# Patient Record
Sex: MALE | Race: BLACK OR AFRICAN AMERICAN | NOT HISPANIC OR LATINO | Employment: UNEMPLOYED | URBAN - METROPOLITAN AREA
[De-identification: names, ages, dates, MRNs, and addresses within clinical notes are randomized per-mention and may not be internally consistent; named-entity substitution may affect disease eponyms.]

---

## 2021-09-19 ENCOUNTER — HOSPITAL ENCOUNTER (INPATIENT)
Facility: OTHER | Age: 56
LOS: 17 days | Discharge: NURSING, REHAB OR LONG-TERM CARE | DRG: 239 | End: 2021-10-06
Attending: EMERGENCY MEDICINE | Admitting: INTERNAL MEDICINE
Payer: MEDICAID

## 2021-09-19 DIAGNOSIS — R00.0 TACHYCARDIA: ICD-10-CM

## 2021-09-19 DIAGNOSIS — S91.301D OPEN WOUND OF FOOT, RIGHT, SUBSEQUENT ENCOUNTER: ICD-10-CM

## 2021-09-19 DIAGNOSIS — A41.9 SEPSIS, DUE TO UNSPECIFIED ORGANISM, UNSPECIFIED WHETHER ACUTE ORGAN DYSFUNCTION PRESENT: ICD-10-CM

## 2021-09-19 DIAGNOSIS — I96 GANGRENE OF TOE OF RIGHT FOOT: ICD-10-CM

## 2021-09-19 DIAGNOSIS — E11.52 DIABETIC WET GANGRENE OF THE FOOT: Primary | ICD-10-CM

## 2021-09-19 DIAGNOSIS — F32.A DEPRESSION, UNSPECIFIED DEPRESSION TYPE: ICD-10-CM

## 2021-09-19 DIAGNOSIS — E66.01 CLASS 2 SEVERE OBESITY DUE TO EXCESS CALORIES WITH SERIOUS COMORBIDITY AND BODY MASS INDEX (BMI) OF 38.0 TO 38.9 IN ADULT: ICD-10-CM

## 2021-09-19 DIAGNOSIS — Z79.4 TYPE 2 DIABETES MELLITUS WITH HYPERGLYCEMIA, WITH LONG-TERM CURRENT USE OF INSULIN: ICD-10-CM

## 2021-09-19 DIAGNOSIS — I10 BENIGN ESSENTIAL HTN: ICD-10-CM

## 2021-09-19 DIAGNOSIS — E78.5 HYPERLIPIDEMIA, UNSPECIFIED HYPERLIPIDEMIA TYPE: ICD-10-CM

## 2021-09-19 DIAGNOSIS — N17.9 AKI (ACUTE KIDNEY INJURY): ICD-10-CM

## 2021-09-19 DIAGNOSIS — E11.65 TYPE 2 DIABETES MELLITUS WITH HYPERGLYCEMIA, WITH LONG-TERM CURRENT USE OF INSULIN: ICD-10-CM

## 2021-09-19 LAB
ALLENS TEST: ABNORMAL
B-OH-BUTYR BLD STRIP-SCNC: 0.6 MMOL/L (ref 0–0.5)
HCO3 UR-SCNC: 31.8 MMOL/L (ref 24–28)
HCT VFR BLD CALC: 49 %PCV (ref 36–54)
HGB BLD-MCNC: 17 G/DL
PCO2 BLDA: 40.2 MMHG (ref 35–45)
PH SMN: 7.51 [PH] (ref 7.35–7.45)
PO2 BLDA: 39 MMHG (ref 40–60)
POC BE: 9 MMOL/L
POC IONIZED CALCIUM: 1.15 MMOL/L (ref 1.06–1.42)
POC SATURATED O2: 78 % (ref 95–100)
POC TCO2: 33 MMOL/L (ref 24–29)
POCT GLUCOSE: 437 MG/DL (ref 70–110)
POTASSIUM BLD-SCNC: 3.3 MMOL/L (ref 3.5–5.1)
SAMPLE: ABNORMAL
SITE: ABNORMAL
SODIUM BLD-SCNC: 129 MMOL/L (ref 136–145)

## 2021-09-19 PROCEDURE — U0002 COVID-19 LAB TEST NON-CDC: HCPCS | Performed by: EMERGENCY MEDICINE

## 2021-09-19 PROCEDURE — 99900035 HC TECH TIME PER 15 MIN (STAT)

## 2021-09-19 PROCEDURE — 80053 COMPREHEN METABOLIC PANEL: CPT | Performed by: EMERGENCY MEDICINE

## 2021-09-19 PROCEDURE — 96361 HYDRATE IV INFUSION ADD-ON: CPT

## 2021-09-19 PROCEDURE — 25000003 PHARM REV CODE 250: Performed by: EMERGENCY MEDICINE

## 2021-09-19 PROCEDURE — 93005 ELECTROCARDIOGRAM TRACING: CPT

## 2021-09-19 PROCEDURE — 82010 KETONE BODYS QUAN: CPT | Performed by: EMERGENCY MEDICINE

## 2021-09-19 PROCEDURE — 82962 GLUCOSE BLOOD TEST: CPT

## 2021-09-19 PROCEDURE — 87186 SC STD MICRODIL/AGAR DIL: CPT | Performed by: EMERGENCY MEDICINE

## 2021-09-19 PROCEDURE — 82803 BLOOD GASES ANY COMBINATION: CPT

## 2021-09-19 PROCEDURE — 87147 CULTURE TYPE IMMUNOLOGIC: CPT | Performed by: EMERGENCY MEDICINE

## 2021-09-19 PROCEDURE — 83605 ASSAY OF LACTIC ACID: CPT | Performed by: EMERGENCY MEDICINE

## 2021-09-19 PROCEDURE — 87040 BLOOD CULTURE FOR BACTERIA: CPT | Mod: 59 | Performed by: EMERGENCY MEDICINE

## 2021-09-19 PROCEDURE — 12000002 HC ACUTE/MED SURGE SEMI-PRIVATE ROOM

## 2021-09-19 PROCEDURE — 83735 ASSAY OF MAGNESIUM: CPT | Performed by: EMERGENCY MEDICINE

## 2021-09-19 PROCEDURE — 85025 COMPLETE CBC W/AUTO DIFF WBC: CPT | Performed by: EMERGENCY MEDICINE

## 2021-09-19 RX ORDER — HYDROCHLOROTHIAZIDE 25 MG/1
25 TABLET ORAL DAILY
Status: ON HOLD | COMMUNITY
Start: 2021-09-16 | End: 2021-10-04 | Stop reason: HOSPADM

## 2021-09-19 RX ORDER — INSULIN GLARGINE 100 [IU]/ML
100 INJECTION, SOLUTION SUBCUTANEOUS DAILY
Status: ON HOLD | COMMUNITY
Start: 2021-08-23 | End: 2021-10-04 | Stop reason: HOSPADM

## 2021-09-19 RX ORDER — SODIUM CHLORIDE 9 MG/ML
INJECTION, SOLUTION INTRAVENOUS
Status: COMPLETED | OUTPATIENT
Start: 2021-09-19 | End: 2021-09-20

## 2021-09-19 RX ORDER — METFORMIN HYDROCHLORIDE 1000 MG/1
1000 TABLET ORAL 2 TIMES DAILY
Status: ON HOLD | COMMUNITY
Start: 2021-08-23 | End: 2022-07-15 | Stop reason: SDUPTHER

## 2021-09-19 RX ORDER — ASPIRIN 81 MG/1
81 TABLET ORAL DAILY
Status: ON HOLD | COMMUNITY
Start: 2021-09-16 | End: 2022-07-15 | Stop reason: SDUPTHER

## 2021-09-19 RX ORDER — QUETIAPINE FUMARATE 100 MG/1
100 TABLET, FILM COATED ORAL NIGHTLY
COMMUNITY
Start: 2021-04-29 | End: 2021-12-07

## 2021-09-19 RX ORDER — LOSARTAN POTASSIUM 25 MG/1
25 TABLET ORAL 2 TIMES DAILY
COMMUNITY
Start: 2021-08-23 | End: 2022-07-15

## 2021-09-19 RX ORDER — LISINOPRIL 10 MG/1
10 TABLET ORAL DAILY
Status: ON HOLD | COMMUNITY
Start: 2021-07-30 | End: 2021-10-04 | Stop reason: HOSPADM

## 2021-09-19 RX ORDER — ATORVASTATIN CALCIUM 20 MG/1
40 TABLET, FILM COATED ORAL DAILY
Status: ON HOLD | COMMUNITY
Start: 2021-08-04 | End: 2022-07-15 | Stop reason: SDUPTHER

## 2021-09-19 RX ADMIN — SODIUM CHLORIDE: 0.9 INJECTION, SOLUTION INTRAVENOUS at 11:09

## 2021-09-20 PROBLEM — Z79.4 TYPE 2 DIABETES MELLITUS, WITH LONG-TERM CURRENT USE OF INSULIN: Status: ACTIVE | Noted: 2021-09-20

## 2021-09-20 PROBLEM — F32.A DEPRESSION: Status: ACTIVE | Noted: 2021-09-20

## 2021-09-20 PROBLEM — I10 BENIGN ESSENTIAL HTN: Status: ACTIVE | Noted: 2021-09-20

## 2021-09-20 PROBLEM — N17.9 AKI (ACUTE KIDNEY INJURY): Status: ACTIVE | Noted: 2021-09-20

## 2021-09-20 PROBLEM — E78.5 HLD (HYPERLIPIDEMIA): Status: ACTIVE | Noted: 2021-09-20

## 2021-09-20 PROBLEM — E66.9 CLASS 2 OBESITY IN ADULT: Status: ACTIVE | Noted: 2021-09-20

## 2021-09-20 PROBLEM — E11.9 TYPE 2 DIABETES MELLITUS, WITH LONG-TERM CURRENT USE OF INSULIN: Status: ACTIVE | Noted: 2021-09-20

## 2021-09-20 PROBLEM — E11.52 DIABETIC WET GANGRENE OF THE FOOT: Status: ACTIVE | Noted: 2021-09-20

## 2021-09-20 LAB
ALBUMIN SERPL BCP-MCNC: 2.5 G/DL (ref 3.5–5.2)
ALP SERPL-CCNC: 77 U/L (ref 55–135)
ALT SERPL W/O P-5'-P-CCNC: 34 U/L (ref 10–44)
AMORPH CRY URNS QL MICRO: NORMAL
ANION GAP SERPL CALC-SCNC: 12 MMOL/L (ref 8–16)
ANION GAP SERPL CALC-SCNC: 15 MMOL/L (ref 8–16)
AST SERPL-CCNC: 33 U/L (ref 10–40)
BACTERIA #/AREA URNS HPF: NORMAL /HPF
BASOPHILS # BLD AUTO: 0.05 K/UL (ref 0–0.2)
BASOPHILS # BLD AUTO: 0.06 K/UL (ref 0–0.2)
BASOPHILS NFR BLD: 0.2 % (ref 0–1.9)
BASOPHILS NFR BLD: 0.3 % (ref 0–1.9)
BILIRUB SERPL-MCNC: 1.2 MG/DL (ref 0.1–1)
BILIRUB UR QL STRIP: NEGATIVE
BNP SERPL-MCNC: 47 PG/ML (ref 0–99)
BUN SERPL-MCNC: 16 MG/DL (ref 6–20)
BUN SERPL-MCNC: 16 MG/DL (ref 6–20)
CALCIUM SERPL-MCNC: 8.5 MG/DL (ref 8.7–10.5)
CALCIUM SERPL-MCNC: 9.8 MG/DL (ref 8.7–10.5)
CHLORIDE SERPL-SCNC: 88 MMOL/L (ref 95–110)
CHLORIDE SERPL-SCNC: 94 MMOL/L (ref 95–110)
CLARITY UR: CLEAR
CO2 SERPL-SCNC: 24 MMOL/L (ref 23–29)
CO2 SERPL-SCNC: 25 MMOL/L (ref 23–29)
COLOR UR: YELLOW
CREAT SERPL-MCNC: 1.3 MG/DL (ref 0.5–1.4)
CREAT SERPL-MCNC: 1.5 MG/DL (ref 0.5–1.4)
CREAT UR-MCNC: 78.8 MG/DL (ref 23–375)
CRP SERPL-MCNC: 339.3 MG/L (ref 0–8.2)
CTP QC/QA: YES
DIFFERENTIAL METHOD: ABNORMAL
DIFFERENTIAL METHOD: ABNORMAL
EOSINOPHIL # BLD AUTO: 0 K/UL (ref 0–0.5)
EOSINOPHIL # BLD AUTO: 0.1 K/UL (ref 0–0.5)
EOSINOPHIL NFR BLD: 0 % (ref 0–8)
EOSINOPHIL NFR BLD: 0.3 % (ref 0–8)
ERYTHROCYTE [DISTWIDTH] IN BLOOD BY AUTOMATED COUNT: 12.6 % (ref 11.5–14.5)
ERYTHROCYTE [DISTWIDTH] IN BLOOD BY AUTOMATED COUNT: 12.6 % (ref 11.5–14.5)
ERYTHROCYTE [SEDIMENTATION RATE] IN BLOOD: 120 MM/HR (ref 0–10)
EST. GFR  (AFRICAN AMERICAN): 58 ML/MIN/1.73 M^2
EST. GFR  (AFRICAN AMERICAN): >60 ML/MIN/1.73 M^2
EST. GFR  (NON AFRICAN AMERICAN): 51 ML/MIN/1.73 M^2
EST. GFR  (NON AFRICAN AMERICAN): >60 ML/MIN/1.73 M^2
ESTIMATED AVG GLUCOSE: 315 MG/DL (ref 68–131)
GLUCOSE SERPL-MCNC: 375 MG/DL (ref 70–110)
GLUCOSE SERPL-MCNC: 497 MG/DL (ref 70–110)
GLUCOSE UR QL STRIP: ABNORMAL
HBA1C MFR BLD: 12.6 % (ref 4–5.6)
HCT VFR BLD AUTO: 32 % (ref 40–54)
HCT VFR BLD AUTO: 36 % (ref 40–54)
HGB BLD-MCNC: 10.3 G/DL (ref 14–18)
HGB BLD-MCNC: 11.9 G/DL (ref 14–18)
HGB UR QL STRIP: ABNORMAL
HYALINE CASTS #/AREA URNS LPF: 0 /LPF
IMM GRANULOCYTES # BLD AUTO: 0.32 K/UL (ref 0–0.04)
IMM GRANULOCYTES # BLD AUTO: 0.84 K/UL (ref 0–0.04)
IMM GRANULOCYTES NFR BLD AUTO: 1.3 % (ref 0–0.5)
IMM GRANULOCYTES NFR BLD AUTO: 3.6 % (ref 0–0.5)
KETONES UR QL STRIP: NEGATIVE
LACTATE SERPL-SCNC: 1.5 MMOL/L (ref 0.5–2.2)
LACTATE SERPL-SCNC: 2 MMOL/L (ref 0.5–2.2)
LEUKOCYTE ESTERASE UR QL STRIP: NEGATIVE
LYMPHOCYTES # BLD AUTO: 1.1 K/UL (ref 1–4.8)
LYMPHOCYTES # BLD AUTO: 1.6 K/UL (ref 1–4.8)
LYMPHOCYTES NFR BLD: 4.7 % (ref 18–48)
LYMPHOCYTES NFR BLD: 6.9 % (ref 18–48)
MAGNESIUM SERPL-MCNC: 1.6 MG/DL (ref 1.6–2.6)
MAGNESIUM SERPL-MCNC: 1.6 MG/DL (ref 1.6–2.6)
MCH RBC QN AUTO: 27.8 PG (ref 27–31)
MCH RBC QN AUTO: 28.3 PG (ref 27–31)
MCHC RBC AUTO-ENTMCNC: 32.2 G/DL (ref 32–36)
MCHC RBC AUTO-ENTMCNC: 33.1 G/DL (ref 32–36)
MCV RBC AUTO: 86 FL (ref 82–98)
MCV RBC AUTO: 87 FL (ref 82–98)
MICROSCOPIC COMMENT: NORMAL
MONOCYTES # BLD AUTO: 2.1 K/UL (ref 0.3–1)
MONOCYTES # BLD AUTO: 2.3 K/UL (ref 0.3–1)
MONOCYTES NFR BLD: 9.2 % (ref 4–15)
MONOCYTES NFR BLD: 9.6 % (ref 4–15)
NEUTROPHILS # BLD AUTO: 18.6 K/UL (ref 1.8–7.7)
NEUTROPHILS # BLD AUTO: 20 K/UL (ref 1.8–7.7)
NEUTROPHILS NFR BLD: 80 % (ref 38–73)
NEUTROPHILS NFR BLD: 83.9 % (ref 38–73)
NITRITE UR QL STRIP: NEGATIVE
NRBC BLD-RTO: 0 /100 WBC
NRBC BLD-RTO: 0 /100 WBC
OSMOLALITY UR: 493 MOSM/KG (ref 50–1200)
PH UR STRIP: 6 [PH] (ref 5–8)
PLATELET # BLD AUTO: 317 K/UL (ref 150–450)
PLATELET # BLD AUTO: 368 K/UL (ref 150–450)
PLATELET BLD QL SMEAR: ABNORMAL
PLATELET BLD QL SMEAR: ABNORMAL
PMV BLD AUTO: 10 FL (ref 9.2–12.9)
PMV BLD AUTO: 10 FL (ref 9.2–12.9)
POCT GLUCOSE: 261 MG/DL (ref 70–110)
POCT GLUCOSE: 268 MG/DL (ref 70–110)
POCT GLUCOSE: 357 MG/DL (ref 70–110)
POCT GLUCOSE: 367 MG/DL (ref 70–110)
POTASSIUM SERPL-SCNC: 3.3 MMOL/L (ref 3.5–5.1)
POTASSIUM SERPL-SCNC: 3.6 MMOL/L (ref 3.5–5.1)
PROT SERPL-MCNC: 9.1 G/DL (ref 6–8.4)
PROT UR QL STRIP: ABNORMAL
PROT UR-MCNC: 29 MG/DL (ref 0–15)
PROT/CREAT UR: 0.37 MG/G{CREAT} (ref 0–0.2)
RBC # BLD AUTO: 3.7 M/UL (ref 4.6–6.2)
RBC # BLD AUTO: 4.21 M/UL (ref 4.6–6.2)
RBC #/AREA URNS HPF: 2 /HPF (ref 0–4)
SARS-COV-2 RDRP RESP QL NAA+PROBE: NEGATIVE
SODIUM SERPL-SCNC: 127 MMOL/L (ref 136–145)
SODIUM SERPL-SCNC: 131 MMOL/L (ref 136–145)
SODIUM UR-SCNC: <20 MMOL/L (ref 20–250)
SP GR UR STRIP: 1.01 (ref 1–1.03)
SQUAMOUS #/AREA URNS HPF: 0 /HPF
URN SPEC COLLECT METH UR: ABNORMAL
UROBILINOGEN UR STRIP-ACNC: ABNORMAL EU/DL
WBC # BLD AUTO: 23.19 K/UL (ref 3.9–12.7)
WBC # BLD AUTO: 23.78 K/UL (ref 3.9–12.7)
WBC #/AREA URNS HPF: 0 /HPF (ref 0–5)
YEAST URNS QL MICRO: NORMAL

## 2021-09-20 PROCEDURE — 99223 PR INITIAL HOSPITAL CARE,LEVL III: ICD-10-PCS | Mod: ,,, | Performed by: INTERNAL MEDICINE

## 2021-09-20 PROCEDURE — 87076 CULTURE ANAEROBE IDENT EACH: CPT | Performed by: PODIATRIST

## 2021-09-20 PROCEDURE — 83735 ASSAY OF MAGNESIUM: CPT | Performed by: INTERNAL MEDICINE

## 2021-09-20 PROCEDURE — 83935 ASSAY OF URINE OSMOLALITY: CPT | Performed by: PHYSICIAN ASSISTANT

## 2021-09-20 PROCEDURE — 36415 COLL VENOUS BLD VENIPUNCTURE: CPT | Performed by: EMERGENCY MEDICINE

## 2021-09-20 PROCEDURE — 25000003 PHARM REV CODE 250: Performed by: EMERGENCY MEDICINE

## 2021-09-20 PROCEDURE — 84156 ASSAY OF PROTEIN URINE: CPT | Performed by: PHYSICIAN ASSISTANT

## 2021-09-20 PROCEDURE — 87070 CULTURE OTHR SPECIMN AEROBIC: CPT | Performed by: PODIATRIST

## 2021-09-20 PROCEDURE — 96365 THER/PROPH/DIAG IV INF INIT: CPT

## 2021-09-20 PROCEDURE — 87102 FUNGUS ISOLATION CULTURE: CPT | Performed by: PODIATRIST

## 2021-09-20 PROCEDURE — 63600175 PHARM REV CODE 636 W HCPCS: Performed by: PHYSICIAN ASSISTANT

## 2021-09-20 PROCEDURE — 87075 CULTR BACTERIA EXCEPT BLOOD: CPT | Performed by: PODIATRIST

## 2021-09-20 PROCEDURE — 80048 BASIC METABOLIC PNL TOTAL CA: CPT | Performed by: INTERNAL MEDICINE

## 2021-09-20 PROCEDURE — 87015 SPECIMEN INFECT AGNT CONCNTJ: CPT | Performed by: PODIATRIST

## 2021-09-20 PROCEDURE — 83036 HEMOGLOBIN GLYCOSYLATED A1C: CPT | Performed by: PHYSICIAN ASSISTANT

## 2021-09-20 PROCEDURE — 36415 COLL VENOUS BLD VENIPUNCTURE: CPT | Performed by: PHYSICIAN ASSISTANT

## 2021-09-20 PROCEDURE — 85025 COMPLETE CBC W/AUTO DIFF WBC: CPT | Performed by: INTERNAL MEDICINE

## 2021-09-20 PROCEDURE — 25000003 PHARM REV CODE 250: Performed by: INTERNAL MEDICINE

## 2021-09-20 PROCEDURE — 87206 SMEAR FLUORESCENT/ACID STAI: CPT | Performed by: PODIATRIST

## 2021-09-20 PROCEDURE — 85651 RBC SED RATE NONAUTOMATED: CPT | Performed by: INTERNAL MEDICINE

## 2021-09-20 PROCEDURE — 99223 1ST HOSP IP/OBS HIGH 75: CPT | Mod: ,,, | Performed by: PODIATRIST

## 2021-09-20 PROCEDURE — 83605 ASSAY OF LACTIC ACID: CPT | Performed by: PHYSICIAN ASSISTANT

## 2021-09-20 PROCEDURE — 86140 C-REACTIVE PROTEIN: CPT | Performed by: INTERNAL MEDICINE

## 2021-09-20 PROCEDURE — 99223 PR INITIAL HOSPITAL CARE,LEVL III: ICD-10-PCS | Mod: ,,, | Performed by: PODIATRIST

## 2021-09-20 PROCEDURE — 83880 ASSAY OF NATRIURETIC PEPTIDE: CPT | Performed by: EMERGENCY MEDICINE

## 2021-09-20 PROCEDURE — 84300 ASSAY OF URINE SODIUM: CPT | Performed by: PHYSICIAN ASSISTANT

## 2021-09-20 PROCEDURE — 63600175 PHARM REV CODE 636 W HCPCS: Performed by: EMERGENCY MEDICINE

## 2021-09-20 PROCEDURE — 99223 1ST HOSP IP/OBS HIGH 75: CPT | Mod: ,,, | Performed by: INTERNAL MEDICINE

## 2021-09-20 PROCEDURE — 99291 CRITICAL CARE FIRST HOUR: CPT | Mod: 25

## 2021-09-20 PROCEDURE — 81000 URINALYSIS NONAUTO W/SCOPE: CPT | Performed by: EMERGENCY MEDICINE

## 2021-09-20 PROCEDURE — 25000003 PHARM REV CODE 250: Performed by: PHYSICIAN ASSISTANT

## 2021-09-20 PROCEDURE — 36415 COLL VENOUS BLD VENIPUNCTURE: CPT | Performed by: INTERNAL MEDICINE

## 2021-09-20 PROCEDURE — 63600175 PHARM REV CODE 636 W HCPCS: Performed by: INTERNAL MEDICINE

## 2021-09-20 PROCEDURE — 87116 MYCOBACTERIA CULTURE: CPT | Performed by: PODIATRIST

## 2021-09-20 PROCEDURE — 12000002 HC ACUTE/MED SURGE SEMI-PRIVATE ROOM

## 2021-09-20 PROCEDURE — C9399 UNCLASSIFIED DRUGS OR BIOLOG: HCPCS | Performed by: PHYSICIAN ASSISTANT

## 2021-09-20 PROCEDURE — 96367 TX/PROPH/DG ADDL SEQ IV INF: CPT

## 2021-09-20 RX ORDER — ASPIRIN 81 MG/1
81 TABLET ORAL DAILY
Status: DISCONTINUED | OUTPATIENT
Start: 2021-09-20 | End: 2021-10-06 | Stop reason: HOSPADM

## 2021-09-20 RX ORDER — SODIUM CHLORIDE 9 MG/ML
INJECTION, SOLUTION INTRAVENOUS CONTINUOUS
Status: DISCONTINUED | OUTPATIENT
Start: 2021-09-20 | End: 2021-09-22

## 2021-09-20 RX ORDER — IBUPROFEN 200 MG
24 TABLET ORAL
Status: DISCONTINUED | OUTPATIENT
Start: 2021-09-20 | End: 2021-10-06 | Stop reason: HOSPADM

## 2021-09-20 RX ORDER — ACETAMINOPHEN 325 MG/1
650 TABLET ORAL EVERY 6 HOURS PRN
Status: DISCONTINUED | OUTPATIENT
Start: 2021-09-20 | End: 2021-09-21

## 2021-09-20 RX ORDER — HYDROCODONE BITARTRATE AND ACETAMINOPHEN 5; 325 MG/1; MG/1
1 TABLET ORAL EVERY 6 HOURS PRN
Status: DISCONTINUED | OUTPATIENT
Start: 2021-09-20 | End: 2021-09-21

## 2021-09-20 RX ORDER — IBUPROFEN 200 MG
16 TABLET ORAL
Status: DISCONTINUED | OUTPATIENT
Start: 2021-09-20 | End: 2021-10-06 | Stop reason: HOSPADM

## 2021-09-20 RX ORDER — MORPHINE SULFATE 2 MG/ML
2 INJECTION, SOLUTION INTRAMUSCULAR; INTRAVENOUS
Status: DISCONTINUED | OUTPATIENT
Start: 2021-09-20 | End: 2021-09-21

## 2021-09-20 RX ORDER — HYDRALAZINE HYDROCHLORIDE 25 MG/1
25 TABLET, FILM COATED ORAL EVERY 8 HOURS PRN
Status: DISCONTINUED | OUTPATIENT
Start: 2021-09-20 | End: 2021-09-26

## 2021-09-20 RX ORDER — SODIUM CHLORIDE 9 MG/ML
INJECTION, SOLUTION INTRAVENOUS
Status: COMPLETED | OUTPATIENT
Start: 2021-09-20 | End: 2021-09-20

## 2021-09-20 RX ORDER — ATORVASTATIN CALCIUM 20 MG/1
20 TABLET, FILM COATED ORAL DAILY
Status: DISCONTINUED | OUTPATIENT
Start: 2021-09-20 | End: 2021-10-06 | Stop reason: HOSPADM

## 2021-09-20 RX ORDER — MEROPENEM AND SODIUM CHLORIDE 1 G/50ML
1 INJECTION, SOLUTION INTRAVENOUS
Status: DISCONTINUED | OUTPATIENT
Start: 2021-09-20 | End: 2021-09-24

## 2021-09-20 RX ORDER — GLUCAGON 1 MG
1 KIT INJECTION
Status: DISCONTINUED | OUTPATIENT
Start: 2021-09-20 | End: 2021-10-06 | Stop reason: HOSPADM

## 2021-09-20 RX ORDER — INSULIN ASPART 100 [IU]/ML
0-5 INJECTION, SOLUTION INTRAVENOUS; SUBCUTANEOUS
Status: DISCONTINUED | OUTPATIENT
Start: 2021-09-20 | End: 2021-10-06 | Stop reason: HOSPADM

## 2021-09-20 RX ADMIN — PIPERACILLIN SODIUM AND TAZOBACTAM SODIUM 4.5 G: 4; .5 INJECTION, POWDER, FOR SOLUTION INTRAVENOUS at 12:09

## 2021-09-20 RX ADMIN — VANCOMYCIN HYDROCHLORIDE 1500 MG: 1.5 INJECTION, POWDER, LYOPHILIZED, FOR SOLUTION INTRAVENOUS at 02:09

## 2021-09-20 RX ADMIN — SODIUM CHLORIDE: 0.9 INJECTION, SOLUTION INTRAVENOUS at 11:09

## 2021-09-20 RX ADMIN — QUETIAPINE 125 MG: 100 TABLET ORAL at 02:09

## 2021-09-20 RX ADMIN — ATORVASTATIN CALCIUM 20 MG: 20 TABLET, FILM COATED ORAL at 09:09

## 2021-09-20 RX ADMIN — HYDROCODONE BITARTRATE AND ACETAMINOPHEN 1 TABLET: 5; 325 TABLET ORAL at 11:09

## 2021-09-20 RX ADMIN — HYDROCODONE BITARTRATE AND ACETAMINOPHEN 1 TABLET: 5; 325 TABLET ORAL at 06:09

## 2021-09-20 RX ADMIN — MEROPENEM AND SODIUM CHLORIDE 1 G: 1 INJECTION, SOLUTION INTRAVENOUS at 11:09

## 2021-09-20 RX ADMIN — SODIUM CHLORIDE: 0.9 INJECTION, SOLUTION INTRAVENOUS at 12:09

## 2021-09-20 RX ADMIN — INSULIN DETEMIR 40 UNITS: 100 INJECTION, SOLUTION SUBCUTANEOUS at 02:09

## 2021-09-20 RX ADMIN — POTASSIUM BICARBONATE 50 MEQ: 978 TABLET, EFFERVESCENT ORAL at 02:09

## 2021-09-20 RX ADMIN — INSULIN DETEMIR 40 UNITS: 100 INJECTION, SOLUTION SUBCUTANEOUS at 09:09

## 2021-09-20 RX ADMIN — MEROPENEM AND SODIUM CHLORIDE 1 G: 1 INJECTION, SOLUTION INTRAVENOUS at 02:09

## 2021-09-20 RX ADMIN — MORPHINE SULFATE 2 MG: 2 INJECTION, SOLUTION INTRAMUSCULAR; INTRAVENOUS at 01:09

## 2021-09-20 RX ADMIN — VANCOMYCIN HYDROCHLORIDE 2000 MG: 10 INJECTION, POWDER, LYOPHILIZED, FOR SOLUTION INTRAVENOUS at 12:09

## 2021-09-20 RX ADMIN — INSULIN ASPART 5 UNITS: 100 INJECTION, SOLUTION INTRAVENOUS; SUBCUTANEOUS at 07:09

## 2021-09-20 RX ADMIN — VANCOMYCIN HYDROCHLORIDE 500 MG: 500 INJECTION, POWDER, LYOPHILIZED, FOR SOLUTION INTRAVENOUS at 07:09

## 2021-09-20 RX ADMIN — INSULIN ASPART 3 UNITS: 100 INJECTION, SOLUTION INTRAVENOUS; SUBCUTANEOUS at 06:09

## 2021-09-20 RX ADMIN — ACETAMINOPHEN 650 MG: 325 TABLET ORAL at 02:09

## 2021-09-20 RX ADMIN — ASPIRIN 81 MG: 81 TABLET, COATED ORAL at 09:09

## 2021-09-20 RX ADMIN — QUETIAPINE 125 MG: 100 TABLET ORAL at 09:09

## 2021-09-20 RX ADMIN — INSULIN ASPART 5 UNITS: 100 INJECTION, SOLUTION INTRAVENOUS; SUBCUTANEOUS at 01:09

## 2021-09-20 RX ADMIN — MEROPENEM AND SODIUM CHLORIDE 1 G: 1 INJECTION, SOLUTION INTRAVENOUS at 09:09

## 2021-09-20 RX ADMIN — HYDROCODONE BITARTRATE AND ACETAMINOPHEN 1 TABLET: 5; 325 TABLET ORAL at 02:09

## 2021-09-20 RX ADMIN — SODIUM CHLORIDE: 0.9 INJECTION, SOLUTION INTRAVENOUS at 04:09

## 2021-09-21 ENCOUNTER — ANESTHESIA EVENT (OUTPATIENT)
Dept: SURGERY | Facility: OTHER | Age: 56
DRG: 239 | End: 2021-09-21
Payer: MEDICAID

## 2021-09-21 ENCOUNTER — ANESTHESIA (OUTPATIENT)
Dept: SURGERY | Facility: OTHER | Age: 56
DRG: 239 | End: 2021-09-21
Payer: MEDICAID

## 2021-09-21 LAB
POCT GLUCOSE: 194 MG/DL (ref 70–110)
POCT GLUCOSE: 213 MG/DL (ref 70–110)
POCT GLUCOSE: 235 MG/DL (ref 70–110)
VANCOMYCIN TROUGH SERPL-MCNC: 14.8 UG/ML (ref 10–22)

## 2021-09-21 PROCEDURE — 63600175 PHARM REV CODE 636 W HCPCS: Performed by: ANESTHESIOLOGY

## 2021-09-21 PROCEDURE — 63600175 PHARM REV CODE 636 W HCPCS: Performed by: PHYSICIAN ASSISTANT

## 2021-09-21 PROCEDURE — 87015 SPECIMEN INFECT AGNT CONCNTJ: CPT | Mod: 59 | Performed by: PODIATRIST

## 2021-09-21 PROCEDURE — 37000008 HC ANESTHESIA 1ST 15 MINUTES: Performed by: PODIATRIST

## 2021-09-21 PROCEDURE — 37000009 HC ANESTHESIA EA ADD 15 MINS: Performed by: PODIATRIST

## 2021-09-21 PROCEDURE — 28002 TREATMENT OF FOOT INFECTION: CPT | Mod: RT,,, | Performed by: PODIATRIST

## 2021-09-21 PROCEDURE — 87077 CULTURE AEROBIC IDENTIFY: CPT | Performed by: PODIATRIST

## 2021-09-21 PROCEDURE — 87116 MYCOBACTERIA CULTURE: CPT | Performed by: PODIATRIST

## 2021-09-21 PROCEDURE — 10061 I&D ABSCESS COMP/MULTIPLE: CPT | Mod: 59,LT,, | Performed by: PODIATRIST

## 2021-09-21 PROCEDURE — 87102 FUNGUS ISOLATION CULTURE: CPT | Performed by: PODIATRIST

## 2021-09-21 PROCEDURE — 25000003 PHARM REV CODE 250: Performed by: INTERNAL MEDICINE

## 2021-09-21 PROCEDURE — 25000003 PHARM REV CODE 250: Performed by: ANESTHESIOLOGY

## 2021-09-21 PROCEDURE — 99233 PR SUBSEQUENT HOSPITAL CARE,LEVL III: ICD-10-PCS | Mod: ,,, | Performed by: HOSPITALIST

## 2021-09-21 PROCEDURE — 10061 PR DRAIN SKIN ABSCESS COMPLIC: ICD-10-PCS | Mod: 59,LT,, | Performed by: PODIATRIST

## 2021-09-21 PROCEDURE — 63600175 PHARM REV CODE 636 W HCPCS: Performed by: INTERNAL MEDICINE

## 2021-09-21 PROCEDURE — 36415 COLL VENOUS BLD VENIPUNCTURE: CPT | Performed by: HOSPITALIST

## 2021-09-21 PROCEDURE — 25000003 PHARM REV CODE 250: Performed by: PHYSICIAN ASSISTANT

## 2021-09-21 PROCEDURE — 87206 SMEAR FLUORESCENT/ACID STAI: CPT | Mod: 91 | Performed by: PODIATRIST

## 2021-09-21 PROCEDURE — 99233 SBSQ HOSP IP/OBS HIGH 50: CPT | Mod: ,,, | Performed by: HOSPITALIST

## 2021-09-21 PROCEDURE — 87186 SC STD MICRODIL/AGAR DIL: CPT | Performed by: PODIATRIST

## 2021-09-21 PROCEDURE — 76942 ECHO GUIDE FOR BIOPSY: CPT | Performed by: ANESTHESIOLOGY

## 2021-09-21 PROCEDURE — 11000001 HC ACUTE MED/SURG PRIVATE ROOM

## 2021-09-21 PROCEDURE — 80202 ASSAY OF VANCOMYCIN: CPT | Performed by: HOSPITALIST

## 2021-09-21 PROCEDURE — 71000039 HC RECOVERY, EACH ADD'L HOUR: Performed by: PODIATRIST

## 2021-09-21 PROCEDURE — 36000704 HC OR TIME LEV I 1ST 15 MIN: Performed by: PODIATRIST

## 2021-09-21 PROCEDURE — 87205 SMEAR GRAM STAIN: CPT | Performed by: PODIATRIST

## 2021-09-21 PROCEDURE — 71000033 HC RECOVERY, INTIAL HOUR: Performed by: PODIATRIST

## 2021-09-21 PROCEDURE — 25000003 PHARM REV CODE 250: Performed by: PODIATRIST

## 2021-09-21 PROCEDURE — 36000705 HC OR TIME LEV I EA ADD 15 MIN: Performed by: PODIATRIST

## 2021-09-21 PROCEDURE — 94761 N-INVAS EAR/PLS OXIMETRY MLT: CPT

## 2021-09-21 PROCEDURE — 87076 CULTURE ANAEROBE IDENT EACH: CPT | Performed by: PODIATRIST

## 2021-09-21 PROCEDURE — 28002 PR DEEP DISSEC FOOT INFEC,1 BURSA: ICD-10-PCS | Mod: RT,,, | Performed by: PODIATRIST

## 2021-09-21 PROCEDURE — 87070 CULTURE OTHR SPECIMN AEROBIC: CPT | Mod: 59 | Performed by: PODIATRIST

## 2021-09-21 PROCEDURE — 63600175 PHARM REV CODE 636 W HCPCS: Performed by: EMERGENCY MEDICINE

## 2021-09-21 PROCEDURE — 63600175 PHARM REV CODE 636 W HCPCS

## 2021-09-21 PROCEDURE — 63600175 PHARM REV CODE 636 W HCPCS: Performed by: PODIATRIST

## 2021-09-21 PROCEDURE — 87075 CULTR BACTERIA EXCEPT BLOOD: CPT | Performed by: PODIATRIST

## 2021-09-21 RX ORDER — FENTANYL CITRATE 50 UG/ML
INJECTION, SOLUTION INTRAMUSCULAR; INTRAVENOUS
Status: DISCONTINUED | OUTPATIENT
Start: 2021-09-21 | End: 2021-09-21

## 2021-09-21 RX ORDER — OXYCODONE HYDROCHLORIDE 5 MG/1
5 TABLET ORAL
Status: DISCONTINUED | OUTPATIENT
Start: 2021-09-21 | End: 2021-09-21

## 2021-09-21 RX ORDER — ROPIVACAINE HYDROCHLORIDE 5 MG/ML
INJECTION, SOLUTION EPIDURAL; INFILTRATION; PERINEURAL
Status: COMPLETED | OUTPATIENT
Start: 2021-09-21 | End: 2021-09-21

## 2021-09-21 RX ORDER — SODIUM CHLORIDE 0.9 % (FLUSH) 0.9 %
3 SYRINGE (ML) INJECTION
Status: DISCONTINUED | OUTPATIENT
Start: 2021-09-21 | End: 2021-09-21

## 2021-09-21 RX ORDER — FENTANYL CITRATE 50 UG/ML
100 INJECTION, SOLUTION INTRAMUSCULAR; INTRAVENOUS EVERY 5 MIN PRN
Status: CANCELLED | OUTPATIENT
Start: 2021-09-21

## 2021-09-21 RX ORDER — VANCOMYCIN HYDROCHLORIDE 1 G/20ML
INJECTION, POWDER, LYOPHILIZED, FOR SOLUTION INTRAVENOUS
Status: DISCONTINUED | OUTPATIENT
Start: 2021-09-21 | End: 2021-09-21 | Stop reason: HOSPADM

## 2021-09-21 RX ORDER — BACITRACIN ZINC 500 UNIT/G
OINTMENT (GRAM) TOPICAL
Status: DISCONTINUED | OUTPATIENT
Start: 2021-09-21 | End: 2021-09-21 | Stop reason: HOSPADM

## 2021-09-21 RX ORDER — ACETAMINOPHEN 500 MG
1000 TABLET ORAL EVERY 8 HOURS PRN
Status: DISCONTINUED | OUTPATIENT
Start: 2021-09-22 | End: 2021-10-06 | Stop reason: HOSPADM

## 2021-09-21 RX ORDER — HYDROMORPHONE HYDROCHLORIDE 2 MG/ML
0.5 INJECTION, SOLUTION INTRAMUSCULAR; INTRAVENOUS; SUBCUTANEOUS
Status: DISCONTINUED | OUTPATIENT
Start: 2021-09-22 | End: 2021-09-23

## 2021-09-21 RX ORDER — PROCHLORPERAZINE EDISYLATE 5 MG/ML
5 INJECTION INTRAMUSCULAR; INTRAVENOUS EVERY 30 MIN PRN
Status: DISCONTINUED | OUTPATIENT
Start: 2021-09-21 | End: 2021-09-21

## 2021-09-21 RX ORDER — OXYCODONE HYDROCHLORIDE 5 MG/1
5 TABLET ORAL EVERY 4 HOURS PRN
Status: DISCONTINUED | OUTPATIENT
Start: 2021-09-21 | End: 2021-10-06 | Stop reason: HOSPADM

## 2021-09-21 RX ORDER — HYDROMORPHONE HYDROCHLORIDE 2 MG/ML
0.4 INJECTION, SOLUTION INTRAMUSCULAR; INTRAVENOUS; SUBCUTANEOUS EVERY 5 MIN PRN
Status: DISCONTINUED | OUTPATIENT
Start: 2021-09-21 | End: 2021-09-21

## 2021-09-21 RX ORDER — DIPHENHYDRAMINE HYDROCHLORIDE 50 MG/ML
12.5 INJECTION INTRAMUSCULAR; INTRAVENOUS EVERY 30 MIN PRN
Status: DISCONTINUED | OUTPATIENT
Start: 2021-09-21 | End: 2021-09-23

## 2021-09-21 RX ORDER — MIDAZOLAM HYDROCHLORIDE 1 MG/ML
INJECTION INTRAMUSCULAR; INTRAVENOUS
Status: DISCONTINUED | OUTPATIENT
Start: 2021-09-21 | End: 2021-09-21

## 2021-09-21 RX ORDER — LIDOCAINE HYDROCHLORIDE 20 MG/ML
INJECTION, SOLUTION EPIDURAL; INFILTRATION; INTRACAUDAL; PERINEURAL
Status: DISCONTINUED | OUTPATIENT
Start: 2021-09-21 | End: 2021-09-21 | Stop reason: HOSPADM

## 2021-09-21 RX ORDER — BUPIVACAINE HYDROCHLORIDE 5 MG/ML
INJECTION, SOLUTION EPIDURAL; INTRACAUDAL
Status: DISCONTINUED | OUTPATIENT
Start: 2021-09-21 | End: 2021-09-21 | Stop reason: HOSPADM

## 2021-09-21 RX ORDER — MIDAZOLAM HYDROCHLORIDE 1 MG/ML
2 INJECTION INTRAMUSCULAR; INTRAVENOUS
Status: CANCELLED | OUTPATIENT
Start: 2021-09-21 | End: 2021-09-21

## 2021-09-21 RX ADMIN — INSULIN DETEMIR 40 UNITS: 100 INJECTION, SOLUTION SUBCUTANEOUS at 09:09

## 2021-09-21 RX ADMIN — FENTANYL CITRATE 100 MCG: 50 INJECTION, SOLUTION INTRAMUSCULAR; INTRAVENOUS at 08:09

## 2021-09-21 RX ADMIN — VANCOMYCIN HYDROCHLORIDE 1500 MG: 1.5 INJECTION, POWDER, LYOPHILIZED, FOR SOLUTION INTRAVENOUS at 03:09

## 2021-09-21 RX ADMIN — HYDROCODONE BITARTRATE AND ACETAMINOPHEN 1 TABLET: 5; 325 TABLET ORAL at 09:09

## 2021-09-21 RX ADMIN — MORPHINE SULFATE 2 MG: 2 INJECTION, SOLUTION INTRAMUSCULAR; INTRAVENOUS at 06:09

## 2021-09-21 RX ADMIN — HYDROMORPHONE HYDROCHLORIDE 0.4 MG: 2 INJECTION INTRAMUSCULAR; INTRAVENOUS; SUBCUTANEOUS at 10:09

## 2021-09-21 RX ADMIN — OXYCODONE 5 MG: 5 TABLET ORAL at 10:09

## 2021-09-21 RX ADMIN — MEROPENEM AND SODIUM CHLORIDE 1 G: 1 INJECTION, SOLUTION INTRAVENOUS at 04:09

## 2021-09-21 RX ADMIN — VANCOMYCIN HYDROCHLORIDE 1500 MG: 1.5 INJECTION, POWDER, LYOPHILIZED, FOR SOLUTION INTRAVENOUS at 02:09

## 2021-09-21 RX ADMIN — MEROPENEM AND SODIUM CHLORIDE 1 G: 1 INJECTION, SOLUTION INTRAVENOUS at 07:09

## 2021-09-21 RX ADMIN — INSULIN ASPART 2 UNITS: 100 INJECTION, SOLUTION INTRAVENOUS; SUBCUTANEOUS at 04:09

## 2021-09-21 RX ADMIN — MEROPENEM AND SODIUM CHLORIDE 1 G: 1 INJECTION, SOLUTION INTRAVENOUS at 01:09

## 2021-09-21 RX ADMIN — MIDAZOLAM HYDROCHLORIDE 2 MG: 1 INJECTION, SOLUTION INTRAMUSCULAR; INTRAVENOUS at 08:09

## 2021-09-21 RX ADMIN — ROPIVACAINE HYDROCHLORIDE 30 ML: 5 INJECTION, SOLUTION EPIDURAL; INFILTRATION; PERINEURAL at 08:09

## 2021-09-21 RX ADMIN — SODIUM CHLORIDE: 0.9 INJECTION, SOLUTION INTRAVENOUS at 07:09

## 2021-09-21 RX ADMIN — QUETIAPINE 125 MG: 100 TABLET ORAL at 09:09

## 2021-09-21 RX ADMIN — INSULIN ASPART 1 UNITS: 100 INJECTION, SOLUTION INTRAVENOUS; SUBCUTANEOUS at 09:09

## 2021-09-22 LAB
ANION GAP SERPL CALC-SCNC: 12 MMOL/L (ref 8–16)
BASOPHILS # BLD AUTO: 0.06 K/UL (ref 0–0.2)
BASOPHILS NFR BLD: 0.3 % (ref 0–1.9)
BUN SERPL-MCNC: 12 MG/DL (ref 6–20)
CALCIUM SERPL-MCNC: 8.7 MG/DL (ref 8.7–10.5)
CHLORIDE SERPL-SCNC: 99 MMOL/L (ref 95–110)
CO2 SERPL-SCNC: 24 MMOL/L (ref 23–29)
CREAT SERPL-MCNC: 0.9 MG/DL (ref 0.5–1.4)
DIFFERENTIAL METHOD: ABNORMAL
EOSINOPHIL # BLD AUTO: 0.1 K/UL (ref 0–0.5)
EOSINOPHIL NFR BLD: 0.7 % (ref 0–8)
ERYTHROCYTE [DISTWIDTH] IN BLOOD BY AUTOMATED COUNT: 13.1 % (ref 11.5–14.5)
EST. GFR  (AFRICAN AMERICAN): >60 ML/MIN/1.73 M^2
EST. GFR  (NON AFRICAN AMERICAN): >60 ML/MIN/1.73 M^2
GLUCOSE SERPL-MCNC: 178 MG/DL (ref 70–110)
HCT VFR BLD AUTO: 34.3 % (ref 40–54)
HGB BLD-MCNC: 10.7 G/DL (ref 14–18)
IMM GRANULOCYTES # BLD AUTO: 0.15 K/UL (ref 0–0.04)
IMM GRANULOCYTES NFR BLD AUTO: 0.8 % (ref 0–0.5)
LYMPHOCYTES # BLD AUTO: 1.9 K/UL (ref 1–4.8)
LYMPHOCYTES NFR BLD: 10.9 % (ref 18–48)
MAGNESIUM SERPL-MCNC: 2 MG/DL (ref 1.6–2.6)
MCH RBC QN AUTO: 27.9 PG (ref 27–31)
MCHC RBC AUTO-ENTMCNC: 31.2 G/DL (ref 32–36)
MCV RBC AUTO: 89 FL (ref 82–98)
MONOCYTES # BLD AUTO: 1.9 K/UL (ref 0.3–1)
MONOCYTES NFR BLD: 10.6 % (ref 4–15)
NEUTROPHILS # BLD AUTO: 13.6 K/UL (ref 1.8–7.7)
NEUTROPHILS NFR BLD: 76.7 % (ref 38–73)
NRBC BLD-RTO: 0 /100 WBC
PHOSPHATE SERPL-MCNC: 1.1 MG/DL (ref 2.7–4.5)
PLATELET # BLD AUTO: 315 K/UL (ref 150–450)
PMV BLD AUTO: 10 FL (ref 9.2–12.9)
POCT GLUCOSE: 169 MG/DL (ref 70–110)
POCT GLUCOSE: 230 MG/DL (ref 70–110)
POCT GLUCOSE: 241 MG/DL (ref 70–110)
POCT GLUCOSE: 262 MG/DL (ref 70–110)
POTASSIUM SERPL-SCNC: 3.3 MMOL/L (ref 3.5–5.1)
RBC # BLD AUTO: 3.84 M/UL (ref 4.6–6.2)
SODIUM SERPL-SCNC: 135 MMOL/L (ref 136–145)
WBC # BLD AUTO: 17.68 K/UL (ref 3.9–12.7)

## 2021-09-22 PROCEDURE — 76937 US GUIDE VASCULAR ACCESS: CPT

## 2021-09-22 PROCEDURE — 63600175 PHARM REV CODE 636 W HCPCS: Performed by: INTERNAL MEDICINE

## 2021-09-22 PROCEDURE — 63600175 PHARM REV CODE 636 W HCPCS: Performed by: PHYSICIAN ASSISTANT

## 2021-09-22 PROCEDURE — 99233 PR SUBSEQUENT HOSPITAL CARE,LEVL III: ICD-10-PCS | Mod: ,,, | Performed by: HOSPITALIST

## 2021-09-22 PROCEDURE — 83735 ASSAY OF MAGNESIUM: CPT | Performed by: HOSPITALIST

## 2021-09-22 PROCEDURE — 84100 ASSAY OF PHOSPHORUS: CPT | Performed by: HOSPITALIST

## 2021-09-22 PROCEDURE — 25000003 PHARM REV CODE 250: Performed by: HOSPITALIST

## 2021-09-22 PROCEDURE — C9399 UNCLASSIFIED DRUGS OR BIOLOG: HCPCS | Performed by: PHYSICIAN ASSISTANT

## 2021-09-22 PROCEDURE — C1751 CATH, INF, PER/CENT/MIDLINE: HCPCS

## 2021-09-22 PROCEDURE — 85025 COMPLETE CBC W/AUTO DIFF WBC: CPT | Performed by: HOSPITALIST

## 2021-09-22 PROCEDURE — 11000001 HC ACUTE MED/SURG PRIVATE ROOM

## 2021-09-22 PROCEDURE — 25000003 PHARM REV CODE 250: Performed by: PHYSICIAN ASSISTANT

## 2021-09-22 PROCEDURE — 36410 VNPNXR 3YR/> PHY/QHP DX/THER: CPT

## 2021-09-22 PROCEDURE — 25000003 PHARM REV CODE 250: Performed by: INTERNAL MEDICINE

## 2021-09-22 PROCEDURE — 36415 COLL VENOUS BLD VENIPUNCTURE: CPT | Performed by: HOSPITALIST

## 2021-09-22 PROCEDURE — 80048 BASIC METABOLIC PNL TOTAL CA: CPT | Performed by: HOSPITALIST

## 2021-09-22 PROCEDURE — 63600175 PHARM REV CODE 636 W HCPCS: Performed by: HOSPITALIST

## 2021-09-22 PROCEDURE — 99233 SBSQ HOSP IP/OBS HIGH 50: CPT | Mod: ,,, | Performed by: HOSPITALIST

## 2021-09-22 RX ORDER — SODIUM CHLORIDE AND POTASSIUM CHLORIDE 300; 900 MG/100ML; MG/100ML
INJECTION, SOLUTION INTRAVENOUS CONTINUOUS
Status: DISPENSED | OUTPATIENT
Start: 2021-09-22 | End: 2021-09-22

## 2021-09-22 RX ORDER — ENOXAPARIN SODIUM 100 MG/ML
40 INJECTION SUBCUTANEOUS EVERY 24 HOURS
Status: DISCONTINUED | OUTPATIENT
Start: 2021-09-22 | End: 2021-10-06 | Stop reason: HOSPADM

## 2021-09-22 RX ADMIN — VANCOMYCIN HYDROCHLORIDE 1500 MG: 1.5 INJECTION, POWDER, LYOPHILIZED, FOR SOLUTION INTRAVENOUS at 07:09

## 2021-09-22 RX ADMIN — INSULIN ASPART 2 UNITS: 100 INJECTION, SOLUTION INTRAVENOUS; SUBCUTANEOUS at 12:09

## 2021-09-22 RX ADMIN — QUETIAPINE 125 MG: 100 TABLET ORAL at 08:09

## 2021-09-22 RX ADMIN — HYDRALAZINE HYDROCHLORIDE 25 MG: 25 TABLET ORAL at 09:09

## 2021-09-22 RX ADMIN — OXYCODONE HYDROCHLORIDE 5 MG: 5 TABLET ORAL at 03:09

## 2021-09-22 RX ADMIN — MEROPENEM AND SODIUM CHLORIDE 1 G: 1 INJECTION, SOLUTION INTRAVENOUS at 02:09

## 2021-09-22 RX ADMIN — ENOXAPARIN SODIUM 40 MG: 40 INJECTION SUBCUTANEOUS at 08:09

## 2021-09-22 RX ADMIN — POTASSIUM CHLORIDE AND SODIUM CHLORIDE: 900; 300 INJECTION, SOLUTION INTRAVENOUS at 12:09

## 2021-09-22 RX ADMIN — POTASSIUM PHOSPHATE, MONOBASIC AND POTASSIUM PHOSPHATE, DIBASIC 15 MMOL: 224; 236 INJECTION, SOLUTION, CONCENTRATE INTRAVENOUS at 12:09

## 2021-09-22 RX ADMIN — INSULIN ASPART 3 UNITS: 100 INJECTION, SOLUTION INTRAVENOUS; SUBCUTANEOUS at 06:09

## 2021-09-22 RX ADMIN — OXYCODONE HYDROCHLORIDE 5 MG: 5 TABLET ORAL at 07:09

## 2021-09-22 RX ADMIN — MEROPENEM AND SODIUM CHLORIDE 1 G: 1 INJECTION, SOLUTION INTRAVENOUS at 11:09

## 2021-09-22 RX ADMIN — INSULIN DETEMIR 40 UNITS: 100 INJECTION, SOLUTION SUBCUTANEOUS at 08:09

## 2021-09-22 RX ADMIN — ATORVASTATIN CALCIUM 20 MG: 20 TABLET, FILM COATED ORAL at 08:09

## 2021-09-22 RX ADMIN — VANCOMYCIN HYDROCHLORIDE 1500 MG: 1.5 INJECTION, POWDER, LYOPHILIZED, FOR SOLUTION INTRAVENOUS at 04:09

## 2021-09-22 RX ADMIN — MEROPENEM AND SODIUM CHLORIDE 1 G: 1 INJECTION, SOLUTION INTRAVENOUS at 08:09

## 2021-09-22 RX ADMIN — INSULIN ASPART 1 UNITS: 100 INJECTION, SOLUTION INTRAVENOUS; SUBCUTANEOUS at 08:09

## 2021-09-22 RX ADMIN — ASPIRIN 81 MG: 81 TABLET, COATED ORAL at 08:09

## 2021-09-22 RX ADMIN — OXYCODONE HYDROCHLORIDE 5 MG: 5 TABLET ORAL at 08:09

## 2021-09-23 ENCOUNTER — ANESTHESIA EVENT (OUTPATIENT)
Dept: SURGERY | Facility: OTHER | Age: 56
DRG: 239 | End: 2021-09-23
Payer: MEDICAID

## 2021-09-23 ENCOUNTER — ANESTHESIA (OUTPATIENT)
Dept: SURGERY | Facility: OTHER | Age: 56
DRG: 239 | End: 2021-09-23
Payer: MEDICAID

## 2021-09-23 PROBLEM — I96 GANGRENE OF TOE OF RIGHT FOOT: Status: ACTIVE | Noted: 2021-09-23

## 2021-09-23 LAB
ANION GAP SERPL CALC-SCNC: 9 MMOL/L (ref 8–16)
BACTERIA FLD AEROBE CULT: ABNORMAL
BACTERIA SPEC AEROBE CULT: ABNORMAL
BASOPHILS # BLD AUTO: 0.04 K/UL (ref 0–0.2)
BASOPHILS NFR BLD: 0.4 % (ref 0–1.9)
BUN SERPL-MCNC: 9 MG/DL (ref 6–20)
CALCIUM SERPL-MCNC: 8.4 MG/DL (ref 8.7–10.5)
CHLORIDE SERPL-SCNC: 100 MMOL/L (ref 95–110)
CO2 SERPL-SCNC: 26 MMOL/L (ref 23–29)
CREAT SERPL-MCNC: 0.8 MG/DL (ref 0.5–1.4)
DIFFERENTIAL METHOD: ABNORMAL
EOSINOPHIL # BLD AUTO: 0.2 K/UL (ref 0–0.5)
EOSINOPHIL NFR BLD: 1.9 % (ref 0–8)
ERYTHROCYTE [DISTWIDTH] IN BLOOD BY AUTOMATED COUNT: 13.2 % (ref 11.5–14.5)
EST. GFR  (AFRICAN AMERICAN): >60 ML/MIN/1.73 M^2
EST. GFR  (NON AFRICAN AMERICAN): >60 ML/MIN/1.73 M^2
GLUCOSE SERPL-MCNC: 189 MG/DL (ref 70–110)
GRAM STN SPEC: ABNORMAL
HCT VFR BLD AUTO: 34.9 % (ref 40–54)
HGB BLD-MCNC: 11 G/DL (ref 14–18)
IMM GRANULOCYTES # BLD AUTO: 0.12 K/UL (ref 0–0.04)
IMM GRANULOCYTES NFR BLD AUTO: 1.2 % (ref 0–0.5)
LYMPHOCYTES # BLD AUTO: 1.5 K/UL (ref 1–4.8)
LYMPHOCYTES NFR BLD: 14.5 % (ref 18–48)
MAGNESIUM SERPL-MCNC: 2.1 MG/DL (ref 1.6–2.6)
MCH RBC QN AUTO: 27.4 PG (ref 27–31)
MCHC RBC AUTO-ENTMCNC: 31.5 G/DL (ref 32–36)
MCV RBC AUTO: 87 FL (ref 82–98)
MONOCYTES # BLD AUTO: 1.3 K/UL (ref 0.3–1)
MONOCYTES NFR BLD: 12.9 % (ref 4–15)
NEUTROPHILS # BLD AUTO: 7 K/UL (ref 1.8–7.7)
NEUTROPHILS NFR BLD: 69.1 % (ref 38–73)
NRBC BLD-RTO: 0 /100 WBC
PHOSPHATE SERPL-MCNC: 1.3 MG/DL (ref 2.7–4.5)
PLATELET # BLD AUTO: 382 K/UL (ref 150–450)
PMV BLD AUTO: 10 FL (ref 9.2–12.9)
POCT GLUCOSE: 174 MG/DL (ref 70–110)
POCT GLUCOSE: 175 MG/DL (ref 70–110)
POCT GLUCOSE: 177 MG/DL (ref 70–110)
POCT GLUCOSE: 213 MG/DL (ref 70–110)
POCT GLUCOSE: 231 MG/DL (ref 70–110)
POTASSIUM SERPL-SCNC: 3.3 MMOL/L (ref 3.5–5.1)
RBC # BLD AUTO: 4.02 M/UL (ref 4.6–6.2)
SODIUM SERPL-SCNC: 135 MMOL/L (ref 136–145)
WBC # BLD AUTO: 10.18 K/UL (ref 3.9–12.7)

## 2021-09-23 PROCEDURE — 37000009 HC ANESTHESIA EA ADD 15 MINS: Performed by: PODIATRIST

## 2021-09-23 PROCEDURE — 27201423 OPTIME MED/SURG SUP & DEVICES STERILE SUPPLY: Performed by: PODIATRIST

## 2021-09-23 PROCEDURE — 25000003 PHARM REV CODE 250: Performed by: PHYSICIAN ASSISTANT

## 2021-09-23 PROCEDURE — 71000033 HC RECOVERY, INTIAL HOUR: Performed by: PODIATRIST

## 2021-09-23 PROCEDURE — 63600175 PHARM REV CODE 636 W HCPCS: Performed by: PODIATRIST

## 2021-09-23 PROCEDURE — 25000003 PHARM REV CODE 250: Performed by: INTERNAL MEDICINE

## 2021-09-23 PROCEDURE — 25000003 PHARM REV CODE 250: Performed by: HOSPITALIST

## 2021-09-23 PROCEDURE — 84100 ASSAY OF PHOSPHORUS: CPT | Performed by: HOSPITALIST

## 2021-09-23 PROCEDURE — 80048 BASIC METABOLIC PNL TOTAL CA: CPT | Performed by: HOSPITALIST

## 2021-09-23 PROCEDURE — 36000706: Performed by: PODIATRIST

## 2021-09-23 PROCEDURE — 37000008 HC ANESTHESIA 1ST 15 MINUTES: Performed by: PODIATRIST

## 2021-09-23 PROCEDURE — 28810 AMPUTATION TOE & METATARSAL: CPT | Mod: T5,,, | Performed by: PODIATRIST

## 2021-09-23 PROCEDURE — 99233 PR SUBSEQUENT HOSPITAL CARE,LEVL III: ICD-10-PCS | Mod: ,,, | Performed by: HOSPITALIST

## 2021-09-23 PROCEDURE — 25000003 PHARM REV CODE 250: Performed by: PODIATRIST

## 2021-09-23 PROCEDURE — 88305 TISSUE EXAM BY PATHOLOGIST: CPT | Performed by: PATHOLOGY

## 2021-09-23 PROCEDURE — 88305 TISSUE EXAM BY PATHOLOGIST: CPT | Mod: 26,,, | Performed by: PATHOLOGY

## 2021-09-23 PROCEDURE — 63600175 PHARM REV CODE 636 W HCPCS: Performed by: PHYSICIAN ASSISTANT

## 2021-09-23 PROCEDURE — 11000001 HC ACUTE MED/SURG PRIVATE ROOM

## 2021-09-23 PROCEDURE — 36415 COLL VENOUS BLD VENIPUNCTURE: CPT | Performed by: HOSPITALIST

## 2021-09-23 PROCEDURE — 36000707: Performed by: PODIATRIST

## 2021-09-23 PROCEDURE — 85025 COMPLETE CBC W/AUTO DIFF WBC: CPT | Performed by: HOSPITALIST

## 2021-09-23 PROCEDURE — 28810 PR AMPUTATION METATARSAL+TOE,SINGLE: ICD-10-PCS | Mod: T5,,, | Performed by: PODIATRIST

## 2021-09-23 PROCEDURE — 63600175 PHARM REV CODE 636 W HCPCS: Performed by: INTERNAL MEDICINE

## 2021-09-23 PROCEDURE — 88305 TISSUE EXAM BY PATHOLOGIST: ICD-10-PCS | Mod: 26,,, | Performed by: PATHOLOGY

## 2021-09-23 PROCEDURE — 71000039 HC RECOVERY, EACH ADD'L HOUR: Performed by: PODIATRIST

## 2021-09-23 PROCEDURE — 83735 ASSAY OF MAGNESIUM: CPT | Performed by: HOSPITALIST

## 2021-09-23 PROCEDURE — 25000003 PHARM REV CODE 250: Performed by: STUDENT IN AN ORGANIZED HEALTH CARE EDUCATION/TRAINING PROGRAM

## 2021-09-23 PROCEDURE — 99233 SBSQ HOSP IP/OBS HIGH 50: CPT | Mod: ,,, | Performed by: HOSPITALIST

## 2021-09-23 PROCEDURE — 63600175 PHARM REV CODE 636 W HCPCS: Performed by: STUDENT IN AN ORGANIZED HEALTH CARE EDUCATION/TRAINING PROGRAM

## 2021-09-23 RX ORDER — HYDROMORPHONE HYDROCHLORIDE 2 MG/ML
0.4 INJECTION, SOLUTION INTRAMUSCULAR; INTRAVENOUS; SUBCUTANEOUS EVERY 5 MIN PRN
Status: DISCONTINUED | OUTPATIENT
Start: 2021-09-23 | End: 2021-10-03

## 2021-09-23 RX ORDER — PROPOFOL 10 MG/ML
VIAL (ML) INTRAVENOUS
Status: DISCONTINUED | OUTPATIENT
Start: 2021-09-23 | End: 2021-09-23

## 2021-09-23 RX ORDER — LIDOCAINE HYDROCHLORIDE 10 MG/ML
INJECTION, SOLUTION EPIDURAL; INFILTRATION; INTRACAUDAL; PERINEURAL
Status: DISCONTINUED | OUTPATIENT
Start: 2021-09-23 | End: 2021-09-23 | Stop reason: HOSPADM

## 2021-09-23 RX ORDER — OXYCODONE HYDROCHLORIDE 5 MG/1
5 TABLET ORAL
Status: DISCONTINUED | OUTPATIENT
Start: 2021-09-23 | End: 2021-09-23

## 2021-09-23 RX ORDER — PROPOFOL 10 MG/ML
VIAL (ML) INTRAVENOUS CONTINUOUS PRN
Status: DISCONTINUED | OUTPATIENT
Start: 2021-09-23 | End: 2021-09-23

## 2021-09-23 RX ORDER — FENTANYL CITRATE 50 UG/ML
INJECTION, SOLUTION INTRAMUSCULAR; INTRAVENOUS
Status: DISCONTINUED | OUTPATIENT
Start: 2021-09-23 | End: 2021-09-23

## 2021-09-23 RX ORDER — PROCHLORPERAZINE EDISYLATE 5 MG/ML
5 INJECTION INTRAMUSCULAR; INTRAVENOUS EVERY 30 MIN PRN
Status: DISCONTINUED | OUTPATIENT
Start: 2021-09-23 | End: 2021-09-23

## 2021-09-23 RX ORDER — SODIUM CHLORIDE 0.9 % (FLUSH) 0.9 %
3 SYRINGE (ML) INJECTION
Status: DISCONTINUED | OUTPATIENT
Start: 2021-09-23 | End: 2021-10-06 | Stop reason: HOSPADM

## 2021-09-23 RX ORDER — LIDOCAINE HYDROCHLORIDE 20 MG/ML
INJECTION INTRAVENOUS
Status: DISCONTINUED | OUTPATIENT
Start: 2021-09-23 | End: 2021-09-23

## 2021-09-23 RX ORDER — MEPERIDINE HYDROCHLORIDE 25 MG/ML
12.5 INJECTION INTRAMUSCULAR; INTRAVENOUS; SUBCUTANEOUS ONCE AS NEEDED
Status: DISCONTINUED | OUTPATIENT
Start: 2021-09-23 | End: 2021-09-23

## 2021-09-23 RX ORDER — BUPIVACAINE HYDROCHLORIDE 5 MG/ML
INJECTION, SOLUTION EPIDURAL; INTRACAUDAL
Status: DISCONTINUED | OUTPATIENT
Start: 2021-09-23 | End: 2021-09-23 | Stop reason: HOSPADM

## 2021-09-23 RX ADMIN — MEROPENEM AND SODIUM CHLORIDE 1 G: 1 INJECTION, SOLUTION INTRAVENOUS at 12:09

## 2021-09-23 RX ADMIN — ASPIRIN 81 MG: 81 TABLET, COATED ORAL at 09:09

## 2021-09-23 RX ADMIN — QUETIAPINE 125 MG: 100 TABLET ORAL at 08:09

## 2021-09-23 RX ADMIN — OXYCODONE HYDROCHLORIDE 5 MG: 5 TABLET ORAL at 08:09

## 2021-09-23 RX ADMIN — PROPOFOL 100 MCG/KG/MIN: 10 INJECTION, EMULSION INTRAVENOUS at 01:09

## 2021-09-23 RX ADMIN — VANCOMYCIN HYDROCHLORIDE 1500 MG: 1.5 INJECTION, POWDER, LYOPHILIZED, FOR SOLUTION INTRAVENOUS at 05:09

## 2021-09-23 RX ADMIN — ATORVASTATIN CALCIUM 20 MG: 20 TABLET, FILM COATED ORAL at 09:09

## 2021-09-23 RX ADMIN — ENOXAPARIN SODIUM 40 MG: 40 INJECTION SUBCUTANEOUS at 04:09

## 2021-09-23 RX ADMIN — MEROPENEM AND SODIUM CHLORIDE 1 G: 1 INJECTION, SOLUTION INTRAVENOUS at 04:09

## 2021-09-23 RX ADMIN — INSULIN DETEMIR 40 UNITS: 100 INJECTION, SOLUTION SUBCUTANEOUS at 09:09

## 2021-09-23 RX ADMIN — PROPOFOL 50 MG: 10 INJECTION, EMULSION INTRAVENOUS at 01:09

## 2021-09-23 RX ADMIN — INSULIN ASPART 1 UNITS: 100 INJECTION, SOLUTION INTRAVENOUS; SUBCUTANEOUS at 09:09

## 2021-09-23 RX ADMIN — OXYCODONE HYDROCHLORIDE 5 MG: 5 TABLET ORAL at 09:09

## 2021-09-23 RX ADMIN — MEROPENEM AND SODIUM CHLORIDE 1 G: 1 INJECTION, SOLUTION INTRAVENOUS at 07:09

## 2021-09-23 RX ADMIN — ACETAMINOPHEN 1000 MG: 500 TABLET ORAL at 07:09

## 2021-09-23 RX ADMIN — LIDOCAINE HYDROCHLORIDE 80 MG: 20 INJECTION, SOLUTION INTRAVENOUS at 01:09

## 2021-09-23 RX ADMIN — FENTANYL CITRATE 100 MCG: 50 INJECTION, SOLUTION INTRAMUSCULAR; INTRAVENOUS at 01:09

## 2021-09-23 RX ADMIN — OXYCODONE HYDROCHLORIDE 5 MG: 5 TABLET ORAL at 04:09

## 2021-09-23 RX ADMIN — INSULIN ASPART 2 UNITS: 100 INJECTION, SOLUTION INTRAVENOUS; SUBCUTANEOUS at 09:09

## 2021-09-24 LAB
ANION GAP SERPL CALC-SCNC: 8 MMOL/L (ref 8–16)
BACTERIA FLD AEROBE CULT: ABNORMAL
BASOPHILS # BLD AUTO: 0.04 K/UL (ref 0–0.2)
BASOPHILS NFR BLD: 0.3 % (ref 0–1.9)
BUN SERPL-MCNC: 8 MG/DL (ref 6–20)
CALCIUM SERPL-MCNC: 8.3 MG/DL (ref 8.7–10.5)
CHLORIDE SERPL-SCNC: 101 MMOL/L (ref 95–110)
CO2 SERPL-SCNC: 26 MMOL/L (ref 23–29)
CREAT SERPL-MCNC: 0.7 MG/DL (ref 0.5–1.4)
DIFFERENTIAL METHOD: ABNORMAL
EOSINOPHIL # BLD AUTO: 0.3 K/UL (ref 0–0.5)
EOSINOPHIL NFR BLD: 2.2 % (ref 0–8)
ERYTHROCYTE [DISTWIDTH] IN BLOOD BY AUTOMATED COUNT: 13.2 % (ref 11.5–14.5)
EST. GFR  (AFRICAN AMERICAN): >60 ML/MIN/1.73 M^2
EST. GFR  (NON AFRICAN AMERICAN): >60 ML/MIN/1.73 M^2
GLUCOSE SERPL-MCNC: 171 MG/DL (ref 70–110)
GRAM STN SPEC: ABNORMAL
HCT VFR BLD AUTO: 34.9 % (ref 40–54)
HGB BLD-MCNC: 11 G/DL (ref 14–18)
IMM GRANULOCYTES # BLD AUTO: 0.2 K/UL (ref 0–0.04)
IMM GRANULOCYTES NFR BLD AUTO: 1.7 % (ref 0–0.5)
LYMPHOCYTES # BLD AUTO: 2 K/UL (ref 1–4.8)
LYMPHOCYTES NFR BLD: 16.6 % (ref 18–48)
MAGNESIUM SERPL-MCNC: 2.1 MG/DL (ref 1.6–2.6)
MCH RBC QN AUTO: 27.3 PG (ref 27–31)
MCHC RBC AUTO-ENTMCNC: 31.5 G/DL (ref 32–36)
MCV RBC AUTO: 87 FL (ref 82–98)
MONOCYTES # BLD AUTO: 1.8 K/UL (ref 0.3–1)
MONOCYTES NFR BLD: 15.2 % (ref 4–15)
NEUTROPHILS # BLD AUTO: 7.6 K/UL (ref 1.8–7.7)
NEUTROPHILS NFR BLD: 64 % (ref 38–73)
NRBC BLD-RTO: 0 /100 WBC
PHOSPHATE SERPL-MCNC: 1.5 MG/DL (ref 2.7–4.5)
PLATELET # BLD AUTO: 421 K/UL (ref 150–450)
PMV BLD AUTO: 9.6 FL (ref 9.2–12.9)
POCT GLUCOSE: 199 MG/DL (ref 70–110)
POCT GLUCOSE: 209 MG/DL (ref 70–110)
POCT GLUCOSE: 244 MG/DL (ref 70–110)
POCT GLUCOSE: 468 MG/DL (ref 70–110)
POTASSIUM SERPL-SCNC: 3.4 MMOL/L (ref 3.5–5.1)
RBC # BLD AUTO: 4.03 M/UL (ref 4.6–6.2)
SODIUM SERPL-SCNC: 135 MMOL/L (ref 136–145)
VANCOMYCIN TROUGH SERPL-MCNC: 12.8 UG/ML (ref 10–22)
WBC # BLD AUTO: 11.87 K/UL (ref 3.9–12.7)

## 2021-09-24 PROCEDURE — 63600175 PHARM REV CODE 636 W HCPCS: Performed by: PODIATRIST

## 2021-09-24 PROCEDURE — 80048 BASIC METABOLIC PNL TOTAL CA: CPT | Performed by: PODIATRIST

## 2021-09-24 PROCEDURE — 25000003 PHARM REV CODE 250: Performed by: INTERNAL MEDICINE

## 2021-09-24 PROCEDURE — 25000003 PHARM REV CODE 250: Performed by: PODIATRIST

## 2021-09-24 PROCEDURE — 80202 ASSAY OF VANCOMYCIN: CPT | Performed by: PODIATRIST

## 2021-09-24 PROCEDURE — 99223 PR INITIAL HOSPITAL CARE,LEVL III: ICD-10-PCS | Mod: ,,, | Performed by: INTERNAL MEDICINE

## 2021-09-24 PROCEDURE — 99223 1ST HOSP IP/OBS HIGH 75: CPT | Mod: ,,, | Performed by: INTERNAL MEDICINE

## 2021-09-24 PROCEDURE — 25000003 PHARM REV CODE 250: Performed by: HOSPITALIST

## 2021-09-24 PROCEDURE — 63600175 PHARM REV CODE 636 W HCPCS: Performed by: INTERNAL MEDICINE

## 2021-09-24 PROCEDURE — 11000001 HC ACUTE MED/SURG PRIVATE ROOM

## 2021-09-24 PROCEDURE — 25000003 PHARM REV CODE 250: Performed by: PHYSICIAN ASSISTANT

## 2021-09-24 PROCEDURE — 85025 COMPLETE CBC W/AUTO DIFF WBC: CPT | Performed by: PODIATRIST

## 2021-09-24 PROCEDURE — 84100 ASSAY OF PHOSPHORUS: CPT | Performed by: PODIATRIST

## 2021-09-24 PROCEDURE — 99233 SBSQ HOSP IP/OBS HIGH 50: CPT | Mod: ,,, | Performed by: HOSPITALIST

## 2021-09-24 PROCEDURE — 94761 N-INVAS EAR/PLS OXIMETRY MLT: CPT

## 2021-09-24 PROCEDURE — 36415 COLL VENOUS BLD VENIPUNCTURE: CPT | Performed by: PODIATRIST

## 2021-09-24 PROCEDURE — 99233 PR SUBSEQUENT HOSPITAL CARE,LEVL III: ICD-10-PCS | Mod: ,,, | Performed by: HOSPITALIST

## 2021-09-24 PROCEDURE — 83735 ASSAY OF MAGNESIUM: CPT | Performed by: PODIATRIST

## 2021-09-24 RX ORDER — SENNOSIDES 8.6 MG/1
8.6 TABLET ORAL DAILY
Status: DISCONTINUED | OUTPATIENT
Start: 2021-09-24 | End: 2021-10-03

## 2021-09-24 RX ORDER — METRONIDAZOLE 250 MG/1
500 TABLET ORAL EVERY 8 HOURS
Status: DISCONTINUED | OUTPATIENT
Start: 2021-09-24 | End: 2021-10-01

## 2021-09-24 RX ORDER — POLYETHYLENE GLYCOL 3350 17 G/17G
17 POWDER, FOR SOLUTION ORAL 2 TIMES DAILY
Status: DISCONTINUED | OUTPATIENT
Start: 2021-09-24 | End: 2021-10-03

## 2021-09-24 RX ADMIN — QUETIAPINE 125 MG: 100 TABLET ORAL at 08:09

## 2021-09-24 RX ADMIN — STANDARDIZED SENNA CONCENTRATE 8.6 MG: 8.6 TABLET ORAL at 02:09

## 2021-09-24 RX ADMIN — INSULIN ASPART 2 UNITS: 100 INJECTION, SOLUTION INTRAVENOUS; SUBCUTANEOUS at 05:09

## 2021-09-24 RX ADMIN — CEFTRIAXONE 2 G: 2 INJECTION, SOLUTION INTRAVENOUS at 02:09

## 2021-09-24 RX ADMIN — MEROPENEM AND SODIUM CHLORIDE 1 G: 1 INJECTION, SOLUTION INTRAVENOUS at 11:09

## 2021-09-24 RX ADMIN — ACETAMINOPHEN 1000 MG: 500 TABLET ORAL at 07:09

## 2021-09-24 RX ADMIN — INSULIN ASPART 2 UNITS: 100 INJECTION, SOLUTION INTRAVENOUS; SUBCUTANEOUS at 01:09

## 2021-09-24 RX ADMIN — POLYETHYLENE GLYCOL 3350 17 G: 17 POWDER, FOR SOLUTION ORAL at 02:09

## 2021-09-24 RX ADMIN — VANCOMYCIN HYDROCHLORIDE 1500 MG: 1.5 INJECTION, POWDER, LYOPHILIZED, FOR SOLUTION INTRAVENOUS at 05:09

## 2021-09-24 RX ADMIN — METRONIDAZOLE 500 MG: 250 TABLET ORAL at 02:09

## 2021-09-24 RX ADMIN — OXYCODONE HYDROCHLORIDE 5 MG: 5 TABLET ORAL at 04:09

## 2021-09-24 RX ADMIN — ACETAMINOPHEN 1000 MG: 500 TABLET ORAL at 08:09

## 2021-09-24 RX ADMIN — ASPIRIN 81 MG: 81 TABLET, COATED ORAL at 08:09

## 2021-09-24 RX ADMIN — MEROPENEM AND SODIUM CHLORIDE 1 G: 1 INJECTION, SOLUTION INTRAVENOUS at 03:09

## 2021-09-24 RX ADMIN — ATORVASTATIN CALCIUM 20 MG: 20 TABLET, FILM COATED ORAL at 08:09

## 2021-09-24 RX ADMIN — OXYCODONE HYDROCHLORIDE 5 MG: 5 TABLET ORAL at 02:09

## 2021-09-24 RX ADMIN — INSULIN DETEMIR 40 UNITS: 100 INJECTION, SOLUTION SUBCUTANEOUS at 10:09

## 2021-09-24 RX ADMIN — ENOXAPARIN SODIUM 40 MG: 40 INJECTION SUBCUTANEOUS at 05:09

## 2021-09-24 RX ADMIN — OXYCODONE HYDROCHLORIDE 5 MG: 5 TABLET ORAL at 08:09

## 2021-09-24 RX ADMIN — METRONIDAZOLE 500 MG: 250 TABLET ORAL at 09:09

## 2021-09-24 RX ADMIN — INSULIN ASPART 3 UNITS: 100 INJECTION, SOLUTION INTRAVENOUS; SUBCUTANEOUS at 10:09

## 2021-09-25 LAB
ANION GAP SERPL CALC-SCNC: 10 MMOL/L (ref 8–16)
BACTERIA BLD CULT: ABNORMAL
BASOPHILS # BLD AUTO: 0.06 K/UL (ref 0–0.2)
BASOPHILS NFR BLD: 0.6 % (ref 0–1.9)
BUN SERPL-MCNC: 9 MG/DL (ref 6–20)
CALCIUM SERPL-MCNC: 9 MG/DL (ref 8.7–10.5)
CHLORIDE SERPL-SCNC: 101 MMOL/L (ref 95–110)
CO2 SERPL-SCNC: 26 MMOL/L (ref 23–29)
CREAT SERPL-MCNC: 0.8 MG/DL (ref 0.5–1.4)
DIFFERENTIAL METHOD: ABNORMAL
EOSINOPHIL # BLD AUTO: 0.4 K/UL (ref 0–0.5)
EOSINOPHIL NFR BLD: 3.3 % (ref 0–8)
ERYTHROCYTE [DISTWIDTH] IN BLOOD BY AUTOMATED COUNT: 13.5 % (ref 11.5–14.5)
EST. GFR  (AFRICAN AMERICAN): >60 ML/MIN/1.73 M^2
EST. GFR  (NON AFRICAN AMERICAN): >60 ML/MIN/1.73 M^2
GLUCOSE SERPL-MCNC: 222 MG/DL (ref 70–110)
HCT VFR BLD AUTO: 37.3 % (ref 40–54)
HGB BLD-MCNC: 11.9 G/DL (ref 14–18)
IMM GRANULOCYTES # BLD AUTO: 0.39 K/UL (ref 0–0.04)
IMM GRANULOCYTES NFR BLD AUTO: 3.7 % (ref 0–0.5)
LYMPHOCYTES # BLD AUTO: 2.5 K/UL (ref 1–4.8)
LYMPHOCYTES NFR BLD: 24.2 % (ref 18–48)
MAGNESIUM SERPL-MCNC: 2.1 MG/DL (ref 1.6–2.6)
MCH RBC QN AUTO: 28.1 PG (ref 27–31)
MCHC RBC AUTO-ENTMCNC: 31.9 G/DL (ref 32–36)
MCV RBC AUTO: 88 FL (ref 82–98)
MONOCYTES # BLD AUTO: 1.6 K/UL (ref 0.3–1)
MONOCYTES NFR BLD: 15.7 % (ref 4–15)
NEUTROPHILS # BLD AUTO: 5.5 K/UL (ref 1.8–7.7)
NEUTROPHILS NFR BLD: 52.5 % (ref 38–73)
NRBC BLD-RTO: 0 /100 WBC
PHOSPHATE SERPL-MCNC: 1.7 MG/DL (ref 2.7–4.5)
PLATELET # BLD AUTO: 491 K/UL (ref 150–450)
PLATELET BLD QL SMEAR: ABNORMAL
PMV BLD AUTO: 10.2 FL (ref 9.2–12.9)
POCT GLUCOSE: 229 MG/DL (ref 70–110)
POCT GLUCOSE: 239 MG/DL (ref 70–110)
POCT GLUCOSE: 250 MG/DL (ref 70–110)
POCT GLUCOSE: 266 MG/DL (ref 70–110)
POCT GLUCOSE: 278 MG/DL (ref 70–110)
POTASSIUM SERPL-SCNC: 3.7 MMOL/L (ref 3.5–5.1)
RBC # BLD AUTO: 4.23 M/UL (ref 4.6–6.2)
SODIUM SERPL-SCNC: 137 MMOL/L (ref 136–145)
WBC # BLD AUTO: 10.47 K/UL (ref 3.9–12.7)

## 2021-09-25 PROCEDURE — 25000003 PHARM REV CODE 250: Performed by: PODIATRIST

## 2021-09-25 PROCEDURE — 11000001 HC ACUTE MED/SURG PRIVATE ROOM

## 2021-09-25 PROCEDURE — 25000003 PHARM REV CODE 250: Performed by: INTERNAL MEDICINE

## 2021-09-25 PROCEDURE — 36415 COLL VENOUS BLD VENIPUNCTURE: CPT | Performed by: PODIATRIST

## 2021-09-25 PROCEDURE — 84100 ASSAY OF PHOSPHORUS: CPT | Performed by: PODIATRIST

## 2021-09-25 PROCEDURE — 80048 BASIC METABOLIC PNL TOTAL CA: CPT | Performed by: PODIATRIST

## 2021-09-25 PROCEDURE — 99233 PR SUBSEQUENT HOSPITAL CARE,LEVL III: ICD-10-PCS | Mod: ,,, | Performed by: HOSPITALIST

## 2021-09-25 PROCEDURE — 63600175 PHARM REV CODE 636 W HCPCS: Performed by: INTERNAL MEDICINE

## 2021-09-25 PROCEDURE — 25000003 PHARM REV CODE 250: Performed by: PHYSICIAN ASSISTANT

## 2021-09-25 PROCEDURE — 94761 N-INVAS EAR/PLS OXIMETRY MLT: CPT

## 2021-09-25 PROCEDURE — 99900035 HC TECH TIME PER 15 MIN (STAT)

## 2021-09-25 PROCEDURE — 85025 COMPLETE CBC W/AUTO DIFF WBC: CPT | Performed by: PODIATRIST

## 2021-09-25 PROCEDURE — 63600175 PHARM REV CODE 636 W HCPCS: Performed by: PODIATRIST

## 2021-09-25 PROCEDURE — 99233 SBSQ HOSP IP/OBS HIGH 50: CPT | Mod: ,,, | Performed by: HOSPITALIST

## 2021-09-25 PROCEDURE — 83735 ASSAY OF MAGNESIUM: CPT | Performed by: PODIATRIST

## 2021-09-25 RX ADMIN — METRONIDAZOLE 500 MG: 250 TABLET ORAL at 10:09

## 2021-09-25 RX ADMIN — QUETIAPINE 125 MG: 100 TABLET ORAL at 08:09

## 2021-09-25 RX ADMIN — CEFTRIAXONE 2 G: 2 INJECTION, SOLUTION INTRAVENOUS at 02:09

## 2021-09-25 RX ADMIN — ATORVASTATIN CALCIUM 20 MG: 20 TABLET, FILM COATED ORAL at 08:09

## 2021-09-25 RX ADMIN — INSULIN ASPART 2 UNITS: 100 INJECTION, SOLUTION INTRAVENOUS; SUBCUTANEOUS at 08:09

## 2021-09-25 RX ADMIN — ENOXAPARIN SODIUM 40 MG: 40 INJECTION SUBCUTANEOUS at 05:09

## 2021-09-25 RX ADMIN — ASPIRIN 81 MG: 81 TABLET, COATED ORAL at 08:09

## 2021-09-25 RX ADMIN — INSULIN ASPART 1 UNITS: 100 INJECTION, SOLUTION INTRAVENOUS; SUBCUTANEOUS at 10:09

## 2021-09-25 RX ADMIN — OXYCODONE HYDROCHLORIDE 5 MG: 5 TABLET ORAL at 04:09

## 2021-09-25 RX ADMIN — METRONIDAZOLE 500 MG: 250 TABLET ORAL at 02:09

## 2021-09-25 RX ADMIN — OXYCODONE HYDROCHLORIDE 5 MG: 5 TABLET ORAL at 07:09

## 2021-09-25 RX ADMIN — OXYCODONE HYDROCHLORIDE 5 MG: 5 TABLET ORAL at 12:09

## 2021-09-25 RX ADMIN — INSULIN DETEMIR 40 UNITS: 100 INJECTION, SOLUTION SUBCUTANEOUS at 08:09

## 2021-09-26 LAB
ANION GAP SERPL CALC-SCNC: 10 MMOL/L (ref 8–16)
BASOPHILS # BLD AUTO: 0.05 K/UL (ref 0–0.2)
BASOPHILS NFR BLD: 0.5 % (ref 0–1.9)
BUN SERPL-MCNC: 9 MG/DL (ref 6–20)
CALCIUM SERPL-MCNC: 8.6 MG/DL (ref 8.7–10.5)
CHLORIDE SERPL-SCNC: 102 MMOL/L (ref 95–110)
CO2 SERPL-SCNC: 24 MMOL/L (ref 23–29)
CREAT SERPL-MCNC: 0.8 MG/DL (ref 0.5–1.4)
DIFFERENTIAL METHOD: ABNORMAL
EOSINOPHIL # BLD AUTO: 0.4 K/UL (ref 0–0.5)
EOSINOPHIL NFR BLD: 3.6 % (ref 0–8)
ERYTHROCYTE [DISTWIDTH] IN BLOOD BY AUTOMATED COUNT: 13.6 % (ref 11.5–14.5)
EST. GFR  (AFRICAN AMERICAN): >60 ML/MIN/1.73 M^2
EST. GFR  (NON AFRICAN AMERICAN): >60 ML/MIN/1.73 M^2
GLUCOSE SERPL-MCNC: 165 MG/DL (ref 70–110)
HCT VFR BLD AUTO: 36.2 % (ref 40–54)
HGB BLD-MCNC: 11.7 G/DL (ref 14–18)
IMM GRANULOCYTES # BLD AUTO: 0.41 K/UL (ref 0–0.04)
IMM GRANULOCYTES NFR BLD AUTO: 4.2 % (ref 0–0.5)
LYMPHOCYTES # BLD AUTO: 2.8 K/UL (ref 1–4.8)
LYMPHOCYTES NFR BLD: 28.3 % (ref 18–48)
MAGNESIUM SERPL-MCNC: 2.1 MG/DL (ref 1.6–2.6)
MCH RBC QN AUTO: 28.2 PG (ref 27–31)
MCHC RBC AUTO-ENTMCNC: 32.3 G/DL (ref 32–36)
MCV RBC AUTO: 87 FL (ref 82–98)
MONOCYTES # BLD AUTO: 1.4 K/UL (ref 0.3–1)
MONOCYTES NFR BLD: 13.8 % (ref 4–15)
NEUTROPHILS # BLD AUTO: 4.9 K/UL (ref 1.8–7.7)
NEUTROPHILS NFR BLD: 49.6 % (ref 38–73)
NRBC BLD-RTO: 0 /100 WBC
PHOSPHATE SERPL-MCNC: 2.1 MG/DL (ref 2.7–4.5)
PLATELET # BLD AUTO: 500 K/UL (ref 150–450)
PMV BLD AUTO: 10 FL (ref 9.2–12.9)
POCT GLUCOSE: 171 MG/DL (ref 70–110)
POCT GLUCOSE: 187 MG/DL (ref 70–110)
POCT GLUCOSE: 210 MG/DL (ref 70–110)
POCT GLUCOSE: 240 MG/DL (ref 70–110)
POCT GLUCOSE: 258 MG/DL (ref 70–110)
POTASSIUM SERPL-SCNC: 3.7 MMOL/L (ref 3.5–5.1)
RBC # BLD AUTO: 4.15 M/UL (ref 4.6–6.2)
SODIUM SERPL-SCNC: 136 MMOL/L (ref 136–145)
WBC # BLD AUTO: 9.87 K/UL (ref 3.9–12.7)

## 2021-09-26 PROCEDURE — 83735 ASSAY OF MAGNESIUM: CPT | Performed by: PODIATRIST

## 2021-09-26 PROCEDURE — 63600175 PHARM REV CODE 636 W HCPCS: Performed by: HOSPITALIST

## 2021-09-26 PROCEDURE — 25000003 PHARM REV CODE 250: Performed by: PODIATRIST

## 2021-09-26 PROCEDURE — 25000003 PHARM REV CODE 250: Performed by: PHYSICIAN ASSISTANT

## 2021-09-26 PROCEDURE — 36415 COLL VENOUS BLD VENIPUNCTURE: CPT | Performed by: PODIATRIST

## 2021-09-26 PROCEDURE — 99233 PR SUBSEQUENT HOSPITAL CARE,LEVL III: ICD-10-PCS | Mod: ,,, | Performed by: HOSPITALIST

## 2021-09-26 PROCEDURE — 80048 BASIC METABOLIC PNL TOTAL CA: CPT | Performed by: PODIATRIST

## 2021-09-26 PROCEDURE — 63600175 PHARM REV CODE 636 W HCPCS: Performed by: INTERNAL MEDICINE

## 2021-09-26 PROCEDURE — 25000003 PHARM REV CODE 250: Performed by: HOSPITALIST

## 2021-09-26 PROCEDURE — 99233 SBSQ HOSP IP/OBS HIGH 50: CPT | Mod: ,,, | Performed by: HOSPITALIST

## 2021-09-26 PROCEDURE — 63600175 PHARM REV CODE 636 W HCPCS: Performed by: PODIATRIST

## 2021-09-26 PROCEDURE — 85025 COMPLETE CBC W/AUTO DIFF WBC: CPT | Performed by: PODIATRIST

## 2021-09-26 PROCEDURE — 25000003 PHARM REV CODE 250: Performed by: INTERNAL MEDICINE

## 2021-09-26 PROCEDURE — 84100 ASSAY OF PHOSPHORUS: CPT | Performed by: PODIATRIST

## 2021-09-26 PROCEDURE — 11000001 HC ACUTE MED/SURG PRIVATE ROOM

## 2021-09-26 RX ORDER — INSULIN ASPART 100 [IU]/ML
5 INJECTION, SOLUTION INTRAVENOUS; SUBCUTANEOUS
Status: DISCONTINUED | OUTPATIENT
Start: 2021-09-26 | End: 2021-09-27

## 2021-09-26 RX ORDER — CHLORTHALIDONE 25 MG/1
25 TABLET ORAL DAILY
Status: DISCONTINUED | OUTPATIENT
Start: 2021-09-26 | End: 2021-10-06 | Stop reason: HOSPADM

## 2021-09-26 RX ORDER — LOSARTAN POTASSIUM 50 MG/1
50 TABLET ORAL DAILY
Status: DISCONTINUED | OUTPATIENT
Start: 2021-09-26 | End: 2021-10-06 | Stop reason: HOSPADM

## 2021-09-26 RX ADMIN — OXYCODONE HYDROCHLORIDE 5 MG: 5 TABLET ORAL at 11:09

## 2021-09-26 RX ADMIN — INSULIN ASPART 2 UNITS: 100 INJECTION, SOLUTION INTRAVENOUS; SUBCUTANEOUS at 04:09

## 2021-09-26 RX ADMIN — CEFTRIAXONE 2 G: 2 INJECTION, SOLUTION INTRAVENOUS at 02:09

## 2021-09-26 RX ADMIN — METRONIDAZOLE 500 MG: 250 TABLET ORAL at 02:09

## 2021-09-26 RX ADMIN — HYDRALAZINE HYDROCHLORIDE 25 MG: 25 TABLET ORAL at 04:09

## 2021-09-26 RX ADMIN — OXYCODONE HYDROCHLORIDE 5 MG: 5 TABLET ORAL at 03:09

## 2021-09-26 RX ADMIN — INSULIN ASPART 3 UNITS: 100 INJECTION, SOLUTION INTRAVENOUS; SUBCUTANEOUS at 12:09

## 2021-09-26 RX ADMIN — INSULIN ASPART 5 UNITS: 100 INJECTION, SOLUTION INTRAVENOUS; SUBCUTANEOUS at 05:09

## 2021-09-26 RX ADMIN — CHLORTHALIDONE 25 MG: 25 TABLET ORAL at 05:09

## 2021-09-26 RX ADMIN — INSULIN DETEMIR 40 UNITS: 100 INJECTION, SOLUTION SUBCUTANEOUS at 08:09

## 2021-09-26 RX ADMIN — QUETIAPINE 125 MG: 100 TABLET ORAL at 08:09

## 2021-09-26 RX ADMIN — METRONIDAZOLE 500 MG: 250 TABLET ORAL at 05:09

## 2021-09-26 RX ADMIN — ATORVASTATIN CALCIUM 20 MG: 20 TABLET, FILM COATED ORAL at 08:09

## 2021-09-26 RX ADMIN — OXYCODONE HYDROCHLORIDE 5 MG: 5 TABLET ORAL at 12:09

## 2021-09-26 RX ADMIN — ENOXAPARIN SODIUM 40 MG: 40 INJECTION SUBCUTANEOUS at 04:09

## 2021-09-26 RX ADMIN — LOSARTAN POTASSIUM 50 MG: 50 TABLET, FILM COATED ORAL at 05:09

## 2021-09-26 RX ADMIN — ASPIRIN 81 MG: 81 TABLET, COATED ORAL at 08:09

## 2021-09-26 RX ADMIN — OXYCODONE HYDROCHLORIDE 5 MG: 5 TABLET ORAL at 08:09

## 2021-09-26 RX ADMIN — METRONIDAZOLE 500 MG: 250 TABLET ORAL at 09:09

## 2021-09-26 RX ADMIN — OXYCODONE HYDROCHLORIDE 5 MG: 5 TABLET ORAL at 05:09

## 2021-09-27 LAB
ANION GAP SERPL CALC-SCNC: 9 MMOL/L (ref 8–16)
BACTERIA SPEC ANAEROBE CULT: ABNORMAL
BACTERIA SPEC ANAEROBE CULT: ABNORMAL
BACTERIA SPEC ANAEROBE CULT: NORMAL
BASOPHILS # BLD AUTO: 0.07 K/UL (ref 0–0.2)
BASOPHILS NFR BLD: 0.6 % (ref 0–1.9)
BUN SERPL-MCNC: 10 MG/DL (ref 6–20)
CALCIUM SERPL-MCNC: 9 MG/DL (ref 8.7–10.5)
CHLORIDE SERPL-SCNC: 101 MMOL/L (ref 95–110)
CO2 SERPL-SCNC: 25 MMOL/L (ref 23–29)
CREAT SERPL-MCNC: 0.8 MG/DL (ref 0.5–1.4)
DIFFERENTIAL METHOD: ABNORMAL
EOSINOPHIL # BLD AUTO: 0.3 K/UL (ref 0–0.5)
EOSINOPHIL NFR BLD: 3.2 % (ref 0–8)
ERYTHROCYTE [DISTWIDTH] IN BLOOD BY AUTOMATED COUNT: 13.8 % (ref 11.5–14.5)
EST. GFR  (AFRICAN AMERICAN): >60 ML/MIN/1.73 M^2
EST. GFR  (NON AFRICAN AMERICAN): >60 ML/MIN/1.73 M^2
GLUCOSE SERPL-MCNC: 186 MG/DL (ref 70–110)
HCT VFR BLD AUTO: 34.3 % (ref 40–54)
HGB BLD-MCNC: 10.8 G/DL (ref 14–18)
IMM GRANULOCYTES # BLD AUTO: 0.4 K/UL (ref 0–0.04)
IMM GRANULOCYTES NFR BLD AUTO: 3.7 % (ref 0–0.5)
LYMPHOCYTES # BLD AUTO: 3.1 K/UL (ref 1–4.8)
LYMPHOCYTES NFR BLD: 28.6 % (ref 18–48)
MAGNESIUM SERPL-MCNC: 1.9 MG/DL (ref 1.6–2.6)
MCH RBC QN AUTO: 27.3 PG (ref 27–31)
MCHC RBC AUTO-ENTMCNC: 31.5 G/DL (ref 32–36)
MCV RBC AUTO: 87 FL (ref 82–98)
MONOCYTES # BLD AUTO: 1.4 K/UL (ref 0.3–1)
MONOCYTES NFR BLD: 12.9 % (ref 4–15)
NEUTROPHILS # BLD AUTO: 5.5 K/UL (ref 1.8–7.7)
NEUTROPHILS NFR BLD: 51 % (ref 38–73)
NRBC BLD-RTO: 0 /100 WBC
PHOSPHATE SERPL-MCNC: 2.7 MG/DL (ref 2.7–4.5)
PLATELET # BLD AUTO: 535 K/UL (ref 150–450)
PLATELET BLD QL SMEAR: ABNORMAL
PMV BLD AUTO: 9.8 FL (ref 9.2–12.9)
POCT GLUCOSE: 162 MG/DL (ref 70–110)
POCT GLUCOSE: 202 MG/DL (ref 70–110)
POCT GLUCOSE: 249 MG/DL (ref 70–110)
POCT GLUCOSE: 265 MG/DL (ref 70–110)
POTASSIUM SERPL-SCNC: 3.8 MMOL/L (ref 3.5–5.1)
RBC # BLD AUTO: 3.96 M/UL (ref 4.6–6.2)
SODIUM SERPL-SCNC: 135 MMOL/L (ref 136–145)
WBC # BLD AUTO: 10.78 K/UL (ref 3.9–12.7)

## 2021-09-27 PROCEDURE — 25000003 PHARM REV CODE 250: Performed by: PHYSICIAN ASSISTANT

## 2021-09-27 PROCEDURE — 99233 PR SUBSEQUENT HOSPITAL CARE,LEVL III: ICD-10-PCS | Mod: ,,, | Performed by: INTERNAL MEDICINE

## 2021-09-27 PROCEDURE — 63600175 PHARM REV CODE 636 W HCPCS: Performed by: INTERNAL MEDICINE

## 2021-09-27 PROCEDURE — 85025 COMPLETE CBC W/AUTO DIFF WBC: CPT | Performed by: PODIATRIST

## 2021-09-27 PROCEDURE — 80048 BASIC METABOLIC PNL TOTAL CA: CPT | Performed by: PODIATRIST

## 2021-09-27 PROCEDURE — 63600175 PHARM REV CODE 636 W HCPCS: Performed by: HOSPITALIST

## 2021-09-27 PROCEDURE — 99233 SBSQ HOSP IP/OBS HIGH 50: CPT | Mod: ,,, | Performed by: HOSPITALIST

## 2021-09-27 PROCEDURE — 25000003 PHARM REV CODE 250: Performed by: INTERNAL MEDICINE

## 2021-09-27 PROCEDURE — 11000001 HC ACUTE MED/SURG PRIVATE ROOM

## 2021-09-27 PROCEDURE — 94761 N-INVAS EAR/PLS OXIMETRY MLT: CPT

## 2021-09-27 PROCEDURE — 36415 COLL VENOUS BLD VENIPUNCTURE: CPT | Performed by: PODIATRIST

## 2021-09-27 PROCEDURE — 83735 ASSAY OF MAGNESIUM: CPT | Performed by: PODIATRIST

## 2021-09-27 PROCEDURE — 63600175 PHARM REV CODE 636 W HCPCS: Performed by: PODIATRIST

## 2021-09-27 PROCEDURE — 84100 ASSAY OF PHOSPHORUS: CPT | Performed by: PODIATRIST

## 2021-09-27 PROCEDURE — 25000003 PHARM REV CODE 250: Performed by: PODIATRIST

## 2021-09-27 PROCEDURE — 99233 SBSQ HOSP IP/OBS HIGH 50: CPT | Mod: ,,, | Performed by: INTERNAL MEDICINE

## 2021-09-27 PROCEDURE — 99233 PR SUBSEQUENT HOSPITAL CARE,LEVL III: ICD-10-PCS | Mod: ,,, | Performed by: HOSPITALIST

## 2021-09-27 PROCEDURE — 25000003 PHARM REV CODE 250: Performed by: HOSPITALIST

## 2021-09-27 RX ORDER — INSULIN ASPART 100 [IU]/ML
8 INJECTION, SOLUTION INTRAVENOUS; SUBCUTANEOUS
Status: DISCONTINUED | OUTPATIENT
Start: 2021-09-28 | End: 2021-09-28

## 2021-09-27 RX ORDER — CEFEPIME HYDROCHLORIDE 1 G/50ML
2 INJECTION, SOLUTION INTRAVENOUS
Status: DISCONTINUED | OUTPATIENT
Start: 2021-09-27 | End: 2021-10-01

## 2021-09-27 RX ORDER — NIFEDIPINE 30 MG/1
30 TABLET, EXTENDED RELEASE ORAL DAILY
Status: DISCONTINUED | OUTPATIENT
Start: 2021-09-27 | End: 2021-10-06 | Stop reason: HOSPADM

## 2021-09-27 RX ADMIN — OXYCODONE HYDROCHLORIDE 5 MG: 5 TABLET ORAL at 08:09

## 2021-09-27 RX ADMIN — INSULIN DETEMIR 40 UNITS: 100 INJECTION, SOLUTION SUBCUTANEOUS at 09:09

## 2021-09-27 RX ADMIN — OXYCODONE HYDROCHLORIDE 5 MG: 5 TABLET ORAL at 09:09

## 2021-09-27 RX ADMIN — METRONIDAZOLE 500 MG: 250 TABLET ORAL at 01:09

## 2021-09-27 RX ADMIN — INSULIN ASPART 5 UNITS: 100 INJECTION, SOLUTION INTRAVENOUS; SUBCUTANEOUS at 08:09

## 2021-09-27 RX ADMIN — LOSARTAN POTASSIUM 50 MG: 50 TABLET, FILM COATED ORAL at 08:09

## 2021-09-27 RX ADMIN — ATORVASTATIN CALCIUM 20 MG: 20 TABLET, FILM COATED ORAL at 08:09

## 2021-09-27 RX ADMIN — INSULIN ASPART 2 UNITS: 100 INJECTION, SOLUTION INTRAVENOUS; SUBCUTANEOUS at 12:09

## 2021-09-27 RX ADMIN — QUETIAPINE 125 MG: 100 TABLET ORAL at 09:09

## 2021-09-27 RX ADMIN — METRONIDAZOLE 500 MG: 250 TABLET ORAL at 09:09

## 2021-09-27 RX ADMIN — INSULIN ASPART 1 UNITS: 100 INJECTION, SOLUTION INTRAVENOUS; SUBCUTANEOUS at 09:09

## 2021-09-27 RX ADMIN — INSULIN ASPART 3 UNITS: 100 INJECTION, SOLUTION INTRAVENOUS; SUBCUTANEOUS at 04:09

## 2021-09-27 RX ADMIN — CHLORTHALIDONE 25 MG: 25 TABLET ORAL at 08:09

## 2021-09-27 RX ADMIN — NIFEDIPINE 30 MG: 30 TABLET, FILM COATED, EXTENDED RELEASE ORAL at 09:09

## 2021-09-27 RX ADMIN — ENOXAPARIN SODIUM 40 MG: 40 INJECTION SUBCUTANEOUS at 04:09

## 2021-09-27 RX ADMIN — CEFEPIME HYDROCHLORIDE 2 G: 2 INJECTION, SOLUTION INTRAVENOUS at 12:09

## 2021-09-27 RX ADMIN — INSULIN ASPART 5 UNITS: 100 INJECTION, SOLUTION INTRAVENOUS; SUBCUTANEOUS at 04:09

## 2021-09-27 RX ADMIN — OXYCODONE HYDROCHLORIDE 5 MG: 5 TABLET ORAL at 04:09

## 2021-09-27 RX ADMIN — METRONIDAZOLE 500 MG: 250 TABLET ORAL at 05:09

## 2021-09-27 RX ADMIN — ASPIRIN 81 MG: 81 TABLET, COATED ORAL at 08:09

## 2021-09-27 RX ADMIN — INSULIN ASPART 5 UNITS: 100 INJECTION, SOLUTION INTRAVENOUS; SUBCUTANEOUS at 12:09

## 2021-09-28 PROBLEM — I96 GANGRENE OF TOE OF RIGHT FOOT: Status: RESOLVED | Noted: 2021-09-23 | Resolved: 2021-09-28

## 2021-09-28 LAB
ANION GAP SERPL CALC-SCNC: 8 MMOL/L (ref 8–16)
BASOPHILS # BLD AUTO: 0.05 K/UL (ref 0–0.2)
BASOPHILS NFR BLD: 0.4 % (ref 0–1.9)
BUN SERPL-MCNC: 11 MG/DL (ref 6–20)
CALCIUM SERPL-MCNC: 9.3 MG/DL (ref 8.7–10.5)
CHLORIDE SERPL-SCNC: 100 MMOL/L (ref 95–110)
CO2 SERPL-SCNC: 25 MMOL/L (ref 23–29)
CREAT SERPL-MCNC: 0.8 MG/DL (ref 0.5–1.4)
DIFFERENTIAL METHOD: ABNORMAL
EOSINOPHIL # BLD AUTO: 0.3 K/UL (ref 0–0.5)
EOSINOPHIL NFR BLD: 2.9 % (ref 0–8)
ERYTHROCYTE [DISTWIDTH] IN BLOOD BY AUTOMATED COUNT: 13.8 % (ref 11.5–14.5)
EST. GFR  (AFRICAN AMERICAN): >60 ML/MIN/1.73 M^2
EST. GFR  (NON AFRICAN AMERICAN): >60 ML/MIN/1.73 M^2
GLUCOSE SERPL-MCNC: 239 MG/DL (ref 70–110)
HCT VFR BLD AUTO: 35 % (ref 40–54)
HGB BLD-MCNC: 11 G/DL (ref 14–18)
IMM GRANULOCYTES # BLD AUTO: 0.38 K/UL (ref 0–0.04)
IMM GRANULOCYTES NFR BLD AUTO: 3.4 % (ref 0–0.5)
LYMPHOCYTES # BLD AUTO: 3.1 K/UL (ref 1–4.8)
LYMPHOCYTES NFR BLD: 27.5 % (ref 18–48)
MAGNESIUM SERPL-MCNC: 1.8 MG/DL (ref 1.6–2.6)
MCH RBC QN AUTO: 27.2 PG (ref 27–31)
MCHC RBC AUTO-ENTMCNC: 31.4 G/DL (ref 32–36)
MCV RBC AUTO: 86 FL (ref 82–98)
MONOCYTES # BLD AUTO: 1.2 K/UL (ref 0.3–1)
MONOCYTES NFR BLD: 10.5 % (ref 4–15)
NEUTROPHILS # BLD AUTO: 6.2 K/UL (ref 1.8–7.7)
NEUTROPHILS NFR BLD: 55.3 % (ref 38–73)
NRBC BLD-RTO: 0 /100 WBC
PHOSPHATE SERPL-MCNC: 3.1 MG/DL (ref 2.7–4.5)
PLATELET # BLD AUTO: 598 K/UL (ref 150–450)
PMV BLD AUTO: 9.7 FL (ref 9.2–12.9)
POCT GLUCOSE: 228 MG/DL (ref 70–110)
POCT GLUCOSE: 249 MG/DL (ref 70–110)
POCT GLUCOSE: 282 MG/DL (ref 70–110)
POCT GLUCOSE: 294 MG/DL (ref 70–110)
POTASSIUM SERPL-SCNC: 3.9 MMOL/L (ref 3.5–5.1)
RBC # BLD AUTO: 4.05 M/UL (ref 4.6–6.2)
SODIUM SERPL-SCNC: 133 MMOL/L (ref 136–145)
WBC # BLD AUTO: 11.21 K/UL (ref 3.9–12.7)

## 2021-09-28 PROCEDURE — 11000001 HC ACUTE MED/SURG PRIVATE ROOM

## 2021-09-28 PROCEDURE — 36415 COLL VENOUS BLD VENIPUNCTURE: CPT | Performed by: PODIATRIST

## 2021-09-28 PROCEDURE — 80048 BASIC METABOLIC PNL TOTAL CA: CPT | Performed by: PODIATRIST

## 2021-09-28 PROCEDURE — 85025 COMPLETE CBC W/AUTO DIFF WBC: CPT | Performed by: PODIATRIST

## 2021-09-28 PROCEDURE — 99233 PR SUBSEQUENT HOSPITAL CARE,LEVL III: ICD-10-PCS | Mod: ,,, | Performed by: INTERNAL MEDICINE

## 2021-09-28 PROCEDURE — 94761 N-INVAS EAR/PLS OXIMETRY MLT: CPT

## 2021-09-28 PROCEDURE — 25000003 PHARM REV CODE 250: Performed by: INTERNAL MEDICINE

## 2021-09-28 PROCEDURE — 25000003 PHARM REV CODE 250: Performed by: PODIATRIST

## 2021-09-28 PROCEDURE — 63600175 PHARM REV CODE 636 W HCPCS: Performed by: PODIATRIST

## 2021-09-28 PROCEDURE — 94799 UNLISTED PULMONARY SVC/PX: CPT

## 2021-09-28 PROCEDURE — 83735 ASSAY OF MAGNESIUM: CPT | Performed by: PODIATRIST

## 2021-09-28 PROCEDURE — 99233 SBSQ HOSP IP/OBS HIGH 50: CPT | Mod: ,,, | Performed by: INTERNAL MEDICINE

## 2021-09-28 PROCEDURE — 25000003 PHARM REV CODE 250: Performed by: PHYSICIAN ASSISTANT

## 2021-09-28 PROCEDURE — 63600175 PHARM REV CODE 636 W HCPCS: Performed by: INTERNAL MEDICINE

## 2021-09-28 PROCEDURE — 25000003 PHARM REV CODE 250: Performed by: HOSPITALIST

## 2021-09-28 PROCEDURE — 84100 ASSAY OF PHOSPHORUS: CPT | Performed by: PODIATRIST

## 2021-09-28 PROCEDURE — 99024 PR POST-OP FOLLOW-UP VISIT: ICD-10-PCS | Mod: ,,, | Performed by: PODIATRIST

## 2021-09-28 PROCEDURE — 99024 POSTOP FOLLOW-UP VISIT: CPT | Mod: ,,, | Performed by: PODIATRIST

## 2021-09-28 PROCEDURE — C9399 UNCLASSIFIED DRUGS OR BIOLOG: HCPCS | Performed by: INTERNAL MEDICINE

## 2021-09-28 RX ORDER — INSULIN ASPART 100 [IU]/ML
10 INJECTION, SOLUTION INTRAVENOUS; SUBCUTANEOUS
Status: DISCONTINUED | OUTPATIENT
Start: 2021-09-28 | End: 2021-10-06 | Stop reason: HOSPADM

## 2021-09-28 RX ADMIN — ATORVASTATIN CALCIUM 20 MG: 20 TABLET, FILM COATED ORAL at 09:09

## 2021-09-28 RX ADMIN — LOSARTAN POTASSIUM 50 MG: 50 TABLET, FILM COATED ORAL at 09:09

## 2021-09-28 RX ADMIN — OXYCODONE HYDROCHLORIDE 5 MG: 5 TABLET ORAL at 07:09

## 2021-09-28 RX ADMIN — QUETIAPINE 125 MG: 100 TABLET ORAL at 09:09

## 2021-09-28 RX ADMIN — OXYCODONE HYDROCHLORIDE 5 MG: 5 TABLET ORAL at 01:09

## 2021-09-28 RX ADMIN — CHLORTHALIDONE 25 MG: 25 TABLET ORAL at 09:09

## 2021-09-28 RX ADMIN — METRONIDAZOLE 500 MG: 250 TABLET ORAL at 01:09

## 2021-09-28 RX ADMIN — METRONIDAZOLE 500 MG: 250 TABLET ORAL at 09:09

## 2021-09-28 RX ADMIN — ASPIRIN 81 MG: 81 TABLET, COATED ORAL at 09:09

## 2021-09-28 RX ADMIN — OXYCODONE HYDROCHLORIDE 5 MG: 5 TABLET ORAL at 05:09

## 2021-09-28 RX ADMIN — METRONIDAZOLE 500 MG: 250 TABLET ORAL at 05:09

## 2021-09-28 RX ADMIN — OXYCODONE HYDROCHLORIDE 5 MG: 5 TABLET ORAL at 09:09

## 2021-09-28 RX ADMIN — ENOXAPARIN SODIUM 40 MG: 40 INJECTION SUBCUTANEOUS at 07:09

## 2021-09-28 RX ADMIN — INSULIN ASPART 2 UNITS: 100 INJECTION, SOLUTION INTRAVENOUS; SUBCUTANEOUS at 01:09

## 2021-09-28 RX ADMIN — INSULIN ASPART 3 UNITS: 100 INJECTION, SOLUTION INTRAVENOUS; SUBCUTANEOUS at 08:09

## 2021-09-28 RX ADMIN — INSULIN ASPART 8 UNITS: 100 INJECTION, SOLUTION INTRAVENOUS; SUBCUTANEOUS at 01:09

## 2021-09-28 RX ADMIN — INSULIN ASPART 10 UNITS: 100 INJECTION, SOLUTION INTRAVENOUS; SUBCUTANEOUS at 08:09

## 2021-09-28 RX ADMIN — NIFEDIPINE 30 MG: 30 TABLET, FILM COATED, EXTENDED RELEASE ORAL at 09:09

## 2021-09-28 RX ADMIN — INSULIN DETEMIR 45 UNITS: 100 INJECTION, SOLUTION SUBCUTANEOUS at 09:09

## 2021-09-28 RX ADMIN — OXYCODONE HYDROCHLORIDE 5 MG: 5 TABLET ORAL at 11:09

## 2021-09-28 RX ADMIN — CEFEPIME HYDROCHLORIDE 2 G: 2 INJECTION, SOLUTION INTRAVENOUS at 01:09

## 2021-09-28 RX ADMIN — INSULIN ASPART 1 UNITS: 100 INJECTION, SOLUTION INTRAVENOUS; SUBCUTANEOUS at 09:09

## 2021-09-29 LAB
ANION GAP SERPL CALC-SCNC: 9 MMOL/L (ref 8–16)
BASOPHILS # BLD AUTO: 0.05 K/UL (ref 0–0.2)
BASOPHILS NFR BLD: 0.4 % (ref 0–1.9)
BUN SERPL-MCNC: 12 MG/DL (ref 6–20)
CALCIUM SERPL-MCNC: 9.1 MG/DL (ref 8.7–10.5)
CHLORIDE SERPL-SCNC: 100 MMOL/L (ref 95–110)
CO2 SERPL-SCNC: 25 MMOL/L (ref 23–29)
CREAT SERPL-MCNC: 0.8 MG/DL (ref 0.5–1.4)
DIFFERENTIAL METHOD: ABNORMAL
EOSINOPHIL # BLD AUTO: 0.4 K/UL (ref 0–0.5)
EOSINOPHIL NFR BLD: 3.2 % (ref 0–8)
ERYTHROCYTE [DISTWIDTH] IN BLOOD BY AUTOMATED COUNT: 13.9 % (ref 11.5–14.5)
EST. GFR  (AFRICAN AMERICAN): >60 ML/MIN/1.73 M^2
EST. GFR  (NON AFRICAN AMERICAN): >60 ML/MIN/1.73 M^2
GLUCOSE SERPL-MCNC: 141 MG/DL (ref 70–110)
HCT VFR BLD AUTO: 35.7 % (ref 40–54)
HGB BLD-MCNC: 11.2 G/DL (ref 14–18)
IMM GRANULOCYTES # BLD AUTO: 0.36 K/UL (ref 0–0.04)
IMM GRANULOCYTES NFR BLD AUTO: 3.1 % (ref 0–0.5)
LYMPHOCYTES # BLD AUTO: 3.3 K/UL (ref 1–4.8)
LYMPHOCYTES NFR BLD: 28.9 % (ref 18–48)
MAGNESIUM SERPL-MCNC: 1.9 MG/DL (ref 1.6–2.6)
MCH RBC QN AUTO: 27.2 PG (ref 27–31)
MCHC RBC AUTO-ENTMCNC: 31.4 G/DL (ref 32–36)
MCV RBC AUTO: 87 FL (ref 82–98)
MONOCYTES # BLD AUTO: 1.1 K/UL (ref 0.3–1)
MONOCYTES NFR BLD: 9.1 % (ref 4–15)
NEUTROPHILS # BLD AUTO: 6.4 K/UL (ref 1.8–7.7)
NEUTROPHILS NFR BLD: 55.3 % (ref 38–73)
NRBC BLD-RTO: 0 /100 WBC
PHOSPHATE SERPL-MCNC: 3.1 MG/DL (ref 2.7–4.5)
PLATELET # BLD AUTO: 603 K/UL (ref 150–450)
PMV BLD AUTO: 9.4 FL (ref 9.2–12.9)
POCT GLUCOSE: 136 MG/DL (ref 70–110)
POCT GLUCOSE: 146 MG/DL (ref 70–110)
POCT GLUCOSE: 151 MG/DL (ref 70–110)
POCT GLUCOSE: 216 MG/DL (ref 70–110)
POTASSIUM SERPL-SCNC: 3.9 MMOL/L (ref 3.5–5.1)
RBC # BLD AUTO: 4.12 M/UL (ref 4.6–6.2)
SODIUM SERPL-SCNC: 134 MMOL/L (ref 136–145)
WBC # BLD AUTO: 11.57 K/UL (ref 3.9–12.7)

## 2021-09-29 PROCEDURE — 83735 ASSAY OF MAGNESIUM: CPT | Performed by: PODIATRIST

## 2021-09-29 PROCEDURE — 25000003 PHARM REV CODE 250: Performed by: PHYSICIAN ASSISTANT

## 2021-09-29 PROCEDURE — 85025 COMPLETE CBC W/AUTO DIFF WBC: CPT | Performed by: PODIATRIST

## 2021-09-29 PROCEDURE — 63600175 PHARM REV CODE 636 W HCPCS: Performed by: PODIATRIST

## 2021-09-29 PROCEDURE — 84100 ASSAY OF PHOSPHORUS: CPT | Performed by: PODIATRIST

## 2021-09-29 PROCEDURE — 11000001 HC ACUTE MED/SURG PRIVATE ROOM

## 2021-09-29 PROCEDURE — C9399 UNCLASSIFIED DRUGS OR BIOLOG: HCPCS | Performed by: INTERNAL MEDICINE

## 2021-09-29 PROCEDURE — 25000003 PHARM REV CODE 250: Performed by: PODIATRIST

## 2021-09-29 PROCEDURE — 97162 PT EVAL MOD COMPLEX 30 MIN: CPT

## 2021-09-29 PROCEDURE — 36415 COLL VENOUS BLD VENIPUNCTURE: CPT | Performed by: PODIATRIST

## 2021-09-29 PROCEDURE — 63600175 PHARM REV CODE 636 W HCPCS: Performed by: INTERNAL MEDICINE

## 2021-09-29 PROCEDURE — 99233 SBSQ HOSP IP/OBS HIGH 50: CPT | Mod: ,,, | Performed by: INTERNAL MEDICINE

## 2021-09-29 PROCEDURE — 97116 GAIT TRAINING THERAPY: CPT

## 2021-09-29 PROCEDURE — 25000003 PHARM REV CODE 250: Performed by: INTERNAL MEDICINE

## 2021-09-29 PROCEDURE — 99232 SBSQ HOSP IP/OBS MODERATE 35: CPT | Mod: 24,,, | Performed by: PODIATRIST

## 2021-09-29 PROCEDURE — 80048 BASIC METABOLIC PNL TOTAL CA: CPT | Performed by: PODIATRIST

## 2021-09-29 PROCEDURE — 25000003 PHARM REV CODE 250: Performed by: HOSPITALIST

## 2021-09-29 PROCEDURE — 99233 PR SUBSEQUENT HOSPITAL CARE,LEVL III: ICD-10-PCS | Mod: ,,, | Performed by: INTERNAL MEDICINE

## 2021-09-29 PROCEDURE — 94761 N-INVAS EAR/PLS OXIMETRY MLT: CPT

## 2021-09-29 PROCEDURE — 99232 PR SUBSEQUENT HOSPITAL CARE,LEVL II: ICD-10-PCS | Mod: 24,,, | Performed by: PODIATRIST

## 2021-09-29 RX ADMIN — QUETIAPINE 125 MG: 100 TABLET ORAL at 08:09

## 2021-09-29 RX ADMIN — INSULIN DETEMIR 45 UNITS: 100 INJECTION, SOLUTION SUBCUTANEOUS at 08:09

## 2021-09-29 RX ADMIN — METRONIDAZOLE 500 MG: 250 TABLET ORAL at 09:09

## 2021-09-29 RX ADMIN — CEFEPIME HYDROCHLORIDE 2 G: 2 INJECTION, SOLUTION INTRAVENOUS at 01:09

## 2021-09-29 RX ADMIN — INSULIN ASPART 10 UNITS: 100 INJECTION, SOLUTION INTRAVENOUS; SUBCUTANEOUS at 08:09

## 2021-09-29 RX ADMIN — NIFEDIPINE 30 MG: 30 TABLET, FILM COATED, EXTENDED RELEASE ORAL at 08:09

## 2021-09-29 RX ADMIN — INSULIN ASPART 10 UNITS: 100 INJECTION, SOLUTION INTRAVENOUS; SUBCUTANEOUS at 12:09

## 2021-09-29 RX ADMIN — LOSARTAN POTASSIUM 50 MG: 50 TABLET, FILM COATED ORAL at 08:09

## 2021-09-29 RX ADMIN — METRONIDAZOLE 500 MG: 250 TABLET ORAL at 05:09

## 2021-09-29 RX ADMIN — OXYCODONE HYDROCHLORIDE 5 MG: 5 TABLET ORAL at 08:09

## 2021-09-29 RX ADMIN — INSULIN ASPART 10 UNITS: 100 INJECTION, SOLUTION INTRAVENOUS; SUBCUTANEOUS at 04:09

## 2021-09-29 RX ADMIN — CEFEPIME HYDROCHLORIDE 2 G: 2 INJECTION, SOLUTION INTRAVENOUS at 12:09

## 2021-09-29 RX ADMIN — OXYCODONE HYDROCHLORIDE 5 MG: 5 TABLET ORAL at 07:09

## 2021-09-29 RX ADMIN — ENOXAPARIN SODIUM 40 MG: 40 INJECTION SUBCUTANEOUS at 04:09

## 2021-09-29 RX ADMIN — ATORVASTATIN CALCIUM 20 MG: 20 TABLET, FILM COATED ORAL at 08:09

## 2021-09-29 RX ADMIN — INSULIN ASPART 1 UNITS: 100 INJECTION, SOLUTION INTRAVENOUS; SUBCUTANEOUS at 08:09

## 2021-09-29 RX ADMIN — METRONIDAZOLE 500 MG: 250 TABLET ORAL at 01:09

## 2021-09-29 RX ADMIN — OXYCODONE HYDROCHLORIDE 5 MG: 5 TABLET ORAL at 02:09

## 2021-09-29 RX ADMIN — ASPIRIN 81 MG: 81 TABLET, COATED ORAL at 08:09

## 2021-09-29 RX ADMIN — CHLORTHALIDONE 25 MG: 25 TABLET ORAL at 08:09

## 2021-09-30 LAB
ANION GAP SERPL CALC-SCNC: 7 MMOL/L (ref 8–16)
BASOPHILS # BLD AUTO: 0.04 K/UL (ref 0–0.2)
BASOPHILS NFR BLD: 0.3 % (ref 0–1.9)
BUN SERPL-MCNC: 15 MG/DL (ref 6–20)
CALCIUM SERPL-MCNC: 9.2 MG/DL (ref 8.7–10.5)
CHLORIDE SERPL-SCNC: 100 MMOL/L (ref 95–110)
CO2 SERPL-SCNC: 26 MMOL/L (ref 23–29)
CREAT SERPL-MCNC: 1 MG/DL (ref 0.5–1.4)
DIFFERENTIAL METHOD: ABNORMAL
EOSINOPHIL # BLD AUTO: 0.3 K/UL (ref 0–0.5)
EOSINOPHIL NFR BLD: 2.8 % (ref 0–8)
ERYTHROCYTE [DISTWIDTH] IN BLOOD BY AUTOMATED COUNT: 14 % (ref 11.5–14.5)
EST. GFR  (AFRICAN AMERICAN): >60 ML/MIN/1.73 M^2
EST. GFR  (NON AFRICAN AMERICAN): >60 ML/MIN/1.73 M^2
GLUCOSE SERPL-MCNC: 230 MG/DL (ref 70–110)
HCT VFR BLD AUTO: 34.6 % (ref 40–54)
HGB BLD-MCNC: 11 G/DL (ref 14–18)
IMM GRANULOCYTES # BLD AUTO: 0.32 K/UL (ref 0–0.04)
IMM GRANULOCYTES NFR BLD AUTO: 2.7 % (ref 0–0.5)
LYMPHOCYTES # BLD AUTO: 3.3 K/UL (ref 1–4.8)
LYMPHOCYTES NFR BLD: 28 % (ref 18–48)
MAGNESIUM SERPL-MCNC: 1.9 MG/DL (ref 1.6–2.6)
MCH RBC QN AUTO: 27.6 PG (ref 27–31)
MCHC RBC AUTO-ENTMCNC: 31.8 G/DL (ref 32–36)
MCV RBC AUTO: 87 FL (ref 82–98)
MONOCYTES # BLD AUTO: 1 K/UL (ref 0.3–1)
MONOCYTES NFR BLD: 8.4 % (ref 4–15)
NEUTROPHILS # BLD AUTO: 6.8 K/UL (ref 1.8–7.7)
NEUTROPHILS NFR BLD: 57.8 % (ref 38–73)
NRBC BLD-RTO: 0 /100 WBC
PHOSPHATE SERPL-MCNC: 3.2 MG/DL (ref 2.7–4.5)
PLATELET # BLD AUTO: 585 K/UL (ref 150–450)
PMV BLD AUTO: 9.4 FL (ref 9.2–12.9)
POCT GLUCOSE: 181 MG/DL (ref 70–110)
POCT GLUCOSE: 192 MG/DL (ref 70–110)
POCT GLUCOSE: 289 MG/DL (ref 70–110)
POTASSIUM SERPL-SCNC: 4.2 MMOL/L (ref 3.5–5.1)
RBC # BLD AUTO: 3.99 M/UL (ref 4.6–6.2)
SODIUM SERPL-SCNC: 133 MMOL/L (ref 136–145)
WBC # BLD AUTO: 11.79 K/UL (ref 3.9–12.7)

## 2021-09-30 PROCEDURE — 85025 COMPLETE CBC W/AUTO DIFF WBC: CPT | Performed by: PODIATRIST

## 2021-09-30 PROCEDURE — 99233 SBSQ HOSP IP/OBS HIGH 50: CPT | Mod: ,,, | Performed by: INTERNAL MEDICINE

## 2021-09-30 PROCEDURE — 84100 ASSAY OF PHOSPHORUS: CPT | Performed by: PODIATRIST

## 2021-09-30 PROCEDURE — 63600175 PHARM REV CODE 636 W HCPCS: Performed by: INTERNAL MEDICINE

## 2021-09-30 PROCEDURE — 25000003 PHARM REV CODE 250: Performed by: INTERNAL MEDICINE

## 2021-09-30 PROCEDURE — 36415 COLL VENOUS BLD VENIPUNCTURE: CPT | Performed by: PODIATRIST

## 2021-09-30 PROCEDURE — 83735 ASSAY OF MAGNESIUM: CPT | Performed by: PODIATRIST

## 2021-09-30 PROCEDURE — 11000001 HC ACUTE MED/SURG PRIVATE ROOM

## 2021-09-30 PROCEDURE — 97116 GAIT TRAINING THERAPY: CPT | Mod: CQ

## 2021-09-30 PROCEDURE — 25000003 PHARM REV CODE 250: Performed by: HOSPITALIST

## 2021-09-30 PROCEDURE — 99233 PR SUBSEQUENT HOSPITAL CARE,LEVL III: ICD-10-PCS | Mod: ,,, | Performed by: INTERNAL MEDICINE

## 2021-09-30 PROCEDURE — 80048 BASIC METABOLIC PNL TOTAL CA: CPT | Performed by: PODIATRIST

## 2021-09-30 PROCEDURE — 63600175 PHARM REV CODE 636 W HCPCS: Performed by: PODIATRIST

## 2021-09-30 PROCEDURE — 25000003 PHARM REV CODE 250: Performed by: PODIATRIST

## 2021-09-30 PROCEDURE — 94761 N-INVAS EAR/PLS OXIMETRY MLT: CPT

## 2021-09-30 PROCEDURE — 25000003 PHARM REV CODE 250: Performed by: PHYSICIAN ASSISTANT

## 2021-09-30 PROCEDURE — 97165 OT EVAL LOW COMPLEX 30 MIN: CPT

## 2021-09-30 RX ADMIN — NIFEDIPINE 30 MG: 30 TABLET, FILM COATED, EXTENDED RELEASE ORAL at 10:09

## 2021-09-30 RX ADMIN — CEFEPIME HYDROCHLORIDE 2 G: 2 INJECTION, SOLUTION INTRAVENOUS at 12:09

## 2021-09-30 RX ADMIN — OXYCODONE HYDROCHLORIDE 5 MG: 5 TABLET ORAL at 11:09

## 2021-09-30 RX ADMIN — INSULIN ASPART 1 UNITS: 100 INJECTION, SOLUTION INTRAVENOUS; SUBCUTANEOUS at 09:09

## 2021-09-30 RX ADMIN — CHLORTHALIDONE 25 MG: 25 TABLET ORAL at 10:09

## 2021-09-30 RX ADMIN — CEFEPIME HYDROCHLORIDE 2 G: 2 INJECTION, SOLUTION INTRAVENOUS at 01:09

## 2021-09-30 RX ADMIN — METRONIDAZOLE 500 MG: 250 TABLET ORAL at 05:09

## 2021-09-30 RX ADMIN — ATORVASTATIN CALCIUM 20 MG: 20 TABLET, FILM COATED ORAL at 10:09

## 2021-09-30 RX ADMIN — METRONIDAZOLE 500 MG: 250 TABLET ORAL at 02:09

## 2021-09-30 RX ADMIN — OXYCODONE HYDROCHLORIDE 5 MG: 5 TABLET ORAL at 03:09

## 2021-09-30 RX ADMIN — INSULIN ASPART 10 UNITS: 100 INJECTION, SOLUTION INTRAVENOUS; SUBCUTANEOUS at 06:09

## 2021-09-30 RX ADMIN — METRONIDAZOLE 500 MG: 250 TABLET ORAL at 09:09

## 2021-09-30 RX ADMIN — QUETIAPINE 125 MG: 100 TABLET ORAL at 09:09

## 2021-09-30 RX ADMIN — ASPIRIN 81 MG: 81 TABLET, COATED ORAL at 10:09

## 2021-09-30 RX ADMIN — ENOXAPARIN SODIUM 40 MG: 40 INJECTION SUBCUTANEOUS at 06:09

## 2021-09-30 RX ADMIN — INSULIN ASPART 10 UNITS: 100 INJECTION, SOLUTION INTRAVENOUS; SUBCUTANEOUS at 12:09

## 2021-09-30 RX ADMIN — OXYCODONE HYDROCHLORIDE 5 MG: 5 TABLET ORAL at 07:09

## 2021-09-30 RX ADMIN — INSULIN ASPART 3 UNITS: 100 INJECTION, SOLUTION INTRAVENOUS; SUBCUTANEOUS at 12:09

## 2021-09-30 RX ADMIN — INSULIN DETEMIR 50 UNITS: 100 INJECTION, SOLUTION SUBCUTANEOUS at 09:09

## 2021-09-30 RX ADMIN — OXYCODONE HYDROCHLORIDE 5 MG: 5 TABLET ORAL at 02:09

## 2021-09-30 RX ADMIN — LOSARTAN POTASSIUM 50 MG: 50 TABLET, FILM COATED ORAL at 10:09

## 2021-09-30 RX ADMIN — INSULIN ASPART 10 UNITS: 100 INJECTION, SOLUTION INTRAVENOUS; SUBCUTANEOUS at 07:09

## 2021-09-30 RX ADMIN — OXYCODONE HYDROCHLORIDE 5 MG: 5 TABLET ORAL at 06:09

## 2021-09-30 RX ADMIN — ACETAMINOPHEN 1000 MG: 500 TABLET ORAL at 07:09

## 2021-09-30 RX ADMIN — POLYETHYLENE GLYCOL 3350 17 G: 17 POWDER, FOR SOLUTION ORAL at 09:09

## 2021-10-01 LAB
ANION GAP SERPL CALC-SCNC: 8 MMOL/L (ref 8–16)
ANISOCYTOSIS BLD QL SMEAR: SLIGHT
BASOPHILS # BLD AUTO: 0.06 K/UL (ref 0–0.2)
BASOPHILS NFR BLD: 0.5 % (ref 0–1.9)
BUN SERPL-MCNC: 15 MG/DL (ref 6–20)
CALCIUM SERPL-MCNC: 9.3 MG/DL (ref 8.7–10.5)
CHLORIDE SERPL-SCNC: 99 MMOL/L (ref 95–110)
CO2 SERPL-SCNC: 26 MMOL/L (ref 23–29)
CREAT SERPL-MCNC: 0.9 MG/DL (ref 0.5–1.4)
DIFFERENTIAL METHOD: ABNORMAL
EOSINOPHIL # BLD AUTO: 0.3 K/UL (ref 0–0.5)
EOSINOPHIL NFR BLD: 2.7 % (ref 0–8)
ERYTHROCYTE [DISTWIDTH] IN BLOOD BY AUTOMATED COUNT: 14 % (ref 11.5–14.5)
EST. GFR  (AFRICAN AMERICAN): >60 ML/MIN/1.73 M^2
EST. GFR  (NON AFRICAN AMERICAN): >60 ML/MIN/1.73 M^2
FINAL PATHOLOGIC DIAGNOSIS: NORMAL
GLUCOSE SERPL-MCNC: 261 MG/DL (ref 70–110)
HCT VFR BLD AUTO: 36.4 % (ref 40–54)
HGB BLD-MCNC: 11.7 G/DL (ref 14–18)
IMM GRANULOCYTES # BLD AUTO: 0.24 K/UL (ref 0–0.04)
IMM GRANULOCYTES NFR BLD AUTO: 2.2 % (ref 0–0.5)
LYMPHOCYTES # BLD AUTO: 3.8 K/UL (ref 1–4.8)
LYMPHOCYTES NFR BLD: 34.3 % (ref 18–48)
Lab: NORMAL
MAGNESIUM SERPL-MCNC: 1.9 MG/DL (ref 1.6–2.6)
MCH RBC QN AUTO: 27.9 PG (ref 27–31)
MCHC RBC AUTO-ENTMCNC: 32.1 G/DL (ref 32–36)
MCV RBC AUTO: 87 FL (ref 82–98)
MONOCYTES # BLD AUTO: 1 K/UL (ref 0.3–1)
MONOCYTES NFR BLD: 9.1 % (ref 4–15)
NEUTROPHILS # BLD AUTO: 5.6 K/UL (ref 1.8–7.7)
NEUTROPHILS NFR BLD: 51.2 % (ref 38–73)
NRBC BLD-RTO: 0 /100 WBC
PHOSPHATE SERPL-MCNC: 2.8 MG/DL (ref 2.7–4.5)
PLATELET # BLD AUTO: 675 K/UL (ref 150–450)
PLATELET BLD QL SMEAR: ABNORMAL
PMV BLD AUTO: 9.3 FL (ref 9.2–12.9)
POCT GLUCOSE: 120 MG/DL (ref 70–110)
POCT GLUCOSE: 155 MG/DL (ref 70–110)
POCT GLUCOSE: 209 MG/DL (ref 70–110)
POCT GLUCOSE: 230 MG/DL (ref 70–110)
POCT GLUCOSE: 243 MG/DL (ref 70–110)
POTASSIUM SERPL-SCNC: 4.1 MMOL/L (ref 3.5–5.1)
RBC # BLD AUTO: 4.19 M/UL (ref 4.6–6.2)
SODIUM SERPL-SCNC: 133 MMOL/L (ref 136–145)
WBC # BLD AUTO: 10.94 K/UL (ref 3.9–12.7)

## 2021-10-01 PROCEDURE — 97116 GAIT TRAINING THERAPY: CPT | Mod: CQ

## 2021-10-01 PROCEDURE — 25000003 PHARM REV CODE 250: Performed by: HOSPITALIST

## 2021-10-01 PROCEDURE — 97535 SELF CARE MNGMENT TRAINING: CPT

## 2021-10-01 PROCEDURE — 97530 THERAPEUTIC ACTIVITIES: CPT | Mod: CQ

## 2021-10-01 PROCEDURE — 99233 SBSQ HOSP IP/OBS HIGH 50: CPT | Mod: ,,, | Performed by: HOSPITALIST

## 2021-10-01 PROCEDURE — 99233 PR SUBSEQUENT HOSPITAL CARE,LEVL III: ICD-10-PCS | Mod: ,,, | Performed by: HOSPITALIST

## 2021-10-01 PROCEDURE — 80048 BASIC METABOLIC PNL TOTAL CA: CPT | Performed by: PODIATRIST

## 2021-10-01 PROCEDURE — 25000003 PHARM REV CODE 250: Performed by: PODIATRIST

## 2021-10-01 PROCEDURE — 85025 COMPLETE CBC W/AUTO DIFF WBC: CPT | Performed by: PODIATRIST

## 2021-10-01 PROCEDURE — 25000003 PHARM REV CODE 250: Performed by: INTERNAL MEDICINE

## 2021-10-01 PROCEDURE — 84100 ASSAY OF PHOSPHORUS: CPT | Performed by: PODIATRIST

## 2021-10-01 PROCEDURE — 11000001 HC ACUTE MED/SURG PRIVATE ROOM

## 2021-10-01 PROCEDURE — 63600175 PHARM REV CODE 636 W HCPCS: Performed by: INTERNAL MEDICINE

## 2021-10-01 PROCEDURE — 99233 SBSQ HOSP IP/OBS HIGH 50: CPT | Mod: ,,, | Performed by: INTERNAL MEDICINE

## 2021-10-01 PROCEDURE — 63600175 PHARM REV CODE 636 W HCPCS: Performed by: PODIATRIST

## 2021-10-01 PROCEDURE — 25000003 PHARM REV CODE 250: Performed by: PHYSICIAN ASSISTANT

## 2021-10-01 PROCEDURE — 99233 PR SUBSEQUENT HOSPITAL CARE,LEVL III: ICD-10-PCS | Mod: ,,, | Performed by: INTERNAL MEDICINE

## 2021-10-01 PROCEDURE — 83735 ASSAY OF MAGNESIUM: CPT | Performed by: PODIATRIST

## 2021-10-01 PROCEDURE — 36415 COLL VENOUS BLD VENIPUNCTURE: CPT | Performed by: PODIATRIST

## 2021-10-01 RX ORDER — AMOXICILLIN AND CLAVULANATE POTASSIUM 875; 125 MG/1; MG/1
1 TABLET, FILM COATED ORAL EVERY 12 HOURS
Status: DISCONTINUED | OUTPATIENT
Start: 2021-10-01 | End: 2021-10-06 | Stop reason: HOSPADM

## 2021-10-01 RX ADMIN — INSULIN ASPART 10 UNITS: 100 INJECTION, SOLUTION INTRAVENOUS; SUBCUTANEOUS at 11:10

## 2021-10-01 RX ADMIN — ASPIRIN 81 MG: 81 TABLET, COATED ORAL at 11:10

## 2021-10-01 RX ADMIN — OXYCODONE HYDROCHLORIDE 5 MG: 5 TABLET ORAL at 04:10

## 2021-10-01 RX ADMIN — INSULIN ASPART 10 UNITS: 100 INJECTION, SOLUTION INTRAVENOUS; SUBCUTANEOUS at 04:10

## 2021-10-01 RX ADMIN — NIFEDIPINE 30 MG: 30 TABLET, FILM COATED, EXTENDED RELEASE ORAL at 11:10

## 2021-10-01 RX ADMIN — QUETIAPINE 125 MG: 100 TABLET ORAL at 08:10

## 2021-10-01 RX ADMIN — INSULIN ASPART 10 UNITS: 100 INJECTION, SOLUTION INTRAVENOUS; SUBCUTANEOUS at 09:10

## 2021-10-01 RX ADMIN — ENOXAPARIN SODIUM 40 MG: 40 INJECTION SUBCUTANEOUS at 04:10

## 2021-10-01 RX ADMIN — AMOXICILLIN AND CLAVULANATE POTASSIUM 1 TABLET: 875; 125 TABLET, FILM COATED ORAL at 08:10

## 2021-10-01 RX ADMIN — OXYCODONE HYDROCHLORIDE 5 MG: 5 TABLET ORAL at 08:10

## 2021-10-01 RX ADMIN — METRONIDAZOLE 500 MG: 250 TABLET ORAL at 06:10

## 2021-10-01 RX ADMIN — CEFEPIME HYDROCHLORIDE 2 G: 2 INJECTION, SOLUTION INTRAVENOUS at 12:10

## 2021-10-01 RX ADMIN — ATORVASTATIN CALCIUM 20 MG: 20 TABLET, FILM COATED ORAL at 01:10

## 2021-10-01 RX ADMIN — INSULIN ASPART 10 UNITS: 100 INJECTION, SOLUTION INTRAVENOUS; SUBCUTANEOUS at 01:10

## 2021-10-01 RX ADMIN — CHLORTHALIDONE 25 MG: 25 TABLET ORAL at 11:10

## 2021-10-01 RX ADMIN — LOSARTAN POTASSIUM 50 MG: 50 TABLET, FILM COATED ORAL at 11:10

## 2021-10-01 RX ADMIN — INSULIN DETEMIR 50 UNITS: 100 INJECTION, SOLUTION SUBCUTANEOUS at 08:10

## 2021-10-02 LAB
ANION GAP SERPL CALC-SCNC: 8 MMOL/L (ref 8–16)
BASOPHILS # BLD AUTO: 0.07 K/UL (ref 0–0.2)
BASOPHILS NFR BLD: 0.7 % (ref 0–1.9)
BUN SERPL-MCNC: 17 MG/DL (ref 6–20)
CALCIUM SERPL-MCNC: 9.3 MG/DL (ref 8.7–10.5)
CHLORIDE SERPL-SCNC: 99 MMOL/L (ref 95–110)
CO2 SERPL-SCNC: 27 MMOL/L (ref 23–29)
CREAT SERPL-MCNC: 1 MG/DL (ref 0.5–1.4)
DIFFERENTIAL METHOD: ABNORMAL
EOSINOPHIL # BLD AUTO: 0.3 K/UL (ref 0–0.5)
EOSINOPHIL NFR BLD: 2.6 % (ref 0–8)
ERYTHROCYTE [DISTWIDTH] IN BLOOD BY AUTOMATED COUNT: 14.1 % (ref 11.5–14.5)
EST. GFR  (AFRICAN AMERICAN): >60 ML/MIN/1.73 M^2
EST. GFR  (NON AFRICAN AMERICAN): >60 ML/MIN/1.73 M^2
GLUCOSE SERPL-MCNC: 226 MG/DL (ref 70–110)
HCT VFR BLD AUTO: 36.7 % (ref 40–54)
HGB BLD-MCNC: 11.7 G/DL (ref 14–18)
IMM GRANULOCYTES # BLD AUTO: 0.18 K/UL (ref 0–0.04)
IMM GRANULOCYTES NFR BLD AUTO: 1.8 % (ref 0–0.5)
LYMPHOCYTES # BLD AUTO: 3.7 K/UL (ref 1–4.8)
LYMPHOCYTES NFR BLD: 37.4 % (ref 18–48)
MAGNESIUM SERPL-MCNC: 2 MG/DL (ref 1.6–2.6)
MCH RBC QN AUTO: 27.7 PG (ref 27–31)
MCHC RBC AUTO-ENTMCNC: 31.9 G/DL (ref 32–36)
MCV RBC AUTO: 87 FL (ref 82–98)
MONOCYTES # BLD AUTO: 0.9 K/UL (ref 0.3–1)
MONOCYTES NFR BLD: 9.6 % (ref 4–15)
NEUTROPHILS # BLD AUTO: 4.7 K/UL (ref 1.8–7.7)
NEUTROPHILS NFR BLD: 47.9 % (ref 38–73)
NRBC BLD-RTO: 0 /100 WBC
PHOSPHATE SERPL-MCNC: 3.1 MG/DL (ref 2.7–4.5)
PLATELET # BLD AUTO: 671 K/UL (ref 150–450)
PMV BLD AUTO: 9.2 FL (ref 9.2–12.9)
POCT GLUCOSE: 192 MG/DL (ref 70–110)
POCT GLUCOSE: 195 MG/DL (ref 70–110)
POCT GLUCOSE: 233 MG/DL (ref 70–110)
POCT GLUCOSE: 256 MG/DL (ref 70–110)
POTASSIUM SERPL-SCNC: 4 MMOL/L (ref 3.5–5.1)
RBC # BLD AUTO: 4.23 M/UL (ref 4.6–6.2)
SODIUM SERPL-SCNC: 134 MMOL/L (ref 136–145)
WBC # BLD AUTO: 9.79 K/UL (ref 3.9–12.7)

## 2021-10-02 PROCEDURE — 80048 BASIC METABOLIC PNL TOTAL CA: CPT | Performed by: PODIATRIST

## 2021-10-02 PROCEDURE — 94799 UNLISTED PULMONARY SVC/PX: CPT

## 2021-10-02 PROCEDURE — 97116 GAIT TRAINING THERAPY: CPT | Mod: CQ

## 2021-10-02 PROCEDURE — 25000003 PHARM REV CODE 250: Performed by: PODIATRIST

## 2021-10-02 PROCEDURE — 99232 SBSQ HOSP IP/OBS MODERATE 35: CPT | Mod: ,,, | Performed by: HOSPITALIST

## 2021-10-02 PROCEDURE — 11000001 HC ACUTE MED/SURG PRIVATE ROOM

## 2021-10-02 PROCEDURE — 25000003 PHARM REV CODE 250: Performed by: INTERNAL MEDICINE

## 2021-10-02 PROCEDURE — 25000003 PHARM REV CODE 250: Performed by: PHYSICIAN ASSISTANT

## 2021-10-02 PROCEDURE — 63600175 PHARM REV CODE 636 W HCPCS: Performed by: PODIATRIST

## 2021-10-02 PROCEDURE — 84100 ASSAY OF PHOSPHORUS: CPT | Performed by: PODIATRIST

## 2021-10-02 PROCEDURE — 97110 THERAPEUTIC EXERCISES: CPT | Mod: CQ

## 2021-10-02 PROCEDURE — 94761 N-INVAS EAR/PLS OXIMETRY MLT: CPT

## 2021-10-02 PROCEDURE — 83735 ASSAY OF MAGNESIUM: CPT | Performed by: PODIATRIST

## 2021-10-02 PROCEDURE — 36415 COLL VENOUS BLD VENIPUNCTURE: CPT | Performed by: PODIATRIST

## 2021-10-02 PROCEDURE — 99232 PR SUBSEQUENT HOSPITAL CARE,LEVL II: ICD-10-PCS | Mod: ,,, | Performed by: HOSPITALIST

## 2021-10-02 PROCEDURE — 85025 COMPLETE CBC W/AUTO DIFF WBC: CPT | Performed by: PODIATRIST

## 2021-10-02 PROCEDURE — 25000003 PHARM REV CODE 250: Performed by: HOSPITALIST

## 2021-10-02 RX ADMIN — INSULIN ASPART 1 UNITS: 100 INJECTION, SOLUTION INTRAVENOUS; SUBCUTANEOUS at 08:10

## 2021-10-02 RX ADMIN — AMOXICILLIN AND CLAVULANATE POTASSIUM 1 TABLET: 875; 125 TABLET, FILM COATED ORAL at 08:10

## 2021-10-02 RX ADMIN — INSULIN ASPART 1 UNITS: 100 INJECTION, SOLUTION INTRAVENOUS; SUBCUTANEOUS at 10:10

## 2021-10-02 RX ADMIN — QUETIAPINE 125 MG: 100 TABLET ORAL at 08:10

## 2021-10-02 RX ADMIN — OXYCODONE HYDROCHLORIDE 5 MG: 5 TABLET ORAL at 08:10

## 2021-10-02 RX ADMIN — INSULIN ASPART 10 UNITS: 100 INJECTION, SOLUTION INTRAVENOUS; SUBCUTANEOUS at 04:10

## 2021-10-02 RX ADMIN — INSULIN ASPART 10 UNITS: 100 INJECTION, SOLUTION INTRAVENOUS; SUBCUTANEOUS at 08:10

## 2021-10-02 RX ADMIN — ASPIRIN 81 MG: 81 TABLET, COATED ORAL at 08:10

## 2021-10-02 RX ADMIN — CHLORTHALIDONE 25 MG: 25 TABLET ORAL at 08:10

## 2021-10-02 RX ADMIN — INSULIN ASPART 10 UNITS: 100 INJECTION, SOLUTION INTRAVENOUS; SUBCUTANEOUS at 12:10

## 2021-10-02 RX ADMIN — ATORVASTATIN CALCIUM 20 MG: 20 TABLET, FILM COATED ORAL at 08:10

## 2021-10-02 RX ADMIN — OXYCODONE HYDROCHLORIDE 5 MG: 5 TABLET ORAL at 02:10

## 2021-10-02 RX ADMIN — NIFEDIPINE 30 MG: 30 TABLET, FILM COATED, EXTENDED RELEASE ORAL at 08:10

## 2021-10-02 RX ADMIN — INSULIN DETEMIR 50 UNITS: 100 INJECTION, SOLUTION SUBCUTANEOUS at 08:10

## 2021-10-02 RX ADMIN — ENOXAPARIN SODIUM 40 MG: 40 INJECTION SUBCUTANEOUS at 04:10

## 2021-10-03 LAB
ANION GAP SERPL CALC-SCNC: 9 MMOL/L (ref 8–16)
ANISOCYTOSIS BLD QL SMEAR: SLIGHT
BASOPHILS # BLD AUTO: 0.06 K/UL (ref 0–0.2)
BASOPHILS NFR BLD: 0.6 % (ref 0–1.9)
BUN SERPL-MCNC: 19 MG/DL (ref 6–20)
CALCIUM SERPL-MCNC: 9.9 MG/DL (ref 8.7–10.5)
CHLORIDE SERPL-SCNC: 97 MMOL/L (ref 95–110)
CO2 SERPL-SCNC: 28 MMOL/L (ref 23–29)
CREAT SERPL-MCNC: 1 MG/DL (ref 0.5–1.4)
DIFFERENTIAL METHOD: ABNORMAL
EOSINOPHIL # BLD AUTO: 0.3 K/UL (ref 0–0.5)
EOSINOPHIL NFR BLD: 2.8 % (ref 0–8)
ERYTHROCYTE [DISTWIDTH] IN BLOOD BY AUTOMATED COUNT: 14.2 % (ref 11.5–14.5)
EST. GFR  (AFRICAN AMERICAN): >60 ML/MIN/1.73 M^2
EST. GFR  (NON AFRICAN AMERICAN): >60 ML/MIN/1.73 M^2
GLUCOSE SERPL-MCNC: 195 MG/DL (ref 70–110)
HCT VFR BLD AUTO: 41.7 % (ref 40–54)
HGB BLD-MCNC: 12.9 G/DL (ref 14–18)
IMM GRANULOCYTES # BLD AUTO: 0.12 K/UL (ref 0–0.04)
IMM GRANULOCYTES NFR BLD AUTO: 1.3 % (ref 0–0.5)
LYMPHOCYTES # BLD AUTO: 3.9 K/UL (ref 1–4.8)
LYMPHOCYTES NFR BLD: 41.4 % (ref 18–48)
MAGNESIUM SERPL-MCNC: 2 MG/DL (ref 1.6–2.6)
MCH RBC QN AUTO: 27.1 PG (ref 27–31)
MCHC RBC AUTO-ENTMCNC: 30.9 G/DL (ref 32–36)
MCV RBC AUTO: 88 FL (ref 82–98)
MONOCYTES # BLD AUTO: 1 K/UL (ref 0.3–1)
MONOCYTES NFR BLD: 10.6 % (ref 4–15)
NEUTROPHILS # BLD AUTO: 4.1 K/UL (ref 1.8–7.7)
NEUTROPHILS NFR BLD: 43.3 % (ref 38–73)
NRBC BLD-RTO: 0 /100 WBC
PHOSPHATE SERPL-MCNC: 3.7 MG/DL (ref 2.7–4.5)
PLATELET # BLD AUTO: 677 K/UL (ref 150–450)
PLATELET BLD QL SMEAR: ABNORMAL
PMV BLD AUTO: 8.9 FL (ref 9.2–12.9)
POCT GLUCOSE: 168 MG/DL (ref 70–110)
POCT GLUCOSE: 188 MG/DL (ref 70–110)
POCT GLUCOSE: 203 MG/DL (ref 70–110)
POCT GLUCOSE: 232 MG/DL (ref 70–110)
POTASSIUM SERPL-SCNC: 4.2 MMOL/L (ref 3.5–5.1)
RBC # BLD AUTO: 4.76 M/UL (ref 4.6–6.2)
SODIUM SERPL-SCNC: 134 MMOL/L (ref 136–145)
WBC # BLD AUTO: 9.39 K/UL (ref 3.9–12.7)

## 2021-10-03 PROCEDURE — 36415 COLL VENOUS BLD VENIPUNCTURE: CPT | Performed by: PODIATRIST

## 2021-10-03 PROCEDURE — 25000003 PHARM REV CODE 250: Performed by: INTERNAL MEDICINE

## 2021-10-03 PROCEDURE — 83735 ASSAY OF MAGNESIUM: CPT | Performed by: PODIATRIST

## 2021-10-03 PROCEDURE — 94799 UNLISTED PULMONARY SVC/PX: CPT

## 2021-10-03 PROCEDURE — 99233 PR SUBSEQUENT HOSPITAL CARE,LEVL III: ICD-10-PCS | Mod: ,,, | Performed by: HOSPITALIST

## 2021-10-03 PROCEDURE — 84100 ASSAY OF PHOSPHORUS: CPT | Performed by: PODIATRIST

## 2021-10-03 PROCEDURE — 25000003 PHARM REV CODE 250: Performed by: PHYSICIAN ASSISTANT

## 2021-10-03 PROCEDURE — 63600175 PHARM REV CODE 636 W HCPCS: Performed by: ANESTHESIOLOGY

## 2021-10-03 PROCEDURE — 80048 BASIC METABOLIC PNL TOTAL CA: CPT | Performed by: PODIATRIST

## 2021-10-03 PROCEDURE — 11000001 HC ACUTE MED/SURG PRIVATE ROOM

## 2021-10-03 PROCEDURE — 25000003 PHARM REV CODE 250: Performed by: PODIATRIST

## 2021-10-03 PROCEDURE — 85025 COMPLETE CBC W/AUTO DIFF WBC: CPT | Performed by: PODIATRIST

## 2021-10-03 PROCEDURE — 63600175 PHARM REV CODE 636 W HCPCS: Performed by: PODIATRIST

## 2021-10-03 PROCEDURE — 97110 THERAPEUTIC EXERCISES: CPT

## 2021-10-03 PROCEDURE — 94761 N-INVAS EAR/PLS OXIMETRY MLT: CPT

## 2021-10-03 PROCEDURE — 99233 SBSQ HOSP IP/OBS HIGH 50: CPT | Mod: ,,, | Performed by: HOSPITALIST

## 2021-10-03 PROCEDURE — 25000003 PHARM REV CODE 250: Performed by: HOSPITALIST

## 2021-10-03 RX ORDER — POLYETHYLENE GLYCOL 3350 17 G/17G
17 POWDER, FOR SOLUTION ORAL 2 TIMES DAILY PRN
Status: DISCONTINUED | OUTPATIENT
Start: 2021-10-03 | End: 2021-10-06 | Stop reason: HOSPADM

## 2021-10-03 RX ORDER — SENNOSIDES 8.6 MG/1
8.6 TABLET ORAL DAILY PRN
Status: DISCONTINUED | OUTPATIENT
Start: 2021-10-03 | End: 2021-10-06 | Stop reason: HOSPADM

## 2021-10-03 RX ADMIN — INSULIN ASPART 1 UNITS: 100 INJECTION, SOLUTION INTRAVENOUS; SUBCUTANEOUS at 09:10

## 2021-10-03 RX ADMIN — OXYCODONE HYDROCHLORIDE 5 MG: 5 TABLET ORAL at 09:10

## 2021-10-03 RX ADMIN — ASPIRIN 81 MG: 81 TABLET, COATED ORAL at 08:10

## 2021-10-03 RX ADMIN — QUETIAPINE 125 MG: 100 TABLET ORAL at 08:10

## 2021-10-03 RX ADMIN — CHLORTHALIDONE 25 MG: 25 TABLET ORAL at 08:10

## 2021-10-03 RX ADMIN — OXYCODONE HYDROCHLORIDE 5 MG: 5 TABLET ORAL at 08:10

## 2021-10-03 RX ADMIN — INSULIN ASPART 10 UNITS: 100 INJECTION, SOLUTION INTRAVENOUS; SUBCUTANEOUS at 08:10

## 2021-10-03 RX ADMIN — OXYCODONE HYDROCHLORIDE 5 MG: 5 TABLET ORAL at 04:10

## 2021-10-03 RX ADMIN — NIFEDIPINE 30 MG: 30 TABLET, FILM COATED, EXTENDED RELEASE ORAL at 08:10

## 2021-10-03 RX ADMIN — INSULIN ASPART 10 UNITS: 100 INJECTION, SOLUTION INTRAVENOUS; SUBCUTANEOUS at 12:10

## 2021-10-03 RX ADMIN — ENOXAPARIN SODIUM 40 MG: 40 INJECTION SUBCUTANEOUS at 05:10

## 2021-10-03 RX ADMIN — OXYCODONE HYDROCHLORIDE 5 MG: 5 TABLET ORAL at 12:10

## 2021-10-03 RX ADMIN — OXYCODONE HYDROCHLORIDE 5 MG: 5 TABLET ORAL at 05:10

## 2021-10-03 RX ADMIN — ATORVASTATIN CALCIUM 20 MG: 20 TABLET, FILM COATED ORAL at 08:10

## 2021-10-03 RX ADMIN — AMOXICILLIN AND CLAVULANATE POTASSIUM 1 TABLET: 875; 125 TABLET, FILM COATED ORAL at 08:10

## 2021-10-03 RX ADMIN — HYDROMORPHONE HYDROCHLORIDE 0.4 MG: 2 INJECTION INTRAMUSCULAR; INTRAVENOUS; SUBCUTANEOUS at 11:10

## 2021-10-03 RX ADMIN — INSULIN ASPART 2 UNITS: 100 INJECTION, SOLUTION INTRAVENOUS; SUBCUTANEOUS at 05:10

## 2021-10-03 RX ADMIN — INSULIN DETEMIR 50 UNITS: 100 INJECTION, SOLUTION SUBCUTANEOUS at 08:10

## 2021-10-03 RX ADMIN — LOSARTAN POTASSIUM 50 MG: 50 TABLET, FILM COATED ORAL at 08:10

## 2021-10-03 RX ADMIN — AMOXICILLIN AND CLAVULANATE POTASSIUM 1 TABLET: 875; 125 TABLET, FILM COATED ORAL at 09:10

## 2021-10-03 RX ADMIN — INSULIN ASPART 10 UNITS: 100 INJECTION, SOLUTION INTRAVENOUS; SUBCUTANEOUS at 05:10

## 2021-10-04 LAB
ANION GAP SERPL CALC-SCNC: 9 MMOL/L (ref 8–16)
BASOPHILS # BLD AUTO: 0.06 K/UL (ref 0–0.2)
BASOPHILS NFR BLD: 0.7 % (ref 0–1.9)
BUN SERPL-MCNC: 20 MG/DL (ref 6–20)
CALCIUM SERPL-MCNC: 9.4 MG/DL (ref 8.7–10.5)
CHLORIDE SERPL-SCNC: 100 MMOL/L (ref 95–110)
CO2 SERPL-SCNC: 25 MMOL/L (ref 23–29)
CREAT SERPL-MCNC: 0.9 MG/DL (ref 0.5–1.4)
DIFFERENTIAL METHOD: ABNORMAL
EOSINOPHIL # BLD AUTO: 0.3 K/UL (ref 0–0.5)
EOSINOPHIL NFR BLD: 2.7 % (ref 0–8)
ERYTHROCYTE [DISTWIDTH] IN BLOOD BY AUTOMATED COUNT: 14.2 % (ref 11.5–14.5)
EST. GFR  (AFRICAN AMERICAN): >60 ML/MIN/1.73 M^2
EST. GFR  (NON AFRICAN AMERICAN): >60 ML/MIN/1.73 M^2
GLUCOSE SERPL-MCNC: 141 MG/DL (ref 70–110)
HCT VFR BLD AUTO: 38.1 % (ref 40–54)
HGB BLD-MCNC: 11.6 G/DL (ref 14–18)
IMM GRANULOCYTES # BLD AUTO: 0.07 K/UL (ref 0–0.04)
IMM GRANULOCYTES NFR BLD AUTO: 0.8 % (ref 0–0.5)
LYMPHOCYTES # BLD AUTO: 3.7 K/UL (ref 1–4.8)
LYMPHOCYTES NFR BLD: 40.2 % (ref 18–48)
MAGNESIUM SERPL-MCNC: 1.9 MG/DL (ref 1.6–2.6)
MCH RBC QN AUTO: 27 PG (ref 27–31)
MCHC RBC AUTO-ENTMCNC: 30.4 G/DL (ref 32–36)
MCV RBC AUTO: 89 FL (ref 82–98)
MONOCYTES # BLD AUTO: 1.1 K/UL (ref 0.3–1)
MONOCYTES NFR BLD: 11.7 % (ref 4–15)
NEUTROPHILS # BLD AUTO: 4 K/UL (ref 1.8–7.7)
NEUTROPHILS NFR BLD: 43.9 % (ref 38–73)
NRBC BLD-RTO: 0 /100 WBC
PHOSPHATE SERPL-MCNC: 3.7 MG/DL (ref 2.7–4.5)
PLATELET # BLD AUTO: 591 K/UL (ref 150–450)
PLATELET BLD QL SMEAR: ABNORMAL
PMV BLD AUTO: 8.8 FL (ref 9.2–12.9)
POCT GLUCOSE: 154 MG/DL (ref 70–110)
POCT GLUCOSE: 154 MG/DL (ref 70–110)
POCT GLUCOSE: 198 MG/DL (ref 70–110)
POTASSIUM SERPL-SCNC: 3.9 MMOL/L (ref 3.5–5.1)
RBC # BLD AUTO: 4.29 M/UL (ref 4.6–6.2)
SODIUM SERPL-SCNC: 134 MMOL/L (ref 136–145)
WBC # BLD AUTO: 9.16 K/UL (ref 3.9–12.7)

## 2021-10-04 PROCEDURE — 83735 ASSAY OF MAGNESIUM: CPT | Performed by: PODIATRIST

## 2021-10-04 PROCEDURE — 84100 ASSAY OF PHOSPHORUS: CPT | Performed by: PODIATRIST

## 2021-10-04 PROCEDURE — 80048 BASIC METABOLIC PNL TOTAL CA: CPT | Performed by: PODIATRIST

## 2021-10-04 PROCEDURE — 25000003 PHARM REV CODE 250: Performed by: HOSPITALIST

## 2021-10-04 PROCEDURE — 85025 COMPLETE CBC W/AUTO DIFF WBC: CPT | Performed by: PODIATRIST

## 2021-10-04 PROCEDURE — 25000003 PHARM REV CODE 250: Performed by: PODIATRIST

## 2021-10-04 PROCEDURE — 25000003 PHARM REV CODE 250: Performed by: INTERNAL MEDICINE

## 2021-10-04 PROCEDURE — 63600175 PHARM REV CODE 636 W HCPCS: Performed by: PODIATRIST

## 2021-10-04 PROCEDURE — C9399 UNCLASSIFIED DRUGS OR BIOLOG: HCPCS | Performed by: INTERNAL MEDICINE

## 2021-10-04 PROCEDURE — 94760 N-INVAS EAR/PLS OXIMETRY 1: CPT

## 2021-10-04 PROCEDURE — 99232 PR SUBSEQUENT HOSPITAL CARE,LEVL II: ICD-10-PCS | Mod: ,,, | Performed by: HOSPITALIST

## 2021-10-04 PROCEDURE — 11000001 HC ACUTE MED/SURG PRIVATE ROOM

## 2021-10-04 PROCEDURE — 97116 GAIT TRAINING THERAPY: CPT | Mod: CQ

## 2021-10-04 PROCEDURE — 97535 SELF CARE MNGMENT TRAINING: CPT

## 2021-10-04 PROCEDURE — 25000003 PHARM REV CODE 250: Performed by: PHYSICIAN ASSISTANT

## 2021-10-04 PROCEDURE — 36415 COLL VENOUS BLD VENIPUNCTURE: CPT | Performed by: PODIATRIST

## 2021-10-04 PROCEDURE — 99232 SBSQ HOSP IP/OBS MODERATE 35: CPT | Mod: ,,, | Performed by: HOSPITALIST

## 2021-10-04 RX ORDER — NIFEDIPINE 30 MG/1
30 TABLET, EXTENDED RELEASE ORAL DAILY
Qty: 30 TABLET | Refills: 11 | Status: SHIPPED | OUTPATIENT
Start: 2021-10-05 | End: 2021-12-07

## 2021-10-04 RX ORDER — AMOXICILLIN AND CLAVULANATE POTASSIUM 875; 125 MG/1; MG/1
1 TABLET, FILM COATED ORAL EVERY 12 HOURS
Qty: 22 TABLET | Refills: 0 | Status: SHIPPED | OUTPATIENT
Start: 2021-10-04 | End: 2021-10-15

## 2021-10-04 RX ORDER — INSULIN ASPART 100 [IU]/ML
10 INJECTION, SOLUTION INTRAVENOUS; SUBCUTANEOUS 3 TIMES DAILY
Refills: 0
Start: 2021-10-04 | End: 2022-05-28

## 2021-10-04 RX ORDER — CHLORTHALIDONE 25 MG/1
25 TABLET ORAL DAILY
Qty: 30 TABLET | Refills: 11 | Status: SHIPPED | OUTPATIENT
Start: 2021-10-05 | End: 2021-12-07

## 2021-10-04 RX ADMIN — QUETIAPINE 125 MG: 100 TABLET ORAL at 10:10

## 2021-10-04 RX ADMIN — INSULIN DETEMIR 50 UNITS: 100 INJECTION, SOLUTION SUBCUTANEOUS at 09:10

## 2021-10-04 RX ADMIN — ENOXAPARIN SODIUM 40 MG: 40 INJECTION SUBCUTANEOUS at 05:10

## 2021-10-04 RX ADMIN — NIFEDIPINE 30 MG: 30 TABLET, FILM COATED, EXTENDED RELEASE ORAL at 08:10

## 2021-10-04 RX ADMIN — INSULIN ASPART 10 UNITS: 100 INJECTION, SOLUTION INTRAVENOUS; SUBCUTANEOUS at 08:10

## 2021-10-04 RX ADMIN — AMOXICILLIN AND CLAVULANATE POTASSIUM 1 TABLET: 875; 125 TABLET, FILM COATED ORAL at 08:10

## 2021-10-04 RX ADMIN — INSULIN ASPART 10 UNITS: 100 INJECTION, SOLUTION INTRAVENOUS; SUBCUTANEOUS at 12:10

## 2021-10-04 RX ADMIN — ASPIRIN 81 MG: 81 TABLET, COATED ORAL at 08:10

## 2021-10-04 RX ADMIN — LOSARTAN POTASSIUM 50 MG: 50 TABLET, FILM COATED ORAL at 08:10

## 2021-10-04 RX ADMIN — INSULIN ASPART 1 UNITS: 100 INJECTION, SOLUTION INTRAVENOUS; SUBCUTANEOUS at 09:10

## 2021-10-04 RX ADMIN — AMOXICILLIN AND CLAVULANATE POTASSIUM 1 TABLET: 875; 125 TABLET, FILM COATED ORAL at 09:10

## 2021-10-04 RX ADMIN — OXYCODONE HYDROCHLORIDE 5 MG: 5 TABLET ORAL at 09:10

## 2021-10-04 RX ADMIN — ATORVASTATIN CALCIUM 20 MG: 20 TABLET, FILM COATED ORAL at 08:10

## 2021-10-04 RX ADMIN — INSULIN ASPART 10 UNITS: 100 INJECTION, SOLUTION INTRAVENOUS; SUBCUTANEOUS at 05:10

## 2021-10-04 RX ADMIN — OXYCODONE HYDROCHLORIDE 5 MG: 5 TABLET ORAL at 04:10

## 2021-10-04 RX ADMIN — CHLORTHALIDONE 25 MG: 25 TABLET ORAL at 08:10

## 2021-10-04 RX ADMIN — OXYCODONE HYDROCHLORIDE 5 MG: 5 TABLET ORAL at 11:10

## 2021-10-05 LAB
ANION GAP SERPL CALC-SCNC: 9 MMOL/L (ref 8–16)
BASOPHILS # BLD AUTO: 0.06 K/UL (ref 0–0.2)
BASOPHILS NFR BLD: 0.8 % (ref 0–1.9)
BUN SERPL-MCNC: 19 MG/DL (ref 6–20)
CALCIUM SERPL-MCNC: 9.5 MG/DL (ref 8.7–10.5)
CHLORIDE SERPL-SCNC: 102 MMOL/L (ref 95–110)
CO2 SERPL-SCNC: 25 MMOL/L (ref 23–29)
CREAT SERPL-MCNC: 0.9 MG/DL (ref 0.5–1.4)
DIFFERENTIAL METHOD: ABNORMAL
EOSINOPHIL # BLD AUTO: 0.2 K/UL (ref 0–0.5)
EOSINOPHIL NFR BLD: 3 % (ref 0–8)
ERYTHROCYTE [DISTWIDTH] IN BLOOD BY AUTOMATED COUNT: 14.2 % (ref 11.5–14.5)
EST. GFR  (AFRICAN AMERICAN): >60 ML/MIN/1.73 M^2
EST. GFR  (NON AFRICAN AMERICAN): >60 ML/MIN/1.73 M^2
GLUCOSE SERPL-MCNC: 171 MG/DL (ref 70–110)
HCT VFR BLD AUTO: 39.8 % (ref 40–54)
HGB BLD-MCNC: 12.4 G/DL (ref 14–18)
IMM GRANULOCYTES # BLD AUTO: 0.06 K/UL (ref 0–0.04)
IMM GRANULOCYTES NFR BLD AUTO: 0.8 % (ref 0–0.5)
LYMPHOCYTES # BLD AUTO: 3.5 K/UL (ref 1–4.8)
LYMPHOCYTES NFR BLD: 44.5 % (ref 18–48)
MAGNESIUM SERPL-MCNC: 2 MG/DL (ref 1.6–2.6)
MCH RBC QN AUTO: 27.6 PG (ref 27–31)
MCHC RBC AUTO-ENTMCNC: 31.2 G/DL (ref 32–36)
MCV RBC AUTO: 89 FL (ref 82–98)
MONOCYTES # BLD AUTO: 0.9 K/UL (ref 0.3–1)
MONOCYTES NFR BLD: 11 % (ref 4–15)
NEUTROPHILS # BLD AUTO: 3.2 K/UL (ref 1.8–7.7)
NEUTROPHILS NFR BLD: 39.9 % (ref 38–73)
NRBC BLD-RTO: 0 /100 WBC
PHOSPHATE SERPL-MCNC: 3.5 MG/DL (ref 2.7–4.5)
PLATELET # BLD AUTO: 643 K/UL (ref 150–450)
PMV BLD AUTO: 9.1 FL (ref 9.2–12.9)
POCT GLUCOSE: 187 MG/DL (ref 70–110)
POCT GLUCOSE: 219 MG/DL (ref 70–110)
POCT GLUCOSE: 245 MG/DL (ref 70–110)
POCT GLUCOSE: 245 MG/DL (ref 70–110)
POTASSIUM SERPL-SCNC: 3.9 MMOL/L (ref 3.5–5.1)
RBC # BLD AUTO: 4.49 M/UL (ref 4.6–6.2)
SODIUM SERPL-SCNC: 136 MMOL/L (ref 136–145)
WBC # BLD AUTO: 7.93 K/UL (ref 3.9–12.7)

## 2021-10-05 PROCEDURE — 25000003 PHARM REV CODE 250: Performed by: PHYSICIAN ASSISTANT

## 2021-10-05 PROCEDURE — 97530 THERAPEUTIC ACTIVITIES: CPT

## 2021-10-05 PROCEDURE — 99232 PR SUBSEQUENT HOSPITAL CARE,LEVL II: ICD-10-PCS | Mod: 24,,, | Performed by: PODIATRIST

## 2021-10-05 PROCEDURE — 97116 GAIT TRAINING THERAPY: CPT

## 2021-10-05 PROCEDURE — 99232 PR SUBSEQUENT HOSPITAL CARE,LEVL II: ICD-10-PCS | Mod: 95,,, | Performed by: HOSPITALIST

## 2021-10-05 PROCEDURE — 83735 ASSAY OF MAGNESIUM: CPT | Performed by: PODIATRIST

## 2021-10-05 PROCEDURE — 80048 BASIC METABOLIC PNL TOTAL CA: CPT | Performed by: PODIATRIST

## 2021-10-05 PROCEDURE — 85025 COMPLETE CBC W/AUTO DIFF WBC: CPT | Performed by: PODIATRIST

## 2021-10-05 PROCEDURE — 25000003 PHARM REV CODE 250: Performed by: INTERNAL MEDICINE

## 2021-10-05 PROCEDURE — 11000001 HC ACUTE MED/SURG PRIVATE ROOM

## 2021-10-05 PROCEDURE — 63600175 PHARM REV CODE 636 W HCPCS: Performed by: PODIATRIST

## 2021-10-05 PROCEDURE — 84100 ASSAY OF PHOSPHORUS: CPT | Performed by: PODIATRIST

## 2021-10-05 PROCEDURE — 99232 SBSQ HOSP IP/OBS MODERATE 35: CPT | Mod: 24,,, | Performed by: PODIATRIST

## 2021-10-05 PROCEDURE — 36415 COLL VENOUS BLD VENIPUNCTURE: CPT | Performed by: PODIATRIST

## 2021-10-05 PROCEDURE — 99232 SBSQ HOSP IP/OBS MODERATE 35: CPT | Mod: 95,,, | Performed by: HOSPITALIST

## 2021-10-05 PROCEDURE — 94761 N-INVAS EAR/PLS OXIMETRY MLT: CPT

## 2021-10-05 PROCEDURE — 25000003 PHARM REV CODE 250: Performed by: HOSPITALIST

## 2021-10-05 PROCEDURE — 25000003 PHARM REV CODE 250: Performed by: PODIATRIST

## 2021-10-05 RX ADMIN — AMOXICILLIN AND CLAVULANATE POTASSIUM 1 TABLET: 875; 125 TABLET, FILM COATED ORAL at 08:10

## 2021-10-05 RX ADMIN — QUETIAPINE 125 MG: 100 TABLET ORAL at 10:10

## 2021-10-05 RX ADMIN — OXYCODONE HYDROCHLORIDE 5 MG: 5 TABLET ORAL at 03:10

## 2021-10-05 RX ADMIN — INSULIN DETEMIR 50 UNITS: 100 INJECTION, SOLUTION SUBCUTANEOUS at 10:10

## 2021-10-05 RX ADMIN — INSULIN ASPART 2 UNITS: 100 INJECTION, SOLUTION INTRAVENOUS; SUBCUTANEOUS at 05:10

## 2021-10-05 RX ADMIN — LOSARTAN POTASSIUM 50 MG: 50 TABLET, FILM COATED ORAL at 08:10

## 2021-10-05 RX ADMIN — INSULIN ASPART 10 UNITS: 100 INJECTION, SOLUTION INTRAVENOUS; SUBCUTANEOUS at 12:10

## 2021-10-05 RX ADMIN — OXYCODONE HYDROCHLORIDE 5 MG: 5 TABLET ORAL at 10:10

## 2021-10-05 RX ADMIN — ASPIRIN 81 MG: 81 TABLET, COATED ORAL at 08:10

## 2021-10-05 RX ADMIN — ATORVASTATIN CALCIUM 20 MG: 20 TABLET, FILM COATED ORAL at 08:10

## 2021-10-05 RX ADMIN — NIFEDIPINE 30 MG: 30 TABLET, FILM COATED, EXTENDED RELEASE ORAL at 08:10

## 2021-10-05 RX ADMIN — OXYCODONE HYDROCHLORIDE 5 MG: 5 TABLET ORAL at 07:10

## 2021-10-05 RX ADMIN — CHLORTHALIDONE 25 MG: 25 TABLET ORAL at 08:10

## 2021-10-05 RX ADMIN — INSULIN ASPART 10 UNITS: 100 INJECTION, SOLUTION INTRAVENOUS; SUBCUTANEOUS at 05:10

## 2021-10-05 RX ADMIN — AMOXICILLIN AND CLAVULANATE POTASSIUM 1 TABLET: 875; 125 TABLET, FILM COATED ORAL at 10:10

## 2021-10-05 RX ADMIN — INSULIN ASPART 1 UNITS: 100 INJECTION, SOLUTION INTRAVENOUS; SUBCUTANEOUS at 10:10

## 2021-10-05 RX ADMIN — ENOXAPARIN SODIUM 40 MG: 40 INJECTION SUBCUTANEOUS at 05:10

## 2021-10-05 RX ADMIN — OXYCODONE HYDROCHLORIDE 5 MG: 5 TABLET ORAL at 02:10

## 2021-10-05 RX ADMIN — INSULIN ASPART 2 UNITS: 100 INJECTION, SOLUTION INTRAVENOUS; SUBCUTANEOUS at 12:10

## 2021-10-05 RX ADMIN — INSULIN ASPART 10 UNITS: 100 INJECTION, SOLUTION INTRAVENOUS; SUBCUTANEOUS at 08:10

## 2021-10-06 VITALS
RESPIRATION RATE: 18 BRPM | DIASTOLIC BLOOD PRESSURE: 79 MMHG | BODY MASS INDEX: 38.5 KG/M2 | WEIGHT: 300 LBS | HEART RATE: 82 BPM | HEIGHT: 74 IN | OXYGEN SATURATION: 95 % | SYSTOLIC BLOOD PRESSURE: 115 MMHG | TEMPERATURE: 98 F

## 2021-10-06 LAB
ANION GAP SERPL CALC-SCNC: 8 MMOL/L (ref 8–16)
ANISOCYTOSIS BLD QL SMEAR: SLIGHT
BASOPHILS # BLD AUTO: 0.09 K/UL (ref 0–0.2)
BASOPHILS NFR BLD: 1.1 % (ref 0–1.9)
BUN SERPL-MCNC: 17 MG/DL (ref 6–20)
CALCIUM SERPL-MCNC: 9.4 MG/DL (ref 8.7–10.5)
CHLORIDE SERPL-SCNC: 102 MMOL/L (ref 95–110)
CO2 SERPL-SCNC: 26 MMOL/L (ref 23–29)
CREAT SERPL-MCNC: 0.9 MG/DL (ref 0.5–1.4)
DIFFERENTIAL METHOD: ABNORMAL
EOSINOPHIL # BLD AUTO: 0.3 K/UL (ref 0–0.5)
EOSINOPHIL NFR BLD: 4 % (ref 0–8)
ERYTHROCYTE [DISTWIDTH] IN BLOOD BY AUTOMATED COUNT: 14.1 % (ref 11.5–14.5)
EST. GFR  (AFRICAN AMERICAN): >60 ML/MIN/1.73 M^2
EST. GFR  (NON AFRICAN AMERICAN): >60 ML/MIN/1.73 M^2
GLUCOSE SERPL-MCNC: 164 MG/DL (ref 70–110)
HCT VFR BLD AUTO: 37.5 % (ref 40–54)
HGB BLD-MCNC: 11.7 G/DL (ref 14–18)
IMM GRANULOCYTES # BLD AUTO: 0.04 K/UL (ref 0–0.04)
IMM GRANULOCYTES NFR BLD AUTO: 0.5 % (ref 0–0.5)
LYMPHOCYTES # BLD AUTO: 4 K/UL (ref 1–4.8)
LYMPHOCYTES NFR BLD: 46.5 % (ref 18–48)
MAGNESIUM SERPL-MCNC: 1.9 MG/DL (ref 1.6–2.6)
MCH RBC QN AUTO: 27.7 PG (ref 27–31)
MCHC RBC AUTO-ENTMCNC: 31.2 G/DL (ref 32–36)
MCV RBC AUTO: 89 FL (ref 82–98)
MONOCYTES # BLD AUTO: 1 K/UL (ref 0.3–1)
MONOCYTES NFR BLD: 11.7 % (ref 4–15)
NEUTROPHILS # BLD AUTO: 3.1 K/UL (ref 1.8–7.7)
NEUTROPHILS NFR BLD: 36.2 % (ref 38–73)
NRBC BLD-RTO: 0 /100 WBC
PHOSPHATE SERPL-MCNC: 3.1 MG/DL (ref 2.7–4.5)
PLATELET # BLD AUTO: 568 K/UL (ref 150–450)
PLATELET BLD QL SMEAR: ABNORMAL
PMV BLD AUTO: 10 FL (ref 9.2–12.9)
POCT GLUCOSE: 123 MG/DL (ref 70–110)
POCT GLUCOSE: 155 MG/DL (ref 70–110)
POCT GLUCOSE: 208 MG/DL (ref 70–110)
POCT GLUCOSE: 288 MG/DL (ref 70–110)
POTASSIUM SERPL-SCNC: 3.9 MMOL/L (ref 3.5–5.1)
RBC # BLD AUTO: 4.22 M/UL (ref 4.6–6.2)
SODIUM SERPL-SCNC: 136 MMOL/L (ref 136–145)
WBC # BLD AUTO: 8.55 K/UL (ref 3.9–12.7)

## 2021-10-06 PROCEDURE — 80048 BASIC METABOLIC PNL TOTAL CA: CPT | Performed by: PODIATRIST

## 2021-10-06 PROCEDURE — 83735 ASSAY OF MAGNESIUM: CPT | Performed by: PODIATRIST

## 2021-10-06 PROCEDURE — 85025 COMPLETE CBC W/AUTO DIFF WBC: CPT | Performed by: PODIATRIST

## 2021-10-06 PROCEDURE — 94761 N-INVAS EAR/PLS OXIMETRY MLT: CPT

## 2021-10-06 PROCEDURE — 25000003 PHARM REV CODE 250: Performed by: HOSPITALIST

## 2021-10-06 PROCEDURE — 63600175 PHARM REV CODE 636 W HCPCS: Performed by: PODIATRIST

## 2021-10-06 PROCEDURE — 97535 SELF CARE MNGMENT TRAINING: CPT

## 2021-10-06 PROCEDURE — 99239 HOSP IP/OBS DSCHRG MGMT >30: CPT | Mod: 95,,, | Performed by: HOSPITALIST

## 2021-10-06 PROCEDURE — 36415 COLL VENOUS BLD VENIPUNCTURE: CPT | Performed by: PODIATRIST

## 2021-10-06 PROCEDURE — 25000003 PHARM REV CODE 250: Performed by: PODIATRIST

## 2021-10-06 PROCEDURE — 25000003 PHARM REV CODE 250: Performed by: PHYSICIAN ASSISTANT

## 2021-10-06 PROCEDURE — 84100 ASSAY OF PHOSPHORUS: CPT | Performed by: PODIATRIST

## 2021-10-06 PROCEDURE — 97530 THERAPEUTIC ACTIVITIES: CPT | Mod: CQ

## 2021-10-06 PROCEDURE — 99239 PR HOSPITAL DISCHARGE DAY,>30 MIN: ICD-10-PCS | Mod: 95,,, | Performed by: HOSPITALIST

## 2021-10-06 PROCEDURE — 97116 GAIT TRAINING THERAPY: CPT | Mod: CQ

## 2021-10-06 PROCEDURE — 25000003 PHARM REV CODE 250: Performed by: INTERNAL MEDICINE

## 2021-10-06 RX ORDER — OXYCODONE HYDROCHLORIDE 5 MG/1
5 TABLET ORAL EVERY 6 HOURS PRN
Qty: 20 TABLET | Refills: 0
Start: 2021-10-06 | End: 2021-12-07

## 2021-10-06 RX ADMIN — ASPIRIN 81 MG: 81 TABLET, COATED ORAL at 08:10

## 2021-10-06 RX ADMIN — INSULIN ASPART 10 UNITS: 100 INJECTION, SOLUTION INTRAVENOUS; SUBCUTANEOUS at 08:10

## 2021-10-06 RX ADMIN — AMOXICILLIN AND CLAVULANATE POTASSIUM 1 TABLET: 875; 125 TABLET, FILM COATED ORAL at 08:10

## 2021-10-06 RX ADMIN — INSULIN ASPART 10 UNITS: 100 INJECTION, SOLUTION INTRAVENOUS; SUBCUTANEOUS at 04:10

## 2021-10-06 RX ADMIN — INSULIN ASPART 10 UNITS: 100 INJECTION, SOLUTION INTRAVENOUS; SUBCUTANEOUS at 12:10

## 2021-10-06 RX ADMIN — CHLORTHALIDONE 25 MG: 25 TABLET ORAL at 08:10

## 2021-10-06 RX ADMIN — INSULIN ASPART 2 UNITS: 100 INJECTION, SOLUTION INTRAVENOUS; SUBCUTANEOUS at 12:10

## 2021-10-06 RX ADMIN — OXYCODONE HYDROCHLORIDE 5 MG: 5 TABLET ORAL at 04:10

## 2021-10-06 RX ADMIN — OXYCODONE HYDROCHLORIDE 5 MG: 5 TABLET ORAL at 03:10

## 2021-10-06 RX ADMIN — ENOXAPARIN SODIUM 40 MG: 40 INJECTION SUBCUTANEOUS at 04:10

## 2021-10-06 RX ADMIN — OXYCODONE HYDROCHLORIDE 5 MG: 5 TABLET ORAL at 08:10

## 2021-10-06 RX ADMIN — ATORVASTATIN CALCIUM 20 MG: 20 TABLET, FILM COATED ORAL at 08:10

## 2021-10-15 ENCOUNTER — TELEPHONE (OUTPATIENT)
Dept: PODIATRY | Facility: CLINIC | Age: 56
End: 2021-10-15

## 2021-10-18 ENCOUNTER — TELEPHONE (OUTPATIENT)
Dept: PODIATRY | Facility: CLINIC | Age: 56
End: 2021-10-18

## 2021-10-19 ENCOUNTER — OFFICE VISIT (OUTPATIENT)
Dept: PODIATRY | Facility: CLINIC | Age: 56
End: 2021-10-19
Payer: MEDICAID

## 2021-10-19 VITALS
WEIGHT: 300 LBS | HEART RATE: 89 BPM | HEIGHT: 74 IN | DIASTOLIC BLOOD PRESSURE: 72 MMHG | BODY MASS INDEX: 38.5 KG/M2 | SYSTOLIC BLOOD PRESSURE: 113 MMHG

## 2021-10-19 DIAGNOSIS — Z98.890 POST-OPERATIVE STATE: Primary | ICD-10-CM

## 2021-10-19 DIAGNOSIS — E11.42 TYPE 2 DIABETES MELLITUS WITH DIABETIC POLYNEUROPATHY, UNSPECIFIED WHETHER LONG TERM INSULIN USE: ICD-10-CM

## 2021-10-19 DIAGNOSIS — L97.519 ULCER OF RIGHT FOOT, UNSPECIFIED ULCER STAGE: ICD-10-CM

## 2021-10-19 DIAGNOSIS — Z89.431 PARTIAL NONTRAUMATIC AMPUTATION OF FOOT, RIGHT: ICD-10-CM

## 2021-10-19 DIAGNOSIS — L57.0 KERATOSIS: ICD-10-CM

## 2021-10-19 PROCEDURE — 99214 PR OFFICE/OUTPT VISIT, EST, LEVL IV, 30-39 MIN: ICD-10-PCS | Mod: 24,S$PBB,, | Performed by: PODIATRIST

## 2021-10-19 PROCEDURE — 99213 OFFICE O/P EST LOW 20 MIN: CPT | Mod: PBBFAC,PN | Performed by: PODIATRIST

## 2021-10-19 PROCEDURE — 99999 PR PBB SHADOW E&M-EST. PATIENT-LVL III: CPT | Mod: PBBFAC,,, | Performed by: PODIATRIST

## 2021-10-19 PROCEDURE — 99214 OFFICE O/P EST MOD 30 MIN: CPT | Mod: 24,S$PBB,, | Performed by: PODIATRIST

## 2021-10-19 PROCEDURE — 99999 PR PBB SHADOW E&M-EST. PATIENT-LVL III: ICD-10-PCS | Mod: PBBFAC,,, | Performed by: PODIATRIST

## 2021-10-19 RX ORDER — ENOXAPARIN SODIUM 100 MG/ML
40 INJECTION SUBCUTANEOUS DAILY
Status: ON HOLD | COMMUNITY
End: 2022-01-17 | Stop reason: HOSPADM

## 2021-10-19 RX ORDER — AMMONIUM LACTATE 12 G/100G
CREAM TOPICAL
Qty: 140 G | Refills: 11 | Status: SHIPPED | OUTPATIENT
Start: 2021-10-19 | End: 2022-04-13

## 2021-10-19 RX ORDER — AMOXICILLIN AND CLAVULANATE POTASSIUM 875; 125 MG/1; MG/1
1 TABLET, FILM COATED ORAL 2 TIMES DAILY
Status: ON HOLD | COMMUNITY
End: 2022-01-17 | Stop reason: HOSPADM

## 2021-10-21 ENCOUNTER — HOSPITAL ENCOUNTER (OUTPATIENT)
Dept: WOUND CARE | Facility: HOSPITAL | Age: 56
Discharge: HOME | End: 2021-10-21
Attending: SURGERY
Payer: MEDICAID

## 2021-10-21 VITALS
WEIGHT: 300 LBS | BODY MASS INDEX: 38.5 KG/M2 | TEMPERATURE: 98 F | DIASTOLIC BLOOD PRESSURE: 78 MMHG | SYSTOLIC BLOOD PRESSURE: 120 MMHG | HEART RATE: 82 BPM | HEIGHT: 74 IN

## 2021-10-21 DIAGNOSIS — S91.301A OPEN WOUND OF RIGHT FOOT, INITIAL ENCOUNTER: Primary | ICD-10-CM

## 2021-10-21 DIAGNOSIS — T81.31XA SURGICAL WOUND DEHISCENCE, INITIAL ENCOUNTER: ICD-10-CM

## 2021-10-21 DIAGNOSIS — E66.01 CLASS 2 SEVERE OBESITY DUE TO EXCESS CALORIES WITH SERIOUS COMORBIDITY AND BODY MASS INDEX (BMI) OF 38.0 TO 38.9 IN ADULT: ICD-10-CM

## 2021-10-21 DIAGNOSIS — S98.111A AMPUTATION OF RIGHT GREAT TOE: ICD-10-CM

## 2021-10-21 DIAGNOSIS — I10 BENIGN ESSENTIAL HTN: ICD-10-CM

## 2021-10-21 PROCEDURE — 87186 SC STD MICRODIL/AGAR DIL: CPT | Performed by: SURGERY

## 2021-10-21 PROCEDURE — 87075 CULTR BACTERIA EXCEPT BLOOD: CPT | Performed by: SURGERY

## 2021-10-21 PROCEDURE — 11042 DBRDMT SUBQ TIS 1ST 20SQCM/<: CPT

## 2021-10-21 PROCEDURE — 87077 CULTURE AEROBIC IDENTIFY: CPT | Performed by: SURGERY

## 2021-10-21 PROCEDURE — 99204 OFFICE O/P NEW MOD 45 MIN: CPT

## 2021-10-21 PROCEDURE — 87070 CULTURE OTHR SPECIMN AEROBIC: CPT | Performed by: SURGERY

## 2021-10-21 PROCEDURE — 27201912 HC WOUND CARE DEBRIDEMENT SUPPLIES

## 2021-10-25 DIAGNOSIS — A49.02 MRSA (METHICILLIN RESISTANT STAPHYLOCOCCUS AUREUS) INFECTION: Primary | ICD-10-CM

## 2021-10-25 LAB
BACTERIA SPEC AEROBE CULT: ABNORMAL
FUNGUS SPEC CULT: NORMAL

## 2021-10-25 RX ORDER — SULFAMETHOXAZOLE AND TRIMETHOPRIM 800; 160 MG/1; MG/1
1 TABLET ORAL EVERY 12 HOURS
Qty: 14 TABLET | Refills: 0 | Status: SHIPPED | OUTPATIENT
Start: 2021-10-25 | End: 2021-11-01

## 2021-10-26 ENCOUNTER — TELEPHONE (OUTPATIENT)
Dept: PHARMACY | Facility: CLINIC | Age: 56
End: 2021-10-26
Payer: MEDICAID

## 2021-10-26 LAB — BACTERIA SPEC ANAEROBE CULT: NORMAL

## 2021-10-27 ENCOUNTER — TELEPHONE (OUTPATIENT)
Dept: WOUND CARE | Facility: HOSPITAL | Age: 56
End: 2021-10-27
Payer: MEDICAID

## 2021-10-28 ENCOUNTER — HOSPITAL ENCOUNTER (OUTPATIENT)
Dept: WOUND CARE | Facility: HOSPITAL | Age: 56
Discharge: HOME | End: 2021-10-28
Attending: SURGERY
Payer: MEDICAID

## 2021-10-28 VITALS
SYSTOLIC BLOOD PRESSURE: 131 MMHG | HEIGHT: 74 IN | DIASTOLIC BLOOD PRESSURE: 85 MMHG | TEMPERATURE: 98 F | WEIGHT: 300 LBS | HEART RATE: 93 BPM | BODY MASS INDEX: 38.5 KG/M2

## 2021-10-28 DIAGNOSIS — Z01.810 PRE-PROCEDURAL CARDIOVASCULAR EXAMINATION: ICD-10-CM

## 2021-10-28 DIAGNOSIS — T86.822 SKIN FLAP INFECTION: ICD-10-CM

## 2021-10-28 DIAGNOSIS — T81.31XA SURGICAL WOUND DEHISCENCE, INITIAL ENCOUNTER: ICD-10-CM

## 2021-10-28 DIAGNOSIS — S98.111A AMPUTATION OF RIGHT GREAT TOE: ICD-10-CM

## 2021-10-28 DIAGNOSIS — Z01.818 PRE-PROCEDURAL EXAMINATION: ICD-10-CM

## 2021-10-28 DIAGNOSIS — A49.02 MRSA (METHICILLIN RESISTANT STAPHYLOCOCCUS AUREUS) INFECTION: ICD-10-CM

## 2021-10-28 DIAGNOSIS — S91.301A OPEN WOUND OF RIGHT FOOT, INITIAL ENCOUNTER: Primary | ICD-10-CM

## 2021-10-28 PROCEDURE — 11042 DBRDMT SUBQ TIS 1ST 20SQCM/<: CPT

## 2021-10-28 PROCEDURE — 27201912 HC WOUND CARE DEBRIDEMENT SUPPLIES

## 2021-11-02 ENCOUNTER — OFFICE VISIT (OUTPATIENT)
Dept: PODIATRY | Facility: CLINIC | Age: 56
End: 2021-11-02
Payer: MEDICAID

## 2021-11-02 VITALS
SYSTOLIC BLOOD PRESSURE: 135 MMHG | DIASTOLIC BLOOD PRESSURE: 88 MMHG | HEART RATE: 84 BPM | BODY MASS INDEX: 38.5 KG/M2 | WEIGHT: 300 LBS | HEIGHT: 74 IN

## 2021-11-02 DIAGNOSIS — Z98.890 POST-OPERATIVE STATE: Primary | ICD-10-CM

## 2021-11-02 DIAGNOSIS — L97.519 ULCER OF RIGHT FOOT, UNSPECIFIED ULCER STAGE: ICD-10-CM

## 2021-11-02 DIAGNOSIS — E11.42 TYPE 2 DIABETES MELLITUS WITH DIABETIC POLYNEUROPATHY, UNSPECIFIED WHETHER LONG TERM INSULIN USE: ICD-10-CM

## 2021-11-02 PROCEDURE — 99213 OFFICE O/P EST LOW 20 MIN: CPT | Mod: PBBFAC,PN,25 | Performed by: PODIATRIST

## 2021-11-02 PROCEDURE — 99999 PR PBB SHADOW E&M-EST. PATIENT-LVL III: ICD-10-PCS | Mod: PBBFAC,,, | Performed by: PODIATRIST

## 2021-11-02 PROCEDURE — 99024 PR POST-OP FOLLOW-UP VISIT: ICD-10-PCS | Mod: ,,, | Performed by: PODIATRIST

## 2021-11-02 PROCEDURE — 99024 POSTOP FOLLOW-UP VISIT: CPT | Mod: ,,, | Performed by: PODIATRIST

## 2021-11-02 PROCEDURE — 99999 PR PBB SHADOW E&M-EST. PATIENT-LVL III: CPT | Mod: PBBFAC,,, | Performed by: PODIATRIST

## 2021-11-02 PROCEDURE — 11042 WOUND DEBRIDEMENT: ICD-10-PCS | Mod: 58,S$PBB,, | Performed by: PODIATRIST

## 2021-11-02 PROCEDURE — 11042 DBRDMT SUBQ TIS 1ST 20SQCM/<: CPT | Mod: 58,PBBFAC,PN | Performed by: PODIATRIST

## 2021-11-08 ENCOUNTER — HOSPITAL ENCOUNTER (OUTPATIENT)
Dept: WOUND CARE | Facility: HOSPITAL | Age: 56
Discharge: HOME | End: 2021-11-08
Attending: SURGERY
Payer: MEDICAID

## 2021-11-08 ENCOUNTER — CLINICAL SUPPORT (OUTPATIENT)
Dept: LAB | Facility: HOSPITAL | Age: 56
End: 2021-11-08
Attending: SURGERY
Payer: MEDICAID

## 2021-11-08 VITALS
WEIGHT: 300 LBS | DIASTOLIC BLOOD PRESSURE: 86 MMHG | SYSTOLIC BLOOD PRESSURE: 123 MMHG | HEART RATE: 79 BPM | HEIGHT: 74 IN | TEMPERATURE: 98 F | BODY MASS INDEX: 38.5 KG/M2

## 2021-11-08 DIAGNOSIS — S91.301A OPEN WOUND OF RIGHT FOOT, INITIAL ENCOUNTER: ICD-10-CM

## 2021-11-08 DIAGNOSIS — Z01.818 PRE-PROCEDURAL EXAMINATION: ICD-10-CM

## 2021-11-08 DIAGNOSIS — Z01.810 PRE-PROCEDURAL CARDIOVASCULAR EXAMINATION: ICD-10-CM

## 2021-11-08 DIAGNOSIS — A49.02 MRSA (METHICILLIN RESISTANT STAPHYLOCOCCUS AUREUS) INFECTION: ICD-10-CM

## 2021-11-08 DIAGNOSIS — S98.111A AMPUTATION OF RIGHT GREAT TOE: Primary | ICD-10-CM

## 2021-11-08 PROCEDURE — 93010 ELECTROCARDIOGRAM REPORT: CPT | Mod: ,,, | Performed by: INTERNAL MEDICINE

## 2021-11-08 PROCEDURE — 11042 DBRDMT SUBQ TIS 1ST 20SQCM/<: CPT

## 2021-11-08 PROCEDURE — 93005 ELECTROCARDIOGRAM TRACING: CPT | Mod: 59

## 2021-11-08 PROCEDURE — 93010 EKG 12-LEAD: ICD-10-PCS | Mod: ,,, | Performed by: INTERNAL MEDICINE

## 2021-11-08 PROCEDURE — 27201912 HC WOUND CARE DEBRIDEMENT SUPPLIES

## 2021-11-09 DIAGNOSIS — R94.31 ABNORMAL EKG: Primary | ICD-10-CM

## 2021-11-09 LAB
ACID FAST MOD KINY STN SPEC: NORMAL
MYCOBACTERIUM SPEC QL CULT: NORMAL

## 2021-11-11 ENCOUNTER — HOSPITAL ENCOUNTER (OUTPATIENT)
Dept: WOUND CARE | Facility: HOSPITAL | Age: 56
Discharge: HOME | End: 2021-11-11
Attending: SURGERY
Payer: MEDICAID

## 2021-11-11 DIAGNOSIS — E11.9 DIABETES MELLITUS WITHOUT COMPLICATION: ICD-10-CM

## 2021-11-11 PROCEDURE — 99213 OFFICE O/P EST LOW 20 MIN: CPT

## 2021-11-16 ENCOUNTER — TELEPHONE (OUTPATIENT)
Dept: CARDIOLOGY | Facility: CLINIC | Age: 56
End: 2021-11-16
Payer: MEDICAID

## 2021-11-17 ENCOUNTER — HOSPITAL ENCOUNTER (OUTPATIENT)
Dept: WOUND CARE | Facility: HOSPITAL | Age: 56
Discharge: HOME | End: 2021-11-17
Attending: SURGERY
Payer: MEDICAID

## 2021-11-17 DIAGNOSIS — E11.9 DIABETES MELLITUS WITHOUT COMPLICATION: ICD-10-CM

## 2021-11-17 PROCEDURE — 99212 OFFICE O/P EST SF 10 MIN: CPT

## 2021-11-23 ENCOUNTER — OFFICE VISIT (OUTPATIENT)
Dept: PODIATRY | Facility: CLINIC | Age: 56
End: 2021-11-23
Payer: MEDICAID

## 2021-11-23 VITALS
HEIGHT: 74 IN | WEIGHT: 300 LBS | HEART RATE: 86 BPM | BODY MASS INDEX: 38.5 KG/M2 | DIASTOLIC BLOOD PRESSURE: 89 MMHG | SYSTOLIC BLOOD PRESSURE: 141 MMHG

## 2021-11-23 DIAGNOSIS — Z89.431 PARTIAL NONTRAUMATIC AMPUTATION OF FOOT, RIGHT: ICD-10-CM

## 2021-11-23 DIAGNOSIS — L97.519 ULCER OF RIGHT FOOT, UNSPECIFIED ULCER STAGE: ICD-10-CM

## 2021-11-23 DIAGNOSIS — Z98.890 POST-OPERATIVE STATE: Primary | ICD-10-CM

## 2021-11-23 DIAGNOSIS — E11.42 TYPE 2 DIABETES MELLITUS WITH DIABETIC POLYNEUROPATHY, UNSPECIFIED WHETHER LONG TERM INSULIN USE: ICD-10-CM

## 2021-11-23 PROCEDURE — 99024 PR POST-OP FOLLOW-UP VISIT: ICD-10-PCS | Mod: ,,, | Performed by: PODIATRIST

## 2021-11-23 PROCEDURE — 99213 OFFICE O/P EST LOW 20 MIN: CPT | Mod: PBBFAC,PN | Performed by: PODIATRIST

## 2021-11-23 PROCEDURE — 99024 POSTOP FOLLOW-UP VISIT: CPT | Mod: ,,, | Performed by: PODIATRIST

## 2021-11-23 PROCEDURE — 99999 PR PBB SHADOW E&M-EST. PATIENT-LVL III: CPT | Mod: PBBFAC,,, | Performed by: PODIATRIST

## 2021-11-23 PROCEDURE — 99999 PR PBB SHADOW E&M-EST. PATIENT-LVL III: ICD-10-PCS | Mod: PBBFAC,,, | Performed by: PODIATRIST

## 2021-11-29 ENCOUNTER — HOSPITAL ENCOUNTER (OUTPATIENT)
Dept: WOUND CARE | Facility: HOSPITAL | Age: 56
Discharge: HOME | End: 2021-11-29
Attending: SURGERY
Payer: MEDICAID

## 2021-11-29 ENCOUNTER — TELEPHONE (OUTPATIENT)
Dept: CARDIOLOGY | Facility: CLINIC | Age: 56
End: 2021-11-29
Payer: MEDICAID

## 2021-11-29 VITALS
WEIGHT: 300 LBS | SYSTOLIC BLOOD PRESSURE: 108 MMHG | TEMPERATURE: 97 F | DIASTOLIC BLOOD PRESSURE: 74 MMHG | HEIGHT: 74 IN | BODY MASS INDEX: 38.5 KG/M2 | HEART RATE: 85 BPM

## 2021-11-29 DIAGNOSIS — T86.822 SKIN FLAP INFECTION: ICD-10-CM

## 2021-11-29 DIAGNOSIS — E11.52 DIABETIC WET GANGRENE OF THE FOOT: Primary | ICD-10-CM

## 2021-11-29 DIAGNOSIS — T81.31XA SURGICAL WOUND DEHISCENCE, INITIAL ENCOUNTER: ICD-10-CM

## 2021-11-29 PROCEDURE — 11042 DBRDMT SUBQ TIS 1ST 20SQCM/<: CPT

## 2021-11-29 PROCEDURE — 87077 CULTURE AEROBIC IDENTIFY: CPT | Mod: 59 | Performed by: SURGERY

## 2021-11-29 PROCEDURE — 27201912 HC WOUND CARE DEBRIDEMENT SUPPLIES

## 2021-11-29 PROCEDURE — 87075 CULTR BACTERIA EXCEPT BLOOD: CPT | Performed by: SURGERY

## 2021-11-29 PROCEDURE — 87186 SC STD MICRODIL/AGAR DIL: CPT | Performed by: SURGERY

## 2021-11-29 PROCEDURE — 87070 CULTURE OTHR SPECIMN AEROBIC: CPT | Performed by: SURGERY

## 2021-11-30 ENCOUNTER — TELEPHONE (OUTPATIENT)
Dept: CARDIOLOGY | Facility: CLINIC | Age: 56
End: 2021-11-30
Payer: MEDICAID

## 2021-12-01 ENCOUNTER — TELEPHONE (OUTPATIENT)
Dept: PODIATRY | Facility: CLINIC | Age: 56
End: 2021-12-01
Payer: MEDICAID

## 2021-12-02 DIAGNOSIS — A49.8 E COLI INFECTION: ICD-10-CM

## 2021-12-02 DIAGNOSIS — A49.02 MRSA (METHICILLIN RESISTANT STAPHYLOCOCCUS AUREUS) INFECTION: Primary | ICD-10-CM

## 2021-12-02 DIAGNOSIS — T86.822 SKIN FLAP INFECTION: ICD-10-CM

## 2021-12-02 LAB — BACTERIA SPEC AEROBE CULT: ABNORMAL

## 2021-12-02 RX ORDER — SULFAMETHOXAZOLE AND TRIMETHOPRIM 800; 160 MG/1; MG/1
1 TABLET ORAL EVERY 12 HOURS
Qty: 28 TABLET | Refills: 0 | Status: SHIPPED | OUTPATIENT
Start: 2021-12-02 | End: 2021-12-16

## 2021-12-02 RX ORDER — CIPROFLOXACIN 500 MG/1
500 TABLET ORAL EVERY 12 HOURS
Qty: 28 TABLET | Refills: 0 | Status: SHIPPED | OUTPATIENT
Start: 2021-12-02 | End: 2021-12-16

## 2021-12-03 ENCOUNTER — HOSPITAL ENCOUNTER (OUTPATIENT)
Dept: WOUND CARE | Facility: HOSPITAL | Age: 56
Discharge: HOME | End: 2021-12-03
Attending: PREVENTIVE MEDICINE
Payer: MEDICAID

## 2021-12-03 LAB
BACTERIA SPEC AEROBE CULT: ABNORMAL
BACTERIA SPEC ANAEROBE CULT: NORMAL
BACTERIA SPEC ANAEROBE CULT: NORMAL

## 2021-12-03 PROCEDURE — 99213 OFFICE O/P EST LOW 20 MIN: CPT

## 2021-12-06 ENCOUNTER — HOSPITAL ENCOUNTER (OUTPATIENT)
Dept: WOUND CARE | Facility: HOSPITAL | Age: 56
Discharge: HOME | End: 2021-12-06
Attending: SURGERY
Payer: MEDICAID

## 2021-12-06 VITALS
HEIGHT: 75 IN | BODY MASS INDEX: 37.3 KG/M2 | WEIGHT: 300 LBS | DIASTOLIC BLOOD PRESSURE: 75 MMHG | TEMPERATURE: 98 F | SYSTOLIC BLOOD PRESSURE: 113 MMHG | HEART RATE: 86 BPM

## 2021-12-06 DIAGNOSIS — T81.31XA SURGICAL WOUND DEHISCENCE, INITIAL ENCOUNTER: Primary | ICD-10-CM

## 2021-12-06 DIAGNOSIS — T86.822 SKIN FLAP INFECTION: ICD-10-CM

## 2021-12-06 DIAGNOSIS — A49.02 MRSA (METHICILLIN RESISTANT STAPHYLOCOCCUS AUREUS) INFECTION: ICD-10-CM

## 2021-12-06 DIAGNOSIS — S91.301A OPEN WOUND OF RIGHT FOOT, INITIAL ENCOUNTER: ICD-10-CM

## 2021-12-06 PROCEDURE — 11042 DBRDMT SUBQ TIS 1ST 20SQCM/<: CPT

## 2021-12-06 PROCEDURE — 27201912 HC WOUND CARE DEBRIDEMENT SUPPLIES

## 2021-12-07 ENCOUNTER — OFFICE VISIT (OUTPATIENT)
Dept: CARDIOLOGY | Facility: CLINIC | Age: 56
End: 2021-12-07
Payer: MEDICAID

## 2021-12-07 ENCOUNTER — TELEPHONE (OUTPATIENT)
Dept: CARDIOLOGY | Facility: CLINIC | Age: 56
End: 2021-12-07
Payer: MEDICAID

## 2021-12-07 VITALS
HEART RATE: 92 BPM | HEIGHT: 75 IN | DIASTOLIC BLOOD PRESSURE: 70 MMHG | WEIGHT: 278.19 LBS | SYSTOLIC BLOOD PRESSURE: 100 MMHG | OXYGEN SATURATION: 96 % | BODY MASS INDEX: 34.59 KG/M2

## 2021-12-07 DIAGNOSIS — E11.52 DIABETIC WET GANGRENE OF THE FOOT: ICD-10-CM

## 2021-12-07 DIAGNOSIS — R94.31 ABNORMAL EKG: ICD-10-CM

## 2021-12-07 DIAGNOSIS — E78.2 MIXED HYPERLIPIDEMIA: ICD-10-CM

## 2021-12-07 DIAGNOSIS — R94.31 NONSPECIFIC ABNORMAL ELECTROCARDIOGRAM (ECG) (EKG): ICD-10-CM

## 2021-12-07 DIAGNOSIS — E66.09 CLASS 1 OBESITY DUE TO EXCESS CALORIES WITH SERIOUS COMORBIDITY AND BODY MASS INDEX (BMI) OF 34.0 TO 34.9 IN ADULT: ICD-10-CM

## 2021-12-07 DIAGNOSIS — I10 BENIGN ESSENTIAL HTN: ICD-10-CM

## 2021-12-07 DIAGNOSIS — I10 PRIMARY HYPERTENSION: ICD-10-CM

## 2021-12-07 DIAGNOSIS — Z01.810 PRE-PROCEDURAL CARDIOVASCULAR EXAMINATION: ICD-10-CM

## 2021-12-07 PROCEDURE — 99999 PR PBB SHADOW E&M-EST. PATIENT-LVL IV: CPT | Mod: PBBFAC,,, | Performed by: INTERNAL MEDICINE

## 2021-12-07 PROCEDURE — 99999 PR PBB SHADOW E&M-EST. PATIENT-LVL IV: ICD-10-PCS | Mod: PBBFAC,,, | Performed by: INTERNAL MEDICINE

## 2021-12-07 PROCEDURE — 99214 OFFICE O/P EST MOD 30 MIN: CPT | Mod: PBBFAC,PN | Performed by: INTERNAL MEDICINE

## 2021-12-07 PROCEDURE — 99204 OFFICE O/P NEW MOD 45 MIN: CPT | Mod: S$PBB,,, | Performed by: INTERNAL MEDICINE

## 2021-12-07 PROCEDURE — 4010F PR ACE/ARB THEARPY RXD/TAKEN: ICD-10-PCS | Mod: CPTII,,, | Performed by: INTERNAL MEDICINE

## 2021-12-07 PROCEDURE — 4010F ACE/ARB THERAPY RXD/TAKEN: CPT | Mod: CPTII,,, | Performed by: INTERNAL MEDICINE

## 2021-12-07 PROCEDURE — 99204 PR OFFICE/OUTPT VISIT, NEW, LEVL IV, 45-59 MIN: ICD-10-PCS | Mod: S$PBB,,, | Performed by: INTERNAL MEDICINE

## 2021-12-07 RX ORDER — LISINOPRIL 10 MG/1
10 TABLET ORAL DAILY
Status: ON HOLD | COMMUNITY
Start: 2021-11-29 | End: 2022-01-07

## 2021-12-07 RX ORDER — QUETIAPINE FUMARATE 200 MG/1
200 TABLET, FILM COATED ORAL NIGHTLY
Status: ON HOLD | COMMUNITY
Start: 2021-11-29 | End: 2022-07-15 | Stop reason: SDUPTHER

## 2021-12-07 RX ORDER — HYDROCHLOROTHIAZIDE 25 MG/1
1 TABLET ORAL DAILY
Status: ON HOLD | COMMUNITY
Start: 2021-11-09 | End: 2022-03-07 | Stop reason: HOSPADM

## 2021-12-10 ENCOUNTER — LAB VISIT (OUTPATIENT)
Dept: LAB | Facility: HOSPITAL | Age: 56
End: 2021-12-10
Attending: INTERNAL MEDICINE
Payer: MEDICAID

## 2021-12-10 ENCOUNTER — TELEPHONE (OUTPATIENT)
Dept: CARDIOLOGY | Facility: CLINIC | Age: 56
End: 2021-12-10
Payer: MEDICAID

## 2021-12-10 DIAGNOSIS — E78.2 MIXED HYPERLIPIDEMIA: ICD-10-CM

## 2021-12-10 LAB
CHOLEST SERPL-MCNC: 123 MG/DL (ref 120–199)
CHOLEST/HDLC SERPL: 2.9 {RATIO} (ref 2–5)
HDLC SERPL-MCNC: 42 MG/DL (ref 40–75)
HDLC SERPL: 34.1 % (ref 20–50)
LDLC SERPL CALC-MCNC: 64.6 MG/DL (ref 63–159)
NONHDLC SERPL-MCNC: 81 MG/DL
TRIGL SERPL-MCNC: 82 MG/DL (ref 30–150)

## 2021-12-10 PROCEDURE — 36415 COLL VENOUS BLD VENIPUNCTURE: CPT | Performed by: INTERNAL MEDICINE

## 2021-12-10 PROCEDURE — 80061 LIPID PANEL: CPT | Performed by: INTERNAL MEDICINE

## 2021-12-13 ENCOUNTER — HOSPITAL ENCOUNTER (OUTPATIENT)
Dept: WOUND CARE | Facility: HOSPITAL | Age: 56
Discharge: HOME | End: 2021-12-13
Attending: SURGERY
Payer: MEDICAID

## 2021-12-13 VITALS
HEART RATE: 78 BPM | BODY MASS INDEX: 34.57 KG/M2 | HEIGHT: 75 IN | SYSTOLIC BLOOD PRESSURE: 136 MMHG | WEIGHT: 278 LBS | DIASTOLIC BLOOD PRESSURE: 82 MMHG | TEMPERATURE: 98 F

## 2021-12-13 DIAGNOSIS — T81.31XA SURGICAL WOUND DEHISCENCE, INITIAL ENCOUNTER: Primary | ICD-10-CM

## 2021-12-13 DIAGNOSIS — S91.301A OPEN WOUND OF RIGHT FOOT, INITIAL ENCOUNTER: ICD-10-CM

## 2021-12-13 DIAGNOSIS — E11.52 DIABETIC WET GANGRENE OF THE FOOT: ICD-10-CM

## 2021-12-13 PROCEDURE — 27201912 HC WOUND CARE DEBRIDEMENT SUPPLIES

## 2021-12-13 PROCEDURE — 11042 DBRDMT SUBQ TIS 1ST 20SQCM/<: CPT

## 2021-12-14 ENCOUNTER — HOSPITAL ENCOUNTER (OUTPATIENT)
Dept: WOUND CARE | Facility: HOSPITAL | Age: 56
Discharge: HOME | End: 2021-12-14
Attending: SURGERY
Payer: MEDICAID

## 2021-12-14 ENCOUNTER — OFFICE VISIT (OUTPATIENT)
Dept: PODIATRY | Facility: CLINIC | Age: 56
End: 2021-12-14
Payer: MEDICAID

## 2021-12-14 VITALS
DIASTOLIC BLOOD PRESSURE: 83 MMHG | HEART RATE: 72 BPM | BODY MASS INDEX: 34.57 KG/M2 | SYSTOLIC BLOOD PRESSURE: 137 MMHG | HEIGHT: 75 IN | WEIGHT: 278 LBS

## 2021-12-14 DIAGNOSIS — B35.1 ONYCHOMYCOSIS DUE TO DERMATOPHYTE: ICD-10-CM

## 2021-12-14 DIAGNOSIS — Z89.431 PARTIAL NONTRAUMATIC AMPUTATION OF FOOT, RIGHT: ICD-10-CM

## 2021-12-14 DIAGNOSIS — E11.42 TYPE 2 DIABETES MELLITUS WITH DIABETIC POLYNEUROPATHY, UNSPECIFIED WHETHER LONG TERM INSULIN USE: Primary | ICD-10-CM

## 2021-12-14 DIAGNOSIS — T86.828 HEMATOMA OF SKIN GRAFT: ICD-10-CM

## 2021-12-14 PROCEDURE — 11721 PR DEBRIDEMENT OF NAILS, 6 OR MORE: ICD-10-PCS | Mod: 58,S$PBB,, | Performed by: PODIATRIST

## 2021-12-14 PROCEDURE — 99214 PR OFFICE/OUTPT VISIT, EST, LEVL IV, 30-39 MIN: ICD-10-PCS | Mod: 25,S$PBB,, | Performed by: PODIATRIST

## 2021-12-14 PROCEDURE — 11721 DEBRIDE NAIL 6 OR MORE: CPT | Mod: Q7,PBBFAC,PN | Performed by: PODIATRIST

## 2021-12-14 PROCEDURE — 99214 OFFICE O/P EST MOD 30 MIN: CPT | Mod: PBBFAC,PN | Performed by: PODIATRIST

## 2021-12-14 PROCEDURE — 99214 OFFICE O/P EST MOD 30 MIN: CPT | Mod: 25,S$PBB,, | Performed by: PODIATRIST

## 2021-12-14 PROCEDURE — G0277 HBOT, FULL BODY CHAMBER, 30M: HCPCS | Mod: CCAT

## 2021-12-14 PROCEDURE — 99999 PR PBB SHADOW E&M-EST. PATIENT-LVL IV: CPT | Mod: PBBFAC,,, | Performed by: PODIATRIST

## 2021-12-14 PROCEDURE — 4010F ACE/ARB THERAPY RXD/TAKEN: CPT | Mod: CPTII,,, | Performed by: PODIATRIST

## 2021-12-14 PROCEDURE — 11721 DEBRIDE NAIL 6 OR MORE: CPT | Mod: 58,S$PBB,, | Performed by: PODIATRIST

## 2021-12-14 PROCEDURE — 4010F PR ACE/ARB THEARPY RXD/TAKEN: ICD-10-PCS | Mod: CPTII,,, | Performed by: PODIATRIST

## 2021-12-14 PROCEDURE — 99999 PR PBB SHADOW E&M-EST. PATIENT-LVL IV: ICD-10-PCS | Mod: PBBFAC,,, | Performed by: PODIATRIST

## 2021-12-14 RX ORDER — CICLOPIROX 80 MG/ML
SOLUTION TOPICAL NIGHTLY
Qty: 6.6 ML | Refills: 11 | Status: SHIPPED | OUTPATIENT
Start: 2021-12-14 | End: 2022-04-13

## 2021-12-15 ENCOUNTER — HOSPITAL ENCOUNTER (OUTPATIENT)
Dept: WOUND CARE | Facility: HOSPITAL | Age: 56
Discharge: HOME | End: 2021-12-15
Attending: SURGERY
Payer: MEDICAID

## 2021-12-15 DIAGNOSIS — L97.512 RIGHT FOOT ULCER, WITH FAT LAYER EXPOSED: ICD-10-CM

## 2021-12-15 DIAGNOSIS — T86.828 HEMATOMA OF SKIN GRAFT: ICD-10-CM

## 2021-12-15 PROCEDURE — G0277 HBOT, FULL BODY CHAMBER, 30M: HCPCS | Mod: CCAT

## 2021-12-16 ENCOUNTER — HOSPITAL ENCOUNTER (OUTPATIENT)
Dept: WOUND CARE | Facility: HOSPITAL | Age: 56
Discharge: HOME | End: 2021-12-16
Attending: SURGERY
Payer: MEDICAID

## 2021-12-16 DIAGNOSIS — T86.828 HEMATOMA OF SKIN GRAFT: ICD-10-CM

## 2021-12-16 DIAGNOSIS — L97.512 RIGHT FOOT ULCER, WITH FAT LAYER EXPOSED: ICD-10-CM

## 2021-12-16 LAB
POCT GLUCOSE: 172 MG/DL (ref 70–110)
POCT GLUCOSE: 211 MG/DL (ref 70–110)

## 2021-12-16 PROCEDURE — G0277 HBOT, FULL BODY CHAMBER, 30M: HCPCS | Mod: CCAT

## 2021-12-17 ENCOUNTER — HOSPITAL ENCOUNTER (OUTPATIENT)
Dept: WOUND CARE | Facility: HOSPITAL | Age: 56
Discharge: HOME | End: 2021-12-17
Attending: PREVENTIVE MEDICINE
Payer: MEDICAID

## 2021-12-17 DIAGNOSIS — L97.512 RIGHT FOOT ULCER, WITH FAT LAYER EXPOSED: ICD-10-CM

## 2021-12-17 DIAGNOSIS — T86.828 HEMATOMA OF SKIN GRAFT: ICD-10-CM

## 2021-12-17 DIAGNOSIS — E11.9 DIABETES MELLITUS WITHOUT COMPLICATION: ICD-10-CM

## 2021-12-17 LAB
POCT GLUCOSE: 213 MG/DL (ref 70–110)
POCT GLUCOSE: 240 MG/DL (ref 70–110)

## 2021-12-17 PROCEDURE — 99212 OFFICE O/P EST SF 10 MIN: CPT | Mod: 25

## 2021-12-17 PROCEDURE — G0277 HBOT, FULL BODY CHAMBER, 30M: HCPCS | Mod: CCAT

## 2021-12-20 ENCOUNTER — HOSPITAL ENCOUNTER (OUTPATIENT)
Dept: WOUND CARE | Facility: HOSPITAL | Age: 56
Discharge: HOME | End: 2021-12-20
Attending: SURGERY
Payer: MEDICAID

## 2021-12-20 ENCOUNTER — TELEPHONE (OUTPATIENT)
Dept: VASCULAR SURGERY | Facility: CLINIC | Age: 56
End: 2021-12-20
Payer: MEDICAID

## 2021-12-20 DIAGNOSIS — S91.101A OPEN WOUND OF RIGHT GREAT TOE, INITIAL ENCOUNTER: ICD-10-CM

## 2021-12-20 DIAGNOSIS — T81.31XA SURGICAL WOUND DEHISCENCE, INITIAL ENCOUNTER: ICD-10-CM

## 2021-12-20 DIAGNOSIS — A49.02 MRSA (METHICILLIN RESISTANT STAPHYLOCOCCUS AUREUS) INFECTION: ICD-10-CM

## 2021-12-20 DIAGNOSIS — T86.828 HEMATOMA OF SKIN GRAFT: ICD-10-CM

## 2021-12-20 DIAGNOSIS — L97.512 RIGHT FOOT ULCER, WITH FAT LAYER EXPOSED: ICD-10-CM

## 2021-12-20 DIAGNOSIS — S91.301A OPEN WOUND OF RIGHT FOOT, INITIAL ENCOUNTER: ICD-10-CM

## 2021-12-20 DIAGNOSIS — S91.301A OPEN WOUND OF RIGHT FOOT: ICD-10-CM

## 2021-12-20 DIAGNOSIS — Z79.4 TYPE 2 DIABETES MELLITUS WITHOUT COMPLICATION, WITH LONG-TERM CURRENT USE OF INSULIN: ICD-10-CM

## 2021-12-20 DIAGNOSIS — L97.512 RIGHT FOOT ULCER, WITH FAT LAYER EXPOSED: Primary | ICD-10-CM

## 2021-12-20 DIAGNOSIS — S91.109A OPEN WOUND OF TOE, INITIAL ENCOUNTER: ICD-10-CM

## 2021-12-20 DIAGNOSIS — E11.9 TYPE 2 DIABETES MELLITUS WITHOUT COMPLICATION, WITH LONG-TERM CURRENT USE OF INSULIN: ICD-10-CM

## 2021-12-20 DIAGNOSIS — T86.822 SKIN FLAP INFECTION: ICD-10-CM

## 2021-12-20 PROCEDURE — 27201912 HC WOUND CARE DEBRIDEMENT SUPPLIES

## 2021-12-20 PROCEDURE — G0277 HBOT, FULL BODY CHAMBER, 30M: HCPCS | Mod: CCAT

## 2021-12-20 PROCEDURE — 11042 DBRDMT SUBQ TIS 1ST 20SQCM/<: CPT

## 2021-12-21 ENCOUNTER — HOSPITAL ENCOUNTER (OUTPATIENT)
Dept: WOUND CARE | Facility: HOSPITAL | Age: 56
Discharge: HOME | End: 2021-12-21
Attending: PREVENTIVE MEDICINE
Payer: MEDICAID

## 2021-12-21 DIAGNOSIS — T86.828 HEMATOMA OF SKIN GRAFT: ICD-10-CM

## 2021-12-21 DIAGNOSIS — L97.512 RIGHT FOOT ULCER, WITH FAT LAYER EXPOSED: ICD-10-CM

## 2021-12-21 PROCEDURE — G0277 HBOT, FULL BODY CHAMBER, 30M: HCPCS | Mod: CCAT

## 2021-12-22 ENCOUNTER — HOSPITAL ENCOUNTER (OUTPATIENT)
Dept: WOUND CARE | Facility: HOSPITAL | Age: 56
Discharge: HOME | End: 2021-12-22
Attending: PREVENTIVE MEDICINE
Payer: MEDICAID

## 2021-12-22 DIAGNOSIS — T86.828 HEMATOMA OF SKIN GRAFT: ICD-10-CM

## 2021-12-22 DIAGNOSIS — L97.512 RIGHT FOOT ULCER, WITH FAT LAYER EXPOSED: ICD-10-CM

## 2021-12-22 PROCEDURE — G0277 HBOT, FULL BODY CHAMBER, 30M: HCPCS | Mod: CCAT

## 2021-12-23 ENCOUNTER — HOSPITAL ENCOUNTER (OUTPATIENT)
Dept: WOUND CARE | Facility: HOSPITAL | Age: 56
Discharge: HOME | End: 2021-12-23
Attending: PREVENTIVE MEDICINE
Payer: MEDICAID

## 2021-12-23 DIAGNOSIS — T86.828 HEMATOMA OF SKIN GRAFT: ICD-10-CM

## 2021-12-23 DIAGNOSIS — E11.9 TYPE 2 DIABETES MELLITUS WITHOUT COMPLICATION, WITH LONG-TERM CURRENT USE OF INSULIN: ICD-10-CM

## 2021-12-23 DIAGNOSIS — L97.512 RIGHT FOOT ULCER, WITH FAT LAYER EXPOSED: Primary | ICD-10-CM

## 2021-12-23 DIAGNOSIS — Z79.4 TYPE 2 DIABETES MELLITUS WITHOUT COMPLICATION, WITH LONG-TERM CURRENT USE OF INSULIN: ICD-10-CM

## 2021-12-23 DIAGNOSIS — T81.31XA SURGICAL WOUND DEHISCENCE, INITIAL ENCOUNTER: ICD-10-CM

## 2021-12-23 DIAGNOSIS — L97.512 RIGHT FOOT ULCER, WITH FAT LAYER EXPOSED: ICD-10-CM

## 2021-12-23 DIAGNOSIS — S91.301A OPEN WOUND OF RIGHT FOOT, INITIAL ENCOUNTER: ICD-10-CM

## 2021-12-23 PROCEDURE — G0277 HBOT, FULL BODY CHAMBER, 30M: HCPCS | Mod: CCAT

## 2021-12-23 PROCEDURE — 99213 OFFICE O/P EST LOW 20 MIN: CPT

## 2021-12-27 ENCOUNTER — HOSPITAL ENCOUNTER (OUTPATIENT)
Dept: WOUND CARE | Facility: HOSPITAL | Age: 56
Discharge: HOME | End: 2021-12-27
Attending: PREVENTIVE MEDICINE
Payer: MEDICAID

## 2021-12-27 ENCOUNTER — HOSPITAL ENCOUNTER (OUTPATIENT)
Dept: WOUND CARE | Facility: HOSPITAL | Age: 56
Discharge: HOME | End: 2021-12-27
Attending: SURGERY
Payer: MEDICAID

## 2021-12-27 VITALS
WEIGHT: 278 LBS | HEART RATE: 78 BPM | DIASTOLIC BLOOD PRESSURE: 98 MMHG | HEIGHT: 75 IN | SYSTOLIC BLOOD PRESSURE: 180 MMHG | BODY MASS INDEX: 34.57 KG/M2 | TEMPERATURE: 97 F

## 2021-12-27 DIAGNOSIS — S91.109D OPEN WOUND OF TOE, SUBSEQUENT ENCOUNTER: ICD-10-CM

## 2021-12-27 DIAGNOSIS — S91.301A OPEN WOUND OF RIGHT FOOT: ICD-10-CM

## 2021-12-27 DIAGNOSIS — T81.31XS SURGICAL WOUND DEHISCENCE, SEQUELA: ICD-10-CM

## 2021-12-27 DIAGNOSIS — S91.101A OPEN WOUND OF RIGHT GREAT TOE: ICD-10-CM

## 2021-12-27 DIAGNOSIS — Z79.4 TYPE 2 DIABETES MELLITUS WITHOUT COMPLICATION, WITH LONG-TERM CURRENT USE OF INSULIN: Primary | ICD-10-CM

## 2021-12-27 DIAGNOSIS — T86.828 HEMATOMA OF SKIN GRAFT: ICD-10-CM

## 2021-12-27 DIAGNOSIS — L97.512 RIGHT FOOT ULCER, WITH FAT LAYER EXPOSED: ICD-10-CM

## 2021-12-27 DIAGNOSIS — E11.9 TYPE 2 DIABETES MELLITUS WITHOUT COMPLICATION, WITH LONG-TERM CURRENT USE OF INSULIN: Primary | ICD-10-CM

## 2021-12-27 PROCEDURE — G0277 HBOT, FULL BODY CHAMBER, 30M: HCPCS | Mod: CCAT

## 2021-12-27 PROCEDURE — 27201912 HC WOUND CARE DEBRIDEMENT SUPPLIES

## 2021-12-27 PROCEDURE — 11042 DBRDMT SUBQ TIS 1ST 20SQCM/<: CPT

## 2022-01-07 ENCOUNTER — HOSPITAL ENCOUNTER (INPATIENT)
Facility: OTHER | Age: 57
LOS: 10 days | Discharge: HOME | DRG: 177 | End: 2022-01-17
Attending: EMERGENCY MEDICINE | Admitting: INTERNAL MEDICINE
Payer: MEDICAID

## 2022-01-07 DIAGNOSIS — J12.82 PNEUMONIA DUE TO COVID-19 VIRUS: Primary | ICD-10-CM

## 2022-01-07 DIAGNOSIS — R09.02 HYPOXIA: ICD-10-CM

## 2022-01-07 DIAGNOSIS — I47.10 SVT (SUPRAVENTRICULAR TACHYCARDIA): ICD-10-CM

## 2022-01-07 DIAGNOSIS — U07.1 COVID-19: ICD-10-CM

## 2022-01-07 DIAGNOSIS — R05.9 COUGH: ICD-10-CM

## 2022-01-07 DIAGNOSIS — U07.1 PNEUMONIA DUE TO COVID-19 VIRUS: Primary | ICD-10-CM

## 2022-01-07 DIAGNOSIS — J44.1 COPD EXACERBATION: ICD-10-CM

## 2022-01-07 DIAGNOSIS — R07.9 CHEST PAIN: ICD-10-CM

## 2022-01-07 PROBLEM — I50.20 SYSTOLIC CONGESTIVE HEART FAILURE: Status: ACTIVE | Noted: 2022-01-07

## 2022-01-07 PROBLEM — J96.00 ACUTE RESPIRATORY FAILURE DUE TO COVID-19: Status: ACTIVE | Noted: 2022-01-07

## 2022-01-07 PROBLEM — Z87.891 FORMER SMOKER: Status: ACTIVE | Noted: 2022-01-07

## 2022-01-07 PROBLEM — J44.89 COPD WITH ASTHMA: Status: ACTIVE | Noted: 2022-01-07

## 2022-01-07 LAB
ALBUMIN SERPL BCP-MCNC: 2.4 G/DL (ref 3.5–5.2)
ALP SERPL-CCNC: 92 U/L (ref 55–135)
ALT SERPL W/O P-5'-P-CCNC: 26 U/L (ref 10–44)
ANION GAP SERPL CALC-SCNC: 13 MMOL/L (ref 8–16)
AST SERPL-CCNC: 25 U/L (ref 10–40)
BASOPHILS # BLD AUTO: 0.02 K/UL (ref 0–0.2)
BASOPHILS NFR BLD: 0.2 % (ref 0–1.9)
BILIRUB SERPL-MCNC: 0.7 MG/DL (ref 0.1–1)
BNP SERPL-MCNC: 34 PG/ML (ref 0–99)
BUN SERPL-MCNC: 9 MG/DL (ref 6–20)
CALCIUM SERPL-MCNC: 8.6 MG/DL (ref 8.7–10.5)
CHLORIDE SERPL-SCNC: 98 MMOL/L (ref 95–110)
CK SERPL-CCNC: 131 U/L (ref 20–200)
CO2 SERPL-SCNC: 23 MMOL/L (ref 23–29)
CREAT SERPL-MCNC: 0.9 MG/DL (ref 0.5–1.4)
CRP SERPL-MCNC: 323 MG/L (ref 0–8.2)
DIFFERENTIAL METHOD: ABNORMAL
EOSINOPHIL # BLD AUTO: 0.3 K/UL (ref 0–0.5)
EOSINOPHIL NFR BLD: 2.1 % (ref 0–8)
ERYTHROCYTE [DISTWIDTH] IN BLOOD BY AUTOMATED COUNT: 16.7 % (ref 11.5–14.5)
EST. GFR  (AFRICAN AMERICAN): >60 ML/MIN/1.73 M^2
EST. GFR  (NON AFRICAN AMERICAN): >60 ML/MIN/1.73 M^2
GLUCOSE SERPL-MCNC: 196 MG/DL (ref 70–110)
HCT VFR BLD AUTO: 38.2 % (ref 40–54)
HGB BLD-MCNC: 12.2 G/DL (ref 14–18)
IMM GRANULOCYTES # BLD AUTO: 0.22 K/UL (ref 0–0.04)
IMM GRANULOCYTES NFR BLD AUTO: 1.8 % (ref 0–0.5)
LACTATE SERPL-SCNC: 2.2 MMOL/L (ref 0.5–2.2)
LDH SERPL L TO P-CCNC: 391 U/L (ref 110–260)
LYMPHOCYTES # BLD AUTO: 1.6 K/UL (ref 1–4.8)
LYMPHOCYTES NFR BLD: 12.7 % (ref 18–48)
MCH RBC QN AUTO: 27.1 PG (ref 27–31)
MCHC RBC AUTO-ENTMCNC: 31.9 G/DL (ref 32–36)
MCV RBC AUTO: 85 FL (ref 82–98)
MONOCYTES # BLD AUTO: 2 K/UL (ref 0.3–1)
MONOCYTES NFR BLD: 16.6 % (ref 4–15)
NEUTROPHILS # BLD AUTO: 8.1 K/UL (ref 1.8–7.7)
NEUTROPHILS NFR BLD: 66.6 % (ref 38–73)
NRBC BLD-RTO: 0 /100 WBC
PLATELET # BLD AUTO: 432 K/UL (ref 150–450)
PMV BLD AUTO: 9 FL (ref 9.2–12.9)
POTASSIUM SERPL-SCNC: 4.2 MMOL/L (ref 3.5–5.1)
PROCALCITONIN SERPL IA-MCNC: 0.21 NG/ML
PROT SERPL-MCNC: 7.8 G/DL (ref 6–8.4)
RBC # BLD AUTO: 4.5 M/UL (ref 4.6–6.2)
SODIUM SERPL-SCNC: 134 MMOL/L (ref 136–145)
TROPONIN I SERPL DL<=0.01 NG/ML-MCNC: 0.02 NG/ML (ref 0–0.03)
WBC # BLD AUTO: 12.2 K/UL (ref 3.9–12.7)

## 2022-01-07 PROCEDURE — 83605 ASSAY OF LACTIC ACID: CPT | Performed by: EMERGENCY MEDICINE

## 2022-01-07 PROCEDURE — 11000001 HC ACUTE MED/SURG PRIVATE ROOM

## 2022-01-07 PROCEDURE — 63600175 PHARM REV CODE 636 W HCPCS: Performed by: EMERGENCY MEDICINE

## 2022-01-07 PROCEDURE — 94761 N-INVAS EAR/PLS OXIMETRY MLT: CPT

## 2022-01-07 PROCEDURE — 82550 ASSAY OF CK (CPK): CPT | Performed by: EMERGENCY MEDICINE

## 2022-01-07 PROCEDURE — 99223 PR INITIAL HOSPITAL CARE,LEVL III: ICD-10-PCS | Mod: ,,, | Performed by: NURSE PRACTITIONER

## 2022-01-07 PROCEDURE — 27000221 HC OXYGEN, UP TO 24 HOURS

## 2022-01-07 PROCEDURE — 27000207 HC ISOLATION

## 2022-01-07 PROCEDURE — 96375 TX/PRO/DX INJ NEW DRUG ADDON: CPT

## 2022-01-07 PROCEDURE — 99223 1ST HOSP IP/OBS HIGH 75: CPT | Mod: ,,, | Performed by: NURSE PRACTITIONER

## 2022-01-07 PROCEDURE — 94640 AIRWAY INHALATION TREATMENT: CPT

## 2022-01-07 PROCEDURE — 25000003 PHARM REV CODE 250: Performed by: EMERGENCY MEDICINE

## 2022-01-07 PROCEDURE — 83615 LACTATE (LD) (LDH) ENZYME: CPT | Performed by: EMERGENCY MEDICINE

## 2022-01-07 PROCEDURE — 25000242 PHARM REV CODE 250 ALT 637 W/ HCPCS: Performed by: EMERGENCY MEDICINE

## 2022-01-07 PROCEDURE — 99291 CRITICAL CARE FIRST HOUR: CPT | Mod: 25

## 2022-01-07 PROCEDURE — 93010 EKG 12-LEAD: ICD-10-PCS | Mod: ,,, | Performed by: INTERNAL MEDICINE

## 2022-01-07 PROCEDURE — 96374 THER/PROPH/DIAG INJ IV PUSH: CPT

## 2022-01-07 PROCEDURE — 86140 C-REACTIVE PROTEIN: CPT | Performed by: EMERGENCY MEDICINE

## 2022-01-07 PROCEDURE — 87040 BLOOD CULTURE FOR BACTERIA: CPT | Performed by: EMERGENCY MEDICINE

## 2022-01-07 PROCEDURE — 84484 ASSAY OF TROPONIN QUANT: CPT | Performed by: EMERGENCY MEDICINE

## 2022-01-07 PROCEDURE — 93010 ELECTROCARDIOGRAM REPORT: CPT | Mod: ,,, | Performed by: INTERNAL MEDICINE

## 2022-01-07 PROCEDURE — 85025 COMPLETE CBC W/AUTO DIFF WBC: CPT | Performed by: EMERGENCY MEDICINE

## 2022-01-07 PROCEDURE — 83880 ASSAY OF NATRIURETIC PEPTIDE: CPT | Performed by: EMERGENCY MEDICINE

## 2022-01-07 PROCEDURE — 80053 COMPREHEN METABOLIC PANEL: CPT | Performed by: EMERGENCY MEDICINE

## 2022-01-07 PROCEDURE — 93005 ELECTROCARDIOGRAM TRACING: CPT

## 2022-01-07 PROCEDURE — 84145 PROCALCITONIN (PCT): CPT | Performed by: EMERGENCY MEDICINE

## 2022-01-07 RX ORDER — IPRATROPIUM BROMIDE AND ALBUTEROL SULFATE 2.5; .5 MG/3ML; MG/3ML
3 SOLUTION RESPIRATORY (INHALATION)
Status: DISCONTINUED | OUTPATIENT
Start: 2022-01-07 | End: 2022-01-07

## 2022-01-07 RX ORDER — GLUCAGON 1 MG
1 KIT INJECTION
Status: DISCONTINUED | OUTPATIENT
Start: 2022-01-07 | End: 2022-01-17 | Stop reason: HOSPADM

## 2022-01-07 RX ORDER — IPRATROPIUM BROMIDE AND ALBUTEROL SULFATE 2.5; .5 MG/3ML; MG/3ML
3 SOLUTION RESPIRATORY (INHALATION)
Status: COMPLETED | OUTPATIENT
Start: 2022-01-07 | End: 2022-01-07

## 2022-01-07 RX ORDER — BISACODYL 10 MG
10 SUPPOSITORY, RECTAL RECTAL DAILY PRN
Status: DISCONTINUED | OUTPATIENT
Start: 2022-01-07 | End: 2022-01-17 | Stop reason: HOSPADM

## 2022-01-07 RX ORDER — DEXAMETHASONE SODIUM PHOSPHATE 4 MG/ML
8 INJECTION, SOLUTION INTRA-ARTICULAR; INTRALESIONAL; INTRAMUSCULAR; INTRAVENOUS; SOFT TISSUE
Status: COMPLETED | OUTPATIENT
Start: 2022-01-07 | End: 2022-01-07

## 2022-01-07 RX ORDER — TALC
6 POWDER (GRAM) TOPICAL NIGHTLY PRN
Status: DISCONTINUED | OUTPATIENT
Start: 2022-01-07 | End: 2022-01-17 | Stop reason: HOSPADM

## 2022-01-07 RX ORDER — IBUPROFEN 200 MG
16 TABLET ORAL
Status: DISCONTINUED | OUTPATIENT
Start: 2022-01-07 | End: 2022-01-17 | Stop reason: HOSPADM

## 2022-01-07 RX ORDER — QUETIAPINE FUMARATE 200 MG/1
200 TABLET, FILM COATED ORAL NIGHTLY
Status: DISCONTINUED | OUTPATIENT
Start: 2022-01-07 | End: 2022-01-17 | Stop reason: HOSPADM

## 2022-01-07 RX ORDER — ASPIRIN 81 MG/1
81 TABLET ORAL DAILY
Status: DISCONTINUED | OUTPATIENT
Start: 2022-01-08 | End: 2022-01-17 | Stop reason: HOSPADM

## 2022-01-07 RX ORDER — GUAIFENESIN/DEXTROMETHORPHAN 100-10MG/5
10 SYRUP ORAL EVERY 4 HOURS PRN
Status: DISCONTINUED | OUTPATIENT
Start: 2022-01-07 | End: 2022-01-17 | Stop reason: HOSPADM

## 2022-01-07 RX ORDER — MUPIROCIN 20 MG/G
OINTMENT TOPICAL 2 TIMES DAILY
Status: DISPENSED | OUTPATIENT
Start: 2022-01-08 | End: 2022-01-13

## 2022-01-07 RX ORDER — ENOXAPARIN SODIUM 100 MG/ML
1 INJECTION SUBCUTANEOUS 2 TIMES DAILY
Status: DISCONTINUED | OUTPATIENT
Start: 2022-01-07 | End: 2022-01-17 | Stop reason: HOSPADM

## 2022-01-07 RX ORDER — POLYETHYLENE GLYCOL 3350 17 G/17G
17 POWDER, FOR SOLUTION ORAL 2 TIMES DAILY PRN
Status: DISCONTINUED | OUTPATIENT
Start: 2022-01-07 | End: 2022-01-17 | Stop reason: HOSPADM

## 2022-01-07 RX ORDER — ONDANSETRON 2 MG/ML
4 INJECTION INTRAMUSCULAR; INTRAVENOUS EVERY 8 HOURS PRN
Status: DISCONTINUED | OUTPATIENT
Start: 2022-01-07 | End: 2022-01-17 | Stop reason: HOSPADM

## 2022-01-07 RX ORDER — INSULIN ASPART 100 [IU]/ML
8 INJECTION, SOLUTION INTRAVENOUS; SUBCUTANEOUS
Status: DISCONTINUED | OUTPATIENT
Start: 2022-01-08 | End: 2022-01-08

## 2022-01-07 RX ORDER — ONDANSETRON 8 MG/1
8 TABLET, ORALLY DISINTEGRATING ORAL EVERY 8 HOURS PRN
Status: DISCONTINUED | OUTPATIENT
Start: 2022-01-07 | End: 2022-01-17 | Stop reason: HOSPADM

## 2022-01-07 RX ORDER — AMOXICILLIN 250 MG
1 CAPSULE ORAL 2 TIMES DAILY
Status: DISCONTINUED | OUTPATIENT
Start: 2022-01-08 | End: 2022-01-17 | Stop reason: HOSPADM

## 2022-01-07 RX ORDER — AZITHROMYCIN 250 MG/1
500 TABLET, FILM COATED ORAL
Status: COMPLETED | OUTPATIENT
Start: 2022-01-08 | End: 2022-01-10

## 2022-01-07 RX ORDER — ATORVASTATIN CALCIUM 20 MG/1
40 TABLET, FILM COATED ORAL DAILY
Status: DISCONTINUED | OUTPATIENT
Start: 2022-01-08 | End: 2022-01-17 | Stop reason: HOSPADM

## 2022-01-07 RX ORDER — SODIUM CHLORIDE 0.9 % (FLUSH) 0.9 %
10 SYRINGE (ML) INJECTION
Status: DISCONTINUED | OUTPATIENT
Start: 2022-01-07 | End: 2022-01-17 | Stop reason: HOSPADM

## 2022-01-07 RX ORDER — INSULIN ASPART 100 [IU]/ML
0-5 INJECTION, SOLUTION INTRAVENOUS; SUBCUTANEOUS
Status: DISCONTINUED | OUTPATIENT
Start: 2022-01-07 | End: 2022-01-08

## 2022-01-07 RX ORDER — HYDROCHLOROTHIAZIDE 25 MG/1
25 TABLET ORAL DAILY
Status: DISCONTINUED | OUTPATIENT
Start: 2022-01-08 | End: 2022-01-17 | Stop reason: HOSPADM

## 2022-01-07 RX ORDER — HYDROCODONE BITARTRATE AND ACETAMINOPHEN 5; 325 MG/1; MG/1
1 TABLET ORAL EVERY 6 HOURS PRN
Status: DISCONTINUED | OUTPATIENT
Start: 2022-01-07 | End: 2022-01-17 | Stop reason: HOSPADM

## 2022-01-07 RX ORDER — ALBUTEROL SULFATE 90 UG/1
2 AEROSOL, METERED RESPIRATORY (INHALATION) EVERY 6 HOURS
Status: DISCONTINUED | OUTPATIENT
Start: 2022-01-08 | End: 2022-01-07

## 2022-01-07 RX ORDER — ACETAMINOPHEN 325 MG/1
650 TABLET ORAL EVERY 4 HOURS PRN
Status: DISCONTINUED | OUTPATIENT
Start: 2022-01-07 | End: 2022-01-17 | Stop reason: HOSPADM

## 2022-01-07 RX ORDER — IPRATROPIUM BROMIDE AND ALBUTEROL SULFATE 2.5; .5 MG/3ML; MG/3ML
3 SOLUTION RESPIRATORY (INHALATION)
Status: ACTIVE | OUTPATIENT
Start: 2022-01-07 | End: 2022-01-07

## 2022-01-07 RX ORDER — IBUPROFEN 200 MG
24 TABLET ORAL
Status: DISCONTINUED | OUTPATIENT
Start: 2022-01-07 | End: 2022-01-17 | Stop reason: HOSPADM

## 2022-01-07 RX ORDER — ALBUTEROL SULFATE 90 UG/1
2 AEROSOL, METERED RESPIRATORY (INHALATION) EVERY 4 HOURS PRN
Status: DISCONTINUED | OUTPATIENT
Start: 2022-01-08 | End: 2022-01-17 | Stop reason: HOSPADM

## 2022-01-07 RX ORDER — LOSARTAN POTASSIUM 25 MG/1
25 TABLET ORAL 2 TIMES DAILY
Status: DISCONTINUED | OUTPATIENT
Start: 2022-01-08 | End: 2022-01-17 | Stop reason: HOSPADM

## 2022-01-07 RX ORDER — ASCORBIC ACID 500 MG
500 TABLET ORAL 2 TIMES DAILY
Status: DISCONTINUED | OUTPATIENT
Start: 2022-01-08 | End: 2022-01-17 | Stop reason: HOSPADM

## 2022-01-07 RX ORDER — IPRATROPIUM BROMIDE AND ALBUTEROL SULFATE 2.5; .5 MG/3ML; MG/3ML
3 SOLUTION RESPIRATORY (INHALATION) EVERY 4 HOURS
Status: DISCONTINUED | OUTPATIENT
Start: 2022-01-08 | End: 2022-01-08

## 2022-01-07 RX ADMIN — IPRATROPIUM BROMIDE AND ALBUTEROL SULFATE 3 ML: 2.5; .5 SOLUTION RESPIRATORY (INHALATION) at 09:01

## 2022-01-07 RX ADMIN — REMDESIVIR 200 MG: 100 INJECTION, POWDER, LYOPHILIZED, FOR SOLUTION INTRAVENOUS at 10:01

## 2022-01-07 RX ADMIN — SODIUM CHLORIDE 500 ML: 0.9 INJECTION, SOLUTION INTRAVENOUS at 08:01

## 2022-01-07 RX ADMIN — AZITHROMYCIN MONOHYDRATE 500 MG: 500 INJECTION, POWDER, LYOPHILIZED, FOR SOLUTION INTRAVENOUS at 09:01

## 2022-01-07 RX ADMIN — CEFTRIAXONE 2 G: 2 INJECTION, SOLUTION INTRAVENOUS at 09:01

## 2022-01-07 RX ADMIN — DEXAMETHASONE SODIUM PHOSPHATE 8 MG: 4 INJECTION INTRA-ARTICULAR; INTRALESIONAL; INTRAMUSCULAR; INTRAVENOUS; SOFT TISSUE at 09:01

## 2022-01-07 NOTE — Clinical Note
Diagnosis: Pneumonia due to COVID-19 virus [[id number]]   Future Attending Provider: GITA VARGAS [[id number]]   Admitting Provider:: GITA VARGAS [[id number]]

## 2022-01-08 LAB
ALBUMIN SERPL BCP-MCNC: 2.2 G/DL (ref 3.5–5.2)
ALP SERPL-CCNC: 90 U/L (ref 55–135)
ALT SERPL W/O P-5'-P-CCNC: 24 U/L (ref 10–44)
ANION GAP SERPL CALC-SCNC: 12 MMOL/L (ref 8–16)
APTT BLDCRRT: 30.1 SEC (ref 21–32)
AST SERPL-CCNC: 24 U/L (ref 10–40)
BACTERIA #/AREA URNS HPF: ABNORMAL /HPF
BASOPHILS # BLD AUTO: 0.01 K/UL (ref 0–0.2)
BASOPHILS NFR BLD: 0.1 % (ref 0–1.9)
BILIRUB SERPL-MCNC: 0.4 MG/DL (ref 0.1–1)
BILIRUB UR QL STRIP: NEGATIVE
BUN SERPL-MCNC: 13 MG/DL (ref 6–20)
CALCIUM SERPL-MCNC: 8.2 MG/DL (ref 8.7–10.5)
CHLORIDE SERPL-SCNC: 100 MMOL/L (ref 95–110)
CLARITY UR: CLEAR
CO2 SERPL-SCNC: 20 MMOL/L (ref 23–29)
COLOR UR: YELLOW
CREAT SERPL-MCNC: 1 MG/DL (ref 0.5–1.4)
D DIMER PPP IA.FEU-MCNC: >33 MG/L FEU
DIFFERENTIAL METHOD: ABNORMAL
EOSINOPHIL # BLD AUTO: 0 K/UL (ref 0–0.5)
EOSINOPHIL NFR BLD: 0.1 % (ref 0–8)
ERYTHROCYTE [DISTWIDTH] IN BLOOD BY AUTOMATED COUNT: 16.2 % (ref 11.5–14.5)
ERYTHROCYTE [SEDIMENTATION RATE] IN BLOOD: 110 MM/HR (ref 0–10)
EST. GFR  (AFRICAN AMERICAN): >60 ML/MIN/1.73 M^2
EST. GFR  (NON AFRICAN AMERICAN): >60 ML/MIN/1.73 M^2
FERRITIN SERPL-MCNC: 787 NG/ML (ref 20–300)
GLUCOSE SERPL-MCNC: 341 MG/DL (ref 70–110)
GLUCOSE UR QL STRIP: ABNORMAL
HCT VFR BLD AUTO: 36.8 % (ref 40–54)
HGB BLD-MCNC: 11.8 G/DL (ref 14–18)
HGB UR QL STRIP: NEGATIVE
HYALINE CASTS #/AREA URNS LPF: 3 /LPF
IMM GRANULOCYTES # BLD AUTO: 0.16 K/UL (ref 0–0.04)
IMM GRANULOCYTES NFR BLD AUTO: 1.7 % (ref 0–0.5)
INFLUENZA A, MOLECULAR: NEGATIVE
INFLUENZA B, MOLECULAR: NEGATIVE
INR PPP: 1.1 (ref 0.8–1.2)
KETONES UR QL STRIP: ABNORMAL
LEUKOCYTE ESTERASE UR QL STRIP: NEGATIVE
LYMPHOCYTES # BLD AUTO: 1.1 K/UL (ref 1–4.8)
LYMPHOCYTES NFR BLD: 12 % (ref 18–48)
MAGNESIUM SERPL-MCNC: 2 MG/DL (ref 1.6–2.6)
MCH RBC QN AUTO: 26.9 PG (ref 27–31)
MCHC RBC AUTO-ENTMCNC: 32.1 G/DL (ref 32–36)
MCV RBC AUTO: 84 FL (ref 82–98)
MICROSCOPIC COMMENT: ABNORMAL
MONOCYTES # BLD AUTO: 1.1 K/UL (ref 0.3–1)
MONOCYTES NFR BLD: 11.5 % (ref 4–15)
NEUTROPHILS # BLD AUTO: 6.9 K/UL (ref 1.8–7.7)
NEUTROPHILS NFR BLD: 74.6 % (ref 38–73)
NITRITE UR QL STRIP: NEGATIVE
NRBC BLD-RTO: 0 /100 WBC
PH UR STRIP: 6 [PH] (ref 5–8)
PLATELET # BLD AUTO: 380 K/UL (ref 150–450)
PMV BLD AUTO: 9.3 FL (ref 9.2–12.9)
POCT GLUCOSE: 245 MG/DL (ref 70–110)
POCT GLUCOSE: 281 MG/DL (ref 70–110)
POCT GLUCOSE: 321 MG/DL (ref 70–110)
POCT GLUCOSE: 403 MG/DL (ref 70–110)
POCT GLUCOSE: 414 MG/DL (ref 70–110)
POTASSIUM SERPL-SCNC: 4.7 MMOL/L (ref 3.5–5.1)
PROT SERPL-MCNC: 7.6 G/DL (ref 6–8.4)
PROT UR QL STRIP: ABNORMAL
PROTHROMBIN TIME: 12 SEC (ref 9–12.5)
RBC # BLD AUTO: 4.39 M/UL (ref 4.6–6.2)
RBC #/AREA URNS HPF: 2 /HPF (ref 0–4)
SODIUM SERPL-SCNC: 132 MMOL/L (ref 136–145)
SP GR UR STRIP: >=1.03 (ref 1–1.03)
SPECIMEN SOURCE: NORMAL
URN SPEC COLLECT METH UR: ABNORMAL
UROBILINOGEN UR STRIP-ACNC: NEGATIVE EU/DL
WBC # BLD AUTO: 9.24 K/UL (ref 3.9–12.7)
WBC #/AREA URNS HPF: 3 /HPF (ref 0–5)

## 2022-01-08 PROCEDURE — 27000249 HC VAPOTHERM CIRCUIT

## 2022-01-08 PROCEDURE — 80053 COMPREHEN METABOLIC PANEL: CPT | Performed by: NURSE PRACTITIONER

## 2022-01-08 PROCEDURE — 25000003 PHARM REV CODE 250: Performed by: NURSE PRACTITIONER

## 2022-01-08 PROCEDURE — 25000242 PHARM REV CODE 250 ALT 637 W/ HCPCS: Performed by: NURSE PRACTITIONER

## 2022-01-08 PROCEDURE — 94640 AIRWAY INHALATION TREATMENT: CPT

## 2022-01-08 PROCEDURE — 20000000 HC ICU ROOM

## 2022-01-08 PROCEDURE — 27100171 HC OXYGEN HIGH FLOW UP TO 24 HOURS

## 2022-01-08 PROCEDURE — 25000003 PHARM REV CODE 250: Performed by: EMERGENCY MEDICINE

## 2022-01-08 PROCEDURE — 63600175 PHARM REV CODE 636 W HCPCS: Performed by: NURSE PRACTITIONER

## 2022-01-08 PROCEDURE — 63600175 PHARM REV CODE 636 W HCPCS: Performed by: EMERGENCY MEDICINE

## 2022-01-08 PROCEDURE — 85610 PROTHROMBIN TIME: CPT | Performed by: NURSE PRACTITIONER

## 2022-01-08 PROCEDURE — 99900035 HC TECH TIME PER 15 MIN (STAT)

## 2022-01-08 PROCEDURE — 85651 RBC SED RATE NONAUTOMATED: CPT | Performed by: NURSE PRACTITIONER

## 2022-01-08 PROCEDURE — 27000207 HC ISOLATION

## 2022-01-08 PROCEDURE — 81000 URINALYSIS NONAUTO W/SCOPE: CPT | Performed by: NURSE PRACTITIONER

## 2022-01-08 PROCEDURE — C9399 UNCLASSIFIED DRUGS OR BIOLOG: HCPCS | Performed by: INTERNAL MEDICINE

## 2022-01-08 PROCEDURE — 93005 ELECTROCARDIOGRAM TRACING: CPT

## 2022-01-08 PROCEDURE — 99233 PR SUBSEQUENT HOSPITAL CARE,LEVL III: ICD-10-PCS | Mod: ,,, | Performed by: INTERNAL MEDICINE

## 2022-01-08 PROCEDURE — 63600175 PHARM REV CODE 636 W HCPCS: Performed by: INTERNAL MEDICINE

## 2022-01-08 PROCEDURE — 87502 INFLUENZA DNA AMP PROBE: CPT | Performed by: NURSE PRACTITIONER

## 2022-01-08 PROCEDURE — 25000003 PHARM REV CODE 250: Performed by: INTERNAL MEDICINE

## 2022-01-08 PROCEDURE — 83735 ASSAY OF MAGNESIUM: CPT | Performed by: NURSE PRACTITIONER

## 2022-01-08 PROCEDURE — 25000242 PHARM REV CODE 250 ALT 637 W/ HCPCS: Performed by: INTERNAL MEDICINE

## 2022-01-08 PROCEDURE — 36415 COLL VENOUS BLD VENIPUNCTURE: CPT | Performed by: NURSE PRACTITIONER

## 2022-01-08 PROCEDURE — 63700000 PHARM REV CODE 250 ALT 637 W/O HCPCS: Performed by: NURSE PRACTITIONER

## 2022-01-08 PROCEDURE — 94761 N-INVAS EAR/PLS OXIMETRY MLT: CPT

## 2022-01-08 PROCEDURE — 93010 EKG 12-LEAD: ICD-10-PCS | Mod: ,,, | Performed by: INTERNAL MEDICINE

## 2022-01-08 PROCEDURE — 94799 UNLISTED PULMONARY SVC/PX: CPT

## 2022-01-08 PROCEDURE — 93010 ELECTROCARDIOGRAM REPORT: CPT | Mod: ,,, | Performed by: INTERNAL MEDICINE

## 2022-01-08 PROCEDURE — 85730 THROMBOPLASTIN TIME PARTIAL: CPT | Performed by: NURSE PRACTITIONER

## 2022-01-08 PROCEDURE — 85025 COMPLETE CBC W/AUTO DIFF WBC: CPT | Performed by: NURSE PRACTITIONER

## 2022-01-08 PROCEDURE — 85379 FIBRIN DEGRADATION QUANT: CPT | Performed by: NURSE PRACTITIONER

## 2022-01-08 PROCEDURE — 82728 ASSAY OF FERRITIN: CPT | Performed by: NURSE PRACTITIONER

## 2022-01-08 PROCEDURE — 99233 SBSQ HOSP IP/OBS HIGH 50: CPT | Mod: ,,, | Performed by: INTERNAL MEDICINE

## 2022-01-08 RX ORDER — INSULIN ASPART 100 [IU]/ML
15 INJECTION, SOLUTION INTRAVENOUS; SUBCUTANEOUS
Status: DISCONTINUED | OUTPATIENT
Start: 2022-01-08 | End: 2022-01-09

## 2022-01-08 RX ORDER — INSULIN ASPART 100 [IU]/ML
1-10 INJECTION, SOLUTION INTRAVENOUS; SUBCUTANEOUS
Status: DISCONTINUED | OUTPATIENT
Start: 2022-01-08 | End: 2022-01-17 | Stop reason: HOSPADM

## 2022-01-08 RX ADMIN — OXYCODONE HYDROCHLORIDE AND ACETAMINOPHEN 500 MG: 500 TABLET ORAL at 09:01

## 2022-01-08 RX ADMIN — HYDROCHLOROTHIAZIDE 25 MG: 25 TABLET ORAL at 10:01

## 2022-01-08 RX ADMIN — IPRATROPIUM BROMIDE AND ALBUTEROL 2 PUFF: 20; 100 SPRAY, METERED RESPIRATORY (INHALATION) at 03:01

## 2022-01-08 RX ADMIN — DOCUSATE SODIUM - SENNOSIDES 1 TABLET: 50; 8.6 TABLET, FILM COATED ORAL at 09:01

## 2022-01-08 RX ADMIN — AZITHROMYCIN MONOHYDRATE 500 MG: 250 TABLET ORAL at 10:01

## 2022-01-08 RX ADMIN — MUPIROCIN: 20 OINTMENT TOPICAL at 09:01

## 2022-01-08 RX ADMIN — IPRATROPIUM BROMIDE AND ALBUTEROL SULFATE 3 ML: .5; 3 SOLUTION RESPIRATORY (INHALATION) at 12:01

## 2022-01-08 RX ADMIN — ASPIRIN 81 MG: 81 TABLET, DELAYED RELEASE ORAL at 10:01

## 2022-01-08 RX ADMIN — INSULIN ASPART 15 UNITS: 100 INJECTION, SOLUTION INTRAVENOUS; SUBCUTANEOUS at 05:01

## 2022-01-08 RX ADMIN — INSULIN DETEMIR 45 UNITS: 100 INJECTION, SOLUTION SUBCUTANEOUS at 09:01

## 2022-01-08 RX ADMIN — DOCUSATE SODIUM - SENNOSIDES 1 TABLET: 50; 8.6 TABLET, FILM COATED ORAL at 10:01

## 2022-01-08 RX ADMIN — INSULIN ASPART 1 UNITS: 100 INJECTION, SOLUTION INTRAVENOUS; SUBCUTANEOUS at 12:01

## 2022-01-08 RX ADMIN — ENOXAPARIN SODIUM 130 MG: 100 INJECTION SUBCUTANEOUS at 10:01

## 2022-01-08 RX ADMIN — ENOXAPARIN SODIUM 130 MG: 100 INJECTION SUBCUTANEOUS at 09:01

## 2022-01-08 RX ADMIN — LOSARTAN POTASSIUM 25 MG: 25 TABLET, FILM COATED ORAL at 10:01

## 2022-01-08 RX ADMIN — MUPIROCIN: 20 OINTMENT TOPICAL at 10:01

## 2022-01-08 RX ADMIN — IPRATROPIUM BROMIDE AND ALBUTEROL SULFATE 3 ML: .5; 3 SOLUTION RESPIRATORY (INHALATION) at 07:01

## 2022-01-08 RX ADMIN — IPRATROPIUM BROMIDE AND ALBUTEROL 2 PUFF: 20; 100 SPRAY, METERED RESPIRATORY (INHALATION) at 11:01

## 2022-01-08 RX ADMIN — ENOXAPARIN SODIUM 130 MG: 100 INJECTION SUBCUTANEOUS at 12:01

## 2022-01-08 RX ADMIN — CEFTRIAXONE 1 G: 1 INJECTION, SOLUTION INTRAVENOUS at 09:01

## 2022-01-08 RX ADMIN — REMDESIVIR 100 MG: 100 INJECTION, POWDER, LYOPHILIZED, FOR SOLUTION INTRAVENOUS at 10:01

## 2022-01-08 RX ADMIN — INSULIN ASPART 8 UNITS: 100 INJECTION, SOLUTION INTRAVENOUS; SUBCUTANEOUS at 10:01

## 2022-01-08 RX ADMIN — DEXAMETHASONE 6 MG: 4 TABLET ORAL at 10:01

## 2022-01-08 RX ADMIN — QUETIAPINE FUMARATE 200 MG: 200 TABLET ORAL at 09:01

## 2022-01-08 RX ADMIN — IPRATROPIUM BROMIDE AND ALBUTEROL 2 PUFF: 20; 100 SPRAY, METERED RESPIRATORY (INHALATION) at 12:01

## 2022-01-08 RX ADMIN — OXYCODONE HYDROCHLORIDE AND ACETAMINOPHEN 500 MG: 500 TABLET ORAL at 10:01

## 2022-01-08 RX ADMIN — IPRATROPIUM BROMIDE AND ALBUTEROL 2 PUFF: 20; 100 SPRAY, METERED RESPIRATORY (INHALATION) at 07:01

## 2022-01-08 RX ADMIN — INSULIN ASPART 3 UNITS: 100 INJECTION, SOLUTION INTRAVENOUS; SUBCUTANEOUS at 01:01

## 2022-01-08 RX ADMIN — INSULIN ASPART 8 UNITS: 100 INJECTION, SOLUTION INTRAVENOUS; SUBCUTANEOUS at 01:01

## 2022-01-08 RX ADMIN — IPRATROPIUM BROMIDE AND ALBUTEROL SULFATE 3 ML: .5; 3 SOLUTION RESPIRATORY (INHALATION) at 04:01

## 2022-01-08 RX ADMIN — QUETIAPINE FUMARATE 200 MG: 200 TABLET ORAL at 12:01

## 2022-01-08 RX ADMIN — INSULIN ASPART 10 UNITS: 100 INJECTION, SOLUTION INTRAVENOUS; SUBCUTANEOUS at 05:01

## 2022-01-08 RX ADMIN — THERA TABS 1 TABLET: TAB at 10:01

## 2022-01-08 RX ADMIN — INSULIN ASPART 4 UNITS: 100 INJECTION, SOLUTION INTRAVENOUS; SUBCUTANEOUS at 10:01

## 2022-01-08 RX ADMIN — LOSARTAN POTASSIUM 25 MG: 25 TABLET, FILM COATED ORAL at 09:01

## 2022-01-08 RX ADMIN — ATORVASTATIN CALCIUM 40 MG: 20 TABLET, FILM COATED ORAL at 10:01

## 2022-01-08 NOTE — ASSESSMENT & PLAN NOTE
-chronic  -BP slightly elevated at admission  -continue home HCTZ and losartan  -dose/medication adjustment as appropriate   -monitor

## 2022-01-08 NOTE — PROGRESS NOTES
[facility] Medicine  Progress Note    Patient Name: Dave Good  MRN: [id number]  Patient Class: IP- Inpatient   Admission Date: 1/7/2022  Length of Stay: 1 days  Attending Physician: GITA Correa MD  Primary Care Provider: Reyna Jorge NP        Subjective:     Principal Problem:Pneumonia due to COVID-19 virus        HPI:  Mr. 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 He presents to the ED with complaints of shortness of breath, chest pain, wheezing, and decreased appetite; he reports the symptoms with onset approximately 2 weeks ago and have progressively worsened.  He was diagnosed with COVID-19 12/29/2021.  He denies fever, chills, nausea, vomiting, abdominal pain, lightheadedness, dizziness, syncope.  He lives with [facility], is independent ADLs, uses no ambulatory aids.  He reports fiancee performed wound care and dressing changes yesterday.  Bilateral feet wrapped, C/D/I, and wearing bilateral surgical shoes. All past medical, social, and family history reviewed with patient; no pertinent family history identified at this time.    In the ED he is noted to be slightly hypertensive, heart rate ranging 100 to 120s, initially hypoxic on room air with saturations 77% to high 80s with improvement on nasal cannula to 92-96% however later transitioned to NRB before floor transfer, and afebrile.  CBC notes WBC 12, stable H&H, platelets 432.  Chemistry with normal renal function, sodium 134, potassium 4.2, albumin 2.4, glucose 196, and LFTs WNLs.  . BNP 34. .  . Troponin 0.016.  Lactic acid 2.2.  Procalcitonin 0.21. EKG with NSR and no acute dynamic changes.  Chest x-ray with bilateral airspace opacities concerning  for pneumonitis/multifocal pneumonia.  Blood cultures obtained.  He was given continuous nebulizers, azithromycin, ceftriaxone, dexamethasone, remdesivir and supplemental oxygen in the ED.  Ongoing COVID evaluation per protocol including ESR, ferritin, PT/INR, PTT, D-dimer, sputum culture, flu evaluation, and urinalysis pending. The patient was admitted to the Hospital Medicine Service for further evaluation and management.       Overview/Hospital Course:  No notes on file    Interval History: Remains on HFNC. Reports feeling symptomatically better.    Review of Systems   Constitutional: Negative for chills and fever.   Respiratory: Positive for shortness of breath. Negative for cough.    Cardiovascular: Negative for chest pain and palpitations.   Gastrointestinal: Negative for abdominal pain, nausea and vomiting.     Objective:     Vital Signs (Most Recent):  Temp: 97.8 °F (36.6 °C) (01/08/22 1623)  Pulse: 96 (01/08/22 1623)  Resp: 20 (01/08/22 1623)  BP: 132/83 (01/08/22 1623)  SpO2: 95 % (01/08/22 1623) Vital Signs (24h Range):  Temp:  [97.6 °F (36.4 °C)-99.6 °F (37.6 °C)] 97.8 °F (36.6 °C)  Pulse:  [] 96  Resp:  [14-42] 20  SpO2:  [77 %-97 %] 95 %  BP: (122-162)/() 132/83     Weight: 113.8 kg (250 lb 14.1 oz)  Body mass index is 32.21 kg/m².    Intake/Output Summary (Last 24 hours) at 1/8/2022 1624  Last data filed at 1/8/2022 0623  Gross per 24 hour   Intake 817.14 ml   Output 0 ml   Net 817.14 ml      Physical Exam  Vitals and nursing note reviewed.   Constitutional:       General: He is not in acute distress.     Appearance: He is well-developed.   HENT:      Head: Normocephalic and atraumatic.   Eyes:      General:         Right eye: No discharge.         Left eye: No discharge.      Conjunctiva/sclera: Conjunctivae normal.   Cardiovascular:      Rate and Rhythm: Normal rate.      Pulses: Normal pulses.   Pulmonary:      Effort: Pulmonary effort is normal. No respiratory distress.      Breath  sounds: Wheezing present.      Comments: On HFNC.  Abdominal:      Palpations: Abdomen is soft.      Tenderness: There is no abdominal tenderness.   Musculoskeletal:         General: Normal range of motion.      Right lower leg: No edema.      Left lower leg: No edema.   Skin:     General: Skin is warm and dry.   Neurological:      Mental Status: He is alert and oriented to person, place, and time.       Significant Labs:   CBC:  Recent Labs   Lab 01/07/22 2031 01/08/22  0513   WBC 12.20 9.24   HGB 12.2* 11.8*   HCT 38.2* 36.8*    380   GRAN 66.6  8.1* 74.6*  6.9   LYMPH 12.7*  1.6 12.0*  1.1   MONO 16.6*  2.0* 11.5  1.1*   EOS 0.3 0.0   BASO 0.02 0.01     CMP:  Recent Labs   Lab 01/07/22 2031 01/08/22  0513   * 132*   K 4.2 4.7   CL 98 100   CO2 23 20*   BUN 9 13   CREATININE 0.9 1.0   * 341*   CALCIUM 8.6* 8.2*   MG  --  2.0   ALKPHOS 92 90   AST 25 24   ALT 26 24   BILITOT 0.7 0.4   PROT 7.8 7.6   ALBUMIN 2.4* 2.2*   ANIONGAP 13 12      Significant Imaging:   No new imaging this morning.      Assessment/Plan:      * Pneumonia due to COVID-19 virus  Acute respiratory failure due to COVID-19 PNA, COPD with asthma, former smoker  - COVID PNA with acute hypoxemic respiratory failure.  - Inflammatory markers significantly elevated. Trend.  - Continue remdesivir 100mg IV daily for 5 day total course, dexamethasone 6mg PO daily for 10 day total course.  - Continue supplemental O2, wean as tolerated.  - Continue albuterol 2 puff inhaled q4hr PRN.  - Continue azithromycin 500mg PO daily, ceftriaxone 1g IV q24hr for 5 day total course.  - Continue enoxaparin 1mg/kg subQ BID.    Systolic congestive heart failure  - Chronic; appears euvolemic.  - Continue losartan, HCTZ as above.    Type II diabetes mellitus with peripheral circulatory disorder, uncontrolled  - Continue insulin detemir at 45 U subQ qHS, insulin aspart at 15 U TIDWM, convert to moderate-dose sliding scale insulin aspart 1-10U subQ  TIDWM PRN.  - Monitor and adjust further as needed.    Mixed hyperlipidemia  - Continue atorvastatin 40 mg PO daily.    Depression  - Continue quetiapine 200 mg PO qHS.    Primary hypertension  - Continue losartan 25mg PO BID, HCTZ 25mg PO daily.    VTE Risk Mitigation (From admission, onward)         Ordered     enoxaparin injection 130 mg  2 times daily         01/07/22 3732                Discharge Planning   LOIS:      Code Status: Full Code   Is the patient medically ready for discharge?:     Reason for patient still in hospital (select all that apply): Treatment  Discharge Plan A: Home        D Valentín Correa MD  Department of Hospital Medicine   [facility]

## 2022-01-08 NOTE — SUBJECTIVE & OBJECTIVE
Past Medical History:   Diagnosis Date    COPD (chronic obstructive pulmonary disease)     COVID-19     Depression     Diabetes mellitus     Diabetes mellitus, type 2     Hypertension        Past Surgical History:   Procedure Laterality Date    FOOT AMPUTATION THROUGH METATARSAL Right 9/23/2021    Procedure: AMPUTATION, FOOT, TRANSMETATARSAL right 1st ray resection;  Surgeon: Samuel Toussaint DPM;  Location: [facility];  Service: Podiatry;  Laterality: Right;    INCISION AND DRAINAGE FOOT Bilateral 9/21/2021    Procedure: INCISION AND DRAINAGE, FOOT - Bilateral;  Surgeon: Lavonne Vigil DPM;  Location: [facility] OR;  Service: Podiatry;  Laterality: Bilateral;       Review of patient's allergies indicates:   Allergen Reactions    Ibuprofen Swelling     Facial swelling       No current facility-administered medications on file prior to encounter.     Current Outpatient Medications on File Prior to Encounter   Medication Sig    ammonium lactate 12 % Crea Apply twice daily to affected parts both feet as needed.    amoxicillin-clavulanate 875-125mg (AUGMENTIN) 875-125 mg per tablet Take 1 tablet by mouth 2 (two) times a day.    aspirin (ECOTRIN) 81 MG EC tablet Take 81 mg by mouth once daily.    atorvastatin (LIPITOR) 20 MG tablet Take 40 mg by mouth once daily.     ciclopirox (PENLAC) 8 % Soln Apply topically nightly.    collagenase (SANTYL) ointment With each dressing change    enoxaparin (LOVENOX) 40 mg/0.4 mL Syrg Inject 40 mg into the skin once daily.    hydroCHLOROthiazide (HYDRODIURIL) 25 MG tablet Take 1 tablet by mouth once daily.    insulin aspart U-100 (NOVOLOG) 100 unit/mL (3 mL) InPn pen Inject 10 Units into the skin 3 (three) times daily.    insulin detemir U-100 (LEVEMIR FLEXTOUCH) 100 unit/mL (3 mL) SubQ InPn pen Inject 50 Units into the skin every evening.    losartan (COZAAR) 25 MG tablet Take 25 mg by mouth 2 (two) times daily.    metFORMIN (GLUCOPHAGE) 1000 MG tablet Take 1,000 mg by mouth  2 (two) times daily.    QUEtiapine (SEROQUEL) 200 MG Tab Take 200 mg by mouth nightly.    [DISCONTINUED] lisinopriL 10 MG tablet Take 10 mg by mouth once daily.     Family History    None       Tobacco Use    Smoking status: Former Smoker    Smokeless tobacco: Never Used   Substance and Sexual Activity    Alcohol use: Yes     Comment: whiskey and beer every other day    Drug use: Not Currently    Sexual activity: Yes     Partners: Female     Review of Systems   Constitutional: Positive for activity change, appetite change and fatigue. Negative for chills, diaphoresis, fever and unexpected weight change.   HENT: Negative for congestion, sinus pressure, sinus pain, sneezing and sore throat.    Respiratory: Positive for cough, shortness of breath and wheezing. Negative for chest tightness.    Cardiovascular: Positive for chest pain. Negative for palpitations and leg swelling.   Gastrointestinal: Negative for abdominal distention, abdominal pain, constipation, diarrhea, nausea and vomiting.   Endocrine: Negative.    Genitourinary: Negative for decreased urine volume, difficulty urinating and urgency.   Skin: Positive for wound.   Neurological: Negative for dizziness, syncope, weakness, light-headedness and headaches.     Objective:     Vital Signs (Most Recent):  Temp: 99.6 °F (37.6 °C) (01/07/22 2300)  Pulse: 97 (01/07/22 2300)  Resp: (!) 25 (01/07/22 2300)  BP: 135/88 (01/07/22 2300)  SpO2: (!) 94 % (01/07/22 2300) Vital Signs (24h Range):  Temp:  [97.8 °F (36.6 °C)-99.6 °F (37.6 °C)] 99.6 °F (37.6 °C)  Pulse:  [] 97  Resp:  [14-42] 25  SpO2:  [77 %-97 %] 94 %  BP: (134-162)/() 135/88     Weight: 129.3 kg (285 lb)  Body mass index is 36.59 kg/m².    Physical Exam  Vitals and nursing note reviewed.   Constitutional:       Appearance: Normal appearance. He is well-developed, normal weight and well-nourished.   HENT:      Head: Normocephalic and atraumatic.      Mouth/Throat:      Mouth: Mucous  membranes are normal.      Dentition: Normal dentition.   Eyes:      General: Lids are normal.      Extraocular Movements: Extraocular movements intact.      Conjunctiva/sclera: Conjunctivae normal.   Cardiovascular:      Rate and Rhythm: Normal rate and regular rhythm.      Pulses: Intact distal pulses.      Heart sounds: No murmur heard.      Pulmonary:      Effort: Pulmonary effort is normal.      Breath sounds: Wheezing and rhonchi present.      Comments: On 15 L non-rebreather, some conversational dyspnea, no significant increased work of breathing or accessory muscle use, coarse cough without sputum production noted during exam  Chest:      Chest wall: No tenderness.   Abdominal:      General: Bowel sounds are normal. There is no distension.      Palpations: Abdomen is soft.      Tenderness: There is no abdominal tenderness.   Musculoskeletal:         General: Deformity (R foot) present. No edema.      Cervical back: Normal range of motion.      Right lower leg: No edema.      Left lower leg: No edema.   Skin:     General: Skin is warm and dry.      Findings: No erythema or rash.      Comments: Bilateral foot wounds dropped with home dressing, C/D/I.    Neurological:      Mental Status: He is alert and oriented to person, place, and time.   Psychiatric:         Mood and Affect: Mood and affect normal.          Significant Labs:   All pertinent labs within the past 24 hours have been reviewed.  CBC:   Recent Labs   Lab 01/07/22 2031   WBC 12.20   HGB 12.2*   HCT 38.2*        CMP:   Recent Labs   Lab 01/07/22 2031   *   K 4.2   CL 98   CO2 23   *   BUN 9   CREATININE 0.9   CALCIUM 8.6*   PROT 7.8   ALBUMIN 2.4*   BILITOT 0.7   ALKPHOS 92   AST 25   ALT 26   ANIONGAP 13   EGFRNONAA >60     Cardiac Markers:   Recent Labs   Lab 01/07/22 2031   BNP 34     Lactic Acid:   Recent Labs   Lab 01/07/22 2031   LACTATE 2.2     Troponin:   Recent Labs   Lab 01/07/22 2031   TROPONINI 0.016        Significant Imaging: I have reviewed all pertinent imaging results/findings within the past 24 hours.

## 2022-01-08 NOTE — ASSESSMENT & PLAN NOTE
-chronic, poorly controlled  -hold home aspart 10 units t.i.d., detemir 50 units qHS., and PO metformin  -begin dose reduced regimen of detemir 38 units qHS, aspart 8 units TID with meals + low-dose sliding scale insulin  -dose/medication adjustment as appropriate   -monitor accuchecks AC/HS and PRN hypoglycemic protocol

## 2022-01-08 NOTE — ED TRIAGE NOTES
Pt to the ER with complaints of SOB and chest pain since for the past two weeks. Pt states he was diagnosed diagnosed with Covid last Wednesday; states his symptoms are getting worse.

## 2022-01-08 NOTE — ASSESSMENT & PLAN NOTE
-compensated  -BNP 34  -continue home HCTZ and losartan  -dose/medication adjustment as appropriate   -intake and output monitoring as well as daily weights  -monitor

## 2022-01-08 NOTE — ASSESSMENT & PLAN NOTE
Acute respiratory failure due to COVID-19  COVID-19  COPD with asthma  Former smoker  -covid risk score 6  -initially tachycardic and hypoxic; improved with supplementation, currently on 15 L non-rebreather  -at admission  WBC 12, stable H&H, platelets 432.  Chemistry with normal renal function, sodium 134, potassium 4.2, albumin 2.4, glucose 196, and LFTs WNLs.  . BNP 34. .  . Troponin 0.016.  Lactic acid 2.2.  Procalcitonin 0.21. EKG with NSR and no acute dynamic changes. Chest x-ray with bilateral airspace opacities concerning for pneumonitis/multifocal pneumonia.  Blood cultures obtained.   -ongoing COVID evaluation per protocol including ESR, ferritin, PT/INR, PTT, D-dimer, sputum culture, flu evaluation, and urinalysis pending.  -given continuous nebulizers, azithromycin, ceftriaxone, dexamethasone, remdesivir and supplemental oxygen in the ED  -continue azithromycin, ceftriaxone, dexamethasone, and remdesivir  -begin multivitamin and vitamin C  -begin albuterol and PRN supportive care  -continue supplemental oxygen titrate as appropriate and wean as tolerated  -further treatment as needed per COVID protocol  -COVID isolation  -monitor labs

## 2022-01-08 NOTE — ASSESSMENT & PLAN NOTE
Acute respiratory failure due to COVID-19 PNA, COPD with asthma, former smoker  - COVID PNA with acute hypoxemic respiratory failure.  - Inflammatory markers significantly elevated. Trend.  - Continue remdesivir 100mg IV daily for 5 day total course, dexamethasone 6mg PO daily for 10 day total course.  - Continue supplemental O2, wean as tolerated.  - Continue albuterol 2 puff inhaled q4hr PRN.  - Continue azithromycin 500mg PO daily, ceftriaxone 1g IV q24hr for 5 day total course.  - Continue enoxaparin 1mg/kg subQ BID.

## 2022-01-08 NOTE — ASSESSMENT & PLAN NOTE
- Continue insulin detemir at 45 U subQ qHS, insulin aspart at 15 U TIDWM, convert to moderate-dose sliding scale insulin aspart 1-10U subQ TIDWM PRN.  - Monitor and adjust further as needed.

## 2022-01-08 NOTE — ED PROVIDER NOTES
Encounter Date: 1/7/2022    SCRIBE #1 NOTE: I, Miracle Lucas, am scribing for, and in the presence of,  Jerry Ellsworth MD. I have scribed the following portions of the note - Other sections scribed: HPI, ROS, PE.       History     Chief Complaint   Patient presents with    Chest Pain    Cough    Diarrhea    Headache    Chills     X 2 days, dx'd w/ covid on 12.29 at [facility] Florentino is a 56 y.o. male, with a PMHx of COPD, HTN, CHF, Polysubstance abuse, Asthma, Bronchitis, who presents to the ED with shortness of breath and chest pain. Patient reports persistent chest pain and shortness of breath that has been progressively worsening over the last 2 weeks. Also reports wheezing and appetite loss. Patient tested positive for COVID-19 over 1 week ago on 12/29/21 at an outside facility. No other exacerbating or alleviating factors. Patient admits to second hand smoking and is a former smoker. No other associated symptoms.     The history is provided by the patient.     Review of patient's allergies indicates:   Allergen Reactions    Ibuprofen Swelling     Facial swelling     Past Medical History:   Diagnosis Date    COPD (chronic obstructive pulmonary disease)     COVID-19     Depression     Diabetes mellitus     Diabetes mellitus, type 2     Hypertension      Past Surgical History:   Procedure Laterality Date    FOOT AMPUTATION THROUGH METATARSAL Right 9/23/2021    Procedure: AMPUTATION, FOOT, TRANSMETATARSAL right 1st ray resection;  Surgeon: Samuel Toussaint DPM;  Location: [facility] OR;  Service: Podiatry;  Laterality: Right;    INCISION AND DRAINAGE FOOT Bilateral 9/21/2021    Procedure: INCISION AND DRAINAGE, FOOT - Bilateral;  Surgeon: Lavonne Vigil DPM;  Location: [facility] OR;  Service: Podiatry;  Laterality: Bilateral;     History reviewed. No pertinent family history.  Social History     Tobacco Use    Smoking status: Former Smoker    Smokeless tobacco: Never Used   Substance Use Topics    Alcohol  use: Yes     Comment: whiskey and beer every other day    Drug use: Not Currently     Review of Systems   Constitutional: Positive for appetite change. Negative for chills and fever.   HENT: Negative for congestion, rhinorrhea and sore throat.    Eyes: Negative for visual disturbance.   Respiratory: Positive for shortness of breath and wheezing. Negative for cough.    Cardiovascular: Positive for chest pain.   Gastrointestinal: Negative for abdominal pain, diarrhea, nausea and vomiting.   Genitourinary: Negative for dysuria, frequency and hematuria.   Musculoskeletal: Negative for back pain.   Skin: Negative for rash.   Neurological: Negative for dizziness, weakness and headaches.       Physical Exam     Initial Vitals [01/07/22 1926]   BP Pulse Resp Temp SpO2   (!) 143/106 (!) 119 (!) 24 97.8 °F (36.6 °C) 95 %      MAP       --         Physical Exam    Nursing note and vitals reviewed.  Constitutional: He is not diaphoretic.   Obese. Uncomfortable appearing.    HENT:   Head: Normocephalic and atraumatic.   Right Ear: External ear normal.   Left Ear: External ear normal.   Dry mucous membranes.    Eyes: EOM are normal. Pupils are equal, round, and reactive to light.   Neck: Neck supple.   Cardiovascular: Regular rhythm.   Tachycardic.    Pulmonary/Chest:   Diminished air exchange and expiratory wheezing throughout. Rhonchi throughout. Accessory muscle use at rest but speaks in full sentences.    Abdominal: Abdomen is soft. There is no abdominal tenderness.   Musculoskeletal:         General: No edema.      Cervical back: Neck supple.     Neurological: He is alert and oriented to person, place, and time.   Skin: Skin is warm and dry.   Psychiatric: He has a normal mood and affect.         ED Course   Critical Care    Date/Time: 1/7/2022 11:09 PM  Performed by: Jerry Ellsworth II, MD  Authorized by: GITA Correa MD   Direct patient critical care time: 20 minutes  Additional history critical care time: 5  minutes  Ordering / reviewing critical care time: 10 minutes  Documentation critical care time: 10 minutes  Consulting other physicians critical care time: 5 minutes  Total critical care time (exclusive of procedural time) : 50 minutes  Critical care was necessary to treat or prevent imminent or life-threatening deterioration of the following conditions: cardiac failure and respiratory failure.        Labs Reviewed   CBC W/ AUTO DIFFERENTIAL - Abnormal; Notable for the following components:       Result Value    RBC 4.50 (*)     Hemoglobin 12.2 (*)     Hematocrit 38.2 (*)     MCHC 31.9 (*)     RDW 16.7 (*)     MPV 9.0 (*)     Immature Granulocytes 1.8 (*)     Gran # (ANC) 8.1 (*)     Immature Grans (Abs) 0.22 (*)     Mono # 2.0 (*)     Lymph % 12.7 (*)     Mono % 16.6 (*)     All other components within normal limits   COMPREHENSIVE METABOLIC PANEL - Abnormal; Notable for the following components:    Sodium 134 (*)     Glucose 196 (*)     Calcium 8.6 (*)     Albumin 2.4 (*)     All other components within normal limits   C-REACTIVE PROTEIN - Abnormal; Notable for the following components:    .0 (*)     All other components within normal limits   LACTATE DEHYDROGENASE - Abnormal; Notable for the following components:     (*)     All other components within normal limits   CULTURE, BLOOD   CULTURE, BLOOD   CK   LACTIC ACID, PLASMA   TROPONIN I   PROCALCITONIN   B-TYPE NATRIURETIC PEPTIDE     EKG Readings: (Independently Interpreted)   Initial Reading: No STEMI.   Limited by motion artifact. Sinus tachycardia with a rate of 115 bpm. No obvious ischemia. Prolonged NC interval.        Imaging Results          X-Ray Chest AP Portable (Final result)  Result time 01/07/22 20:22:15    Final result by Patrice Alarcon MD (01/07/22 20:22:15)                 Impression:      Diffuse bilateral airspace opacities, suggestive of pneumonitis/multifocal pneumonia.      Electronically signed by: Patrice Alarcon  MD  Date:    01/07/2022  Time:    20:22             Narrative:    EXAMINATION:  XR CHEST AP PORTABLE    CLINICAL HISTORY:  Cough, unspecified    TECHNIQUE:  Single frontal view of the chest was performed.    COMPARISON:  09/19/2021.    FINDINGS:  Monitoring EKG leads are present.  The trachea is unremarkable.  The cardiomediastinal silhouette is within normal limits.  The hemidiaphragms are unremarkable.  There are no pleural effusions.  There is no evidence of a pneumothorax.  There is no evidence of pneumomediastinum.  There are diffuse bilateral airspace opacities.  There are degenerative changes in the osseous structures.                              X-Rays:   Independently Interpreted Readings:   Chest X-Ray: Patchy bibasilar infiltrates. No effusion or pneumothorax.      Medications   albuterol-ipratropium 2.5 mg-0.5 mg/3 mL nebulizer solution 3 mL (3 mLs Nebulization Not Given 1/7/22 2025)   remdesivir 200 mg in sodium chloride 0.9% 250 mL infusion (200 mg Intravenous New Bag 1/7/22 2256)     Followed by   remdesivir 100 mg in sodium chloride 0.9% 250 mL infusion (has no administration in time range)   aspirin EC tablet 81 mg (has no administration in time range)   atorvastatin tablet 40 mg (has no administration in time range)   hydroCHLOROthiazide tablet 25 mg (has no administration in time range)   losartan tablet 25 mg (has no administration in time range)   QUEtiapine tablet 200 mg (has no administration in time range)   sodium chloride 0.9% flush 10 mL (has no administration in time range)   dexAMETHasone tablet 6 mg (has no administration in time range)   glucose chewable tablet 16 g (has no administration in time range)   glucose chewable tablet 24 g (has no administration in time range)   dextrose 50% injection 12.5 g (has no administration in time range)   dextrose 50% injection 25 g (has no administration in time range)   glucagon (human recombinant) injection 1 mg (has no administration in time  range)   ascorbic acid (vitamin C) tablet 500 mg (has no administration in time range)   multivitamin tablet (has no administration in time range)   albuterol inhaler 2 puff (has no administration in time range)   enoxaparin injection 130 mg (has no administration in time range)   cefTRIAXone (ROCEPHIN) 1 g/50 mL D5W IVPB (has no administration in time range)   azithromycin tablet 500 mg (has no administration in time range)   acetaminophen tablet 650 mg (has no administration in time range)   HYDROcodone-acetaminophen 5-325 mg per tablet 1 tablet (has no administration in time range)   senna-docusate 8.6-50 mg per tablet 1 tablet (has no administration in time range)   polyethylene glycol packet 17 g (has no administration in time range)   bisacodyL suppository 10 mg (has no administration in time range)   ondansetron disintegrating tablet 8 mg (has no administration in time range)   ondansetron injection 4 mg (has no administration in time range)   insulin detemir U-100 pen 38 Units (has no administration in time range)   insulin aspart U-100 pen 8 Units (has no administration in time range)   insulin aspart U-100 pen 0-5 Units (has no administration in time range)   dextromethorphan-guaiFENesin  mg/5 ml liquid 10 mL (has no administration in time range)   melatonin tablet 6 mg (has no administration in time range)   mupirocin 2 % ointment (has no administration in time range)   sodium chloride 0.9% bolus 500 mL ( Intravenous Verify Only 1/7/22 2128)   albuterol-ipratropium 2.5 mg-0.5 mg/3 mL nebulizer solution 3 mL (3 mLs Nebulization Given 1/7/22 2135)   dexamethasone injection 8 mg (8 mg Intravenous Given 1/7/22 2113)   cefTRIAXone (ROCEPHIN) 2 g/50 mL D5W IVPB (2 g Intravenous New Bag 1/7/22 2117)   azithromycin 500 mg in dextrose 5 % 250 mL IVPB (ready to mix system) ( Intravenous Verify Only 1/7/22 2159)     Medical Decision Making:   History:   Old Medical Records: I decided to obtain old medical  records.  Independently Interpreted Test(s):   I have ordered and independently interpreted X-rays - see prior notes.  I have ordered and independently interpreted EKG Reading(s) - see prior notes  Clinical Tests:   Lab Tests: Ordered and Reviewed  Radiological Study: Ordered and Reviewed  Medical Tests: Ordered and Reviewed    Patient with positive COVID test 9 days ago, but not with worsening shortness of breath cough and general malaise.  Upon arrival is hypoxic, tachycardic.  Tachypneic with diffuse wheezing and diminished air exchange.  Started on nebulizer treatments due to respiratory difficulty.  X-ray shows diffuse patchy infiltrate consistent with COVID and therefore will be started on steroids, remdesivir.  Will also be started on antibiotics for community-acquired pneumonia given the x-ray findings.  Patient does feel better after the nebulizer treatments.  Maintaining oxygenation minimal supplemental O2.  Laboratory studies show normal CBC.  Mild hyponatremia.  Elevation of LDH and CRP, but normal BNP, lactic, procalcitonin.  Patient will be admitted for further oxygen, nebulizer treatments and observation.  Case discussed with the hospitalist service to whom he will be admitted.          Scribe Attestation:   Scribe #1: I performed the above scribed service and the documentation accurately describes the services I performed. I attest to the accuracy of the note.               I, [facility], personally performed the services described in this documentation. All medical record entries made by the scribe were at my direction and in my presence. I have reviewed the chart and agree that the record reflects my personal performance and is accurate and complete.    Clinical Impression:   Final diagnoses:  [R07.9] Chest pain  [R05.9] Cough  [U07.1] COVID-19  [U07.1, J12.82] Pneumonia due to COVID-19 virus (Primary)  [R09.02] Hypoxia  [J44.1] COPD exacerbation          ED Disposition Condition    Admit                Jerry Ellsworth II, MD  01/07/22 7469

## 2022-01-08 NOTE — ED NOTES
LOC: The patient is awake, alert, and oriented to self, place, time, and situation. Pt is calm and cooperative. Affect is appropriate.  Speech is appropriate and clear but pt is winded speaking in short, hurried phrases.     APPEARANCE: Patient resting on stretcher in respiratory distress.  Patient is clean and well groomed.    SKIN: The skin is warm and dry; color consistent with ethnicity.  Patient has normal skin turgor and moist mucus membranes.  Skin intact; no breakdown or bruising noted.     MUSCULOSKELETAL: Patient moving upper and lower extremities without difficulty; denies pain in the extremities or back.  Denies weakness.     RESPIRATORY: Airway is open and patent. Respirations spontaneous and even but moderately labored. Pt is tachypneic with a RR in the 40's.  Patient has an increased effort and rate. Wheezing and rales noted with ausculation. No accessory muscle use noted. Reports non-productive cough.     CARDIAC: Pt is tachycardic with a HR in the lower 100's.  Peripheral edema noted to the bilateral lower extremites. Pt complains of mid sternal chest pain.      ABDOMEN: Soft and non tender to palpation.  No distention noted; pt is obese. Pt denies abdominal pain; denies nausea, vomiting, diarrhea, or constipation.    NEUROLOGIC: Eyes open spontaneously.  Behavior appropriate to situation.  Follows commands; facial expression symmetrical.  Purposeful motor response noted; normal sensation in all extremities. Pt denies headache; denies lightheadedness or dizziness; denies visual disturbances; denies loss of balance; denies unilateral weakness.

## 2022-01-08 NOTE — PLAN OF CARE
[facility] - Med Surg ([place])  Initial Discharge Assessment       Primary Care Provider: Reyna Jorge NP    Admission Diagnosis: Cough [R05.9]  Hypoxia [R09.02]  COPD exacerbation [J44.1]  Chest pain [R07.9]  Pneumonia due to COVID-19 virus [U07.1, J12.82]  COVID-19 [U07.1]  Acute hypoxemic respiratory failure due to COVID-19 [U07.1, J96.01]    Admission Date: 1/7/2022  Expected Discharge Date:     Discharge Barriers Identified: None    Payor: MEDICAID / Plan: [facility] COMMUNITY PLAN [facility] ([place] MEDICAID) / Product Type: Managed Medicaid /     Extended Emergency Contact Information  Primary Emergency Contact: Taj Cole(Iris)  Mobile Phone: [phone]  Relation: Significant other  Preferred language: English   needed? No  Secondary Emergency Contact: nava good  Mobile Phone: [phone]  Relation: Relative   needed? No    Discharge Plan A: Home  Discharge Plan B: Home with family      [place], [place] - [address] [facility]  [address]  [facility] [address]  Phone: [phone] Fax: [phone]    SW spoke with patient's niece Nava Good via telephone to complete discharge planning assessment.  Naav verified all demographic information on facesheet is correct.  Nava verified PCP is KEVYN Jorge.  Nava verified primary health insurance is .  Patient with NO home health or DME.  Patient with NO POA or Living Will.  Patient not on dialysis or medication coumadin.  Patient with no 30 day admission.  Patient with no financial issues at this time.  Patient family will provide transportation upon discharge from facility.  Patient independent with ADLs, live with significant other, family drives OR patient uses Medicaid transportation.      Initial Assessment (most recent)     Adult Discharge Assessment - 01/08/22 1231        Discharge Assessment    Assessment Type Discharge Planning Assessment     Confirmed/corrected  address, phone number and insurance Yes     Confirmed Demographics Correct on Facesheet     Source of Information family     If unable to respond/provide information was family/caregiver contacted? Yes     Contact Name/Number Nava Good [phone]     Communicated LOIS with patient/caregiver Date not available/Unable to determine     Lives With significant other     Facility Arrived From: home     Do you expect to return to your current living situation? Yes     Do you have help at home or someone to help you manage your care at home? Yes     Who are your caregiver(s) and their phone number(s)? SO and family     Prior to hospitilization cognitive status: Unable to Assess     Current cognitive status: Unable to Assess     Walking or Climbing Stairs Difficulty none     Dressing/Bathing Difficulty none     Equipment Currently Used at Home none     Readmission within 30 days? No     Patient currently being followed by outpatient case management? No     Do you currently have service(s) that help you manage your care at home? No     Do you take prescription medications? Yes     Do you have prescription coverage? Yes     Do you have any problems affording any of your prescribed medications? No     Is the patient taking medications as prescribed? yes     Who is going to help you get home at discharge? SO and niece     How do you get to doctors appointments? family or friend will provide;health plan transportation     Are you on dialysis? No     Do you take coumadin? No     Discharge Plan A Home     Discharge Plan B Home with family     DME Needed Upon Discharge  oxygen     Discharge Plan discussed with: Patient     Discharge Barriers Identified None

## 2022-01-08 NOTE — SUBJECTIVE & OBJECTIVE
Interval History: Remains on HFNC. Reports feeling symptomatically better.    Review of Systems   Constitutional: Negative for chills and fever.   Respiratory: Positive for shortness of breath. Negative for cough.    Cardiovascular: Negative for chest pain and palpitations.   Gastrointestinal: Negative for abdominal pain, nausea and vomiting.     Objective:     Vital Signs (Most Recent):  Temp: 97.8 °F (36.6 °C) (01/08/22 1623)  Pulse: 96 (01/08/22 1623)  Resp: 20 (01/08/22 1623)  BP: 132/83 (01/08/22 1623)  SpO2: 95 % (01/08/22 1623) Vital Signs (24h Range):  Temp:  [97.6 °F (36.4 °C)-99.6 °F (37.6 °C)] 97.8 °F (36.6 °C)  Pulse:  [] 96  Resp:  [14-42] 20  SpO2:  [77 %-97 %] 95 %  BP: (122-162)/() 132/83     Weight: 113.8 kg (250 lb 14.1 oz)  Body mass index is 32.21 kg/m².    Intake/Output Summary (Last 24 hours) at 1/8/2022 1624  Last data filed at 1/8/2022 0623  Gross per 24 hour   Intake 817.14 ml   Output 0 ml   Net 817.14 ml      Physical Exam  Vitals and nursing note reviewed.   Constitutional:       General: He is not in acute distress.     Appearance: He is well-developed.   HENT:      Head: Normocephalic and atraumatic.   Eyes:      General:         Right eye: No discharge.         Left eye: No discharge.      Conjunctiva/sclera: Conjunctivae normal.   Cardiovascular:      Rate and Rhythm: Normal rate.      Pulses: Normal pulses.   Pulmonary:      Effort: Pulmonary effort is normal. No respiratory distress.      Breath sounds: Wheezing present.      Comments: On HFNC.  Abdominal:      Palpations: Abdomen is soft.      Tenderness: There is no abdominal tenderness.   Musculoskeletal:         General: Normal range of motion.      Right lower leg: No edema.      Left lower leg: No edema.   Skin:     General: Skin is warm and dry.   Neurological:      Mental Status: He is alert and oriented to person, place, and time.       Significant Labs:   CBC:  Recent Labs   Lab 01/07/22 2031 01/08/22  0513    WBC 12.20 9.24   HGB 12.2* 11.8*   HCT 38.2* 36.8*    380   GRAN 66.6  8.1* 74.6*  6.9   LYMPH 12.7*  1.6 12.0*  1.1   MONO 16.6*  2.0* 11.5  1.1*   EOS 0.3 0.0   BASO 0.02 0.01     CMP:  Recent Labs   Lab 01/07/22 2031 01/08/22  0513   * 132*   K 4.2 4.7   CL 98 100   CO2 23 20*   BUN 9 13   CREATININE 0.9 1.0   * 341*   CALCIUM 8.6* 8.2*   MG  --  2.0   ALKPHOS 92 90   AST 25 24   ALT 26 24   BILITOT 0.7 0.4   PROT 7.8 7.6   ALBUMIN 2.4* 2.2*   ANIONGAP 13 12      Significant Imaging:   No new imaging this morning.

## 2022-01-08 NOTE — H&P
[facility] Hospital Medicine  History & Physical    Patient Name: Dave Good  MRN: [id number]  Patient Class: IP- Inpatient  Admission Date: 1/7/2022  Attending Physician: GITA Correa MD   Primary Care Provider: Reyna Jorge NP    Patient information was obtained from patient, past medical records and ER records.     Subjective:     Principal Problem:Pneumonia due to COVID-19 virus    Chief Complaint:   Chief Complaint   Patient presents with    Chest Pain    Cough    Diarrhea    Headache    Chills     X 2 days, dx'd w/ covid on 12.29 at [facility]        HPI: Mr. 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 He presents to the ED with complaints of shortness of breath, chest pain, wheezing, and decreased appetite; he reports the symptoms with onset approximately 2 weeks ago and have progressively worsened.  He was diagnosed with COVID-19 12/29/2021.  He denies fever, chills, nausea, vomiting, abdominal pain, lightheadedness, dizziness, syncope.  He lives with [facility], is independent ADLs, uses no ambulatory aids.  He reports fiancee performed wound care and dressing changes yesterday.  Bilateral feet wrapped, C/D/I, and wearing bilateral surgical shoes. All past medical, social, and family history reviewed with patient; no pertinent family history identified at this time.    In the ED he is noted to be slightly hypertensive, heart rate ranging 100 to 120s, initially hypoxic on room air with saturations 77% to high 80s with improvement on nasal cannula to 92-96% however later transitioned to NRB before floor transfer, and afebrile.  CBC notes WBC 12, stable H&H, platelets 432.  Chemistry with normal renal function, sodium 134, potassium  4.2, albumin 2.4, glucose 196, and LFTs WNLs.  . BNP 34. .  . Troponin 0.016.  Lactic acid 2.2.  Procalcitonin 0.21. EKG with NSR and no acute dynamic changes.  Chest x-ray with bilateral airspace opacities concerning for pneumonitis/multifocal pneumonia.  Blood cultures obtained.  He was given continuous nebulizers, azithromycin, ceftriaxone, dexamethasone, remdesivir and supplemental oxygen in the ED.  Ongoing COVID evaluation per protocol including ESR, ferritin, PT/INR, PTT, D-dimer, sputum culture, flu evaluation, and urinalysis pending. The patient was admitted to the Hospital Medicine Service for further evaluation and management.     Past Medical History:   Diagnosis Date    COPD (chronic obstructive pulmonary disease)     COVID-19     Depression     Diabetes mellitus     Diabetes mellitus, type 2     Hypertension        Past Surgical History:   Procedure Laterality Date    FOOT AMPUTATION THROUGH METATARSAL Right 9/23/2021    Procedure: AMPUTATION, FOOT, TRANSMETATARSAL right 1st ray resection;  Surgeon: Samuel Toussaint DPM;  Location: [facility] OR;  Service: Podiatry;  Laterality: Right;    INCISION AND DRAINAGE FOOT Bilateral 9/21/2021    Procedure: INCISION AND DRAINAGE, FOOT - Bilateral;  Surgeon: Lavonne Vigil DPM;  Location: [facility] OR;  Service: Podiatry;  Laterality: Bilateral;       Review of patient's allergies indicates:   Allergen Reactions    Ibuprofen Swelling     Facial swelling       No current facility-administered medications on file prior to encounter.     Current Outpatient Medications on File Prior to Encounter   Medication Sig    ammonium lactate 12 % Crea Apply twice daily to affected parts both feet as needed.    amoxicillin-clavulanate 875-125mg (AUGMENTIN) 875-125 mg per tablet Take 1 tablet by mouth 2 (two) times a day.    aspirin (ECOTRIN) 81 MG EC tablet Take 81 mg by mouth once daily.    atorvastatin (LIPITOR) 20 MG tablet Take 40 mg by mouth once  daily.     ciclopirox (PENLAC) 8 % Soln Apply topically nightly.    collagenase (SANTYL) ointment With each dressing change    enoxaparin (LOVENOX) 40 mg/0.4 mL Syrg Inject 40 mg into the skin once daily.    hydroCHLOROthiazide (HYDRODIURIL) 25 MG tablet Take 1 tablet by mouth once daily.    insulin aspart U-100 (NOVOLOG) 100 unit/mL (3 mL) InPn pen Inject 10 Units into the skin 3 (three) times daily.    insulin detemir U-100 (LEVEMIR FLEXTOUCH) 100 unit/mL (3 mL) SubQ InPn pen Inject 50 Units into the skin every evening.    losartan (COZAAR) 25 MG tablet Take 25 mg by mouth 2 (two) times daily.    metFORMIN (GLUCOPHAGE) 1000 MG tablet Take 1,000 mg by mouth 2 (two) times daily.    QUEtiapine (SEROQUEL) 200 MG Tab Take 200 mg by mouth nightly.    [DISCONTINUED] lisinopriL 10 MG tablet Take 10 mg by mouth once daily.     Family History    None       Tobacco Use    Smoking status: Former Smoker    Smokeless tobacco: Never Used   Substance and Sexual Activity    Alcohol use: Yes     Comment: whiskey and beer every other day    Drug use: Not Currently    Sexual activity: Yes     Partners: Female     Review of Systems   Constitutional: Positive for activity change, appetite change and fatigue. Negative for chills, diaphoresis, fever and unexpected weight change.   HENT: Negative for congestion, sinus pressure, sinus pain, sneezing and sore throat.    Respiratory: Positive for cough, shortness of breath and wheezing. Negative for chest tightness.    Cardiovascular: Positive for chest pain. Negative for palpitations and leg swelling.   Gastrointestinal: Negative for abdominal distention, abdominal pain, constipation, diarrhea, nausea and vomiting.   Endocrine: Negative.    Genitourinary: Negative for decreased urine volume, difficulty urinating and urgency.   Skin: Positive for wound.   Neurological: Negative for dizziness, syncope, weakness, light-headedness and headaches.     Objective:     Vital Signs  (Most Recent):  Temp: 99.6 °F (37.6 °C) (01/07/22 2300)  Pulse: 97 (01/07/22 2300)  Resp: (!) 25 (01/07/22 2300)  BP: 135/88 (01/07/22 2300)  SpO2: (!) 94 % (01/07/22 2300) Vital Signs (24h Range):  Temp:  [97.8 °F (36.6 °C)-99.6 °F (37.6 °C)] 99.6 °F (37.6 °C)  Pulse:  [] 97  Resp:  [14-42] 25  SpO2:  [77 %-97 %] 94 %  BP: (134-162)/() 135/88     Weight: 129.3 kg (285 lb)  Body mass index is 36.59 kg/m².    Physical Exam  Vitals and nursing note reviewed.   Constitutional:       Appearance: Normal appearance. He is well-developed, normal weight and well-nourished.   HENT:      Head: Normocephalic and atraumatic.      Mouth/Throat:      Mouth: Mucous membranes are normal.      Dentition: Normal dentition.   Eyes:      General: Lids are normal.      Extraocular Movements: Extraocular movements intact.      Conjunctiva/sclera: Conjunctivae normal.   Cardiovascular:      Rate and Rhythm: Normal rate and regular rhythm.      Pulses: Intact distal pulses.      Heart sounds: No murmur heard.      Pulmonary:      Effort: Pulmonary effort is normal.      Breath sounds: Wheezing and rhonchi present.      Comments: On 15 L non-rebreather, some conversational dyspnea, no significant increased work of breathing or accessory muscle use, coarse cough without sputum production noted during exam  Chest:      Chest wall: No tenderness.   Abdominal:      General: Bowel sounds are normal. There is no distension.      Palpations: Abdomen is soft.      Tenderness: There is no abdominal tenderness.   Musculoskeletal:         General: Deformity (R foot) present. No edema.      Cervical back: Normal range of motion.      Right lower leg: No edema.      Left lower leg: No edema.   Skin:     General: Skin is warm and dry.      Findings: No erythema or rash.      Comments: Bilateral foot wounds dropped with home dressing, C/D/I.    Neurological:      Mental Status: He is alert and oriented to person, place, and time.    Psychiatric:         Mood and Affect: Mood and affect normal.          Significant Labs:   All pertinent labs within the past 24 hours have been reviewed.  CBC:   Recent Labs   Lab 01/07/22 2031   WBC 12.20   HGB 12.2*   HCT 38.2*        CMP:   Recent Labs   Lab 01/07/22 2031   *   K 4.2   CL 98   CO2 23   *   BUN 9   CREATININE 0.9   CALCIUM 8.6*   PROT 7.8   ALBUMIN 2.4*   BILITOT 0.7   ALKPHOS 92   AST 25   ALT 26   ANIONGAP 13   EGFRNONAA >60     Cardiac Markers:   Recent Labs   Lab 01/07/22 2031   BNP 34     Lactic Acid:   Recent Labs   Lab 01/07/22 2031   LACTATE 2.2     Troponin:   Recent Labs   Lab 01/07/22 2031   TROPONINI 0.016       Significant Imaging: I have reviewed all pertinent imaging results/findings within the past 24 hours.    Assessment/Plan:     * Pneumonia due to COVID-19 virus  Acute respiratory failure due to COVID-19  COVID-19  COPD with asthma  Former smoker  -covid risk score 6  -initially tachycardic and hypoxic; improved with supplementation, currently on 15 L non-rebreather  -at admission  WBC 12, stable H&H, platelets 432.  Chemistry with normal renal function, sodium 134, potassium 4.2, albumin 2.4, glucose 196, and LFTs WNLs.  . BNP 34. .  . Troponin 0.016.  Lactic acid 2.2.  Procalcitonin 0.21. EKG with NSR and no acute dynamic changes. Chest x-ray with bilateral airspace opacities concerning for pneumonitis/multifocal pneumonia.  Blood cultures obtained.   -ongoing COVID evaluation per protocol including ESR, ferritin, PT/INR, PTT, D-dimer, sputum culture, flu evaluation, and urinalysis pending.  -given continuous nebulizers, azithromycin, ceftriaxone, dexamethasone, remdesivir and supplemental oxygen in the ED  -continue azithromycin, ceftriaxone, dexamethasone, and remdesivir  -begin multivitamin and vitamin C  -begin albuterol and PRN supportive care  -continue supplemental oxygen titrate as appropriate and wean as  tolerated  -anticoagulation with full-dose Lovenox per COVID protocol  -further treatment as needed per COVID protocol  -COVID isolation  -monitor labs    Systolic congestive heart failure  -compensated  -BNP 34  -continue home HCTZ and losartan  -dose/medication adjustment as appropriate   -intake and output monitoring as well as daily weights  -monitor    Primary hypertension  -chronic  -BP slightly elevated at admission  -continue home HCTZ and losartan  -dose/medication adjustment as appropriate   -monitor     Type II diabetes mellitus with peripheral circulatory disorder, uncontrolled  -chronic, poorly controlled  -hold home aspart 10 units t.i.d., detemir 50 units qHS., and PO metformin  -begin dose reduced regimen of detemir 38 units qHS, aspart 8 units TID with meals + low-dose sliding scale insulin  -dose/medication adjustment as appropriate   -monitor accuchecks AC/HS and PRN hypoglycemic protocol     Mixed hyperlipidemia  -chronic  -continue home statin     Depression  -chronic  -continue home seroquel    VTE Risk Mitigation (From admission, onward)         Ordered     enoxaparin injection 130 mg  2 times daily         01/07/22 6538                Tiffanie Bush, IRAJ, AG-ACNP, BC  Department of Hospital Medicine  [facility]

## 2022-01-08 NOTE — HPI
Mr. 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 He presents to the ED with complaints of shortness of breath, chest pain, wheezing, and decreased appetite; he reports the symptoms with onset approximately 2 weeks ago and have progressively worsened.  He was diagnosed with COVID-19 12/29/2021.  He denies fever, chills, nausea, vomiting, abdominal pain, lightheadedness, dizziness, syncope.  He lives with [facility], is independent ADLs, uses no ambulatory aids.  He reports fiancee performed wound care and dressing changes yesterday.  Bilateral feet wrapped, C/D/I, and wearing bilateral surgical shoes. All past medical, social, and family history reviewed with patient; no pertinent family history identified at this time.    In the ED he is noted to be slightly hypertensive, heart rate ranging 100 to 120s, initially hypoxic on room air with saturations 77% to high 80s with improvement on nasal cannula to 92-96% however later transitioned to NRB before floor transfer, and afebrile.  CBC notes WBC 12, stable H&H, platelets 432.  Chemistry with normal renal function, sodium 134, potassium 4.2, albumin 2.4, glucose 196, and LFTs WNLs.  . BNP 34. .  . Troponin 0.016.  Lactic acid 2.2.  Procalcitonin 0.21. EKG with NSR and no acute dynamic changes.  Chest x-ray with bilateral airspace opacities concerning for pneumonitis/multifocal pneumonia.  Blood cultures obtained.  He was given continuous nebulizers, azithromycin, ceftriaxone, dexamethasone, remdesivir and supplemental oxygen in the ED.  Ongoing COVID evaluation per protocol including ESR, ferritin, PT/INR, PTT, D-dimer, sputum culture, flu evaluation, and urinalysis pending. The patient was admitted to  the Hospital Medicine Service for further evaluation and management.

## 2022-01-08 NOTE — ED NOTES
Pt placed on continuous cardiac and pulse ox monitoring with blood pressure to cycle every 30 minutes. Pt is tachypneic with a RR of 40 breaths/min and hypoxic with a oxygen saturation in the upper 70's. Pt placed on 2 liters of supplemental oxygen via nasal cannula. Bed locked in lowest position; side rails up and locked x 2; call light, bedside table, and personal belongings within reach. Family member remains at bedside  Dr Ellsworth made aware of pt status.

## 2022-01-09 LAB
ANION GAP SERPL CALC-SCNC: 10 MMOL/L (ref 8–16)
BASOPHILS # BLD AUTO: 0.02 K/UL (ref 0–0.2)
BASOPHILS NFR BLD: 0.1 % (ref 0–1.9)
BUN SERPL-MCNC: 25 MG/DL (ref 6–20)
CALCIUM SERPL-MCNC: 8.3 MG/DL (ref 8.7–10.5)
CHLORIDE SERPL-SCNC: 100 MMOL/L (ref 95–110)
CO2 SERPL-SCNC: 24 MMOL/L (ref 23–29)
CREAT SERPL-MCNC: 1 MG/DL (ref 0.5–1.4)
CRP SERPL-MCNC: 140.7 MG/L (ref 0–8.2)
DIFFERENTIAL METHOD: ABNORMAL
EOSINOPHIL # BLD AUTO: 0 K/UL (ref 0–0.5)
EOSINOPHIL NFR BLD: 0.1 % (ref 0–8)
ERYTHROCYTE [DISTWIDTH] IN BLOOD BY AUTOMATED COUNT: 16.6 % (ref 11.5–14.5)
EST. GFR  (AFRICAN AMERICAN): >60 ML/MIN/1.73 M^2
EST. GFR  (NON AFRICAN AMERICAN): >60 ML/MIN/1.73 M^2
GLUCOSE SERPL-MCNC: 411 MG/DL (ref 70–110)
HCT VFR BLD AUTO: 35.9 % (ref 40–54)
HGB BLD-MCNC: 11.4 G/DL (ref 14–18)
IMM GRANULOCYTES # BLD AUTO: 0.35 K/UL (ref 0–0.04)
IMM GRANULOCYTES NFR BLD AUTO: 2.3 % (ref 0–0.5)
LDH SERPL L TO P-CCNC: 397 U/L (ref 110–260)
LYMPHOCYTES # BLD AUTO: 1.9 K/UL (ref 1–4.8)
LYMPHOCYTES NFR BLD: 12.1 % (ref 18–48)
MAGNESIUM SERPL-MCNC: 2.4 MG/DL (ref 1.6–2.6)
MCH RBC QN AUTO: 26.6 PG (ref 27–31)
MCHC RBC AUTO-ENTMCNC: 31.8 G/DL (ref 32–36)
MCV RBC AUTO: 84 FL (ref 82–98)
MONOCYTES # BLD AUTO: 2 K/UL (ref 0.3–1)
MONOCYTES NFR BLD: 13 % (ref 4–15)
NEUTROPHILS # BLD AUTO: 11.1 K/UL (ref 1.8–7.7)
NEUTROPHILS NFR BLD: 72.4 % (ref 38–73)
NRBC BLD-RTO: 0 /100 WBC
PHOSPHATE SERPL-MCNC: 2.5 MG/DL (ref 2.7–4.5)
PLATELET # BLD AUTO: 474 K/UL (ref 150–450)
PMV BLD AUTO: 9.4 FL (ref 9.2–12.9)
POCT GLUCOSE: 261 MG/DL (ref 70–110)
POCT GLUCOSE: 317 MG/DL (ref 70–110)
POCT GLUCOSE: 349 MG/DL (ref 70–110)
POCT GLUCOSE: 357 MG/DL (ref 70–110)
POTASSIUM SERPL-SCNC: 4.8 MMOL/L (ref 3.5–5.1)
RBC # BLD AUTO: 4.28 M/UL (ref 4.6–6.2)
SODIUM SERPL-SCNC: 134 MMOL/L (ref 136–145)
WBC # BLD AUTO: 15.34 K/UL (ref 3.9–12.7)

## 2022-01-09 PROCEDURE — 36415 COLL VENOUS BLD VENIPUNCTURE: CPT | Performed by: INTERNAL MEDICINE

## 2022-01-09 PROCEDURE — 84100 ASSAY OF PHOSPHORUS: CPT | Performed by: INTERNAL MEDICINE

## 2022-01-09 PROCEDURE — 99291 CRITICAL CARE FIRST HOUR: CPT | Mod: ,,, | Performed by: INTERNAL MEDICINE

## 2022-01-09 PROCEDURE — 80048 BASIC METABOLIC PNL TOTAL CA: CPT | Performed by: INTERNAL MEDICINE

## 2022-01-09 PROCEDURE — 63700000 PHARM REV CODE 250 ALT 637 W/O HCPCS: Performed by: NURSE PRACTITIONER

## 2022-01-09 PROCEDURE — 82728 ASSAY OF FERRITIN: CPT | Performed by: NURSE PRACTITIONER

## 2022-01-09 PROCEDURE — 36415 COLL VENOUS BLD VENIPUNCTURE: CPT | Performed by: NURSE PRACTITIONER

## 2022-01-09 PROCEDURE — 86140 C-REACTIVE PROTEIN: CPT | Performed by: INTERNAL MEDICINE

## 2022-01-09 PROCEDURE — 94640 AIRWAY INHALATION TREATMENT: CPT

## 2022-01-09 PROCEDURE — 25000003 PHARM REV CODE 250: Performed by: NURSE PRACTITIONER

## 2022-01-09 PROCEDURE — 94799 UNLISTED PULMONARY SVC/PX: CPT

## 2022-01-09 PROCEDURE — 85025 COMPLETE CBC W/AUTO DIFF WBC: CPT | Performed by: INTERNAL MEDICINE

## 2022-01-09 PROCEDURE — 27000207 HC ISOLATION

## 2022-01-09 PROCEDURE — 27100171 HC OXYGEN HIGH FLOW UP TO 24 HOURS

## 2022-01-09 PROCEDURE — 94761 N-INVAS EAR/PLS OXIMETRY MLT: CPT

## 2022-01-09 PROCEDURE — 63600175 PHARM REV CODE 636 W HCPCS: Performed by: NURSE PRACTITIONER

## 2022-01-09 PROCEDURE — 25000003 PHARM REV CODE 250: Performed by: EMERGENCY MEDICINE

## 2022-01-09 PROCEDURE — 25000242 PHARM REV CODE 250 ALT 637 W/ HCPCS: Performed by: INTERNAL MEDICINE

## 2022-01-09 PROCEDURE — 83735 ASSAY OF MAGNESIUM: CPT | Performed by: INTERNAL MEDICINE

## 2022-01-09 PROCEDURE — 83615 LACTATE (LD) (LDH) ENZYME: CPT | Performed by: INTERNAL MEDICINE

## 2022-01-09 PROCEDURE — 20000000 HC ICU ROOM

## 2022-01-09 PROCEDURE — 99900035 HC TECH TIME PER 15 MIN (STAT)

## 2022-01-09 PROCEDURE — 99291 PR CRITICAL CARE, E/M 30-74 MINUTES: ICD-10-PCS | Mod: ,,, | Performed by: INTERNAL MEDICINE

## 2022-01-09 PROCEDURE — 63600175 PHARM REV CODE 636 W HCPCS: Performed by: EMERGENCY MEDICINE

## 2022-01-09 RX ORDER — INSULIN ASPART 100 [IU]/ML
18 INJECTION, SOLUTION INTRAVENOUS; SUBCUTANEOUS
Status: DISCONTINUED | OUTPATIENT
Start: 2022-01-09 | End: 2022-01-10

## 2022-01-09 RX ADMIN — LOSARTAN POTASSIUM 25 MG: 25 TABLET, FILM COATED ORAL at 08:01

## 2022-01-09 RX ADMIN — IPRATROPIUM BROMIDE AND ALBUTEROL 2 PUFF: 20; 100 SPRAY, METERED RESPIRATORY (INHALATION) at 08:01

## 2022-01-09 RX ADMIN — ATORVASTATIN CALCIUM 40 MG: 20 TABLET, FILM COATED ORAL at 08:01

## 2022-01-09 RX ADMIN — IPRATROPIUM BROMIDE AND ALBUTEROL 2 PUFF: 20; 100 SPRAY, METERED RESPIRATORY (INHALATION) at 12:01

## 2022-01-09 RX ADMIN — INSULIN ASPART 3 UNITS: 100 INJECTION, SOLUTION INTRAVENOUS; SUBCUTANEOUS at 09:01

## 2022-01-09 RX ADMIN — DEXAMETHASONE 6 MG: 4 TABLET ORAL at 08:01

## 2022-01-09 RX ADMIN — OXYCODONE HYDROCHLORIDE AND ACETAMINOPHEN 500 MG: 500 TABLET ORAL at 09:01

## 2022-01-09 RX ADMIN — MUPIROCIN: 20 OINTMENT TOPICAL at 08:01

## 2022-01-09 RX ADMIN — QUETIAPINE FUMARATE 200 MG: 200 TABLET ORAL at 09:01

## 2022-01-09 RX ADMIN — INSULIN ASPART 18 UNITS: 100 INJECTION, SOLUTION INTRAVENOUS; SUBCUTANEOUS at 04:01

## 2022-01-09 RX ADMIN — ASPIRIN 81 MG: 81 TABLET, DELAYED RELEASE ORAL at 08:01

## 2022-01-09 RX ADMIN — INSULIN ASPART 18 UNITS: 100 INJECTION, SOLUTION INTRAVENOUS; SUBCUTANEOUS at 11:01

## 2022-01-09 RX ADMIN — IPRATROPIUM BROMIDE AND ALBUTEROL 2 PUFF: 20; 100 SPRAY, METERED RESPIRATORY (INHALATION) at 07:01

## 2022-01-09 RX ADMIN — DOCUSATE SODIUM - SENNOSIDES 1 TABLET: 50; 8.6 TABLET, FILM COATED ORAL at 09:01

## 2022-01-09 RX ADMIN — ENOXAPARIN SODIUM 130 MG: 100 INJECTION SUBCUTANEOUS at 08:01

## 2022-01-09 RX ADMIN — INSULIN ASPART 15 UNITS: 100 INJECTION, SOLUTION INTRAVENOUS; SUBCUTANEOUS at 07:01

## 2022-01-09 RX ADMIN — OXYCODONE HYDROCHLORIDE AND ACETAMINOPHEN 500 MG: 500 TABLET ORAL at 08:01

## 2022-01-09 RX ADMIN — INSULIN ASPART 8 UNITS: 100 INJECTION, SOLUTION INTRAVENOUS; SUBCUTANEOUS at 08:01

## 2022-01-09 RX ADMIN — CEFTRIAXONE 1 G: 1 INJECTION, SOLUTION INTRAVENOUS at 09:01

## 2022-01-09 RX ADMIN — DOCUSATE SODIUM - SENNOSIDES 1 TABLET: 50; 8.6 TABLET, FILM COATED ORAL at 08:01

## 2022-01-09 RX ADMIN — IPRATROPIUM BROMIDE AND ALBUTEROL 2 PUFF: 20; 100 SPRAY, METERED RESPIRATORY (INHALATION) at 03:01

## 2022-01-09 RX ADMIN — ENOXAPARIN SODIUM 130 MG: 100 INJECTION SUBCUTANEOUS at 09:01

## 2022-01-09 RX ADMIN — REMDESIVIR 100 MG: 100 INJECTION, POWDER, LYOPHILIZED, FOR SOLUTION INTRAVENOUS at 09:01

## 2022-01-09 RX ADMIN — THERA TABS 1 TABLET: TAB at 08:01

## 2022-01-09 RX ADMIN — HYDROCHLOROTHIAZIDE 25 MG: 25 TABLET ORAL at 08:01

## 2022-01-09 RX ADMIN — INSULIN ASPART 8 UNITS: 100 INJECTION, SOLUTION INTRAVENOUS; SUBCUTANEOUS at 04:01

## 2022-01-09 RX ADMIN — LOSARTAN POTASSIUM 25 MG: 25 TABLET, FILM COATED ORAL at 09:01

## 2022-01-09 RX ADMIN — AZITHROMYCIN MONOHYDRATE 500 MG: 250 TABLET ORAL at 08:01

## 2022-01-09 RX ADMIN — INSULIN ASPART 10 UNITS: 100 INJECTION, SOLUTION INTRAVENOUS; SUBCUTANEOUS at 11:01

## 2022-01-09 NOTE — PLAN OF CARE
Pt was received at 1800 via stretcher on O2 on NRB mask. He is on vapotherm with the documented settings maintaining the SPO2 above 95%. He is AAOX4. VSS. He has diabetic foot bilaterally. Dressing is in the place, so unbale to see the wound. Urinal and call light within reach.

## 2022-01-09 NOTE — PLAN OF CARE
Patient in no apparent distress. MDI given Q 4. Patient received on vapotherm with settings as documented. FIO2 and flow weaned as tolerated . Will continue to monitor.

## 2022-01-09 NOTE — PROGRESS NOTES
[facility] Intensive Care [place] Hospital Medicine  Progress Note    Patient Name: Dave Good  MRN: [id number]  Patient Class: IP- Inpatient   Admission Date: 1/7/2022  Length of Stay: 2 days  Attending Physician: GITA Correa MD  Primary Care Provider: Reyna Jorge NP        Subjective:     Principal Problem:Pneumonia due to COVID-19 virus        HPI:  Mr. 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 He presents to the ED with complaints of shortness of breath, chest pain, wheezing, and decreased appetite; he reports the symptoms with onset approximately 2 weeks ago and have progressively worsened.  He was diagnosed with COVID-19 12/29/2021.  He denies fever, chills, nausea, vomiting, abdominal pain, lightheadedness, dizziness, syncope.  He lives with [facility] [place], is independent ADLs, uses no ambulatory aids.  He reports fiancee performed wound care and dressing changes yesterday.  Bilateral feet wrapped, C/D/I, and wearing bilateral surgical shoes. All past medical, social, and family history reviewed with patient; no pertinent family history identified at this time.    In the ED he is noted to be slightly hypertensive, heart rate ranging 100 to 120s, initially hypoxic on room air with saturations 77% to high 80s with improvement on nasal cannula to 92-96% however later transitioned to NRB before floor transfer, and afebrile.  CBC notes WBC 12, stable H&H, platelets 432.  Chemistry with normal renal function, sodium 134, potassium 4.2, albumin 2.4, glucose 196, and LFTs WNLs.  . BNP 34. .  . Troponin 0.016.  Lactic acid 2.2.  Procalcitonin 0.21. EKG with NSR and no acute dynamic changes.  Chest x-ray with bilateral airspace opacities  concerning for pneumonitis/multifocal pneumonia.  Blood cultures obtained.  He was given continuous nebulizers, azithromycin, ceftriaxone, dexamethasone, remdesivir and supplemental oxygen in the ED.  Ongoing COVID evaluation per protocol including ESR, ferritin, PT/INR, PTT, D-dimer, sputum culture, flu evaluation, and urinalysis pending. The patient was admitted to the Hospital Medicine Service for further evaluation and management.       Overview/Hospital Course:  No notes on file    Interval History: Decreasing HFNC requirements. Denies SOB at rest. No new concerns.    Review of Systems   Constitutional: Negative for chills and fever.   Respiratory: Negative for cough and shortness of breath.    Cardiovascular: Negative for chest pain and palpitations.   Gastrointestinal: Negative for abdominal pain, nausea and vomiting.     Objective:     Vital Signs (Most Recent):  Temp: 98.8 °F (37.1 °C) (01/09/22 1610)  Pulse: 81 (01/09/22 2015)  Resp: (!) 31 (01/09/22 2015)  BP: 139/73 (01/09/22 2001)  SpO2: (!) 92 % (01/09/22 2015) Vital Signs (24h Range):  Temp:  [97.9 °F (36.6 °C)-98.9 °F (37.2 °C)] 98.8 °F (37.1 °C)  Pulse:  [75-92] 81  Resp:  [16-39] 31  SpO2:  [87 %-98 %] 92 %  BP: (118-151)/(73-99) 139/73     Weight: 123 kg (271 lb 2.7 oz)  Body mass index is 34.82 kg/m².    Intake/Output Summary (Last 24 hours) at 1/9/2022 2101  Last data filed at 1/9/2022 1700  Gross per 24 hour   Intake 286.98 ml   Output 3300 ml   Net -3013.02 ml      Physical Exam  Vitals and nursing note reviewed.   Constitutional:       General: He is not in acute distress.     Appearance: He is well-developed.   HENT:      Head: Normocephalic and atraumatic.   Eyes:      General:         Right eye: No discharge.         Left eye: No discharge.      Conjunctiva/sclera: Conjunctivae normal.   Cardiovascular:      Rate and Rhythm: Normal rate.      Pulses: Normal pulses.   Pulmonary:      Effort: Pulmonary effort is normal. No respiratory  distress.      Breath sounds: Wheezing present.      Comments: On HFNC.  Abdominal:      Palpations: Abdomen is soft.      Tenderness: There is no abdominal tenderness.   Musculoskeletal:         General: Normal range of motion.      Right lower leg: No edema.      Left lower leg: No edema.   Skin:     General: Skin is warm and dry.   Neurological:      Mental Status: He is alert and oriented to person, place, and time.       Significant Labs:   CBC:  Recent Labs   Lab 01/07/22 2031 01/07/22 2031 01/08/22 0513 01/09/22 0449   WBC 12.20  --  9.24 15.34*   HGB 12.2*  --  11.8* 11.4*   HCT 38.2*  --  36.8* 35.9*     --  380 474*   GRAN 66.6  8.1*   < > 74.6*  6.9 72.4  11.1*   LYMPH 12.7*  1.6   < > 12.0*  1.1 12.1*  1.9   MONO 16.6*  2.0*   < > 11.5  1.1* 13.0  2.0*   EOS 0.3  --  0.0 0.0   BASO 0.02  --  0.01 0.02    < > = values in this interval not displayed.     CMP:  Recent Labs   Lab 01/07/22 2031 01/08/22 0513 01/09/22 0449   * 132* 134*   K 4.2 4.7 4.8   CL 98 100 100   CO2 23 20* 24   BUN 9 13 25*   CREATININE 0.9 1.0 1.0   * 341* 411*   CALCIUM 8.6* 8.2* 8.3*   MG  --  2.0 2.4   PHOS  --   --  2.5*   ALKPHOS 92 90  --    AST 25 24  --    ALT 26 24  --    BILITOT 0.7 0.4  --    PROT 7.8 7.6  --    ALBUMIN 2.4* 2.2*  --    ANIONGAP 13 12 10      Significant Imaging:   No new imaging this morning.      Assessment/Plan:      * Pneumonia due to COVID-19 virus  Acute respiratory failure due to COVID-19 PNA, COPD with asthma, former smoker  - COVID PNA with acute hypoxemic respiratory failure.  - Inflammatory markers significantly elevated. Trend.  - Continue remdesivir 100mg IV daily for 5 day total course, dexamethasone 6mg PO daily for 10 day total course.  - Continue supplemental O2, wean as tolerated.  - Continue albuterol 2 puff inhaled as q4hr PRN, ipratropium-albuterol 2 puff inhaled as q6hr wake.  - Continue azithromycin 500mg PO daily, ceftriaxone 1g IV q24hr for 5 day  total course.  - Continue enoxaparin 1mg/kg subQ BID.    Systolic congestive heart failure  - Chronic; appears euvolemic.  - Continue losartan, HCTZ as above.    Type II diabetes mellitus with peripheral circulatory disorder, uncontrolled  - Continue insulin detemir at 45 U subQ qHS, insulin aspart at 15 U TIDWM, convert to moderate-dose sliding scale insulin aspart 1-10U subQ TIDWM PRN.  - Monitor and adjust further as needed.    Mixed hyperlipidemia  - Continue atorvastatin 40 mg PO daily.    Depression  - Continue quetiapine 200 mg PO qHS.    Primary hypertension  - Continue losartan 25mg PO BID, HCTZ 25mg PO daily.    VTE Risk Mitigation (From admission, onward)         Ordered     enoxaparin injection 130 mg  2 times daily         01/07/22 0193                Discharge Planning   LOIS:      Code Status: Full Code   Is the patient medically ready for discharge?:     Reason for patient still in hospital (select all that apply): Treatment  Discharge Plan A: Home        Critical due to hypoxemic resp failure requiring HFNC.    Critical care time spent on the evaluation and treatment of severe organ dysfunction, review of pertinent labs and imaging studies, discussions with consulting providers and discussions with patient/family: 35 minutes.    VENANCIO Correa MD  Department of Hospital Medicine   [facility] Int[facility] ([place])

## 2022-01-10 LAB
FERRITIN SERPL-MCNC: 1033 NG/ML (ref 20–300)
POCT GLUCOSE: 281 MG/DL (ref 70–110)

## 2022-01-10 PROCEDURE — 25000003 PHARM REV CODE 250: Performed by: NURSE PRACTITIONER

## 2022-01-10 PROCEDURE — 94761 N-INVAS EAR/PLS OXIMETRY MLT: CPT

## 2022-01-10 PROCEDURE — 63600175 PHARM REV CODE 636 W HCPCS: Performed by: INTERNAL MEDICINE

## 2022-01-10 PROCEDURE — 86704 HEP B CORE ANTIBODY TOTAL: CPT | Performed by: INTERNAL MEDICINE

## 2022-01-10 PROCEDURE — 87340 HEPATITIS B SURFACE AG IA: CPT | Performed by: INTERNAL MEDICINE

## 2022-01-10 PROCEDURE — 63600175 PHARM REV CODE 636 W HCPCS: Performed by: NURSE PRACTITIONER

## 2022-01-10 PROCEDURE — 25000242 PHARM REV CODE 250 ALT 637 W/ HCPCS: Performed by: INTERNAL MEDICINE

## 2022-01-10 PROCEDURE — 99291 PR CRITICAL CARE, E/M 30-74 MINUTES: ICD-10-PCS | Mod: ,,, | Performed by: INTERNAL MEDICINE

## 2022-01-10 PROCEDURE — 86480 TB TEST CELL IMMUN MEASURE: CPT | Performed by: INTERNAL MEDICINE

## 2022-01-10 PROCEDURE — 25000242 PHARM REV CODE 250 ALT 637 W/ HCPCS: Performed by: NURSE PRACTITIONER

## 2022-01-10 PROCEDURE — 94640 AIRWAY INHALATION TREATMENT: CPT

## 2022-01-10 PROCEDURE — 63600175 PHARM REV CODE 636 W HCPCS: Performed by: EMERGENCY MEDICINE

## 2022-01-10 PROCEDURE — 36415 COLL VENOUS BLD VENIPUNCTURE: CPT | Performed by: INTERNAL MEDICINE

## 2022-01-10 PROCEDURE — 20000000 HC ICU ROOM

## 2022-01-10 PROCEDURE — 99900035 HC TECH TIME PER 15 MIN (STAT)

## 2022-01-10 PROCEDURE — 27000207 HC ISOLATION

## 2022-01-10 PROCEDURE — 63700000 PHARM REV CODE 250 ALT 637 W/O HCPCS: Performed by: NURSE PRACTITIONER

## 2022-01-10 PROCEDURE — C9399 UNCLASSIFIED DRUGS OR BIOLOG: HCPCS | Performed by: INTERNAL MEDICINE

## 2022-01-10 PROCEDURE — 25000003 PHARM REV CODE 250: Performed by: EMERGENCY MEDICINE

## 2022-01-10 PROCEDURE — 99291 CRITICAL CARE FIRST HOUR: CPT | Mod: ,,, | Performed by: INTERNAL MEDICINE

## 2022-01-10 PROCEDURE — 94799 UNLISTED PULMONARY SVC/PX: CPT

## 2022-01-10 RX ORDER — INSULIN ASPART 100 [IU]/ML
22 INJECTION, SOLUTION INTRAVENOUS; SUBCUTANEOUS
Status: DISCONTINUED | OUTPATIENT
Start: 2022-01-10 | End: 2022-01-10

## 2022-01-10 RX ORDER — INSULIN ASPART 100 [IU]/ML
22 INJECTION, SOLUTION INTRAVENOUS; SUBCUTANEOUS
Status: DISCONTINUED | OUTPATIENT
Start: 2022-01-10 | End: 2022-01-11

## 2022-01-10 RX ADMIN — REMDESIVIR 100 MG: 100 INJECTION, POWDER, LYOPHILIZED, FOR SOLUTION INTRAVENOUS at 08:01

## 2022-01-10 RX ADMIN — ENOXAPARIN SODIUM 130 MG: 100 INJECTION SUBCUTANEOUS at 08:01

## 2022-01-10 RX ADMIN — INSULIN ASPART 22 UNITS: 100 INJECTION, SOLUTION INTRAVENOUS; SUBCUTANEOUS at 11:01

## 2022-01-10 RX ADMIN — AZITHROMYCIN MONOHYDRATE 500 MG: 250 TABLET ORAL at 08:01

## 2022-01-10 RX ADMIN — IPRATROPIUM BROMIDE AND ALBUTEROL 2 PUFF: 20; 100 SPRAY, METERED RESPIRATORY (INHALATION) at 07:01

## 2022-01-10 RX ADMIN — CEFTRIAXONE 1 G: 1 INJECTION, SOLUTION INTRAVENOUS at 10:01

## 2022-01-10 RX ADMIN — ASPIRIN 81 MG: 81 TABLET, DELAYED RELEASE ORAL at 08:01

## 2022-01-10 RX ADMIN — OXYCODONE HYDROCHLORIDE AND ACETAMINOPHEN 500 MG: 500 TABLET ORAL at 08:01

## 2022-01-10 RX ADMIN — LOSARTAN POTASSIUM 25 MG: 25 TABLET, FILM COATED ORAL at 09:01

## 2022-01-10 RX ADMIN — INSULIN ASPART 6 UNITS: 100 INJECTION, SOLUTION INTRAVENOUS; SUBCUTANEOUS at 08:01

## 2022-01-10 RX ADMIN — MUPIROCIN: 20 OINTMENT TOPICAL at 08:01

## 2022-01-10 RX ADMIN — DEXAMETHASONE 6 MG: 4 TABLET ORAL at 08:01

## 2022-01-10 RX ADMIN — LOSARTAN POTASSIUM 25 MG: 25 TABLET, FILM COATED ORAL at 08:01

## 2022-01-10 RX ADMIN — QUETIAPINE FUMARATE 200 MG: 200 TABLET ORAL at 09:01

## 2022-01-10 RX ADMIN — ATORVASTATIN CALCIUM 40 MG: 20 TABLET, FILM COATED ORAL at 08:01

## 2022-01-10 RX ADMIN — THERA TABS 1 TABLET: TAB at 08:01

## 2022-01-10 RX ADMIN — IPRATROPIUM BROMIDE AND ALBUTEROL 2 PUFF: 20; 100 SPRAY, METERED RESPIRATORY (INHALATION) at 01:01

## 2022-01-10 RX ADMIN — HYDROCHLOROTHIAZIDE 25 MG: 25 TABLET ORAL at 08:01

## 2022-01-10 RX ADMIN — BARICITINIB 4 MG: 2 TABLET, FILM COATED ORAL at 08:01

## 2022-01-10 RX ADMIN — INSULIN ASPART 6 UNITS: 100 INJECTION, SOLUTION INTRAVENOUS; SUBCUTANEOUS at 04:01

## 2022-01-10 RX ADMIN — MUPIROCIN: 20 OINTMENT TOPICAL at 09:01

## 2022-01-10 RX ADMIN — DOCUSATE SODIUM - SENNOSIDES 1 TABLET: 50; 8.6 TABLET, FILM COATED ORAL at 08:01

## 2022-01-10 RX ADMIN — INSULIN ASPART 4 UNITS: 100 INJECTION, SOLUTION INTRAVENOUS; SUBCUTANEOUS at 09:01

## 2022-01-10 RX ADMIN — INSULIN ASPART 22 UNITS: 100 INJECTION, SOLUTION INTRAVENOUS; SUBCUTANEOUS at 08:01

## 2022-01-10 RX ADMIN — DOCUSATE SODIUM - SENNOSIDES 1 TABLET: 50; 8.6 TABLET, FILM COATED ORAL at 09:01

## 2022-01-10 RX ADMIN — INSULIN ASPART 22 UNITS: 100 INJECTION, SOLUTION INTRAVENOUS; SUBCUTANEOUS at 04:01

## 2022-01-10 RX ADMIN — INSULIN ASPART 4 UNITS: 100 INJECTION, SOLUTION INTRAVENOUS; SUBCUTANEOUS at 11:01

## 2022-01-10 RX ADMIN — OXYCODONE HYDROCHLORIDE AND ACETAMINOPHEN 500 MG: 500 TABLET ORAL at 09:01

## 2022-01-10 RX ADMIN — ENOXAPARIN SODIUM 130 MG: 100 INJECTION SUBCUTANEOUS at 09:01

## 2022-01-10 NOTE — PROGRESS NOTES
[facility] Intensive Care [facility])  Wound Care    Patient Name:  Dave Good   MRN:  [id number]  Date: 1/10/2022  Diagnosis: Pneumonia due to COVID-19 virus    History:     Past Medical History:   Diagnosis Date    COPD (chronic obstructive pulmonary disease)     COVID-19     Depression     Diabetes mellitus     Diabetes mellitus, type 2     Hypertension        Social History     Socioeconomic History    Marital status: Single   Tobacco Use    Smoking status: Former Smoker    Smokeless tobacco: Never Used   Substance and Sexual Activity    Alcohol use: Yes     Comment: whiskey and beer every other day    Drug use: Not Currently    Sexual activity: Yes     Partners: Female       Precautions:     Allergies as of 01/07/2022 - Reviewed 01/07/2022   Allergen Reaction Noted    Ibuprofen Swelling 07/29/2014   56 YOM with PMHx of COPD with asthma, former smoker (quit 16 years ago, however lives with a current smoker), occasional marijuana user, HFrEF (EF 45-50%), HTN, DM type II uncontrolled, history of diabetic foot 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 He presents to the ED with complaints of shortness of breath, chest pain, wheezing, and decreased appetite; he reports the symptoms with onset approximately 2 weeks ago and have progressively worsened.  He was diagnosed with COVID-19 12/29/2021    [facility] Assessment Details/Treatment     Wound care consulted to see for right foot incision , right 2nd plantar toe and left plantar foot ulcer.   Pt is under care of Dr Toussaint who performed the amputation and Dr Stover his wound care MD.  Wound care contacted [facility] wound care to follow current course of treatment.  Wounds cleaned with wound cleanser, Hydrafera blue antimicrobial foam applied and secured with rolled gauze and flexinet.  A small amount of tan purulence expressed from right 2nd toe under the surrounding callus.  Incision is well approximated. The sarah wound skin is  darker in pigmentation, no s/s of infection noted. Left plantar foot ulcer is black without fluctuance or exudate.      01/10/22 1600   Pain/Comfort/Sleep   Preferred Pain Scale number (Numeric Rating Pain Scale)   Comfort/Acceptable Pain Level 0   Pain Rating (0-10): Rest 0   Pain Rating (0-10): Activity 0   Coping/Psychosocial   Observed Emotional State accepting;calm;cooperative   Verbalized Emotional State acceptance   Involvement in Care   Family/Support Persons significant other   HEENT   HEENT WDL ex   Vision Aid glasses at bedside   Mouth/Teeth WDL   Mouth/Teeth WDL ex   Teeth Symptoms tooth/teeth missing   Cognitive   Cognitive/Neuro/Behavioral WDL WDL;arousability;mood/behavior;orientation   Level of Consciousness (AVPU) alert   Arousal Level opens eyes spontaneously   Orientation oriented x 4   Mood/Behavior calm;cooperative   Pupils   Pupil PERRLA yes   Addison Coma Scale   Best Eye Response 4-->(E4) spontaneous   Best Motor Response 6-->(M6) obeys commands   Best Verbal Response 5-->(V5) oriented   Addison Coma Scale Score 15   Motor Response   General Motor Response purposeful movement/localizing;purposeful motor response   Hand /Ankle Strength   Hand , Left strong   Hand , Right strong   Dorsiflexion, Left strong   Dorsiflexion, Right strong   Plantarflexion, Left strong   Plantarflexion, Right strong   Respiratory   Respiratory WDL ex   Rhythm/Pattern, Respiratory tachypneic;unlabored;shallow   Expansion/Accessory Muscles/Retractions expansion symmetric;no retractions;no use of accessory muscles   Cough Frequency infrequent   Cough Type productive   Breath Sounds   Breath Sounds All Fields;ZEB;LLL;RUL;RML;RLL   All Lung Fields Breath Sounds stridor, expiratory;crackles, coarse;diminished   Oxygen Therapy   Flow (L/min) 15   Oxygen Concentration (%) 40   Cardiac   Cardiac WDL ex;rhythm   Cardiac Rhythm tachycardic;apical pulse regular;radial pulse regular   ECG   Lead Monitored Lead II;V1    Rhythm normal sinus rhythm;sinus tachycardia;conduction defect   Conduction Defect first degree AV block   Peripheral Neurovascular   Peripheral Neurovascular WDL WDL   VTE Required Core Measure Pharmacological prophylaxis initiated/maintained   Edema dependent   Pulse Radial   Right Radial Pulse 2+ (normal)   Left Radial Pulse 2+ (normal)   Edema   Ankle, Left Edema 1+ (Trace)   Leg, Right Edema 1+ (Trace)   Foot, Left Edema 1+ (Trace)   Leg, Left Edema 1+ (Trace)   Ankle, Right Edema 1+ (Trace)   Foot, Right Edema 1+ (Trace)   Dependent Edema 1+ (Trace)        Peripheral IV - Single Lumen 01/07/22 2015 20 G Right Hand   Placement Date/Time: 01/07/22 2015   Present Prior to Hospital Arrival?: No  Size/Length: 20 G  Orientation: Right  Location: Hand  Site Prep: Chlorhexidine   Inserted by: RN  Insertion attempts (enter comment if more than 2 attempts): 1  Patient Tole...   Site Assessment Clean;Dry;Intact;No redness;No swelling   Extremity Assessment Distal to IV No abnormal discoloration;No redness;No swelling;No warmth   Line Status Saline locked   Dressing Status Clean;Dry;Intact   Dressing Intervention Integrity maintained   Dressing Change Due 01/11/22   Reason Not Rotated Not due        Peripheral IV - Single Lumen 01/07/22 2030 18 G Right Wrist   Placement Date/Time: 01/07/22 2030   Present Prior to Hospital Arrival?: No  Size/Length: 18 G  Orientation: Right  Location: Wrist  Site Prep: Chlorhexidine   Inserted by: RN  Insertion attempts (enter comment if more than 2 attempts): 1  Patient Herb...   Site Assessment Clean;Dry;Intact   Extremity Assessment Distal to IV No abnormal discoloration;No redness;No swelling;No warmth   Line Status Infusing   Dressing Status Clean;Intact   Dressing Intervention Integrity maintained   Dressing Change Due 01/11/22   Reason Not Rotated Not due   Gastrointestinal   GI WDL WDL;appearance/characteristics;bowel sounds   Abdominal Appearance rounded;obese;nondistended    Bowel Sounds All Quadrants   All Quadrants Bowel Sounds audible and normoactive;audible and active in all quadrants   GI Signs/Symptoms no gastrointestinal signs/symptoms;passing flatus   Last Bowel Movement 01/07/22   Genitourinary   Genitourinary WDL WDL   Voiding Characteristics voids spontaneously without difficulty   Urine Characteristics clear yellow   Skin   Skin WDL ex   Skin Color/Characteristics without discoloration   Skin Temperature warm   Skin Moisture dry   Skin Elasticity slow return to original state   Skin Integrity wound        Incision/Site 10/21/21 1300 Right Foot medial   Date First Assessed/Time First Assessed: 10/21/21 1300   Side: Right  Location: Foot  Orientation: medial   Wound Image    Incision WDL ex   Dressing Appearance Clean;Dry;Intact   Drainage Amount None   Drainage Characteristics/Odor No odor   Appearance Intact  (Incision approximated, with dark discoloration)   Periwound Area Intact   Care Cleansed with:;Wound cleanser   Dressing Applied;Foam;Gauze;Rolled gauze  (Hydrafera Blue foam and net wrap)        Wound 11/29/21 0240 Non pressure chronic ulcer Right Toe, second #3   Date First Assessed/Time First Assessed: 11/29/21 0240   Primary Wound Type: Non pressure chronic ulcer  Side: Right  Location: Toe, second  Wound Number: #3   Wound Image     Wound WDL ex   Dressing Appearance Clean;Dry;Intact   Drainage Amount Scant   Drainage Characteristics/Odor Purulent;Tan;No odor  (expressed purulence)   Appearance Pink;Moist  (sarah wound callus)   Tissue loss description Full thickness   Red (%), Wound Tissue Color 100 %  (pink)   Periwound Area Intact  (callused)   Wound Edges Callused;Open   Wound Length (cm) 0.3 cm   Wound Width (cm) 0.3 cm   Wound Depth (cm) 0.1 cm   Wound Volume (cm^3) 0.009 cm^3   Wound Surface Area (cm^2) 0.09 cm^2   Care Cleansed with:;Wound cleanser   Dressing Applied;Foam;Rolled gauze  (Hydrafera blue foam, flexinet)        Wound 10/21/21 1300 Other  (comment) Left plantar Foot   Date First Assessed/Time First Assessed: 10/21/21 1300   Primary Wound Type: Other (comment)  Side: Left  Orientation: plantar  Location: Foot   Wound Image    Wound WDL ex   Dressing Appearance Clean;Dry;Intact   Drainage Amount None   Drainage Characteristics/Odor No odor   Appearance Dry;Black;Eschar   Tissue loss description Full thickness   Black (%), Wound Tissue Color 100 %   Periwound Area Intact   Wound Edges Open   Wound Length (cm) 2 cm   Wound Width (cm) 2 cm   Wound Depth (cm) 0.1 cm   Wound Volume (cm^3) 0.4 cm^3   Wound Surface Area (cm^2) 4 cm^2   Care Cleansed with:;Wound cleanser   Dressing Gauze;Rolled gauze  (flexinet)   Musculoskeletal   Musculoskeletal WDL ex   General Mobility generalized weakness   Left Joint Tenderness foot;tenderness   Right Joint Tenderness foot;tenderness   Left Joint Swelling ankle;foot;swelling   Right Joint Swelling ankle;foot;swelling   Nutrition   Nutrition Risk Screen no indicators present   Safety   Safety WDL WDL   Safety Factors side rails raised x 3;bed in low position;wheels locked;call light in reach;ID band on   Safety Management   Safety Promotion/Fall Prevention assistive device/personal item within reach;Fall Risk reviewed with patient/family;Fall Risk signage in place;high risk medications identified;lighting adjusted;medications reviewed;pulse ox;instructed to call staff for mobility;bed alarm set   Medication Review/Management medications reviewed;high-risk medications identified   Patient Rounds bed in low position;bed wheels locked;call light in patient/parent reach;clutter free environment maintained;ID band on;placement of personal items at bedside;toileting offered;visualized patient   Safety Bands on Patient Fall Risk Band;Allergy Band   Daily Care   Activity Management Rolling - L1   Activity Assistance Provided independent   Symptoms Noted During/After Activity none     01/10/2022

## 2022-01-10 NOTE — PLAN OF CARE
Patient in no apparent distress. Patient received on vapotherm with settings as documented. Flow and Fio2 adjusted as needed. MDI given Q 6 wa . Will continue to monitor.

## 2022-01-10 NOTE — PROGRESS NOTES
[facility] Intensive Care [facility] Medicine  Progress Note    Patient Name: Dave Good  MRN: [id number]  Patient Class: IP- Inpatient   Admission Date: 1/7/2022  Length of Stay: 3 days  Attending Physician: GITA Correa MD  Primary Care Provider: Reyna Jorge NP        Subjective:     Principal Problem:Pneumonia due to COVID-19 virus        HPI:  Mr. 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 He presents to the ED with complaints of shortness of breath, chest pain, wheezing, and decreased appetite; he reports the symptoms with onset approximately 2 weeks ago and have progressively worsened.  He was diagnosed with COVID-19 12/29/2021.  He denies fever, chills, nausea, vomiting, abdominal pain, lightheadedness, dizziness, syncope.  He lives with [facility], is independent ADLs, uses no ambulatory aids.  He reports fiancee performed wound care and dressing changes yesterday.  Bilateral feet wrapped, C/D/I, and wearing bilateral surgical shoes. All past medical, social, and family history reviewed with patient; no pertinent family history identified at this time.    In the ED he is noted to be slightly hypertensive, heart rate ranging 100 to 120s, initially hypoxic on room air with saturations 77% to high 80s with improvement on nasal cannula to 92-96% however later transitioned to NRB before floor transfer, and afebrile.  CBC notes WBC 12, stable H&H, platelets 432.  Chemistry with normal renal function, sodium 134, potassium 4.2, albumin 2.4, glucose 196, and LFTs WNLs.  . BNP 34. .  . Troponin 0.016.  Lactic acid 2.2.  Procalcitonin 0.21. EKG with NSR and no acute dynamic changes.  Chest x-ray with bilateral airspace opacities  concerning for pneumonitis/multifocal pneumonia.  Blood cultures obtained.  He was given continuous nebulizers, azithromycin, ceftriaxone, dexamethasone, remdesivir and supplemental oxygen in the ED.  Ongoing COVID evaluation per protocol including ESR, ferritin, PT/INR, PTT, D-dimer, sputum culture, flu evaluation, and urinalysis pending. The patient was admitted to the Hospital Medicine Service for further evaluation and management.       Overview/Hospital Course:  Admitted with COVID PNA. Had increased O2 requirements and transferred to ICU. Had some improvement with remdesivir and dexamethasone but with persistent HFNC requirements started on baricitinib.      Interval History: Mildly improved HFNC requirements. Denies SOB at rest. No new concerns.    Review of Systems   Constitutional: Negative for chills and fever.   Respiratory: Negative for cough and shortness of breath.    Cardiovascular: Negative for chest pain and palpitations.   Gastrointestinal: Negative for abdominal pain, nausea and vomiting.     Objective:     Vital Signs (Most Recent):  Temp: 98.6 °F (37 °C) (01/10/22 1901)  Pulse: 93 (01/10/22 2201)  Resp: (!) 37 (01/10/22 2201)  BP: 136/87 (01/10/22 2201)  SpO2: (!) 89 % (01/10/22 2201) Vital Signs (24h Range):  Temp:  [98 °F (36.7 °C)-98.9 °F (37.2 °C)] 98.6 °F (37 °C)  Pulse:  [] 93  Resp:  [25-43] 37  SpO2:  [88 %-97 %] 89 %  BP: (117-164)/(78-98) 136/87     Weight: 123 kg (271 lb 2.7 oz)  Body mass index is 34.82 kg/m².    Intake/Output Summary (Last 24 hours) at 1/10/2022 2311  Last data filed at 1/10/2022 2200  Gross per 24 hour   Intake 1996.8 ml   Output 1850 ml   Net 146.8 ml      Physical Exam  Vitals and nursing note reviewed.   Constitutional:       General: He is not in acute distress.     Appearance: He is well-developed.   HENT:      Head: Normocephalic and atraumatic.   Eyes:      General:         Right eye: No discharge.         Left eye: No discharge.       Conjunctiva/sclera: Conjunctivae normal.   Cardiovascular:      Rate and Rhythm: Normal rate.      Pulses: Normal pulses.   Pulmonary:      Effort: Pulmonary effort is normal. No respiratory distress.      Breath sounds: Wheezing present.      Comments: On HFNC.  Abdominal:      Palpations: Abdomen is soft.      Tenderness: There is no abdominal tenderness.   Musculoskeletal:         General: Normal range of motion.      Right lower leg: No edema.      Left lower leg: No edema.   Skin:     General: Skin is warm and dry.   Neurological:      Mental Status: He is alert and oriented to person, place, and time.       Significant Labs:   CBC:  Recent Labs   Lab 01/08/22  0513 01/09/22  0449   WBC 9.24 15.34*   HGB 11.8* 11.4*   HCT 36.8* 35.9*    474*   GRAN 74.6*  6.9 72.4  11.1*   LYMPH 12.0*  1.1 12.1*  1.9   MONO 11.5  1.1* 13.0  2.0*   EOS 0.0 0.0   BASO 0.01 0.02     CMP:  Recent Labs   Lab 01/08/22  0513 01/09/22  0449   * 134*   K 4.7 4.8    100   CO2 20* 24   BUN 13 25*   CREATININE 1.0 1.0   * 411*   CALCIUM 8.2* 8.3*   MG 2.0 2.4   PHOS  --  2.5*   ALKPHOS 90  --    AST 24  --    ALT 24  --    BILITOT 0.4  --    PROT 7.6  --    ALBUMIN 2.2*  --    ANIONGAP 12 10      Significant Imaging:   No new imaging this morning.      Assessment/Plan:      * Pneumonia due to COVID-19 virus  Acute respiratory failure due to COVID-19 PNA, COPD with asthma, former smoker  - COVID PNA with acute hypoxemic respiratory failure.  - Inflammatory markers significantly elevated. Trend.  - Continue remdesivir 100mg IV daily for 5 day total course, dexamethasone 6mg PO daily for 10 day total course. Start baricitinib 4mg PO daily for 14 day total course.  - Continue supplemental O2, wean as tolerated.  - Continue albuterol 2 puff inhaled q4hr PRN, ipratropium-albuterol 2 puff inhaled q6hr wake.  - Continue azithromycin 500mg PO daily, ceftriaxone 1g IV q24hr for 5 day total course.  - Continue  enoxaparin 1mg/kg subQ BID.    Systolic congestive heart failure  - Chronic; appears euvolemic.  - Continue losartan, HCTZ as above.    Type II diabetes mellitus with peripheral circulatory disorder, uncontrolled  - Continue insulin detemir at 45 U subQ qHS, insulin aspart at 15 U TIDWM, convert to moderate-dose sliding scale insulin aspart 1-10U subQ TIDWM PRN.  - Monitor and adjust further as needed.    Mixed hyperlipidemia  - Continue atorvastatin 40 mg PO daily.    Depression  - Continue quetiapine 200 mg PO qHS.    Primary hypertension  - Continue losartan 25mg PO BID, HCTZ 25mg PO daily.    VTE Risk Mitigation (From admission, onward)         Ordered     enoxaparin injection 130 mg  2 times daily         01/07/22 1759                Discharge Planning   LOIS:      Code Status: Full Code   Is the patient medically ready for discharge?:     Reason for patient still in hospital (select all that apply): Treatment  Discharge Plan A: Home        Critical due to resp failure.    Critical care time spent on the evaluation and treatment of severe organ dysfunction, review of pertinent labs and imaging studies, discussions with consulting providers and discussions with patient/family: 35 minutes.     VENANCIO Correa MD  Department of Hospital Medicine   [facility] Intensive Care [facility])

## 2022-01-10 NOTE — PLAN OF CARE
Patient is AAO x4 . VSS. He is on vapotherm with documented settings. He did not have any specific complaint throughout the shift. His BG is above 300. Urinal and call light within reach. Will continue to monitor.

## 2022-01-10 NOTE — SUBJECTIVE & OBJECTIVE
Interval History: Decreasing HFNC requirements. Denies SOB at rest. No new concerns.    Review of Systems   Constitutional: Negative for chills and fever.   Respiratory: Negative for cough and shortness of breath.    Cardiovascular: Negative for chest pain and palpitations.   Gastrointestinal: Negative for abdominal pain, nausea and vomiting.     Objective:     Vital Signs (Most Recent):  Temp: 98.8 °F (37.1 °C) (01/09/22 1610)  Pulse: 81 (01/09/22 2015)  Resp: (!) 31 (01/09/22 2015)  BP: 139/73 (01/09/22 2001)  SpO2: (!) 92 % (01/09/22 2015) Vital Signs (24h Range):  Temp:  [97.9 °F (36.6 °C)-98.9 °F (37.2 °C)] 98.8 °F (37.1 °C)  Pulse:  [75-92] 81  Resp:  [16-39] 31  SpO2:  [87 %-98 %] 92 %  BP: (118-151)/(73-99) 139/73     Weight: 123 kg (271 lb 2.7 oz)  Body mass index is 34.82 kg/m².    Intake/Output Summary (Last 24 hours) at 1/9/2022 2101  Last data filed at 1/9/2022 1700  Gross per 24 hour   Intake 286.98 ml   Output 3300 ml   Net -3013.02 ml      Physical Exam  Vitals and nursing note reviewed.   Constitutional:       General: He is not in acute distress.     Appearance: He is well-developed.   HENT:      Head: Normocephalic and atraumatic.   Eyes:      General:         Right eye: No discharge.         Left eye: No discharge.      Conjunctiva/sclera: Conjunctivae normal.   Cardiovascular:      Rate and Rhythm: Normal rate.      Pulses: Normal pulses.   Pulmonary:      Effort: Pulmonary effort is normal. No respiratory distress.      Breath sounds: Wheezing present.      Comments: On HFNC.  Abdominal:      Palpations: Abdomen is soft.      Tenderness: There is no abdominal tenderness.   Musculoskeletal:         General: Normal range of motion.      Right lower leg: No edema.      Left lower leg: No edema.   Skin:     General: Skin is warm and dry.   Neurological:      Mental Status: He is alert and oriented to person, place, and time.       Significant Labs:   CBC:  Recent Labs   Lab 01/07/22 2031  01/07/22 2031 01/08/22 0513 01/09/22 0449   WBC 12.20  --  9.24 15.34*   HGB 12.2*  --  11.8* 11.4*   HCT 38.2*  --  36.8* 35.9*     --  380 474*   GRAN 66.6  8.1*   < > 74.6*  6.9 72.4  11.1*   LYMPH 12.7*  1.6   < > 12.0*  1.1 12.1*  1.9   MONO 16.6*  2.0*   < > 11.5  1.1* 13.0  2.0*   EOS 0.3  --  0.0 0.0   BASO 0.02  --  0.01 0.02    < > = values in this interval not displayed.     CMP:  Recent Labs   Lab 01/07/22 2031 01/08/22 0513 01/09/22 0449   * 132* 134*   K 4.2 4.7 4.8   CL 98 100 100   CO2 23 20* 24   BUN 9 13 25*   CREATININE 0.9 1.0 1.0   * 341* 411*   CALCIUM 8.6* 8.2* 8.3*   MG  --  2.0 2.4   PHOS  --   --  2.5*   ALKPHOS 92 90  --    AST 25 24  --    ALT 26 24  --    BILITOT 0.7 0.4  --    PROT 7.8 7.6  --    ALBUMIN 2.4* 2.2*  --    ANIONGAP 13 12 10      Significant Imaging:   No new imaging this morning.

## 2022-01-10 NOTE — NURSING
Assisted up to bedside chair. Deep breathing exercises completed. No SOB/GUNN noted with increased movement. Current spo2 is stable on 20L/60%. Safety maintained. Verbalized understanding to call when ready to get back in bed.

## 2022-01-11 PROBLEM — Z87.891 FORMER SMOKER: Status: RESOLVED | Noted: 2022-01-07 | Resolved: 2022-01-11

## 2022-01-11 LAB
ALBUMIN SERPL BCP-MCNC: 2.2 G/DL (ref 3.5–5.2)
ALP SERPL-CCNC: 91 U/L (ref 55–135)
ALT SERPL W/O P-5'-P-CCNC: 42 U/L (ref 10–44)
ANION GAP SERPL CALC-SCNC: 11 MMOL/L (ref 8–16)
AST SERPL-CCNC: 27 U/L (ref 10–40)
BASOPHILS # BLD AUTO: 0.04 K/UL (ref 0–0.2)
BASOPHILS NFR BLD: 0.3 % (ref 0–1.9)
BILIRUB SERPL-MCNC: 0.3 MG/DL (ref 0.1–1)
BUN SERPL-MCNC: 20 MG/DL (ref 6–20)
CALCIUM SERPL-MCNC: 8.6 MG/DL (ref 8.7–10.5)
CHLORIDE SERPL-SCNC: 99 MMOL/L (ref 95–110)
CO2 SERPL-SCNC: 23 MMOL/L (ref 23–29)
CREAT SERPL-MCNC: 0.9 MG/DL (ref 0.5–1.4)
CRP SERPL-MCNC: 142.1 MG/L (ref 0–8.2)
DIFFERENTIAL METHOD: ABNORMAL
EOSINOPHIL # BLD AUTO: 0.2 K/UL (ref 0–0.5)
EOSINOPHIL NFR BLD: 1.2 % (ref 0–8)
ERYTHROCYTE [DISTWIDTH] IN BLOOD BY AUTOMATED COUNT: 17.8 % (ref 11.5–14.5)
EST. GFR  (AFRICAN AMERICAN): >60 ML/MIN/1.73 M^2
EST. GFR  (NON AFRICAN AMERICAN): >60 ML/MIN/1.73 M^2
GAMMA INTERFERON BACKGROUND BLD IA-ACNC: 0.03 IU/ML
GLUCOSE SERPL-MCNC: 440 MG/DL (ref 70–110)
HBV CORE AB SERPL QL IA: NEGATIVE
HBV SURFACE AG SERPL QL IA: NEGATIVE
HCT VFR BLD AUTO: 36.4 % (ref 40–54)
HGB BLD-MCNC: 11.9 G/DL (ref 14–18)
IMM GRANULOCYTES # BLD AUTO: 0.52 K/UL (ref 0–0.04)
IMM GRANULOCYTES NFR BLD AUTO: 3.8 % (ref 0–0.5)
LDH SERPL L TO P-CCNC: 371 U/L (ref 110–260)
LYMPHOCYTES # BLD AUTO: 2.5 K/UL (ref 1–4.8)
LYMPHOCYTES NFR BLD: 18 % (ref 18–48)
M TB IFN-G CD4+ BCKGRND COR BLD-ACNC: -0.01 IU/ML
MCH RBC QN AUTO: 29 PG (ref 27–31)
MCHC RBC AUTO-ENTMCNC: 32.7 G/DL (ref 32–36)
MCV RBC AUTO: 89 FL (ref 82–98)
MITOGEN IGNF BCKGRD COR BLD-ACNC: -0.01 IU/ML
MONOCYTES # BLD AUTO: 1.6 K/UL (ref 0.3–1)
MONOCYTES NFR BLD: 11.4 % (ref 4–15)
NEUTROPHILS # BLD AUTO: 9 K/UL (ref 1.8–7.7)
NEUTROPHILS NFR BLD: 65.3 % (ref 38–73)
NRBC BLD-RTO: 0 /100 WBC
PLATELET # BLD AUTO: 624 K/UL (ref 150–450)
PMV BLD AUTO: 10 FL (ref 9.2–12.9)
POCT GLUCOSE: 207 MG/DL (ref 70–110)
POCT GLUCOSE: 260 MG/DL (ref 70–110)
POCT GLUCOSE: 267 MG/DL (ref 70–110)
POCT GLUCOSE: 309 MG/DL (ref 70–110)
POCT GLUCOSE: 334 MG/DL (ref 70–110)
POCT GLUCOSE: 352 MG/DL (ref 70–110)
POCT GLUCOSE: 495 MG/DL (ref 70–110)
POTASSIUM SERPL-SCNC: 4.9 MMOL/L (ref 3.5–5.1)
PROT SERPL-MCNC: 7.5 G/DL (ref 6–8.4)
RBC # BLD AUTO: 4.11 M/UL (ref 4.6–6.2)
SODIUM SERPL-SCNC: 133 MMOL/L (ref 136–145)
TB GOLD PLUS: ABNORMAL
TB2 - NIL: -0.01 IU/ML
WBC # BLD AUTO: 13.77 K/UL (ref 3.9–12.7)

## 2022-01-11 PROCEDURE — 27000207 HC ISOLATION

## 2022-01-11 PROCEDURE — 94761 N-INVAS EAR/PLS OXIMETRY MLT: CPT

## 2022-01-11 PROCEDURE — 63600175 PHARM REV CODE 636 W HCPCS: Performed by: INTERNAL MEDICINE

## 2022-01-11 PROCEDURE — 63600175 PHARM REV CODE 636 W HCPCS: Performed by: NURSE PRACTITIONER

## 2022-01-11 PROCEDURE — 99232 PR SUBSEQUENT HOSPITAL CARE,LEVL II: ICD-10-PCS | Mod: CR,,, | Performed by: INTERNAL MEDICINE

## 2022-01-11 PROCEDURE — 80053 COMPREHEN METABOLIC PANEL: CPT | Performed by: INTERNAL MEDICINE

## 2022-01-11 PROCEDURE — 27000249 HC VAPOTHERM CIRCUIT

## 2022-01-11 PROCEDURE — 94799 UNLISTED PULMONARY SVC/PX: CPT

## 2022-01-11 PROCEDURE — 27100171 HC OXYGEN HIGH FLOW UP TO 24 HOURS

## 2022-01-11 PROCEDURE — C9399 UNCLASSIFIED DRUGS OR BIOLOG: HCPCS | Performed by: INTERNAL MEDICINE

## 2022-01-11 PROCEDURE — 86140 C-REACTIVE PROTEIN: CPT | Performed by: INTERNAL MEDICINE

## 2022-01-11 PROCEDURE — 25000003 PHARM REV CODE 250: Performed by: NURSE PRACTITIONER

## 2022-01-11 PROCEDURE — 20000000 HC ICU ROOM

## 2022-01-11 PROCEDURE — 99232 SBSQ HOSP IP/OBS MODERATE 35: CPT | Mod: CR,,, | Performed by: INTERNAL MEDICINE

## 2022-01-11 PROCEDURE — 25000003 PHARM REV CODE 250: Performed by: EMERGENCY MEDICINE

## 2022-01-11 PROCEDURE — 63600175 PHARM REV CODE 636 W HCPCS: Performed by: EMERGENCY MEDICINE

## 2022-01-11 PROCEDURE — 25000242 PHARM REV CODE 250 ALT 637 W/ HCPCS: Performed by: INTERNAL MEDICINE

## 2022-01-11 PROCEDURE — 36415 COLL VENOUS BLD VENIPUNCTURE: CPT | Performed by: INTERNAL MEDICINE

## 2022-01-11 PROCEDURE — 94640 AIRWAY INHALATION TREATMENT: CPT

## 2022-01-11 PROCEDURE — 83615 LACTATE (LD) (LDH) ENZYME: CPT | Performed by: INTERNAL MEDICINE

## 2022-01-11 PROCEDURE — 99900035 HC TECH TIME PER 15 MIN (STAT)

## 2022-01-11 PROCEDURE — 85025 COMPLETE CBC W/AUTO DIFF WBC: CPT | Performed by: INTERNAL MEDICINE

## 2022-01-11 PROCEDURE — 25000003 PHARM REV CODE 250: Performed by: INTERNAL MEDICINE

## 2022-01-11 RX ORDER — INSULIN ASPART 100 [IU]/ML
26 INJECTION, SOLUTION INTRAVENOUS; SUBCUTANEOUS
Status: DISCONTINUED | OUTPATIENT
Start: 2022-01-11 | End: 2022-01-13

## 2022-01-11 RX ADMIN — ATORVASTATIN CALCIUM 40 MG: 20 TABLET, FILM COATED ORAL at 08:01

## 2022-01-11 RX ADMIN — IPRATROPIUM BROMIDE AND ALBUTEROL 2 PUFF: 20; 100 SPRAY, METERED RESPIRATORY (INHALATION) at 07:01

## 2022-01-11 RX ADMIN — DOCUSATE SODIUM - SENNOSIDES 1 TABLET: 50; 8.6 TABLET, FILM COATED ORAL at 08:01

## 2022-01-11 RX ADMIN — QUETIAPINE FUMARATE 200 MG: 200 TABLET ORAL at 08:01

## 2022-01-11 RX ADMIN — INSULIN ASPART 5 UNITS: 100 INJECTION, SOLUTION INTRAVENOUS; SUBCUTANEOUS at 08:01

## 2022-01-11 RX ADMIN — INSULIN ASPART 26 UNITS: 100 INJECTION, SOLUTION INTRAVENOUS; SUBCUTANEOUS at 11:01

## 2022-01-11 RX ADMIN — REMDESIVIR 100 MG: 100 INJECTION, POWDER, LYOPHILIZED, FOR SOLUTION INTRAVENOUS at 08:01

## 2022-01-11 RX ADMIN — INSULIN ASPART 26 UNITS: 100 INJECTION, SOLUTION INTRAVENOUS; SUBCUTANEOUS at 04:01

## 2022-01-11 RX ADMIN — INSULIN DETEMIR 70 UNITS: 100 INJECTION, SOLUTION SUBCUTANEOUS at 08:01

## 2022-01-11 RX ADMIN — THERA TABS 1 TABLET: TAB at 08:01

## 2022-01-11 RX ADMIN — BARICITINIB 4 MG: 2 TABLET, FILM COATED ORAL at 08:01

## 2022-01-11 RX ADMIN — INSULIN ASPART 8 UNITS: 100 INJECTION, SOLUTION INTRAVENOUS; SUBCUTANEOUS at 11:01

## 2022-01-11 RX ADMIN — INSULIN ASPART 6 UNITS: 100 INJECTION, SOLUTION INTRAVENOUS; SUBCUTANEOUS at 04:01

## 2022-01-11 RX ADMIN — INSULIN ASPART 26 UNITS: 100 INJECTION, SOLUTION INTRAVENOUS; SUBCUTANEOUS at 07:01

## 2022-01-11 RX ADMIN — DEXAMETHASONE 6 MG: 4 TABLET ORAL at 08:01

## 2022-01-11 RX ADMIN — ASPIRIN 81 MG: 81 TABLET, DELAYED RELEASE ORAL at 08:01

## 2022-01-11 RX ADMIN — HYDROCHLOROTHIAZIDE 25 MG: 25 TABLET ORAL at 08:01

## 2022-01-11 RX ADMIN — ENOXAPARIN SODIUM 130 MG: 100 INJECTION SUBCUTANEOUS at 08:01

## 2022-01-11 RX ADMIN — OXYCODONE HYDROCHLORIDE AND ACETAMINOPHEN 500 MG: 500 TABLET ORAL at 08:01

## 2022-01-11 RX ADMIN — LOSARTAN POTASSIUM 25 MG: 25 TABLET, FILM COATED ORAL at 08:01

## 2022-01-11 RX ADMIN — INSULIN ASPART 10 UNITS: 100 INJECTION, SOLUTION INTRAVENOUS; SUBCUTANEOUS at 07:01

## 2022-01-11 RX ADMIN — CEFTRIAXONE 1 G: 1 INJECTION, SOLUTION INTRAVENOUS at 08:01

## 2022-01-11 RX ADMIN — MUPIROCIN: 20 OINTMENT TOPICAL at 08:01

## 2022-01-11 NOTE — PLAN OF CARE
Plan of care is progressing. Weaned Vapotherm to 15L/30% - spo2 > 90 throughout shift. Up to chair x 3 hours. Mild SOB with increased activity. Tachypnea with HR 25-30. VSS. Afebrile. Good UO. Good intake. Elevated CBG treated with scheduled insulin and sliding scale. Safety maintained.

## 2022-01-11 NOTE — HOSPITAL COURSE
Admitted with COVID PNA. He was started on remdesivir and dexamethasone. He had increased O2 requirements and transferred to ICU. He had some gradual improvement with remdesivir and dexamethasone but with persistent HFNC requirements started on baricitinib. Developed hyperglycemia related to steroids so insulin was adjusted. He was transferred out of ICU with continued improvement. He was slowly weaned from high to low flow oxygen. He remained stable on 2L. He qualified for home O2 with 6MWT and was set up. He is now stable and ready for discharge home today with covid surveillance program.

## 2022-01-11 NOTE — PROGRESS NOTES
[facility] Intensive [facility] Medicine  Progress Note    Patient Name: Dave Good  MRN: [id number]  Patient Class: IP- Inpatient   Admission Date: 1/7/2022  Length of Stay: 4 days  Attending Physician: Marcy Patrick MD  Primary Care Provider: Reyna Jorge NP        Subjective:     Principal Problem:Pneumonia due to COVID-19 virus        HPI:  Mr. 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 He presents to the ED with complaints of shortness of breath, chest pain, wheezing, and decreased appetite; he reports the symptoms with onset approximately 2 weeks ago and have progressively worsened.  He was diagnosed with COVID-19 12/29/2021.  He denies fever, chills, nausea, vomiting, abdominal pain, lightheadedness, dizziness, syncope.  He lives with [facility], is independent ADLs, uses no ambulatory aids.  He reports ancee performed wound care and dressing changes yesterday.  Bilateral feet wrapped, C/D/I, and wearing bilateral surgical shoes. All past medical, social, and family history reviewed with patient; no pertinent family history identified at this time.    In the ED he is noted to be slightly hypertensive, heart rate ranging 100 to 120s, initially hypoxic on room air with saturations 77% to high 80s with improvement on nasal cannula to 92-96% however later transitioned to NRB before floor transfer, and afebrile.  CBC notes WBC 12, stable H&H, platelets 432.  Chemistry with normal renal function, sodium 134, potassium 4.2, albumin 2.4, glucose 196, and LFTs WNLs.  . BNP 34. .  . Troponin 0.016.  Lactic acid 2.2.  Procalcitonin 0.21. EKG with NSR and no acute dynamic changes.  Chest x-ray with bilateral airspace opacities  concerning for pneumonitis/multifocal pneumonia.  Blood cultures obtained.  He was given continuous nebulizers, azithromycin, ceftriaxone, dexamethasone, remdesivir and supplemental oxygen in the ED.  Ongoing COVID evaluation per protocol including ESR, ferritin, PT/INR, PTT, D-dimer, sputum culture, flu evaluation, and urinalysis pending. The patient was admitted to the Hospital Medicine Service for further evaluation and management.       Overview/Hospital Course:  Admitted with COVID PNA. Had increased O2 requirements and transferred to ICU. Had some improvement with remdesivir and dexamethasone but with persistent HFNC requirements started on baricitinib.      Interval History: Continued mild improvement in HFNC requirements. Denies SOB at rest. No new concerns.    Review of Systems   Constitutional: Negative for chills and fever.   Respiratory: Negative for cough and shortness of breath.    Cardiovascular: Negative for chest pain and palpitations.   Gastrointestinal: Negative for abdominal pain, nausea and vomiting.   Genitourinary: Positive for frequency.     Objective:     Vital Signs (Most Recent):  Temp: 98.7 °F (37.1 °C) (01/11/22 0730)  Pulse: 101 (01/11/22 1100)  Resp: (!) 29 (01/11/22 1100)  BP: 114/79 (01/11/22 1100)  SpO2: 97 % (01/11/22 1100) Vital Signs (24h Range):  Temp:  [98.5 °F (36.9 °C)-98.8 °F (37.1 °C)] 98.7 °F (37.1 °C)  Pulse:  [] 101  Resp:  [18-39] 29  SpO2:  [88 %-97 %] 97 %  BP: (114-162)/(77-96) 114/79     Weight: 123 kg (271 lb 2.7 oz)  Body mass index is 34.82 kg/m².    Intake/Output Summary (Last 24 hours) at 1/11/2022 1141  Last data filed at 1/11/2022 1025  Gross per 24 hour   Intake 1378.38 ml   Output 3100 ml   Net -1721.62 ml      Physical Exam  Vitals and nursing note reviewed.   Constitutional:       General: He is not in acute distress.     Appearance: Normal appearance. He is well-developed.   Cardiovascular:      Rate and Rhythm: Normal rate.      Pulses: Normal  pulses.   Pulmonary:      Effort: Pulmonary effort is normal. No respiratory distress.      Comments: Shallow BS. On HFNC.  Abdominal:      Palpations: Abdomen is soft.      Tenderness: There is no abdominal tenderness.   Musculoskeletal:      Right lower leg: No edema.      Left lower leg: No edema.   Skin:     General: Skin is warm and dry.      Comments: Wound dressing in place to b/l LE   Neurological:      Mental Status: He is alert and oriented to person, place, and time.   Psychiatric:         Mood and Affect: Mood normal.       Significant Labs:   CBC:  Recent Labs   Lab 01/09/22 0449 01/11/22 0518   WBC 15.34* 13.77*   HGB 11.4* 11.9*   HCT 35.9* 36.4*   * 624*   GRAN 72.4  11.1* 65.3  9.0*   LYMPH 12.1*  1.9 18.0  2.5   MONO 13.0  2.0* 11.4  1.6*   EOS 0.0 0.2   BASO 0.02 0.04     CMP:  Recent Labs   Lab 01/09/22 0449 01/11/22 0518   * 133*   K 4.8 4.9    99   CO2 24 23   BUN 25* 20   CREATININE 1.0 0.9   * 440*   CALCIUM 8.3* 8.6*   MG 2.4  --    PHOS 2.5*  --    ALKPHOS  --  91   AST  --  27   ALT  --  42   BILITOT  --  0.3   PROT  --  7.5   ALBUMIN  --  2.2*   ANIONGAP 10 11      Significant Imaging:   No new imaging this morning.      Assessment/Plan:      * Pneumonia due to COVID-19 virus  Acute respiratory failure due to COVID-19 PNA   COPD with asthma  Former smoker  - COVID PNA with acute hypoxemic respiratory failure.  - Inflammatory markers significantly elevated. Trend.  - Continue remdesivir 100mg IV daily x5 days, dexamethasone 6mg PO daily x10 days, baricitinib 4mg PO daily x14 days  - Continue supplemental O2, wean as tolerated.  - Continue albuterol 2 puff inhaled q4hr PRN, ipratropium-albuterol 2 puff inhaled q6hr wake.  - Continue azithromycin 500mg PO daily, ceftriaxone 1g IV q24hr for 5 day total course.  - Continue enoxaparin 1mg/kg subQ BID.    Systolic congestive heart failure  - Chronic; appears euvolemic.  - Continue losartan, HCTZ as  above.    Type II diabetes mellitus with peripheral circulatory disorder, uncontrolled  - Hyperglycemic due to steroids  - Increase insulin detemir to 70 U subQ qHS, insulin aspart at 26 U TIDWM  - Continue moderate-dose sliding scale insulin aspart 1-10U subQ TIDWM PRN.  - Discussed his diet and asked to avoid eating snacks his family brings  - Monitor and adjust further as needed.    Mixed hyperlipidemia  - Continue atorvastatin 40 mg PO daily.    Depression  - Continue quetiapine 200 mg PO qHS.    Primary hypertension  - Continue losartan 25mg PO BID, HCTZ 25mg PO daily.    VTE Risk Mitigation (From admission, onward)         Ordered     enoxaparin injection 130 mg  2 times daily         01/07/22 3586                        Marcy Patrick MD  Department of Hospital Medicine   [facility] - Intensive Care ([place])

## 2022-01-11 NOTE — ASSESSMENT & PLAN NOTE
Acute respiratory failure due to COVID-19 PNA   COPD with asthma  Former smoker  - COVID PNA with acute hypoxemic respiratory failure.  - Inflammatory markers significantly elevated. Trend.  - Continue remdesivir 100mg IV daily x5 days, dexamethasone 6mg PO daily x10 days, baricitinib 4mg PO daily x14 days  - Continue supplemental O2, wean as tolerated.  - Continue albuterol 2 puff inhaled q4hr PRN, ipratropium-albuterol 2 puff inhaled q6hr wake.  - Continue azithromycin 500mg PO daily, ceftriaxone 1g IV q24hr for 5 day total course.  - Continue enoxaparin 1mg/kg subQ BID.

## 2022-01-11 NOTE — ASSESSMENT & PLAN NOTE
Acute respiratory failure due to COVID-19 PNA, COPD with asthma, former smoker  - COVID PNA with acute hypoxemic respiratory failure.  - Inflammatory markers significantly elevated. Trend.  - Continue remdesivir 100mg IV daily for 5 day total course, dexamethasone 6mg PO daily for 10 day total course. Start baricitinib 4mg PO daily for 14 day total course.  - Continue supplemental O2, wean as tolerated.  - Continue albuterol 2 puff inhaled q4hr PRN, ipratropium-albuterol 2 puff inhaled q6hr wake.  - Continue azithromycin 500mg PO daily, ceftriaxone 1g IV q24hr for 5 day total course.  - Continue enoxaparin 1mg/kg subQ BID.

## 2022-01-11 NOTE — SUBJECTIVE & OBJECTIVE
Interval History: Continued mild improvement in HFNC requirements. Denies SOB at rest. No new concerns.    Review of Systems   Constitutional: Negative for chills and fever.   Respiratory: Negative for cough and shortness of breath.    Cardiovascular: Negative for chest pain and palpitations.   Gastrointestinal: Negative for abdominal pain, nausea and vomiting.   Genitourinary: Positive for frequency.     Objective:     Vital Signs (Most Recent):  Temp: 98.7 °F (37.1 °C) (01/11/22 0730)  Pulse: 101 (01/11/22 1100)  Resp: (!) 29 (01/11/22 1100)  BP: 114/79 (01/11/22 1100)  SpO2: 97 % (01/11/22 1100) Vital Signs (24h Range):  Temp:  [98.5 °F (36.9 °C)-98.8 °F (37.1 °C)] 98.7 °F (37.1 °C)  Pulse:  [] 101  Resp:  [18-39] 29  SpO2:  [88 %-97 %] 97 %  BP: (114-162)/(77-96) 114/79     Weight: 123 kg (271 lb 2.7 oz)  Body mass index is 34.82 kg/m².    Intake/Output Summary (Last 24 hours) at 1/11/2022 1141  Last data filed at 1/11/2022 1025  Gross per 24 hour   Intake 1378.38 ml   Output 3100 ml   Net -1721.62 ml      Physical Exam  Vitals and nursing note reviewed.   Constitutional:       General: He is not in acute distress.     Appearance: Normal appearance. He is well-developed.   Cardiovascular:      Rate and Rhythm: Normal rate.      Pulses: Normal pulses.   Pulmonary:      Effort: Pulmonary effort is normal. No respiratory distress.      Comments: Shallow BS. On HFNC.  Abdominal:      Palpations: Abdomen is soft.      Tenderness: There is no abdominal tenderness.   Musculoskeletal:      Right lower leg: No edema.      Left lower leg: No edema.   Skin:     General: Skin is warm and dry.      Comments: Wound dressing in place to b/l LE   Neurological:      Mental Status: He is alert and oriented to person, place, and time.   Psychiatric:         Mood and Affect: Mood normal.       Significant Labs:   CBC:  Recent Labs   Lab 01/09/22  0449 01/11/22  0518   WBC 15.34* 13.77*   HGB 11.4* 11.9*   HCT 35.9* 36.4*    * 624*   GRAN 72.4  11.1* 65.3  9.0*   LYMPH 12.1*  1.9 18.0  2.5   MONO 13.0  2.0* 11.4  1.6*   EOS 0.0 0.2   BASO 0.02 0.04     CMP:  Recent Labs   Lab 01/09/22  0449 01/11/22  0518   * 133*   K 4.8 4.9    99   CO2 24 23   BUN 25* 20   CREATININE 1.0 0.9   * 440*   CALCIUM 8.3* 8.6*   MG 2.4  --    PHOS 2.5*  --    ALKPHOS  --  91   AST  --  27   ALT  --  42   BILITOT  --  0.3   PROT  --  7.5   ALBUMIN  --  2.2*   ANIONGAP 10 11      Significant Imaging:   No new imaging this morning.

## 2022-01-11 NOTE — NURSING
Assisted back to bed. Tolerated transfer well with stand-by assistance. Spo2 95% on 15L/40%. Weaned to 15L/35%.

## 2022-01-11 NOTE — ASSESSMENT & PLAN NOTE
- Hyperglycemic due to steroids  - Increase insulin detemir to 70 U subQ qHS, insulin aspart at 26 U TIDWM  - Continue moderate-dose sliding scale insulin aspart 1-10U subQ TIDWM PRN.  - Discussed his diet and asked to avoid eating snacks his family brings  - Monitor and adjust further as needed.

## 2022-01-11 NOTE — SUBJECTIVE & OBJECTIVE
Interval History: Mildly improved HFNC requirements. Denies SOB at rest. No new concerns.    Review of Systems   Constitutional: Negative for chills and fever.   Respiratory: Negative for cough and shortness of breath.    Cardiovascular: Negative for chest pain and palpitations.   Gastrointestinal: Negative for abdominal pain, nausea and vomiting.     Objective:     Vital Signs (Most Recent):  Temp: 98.6 °F (37 °C) (01/10/22 1901)  Pulse: 93 (01/10/22 2201)  Resp: (!) 37 (01/10/22 2201)  BP: 136/87 (01/10/22 2201)  SpO2: (!) 89 % (01/10/22 2201) Vital Signs (24h Range):  Temp:  [98 °F (36.7 °C)-98.9 °F (37.2 °C)] 98.6 °F (37 °C)  Pulse:  [] 93  Resp:  [25-43] 37  SpO2:  [88 %-97 %] 89 %  BP: (117-164)/(78-98) 136/87     Weight: 123 kg (271 lb 2.7 oz)  Body mass index is 34.82 kg/m².    Intake/Output Summary (Last 24 hours) at 1/10/2022 2311  Last data filed at 1/10/2022 2200  Gross per 24 hour   Intake 1996.8 ml   Output 1850 ml   Net 146.8 ml      Physical Exam  Vitals and nursing note reviewed.   Constitutional:       General: He is not in acute distress.     Appearance: He is well-developed.   HENT:      Head: Normocephalic and atraumatic.   Eyes:      General:         Right eye: No discharge.         Left eye: No discharge.      Conjunctiva/sclera: Conjunctivae normal.   Cardiovascular:      Rate and Rhythm: Normal rate.      Pulses: Normal pulses.   Pulmonary:      Effort: Pulmonary effort is normal. No respiratory distress.      Breath sounds: Wheezing present.      Comments: On HFNC.  Abdominal:      Palpations: Abdomen is soft.      Tenderness: There is no abdominal tenderness.   Musculoskeletal:         General: Normal range of motion.      Right lower leg: No edema.      Left lower leg: No edema.   Skin:     General: Skin is warm and dry.   Neurological:      Mental Status: He is alert and oriented to person, place, and time.       Significant Labs:   CBC:  Recent Labs   Lab 01/08/22  0513  01/09/22 0449   WBC 9.24 15.34*   HGB 11.8* 11.4*   HCT 36.8* 35.9*    474*   GRAN 74.6*  6.9 72.4  11.1*   LYMPH 12.0*  1.1 12.1*  1.9   MONO 11.5  1.1* 13.0  2.0*   EOS 0.0 0.0   BASO 0.01 0.02     CMP:  Recent Labs   Lab 01/08/22  0513 01/09/22 0449   * 134*   K 4.7 4.8    100   CO2 20* 24   BUN 13 25*   CREATININE 1.0 1.0   * 411*   CALCIUM 8.2* 8.3*   MG 2.0 2.4   PHOS  --  2.5*   ALKPHOS 90  --    AST 24  --    ALT 24  --    BILITOT 0.4  --    PROT 7.6  --    ALBUMIN 2.2*  --    ANIONGAP 12 10      Significant Imaging:   No new imaging this morning.

## 2022-01-11 NOTE — PLAN OF CARE
Patient on 15L 40% Vapothern. Sats 93%. MDI tx given, pt tolerated well. Pt. in no distress, will continue to monitor.

## 2022-01-12 PROBLEM — I50.22 CHRONIC SYSTOLIC CONGESTIVE HEART FAILURE: Status: ACTIVE | Noted: 2022-01-07

## 2022-01-12 LAB
ALBUMIN SERPL BCP-MCNC: 2.5 G/DL (ref 3.5–5.2)
ALP SERPL-CCNC: 88 U/L (ref 55–135)
ALT SERPL W/O P-5'-P-CCNC: 50 U/L (ref 10–44)
ANION GAP SERPL CALC-SCNC: 9 MMOL/L (ref 8–16)
AST SERPL-CCNC: 27 U/L (ref 10–40)
BASOPHILS # BLD AUTO: 0.07 K/UL (ref 0–0.2)
BASOPHILS NFR BLD: 0.4 % (ref 0–1.9)
BILIRUB SERPL-MCNC: 0.3 MG/DL (ref 0.1–1)
BUN SERPL-MCNC: 21 MG/DL (ref 6–20)
CALCIUM SERPL-MCNC: 9.4 MG/DL (ref 8.7–10.5)
CHLORIDE SERPL-SCNC: 100 MMOL/L (ref 95–110)
CO2 SERPL-SCNC: 29 MMOL/L (ref 23–29)
CREAT SERPL-MCNC: 0.9 MG/DL (ref 0.5–1.4)
DIFFERENTIAL METHOD: ABNORMAL
EOSINOPHIL # BLD AUTO: 0.2 K/UL (ref 0–0.5)
EOSINOPHIL NFR BLD: 1.3 % (ref 0–8)
ERYTHROCYTE [DISTWIDTH] IN BLOOD BY AUTOMATED COUNT: 18.4 % (ref 11.5–14.5)
EST. GFR  (AFRICAN AMERICAN): >60 ML/MIN/1.73 M^2
EST. GFR  (NON AFRICAN AMERICAN): >60 ML/MIN/1.73 M^2
GLUCOSE SERPL-MCNC: 225 MG/DL (ref 70–110)
HCT VFR BLD AUTO: 39.6 % (ref 40–54)
HGB BLD-MCNC: 12.8 G/DL (ref 14–18)
IMM GRANULOCYTES # BLD AUTO: 0.78 K/UL (ref 0–0.04)
IMM GRANULOCYTES NFR BLD AUTO: 4.3 % (ref 0–0.5)
LYMPHOCYTES # BLD AUTO: 3.9 K/UL (ref 1–4.8)
LYMPHOCYTES NFR BLD: 21.4 % (ref 18–48)
MCH RBC QN AUTO: 29.1 PG (ref 27–31)
MCHC RBC AUTO-ENTMCNC: 32.3 G/DL (ref 32–36)
MCV RBC AUTO: 90 FL (ref 82–98)
MONOCYTES # BLD AUTO: 1.9 K/UL (ref 0.3–1)
MONOCYTES NFR BLD: 10.3 % (ref 4–15)
NEUTROPHILS # BLD AUTO: 11.4 K/UL (ref 1.8–7.7)
NEUTROPHILS NFR BLD: 62.3 % (ref 38–73)
NRBC BLD-RTO: 0 /100 WBC
PLATELET # BLD AUTO: 745 K/UL (ref 150–450)
PMV BLD AUTO: 9.8 FL (ref 9.2–12.9)
POCT GLUCOSE: 175 MG/DL (ref 70–110)
POCT GLUCOSE: 191 MG/DL (ref 70–110)
POCT GLUCOSE: 255 MG/DL (ref 70–110)
POCT GLUCOSE: 268 MG/DL (ref 70–110)
POTASSIUM SERPL-SCNC: 5.4 MMOL/L (ref 3.5–5.1)
PROT SERPL-MCNC: 8.2 G/DL (ref 6–8.4)
RBC # BLD AUTO: 4.4 M/UL (ref 4.6–6.2)
SODIUM SERPL-SCNC: 138 MMOL/L (ref 136–145)
WBC # BLD AUTO: 18.22 K/UL (ref 3.9–12.7)

## 2022-01-12 PROCEDURE — 94640 AIRWAY INHALATION TREATMENT: CPT

## 2022-01-12 PROCEDURE — 27100171 HC OXYGEN HIGH FLOW UP TO 24 HOURS

## 2022-01-12 PROCEDURE — C9399 UNCLASSIFIED DRUGS OR BIOLOG: HCPCS | Performed by: INTERNAL MEDICINE

## 2022-01-12 PROCEDURE — 25000003 PHARM REV CODE 250: Performed by: NURSE PRACTITIONER

## 2022-01-12 PROCEDURE — 99232 SBSQ HOSP IP/OBS MODERATE 35: CPT | Mod: CR,,, | Performed by: INTERNAL MEDICINE

## 2022-01-12 PROCEDURE — 63600175 PHARM REV CODE 636 W HCPCS: Performed by: INTERNAL MEDICINE

## 2022-01-12 PROCEDURE — 63600175 PHARM REV CODE 636 W HCPCS: Performed by: NURSE PRACTITIONER

## 2022-01-12 PROCEDURE — 94761 N-INVAS EAR/PLS OXIMETRY MLT: CPT

## 2022-01-12 PROCEDURE — 99900035 HC TECH TIME PER 15 MIN (STAT)

## 2022-01-12 PROCEDURE — 80053 COMPREHEN METABOLIC PANEL: CPT | Performed by: INTERNAL MEDICINE

## 2022-01-12 PROCEDURE — 11000001 HC ACUTE MED/SURG PRIVATE ROOM

## 2022-01-12 PROCEDURE — 36415 COLL VENOUS BLD VENIPUNCTURE: CPT | Performed by: INTERNAL MEDICINE

## 2022-01-12 PROCEDURE — 27000207 HC ISOLATION

## 2022-01-12 PROCEDURE — 25000242 PHARM REV CODE 250 ALT 637 W/ HCPCS: Performed by: INTERNAL MEDICINE

## 2022-01-12 PROCEDURE — 99232 PR SUBSEQUENT HOSPITAL CARE,LEVL II: ICD-10-PCS | Mod: CR,,, | Performed by: INTERNAL MEDICINE

## 2022-01-12 PROCEDURE — 94799 UNLISTED PULMONARY SVC/PX: CPT

## 2022-01-12 PROCEDURE — 85025 COMPLETE CBC W/AUTO DIFF WBC: CPT | Performed by: INTERNAL MEDICINE

## 2022-01-12 RX ADMIN — DOCUSATE SODIUM - SENNOSIDES 1 TABLET: 50; 8.6 TABLET, FILM COATED ORAL at 09:01

## 2022-01-12 RX ADMIN — HYDROCHLOROTHIAZIDE 25 MG: 25 TABLET ORAL at 09:01

## 2022-01-12 RX ADMIN — INSULIN ASPART 1 UNITS: 100 INJECTION, SOLUTION INTRAVENOUS; SUBCUTANEOUS at 09:01

## 2022-01-12 RX ADMIN — MUPIROCIN: 20 OINTMENT TOPICAL at 09:01

## 2022-01-12 RX ADMIN — LOSARTAN POTASSIUM 25 MG: 25 TABLET, FILM COATED ORAL at 09:01

## 2022-01-12 RX ADMIN — BARICITINIB 4 MG: 2 TABLET, FILM COATED ORAL at 09:01

## 2022-01-12 RX ADMIN — DEXAMETHASONE 6 MG: 4 TABLET ORAL at 09:01

## 2022-01-12 RX ADMIN — IPRATROPIUM BROMIDE AND ALBUTEROL 2 PUFF: 20; 100 SPRAY, METERED RESPIRATORY (INHALATION) at 08:01

## 2022-01-12 RX ADMIN — CEFTRIAXONE 1 G: 1 INJECTION, SOLUTION INTRAVENOUS at 09:01

## 2022-01-12 RX ADMIN — OXYCODONE HYDROCHLORIDE AND ACETAMINOPHEN 500 MG: 500 TABLET ORAL at 09:01

## 2022-01-12 RX ADMIN — INSULIN ASPART 26 UNITS: 100 INJECTION, SOLUTION INTRAVENOUS; SUBCUTANEOUS at 12:01

## 2022-01-12 RX ADMIN — THERA TABS 1 TABLET: TAB at 09:01

## 2022-01-12 RX ADMIN — ATORVASTATIN CALCIUM 40 MG: 20 TABLET, FILM COATED ORAL at 09:01

## 2022-01-12 RX ADMIN — INSULIN ASPART 6 UNITS: 100 INJECTION, SOLUTION INTRAVENOUS; SUBCUTANEOUS at 12:01

## 2022-01-12 RX ADMIN — INSULIN ASPART 26 UNITS: 100 INJECTION, SOLUTION INTRAVENOUS; SUBCUTANEOUS at 05:01

## 2022-01-12 RX ADMIN — INSULIN ASPART 6 UNITS: 100 INJECTION, SOLUTION INTRAVENOUS; SUBCUTANEOUS at 08:01

## 2022-01-12 RX ADMIN — ENOXAPARIN SODIUM 130 MG: 100 INJECTION SUBCUTANEOUS at 09:01

## 2022-01-12 RX ADMIN — INSULIN DETEMIR 70 UNITS: 100 INJECTION, SOLUTION SUBCUTANEOUS at 09:01

## 2022-01-12 RX ADMIN — INSULIN ASPART 2 UNITS: 100 INJECTION, SOLUTION INTRAVENOUS; SUBCUTANEOUS at 05:01

## 2022-01-12 RX ADMIN — ASPIRIN 81 MG: 81 TABLET, DELAYED RELEASE ORAL at 09:01

## 2022-01-12 RX ADMIN — QUETIAPINE FUMARATE 200 MG: 200 TABLET ORAL at 09:01

## 2022-01-12 RX ADMIN — INSULIN ASPART 26 UNITS: 100 INJECTION, SOLUTION INTRAVENOUS; SUBCUTANEOUS at 08:01

## 2022-01-12 NOTE — ASSESSMENT & PLAN NOTE
Acute respiratory failure due to COVID-19 PNA   COPD with asthma  Former smoker  - COVID PNA with acute hypoxemic respiratory failure.  - Inflammatory markers significantly elevated. Trend.  - Continue remdesivir 100mg IV daily x5 days, dexamethasone 6mg PO daily x10 days, baricitinib 4mg PO daily x14 days  - Continue supplemental O2, wean as tolerated.  - Continue albuterol 2 puff inhaled q4hr PRN, ipratropium-albuterol 2 puff inhaled q6hr wake.  - Continue azithromycin 500mg PO daily, ceftriaxone 1g IV q24hr for 5 day total course  - WBC trending up, suspect due to steroids. No other evidence of infection  - Continue enoxaparin 1mg/kg subQ BID.

## 2022-01-12 NOTE — SUBJECTIVE & OBJECTIVE
Interval History: Continued mild improvement in HFNC requirements. Denies SOB at rest but easily winded with activity. No new concerns.    Review of Systems   Constitutional: Negative for chills and fever.   Respiratory: Positive for shortness of breath. Negative for cough.    Cardiovascular: Negative for chest pain and palpitations.   Gastrointestinal: Negative for abdominal pain, nausea and vomiting.     Objective:     Vital Signs (Most Recent):  Temp: 97.7 °F (36.5 °C) (01/12/22 0400)  Pulse: 83 (01/12/22 0805)  Resp: 16 (01/12/22 0805)  BP: 120/76 (01/12/22 0909)  SpO2: (!) 92 % (01/12/22 0805) Vital Signs (24h Range):  Temp:  [97.7 °F (36.5 °C)-98.9 °F (37.2 °C)] 97.7 °F (36.5 °C)  Pulse:  [] 83  Resp:  [16-31] 16  SpO2:  [90 %-97 %] 92 %  BP: (101-148)/(71-99) 120/76     Weight: 123 kg (271 lb 2.7 oz)  Body mass index is 34.82 kg/m².    Intake/Output Summary (Last 24 hours) at 1/12/2022 0912  Last data filed at 1/12/2022 0630  Gross per 24 hour   Intake 778.15 ml   Output 2625 ml   Net -1846.85 ml      Physical Exam  Vitals and nursing note reviewed.   Constitutional:       General: He is not in acute distress.     Appearance: Normal appearance. He is well-developed.   Cardiovascular:      Rate and Rhythm: Normal rate.      Pulses: Normal pulses.   Pulmonary:      Effort: Pulmonary effort is normal. No respiratory distress.      Comments: Shallow BS. On HFNC.  Abdominal:      Palpations: Abdomen is soft.      Tenderness: There is no abdominal tenderness.   Musculoskeletal:      Right lower leg: No edema.      Left lower leg: No edema.   Skin:     General: Skin is warm and dry.      Comments: Wound dressing in place to b/l LE   Neurological:      Mental Status: He is alert and oriented to person, place, and time.   Psychiatric:         Mood and Affect: Mood normal.       Significant Labs:   CBC:  Recent Labs   Lab 01/11/22  0518 01/12/22  0354   WBC 13.77* 18.22*   HGB 11.9* 12.8*   HCT 36.4* 39.6*   PLT  624* 745*   GRAN 65.3  9.0* 62.3  11.4*   LYMPH 18.0  2.5 21.4  3.9   MONO 11.4  1.6* 10.3  1.9*   EOS 0.2 0.2   BASO 0.04 0.07     CMP:  Recent Labs   Lab 01/11/22  0518 01/12/22  0354   * 138   K 4.9 5.4*   CL 99 100   CO2 23 29   BUN 20 21*   CREATININE 0.9 0.9   * 225*   CALCIUM 8.6* 9.4   ALKPHOS 91 88   AST 27 27   ALT 42 50*   BILITOT 0.3 0.3   PROT 7.5 8.2   ALBUMIN 2.2* 2.5*   ANIONGAP 11 9      Significant Imaging:   No new imaging this morning.

## 2022-01-12 NOTE — ASSESSMENT & PLAN NOTE
Chronic wounds  - Hyperglycemic due to steroids  - Continue insulin detemir 70 U subQ qHS, insulin aspart 26 U TIDWM  - Continue moderate-dose sliding scale insulin aspart 1-10U subQ TIDWM PRN.  - Discussed his diet and asked to avoid eating snacks his family brings  - Monitor and adjust further as needed.  - Continue local wound care to b/l LE

## 2022-01-12 NOTE — PROGRESS NOTES
[facility], [place], operated by [facility] Medicine  Progress Note    Patient Name: Dave Good  MRN: [id number]  Patient Class: IP- Inpatient   Admission Date: 1/7/2022  Length of Stay: 5 days  Attending Physician: Marcy Patrick MD  Primary Care Provider: Reyna Jorge NP        Subjective:     Principal Problem:Pneumonia due to COVID-19 virus        HPI:  Mr. 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 He presents to the ED with complaints of shortness of breath, chest pain, wheezing, and decreased appetite; he reports the symptoms with onset approximately 2 weeks ago and have progressively worsened.  He was diagnosed with COVID-19 12/29/2021.  He denies fever, chills, nausea, vomiting, abdominal pain, lightheadedness, dizziness, syncope.  He lives with [facility], is independent ADLs, uses no ambulatory aids.  He reports ancee performed wound care and dressing changes yesterday.  Bilateral feet wrapped, C/D/I, and wearing bilateral surgical shoes. All past medical, social, and family history reviewed with patient; no pertinent family history identified at this time.    In the ED he is noted to be slightly hypertensive, heart rate ranging 100 to 120s, initially hypoxic on room air with saturations 77% to high 80s with improvement on nasal cannula to 92-96% however later transitioned to NRB before floor transfer, and afebrile.  CBC notes WBC 12, stable H&H, platelets 432.  Chemistry with normal renal function, sodium 134, potassium 4.2, albumin 2.4, glucose 196, and LFTs WNLs.  . BNP 34. .  . Troponin 0.016.  Lactic acid 2.2.  Procalcitonin 0.21. EKG with NSR and no acute dynamic changes.  Chest x-ray with bilateral airspace opacities  concerning for pneumonitis/multifocal pneumonia.  Blood cultures obtained.  He was given continuous nebulizers, azithromycin, ceftriaxone, dexamethasone, remdesivir and supplemental oxygen in the ED.  Ongoing COVID evaluation per protocol including ESR, ferritin, PT/INR, PTT, D-dimer, sputum culture, flu evaluation, and urinalysis pending. The patient was admitted to the Hospital Medicine Service for further evaluation and management.       Overview/Hospital Course:  Admitted with COVID PNA. He was started on remdesivir and dexamethasone. He had increased O2 requirements and transferred to ICU. He had some gradual improvement with remdesivir and dexamethasone but with persistent HFNC requirements started on baricitinib. Developed hyperglycemia related to steroids so insulin was adjusted.      Interval History: Continued mild improvement in HFNC requirements. Denies SOB at rest but easily winded with activity. No new concerns.    Review of Systems   Constitutional: Negative for chills and fever.   Respiratory: Positive for shortness of breath. Negative for cough.    Cardiovascular: Negative for chest pain and palpitations.   Gastrointestinal: Negative for abdominal pain, nausea and vomiting.     Objective:     Vital Signs (Most Recent):  Temp: 97.7 °F (36.5 °C) (01/12/22 0400)  Pulse: 83 (01/12/22 0805)  Resp: 16 (01/12/22 0805)  BP: 120/76 (01/12/22 0909)  SpO2: (!) 92 % (01/12/22 0805) Vital Signs (24h Range):  Temp:  [97.7 °F (36.5 °C)-98.9 °F (37.2 °C)] 97.7 °F (36.5 °C)  Pulse:  [] 83  Resp:  [16-31] 16  SpO2:  [90 %-97 %] 92 %  BP: (101-148)/(71-99) 120/76     Weight: 123 kg (271 lb 2.7 oz)  Body mass index is 34.82 kg/m².    Intake/Output Summary (Last 24 hours) at 1/12/2022 0912  Last data filed at 1/12/2022 0630  Gross per 24 hour   Intake 778.15 ml   Output 2625 ml   Net -1846.85 ml      Physical Exam  Vitals and nursing note reviewed.   Constitutional:       General: He is not in acute distress.      Appearance: Normal appearance. He is well-developed.   Cardiovascular:      Rate and Rhythm: Normal rate.      Pulses: Normal pulses.   Pulmonary:      Effort: Pulmonary effort is normal. No respiratory distress.      Comments: Shallow BS. On HFNC.  Abdominal:      Palpations: Abdomen is soft.      Tenderness: There is no abdominal tenderness.   Musculoskeletal:      Right lower leg: No edema.      Left lower leg: No edema.   Skin:     General: Skin is warm and dry.      Comments: Wound dressing in place to b/l LE   Neurological:      Mental Status: He is alert and oriented to person, place, and time.   Psychiatric:         Mood and Affect: Mood normal.       Significant Labs:   CBC:  Recent Labs   Lab 01/11/22  0518 01/12/22  0354   WBC 13.77* 18.22*   HGB 11.9* 12.8*   HCT 36.4* 39.6*   * 745*   GRAN 65.3  9.0* 62.3  11.4*   LYMPH 18.0  2.5 21.4  3.9   MONO 11.4  1.6* 10.3  1.9*   EOS 0.2 0.2   BASO 0.04 0.07     CMP:  Recent Labs   Lab 01/11/22  0518 01/12/22  0354   * 138   K 4.9 5.4*   CL 99 100   CO2 23 29   BUN 20 21*   CREATININE 0.9 0.9   * 225*   CALCIUM 8.6* 9.4   ALKPHOS 91 88   AST 27 27   ALT 42 50*   BILITOT 0.3 0.3   PROT 7.5 8.2   ALBUMIN 2.2* 2.5*   ANIONGAP 11 9      Significant Imaging:   No new imaging this morning.      Assessment/Plan:      * Pneumonia due to COVID-19 virus  Acute respiratory failure due to COVID-19 PNA   COPD with asthma  Former smoker  - COVID PNA with acute hypoxemic respiratory failure.  - Inflammatory markers significantly elevated. Trend.  - Continue remdesivir 100mg IV daily x5 days, dexamethasone 6mg PO daily x10 days, baricitinib 4mg PO daily x14 days  - Continue supplemental O2, wean as tolerated.  - Continue albuterol 2 puff inhaled q4hr PRN, ipratropium-albuterol 2 puff inhaled q6hr wake.  - Continue azithromycin 500mg PO daily, ceftriaxone 1g IV q24hr for 5 day total course  - WBC trending up, suspect due to steroids. No other evidence of  infection  - Continue enoxaparin 1mg/kg subQ BID.    Chronic systolic congestive heart failure  - Chronic; appears euvolemic.  - Continue losartan, HCTZ as above.    Type II diabetes mellitus with peripheral circulatory disorder, uncontrolled  Chronic wounds  - Hyperglycemic due to steroids  - Continue insulin detemir 70 U subQ qHS, insulin aspart 26 U TIDWM  - Continue moderate-dose sliding scale insulin aspart 1-10U subQ TIDWM PRN.  - Discussed his diet and asked to avoid eating snacks his family brings  - Monitor and adjust further as needed.  - Continue local wound care to b/l LE    Mixed hyperlipidemia  - Continue atorvastatin 40 mg PO daily.    Depression  - Continue quetiapine 200 mg PO qHS.    Primary hypertension  - Continue losartan 25mg PO BID, HCTZ 25mg PO daily.    VTE Risk Mitigation (From admission, onward)         Ordered     enoxaparin injection 130 mg  2 times daily         01/07/22 1157                      Marcy Patrick MD  Department of Hospital Medicine   [facility] ([place])

## 2022-01-12 NOTE — PLAN OF CARE
Patient in no apparent distress. Patient received on vapotherm with settings as documented. FIO2 and Flow adjusted as needed. Sat's 91-94  % on 10 lpm and 28%. MDI given Q 6 wa . Will continue to monitor.

## 2022-01-13 LAB
ALBUMIN SERPL BCP-MCNC: 2.5 G/DL (ref 3.5–5.2)
ALBUMIN SERPL BCP-MCNC: 2.5 G/DL (ref 3.5–5.2)
ALP SERPL-CCNC: 81 U/L (ref 55–135)
ALT SERPL W/O P-5'-P-CCNC: 46 U/L (ref 10–44)
ANION GAP SERPL CALC-SCNC: 9 MMOL/L (ref 8–16)
ANION GAP SERPL CALC-SCNC: 9 MMOL/L (ref 8–16)
AST SERPL-CCNC: 22 U/L (ref 10–40)
BACTERIA BLD CULT: NORMAL
BACTERIA BLD CULT: NORMAL
BASOPHILS # BLD AUTO: 0.06 K/UL (ref 0–0.2)
BASOPHILS NFR BLD: 0.3 % (ref 0–1.9)
BILIRUB SERPL-MCNC: 0.3 MG/DL (ref 0.1–1)
BUN SERPL-MCNC: 21 MG/DL (ref 6–20)
BUN SERPL-MCNC: 21 MG/DL (ref 6–20)
CALCIUM SERPL-MCNC: 9.4 MG/DL (ref 8.7–10.5)
CALCIUM SERPL-MCNC: 9.5 MG/DL (ref 8.7–10.5)
CHLORIDE SERPL-SCNC: 98 MMOL/L (ref 95–110)
CHLORIDE SERPL-SCNC: 99 MMOL/L (ref 95–110)
CO2 SERPL-SCNC: 28 MMOL/L (ref 23–29)
CO2 SERPL-SCNC: 29 MMOL/L (ref 23–29)
CREAT SERPL-MCNC: 0.9 MG/DL (ref 0.5–1.4)
CREAT SERPL-MCNC: 0.9 MG/DL (ref 0.5–1.4)
CRP SERPL-MCNC: 33.8 MG/L (ref 0–8.2)
DIFFERENTIAL METHOD: ABNORMAL
EOSINOPHIL # BLD AUTO: 0.2 K/UL (ref 0–0.5)
EOSINOPHIL NFR BLD: 1.2 % (ref 0–8)
ERYTHROCYTE [DISTWIDTH] IN BLOOD BY AUTOMATED COUNT: 16.9 % (ref 11.5–14.5)
EST. GFR  (AFRICAN AMERICAN): >60 ML/MIN/1.73 M^2
EST. GFR  (AFRICAN AMERICAN): >60 ML/MIN/1.73 M^2
EST. GFR  (NON AFRICAN AMERICAN): >60 ML/MIN/1.73 M^2
EST. GFR  (NON AFRICAN AMERICAN): >60 ML/MIN/1.73 M^2
GLUCOSE SERPL-MCNC: 213 MG/DL (ref 70–110)
GLUCOSE SERPL-MCNC: 215 MG/DL (ref 70–110)
HCT VFR BLD AUTO: 41.9 % (ref 40–54)
HGB BLD-MCNC: 12.9 G/DL (ref 14–18)
IMM GRANULOCYTES # BLD AUTO: 0.72 K/UL (ref 0–0.04)
IMM GRANULOCYTES NFR BLD AUTO: 4.2 % (ref 0–0.5)
LDH SERPL L TO P-CCNC: 278 U/L (ref 110–260)
LYMPHOCYTES # BLD AUTO: 3.9 K/UL (ref 1–4.8)
LYMPHOCYTES NFR BLD: 22.3 % (ref 18–48)
MCH RBC QN AUTO: 26.9 PG (ref 27–31)
MCHC RBC AUTO-ENTMCNC: 30.8 G/DL (ref 32–36)
MCV RBC AUTO: 88 FL (ref 82–98)
MONOCYTES # BLD AUTO: 1.8 K/UL (ref 0.3–1)
MONOCYTES NFR BLD: 10.2 % (ref 4–15)
NEUTROPHILS # BLD AUTO: 10.7 K/UL (ref 1.8–7.7)
NEUTROPHILS NFR BLD: 61.8 % (ref 38–73)
NRBC BLD-RTO: 0 /100 WBC
PHOSPHATE SERPL-MCNC: 4.3 MG/DL (ref 2.7–4.5)
PLATELET # BLD AUTO: 809 K/UL (ref 150–450)
PMV BLD AUTO: 9.6 FL (ref 9.2–12.9)
POCT GLUCOSE: 229 MG/DL (ref 70–110)
POCT GLUCOSE: 230 MG/DL (ref 70–110)
POCT GLUCOSE: 254 MG/DL (ref 70–110)
POCT GLUCOSE: 289 MG/DL (ref 70–110)
POCT GLUCOSE: 336 MG/DL (ref 70–110)
POCT GLUCOSE: 353 MG/DL (ref 70–110)
POCT GLUCOSE: 368 MG/DL (ref 70–110)
POTASSIUM SERPL-SCNC: 4.8 MMOL/L (ref 3.5–5.1)
POTASSIUM SERPL-SCNC: 4.9 MMOL/L (ref 3.5–5.1)
PROT SERPL-MCNC: 8.2 G/DL (ref 6–8.4)
RBC # BLD AUTO: 4.79 M/UL (ref 4.6–6.2)
SODIUM SERPL-SCNC: 136 MMOL/L (ref 136–145)
SODIUM SERPL-SCNC: 136 MMOL/L (ref 136–145)
WBC # BLD AUTO: 17.32 K/UL (ref 3.9–12.7)

## 2022-01-13 PROCEDURE — 63600175 PHARM REV CODE 636 W HCPCS: Performed by: INTERNAL MEDICINE

## 2022-01-13 PROCEDURE — 99232 SBSQ HOSP IP/OBS MODERATE 35: CPT | Mod: CR,,, | Performed by: INTERNAL MEDICINE

## 2022-01-13 PROCEDURE — 85025 COMPLETE CBC W/AUTO DIFF WBC: CPT | Performed by: INTERNAL MEDICINE

## 2022-01-13 PROCEDURE — 36415 COLL VENOUS BLD VENIPUNCTURE: CPT | Performed by: INTERNAL MEDICINE

## 2022-01-13 PROCEDURE — 80069 RENAL FUNCTION PANEL: CPT | Performed by: INTERNAL MEDICINE

## 2022-01-13 PROCEDURE — 86140 C-REACTIVE PROTEIN: CPT | Performed by: INTERNAL MEDICINE

## 2022-01-13 PROCEDURE — 99232 PR SUBSEQUENT HOSPITAL CARE,LEVL II: ICD-10-PCS | Mod: CR,,, | Performed by: INTERNAL MEDICINE

## 2022-01-13 PROCEDURE — 83615 LACTATE (LD) (LDH) ENZYME: CPT | Performed by: INTERNAL MEDICINE

## 2022-01-13 PROCEDURE — 27000221 HC OXYGEN, UP TO 24 HOURS

## 2022-01-13 PROCEDURE — 25000003 PHARM REV CODE 250: Performed by: INTERNAL MEDICINE

## 2022-01-13 PROCEDURE — 11000001 HC ACUTE MED/SURG PRIVATE ROOM

## 2022-01-13 PROCEDURE — 80053 COMPREHEN METABOLIC PANEL: CPT | Performed by: INTERNAL MEDICINE

## 2022-01-13 PROCEDURE — 27000207 HC ISOLATION

## 2022-01-13 PROCEDURE — 94761 N-INVAS EAR/PLS OXIMETRY MLT: CPT

## 2022-01-13 PROCEDURE — C9399 UNCLASSIFIED DRUGS OR BIOLOG: HCPCS | Performed by: INTERNAL MEDICINE

## 2022-01-13 RX ORDER — INSULIN ASPART 100 [IU]/ML
30 INJECTION, SOLUTION INTRAVENOUS; SUBCUTANEOUS
Status: DISCONTINUED | OUTPATIENT
Start: 2022-01-13 | End: 2022-01-16

## 2022-01-13 RX ORDER — METFORMIN HYDROCHLORIDE 500 MG/1
1000 TABLET ORAL 2 TIMES DAILY WITH MEALS
Status: DISCONTINUED | OUTPATIENT
Start: 2022-01-13 | End: 2022-01-17 | Stop reason: HOSPADM

## 2022-01-13 RX ADMIN — BARICITINIB 4 MG: 2 TABLET, FILM COATED ORAL at 09:01

## 2022-01-13 RX ADMIN — ENOXAPARIN SODIUM 130 MG: 100 INJECTION SUBCUTANEOUS at 08:01

## 2022-01-13 RX ADMIN — INSULIN DETEMIR 70 UNITS: 100 INJECTION, SOLUTION SUBCUTANEOUS at 09:01

## 2022-01-13 RX ADMIN — METFORMIN HYDROCHLORIDE 1000 MG: 500 TABLET ORAL at 05:01

## 2022-01-13 RX ADMIN — ATORVASTATIN CALCIUM 40 MG: 20 TABLET, FILM COATED ORAL at 09:01

## 2022-01-13 RX ADMIN — OXYCODONE HYDROCHLORIDE AND ACETAMINOPHEN 500 MG: 500 TABLET ORAL at 09:01

## 2022-01-13 RX ADMIN — DOCUSATE SODIUM - SENNOSIDES 1 TABLET: 50; 8.6 TABLET, FILM COATED ORAL at 09:01

## 2022-01-13 RX ADMIN — INSULIN ASPART 30 UNITS: 100 INJECTION, SOLUTION INTRAVENOUS; SUBCUTANEOUS at 05:01

## 2022-01-13 RX ADMIN — INSULIN ASPART 26 UNITS: 100 INJECTION, SOLUTION INTRAVENOUS; SUBCUTANEOUS at 12:01

## 2022-01-13 RX ADMIN — ASPIRIN 81 MG: 81 TABLET, DELAYED RELEASE ORAL at 09:01

## 2022-01-13 RX ADMIN — LOSARTAN POTASSIUM 25 MG: 25 TABLET, FILM COATED ORAL at 09:01

## 2022-01-13 RX ADMIN — THERA TABS 1 TABLET: TAB at 09:01

## 2022-01-13 RX ADMIN — INSULIN ASPART 4 UNITS: 100 INJECTION, SOLUTION INTRAVENOUS; SUBCUTANEOUS at 09:01

## 2022-01-13 RX ADMIN — INSULIN ASPART 26 UNITS: 100 INJECTION, SOLUTION INTRAVENOUS; SUBCUTANEOUS at 09:01

## 2022-01-13 RX ADMIN — DOCUSATE SODIUM - SENNOSIDES 1 TABLET: 50; 8.6 TABLET, FILM COATED ORAL at 08:01

## 2022-01-13 RX ADMIN — LOSARTAN POTASSIUM 25 MG: 25 TABLET, FILM COATED ORAL at 08:01

## 2022-01-13 RX ADMIN — ENOXAPARIN SODIUM 130 MG: 100 INJECTION SUBCUTANEOUS at 09:01

## 2022-01-13 RX ADMIN — INSULIN ASPART 10 UNITS: 100 INJECTION, SOLUTION INTRAVENOUS; SUBCUTANEOUS at 12:01

## 2022-01-13 RX ADMIN — HYDROCHLOROTHIAZIDE 25 MG: 25 TABLET ORAL at 09:01

## 2022-01-13 RX ADMIN — DEXAMETHASONE 6 MG: 4 TABLET ORAL at 09:01

## 2022-01-13 RX ADMIN — QUETIAPINE FUMARATE 200 MG: 200 TABLET ORAL at 08:01

## 2022-01-13 RX ADMIN — OXYCODONE HYDROCHLORIDE AND ACETAMINOPHEN 500 MG: 500 TABLET ORAL at 08:01

## 2022-01-13 NOTE — SUBJECTIVE & OBJECTIVE
Interval History: Pt continues to improve. He is comfortable on low flow oxygen today.    Review of Systems   Constitutional: Negative for chills and fever.   Respiratory: Positive for shortness of breath. Negative for cough.    Cardiovascular: Negative for chest pain and palpitations.   Gastrointestinal: Negative for abdominal pain, nausea and vomiting.     Objective:     Vital Signs (Most Recent):  Temp: 97.8 °F (36.6 °C) (01/13/22 0512)  Pulse: 83 (01/13/22 0749)  Resp: 20 (01/13/22 0749)  BP: 116/86 (01/13/22 0512)  SpO2: (!) 93 % (01/13/22 0749) Vital Signs (24h Range):  Temp:  [97.4 °F (36.3 °C)-98.7 °F (37.1 °C)] 97.8 °F (36.6 °C)  Pulse:  [75-95] 83  Resp:  [18-30] 20  SpO2:  [93 %-99 %] 93 %  BP: (111-141)/(75-93) 116/86     Weight: 123 kg (271 lb 2.7 oz)  Body mass index is 34.82 kg/m².    Intake/Output Summary (Last 24 hours) at 1/13/2022 1026  Last data filed at 1/12/2022 1200  Gross per 24 hour   Intake --   Output 900 ml   Net -900 ml      Physical Exam  Vitals and nursing note reviewed.   Constitutional:       General: He is not in acute distress.     Appearance: Normal appearance. He is well-developed.   Cardiovascular:      Rate and Rhythm: Normal rate.      Pulses: Normal pulses.   Pulmonary:      Effort: Pulmonary effort is normal. No respiratory distress.      Comments: Shallow BS  Abdominal:      Palpations: Abdomen is soft.      Tenderness: There is no abdominal tenderness.   Musculoskeletal:      Right lower leg: No edema.      Left lower leg: No edema.   Skin:     General: Skin is warm and dry.      Comments: Wound dressing in place to b/l LE   Neurological:      Mental Status: He is alert and oriented to person, place, and time.   Psychiatric:         Mood and Affect: Mood normal.       Significant Labs:   CBC:  Recent Labs   Lab 01/11/22  0518 01/11/22  0518 01/12/22  0354 01/13/22  0756   WBC 13.77*  --  18.22* 17.32*   HGB 11.9*  --  12.8* 12.9*   HCT 36.4*  --  39.6* 41.9   *  --   745* 809*   GRAN 65.3  9.0*   < > 62.3  11.4* 61.8  10.7*   LYMPH 18.0  2.5   < > 21.4  3.9 22.3  3.9   MONO 11.4  1.6*   < > 10.3  1.9* 10.2  1.8*   EOS 0.2  --  0.2 0.2   BASO 0.04  --  0.07 0.06    < > = values in this interval not displayed.     CMP:  Recent Labs   Lab 01/11/22  0518 01/11/22  0518 01/12/22  0354 01/13/22  0756 01/13/22  0846   *   < > 138 136 136   K 4.9   < > 5.4* 4.9 4.8   CL 99   < > 100 98 99   CO2 23   < > 29 29 28   BUN 20   < > 21* 21* 21*   CREATININE 0.9   < > 0.9 0.9 0.9   *   < > 225* 215* 213*   CALCIUM 8.6*   < > 9.4 9.5 9.4   PHOS  --   --   --   --  4.3   ALKPHOS 91  --  88 81  --    AST 27  --  27 22  --    ALT 42  --  50* 46*  --    BILITOT 0.3  --  0.3 0.3  --    PROT 7.5  --  8.2 8.2  --    ALBUMIN 2.2*   < > 2.5* 2.5* 2.5*   ANIONGAP 11   < > 9 9 9    < > = values in this interval not displayed.      Significant Imaging:   No new imaging this morning.

## 2022-01-13 NOTE — PLAN OF CARE
Pt transferred to unit via wheelchair w/ personal belongings. POC reviewed w/ pt and rounding complete. Meds administered per MAR. Blood glucose monitored and PRN insulin administered per order. VSS on 3L NC. Pt AAOx4 and ambulatory. Safety precautions intact; no injuries or falls this shift. Isolation precautions for COVID maintained. Pt denies needs at this time; will continue to monitor.

## 2022-01-13 NOTE — ASSESSMENT & PLAN NOTE
Acute respiratory failure due to COVID-19 PNA   COPD with asthma  Former smoker  - COVID PNA with acute hypoxemic respiratory failure.  - Inflammatory markers significantly elevated. Trend.  - Completed remdesivir x5 days  - Continue dexamethasone 6mg PO daily x10 days, baricitinib 4mg PO daily x14 days  - Continue supplemental O2, wean as tolerated. Now on low flow  - Continue albuterol 2 puff inhaled q4hr PRN, ipratropium-albuterol 2 puff inhaled q6hr wake.  - Completed azithromycin and ceftriaxone x 5 days  - WBC trending up, suspect due to steroids. No other evidence of infection  - Continue enoxaparin 1mg/kg subQ BID.

## 2022-01-13 NOTE — ASSESSMENT & PLAN NOTE
Chronic wounds  - Hyperglycemic due to steroids  - Continue insulin detemir 70 U subQ qHS, insulin aspart 26 U TIDWM  - Continue moderate-dose sliding scale insulin aspart 1-10U subQ TIDWM PRN.  - Monitor and adjust further as needed.  - Continue local wound care to b/l LE

## 2022-01-13 NOTE — NURSING
D: pt tx to room 321 per wheelchair. Pt liborio well. Pt notified fiance of room change. No acute signs of distress noted.

## 2022-01-13 NOTE — PLAN OF CARE
Pt is AAO x4. VSS. He is on 5 l O2 via NC. His blood glucose decreasing, last BG-191. No any complaints throughout the shift. He has a transfer order to Med-Surg unit. Bed-321 assigned. Awaiting for the nurse at med-surg to give the report.    1859 - Report given to the nurse for room 321. Awaiting transportation to transfer the patient.

## 2022-01-13 NOTE — PROGRESS NOTES
[facility] Medicine  Progress Note    Patient Name: Dave Good  MRN: [id number]  Patient Class: IP- Inpatient   Admission Date: 1/7/2022  Length of Stay: 6 days  Attending Physician: Marcy Patrick MD  Primary Care Provider: Reyna Jorge NP        Subjective:     Principal Problem:Pneumonia due to COVID-19 virus        HPI:  Mr. 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 He presents to the ED with complaints of shortness of breath, chest pain, wheezing, and decreased appetite; he reports the symptoms with onset approximately 2 weeks ago and have progressively worsened.  He was diagnosed with COVID-19 12/29/2021.  He denies fever, chills, nausea, vomiting, abdominal pain, lightheadedness, dizziness, syncope.  He lives with [facility], is independent ADLs, uses no ambulatory aids.  He reports ancee performed wound care and dressing changes yesterday.  Bilateral feet wrapped, C/D/I, and wearing bilateral surgical shoes. All past medical, social, and family history reviewed with patient; no pertinent family history identified at this time.    In the ED he is noted to be slightly hypertensive, heart rate ranging 100 to 120s, initially hypoxic on room air with saturations 77% to high 80s with improvement on nasal cannula to 92-96% however later transitioned to NRB before floor transfer, and afebrile.  CBC notes WBC 12, stable H&H, platelets 432.  Chemistry with normal renal function, sodium 134, potassium 4.2, albumin 2.4, glucose 196, and LFTs WNLs.  . BNP 34. .  . Troponin 0.016.  Lactic acid 2.2.  Procalcitonin 0.21. EKG with NSR and no acute dynamic changes.  Chest x-ray with bilateral airspace opacities concerning  for pneumonitis/multifocal pneumonia.  Blood cultures obtained.  He was given continuous nebulizers, azithromycin, ceftriaxone, dexamethasone, remdesivir and supplemental oxygen in the ED.  Ongoing COVID evaluation per protocol including ESR, ferritin, PT/INR, PTT, D-dimer, sputum culture, flu evaluation, and urinalysis pending. The patient was admitted to the Hospital Medicine Service for further evaluation and management.       Overview/Hospital Course:  Admitted with COVID PNA. He was started on remdesivir and dexamethasone. He had increased O2 requirements and transferred to ICU. He had some gradual improvement with remdesivir and dexamethasone but with persistent HFNC requirements started on baricitinib. Developed hyperglycemia related to steroids so insulin was adjusted. He was transferred out of ICU with continued improvement.      Interval History: Pt continues to improve. He is comfortable on low flow oxygen today.    Review of Systems   Constitutional: Negative for chills and fever.   Respiratory: Positive for shortness of breath. Negative for cough.    Cardiovascular: Negative for chest pain and palpitations.   Gastrointestinal: Negative for abdominal pain, nausea and vomiting.     Objective:     Vital Signs (Most Recent):  Temp: 97.8 °F (36.6 °C) (01/13/22 0512)  Pulse: 83 (01/13/22 0749)  Resp: 20 (01/13/22 0749)  BP: 116/86 (01/13/22 0512)  SpO2: (!) 93 % (01/13/22 0749) Vital Signs (24h Range):  Temp:  [97.4 °F (36.3 °C)-98.7 °F (37.1 °C)] 97.8 °F (36.6 °C)  Pulse:  [75-95] 83  Resp:  [18-30] 20  SpO2:  [93 %-99 %] 93 %  BP: (111-141)/(75-93) 116/86     Weight: 123 kg (271 lb 2.7 oz)  Body mass index is 34.82 kg/m².    Intake/Output Summary (Last 24 hours) at 1/13/2022 1026  Last data filed at 1/12/2022 1200  Gross per 24 hour   Intake --   Output 900 ml   Net -900 ml      Physical Exam  Vitals and nursing note reviewed.   Constitutional:       General: He is not in acute distress.     Appearance:  Normal appearance. He is well-developed.   Cardiovascular:      Rate and Rhythm: Normal rate.      Pulses: Normal pulses.   Pulmonary:      Effort: Pulmonary effort is normal. No respiratory distress.      Comments: Shallow BS  Abdominal:      Palpations: Abdomen is soft.      Tenderness: There is no abdominal tenderness.   Musculoskeletal:      Right lower leg: No edema.      Left lower leg: No edema.   Skin:     General: Skin is warm and dry.      Comments: Wound dressing in place to b/l LE   Neurological:      Mental Status: He is alert and oriented to person, place, and time.   Psychiatric:         Mood and Affect: Mood normal.       Significant Labs:   CBC:  Recent Labs   Lab 01/11/22 0518 01/11/22 0518 01/12/22  0354 01/13/22  0756   WBC 13.77*  --  18.22* 17.32*   HGB 11.9*  --  12.8* 12.9*   HCT 36.4*  --  39.6* 41.9   *  --  745* 809*   GRAN 65.3  9.0*   < > 62.3  11.4* 61.8  10.7*   LYMPH 18.0  2.5   < > 21.4  3.9 22.3  3.9   MONO 11.4  1.6*   < > 10.3  1.9* 10.2  1.8*   EOS 0.2  --  0.2 0.2   BASO 0.04  --  0.07 0.06    < > = values in this interval not displayed.     CMP:  Recent Labs   Lab 01/11/22 0518 01/11/22 0518 01/12/22  0354 01/13/22  0756 01/13/22  0846   *   < > 138 136 136   K 4.9   < > 5.4* 4.9 4.8   CL 99   < > 100 98 99   CO2 23   < > 29 29 28   BUN 20   < > 21* 21* 21*   CREATININE 0.9   < > 0.9 0.9 0.9   *   < > 225* 215* 213*   CALCIUM 8.6*   < > 9.4 9.5 9.4   PHOS  --   --   --   --  4.3   ALKPHOS 91  --  88 81  --    AST 27  --  27 22  --    ALT 42  --  50* 46*  --    BILITOT 0.3  --  0.3 0.3  --    PROT 7.5  --  8.2 8.2  --    ALBUMIN 2.2*   < > 2.5* 2.5* 2.5*   ANIONGAP 11   < > 9 9 9    < > = values in this interval not displayed.      Significant Imaging:   No new imaging this morning.      Assessment/Plan:      * Pneumonia due to COVID-19 virus  Acute respiratory failure due to COVID-19 PNA   COPD with asthma  Former smoker  - COVID PNA with acute  hypoxemic respiratory failure.  - Inflammatory markers significantly elevated. Trend.  - Completed remdesivir x5 days  - Continue dexamethasone 6mg PO daily x10 days, baricitinib 4mg PO daily x14 days  - Continue supplemental O2, wean as tolerated. Now on low flow  - Continue albuterol 2 puff inhaled q4hr PRN, ipratropium-albuterol 2 puff inhaled q6hr wake.  - Completed azithromycin and ceftriaxone x 5 days  - WBC trending up, suspect due to steroids. No other evidence of infection  - Continue enoxaparin 1mg/kg subQ BID.    Chronic systolic congestive heart failure  - Chronic; appears euvolemic.  - Continue losartan, HCTZ as above.    Type II diabetes mellitus with peripheral circulatory disorder, uncontrolled  Chronic wounds  - Hyperglycemic due to steroids  - Continue insulin detemir 70 U subQ qHS, insulin aspart 26 U TIDWM  - Continue moderate-dose sliding scale insulin aspart 1-10U subQ TIDWM PRN.  - Monitor and adjust further as needed.  - Continue local wound care to b/l LE    Mixed hyperlipidemia  - Continue atorvastatin 40 mg PO daily.    Depression  - Continue quetiapine 200 mg PO qHS.    Primary hypertension  - Continue losartan 25mg PO BID, HCTZ 25mg PO daily.    VTE Risk Mitigation (From admission, onward)         Ordered     enoxaparin injection 130 mg  2 times daily         01/07/22 9971                Marcy Patrick MD  Department of Hospital Medicine   [facility] ([place])

## 2022-01-14 LAB
POCT GLUCOSE: 136 MG/DL (ref 70–110)
POCT GLUCOSE: 167 MG/DL (ref 70–110)
POCT GLUCOSE: 186 MG/DL (ref 70–110)
POCT GLUCOSE: 206 MG/DL (ref 70–110)
POCT GLUCOSE: 207 MG/DL (ref 70–110)
POCT GLUCOSE: 279 MG/DL (ref 70–110)

## 2022-01-14 PROCEDURE — 99900035 HC TECH TIME PER 15 MIN (STAT)

## 2022-01-14 PROCEDURE — 11000001 HC ACUTE MED/SURG PRIVATE ROOM

## 2022-01-14 PROCEDURE — 99232 SBSQ HOSP IP/OBS MODERATE 35: CPT | Mod: ,,, | Performed by: INTERNAL MEDICINE

## 2022-01-14 PROCEDURE — C9399 UNCLASSIFIED DRUGS OR BIOLOG: HCPCS | Performed by: INTERNAL MEDICINE

## 2022-01-14 PROCEDURE — 63600175 PHARM REV CODE 636 W HCPCS: Performed by: INTERNAL MEDICINE

## 2022-01-14 PROCEDURE — 94761 N-INVAS EAR/PLS OXIMETRY MLT: CPT

## 2022-01-14 PROCEDURE — 25000003 PHARM REV CODE 250: Performed by: INTERNAL MEDICINE

## 2022-01-14 PROCEDURE — 99232 PR SUBSEQUENT HOSPITAL CARE,LEVL II: ICD-10-PCS | Mod: ,,, | Performed by: INTERNAL MEDICINE

## 2022-01-14 PROCEDURE — 27000221 HC OXYGEN, UP TO 24 HOURS

## 2022-01-14 PROCEDURE — 27000207 HC ISOLATION

## 2022-01-14 RX ADMIN — INSULIN ASPART 30 UNITS: 100 INJECTION, SOLUTION INTRAVENOUS; SUBCUTANEOUS at 05:01

## 2022-01-14 RX ADMIN — INSULIN ASPART 30 UNITS: 100 INJECTION, SOLUTION INTRAVENOUS; SUBCUTANEOUS at 12:01

## 2022-01-14 RX ADMIN — ATORVASTATIN CALCIUM 40 MG: 20 TABLET, FILM COATED ORAL at 09:01

## 2022-01-14 RX ADMIN — ENOXAPARIN SODIUM 130 MG: 100 INJECTION SUBCUTANEOUS at 09:01

## 2022-01-14 RX ADMIN — LOSARTAN POTASSIUM 25 MG: 25 TABLET, FILM COATED ORAL at 09:01

## 2022-01-14 RX ADMIN — ASPIRIN 81 MG: 81 TABLET, DELAYED RELEASE ORAL at 09:01

## 2022-01-14 RX ADMIN — DEXAMETHASONE 6 MG: 4 TABLET ORAL at 09:01

## 2022-01-14 RX ADMIN — DOCUSATE SODIUM - SENNOSIDES 1 TABLET: 50; 8.6 TABLET, FILM COATED ORAL at 09:01

## 2022-01-14 RX ADMIN — INSULIN ASPART 3 UNITS: 100 INJECTION, SOLUTION INTRAVENOUS; SUBCUTANEOUS at 09:01

## 2022-01-14 RX ADMIN — METFORMIN HYDROCHLORIDE 1000 MG: 500 TABLET ORAL at 09:01

## 2022-01-14 RX ADMIN — BARICITINIB 4 MG: 2 TABLET, FILM COATED ORAL at 09:01

## 2022-01-14 RX ADMIN — HYDROCHLOROTHIAZIDE 25 MG: 25 TABLET ORAL at 09:01

## 2022-01-14 RX ADMIN — INSULIN ASPART 30 UNITS: 100 INJECTION, SOLUTION INTRAVENOUS; SUBCUTANEOUS at 09:01

## 2022-01-14 RX ADMIN — QUETIAPINE FUMARATE 200 MG: 200 TABLET ORAL at 09:01

## 2022-01-14 RX ADMIN — METFORMIN HYDROCHLORIDE 1000 MG: 500 TABLET ORAL at 05:01

## 2022-01-14 RX ADMIN — THERA TABS 1 TABLET: TAB at 09:01

## 2022-01-14 RX ADMIN — INSULIN DETEMIR 70 UNITS: 100 INJECTION, SOLUTION SUBCUTANEOUS at 09:01

## 2022-01-14 RX ADMIN — OXYCODONE HYDROCHLORIDE AND ACETAMINOPHEN 500 MG: 500 TABLET ORAL at 09:01

## 2022-01-14 NOTE — ASSESSMENT & PLAN NOTE
Chronic wounds  - Hyperglycemic due to steroids  - Continue insulin detemir 70 U subQ qHS, insulin aspart 26 U TIDWM  - Continue moderate-dose sliding scale insulin aspart 1-10U subQ TIDWM PRN.  - Add home dose of metformin  - Monitor and adjust further as needed.  - Continue local wound care to b/l LE

## 2022-01-14 NOTE — SUBJECTIVE & OBJECTIVE
Interval History: Pt continues to improve. He is comfortable on low flow oxygen.    Review of Systems   Constitutional: Negative for chills and fever.   Respiratory: Positive for shortness of breath. Negative for cough.    Cardiovascular: Negative for chest pain and palpitations.   Gastrointestinal: Negative for abdominal pain, nausea and vomiting.     Objective:     Vital Signs (Most Recent):  Temp: 98.4 °F (36.9 °C) (01/14/22 1137)  Pulse: 87 (01/14/22 1137)  Resp: 16 (01/14/22 1137)  BP: 108/83 (01/14/22 1137)  SpO2: (!) 94 % (01/14/22 1137) Vital Signs (24h Range):  Temp:  [97.4 °F (36.3 °C)-98.4 °F (36.9 °C)] 98.4 °F (36.9 °C)  Pulse:  [83-95] 87  Resp:  [16-20] 16  SpO2:  [94 %-98 %] 94 %  BP: (108-121)/(74-84) 108/83     Weight: 123 kg (271 lb 2.7 oz)  Body mass index is 34.82 kg/m².    Intake/Output Summary (Last 24 hours) at 1/14/2022 1359  Last data filed at 1/14/2022 0800  Gross per 24 hour   Intake 900 ml   Output 800 ml   Net 100 ml      Physical Exam  Vitals and nursing note reviewed.   Constitutional:       General: He is not in acute distress.     Appearance: Normal appearance. He is well-developed.   Cardiovascular:      Rate and Rhythm: Normal rate.      Pulses: Normal pulses.   Pulmonary:      Effort: Pulmonary effort is normal. No respiratory distress.      Comments: Shallow BS  Abdominal:      Palpations: Abdomen is soft.      Tenderness: There is no abdominal tenderness.   Musculoskeletal:      Right lower leg: No edema.      Left lower leg: No edema.   Skin:     General: Skin is warm and dry.      Comments: Wound dressing in place to b/l LE   Neurological:      Mental Status: He is alert and oriented to person, place, and time.   Psychiatric:         Mood and Affect: Mood normal.       Significant Labs:   CBC:  Recent Labs   Lab 01/12/22  0354 01/13/22  0756   WBC 18.22* 17.32*   HGB 12.8* 12.9*   HCT 39.6* 41.9   * 809*   GRAN 62.3  11.4* 61.8  10.7*   LYMPH 21.4  3.9 22.3  3.9    MONO 10.3  1.9* 10.2  1.8*   EOS 0.2 0.2   BASO 0.07 0.06     CMP:  Recent Labs   Lab 01/12/22  0354 01/13/22  0756 01/13/22  0846    136 136   K 5.4* 4.9 4.8    98 99   CO2 29 29 28   BUN 21* 21* 21*   CREATININE 0.9 0.9 0.9   * 215* 213*   CALCIUM 9.4 9.5 9.4   PHOS  --   --  4.3   ALKPHOS 88 81  --    AST 27 22  --    ALT 50* 46*  --    BILITOT 0.3 0.3  --    PROT 8.2 8.2  --    ALBUMIN 2.5* 2.5* 2.5*   ANIONGAP 9 9 9      Significant Imaging:   No new imaging this morning.

## 2022-01-14 NOTE — PROGRESS NOTES
[facility] Medicine  Progress Note    Patient Name: Dave Good  MRN: [id number]  Patient Class: IP- Inpatient   Admission Date: 1/7/2022  Length of Stay: 7 days  Attending Physician: Marcy Patrick MD  Primary Care Provider: Reyna Jorge NP        Subjective:     Principal Problem:Pneumonia due to COVID-19 virus        HPI:  Mr. 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 He presents to the ED with complaints of shortness of breath, chest pain, wheezing, and decreased appetite; he reports the symptoms with onset approximately 2 weeks ago and have progressively worsened.  He was diagnosed with COVID-19 12/29/2021.  He denies fever, chills, nausea, vomiting, abdominal pain, lightheadedness, dizziness, syncope.  He lives with [facility], is independent ADLs, uses no ambulatory aids.  He reports ancee performed wound care and dressing changes yesterday.  Bilateral feet wrapped, C/D/I, and wearing bilateral surgical shoes. All past medical, social, and family history reviewed with patient; no pertinent family history identified at this time.    In the ED he is noted to be slightly hypertensive, heart rate ranging 100 to 120s, initially hypoxic on room air with saturations 77% to high 80s with improvement on nasal cannula to 92-96% however later transitioned to NRB before floor transfer, and afebrile.  CBC notes WBC 12, stable H&H, platelets 432.  Chemistry with normal renal function, sodium 134, potassium 4.2, albumin 2.4, glucose 196, and LFTs WNLs.  . BNP 34. .  . Troponin 0.016.  Lactic acid 2.2.  Procalcitonin 0.21. EKG with NSR and no acute dynamic changes.  Chest x-ray with bilateral airspace opacities concerning  for pneumonitis/multifocal pneumonia.  Blood cultures obtained.  He was given continuous nebulizers, azithromycin, ceftriaxone, dexamethasone, remdesivir and supplemental oxygen in the ED.  Ongoing COVID evaluation per protocol including ESR, ferritin, PT/INR, PTT, D-dimer, sputum culture, flu evaluation, and urinalysis pending. The patient was admitted to the Hospital Medicine Service for further evaluation and management.       Overview/Hospital Course:  Admitted with COVID PNA. He was started on remdesivir and dexamethasone. He had increased O2 requirements and transferred to ICU. He had some gradual improvement with remdesivir and dexamethasone but with persistent HFNC requirements started on baricitinib. Developed hyperglycemia related to steroids so insulin was adjusted. He was transferred out of ICU with continued improvement.      Interval History: Pt continues to improve. He is comfortable on low flow oxygen.    Review of Systems   Constitutional: Negative for chills and fever.   Respiratory: Positive for shortness of breath. Negative for cough.    Cardiovascular: Negative for chest pain and palpitations.   Gastrointestinal: Negative for abdominal pain, nausea and vomiting.     Objective:     Vital Signs (Most Recent):  Temp: 98.4 °F (36.9 °C) (01/14/22 1137)  Pulse: 87 (01/14/22 1137)  Resp: 16 (01/14/22 1137)  BP: 108/83 (01/14/22 1137)  SpO2: (!) 94 % (01/14/22 1137) Vital Signs (24h Range):  Temp:  [97.4 °F (36.3 °C)-98.4 °F (36.9 °C)] 98.4 °F (36.9 °C)  Pulse:  [83-95] 87  Resp:  [16-20] 16  SpO2:  [94 %-98 %] 94 %  BP: (108-121)/(74-84) 108/83     Weight: 123 kg (271 lb 2.7 oz)  Body mass index is 34.82 kg/m².    Intake/Output Summary (Last 24 hours) at 1/14/2022 1359  Last data filed at 1/14/2022 0800  Gross per 24 hour   Intake 900 ml   Output 800 ml   Net 100 ml      Physical Exam  Vitals and nursing note reviewed.   Constitutional:       General: He is not in acute distress.     Appearance: Normal  appearance. He is well-developed.   Cardiovascular:      Rate and Rhythm: Normal rate.      Pulses: Normal pulses.   Pulmonary:      Effort: Pulmonary effort is normal. No respiratory distress.      Comments: Shallow BS  Abdominal:      Palpations: Abdomen is soft.      Tenderness: There is no abdominal tenderness.   Musculoskeletal:      Right lower leg: No edema.      Left lower leg: No edema.   Skin:     General: Skin is warm and dry.      Comments: Wound dressing in place to b/l LE   Neurological:      Mental Status: He is alert and oriented to person, place, and time.   Psychiatric:         Mood and Affect: Mood normal.       Significant Labs:   CBC:  Recent Labs   Lab 01/12/22  0354 01/13/22  0756   WBC 18.22* 17.32*   HGB 12.8* 12.9*   HCT 39.6* 41.9   * 809*   GRAN 62.3  11.4* 61.8  10.7*   LYMPH 21.4  3.9 22.3  3.9   MONO 10.3  1.9* 10.2  1.8*   EOS 0.2 0.2   BASO 0.07 0.06     CMP:  Recent Labs   Lab 01/12/22  0354 01/13/22  0756 01/13/22  0846    136 136   K 5.4* 4.9 4.8    98 99   CO2 29 29 28   BUN 21* 21* 21*   CREATININE 0.9 0.9 0.9   * 215* 213*   CALCIUM 9.4 9.5 9.4   PHOS  --   --  4.3   ALKPHOS 88 81  --    AST 27 22  --    ALT 50* 46*  --    BILITOT 0.3 0.3  --    PROT 8.2 8.2  --    ALBUMIN 2.5* 2.5* 2.5*   ANIONGAP 9 9 9      Significant Imaging:   No new imaging this morning.      Assessment/Plan:      * Pneumonia due to COVID-19 virus  Acute respiratory failure due to COVID-19 PNA   COPD with asthma  Former smoker  - COVID PNA with acute hypoxemic respiratory failure.  - Inflammatory markers significantly elevated. Trend.  - Completed remdesivir x5 days  - Continue dexamethasone 6mg PO daily x10 days, baricitinib 4mg PO daily x14 days  - Continue supplemental O2, wean as tolerated. Now on low flow  - Continue albuterol 2 puff inhaled q4hr PRN, ipratropium-albuterol 2 puff inhaled q6hr wake.  - Completed azithromycin and ceftriaxone x 5 days  - WBC trending up,  suspect due to steroids. No other evidence of infection  - Continue enoxaparin 1mg/kg subQ BID.    Chronic systolic congestive heart failure  - Chronic; appears euvolemic.  - Continue losartan, HCTZ as above.    Type II diabetes mellitus with peripheral circulatory disorder, uncontrolled  Chronic wounds  - Hyperglycemic due to steroids  - Continue insulin detemir 70 U subQ qHS, insulin aspart 26 U TIDWM  - Continue moderate-dose sliding scale insulin aspart 1-10U subQ TIDWM PRN.  - Add home dose of metformin  - Monitor and adjust further as needed.  - Continue local wound care to b/l LE    Mixed hyperlipidemia  - Continue atorvastatin 40 mg PO daily.    Depression  - Continue quetiapine 200 mg PO qHS.    Primary hypertension  - Continue losartan 25mg PO BID, HCTZ 25mg PO daily.      VTE Risk Mitigation (From admission, onward)         Ordered     enoxaparin injection 130 mg  2 times daily         01/07/22 6846                        Marcy Patrick MD  Department of Hospital Medicine   [facility])

## 2022-01-14 NOTE — PLAN OF CARE
POC reviewed w/ pt and rounding complete. Meds administered per MAR. Blood glucose monitored and PRN  insulin administered per order. VSS on 5L NC. Pt AAOx4 and ambulatory. Safety precautions intact; no injuries or falls this shift. Pt denies needs at this time; will continue to monitor.

## 2022-01-15 LAB
CRP SERPL-MCNC: 10.3 MG/L (ref 0–8.2)
LDH SERPL L TO P-CCNC: 294 U/L (ref 110–260)
POCT GLUCOSE: 159 MG/DL (ref 70–110)
POCT GLUCOSE: 169 MG/DL (ref 70–110)
POCT GLUCOSE: 205 MG/DL (ref 70–110)
POCT GLUCOSE: 224 MG/DL (ref 70–110)
POCT GLUCOSE: 73 MG/DL (ref 70–110)
POCT GLUCOSE: 94 MG/DL (ref 70–110)

## 2022-01-15 PROCEDURE — 99232 PR SUBSEQUENT HOSPITAL CARE,LEVL II: ICD-10-PCS | Mod: ,,, | Performed by: INTERNAL MEDICINE

## 2022-01-15 PROCEDURE — 11000001 HC ACUTE MED/SURG PRIVATE ROOM

## 2022-01-15 PROCEDURE — 63600175 PHARM REV CODE 636 W HCPCS: Performed by: INTERNAL MEDICINE

## 2022-01-15 PROCEDURE — 27000207 HC ISOLATION

## 2022-01-15 PROCEDURE — 36415 COLL VENOUS BLD VENIPUNCTURE: CPT | Performed by: INTERNAL MEDICINE

## 2022-01-15 PROCEDURE — 99232 SBSQ HOSP IP/OBS MODERATE 35: CPT | Mod: ,,, | Performed by: INTERNAL MEDICINE

## 2022-01-15 PROCEDURE — 25000003 PHARM REV CODE 250: Performed by: INTERNAL MEDICINE

## 2022-01-15 PROCEDURE — C9399 UNCLASSIFIED DRUGS OR BIOLOG: HCPCS | Performed by: INTERNAL MEDICINE

## 2022-01-15 PROCEDURE — 83615 LACTATE (LD) (LDH) ENZYME: CPT | Performed by: INTERNAL MEDICINE

## 2022-01-15 PROCEDURE — 86140 C-REACTIVE PROTEIN: CPT | Performed by: INTERNAL MEDICINE

## 2022-01-15 RX ADMIN — HYDROCHLOROTHIAZIDE 25 MG: 25 TABLET ORAL at 09:01

## 2022-01-15 RX ADMIN — DOCUSATE SODIUM - SENNOSIDES 1 TABLET: 50; 8.6 TABLET, FILM COATED ORAL at 08:01

## 2022-01-15 RX ADMIN — INSULIN ASPART 1 UNITS: 100 INJECTION, SOLUTION INTRAVENOUS; SUBCUTANEOUS at 10:01

## 2022-01-15 RX ADMIN — QUETIAPINE FUMARATE 200 MG: 200 TABLET ORAL at 08:01

## 2022-01-15 RX ADMIN — LOSARTAN POTASSIUM 25 MG: 25 TABLET, FILM COATED ORAL at 09:01

## 2022-01-15 RX ADMIN — INSULIN ASPART 30 UNITS: 100 INJECTION, SOLUTION INTRAVENOUS; SUBCUTANEOUS at 09:01

## 2022-01-15 RX ADMIN — METFORMIN HYDROCHLORIDE 1000 MG: 500 TABLET ORAL at 06:01

## 2022-01-15 RX ADMIN — ASPIRIN 81 MG: 81 TABLET, DELAYED RELEASE ORAL at 09:01

## 2022-01-15 RX ADMIN — ENOXAPARIN SODIUM 130 MG: 100 INJECTION SUBCUTANEOUS at 09:01

## 2022-01-15 RX ADMIN — ATORVASTATIN CALCIUM 40 MG: 20 TABLET, FILM COATED ORAL at 09:01

## 2022-01-15 RX ADMIN — ENOXAPARIN SODIUM 130 MG: 100 INJECTION SUBCUTANEOUS at 08:01

## 2022-01-15 RX ADMIN — THERA TABS 1 TABLET: TAB at 09:01

## 2022-01-15 RX ADMIN — INSULIN DETEMIR 70 UNITS: 100 INJECTION, SOLUTION SUBCUTANEOUS at 09:01

## 2022-01-15 RX ADMIN — DEXAMETHASONE 6 MG: 4 TABLET ORAL at 09:01

## 2022-01-15 RX ADMIN — METFORMIN HYDROCHLORIDE 1000 MG: 500 TABLET ORAL at 09:01

## 2022-01-15 RX ADMIN — DOCUSATE SODIUM - SENNOSIDES 1 TABLET: 50; 8.6 TABLET, FILM COATED ORAL at 09:01

## 2022-01-15 RX ADMIN — OXYCODONE HYDROCHLORIDE AND ACETAMINOPHEN 500 MG: 500 TABLET ORAL at 09:01

## 2022-01-15 RX ADMIN — INSULIN ASPART 30 UNITS: 100 INJECTION, SOLUTION INTRAVENOUS; SUBCUTANEOUS at 01:01

## 2022-01-15 RX ADMIN — OXYCODONE HYDROCHLORIDE AND ACETAMINOPHEN 500 MG: 500 TABLET ORAL at 08:01

## 2022-01-15 RX ADMIN — LOSARTAN POTASSIUM 25 MG: 25 TABLET, FILM COATED ORAL at 08:01

## 2022-01-15 RX ADMIN — BARICITINIB 4 MG: 2 TABLET, FILM COATED ORAL at 09:01

## 2022-01-15 NOTE — NURSING
Oriented x 4. Plan of care reviewed. Pain denied at this time. Vitals stable on 5 liters via nasal cannula with humidification. Fall risk reviewed. Urinal provided. Glucose monitoring continued. Bed locked/lowest position, side rails up x 2, call light within reach. Patient encouraged to call for assistance when needed. Will continue to monitor.

## 2022-01-15 NOTE — PROGRESS NOTES
[facility] Medicine  Progress Note    Patient Name: Dave Good  MRN: [id number]  Patient Class: IP- Inpatient   Admission Date: 1/7/2022  Length of Stay: 8 days  Attending Physician: Marcy Patrick MD  Primary Care Provider: Reyna Jorge NP        Subjective:     Principal Problem:Pneumonia due to COVID-19 virus        HPI:  Mr. 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 He presents to the ED with complaints of shortness of breath, chest pain, wheezing, and decreased appetite; he reports the symptoms with onset approximately 2 weeks ago and have progressively worsened.  He was diagnosed with COVID-19 12/29/2021.  He denies fever, chills, nausea, vomiting, abdominal pain, lightheadedness, dizziness, syncope.  He lives with [facility], is independent ADLs, uses no ambulatory aids.  He reports ancee performed wound care and dressing changes yesterday.  Bilateral feet wrapped, C/D/I, and wearing bilateral surgical shoes. All past medical, social, and family history reviewed with patient; no pertinent family history identified at this time.    In the ED he is noted to be slightly hypertensive, heart rate ranging 100 to 120s, initially hypoxic on room air with saturations 77% to high 80s with improvement on nasal cannula to 92-96% however later transitioned to NRB before floor transfer, and afebrile.  CBC notes WBC 12, stable H&H, platelets 432.  Chemistry with normal renal function, sodium 134, potassium 4.2, albumin 2.4, glucose 196, and LFTs WNLs.  . BNP 34. .  . Troponin 0.016.  Lactic acid 2.2.  Procalcitonin 0.21. EKG with NSR and no acute dynamic changes.  Chest x-ray with bilateral airspace opacities concerning  for pneumonitis/multifocal pneumonia.  Blood cultures obtained.  He was given continuous nebulizers, azithromycin, ceftriaxone, dexamethasone, remdesivir and supplemental oxygen in the ED.  Ongoing COVID evaluation per protocol including ESR, ferritin, PT/INR, PTT, D-dimer, sputum culture, flu evaluation, and urinalysis pending. The patient was admitted to the Hospital Medicine Service for further evaluation and management.       Overview/Hospital Course:  Admitted with COVID PNA. He was started on remdesivir and dexamethasone. He had increased O2 requirements and transferred to ICU. He had some gradual improvement with remdesivir and dexamethasone but with persistent HFNC requirements started on baricitinib. Developed hyperglycemia related to steroids so insulin was adjusted. He was transferred out of ICU with continued improvement.      Interval History: Pt continues to improve. He remains on 5L    Review of Systems   Constitutional: Negative for chills and fever.   Respiratory: Negative for cough and shortness of breath.    Cardiovascular: Negative for chest pain and palpitations.   Gastrointestinal: Negative for abdominal pain, nausea and vomiting.     Objective:     Vital Signs (Most Recent):  Temp: 97.8 °F (36.6 °C) (01/15/22 1145)  Pulse: 85 (01/15/22 1145)  Resp: 16 (01/15/22 1145)  BP: 116/75 (01/15/22 1145)  SpO2: 95 % (01/15/22 1145) Vital Signs (24h Range):  Temp:  [97.8 °F (36.6 °C)-98.7 °F (37.1 °C)] 97.8 °F (36.6 °C)  Pulse:  [] 85  Resp:  [16-20] 16  SpO2:  [95 %-99 %] 95 %  BP: (102-123)/(72-82) 116/75     Weight: 123 kg (271 lb 2.7 oz)  Body mass index is 34.82 kg/m².    Intake/Output Summary (Last 24 hours) at 1/15/2022 1228  Last data filed at 1/15/2022 1200  Gross per 24 hour   Intake 720 ml   Output 1630 ml   Net -910 ml      Physical Exam  Vitals and nursing note reviewed.   Constitutional:       General: He is not in acute distress.     Appearance: Normal appearance. He is  well-developed.   Cardiovascular:      Rate and Rhythm: Normal rate.      Pulses: Normal pulses.   Pulmonary:      Effort: Pulmonary effort is normal. No respiratory distress.      Comments: Shallow BS  Abdominal:      Palpations: Abdomen is soft.      Tenderness: There is no abdominal tenderness.   Musculoskeletal:      Right lower leg: No edema.      Left lower leg: No edema.   Skin:     General: Skin is warm and dry.      Comments: Wound dressing in place to b/l LE   Neurological:      Mental Status: He is alert and oriented to person, place, and time.   Psychiatric:         Mood and Affect: Mood normal.       Significant Labs:   CBC:  Recent Labs   Lab 01/13/22  0756   WBC 17.32*   HGB 12.9*   HCT 41.9   *   GRAN 61.8  10.7*   LYMPH 22.3  3.9   MONO 10.2  1.8*   EOS 0.2   BASO 0.06     CMP:  Recent Labs   Lab 01/13/22  0756 01/13/22  0846    136   K 4.9 4.8   CL 98 99   CO2 29 28   BUN 21* 21*   CREATININE 0.9 0.9   * 213*   CALCIUM 9.5 9.4   PHOS  --  4.3   ALKPHOS 81  --    AST 22  --    ALT 46*  --    BILITOT 0.3  --    PROT 8.2  --    ALBUMIN 2.5* 2.5*   ANIONGAP 9 9      Significant Imaging:   No new imaging this morning.      Assessment/Plan:      * Pneumonia due to COVID-19 virus  Acute respiratory failure due to COVID-19 PNA   COPD with asthma  Former smoker  - COVID PNA with acute hypoxemic respiratory failure.  - Inflammatory markers significantly elevated. Trend.  - Completed remdesivir x5 days  - Continue dexamethasone 6mg PO daily x10 days, baricitinib 4mg PO daily x14 days  - Continue supplemental O2, wean as tolerated. Now on low flow  - Continue albuterol 2 puff inhaled q4hr PRN, ipratropium-albuterol 2 puff inhaled q6hr wake.  - Completed azithromycin and ceftriaxone x 5 days  - WBC trending up, suspect due to steroids. No other evidence of infection  - Continue enoxaparin 1mg/kg subQ BID.    Chronic systolic congestive heart failure  - Chronic; appears euvolemic.  -  Continue losartan, HCTZ as above.    Type II diabetes mellitus with peripheral circulatory disorder, uncontrolled  Chronic wounds  - Hyperglycemic due to steroids  - Continue insulin detemir 70 U subQ qHS, insulin aspart 26 U TIDWM  - Continue moderate-dose sliding scale insulin aspart 1-10U subQ TIDWM PRN.  - Add home dose of metformin  - Monitor and adjust further as needed.  - Continue local wound care to b/l LE    Mixed hyperlipidemia  - Continue atorvastatin 40 mg PO daily.    Depression  - Continue quetiapine 200 mg PO qHS.    Primary hypertension  - Continue losartan 25mg PO BID, HCTZ 25mg PO daily.    VTE Risk Mitigation (From admission, onward)         Ordered     enoxaparin injection 130 mg  2 times daily         01/07/22 6998                    Marcy Patrick MD  Department of Hospital Medicine   [facility] ([facility])

## 2022-01-15 NOTE — SUBJECTIVE & OBJECTIVE
Interval History: Pt continues to improve. He remains on 5L    Review of Systems   Constitutional: Negative for chills and fever.   Respiratory: Negative for cough and shortness of breath.    Cardiovascular: Negative for chest pain and palpitations.   Gastrointestinal: Negative for abdominal pain, nausea and vomiting.     Objective:     Vital Signs (Most Recent):  Temp: 97.8 °F (36.6 °C) (01/15/22 1145)  Pulse: 85 (01/15/22 1145)  Resp: 16 (01/15/22 1145)  BP: 116/75 (01/15/22 1145)  SpO2: 95 % (01/15/22 1145) Vital Signs (24h Range):  Temp:  [97.8 °F (36.6 °C)-98.7 °F (37.1 °C)] 97.8 °F (36.6 °C)  Pulse:  [] 85  Resp:  [16-20] 16  SpO2:  [95 %-99 %] 95 %  BP: (102-123)/(72-82) 116/75     Weight: 123 kg (271 lb 2.7 oz)  Body mass index is 34.82 kg/m².    Intake/Output Summary (Last 24 hours) at 1/15/2022 1228  Last data filed at 1/15/2022 1200  Gross per 24 hour   Intake 720 ml   Output 1630 ml   Net -910 ml      Physical Exam  Vitals and nursing note reviewed.   Constitutional:       General: He is not in acute distress.     Appearance: Normal appearance. He is well-developed.   Cardiovascular:      Rate and Rhythm: Normal rate.      Pulses: Normal pulses.   Pulmonary:      Effort: Pulmonary effort is normal. No respiratory distress.      Comments: Shallow BS  Abdominal:      Palpations: Abdomen is soft.      Tenderness: There is no abdominal tenderness.   Musculoskeletal:      Right lower leg: No edema.      Left lower leg: No edema.   Skin:     General: Skin is warm and dry.      Comments: Wound dressing in place to b/l LE   Neurological:      Mental Status: He is alert and oriented to person, place, and time.   Psychiatric:         Mood and Affect: Mood normal.       Significant Labs:   CBC:  Recent Labs   Lab 01/13/22  0756   WBC 17.32*   HGB 12.9*   HCT 41.9   *   GRAN 61.8  10.7*   LYMPH 22.3  3.9   MONO 10.2  1.8*   EOS 0.2   BASO 0.06     CMP:  Recent Labs   Lab 01/13/22  0756 01/13/22  0846     136   K 4.9 4.8   CL 98 99   CO2 29 28   BUN 21* 21*   CREATININE 0.9 0.9   * 213*   CALCIUM 9.5 9.4   PHOS  --  4.3   ALKPHOS 81  --    AST 22  --    ALT 46*  --    BILITOT 0.3  --    PROT 8.2  --    ALBUMIN 2.5* 2.5*   ANIONGAP 9 9      Significant Imaging:   No new imaging this morning.

## 2022-01-16 LAB
POCT GLUCOSE: 155 MG/DL (ref 70–110)
POCT GLUCOSE: 190 MG/DL (ref 70–110)
POCT GLUCOSE: 194 MG/DL (ref 70–110)
POCT GLUCOSE: 324 MG/DL (ref 70–110)

## 2022-01-16 PROCEDURE — 99232 PR SUBSEQUENT HOSPITAL CARE,LEVL II: ICD-10-PCS | Mod: ,,, | Performed by: INTERNAL MEDICINE

## 2022-01-16 PROCEDURE — 63600175 PHARM REV CODE 636 W HCPCS: Performed by: INTERNAL MEDICINE

## 2022-01-16 PROCEDURE — 27000221 HC OXYGEN, UP TO 24 HOURS

## 2022-01-16 PROCEDURE — 25000003 PHARM REV CODE 250: Performed by: INTERNAL MEDICINE

## 2022-01-16 PROCEDURE — 94761 N-INVAS EAR/PLS OXIMETRY MLT: CPT

## 2022-01-16 PROCEDURE — C9399 UNCLASSIFIED DRUGS OR BIOLOG: HCPCS | Performed by: INTERNAL MEDICINE

## 2022-01-16 PROCEDURE — 27000207 HC ISOLATION

## 2022-01-16 PROCEDURE — 11000001 HC ACUTE MED/SURG PRIVATE ROOM

## 2022-01-16 PROCEDURE — 99232 SBSQ HOSP IP/OBS MODERATE 35: CPT | Mod: ,,, | Performed by: INTERNAL MEDICINE

## 2022-01-16 RX ORDER — INSULIN ASPART 100 [IU]/ML
20 INJECTION, SOLUTION INTRAVENOUS; SUBCUTANEOUS
Status: DISCONTINUED | OUTPATIENT
Start: 2022-01-16 | End: 2022-01-17

## 2022-01-16 RX ADMIN — LOSARTAN POTASSIUM 25 MG: 25 TABLET, FILM COATED ORAL at 09:01

## 2022-01-16 RX ADMIN — ENOXAPARIN SODIUM 130 MG: 100 INJECTION SUBCUTANEOUS at 08:01

## 2022-01-16 RX ADMIN — QUETIAPINE FUMARATE 200 MG: 200 TABLET ORAL at 09:01

## 2022-01-16 RX ADMIN — DOCUSATE SODIUM - SENNOSIDES 1 TABLET: 50; 8.6 TABLET, FILM COATED ORAL at 08:01

## 2022-01-16 RX ADMIN — METFORMIN HYDROCHLORIDE 1000 MG: 500 TABLET ORAL at 05:01

## 2022-01-16 RX ADMIN — INSULIN ASPART 1 UNITS: 100 INJECTION, SOLUTION INTRAVENOUS; SUBCUTANEOUS at 09:01

## 2022-01-16 RX ADMIN — METFORMIN HYDROCHLORIDE 1000 MG: 500 TABLET ORAL at 08:01

## 2022-01-16 RX ADMIN — INSULIN ASPART 2 UNITS: 100 INJECTION, SOLUTION INTRAVENOUS; SUBCUTANEOUS at 09:01

## 2022-01-16 RX ADMIN — THERA TABS 1 TABLET: TAB at 08:01

## 2022-01-16 RX ADMIN — INSULIN DETEMIR 70 UNITS: 100 INJECTION, SOLUTION SUBCUTANEOUS at 09:01

## 2022-01-16 RX ADMIN — ASPIRIN 81 MG: 81 TABLET, DELAYED RELEASE ORAL at 08:01

## 2022-01-16 RX ADMIN — DEXAMETHASONE 6 MG: 4 TABLET ORAL at 08:01

## 2022-01-16 RX ADMIN — ENOXAPARIN SODIUM 130 MG: 100 INJECTION SUBCUTANEOUS at 09:01

## 2022-01-16 RX ADMIN — OXYCODONE HYDROCHLORIDE AND ACETAMINOPHEN 500 MG: 500 TABLET ORAL at 09:01

## 2022-01-16 RX ADMIN — ATORVASTATIN CALCIUM 40 MG: 20 TABLET, FILM COATED ORAL at 08:01

## 2022-01-16 RX ADMIN — DOCUSATE SODIUM - SENNOSIDES 1 TABLET: 50; 8.6 TABLET, FILM COATED ORAL at 09:01

## 2022-01-16 RX ADMIN — BARICITINIB 4 MG: 2 TABLET, FILM COATED ORAL at 08:01

## 2022-01-16 RX ADMIN — HYDROCHLOROTHIAZIDE 25 MG: 25 TABLET ORAL at 09:01

## 2022-01-16 RX ADMIN — INSULIN ASPART 20 UNITS: 100 INJECTION, SOLUTION INTRAVENOUS; SUBCUTANEOUS at 05:01

## 2022-01-16 RX ADMIN — OXYCODONE HYDROCHLORIDE AND ACETAMINOPHEN 500 MG: 500 TABLET ORAL at 08:01

## 2022-01-16 RX ADMIN — INSULIN ASPART 20 UNITS: 100 INJECTION, SOLUTION INTRAVENOUS; SUBCUTANEOUS at 01:01

## 2022-01-16 RX ADMIN — INSULIN ASPART 2 UNITS: 100 INJECTION, SOLUTION INTRAVENOUS; SUBCUTANEOUS at 05:01

## 2022-01-16 NOTE — PROGRESS NOTES
[facility] Medicine  Progress Note    Patient Name: Dave Good  MRN: [id number]  Patient Class: IP- Inpatient   Admission Date: 1/7/2022  Length of Stay: 9 days  Attending Physician: Marcy Patrick MD  Primary Care Provider: Reyna Jorge NP        Subjective:     Principal Problem:Pneumonia due to COVID-19 virus        HPI:  Mr. 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 He presents to the ED with complaints of shortness of breath, chest pain, wheezing, and decreased appetite; he reports the symptoms with onset approximately 2 weeks ago and have progressively worsened.  He was diagnosed with COVID-19 12/29/2021.  He denies fever, chills, nausea, vomiting, abdominal pain, lightheadedness, dizziness, syncope.  He lives with [facility], is independent ADLs, uses no ambulatory aids.  He reports ancee performed wound care and dressing changes yesterday.  Bilateral feet wrapped, C/D/I, and wearing bilateral surgical shoes. All past medical, social, and family history reviewed with patient; no pertinent family history identified at this time.    In the ED he is noted to be slightly hypertensive, heart rate ranging 100 to 120s, initially hypoxic on room air with saturations 77% to high 80s with improvement on nasal cannula to 92-96% however later transitioned to NRB before floor transfer, and afebrile.  CBC notes WBC 12, stable H&H, platelets 432.  Chemistry with normal renal function, sodium 134, potassium 4.2, albumin 2.4, glucose 196, and LFTs WNLs.  . BNP 34. .  . Troponin 0.016.  Lactic acid 2.2.  Procalcitonin 0.21. EKG with NSR and no acute dynamic changes.  Chest x-ray with bilateral airspace opacities concerning  for pneumonitis/multifocal pneumonia.  Blood cultures obtained.  He was given continuous nebulizers, azithromycin, ceftriaxone, dexamethasone, remdesivir and supplemental oxygen in the ED.  Ongoing COVID evaluation per protocol including ESR, ferritin, PT/INR, PTT, D-dimer, sputum culture, flu evaluation, and urinalysis pending. The patient was admitted to the Hospital Medicine Service for further evaluation and management.       Overview/Hospital Course:  Admitted with COVID PNA. He was started on remdesivir and dexamethasone. He had increased O2 requirements and transferred to ICU. He had some gradual improvement with remdesivir and dexamethasone but with persistent HFNC requirements started on baricitinib. Developed hyperglycemia related to steroids so insulin was adjusted. He was transferred out of ICU with continued improvement.      Interval History: Pt continues to improve. He is feeling well on 2L NC today.    Review of Systems   Constitutional: Negative for chills and fever.   Respiratory: Negative for cough and shortness of breath.    Cardiovascular: Negative for chest pain and palpitations.   Gastrointestinal: Negative for abdominal pain, nausea and vomiting.     Objective:     Vital Signs (Most Recent):  Temp: 98.4 °F (36.9 °C) (01/16/22 0904)  Pulse: 83 (01/16/22 0904)  Resp: 18 (01/16/22 0904)  BP: 116/80 (01/16/22 0910)  SpO2: 97 % (01/16/22 0904) Vital Signs (24h Range):  Temp:  [97.7 °F (36.5 °C)-98.7 °F (37.1 °C)] 98.4 °F (36.9 °C)  Pulse:  [] 83  Resp:  [16-20] 18  SpO2:  [95 %-100 %] 97 %  BP: (100-125)/(67-81) 116/80     Weight: 123 kg (271 lb 2.7 oz)  Body mass index is 34.82 kg/m².    Intake/Output Summary (Last 24 hours) at 1/16/2022 1045  Last data filed at 1/15/2022 1800  Gross per 24 hour   Intake 1000 ml   Output 1740 ml   Net -740 ml      Physical Exam  Vitals and nursing note reviewed.   Constitutional:       General: He is not in acute distress.     Appearance: Normal appearance.  He is well-developed.   Cardiovascular:      Rate and Rhythm: Normal rate.      Pulses: Normal pulses.   Pulmonary:      Effort: Pulmonary effort is normal. No respiratory distress.      Comments: Shallow BS  Abdominal:      Palpations: Abdomen is soft.      Tenderness: There is no abdominal tenderness.   Musculoskeletal:      Right lower leg: No edema.      Left lower leg: No edema.   Skin:     General: Skin is warm and dry.      Comments: Wound dressing in place to b/l LE   Neurological:      Mental Status: He is alert and oriented to person, place, and time.   Psychiatric:         Mood and Affect: Mood normal.       Significant Labs:   All labs reviewed    Significant Imaging:   No new imaging this morning.      Assessment/Plan:      * Pneumonia due to COVID-19 virus  Acute respiratory failure due to COVID-19 PNA   COPD with asthma  Former smoker  - COVID PNA with acute hypoxemic respiratory failure.  - Inflammatory markers significantly elevated. Trend.  - Completed remdesivir x5 days  - Continue dexamethasone 6mg PO daily x10 days, baricitinib 4mg PO daily x14 days  - Continue albuterol 2 puff inhaled q4hr PRN, ipratropium-albuterol 2 puff inhaled q6hr wake.  - Completed azithromycin and ceftriaxone x 5 days  - WBC trending up, suspect due to steroids. No other evidence of infection  - Continue enoxaparin 1mg/kg subQ BID.  - Continue supplemental O2, wean as tolerated. Turned down to 2L today. If remains stable plan for 6MWT tomorrow    Chronic systolic congestive heart failure  - Chronic; appears euvolemic.  - Continue losartan, HCTZ as above.    Type II diabetes mellitus with peripheral circulatory disorder, uncontrolled  Chronic wounds  - Hyperglycemic due to steroids  - Continue insulin detemir 70 U subQ qHS, insulin aspart 26 U TIDWM  - Continue moderate-dose sliding scale insulin aspart 1-10U subQ TIDWM PRN.  - Add home dose of metformin  - Monitor and adjust further as needed.  - Continue local wound  care to b/l LE    Mixed hyperlipidemia  - Continue atorvastatin 40 mg PO daily.    Depression  - Continue quetiapine 200 mg PO qHS.    Primary hypertension  - Continue losartan 25mg PO BID, HCTZ 25mg PO daily.      VTE Risk Mitigation (From admission, onward)         Ordered     enoxaparin injection 130 mg  2 times daily         01/07/22 5549                    Marcy Patrick MD  Department of Hospital Medicine   [facility] ([place])

## 2022-01-16 NOTE — ASSESSMENT & PLAN NOTE
Acute respiratory failure due to COVID-19 PNA   COPD with asthma  Former smoker  - COVID PNA with acute hypoxemic respiratory failure.  - Inflammatory markers significantly elevated. Trend.  - Completed remdesivir x5 days  - Continue dexamethasone 6mg PO daily x10 days, baricitinib 4mg PO daily x14 days  - Continue albuterol 2 puff inhaled q4hr PRN, ipratropium-albuterol 2 puff inhaled q6hr wake.  - Completed azithromycin and ceftriaxone x 5 days  - WBC trending up, suspect due to steroids. No other evidence of infection  - Continue enoxaparin 1mg/kg subQ BID.  - Continue supplemental O2, wean as tolerated. Turned down to 2L today. If remains stable plan for 6MWT tomorrow

## 2022-01-16 NOTE — RESPIRATORY THERAPY
Decreased oxygen to 2 LPM via NC as RN stated that was physician's requested     SpO2 remained at 96 % and patient does not complain of SOB at this time

## 2022-01-16 NOTE — SUBJECTIVE & OBJECTIVE
Interval History: Pt continues to improve. He is feeling well on 2L NC today.    Review of Systems   Constitutional: Negative for chills and fever.   Respiratory: Negative for cough and shortness of breath.    Cardiovascular: Negative for chest pain and palpitations.   Gastrointestinal: Negative for abdominal pain, nausea and vomiting.     Objective:     Vital Signs (Most Recent):  Temp: 98.4 °F (36.9 °C) (01/16/22 0904)  Pulse: 83 (01/16/22 0904)  Resp: 18 (01/16/22 0904)  BP: 116/80 (01/16/22 0910)  SpO2: 97 % (01/16/22 0904) Vital Signs (24h Range):  Temp:  [97.7 °F (36.5 °C)-98.7 °F (37.1 °C)] 98.4 °F (36.9 °C)  Pulse:  [] 83  Resp:  [16-20] 18  SpO2:  [95 %-100 %] 97 %  BP: (100-125)/(67-81) 116/80     Weight: 123 kg (271 lb 2.7 oz)  Body mass index is 34.82 kg/m².    Intake/Output Summary (Last 24 hours) at 1/16/2022 1045  Last data filed at 1/15/2022 1800  Gross per 24 hour   Intake 1000 ml   Output 1740 ml   Net -740 ml      Physical Exam  Vitals and nursing note reviewed.   Constitutional:       General: He is not in acute distress.     Appearance: Normal appearance. He is well-developed.   Cardiovascular:      Rate and Rhythm: Normal rate.      Pulses: Normal pulses.   Pulmonary:      Effort: Pulmonary effort is normal. No respiratory distress.      Comments: Shallow BS  Abdominal:      Palpations: Abdomen is soft.      Tenderness: There is no abdominal tenderness.   Musculoskeletal:      Right lower leg: No edema.      Left lower leg: No edema.   Skin:     General: Skin is warm and dry.      Comments: Wound dressing in place to b/l LE   Neurological:      Mental Status: He is alert and oriented to person, place, and time.   Psychiatric:         Mood and Affect: Mood normal.       Significant Labs:   All labs reviewed    Significant Imaging:   No new imaging this morning.

## 2022-01-16 NOTE — PLAN OF CARE
POC reviewed w/ pt and rounding complete. Meds administered per MAR. Blood glucose monitored and PRN insulin administered per order. VSS on 5L NC. Pt AAOx4 and turns independently. Isolations precautions for COVID intact. Pt denies needs at this time; will continue to monitor.

## 2022-01-17 ENCOUNTER — PATIENT MESSAGE (OUTPATIENT)
Dept: ADMINISTRATIVE | Facility: CLINIC | Age: 57
End: 2022-01-17
Payer: MEDICAID

## 2022-01-17 VITALS
WEIGHT: 271.19 LBS | BODY MASS INDEX: 34.8 KG/M2 | DIASTOLIC BLOOD PRESSURE: 80 MMHG | OXYGEN SATURATION: 96 % | TEMPERATURE: 99 F | SYSTOLIC BLOOD PRESSURE: 138 MMHG | RESPIRATION RATE: 16 BRPM | HEIGHT: 74 IN | HEART RATE: 96 BPM

## 2022-01-17 LAB
CRP SERPL-MCNC: 5.2 MG/L (ref 0–8.2)
LDH SERPL L TO P-CCNC: 242 U/L (ref 110–260)
POCT GLUCOSE: 135 MG/DL (ref 70–110)
POCT GLUCOSE: 136 MG/DL (ref 70–110)
POCT GLUCOSE: 166 MG/DL (ref 70–110)

## 2022-01-17 PROCEDURE — C9399 UNCLASSIFIED DRUGS OR BIOLOG: HCPCS | Performed by: INTERNAL MEDICINE

## 2022-01-17 PROCEDURE — 99239 PR HOSPITAL DISCHARGE DAY,>30 MIN: ICD-10-PCS | Mod: ,,, | Performed by: INTERNAL MEDICINE

## 2022-01-17 PROCEDURE — 63600175 PHARM REV CODE 636 W HCPCS: Performed by: INTERNAL MEDICINE

## 2022-01-17 PROCEDURE — 99239 HOSP IP/OBS DSCHRG MGMT >30: CPT | Mod: ,,, | Performed by: INTERNAL MEDICINE

## 2022-01-17 PROCEDURE — 83615 LACTATE (LD) (LDH) ENZYME: CPT | Performed by: INTERNAL MEDICINE

## 2022-01-17 PROCEDURE — 86140 C-REACTIVE PROTEIN: CPT | Performed by: INTERNAL MEDICINE

## 2022-01-17 PROCEDURE — 25000003 PHARM REV CODE 250: Performed by: INTERNAL MEDICINE

## 2022-01-17 PROCEDURE — 36415 COLL VENOUS BLD VENIPUNCTURE: CPT | Performed by: INTERNAL MEDICINE

## 2022-01-17 RX ORDER — INSULIN ASPART 100 [IU]/ML
10 INJECTION, SOLUTION INTRAVENOUS; SUBCUTANEOUS
Status: DISCONTINUED | OUTPATIENT
Start: 2022-01-17 | End: 2022-01-17 | Stop reason: HOSPADM

## 2022-01-17 RX ADMIN — BARICITINIB 4 MG: 2 TABLET, FILM COATED ORAL at 08:01

## 2022-01-17 RX ADMIN — LOSARTAN POTASSIUM 25 MG: 25 TABLET, FILM COATED ORAL at 08:01

## 2022-01-17 RX ADMIN — THERA TABS 1 TABLET: TAB at 08:01

## 2022-01-17 RX ADMIN — METFORMIN HYDROCHLORIDE 1000 MG: 500 TABLET ORAL at 08:01

## 2022-01-17 RX ADMIN — INSULIN ASPART 10 UNITS: 100 INJECTION, SOLUTION INTRAVENOUS; SUBCUTANEOUS at 01:01

## 2022-01-17 RX ADMIN — ASPIRIN 81 MG: 81 TABLET, DELAYED RELEASE ORAL at 08:01

## 2022-01-17 RX ADMIN — ENOXAPARIN SODIUM 130 MG: 100 INJECTION SUBCUTANEOUS at 08:01

## 2022-01-17 RX ADMIN — HYDROCHLOROTHIAZIDE 25 MG: 25 TABLET ORAL at 08:01

## 2022-01-17 RX ADMIN — DEXAMETHASONE 6 MG: 4 TABLET ORAL at 08:01

## 2022-01-17 RX ADMIN — ATORVASTATIN CALCIUM 40 MG: 20 TABLET, FILM COATED ORAL at 08:01

## 2022-01-17 RX ADMIN — OXYCODONE HYDROCHLORIDE AND ACETAMINOPHEN 500 MG: 500 TABLET ORAL at 08:01

## 2022-01-17 RX ADMIN — DOCUSATE SODIUM - SENNOSIDES 1 TABLET: 50; 8.6 TABLET, FILM COATED ORAL at 08:01

## 2022-01-17 NOTE — DISCHARGE SUMMARY
[facility] Medicine  Discharge Summary      Patient Name: Dave Good  MRN: [id number]  Patient Class: IP- Inpatient  Admission Date: 1/7/2022  Hospital Length of Stay: 10 days  Discharge Date and Time:  01/17/2022 5:01 PM  Attending Physician: No att. providers found   Discharging Provider: Marcy Patrick MD  Primary Care Provider: Reyna Jorge NP      HPI:   Mr. 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 He presents to the ED with complaints of shortness of breath, chest pain, wheezing, and decreased appetite; he reports the symptoms with onset approximately 2 weeks ago and have progressively worsened.  He was diagnosed with COVID-19 12/29/2021.  He denies fever, chills, nausea, vomiting, abdominal pain, lightheadedness, dizziness, syncope.  He lives with [facility], is independent ADLs, uses no ambulatory aids.  He reports fiancee performed wound care and dressing changes yesterday.  Bilateral feet wrapped, C/D/I, and wearing bilateral surgical shoes. All past medical, social, and family history reviewed with patient; no pertinent family history identified at this time.    In the ED he is noted to be slightly hypertensive, heart rate ranging 100 to 120s, initially hypoxic on room air with saturations 77% to high 80s with improvement on nasal cannula to 92-96% however later transitioned to NRB before floor transfer, and afebrile.  CBC notes WBC 12, stable H&H, platelets 432.  Chemistry with normal renal function, sodium 134, potassium 4.2, albumin 2.4, glucose 196, and LFTs WNLs.  . BNP 34. .  . Troponin 0.016.  Lactic acid 2.2.  Procalcitonin 0.21. EKG with NSR and no acute dynamic changes.  Chest x-ray with  bilateral airspace opacities concerning for pneumonitis/multifocal pneumonia.  Blood cultures obtained.  He was given continuous nebulizers, azithromycin, ceftriaxone, dexamethasone, remdesivir and supplemental oxygen in the ED.  Ongoing COVID evaluation per protocol including ESR, ferritin, PT/INR, PTT, D-dimer, sputum culture, flu evaluation, and urinalysis pending. The patient was admitted to the Hospital Medicine Service for further evaluation and management.       * No surgery found *      Hospital Course:   Admitted with COVID PNA. He was started on remdesivir and dexamethasone. He had increased O2 requirements and transferred to ICU. He had some gradual improvement with remdesivir and dexamethasone but with persistent HFNC requirements started on baricitinib. Developed hyperglycemia related to steroids so insulin was adjusted. He was transferred out of ICU with continued improvement. He was slowly weaned from high to low flow oxygen. He remained stable on 2L. He qualified for home O2 with 6MWT and was set up. He is now stable and ready for discharge home today with covid surveillance program.       Goals of Care Treatment Preferences:  Code Status: Full Code      Consults:     No new Assessment & Plan notes have been filed under this hospital service since the last note was generated.  Service: Hospital Medicine    Final Active Diagnoses:    Diagnosis Date Noted POA    PRINCIPAL PROBLEM:  Pneumonia due to COVID-19 virus [U07.1, J12.82] 01/07/2022 Yes    COVID-19 [U07.1] 01/07/2022 Yes    Acute respiratory failure due to COVID-19 [U07.1, J96.00] 01/07/2022 Yes    COPD with asthma [J44.9] 01/07/2022 Yes    Chronic systolic congestive heart failure [I50.22] 01/07/2022 Yes    Type II diabetes mellitus with peripheral circulatory disorder, uncontrolled [E11.51, E11.65]  Yes    Depression [F32.A] 09/20/2021 Yes    Mixed hyperlipidemia [E78.2] 09/20/2021 Yes    Primary hypertension [I10] 09/20/2021 Yes     "  Problems Resolved During this Admission:    Diagnosis Date Noted Date Resolved POA    Former smoker [Z87.891] 01/07/2022 01/11/2022 Not Applicable       Discharged Condition: good    Disposition: Home or Self Care    Follow Up:   Follow-up Information     Reyna Jorge NP In 2 weeks.    Specialty: Family Medicine  Why: hospital follow-up  Contact information:  [id number] [place] [id number]  [phone]                       Patient Instructions:      OXYGEN FOR HOME USE     Order Specific Question Answer Comments   Liter Flow 2    Duration Continuous    Qualifying Test Performed at: Rest    Oxygen saturation: 88    Portable mode: continuous    Route nasal cannula    Device: home concentrator with portable concentrator    Length of need (in months): 3 mos    Patient condition with qualifying saturation Other - List qualifying diagnosis and code    Select a diagnosis & list the code in the comments COVID-19 [[id number]]    Height: 6' 2" (1.88 m)    Weight: 123 kg (271 lb 2.7 oz)    Alternative treatment measures have been tried or considered and deemed clinically ineffective. Yes      Diet diabetic     COVID-19 Surveillance Program     Order Specific Question Answer Comments   Does patient have a smartphone? Yes    Does patient have the [facility] on their smartphone? No    While in surveillance program, will patient be using home oxygen? Yes      Activity as tolerated       Significant Diagnostic Studies: Labs: All labs within the past 24 hours have been reviewed    Pending Diagnostic Studies:     None         Medications:  Reconciled Home Medications:      Medication List      START taking these medications    pulse oximeter device  Commonly known as: pulse oximeter  by Apply Externally route 2 (two) times a day. Use twice daily at 8 AM and 3 PM and record the value in MyChart as directed.        CONTINUE taking these medications    ammonium lactate 12 % Crea  Apply twice daily to " affected parts both feet as needed.     aspirin 81 MG EC tablet  Commonly known as: ECOTRIN  Take 81 mg by mouth once daily.     atorvastatin 20 MG tablet  Commonly known as: LIPITOR  Take 40 mg by mouth once daily.     ciclopirox 8 % Soln  Commonly known as: PENLAC  Apply topically nightly.     collagenase ointment  Commonly known as: SANTYL  With each dressing change     hydroCHLOROthiazide 25 MG tablet  Commonly known as: HYDRODIURIL  Take 1 tablet by mouth once daily.     insulin aspart U-100 100 unit/mL (3 mL) Inpn pen  Commonly known as: NovoLOG  Inject 10 Units into the skin 3 (three) times daily.     insulin detemir U-100 100 unit/mL (3 mL) Inpn pen  Commonly known as: Levemir FLEXTOUCH  Inject 50 Units into the skin every evening.     losartan 25 MG tablet  Commonly known as: COZAAR  Take 25 mg by mouth 2 (two) times daily.     metFORMIN 1000 MG tablet  Commonly known as: GLUCOPHAGE  Take 1,000 mg by mouth 2 (two) times daily.     QUEtiapine 200 MG Tab  Commonly known as: SEROQUEL  Take 200 mg by mouth nightly.        STOP taking these medications    amoxicillin-clavulanate 875-125mg 875-125 mg per tablet  Commonly known as: AUGMENTIN     enoxaparin 40 mg/0.4 mL Syrg  Commonly known as: LOVENOX            Indwelling Lines/Drains at time of discharge:   Lines/Drains/Airways     None                 Time spent on the discharge of patient: 35 minutes         Marcy Patrick MD  Department of Hospital Medicine  [facility])

## 2022-01-17 NOTE — PLAN OF CARE
Dr. Patrick requested the O2 monitoring program with O2 oximeter being supplied. CM Sup contacted the [employer] O2 monitoring program set up at this time/[phone]; patient will receive a pulse Ox meter and be further monitored by this program. ARLENE Francis contacted the patient and left a VM advising of this service being arranged.     Patient has dc home with family and supplemental O2. No further needs at this time.

## 2022-01-17 NOTE — PLAN OF CARE
Pt discharged. Waiting for respiratory to come and do the teaching. IV removed and discharge papers given and reviewed. Bed low and locked in place. Call bell and personal items in reach.      Problem: Adult Inpatient Plan of Care  Goal: Plan of Care Review  Outcome: Met  Goal: Patient-Specific Goal (Individualized)  Outcome: Met  Goal: Absence of Hospital-Acquired Illness or Injury  Outcome: Met  Goal: Optimal Comfort and Wellbeing  Outcome: Met  Goal: Readiness for Transition of Care  Outcome: Met     Problem: Diabetes Comorbidity  Goal: Blood Glucose Level Within Targeted Range  Outcome: Met     Problem: Fluid and Electrolyte Imbalance (Acute Kidney Injury/Impairment)  Goal: Fluid and Electrolyte Balance  Outcome: Met     Problem: Oral Intake Inadequate (Acute Kidney Injury/Impairment)  Goal: Optimal Nutrition Intake  Outcome: Met     Problem: Renal Function Impairment (Acute Kidney Injury/Impairment)  Goal: Effective Renal Function  Outcome: Met     Problem: Infection  Goal: Absence of Infection Signs and Symptoms  Outcome: Met     Problem: Impaired Wound Healing  Goal: Optimal Wound Healing  Outcome: Met

## 2022-01-17 NOTE — PLAN OF CARE
POC reviewed w/ pt and purposeful rounding complete. Meds administered per order. Blood glucose ordered and PRN insulin administered per order. VSS on 2L NC. Pt AAOx4 and ambulatory. Isolation precautions for COVID intact. Safety precautions intact; no injuries or falls this shift. Pt denies needs at this time; will continue to monitor.

## 2022-01-17 NOTE — PLAN OF CARE
01/17/22 1511   Final Note   Assessment Type Final Discharge Note   Anticipated Discharge Disposition Home   Post-Acute Status   Post-Acute Authorization HME   HME Status Set-up Complete/Auth obtained   Discharge Delays (!) Home Medical Equipment (Insurance, Delivery)   [facility] - [facility] ([facility])  Discharge Final Note    Primary Care Provider: Reyna Jorge NP    Expected Discharge Date: 1/17/2022    Final Discharge Note (most recent)       Final Note - 01/17/22 1511          Final Note    Assessment Type Final Discharge Note (P)      Anticipated Discharge Disposition Home or Self Care (P)         Post-Acute Status    Post-Acute Authorization HME (P)      HME Status Set-up Complete/Auth obtained (P)      Discharge Delays Home Medical Equipment (Insurance, Delivery) (P)                      Important Message from Medicare             Contact Info       Reyna Jorge NP   Specialty: Family Medicine   Relationship: PCP - General    [address]  [address]  [place] [address]   Phone: [phone]       Next Steps: Follow up in 2 week(s)    Instructions: hospital follow-up

## 2022-01-17 NOTE — RESPIRATORY THERAPY
E-Cylinder and Oxygen  use education provided  Via verbal, instruction and demonstration.    Patient was able to verba brian understanding and demonstrate ability

## 2022-01-17 NOTE — PLAN OF CARE
01/17/22 1507   Post-Acute Status   Post-Acute Authorization [facility] Status Set-up Complete/Auth obtained   Discharge Delays (!) Home Medical Equipment (Insurance, Delivery)   Discharge Plan   Discharge Plan A Home with family     O2 approved by Gilbert with [facility]. ARLENE contacted the patient on his cell and verified the address for delivery; Patient explained that he and family will be called to deliver the concentrator; patient's niece (unknown name) was irate on the phone and stated that she will use her own concentrator for the patient if the concentrator is not delivered when they are home. ARLENE Sup attempted to explain that the patient would indeed have his own, but delivery is contingent to someone being available.     ARLENE Francis advised Rey barrios to print order and facesheet as well as the delivery ticket and rental sheet for the patient to sign. ARLENE CISNEROS and ARLENE Francis will follow up in the AM.

## 2022-01-17 NOTE — PROCEDURES
SpO2 at rest on room air drops to 88%    During the first minute of walking on room air patients SpO2 drops to between 77 and 80%      Placed patient back on 2lpm NC and SpO2 returns to 92%

## 2022-01-19 ENCOUNTER — PATIENT MESSAGE (OUTPATIENT)
Dept: ADMINISTRATIVE | Facility: CLINIC | Age: 57
End: 2022-01-19
Payer: MEDICAID

## 2022-01-19 ENCOUNTER — PATIENT MESSAGE (OUTPATIENT)
Dept: ADMINISTRATIVE | Facility: OTHER | Age: 57
End: 2022-01-19
Payer: MEDICAID

## 2022-01-19 ENCOUNTER — TELEPHONE (OUTPATIENT)
Dept: ADMINISTRATIVE | Facility: CLINIC | Age: 57
End: 2022-01-19
Payer: MEDICAID

## 2022-01-20 ENCOUNTER — PATIENT MESSAGE (OUTPATIENT)
Dept: ADMINISTRATIVE | Facility: CLINIC | Age: 57
End: 2022-01-20
Payer: MEDICAID

## 2022-01-21 ENCOUNTER — NURSE TRIAGE (OUTPATIENT)
Dept: ADMINISTRATIVE | Facility: CLINIC | Age: 57
End: 2022-01-21
Payer: MEDICAID

## 2022-01-21 ENCOUNTER — PATIENT MESSAGE (OUTPATIENT)
Dept: ADMINISTRATIVE | Facility: CLINIC | Age: 57
End: 2022-01-21
Payer: MEDICAID

## 2022-01-21 ENCOUNTER — PATIENT MESSAGE (OUTPATIENT)
Dept: ADMINISTRATIVE | Facility: OTHER | Age: 57
End: 2022-01-21
Payer: MEDICAID

## 2022-01-21 NOTE — TELEPHONE ENCOUNTER
Pt called and he said that he is doing well pt escalated for O2 sat 93 and he said that his wife left and he is home alone and he is feeling good and denies needing any help. Pt told to call back if any problems he voices no complaints and doesn't know where his o2 device is he will call back if any problems   Reason for Disposition   Information only question and nurse able to answer    Protocols used: INFORMATION ONLY CALL - NO TRIAGE-A-OH

## 2022-01-21 NOTE — TELEPHONE ENCOUNTER
"Surveillance pt escalated for SOB 3/5 with activity.    Pt was advised to see PCP within 2 weeks per triage protocol.  Pt verbalized understanding.    Reason for Disposition   [1] MILD longstanding difficulty breathing AND [2]  SAME as normal    Additional Information   Negative: SEVERE difficulty breathing (e.g., struggling for each breath, speaks in single words)   Negative: [1] Breathing stopped AND [2] hasn't returned   Negative: Choking on something   Negative: Bluish (or gray) lips or face now   Negative: Difficult to awaken or acting confused (e.g., disoriented, slurred speech)   Negative: Passed out (i.e., lost consciousness, collapsed and was not responding)   Negative: Wheezing started suddenly after medicine, an allergic food or bee sting   Negative: Stridor   Negative: Slow, shallow and weak breathing   Negative: Sounds like a life-threatening emergency to the triager   Negative: [1] MODERATE difficulty breathing (e.g., speaks in phrases, SOB even at rest, pulse 100-120) AND [2] NEW-onset or WORSE than normal   Negative: Wheezing can be heard across the room   Negative: Drooling or spitting out saliva (because can't swallow)   Negative: History of prior "blood clot" in leg or lungs (i.e., deep vein thrombosis, pulmonary embolism)   Negative: History of inherited increased risk of blood clots (e.g., Factor 5 Leiden, Anti-thrombin 3, Protein C or Protein S deficiency, Prothrombin mutation)   Negative: Major surgery in the past month   Negative: Hip or leg fracture (broken bone) in past month (or had cast on leg or ankle in past month)   Negative: Illness requiring prolonged bedrest in past month (e.g., immobilization, long hospital stay)   Negative: Long-distance travel in past month (e.g., car, bus, train, plane; with trip lasting 6 or more hours)   Negative: Cancer treatment in past six months (or has cancer now)   Negative: Extra heart beats OR irregular heart beating   (i.e., " ""palpitations")   Negative: Fever > 103 F (39.4 C)   Negative: [1] Fever > 101 F (38.3 C) AND [2] age > 60 years   Negative: [1] Fever > 100.0 F (37.8 C) AND [2] bedridden (e.g., nursing home patient, CVA, chronic illness, recovering from surgery)   Negative: [1] Fever > 100.0 F (37.8 C) AND [2] diabetes mellitus or weak immune system (e.g., HIV positive, cancer chemo, splenectomy, organ transplant, chronic steroids)   Negative: [1] Periods where breathing stops and then resumes normally AND [2] bedridden (e.g., nursing home patient, CVA)   Negative: Pregnant or postpartum (from 0 to 6 weeks after delivery)   Negative: Patient sounds very sick or weak to the triager   Negative: [1] MILD difficulty breathing (e.g., minimal/no SOB at rest, SOB with walking, pulse <100) AND [2] NEW-onset or WORSE than normal   Negative: [1] Longstanding difficulty breathing (e.g., CHF, COPD, emphysema) AND [2] WORSE than normal   Negative: [1] Longstanding difficulty breathing AND [2] not responding to usual therapy   Negative: [1] Continuous (nonstop) coughing AND [2] keeps from working or sleeping   Negative: [1] MODERATE longstanding difficulty breathing (e.g., speaks in phrases, SOB even at rest, pulse 100-120) AND [2] SAME as normal    Protocols used: BREATHING DIFFICULTY-A-AH      "

## 2022-01-22 ENCOUNTER — PATIENT MESSAGE (OUTPATIENT)
Dept: ADMINISTRATIVE | Facility: CLINIC | Age: 57
End: 2022-01-22
Payer: MEDICAID

## 2022-01-23 ENCOUNTER — PATIENT MESSAGE (OUTPATIENT)
Dept: ADMINISTRATIVE | Facility: CLINIC | Age: 57
End: 2022-01-23
Payer: MEDICAID

## 2022-01-27 ENCOUNTER — HOSPITAL ENCOUNTER (OUTPATIENT)
Dept: WOUND CARE | Facility: HOSPITAL | Age: 57
Discharge: HOME | End: 2022-01-27
Attending: SURGERY
Payer: MEDICAID

## 2022-01-27 VITALS
HEART RATE: 98 BPM | TEMPERATURE: 98 F | SYSTOLIC BLOOD PRESSURE: 108 MMHG | RESPIRATION RATE: 18 BRPM | DIASTOLIC BLOOD PRESSURE: 76 MMHG

## 2022-01-27 DIAGNOSIS — L97.512 RIGHT FOOT ULCER, WITH FAT LAYER EXPOSED: Primary | ICD-10-CM

## 2022-01-27 DIAGNOSIS — T81.31XS SURGICAL WOUND DEHISCENCE, SEQUELA: ICD-10-CM

## 2022-01-27 PROCEDURE — 11042 DBRDMT SUBQ TIS 1ST 20SQCM/<: CPT

## 2022-01-27 PROCEDURE — 27201912 HC WOUND CARE DEBRIDEMENT SUPPLIES

## 2022-01-27 NOTE — PROGRESS NOTES
Subjective:       Patient ID: Dave Good is a 56 y.o. male.    Chief Complaint: Wound Care    Patient to wound care for right foot surgical wound, progressing well. 2 small wounds remain, new orders noted.   Referral to Dr. Winkler in his clinic for foot  and callus care.     Review of Systems      Objective:      Temp:  [98.3 °F (36.8 °C)]   Pulse:  [98]   Resp:  [18]   BP: (108)/(76)   Physical Exam       Incision/Site 10/21/21 1300 Right Foot medial (Active)   10/21/21 1300   Present Prior to Hospital Arrival?:    Side: Right   Location: Foot   Orientation: medial   Incision Type:    Closure Method:    Additional Comments:    Removal Indication and Assessment:    Wound Outcome:    Removal Indications:    Wound Image   01/27/22 1500   Dressing Appearance Intact;Moist drainage 01/27/22 1500   Drainage Amount Small 01/27/22 1500   Drainage Characteristics/Odor Serosanguineous 01/27/22 1500   Appearance Intact;Pink;White;Moist 01/27/22 1500   Red (%), Wound Tissue Color 100 % 01/27/22 1500   Periwound Area Intact 01/27/22 1500   Wound Edges Irregular 01/27/22 1500   Wound Length (cm) 0.5 cm 01/27/22 1500   Wound Width (cm) 2 cm 01/27/22 1500   Wound Depth (cm) 0.2 cm 01/27/22 1500   Wound Volume (cm^3) 0.2 cm^3 01/27/22 1500   Wound Surface Area (cm^2) 1 cm^2 01/27/22 1500   Care Cleansed with:;Soap and water;Sterile normal saline 01/27/22 1500   Dressing Applied;Changed;Hydrofiber;Island/border 01/27/22 1500   Periwound Care Dry periwound area maintained 01/27/22 1500   Off Loading Off loading shoe 01/27/22 1500   Dressing Change Due 02/10/22 01/27/22 1500            Incision/Site 01/27/22 0200 Right Foot upper;medial (Active)   01/27/22 0200   Present Prior to Hospital Arrival?: Yes   Side: Right   Location: Foot   Orientation: upper;medial   Incision Type:    Closure Method:    Additional Comments:    Removal Indication and Assessment:    Wound Outcome:    Removal Indications:    Wound Image   01/27/22 1500    Dressing Appearance Intact;Moist drainage 01/27/22 1500   Drainage Amount Small 01/27/22 1500   Drainage Characteristics/Odor Serosanguineous 01/27/22 1500   Appearance Intact;Pink;Red 01/27/22 1500   Red (%), Wound Tissue Color 100 % 01/27/22 1500   Wound Edges Defined 01/27/22 1500   Wound Length (cm) 0.2 cm 01/27/22 1500   Wound Width (cm) 0.7 cm 01/27/22 1500   Wound Depth (cm) 0.2 cm 01/27/22 1500   Wound Volume (cm^3) 0.028 cm^3 01/27/22 1500   Wound Surface Area (cm^2) 0.14 cm^2 01/27/22 1500   Care Cleansed with:;Soap and water;Sterile normal saline 01/27/22 1500   Dressing Applied;Changed;Hydrofiber;Island/border 01/27/22 1500   Off Loading Off loading shoe 01/27/22 1500   Dressing Change Due 02/10/22 01/27/22 1500         Assessment:         ICD-10-CM ICD-9-CM   1. Right foot ulcer, with fat layer exposed  L97.512 707.15         Plan:   Tissue pathology and/or culture taken:  [] Yes [x] No   Sharp debridement performed:   [x] Yes [] No   Labs ordered this visit:   [] Yes [x] No   Imaging ordered this visit:   [] Yes [x] No           Orders Placed This Encounter   Procedures    Change dressing     Left foot, Right medial foot and R 2nd toe   Cleanse wound with: Normal Saline   Lidocaine: PRN   Silver nitrate: PRN   Periwound care: Moisturizer   Primary dressing: Hydrofera Ready, Border Dressing  Edema control: Elevate as much as possible avoid prolong standing.     Follow-up: 2 weeks with Dr. Walker 02/10/21    Referral to Dr. Bunn for foot care in his clinic        Follow up in about 2 weeks (around 2/10/2022).

## 2022-01-27 NOTE — PROGRESS NOTES
Subjective:       Patient ID: Dave Good is a 56 y.o. male.    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 Patient has a hx of DM2, COPD and HTN,  Patient verbalized a pain level of 7 and takes oxycodone prn. Spouse present, verbalized Dr. Toussaint did surgery at [facility], does not have home health, last dressing change was done in hospital (at in rehab) last week. Wound is opened at right top medial foot and is connected to skin flap with 3 intact sutures. Soft tissue flap is not adherent to deep tissue and medial proximal wounds communicate with distal open wounds. Patient verbalized wound does not drain much. Wound packed with santyl and Aquacel rope gently and lightly to avoid opening of surgical incision. Cx taken to right medial foot per Dr. Walker, patient tolerated well. Will fax paperwork to home health  10/28/2021:  Wound cx reviewed. Bactrim re-ordered after last visit. Discussed HBO with pt's surgeon who agrees with this. Patient to wound care for right DFU, new orders noted. Patient educated and evaluated for HBO, possible start on 11/01/21.  11/8/21: Follow up with  S/p right great toe amputation with partial closure 9/23/21 with . Reports transportation issues with insurance. Denies any issues related to wound. States he has appointment tomorrow with Dr. Toussaint Thursday in his office for dressing change.  Tolerated debridement today per Dr. Walker. New orders initiated in clinic. Approved for HBOT pending labs and EKG.  Patient educated to get labs/EKG today to complete HBOT workup. Discussed having visits with us and Dr. Toussaint on days that the dressing is due to be changed (as pt is unable to have HH per his insurance). Patient verbalized understanding and will arrange transportation through his insurance when approved for HBOT.  RTC for  11/15/21.   11/29/2021:  Patient to wound care center for BL foot wounds, new orders noted.  Patient to follow up with Cardiology to be cleared for HBO, and Dr. Toussaint for nail and callus care. Repeat wound tissue cx taken.  12/6/2021:  Patient to wound care center for bilateral foot wounds, progressing well. Continue with the same treatment plan.   Patient to see Cardiology on 12/07/21 and Dr. Toussaint on 12/14/21 12/13/2021:  Patient to wound care center for bilateral foot wounds, progressing well, but still present wet. Increase frequency of dressing changes and patient to start HBO. 12/14 12/20/2021:   Pt presents to the wound center for a scheduled visit. He is tolerating HBOT well. No new wound c/o. It appears to be healing well.    1/27/2021:  Pt hasn't been seen for >1mo due to his hospitalization. Patient to wound care for right foot surgical wound, progressing well. 2 small wounds remain, much improved, new orders noted. Pt requests referral to a different podiatrist for a second opinion regarding foot care. Referral placed to Dr. Winkler in his clinic for foot and callus care.     Review of Systems    All systems were reviewed and are negative, except that mentioned in the HPI.    Objective:         Physical Exam  Constitutional:       Appearance: He is well-developed and well-nourished.   HENT:      Head: Normocephalic and atraumatic.   Eyes:      Extraocular Movements: EOM normal.      Pupils: Pupils are equal, round, and reactive to light.   Cardiovascular:      Rate and Rhythm: Normal rate.   Pulmonary:      Effort: Pulmonary effort is normal. No respiratory distress.      Breath sounds: No stridor.   Abdominal:      General: There is no distension.   Musculoskeletal:      Cervical back: Normal range of motion.   Skin:     Comments: See Synopsis for wound details   Neurological:      Mental Status: He is alert and oriented to person, place, and time.   Psychiatric:         Mood and  Affect: Mood and affect normal.            Incision/Site 10/21/21 1300 Right Foot medial (Active)   10/21/21 1300   Present Prior to Hospital Arrival?:    Side: Right   Location: Foot   Orientation: medial   Incision Type:    Closure Method:    Additional Comments:    Removal Indication and Assessment:    Wound Outcome:    Removal Indications:    Wound Image   01/27/22 1500   Dressing Appearance Intact;Moist drainage 01/27/22 1500   Drainage Amount Moderate 01/27/22 1500   Drainage Characteristics/Odor Serosanguineous 01/27/22 1500   Appearance Intact;Pink;White;Moist 01/27/22 1500   Red (%), Wound Tissue Color 100 % 01/27/22 1500   Periwound Area Intact 01/27/22 1500   Wound Edges Irregular 01/27/22 1500   Wound Length (cm) 0.5 cm 01/27/22 1500   Wound Width (cm) 2 cm 01/27/22 1500   Wound Depth (cm) 0.2 cm 01/27/22 1500   Wound Volume (cm^3) 0.2 cm^3 01/27/22 1500   Wound Surface Area (cm^2) 1 cm^2 01/27/22 1500   Care Cleansed with:;Soap and water;Sterile normal saline 01/27/22 1500   Dressing Applied;Changed;Hydrofiber;Island/border 01/27/22 1500   Periwound Care Dry periwound area maintained 01/27/22 1500   Off Loading Off loading shoe 01/27/22 1500   Dressing Change Due 02/10/22 01/27/22 1500            Incision/Site 01/27/22 0200 Right Foot upper;medial (Active)   01/27/22 0200   Present Prior to Hospital Arrival?: Yes   Side: Right   Location: Foot   Orientation: upper;medial   Incision Type:    Closure Method:    Additional Comments:    Removal Indication and Assessment:    Wound Outcome:    Removal Indications:    Wound Image   01/27/22 1500   Dressing Appearance Intact;Moist drainage 01/27/22 1500   Drainage Amount Moderate 01/27/22 1500   Drainage Characteristics/Odor Serosanguineous 01/27/22 1500   Appearance Intact;Pink;Red 01/27/22 1500   Red (%), Wound Tissue Color 100 % 01/27/22 1500   Wound Edges Defined 01/27/22 1500   Wound Length (cm) 0.2 cm 01/27/22 1500   Wound Width (cm) 0.7 cm 01/27/22 1500    Wound Depth (cm) 0.2 cm 01/27/22 1500   Wound Volume (cm^3) 0.028 cm^3 01/27/22 1500   Wound Surface Area (cm^2) 0.14 cm^2 01/27/22 1500   Care Cleansed with:;Soap and water;Sterile normal saline 01/27/22 1500   Dressing Applied;Changed;Hydrofiber;Island/border 01/27/22 1500   Off Loading Off loading shoe 01/27/22 1500   Dressing Change Due 02/10/22 01/27/22 1500         Assessment:         ICD-10-CM ICD-9-CM   1. Right foot ulcer, with fat layer exposed  L97.512 707.15   2. Surgical wound dehiscence, sequela  T81.31XS 909.3         Plan:   Tissue pathology and/or culture taken:  [] Yes [x] No   Sharp debridement performed:   [x] Yes [] No   Labs ordered this visit:   [] Yes [x] No   Imaging ordered this visit:   [] Yes [x] No           Orders Placed This Encounter   Procedures    Change dressing     Left foot, Right medial foot and R 2nd toe   Cleanse wound with: Normal Saline   Lidocaine: PRN   Silver nitrate: PRN   Periwound care: Moisturizer   Primary dressing: Hydrofera Ready, Border Dressing  Edema control: Elevate as much as possible avoid prolong standing.     Follow-up: 2 weeks with Dr. Walker 02/10/21    Referral to Dr. Bunn for foot care in his clinic        Follow up in about 2 weeks (around 2/10/2022).

## 2022-01-31 NOTE — PROCEDURES
"Debridement    Date/Time: 1/27/2022 2:00 PM  Performed by: Miracle Walker DO  Authorized by: Miracle Walker DO     Time out: Immediately prior to procedure a "time out" was called to verify the correct patient, procedure, equipment, support staff and site/side marked as required.    Consent Done?:  Yes (Written)  Local anesthesia used?: Yes    Local anesthetic:  Topical anesthetic    Debridement - 1st Wound - General Location: right foot medial.    Type of Debridement:  Excisional       Length (cm):  0.5       Area (sq cm):  1       Width (cm):  2       Percent Debrided (%):  100       Depth (cm):  0.2       Total Area Debrided (sq cm):  1    Depth of debridement:  Subcutaneous tissue    Tissue debrided:  Subcutaneous    Devitalized tissue debrided:  Slough, Fibrin, Exudate and Biofilm    Instruments:  Curette    2nd Wound Details:     Debridement - 2nd Wound - General Location: right foot upper medial.    Type of Debridement:  Excisional       Length (cm):  0.2       Area (sq cm):  0.14       Width (cm):  0.7       Percent Debrided (%):  100       Depth (cm):  0.2       Total Area Debrided (sq cm):  0.14    Depth of debridement:  Subcutaneous tissue    Tissue debrided:  Subcutaneous    Devitalized tissue debrided:  Slough, Fibrin, Biofilm and Exudate    Instruments:  Curette    Bleeding:  Minimal  Hemostasis Achieved: Yes    Method Used:  Pressure  Patient tolerance:  Patient tolerated the procedure well with no immediate complications      "

## 2022-02-10 ENCOUNTER — HOSPITAL ENCOUNTER (OUTPATIENT)
Dept: WOUND CARE | Facility: HOSPITAL | Age: 57
Discharge: HOME | End: 2022-02-10
Attending: SURGERY
Payer: MEDICAID

## 2022-02-10 VITALS
TEMPERATURE: 98 F | WEIGHT: 285 LBS | HEART RATE: 95 BPM | SYSTOLIC BLOOD PRESSURE: 136 MMHG | BODY MASS INDEX: 36.57 KG/M2 | HEIGHT: 74 IN | DIASTOLIC BLOOD PRESSURE: 85 MMHG

## 2022-02-10 DIAGNOSIS — T81.31XS SURGICAL WOUND DEHISCENCE, SEQUELA: Primary | ICD-10-CM

## 2022-02-10 DIAGNOSIS — L97.512 RIGHT FOOT ULCER, WITH FAT LAYER EXPOSED: ICD-10-CM

## 2022-02-10 DIAGNOSIS — S91.101A OPEN WOUND OF RIGHT GREAT TOE: ICD-10-CM

## 2022-02-10 DIAGNOSIS — S91.302A OPEN WOUND OF LEFT FOOT, INITIAL ENCOUNTER: ICD-10-CM

## 2022-02-10 DIAGNOSIS — E11.9 TYPE 2 DIABETES MELLITUS WITHOUT COMPLICATION, WITH LONG-TERM CURRENT USE OF INSULIN: ICD-10-CM

## 2022-02-10 DIAGNOSIS — S91.109A OPEN WOUND OF TOE: ICD-10-CM

## 2022-02-10 DIAGNOSIS — S91.101A OPEN WOUND OF RIGHT GREAT TOE, INITIAL ENCOUNTER: ICD-10-CM

## 2022-02-10 DIAGNOSIS — S91.109A OPEN WOUND OF TOE, INITIAL ENCOUNTER: ICD-10-CM

## 2022-02-10 DIAGNOSIS — Z79.4 TYPE 2 DIABETES MELLITUS WITHOUT COMPLICATION, WITH LONG-TERM CURRENT USE OF INSULIN: ICD-10-CM

## 2022-02-10 PROCEDURE — 27201912 HC WOUND CARE DEBRIDEMENT SUPPLIES

## 2022-02-10 PROCEDURE — 11042 DBRDMT SUBQ TIS 1ST 20SQCM/<: CPT

## 2022-02-10 NOTE — PROGRESS NOTES
Subjective:       Patient ID: Dave Good is a 56 y.o. male.    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 Patient has a hx of DM2, COPD and HTN,  Patient verbalized a pain level of 7 and takes oxycodone prn. Spouse present, verbalized Dr. Toussaint did surgery at [facility], does not have home health, last dressing change was done in hospital (at in rehab) last week. Wound is opened at right top medial foot and is connected to skin flap with 3 intact sutures. Soft tissue flap is not adherent to deep tissue and medial proximal wounds communicate with distal open wounds. Patient verbalized wound does not drain much. Wound packed with santyl and Aquacel rope gently and lightly to avoid opening of surgical incision. Cx taken to right medial foot per Dr. Walker, patient tolerated well. Will fax paperwork to home health  10/28/2021:  Wound cx reviewed. Bactrim re-ordered after last visit. Discussed HBO with pt's surgeon who agrees with this. Patient to wound care for right DFU, new orders noted. Patient educated and evaluated for HBO, possible start on 11/01/21.  11/8/21: Follow up with  S/p right great toe amputation with partial closure 9/23/21 with . Reports transportation issues with insurance. Denies any issues related to wound. States he has appointment tomorrow with Dr. Toussaint Thursday in his office for dressing change.  Tolerated debridement today per Dr. Walker. New orders initiated in clinic. Approved for HBOT pending labs and EKG.  Patient educated to get labs/EKG today to complete HBOT workup. Discussed having visits with us and Dr. Toussaint on days that the dressing is due to be changed (as pt is unable to have HH per his insurance). Patient verbalized understanding and will arrange transportation through his insurance when approved for HBOT.  RTC for  11/15/21.   11/29/2021:  Patient to wound care center for BL foot wounds, new orders noted.  Patient to follow up with Cardiology to be cleared for HBO, and Dr. Toussaint for nail and callus care. Repeat wound tissue cx taken.  12/6/2021:  Patient to wound care center for bilateral foot wounds, progressing well. Continue with the same treatment plan.   Patient to see Cardiology on 12/07/21 and Dr. Toussaint on 12/14/21 12/13/2021:  Patient to wound care center for bilateral foot wounds, progressing well, but still present wet. Increase frequency of dressing changes and patient to start HBO. 12/14 12/20/2021:   Pt presents to the wound center for a scheduled visit. He is tolerating HBOT well. No new wound c/o. It appears to be healing well.     1/27/2021:  Pt hasn't been seen for >1mo due to his hospitalization. Patient to wound care for right foot surgical wound, progressing well. 2 small wounds remain, much improved, new orders noted. Pt requests referral to a different podiatrist for a second opinion regarding foot care. Referral placed to Dr. Winkler in his clinic for foot and callus care.   2/10/2022:  Patient to wound care center for BL foot wounds, new orders noted. New wound to the left foot. Right foot surgical wound not completely healed. Pt has been soaking his feet. No new c/o.  He would like to establish care with a new podiatrist. Patient to follow up with Dr. Bunn on 02/22    Review of Systems    All systems were reviewed and are negative, except that mentioned in the HPI.    Objective:      Temp:  [97.8 °F (36.6 °C)]   Pulse:  [95]   BP: (136)/(85)   Physical Exam  Constitutional:       Appearance: He is well-developed and well-nourished.   HENT:      Head: Normocephalic and atraumatic.   Eyes:      Extraocular Movements: EOM normal.      Pupils: Pupils are equal, round, and reactive to light.   Cardiovascular:      Rate and Rhythm: Normal rate.   Pulmonary:      Effort: Pulmonary  effort is normal. No respiratory distress.      Breath sounds: No stridor.   Abdominal:      General: There is no distension.   Musculoskeletal:      Cervical back: Normal range of motion.   Skin:     Comments: See Synopsis for wound details   Neurological:      Mental Status: He is alert and oriented to person, place, and time.   Psychiatric:         Mood and Affect: Mood and affect normal.            Wound 10/21/21 1300 Other (comment) Left plantar Foot (Active)   10/21/21 1300    Pre-existing:    Primary Wound Type: Other   Side: Left   Orientation: plantar   Location: Foot   Wound Number:    Ankle-Brachial Index:    Pulses:    Removal Indication and Assessment:    Wound Outcome:    (Retired) Wound Type:    (Retired) Wound Length (cm):    (Retired) Wound Width (cm):    (Retired) Depth (cm):    Wound Description (Comments):    Removal Indications:    Wound Image   02/10/22 1500   Dressing Appearance Intact;Moist drainage 02/10/22 1500   Drainage Amount Scant 02/10/22 1500   Drainage Characteristics/Odor Serosanguineous 02/10/22 1500   Appearance Pink;Intact;White;Wet 02/10/22 1500   Tissue loss description Full thickness 02/10/22 1500   Red (%), Wound Tissue Color 100 % 02/10/22 1500   Periwound Area Intact;Moist 02/10/22 1500   Wound Edges Irregular 02/10/22 1500   Wound Length (cm) 0.6 cm 02/10/22 1500   Wound Width (cm) 0.4 cm 02/10/22 1500   Wound Depth (cm) 0.2 cm 02/10/22 1500   Wound Volume (cm^3) 0.048 cm^3 02/10/22 1500   Wound Surface Area (cm^2) 0.24 cm^2 02/10/22 1500   Care Cleansed with:;Sterile normal saline;Soap and water 02/10/22 1500   Dressing Applied;Changed;Hydrofiber;Island/border 02/10/22 1500            Wound 11/29/21 0240 Non pressure chronic ulcer Right Toe, second #3 (Active)   11/29/21 0240    Pre-existing:    Primary Wound Type: Non pressure   Side: Right   Orientation:    Location: Toe, second   Wound Number: #3   Ankle-Brachial Index:    Pulses:    Removal Indication and Assessment:     Wound Outcome:    (Retired) Wound Type:    (Retired) Wound Length (cm):    (Retired) Wound Width (cm):    (Retired) Depth (cm):    Wound Description (Comments):    Removal Indications:    Wound Image   02/10/22 1500   Dressing Appearance Intact 02/10/22 1500   Drainage Amount Scant 02/10/22 1500   Appearance Intact;Pink;Red 02/10/22 1500   Tissue loss description Partial thickness 02/10/22 1500   Red (%), Wound Tissue Color 100 % 02/10/22 1500   Periwound Area Intact;Moist;Pale white 02/10/22 1500   Wound Edges Irregular 02/10/22 1500   Wound Length (cm) 0.4 cm 02/10/22 1500   Wound Width (cm) 0.04 cm 02/10/22 1500   Wound Depth (cm) 0.1 cm 02/10/22 1500   Wound Volume (cm^3) 0.0016 cm^3 02/10/22 1500   Wound Surface Area (cm^2) 0.016 cm^2 02/10/22 1500   Care Cleansed with:;Soap and water;Sterile normal saline 02/10/22 1500   Dressing Applied;Changed;Hydrofiber;Island/border 02/10/22 1500   Periwound Care Skin barrier film applied 02/10/22 1500   Dressing Change Due 02/22/22 02/10/22 1500            Incision/Site 01/27/22 0200 Right Foot upper;medial (Active)   01/27/22 0200   Present Prior to Hospital Arrival?: Yes   Side: Right   Location: Foot   Orientation: upper;medial   Incision Type:    Closure Method:    Additional Comments:    Removal Indication and Assessment:    Wound Outcome:    Removal Indications:    Dressing Appearance Intact;Moist drainage 02/10/22 1500   Drainage Amount Small 02/10/22 1500   Drainage Characteristics/Odor Serosanguineous 02/10/22 1500   Appearance Intact;Pink;White;Moist 02/10/22 1500   Red (%), Wound Tissue Color 100 % 02/10/22 1500   Wound Edges Irregular 02/10/22 1500   Wound Length (cm) 0.3 cm 02/10/22 1500   Wound Width (cm) 0.5 cm 02/10/22 1500   Wound Depth (cm) 0.2 cm 02/10/22 1500   Wound Volume (cm^3) 0.03 cm^3 02/10/22 1500   Wound Surface Area (cm^2) 0.15 cm^2 02/10/22 1500   Care Cleansed with:;Soap and water;Sterile normal saline 02/10/22 1500   Dressing  Applied;Changed;Hydrofiber;Island/border 02/10/22 1500         Assessment:         ICD-10-CM ICD-9-CM   1. Open wound of toe  S91.109A 893.0   2. Open wound of right great toe  S91.101A 893.0   3. Right foot ulcer, with fat layer exposed  L97.512 707.15         Plan:   Tissue pathology and/or culture taken:  [] Yes [x] No   Sharp debridement performed:   [x] Yes [] No   Labs ordered this visit:   [] Yes [x] No   Imaging ordered this visit:   [] Yes [x] No           Orders Placed This Encounter   Procedures    Change dressing     Left foot, Right medial foot and R 2nd toe   Cleanse wound with: Normal Saline   Lidocaine: PRN   Silver nitrate: PRN   Periwound care: Moisturizer   Primary dressing: Hydrofera Ready, Border Dressing   Edema control: Elevate as much as possible avoid prolong standing.     Follow-up: 2 weeks with Dr. Bunn 02/22/22        Follow up in about 12 days (around 2/22/2022).

## 2022-02-21 ENCOUNTER — HOSPITAL ENCOUNTER (INPATIENT)
Facility: OTHER | Age: 57
LOS: 14 days | Discharge: NURSING, REHAB OR LONG-TERM CARE | DRG: 255 | End: 2022-03-07
Attending: EMERGENCY MEDICINE | Admitting: INTERNAL MEDICINE
Payer: MEDICAID

## 2022-02-21 DIAGNOSIS — E87.6 HYPOKALEMIA: ICD-10-CM

## 2022-02-21 DIAGNOSIS — B95.8 BACTEREMIA DUE TO STAPHYLOCOCCUS: ICD-10-CM

## 2022-02-21 DIAGNOSIS — R63.8 INCREASED NUTRITIONAL NEEDS: ICD-10-CM

## 2022-02-21 DIAGNOSIS — S91.302A OPEN WOUND OF LEFT FOOT, INITIAL ENCOUNTER: Primary | ICD-10-CM

## 2022-02-21 DIAGNOSIS — E11.52 DIABETIC WET GANGRENE OF THE FOOT: ICD-10-CM

## 2022-02-21 DIAGNOSIS — I96 GANGRENE OF RIGHT FOOT: ICD-10-CM

## 2022-02-21 DIAGNOSIS — R07.9 CHEST PAIN: ICD-10-CM

## 2022-02-21 DIAGNOSIS — I76 SEPTIC EMBOLISM: ICD-10-CM

## 2022-02-21 DIAGNOSIS — R78.81 MRSA BACTEREMIA: ICD-10-CM

## 2022-02-21 DIAGNOSIS — E11.628 TYPE 2 DIABETES MELLITUS WITH RIGHT DIABETIC FOOT INFECTION: ICD-10-CM

## 2022-02-21 DIAGNOSIS — M79.673 FOOT PAIN: ICD-10-CM

## 2022-02-21 DIAGNOSIS — R78.81 BACTEREMIA DUE TO STAPHYLOCOCCUS: ICD-10-CM

## 2022-02-21 DIAGNOSIS — J98.4 CAVITARY LESION OF LUNG: ICD-10-CM

## 2022-02-21 DIAGNOSIS — L08.9 TYPE 2 DIABETES MELLITUS WITH RIGHT DIABETIC FOOT INFECTION: ICD-10-CM

## 2022-02-21 DIAGNOSIS — B95.62 MRSA BACTEREMIA: ICD-10-CM

## 2022-02-21 LAB
ALBUMIN SERPL BCP-MCNC: 1.9 G/DL (ref 3.5–5.2)
ALBUMIN SERPL BCP-MCNC: 2.1 G/DL (ref 3.5–5.2)
ALP SERPL-CCNC: 106 U/L (ref 55–135)
ALP SERPL-CCNC: 95 U/L (ref 55–135)
ALT SERPL W/O P-5'-P-CCNC: 31 U/L (ref 10–44)
ALT SERPL W/O P-5'-P-CCNC: 35 U/L (ref 10–44)
ANION GAP SERPL CALC-SCNC: 10 MMOL/L (ref 8–16)
ANION GAP SERPL CALC-SCNC: 13 MMOL/L (ref 8–16)
AST SERPL-CCNC: 30 U/L (ref 10–40)
AST SERPL-CCNC: 36 U/L (ref 10–40)
BASOPHILS # BLD AUTO: 0.06 K/UL (ref 0–0.2)
BASOPHILS # BLD AUTO: 0.06 K/UL (ref 0–0.2)
BASOPHILS NFR BLD: 0.3 % (ref 0–1.9)
BASOPHILS NFR BLD: 0.3 % (ref 0–1.9)
BILIRUB SERPL-MCNC: 0.6 MG/DL (ref 0.1–1)
BILIRUB SERPL-MCNC: 0.8 MG/DL (ref 0.1–1)
BUN SERPL-MCNC: 12 MG/DL (ref 6–20)
BUN SERPL-MCNC: 13 MG/DL (ref 6–20)
CALCIUM SERPL-MCNC: 9.4 MG/DL (ref 8.7–10.5)
CALCIUM SERPL-MCNC: 9.7 MG/DL (ref 8.7–10.5)
CHLORIDE SERPL-SCNC: 89 MMOL/L (ref 95–110)
CHLORIDE SERPL-SCNC: 91 MMOL/L (ref 95–110)
CO2 SERPL-SCNC: 28 MMOL/L (ref 23–29)
CO2 SERPL-SCNC: 31 MMOL/L (ref 23–29)
CREAT SERPL-MCNC: 1 MG/DL (ref 0.5–1.4)
CREAT SERPL-MCNC: 1.1 MG/DL (ref 0.5–1.4)
CRP SERPL-MCNC: 289 MG/L (ref 0–8.2)
DIFFERENTIAL METHOD: ABNORMAL
DIFFERENTIAL METHOD: ABNORMAL
EOSINOPHIL # BLD AUTO: 0.1 K/UL (ref 0–0.5)
EOSINOPHIL # BLD AUTO: 0.2 K/UL (ref 0–0.5)
EOSINOPHIL NFR BLD: 0.5 % (ref 0–8)
EOSINOPHIL NFR BLD: 1.2 % (ref 0–8)
ERYTHROCYTE [DISTWIDTH] IN BLOOD BY AUTOMATED COUNT: 15 % (ref 11.5–14.5)
ERYTHROCYTE [DISTWIDTH] IN BLOOD BY AUTOMATED COUNT: 15 % (ref 11.5–14.5)
ERYTHROCYTE [SEDIMENTATION RATE] IN BLOOD: 120 MM/HR (ref 0–10)
EST. GFR  (AFRICAN AMERICAN): >60 ML/MIN/1.73 M^2
EST. GFR  (AFRICAN AMERICAN): >60 ML/MIN/1.73 M^2
EST. GFR  (NON AFRICAN AMERICAN): >60 ML/MIN/1.73 M^2
EST. GFR  (NON AFRICAN AMERICAN): >60 ML/MIN/1.73 M^2
ESTIMATED AVG GLUCOSE: 235 MG/DL (ref 68–131)
GLUCOSE SERPL-MCNC: 234 MG/DL (ref 70–110)
GLUCOSE SERPL-MCNC: 239 MG/DL (ref 70–110)
HBA1C MFR BLD: 9.8 % (ref 4–5.6)
HCT VFR BLD AUTO: 31.2 % (ref 40–54)
HCT VFR BLD AUTO: 34 % (ref 40–54)
HCV AB SERPL QL IA: POSITIVE
HGB BLD-MCNC: 10.8 G/DL (ref 14–18)
HGB BLD-MCNC: 9.9 G/DL (ref 14–18)
HIV 1+2 AB+HIV1 P24 AG SERPL QL IA: NEGATIVE
IMM GRANULOCYTES # BLD AUTO: 0.48 K/UL (ref 0–0.04)
IMM GRANULOCYTES # BLD AUTO: 0.62 K/UL (ref 0–0.04)
IMM GRANULOCYTES NFR BLD AUTO: 2.5 % (ref 0–0.5)
IMM GRANULOCYTES NFR BLD AUTO: 2.9 % (ref 0–0.5)
LACTATE SERPL-SCNC: 1.1 MMOL/L (ref 0.5–2.2)
LACTATE SERPL-SCNC: 1.4 MMOL/L (ref 0.5–2.2)
LYMPHOCYTES # BLD AUTO: 2.9 K/UL (ref 1–4.8)
LYMPHOCYTES # BLD AUTO: 3 K/UL (ref 1–4.8)
LYMPHOCYTES NFR BLD: 13.9 % (ref 18–48)
LYMPHOCYTES NFR BLD: 15 % (ref 18–48)
MCH RBC QN AUTO: 27.3 PG (ref 27–31)
MCH RBC QN AUTO: 27.3 PG (ref 27–31)
MCHC RBC AUTO-ENTMCNC: 31.7 G/DL (ref 32–36)
MCHC RBC AUTO-ENTMCNC: 31.8 G/DL (ref 32–36)
MCV RBC AUTO: 86 FL (ref 82–98)
MCV RBC AUTO: 86 FL (ref 82–98)
MONOCYTES # BLD AUTO: 2.3 K/UL (ref 0.3–1)
MONOCYTES # BLD AUTO: 2.5 K/UL (ref 0.3–1)
MONOCYTES NFR BLD: 11.4 % (ref 4–15)
MONOCYTES NFR BLD: 12.1 % (ref 4–15)
NEUTROPHILS # BLD AUTO: 13.2 K/UL (ref 1.8–7.7)
NEUTROPHILS # BLD AUTO: 15.4 K/UL (ref 1.8–7.7)
NEUTROPHILS NFR BLD: 68.9 % (ref 38–73)
NEUTROPHILS NFR BLD: 71 % (ref 38–73)
NRBC BLD-RTO: 0 /100 WBC
NRBC BLD-RTO: 0 /100 WBC
PLATELET # BLD AUTO: 504 K/UL (ref 150–450)
PLATELET # BLD AUTO: 554 K/UL (ref 150–450)
PLATELET BLD QL SMEAR: ABNORMAL
PMV BLD AUTO: 9.5 FL (ref 9.2–12.9)
PMV BLD AUTO: 9.7 FL (ref 9.2–12.9)
POCT GLUCOSE: 224 MG/DL (ref 70–110)
POCT GLUCOSE: 240 MG/DL (ref 70–110)
POCT GLUCOSE: 243 MG/DL (ref 70–110)
POCT GLUCOSE: 280 MG/DL (ref 70–110)
POTASSIUM SERPL-SCNC: 2.9 MMOL/L (ref 3.5–5.1)
POTASSIUM SERPL-SCNC: 3 MMOL/L (ref 3.5–5.1)
PROT SERPL-MCNC: 8.3 G/DL (ref 6–8.4)
PROT SERPL-MCNC: 9.1 G/DL (ref 6–8.4)
RBC # BLD AUTO: 3.62 M/UL (ref 4.6–6.2)
RBC # BLD AUTO: 3.96 M/UL (ref 4.6–6.2)
SODIUM SERPL-SCNC: 130 MMOL/L (ref 136–145)
SODIUM SERPL-SCNC: 132 MMOL/L (ref 136–145)
TROPONIN I SERPL DL<=0.01 NG/ML-MCNC: 0.01 NG/ML (ref 0–0.03)
WBC # BLD AUTO: 19.18 K/UL (ref 3.9–12.7)
WBC # BLD AUTO: 21.67 K/UL (ref 3.9–12.7)

## 2022-02-21 PROCEDURE — 80053 COMPREHEN METABOLIC PANEL: CPT | Performed by: NURSE PRACTITIONER

## 2022-02-21 PROCEDURE — 99291 CRITICAL CARE FIRST HOUR: CPT | Mod: 25

## 2022-02-21 PROCEDURE — 96365 THER/PROPH/DIAG IV INF INIT: CPT

## 2022-02-21 PROCEDURE — 82962 GLUCOSE BLOOD TEST: CPT

## 2022-02-21 PROCEDURE — 63600175 PHARM REV CODE 636 W HCPCS: Performed by: EMERGENCY MEDICINE

## 2022-02-21 PROCEDURE — 36415 COLL VENOUS BLD VENIPUNCTURE: CPT | Performed by: EMERGENCY MEDICINE

## 2022-02-21 PROCEDURE — 99223 1ST HOSP IP/OBS HIGH 75: CPT | Mod: ,,, | Performed by: INTERNAL MEDICINE

## 2022-02-21 PROCEDURE — 85651 RBC SED RATE NONAUTOMATED: CPT | Performed by: NURSE PRACTITIONER

## 2022-02-21 PROCEDURE — 63600175 PHARM REV CODE 636 W HCPCS: Performed by: INTERNAL MEDICINE

## 2022-02-21 PROCEDURE — 83605 ASSAY OF LACTIC ACID: CPT | Performed by: EMERGENCY MEDICINE

## 2022-02-21 PROCEDURE — 36415 COLL VENOUS BLD VENIPUNCTURE: CPT | Performed by: NURSE PRACTITIONER

## 2022-02-21 PROCEDURE — 87186 SC STD MICRODIL/AGAR DIL: CPT | Performed by: EMERGENCY MEDICINE

## 2022-02-21 PROCEDURE — 87040 BLOOD CULTURE FOR BACTERIA: CPT | Mod: 59 | Performed by: EMERGENCY MEDICINE

## 2022-02-21 PROCEDURE — 93010 EKG 12-LEAD: ICD-10-PCS | Mod: ,,, | Performed by: INTERNAL MEDICINE

## 2022-02-21 PROCEDURE — 80053 COMPREHEN METABOLIC PANEL: CPT | Mod: 91 | Performed by: INTERNAL MEDICINE

## 2022-02-21 PROCEDURE — 83036 HEMOGLOBIN GLYCOSYLATED A1C: CPT | Performed by: INTERNAL MEDICINE

## 2022-02-21 PROCEDURE — 25500020 PHARM REV CODE 255: Performed by: EMERGENCY MEDICINE

## 2022-02-21 PROCEDURE — 85025 COMPLETE CBC W/AUTO DIFF WBC: CPT | Mod: 91 | Performed by: INTERNAL MEDICINE

## 2022-02-21 PROCEDURE — 12000002 HC ACUTE/MED SURGE SEMI-PRIVATE ROOM

## 2022-02-21 PROCEDURE — 96367 TX/PROPH/DG ADDL SEQ IV INF: CPT

## 2022-02-21 PROCEDURE — 86803 HEPATITIS C AB TEST: CPT | Performed by: EMERGENCY MEDICINE

## 2022-02-21 PROCEDURE — 87077 CULTURE AEROBIC IDENTIFY: CPT | Performed by: EMERGENCY MEDICINE

## 2022-02-21 PROCEDURE — C9399 UNCLASSIFIED DRUGS OR BIOLOG: HCPCS | Performed by: INTERNAL MEDICINE

## 2022-02-21 PROCEDURE — 86140 C-REACTIVE PROTEIN: CPT | Performed by: NURSE PRACTITIONER

## 2022-02-21 PROCEDURE — 93005 ELECTROCARDIOGRAM TRACING: CPT

## 2022-02-21 PROCEDURE — 25000003 PHARM REV CODE 250: Performed by: PHYSICIAN ASSISTANT

## 2022-02-21 PROCEDURE — 93010 ELECTROCARDIOGRAM REPORT: CPT | Mod: ,,, | Performed by: INTERNAL MEDICINE

## 2022-02-21 PROCEDURE — 99285 EMERGENCY DEPT VISIT HI MDM: CPT | Mod: 25

## 2022-02-21 PROCEDURE — 25000003 PHARM REV CODE 250: Performed by: EMERGENCY MEDICINE

## 2022-02-21 PROCEDURE — 25000003 PHARM REV CODE 250: Performed by: INTERNAL MEDICINE

## 2022-02-21 PROCEDURE — 87522 HEPATITIS C REVRS TRNSCRPJ: CPT | Performed by: EMERGENCY MEDICINE

## 2022-02-21 PROCEDURE — 84484 ASSAY OF TROPONIN QUANT: CPT | Performed by: NURSE PRACTITIONER

## 2022-02-21 PROCEDURE — 99223 PR INITIAL HOSPITAL CARE,LEVL III: ICD-10-PCS | Mod: ,,, | Performed by: INTERNAL MEDICINE

## 2022-02-21 PROCEDURE — 85025 COMPLETE CBC W/AUTO DIFF WBC: CPT | Performed by: NURSE PRACTITIONER

## 2022-02-21 PROCEDURE — 87389 HIV-1 AG W/HIV-1&-2 AB AG IA: CPT | Performed by: EMERGENCY MEDICINE

## 2022-02-21 PROCEDURE — 83605 ASSAY OF LACTIC ACID: CPT | Mod: 91 | Performed by: EMERGENCY MEDICINE

## 2022-02-21 RX ORDER — ACETAMINOPHEN 325 MG/1
650 TABLET ORAL EVERY 6 HOURS PRN
Status: DISCONTINUED | OUTPATIENT
Start: 2022-02-21 | End: 2022-03-07 | Stop reason: HOSPADM

## 2022-02-21 RX ORDER — HYDROCODONE BITARTRATE AND ACETAMINOPHEN 5; 325 MG/1; MG/1
1 TABLET ORAL EVERY 6 HOURS PRN
Status: DISCONTINUED | OUTPATIENT
Start: 2022-02-21 | End: 2022-02-25

## 2022-02-21 RX ORDER — INSULIN ASPART 100 [IU]/ML
10 INJECTION, SOLUTION INTRAVENOUS; SUBCUTANEOUS
Status: DISCONTINUED | OUTPATIENT
Start: 2022-02-21 | End: 2022-03-07 | Stop reason: HOSPADM

## 2022-02-21 RX ORDER — POTASSIUM CHLORIDE 20 MEQ/1
20 TABLET, EXTENDED RELEASE ORAL ONCE
Status: COMPLETED | OUTPATIENT
Start: 2022-02-21 | End: 2022-02-21

## 2022-02-21 RX ORDER — ASPIRIN 81 MG/1
81 TABLET ORAL DAILY
Status: DISCONTINUED | OUTPATIENT
Start: 2022-02-22 | End: 2022-03-07 | Stop reason: HOSPADM

## 2022-02-21 RX ORDER — ATORVASTATIN CALCIUM 20 MG/1
40 TABLET, FILM COATED ORAL DAILY
Status: DISCONTINUED | OUTPATIENT
Start: 2022-02-22 | End: 2022-03-07 | Stop reason: HOSPADM

## 2022-02-21 RX ORDER — MORPHINE SULFATE 2 MG/ML
2 INJECTION, SOLUTION INTRAMUSCULAR; INTRAVENOUS EVERY 4 HOURS PRN
Status: DISCONTINUED | OUTPATIENT
Start: 2022-02-21 | End: 2022-02-22 | Stop reason: RX

## 2022-02-21 RX ORDER — IBUPROFEN 200 MG
16 TABLET ORAL
Status: DISCONTINUED | OUTPATIENT
Start: 2022-02-21 | End: 2022-03-07 | Stop reason: HOSPADM

## 2022-02-21 RX ORDER — QUETIAPINE FUMARATE 200 MG/1
200 TABLET, FILM COATED ORAL NIGHTLY
Status: DISCONTINUED | OUTPATIENT
Start: 2022-02-21 | End: 2022-03-07 | Stop reason: HOSPADM

## 2022-02-21 RX ORDER — HYDROCODONE BITARTRATE AND ACETAMINOPHEN 10; 325 MG/1; MG/1
1 TABLET ORAL
Status: COMPLETED | OUTPATIENT
Start: 2022-02-21 | End: 2022-02-21

## 2022-02-21 RX ORDER — SODIUM CHLORIDE 0.9 % (FLUSH) 0.9 %
10 SYRINGE (ML) INJECTION
Status: DISCONTINUED | OUTPATIENT
Start: 2022-02-21 | End: 2022-03-07 | Stop reason: HOSPADM

## 2022-02-21 RX ORDER — IBUPROFEN 200 MG
24 TABLET ORAL
Status: DISCONTINUED | OUTPATIENT
Start: 2022-02-21 | End: 2022-03-07 | Stop reason: HOSPADM

## 2022-02-21 RX ORDER — LISINOPRIL 10 MG/1
10 TABLET ORAL DAILY
Status: ON HOLD | COMMUNITY
Start: 2022-02-21 | End: 2022-07-15 | Stop reason: SDUPTHER

## 2022-02-21 RX ORDER — GLUCAGON 1 MG
1 KIT INJECTION
Status: DISCONTINUED | OUTPATIENT
Start: 2022-02-21 | End: 2022-03-07 | Stop reason: HOSPADM

## 2022-02-21 RX ORDER — MEROPENEM AND SODIUM CHLORIDE 1 G/50ML
1 INJECTION, SOLUTION INTRAVENOUS
Status: COMPLETED | OUTPATIENT
Start: 2022-02-21 | End: 2022-02-21

## 2022-02-21 RX ORDER — INSULIN GLARGINE 100 [IU]/ML
INJECTION, SOLUTION SUBCUTANEOUS
Status: ON HOLD | COMMUNITY
Start: 2022-02-21 | End: 2022-03-07 | Stop reason: HOSPADM

## 2022-02-21 RX ORDER — HEPARIN SODIUM 5000 [USP'U]/ML
5000 INJECTION, SOLUTION INTRAVENOUS; SUBCUTANEOUS EVERY 8 HOURS
Status: DISCONTINUED | OUTPATIENT
Start: 2022-02-21 | End: 2022-03-07 | Stop reason: HOSPADM

## 2022-02-21 RX ORDER — LOSARTAN POTASSIUM 25 MG/1
25 TABLET ORAL 2 TIMES DAILY
Status: DISCONTINUED | OUTPATIENT
Start: 2022-02-21 | End: 2022-03-07 | Stop reason: HOSPADM

## 2022-02-21 RX ADMIN — QUETIAPINE FUMARATE 200 MG: 200 TABLET ORAL at 09:02

## 2022-02-21 RX ADMIN — IOHEXOL 100 ML: 350 INJECTION, SOLUTION INTRAVENOUS at 02:02

## 2022-02-21 RX ADMIN — POTASSIUM BICARBONATE 20 MEQ: 391 TABLET, EFFERVESCENT ORAL at 02:02

## 2022-02-21 RX ADMIN — LOSARTAN POTASSIUM 25 MG: 25 TABLET, FILM COATED ORAL at 11:02

## 2022-02-21 RX ADMIN — MEROPENEM AND SODIUM CHLORIDE 1 G: 1 INJECTION, SOLUTION INTRAVENOUS at 12:02

## 2022-02-21 RX ADMIN — POTASSIUM CHLORIDE 20 MEQ: 1500 TABLET, EXTENDED RELEASE ORAL at 05:02

## 2022-02-21 RX ADMIN — VANCOMYCIN HYDROCHLORIDE 2000 MG: 100 INJECTION, POWDER, LYOPHILIZED, FOR SOLUTION INTRAVENOUS at 01:02

## 2022-02-21 RX ADMIN — INSULIN ASPART 10 UNITS: 100 INJECTION, SOLUTION INTRAVENOUS; SUBCUTANEOUS at 05:02

## 2022-02-21 RX ADMIN — HYDROCODONE BITARTRATE AND ACETAMINOPHEN 1 TABLET: 5; 325 TABLET ORAL at 09:02

## 2022-02-21 RX ADMIN — HYDROCODONE BITARTRATE AND ACETAMINOPHEN 1 TABLET: 10; 325 TABLET ORAL at 01:02

## 2022-02-21 RX ADMIN — HEPARIN SODIUM 5000 UNITS: 5000 INJECTION INTRAVENOUS; SUBCUTANEOUS at 09:02

## 2022-02-21 RX ADMIN — INSULIN DETEMIR 50 UNITS: 100 INJECTION, SOLUTION SUBCUTANEOUS at 09:02

## 2022-02-21 RX ADMIN — PIPERACILLIN AND TAZOBACTAM 4.5 G: 4; .5 INJECTION, POWDER, LYOPHILIZED, FOR SOLUTION INTRAVENOUS; PARENTERAL at 04:02

## 2022-02-21 NOTE — ED PROVIDER NOTES
"SCRIBE #1 NOTE: I, Lenka Shravan, am scribing for, and in the presence of, Gricelda Jay MD.       Source of History:  The patient.    Chief complaint:  Wound Infection (Wound infection to the right second toe, recent amputation to the right big toe. Foul smelling odor coming from foot. )      HPI:  Dave Good is a 56 y.o. male with PMHx of HTN, DM, COPD and PE presenting with right second digit wound. The patient had his right great toe amputated by Dr. 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 He has had ESBL, E. Coli and Staph Aureus grow out of the wound cultures sensitive to vancomycin and zosyn. He is followed by wound care at [facility]. He was receiving wound care twice per week, but his last appointment was 1.5 weeks ago. He states that his right second digit began swelling 2 weeks ago. Last week, he began having pain and foul smelling drainage associated with the right 2nd digit. He states that the presentation is similar to his previous infection of the right great toe. He has been taking tylenol for pain; his last dose was yesterday. He denies any fever or chills. He reports worsening shortness of breath on exertion for the past week. He also reports intermittent left sided chest pain that began yesterday. He describes the pain as a "stabbing" sensation that is worse with movement and deep breaths.     This is the extent to the patients complaints today here in the emergency department.    ROS: As per HPI and below:  General: No fever.  No chills.  Eyes: No visual changes.  ENT: No sore throat. No ear pain  Head: No headache.    Respiratory: +Shortness of breath.  Cardiovascular: +Left sided chest pain.  Abdomen: No abdominal pain.  No nausea or vomiting.  Genito-Urinary: No abnormal urination.  Neurologic: No focal weakness.  No numbness.  MSK: no back pain.  Integument: No rashes or lesions. +Right 2nd toe wound.  Hematologic: No easy bruising.  Endocrine: No excessive thirst or " "urination.    Review of patient's allergies indicates:   Allergen Reactions    Ibuprofen Swelling     Facial swelling       PMH:  As per HPI and below:  Past Medical History:   Diagnosis Date    COPD (chronic obstructive pulmonary disease)     COVID-19     Depression     Diabetes mellitus     Diabetes mellitus, type 2     Hypertension      Past Surgical History:   Procedure Laterality Date    FOOT AMPUTATION THROUGH METATARSAL Right 9/23/2021    Procedure: AMPUTATION, FOOT, TRANSMETATARSAL right 1st ray resection;  Surgeon: Samuel Toussaint DPM;  Location: [facility] OR;  Service: Podiatry;  Laterality: Right;    INCISION AND DRAINAGE FOOT Bilateral 9/21/2021    Procedure: INCISION AND DRAINAGE, FOOT - Bilateral;  Surgeon: Lavonne Vigil DPM;  Location: [facility] OR;  Service: Podiatry;  Laterality: Bilateral;       Social History     Tobacco Use    Smoking status: Former Smoker    Smokeless tobacco: Never Used   Substance Use Topics    Alcohol use: Yes     Comment: whiskey and beer every other day    Drug use: Not Currently       Physical Exam:    /75   Pulse 80   Temp 98.3 °F (36.8 °C)   Resp (!) 23   Ht 6' 2" (1.88 m)   Wt 129.3 kg (285 lb)   SpO2 96%   BMI 36.59 kg/m²   Nursing note and vital signs reviewed.    Appearance: No acute distress.  Eyes: No conjunctival injection.  Neck: No deformity.   ENT: Oropharynx clear.  No stridor.   Chest/ Respiratory: Coarse breath sounds bilaterally. No wheezes. No rhonchi. No rales.  Cardiovascular: Tachycardic rate. Regular rhythm.  No murmurs. No gallops. No rubs.  Abdomen: Obese abdomen. Soft.  Not distended.  Nontender.  No guarding.  No rebound. Non-peritoneal.  Musculoskeletal: Good range of motion all joints.  No deformities.  Neck supple.  No meningismus.  RLE: Right foot amputation of the 1st digit with wound dehiscence and foul drainage. Swelling and discoloration of the 2nd digit. Dorsum of the foot with subcutaneous fluid collection visible. " Tenderness on exam. 1+ pulse. Intact sensation of the foot but decreased sensation of the toes. Right ankle is nontender with good range of motion.  Skin: No rashes seen.  Good turgor.  No abrasions.  No ecchymoses.  Neurologic: Motor intact.  Sensation intact.  Cerebellar intact.  Cranial nerves intact.  Mental Status:  Alert and oriented x 3.  Appropriate, conversant.      EKG:  I independently reviewed and interpreted the EKG and my findings are as follows:   Normal Sinus Rhythm. Rate of 94. Left axis deviation. QT prolonged at 505. No ST elevation. T waves are upright. Similar to previous from 01/08/2022.    Imaging:  I independently reviewed and interpreted the patient's X-Ray and my findings are as follows:    Right Foot X-Ray: Evidence of bony destruction of the stump of the 1st metatarsal. Subcutaneous air present. Partial loss of the distal portion of the 2nd distal phalanx with periosteal elevation on the lateral side with additional gas.    Initial Impression  56 y.o. male with diabetic foot infection consistent with wet gangrene. Hemodynamically stable, however blood pressure is lower than baseline. Patient also with shortness of breath and history of PE. Plan septic workup, inflammatory markers, foot X-ray, add on troponin and CTA chest if creatinine permits. Admit to hospital with surgical consultation.    Differential Dx:  Sepsis, bacteremia, UTI, pneumonia, cellulitis, abscess, indwelling line/catheter infection, cholecystitis, viral URI, gastroenteritis, viral syndrome, sinusitis, otitis media/externa, neoplasm, drug reaction, serotonin syndrome, intoxication/withdrawal syndrome, osteomyelitis, infection with gas forming organisms.     MDM:        ED Course as of 02/21/22 2327   Mon Feb 21, 2022   1208 CBC auto differential [LF]   1339 1:39 PM  Spoke with Dr. Flores. He will come down to see the patient. [MG]      ED Course User Index  [LF] Gricelda Jay MD  [MG] Lenka Cheney     Results for orders  placed or performed during the hospital encounter of 02/21/22   CBC auto differential   Result Value Ref Range    WBC 21.67 (H) 3.90 - 12.70 K/uL    RBC 3.96 (L) 4.60 - 6.20 M/uL    Hemoglobin 10.8 (L) 14.0 - 18.0 g/dL    Hematocrit 34.0 (L) 40.0 - 54.0 %    MCV 86 82 - 98 fL    MCH 27.3 27.0 - 31.0 pg    MCHC 31.8 (L) 32.0 - 36.0 g/dL    RDW 15.0 (H) 11.5 - 14.5 %    Platelets 554 (H) 150 - 450 K/uL    MPV 9.7 9.2 - 12.9 fL    Immature Granulocytes 2.9 (H) 0.0 - 0.5 %    Gran # (ANC) 15.4 (H) 1.8 - 7.7 K/uL    Immature Grans (Abs) 0.62 (H) 0.00 - 0.04 K/uL    Lymph # 3.0 1.0 - 4.8 K/uL    Mono # 2.5 (H) 0.3 - 1.0 K/uL    Eos # 0.1 0.0 - 0.5 K/uL    Baso # 0.06 0.00 - 0.20 K/uL    nRBC 0 0 /100 WBC    Gran % 71.0 38.0 - 73.0 %    Lymph % 13.9 (L) 18.0 - 48.0 %    Mono % 11.4 4.0 - 15.0 %    Eosinophil % 0.5 0.0 - 8.0 %    Basophil % 0.3 0.0 - 1.9 %    Differential Method Automated    Comprehensive metabolic panel   Result Value Ref Range    Sodium 132 (L) 136 - 145 mmol/L    Potassium 2.9 (L) 3.5 - 5.1 mmol/L    Chloride 91 (L) 95 - 110 mmol/L    CO2 28 23 - 29 mmol/L    Glucose 234 (H) 70 - 110 mg/dL    BUN 13 6 - 20 mg/dL    Creatinine 1.1 0.5 - 1.4 mg/dL    Calcium 9.7 8.7 - 10.5 mg/dL    Total Protein 9.1 (H) 6.0 - 8.4 g/dL    Albumin 2.1 (L) 3.5 - 5.2 g/dL    Total Bilirubin 0.8 0.1 - 1.0 mg/dL    Alkaline Phosphatase 106 55 - 135 U/L    AST 36 10 - 40 U/L    ALT 35 10 - 44 U/L    Anion Gap 13 8 - 16 mmol/L    eGFR if African American >60 >60 mL/min/1.73 m^2    eGFR if non African American >60 >60 mL/min/1.73 m^2   C-reactive protein   Result Value Ref Range    .0 (H) 0.0 - 8.2 mg/L   Sedimentation rate   Result Value Ref Range    Sed Rate 120 (H) 0 - 10 mm/Hr   HIV 1/2 Ag/Ab (4th Gen)   Result Value Ref Range    HIV 1/2 Ag/Ab Negative Negative   Hepatitis C Antibody   Result Value Ref Range    Hepatitis C Ab Positive (A) Negative   Lactic acid, plasma #1   Result Value Ref Range    Lactate (Lactic Acid)  1.4 0.5 - 2.2 mmol/L   Lactic acid, plasma #2   Result Value Ref Range    Lactate (Lactic Acid) 1.1 0.5 - 2.2 mmol/L   Troponin I   Result Value Ref Range    Troponin I 0.012 0.000 - 0.026 ng/mL   Comprehensive metabolic panel   Result Value Ref Range    Sodium 130 (L) 136 - 145 mmol/L    Potassium 3.0 (L) 3.5 - 5.1 mmol/L    Chloride 89 (L) 95 - 110 mmol/L    CO2 31 (H) 23 - 29 mmol/L    Glucose 239 (H) 70 - 110 mg/dL    BUN 12 6 - 20 mg/dL    Creatinine 1.0 0.5 - 1.4 mg/dL    Calcium 9.4 8.7 - 10.5 mg/dL    Total Protein 8.3 6.0 - 8.4 g/dL    Albumin 1.9 (L) 3.5 - 5.2 g/dL    Total Bilirubin 0.6 0.1 - 1.0 mg/dL    Alkaline Phosphatase 95 55 - 135 U/L    AST 30 10 - 40 U/L    ALT 31 10 - 44 U/L    Anion Gap 10 8 - 16 mmol/L    eGFR if African American >60 >60 mL/min/1.73 m^2    eGFR if non African American >60 >60 mL/min/1.73 m^2   CBC auto differential   Result Value Ref Range    WBC 19.18 (H) 3.90 - 12.70 K/uL    RBC 3.62 (L) 4.60 - 6.20 M/uL    Hemoglobin 9.9 (L) 14.0 - 18.0 g/dL    Hematocrit 31.2 (L) 40.0 - 54.0 %    MCV 86 82 - 98 fL    MCH 27.3 27.0 - 31.0 pg    MCHC 31.7 (L) 32.0 - 36.0 g/dL    RDW 15.0 (H) 11.5 - 14.5 %    Platelets 504 (H) 150 - 450 K/uL    MPV 9.5 9.2 - 12.9 fL    Immature Granulocytes 2.5 (H) 0.0 - 0.5 %    Gran # (ANC) 13.2 (H) 1.8 - 7.7 K/uL    Immature Grans (Abs) 0.48 (H) 0.00 - 0.04 K/uL    Lymph # 2.9 1.0 - 4.8 K/uL    Mono # 2.3 (H) 0.3 - 1.0 K/uL    Eos # 0.2 0.0 - 0.5 K/uL    Baso # 0.06 0.00 - 0.20 K/uL    nRBC 0 0 /100 WBC    Gran % 68.9 38.0 - 73.0 %    Lymph % 15.0 (L) 18.0 - 48.0 %    Mono % 12.1 4.0 - 15.0 %    Eosinophil % 1.2 0.0 - 8.0 %    Basophil % 0.3 0.0 - 1.9 %    Platelet Estimate Increased (A)     Differential Method Automated    Hemoglobin A1c if not done in past 3 months   Result Value Ref Range    Hemoglobin A1C 9.8 (H) 4.0 - 5.6 %    Estimated Avg Glucose 235 (H) 68 - 131 mg/dL   POCT glucose   Result Value Ref Range    POCT Glucose 280 (H) 70 - 110 mg/dL    POCT glucose   Result Value Ref Range    POCT Glucose 243 (H) 70 - 110 mg/dL   POCT glucose   Result Value Ref Range    POCT Glucose 224 (H) 70 - 110 mg/dL   POCT glucose   Result Value Ref Range    POCT Glucose 240 (H) 70 - 110 mg/dL     CTA Chest Non-Coronary (PE Study)   Final Result      1. No pulmonary embolism.   2. Numerous bilateral ill-defined cavitary lesions.  This is concerning for septic emboli or pneumonia (including bacterial, fungal and mycobacterial).  Metastatic disease is possible but felt to be less likely.  Imaging follow-up is recommended to ensure resolution of these findings.   3. Scattered peripheral opacities seen throughout both lungs, suggestive of an underlying component of organizing pneumonia.   4. Bilateral hilar and mediastinal lymphadenopathy.   These findings were discussed with Gricelda Jay MD, on 02/21/2022 at 15:11         Electronically signed by: Malorie Hill   Date:    02/21/2022   Time:    15:11      X-Ray Foot Complete Right   Final Result      Soft tissue edema and subcutaneous emphysema adjacent to the 2nd toe suggestive of infection in the appropriate clinical setting.  No obvious findings of acute osteomyelitis, though MRI could provide improved sensitivity if clinically warranted.      Additional findings discussed in the body of the report.         Electronically signed by: Declan Ma MD   Date:    02/21/2022   Time:    13:21        Critical Care    Date/Time: 2/21/2022 1:21 PM  Performed by: Gricelda Jay MD  Authorized by: Gricelda Jay MD   Direct patient critical care time: 10 minutes  Additional history critical care time: 10 minutes  Ordering / reviewing critical care time: 10 minutes  Documentation critical care time: 10 minutes  Consulting other physicians critical care time: 10 minutes  Consult with family critical care time: 10 minutes  Total critical care time (exclusive of procedural time) : 60 minutes  Critical care was necessary to treat or  prevent imminent or life-threatening deterioration of the following conditions: sepsis.  Critical care was time spent personally by me on the following activities: development of treatment plan with patient or surrogate, discussions with consultants, evaluation of patient's response to treatment, examination of patient, obtaining history from patient or surrogate, ordering and performing treatments and interventions, ordering and review of laboratory studies, ordering and review of radiographic studies and re-evaluation of patient's condition.                     Scribe Attestation:   Scribe #1: I performed the above scribed service and the documentation accurately describes the services I performed. I attest to the accuracy of the note.    Physician Attestation for Scribe: I, Gricelda Jay MD, reviewed documentation as scribed in my presence, which is both accurate and complete.      Diagnostic Impression:    1. Gangrene of right foot    2. Foot pain    3. Chest pain    4. Type 2 diabetes mellitus with right diabetic foot infection    5. Septic embolism    6. Cavitary lesion of lung    7. Type II diabetes mellitus with peripheral circulatory disorder, uncontrolled         ED Disposition Condition    Admit                   Gricelda Jay MD  02/21/22 5018

## 2022-02-21 NOTE — HPI
55 yo m with PMH of DM , HTN , PE and COPD presented to ED  for R 2nd digit wound. Pt had amputation by  back on 9/2021 for wet gangrene . Pt is following up with wound care at [facility] twice a week. But the last time was 1.5 weeks ago, he noticed swelling ,pain and foul smelling drainage from the wound. Pt also reports worsening SOB with exertion . With some chest tightness. In ED VS were stable. Started on iv abx . Labs showed leukocytosis and hypokalemia.CTA showed cavitary lesions with possible septic emboli.

## 2022-02-21 NOTE — ED NOTES
HPI:  Patient states his right great toe was amputated in November 2021 and patient was receiving wound care and hyperbarics treatments. Patient tested positive for COVID and was removed from hyperbarics treatments. Patient states that over the past month the right second toe began with drainage and an odor. These symptoms progressively worse which concerned the patient and he sought further follow-up with his doctor, who referred him to the emergency department for further evaluation.    -in aggreance with [facility]

## 2022-02-21 NOTE — SUBJECTIVE & OBJECTIVE
Past Medical History:   Diagnosis Date    COPD (chronic obstructive pulmonary disease)     COVID-19     Depression     Diabetes mellitus     Diabetes mellitus, type 2     Hypertension        Past Surgical History:   Procedure Laterality Date    FOOT AMPUTATION THROUGH METATARSAL Right 9/23/2021    Procedure: AMPUTATION, FOOT, TRANSMETATARSAL right 1st ray resection;  Surgeon: Samuel Toussaint DPM;  Location: [facility] OR;  Service: Podiatry;  Laterality: Right;    INCISION AND DRAINAGE FOOT Bilateral 9/21/2021    Procedure: INCISION AND DRAINAGE, FOOT - Bilateral;  Surgeon: Lavonne Vigil DPM;  Location: [facility] OR;  Service: Podiatry;  Laterality: Bilateral;       Review of patient's allergies indicates:   Allergen Reactions    Ibuprofen Swelling     Facial swelling       No current facility-administered medications on file prior to encounter.     Current Outpatient Medications on File Prior to Encounter   Medication Sig    aspirin (ECOTRIN) 81 MG EC tablet Take 81 mg by mouth once daily.    atorvastatin (LIPITOR) 20 MG tablet Take 40 mg by mouth once daily.     hydroCHLOROthiazide (HYDRODIURIL) 25 MG tablet Take 1 tablet by mouth once daily.    insulin detemir U-100 (LEVEMIR FLEXTOUCH) 100 unit/mL (3 mL) SubQ InPn pen Inject 50 Units into the skin every evening.    losartan (COZAAR) 25 MG tablet Take 25 mg by mouth 2 (two) times daily.    metFORMIN (GLUCOPHAGE) 1000 MG tablet Take 1,000 mg by mouth 2 (two) times daily.    QUEtiapine (SEROQUEL) 200 MG Tab Take 200 mg by mouth nightly.    ammonium lactate 12 % Crea Apply twice daily to affected parts both feet as needed.    ciclopirox (PENLAC) 8 % Soln Apply topically nightly.    collagenase (SANTYL) ointment With each dressing change    insulin aspart U-100 (NOVOLOG) 100 unit/mL (3 mL) InPn pen Inject 10 Units into the skin 3 (three) times daily.    LANTUS SOLOSTAR U-100 INSULIN glargine 100 units/mL (3mL) SubQ pen Inject into the skin.    lisinopriL 10 MG tablet Take 10 mg  by mouth once daily.    pulse oximeter (PULSE OXIMETER) device by Apply Externally route 2 (two) times a day. Use twice daily at 8 AM and 3 PM and record the value in MyChart as directed.     Family History    None       Tobacco Use    Smoking status: Former Smoker    Smokeless tobacco: Never Used   Substance and Sexual Activity    Alcohol use: Yes     Comment: whiskey and beer every other day    Drug use: Not Currently    Sexual activity: Yes     Partners: Female     Review of Systems   Constitutional:  Negative for appetite change, chills and fever.   HENT:  Negative for congestion and nosebleeds.    Eyes:  Negative for photophobia and pain.   Respiratory:  Positive for shortness of breath. Negative for cough.    Cardiovascular:  Positive for chest pain. Negative for palpitations and leg swelling.   Gastrointestinal:  Negative for abdominal pain, diarrhea, nausea and vomiting.   Endocrine: Negative for polydipsia, polyphagia and polyuria.   Genitourinary:  Negative for dysuria and frequency.   Musculoskeletal:  Negative for arthralgias and myalgias.        R 2nd digit wound   Neurological:  Negative for dizziness, weakness and light-headedness.   Psychiatric/Behavioral:  Negative for agitation and confusion.    Objective:     Vital Signs (Most Recent):  Temp: 98.3 °F (36.8 °C) (02/21/22 1530)  Pulse: 80 (02/21/22 1530)  Resp: 19 (02/21/22 1530)  BP: 135/82 (02/21/22 1530)  SpO2: 95 % (02/21/22 1530) Vital Signs (24h Range):  Temp:  [98.1 °F (36.7 °C)-98.5 °F (36.9 °C)] 98.3 °F (36.8 °C)  Pulse:  [] 80  Resp:  [14-19] 19  SpO2:  [91 %-97 %] 95 %  BP: (123-149)/(77-87) 135/82     Weight: 129.3 kg (285 lb)  Body mass index is 36.59 kg/m².    Physical Exam  Constitutional:       Appearance: Normal appearance. He is obese.   HENT:      Head: Normocephalic and atraumatic.      Mouth/Throat:      Mouth: Mucous membranes are moist.   Cardiovascular:      Rate and Rhythm: Regular rhythm. Tachycardia present.       Pulses: Normal pulses.      Heart sounds: Normal heart sounds. No murmur heard.    No gallop.   Pulmonary:      Effort: Pulmonary effort is normal.      Breath sounds: Rales present.   Abdominal:      General: Abdomen is flat. Bowel sounds are normal. There is no distension.      Palpations: Abdomen is soft.      Tenderness: There is no abdominal tenderness.   Musculoskeletal:         General: No swelling. Normal range of motion.      Cervical back: Normal range of motion.      Comments: R 2nd digit swelling with wound    Skin:     General: Skin is warm.   Neurological:      General: No focal deficit present.      Mental Status: He is alert and oriented to person, place, and time.           Significant Labs: All pertinent labs within the past 24 hours have been reviewed.    Significant Imaging: I have reviewed all pertinent imaging results/findings within the past 24 hours.

## 2022-02-21 NOTE — ASSESSMENT & PLAN NOTE
- CTA was done to r/o PE showed B/L cavitary lesion with possible septic emboli vs organizing pneumonia   - will get quantiferon gold test  - will get ECHO   - currently on broad spectrum abx  - will consult ID   - monitor pulse oxi  - blood cx

## 2022-02-21 NOTE — PHARMACY MED REC
".Admission Medication History     The home medication history was taken by Ramona Wilburn CPHT    Patient was able to verify medication at bedside with fiance.      You may go to "Admission" then "Reconcile Home Medications" tabs to review and/or act upon these items.      The home medication list has been updated by the Pharmacy department.    Please read ALL comments highlighted in yellow.    Please address this information as you see fit.     Feel free to contact us if you have any questions or require assistance.      Ramona Wilburn CPHT  EXT: [id number]                  .          "

## 2022-02-21 NOTE — H&P
[facility] Medicine  History & Physical    Patient Name: Dave Good  MRN: [id number]  Patient Class: IP- Inpatient  Admission Date: 2/21/2022  Attending Physician: Evi Tolentino MD  Primary Care Provider: Reyna Jorge NP         Patient information was obtained from ER records.     Subjective:     Principal Problem:<principal problem not specified>    Chief Complaint:   Chief Complaint   Patient presents with    Wound Infection     Wound infection to the right second toe, recent amputation to the right big toe. Foul smelling odor coming from foot.         HPI: 57 yo m with PMH of DM , HTN , PE and COPD presented to ED  for R 2nd digit wound. Pt had amputation by  back on 9/2021 for wet gangrene . Pt is following up with wound care at [facility] twice a week. But the last time was 1.5 weeks ago, he noticed swelling ,pain and foul smelling drainage from the wound. Pt also reports worsening SOB with exertion . With some chest tightness. In ED VS were stable. Started on iv abx . Labs showed leukocytosis and hypokalemia.CTA showed cavitary lesions with possible septic emboli.       Past Medical History:   Diagnosis Date    COPD (chronic obstructive pulmonary disease)     COVID-19     Depression     Diabetes mellitus     Diabetes mellitus, type 2     Hypertension        Past Surgical History:   Procedure Laterality Date    FOOT AMPUTATION THROUGH METATARSAL Right 9/23/2021    Procedure: AMPUTATION, FOOT, TRANSMETATARSAL right 1st ray resection;  Surgeon: Samuel Toussaint DPM;  Location: [facility] OR;  Service: Podiatry;  Laterality: Right;    INCISION AND DRAINAGE FOOT Bilateral 9/21/2021    Procedure: INCISION AND DRAINAGE, FOOT - Bilateral;  Surgeon: Lavonne Vigil DPM;  Location: [facility] OR;  Service: Podiatry;  Laterality: Bilateral;       Review of patient's allergies indicates:   Allergen Reactions    Ibuprofen Swelling     Facial swelling       No current  facility-administered medications on file prior to encounter.     Current Outpatient Medications on File Prior to Encounter   Medication Sig    aspirin (ECOTRIN) 81 MG EC tablet Take 81 mg by mouth once daily.    atorvastatin (LIPITOR) 20 MG tablet Take 40 mg by mouth once daily.     hydroCHLOROthiazide (HYDRODIURIL) 25 MG tablet Take 1 tablet by mouth once daily.    insulin detemir U-100 (LEVEMIR FLEXTOUCH) 100 unit/mL (3 mL) SubQ InPn pen Inject 50 Units into the skin every evening.    losartan (COZAAR) 25 MG tablet Take 25 mg by mouth 2 (two) times daily.    metFORMIN (GLUCOPHAGE) 1000 MG tablet Take 1,000 mg by mouth 2 (two) times daily.    QUEtiapine (SEROQUEL) 200 MG Tab Take 200 mg by mouth nightly.    ammonium lactate 12 % Crea Apply twice daily to affected parts both feet as needed.    ciclopirox (PENLAC) 8 % Soln Apply topically nightly.    collagenase (SANTYL) ointment With each dressing change    insulin aspart U-100 (NOVOLOG) 100 unit/mL (3 mL) InPn pen Inject 10 Units into the skin 3 (three) times daily.    LANTUS SOLOSTAR U-100 INSULIN glargine 100 units/mL (3mL) SubQ pen Inject into the skin.    lisinopriL 10 MG tablet Take 10 mg by mouth once daily.    pulse oximeter (PULSE OXIMETER) device by Apply Externally route 2 (two) times a day. Use twice daily at 8 AM and 3 PM and record the value in MyChart as directed.     Family History    None       Tobacco Use    Smoking status: Former Smoker    Smokeless tobacco: Never Used   Substance and Sexual Activity    Alcohol use: Yes     Comment: whiskey and beer every other day    Drug use: Not Currently    Sexual activity: Yes     Partners: Female     Review of Systems   Constitutional:  Negative for appetite change, chills and fever.   HENT:  Negative for congestion and nosebleeds.    Eyes:  Negative for photophobia and pain.   Respiratory:  Positive for shortness of breath. Negative for cough.    Cardiovascular:  Positive for chest pain.  Negative for palpitations and leg swelling.   Gastrointestinal:  Negative for abdominal pain, diarrhea, nausea and vomiting.   Endocrine: Negative for polydipsia, polyphagia and polyuria.   Genitourinary:  Negative for dysuria and frequency.   Musculoskeletal:  Negative for arthralgias and myalgias.        R 2nd digit wound   Neurological:  Negative for dizziness, weakness and light-headedness.   Psychiatric/Behavioral:  Negative for agitation and confusion.    Objective:     Vital Signs (Most Recent):  Temp: 98.3 °F (36.8 °C) (02/21/22 1530)  Pulse: 80 (02/21/22 1530)  Resp: 19 (02/21/22 1530)  BP: 135/82 (02/21/22 1530)  SpO2: 95 % (02/21/22 1530) Vital Signs (24h Range):  Temp:  [98.1 °F (36.7 °C)-98.5 °F (36.9 °C)] 98.3 °F (36.8 °C)  Pulse:  [] 80  Resp:  [14-19] 19  SpO2:  [91 %-97 %] 95 %  BP: (123-149)/(77-87) 135/82     Weight: 129.3 kg (285 lb)  Body mass index is 36.59 kg/m².    Physical Exam  Constitutional:       Appearance: Normal appearance. He is obese.   HENT:      Head: Normocephalic and atraumatic.      Mouth/Throat:      Mouth: Mucous membranes are moist.   Cardiovascular:      Rate and Rhythm: Regular rhythm. Tachycardia present.      Pulses: Normal pulses.      Heart sounds: Normal heart sounds. No murmur heard.    No gallop.   Pulmonary:      Effort: Pulmonary effort is normal.      Breath sounds: Rales present.   Abdominal:      General: Abdomen is flat. Bowel sounds are normal. There is no distension.      Palpations: Abdomen is soft.      Tenderness: There is no abdominal tenderness.   Musculoskeletal:         General: No swelling. Normal range of motion.      Cervical back: Normal range of motion.      Comments: R 2nd digit swelling with wound    Skin:     General: Skin is warm.   Neurological:      General: No focal deficit present.      Mental Status: He is alert and oriented to person, place, and time.           Significant Labs: All pertinent labs within the past 24 hours have  been reviewed.    Significant Imaging: I have reviewed all pertinent imaging results/findings within the past 24 hours.    Assessment/Plan:     Hypokalemia  Will replete and monitor       Cavitary lesion of lung  - CTA was done to r/o PE showed B/L cavitary lesion with possible septic emboli vs organizing pneumonia   - will get quantiferon gold test  - will get ECHO   - currently on broad spectrum abx  - will consult ID   - monitor pulse oxi  - blood cx      Depression  Resume home meds      HTN (hypertension)  Resume home meds      Type 2 diabetes mellitus without complication, with long-term current use of insulin  - resumed home insulin  - will hold meformin as the pt got meformin  - diabetic diet      Diabetic wet gangrene of the foot  - likely infected   - started on broad spectrum antibiotics  - surgery consulted   - wound care         VTE Risk Mitigation (From admission, onward)         Ordered     heparin (porcine) injection 5,000 Units  Every 8 hours         02/21/22 0438                   Evi Tolentino MD  Department of Hospital Medicine   [facility] - Emergency Dept

## 2022-02-21 NOTE — FIRST PROVIDER EVALUATION
Emergency Department TeleTriage Encounter Note      CHIEF COMPLAINT    Chief Complaint   Patient presents with    Wound Infection     Wound infection to the right second toe, recent amputation to the right big toe. Foul smelling odor coming from foot.        VITAL SIGNS   Initial Vitals [02/21/22 1125]   BP Pulse Resp Temp SpO2   123/77 (!) 115 18 98.5 °F (36.9 °C) (!) 91 %      MAP       --            ALLERGIES    Review of patient's allergies indicates:   Allergen Reactions    Ibuprofen Swelling     Facial swelling       PROVIDER TRIAGE NOTE  This is a teletriage evaluation of a 56 y.o. male presenting to the ED complaining of foul odor and drainage coming from right second toe for three weeks. Pt had right great toe amputation in September.     Initial orders will be placed and care will be transferred to an alternate provider when patient is roomed for a full evaluation. Any additional orders and the final disposition will be determined by that provider.           ORDERS  Labs Reviewed   CBC W/ AUTO DIFFERENTIAL   COMPREHENSIVE METABOLIC PANEL   C-REACTIVE PROTEIN   SEDIMENTATION RATE   POCT GLUCOSE MONITORING CONTINUOUS       ED Orders (720h ago, onward)    Start Ordered     Status Ordering Provider    02/21/22 1130 02/21/22 1130  CBC auto differential  STAT         Ordered JAXON AUSTIN    02/21/22 1130 02/21/22 1130  Comprehensive metabolic panel  STAT         Ordered JAXON AUSTIN    02/21/22 1130 02/21/22 1130  Insert Saline lock IV  Once         Ordered JAXON AUSTIN    02/21/22 1130 02/21/22 1130  POCT glucose  Once         Ordered JAXON AUSTIN    02/21/22 1130 02/21/22 1130  C-reactive protein  STAT         Ordered JAXON AUSTIN    02/21/22 1130 02/21/22 1130  Sedimentation rate  STAT         Ordered JAXON AUSTIN    02/21/22 1130 02/21/22 1130  X-Ray Foot Complete Right  1 time imaging         Ordered JAXON AUSTIN             Virtual Visit Note: The provider triage portion of this emergency department evaluation and documentation was performed via [employer], a HIPAA-compliant telemedicine application, in concert with a tele-presenter in the room. A face to face patient evaluation with one of my colleagues will occur once the patient is placed in an emergency department room.      DISCLAIMER: This note was prepared with [employer] voice recognition transcription software. Garbled syntax, mangled pronouns, and other bizarre constructions may be attributed to that software system.

## 2022-02-21 NOTE — ASSESSMENT & PLAN NOTE
- likely infected   - started on broad spectrum antibiotics  - surgery consulted   - wound care

## 2022-02-22 ENCOUNTER — ANESTHESIA (OUTPATIENT)
Dept: SURGERY | Facility: OTHER | Age: 57
DRG: 255 | End: 2022-02-22
Payer: MEDICAID

## 2022-02-22 ENCOUNTER — ANESTHESIA EVENT (OUTPATIENT)
Dept: SURGERY | Facility: OTHER | Age: 57
DRG: 255 | End: 2022-02-22
Payer: MEDICAID

## 2022-02-22 PROBLEM — R78.81 BACTEREMIA: Status: ACTIVE | Noted: 2022-02-22

## 2022-02-22 PROBLEM — J96.11 CHRONIC RESPIRATORY FAILURE WITH HYPOXIA: Status: ACTIVE | Noted: 2022-02-22

## 2022-02-22 LAB
ANION GAP SERPL CALC-SCNC: 8 MMOL/L (ref 8–16)
ASCENDING AORTA: 3.35 CM
AV INDEX (PROSTH): 0.83
AV MEAN GRADIENT: 7 MMHG
AV PEAK GRADIENT: 12 MMHG
AV VALVE AREA: 3.28 CM2
AV VELOCITY RATIO: 0.9
BASOPHILS # BLD AUTO: 0.03 K/UL (ref 0–0.2)
BASOPHILS NFR BLD: 0.2 % (ref 0–1.9)
BSA FOR ECHO PROCEDURE: 2.6 M2
BUN SERPL-MCNC: 12 MG/DL (ref 6–20)
CALCIUM SERPL-MCNC: 8.9 MG/DL (ref 8.7–10.5)
CHLORIDE SERPL-SCNC: 93 MMOL/L (ref 95–110)
CO2 SERPL-SCNC: 33 MMOL/L (ref 23–29)
CREAT SERPL-MCNC: 0.9 MG/DL (ref 0.5–1.4)
CTP QC/QA: YES
CV ECHO LV RWT: 0.51 CM
DIFFERENTIAL METHOD: ABNORMAL
DOP CALC AO PEAK VEL: 1.72 M/S
DOP CALC AO VTI: 30.99 CM
DOP CALC LVOT AREA: 3.9 CM2
DOP CALC LVOT DIAMETER: 2.24 CM
DOP CALC LVOT PEAK VEL: 1.55 M/S
DOP CALC LVOT STROKE VOLUME: 101.58 CM3
DOP CALCLVOT PEAK VEL VTI: 25.79 CM
ECHO LV POSTERIOR WALL: 1.26 CM (ref 0.6–1.1)
EOSINOPHIL # BLD AUTO: 0.3 K/UL (ref 0–0.5)
EOSINOPHIL NFR BLD: 1.9 % (ref 0–8)
ERYTHROCYTE [DISTWIDTH] IN BLOOD BY AUTOMATED COUNT: 15.1 % (ref 11.5–14.5)
EST. GFR  (AFRICAN AMERICAN): >60 ML/MIN/1.73 M^2
EST. GFR  (NON AFRICAN AMERICAN): >60 ML/MIN/1.73 M^2
FRACTIONAL SHORTENING: 28 % (ref 28–44)
GLUCOSE SERPL-MCNC: 162 MG/DL (ref 70–110)
GRAM STN SPEC: NORMAL
HCT VFR BLD AUTO: 32 % (ref 40–54)
HGB BLD-MCNC: 10.2 G/DL (ref 14–18)
IMM GRANULOCYTES # BLD AUTO: 0.39 K/UL (ref 0–0.04)
IMM GRANULOCYTES NFR BLD AUTO: 2.7 % (ref 0–0.5)
INTERVENTRICULAR SEPTUM: 0.99 CM (ref 0.6–1.1)
LA MAJOR: 5.06 CM
LA MINOR: 5.91 CM
LA WIDTH: 3.48 CM
LEFT ATRIUM SIZE: 3.37 CM
LEFT ATRIUM VOLUME INDEX MOD: 22.5 ML/M2
LEFT ATRIUM VOLUME INDEX: 21.5 ML/M2
LEFT ATRIUM VOLUME MOD: 57 CM3
LEFT ATRIUM VOLUME: 54.35 CM3
LEFT INTERNAL DIMENSION IN SYSTOLE: 3.57 CM (ref 2.1–4)
LEFT VENTRICLE DIASTOLIC VOLUME INDEX: 46.28 ML/M2
LEFT VENTRICLE DIASTOLIC VOLUME: 117.1 ML
LEFT VENTRICLE MASS INDEX: 84 G/M2
LEFT VENTRICLE SYSTOLIC VOLUME INDEX: 21 ML/M2
LEFT VENTRICLE SYSTOLIC VOLUME: 53.2 ML
LEFT VENTRICULAR INTERNAL DIMENSION IN DIASTOLE: 4.98 CM (ref 3.5–6)
LEFT VENTRICULAR MASS: 212.28 G
LYMPHOCYTES # BLD AUTO: 2.3 K/UL (ref 1–4.8)
LYMPHOCYTES NFR BLD: 15.6 % (ref 18–48)
MCH RBC QN AUTO: 28.1 PG (ref 27–31)
MCHC RBC AUTO-ENTMCNC: 31.9 G/DL (ref 32–36)
MCV RBC AUTO: 88 FL (ref 82–98)
MONOCYTES # BLD AUTO: 1.6 K/UL (ref 0.3–1)
MONOCYTES NFR BLD: 11 % (ref 4–15)
MV A" WAVE DURATION": 11.99 MSEC
NEUTROPHILS # BLD AUTO: 10 K/UL (ref 1.8–7.7)
NEUTROPHILS NFR BLD: 68.6 % (ref 38–73)
NRBC BLD-RTO: 0 /100 WBC
PLATELET # BLD AUTO: 503 K/UL (ref 150–450)
PMV BLD AUTO: 9.8 FL (ref 9.2–12.9)
POCT GLUCOSE: 163 MG/DL (ref 70–110)
POCT GLUCOSE: 167 MG/DL (ref 70–110)
POCT GLUCOSE: 194 MG/DL (ref 70–110)
POCT GLUCOSE: 276 MG/DL (ref 70–110)
POCT GLUCOSE: 316 MG/DL (ref 70–110)
POTASSIUM SERPL-SCNC: 3.9 MMOL/L (ref 3.5–5.1)
PULM VEIN S/D RATIO: 1.14
PV PEAK D VEL: 0.29 M/S
PV PEAK S VEL: 0.33 M/S
PV PEAK VELOCITY: 1.08 CM/S
RA MAJOR: 4.55 CM
RA PRESSURE: 3 MMHG
RA WIDTH: 3.15 CM
RBC # BLD AUTO: 3.63 M/UL (ref 4.6–6.2)
RV TISSUE DOPPLER FREE WALL SYSTOLIC VELOCITY 1 (APICAL 4 CHAMBER VIEW): 15.01 CM/S
SARS-COV-2 RDRP RESP QL NAA+PROBE: NEGATIVE
SINUS: 3.68 CM
SODIUM SERPL-SCNC: 134 MMOL/L (ref 136–145)
STJ: 2.95 CM
TDI LATERAL: 0.09 M/S
TDI SEPTAL: 0.08 M/S
TDI: 0.09 M/S
TRICUSPID ANNULAR PLANE SYSTOLIC EXCURSION: 2.67 CM
WBC # BLD AUTO: 14.57 K/UL (ref 3.9–12.7)

## 2022-02-22 PROCEDURE — 63600175 PHARM REV CODE 636 W HCPCS: Performed by: INTERNAL MEDICINE

## 2022-02-22 PROCEDURE — 87206 SMEAR FLUORESCENT/ACID STAI: CPT | Performed by: INTERNAL MEDICINE

## 2022-02-22 PROCEDURE — 25000003 PHARM REV CODE 250: Performed by: INTERNAL MEDICINE

## 2022-02-22 PROCEDURE — 63600175 PHARM REV CODE 636 W HCPCS

## 2022-02-22 PROCEDURE — 87076 CULTURE ANAEROBE IDENT EACH: CPT | Performed by: SPECIALIST

## 2022-02-22 PROCEDURE — 25000003 PHARM REV CODE 250: Performed by: NURSE ANESTHETIST, CERTIFIED REGISTERED

## 2022-02-22 PROCEDURE — 99223 1ST HOSP IP/OBS HIGH 75: CPT | Mod: ,,, | Performed by: INTERNAL MEDICINE

## 2022-02-22 PROCEDURE — U0002 COVID-19 LAB TEST NON-CDC: HCPCS | Performed by: INTERNAL MEDICINE

## 2022-02-22 PROCEDURE — 71000039 HC RECOVERY, EACH ADD'L HOUR: Performed by: SPECIALIST

## 2022-02-22 PROCEDURE — 87077 CULTURE AEROBIC IDENTIFY: CPT | Performed by: SPECIALIST

## 2022-02-22 PROCEDURE — 25000003 PHARM REV CODE 250: Performed by: ANESTHESIOLOGY

## 2022-02-22 PROCEDURE — 71000033 HC RECOVERY, INTIAL HOUR: Performed by: SPECIALIST

## 2022-02-22 PROCEDURE — 99233 PR SUBSEQUENT HOSPITAL CARE,LEVL III: ICD-10-PCS | Mod: ,,, | Performed by: INTERNAL MEDICINE

## 2022-02-22 PROCEDURE — 87186 SC STD MICRODIL/AGAR DIL: CPT | Performed by: SPECIALIST

## 2022-02-22 PROCEDURE — 87070 CULTURE OTHR SPECIMN AEROBIC: CPT | Performed by: SPECIALIST

## 2022-02-22 PROCEDURE — 63600175 PHARM REV CODE 636 W HCPCS: Performed by: EMERGENCY MEDICINE

## 2022-02-22 PROCEDURE — 28810 PR AMPUTATION METATARSAL+TOE,SINGLE: ICD-10-PCS | Mod: T6,,, | Performed by: SPECIALIST

## 2022-02-22 PROCEDURE — 37000008 HC ANESTHESIA 1ST 15 MINUTES: Performed by: SPECIALIST

## 2022-02-22 PROCEDURE — 76942 ECHO GUIDE FOR BIOPSY: CPT | Performed by: ANESTHESIOLOGY

## 2022-02-22 PROCEDURE — 25000003 PHARM REV CODE 250: Performed by: EMERGENCY MEDICINE

## 2022-02-22 PROCEDURE — 99223 PR INITIAL HOSPITAL CARE,LEVL III: ICD-10-PCS | Mod: ,,, | Performed by: INTERNAL MEDICINE

## 2022-02-22 PROCEDURE — 94640 AIRWAY INHALATION TREATMENT: CPT

## 2022-02-22 PROCEDURE — 87205 SMEAR GRAM STAIN: CPT | Performed by: SPECIALIST

## 2022-02-22 PROCEDURE — 87102 FUNGUS ISOLATION CULTURE: CPT | Performed by: SPECIALIST

## 2022-02-22 PROCEDURE — 11000001 HC ACUTE MED/SURG PRIVATE ROOM

## 2022-02-22 PROCEDURE — 63600175 PHARM REV CODE 636 W HCPCS: Performed by: PHYSICIAN ASSISTANT

## 2022-02-22 PROCEDURE — 87015 SPECIMEN INFECT AGNT CONCNTJ: CPT | Performed by: INTERNAL MEDICINE

## 2022-02-22 PROCEDURE — 37000009 HC ANESTHESIA EA ADD 15 MINS: Performed by: SPECIALIST

## 2022-02-22 PROCEDURE — 87206 SMEAR FLUORESCENT/ACID STAI: CPT | Mod: 91 | Performed by: SPECIALIST

## 2022-02-22 PROCEDURE — 87116 MYCOBACTERIA CULTURE: CPT | Mod: 59 | Performed by: SPECIALIST

## 2022-02-22 PROCEDURE — 27201423 OPTIME MED/SURG SUP & DEVICES STERILE SUPPLY: Performed by: SPECIALIST

## 2022-02-22 PROCEDURE — 94761 N-INVAS EAR/PLS OXIMETRY MLT: CPT

## 2022-02-22 PROCEDURE — 27000221 HC OXYGEN, UP TO 24 HOURS

## 2022-02-22 PROCEDURE — 85025 COMPLETE CBC W/AUTO DIFF WBC: CPT | Performed by: INTERNAL MEDICINE

## 2022-02-22 PROCEDURE — 25000003 PHARM REV CODE 250: Performed by: PHYSICIAN ASSISTANT

## 2022-02-22 PROCEDURE — 36415 COLL VENOUS BLD VENIPUNCTURE: CPT | Performed by: INTERNAL MEDICINE

## 2022-02-22 PROCEDURE — 63600175 PHARM REV CODE 636 W HCPCS: Performed by: NURSE ANESTHETIST, CERTIFIED REGISTERED

## 2022-02-22 PROCEDURE — 88305 TISSUE EXAM BY PATHOLOGIST: CPT | Mod: 26,,, | Performed by: PATHOLOGY

## 2022-02-22 PROCEDURE — 63600175 PHARM REV CODE 636 W HCPCS: Performed by: ANESTHESIOLOGY

## 2022-02-22 PROCEDURE — 99233 SBSQ HOSP IP/OBS HIGH 50: CPT | Mod: ,,, | Performed by: INTERNAL MEDICINE

## 2022-02-22 PROCEDURE — 87075 CULTR BACTERIA EXCEPT BLOOD: CPT | Performed by: SPECIALIST

## 2022-02-22 PROCEDURE — 36000707: Performed by: SPECIALIST

## 2022-02-22 PROCEDURE — 36000706: Performed by: SPECIALIST

## 2022-02-22 PROCEDURE — 88305 TISSUE EXAM BY PATHOLOGIST: CPT | Performed by: PATHOLOGY

## 2022-02-22 PROCEDURE — 88305 TISSUE EXAM BY PATHOLOGIST: ICD-10-PCS | Mod: 26,,, | Performed by: PATHOLOGY

## 2022-02-22 PROCEDURE — C9399 UNCLASSIFIED DRUGS OR BIOLOG: HCPCS | Performed by: INTERNAL MEDICINE

## 2022-02-22 PROCEDURE — 28810 AMPUTATION TOE & METATARSAL: CPT | Mod: T6,,, | Performed by: SPECIALIST

## 2022-02-22 PROCEDURE — 80048 BASIC METABOLIC PNL TOTAL CA: CPT | Performed by: INTERNAL MEDICINE

## 2022-02-22 PROCEDURE — 87116 MYCOBACTERIA CULTURE: CPT | Performed by: INTERNAL MEDICINE

## 2022-02-22 RX ORDER — MEPERIDINE HYDROCHLORIDE 25 MG/ML
12.5 INJECTION INTRAMUSCULAR; INTRAVENOUS; SUBCUTANEOUS ONCE AS NEEDED
Status: DISCONTINUED | OUTPATIENT
Start: 2022-02-22 | End: 2022-02-22 | Stop reason: HOSPADM

## 2022-02-22 RX ORDER — FENTANYL CITRATE 50 UG/ML
INJECTION, SOLUTION INTRAMUSCULAR; INTRAVENOUS
Status: DISCONTINUED | OUTPATIENT
Start: 2022-02-22 | End: 2022-02-22

## 2022-02-22 RX ORDER — MIDAZOLAM HYDROCHLORIDE 1 MG/ML
INJECTION INTRAMUSCULAR; INTRAVENOUS
Status: DISCONTINUED | OUTPATIENT
Start: 2022-02-22 | End: 2022-02-22

## 2022-02-22 RX ORDER — LIDOCAINE HCL/PF 100 MG/5ML
SYRINGE (ML) INTRAVENOUS
Status: DISCONTINUED | OUTPATIENT
Start: 2022-02-22 | End: 2022-02-22

## 2022-02-22 RX ORDER — DIPHENHYDRAMINE HYDROCHLORIDE 50 MG/ML
25 INJECTION INTRAMUSCULAR; INTRAVENOUS EVERY 6 HOURS PRN
Status: DISCONTINUED | OUTPATIENT
Start: 2022-02-22 | End: 2022-02-22 | Stop reason: HOSPADM

## 2022-02-22 RX ORDER — HYDROMORPHONE HYDROCHLORIDE 2 MG/ML
0.4 INJECTION, SOLUTION INTRAMUSCULAR; INTRAVENOUS; SUBCUTANEOUS EVERY 5 MIN PRN
Status: DISCONTINUED | OUTPATIENT
Start: 2022-02-22 | End: 2022-02-22 | Stop reason: HOSPADM

## 2022-02-22 RX ORDER — POTASSIUM CHLORIDE 750 MG/1
30 TABLET, EXTENDED RELEASE ORAL ONCE
Status: DISCONTINUED | OUTPATIENT
Start: 2022-02-22 | End: 2022-02-22

## 2022-02-22 RX ORDER — OXYCODONE HYDROCHLORIDE 5 MG/1
5 TABLET ORAL
Status: DISCONTINUED | OUTPATIENT
Start: 2022-02-22 | End: 2022-02-22 | Stop reason: HOSPADM

## 2022-02-22 RX ORDER — INSULIN ASPART 100 [IU]/ML
0-5 INJECTION, SOLUTION INTRAVENOUS; SUBCUTANEOUS
Status: DISCONTINUED | OUTPATIENT
Start: 2022-02-22 | End: 2022-03-07 | Stop reason: HOSPADM

## 2022-02-22 RX ORDER — LIDOCAINE HYDROCHLORIDE 20 MG/ML
INJECTION, SOLUTION EPIDURAL; INFILTRATION; INTRACAUDAL; PERINEURAL
Status: COMPLETED | OUTPATIENT
Start: 2022-02-22 | End: 2022-02-22

## 2022-02-22 RX ORDER — PROPOFOL 10 MG/ML
VIAL (ML) INTRAVENOUS CONTINUOUS PRN
Status: DISCONTINUED | OUTPATIENT
Start: 2022-02-22 | End: 2022-02-22

## 2022-02-22 RX ORDER — SODIUM CHLORIDE FOR INHALATION 7 %
7 VIAL, NEBULIZER (ML) INHALATION ONCE
Status: COMPLETED | OUTPATIENT
Start: 2022-02-22 | End: 2022-02-22

## 2022-02-22 RX ORDER — PROCHLORPERAZINE EDISYLATE 5 MG/ML
5 INJECTION INTRAMUSCULAR; INTRAVENOUS EVERY 30 MIN PRN
Status: DISCONTINUED | OUTPATIENT
Start: 2022-02-22 | End: 2022-02-22 | Stop reason: HOSPADM

## 2022-02-22 RX ORDER — SODIUM CHLORIDE 0.9 % (FLUSH) 0.9 %
3 SYRINGE (ML) INJECTION
Status: DISCONTINUED | OUTPATIENT
Start: 2022-02-22 | End: 2022-03-07 | Stop reason: HOSPADM

## 2022-02-22 RX ORDER — MORPHINE SULFATE 4 MG/ML
2 INJECTION, SOLUTION INTRAMUSCULAR; INTRAVENOUS EVERY 4 HOURS PRN
Status: DISCONTINUED | OUTPATIENT
Start: 2022-02-23 | End: 2022-03-07 | Stop reason: HOSPADM

## 2022-02-22 RX ORDER — IPRATROPIUM BROMIDE AND ALBUTEROL SULFATE 2.5; .5 MG/3ML; MG/3ML
3 SOLUTION RESPIRATORY (INHALATION) EVERY 6 HOURS PRN
Status: DISCONTINUED | OUTPATIENT
Start: 2022-02-22 | End: 2022-03-07 | Stop reason: HOSPADM

## 2022-02-22 RX ORDER — MUPIROCIN 20 MG/G
OINTMENT TOPICAL 2 TIMES DAILY
Status: COMPLETED | OUTPATIENT
Start: 2022-02-22 | End: 2022-02-27

## 2022-02-22 RX ORDER — ROPIVACAINE HYDROCHLORIDE 5 MG/ML
INJECTION, SOLUTION EPIDURAL; INFILTRATION; PERINEURAL
Status: COMPLETED | OUTPATIENT
Start: 2022-02-22 | End: 2022-02-22

## 2022-02-22 RX ORDER — PHENYLEPHRINE HYDROCHLORIDE 10 MG/ML
INJECTION INTRAVENOUS
Status: DISCONTINUED | OUTPATIENT
Start: 2022-02-22 | End: 2022-02-22

## 2022-02-22 RX ADMIN — PHENYLEPHRINE HYDROCHLORIDE 200 MCG: 10 INJECTION INTRAVENOUS at 12:02

## 2022-02-22 RX ADMIN — PIPERACILLIN AND TAZOBACTAM 4.5 G: 4; .5 INJECTION, POWDER, LYOPHILIZED, FOR SOLUTION INTRAVENOUS; PARENTERAL at 06:02

## 2022-02-22 RX ADMIN — PIPERACILLIN AND TAZOBACTAM 4.5 G: 4; .5 INJECTION, POWDER, LYOPHILIZED, FOR SOLUTION INTRAVENOUS; PARENTERAL at 10:02

## 2022-02-22 RX ADMIN — HYDROCODONE BITARTRATE AND ACETAMINOPHEN 1 TABLET: 5; 325 TABLET ORAL at 07:02

## 2022-02-22 RX ADMIN — MUPIROCIN: 20 OINTMENT TOPICAL at 08:02

## 2022-02-22 RX ADMIN — PHENYLEPHRINE HYDROCHLORIDE 100 MCG: 10 INJECTION INTRAVENOUS at 11:02

## 2022-02-22 RX ADMIN — INSULIN ASPART 3 UNITS: 100 INJECTION, SOLUTION INTRAVENOUS; SUBCUTANEOUS at 05:02

## 2022-02-22 RX ADMIN — PHENYLEPHRINE HYDROCHLORIDE 100 MCG: 10 INJECTION INTRAVENOUS at 12:02

## 2022-02-22 RX ADMIN — SODIUM CHLORIDE, SODIUM LACTATE, POTASSIUM CHLORIDE, AND CALCIUM CHLORIDE: .6; .31; .03; .02 INJECTION, SOLUTION INTRAVENOUS at 10:02

## 2022-02-22 RX ADMIN — HEPARIN SODIUM 5000 UNITS: 5000 INJECTION INTRAVENOUS; SUBCUTANEOUS at 06:02

## 2022-02-22 RX ADMIN — INSULIN ASPART 10 UNITS: 100 INJECTION, SOLUTION INTRAVENOUS; SUBCUTANEOUS at 05:02

## 2022-02-22 RX ADMIN — CEFTRIAXONE 2 G: 2 INJECTION, SOLUTION INTRAVENOUS at 06:02

## 2022-02-22 RX ADMIN — MORPHINE SULFATE 2 MG: 4 INJECTION, SOLUTION INTRAMUSCULAR; INTRAVENOUS at 11:02

## 2022-02-22 RX ADMIN — INSULIN DETEMIR 50 UNITS: 100 INJECTION, SOLUTION SUBCUTANEOUS at 10:02

## 2022-02-22 RX ADMIN — INSULIN ASPART 2 UNITS: 100 INJECTION, SOLUTION INTRAVENOUS; SUBCUTANEOUS at 08:02

## 2022-02-22 RX ADMIN — PROPOFOL 50 MCG/KG/MIN: 10 INJECTION, EMULSION INTRAVENOUS at 11:02

## 2022-02-22 RX ADMIN — VANCOMYCIN HYDROCHLORIDE 2000 MG: 10 INJECTION, POWDER, LYOPHILIZED, FOR SOLUTION INTRAVENOUS at 08:02

## 2022-02-22 RX ADMIN — FENTANYL CITRATE 50 MCG: 50 INJECTION, SOLUTION INTRAMUSCULAR; INTRAVENOUS at 10:02

## 2022-02-22 RX ADMIN — VANCOMYCIN HYDROCHLORIDE 2000 MG: 10 INJECTION, POWDER, LYOPHILIZED, FOR SOLUTION INTRAVENOUS at 07:02

## 2022-02-22 RX ADMIN — HEPARIN SODIUM 5000 UNITS: 5000 INJECTION INTRAVENOUS; SUBCUTANEOUS at 10:02

## 2022-02-22 RX ADMIN — QUETIAPINE FUMARATE 200 MG: 200 TABLET ORAL at 08:02

## 2022-02-22 RX ADMIN — LIDOCAINE HYDROCHLORIDE 50 MG: 20 INJECTION, SOLUTION INTRAVENOUS at 11:02

## 2022-02-22 RX ADMIN — LIDOCAINE HYDROCHLORIDE 10 ML: 20 INJECTION, SOLUTION EPIDURAL; INFILTRATION; INTRACAUDAL; PERINEURAL at 10:02

## 2022-02-22 RX ADMIN — LOSARTAN POTASSIUM 25 MG: 25 TABLET, FILM COATED ORAL at 08:02

## 2022-02-22 RX ADMIN — MORPHINE SULFATE 2 MG: 2 INJECTION, SOLUTION INTRAMUSCULAR; INTRAVENOUS at 05:02

## 2022-02-22 RX ADMIN — MIDAZOLAM HYDROCHLORIDE 1 MG: 1 INJECTION, SOLUTION INTRAMUSCULAR; INTRAVENOUS at 10:02

## 2022-02-22 RX ADMIN — PIPERACILLIN AND TAZOBACTAM 4.5 G: 4; .5 INJECTION, POWDER, LYOPHILIZED, FOR SOLUTION INTRAVENOUS; PARENTERAL at 04:02

## 2022-02-22 RX ADMIN — SODIUM CHLORIDE 7 ML: 7 NEBU SOLN,3 % NEBU at 07:02

## 2022-02-22 RX ADMIN — HYDROMORPHONE HYDROCHLORIDE 0.4 MG: 2 INJECTION INTRAMUSCULAR; INTRAVENOUS; SUBCUTANEOUS at 01:02

## 2022-02-22 RX ADMIN — ROPIVACAINE HYDROCHLORIDE 20 ML: 5 INJECTION, SOLUTION EPIDURAL; INFILTRATION; PERINEURAL at 10:02

## 2022-02-22 NOTE — ASSESSMENT & PLAN NOTE
Gram positive bacteremia.  Very concerning for endocarditis.  TTE negative.      Plan    1. Repeat blood cultures.    2. Vancomycin and ceftriaxone for now.    3. Follow up on ID and sensitivities of the GPCs.  He may need PAT.

## 2022-02-22 NOTE — TRANSFER OF CARE
"Anesthesia Transfer of Care Note    Patient: Dave Good    Procedure(s) Performed: Procedure(s) (LRB):  DEBRIDEMENT, WOUND - RIGHT FOOT (Right)    Patient location: PACU    Anesthesia Type: regional    Transport from OR: Transported from OR on 6-10 L/min O2 by face mask with adequate spontaneous ventilation    Post pain: adequate analgesia    Post assessment: no apparent anesthetic complications    Post vital signs: stable    Level of consciousness: awake and responds to stimulation    Nausea/Vomiting: no nausea/vomiting    Complications: none    Transfer of care protocol was followed      Last vitals:   Visit Vitals  /78 (BP Location: Left arm, Patient Position: Lying)   Pulse 82   Temp 36.9 °C (98.4 °F) (Oral)   Resp 16   Ht 6' 2" (1.88 m)   Wt 129.3 kg (285 lb)   SpO2 99%   BMI 36.59 kg/m²     "

## 2022-02-22 NOTE — INTERVAL H&P NOTE
The patient has been examined and the H&P has been reviewed:    I concur with the findings and no changes have occurred since H&P was written.    Active Hospital Problems    Diagnosis  POA    *Diabetic wet gangrene of the foot [E11.52]  Yes    Cavitary lesion of lung [J98.4]  Yes    Hypokalemia [E87.6]  Yes    Type 2 diabetes mellitus without complication, with long-term current use of insulin [E11.9, Z79.4]  Not Applicable    HTN (hypertension) [I10]  Yes    Depression [F32.A]  Yes      Resolved Hospital Problems   No resolved problems to display.

## 2022-02-22 NOTE — ASSESSMENT & PLAN NOTE
- CTA without PE but with bilateral cavitary lesion with possible septic emboli vs organizing pneumonia   - Check quantiferon gold and sputum AFB x 3  - Continue broad spectrum abx. Follow cultures  - Echo without vegetation. May require PAT pending clearance of bacteremia  - ID consulted

## 2022-02-22 NOTE — ED NOTES
420 am labs colleced to lab.  Called lab multiple times for draw and advised by Vandana it has not crossed over on board.  quantiferon gold tb lab test needs to be collected.   Vandana advised that is on board and tech will come draw.

## 2022-02-22 NOTE — ASSESSMENT & PLAN NOTE
In the setting of gram positive bacteremia, this is concerning for septic emboli.  Patient reports a history of homelessness for years and incarceration for years putting him at high risk for exposure to tuberculosis.  His underlying history of diabetes increases his risk of progression from latent tuberculosis to active tuberculosis.  He denies cough but admits to 15 lb weight loss over the past 5 months and night sweats.    Recommendations    1. Consult pulmonary for induction of sputum samples.  Send AFB smear and culture X3.  Send once sample for tuberculosis PCR as well.    2. Agree with checking quantiferon gold.  Follow up results.  Would still obtain respiratory samples.

## 2022-02-22 NOTE — CONSULTS
56-year-old male with longstanding diabetes mellitus and infection the right foot.  Patient is status post amputation of the 1st digit of the right foot.  The 2nd digit now appears to be gangrenous.  The foot is swollen and tender.  Patient will need surgical debridement or amputation.

## 2022-02-22 NOTE — SUBJECTIVE & OBJECTIVE
Interval History: No events overnight. Pt seen after OR with family at bedside. Denies pain. + dyspnea.    Review of Systems   Constitutional:  Negative for chills and fever.   Respiratory:  Positive for shortness of breath.    Cardiovascular:  Negative for chest pain.   Gastrointestinal:  Negative for nausea and vomiting.   Objective:     Vital Signs (Most Recent):  Temp: 98.4 °F (36.9 °C) (02/22/22 1451)  Pulse: 83 (02/22/22 1451)  Resp: 18 (02/22/22 1451)  BP: (!) 92/55 (02/22/22 1451)  SpO2: (!) 94 % (02/22/22 1451)   Vital Signs (24h Range):  Temp:  [97.6 °F (36.4 °C)-98.4 °F (36.9 °C)] 98.4 °F (36.9 °C)  Pulse:  [77-85] 83  Resp:  [11-30] 18  SpO2:  [94 %-100 %] 94 %  BP: ()/(55-82) 92/55     Weight: 129.3 kg (285 lb)  Body mass index is 36.59 kg/m².    Intake/Output Summary (Last 24 hours) at 2/22/2022 1644  Last data filed at 2/22/2022 1355  Gross per 24 hour   Intake 2250 ml   Output 0 ml   Net 2250 ml      Physical Exam  Vitals and nursing note reviewed.   Constitutional:       General: He is not in acute distress.     Appearance: Normal appearance. He is well-developed.   Cardiovascular:      Rate and Rhythm: Normal rate and regular rhythm.      Pulses: Normal pulses.   Pulmonary:      Effort: Pulmonary effort is normal.      Breath sounds: Rales present.   Abdominal:      General: Bowel sounds are normal.      Palpations: Abdomen is soft.      Tenderness: There is no abdominal tenderness.   Musculoskeletal:      Right lower leg: Edema present.      Left lower leg: Edema present.   Skin:     General: Skin is warm and dry.      Comments: R foot with post-op dressing in place   Neurological:      Mental Status: He is alert and oriented to person, place, and time. Mental status is at baseline.   Psychiatric:         Behavior: Behavior normal.       Significant Labs: All pertinent labs within the past 24 hours have been reviewed.  BMP:   Recent Labs   Lab 02/22/22  0810   *   *   K 3.9   CL  93*   CO2 33*   BUN 12   CREATININE 0.9   CALCIUM 8.9     CBC:   Recent Labs   Lab 02/21/22  1210 02/21/22  1544 02/22/22  0810   WBC 21.67* 19.18* 14.57*   HGB 10.8* 9.9* 10.2*   HCT 34.0* 31.2* 32.0*   * 504* 503*       Significant Imaging: I have reviewed all pertinent imaging results/findings within the past 24 hours.

## 2022-02-22 NOTE — OR NURSING
Patient had phone under sheet prior to surgery.  Removed phone and put in bag with patient label. Screen noted to be shattered. Patient states he is aware of that.

## 2022-02-22 NOTE — HPI
56 year old male with a history of DM and chronic wounds to both feet worse on the right foot.  He has a history of right foot transmetatarsal amputation of the 1st digit of the right foot in September 2021.  He had been following with wound care for his non-healing wound.  Cultures obtained from his right foot wound in November 2021 were positive for MSSA.  He was treated with oral bactrim for about 2 weeks.  A few days prior to admission, he developed fevers, increased pain in his foot and ultimately presented to the ER.  In the ER, he was noted to have a gangrenous 2nd digit of the right foot.  CTA of chest in the ER revealed multiple cavitary lung lesions.  Blood cultures obtained are positive in 3 out of 4 bottles with GPCs.  TTE is negative.  He has now underwent surgical debridement of the right foot with amputation of the 2nd digit on February 22, 2022.  ID is consulted to assist with management.  Of note, he states that he has lost around 15 lbs since September of 2021.  He has previously been homeless for many years but secured an apartment 1 year ago and hasn't been homeless since.

## 2022-02-22 NOTE — ASSESSMENT & PLAN NOTE
S/p surgical debridement.  Suspect there may be some residual bone infection.  Blood cultures positive for GPCs.      Plan    1. Continue IV vancomycin.    2. Discontinue zosyn and start IV ceftriaxone.

## 2022-02-22 NOTE — ANESTHESIA PREPROCEDURE EVALUATION
02/22/2022  Dave Good is a 56 y.o., male.    Pre-op Assessment    I have reviewed the Patient Summary Reports.    I have reviewed the Nursing Notes. I have reviewed the NPO Status.   I have reviewed the Medications.     Review of Systems  Anesthesia Hx:  Denies Family Hx of Anesthesia complications.   Denies Personal Hx of Anesthesia complications.   Social:  Non-Smoker    Hematology/Oncology:     Oncology Normal    -- Anemia:   EENT/Dental:EENT/Dental Normal   Cardiovascular:   Hypertension hyperlipidemia    Pulmonary:   COPD    Renal/:   Chronic Renal Disease (improved since admit), ARF    Hepatic/GI:  Hepatic/GI Normal    Musculoskeletal:  Musculoskeletal Normal    Neurological:  Neurology Normal    Endocrine:   Diabetes, poorly controlled, type 2, using insulin    Dermatological:  Skin Normal    Psych:   Psychiatric History          Physical Exam  General:  Morbid Obesity        Dental:  Dental Findings:Multiple missing, none loose          Mental Status:  Mental Status Findings:  Cooperative, Alert and Oriented         Anesthesia Plan  Type of Anesthesia, risks & benefits discussed:  Anesthesia Type:  MAC    Patient's Preference:   Plan Factors:          Intra-op Monitoring Plan: standard ASA monitors  Intra-op Monitoring Plan Comments:   Post Op Pain Control Plan: per primary service following discharge from PACU and multimodal analgesia  Post Op Pain Control Plan Comments:     Induction:   IV  Beta Blocker:         Informed Consent: Informed consent signed with the Patient and all parties understand the risks and agree with anesthesia plan.  All questions answered.  Anesthesia consent signed with patient.  ASA Score: 3     Day of Surgery Review of History & Physical:              Ready For Surgery From Anesthesia Perspective.           Physical Exam  General: Morbid Obesity          Anesthesia  Plan  Type of Anesthesia, risks & benefits discussed:    Anesthesia Type: MAC  Intra-op Monitoring Plan: standard ASA monitors  Post Op Pain Control Plan: per primary service following discharge from PACU and multimodal analgesia  Induction:  IV  Informed Consent: Informed consent signed with the Patient and all parties understand the risks and agree with anesthesia plan.  All questions answered.   ASA Score: 3    Ready For Surgery From Anesthesia Perspective.       .

## 2022-02-22 NOTE — SUBJECTIVE & OBJECTIVE
Past Medical History:   Diagnosis Date    COPD (chronic obstructive pulmonary disease)     COVID-19     Depression     Diabetes mellitus     Diabetes mellitus, type 2     Hypertension        Past Surgical History:   Procedure Laterality Date    FOOT AMPUTATION THROUGH METATARSAL Right 9/23/2021    Procedure: AMPUTATION, FOOT, TRANSMETATARSAL right 1st ray resection;  Surgeon: Samuel Toussaint DPM;  Location: [facility];  Service: Podiatry;  Laterality: Right;    INCISION AND DRAINAGE FOOT Bilateral 9/21/2021    Procedure: INCISION AND DRAINAGE, FOOT - Bilateral;  Surgeon: Lavonne Vigil DPM;  Location: [facility] OR;  Service: Podiatry;  Laterality: Bilateral;       Review of patient's allergies indicates:   Allergen Reactions    Ibuprofen Swelling     Facial swelling       Medications:  Medications Prior to Admission   Medication Sig    ammonium lactate 12 % Crea Apply twice daily to affected parts both feet as needed.    aspirin (ECOTRIN) 81 MG EC tablet Take 81 mg by mouth once daily.    atorvastatin (LIPITOR) 20 MG tablet Take 40 mg by mouth once daily.     ciclopirox (PENLAC) 8 % Soln Apply topically nightly.    collagenase (SANTYL) ointment With each dressing change    hydroCHLOROthiazide (HYDRODIURIL) 25 MG tablet Take 1 tablet by mouth once daily.    LANTUS SOLOSTAR U-100 INSULIN glargine 100 units/mL (3mL) SubQ pen Inject into the skin.    lisinopriL 10 MG tablet Take 10 mg by mouth once daily.    losartan (COZAAR) 25 MG tablet Take 25 mg by mouth 2 (two) times daily.    metFORMIN (GLUCOPHAGE) 1000 MG tablet Take 1,000 mg by mouth 2 (two) times daily.    QUEtiapine (SEROQUEL) 200 MG Tab Take 200 mg by mouth nightly.    insulin aspart U-100 (NOVOLOG) 100 unit/mL (3 mL) InPn pen Inject 10 Units into the skin 3 (three) times daily.    insulin detemir U-100 (LEVEMIR FLEXTOUCH) 100 unit/mL (3 mL) SubQ InPn pen Inject 50 Units into the skin every evening.    pulse oximeter (PULSE OXIMETER) device by Apply Externally route 2  "(two) times a day. Use twice daily at 8 AM and 3 PM and record the value in MyChart as directed.     Antibiotics (From admission, onward)                Start     Stop Route Frequency Ordered    02/22/22 2100  mupirocin 2 % ointment         02/27 2059 Nasl 2 times daily 02/22/22 1255    02/22/22 0330  vancomycin 2 g in dextrose 5 % 500 mL IVPB         -- IV Every 12 hours (non-standard times) 02/21/22 1553    02/21/22 1630  piperacillin-tazobactam 4.5 g in dextrose 5 % 100 mL IVPB (ready to mix system)  ( Pneumonia - Moderate-High MDR )         02/26 1629 IV Every 8 hours (non-standard times) 02/21/22 1530    02/21/22 1628  vancomycin - pharmacy to dose  ( Pneumonia - Moderate-High MDR )        "And" Linked Group Details    -- IV pharmacy to manage frequency 02/21/22 1530          Antifungals (From admission, onward)                None          Antivirals (From admission, onward)      None             There is no immunization history for the selected administration types on file for this patient.    Family History    None       Social History     Socioeconomic History    Marital status: Single   Tobacco Use    Smoking status: Former Smoker    Smokeless tobacco: Never Used   Substance and Sexual Activity    Alcohol use: Yes     Comment: whiskey and beer every other day    Drug use: Not Currently    Sexual activity: Yes     Partners: Female     Review of Systems   Constitutional:  Positive for fatigue, fever and unexpected weight change.   Skin:  Positive for wound.   All other systems reviewed and are negative.  Objective:     Vital Signs (Most Recent):  Temp: 98.4 °F (36.9 °C) (02/22/22 1451)  Pulse: 83 (02/22/22 1451)  Resp: 18 (02/22/22 1451)  BP: (!) 92/55 (02/22/22 1451)  SpO2: (!) 94 % (02/22/22 1451)   Vital Signs (24h Range):  Temp:  [97.6 °F (36.4 °C)-98.4 °F (36.9 °C)] 98.4 °F (36.9 °C)  Pulse:  [77-85] 83  Resp:  [11-30] 18  SpO2:  [94 %-100 %] 94 %  BP: ()/(55-82) 92/55     Weight: 129.3 kg (285 " lb)  Body mass index is 36.59 kg/m².    Estimated Creatinine Clearance: 127.8 mL/min (based on SCr of 0.9 mg/dL).    Physical Exam  Vitals and nursing note reviewed.   Constitutional:       General: He is not in acute distress.     Appearance: He is well-developed. He is not diaphoretic.   HENT:      Head: Normocephalic and atraumatic.      Right Ear: External ear normal.      Left Ear: External ear normal.      Nose: Nose normal.      Mouth/Throat:      Pharynx: No oropharyngeal exudate.   Eyes:      General: No scleral icterus.        Right eye: No discharge.         Left eye: No discharge.      Conjunctiva/sclera: Conjunctivae normal.      Pupils: Pupils are equal, round, and reactive to light.   Neck:      Thyroid: No thyromegaly.      Vascular: No JVD.      Trachea: No tracheal deviation.   Cardiovascular:      Rate and Rhythm: Normal rate and regular rhythm.      Heart sounds: No murmur heard.    No friction rub. No gallop.   Pulmonary:      Effort: Pulmonary effort is normal. No respiratory distress.      Breath sounds: Normal breath sounds. No stridor. No wheezing or rales.   Chest:      Chest wall: No tenderness.   Abdominal:      General: Bowel sounds are normal. There is no distension.      Palpations: Abdomen is soft. There is no mass.      Tenderness: There is no abdominal tenderness. There is no guarding or rebound.   Musculoskeletal:         General: No tenderness. Normal range of motion.      Cervical back: Normal range of motion and neck supple.      Comments: Dressings covering both feet.  Pictures of foot wounds reviewed.   Lymphadenopathy:      Cervical: No cervical adenopathy.   Skin:     General: Skin is warm.      Coloration: Skin is not pale.      Findings: No erythema or rash.   Neurological:      Mental Status: He is alert and oriented to person, place, and time.      Cranial Nerves: No cranial nerve deficit.      Motor: No abnormal muscle tone.      Coordination: Coordination normal.       Deep Tendon Reflexes: Reflexes are normal and symmetric. Reflexes normal.   Psychiatric:         Behavior: Behavior normal.         Thought Content: Thought content normal.         Judgment: Judgment normal.       Significant Labs:   Microbiology Results (last 7 days)       Procedure Component Value Units Date/Time    Aerobic culture [[id number]] Collected: 02/22/22 1153    Order Status: Sent Specimen: Wound from Foot, Right Updated: 02/22/22 1302    Gram stain [[id number]] Collected: 02/22/22 1153    Order Status: Sent Specimen: Wound from Foot, Right Updated: 02/22/22 1302    AFB Culture & Smear [[id number]] Collected: 02/22/22 1153    Order Status: Sent Specimen: Wound from Foot, Right Updated: 02/22/22 1301    Fungus culture [[id number]] Collected: 02/22/22 1153    Order Status: Sent Specimen: Wound from Foot, Right Updated: 02/22/22 1301    Culture, Anaerobic [[id number]] Collected: 02/22/22 1153    Order Status: Sent Specimen: Wound from Foot, Right Updated: 02/22/22 1301    Blood culture x two cultures. Draw prior to antibiotics. [[id number]] Collected: 02/21/22 1218    Order Status: Completed Specimen: Blood from Peripheral, Hand, Right Updated: 02/22/22 1213     Blood Culture, Routine Gram stain aer bottle: Gram positive cocci       Positive results previously called 02/22/2022  12:12    Narrative:      Aerobic and anaerobic    Blood culture x two cultures. Draw prior to antibiotics. [[id number]] Collected: 02/21/22 1231    Order Status: Completed Specimen: Blood from Peripheral, Hand, Left Updated: 02/22/22 1104     Blood Culture, Routine Gram stain aer bottle: Gram positive cocci      Gram stain jose bottle: Gram positive cocci      Results called to and read back by: KRISTINA ESCOTO RN  02/22/2022  11:03    Narrative:      Aerobic and anaerobic            Significant Imaging: I have reviewed all pertinent imaging results/findings within the past 24 hours.

## 2022-02-22 NOTE — ASSESSMENT & PLAN NOTE
Bacteremia  - R 2nd digit with gangrene  - Blood culture with GPC. Repeat blood culture pending  - ID consulted. Appreciate input  - Continue ceftriaxone and vancomycin with pharmacy dosing  - General surgery following. S/p 2nd digit transmetatarsal amputation and debridement   - OR cultures pending

## 2022-02-22 NOTE — HOSPITAL COURSE
Patient admitted with foul smelling drainage from wound of R foot and SOB. Found to have cavitary lung lesions concerning for septic emboli. General surgery and ID consulted. He went to OR on 2/22 debridement and transmetatarsal amputation of 2nd R digit. Wound vac placed. Blood culture with MRSA. PAT ordered but patient unable to tolerate procedure (unable to pass probe, wouldn't swallow). Given presence of septic emboli, planned for empiric treatment of endocarditis for 6 weeks. Planned for removal of wound vac and closure on 3/02 which went well. He was discharged to LTAC in stable condition to continue IV abx for endocarditis and wound care.

## 2022-02-22 NOTE — ED NOTES
Pt in bed with fiance at bedside, bed in the lowest position, call light within reach. Pt is AOx3, dressed in gown,rt foot exposed with gangrenous rt foot. Rt foot presents dark in color, great toe amputation with blackened necrotic second toe. Pt requests to eat. Pt expresses no further needs at this time.

## 2022-02-22 NOTE — ANESTHESIA PROCEDURE NOTES
Peripheral Block    Patient location during procedure: holding area    Reason for block: primary anesthetic    Diagnosis: right foot   Timeout: 2/22/2022 10:46 AM     Staffing  Authorizing Provider: Vitaliy Mcclellan MD  Performing Provider: Vitaliy Mcclellan MD    Preanesthetic Checklist  Completed: patient identified, IV checked, site marked, risks and benefits discussed, surgical consent, monitors and equipment checked, pre-op evaluation and timeout performed  Peripheral Block  Patient position: left lateral decubitus  Prep: ChloraPrep  Patient monitoring: heart rate, cardiac monitor, continuous pulse ox, continuous capnometry and frequent blood pressure checks  Block type: popliteal  Laterality: right  Injection technique: single shot  Needle  Needle type: Stimuplex   Needle gauge: 21 G  Needle length: 4 in  Needle localization: ultrasound guidance   -ultrasound image captured on disc.  Assessment  Injection assessment: negative aspiration, negative parasthesia and local visualized surrounding nerve  Paresthesia pain: none  Heart rate change: no  Slow fractionated injection: yes    Medications:  Medication Administration Time: 2/22/2022 10:49 AM  Medications: lidocaine (PF) 20 mg/mL (2%) injection - Other   10 mL - 2/22/2022 10:49:00 AM  ropivacaine (NAROPIN) injection 0.5% - Perineural   20 mL - 2/22/2022 10:49:00 AM    Additional Notes  VSS.  [facility] RN monitoring vitals throughout procedure.  Patient tolerated procedure well.

## 2022-02-22 NOTE — PROGRESS NOTES
[facility] Emergency Dept  [facility]  Progress Note    Patient Name: Dave Good  MRN: [id number]  Patient Class: IP- Inpatient   Admission Date: 2/21/2022  Length of Stay: 1 days  Attending Physician: Marcy Patrick MD  Primary Care Provider: Reyna Jorge NP        Subjective:     Principal Problem:Diabetic wet gangrene of the foot        HPI:  55 yo m with PMH of DM , HTN , PE and COPD presented to ED  for R 2nd digit wound. Pt had amputation by  back on 9/2021 for wet gangrene . Pt is following up with wound care at [facility] twice a week. But the last time was 1.5 weeks ago, he noticed swelling ,pain and foul smelling drainage from the wound. Pt also reports worsening SOB with exertion . With some chest tightness. In ED VS were stable. Started on iv abx . Labs showed leukocytosis and hypokalemia.CTA showed cavitary lesions with possible septic emboli.       Overview/Hospital Course:  Patient admitted with foul smelling drainage from wound of R foot and SOB. Found to have cavitary lung lesions concerning for septic emboli. General surgery and ID consulted. He went to OR on 2/22 debridement and transmetatarsal amputation of 2nd R digit. Blood culture with GPC.      Interval History: No events overnight. Pt seen after OR with family at bedside. Denies pain. + dyspnea.    Review of Systems   Constitutional:  Negative for chills and fever.   Respiratory:  Positive for shortness of breath.    Cardiovascular:  Negative for chest pain.   Gastrointestinal:  Negative for nausea and vomiting.   Objective:     Vital Signs (Most Recent):  Temp: 98.4 °F (36.9 °C) (02/22/22 1451)  Pulse: 83 (02/22/22 1451)  Resp: 18 (02/22/22 1451)  BP: (!) 92/55 (02/22/22 1451)  SpO2: (!) 94 % (02/22/22 1451)   Vital Signs (24h Range):  Temp:  [97.6 °F (36.4 °C)-98.4 °F (36.9 °C)] 98.4 °F (36.9 °C)  Pulse:  [77-85] 83  Resp:  [11-30] 18  SpO2:  [94 %-100 %] 94 %  BP: ()/(55-82) 92/55     Weight: 129.3 kg (285  lb)  Body mass index is 36.59 kg/m².    Intake/Output Summary (Last 24 hours) at 2/22/2022 1644  Last data filed at 2/22/2022 1355  Gross per 24 hour   Intake 2250 ml   Output 0 ml   Net 2250 ml      Physical Exam  Vitals and nursing note reviewed.   Constitutional:       General: He is not in acute distress.     Appearance: Normal appearance. He is well-developed.   Cardiovascular:      Rate and Rhythm: Normal rate and regular rhythm.      Pulses: Normal pulses.   Pulmonary:      Effort: Pulmonary effort is normal.      Breath sounds: Rales present.   Abdominal:      General: Bowel sounds are normal.      Palpations: Abdomen is soft.      Tenderness: There is no abdominal tenderness.   Musculoskeletal:      Right lower leg: Edema present.      Left lower leg: Edema present.   Skin:     General: Skin is warm and dry.      Comments: R foot with post-op dressing in place   Neurological:      Mental Status: He is alert and oriented to person, place, and time. Mental status is at baseline.   Psychiatric:         Behavior: Behavior normal.       Significant Labs: All pertinent labs within the past 24 hours have been reviewed.  BMP:   Recent Labs   Lab 02/22/22  0810   *   *   K 3.9   CL 93*   CO2 33*   BUN 12   CREATININE 0.9   CALCIUM 8.9     CBC:   Recent Labs   Lab 02/21/22  1210 02/21/22  1544 02/22/22  0810   WBC 21.67* 19.18* 14.57*   HGB 10.8* 9.9* 10.2*   HCT 34.0* 31.2* 32.0*   * 504* 503*       Significant Imaging: I have reviewed all pertinent imaging results/findings within the past 24 hours.      Assessment/Plan:      * Diabetic wet gangrene of the foot  Bacteremia  - R 2nd digit with gangrene  - Blood culture with GPC. Repeat blood culture pending  - ID consulted. Appreciate input  - Continue ceftriaxone and vancomycin with pharmacy dosing  - General surgery following. S/p 2nd digit transmetatarsal amputation and debridement   - OR cultures pending    Chronic respiratory failure with  hypoxia  - On 2L home O2 2/2 COVID-19  - Continue oxygen protocol    Hypokalemia  - Replace PRN    Cavitary lesion of lung  - CTA without PE but with bilateral cavitary lesion with possible septic emboli vs organizing pneumonia   - Check quantiferon gold and sputum AFB x 3  - Continue broad spectrum abx. Follow cultures  - Echo without vegetation. May require PAT pending clearance of bacteremia  - ID consulted    COPD with asthma  - Duoneb PRN    Depression  - Continue seroquel    HTN (hypertension)  - Continue losartan 25 mg qd    Type 2 diabetes mellitus without complication, with long-term current use of insulin  - A1c 9.8  - Continue levemir 50U qHS, aspart 10U TIDWM and low dose SSI AC/HS PRN    VTE Risk Mitigation (From admission, onward)         Ordered     heparin (porcine) injection 5,000 Units  Every 8 hours         02/21/22 4097                        Marcy Patrick MD  Department of Hospital Medicine   [facility] Emergency Dept

## 2022-02-22 NOTE — ED NOTES
Pt resting. Family at bedside. Monitors in place.  Pt has wound to right foot on great to that appears not to be healing. Pt 2nd digit right foot black and appears infected.  AAO x 3 nadn skin w.d  No needs expressed. Pt is NPO at this time

## 2022-02-22 NOTE — ANESTHESIA POSTPROCEDURE EVALUATION
Anesthesia Post Evaluation    Patient: Dave Good    Procedure(s) Performed: Procedure(s) (LRB):  DEBRIDEMENT, WOUND - RIGHT FOOT (Right)    Final Anesthesia Type: regional      Patient location during evaluation: PACU  Patient participation: Yes- Able to Participate  Level of consciousness: awake and alert  Post-procedure vital signs: reviewed and stable  Pain management: adequate  Airway patency: patent    PONV status at discharge: No PONV  Anesthetic complications: no      Cardiovascular status: blood pressure returned to baseline and stable  Respiratory status: room air  Hydration status: euvolemic  Follow-up not needed.          Vitals Value Taken Time   /76 02/22/22 1330   Temp 36.4 °C (97.6 °F) 02/22/22 1258   Pulse 78 02/22/22 1341   Resp 16 02/22/22 1332   SpO2 98 % 02/22/22 1341   Vitals shown include unvalidated device data.      No case tracking events are documented in the log.      Pain/Mignon Score: Pain Rating Prior to Med Admin: 8 (2/22/2022  1:32 PM)  Mignon Score: 8 (2/22/2022 12:58 PM)

## 2022-02-22 NOTE — CONSULTS
[facility] ([place]  Infectious Disease  Consult Note    Patient Name: Dave Good  MRN: [id number]  Admission Date: 2/21/2022  Hospital Length of Stay: 1 days  Attending Physician: Marcy Patrick MD  Primary Care Provider: Reyna Jorge NP     Isolation Status: No active isolations    Patient information was obtained from patient, past medical records and ER records.      Inpatient consult to Infectious Diseases  Consult performed by: Jean Munguia MD  Consult ordered by: Evi Tolentino MD        Assessment/Plan:     * Diabetic wet gangrene of the foot  S/p surgical debridement.  Suspect there may be some residual bone infection.  Blood cultures positive for GPCs.      Plan    1. Continue IV vancomycin.    2. Discontinue zosyn and start IV ceftriaxone.    Bacteremia  Gram positive bacteremia.  Very concerning for endocarditis.  TTE negative.      Plan    1. Repeat blood cultures.    2. Vancomycin and ceftriaxone for now.    3. Follow up on ID and sensitivities of the GPCs.  He may need PAT.        Cavitary lesion of lung  In the setting of gram positive bacteremia, this is concerning for septic emboli.  Patient reports a history of homelessness for years and incarceration for years putting him at high risk for exposure to tuberculosis.  His underlying history of diabetes increases his risk of progression from latent tuberculosis to active tuberculosis.  He denies cough but admits to 15 lb weight loss over the past 5 months and night sweats.    Recommendations    1. Consult pulmonary for induction of sputum samples.  Send AFB smear and culture X3.  Send once sample for tuberculosis PCR as well.    2. Agree with checking quantiferon gold.  Follow up results.  Would still obtain respiratory samples.        Thank you for your consult. I will follow-up with patient. Please contact us if you have any additional questions.    Jean Munguia MD  Infectious Disease  [facility]  ([place])    Subjective:     Principal Problem: Diabetic wet gangrene of the foot    HPI: 56 year old male with a history of DM and chronic wounds to both feet worse on the right foot.  He has a history of right foot transmetatarsal amputation of the 1st digit of the right foot in September 2021.  He had been following with wound care for his non-healing wound.  Cultures obtained from his right foot wound in November 2021 were positive for MSSA.  He was treated with oral bactrim for about 2 weeks.  A few days prior to admission, he developed fevers, increased pain in his foot and ultimately presented to the ER.  In the ER, he was noted to have a gangrenous 2nd digit of the right foot.  CTA of chest in the ER revealed multiple cavitary lung lesions.  Blood cultures obtained are positive in 3 out of 4 bottles with GPCs.  TTE is negative.  He has now underwent surgical debridement of the right foot with amputation of the 2nd digit on February 22, 2022.  ID is consulted to assist with management.  Of note, he states that he has lost around 15 lbs since September of 2021.  He has previously been homeless for many years but secured an apartment 1 year ago and hasn't been homeless since.      Past Medical History:   Diagnosis Date    COPD (chronic obstructive pulmonary disease)     COVID-19     Depression     Diabetes mellitus     Diabetes mellitus, type 2     Hypertension        Past Surgical History:   Procedure Laterality Date    FOOT AMPUTATION THROUGH METATARSAL Right 9/23/2021    Procedure: AMPUTATION, FOOT, TRANSMETATARSAL right 1st ray resection;  Surgeon: Samuel Toussaint DPM;  Location: [facility] OR;  Service: Podiatry;  Laterality: Right;    INCISION AND DRAINAGE FOOT Bilateral 9/21/2021    Procedure: INCISION AND DRAINAGE, FOOT - Bilateral;  Surgeon: Lavonne Vigil DPM;  Location: [facility] OR;  Service: Podiatry;  Laterality: Bilateral;       Review of patient's allergies indicates:   Allergen Reactions     "Ibuprofen Swelling     Facial swelling       Medications:  Medications Prior to Admission   Medication Sig    ammonium lactate 12 % Crea Apply twice daily to affected parts both feet as needed.    aspirin (ECOTRIN) 81 MG EC tablet Take 81 mg by mouth once daily.    atorvastatin (LIPITOR) 20 MG tablet Take 40 mg by mouth once daily.     ciclopirox (PENLAC) 8 % Soln Apply topically nightly.    collagenase (SANTYL) ointment With each dressing change    hydroCHLOROthiazide (HYDRODIURIL) 25 MG tablet Take 1 tablet by mouth once daily.    LANTUS SOLOSTAR U-100 INSULIN glargine 100 units/mL (3mL) SubQ pen Inject into the skin.    lisinopriL 10 MG tablet Take 10 mg by mouth once daily.    losartan (COZAAR) 25 MG tablet Take 25 mg by mouth 2 (two) times daily.    metFORMIN (GLUCOPHAGE) 1000 MG tablet Take 1,000 mg by mouth 2 (two) times daily.    QUEtiapine (SEROQUEL) 200 MG Tab Take 200 mg by mouth nightly.    insulin aspart U-100 (NOVOLOG) 100 unit/mL (3 mL) InPn pen Inject 10 Units into the skin 3 (three) times daily.    insulin detemir U-100 (LEVEMIR FLEXTOUCH) 100 unit/mL (3 mL) SubQ InPn pen Inject 50 Units into the skin every evening.    pulse oximeter (PULSE OXIMETER) device by Apply Externally route 2 (two) times a day. Use twice daily at 8 AM and 3 PM and [facility] the [facility] as directed.     Antibiotics (From admission, onward)                Start     Stop Route Frequency Ordered    02/22/22 2100  mupirocin 2 % ointment         02/27 2059 Nasl 2 times daily 02/22/22 1255    02/22/22 0330  vancomycin 2 g in dextrose 5 % 500 mL IVPB         -- IV Every 12 hours (non-standard times) 02/21/22 1553    02/21/22 1630  piperacillin-tazobactam 4.5 g in dextrose 5 % 100 mL IVPB (ready to mix system)  ( Pneumonia - Moderate-High MDR )         02/26 1629 IV Every 8 hours (non-standard times) 02/21/22 1530    02/21/22 1628  vancomycin - pharmacy to dose  ( Pneumonia - Moderate-High MDR )        "And" " Linked Group Details    -- IV pharmacy to manage frequency 02/21/22 1530          Antifungals (From admission, onward)                None          Antivirals (From admission, onward)      None             There is no immunization history for the selected administration types on file for this patient.    Family History    None       Social History     Socioeconomic History    Marital status: Single   Tobacco Use    Smoking status: Former Smoker    Smokeless tobacco: Never Used   Substance and Sexual Activity    Alcohol use: Yes     Comment: whiskey and beer every other day    Drug use: Not Currently    Sexual activity: Yes     Partners: Female     Review of Systems   Constitutional:  Positive for fatigue, fever and unexpected weight change.   Skin:  Positive for wound.   All other systems reviewed and are negative.  Objective:     Vital Signs (Most Recent):  Temp: 98.4 °F (36.9 °C) (02/22/22 1451)  Pulse: 83 (02/22/22 1451)  Resp: 18 (02/22/22 1451)  BP: (!) 92/55 (02/22/22 1451)  SpO2: (!) 94 % (02/22/22 1451)   Vital Signs (24h Range):  Temp:  [97.6 °F (36.4 °C)-98.4 °F (36.9 °C)] 98.4 °F (36.9 °C)  Pulse:  [77-85] 83  Resp:  [11-30] 18  SpO2:  [94 %-100 %] 94 %  BP: ()/(55-82) 92/55     Weight: 129.3 kg (285 lb)  Body mass index is 36.59 kg/m².    Estimated Creatinine Clearance: 127.8 mL/min (based on SCr of 0.9 mg/dL).    Physical Exam  Vitals and nursing note reviewed.   Constitutional:       General: He is not in acute distress.     Appearance: He is well-developed. He is not diaphoretic.   HENT:      Head: Normocephalic and atraumatic.      Right Ear: External ear normal.      Left Ear: External ear normal.      Nose: Nose normal.      Mouth/Throat:      Pharynx: No oropharyngeal exudate.   Eyes:      General: No scleral icterus.        Right eye: No discharge.         Left eye: No discharge.      Conjunctiva/sclera: Conjunctivae normal.      Pupils: Pupils are equal, round, and reactive to light.    Neck:      Thyroid: No thyromegaly.      Vascular: No JVD.      Trachea: No tracheal deviation.   Cardiovascular:      Rate and Rhythm: Normal rate and regular rhythm.      Heart sounds: No murmur heard.    No friction rub. No gallop.   Pulmonary:      Effort: Pulmonary effort is normal. No respiratory distress.      Breath sounds: Normal breath sounds. No stridor. No wheezing or rales.   Chest:      Chest wall: No tenderness.   Abdominal:      General: Bowel sounds are normal. There is no distension.      Palpations: Abdomen is soft. There is no mass.      Tenderness: There is no abdominal tenderness. There is no guarding or rebound.   Musculoskeletal:         General: No tenderness. Normal range of motion.      Cervical back: Normal range of motion and neck supple.      Comments: Dressings covering both feet.  Pictures of foot wounds reviewed.   Lymphadenopathy:      Cervical: No cervical adenopathy.   Skin:     General: Skin is warm.      Coloration: Skin is not pale.      Findings: No erythema or rash.   Neurological:      Mental Status: He is alert and oriented to person, place, and time.      Cranial Nerves: No cranial nerve deficit.      Motor: No abnormal muscle tone.      Coordination: Coordination normal.      Deep Tendon Reflexes: Reflexes are normal and symmetric. Reflexes normal.   Psychiatric:         Behavior: Behavior normal.         Thought Content: Thought content normal.         Judgment: Judgment normal.       Significant Labs:   Microbiology Results (last 7 days)       Procedure Component Value Units Date/Time    Aerobic culture [[id number]] Collected: 02/22/22 1153    Order Status: Sent Specimen: Wound from Foot, Right Updated: 02/22/22 1302    Gram stain [[id number]] Collected: 02/22/22 1153    Order Status: Sent Specimen: Wound from Foot, Right Updated: 02/22/22 1302    AFB Culture & Smear [[id number]] Collected: 02/22/22 1153    Order Status: Sent Specimen: Wound from Foot, Right Updated:  02/22/22 1301    Fungus culture [[id number]] Collected: 02/22/22 1153    Order Status: Sent Specimen: Wound from Foot, Right Updated: 02/22/22 1301    Culture, Anaerobic [[id number]] Collected: 02/22/22 1153    Order Status: Sent Specimen: Wound from Foot, Right Updated: 02/22/22 1301    Blood culture x two cultures. Draw prior to antibiotics. [[id number]] Collected: 02/21/22 1218    Order Status: Completed Specimen: Blood from Peripheral, Hand, Right Updated: 02/22/22 1213     Blood Culture, Routine Gram stain aer bottle: Gram positive cocci       Positive results previously called 02/22/2022  12:12    Narrative:      Aerobic and anaerobic    Blood culture x two cultures. Draw prior to antibiotics. [[id number]] Collected: 02/21/22 1231    Order Status: Completed Specimen: Blood from Peripheral, Hand, Left Updated: 02/22/22 1104     Blood Culture, Routine Gram stain aer bottle: Gram positive cocci      Gram stain jose bottle: Gram positive cocci      Results called to and read back by: KRISTINA ESCOTO RN  02/22/2022  11:03    Narrative:      Aerobic and anaerobic            Significant Imaging: I have reviewed all pertinent imaging results/findings within the past 24 hours.

## 2022-02-22 NOTE — OP NOTE
[facility] ([place])  Surgery Department  Operative Note    SUMMARY     Patient: Dave Good    Medical Record: [id number]    Date of Procedure: 2/22/2022     Surgeon: Surgeon(s) and Role:     * Jesus Flores Jr., MD - Primary    Assisting Surgeon: None    Pre-Operative Diagnosis: Diabetic infection of right foot [E11.628, L08.9]  Infected blister of right foot, initial encounter [S90.821A, L08.9]    Post-Operative Diagnosis: Post-Op Diagnosis Codes:     * Diabetic infection of right foot [E11.628, L08.9]     * Infected blister of right foot, initial encounter [S90.821A, L08.9]    Procedure: Procedure(s) (LRB):  DEBRIDEMENT, WOUND - RIGHT FOOT (Right) , skin, subcutaneous tissue, muscle, fascia transmetatarsal amputation 2nd digit, application of black VAC dressing system    Procedure in Detail:  The patient was brought to the operating room and placed in the supine position, the right lower extremity prepped and draped in a sterile fashion.  A longitudinal incision made along the medial aspect of the right foot along the 1st metatarsal.  The incision was then carried over across the 2nd digit which was gangrenous.  Soft tissues were freed from the distal metatarsal and the distal metatarsal transected.  Hemostasis obtained with 3-0 Vicryl ties and ligatures.  Patient had deep space infection which required excisional debridement of muscle, subcutaneous tissue, skin & tendons.  This was done using a Metzenbaum scissors in a 10. Blade.  The wounds were irrigated and after hemostasis had been obtained a black VAC dressing sponge was applied.  The patient tolerated procedure well left the operating room in good condition.  The end of procedure all sponge lap and instrument counts were correct.  Estimated blood loss 100 cc

## 2022-02-23 PROBLEM — B95.8 BACTEREMIA DUE TO STAPHYLOCOCCUS: Status: ACTIVE | Noted: 2022-02-22

## 2022-02-23 LAB
ALBUMIN SERPL BCP-MCNC: 1.7 G/DL (ref 3.5–5.2)
ANION GAP SERPL CALC-SCNC: 7 MMOL/L (ref 8–16)
BASOPHILS # BLD AUTO: 0.03 K/UL (ref 0–0.2)
BASOPHILS NFR BLD: 0.2 % (ref 0–1.9)
BUN SERPL-MCNC: 15 MG/DL (ref 6–20)
CALCIUM SERPL-MCNC: 8.9 MG/DL (ref 8.7–10.5)
CHLORIDE SERPL-SCNC: 95 MMOL/L (ref 95–110)
CO2 SERPL-SCNC: 30 MMOL/L (ref 23–29)
CREAT SERPL-MCNC: 1.2 MG/DL (ref 0.5–1.4)
DIFFERENTIAL METHOD: ABNORMAL
EOSINOPHIL # BLD AUTO: 0.2 K/UL (ref 0–0.5)
EOSINOPHIL NFR BLD: 1.5 % (ref 0–8)
ERYTHROCYTE [DISTWIDTH] IN BLOOD BY AUTOMATED COUNT: 15.3 % (ref 11.5–14.5)
EST. GFR  (AFRICAN AMERICAN): >60 ML/MIN/1.73 M^2
EST. GFR  (NON AFRICAN AMERICAN): >60 ML/MIN/1.73 M^2
GLUCOSE SERPL-MCNC: 224 MG/DL (ref 70–110)
HCT VFR BLD AUTO: 30.8 % (ref 40–54)
HEPATITIS C VIRUS (HCV) RNA DETECTION/QUANTIFICATION RT-PCR: NORMAL IU/ML
HGB BLD-MCNC: 9.7 G/DL (ref 14–18)
IMM GRANULOCYTES # BLD AUTO: 0.4 K/UL (ref 0–0.04)
IMM GRANULOCYTES NFR BLD AUTO: 2.8 % (ref 0–0.5)
LYMPHOCYTES # BLD AUTO: 2.4 K/UL (ref 1–4.8)
LYMPHOCYTES NFR BLD: 17.1 % (ref 18–48)
MCH RBC QN AUTO: 27.9 PG (ref 27–31)
MCHC RBC AUTO-ENTMCNC: 31.5 G/DL (ref 32–36)
MCV RBC AUTO: 89 FL (ref 82–98)
MONOCYTES # BLD AUTO: 1.5 K/UL (ref 0.3–1)
MONOCYTES NFR BLD: 10.7 % (ref 4–15)
NEUTROPHILS # BLD AUTO: 9.7 K/UL (ref 1.8–7.7)
NEUTROPHILS NFR BLD: 67.7 % (ref 38–73)
NRBC BLD-RTO: 0 /100 WBC
PHOSPHATE SERPL-MCNC: 2.7 MG/DL (ref 2.7–4.5)
PLATELET # BLD AUTO: 547 K/UL (ref 150–450)
PMV BLD AUTO: 9.7 FL (ref 9.2–12.9)
POCT GLUCOSE: 174 MG/DL (ref 70–110)
POCT GLUCOSE: 232 MG/DL (ref 70–110)
POCT GLUCOSE: 235 MG/DL (ref 70–110)
POTASSIUM SERPL-SCNC: 3.7 MMOL/L (ref 3.5–5.1)
RBC # BLD AUTO: 3.48 M/UL (ref 4.6–6.2)
SODIUM SERPL-SCNC: 132 MMOL/L (ref 136–145)
VANCOMYCIN TROUGH SERPL-MCNC: 21.9 UG/ML (ref 10–22)
WBC # BLD AUTO: 14.29 K/UL (ref 3.9–12.7)

## 2022-02-23 PROCEDURE — 63600175 PHARM REV CODE 636 W HCPCS: Performed by: INTERNAL MEDICINE

## 2022-02-23 PROCEDURE — 87040 BLOOD CULTURE FOR BACTERIA: CPT | Performed by: INTERNAL MEDICINE

## 2022-02-23 PROCEDURE — 36415 COLL VENOUS BLD VENIPUNCTURE: CPT | Performed by: INTERNAL MEDICINE

## 2022-02-23 PROCEDURE — 63600175 PHARM REV CODE 636 W HCPCS: Performed by: EMERGENCY MEDICINE

## 2022-02-23 PROCEDURE — 25000003 PHARM REV CODE 250: Performed by: INTERNAL MEDICINE

## 2022-02-23 PROCEDURE — 25000003 PHARM REV CODE 250: Performed by: PHYSICIAN ASSISTANT

## 2022-02-23 PROCEDURE — 94761 N-INVAS EAR/PLS OXIMETRY MLT: CPT

## 2022-02-23 PROCEDURE — 63600175 PHARM REV CODE 636 W HCPCS: Performed by: PHYSICIAN ASSISTANT

## 2022-02-23 PROCEDURE — 80069 RENAL FUNCTION PANEL: CPT | Performed by: INTERNAL MEDICINE

## 2022-02-23 PROCEDURE — 99900035 HC TECH TIME PER 15 MIN (STAT)

## 2022-02-23 PROCEDURE — 36415 COLL VENOUS BLD VENIPUNCTURE: CPT | Performed by: EMERGENCY MEDICINE

## 2022-02-23 PROCEDURE — 99233 PR SUBSEQUENT HOSPITAL CARE,LEVL III: ICD-10-PCS | Mod: ,,, | Performed by: INTERNAL MEDICINE

## 2022-02-23 PROCEDURE — 99233 SBSQ HOSP IP/OBS HIGH 50: CPT | Mod: ,,, | Performed by: INTERNAL MEDICINE

## 2022-02-23 PROCEDURE — 11000001 HC ACUTE MED/SURG PRIVATE ROOM

## 2022-02-23 PROCEDURE — 80202 ASSAY OF VANCOMYCIN: CPT | Performed by: EMERGENCY MEDICINE

## 2022-02-23 PROCEDURE — 86480 TB TEST CELL IMMUN MEASURE: CPT | Performed by: INTERNAL MEDICINE

## 2022-02-23 PROCEDURE — 27000221 HC OXYGEN, UP TO 24 HOURS

## 2022-02-23 PROCEDURE — 85025 COMPLETE CBC W/AUTO DIFF WBC: CPT | Performed by: INTERNAL MEDICINE

## 2022-02-23 PROCEDURE — 25000003 PHARM REV CODE 250: Performed by: EMERGENCY MEDICINE

## 2022-02-23 RX ADMIN — INSULIN ASPART 10 UNITS: 100 INJECTION, SOLUTION INTRAVENOUS; SUBCUTANEOUS at 04:02

## 2022-02-23 RX ADMIN — INSULIN DETEMIR 50 UNITS: 100 INJECTION, SOLUTION SUBCUTANEOUS at 08:02

## 2022-02-23 RX ADMIN — MORPHINE SULFATE 2 MG: 4 INJECTION, SOLUTION INTRAMUSCULAR; INTRAVENOUS at 03:02

## 2022-02-23 RX ADMIN — LOSARTAN POTASSIUM 25 MG: 25 TABLET, FILM COATED ORAL at 08:02

## 2022-02-23 RX ADMIN — INSULIN ASPART 2 UNITS: 100 INJECTION, SOLUTION INTRAVENOUS; SUBCUTANEOUS at 04:02

## 2022-02-23 RX ADMIN — HYDROCODONE BITARTRATE AND ACETAMINOPHEN 1 TABLET: 5; 325 TABLET ORAL at 08:02

## 2022-02-23 RX ADMIN — VANCOMYCIN HYDROCHLORIDE 2000 MG: 10 INJECTION, POWDER, LYOPHILIZED, FOR SOLUTION INTRAVENOUS at 05:02

## 2022-02-23 RX ADMIN — HEPARIN SODIUM 5000 UNITS: 5000 INJECTION INTRAVENOUS; SUBCUTANEOUS at 03:02

## 2022-02-23 RX ADMIN — ATORVASTATIN CALCIUM 40 MG: 20 TABLET, FILM COATED ORAL at 09:02

## 2022-02-23 RX ADMIN — MORPHINE SULFATE 2 MG: 4 INJECTION, SOLUTION INTRAMUSCULAR; INTRAVENOUS at 09:02

## 2022-02-23 RX ADMIN — MUPIROCIN: 20 OINTMENT TOPICAL at 08:02

## 2022-02-23 RX ADMIN — INSULIN ASPART 10 UNITS: 100 INJECTION, SOLUTION INTRAVENOUS; SUBCUTANEOUS at 09:02

## 2022-02-23 RX ADMIN — ASPIRIN 81 MG: 81 TABLET, COATED ORAL at 09:02

## 2022-02-23 RX ADMIN — MUPIROCIN: 20 OINTMENT TOPICAL at 09:02

## 2022-02-23 RX ADMIN — QUETIAPINE FUMARATE 200 MG: 200 TABLET ORAL at 08:02

## 2022-02-23 RX ADMIN — INSULIN ASPART 2 UNITS: 100 INJECTION, SOLUTION INTRAVENOUS; SUBCUTANEOUS at 09:02

## 2022-02-23 RX ADMIN — VANCOMYCIN HYDROCHLORIDE 2000 MG: 10 INJECTION, POWDER, LYOPHILIZED, FOR SOLUTION INTRAVENOUS at 04:02

## 2022-02-23 RX ADMIN — INSULIN ASPART 10 UNITS: 100 INJECTION, SOLUTION INTRAVENOUS; SUBCUTANEOUS at 12:02

## 2022-02-23 RX ADMIN — HEPARIN SODIUM 5000 UNITS: 5000 INJECTION INTRAVENOUS; SUBCUTANEOUS at 10:02

## 2022-02-23 RX ADMIN — MORPHINE SULFATE 2 MG: 4 INJECTION, SOLUTION INTRAMUSCULAR; INTRAVENOUS at 07:02

## 2022-02-23 RX ADMIN — HEPARIN SODIUM 5000 UNITS: 5000 INJECTION INTRAVENOUS; SUBCUTANEOUS at 05:02

## 2022-02-23 RX ADMIN — CEFTRIAXONE 2 G: 2 INJECTION, SOLUTION INTRAVENOUS at 06:02

## 2022-02-23 RX ADMIN — LOSARTAN POTASSIUM 25 MG: 25 TABLET, FILM COATED ORAL at 09:02

## 2022-02-23 NOTE — SUBJECTIVE & OBJECTIVE
Interval History: No events overnight. Pt with sweats overnight but no fever (the AC was broken). Family at bedside. Updated on POC.     Review of Systems   Constitutional:  Negative for chills and fever.   Respiratory:  Positive for shortness of breath.    Cardiovascular:  Negative for chest pain.   Gastrointestinal:  Negative for nausea and vomiting.   Objective:     Vital Signs (Most Recent):  Temp: 97.2 °F (36.2 °C) (02/23/22 0748)  Pulse: 90 (02/23/22 0748)  Resp: 20 (02/23/22 0919)  BP: 120/78 (02/23/22 0913)  SpO2: 95 % (02/23/22 0748)   Vital Signs (24h Range):  Temp:  [97.2 °F (36.2 °C)-99.7 °F (37.6 °C)] 97.2 °F (36.2 °C)  Pulse:  [77-96] 90  Resp:  [14-20] 20  SpO2:  [92 %-100 %] 95 %  BP: ()/(55-78) 120/78     Weight: 129.3 kg (285 lb)  Body mass index is 36.59 kg/m².    Intake/Output Summary (Last 24 hours) at 2/23/2022 1103  Last data filed at 2/22/2022 2005  Gross per 24 hour   Intake 2088.58 ml   Output 600 ml   Net 1488.58 ml        Physical Exam  Vitals and nursing note reviewed.   Constitutional:       General: He is not in acute distress.     Appearance: Normal appearance. He is well-developed.   Cardiovascular:      Rate and Rhythm: Normal rate and regular rhythm.      Pulses: Normal pulses.   Pulmonary:      Effort: Pulmonary effort is normal.      Breath sounds: Rales present.   Abdominal:      General: Bowel sounds are normal.      Palpations: Abdomen is soft.      Tenderness: There is no abdominal tenderness.   Musculoskeletal:      Right lower leg: Edema present.      Left lower leg: Edema present.   Skin:     General: Skin is warm and dry.      Comments: R foot with post-op dressing in place. L foot with wounds to plantar aspect with eschar present, malodorous, no drainage noted.   Neurological:      Mental Status: He is alert and oriented to person, place, and time. Mental status is at baseline.   Psychiatric:         Behavior: Behavior normal.       Significant Labs: All pertinent  labs within the past 24 hours have been reviewed.  BMP:   Recent Labs   Lab 02/22/22  0810   *   *   K 3.9   CL 93*   CO2 33*   BUN 12   CREATININE 0.9   CALCIUM 8.9       CBC:   Recent Labs   Lab 02/21/22  1210 02/21/22  1544 02/22/22  0810   WBC 21.67* 19.18* 14.57*   HGB 10.8* 9.9* 10.2*   HCT 34.0* 31.2* 32.0*   * 504* 503*         Significant Imaging: I have reviewed all pertinent imaging results/findings within the past 24 hours.

## 2022-02-23 NOTE — ASSESSMENT & PLAN NOTE
- CTA without PE but with bilateral cavitary lesion with possible septic emboli vs organizing pneumonia   - Check quantiferon gold and sputum AFB x 3  - Continue broad spectrum abx. Follow cultures  - Echo without vegetation. May require PAT pending clearance of bacteremia  - ID following

## 2022-02-23 NOTE — PROGRESS NOTES
Status post debridement right foot yesterday.  Patient also with diabetic ulcers and necrotic appearing skin left foot.  Discussed with patient.  For bilateral foot debridement in a.m.

## 2022-02-23 NOTE — ASSESSMENT & PLAN NOTE
Reason For Visit  KRANTHI VALVERDE is here today for a nurse visit for SPIROMETRY.      Current Meds   1. Atorvastatin Calcium 40 MG Oral Tablet; TAKE 1 TABLET AT BEDTIME, REPLACES THE   20 MG DOSE;   Therapy: 06Apr2016 to (Evaluate:22Mar2019)  Requested for: 25Jun2018; Last   Rx:[id number] Ordered   2. Azithromycin 250 MG Oral Tablet; TAKE 2 TABLETS ON DAY 1 THEN TAKE 1 TABLET A   DAY FOR 4 DAYS;   Therapy: 02Scs4902 to (Last Rx:[id number])  Requested for: 98Qvu6045 Ordered   3. Breo Ellipta 200-25 MCG/INH Inhalation Aerosol Powder Breath Activated; INHALE 1   PUFFS Daily to replace the Symbicort;   Therapy: 25Jun2018 to (Last Rx:25Jun2018) Ordered   4. Cialis 5 MG Oral Tablet; TAKE AS DIRECTED;   Therapy: 12Oct2016 to (Evaluate:08Xqn3822); Last Rx:[id number] Ordered   5. Cyclobenzaprine HCl - 10 MG Oral Tablet; TAKE 1 TABLET AT BEDTIME AS NEEDED FOR   MUSCLE CRAMPS;   Therapy: 21Roa2644 to (Evaluate:17Jun2018)  Requested for: 14Xmg1518; Last   Rx:[id number] Ordered   6. Fenofibrate Micronized 200 MG Oral Capsule; TAKE ONE CAPSULE BY MOUTH EVERY   DAY;   Therapy: 72Qtk8027 to (Evaluate:22Mar2019)  Requested for: 25Jun2018; Last   Rx:25Jun2018 Ordered   7. Fluticasone Propionate 50 MCG/ACT Nasal Suspension; USE 2 SPRAYS IN EACH   NOSTRIL ONCE DAILY;   Therapy: 98Unq2830 to (Evaluate:27Jyn0706)  Requested for: 25Jun2018; Last   Rx:25Jun2018 Ordered   8. Ketorolac Tromethamine 0.5 % Ophthalmic Solution;   Therapy: 42Caj8277 to Recorded   9. Lisinopril-Hydrochlorothiazide 20-12.5 MG Oral Tablet; TAKE 1 TABLET BY MOUTH   EVERY DAY;   Therapy: 14Uec8950 to (Evaluate:69Sly1484)  Requested for: 25Jun2018; Last   Rx:[id number] Ordered   10. Omeprazole 20 MG Oral Capsule Delayed Release; TAKE 1 CAPSULE DAILY EVERY    MORNING BEFORE BREAKFAST;    Therapy: 77Ado6763 to (Evaluate:18Oct2017)  Requested for: 20Jun2017; Last    Rx:20Jun2017; Status: ACTIVE - Transmit to Pharmacy - Awaiting Verification Ordered   11. PredniSONE 20  Staph aureus bacteremia  Diabetic foot infection of left foot  - R 2nd digit with gangrene  - Blood culture with Staph. Repeat blood culture pending  - ID consulted. Appreciate input  - Continue ceftriaxone and vancomycin with pharmacy dosing  - General surgery following. S/p 2nd digit transmetatarsal amputation and debridement   - R foot cultures pending   - L foot appears infected as well. Likely needs debridement. Wound care consulted   MG Oral Tablet; TAKE 1 TABLET BY MOUTH TWICE DAILY;    Therapy: 85Htv4302 to (Evaluate:09Jan2018)  Requested for: 41Viq0370; Last    Rx:[id number] Ordered   12. Spiriva HandiHaler 18 MCG Inhalation Capsule; INHALE CONTENTS OF ONE CAPSULE    ONCE DAILY USING HANDIHALER;    Therapy: 46Isw1911 to (Last Rx:[id number]) Ordered   13. Symbicort 160-4.5 MCG/ACT Inhalation Aerosol; INHALE TWO PUFFS BY MOUTH EVERY    TWELVE HOURS *rinse mouth after use*;    Therapy: 48Jtf4019 to (Evaluate:64Bue5406)  Requested for: 30Nov2017; Last    Rx:30Nov2017; Status: ACTIVE - Transmit to Pharmacy - Awaiting Verification Ordered   14. TraMADol HCl - 50 MG Oral Tablet; TAKE 1 TABLET 3 TIMES DAILY as needed for knee or    hip pain;    Therapy: 12Oct2016 to (Evaluate:11Nov2016); Last Rx:[id number] Ordered   15. Ventolin  (90 Base) MCG/ACT Inhalation Aerosol Solution; INHALE 1-2 PUFFS    EVERY 4-6 HOURS AS NEEDED AND AS DIRECTED;    Therapy: 95Icu6845 to (Last Rx:[id number])  Requested for: 20Fii9575 Ordered   16. Vigamox 0.5 % Ophthalmic Solution;    Therapy: 10Mar2012 to Recorded    Allergies  No Known Drug Allergies  Asprin    Procedure    PERFORMED SPIROMETRY. PT. TOLERATED WELL.      Nurse Documentation    PERFORMED SPIROMETRY. PT. TOLERATED WELL.           Plan    Patient Instructions MD NOTIFIED WITH NO NEW ORDERS. PT. D/C IN STABLE CONDITION. COPY OF RESULTS PLACED TO BE SCANNED INTO CHART.   Return to Clinic as needed.      Signatures   Electronically signed by : Christine Adams R.N.; Jun 27 2018 10:58AM CST

## 2022-02-23 NOTE — PLAN OF CARE
Received pt from PACU, oriented to room. AAOx4. POC reviewed w/ pt and spouse. No significant events this shift. VSS on 2.5 L NC. C/o pain treated w/ PRN pain medication per MAR. Wound vac therapy continued at 125 mmHg; no leak detected in seal, dressing remains intact. Pt voids and shifts in bed independently. No injuries, falls, or trauma occurred during shift. Purposeful rounding completed. Bed low and locked, side rails up x3, call light within reach.

## 2022-02-23 NOTE — PLAN OF CARE
Initial Discharge Planning Assessment:  Patient admitted on: 2/21/22     Chart reviewed, Care plan discussed with treatment team,  attending Dr. Patrick     PCP updated in Epic: Ania Masterson, updated in Epic: Danyelle     DME at home: None       Current dispo: Home with family       Transportation: Family will provide      Power of  or Living Will: None      Anticipated DC needs from CM perspective:       02/23/22 1604   Discharge Assessment   Assessment Type Discharge Planning Assessment   Confirmed/corrected address, phone number and insurance Yes   Confirmed Demographics Correct on Facesheet   Source of Information patient;family;health record   Lives With alone   Do you expect to return to your current living situation? Yes   Prior to hospitilization cognitive status: Alert/Oriented   Current cognitive status: Alert/Oriented   Walking or Climbing Stairs Difficulty none   Dressing/Bathing Difficulty none   Equipment Currently Used at Home none   Readmission within 30 days? No   Patient currently being followed by outpatient case management? Unable to determine (comments)   Do you currently have service(s) that help you manage your care at home? No   Do you take prescription medications? Yes   Do you have prescription coverage? Yes   Do you have any problems affording any of your prescribed medications? No   Is the patient taking medications as prescribed? yes   How do you get to doctors appointments? family or friend will provide;health plan transportation   Are you on dialysis? No   Do you take coumadin? No   Discharge Plan A Home with family   Discharge Plan B Home with family   DME Needed Upon Discharge  none   Discharge Plan discussed with: Patient   Discharge Barriers Identified None

## 2022-02-23 NOTE — CONSULTS
[facility] ([place])  Wound Care    Patient Name:  Dave Good   MRN:  [id number]  Date: 2/23/2022  Diagnosis: Diabetic wet gangrene of the foot    Area of concern has been removed by sharps debridement per Dr Flores, NPWT in place at this time.    02/23/2022

## 2022-02-23 NOTE — CONSULTS
"  [facility] ([place])  Adult Nutrition  Consult Note    SUMMARY     Recommendations    Recommendation:   1. Continue Diabetic diet as ordered   2. Please Order James TID to promote wound healing   3. If pt's intake is adequate, also consider adding double protein to diet order    Goals: pt to meet at least 85% EPN by f/u  Nutrition Goal Status: new  Communication of RD Recs:  (POC)    Assessment and Plan  Nutrition Problem:  Inadequate protein intake    Related to (etiology):   wound    Signs and Symptoms (as evidenced by):   S/p 2nd R toe amputation, foot gangrene    Interventions:  Carbohydrate modified diet  [employer] TID - James  Collaboration with other providers    Nutrition Diagnosis Status:   New    Malnutrition Assessment  EMI    Reason for Assessment    Reason For Assessment: consult  Diagnosis: diabetes diagnosis/complications  Relevant Medical History: COPD, COVID-19, depression, T2DM. HTN    General Information Comments: RD working remotely for facility coverage;NFPE unable to be performed. Consult recieved for wound. Pt admitted with diabetic gangrene of foot. On 2/22 had amputation of 2nd R digit. wound vac present. +edema.    Nutrition Discharge Planning: Diabetic/Low sodium with increased protein for wound healing    Nutrition Risk Screen    Nutrition Risk Screen: no indicators present    Nutrition/Diet History    Food Allergies: NKFA  Factors Affecting Nutritional Intake: None identified at this time    Anthropometrics    Temp: 97.2 °F (36.2 °C)  Height: 6' 2" (188 cm)  Height (inches): 74 in  Weight Method: Stated  Weight: 129.3 kg (285 lb)  Weight (lb): 285 lb  Ideal Body Weight (IBW), Male: 190 lb  % Ideal Body Weight, Male (lb): 150 %  BMI (Calculated): 36.6  BMI Grade: 35 - 39.9 - obesity - grade II  Weight Change Amount: 15 lb (Since September)       Lab/Procedures/Meds    Pertinent Labs Reviewed: reviewed  Pertinent Labs Comments: Na 134, glucose 162, A1c 9.8  Pertinent " Medications Reviewed: reviewed  Pertinent Medications Comments: statin, insulin, losartan, abx    Estimated/Assessed Needs    Weight Used For Calorie Calculations: 129.3 kg (285 lb 0.9 oz)  Energy Calorie Requirements (kcal): 2182  Energy Need Method: Sacul-St Jeor (no AF)  Protein Requirements: 155 g (1.2 g/kg)  Weight Used For Protein Calculations: 129.3 kg (285 lb 0.9 oz)     Estimated Fluid Requirement Method: RDA Method  RDA Method (mL): 2182  CHO Requirement: 272 g    Nutrition Prescription Ordered    Current Diet Order: Diabetic diet    Evaluation of Received Nutrient/Fluid Intake    Fluid Required: meeting needs  % Intake of Estimated Energy Needs:EMI  % Meal Intake:EMI    Nutrition Risk    Level of Risk/Frequency of Follow-up: moderate       Monitor and Evaluation    Food and Nutrient Intake: energy intake, food and beverage intake  Food and Nutrient Adminstration: diet order  Physical Activity and Function: nutrition-related ADLs and IADLs  Anthropometric Measurements: weight, weight change  Biochemical Data, Medical Tests and Procedures: electrolyte and renal panel, glucose/endocrine profile, gastrointestinal profile, inflammatory profile, lipid profile  Nutrition-Focused Physical Findings: overall appearance       Nutrition Follow-Up    RD Follow-up?: Yes   29-Aug-2017 10:06

## 2022-02-23 NOTE — PROGRESS NOTES
[facility] Medicine  Progress Note    Patient Name: Dave Good  MRN: [id number]  Patient Class: IP- Inpatient   Admission Date: 2/21/2022  Length of Stay: 2 days  Attending Physician: Marcy Patrick MD  Primary Care Provider: Reyna Jorge NP        Subjective:     Principal Problem:Diabetic wet gangrene of the foot        HPI:  55 yo m with PMH of DM , HTN , PE and COPD presented to ED  for R 2nd digit wound. Pt had amputation by  back on 9/2021 for wet gangrene . Pt is following up with wound care at [facility] twice a week. But the last time was 1.5 weeks ago, he noticed swelling ,pain and foul smelling drainage from the wound. Pt also reports worsening SOB with exertion . With some chest tightness. In ED VS were stable. Started on iv abx . Labs showed leukocytosis and hypokalemia.CTA showed cavitary lesions with possible septic emboli.       Overview/Hospital Course:  Patient admitted with foul smelling drainage from wound of R foot and SOB. Found to have cavitary lung lesions concerning for septic emboli. General surgery and ID consulted. He went to OR on 2/22 debridement and transmetatarsal amputation of 2nd R digit. Blood culture with Staph aureus.      Interval History: No events overnight. Pt with sweats overnight but no fever (the AC was broken). Family at bedside. Updated on POC.     Review of Systems   Constitutional:  Negative for chills and fever.   Respiratory:  Positive for shortness of breath.    Cardiovascular:  Negative for chest pain.   Gastrointestinal:  Negative for nausea and vomiting.   Objective:     Vital Signs (Most Recent):  Temp: 97.2 °F (36.2 °C) (02/23/22 0748)  Pulse: 90 (02/23/22 0748)  Resp: 20 (02/23/22 0919)  BP: 120/78 (02/23/22 0913)  SpO2: 95 % (02/23/22 0748)   Vital Signs (24h Range):  Temp:  [97.2 °F (36.2 °C)-99.7 °F (37.6 °C)] 97.2 °F (36.2 °C)  Pulse:  [77-96] 90  Resp:  [14-20] 20  SpO2:  [92 %-100 %] 95 %  BP: ()/(55-78)  120/78     Weight: 129.3 kg (285 lb)  Body mass index is 36.59 kg/m².    Intake/Output Summary (Last 24 hours) at 2/23/2022 1103  Last data filed at 2/22/2022 2005  Gross per 24 hour   Intake 2088.58 ml   Output 600 ml   Net 1488.58 ml        Physical Exam  Vitals and nursing note reviewed.   Constitutional:       General: He is not in acute distress.     Appearance: Normal appearance. He is well-developed.   Cardiovascular:      Rate and Rhythm: Normal rate and regular rhythm.      Pulses: Normal pulses.   Pulmonary:      Effort: Pulmonary effort is normal.      Breath sounds: Rales present.   Abdominal:      General: Bowel sounds are normal.      Palpations: Abdomen is soft.      Tenderness: There is no abdominal tenderness.   Musculoskeletal:      Right lower leg: Edema present.      Left lower leg: Edema present.   Skin:     General: Skin is warm and dry.      Comments: R foot with post-op dressing in place. L foot with wounds to plantar aspect with eschar present, malodorous, no drainage noted.   Neurological:      Mental Status: He is alert and oriented to person, place, and time. Mental status is at baseline.   Psychiatric:         Behavior: Behavior normal.       Significant Labs: All pertinent labs within the past 24 hours have been reviewed.  BMP:   Recent Labs   Lab 02/22/22  0810   *   *   K 3.9   CL 93*   CO2 33*   BUN 12   CREATININE 0.9   CALCIUM 8.9       CBC:   Recent Labs   Lab 02/21/22  1210 02/21/22  1544 02/22/22  0810   WBC 21.67* 19.18* 14.57*   HGB 10.8* 9.9* 10.2*   HCT 34.0* 31.2* 32.0*   * 504* 503*         Significant Imaging: I have reviewed all pertinent imaging results/findings within the past 24 hours.      Assessment/Plan:      * Diabetic wet gangrene of the foot  Staph aureus bacteremia  Diabetic foot infection of left foot  - R 2nd digit with gangrene  - Blood culture with Staph. Repeat blood culture pending  - ID consulted. Appreciate input  - Continue  ceftriaxone and vancomycin with pharmacy dosing  - General surgery following. S/p 2nd digit transmetatarsal amputation and debridement   - R foot cultures pending   - L foot appears infected as well. Likely needs debridement. Wound care consulted    Chronic respiratory failure with hypoxia  - On 2L home O2 2/2 COVID-19  - Continue oxygen protocol    Hypokalemia  - Replace PRN    Cavitary lesion of lung  - CTA without PE but with bilateral cavitary lesion with possible septic emboli vs organizing pneumonia   - Check quantiferon gold and sputum AFB x 3  - Continue broad spectrum abx. Follow cultures  - Echo without vegetation. May require PAT pending clearance of bacteremia  - ID following    COPD with asthma  - Duoneb PRN    Depression  - Continue seroquel    HTN (hypertension)  - Continue losartan 25 mg qd    Type 2 diabetes mellitus without complication, with long-term current use of insulin  - A1c 9.8  - Continue levemir 50U qHS, aspart 10U TIDWM and low dose SSI AC/HS PRN    VTE Risk Mitigation (From admission, onward)         Ordered     heparin (porcine) injection 5,000 Units  Every 8 hours         02/21/22 3803                        Marcy Patrick MD  Department of Hospital Medicine   [facility] ([place])

## 2022-02-23 NOTE — PLAN OF CARE
Recommendation:   1. Continue Diabetic diet as ordered   2. Please Order James TID to promote wound healing   3. If pt's intake is adequate, also consider adding double protein to diet order    Goals: pt to meet at least 85% EPN by f/u  Nutrition Goal Status: new

## 2022-02-23 NOTE — PROGRESS NOTES
Pharmacokinetic Assessment Follow Up: IV Vancomycin    Vancomycin serum concentration assessment(s):    The trough level was drawn correctly and can be used to guide therapy at this time. The measurement is above the desired definitive target range of 10 to 20 mcg/mL.    Vancomycin Regimen Plan:    Change regimen to Vancomycin 1750 mg IV every 12 hours with next serum trough concentration measured at 3:00 prior to 3rd dose on 2/25/22    Drug levels (last 3 results):  Recent Labs   Lab Result Units 02/23/22  1505   Vancomycin-Trough ug/mL 21.9       Pharmacy will continue to follow and monitor vancomycin.    Please contact pharmacy at extension [phone] for questions regarding this assessment.    Thank you for the consult,   Jhonathan Sharma       Patient brief summary:  Dave Good is a 56 y.o. male initiated on antimicrobial therapy with IV Vancomycin for treatment of sepsis    The patient's current regimen is 1750 mg q12h IV    Drug Allergies:   Review of patient's allergies indicates:   Allergen Reactions    Ibuprofen Swelling     Facial swelling       Actual Body Weight:   131.1 kg    Renal Function:   Estimated Creatinine Clearance: 96.6 mL/min (based on SCr of 1.2 mg/dL).,     Dialysis Method (if applicable):  N/A    CBC (last 72 hours):  Recent Labs   Lab Result Units 02/21/22  1210 02/21/22  1544 02/22/22  0810 02/23/22  1059   WBC K/uL 21.67* 19.18* 14.57* 14.29*   Hemoglobin g/dL 10.8* 9.9* 10.2* 9.7*   Hemoglobin A1C %  --  9.8*  --   --    Hematocrit % 34.0* 31.2* 32.0* 30.8*   Platelets K/uL 554* 504* 503* 547*   Gran % % 71.0 68.9 68.6 67.7   Lymph % % 13.9* 15.0* 15.6* 17.1*   Mono % % 11.4 12.1 11.0 10.7   Eosinophil % % 0.5 1.2 1.9 1.5   Basophil % % 0.3 0.3 0.2 0.2   Differential Method  Automated Automated Automated Automated       Metabolic Panel (last 72 hours):  Recent Labs   Lab Result Units 02/21/22  1210 02/21/22  1544 02/22/22  0810 02/23/22  1059   Sodium mmol/L 132* 130* 134* 132*    Potassium mmol/L 2.9* 3.0* 3.9 3.7   Chloride mmol/L 91* 89* 93* 95   CO2 mmol/L 28 31* 33* 30*   Glucose mg/dL 234* 239* 162* 224*   BUN mg/dL 13 12 12 15   Creatinine mg/dL 1.1 1.0 0.9 1.2   Albumin g/dL 2.1* 1.9*  --  1.7*   Total Bilirubin mg/dL 0.8 0.6  --   --    Alkaline Phosphatase U/L 106 95  --   --    AST U/L 36 30  --   --    ALT U/L 35 31  --   --    Phosphorus mg/dL  --   --   --  2.7       Vancomycin Administrations:  vancomycin given in the last 96 hours                     vancomycin 2 g in dextrose 5 % 500 mL IVPB (mg) 2,000 mg New Bag 02/23/22 1633     2,000 mg New Bag  0510     2,000 mg New Bag 02/22/22 2047     2,000 mg New Bag  0739    vancomycin 2 g in dextrose 5 % 500 mL IVPB (mg) 2,000 mg New Bag 02/21/22 1320                    Microbiologic Results:  Microbiology Results (last 7 days)       Procedure Component Value Units Date/Time    Blood culture [[id number]] Collected: 02/23/22 0550    Order Status: Completed Specimen: Blood Updated: 02/23/22 1545     Blood Culture, Routine No Growth to date    Narrative:      Collection has been rescheduled by WAD1 at 02/23/2022 05:53 Reason:   Patient unavailable labs collected  Collection has been rescheduled by WAD1 at 02/23/2022 05:53 Reason:   Patient unavailable labs collected    Blood culture x two cultures. Draw prior to antibiotics. [[id number]]  (Abnormal) Collected: 02/21/22 1231    Order Status: Completed Specimen: Blood from Peripheral, Hand, Left Updated: 02/23/22 0743     Blood Culture, Routine Gram stain aer bottle: Gram positive cocci      Gram stain jose bottle: Gram positive cocci      Results called to and read back by: KRISTINA ESCOTO RN  02/22/2022  11:03      STAPHYLOCOCCUS AUREUS  ID consult required at [facility],[place] [facility].  For susceptibility see order #[id number]      Narrative:      Aerobic and anaerobic    Blood culture x two cultures. Draw prior to antibiotics. [[id number]]  (Abnormal) Collected: 02/21/22  1218    Order Status: Completed Specimen: Blood from Peripheral, Hand, Right Updated: 02/23/22 0739     Blood Culture, Routine Gram stain aer bottle: Gram positive cocci       Positive results previously called 02/22/2022  12:12      Gram stain jose bottle: Gram positive cocci      STAPHYLOCOCCUS AUREUS  Susceptibility pending  ID consult required at [facility],[place] and [facility] locations.      Narrative:      Aerobic and anaerobic    Culture, Anaerobic [[id number]] Collected: 02/22/22 1153    Order Status: Completed Specimen: Wound from Foot, Right Updated: 02/23/22 0648     Anaerobic Culture Culture in progress    AFB Culture & Smear [[id number]]     Order Status: No result Specimen: Respiratory     AFB Culture & Smear [[id number]] Collected: 02/22/22 1955    Order Status: Sent Specimen: Respiratory from Mouth Updated: 02/23/22 0051    AFB Culture & Smear [[id number]]     Order Status: No result Specimen: Respiratory     Gram stain [[id number]] Collected: 02/22/22 1153    Order Status: Completed Specimen: Wound from Foot, Right Updated: 02/22/22 2156     Gram Stain Result Rare WBC's      Few Gram positive cocci      Rare Gram positive rods    AFB Culture & Smear [[id number]] Collected: 02/22/22 1153    Order Status: Sent Specimen: Wound from Foot, Right Updated: 02/22/22 1937    Aerobic culture [[id number]] Collected: 02/22/22 1153    Order Status: Sent Specimen: Wound from Foot, Right Updated: 02/22/22 1937    Fungus culture [[id number]] Collected: 02/22/22 1153    Order Status: Sent Specimen: Wound from Foot, Right Updated: 02/22/22 1937

## 2022-02-24 ENCOUNTER — ANESTHESIA (OUTPATIENT)
Dept: SURGERY | Facility: OTHER | Age: 57
DRG: 255 | End: 2022-02-24
Payer: MEDICAID

## 2022-02-24 ENCOUNTER — ANESTHESIA EVENT (OUTPATIENT)
Dept: SURGERY | Facility: OTHER | Age: 57
DRG: 255 | End: 2022-02-24
Payer: MEDICAID

## 2022-02-24 PROBLEM — E87.6 HYPOKALEMIA: Status: RESOLVED | Noted: 2022-02-21 | Resolved: 2022-02-24

## 2022-02-24 LAB
ALBUMIN SERPL BCP-MCNC: 1.7 G/DL (ref 3.5–5.2)
ANION GAP SERPL CALC-SCNC: 7 MMOL/L (ref 8–16)
BACTERIA BLD CULT: ABNORMAL
BASOPHILS # BLD AUTO: 0.04 K/UL (ref 0–0.2)
BASOPHILS NFR BLD: 0.3 % (ref 0–1.9)
BUN SERPL-MCNC: 13 MG/DL (ref 6–20)
CALCIUM SERPL-MCNC: 9.1 MG/DL (ref 8.7–10.5)
CHLORIDE SERPL-SCNC: 97 MMOL/L (ref 95–110)
CO2 SERPL-SCNC: 31 MMOL/L (ref 23–29)
CREAT SERPL-MCNC: 1.1 MG/DL (ref 0.5–1.4)
DIFFERENTIAL METHOD: ABNORMAL
EOSINOPHIL # BLD AUTO: 0.4 K/UL (ref 0–0.5)
EOSINOPHIL NFR BLD: 2.6 % (ref 0–8)
ERYTHROCYTE [DISTWIDTH] IN BLOOD BY AUTOMATED COUNT: 15.2 % (ref 11.5–14.5)
EST. GFR  (AFRICAN AMERICAN): >60 ML/MIN/1.73 M^2
EST. GFR  (NON AFRICAN AMERICAN): >60 ML/MIN/1.73 M^2
GLUCOSE SERPL-MCNC: 182 MG/DL (ref 70–110)
HCT VFR BLD AUTO: 31.3 % (ref 40–54)
HGB BLD-MCNC: 9.7 G/DL (ref 14–18)
IMM GRANULOCYTES # BLD AUTO: 0.34 K/UL (ref 0–0.04)
IMM GRANULOCYTES NFR BLD AUTO: 2.4 % (ref 0–0.5)
LYMPHOCYTES # BLD AUTO: 2.5 K/UL (ref 1–4.8)
LYMPHOCYTES NFR BLD: 17.7 % (ref 18–48)
MCH RBC QN AUTO: 27.5 PG (ref 27–31)
MCHC RBC AUTO-ENTMCNC: 31 G/DL (ref 32–36)
MCV RBC AUTO: 89 FL (ref 82–98)
MONOCYTES # BLD AUTO: 1.4 K/UL (ref 0.3–1)
MONOCYTES NFR BLD: 9.5 % (ref 4–15)
NEUTROPHILS # BLD AUTO: 9.6 K/UL (ref 1.8–7.7)
NEUTROPHILS NFR BLD: 67.5 % (ref 38–73)
NRBC BLD-RTO: 0 /100 WBC
PHOSPHATE SERPL-MCNC: 2.9 MG/DL (ref 2.7–4.5)
PLATELET # BLD AUTO: 563 K/UL (ref 150–450)
PMV BLD AUTO: 9.5 FL (ref 9.2–12.9)
POCT GLUCOSE: 184 MG/DL (ref 70–110)
POCT GLUCOSE: 186 MG/DL (ref 70–110)
POCT GLUCOSE: 192 MG/DL (ref 70–110)
POCT GLUCOSE: 203 MG/DL (ref 70–110)
POCT GLUCOSE: 241 MG/DL (ref 70–110)
POCT GLUCOSE: 299 MG/DL (ref 70–110)
POTASSIUM SERPL-SCNC: 3.9 MMOL/L (ref 3.5–5.1)
RBC # BLD AUTO: 3.53 M/UL (ref 4.6–6.2)
SODIUM SERPL-SCNC: 135 MMOL/L (ref 136–145)
WBC # BLD AUTO: 14.24 K/UL (ref 3.9–12.7)

## 2022-02-24 PROCEDURE — 97607 PR NEG PRESS WOUND THERAPY (NPWT) W/DISPOSABLE WOUND VAC DEVICE, <=50 CM: ICD-10-PCS | Mod: ,,, | Performed by: SPECIALIST

## 2022-02-24 PROCEDURE — 63600175 PHARM REV CODE 636 W HCPCS: Performed by: INTERNAL MEDICINE

## 2022-02-24 PROCEDURE — 80069 RENAL FUNCTION PANEL: CPT | Performed by: INTERNAL MEDICINE

## 2022-02-24 PROCEDURE — 11000001 HC ACUTE MED/SURG PRIVATE ROOM

## 2022-02-24 PROCEDURE — 99232 SBSQ HOSP IP/OBS MODERATE 35: CPT | Mod: ,,, | Performed by: INTERNAL MEDICINE

## 2022-02-24 PROCEDURE — 25000003 PHARM REV CODE 250: Performed by: ANESTHESIOLOGY

## 2022-02-24 PROCEDURE — 99233 SBSQ HOSP IP/OBS HIGH 50: CPT | Mod: ,,, | Performed by: INTERNAL MEDICINE

## 2022-02-24 PROCEDURE — 63600175 PHARM REV CODE 636 W HCPCS: Performed by: STUDENT IN AN ORGANIZED HEALTH CARE EDUCATION/TRAINING PROGRAM

## 2022-02-24 PROCEDURE — 99232 PR SUBSEQUENT HOSPITAL CARE,LEVL II: ICD-10-PCS | Mod: ,,, | Performed by: INTERNAL MEDICINE

## 2022-02-24 PROCEDURE — 86480 TB TEST CELL IMMUN MEASURE: CPT | Performed by: INTERNAL MEDICINE

## 2022-02-24 PROCEDURE — 11043 DBRDMT MUSC&/FSCA 1ST 20/<: CPT | Mod: 58,,, | Performed by: SPECIALIST

## 2022-02-24 PROCEDURE — 63600175 PHARM REV CODE 636 W HCPCS: Performed by: ANESTHESIOLOGY

## 2022-02-24 PROCEDURE — 63600175 PHARM REV CODE 636 W HCPCS: Performed by: PHYSICIAN ASSISTANT

## 2022-02-24 PROCEDURE — 71000033 HC RECOVERY, INTIAL HOUR: Performed by: SPECIALIST

## 2022-02-24 PROCEDURE — 37000008 HC ANESTHESIA 1ST 15 MINUTES: Performed by: SPECIALIST

## 2022-02-24 PROCEDURE — 25000003 PHARM REV CODE 250: Performed by: INTERNAL MEDICINE

## 2022-02-24 PROCEDURE — 99222 1ST HOSP IP/OBS MODERATE 55: CPT | Mod: ,,, | Performed by: INTERNAL MEDICINE

## 2022-02-24 PROCEDURE — 63600175 PHARM REV CODE 636 W HCPCS

## 2022-02-24 PROCEDURE — 11043 PR DEBRIDEMENT, SKIN, SUB-Q TISSUE,MUSCLE,=<20 SQ CM: ICD-10-PCS | Mod: 58,,, | Performed by: SPECIALIST

## 2022-02-24 PROCEDURE — 36415 COLL VENOUS BLD VENIPUNCTURE: CPT | Performed by: INTERNAL MEDICINE

## 2022-02-24 PROCEDURE — 99222 PR INITIAL HOSPITAL CARE,LEVL II: ICD-10-PCS | Mod: ,,, | Performed by: INTERNAL MEDICINE

## 2022-02-24 PROCEDURE — 36000706: Performed by: SPECIALIST

## 2022-02-24 PROCEDURE — 76942 ECHO GUIDE FOR BIOPSY: CPT | Performed by: ANESTHESIOLOGY

## 2022-02-24 PROCEDURE — 85025 COMPLETE CBC W/AUTO DIFF WBC: CPT | Performed by: INTERNAL MEDICINE

## 2022-02-24 PROCEDURE — 25000003 PHARM REV CODE 250: Performed by: STUDENT IN AN ORGANIZED HEALTH CARE EDUCATION/TRAINING PROGRAM

## 2022-02-24 PROCEDURE — 71000039 HC RECOVERY, EACH ADD'L HOUR: Performed by: SPECIALIST

## 2022-02-24 PROCEDURE — 99233 PR SUBSEQUENT HOSPITAL CARE,LEVL III: ICD-10-PCS | Mod: ,,, | Performed by: INTERNAL MEDICINE

## 2022-02-24 PROCEDURE — 97607 NEG PRS WND THR NDME<=50SQCM: CPT | Mod: ,,, | Performed by: SPECIALIST

## 2022-02-24 PROCEDURE — 36000707: Performed by: SPECIALIST

## 2022-02-24 PROCEDURE — 37000009 HC ANESTHESIA EA ADD 15 MINS: Performed by: SPECIALIST

## 2022-02-24 PROCEDURE — 25000003 PHARM REV CODE 250: Performed by: PHYSICIAN ASSISTANT

## 2022-02-24 RX ORDER — SODIUM CHLORIDE 0.9 % (FLUSH) 0.9 %
3 SYRINGE (ML) INJECTION
Status: DISCONTINUED | OUTPATIENT
Start: 2022-02-24 | End: 2022-03-07 | Stop reason: HOSPADM

## 2022-02-24 RX ORDER — FENTANYL CITRATE 50 UG/ML
INJECTION, SOLUTION INTRAMUSCULAR; INTRAVENOUS
Status: DISCONTINUED | OUTPATIENT
Start: 2022-02-24 | End: 2022-02-24

## 2022-02-24 RX ORDER — MIDAZOLAM HYDROCHLORIDE 1 MG/ML
INJECTION INTRAMUSCULAR; INTRAVENOUS
Status: DISCONTINUED | OUTPATIENT
Start: 2022-02-24 | End: 2022-02-24

## 2022-02-24 RX ORDER — ROPIVACAINE HYDROCHLORIDE 5 MG/ML
INJECTION, SOLUTION EPIDURAL; INFILTRATION; PERINEURAL
Status: COMPLETED | OUTPATIENT
Start: 2022-02-24 | End: 2022-02-24

## 2022-02-24 RX ORDER — HYDROMORPHONE HYDROCHLORIDE 2 MG/ML
0.4 INJECTION, SOLUTION INTRAMUSCULAR; INTRAVENOUS; SUBCUTANEOUS EVERY 5 MIN PRN
Status: DISCONTINUED | OUTPATIENT
Start: 2022-02-24 | End: 2022-02-25 | Stop reason: HOSPADM

## 2022-02-24 RX ORDER — OXYCODONE HYDROCHLORIDE 5 MG/1
5 TABLET ORAL
Status: DISCONTINUED | OUTPATIENT
Start: 2022-02-24 | End: 2022-02-25 | Stop reason: HOSPADM

## 2022-02-24 RX ORDER — MEPERIDINE HYDROCHLORIDE 25 MG/ML
12.5 INJECTION INTRAMUSCULAR; INTRAVENOUS; SUBCUTANEOUS ONCE AS NEEDED
Status: DISCONTINUED | OUTPATIENT
Start: 2022-02-24 | End: 2022-02-25 | Stop reason: HOSPADM

## 2022-02-24 RX ORDER — PROPOFOL 10 MG/ML
VIAL (ML) INTRAVENOUS CONTINUOUS PRN
Status: DISCONTINUED | OUTPATIENT
Start: 2022-02-24 | End: 2022-02-24

## 2022-02-24 RX ORDER — LIDOCAINE HYDROCHLORIDE 20 MG/ML
INJECTION INTRAVENOUS
Status: DISCONTINUED | OUTPATIENT
Start: 2022-02-24 | End: 2022-02-24

## 2022-02-24 RX ORDER — PROCHLORPERAZINE EDISYLATE 5 MG/ML
5 INJECTION INTRAMUSCULAR; INTRAVENOUS EVERY 30 MIN PRN
Status: DISCONTINUED | OUTPATIENT
Start: 2022-02-24 | End: 2022-02-25 | Stop reason: HOSPADM

## 2022-02-24 RX ADMIN — LIDOCAINE HYDROCHLORIDE 20 ML: 10; .005 INJECTION, SOLUTION EPIDURAL; INFILTRATION; INTRACAUDAL; PERINEURAL at 09:02

## 2022-02-24 RX ADMIN — MIDAZOLAM HYDROCHLORIDE 1 MG: 1 INJECTION, SOLUTION INTRAMUSCULAR; INTRAVENOUS at 08:02

## 2022-02-24 RX ADMIN — LIDOCAINE HYDROCHLORIDE 40 MG: 20 INJECTION, SOLUTION INTRAVENOUS at 09:02

## 2022-02-24 RX ADMIN — MORPHINE SULFATE 2 MG: 4 INJECTION, SOLUTION INTRAMUSCULAR; INTRAVENOUS at 09:02

## 2022-02-24 RX ADMIN — SODIUM CHLORIDE, SODIUM LACTATE, POTASSIUM CHLORIDE, AND CALCIUM CHLORIDE: .6; .31; .03; .02 INJECTION, SOLUTION INTRAVENOUS at 09:02

## 2022-02-24 RX ADMIN — INSULIN ASPART 10 UNITS: 100 INJECTION, SOLUTION INTRAVENOUS; SUBCUTANEOUS at 12:02

## 2022-02-24 RX ADMIN — INSULIN ASPART 10 UNITS: 100 INJECTION, SOLUTION INTRAVENOUS; SUBCUTANEOUS at 06:02

## 2022-02-24 RX ADMIN — OXYCODONE 5 MG: 5 TABLET ORAL at 10:02

## 2022-02-24 RX ADMIN — HYDROCODONE BITARTRATE AND ACETAMINOPHEN 1 TABLET: 5; 325 TABLET ORAL at 09:02

## 2022-02-24 RX ADMIN — VANCOMYCIN HYDROCHLORIDE 1750 MG: 500 INJECTION, POWDER, LYOPHILIZED, FOR SOLUTION INTRAVENOUS at 03:02

## 2022-02-24 RX ADMIN — CEFTRIAXONE 2 G: 2 INJECTION, SOLUTION INTRAVENOUS at 06:02

## 2022-02-24 RX ADMIN — MORPHINE SULFATE 2 MG: 4 INJECTION, SOLUTION INTRAMUSCULAR; INTRAVENOUS at 01:02

## 2022-02-24 RX ADMIN — INSULIN DETEMIR 50 UNITS: 100 INJECTION, SOLUTION SUBCUTANEOUS at 08:02

## 2022-02-24 RX ADMIN — MORPHINE SULFATE 2 MG: 4 INJECTION, SOLUTION INTRAMUSCULAR; INTRAVENOUS at 05:02

## 2022-02-24 RX ADMIN — MUPIROCIN: 20 OINTMENT TOPICAL at 11:02

## 2022-02-24 RX ADMIN — INSULIN ASPART 1 UNITS: 100 INJECTION, SOLUTION INTRAVENOUS; SUBCUTANEOUS at 09:02

## 2022-02-24 RX ADMIN — OXYCODONE 5 MG: 5 TABLET ORAL at 06:02

## 2022-02-24 RX ADMIN — QUETIAPINE FUMARATE 200 MG: 200 TABLET ORAL at 08:02

## 2022-02-24 RX ADMIN — LOSARTAN POTASSIUM 25 MG: 25 TABLET, FILM COATED ORAL at 11:02

## 2022-02-24 RX ADMIN — INSULIN ASPART 3 UNITS: 100 INJECTION, SOLUTION INTRAVENOUS; SUBCUTANEOUS at 06:02

## 2022-02-24 RX ADMIN — HEPARIN SODIUM 5000 UNITS: 5000 INJECTION INTRAVENOUS; SUBCUTANEOUS at 06:02

## 2022-02-24 RX ADMIN — GLYCOPYRROLATE 0.2 MG: 0.2 INJECTION, SOLUTION INTRAMUSCULAR; INTRAVITREAL at 09:02

## 2022-02-24 RX ADMIN — HEPARIN SODIUM 5000 UNITS: 5000 INJECTION INTRAVENOUS; SUBCUTANEOUS at 03:02

## 2022-02-24 RX ADMIN — LOSARTAN POTASSIUM 25 MG: 25 TABLET, FILM COATED ORAL at 08:02

## 2022-02-24 RX ADMIN — ASPIRIN 81 MG: 81 TABLET, COATED ORAL at 11:02

## 2022-02-24 RX ADMIN — HEPARIN SODIUM 5000 UNITS: 5000 INJECTION INTRAVENOUS; SUBCUTANEOUS at 09:02

## 2022-02-24 RX ADMIN — FENTANYL CITRATE 50 MCG: 50 INJECTION, SOLUTION INTRAMUSCULAR; INTRAVENOUS at 08:02

## 2022-02-24 RX ADMIN — ATORVASTATIN CALCIUM 40 MG: 20 TABLET, FILM COATED ORAL at 11:02

## 2022-02-24 RX ADMIN — PROPOFOL 100 MCG/KG/MIN: 10 INJECTION, EMULSION INTRAVENOUS at 09:02

## 2022-02-24 RX ADMIN — MUPIROCIN: 20 OINTMENT TOPICAL at 08:02

## 2022-02-24 RX ADMIN — ROPIVACAINE HYDROCHLORIDE 20 ML: 5 INJECTION, SOLUTION EPIDURAL; INFILTRATION; PERINEURAL at 09:02

## 2022-02-24 NOTE — ASSESSMENT & PLAN NOTE
S/p surgical debridement.  Suspect there may be some residual bone infection.  Blood cultures positive for staphylococus aureus. Surgical cultures are pending.    Plan    1. Continue IV vancomycin.    2. Continue IV ceftriaxone.    3. Follow up on culture results.

## 2022-02-24 NOTE — H&P (VIEW-ONLY)
Cardiology Consult  2/24/2022  1:17 PM    Attending Cardiologist: Chavo Vigil M.D.  Primary Care Provider: Reyna Jorge NP  Chief Complaint/Reason For Consultation:  PAT      Problem list  Patient Active Problem List   Diagnosis    Diabetic wet gangrene of the foot    Type 2 diabetes mellitus without complication, with long-term current use of insulin    HTN (hypertension)    SHIV (acute kidney injury)    Depression    Mixed hyperlipidemia    Class 1 obesity due to excess calories with serious comorbidity and body mass index (BMI) of 34.0 to 34.9 in adult    Open wound of right foot    Surgical wound dehiscence, sequela    Type II diabetes mellitus with peripheral circulatory disorder, uncontrolled    Amputation of right great toe    Skin flap infection    MRSA (methicillin resistant Staphylococcus aureus) infection    Pre-procedural cardiovascular examination    Pre-procedural examination    Nonspecific abnormal electrocardiogram (ECG) (EKG)    Hematoma of skin graft    Right foot ulcer, with fat layer exposed    Open wound of right great toe    Open wound of toe    Pneumonia due to COVID-19 virus    COVID-19    Acute respiratory failure due to COVID-19    COPD with asthma    Chronic systolic congestive heart failure    Open wound of left foot    Cavitary lesion of lung    Bacteremia due to Staphylococcus    Chronic respiratory failure with hypoxia       CC:  Infection in feet    HPI:  Dave Good is a 56 y.o.year-old male DM , HTN , PE and COPD presented to ED  for R 2nd digit wound.  Has cavitary lung lesions with GPC in blood cultures.  On antibiotics.  ID MD recommended PAT to evaluate for endocarditis.  Patient denies any prior cardiac problems.  He denies any dysphagia, odynophagia.      Medications  Current Facility-Administered Medications   Medication Dose Route Frequency Provider Last Rate Last Admin    acetaminophen tablet 650 mg  650 mg Oral Q6H PRN  Bonnie Terrazas, PA-C        albuterol-ipratropium 2.5 mg-0.5 mg/3 mL nebulizer solution 3 mL  3 mL Nebulization Q6H PRN Marcy Patrick MD        aspirin EC tablet 81 mg  81 mg Oral Daily Evi Tolentino MD   81 mg at 02/24/22 1120    atorvastatin tablet 40 mg  40 mg Oral Daily Evi Tolentino MD   40 mg at 02/24/22 1120    cefTRIAXone (ROCEPHIN) 2 g/50 mL D5W IVPB  2 g Intravenous Q24H Jean Munguia MD   Stopped at 02/23/22 1903    dextrose 10% bolus 125 mL  12.5 g Intravenous PRN Evi Tolentino MD        dextrose 10% bolus 250 mL  25 g Intravenous PRN Evi Tolentino MD        glucagon (human recombinant) injection 1 mg  1 mg Intramuscular PRN Evi Tolentino MD        glucose chewable tablet 16 g  16 g Oral PRN Evi Tolentino MD        glucose chewable tablet 24 g  24 g Oral PRN Evi Tolentino MD        heparin (porcine) injection 5,000 Units  5,000 Units Subcutaneous Q8H Evi Tolentino MD   5,000 Units at 02/24/22 0602    HYDROcodone-acetaminophen 5-325 mg per tablet 1 tablet  1 tablet Oral Q6H PRN Bonnie Terrazas, JERI   1 tablet at 02/23/22 2017    HYDROmorphone (PF) injection 0.4 mg  0.4 mg Intravenous Q5 Min PRN Anibal Brandon MD        influenza (QUADRIVALENT PF) vaccine 0.5 mL  0.5 mL Intramuscular vaccine x 1 dose Marcy Patrick MD        insulin aspart U-100 pen 0-5 Units  0-5 Units Subcutaneous QID (AC + HS) PRN Marcy Patrick MD   2 Units at 02/23/22 1636    insulin aspart U-100 pen 10 Units  10 Units Subcutaneous TIDWM Evi Tolentino MD   10 Units at 02/24/22 1239    insulin detemir U-100 pen 50 Units  50 Units Subcutaneous QHS Evi Tolentino MD   50 Units at 02/23/22 2018    losartan tablet 25 mg  25 mg Oral BID Evi Tolentino MD   25 mg at 02/24/22 1120    meperidine (PF) injection 12.5 mg  12.5 mg Intravenous Once PRN Anibal Brandon MD        morphine injection 2 mg  2 mg Intravenous Q4H PRN Bonnie Terrazas PA-C   2 mg at  02/23/22 1931    mupirocin 2 % ointment   Nasal BID Marcy Patrick MD   Given at 02/24/22 1120    oxyCODONE immediate release tablet 5 mg  5 mg Oral Q3H PRN Anibal Brandon MD   5 mg at 02/24/22 1047    prochlorperazine injection Soln 5 mg  5 mg Intravenous Q30 Min PRN Anibal Brandon MD        QUEtiapine tablet 200 mg  200 mg Oral Nightly Evi Tolentino MD   200 mg at 02/23/22 2018    sodium chloride 0.9% flush 10 mL  10 mL Intravenous PRN Evi Tolentino MD        sodium chloride 0.9% flush 3 mL  3 mL Intravenous PRN Vitaliy Mcclellan MD        sodium chloride 0.9% flush 3 mL  3 mL Intravenous PRN Anibal Brandon MD        vancomycin - pharmacy to dose   Intravenous pharmacy to manage frequency Evi Tolentino MD        vancomycin 1.75 g in 5 % dextrose 500 mL IVPB  1,750 mg Intravenous Q12H Marcy Patrick MD   Stopped at 02/24/22 0548      Prior to Admission medications    Medication Sig Start Date End Date Taking? Authorizing Provider   ammonium lactate 12 % Crea Apply twice daily to affected parts both feet as needed. 10/19/21  Yes Samuel Toussaint DPM   aspirin (ECOTRIN) 81 MG EC tablet Take 81 mg by mouth once daily. 9/16/21  Yes Historical Provider   atorvastatin (LIPITOR) 20 MG tablet Take 40 mg by mouth once daily.  8/4/21  Yes Historical Provider   ciclopirox (PENLAC) 8 % Soln Apply topically nightly. 12/14/21  Yes Samuel Toussaint DPM   collagenase (SANTYL) ointment With each dressing change 10/21/21  Yes Miracle Walker,    hydroCHLOROthiazide (HYDRODIURIL) 25 MG tablet Take 1 tablet by mouth once daily. 11/9/21  Yes Historical Provider   LANTUS SOLOSTAR U-100 INSULIN glargine 100 units/mL (3mL) SubQ pen Inject into the skin. 2/21/22  Yes Historical Provider   lisinopriL 10 MG tablet Take 10 mg by mouth once daily. 2/21/22  Yes Historical Provider   losartan (COZAAR) 25 MG tablet Take 25 mg by mouth 2 (two) times daily. 8/23/21  Yes Historical Provider   metFORMIN  (GLUCOPHAGE) 1000 MG tablet Take 1,000 mg by mouth 2 (two) times daily. 8/23/21  Yes Historical Provider   QUEtiapine (SEROQUEL) 200 MG Tab Take 200 mg by mouth nightly. 11/29/21  Yes Historical Provider   insulin aspart U-100 (NOVOLOG) 100 unit/mL (3 mL) InPn pen Inject 10 Units into the skin 3 (three) times daily. 10/4/21 10/4/22  Aminah Ricardo MD   insulin detemir U-100 (LEVEMIR FLEXTOUCH) 100 unit/mL (3 mL) SubQ InPn pen Inject 50 Units into the skin every evening. 10/4/21 10/4/22  Aminah Ricardo MD   pulse oximeter (PULSE OXIMETER) device by Apply Externally route 2 (two) times a day. Use twice daily at 8 AM and 3 PM and record the value in MyChart as directed. 1/17/22   Marcy Patrick MD         History  Past Medical History:   Diagnosis Date    COPD (chronic obstructive pulmonary disease)     COVID-19     Depression     Diabetes mellitus     Diabetes mellitus, type 2     Hypertension      Past Surgical History:   Procedure Laterality Date    FOOT AMPUTATION THROUGH METATARSAL Right 9/23/2021    Procedure: AMPUTATION, FOOT, TRANSMETATARSAL right 1st ray resection;  Surgeon: Samuel Toussaint DPM;  Location: [facility];  Service: Podiatry;  Laterality: Right;    INCISION AND DRAINAGE FOOT Bilateral 9/21/2021    Procedure: INCISION AND DRAINAGE, FOOT - Bilateral;  Surgeon: Lavonne Vigil DPM;  Location: [facility] OR;  Service: Podiatry;  Laterality: Bilateral;    WOUND DEBRIDEMENT Right 2/22/2022    Procedure: DEBRIDEMENT, WOUND - RIGHT FOOT;  Surgeon: Jesus Flores Jr., MD;  Location: [facility];  Service: Vascular;  Laterality: Right;     Social History     Socioeconomic History    Marital status: Single   Tobacco Use    Smoking status: Former Smoker    Smokeless tobacco: Never Used   Substance and Sexual Activity    Alcohol use: Yes     Comment: whiskey and beer every other day    Drug use: Not Currently    Sexual activity: Yes     Partners: Female         Allergies  Review of patient's allergies  indicates:   Allergen Reactions    Ibuprofen Swelling     Facial swelling         Review of Systems   Review of Systems   Cardiovascular: Negative.    Respiratory: Negative.          Physical Exam  Wt Readings from Last 1 Encounters:   02/23/22 131.1 kg (289 lb 0.4 oz)     BP Readings from Last 3 Encounters:   02/24/22 113/70   02/10/22 136/85   01/27/22 108/76     Pulse Readings from Last 1 Encounters:   02/24/22 75     Body mass index is 37.11 kg/m².    Physical Exam  Constitutional:       General: He is not in acute distress.  Cardiovascular:      Rate and Rhythm: Normal rate and regular rhythm.      Pulses:           Carotid pulses are 2+ on the right side and 2+ on the left side.       Radial pulses are 2+ on the right side and 2+ on the left side.      Heart sounds: S1 normal and S2 normal.   Pulmonary:      Breath sounds: Normal breath sounds and air entry.   Musculoskeletal:      Right lower leg: No edema.      Left lower leg: No edema.      Comments: Both feet are wrapped   Neurological:      Mental Status: He is alert.                   Assessment and Plan:    Diabetic wet gangrene of the foot    Bacteremia  -discussed in details risk/benefits/indications/alternatives of PAT.  Patient and his wife understood and agreed to proceed.  Consent signed.  Will proceed with PAT tomorrow.    Thank you for allowing me to participate in the care of Dave MARIPOSA Good.      Chavo Vigil MD, F.A.C.C, F.S.C.A.I.

## 2022-02-24 NOTE — ASSESSMENT & PLAN NOTE
Staphylococcus aureus bacteremia. Sensitivities pending.  Very concerning for endocarditis.  TTE negative.      Plan    1. Follow up on sensitivities.    2. Vancomycin and ceftriaxone for now.    3. Recommend PAT.

## 2022-02-24 NOTE — ANESTHESIA PROCEDURE NOTES
Peripheral Block    Patient location during procedure: holding area    Reason for block: primary anesthetic    Diagnosis: bilateral feet   Timeout: 2/24/2022 8:50 AM     Staffing  Authorizing Provider: Vitaliy Mcclellan MD  Performing Provider: Vitaliy Mcclellan MD    Preanesthetic Checklist  Completed: patient identified, IV checked, site marked, risks and benefits discussed, surgical consent, monitors and equipment checked, pre-op evaluation and timeout performed  Peripheral Block  Patient position: supine  Prep: ChloraPrep  Patient monitoring: heart rate, cardiac monitor, continuous pulse ox, continuous capnometry and frequent blood pressure checks  Block type: popliteal  Laterality: bilateral  Injection technique: single shot  Needle  Needle type: Stimuplex   Needle gauge: 21 G  Needle length: 4 in  Needle localization: anatomical landmarks and ultrasound guidance   -ultrasound image captured on disc.  Assessment  Injection assessment: negative aspiration, negative parasthesia and local visualized surrounding nerve  Paresthesia pain: none  Heart rate change: no  Slow fractionated injection: yes    Medications:    Medications: lidocaine (PF) 1%-EPINEPHrine 1:200,000 injection - Other   20 mL - 2/24/2022 9:00:00 AM  ropivacaine (NAROPIN) injection 0.5% - Perineural   20 mL - 2/24/2022 9:00:00 AM    Additional Notes  VSS.  [facility] RN monitoring vitals throughout procedure.  Patient tolerated procedure well.

## 2022-02-24 NOTE — ASSESSMENT & PLAN NOTE
In the setting of staphylococcus aureus bacteremia, this is concerning for septic emboli.  Patient reports a history of homelessness for years and incarceration for years putting him at high risk for exposure to tuberculosis.  His underlying history of diabetes increases his risk of progression from latent tuberculosis to active tuberculosis.  He denies cough but admits to 15 lb weight loss over the past 5 months and night sweats.    Recommendations    1. Consult respiratory therapy for induction of sputum samples.  Send AFB smear and culture X3.  Send once sample for tuberculosis PCR as well.    2. Will follow up quantiferon gold test.

## 2022-02-24 NOTE — OP NOTE
[facility])  Surgery Department  Operative Note    SUMMARY     Patient: Dave Good    Medical Record: [id number]    Date of Procedure: 2/24/2022     Surgeon: Surgeon(s) and Role:     * Jesus Flores Jr., MD - Primary    Assisting Surgeon: None    Pre-Operative Diagnosis: Diabetic wet gangrene of the foot [E11.52]    Post-Operative Diagnosis: Post-Op Diagnosis Codes:     * Diabetic wet gangrene of the foot [E11.52]    Procedure: Procedure(s) (LRB):  DEBRIDEMENT, WOUND / BILATERAL DEBRIDEMENT FEET (Bilateral)  REPLACEMENT, DRESSING, WOUND (Right)  Implication  Procedure in Detail:  The patient was brought to the operating room and placed in supine position.  Both lower extremities prepped draped in sterile fashion.  A black VAC dressing sponge was removed from the right foot in the wounds carefully inspected.  The wound showed improvement over previous dressing change however, there were areas of nonviable muscle, fascia, subcutaneous tissue and skin which required excision.  This was done with a 20. Blade.  The wounds thoroughly irrigated hemostasis obtained with electrocautery Bovie.  Black VAC dressing sponge then reapplied.  The left lower extremity was then addressed there were areas of nonviable skin subcutaneous tissue plantar surface which required excision.  This was also done with a 20. Blade.  The wounds irrigated after hemostasis had been obtained for sterilely dressed.  Patient tolerated procedure well left operating room in good condition.  The end the procedure all sponge lap instrument counts were correct.  Estimated blood loss-30 cc

## 2022-02-24 NOTE — PROGRESS NOTES
[facility] Medicine  Progress Note    Patient Name: Dave Good  MRN: [id number]  Patient Class: IP- Inpatient   Admission Date: 2/21/2022  Length of Stay: 3 days  Attending Physician: Marcy Patrick MD  Primary Care Provider: Reyna Jorge NP        Subjective:     Principal Problem:Diabetic wet gangrene of the foot        HPI:  55 yo m with PMH of DM , HTN , PE and COPD presented to ED  for R 2nd digit wound. Pt had amputation by  back on 9/2021 for wet gangrene . Pt is following up with wound care at [facility] twice a week. But the last time was 1.5 weeks ago, he noticed swelling ,pain and foul smelling drainage from the wound. Pt also reports worsening SOB with exertion . With some chest tightness. In ED VS were stable. Started on iv abx . Labs showed leukocytosis and hypokalemia.CTA showed cavitary lesions with possible septic emboli.       Overview/Hospital Course:  Patient admitted with foul smelling drainage from wound of R foot and SOB. Found to have cavitary lung lesions concerning for septic emboli. General surgery and ID consulted. He went to OR on 2/22 debridement and transmetatarsal amputation of 2nd R digit. Blood culture with Staph aureus.      Interval History: No events overnight. Pt seen after OR today. No complaints     Review of Systems   Constitutional:  Negative for chills and fever.   Respiratory:  Negative for shortness of breath.    Cardiovascular:  Negative for chest pain.   Gastrointestinal:  Negative for nausea and vomiting.   Objective:     Vital Signs (Most Recent):  Temp: 97.5 °F (36.4 °C) (02/24/22 1119)  Pulse: 75 (02/24/22 1119)  Resp: 18 (02/24/22 1333)  BP: 113/70 (02/24/22 1119)  SpO2: 95 % (02/24/22 1119)   Vital Signs (24h Range):  Temp:  [97.5 °F (36.4 °C)-99.1 °F (37.3 °C)] 97.5 °F (36.4 °C)  Pulse:  [65-85] 75  Resp:  [18-20] 18  SpO2:  [95 %-100 %] 95 %  BP: (108-145)/(67-88) 113/70     Weight: 131.1 kg (289 lb 0.4 oz)  Body mass  index is 37.11 kg/m².    Intake/Output Summary (Last 24 hours) at 2/24/2022 1501  Last data filed at 2/24/2022 0958  Gross per 24 hour   Intake 1635.36 ml   Output 1125 ml   Net 510.36 ml        Physical Exam  Vitals and nursing note reviewed.   Constitutional:       General: He is not in acute distress.     Appearance: Normal appearance. He is well-developed.   Cardiovascular:      Rate and Rhythm: Normal rate and regular rhythm.      Pulses: Normal pulses.   Pulmonary:      Effort: Pulmonary effort is normal.      Breath sounds: Rales present.   Abdominal:      General: Bowel sounds are normal.      Palpations: Abdomen is soft.      Tenderness: There is no abdominal tenderness.   Musculoskeletal:      Right lower leg: Edema present.      Left lower leg: Edema present.   Skin:     General: Skin is warm and dry.      Comments: Post-op dressing on bilateral feet   Neurological:      Mental Status: He is alert and oriented to person, place, and time. Mental status is at baseline.   Psychiatric:         Behavior: Behavior normal.       Significant Labs: All pertinent labs within the past 24 hours have been reviewed.  BMP:   Recent Labs   Lab 02/24/22  0821   *   *   K 3.9   CL 97   CO2 31*   BUN 13   CREATININE 1.1   CALCIUM 9.1       CBC:   Recent Labs   Lab 02/23/22  1059 02/24/22  0821   WBC 14.29* 14.24*   HGB 9.7* 9.7*   HCT 30.8* 31.3*   * 563*         Significant Imaging: I have reviewed all pertinent imaging results/findings within the past 24 hours.      Assessment/Plan:      * Diabetic wet gangrene of the foot  Staph aureus bacteremia  Septic emboli / cavitary lung lesion  Diabetic foot infection of left foot  - R 2nd digit with gangrene  - CTA without PE but with bilateral cavitary lesion with possible septic emboli vs organizing pneumonia   - Quantiferon gold and sputum AFB x 3 pending  - Echo without vegetation but given septic emboli, needs PAT to r/o endocarditis. Cardiology  consulted  - Blood culture with Staph. Repeat blood culture NGTD  - ID following. Appreciate input  - Continue ceftriaxone and vancomycin with pharmacy dosing  - General surgery and wound care following.   - S/p 2nd digit transmetatarsal amputation and debridement 2/22 and b/l debridement 2/24  - Cultures pending    Chronic respiratory failure with hypoxia  - On 2L home O2 2/2 COVID-19  - Continue oxygen protocol    Cavitary lesion of lung  - As above    COPD with asthma  - Duoneb PRN    Depression  - Continue seroquel    HTN (hypertension)  - Continue losartan 25 mg qd    Type 2 diabetes mellitus without complication, with long-term current use of insulin  - A1c 9.8  - Continue levemir 50U qHS, aspart 10U TIDWM and low dose SSI AC/HS PRN    VTE Risk Mitigation (From admission, onward)         Ordered     heparin (porcine) injection 5,000 Units  Every 8 hours         02/21/22 4752                      Marcy Patrick MD  Department of Hospital Medicine   [facility] ([place])

## 2022-02-24 NOTE — ASSESSMENT & PLAN NOTE
Staph aureus bacteremia  Septic emboli / cavitary lung lesion  Diabetic foot infection of left foot  - R 2nd digit with gangrene  - CTA without PE but with bilateral cavitary lesion with possible septic emboli vs organizing pneumonia   - Quantiferon gold and sputum AFB x 3 pending  - Echo without vegetation but given septic emboli, needs PAT to r/o endocarditis. Cardiology consulted  - Blood culture with Staph. Repeat blood culture NGTD  - ID following. Appreciate input  - Continue ceftriaxone and vancomycin with pharmacy dosing  - General surgery and wound care following.   - S/p 2nd digit transmetatarsal amputation and debridement 2/22 and b/l debridement 2/24  - Cultures pending

## 2022-02-24 NOTE — SUBJECTIVE & OBJECTIVE
Interval History:     Feels okay today.    Review of Systems   All other systems reviewed and are negative.  Objective:     Vital Signs (Most Recent):  Temp: 98 °F (36.7 °C) (02/23/22 2016)  Pulse: 81 (02/23/22 2024)  Resp: 20 (02/23/22 2024)  BP: 117/71 (02/23/22 2016)  SpO2: 97 % (02/23/22 2024) Vital Signs (24h Range):  Temp:  [97.2 °F (36.2 °C)-99.1 °F (37.3 °C)] 98 °F (36.7 °C)  Pulse:  [81-92] 81  Resp:  [14-20] 20  SpO2:  [92 %-97 %] 97 %  BP: (103-124)/(67-78) 117/71     Weight: 131.1 kg (289 lb 0.4 oz)  Body mass index is 37.11 kg/m².    Estimated Creatinine Clearance: 96.6 mL/min (based on SCr of 1.2 mg/dL).    Physical Exam  Vitals and nursing note reviewed.   Constitutional:       General: He is not in acute distress.     Appearance: He is well-developed. He is not diaphoretic.   HENT:      Head: Normocephalic and atraumatic.      Right Ear: External ear normal.      Left Ear: External ear normal.      Nose: Nose normal.      Mouth/Throat:      Pharynx: No oropharyngeal exudate.   Eyes:      General: No scleral icterus.        Right eye: No discharge.         Left eye: No discharge.      Conjunctiva/sclera: Conjunctivae normal.      Pupils: Pupils are equal, round, and reactive to light.   Neck:      Thyroid: No thyromegaly.      Vascular: No JVD.      Trachea: No tracheal deviation.   Cardiovascular:      Rate and Rhythm: Normal rate and regular rhythm.      Heart sounds: No murmur heard.    No friction rub. No gallop.   Pulmonary:      Effort: Pulmonary effort is normal. No respiratory distress.      Breath sounds: Normal breath sounds. No stridor. No wheezing or rales.   Chest:      Chest wall: No tenderness.   Abdominal:      General: Bowel sounds are normal. There is no distension.      Palpations: Abdomen is soft. There is no mass.      Tenderness: There is no abdominal tenderness. There is no guarding or rebound.   Musculoskeletal:      Cervical back: Normal range of motion and neck supple.       Comments: Feet wrapped bilaterally.   Lymphadenopathy:      Cervical: No cervical adenopathy.   Skin:     General: Skin is warm.      Coloration: Skin is not pale.      Findings: No erythema or rash.   Neurological:      Mental Status: He is alert and oriented to person, place, and time.      Cranial Nerves: No cranial nerve deficit.      Motor: No abnormal muscle tone.      Coordination: Coordination normal.      Deep Tendon Reflexes: Reflexes are normal and symmetric. Reflexes normal.   Psychiatric:         Behavior: Behavior normal.         Thought Content: Thought content normal.         Judgment: Judgment normal.       Significant Labs:   Microbiology Results (last 7 days)       Procedure Component Value Units Date/Time    Blood culture [[id number]] Collected: 02/23/22 0550    Order Status: Completed Specimen: Blood Updated: 02/23/22 1545     Blood Culture, Routine No Growth to date    Narrative:      Collection has been rescheduled by [email or web address] at 02/23/2022 05:53 Reason:   Patient unavailable labs collected  Collection has been rescheduled by [email or web address] at 02/23/2022 05:53 Reason:   Patient unavailable labs collected    Blood culture x two cultures. Draw prior to antibiotics. [[id number]]  (Abnormal) Collected: 02/21/22 1231    Order Status: Completed Specimen: Blood from Peripheral, Hand, Left Updated: 02/23/22 0743     Blood Culture, Routine Gram stain aer bottle: Gram positive cocci      Gram stain jose bottle: Gram positive cocci      Results called to and read back by: KRISTINA ESCOTO RN  02/22/2022  11:03      STAPHYLOCOCCUS AUREUS  ID consult required at [facility],[place] and [facility] locations.  For susceptibility see order #[id number]      Narrative:      Aerobic and anaerobic    Blood culture x two cultures. Draw prior to antibiotics. [[id number]]  (Abnormal) Collected: 02/21/22 1218    Order Status: Completed Specimen: Blood from Peripheral, Hand, Right Updated: 02/23/22 0739     Blood Culture, Routine Gram stain  aer bottle: Gram positive cocci       Positive results previously called 02/22/2022  12:12      Gram stain jose bottle: Gram positive cocci      STAPHYLOCOCCUS AUREUS  Susceptibility pending  ID consult required at [facility] [place] and [facility].      Narrative:      Aerobic and anaerobic    Culture, Anaerobic [[id number]] Collected: 02/22/22 1153    Order Status: Completed Specimen: Wound from Foot, Right Updated: 02/23/22 0648     Anaerobic Culture Culture in progress    AFB Culture & Smear [[id number]]     Order Status: No result Specimen: Respiratory     AFB Culture & Smear [[id number]] Collected: 02/22/22 1955    Order Status: Sent Specimen: Respiratory from Mouth Updated: 02/23/22 0051    AFB Culture & Smear [[id number]]     Order Status: No result Specimen: Respiratory     Gram stain [[id number]] Collected: 02/22/22 1153    Order Status: Completed Specimen: Wound from Foot, Right Updated: 02/22/22 2156     Gram Stain Result Rare WBC's      Few Gram positive cocci      Rare Gram positive rods    AFB Culture & Smear [[id number]] Collected: 02/22/22 1153    Order Status: Sent Specimen: Wound from Foot, Right Updated: 02/22/22 1937    Aerobic culture [[id number]] Collected: 02/22/22 1153    Order Status: Sent Specimen: Wound from Foot, Right Updated: 02/22/22 1937    Fungus culture [[id number]] Collected: 02/22/22 1153    Order Status: Sent Specimen: Wound from Foot, Right Updated: 02/22/22 1937            Significant Imaging: I have reviewed all pertinent imaging results/findings within the past 24 hours.

## 2022-02-24 NOTE — PLAN OF CARE
Patient in no apparent distress. Sat's  97 % on 2 lpm. Patient wears 2 lpm at home. PRN treatments not needed . Will continue to monitor.

## 2022-02-24 NOTE — CONSULTS
Cardiology Consult  2/24/2022  1:17 PM    Attending Cardiologist: Chavo Vigil M.D.  Primary Care Provider: Reyna Jorge NP  Chief Complaint/Reason For Consultation:  PAT      Problem list  Patient Active Problem List   Diagnosis    Diabetic wet gangrene of the foot    Type 2 diabetes mellitus without complication, with long-term current use of insulin    HTN (hypertension)    SHIV (acute kidney injury)    Depression    Mixed hyperlipidemia    Class 1 obesity due to excess calories with serious comorbidity and body mass index (BMI) of 34.0 to 34.9 in adult    Open wound of right foot    Surgical wound dehiscence, sequela    Type II diabetes mellitus with peripheral circulatory disorder, uncontrolled    Amputation of right great toe    Skin flap infection    MRSA (methicillin resistant Staphylococcus aureus) infection    Pre-procedural cardiovascular examination    Pre-procedural examination    Nonspecific abnormal electrocardiogram (ECG) (EKG)    Hematoma of skin graft    Right foot ulcer, with fat layer exposed    Open wound of right great toe    Open wound of toe    Pneumonia due to COVID-19 virus    COVID-19    Acute respiratory failure due to COVID-19    COPD with asthma    Chronic systolic congestive heart failure    Open wound of left foot    Cavitary lesion of lung    Bacteremia due to Staphylococcus    Chronic respiratory failure with hypoxia       CC:  Infection in feet    HPI:  Dave Good is a 56 y.o.year-old male DM , HTN , PE and COPD presented to ED  for R 2nd digit wound.  Has cavitary lung lesions with GPC in blood cultures.  On antibiotics.  ID MD recommended PAT to evaluate for endocarditis.  Patient denies any prior cardiac problems.  He denies any dysphagia, odynophagia.      Medications  Current Facility-Administered Medications   Medication Dose Route Frequency Provider Last Rate Last Admin    acetaminophen tablet 650 mg  650 mg Oral Q6H PRN  Bonnie Terrazas, PA-C        albuterol-ipratropium 2.5 mg-0.5 mg/3 mL nebulizer solution 3 mL  3 mL Nebulization Q6H PRN Marcy Ptarick MD        aspirin EC tablet 81 mg  81 mg Oral Daily Evi Tolentino MD   81 mg at 02/24/22 1120    atorvastatin tablet 40 mg  40 mg Oral Daily Evi Tolentino MD   40 mg at 02/24/22 1120    cefTRIAXone (ROCEPHIN) 2 g/50 mL D5W IVPB  2 g Intravenous Q24H Jean Munguia MD   Stopped at 02/23/22 1903    dextrose 10% bolus 125 mL  12.5 g Intravenous PRN Evi Tolentino MD        dextrose 10% bolus 250 mL  25 g Intravenous PRN Evi Tolentino MD        glucagon (human recombinant) injection 1 mg  1 mg Intramuscular PRN Evi Tolentino MD        glucose chewable tablet 16 g  16 g Oral PRN Evi Tolentino MD        glucose chewable tablet 24 g  24 g Oral PRN Evi Tolentino MD        heparin (porcine) injection 5,000 Units  5,000 Units Subcutaneous Q8H Evi Tolentino MD   5,000 Units at 02/24/22 0602    HYDROcodone-acetaminophen 5-325 mg per tablet 1 tablet  1 tablet Oral Q6H PRN Bonnie Terrazas, JERI   1 tablet at 02/23/22 2017    HYDROmorphone (PF) injection 0.4 mg  0.4 mg Intravenous Q5 Min PRN Anibal Brandon MD        influenza (QUADRIVALENT PF) vaccine 0.5 mL  0.5 mL Intramuscular vaccine x 1 dose Marcy Patrick MD        insulin aspart U-100 pen 0-5 Units  0-5 Units Subcutaneous QID (AC + HS) PRN Marcy Patrick MD   2 Units at 02/23/22 1636    insulin aspart U-100 pen 10 Units  10 Units Subcutaneous TIDWM Evi Tolentino MD   10 Units at 02/24/22 1239    insulin detemir U-100 pen 50 Units  50 Units Subcutaneous QHS Evi Tolentino MD   50 Units at 02/23/22 2018    losartan tablet 25 mg  25 mg Oral BID Evi Tolentino MD   25 mg at 02/24/22 1120    meperidine (PF) injection 12.5 mg  12.5 mg Intravenous Once PRN Anibal Brandon MD        morphine injection 2 mg  2 mg Intravenous Q4H PRN Bonnie Terrazas PA-C   2 mg at  02/23/22 1931    mupirocin 2 % ointment   Nasal BID Marcy Patrick MD   Given at 02/24/22 1120    oxyCODONE immediate release tablet 5 mg  5 mg Oral Q3H PRN Anibal Brandon MD   5 mg at 02/24/22 1047    prochlorperazine injection Soln 5 mg  5 mg Intravenous Q30 Min PRN Anibal Brandon MD        QUEtiapine tablet 200 mg  200 mg Oral Nightly Evi Tolentino MD   200 mg at 02/23/22 2018    sodium chloride 0.9% flush 10 mL  10 mL Intravenous PRN Evi Tolentino MD        sodium chloride 0.9% flush 3 mL  3 mL Intravenous PRN Vitaliy Mcclellan MD        sodium chloride 0.9% flush 3 mL  3 mL Intravenous PRN Anibal Brandon MD        vancomycin - pharmacy to dose   Intravenous pharmacy to manage frequency Evi Tolentino MD        vancomycin 1.75 g in 5 % dextrose 500 mL IVPB  1,750 mg Intravenous Q12H Marcy Patrick MD   Stopped at 02/24/22 0548      Prior to Admission medications    Medication Sig Start Date End Date Taking? Authorizing Provider   ammonium lactate 12 % Crea Apply twice daily to affected parts both feet as needed. 10/19/21  Yes Samuel Toussaint DPM   aspirin (ECOTRIN) 81 MG EC tablet Take 81 mg by mouth once daily. 9/16/21  Yes Historical Provider   atorvastatin (LIPITOR) 20 MG tablet Take 40 mg by mouth once daily.  8/4/21  Yes Historical Provider   ciclopirox (PENLAC) 8 % Soln Apply topically nightly. 12/14/21  Yes Samuel Toussaint DPM   collagenase (SANTYL) ointment With each dressing change 10/21/21  Yes Miracle Walker,    hydroCHLOROthiazide (HYDRODIURIL) 25 MG tablet Take 1 tablet by mouth once daily. 11/9/21  Yes Historical Provider   LANTUS SOLOSTAR U-100 INSULIN glargine 100 units/mL (3mL) SubQ pen Inject into the skin. 2/21/22  Yes Historical Provider   lisinopriL 10 MG tablet Take 10 mg by mouth once daily. 2/21/22  Yes Historical Provider   losartan (COZAAR) 25 MG tablet Take 25 mg by mouth 2 (two) times daily. 8/23/21  Yes Historical Provider   metFORMIN  (GLUCOPHAGE) 1000 MG tablet Take 1,000 mg by mouth 2 (two) times daily. 8/23/21  Yes Historical Provider   QUEtiapine (SEROQUEL) 200 MG Tab Take 200 mg by mouth nightly. 11/29/21  Yes Historical Provider   insulin aspart U-100 (NOVOLOG) 100 unit/mL (3 mL) InPn pen Inject 10 Units into the skin 3 (three) times daily. 10/4/21 10/4/22  Aminah Ricardo MD   insulin detemir U-100 (LEVEMIR FLEXTOUCH) 100 unit/mL (3 mL) SubQ InPn pen Inject 50 Units into the skin every evening. 10/4/21 10/4/22  Aminah Ricardo MD   pulse oximeter (PULSE OXIMETER) device by Apply Externally route 2 (two) times a day. Use twice daily at 8 AM and 3 PM and record the value in MyChart as directed. 1/17/22   Marcy Patrick MD         History  Past Medical History:   Diagnosis Date    COPD (chronic obstructive pulmonary disease)     COVID-19     Depression     Diabetes mellitus     Diabetes mellitus, type 2     Hypertension      Past Surgical History:   Procedure Laterality Date    FOOT AMPUTATION THROUGH METATARSAL Right 9/23/2021    Procedure: AMPUTATION, FOOT, TRANSMETATARSAL right 1st ray resection;  Surgeon: Samuel Toussaint DPM;  Location: [facility];  Service: Podiatry;  Laterality: Right;    INCISION AND DRAINAGE FOOT Bilateral 9/21/2021    Procedure: INCISION AND DRAINAGE, FOOT - Bilateral;  Surgeon: Lavonne Vigil DPM;  Location: [facility] OR;  Service: Podiatry;  Laterality: Bilateral;    WOUND DEBRIDEMENT Right 2/22/2022    Procedure: DEBRIDEMENT, WOUND - RIGHT FOOT;  Surgeon: Jesus Flores Jr., MD;  Location: [facility];  Service: Vascular;  Laterality: Right;     Social History     Socioeconomic History    Marital status: Single   Tobacco Use    Smoking status: Former Smoker    Smokeless tobacco: Never Used   Substance and Sexual Activity    Alcohol use: Yes     Comment: whiskey and beer every other day    Drug use: Not Currently    Sexual activity: Yes     Partners: Female         Allergies  Review of patient's allergies  indicates:   Allergen Reactions    Ibuprofen Swelling     Facial swelling         Review of Systems   Review of Systems   Cardiovascular: Negative.    Respiratory: Negative.          Physical Exam  Wt Readings from Last 1 Encounters:   02/23/22 131.1 kg (289 lb 0.4 oz)     BP Readings from Last 3 Encounters:   02/24/22 113/70   02/10/22 136/85   01/27/22 108/76     Pulse Readings from Last 1 Encounters:   02/24/22 75     Body mass index is 37.11 kg/m².    Physical Exam  Constitutional:       General: He is not in acute distress.  Cardiovascular:      Rate and Rhythm: Normal rate and regular rhythm.      Pulses:           Carotid pulses are 2+ on the right side and 2+ on the left side.       Radial pulses are 2+ on the right side and 2+ on the left side.      Heart sounds: S1 normal and S2 normal.   Pulmonary:      Breath sounds: Normal breath sounds and air entry.   Musculoskeletal:      Right lower leg: No edema.      Left lower leg: No edema.      Comments: Both feet are wrapped   Neurological:      Mental Status: He is alert.                   Assessment and Plan:    Diabetic wet gangrene of the foot    Bacteremia  -discussed in details risk/benefits/indications/alternatives of PAT.  Patient and his wife understood and agreed to proceed.  Consent signed.  Will proceed with PAT tomorrow.    Thank you for allowing me to participate in the care of Dave MARIPOSA Good.      Chavo Vigil MD, F.A.C.C, F.S.C.A.I.

## 2022-02-24 NOTE — PROGRESS NOTES
Patient reviewed, renal function stable, cultures reviewed, no new levels, continue current therapy; Next levels due: 2/25 @ 4707

## 2022-02-24 NOTE — ANESTHESIA POSTPROCEDURE EVALUATION
Anesthesia Post Evaluation    Patient: Dave Good    Procedure(s) Performed: Procedure(s) (LRB):  DEBRIDEMENT, WOUND / BILATERAL DEBRIDEMENT FEET (Bilateral)  REPLACEMENT, DRESSING, WOUND (Right)    Final Anesthesia Type: regional      Patient location during evaluation: PACU  Patient participation: Yes- Able to Participate  Level of consciousness: awake and alert  Post-procedure vital signs: reviewed and stable  Pain management: adequate  Airway patency: patent    PONV status at discharge: No PONV  Anesthetic complications: no      Cardiovascular status: blood pressure returned to baseline and stable  Respiratory status: room air  Hydration status: euvolemic  Follow-up not needed.          Vitals Value Taken Time   /70 02/24/22 1119   Temp 36.4 °C (97.5 °F) 02/24/22 1119   Pulse 75 02/24/22 1119   Resp 18 02/24/22 1119   SpO2 95 % 02/24/22 1119         Event Time   Out of Recovery 02/24/2022 11:03:00         Pain/Mignon Score: Pain Rating Prior to Med Admin: 5 (2/24/2022 10:47 AM)  Pain Rating Post Med Admin: 4 (2/24/2022 10:51 AM)  Mignon Score: 9 (2/24/2022 10:51 AM)

## 2022-02-24 NOTE — PROGRESS NOTES
[facility] ([place])  Infectious Disease  Progress Note    Patient Name: Dave Good  MRN: [id number]  Admission Date: 2/21/2022  Length of Stay: 2 days  Attending Physician: Marcy Patrick MD  Primary Care Provider: Reyna Jorge NP    Isolation Status: No active isolations  Assessment/Plan:      * Diabetic wet gangrene of the foot  S/p surgical debridement.  Suspect there may be some residual bone infection.  Blood cultures positive for staphylococus aureus. Surgical cultures are pending.    Plan    1. Continue IV vancomycin.    2. Continue IV ceftriaxone.    3. Follow up on culture results.    Bacteremia due to Staphylococcus  Staphylococcus aureus bacteremia. Sensitivities pending.  Very concerning for endocarditis.  TTE negative.      Plan    1. Follow up on sensitivities.    2. Vancomycin and ceftriaxone for now.    3. Recommend PAT.        Cavitary lesion of lung  In the setting of staphylococcus aureus bacteremia, this is concerning for septic emboli.  Patient reports a history of homelessness for years and incarceration for years putting him at high risk for exposure to tuberculosis.  His underlying history of diabetes increases his risk of progression from latent tuberculosis to active tuberculosis.  He denies cough but admits to 15 lb weight loss over the past 5 months and night sweats.    Recommendations    1. Consult respiratory therapy for induction of sputum samples.  Send AFB smear and culture X3.  Send once sample for tuberculosis PCR as well.    2. Will follow up quantiferon gold test.        Anticipated Disposition: TBD    Thank you for your consult. I will follow-up with patient. Please contact us if you have any additional questions.    Jean Munguia MD  Infectious Disease  [facility] ([place])    Subjective:     Principal Problem:Diabetic wet gangrene of the foot    HPI: 56 year old male with a history of DM and chronic wounds to both feet worse on the right foot.  He has a  history of right foot transmetatarsal amputation of the 1st digit of the right foot in September 2021.  He had been following with wound care for his non-healing wound.  Cultures obtained from his right foot wound in November 2021 were positive for MSSA.  He was treated with oral bactrim for about 2 weeks.  A few days prior to admission, he developed fevers, increased pain in his foot and ultimately presented to the ER.  In the ER, he was noted to have a gangrenous 2nd digit of the right foot.  CTA of chest in the ER revealed multiple cavitary lung lesions.  Blood cultures obtained are positive in 3 out of 4 bottles with GPCs.  TTE is negative.  He has now underwent surgical debridement of the right foot with amputation of the 2nd digit on February 22, 2022.  ID is consulted to assist with management.  Of note, he states that he has lost around 15 lbs since September of 2021.  He has previously been homeless for many years but secured an apartment 1 year ago and hasn't been homeless since.    Interval History:     Feels okay today.    Review of Systems   All other systems reviewed and are negative.  Objective:     Vital Signs (Most Recent):  Temp: 98 °F (36.7 °C) (02/23/22 2016)  Pulse: 81 (02/23/22 2024)  Resp: 20 (02/23/22 2024)  BP: 117/71 (02/23/22 2016)  SpO2: 97 % (02/23/22 2024) Vital Signs (24h Range):  Temp:  [97.2 °F (36.2 °C)-99.1 °F (37.3 °C)] 98 °F (36.7 °C)  Pulse:  [81-92] 81  Resp:  [14-20] 20  SpO2:  [92 %-97 %] 97 %  BP: (103-124)/(67-78) 117/71     Weight: 131.1 kg (289 lb 0.4 oz)  Body mass index is 37.11 kg/m².    Estimated Creatinine Clearance: 96.6 mL/min (based on SCr of 1.2 mg/dL).    Physical Exam  Vitals and nursing note reviewed.   Constitutional:       General: He is not in acute distress.     Appearance: He is well-developed. He is not diaphoretic.   HENT:      Head: Normocephalic and atraumatic.      Right Ear: External ear normal.      Left Ear: External ear normal.      Nose: Nose  normal.      Mouth/Throat:      Pharynx: No oropharyngeal exudate.   Eyes:      General: No scleral icterus.        Right eye: No discharge.         Left eye: No discharge.      Conjunctiva/sclera: Conjunctivae normal.      Pupils: Pupils are equal, round, and reactive to light.   Neck:      Thyroid: No thyromegaly.      Vascular: No JVD.      Trachea: No tracheal deviation.   Cardiovascular:      Rate and Rhythm: Normal rate and regular rhythm.      Heart sounds: No murmur heard.    No friction rub. No gallop.   Pulmonary:      Effort: Pulmonary effort is normal. No respiratory distress.      Breath sounds: Normal breath sounds. No stridor. No wheezing or rales.   Chest:      Chest wall: No tenderness.   Abdominal:      General: Bowel sounds are normal. There is no distension.      Palpations: Abdomen is soft. There is no mass.      Tenderness: There is no abdominal tenderness. There is no guarding or rebound.   Musculoskeletal:      Cervical back: Normal range of motion and neck supple.      Comments: Feet wrapped bilaterally.   Lymphadenopathy:      Cervical: No cervical adenopathy.   Skin:     General: Skin is warm.      Coloration: Skin is not pale.      Findings: No erythema or rash.   Neurological:      Mental Status: He is alert and oriented to person, place, and time.      Cranial Nerves: No cranial nerve deficit.      Motor: No abnormal muscle tone.      Coordination: Coordination normal.      Deep Tendon Reflexes: Reflexes are normal and symmetric. Reflexes normal.   Psychiatric:         Behavior: Behavior normal.         Thought Content: Thought content normal.         Judgment: Judgment normal.       Significant Labs:   Microbiology Results (last 7 days)       Procedure Component Value Units Date/Time    Blood culture [[id number]] Collected: 02/23/22 0566    Order Status: Completed Specimen: Blood Updated: 02/23/22 7427     Blood Culture, Routine No Growth to date    Narrative:      Collection has been  rescheduled by [email or web address] at 02/23/2022 05:53 Reason:   Patient unavailable labs collected  Collection has been rescheduled by WAD1 at 02/23/2022 05:53 Reason:   Patient unavailable labs collected    Blood culture x two cultures. Draw prior to antibiotics. [[id number]]  (Abnormal) Collected: 02/21/22 1231    Order Status: Completed Specimen: Blood from Peripheral, Hand, Left Updated: 02/23/22 0743     Blood Culture, Routine Gram stain aer bottle: Gram positive cocci      Gram stain jose bottle: Gram positive cocci      Results called to and read back by: KRISTINA ESCOTO RN  02/22/2022  11:03      STAPHYLOCOCCUS AUREUS  ID consult required at [facility] and [facility] locations.  For susceptibility see order #[id number]      Narrative:      Aerobic and anaerobic    Blood culture x two cultures. Draw prior to antibiotics. [[id number]]  (Abnormal) Collected: 02/21/22 1218    Order Status: Completed Specimen: Blood from Peripheral, Hand, Right Updated: 02/23/22 0739     Blood Culture, Routine Gram stain aer bottle: Gram positive cocci       Positive results previously called 02/22/2022  12:12      Gram stain jose bottle: Gram positive cocci      STAPHYLOCOCCUS AUREUS  Susceptibility pending  ID consult required at [facility] and [facility] locations.      Narrative:      Aerobic and anaerobic    Culture, Anaerobic [[id number]] Collected: 02/22/22 1153    Order Status: Completed Specimen: Wound from Foot, Right Updated: 02/23/22 0648     Anaerobic Culture Culture in progress    AFB Culture & Smear [[id number]]     Order Status: No result Specimen: Respiratory     AFB Culture & Smear [[id number]] Collected: 02/22/22 1955    Order Status: Sent Specimen: Respiratory from Mouth Updated: 02/23/22 0051    AFB Culture & Smear [[id number]]     Order Status: No result Specimen: Respiratory     Gram stain [[id number]] Collected: 02/22/22 1153    Order Status: Completed Specimen: Wound from Foot, Right Updated: 02/22/22 2156     Gram  Stain Result Rare WBC's      Few Gram positive cocci      Rare Gram positive rods    AFB Culture & Smear [[id number]] Collected: 02/22/22 1153    Order Status: Sent Specimen: Wound from Foot, Right Updated: 02/22/22 1937    Aerobic culture [[id number]] Collected: 02/22/22 1153    Order Status: Sent Specimen: Wound from Foot, Right Updated: 02/22/22 1937    Fungus culture [[id number]] Collected: 02/22/22 1153    Order Status: Sent Specimen: Wound from Foot, Right Updated: 02/22/22 1937            Significant Imaging: I have reviewed all pertinent imaging results/findings within the past 24 hours.

## 2022-02-24 NOTE — TRANSFER OF CARE
"Anesthesia Transfer of Care Note    Patient: Dave Good    Procedure(s) Performed: Procedure(s) (LRB):  DEBRIDEMENT, WOUND / BILATERAL DEBRIDEMENT FEET (Bilateral)  REPLACEMENT, DRESSING, WOUND (Right)    Patient location: PACU    Anesthesia Type: general    Transport from OR: Transported from OR on 6-10 L/min O2 by face mask with adequate spontaneous ventilation    Post pain: adequate analgesia    Post assessment: no apparent anesthetic complications and tolerated procedure well    Post vital signs: stable    Level of consciousness: sedated    Nausea/Vomiting: no nausea/vomiting    Complications: none    Transfer of care protocol was followed      Last vitals:   Visit Vitals  /73 (BP Location: Left arm, Patient Position: Lying)   Pulse 72   Temp 36.6 °C (97.8 °F) (Oral)   Resp 18   Ht 6' 2" (1.88 m)   Wt 131.1 kg (289 lb 0.4 oz)   SpO2 96%   BMI 37.11 kg/m²     "

## 2022-02-24 NOTE — SUBJECTIVE & OBJECTIVE
Interval History: No events overnight. Pt seen after OR today. No complaints     Review of Systems   Constitutional:  Negative for chills and fever.   Respiratory:  Negative for shortness of breath.    Cardiovascular:  Negative for chest pain.   Gastrointestinal:  Negative for nausea and vomiting.   Objective:     Vital Signs (Most Recent):  Temp: 97.5 °F (36.4 °C) (02/24/22 1119)  Pulse: 75 (02/24/22 1119)  Resp: 18 (02/24/22 1333)  BP: 113/70 (02/24/22 1119)  SpO2: 95 % (02/24/22 1119)   Vital Signs (24h Range):  Temp:  [97.5 °F (36.4 °C)-99.1 °F (37.3 °C)] 97.5 °F (36.4 °C)  Pulse:  [65-85] 75  Resp:  [18-20] 18  SpO2:  [95 %-100 %] 95 %  BP: (108-145)/(67-88) 113/70     Weight: 131.1 kg (289 lb 0.4 oz)  Body mass index is 37.11 kg/m².    Intake/Output Summary (Last 24 hours) at 2/24/2022 1501  Last data filed at 2/24/2022 0958  Gross per 24 hour   Intake 1635.36 ml   Output 1125 ml   Net 510.36 ml        Physical Exam  Vitals and nursing note reviewed.   Constitutional:       General: He is not in acute distress.     Appearance: Normal appearance. He is well-developed.   Cardiovascular:      Rate and Rhythm: Normal rate and regular rhythm.      Pulses: Normal pulses.   Pulmonary:      Effort: Pulmonary effort is normal.      Breath sounds: Rales present.   Abdominal:      General: Bowel sounds are normal.      Palpations: Abdomen is soft.      Tenderness: There is no abdominal tenderness.   Musculoskeletal:      Right lower leg: Edema present.      Left lower leg: Edema present.   Skin:     General: Skin is warm and dry.      Comments: Post-op dressing on bilateral feet   Neurological:      Mental Status: He is alert and oriented to person, place, and time. Mental status is at baseline.   Psychiatric:         Behavior: Behavior normal.       Significant Labs: All pertinent labs within the past 24 hours have been reviewed.  BMP:   Recent Labs   Lab 02/24/22  0821   *   *   K 3.9   CL 97   CO2 31*    BUN 13   CREATININE 1.1   CALCIUM 9.1       CBC:   Recent Labs   Lab 02/23/22  1059 02/24/22  0821   WBC 14.29* 14.24*   HGB 9.7* 9.7*   HCT 30.8* 31.3*   * 563*         Significant Imaging: I have reviewed all pertinent imaging results/findings within the past 24 hours.

## 2022-02-24 NOTE — PLAN OF CARE
POC reviewed w/ pt and niece. AAOx4. No significant events this shift. VSS on 2L NC. C/o pain treated w/ PRN pain medication per MAR. Wound vac therapy continued at 125 mmHg; no leak detected in seal, dressing remains intact. Turn Q2/frequent weight shift assistance provided. Instructed to call for help ambulating. No injuries, falls, or trauma occurred during shift. Purposeful rounding completed. Bed alarm set, bed low and locked, side rails up x3, call light within reach.

## 2022-02-24 NOTE — ANESTHESIA PREPROCEDURE EVALUATION
02/24/2022  Dave Good is a 56 y.o., male.    Pre-op Assessment    I have reviewed the Patient Summary Reports.    I have reviewed the Nursing Notes. I have reviewed the NPO Status.   I have reviewed the Medications.     Review of Systems  Anesthesia Hx:  Denies Family Hx of Anesthesia complications.   Denies Personal Hx of Anesthesia complications.   Social:  Non-Smoker    Hematology/Oncology:     Oncology Normal    -- Anemia:   EENT/Dental:EENT/Dental Normal   Cardiovascular:   Hypertension hyperlipidemia    Pulmonary:   COPD    Renal/:   Chronic Renal Disease (improved since admit), ARF    Hepatic/GI:  Hepatic/GI Normal    Musculoskeletal:  Musculoskeletal Normal    Neurological:  Neurology Normal    Endocrine:   Diabetes, poorly controlled, type 2, using insulin    Dermatological:  Skin Normal    Psych:   Psychiatric History          Physical Exam  General:  Morbid Obesity        Dental:  Dental Findings:Multiple missing, none loose          Mental Status:  Mental Status Findings:  Cooperative, Alert and Oriented         Anesthesia Plan  Type of Anesthesia, risks & benefits discussed:  Anesthesia Type:  MAC    Patient's Preference:   Plan Factors:          Intra-op Monitoring Plan: standard ASA monitors  Intra-op Monitoring Plan Comments:   Post Op Pain Control Plan: per primary service following discharge from PACU, multimodal analgesia and peripheral nerve block  Post Op Pain Control Plan Comments:     Induction:   IV  Beta Blocker:         Informed Consent: Informed consent signed with the Patient and all parties understand the risks and agree with anesthesia plan.  All questions answered.  Anesthesia consent signed with patient.  ASA Score: 3     Day of Surgery Review of History & Physical:              Ready For Surgery From Anesthesia Perspective.           Physical Exam  General: Morbid  Obesity          Anesthesia Plan  Type of Anesthesia, risks & benefits discussed:    Anesthesia Type: MAC  Intra-op Monitoring Plan: standard ASA monitors  Post Op Pain Control Plan: per primary service following discharge from PACU, multimodal analgesia and peripheral nerve block  Induction:  IV  Informed Consent: Informed consent signed with the Patient and all parties understand the risks and agree with anesthesia plan.  All questions answered.   ASA Score: 3    Ready For Surgery From Anesthesia Perspective.       .

## 2022-02-24 NOTE — PLAN OF CARE
POC reviewed w/ pt and hourly rounding complete. Meds administered per MAR. PRN pain meds administered for foot pain w/ relief obtained. VSS on RA. Pt  AAOx4 and shifts weight independently. Wound vac intact. Blood glucose monitored. Safety precautions intact; no injuries or falls this shift. Pt denies needs at this time; will continue to monitor.

## 2022-02-25 PROBLEM — B95.62 MRSA BACTEREMIA: Status: ACTIVE | Noted: 2022-02-22

## 2022-02-25 LAB
BACTERIA SPEC AEROBE CULT: ABNORMAL
GAMMA INTERFERON BACKGROUND BLD IA-ACNC: 0.02 IU/ML
M TB IFN-G CD4+ BCKGRND COR BLD-ACNC: 0.01 IU/ML
M TB IFN-G CD4+CD8+ BCKGRND COR BLD-ACNC: 0 IU/ML
MITOGEN IGNF BCKGRD COR BLD-ACNC: 2.32 IU/ML
POCT GLUCOSE: 157 MG/DL (ref 70–110)
POCT GLUCOSE: 161 MG/DL (ref 70–110)
POCT GLUCOSE: 167 MG/DL (ref 70–110)
TB GOLD PLUS: NEGATIVE
VANCOMYCIN SERPL-MCNC: 21.2 UG/ML
VANCOMYCIN TROUGH SERPL-MCNC: 26.9 UG/ML (ref 10–22)

## 2022-02-25 PROCEDURE — 27000207 HC ISOLATION

## 2022-02-25 PROCEDURE — 36415 COLL VENOUS BLD VENIPUNCTURE: CPT | Performed by: INTERNAL MEDICINE

## 2022-02-25 PROCEDURE — 99499 NO LOS: ICD-10-PCS | Mod: ,,, | Performed by: INTERNAL MEDICINE

## 2022-02-25 PROCEDURE — C9399 UNCLASSIFIED DRUGS OR BIOLOG: HCPCS | Performed by: INTERNAL MEDICINE

## 2022-02-25 PROCEDURE — 27000221 HC OXYGEN, UP TO 24 HOURS

## 2022-02-25 PROCEDURE — 87040 BLOOD CULTURE FOR BACTERIA: CPT | Performed by: INTERNAL MEDICINE

## 2022-02-25 PROCEDURE — 11000001 HC ACUTE MED/SURG PRIVATE ROOM

## 2022-02-25 PROCEDURE — 99233 PR SUBSEQUENT HOSPITAL CARE,LEVL III: ICD-10-PCS | Mod: ,,, | Performed by: INTERNAL MEDICINE

## 2022-02-25 PROCEDURE — 97162 PT EVAL MOD COMPLEX 30 MIN: CPT

## 2022-02-25 PROCEDURE — 80202 ASSAY OF VANCOMYCIN: CPT | Performed by: INTERNAL MEDICINE

## 2022-02-25 PROCEDURE — 99233 SBSQ HOSP IP/OBS HIGH 50: CPT | Mod: ,,, | Performed by: INTERNAL MEDICINE

## 2022-02-25 PROCEDURE — 99499 UNLISTED E&M SERVICE: CPT | Mod: ,,, | Performed by: INTERNAL MEDICINE

## 2022-02-25 PROCEDURE — 94761 N-INVAS EAR/PLS OXIMETRY MLT: CPT

## 2022-02-25 PROCEDURE — 80202 ASSAY OF VANCOMYCIN: CPT | Mod: 91 | Performed by: INTERNAL MEDICINE

## 2022-02-25 PROCEDURE — 25000003 PHARM REV CODE 250: Performed by: INTERNAL MEDICINE

## 2022-02-25 PROCEDURE — 25000003 PHARM REV CODE 250: Performed by: PHYSICIAN ASSISTANT

## 2022-02-25 PROCEDURE — 63600175 PHARM REV CODE 636 W HCPCS: Performed by: INTERNAL MEDICINE

## 2022-02-25 PROCEDURE — 63600175 PHARM REV CODE 636 W HCPCS: Performed by: PHYSICIAN ASSISTANT

## 2022-02-25 RX ORDER — FENTANYL CITRATE 50 UG/ML
INJECTION, SOLUTION INTRAMUSCULAR; INTRAVENOUS
Status: DISCONTINUED | OUTPATIENT
Start: 2022-02-25 | End: 2022-02-25 | Stop reason: HOSPADM

## 2022-02-25 RX ORDER — MIDAZOLAM HYDROCHLORIDE 1 MG/ML
INJECTION, SOLUTION INTRAMUSCULAR; INTRAVENOUS
Status: DISCONTINUED | OUTPATIENT
Start: 2022-02-25 | End: 2022-02-25 | Stop reason: HOSPADM

## 2022-02-25 RX ORDER — OXYCODONE AND ACETAMINOPHEN 7.5; 325 MG/1; MG/1
1 TABLET ORAL EVERY 6 HOURS PRN
Status: DISCONTINUED | OUTPATIENT
Start: 2022-02-25 | End: 2022-03-07 | Stop reason: HOSPADM

## 2022-02-25 RX ADMIN — MORPHINE SULFATE 2 MG: 4 INJECTION, SOLUTION INTRAMUSCULAR; INTRAVENOUS at 03:02

## 2022-02-25 RX ADMIN — MUPIROCIN: 20 OINTMENT TOPICAL at 09:02

## 2022-02-25 RX ADMIN — HEPARIN SODIUM 5000 UNITS: 5000 INJECTION INTRAVENOUS; SUBCUTANEOUS at 10:02

## 2022-02-25 RX ADMIN — INSULIN ASPART 10 UNITS: 100 INJECTION, SOLUTION INTRAVENOUS; SUBCUTANEOUS at 09:02

## 2022-02-25 RX ADMIN — HYDROCODONE BITARTRATE AND ACETAMINOPHEN 1 TABLET: 5; 325 TABLET ORAL at 01:02

## 2022-02-25 RX ADMIN — VANCOMYCIN HYDROCHLORIDE 1750 MG: 500 INJECTION, POWDER, LYOPHILIZED, FOR SOLUTION INTRAVENOUS at 03:02

## 2022-02-25 RX ADMIN — MORPHINE SULFATE 2 MG: 4 INJECTION, SOLUTION INTRAMUSCULAR; INTRAVENOUS at 04:02

## 2022-02-25 RX ADMIN — HEPARIN SODIUM 5000 UNITS: 5000 INJECTION INTRAVENOUS; SUBCUTANEOUS at 05:02

## 2022-02-25 RX ADMIN — MUPIROCIN: 20 OINTMENT TOPICAL at 10:02

## 2022-02-25 RX ADMIN — LOSARTAN POTASSIUM 25 MG: 25 TABLET, FILM COATED ORAL at 09:02

## 2022-02-25 RX ADMIN — INSULIN ASPART 10 UNITS: 100 INJECTION, SOLUTION INTRAVENOUS; SUBCUTANEOUS at 05:02

## 2022-02-25 RX ADMIN — INSULIN ASPART 10 UNITS: 100 INJECTION, SOLUTION INTRAVENOUS; SUBCUTANEOUS at 01:02

## 2022-02-25 RX ADMIN — CEFTRIAXONE 2 G: 2 INJECTION, SOLUTION INTRAVENOUS at 05:02

## 2022-02-25 RX ADMIN — ASPIRIN 81 MG: 81 TABLET, COATED ORAL at 09:02

## 2022-02-25 RX ADMIN — QUETIAPINE FUMARATE 200 MG: 200 TABLET ORAL at 10:02

## 2022-02-25 RX ADMIN — LOSARTAN POTASSIUM 25 MG: 25 TABLET, FILM COATED ORAL at 10:02

## 2022-02-25 RX ADMIN — OXYCODONE AND ACETAMINOPHEN 1 TABLET: 7.5; 325 TABLET ORAL at 05:02

## 2022-02-25 RX ADMIN — MORPHINE SULFATE 2 MG: 4 INJECTION, SOLUTION INTRAMUSCULAR; INTRAVENOUS at 10:02

## 2022-02-25 RX ADMIN — INSULIN DETEMIR 55 UNITS: 100 INJECTION, SOLUTION SUBCUTANEOUS at 10:02

## 2022-02-25 RX ADMIN — HEPARIN SODIUM 5000 UNITS: 5000 INJECTION INTRAVENOUS; SUBCUTANEOUS at 01:02

## 2022-02-25 RX ADMIN — ATORVASTATIN CALCIUM 40 MG: 20 TABLET, FILM COATED ORAL at 09:02

## 2022-02-25 RX ADMIN — OXYCODONE AND ACETAMINOPHEN 1 TABLET: 7.5; 325 TABLET ORAL at 10:02

## 2022-02-25 NOTE — PROGRESS NOTES
Postop day 1.  Status post debridement right/ left lower extremities  VAC dressing in place right lower extremity  VAC dressing change next week

## 2022-02-25 NOTE — PROGRESS NOTES
Pharmacokinetic Assessment Follow Up: IV Vancomycin    Vancomycin serum concentration assessment(s):    The trough level was drawn correctly and can be used to guide therapy at this time. The measurement is above the desired definitive target range of 10 to 20 mcg/mL.    Vancomycin Regimen Plan:    Discontinue the scheduled vancomycin regimen and re-dose when the random level is less than 20 mcg/mL, next level to be drawn at 2100 on 2/25/22.    Drug levels (last 3 results):  Recent Labs   Lab Result Units 02/23/22  1505 02/25/22  0243   Vancomycin-Trough ug/mL 21.9 26.9*       Pharmacy will continue to follow and monitor vancomycin.    Please contact pharmacy at extension [phone] for questions regarding this assessment.    Thank you for the consult,   Tanya Boggs       Patient brief summary:  Dave Good is a 56 y.o. male initiated on antimicrobial therapy with IV Vancomycin for treatment of sepsis    The patient's current regimen is pulse dosing    Drug Allergies:   Review of patient's allergies indicates:   Allergen Reactions    Ibuprofen Swelling     Facial swelling       Actual Body Weight:   131.1 kg    Renal Function:   Estimated Creatinine Clearance: 105.4 mL/min (based on SCr of 1.1 mg/dL).,     Dialysis Method (if applicable):  N/A    CBC (last 72 hours):  Recent Labs   Lab Result Units 02/22/22  0810 02/23/22  1059 02/24/22  0821   WBC K/uL 14.57* 14.29* 14.24*   Hemoglobin g/dL 10.2* 9.7* 9.7*   Hematocrit % 32.0* 30.8* 31.3*   Platelets K/uL 503* 547* 563*   Gran % % 68.6 67.7 67.5   Lymph % % 15.6* 17.1* 17.7*   Mono % % 11.0 10.7 9.5   Eosinophil % % 1.9 1.5 2.6   Basophil % % 0.2 0.2 0.3   Differential Method  Automated Automated Automated       Metabolic Panel (last 72 hours):  Recent Labs   Lab Result Units 02/22/22  0810 02/23/22  1059 02/24/22  0821   Sodium mmol/L 134* 132* 135*   Potassium mmol/L 3.9 3.7 3.9   Chloride mmol/L 93* 95 97   CO2 mmol/L 33* 30* 31*   Glucose mg/dL 162* 224* 182*    BUN mg/dL 12 15 13   Creatinine mg/dL 0.9 1.2 1.1   Albumin g/dL  --  1.7* 1.7*   Phosphorus mg/dL  --  2.7 2.9       Vancomycin Administrations:  vancomycin given in the last 96 hours                   vancomycin 1.75 g in 5 % dextrose 500 mL IVPB (mg) 1,750 mg New Bag 02/25/22 0308      Restarted 02/24/22 1703     1,750 mg New Bag  1505     1,750 mg New Bag  0348    vancomycin 2 g in dextrose 5 % 500 mL IVPB (mg) 2,000 mg New Bag 02/23/22 1633     2,000 mg New Bag  0510     2,000 mg New Bag 02/22/22 2047     2,000 mg New Bag  0739    vancomycin 2 g in dextrose 5 % 500 mL IVPB (mg) 2,000 mg New Bag 02/21/22 1320                Microbiologic Results:  Microbiology Results (last 7 days)     Procedure Component Value Units Date/Time    Blood culture [[id number]]     Order Status: Sent Specimen: Blood     Aerobic culture [[id number]]  (Abnormal) Collected: 02/22/22 1153    Order Status: Completed Specimen: Wound from Foot, Right Updated: 02/24/22 1021     Aerobic Bacterial Culture STAPHYLOCOCCUS AUREUS  Many  Susceptibility pending      Blood culture [[id number]] Collected: 02/23/22 0550    Order Status: Completed Specimen: Blood Updated: 02/24/22 1012     Blood Culture, Routine No Growth to date      No Growth to date    Narrative:      Collection has been rescheduled by [email or web address] at 02/23/2022 05:53 Reason:   Patient unavailable labs collected  Collection has been rescheduled by [email or web address] at 02/23/2022 05:53 Reason:   Patient unavailable labs collected    Blood culture x two cultures. Draw prior to antibiotics. [[id number]]  (Abnormal) Collected: 02/21/22 1231    Order Status: Completed Specimen: Blood from Peripheral, Hand, Left Updated: 02/24/22 0948     Blood Culture, Routine Gram stain aer bottle: Gram positive cocci      Gram stain jose bottle: Gram positive cocci      Results called to and read back by: KRISTINA ESCOTO RN  02/22/2022  11:03      METHICILLIN RESISTANT STAPHYLOCOCCUS AUREUS  [place] [facility] [place]  [facility].  For susceptibility see order #[id number]  ID consult required at [facility].      Narrative:      Aerobic and anaerobic    Blood culture x two cultures. Draw prior to antibiotics. [[id number]]  (Abnormal)  (Susceptibility) Collected: 02/21/22 1218    Order Status: Completed Specimen: Blood from Peripheral, Hand, Right Updated: 02/24/22 0945     Blood Culture, Routine Gram stain aer bottle: Gram positive cocci       Positive results previously called 02/22/2022  12:12      Gram stain jose bottle: Gram positive cocci      METHICILLIN RESISTANT STAPHYLOCOCCUS AUREUS  ID consult required at [facility].      Narrative:      Aerobic and anaerobic    AFB Culture & Smear [[id number]] Collected: 02/22/22 1955    Order Status: Completed Specimen: Respiratory from Mouth Updated: 02/24/22 0927     AFB Culture & Smear Culture in progress     AFB CULTURE STAIN No acid fast bacilli seen.    AFB Culture & Smear [[id number]] Collected: 02/22/22 1153    Order Status: Completed Specimen: Wound from Foot, Right Updated: 02/24/22 0927     AFB Culture & Smear Culture in progress     AFB CULTURE STAIN No acid fast bacilli seen.    Culture, Anaerobic [[id number]] Collected: 02/22/22 1153    Order Status: Completed Specimen: Wound from Foot, Right Updated: 02/24/22 0925     Anaerobic Culture Culture in progress    AFB Culture & Smear [[id number]]     Order Status: No result Specimen: Respiratory     AFB Culture & Smear [[id number]]     Order Status: No result Specimen: Respiratory     Gram stain [[id number]] Collected: 02/22/22 1153    Order Status: Completed Specimen: Wound from Foot, Right Updated: 02/22/22 2156     Gram Stain Result Rare WBC's      Few Gram positive cocci      Rare Gram positive rods    Fungus culture [[id number]] Collected: 02/22/22 1153    Order Status: Sent Specimen: Wound from Foot, Right Updated: 02/22/22 1937

## 2022-02-25 NOTE — PROCEDURES
PAT    Sedation:  Hurricane oropharynx spray, 3mg Versed, 50 mcg Fentanyl  Indication:  Bacteremia    Consent on chart.  Unable to pass PAT probe (lack of effort on patient to try and swallow) after 3 attempts and patient refused procedure.

## 2022-02-25 NOTE — ASSESSMENT & PLAN NOTE
MRSA bacteremia.  Likely source would be his right foot.  TTE negative.     Plan    1. Continue vancomycin IV    2. Discontinue ceftriaxone.    3. Place patient in contact precautions for MRSA.    4. Repeat blood cultures.

## 2022-02-25 NOTE — PLAN OF CARE
POC reviewed w/ pt and purposeful rounding complete. Meds administered per MAR. PRN pain meds administered for c/o bilateral foot pain w/ moderate relief obtained. Blood glucose monitored and PRN insulin administered per order. VSS on 2L NC. Pt AAOx4 and turns independently. Significant other at bedside. Safety precautions intact; no injuries or falls this shift. Pt denies needs at this time; will continue to monitor.

## 2022-02-25 NOTE — PROGRESS NOTES
[facility]  Progress Note    Patient Name: Dave Good  MRN: [id number]  Patient Class: IP- Inpatient   Admission Date: 2/21/2022  Length of Stay: 4 days  Attending Physician: Marcy Patrick MD  Primary Care Provider: Reyna Jorge NP        Subjective:     Principal Problem:Diabetic wet gangrene of the foot        HPI:  55 yo m with PMH of DM , HTN , PE and COPD presented to ED  for R 2nd digit wound. Pt had amputation by  back on 9/2021 for wet gangrene . Pt is following up with wound care at [facility] twice a week. But the last time was 1.5 weeks ago, he noticed swelling ,pain and foul smelling drainage from the wound. Pt also reports worsening SOB with exertion . With some chest tightness. In ED VS were stable. Started on iv abx . Labs showed leukocytosis and hypokalemia.CTA showed cavitary lesions with possible septic emboli.       Overview/Hospital Course:  Patient admitted with foul smelling drainage from wound of R foot and SOB. Found to have cavitary lung lesions concerning for septic emboli. General surgery and ID consulted. He went to OR on 2/22 debridement and transmetatarsal amputation of 2nd R digit. Blood culture with MRSA. PAT ordered but patient unable to tolerate procedure (unable to pass probe, wouldn't swallow).      Interval History: No events overnight. Went for PAT this morning but unable to swallow probe and eventually refused.    Review of Systems   Constitutional:  Negative for chills and fever.   Respiratory:  Negative for shortness of breath.    Cardiovascular:  Negative for chest pain.   Gastrointestinal:  Negative for nausea and vomiting.   Objective:     Vital Signs (Most Recent):  Temp: 98 °F (36.7 °C) (02/25/22 1222)  Pulse: 80 (02/25/22 1222)  Resp: 18 (02/25/22 1311)  BP: 111/67 (02/25/22 1222)  SpO2: 97 % (02/25/22 1302)   Vital Signs (24h Range):  Temp:  [97.8 °F (36.6 °C)-98.8 °F (37.1 °C)] 98 °F (36.7 °C)  Pulse:  [69-85]  80  Resp:  [16-20] 18  SpO2:  [92 %-100 %] 97 %  BP: ()/(53-76) 111/67     Weight: 131.1 kg (289 lb 0.4 oz)  Body mass index is 37.11 kg/m².    Intake/Output Summary (Last 24 hours) at 2/25/2022 1334  Last data filed at 2/25/2022 0400  Gross per 24 hour   Intake 2720.36 ml   Output 2100 ml   Net 620.36 ml        Physical Exam  Vitals and nursing note reviewed.   Constitutional:       General: He is not in acute distress.     Appearance: Normal appearance. He is well-developed.   Cardiovascular:      Rate and Rhythm: Normal rate and regular rhythm.      Pulses: Normal pulses.   Pulmonary:      Effort: Pulmonary effort is normal.      Breath sounds: No rales.      Comments: shallow  Abdominal:      General: Bowel sounds are normal.      Palpations: Abdomen is soft.      Tenderness: There is no abdominal tenderness.   Musculoskeletal:      Right lower leg: Edema present.      Left lower leg: Edema present.   Skin:     General: Skin is warm and dry.      Comments: Post-op dressing on bilateral feet   Neurological:      Mental Status: He is alert and oriented to person, place, and time. Mental status is at baseline.   Psychiatric:         Behavior: Behavior normal.       Significant Labs: All pertinent labs within the past 24 hours have been reviewed.  BMP:   Recent Labs   Lab 02/24/22  0821   *   *   K 3.9   CL 97   CO2 31*   BUN 13   CREATININE 1.1   CALCIUM 9.1       CBC:   Recent Labs   Lab 02/24/22  0821   WBC 14.24*   HGB 9.7*   HCT 31.3*   *         Significant Imaging: I have reviewed all pertinent imaging results/findings within the past 24 hours.      Assessment/Plan:      * Diabetic wet gangrene of the foot  MRSA bacteremia  Septic emboli / cavitary lung lesion  Diabetic foot infection of left foot  - R 2nd digit with wet gangrene and necrotic ulcer on L foot  - CTA without PE but with bilateral cavitary lesion with possible septic emboli vs organizing pneumonia   - Quantiferon gold  pending. Sputum AFB smear negative x 3. AFB culture pending   - Echo without vegetation but given septic emboli  - PAT canceled, pt unable to swallow probe on 3 attempts then refused  - Blood culture with MRSA. Repeat blood culture NGTD  - ID, general surgery and wound care following. Appreciate input  - Continue vancomycin with pharmacy dosing  - S/p 2nd digit transmetatarsal amputation and debridement 2/22 and b/l debridement 2/24  - PT/OT eval once weight bearing status established  - Add percocet 7.5mg due to uncontrolled pain. Continue morphine 2 mg IV prn severe pain    Chronic respiratory failure with hypoxia  - On 2L home O2 2/2 COVID-19  - Continue oxygen protocol    Cavitary lesion of lung  - As above    COPD with asthma  - Duoneb PRN    Depression  - Continue seroquel    HTN (hypertension)  - Continue losartan 25 mg qd    Type 2 diabetes mellitus without complication, with long-term current use of insulin  - A1c 9.8  - Increase levemir to 55U qHS, continue aspart 10U TIDWM and low dose SSI AC/HS PRN    VTE Risk Mitigation (From admission, onward)         Ordered     heparin (porcine) injection 5,000 Units  Every 8 hours         02/21/22 9889                    Marcy Patrick MD  Department of Hospital Medicine   [facility] Med S[facility] ([place])

## 2022-02-25 NOTE — INTERVAL H&P NOTE
The patient has been examined and the H&P has been reviewed:    I concur with the findings and no changes have occurred since H&P was written.    Anesthesia/Surgery risks, benefits and alternative options discussed and understood by patient/family.          Active Hospital Problems    Diagnosis  POA    *Diabetic wet gangrene of the foot [E11.52]  Yes    MRSA bacteremia [R78.81, B95.62]  Yes    Chronic respiratory failure with hypoxia [J96.11]  Yes    Cavitary lesion of lung [J98.4]  Yes    COPD with asthma [J44.9]  Yes    Type 2 diabetes mellitus without complication, with long-term current use of insulin [E11.9, Z79.4]  Not Applicable    HTN (hypertension) [I10]  Yes    Depression [F32.A]  Yes      Resolved Hospital Problems    Diagnosis Date Resolved POA    Hypokalemia [E87.6] 02/24/2022 Yes

## 2022-02-25 NOTE — PROGRESS NOTES
Patient PAT cancelled due to patient inability to tolerate procedure with conscious sedation per MD Vigil. Patient VSS. Patient report called to . Patient transported with transport staff via stretcher with O2 2liters via NC.

## 2022-02-25 NOTE — PROGRESS NOTES
[facility] ([place])  Infectious Disease  Progress Note    Patient Name: Dave Good  MRN: [id number]  Admission Date: 2/21/2022  Length of Stay: 3 days  Attending Physician: Marcy Patrick MD  Primary Care Provider: Reyna Jorge NP    Isolation Status: No active isolations  Assessment/Plan:      * Diabetic wet gangrene of the foot  S/p surgical debridement right foot debridement on February 22 and bilateral foot debridements on February 24.  Suspect there may be some residual bone infection.  Blood cultures positive for MRSA. Surgical cultures positive for staph aureus.    Plan    1. Continue IV vancomycin.    2. Can discontinue ceftriaxone.    Bacteremia due to Staphylococcus  MRSA bacteremia.  Likely source would be his right foot.  TTE negative.     Plan    1. Continue vancomycin IV    2. Discontinue ceftriaxone.    3. Place patient in contact precautions for MRSA.    4. Repeat blood cultures.        Cavitary lesion of lung  In the setting of MRSA bacteremia, this is concerning for septic emboli.  Patient has some risk factors for TB exposure (homelessness, incarceration).  AFB smear negative X3.  AFB cultures pending.    Recommendations    1. Continue IV vancomycin.    2. Follow up quantiferon gold test.    3. Follow up AFB cultures.        Anticipated Disposition: TBD    Thank you for your consult. I will follow-up with patient. Please contact us if you have any additional questions.    Jean Munguia MD  Infectious Disease  [facility] ([place])    Subjective:     Principal Problem:Diabetic wet gangrene of the foot    HPI: 56 year old male with a history of DM and chronic wounds to both feet worse on the right foot.  He has a history of right foot transmetatarsal amputation of the 1st digit of the right foot in September 2021.  He had been following with wound care for his non-healing wound.  Cultures obtained from his right foot wound in November 2021 were positive for MSSA.  He was treated  with oral bactrim for about 2 weeks.  A few days prior to admission, he developed fevers, increased pain in his foot and ultimately presented to the ER.  In the ER, he was noted to have a gangrenous 2nd digit of the right foot.  CTA of chest in the ER revealed multiple cavitary lung lesions.  Blood cultures obtained are positive in 3 out of 4 bottles with GPCs.  TTE is negative.  He has now underwent surgical debridement of the right foot with amputation of the 2nd digit on February 22, 2022.  ID is consulted to assist with management.  Of note, he states that he has lost around 15 lbs since September of 2021.  He has previously been homeless for many years but secured an apartment 1 year ago and hasn't been homeless since.    Interval History:     No adverse events.    Review of Systems   All other systems reviewed and are negative.  Objective:     Vital Signs (Most Recent):  Temp: 98.8 °F (37.1 °C) (02/24/22 2004)  Pulse: 79 (02/24/22 2004)  Resp: 20 (02/24/22 2158)  BP: 108/69 (02/24/22 2004)  SpO2: 97 % (02/24/22 2004) Vital Signs (24h Range):  Temp:  [97.5 °F (36.4 °C)-98.8 °F (37.1 °C)] 98.8 °F (37.1 °C)  Pulse:  [65-79] 79  Resp:  [18-20] 20  SpO2:  [92 %-100 %] 97 %  BP: (108-146)/(59-88) 108/69     Weight: 131.1 kg (289 lb 0.4 oz)  Body mass index is 37.11 kg/m².    Estimated Creatinine Clearance: 105.4 mL/min (based on SCr of 1.1 mg/dL).    Physical Exam  Vitals and nursing note reviewed.   Constitutional:       General: He is not in acute distress.     Appearance: He is well-developed. He is not diaphoretic.   HENT:      Head: Normocephalic and atraumatic.      Right Ear: External ear normal.      Left Ear: External ear normal.      Nose: Nose normal.      Mouth/Throat:      Pharynx: No oropharyngeal exudate.   Eyes:      General: No scleral icterus.        Right eye: No discharge.         Left eye: No discharge.      Conjunctiva/sclera: Conjunctivae normal.      Pupils: Pupils are equal, round, and  reactive to light.   Neck:      Thyroid: No thyromegaly.      Vascular: No JVD.      Trachea: No tracheal deviation.   Cardiovascular:      Rate and Rhythm: Normal rate and regular rhythm.      Heart sounds: No murmur heard.    No friction rub. No gallop.   Pulmonary:      Effort: Pulmonary effort is normal. No respiratory distress.      Breath sounds: Normal breath sounds. No stridor. No wheezing or rales.   Chest:      Chest wall: No tenderness.   Abdominal:      General: Bowel sounds are normal. There is no distension.      Palpations: Abdomen is soft. There is no mass.      Tenderness: There is no abdominal tenderness. There is no guarding or rebound.   Musculoskeletal:      Cervical back: Normal range of motion and neck supple.      Comments: Feet wrapped bilaterally.   Lymphadenopathy:      Cervical: No cervical adenopathy.   Skin:     General: Skin is warm.      Coloration: Skin is not pale.      Findings: No erythema or rash.   Neurological:      Mental Status: He is alert and oriented to person, place, and time.      Cranial Nerves: No cranial nerve deficit.      Motor: No abnormal muscle tone.      Coordination: Coordination normal.      Deep Tendon Reflexes: Reflexes are normal and symmetric. Reflexes normal.   Psychiatric:         Behavior: Behavior normal.         Thought Content: Thought content normal.         Judgment: Judgment normal.       Significant Labs:   Microbiology Results (last 7 days)       Procedure Component Value Units Date/Time    Aerobic culture [[id number]]  (Abnormal) Collected: 02/22/22 1153    Order Status: Completed Specimen: Wound from Foot, Right Updated: 02/24/22 1021     Aerobic Bacterial Culture STAPHYLOCOCCUS AUREUS  Many  Susceptibility pending      Blood culture [[id number]] Collected: 02/23/22 0550    Order Status: Completed Specimen: Blood Updated: 02/24/22 1012     Blood Culture, Routine No Growth to date      No Growth to date    Narrative:      Collection has been  rescheduled by WAD1 at 02/23/2022 05:53 Reason:   Patient unavailable labs collected  Collection has been rescheduled by WAD1 at 02/23/2022 05:53 Reason:   Patient unavailable labs collected    Blood culture x two cultures. Draw prior to antibiotics. [[id number]]  (Abnormal) Collected: 02/21/22 1231    Order Status: Completed Specimen: Blood from Peripheral, Hand, Left Updated: 02/24/22 0948     Blood Culture, Routine Gram stain aer bottle: Gram positive cocci      Gram stain jose bottle: Gram positive cocci      Results called to and read back by: KRISTINA ESCOTO RN  02/22/2022  11:03      METHICILLIN RESISTANT STAPHYLOCOCCUS AUREUS  ID consult required at [facility] and [facility] locations.  For susceptibility see order #[id number]  ID consult required at [facility] and [facility] locations.      Narrative:      Aerobic and anaerobic    Blood culture x two cultures. Draw prior to antibiotics. [[id number]]  (Abnormal)  (Susceptibility) Collected: 02/21/22 1218    Order Status: Completed Specimen: Blood from Peripheral, Hand, Right Updated: 02/24/22 0945     Blood Culture, Routine Gram stain aer bottle: Gram positive cocci       Positive results previously called 02/22/2022  12:12      Gram stain jose bottle: Gram positive cocci      METHICILLIN RESISTANT STAPHYLOCOCCUS AUREUS  ID consult required at [facility] and [facility] locations.      Narrative:      Aerobic and anaerobic    AFB Culture & Smear [[id number]] Collected: 02/22/22 1955    Order Status: Completed Specimen: Respiratory from Mouth Updated: 02/24/22 0927     AFB Culture & Smear Culture in progress     AFB CULTURE STAIN No acid fast bacilli seen.    AFB Culture & Smear [[id number]] Collected: 02/22/22 1153    Order Status: Completed Specimen: Wound from Foot, Right Updated: 02/24/22 0927     AFB Culture & Smear Culture in progress     AFB CULTURE STAIN No acid fast bacilli seen.    Culture, Anaerobic [[id number]] Collected: 02/22/22 1153     Order Status: Completed Specimen: Wound from Foot, Right Updated: 02/24/22 0925     Anaerobic Culture Culture in progress    AFB Culture & Smear [[id number]]     Order Status: No result Specimen: Respiratory     AFB Culture & Smear [[id number]]     Order Status: No result Specimen: Respiratory     Gram stain [[id number]] Collected: 02/22/22 1153    Order Status: Completed Specimen: Wound from Foot, Right Updated: 02/22/22 2156     Gram Stain Result Rare WBC's      Few Gram positive cocci      Rare Gram positive rods    Fungus culture [[id number]] Collected: 02/22/22 1153    Order Status: Sent Specimen: Wound from Foot, Right Updated: 02/22/22 1937            Significant Imaging: I have reviewed all pertinent imaging results/findings within the past 24 hours.

## 2022-02-25 NOTE — PT/OT/SLP EVAL
Physical Therapy Evaluation    Patient Name:  Dave Good   MRN:  [id number]    Recommendations:     Discharge Recommendations:  home health PT, home health OT (if HH not covered by insurance, OP PT with insurance transport)   Discharge Equipment Recommendations: cane, straight (other DME per OT)   Barriers to discharge: Inaccessible home    Assessment:     Dave Good is a 56 y.o. male admitted with a medical diagnosis of Diabetic wet gangrene of the foot s/p debridement RLE 2/22 and debridement BLE 2/24 with wound vac placed to RLE; hospital course notable for finding of cavitary lung lesions concerning for septic emboli. Pmhx significant for  He presents with the following impairments/functional limitations:  impaired sensation, impaired self care skills, impaired functional mobilty, gait instability, impaired balance, decreased lower extremity function, decreased safety awareness, decreased ROM, impaired skin, orthopedic precautions.    Patient evaluated by PT and goals established. Patient defers far ambulation bout this date d/t frequent blood draws but agreeable to brief assessment of standing and step transfer. Performed with SBA without AD with wide stance and high guard - would benefit from DME with standing activity (cane anticipated most appropriate device). Will plan to see patient tomorrow, 2/26 to best determine DME needs. PT will continue to follow and progress as tolerated. Rec for dc to home with HH (if HH covered by insurance; otherwise OP PT to address gait stability).     Rehab Prognosis: Good; patient would benefit from acute skilled PT services to address these deficits and reach maximum level of function.    Recent Surgery: Procedure(s) (LRB):  Transesophageal echo (PAT) intra-procedure log documentation (N/A) Day of Surgery    Plan:     During this hospitalization, patient to be seen 3 x/week to address the identified rehab impairments via gait training, therapeutic activities, therapeutic  "exercises, neuromuscular re-education and progress toward the following goals:    · Plan of Care Expires:  03/11/22    Subjective     Chief Complaint: "They were sticking me all day for blood, I'm not up for all this"  Patient/Family Comments/goals: Goal to be able to go home; Patient agreeable to evaluation.  Pain/Comfort:  · Pain Rating 1:  (unrated)  · Location - Side 1: Bilateral  · Location 1: foot  · Pain Addressed 1: Reposition, Distraction, Cessation of Activity  · Pain Rating Post-Intervention 1:  (unrated, appears comfortable at rest)    Patients cultural, spiritual, Bahai conflicts given the current situation: no    Living Environment:  · Pt lives with his fiance in a 2nd floor apartment with flight of steps to enter (no elevator access).   · Upon discharge, patient will have assistance from his fiance PRN.  Prior level of function:  · Ambulation: Indep  · ADL's: Indep  · IADLs: Indep  ·  Equipment used at home: none.  DME owned (not currently used): none.      Objective:     Communicated with [place] William prior to session.  Patient found HOB elevated with wound vac  upon PT entry to room.    General Precautions: Standard, diabetic, fall   Orthopedic Precautions:RLE weight bearing as tolerated   Braces:  (surgical shoes/darco shoes in closet)  Respiratory Status: Room air    Exams:  · Cognition:   · Patient is oriented x4.  · Pt follows approximately 100% of one step commands.    · Mood: Pleasant and cooperative.   · Safety Awareness: Impaired  · Musculoskeletal:  · BMI: 37.11  · Posture:  Forward head, rounded shoulders, wide stance width  · LE ROM/Strength:   · R ROM: WFL, limited DF  · L ROM: WFL, limited DF  · R Strength: WFL  · L Strength: WFL  · Neuromuscular:  · Coordination/Tone/Reflexes: No impairments identified with functional mobility. No formal testing performed.   · Balance:   · Static sitting: Good  · Static standing: Good  · Dynamic standing: Fair  · Visual-vestibular: No impairments " identified with functional mobility. No formal testing performed.  · Integument: Dry and B foot dressing c/d/i BLE  · Cardiopulmonary:  · O2 Requirements: 2L NC  · Edema: No pitting edema noted     Functional Mobility:  · Bed Mobility:     · Supine to Sit: modified independence  · Sit to Supine: modified independence  · Transfers:     · Sit to Stand:  stand by assistance with no AD  · Gait: x2 ft forwards and backwards with no AD and SBA.   · High guard with wide stance and (I) B stance time.   · No overt LOB noted.     Therapeutic Activities and Exercises:   PT educated patient re:   PT plan of care/role of PT  Safety with OOB mobility  Use of cane   Discharge disposition    Pt verbalized understanding       AM-PAC 6 CLICK MOBILITY  Total Score:20     Patient left HOB elevated with all lines intact, call button in reach and white board updated.    GOALS:   Multidisciplinary Problems     Physical Therapy Goals        Problem: Physical Therapy Goal    Goal Priority Disciplines Outcome Goal Variances Interventions   Physical Therapy Goal     PT, PT/OT Ongoing, Progressing     Description: Goals to be met by: 3/11/22    Patient will increase functional independence with mobility by performin. Gait x 100 feet with supervision with LRAD.  2. Ascend/descend 10 step(s) with least restrictive assistive device and uni HR with supervision.  3. Standing activity x5 min without significant increased in foot pain with surgical shoes donned.                    History:     Past Medical History:   Diagnosis Date    COPD (chronic obstructive pulmonary disease)     COVID-19     Depression     Diabetes mellitus     Diabetes mellitus, type 2     Hypertension        Past Surgical History:   Procedure Laterality Date    FOOT AMPUTATION THROUGH METATARSAL Right 2021    Procedure: AMPUTATION, FOOT, TRANSMETATARSAL right 1st ray resection;  Surgeon: Samuel Toussaint DPM;  Location: [facility];  Service: Podiatry;   Laterality: Right;    INCISION AND DRAINAGE FOOT Bilateral 9/21/2021    Procedure: INCISION AND DRAINAGE, FOOT - Bilateral;  Surgeon: Lavonne Vigil DPM;  Location: [facility];  Service: Podiatry;  Laterality: Bilateral;    REPLACEMENT OF WOUND DRESSING Right 2/24/2022    Procedure: REPLACEMENT, DRESSING, WOUND;  Surgeon: Jesus Flores Jr., MD;  Location: [facility];  Service: Vascular;  Laterality: Right;  wound vac dressing changed    WOUND DEBRIDEMENT Right 2/22/2022    Procedure: DEBRIDEMENT, WOUND - RIGHT FOOT;  Surgeon: Jesus Flores Jr., MD;  Location: [facility];  Service: Vascular;  Laterality: Right;    WOUND DEBRIDEMENT Bilateral 2/24/2022    Procedure: DEBRIDEMENT, WOUND / BILATERAL DEBRIDEMENT FEET;  Surgeon: Jesus Flores Jr., MD;  Location: [facility];  Service: Vascular;  Laterality: Bilateral;       Time Tracking:     PT Received On: 02/25/22  PT Start Time: 1438     PT Stop Time: 1448  PT Total Time (min): 10 min     Billable Minutes: Evaluation 10      02/25/2022

## 2022-02-25 NOTE — ASSESSMENT & PLAN NOTE
S/p surgical debridement right foot debridement on February 22 and bilateral foot debridements on February 24.  Suspect there may be some residual bone infection.  Blood cultures positive for MRSA. Surgical cultures positive for staph aureus.    Plan    1. Continue IV vancomycin.    2. Can discontinue ceftriaxone.

## 2022-02-25 NOTE — ASSESSMENT & PLAN NOTE
In the setting of MRSA bacteremia, this is concerning for septic emboli.  Patient has some risk factors for TB exposure (homelessness, incarceration).  AFB smear negative X3.  AFB cultures pending.    Recommendations    1. Continue IV vancomycin.    2. Follow up quantiferon gold test.    3. Follow up AFB cultures.

## 2022-02-25 NOTE — SUBJECTIVE & OBJECTIVE
Interval History:     No adverse events.    Review of Systems   All other systems reviewed and are negative.  Objective:     Vital Signs (Most Recent):  Temp: 98.8 °F (37.1 °C) (02/24/22 2004)  Pulse: 79 (02/24/22 2004)  Resp: 20 (02/24/22 2158)  BP: 108/69 (02/24/22 2004)  SpO2: 97 % (02/24/22 2004) Vital Signs (24h Range):  Temp:  [97.5 °F (36.4 °C)-98.8 °F (37.1 °C)] 98.8 °F (37.1 °C)  Pulse:  [65-79] 79  Resp:  [18-20] 20  SpO2:  [92 %-100 %] 97 %  BP: (108-146)/(59-88) 108/69     Weight: 131.1 kg (289 lb 0.4 oz)  Body mass index is 37.11 kg/m².    Estimated Creatinine Clearance: 105.4 mL/min (based on SCr of 1.1 mg/dL).    Physical Exam  Vitals and nursing note reviewed.   Constitutional:       General: He is not in acute distress.     Appearance: He is well-developed. He is not diaphoretic.   HENT:      Head: Normocephalic and atraumatic.      Right Ear: External ear normal.      Left Ear: External ear normal.      Nose: Nose normal.      Mouth/Throat:      Pharynx: No oropharyngeal exudate.   Eyes:      General: No scleral icterus.        Right eye: No discharge.         Left eye: No discharge.      Conjunctiva/sclera: Conjunctivae normal.      Pupils: Pupils are equal, round, and reactive to light.   Neck:      Thyroid: No thyromegaly.      Vascular: No JVD.      Trachea: No tracheal deviation.   Cardiovascular:      Rate and Rhythm: Normal rate and regular rhythm.      Heart sounds: No murmur heard.    No friction rub. No gallop.   Pulmonary:      Effort: Pulmonary effort is normal. No respiratory distress.      Breath sounds: Normal breath sounds. No stridor. No wheezing or rales.   Chest:      Chest wall: No tenderness.   Abdominal:      General: Bowel sounds are normal. There is no distension.      Palpations: Abdomen is soft. There is no mass.      Tenderness: There is no abdominal tenderness. There is no guarding or rebound.   Musculoskeletal:      Cervical back: Normal range of motion and neck supple.       Comments: Feet wrapped bilaterally.   Lymphadenopathy:      Cervical: No cervical adenopathy.   Skin:     General: Skin is warm.      Coloration: Skin is not pale.      Findings: No erythema or rash.   Neurological:      Mental Status: He is alert and oriented to person, place, and time.      Cranial Nerves: No cranial nerve deficit.      Motor: No abnormal muscle tone.      Coordination: Coordination normal.      Deep Tendon Reflexes: Reflexes are normal and symmetric. Reflexes normal.   Psychiatric:         Behavior: Behavior normal.         Thought Content: Thought content normal.         Judgment: Judgment normal.       Significant Labs:   Microbiology Results (last 7 days)       Procedure Component Value Units Date/Time    Aerobic culture [[id number]]  (Abnormal) Collected: 02/22/22 1153    Order Status: Completed Specimen: Wound from Foot, Right Updated: 02/24/22 1021     Aerobic Bacterial Culture STAPHYLOCOCCUS AUREUS  Many  Susceptibility pending      Blood culture [[id number]] Collected: 02/23/22 0550    Order Status: Completed Specimen: Blood Updated: 02/24/22 1012     Blood Culture, Routine No Growth to date      No Growth to date    Narrative:      Collection has been rescheduled by WAD1 at 02/23/2022 05:53 Reason:   Patient unavailable labs collected  Collection has been rescheduled by WAD1 at 02/23/2022 05:53 Reason:   Patient unavailable labs collected    Blood culture x two cultures. Draw prior to antibiotics. [[id number]]  (Abnormal) Collected: 02/21/22 1231    Order Status: Completed Specimen: Blood from Peripheral, Hand, Left Updated: 02/24/22 0948     Blood Culture, Routine Gram stain aer bottle: Gram positive cocci      Gram stain jose bottle: Gram positive cocci      Results called to and read back by: KRISTINA ESCOTO RN  02/22/2022  11:03      METHICILLIN RESISTANT STAPHYLOCOCCUS AUREUS  ID consult required at [facility].[place] and [place] locations.  For susceptibility see order  #[id number]  ID consult required at [facility] beds at the [facility] and [facility].      Narrative:      Aerobic and anaerobic    Blood culture x two cultures. Draw prior to antibiotics. [[id number]]  (Abnormal)  (Susceptibility) Collected: 02/21/22 1218    Order Status: Completed Specimen: Blood from Peripheral, Hand, Right Updated: 02/24/22 0945     Blood Culture, Routine Gram stain aer bottle: Gram positive cocci       Positive results previously called 02/22/2022  12:12      Gram stain jose bottle: Gram positive cocci      METHICILLIN RESISTANT STAPHYLOCOCCUS AUREUS  ID consult required at [facility] and [facility].      Narrative:      Aerobic and anaerobic    AFB Culture & Smear [[id number]] Collected: 02/22/22 1955    Order Status: Completed Specimen: Respiratory from Mouth Updated: 02/24/22 0927     AFB Culture & Smear Culture in progress     AFB CULTURE STAIN No acid fast bacilli seen.    AFB Culture & Smear [[id number]] Collected: 02/22/22 1153    Order Status: Completed Specimen: Wound from Foot, Right Updated: 02/24/22 0927     AFB Culture & Smear Culture in progress     AFB CULTURE STAIN No acid fast bacilli seen.    Culture, Anaerobic [[id number]] Collected: 02/22/22 1153    Order Status: Completed Specimen: Wound from Foot, Right Updated: 02/24/22 0925     Anaerobic Culture Culture in progress    AFB Culture & Smear [[id number]]     Order Status: No result Specimen: Respiratory     AFB Culture & Smear [[id number]]     Order Status: No result Specimen: Respiratory     Gram stain [[id number]] Collected: 02/22/22 1153    Order Status: Completed Specimen: Wound from Foot, Right Updated: 02/22/22 2156     Gram Stain Result Rare WBC's      Few Gram positive cocci      Rare Gram positive rods    Fungus culture [[id number]] Collected: 02/22/22 1153    Order Status: Sent Specimen: Wound from Foot, Right Updated: 02/22/22 1937            Significant Imaging: I have reviewed all pertinent imaging  results/findings within the past 24 hours.

## 2022-02-25 NOTE — SUBJECTIVE & OBJECTIVE
Interval History: No events overnight. Went for PAT this morning but unable to swallow probe and eventually refused.    Review of Systems   Constitutional:  Negative for chills and fever.   Respiratory:  Negative for shortness of breath.    Cardiovascular:  Negative for chest pain.   Gastrointestinal:  Negative for nausea and vomiting.   Objective:     Vital Signs (Most Recent):  Temp: 98 °F (36.7 °C) (02/25/22 1222)  Pulse: 80 (02/25/22 1222)  Resp: 18 (02/25/22 1311)  BP: 111/67 (02/25/22 1222)  SpO2: 97 % (02/25/22 1302)   Vital Signs (24h Range):  Temp:  [97.8 °F (36.6 °C)-98.8 °F (37.1 °C)] 98 °F (36.7 °C)  Pulse:  [69-85] 80  Resp:  [16-20] 18  SpO2:  [92 %-100 %] 97 %  BP: ()/(53-76) 111/67     Weight: 131.1 kg (289 lb 0.4 oz)  Body mass index is 37.11 kg/m².    Intake/Output Summary (Last 24 hours) at 2/25/2022 1334  Last data filed at 2/25/2022 0400  Gross per 24 hour   Intake 2720.36 ml   Output 2100 ml   Net 620.36 ml        Physical Exam  Vitals and nursing note reviewed.   Constitutional:       General: He is not in acute distress.     Appearance: Normal appearance. He is well-developed.   Cardiovascular:      Rate and Rhythm: Normal rate and regular rhythm.      Pulses: Normal pulses.   Pulmonary:      Effort: Pulmonary effort is normal.      Breath sounds: No rales.      Comments: shallow  Abdominal:      General: Bowel sounds are normal.      Palpations: Abdomen is soft.      Tenderness: There is no abdominal tenderness.   Musculoskeletal:      Right lower leg: Edema present.      Left lower leg: Edema present.   Skin:     General: Skin is warm and dry.      Comments: Post-op dressing on bilateral feet   Neurological:      Mental Status: He is alert and oriented to person, place, and time. Mental status is at baseline.   Psychiatric:         Behavior: Behavior normal.       Significant Labs: All pertinent labs within the past 24 hours have been reviewed.  BMP:   Recent Labs   Lab 02/24/22  0849    *   *   K 3.9   CL 97   CO2 31*   BUN 13   CREATININE 1.1   CALCIUM 9.1       CBC:   Recent Labs   Lab 02/24/22  0821   WBC 14.24*   HGB 9.7*   HCT 31.3*   *         Significant Imaging: I have reviewed all pertinent imaging results/findings within the past 24 hours.

## 2022-02-25 NOTE — ASSESSMENT & PLAN NOTE
MRSA bacteremia  Septic emboli / cavitary lung lesion  Diabetic foot infection of left foot  - R 2nd digit with wet gangrene and necrotic ulcer on L foot  - CTA without PE but with bilateral cavitary lesion with possible septic emboli vs organizing pneumonia   - Quantiferon gold pending. Sputum AFB smear negative x 3. AFB culture pending   - Echo without vegetation but given septic emboli  - PAT canceled, pt unable to swallow probe on 3 attempts then refused  - Blood culture with MRSA. Repeat blood culture NGTD  - ID, general surgery and wound care following. Appreciate input  - Continue vancomycin with pharmacy dosing  - S/p 2nd digit transmetatarsal amputation and debridement 2/22 and b/l debridement 2/24  - PT/OT eval once weight bearing status established  - Add percocet 7.5mg due to uncontrolled pain. Continue morphine 2 mg IV prn severe pain

## 2022-02-25 NOTE — PLAN OF CARE
Problem: Physical Therapy Goal  Goal: Physical Therapy Goal  Description: Goals to be met by: 3/11/22    Patient will increase functional independence with mobility by performin. Gait x 100 feet with supervision with LRAD.  2. Ascend/descend 10 step(s) with least restrictive assistive device and uni HR with supervision.  3. Standing activity x5 min without significant increased in foot pain with surgical shoes donned.   Outcome: Ongoing, Progressing     Patient evaluated by PT and goals established. Patient defers far ambulation bout this date d/t frequent blood draws but agreeable to brief assessment of standing and step transfer. Performed with SBA without AD with wide stance and high guard - would benefit from DME with standing activity (cane anticipated most appropriate device). Will plan to see patient tomorrow,  to best determine DME needs. PT will continue to follow and progress as tolerated. Rec for dc to home with HH (if HH covered by insurance; otherwise OP PT to address gait stability). Please see progress note for detailed plan of care and recommendations.

## 2022-02-26 LAB
ALBUMIN SERPL BCP-MCNC: 1.8 G/DL (ref 3.5–5.2)
ANION GAP SERPL CALC-SCNC: 8 MMOL/L (ref 8–16)
BUN SERPL-MCNC: 14 MG/DL (ref 6–20)
CALCIUM SERPL-MCNC: 9.6 MG/DL (ref 8.7–10.5)
CHLORIDE SERPL-SCNC: 100 MMOL/L (ref 95–110)
CO2 SERPL-SCNC: 29 MMOL/L (ref 23–29)
CREAT SERPL-MCNC: 1.2 MG/DL (ref 0.5–1.4)
ERYTHROCYTE [DISTWIDTH] IN BLOOD BY AUTOMATED COUNT: 15.1 % (ref 11.5–14.5)
EST. GFR  (AFRICAN AMERICAN): >60 ML/MIN/1.73 M^2
EST. GFR  (NON AFRICAN AMERICAN): >60 ML/MIN/1.73 M^2
GLUCOSE SERPL-MCNC: 184 MG/DL (ref 70–110)
HCT VFR BLD AUTO: 34.8 % (ref 40–54)
HGB BLD-MCNC: 10.4 G/DL (ref 14–18)
MCH RBC QN AUTO: 27.1 PG (ref 27–31)
MCHC RBC AUTO-ENTMCNC: 29.9 G/DL (ref 32–36)
MCV RBC AUTO: 91 FL (ref 82–98)
PHOSPHATE SERPL-MCNC: 2.8 MG/DL (ref 2.7–4.5)
PLATELET # BLD AUTO: 653 K/UL (ref 150–450)
PMV BLD AUTO: 9.4 FL (ref 9.2–12.9)
POCT GLUCOSE: 128 MG/DL (ref 70–110)
POCT GLUCOSE: 179 MG/DL (ref 70–110)
POCT GLUCOSE: 181 MG/DL (ref 70–110)
POCT GLUCOSE: 188 MG/DL (ref 70–110)
POCT GLUCOSE: 266 MG/DL (ref 70–110)
POTASSIUM SERPL-SCNC: 4.4 MMOL/L (ref 3.5–5.1)
RBC # BLD AUTO: 3.84 M/UL (ref 4.6–6.2)
SODIUM SERPL-SCNC: 137 MMOL/L (ref 136–145)
VANCOMYCIN SERPL-MCNC: 16 UG/ML
VANCOMYCIN SERPL-MCNC: 17.4 UG/ML
WBC # BLD AUTO: 13.62 K/UL (ref 3.9–12.7)

## 2022-02-26 PROCEDURE — 97166 OT EVAL MOD COMPLEX 45 MIN: CPT

## 2022-02-26 PROCEDURE — 80202 ASSAY OF VANCOMYCIN: CPT | Performed by: INTERNAL MEDICINE

## 2022-02-26 PROCEDURE — 36415 COLL VENOUS BLD VENIPUNCTURE: CPT | Performed by: INTERNAL MEDICINE

## 2022-02-26 PROCEDURE — 85027 COMPLETE CBC AUTOMATED: CPT | Performed by: INTERNAL MEDICINE

## 2022-02-26 PROCEDURE — 97530 THERAPEUTIC ACTIVITIES: CPT

## 2022-02-26 PROCEDURE — 97110 THERAPEUTIC EXERCISES: CPT | Mod: CQ

## 2022-02-26 PROCEDURE — 25000003 PHARM REV CODE 250: Performed by: INTERNAL MEDICINE

## 2022-02-26 PROCEDURE — 63600175 PHARM REV CODE 636 W HCPCS: Performed by: PHYSICIAN ASSISTANT

## 2022-02-26 PROCEDURE — 11000001 HC ACUTE MED/SURG PRIVATE ROOM

## 2022-02-26 PROCEDURE — 27000207 HC ISOLATION

## 2022-02-26 PROCEDURE — 63600175 PHARM REV CODE 636 W HCPCS: Performed by: INTERNAL MEDICINE

## 2022-02-26 PROCEDURE — 99232 SBSQ HOSP IP/OBS MODERATE 35: CPT | Mod: ,,, | Performed by: INTERNAL MEDICINE

## 2022-02-26 PROCEDURE — 80069 RENAL FUNCTION PANEL: CPT | Performed by: INTERNAL MEDICINE

## 2022-02-26 PROCEDURE — 99232 PR SUBSEQUENT HOSPITAL CARE,LEVL II: ICD-10-PCS | Mod: ,,, | Performed by: INTERNAL MEDICINE

## 2022-02-26 PROCEDURE — 97116 GAIT TRAINING THERAPY: CPT | Mod: CQ

## 2022-02-26 RX ADMIN — OXYCODONE AND ACETAMINOPHEN 1 TABLET: 7.5; 325 TABLET ORAL at 05:02

## 2022-02-26 RX ADMIN — ATORVASTATIN CALCIUM 40 MG: 20 TABLET, FILM COATED ORAL at 08:02

## 2022-02-26 RX ADMIN — QUETIAPINE FUMARATE 200 MG: 200 TABLET ORAL at 10:02

## 2022-02-26 RX ADMIN — INSULIN ASPART 10 UNITS: 100 INJECTION, SOLUTION INTRAVENOUS; SUBCUTANEOUS at 08:02

## 2022-02-26 RX ADMIN — OXYCODONE AND ACETAMINOPHEN 1 TABLET: 7.5; 325 TABLET ORAL at 06:02

## 2022-02-26 RX ADMIN — MUPIROCIN: 20 OINTMENT TOPICAL at 08:02

## 2022-02-26 RX ADMIN — INSULIN ASPART 10 UNITS: 100 INJECTION, SOLUTION INTRAVENOUS; SUBCUTANEOUS at 03:02

## 2022-02-26 RX ADMIN — HEPARIN SODIUM 5000 UNITS: 5000 INJECTION INTRAVENOUS; SUBCUTANEOUS at 10:02

## 2022-02-26 RX ADMIN — LOSARTAN POTASSIUM 25 MG: 25 TABLET, FILM COATED ORAL at 10:02

## 2022-02-26 RX ADMIN — OXYCODONE AND ACETAMINOPHEN 1 TABLET: 7.5; 325 TABLET ORAL at 11:02

## 2022-02-26 RX ADMIN — ASPIRIN 81 MG: 81 TABLET, COATED ORAL at 08:02

## 2022-02-26 RX ADMIN — CEFTRIAXONE 2 G: 2 INJECTION, SOLUTION INTRAVENOUS at 05:02

## 2022-02-26 RX ADMIN — HEPARIN SODIUM 5000 UNITS: 5000 INJECTION INTRAVENOUS; SUBCUTANEOUS at 05:02

## 2022-02-26 RX ADMIN — LOSARTAN POTASSIUM 25 MG: 25 TABLET, FILM COATED ORAL at 08:02

## 2022-02-26 RX ADMIN — HEPARIN SODIUM 5000 UNITS: 5000 INJECTION INTRAVENOUS; SUBCUTANEOUS at 03:02

## 2022-02-26 RX ADMIN — MUPIROCIN: 20 OINTMENT TOPICAL at 10:02

## 2022-02-26 RX ADMIN — MORPHINE SULFATE 2 MG: 4 INJECTION, SOLUTION INTRAMUSCULAR; INTRAVENOUS at 10:02

## 2022-02-26 RX ADMIN — MORPHINE SULFATE 2 MG: 4 INJECTION, SOLUTION INTRAMUSCULAR; INTRAVENOUS at 07:02

## 2022-02-26 RX ADMIN — INSULIN ASPART 3 UNITS: 100 INJECTION, SOLUTION INTRAVENOUS; SUBCUTANEOUS at 10:02

## 2022-02-26 RX ADMIN — VANCOMYCIN HYDROCHLORIDE 1500 MG: 1.5 INJECTION, POWDER, LYOPHILIZED, FOR SOLUTION INTRAVENOUS at 08:02

## 2022-02-26 RX ADMIN — INSULIN DETEMIR 55 UNITS: 100 INJECTION, SOLUTION SUBCUTANEOUS at 10:02

## 2022-02-26 RX ADMIN — INSULIN ASPART 10 UNITS: 100 INJECTION, SOLUTION INTRAVENOUS; SUBCUTANEOUS at 12:02

## 2022-02-26 NOTE — ASSESSMENT & PLAN NOTE
Likely septic emboli.  Mycobacterial infection is less likely.    Recommendations    1. Continue IV vancomycin.    2. Quantiferon gold pending.

## 2022-02-26 NOTE — PROGRESS NOTES
[facility]  Progress Note    Patient Name: Dave Good  MRN: [id number]  Patient Class: IP- Inpatient   Admission Date: 2/21/2022  Length of Stay: 5 days  Attending Physician: Marcy Patrick MD  Primary Care Provider: Reyna Jorge NP        Subjective:     Principal Problem:Diabetic wet gangrene of the foot        HPI:  57 yo m with PMH of DM , HTN , PE and COPD presented to ED  for R 2nd digit wound. Pt had amputation by  back on 9/2021 for wet gangrene . Pt is following up with wound care at [facility] twice a week. But the last time was 1.5 weeks ago, he noticed swelling ,pain and foul smelling drainage from the wound. Pt also reports worsening SOB with exertion . With some chest tightness. In ED VS were stable. Started on iv abx . Labs showed leukocytosis and hypokalemia.CTA showed cavitary lesions with possible septic emboli.       Overview/Hospital Course:  Patient admitted with foul smelling drainage from wound of R foot and SOB. Found to have cavitary lung lesions concerning for septic emboli. General surgery and ID consulted. He went to OR on 2/22 debridement and transmetatarsal amputation of 2nd R digit. Blood culture with MRSA. PAT ordered but patient unable to tolerate procedure (unable to pass probe, wouldn't swallow).      Interval History: No events overnight. No new concerns.    Review of Systems   Constitutional:  Negative for chills and fever.   Respiratory:  Negative for shortness of breath.    Cardiovascular:  Negative for chest pain.   Gastrointestinal:  Negative for nausea and vomiting.   Objective:     Vital Signs (Most Recent):  Temp: 97.4 °F (36.3 °C) (02/26/22 0736)  Pulse: 76 (02/26/22 0736)  Resp: 18 (02/26/22 0736)  BP: 122/78 (02/26/22 0736)  SpO2: 97 % (02/26/22 0736)   Vital Signs (24h Range):  Temp:  [97 °F (36.1 °C)-98.6 °F (37 °C)] 97.4 °F (36.3 °C)  Pulse:  [75-80] 76  Resp:  [16-18] 18  SpO2:  [96 %-97 %] 97 %  BP:  (100-130)/(67-80) 122/78     Weight: 131.1 kg (289 lb 0.4 oz)  Body mass index is 37.11 kg/m².    Intake/Output Summary (Last 24 hours) at 2/26/2022 1101  Last data filed at 2/26/2022 0600  Gross per 24 hour   Intake 1920 ml   Output 2300 ml   Net -380 ml        Physical Exam  Vitals and nursing note reviewed.   Constitutional:       General: He is not in acute distress.     Appearance: Normal appearance. He is well-developed.   Cardiovascular:      Rate and Rhythm: Normal rate and regular rhythm.      Pulses: Normal pulses.   Pulmonary:      Effort: Pulmonary effort is normal.      Breath sounds: No rales.      Comments: shallow  Abdominal:      General: Bowel sounds are normal.      Palpations: Abdomen is soft.      Tenderness: There is no abdominal tenderness.   Musculoskeletal:      Right lower leg: Edema present.      Left lower leg: Edema present.   Skin:     General: Skin is warm and dry.      Comments: Post-op dressing on bilateral feet   Neurological:      Mental Status: He is alert and oriented to person, place, and time. Mental status is at baseline.   Psychiatric:         Behavior: Behavior normal.       Significant Labs: All pertinent labs within the past 24 hours have been reviewed.  BMP:   Recent Labs   Lab 02/26/22  0513   *      K 4.4      CO2 29   BUN 14   CREATININE 1.2   CALCIUM 9.6       CBC:   Recent Labs   Lab 02/26/22  0513   WBC 13.62*   HGB 10.4*   HCT 34.8*   *         Significant Imaging: I have reviewed all pertinent imaging results/findings within the past 24 hours.      Assessment/Plan:      * Diabetic wet gangrene of the foot  MRSA bacteremia  Septic emboli / cavitary lung lesion  Diabetic foot infection of left foot  - R 2nd digit with wet gangrene and necrotic ulcer on L foot  - CTA without PE but with bilateral cavitary lesion with possible septic emboli vs organizing pneumonia   - Quantiferon gold negative. Sputum AFB smear negative x 3. AFB culture  pending   - Echo without vegetation but given septic emboli, PAT attempted  - PAT canceled, pt unable to swallow probe on 3 attempts then refused  - Blood culture with MRSA. Repeat blood culture NGTD  - ID, general surgery and wound care following. Appreciate input  - Continue vancomycin with pharmacy dosing  - S/p 2nd digit transmetatarsal amputation and debridement 2/22 and b/l debridement 2/24  - Continue PT/OT   - Continue percocet and morphine for pain  - Plan to treat empirically for endocarditis given septic emboli    Chronic respiratory failure with hypoxia  - On 2L home O2 2/2 COVID-19  - Continue oxygen protocol    Cavitary lesion of lung  - As above    COPD with asthma  - Duoneb PRN    Depression  - Continue seroquel    HTN (hypertension)  - Continue losartan 25 mg qd    Type 2 diabetes mellitus without complication, with long-term current use of insulin  - A1c 9.8  - Increase levemir to 55U qHS, continue aspart 10U TIDWM and low dose SSI AC/HS PRN    VTE Risk Mitigation (From admission, onward)         Ordered     heparin (porcine) injection 5,000 Units  Every 8 hours         02/21/22 3108                    Marcy Patrick MD  Department of Hospital Medicine   [facility] ([place])

## 2022-02-26 NOTE — PROGRESS NOTES
Pharmacokinetic Assessment Follow Up: IV Vancomycin    Vancomycin serum concentration assessment(s):    The random level was drawn correctly and can be used to guide therapy at this time. The measurement is within the desired definitive target range of 10 to 20 mcg/mL.    Vancomycin Regimen Plan:    Administer vancomycin 1500 mg IV once now. Re-dose when the random level is less than 20 mcg/mL, next level to be drawn at 2000 on 2/26/22    Drug levels (last 3 results):  Recent Labs   Lab Result Units 02/23/22  1505 02/25/22  0243 02/25/22  2138 02/26/22  0513   Vancomycin, Random ug/mL  --   --  21.2 17.4   Vancomycin-Trough ug/mL 21.9 26.9*  --   --        Pharmacy will continue to follow and monitor vancomycin.    Please contact pharmacy at extension [phone] for questions regarding this assessment.    Thank you for the consult,   Tanya Boggs       Patient brief summary:  Dave Good is a 56 y.o. male initiated on antimicrobial therapy with IV Vancomycin for treatment of pneumonia      Drug Allergies:   Review of patient's allergies indicates:   Allergen Reactions    Ibuprofen Swelling     Facial swelling       Actual Body Weight:   131.1 kg    Renal Function:   Estimated Creatinine Clearance: 96.6 mL/min (based on SCr of 1.2 mg/dL).,     Dialysis Method (if applicable):  N/A    CBC (last 72 hours):  Recent Labs   Lab Result Units 02/23/22  1059 02/24/22  0821 02/26/22  0513   WBC K/uL 14.29* 14.24* 13.62*   Hemoglobin g/dL 9.7* 9.7* 10.4*   Hematocrit % 30.8* 31.3* 34.8*   Platelets K/uL 547* 563* 653*   Gran % % 67.7 67.5  --    Lymph % % 17.1* 17.7*  --    Mono % % 10.7 9.5  --    Eosinophil % % 1.5 2.6  --    Basophil % % 0.2 0.3  --    Differential Method  Automated Automated  --        Metabolic Panel (last 72 hours):  Recent Labs   Lab Result Units 02/23/22  1059 02/24/22  0821 02/26/22  0513   Sodium mmol/L 132* 135* 137   Potassium mmol/L 3.7 3.9 4.4   Chloride mmol/L 95 97 100   CO2 mmol/L 30* 31* 29    Glucose mg/dL 224* 182* 184*   BUN mg/dL 15 13 14   Creatinine mg/dL 1.2 1.1 1.2   Albumin g/dL 1.7* 1.7* 1.8*   Phosphorus mg/dL 2.7 2.9 2.8       Vancomycin Administrations:  vancomycin given in the last 96 hours                   vancomycin 1.75 g in 5 % dextrose 500 mL IVPB (mg) 1,750 mg New Bag 02/25/22 0308      Restarted 02/24/22 1703     1,750 mg New Bag  1505     1,750 mg New Bag  0348    vancomycin 2 g in dextrose 5 % 500 mL IVPB (mg) 2,000 mg New Bag 02/23/22 1633     2,000 mg New Bag  0510     2,000 mg New Bag 02/22/22 2047     2,000 mg New Bag  0739                Microbiologic Results:  Microbiology Results (last 7 days)     Procedure Component Value Units Date/Time    Blood culture [[id number]] Collected: 02/25/22 0548    Order Status: Completed Specimen: Blood Updated: 02/25/22 1745     Blood Culture, Routine No Growth to date    Narrative:      Collection has been rescheduled by [email or web address] at 02/25/2022 05:55 Reason:   Patient unavailable labs collected  Collection has been rescheduled by [email or web address] at 02/25/2022 05:55 Reason:   Patient unavailable labs collected    Aerobic culture [[id number]]  (Abnormal)  (Susceptibility) Collected: 02/22/22 1153    Order Status: Completed Specimen: Wound from Foot, Right Updated: 02/25/22 1055     Aerobic Bacterial Culture METHICILLIN RESISTANT STAPHYLOCOCCUS AUREUS  Many  No other significant isolate      Blood culture [[id number]] Collected: 02/23/22 0550    Order Status: Completed Specimen: Blood Updated: 02/25/22 1012     Blood Culture, Routine No Growth to date      No Growth to date      No Growth to date    Narrative:      Collection has been rescheduled by [email or web address] at 02/23/2022 05:53 Reason:   Patient unavailable labs collected  Collection has been rescheduled by [email or web address] at 02/23/2022 05:53 Reason:   Patient unavailable labs collected    Blood culture x two cultures. Draw prior to antibiotics. [[id number]]  (Abnormal) Collected: 02/21/22 1231    Order Status: Completed  Specimen: Blood from Peripheral, Hand, Left Updated: 02/24/22 0948     Blood Culture, Routine Gram stain aer bottle: Gram positive cocci      Gram stain jose bottle: Gram positive cocci      Results called to and read back by: KRISTINA ESCOTO RN  02/22/2022  11:03      METHICILLIN RESISTANT STAPHYLOCOCCUS AUREUS  ID consult required at [facility] and [facility].  For susceptibility see order #[id number]  ID consult required at [facility] and [facility].      Narrative:      Aerobic and anaerobic    Blood culture x two cultures. Draw prior to antibiotics. [[id number]]  (Abnormal)  (Susceptibility) Collected: 02/21/22 1218    Order Status: Completed Specimen: Blood from Peripheral, Hand, Right Updated: 02/24/22 0945     Blood Culture, Routine Gram stain aer bottle: Gram positive cocci       Positive results previously called 02/22/2022  12:12      Gram stain jose bottle: Gram positive cocci      METHICILLIN RESISTANT STAPHYLOCOCCUS AUREUS  ID consult required at [facility] and [facility] locations.      Narrative:      Aerobic and anaerobic    AFB Culture & Smear [[id number]] Collected: 02/22/22 1955    Order Status: Completed Specimen: Respiratory from Mouth Updated: 02/24/22 0927     AFB Culture & Smear Culture in progress     AFB CULTURE STAIN No acid fast bacilli seen.    AFB Culture & Smear [[id number]] Collected: 02/22/22 1153    Order Status: Completed Specimen: Wound from Foot, Right Updated: 02/24/22 0927     AFB Culture & Smear Culture in progress     AFB CULTURE STAIN No acid fast bacilli seen.    Culture, Anaerobic [[id number]] Collected: 02/22/22 1153    Order Status: Completed Specimen: Wound from Foot, Right Updated: 02/24/22 0925     Anaerobic Culture Culture in progress    AFB Culture & Smear [[id number]]     Order Status: No result Specimen: Respiratory     AFB Culture & Smear [[id number]]     Order Status: No result Specimen: Respiratory     Gram stain [[id number]]  Collected: 02/22/22 1153    Order Status: Completed Specimen: Wound from Foot, Right Updated: 02/22/22 2156     Gram Stain Result Rare WBC's      Few Gram positive cocci      Rare Gram positive rods    Fungus culture [[id number]] Collected: 02/22/22 1153    Order Status: Sent Specimen: Wound from Foot, Right Updated: 02/22/22 1937

## 2022-02-26 NOTE — ASSESSMENT & PLAN NOTE
MRSA bacteremia  Septic emboli / cavitary lung lesion  Diabetic foot infection of left foot  - R 2nd digit with wet gangrene and necrotic ulcer on L foot  - CTA without PE but with bilateral cavitary lesion with possible septic emboli vs organizing pneumonia   - Quantiferon gold negative. Sputum AFB smear negative x 3. AFB culture pending   - Echo without vegetation but given septic emboli, PAT attempted  - PAT canceled, pt unable to swallow probe on 3 attempts then refused  - Blood culture with MRSA. Repeat blood culture NGTD  - ID, general surgery and wound care following. Appreciate input  - Continue vancomycin with pharmacy dosing  - S/p 2nd digit transmetatarsal amputation and debridement 2/22 and b/l debridement 2/24  - Continue PT/OT   - Continue percocet and morphine for pain  - Plan to treat empirically for endocarditis given septic emboli

## 2022-02-26 NOTE — PLAN OF CARE
Problem: Occupational Therapy Goal  Goal: Occupational Therapy Goal  Description: Goals to be met by: 3/12/2022     Patient will increase functional independence with ADLs by performing:    LE Dressing with Supervision to don pants.  Grooming while standing at sink with Supervision.  Toileting from bedside commode with Supervision for hygiene and clothing management.   Toilet transfer to bedside commode with Supervision.    Outcome: Ongoing, Progressing     Initial OT eval/treat complete.  Ambulating short household distance bedside chair<>bathroom doorway with CGA/SBA and RW with O2 NC in place.  Step transfer to toilet also requiring CGA/SBA for steadying/safety while use RW with increased verbal cuing for safe RW usage while navigating turn as Pt. Lifting RW; better follow through with safe RW use once returning from BSC>bedside chair.  Reports increased pain to LLE foot this day.  Needs BSC and T TB; defer AD needs to PT.  Recommend post acute HH OT/PT.  Fiance cooks, cleans, and provides intermittent assistance while niece drives to important appts.  To benefit from continued acute care OT services to increase independence in self-care/functional transfers.  OT to follow.

## 2022-02-26 NOTE — ASSESSMENT & PLAN NOTE
MRSA bacteremia.  Likely source would be his right foot.  TTE negative. PAT aborted due to patient becoming patient having substantial anxiety.  Given the presence of possible septic emboli in his lungs.  Will plan to treat for presumptive endocarditis.      Plan    1. Continue vancomycin IV    2. Follow up repeat blood cultures.

## 2022-02-26 NOTE — SUBJECTIVE & OBJECTIVE
Interval History: No events overnight. No new concerns.    Review of Systems   Constitutional:  Negative for chills and fever.   Respiratory:  Negative for shortness of breath.    Cardiovascular:  Negative for chest pain.   Gastrointestinal:  Negative for nausea and vomiting.   Objective:     Vital Signs (Most Recent):  Temp: 97.4 °F (36.3 °C) (02/26/22 0736)  Pulse: 76 (02/26/22 0736)  Resp: 18 (02/26/22 0736)  BP: 122/78 (02/26/22 0736)  SpO2: 97 % (02/26/22 0736)   Vital Signs (24h Range):  Temp:  [97 °F (36.1 °C)-98.6 °F (37 °C)] 97.4 °F (36.3 °C)  Pulse:  [75-80] 76  Resp:  [16-18] 18  SpO2:  [96 %-97 %] 97 %  BP: (100-130)/(67-80) 122/78     Weight: 131.1 kg (289 lb 0.4 oz)  Body mass index is 37.11 kg/m².    Intake/Output Summary (Last 24 hours) at 2/26/2022 1101  Last data filed at 2/26/2022 0600  Gross per 24 hour   Intake 1920 ml   Output 2300 ml   Net -380 ml        Physical Exam  Vitals and nursing note reviewed.   Constitutional:       General: He is not in acute distress.     Appearance: Normal appearance. He is well-developed.   Cardiovascular:      Rate and Rhythm: Normal rate and regular rhythm.      Pulses: Normal pulses.   Pulmonary:      Effort: Pulmonary effort is normal.      Breath sounds: No rales.      Comments: shallow  Abdominal:      General: Bowel sounds are normal.      Palpations: Abdomen is soft.      Tenderness: There is no abdominal tenderness.   Musculoskeletal:      Right lower leg: Edema present.      Left lower leg: Edema present.   Skin:     General: Skin is warm and dry.      Comments: Post-op dressing on bilateral feet   Neurological:      Mental Status: He is alert and oriented to person, place, and time. Mental status is at baseline.   Psychiatric:         Behavior: Behavior normal.       Significant Labs: All pertinent labs within the past 24 hours have been reviewed.  BMP:   Recent Labs   Lab 02/26/22  0513   *      K 4.4      CO2 29   BUN 14    CREATININE 1.2   CALCIUM 9.6       CBC:   Recent Labs   Lab 02/26/22  0513   WBC 13.62*   HGB 10.4*   HCT 34.8*   *         Significant Imaging: I have reviewed all pertinent imaging results/findings within the past 24 hours.

## 2022-02-26 NOTE — PROGRESS NOTES
Pharmacokinetic Assessment Follow Up: IV Vancomycin    Vancomycin serum concentration assessment(s):    The random level was drawn correctly and can be used to guide therapy at this time. The measurement is above the desired definitive target range of 10 to 20 mcg/mL.    Vancomycin Regimen Plan:    Re-dose when the random level is less than 20 mcg/mL, next level to be drawn at 0430 on 2/26/22    Drug levels (last 3 results):  Recent Labs   Lab Result Units 02/23/22  1505 02/25/22  0243 02/25/22  2138   Vancomycin, Random ug/mL  --   --  21.2   Vancomycin-Trough ug/mL 21.9 26.9*  --        Pharmacy will continue to follow and monitor vancomycin.    Please contact pharmacy at extension [phone] for questions regarding this assessment.    Thank you for the consult,   Tanya Boggs       Patient brief summary:  Dave Good is a 56 y.o. male initiated on antimicrobial therapy with IV Vancomycin for treatment of sepsis      Drug Allergies:   Review of patient's allergies indicates:   Allergen Reactions    Ibuprofen Swelling     Facial swelling       Actual Body Weight:   131.1 kg    Renal Function:   Estimated Creatinine Clearance: 105.4 mL/min (based on SCr of 1.1 mg/dL).,     Dialysis Method (if applicable):  N/A    CBC (last 72 hours):  Recent Labs   Lab Result Units 02/23/22  1059 02/24/22  0821   WBC K/uL 14.29* 14.24*   Hemoglobin g/dL 9.7* 9.7*   Hematocrit % 30.8* 31.3*   Platelets K/uL 547* 563*   Gran % % 67.7 67.5   Lymph % % 17.1* 17.7*   Mono % % 10.7 9.5   Eosinophil % % 1.5 2.6   Basophil % % 0.2 0.3   Differential Method  Automated Automated       Metabolic Panel (last 72 hours):  Recent Labs   Lab Result Units 02/23/22  1059 02/24/22  0821   Sodium mmol/L 132* 135*   Potassium mmol/L 3.7 3.9   Chloride mmol/L 95 97   CO2 mmol/L 30* 31*   Glucose mg/dL 224* 182*   BUN mg/dL 15 13   Creatinine mg/dL 1.2 1.1   Albumin g/dL 1.7* 1.7*   Phosphorus mg/dL 2.7 2.9       Vancomycin Administrations:  vancomycin  given in the last 96 hours                   vancomycin 1.75 g in 5 % dextrose 500 mL IVPB (mg) 1,750 mg New Bag 02/25/22 0308      Restarted 02/24/22 1703     1,750 mg New Bag  1505     1,750 mg New Bag  0348    vancomycin 2 g in dextrose 5 % 500 mL IVPB (mg) 2,000 mg New Bag 02/23/22 1633     2,000 mg New Bag  0510     2,000 mg New Bag 02/22/22 2047     2,000 mg New Bag  0739                Microbiologic Results:  Microbiology Results (last 7 days)     Procedure Component Value Units Date/Time    Blood culture [[id number]] Collected: 02/25/22 0548    Order Status: Completed Specimen: Blood Updated: 02/25/22 1745     Blood Culture, Routine No Growth to date    Narrative:      Collection has been rescheduled by WAD1 at 02/25/2022 05:55 Reason:   Patient unavailable labs collected  Collection has been rescheduled by WAD1 at 02/25/2022 05:55 Reason:   Patient unavailable labs collected    Aerobic culture [[id number]]  (Abnormal)  (Susceptibility) Collected: 02/22/22 1153    Order Status: Completed Specimen: Wound from Foot, Right Updated: 02/25/22 1055     Aerobic Bacterial Culture METHICILLIN RESISTANT STAPHYLOCOCCUS AUREUS  Many  No other significant isolate      Blood culture [[id number]] Collected: 02/23/22 0550    Order Status: Completed Specimen: Blood Updated: 02/25/22 1012     Blood Culture, Routine No Growth to date      No Growth to date      No Growth to date    Narrative:      Collection has been rescheduled by WAD1 at 02/23/2022 05:53 Reason:   Patient unavailable labs collected  Collection has been rescheduled by WAD1 at 02/23/2022 05:53 Reason:   Patient unavailable labs collected    Blood culture x two cultures. Draw prior to antibiotics. [[id number]]  (Abnormal) Collected: 02/21/22 1231    Order Status: Completed Specimen: Blood from Peripheral, Hand, Left Updated: 02/24/22 0948     Blood Culture, Routine Gram stain aer bottle: Gram positive cocci      Gram stain jose bottle: Gram positive cocci       Results called to and read back by: KRISTINA ESCOTO RN  02/22/2022  11:03      METHICILLIN RESISTANT STAPHYLOCOCCUS AUREUS  ID consult required at [facility] and [facility].  For susceptibility see order #[id number]  ID consult required at [facility] and [facility] locations.      Narrative:      Aerobic and anaerobic    Blood culture x two cultures. Draw prior to antibiotics. [[id number]]  (Abnormal)  (Susceptibility) Collected: 02/21/22 1218    Order Status: Completed Specimen: Blood from Peripheral, Hand, Right Updated: 02/24/22 0945     Blood Culture, Routine Gram stain aer bottle: Gram positive cocci       Positive results previously called 02/22/2022  12:12      Gram stain jose bottle: Gram positive cocci      METHICILLIN RESISTANT STAPHYLOCOCCUS AUREUS  ID consult required at [facility] and [facility] locations.      Narrative:      Aerobic and anaerobic    AFB Culture & Smear [[id number]] Collected: 02/22/22 1955    Order Status: Completed Specimen: Respiratory from Mouth Updated: 02/24/22 0927     AFB Culture & Smear Culture in progress     AFB CULTURE STAIN No acid fast bacilli seen.    AFB Culture & Smear [[id number]] Collected: 02/22/22 1153    Order Status: Completed Specimen: Wound from Foot, Right Updated: 02/24/22 0927     AFB Culture & Smear Culture in progress     AFB CULTURE STAIN No acid fast bacilli seen.    Culture, Anaerobic [[id number]] Collected: 02/22/22 1153    Order Status: Completed Specimen: Wound from Foot, Right Updated: 02/24/22 0925     Anaerobic Culture Culture in progress    AFB Culture & Smear [[id number]]     Order Status: No result Specimen: Respiratory     AFB Culture & Smear [[id number]]     Order Status: No result Specimen: Respiratory     Gram stain [[id number]] Collected: 02/22/22 1153    Order Status: Completed Specimen: Wound from Foot, Right Updated: 02/22/22 2156     Gram Stain Result Rare WBC's      Few Gram positive cocci      Rare Gram positive rods     Fungus culture [[id number]] Collected: 02/22/22 1153    Order Status: Sent Specimen: Wound from Foot, Right Updated: 02/22/22 1937

## 2022-02-26 NOTE — SUBJECTIVE & OBJECTIVE
Interval History:     PAT attempted but aborted.    Review of Systems   All other systems reviewed and are negative.  Objective:     Vital Signs (Most Recent):  Temp: 97 °F (36.1 °C) (02/25/22 1936)  Pulse: 75 (02/25/22 2253)  Resp: 16 (02/25/22 2251)  BP: 120/76 (02/25/22 2253)  SpO2: 97 % (02/25/22 1936) Vital Signs (24h Range):  Temp:  [97 °F (36.1 °C)-98.6 °F (37 °C)] 97 °F (36.1 °C)  Pulse:  [69-85] 75  Resp:  [16-20] 16  SpO2:  [93 %-100 %] 97 %  BP: ()/(53-80) 120/76     Weight: 131.1 kg (289 lb 0.4 oz)  Body mass index is 37.11 kg/m².    Estimated Creatinine Clearance: 105.4 mL/min (based on SCr of 1.1 mg/dL).    Physical Exam  Vitals and nursing note reviewed.   Constitutional:       General: He is not in acute distress.     Appearance: He is well-developed. He is not diaphoretic.   HENT:      Head: Normocephalic and atraumatic.      Right Ear: External ear normal.      Left Ear: External ear normal.      Nose: Nose normal.      Mouth/Throat:      Pharynx: No oropharyngeal exudate.   Eyes:      General: No scleral icterus.        Right eye: No discharge.         Left eye: No discharge.      Conjunctiva/sclera: Conjunctivae normal.      Pupils: Pupils are equal, round, and reactive to light.   Neck:      Thyroid: No thyromegaly.      Vascular: No JVD.      Trachea: No tracheal deviation.   Cardiovascular:      Rate and Rhythm: Normal rate and regular rhythm.      Heart sounds: No murmur heard.    No friction rub. No gallop.   Pulmonary:      Effort: Pulmonary effort is normal. No respiratory distress.      Breath sounds: Normal breath sounds. No stridor. No wheezing or rales.   Chest:      Chest wall: No tenderness.   Abdominal:      General: Bowel sounds are normal. There is no distension.      Palpations: Abdomen is soft. There is no mass.      Tenderness: There is no abdominal tenderness. There is no guarding or rebound.   Musculoskeletal:      Cervical back: Normal range of motion and neck supple.       Comments: Feet wrapped bilaterally.   Lymphadenopathy:      Cervical: No cervical adenopathy.   Skin:     General: Skin is warm.      Coloration: Skin is not pale.      Findings: No erythema or rash.   Neurological:      Mental Status: He is alert and oriented to person, place, and time.      Cranial Nerves: No cranial nerve deficit.      Motor: No abnormal muscle tone.      Coordination: Coordination normal.      Deep Tendon Reflexes: Reflexes are normal and symmetric. Reflexes normal.   Psychiatric:         Behavior: Behavior normal.         Thought Content: Thought content normal.         Judgment: Judgment normal.       Significant Labs:   Microbiology Results (last 7 days)       Procedure Component Value Units Date/Time    Blood culture [[id number]] Collected: 02/25/22 0548    Order Status: Completed Specimen: Blood Updated: 02/25/22 1745     Blood Culture, Routine No Growth to date    Narrative:      Collection has been rescheduled by WAD1 at 02/25/2022 05:55 Reason:   Patient unavailable labs collected  Collection has been rescheduled by WAD1 at 02/25/2022 05:55 Reason:   Patient unavailable labs collected    Aerobic culture [[id number]]  (Abnormal)  (Susceptibility) Collected: 02/22/22 1153    Order Status: Completed Specimen: Wound from Foot, Right Updated: 02/25/22 1055     Aerobic Bacterial Culture METHICILLIN RESISTANT STAPHYLOCOCCUS AUREUS  Many  No other significant isolate      Blood culture [[id number]] Collected: 02/23/22 0550    Order Status: Completed Specimen: Blood Updated: 02/25/22 1012     Blood Culture, Routine No Growth to date      No Growth to date      No Growth to date    Narrative:      Collection has been rescheduled by WAD1 at 02/23/2022 05:53 Reason:   Patient unavailable labs collected  Collection has been rescheduled by WAD1 at 02/23/2022 05:53 Reason:   Patient unavailable labs collected    Blood culture x two cultures. Draw prior to antibiotics. [[id number]]  (Abnormal)  Collected: 02/21/22 1231    Order Status: Completed Specimen: Blood from Peripheral, Hand, Left Updated: 02/24/22 0948     Blood Culture, Routine Gram stain aer bottle: Gram positive cocci      Gram stain jose bottle: Gram positive cocci      Results called to and read back by: KRISTINA ESCOTO RN  02/22/2022  11:03      METHICILLIN RESISTANT STAPHYLOCOCCUS AUREUS  ID consult required at [facility].  For susceptibility see order #[id number]  ID consult required at [facility] and [facility].      Narrative:      Aerobic and anaerobic    Blood culture x two cultures. Draw prior to antibiotics. [[id number]]  (Abnormal)  (Susceptibility) Collected: 02/21/22 1218    Order Status: Completed Specimen: Blood from Peripheral, Hand, Right Updated: 02/24/22 0945     Blood Culture, Routine Gram stain aer bottle: Gram positive cocci       Positive results previously called 02/22/2022  12:12      Gram stain jose bottle: Gram positive cocci      METHICILLIN RESISTANT STAPHYLOCOCCUS AUREUS  ID consult required at [facility] and [facility] locations.      Narrative:      Aerobic and anaerobic    AFB Culture & Smear [[id number]] Collected: 02/22/22 1955    Order Status: Completed Specimen: Respiratory from Mouth Updated: 02/24/22 0927     AFB Culture & Smear Culture in progress     AFB CULTURE STAIN No acid fast bacilli seen.    AFB Culture & Smear [[id number]] Collected: 02/22/22 1153    Order Status: Completed Specimen: Wound from Foot, Right Updated: 02/24/22 0927     AFB Culture & Smear Culture in progress     AFB CULTURE STAIN No acid fast bacilli seen.    Culture, Anaerobic [[id number]] Collected: 02/22/22 1153    Order Status: Completed Specimen: Wound from Foot, Right Updated: 02/24/22 0925     Anaerobic Culture Culture in progress    AFB Culture & Smear [[id number]]     Order Status: No result Specimen: Respiratory     AFB Culture & Smear [[id number]]     Order Status: No result  Specimen: Respiratory     Gram stain [[id number]] Collected: 02/22/22 1153    Order Status: Completed Specimen: Wound from Foot, Right Updated: 02/22/22 2156     Gram Stain Result Rare WBC's      Few Gram positive cocci      Rare Gram positive rods    Fungus culture [[id number]] Collected: 02/22/22 1153    Order Status: Sent Specimen: Wound from Foot, Right Updated: 02/22/22 1937            Significant Imaging: I have reviewed all pertinent imaging results/findings within the past 24 hours.

## 2022-02-26 NOTE — ASSESSMENT & PLAN NOTE
S/p surgical debridement right foot debridement on February 22 and bilateral foot debridements on February 24.  Suspect there may be some residual bone infection.  Blood cultures positive for MRSA. Surgical cultures positive for MRSA    Plan    1. Continue IV vancomycin.

## 2022-02-26 NOTE — PROGRESS NOTES
[facility] ([place])  Infectious Disease  Progress Note    Patient Name: Dave Good  MRN: [id number]  Admission Date: 2/21/2022  Length of Stay: 4 days  Attending Physician: Marcy Patrick MD  Primary Care Provider: Reyna Jorge NP    Isolation Status: Contact  Assessment/Plan:      * Diabetic wet gangrene of the foot  S/p surgical debridement right foot debridement on February 22 and bilateral foot debridements on February 24.  Suspect there may be some residual bone infection.  Blood cultures positive for MRSA. Surgical cultures positive for MRSA    Plan    1. Continue IV vancomycin.    MRSA bacteremia  MRSA bacteremia.  Likely source would be his right foot.  TTE negative. PAT aborted due to patient becoming patient having substantial anxiety.  Given the presence of possible septic emboli in his lungs.  Will plan to treat for presumptive endocarditis.      Plan    1. Continue vancomycin IV    2. Follow up repeat blood cultures.    Cavitary lesion of lung  Likely septic emboli.  Mycobacterial infection is less likely.    Recommendations    1. Continue IV vancomycin.    2. Quantiferon gold pending.        Anticipated Disposition: TBD    Thank you for your consult. I will follow-up with patient. Please contact us if you have any additional questions.    Jean Munguia MD  Infectious Disease  [facility] ([place])    Subjective:     Principal Problem:Diabetic wet gangrene of the foot    HPI: 56 year old male with a history of DM and chronic wounds to both feet worse on the right foot.  He has a history of right foot transmetatarsal amputation of the 1st digit of the right foot in September 2021.  He had been following with wound care for his non-healing wound.  Cultures obtained from his right foot wound in November 2021 were positive for MSSA.  He was treated with oral bactrim for about 2 weeks.  A few days prior to admission, he developed fevers, increased pain in his foot and ultimately presented  to the ER.  In the ER, he was noted to have a gangrenous 2nd digit of the right foot.  CTA of chest in the ER revealed multiple cavitary lung lesions.  Blood cultures obtained are positive in 3 out of 4 bottles with GPCs.  TTE is negative.  He has now underwent surgical debridement of the right foot with amputation of the 2nd digit on February 22, 2022.  ID is consulted to assist with management.  Of note, he states that he has lost around 15 lbs since September of 2021.  He has previously been homeless for many years but secured an apartment 1 year ago and hasn't been homeless since.    Interval History:     PAT attempted but aborted.    Review of Systems   All other systems reviewed and are negative.  Objective:     Vital Signs (Most Recent):  Temp: 97 °F (36.1 °C) (02/25/22 1936)  Pulse: 75 (02/25/22 2253)  Resp: 16 (02/25/22 2251)  BP: 120/76 (02/25/22 2253)  SpO2: 97 % (02/25/22 1936) Vital Signs (24h Range):  Temp:  [97 °F (36.1 °C)-98.6 °F (37 °C)] 97 °F (36.1 °C)  Pulse:  [69-85] 75  Resp:  [16-20] 16  SpO2:  [93 %-100 %] 97 %  BP: ()/(53-80) 120/76     Weight: 131.1 kg (289 lb 0.4 oz)  Body mass index is 37.11 kg/m².    Estimated Creatinine Clearance: 105.4 mL/min (based on SCr of 1.1 mg/dL).    Physical Exam  Vitals and nursing note reviewed.   Constitutional:       General: He is not in acute distress.     Appearance: He is well-developed. He is not diaphoretic.   HENT:      Head: Normocephalic and atraumatic.      Right Ear: External ear normal.      Left Ear: External ear normal.      Nose: Nose normal.      Mouth/Throat:      Pharynx: No oropharyngeal exudate.   Eyes:      General: No scleral icterus.        Right eye: No discharge.         Left eye: No discharge.      Conjunctiva/sclera: Conjunctivae normal.      Pupils: Pupils are equal, round, and reactive to light.   Neck:      Thyroid: No thyromegaly.      Vascular: No JVD.      Trachea: No tracheal deviation.   Cardiovascular:      Rate and  Rhythm: Normal rate and regular rhythm.      Heart sounds: No murmur heard.    No friction rub. No gallop.   Pulmonary:      Effort: Pulmonary effort is normal. No respiratory distress.      Breath sounds: Normal breath sounds. No stridor. No wheezing or rales.   Chest:      Chest wall: No tenderness.   Abdominal:      General: Bowel sounds are normal. There is no distension.      Palpations: Abdomen is soft. There is no mass.      Tenderness: There is no abdominal tenderness. There is no guarding or rebound.   Musculoskeletal:      Cervical back: Normal range of motion and neck supple.      Comments: Feet wrapped bilaterally.   Lymphadenopathy:      Cervical: No cervical adenopathy.   Skin:     General: Skin is warm.      Coloration: Skin is not pale.      Findings: No erythema or rash.   Neurological:      Mental Status: He is alert and oriented to person, place, and time.      Cranial Nerves: No cranial nerve deficit.      Motor: No abnormal muscle tone.      Coordination: Coordination normal.      Deep Tendon Reflexes: Reflexes are normal and symmetric. Reflexes normal.   Psychiatric:         Behavior: Behavior normal.         Thought Content: Thought content normal.         Judgment: Judgment normal.       Significant Labs:   Microbiology Results (last 7 days)       Procedure Component Value Units Date/Time    Blood culture [[id number]] Collected: 02/25/22 0548    Order Status: Completed Specimen: Blood Updated: 02/25/22 1745     Blood Culture, Routine No Growth to date    Narrative:      Collection has been rescheduled by WAD1 at 02/25/2022 05:55 Reason:   Patient unavailable labs collected  Collection has been rescheduled by WAD1 at 02/25/2022 05:55 Reason:   Patient unavailable labs collected    Aerobic culture [[id number]]  (Abnormal)  (Susceptibility) Collected: 02/22/22 1153    Order Status: Completed Specimen: Wound from Foot, Right Updated: 02/25/22 1055     Aerobic Bacterial Culture METHICILLIN RESISTANT  STAPHYLOCOCCUS AUREUS  Many  No other significant isolate      Blood culture [[id number]] Collected: 02/23/22 0550    Order Status: Completed Specimen: Blood Updated: 02/25/22 1012     Blood Culture, Routine No Growth to date      No Growth to date      No Growth to date    Narrative:      Collection has been rescheduled by WAD1 at 02/23/2022 05:53 Reason:   Patient unavailable labs collected  Collection has been rescheduled by WAD1 at 02/23/2022 05:53 Reason:   Patient unavailable labs collected    Blood culture x two cultures. Draw prior to antibiotics. [[id number]]  (Abnormal) Collected: 02/21/22 1231    Order Status: Completed Specimen: Blood from Peripheral, Hand, Left Updated: 02/24/22 0948     Blood Culture, Routine Gram stain aer bottle: Gram positive cocci      Gram stain jose bottle: Gram positive cocci      Results called to and read back by: KRISTINA ESCOTO RN  02/22/2022  11:03      METHICILLIN RESISTANT STAPHYLOCOCCUS AUREUS  ID consult required at [facility].  For susceptibility see order #[id number]  ID consult required at [facility] and [facility].      Narrative:      Aerobic and anaerobic    Blood culture x two cultures. Draw prior to antibiotics. [[id number]]  (Abnormal)  (Susceptibility) Collected: 02/21/22 1218    Order Status: Completed Specimen: Blood from Peripheral, Hand, Right Updated: 02/24/22 0945     Blood Culture, Routine Gram stain aer bottle: Gram positive cocci       Positive results previously called 02/22/2022  12:12      Gram stain jose bottle: Gram positive cocci      METHICILLIN RESISTANT STAPHYLOCOCCUS AUREUS  ID consult required at [facility].      Narrative:      Aerobic and anaerobic    AFB Culture & Smear [[id number]] Collected: 02/22/22 1955    Order Status: Completed Specimen: Respiratory from Mouth Updated: 02/24/22 0927     AFB Culture & Smear Culture in progress     AFB CULTURE STAIN No acid fast  bacilli seen.    AFB Culture & Smear [[id number]] Collected: 02/22/22 1153    Order Status: Completed Specimen: Wound from Foot, Right Updated: 02/24/22 0927     AFB Culture & Smear Culture in progress     AFB CULTURE STAIN No acid fast bacilli seen.    Culture, Anaerobic [[id number]] Collected: 02/22/22 1153    Order Status: Completed Specimen: Wound from Foot, Right Updated: 02/24/22 0925     Anaerobic Culture Culture in progress    AFB Culture & Smear [[id number]]     Order Status: No result Specimen: Respiratory     AFB Culture & Smear [[id number]]     Order Status: No result Specimen: Respiratory     Gram stain [[id number]] Collected: 02/22/22 1153    Order Status: Completed Specimen: Wound from Foot, Right Updated: 02/22/22 2156     Gram Stain Result Rare WBC's      Few Gram positive cocci      Rare Gram positive rods    Fungus culture [[id number]] Collected: 02/22/22 1153    Order Status: Sent Specimen: Wound from Foot, Right Updated: 02/22/22 1937            Significant Imaging: I have reviewed all pertinent imaging results/findings within the past 24 hours.

## 2022-02-26 NOTE — PT/OT/SLP PROGRESS
"Physical Therapy Treatment    Patient Name:  Dave Good   MRN:  [id number]    Recommendations:     Discharge Recommendations:  home health PT, home health OT (if HHPT not covered by ins., then OPPT w/ insurance transportation)   Discharge Equipment Recommendations: cane, straight (other DME per OT)   Barriers to discharge: None    Assessment:     Dave Good is a 56 y.o. male admitted with a medical diagnosis of Diabetic wet gangrene of the foot.  He presents with the following impairments/functional limitations:  weakness, impaired endurance, impaired self care skills, impaired functional mobilty, gait instability, impaired balance, decreased lower extremity function, pain, impaired skin, edema, decreased safety awareness ;pt with improved mobility today, inc amb distance in room, using a RW today, will try a cane next session.    Rehab Prognosis: Good; patient would benefit from acute skilled PT services to address these deficits and reach maximum level of function.    Recent Surgery: Procedure(s) (LRB):  Transesophageal echo (PAT) intra-procedure log documentation (N/A) 1 Day Post-Op    Plan:     During this hospitalization, patient to be seen 3 x/week to address the identified rehab impairments via gait training, therapeutic activities, therapeutic exercises, neuromuscular re-education and progress toward the following goals:    · Plan of Care Expires:  03/11/22    Subjective     Chief Complaint: pt reporting "throbbing" in R foot  Patient/Family Comments/goals: pt agreeable to session  Pain/Comfort:  · Pain Rating 1: 0/10 (at rest)  · Location - Side 1: Right  · Location 1: foot  · Pain Addressed 1: Reposition, Distraction  · Pain Rating Post-Intervention 1: 9/10 (after amb)      Objective:     Communicated with nurse prior to session.  Patient found HOB elevated with peripheral IV, oxygen, wound vac upon PT entry to room.     General Precautions: Standard, diabetic, fall (home O2)   Orthopedic " Precautions:RLE weight bearing as tolerated   Braces:  (B [employer]co shoes)  Respiratory Status: Nasal cannula, flow 2 L/min     Functional Mobility:  · Bed Mobility:     · Supine to Sit: stand by assistance  · Transfers:     · Sit to Stand:  stand by assistance with rolling walker  · Gait: amb'd 30' w/ RW and CGA/SBA, woundvac in tow, O2@2L      AM-PAC 6 CLICK MOBILITY  Turning over in bed (including adjusting bedclothes, sheets and blankets)?: 4  Sitting down on and standing up from a chair with arms (e.g., wheelchair, bedside commode, etc.): 3  Moving from lying on back to sitting on the side of the bed?: 4  Moving to and from a bed to a chair (including a wheelchair)?: 3  Need to walk in hospital room?: 3  Climbing 3-5 steps with a railing?: 3  Basic Mobility Total Score: 20       Therapeutic Activities and Exercises:   perf'd  Sup to sit from partially elevated HOB w/ SBA, perf'd seated hip flex and LAQ's x 10 ea.     Patient left up in chair with all lines intact, call button in reach, nurse notified and LE's elevated..    GOALS:   Multidisciplinary Problems     Physical Therapy Goals        Problem: Physical Therapy Goal    Goal Priority Disciplines Outcome Goal Variances Interventions   Physical Therapy Goal     PT, PT/OT Ongoing, Progressing     Description: Goals to be met by: 3/11/22    Patient will increase functional independence with mobility by performin. Gait x 100 feet with supervision with LRAD.  2. Ascend/descend 10 step(s) with least restrictive assistive device and uni HR with supervision.  3. Standing activity x5 min without significant increased in foot pain with surgical shoes donned.                    Time Tracking:     PT Received On: 22  PT Start Time: 1130     PT Stop Time: 1153  PT Total Time (min): 23 min     Billable Minutes: Gait Training 13 and Therapeutic Exercise 10    Treatment Type: Treatment  PT/PTA: PTA     PTA Visit Number: 2022

## 2022-02-26 NOTE — PT/OT/SLP EVAL
Occupational Therapy   Evaluation and Treatment    Name: Dave Good  MRN: [id number]  Admitting Diagnosis:  Diabetic wet gangrene of the foot  Recent Surgery: Procedure(s) (LRB):  Transesophageal echo (PAT) intra-procedure log documentation (N/A) 1 Day Post-Op    Recommendations:     Discharge Recommendations: home health PT, home health OT  Discharge Equipment Recommendations:  bath bench, bedside commode (AD needs per PT)  Barriers to discharge:  Inaccessible home environment, Decreased caregiver support (current functional level)    Assessment:   Initial OT eval/treat complete.  Ambulating short household distance bedside chair<>bathroom doorway with CGA/SBA and RW with O2 NC in place.  Step transfer to toilet also requiring CGA/SBA for steadying/safety while use RW with increased verbal cuing for safe RW usage while navigating turn as Pt. Lifting RW; better follow through with safe RW use once returning from BSC>bedside chair.  Reports increased pain to LLE foot this day.  Needs BSC and T TB; defer AD needs to PT.  Recommend post acute HH OT/PT.  Bobo cooks, cleans, and provides intermittent assistance while niece drives to important appts.  To benefit from continued acute care OT services to increase independence in self-care/functional transfers.  OT to follow.     Dave Good is a 56 y.o. male with a medical diagnosis of Diabetic wet gangrene of the foot.  He presents with below deficits decreasing independence in self-care/functional transfers. Performance deficits affecting function: weakness, impaired endurance, impaired self care skills, impaired functional mobilty, gait instability, impaired balance, pain, decreased safety awareness, decreased lower extremity function, impaired skin, impaired cardiopulmonary response to activity.      Rehab Prognosis: Good; patient would benefit from acute skilled OT services to address these deficits and reach maximum level of function.       Plan:     Patient to be  seen 3 x/week to address the above listed problems via self-care/home management, therapeutic activities, therapeutic exercises  · Plan of Care Expires: 03/12/22  · Plan of Care Reviewed with: patient    Subjective     Chief Complaint: With c/o RLE-foot pain.   Patient/Family Comments/goals: No goals stated at this time.     Occupational Profile:  Lives alone in [address] [place] [address] with flight of stairs to with B-handrails; bathroom setup as tub/shower combo with standard toilet.  Previously MOD I with ambulation and ADL.  Fiance comes by daily for cooking/cleaning needs while niece assists with driving to important appts and MD appts.   Equipment Used at Home:  none  Assistance upon Discharge: Fiance able to provide intermittent assist.     Pain/Comfort:  · Pain Rating 1: 9/10 (at rest; no facial grimace, furrowed brows, moaning, guarding, or rigidity noted)  · Location - Side 1: Right  · Location 1: foot  · Pain Rating Post-Intervention 1: 9/10 (at rest though after activity; no facial grimace, furrowed brows, moaning, guarding, or rigidity noted)    Patients cultural, spiritual, [employer]ology conflicts given the current situation:  (None stated.)    Objective:     Communicated with: Kristen MANUEL LPN prior to session.  Patient found up in chair with peripheral IV, wound vac, oxygen upon OT entry to room.    General Precautions: Standard, fall, diabetic, contact   Orthopedic Precautions:RLE weight bearing as tolerated   Braces:  (sx shoe to BLE)  Respiratory Status: Nasal cannula, flow 2 L/min    Occupational Performance:    Functional Mobility/Transfers:  Sit>stand from bedside chair and ambulating short household distance bedside chair<>bathroom doorway with RW and CGA/SBA for steadying/safety due to impaired balance/stability. Step transfer bedside chair<>BSC with CGA/SBA and verbal cuing for safe RW management during task as Pt. With tendency to lift RW during step/turn though with better follow through of cues for proper RW  management upon return to bedside chair.      Activities of Daily Living:  Donned hospital gown as would robe.  Pt. Denied need for toileting at this time with BSC placed inside room in the even of need to toilet with nursing staff.  Offered assist with grooming/hygiene though Pt. Also refused.      Cognitive/Visual Perceptual:  Cognitive/Psychosocial Skills:  -       Oriented to: Person, Place, Time and Situation   -       Follows Commands/attention:Follows one-step commands  -       Communication: able to make basic needs known and answers questions appropriately  -       Memory: No Deficits noted  -       Safety awareness/insight to disability: impaired   -       Mood/Affect/Coping skills/emotional control: Appropriate to situation and Cooperative  Visual/Perceptual:  -grossly intact     Physical Exam:  Postural examination/scapula alignment: -       Rounded shoulders  -       Forward head  -       forward flexed trunk in stance   Skin integrity: Visible skin intact  Edema:  None noted  Sensation: -       Intact  light/touch though reports numbness/tinling to BUE-hands  Dominant hand: -       R  Upper Extremity Range of Motion:  -       Right Upper Extremity: WFL  -       Left Upper Extremity: WFL  Upper Extremity Strength: -       Right Upper Extremity: 4+/5 GROSS   -       Left Upper Extremity: 4+/5 GROSS   Strength: -       Right Upper Extremity: WFL  -       Left Upper Extremity: WFL  Fine Motor Coordination: -       Intact  Left hand thumb/finger opposition skills and Right hand thumb/finger opposition skills    AMPAC 6 Click ADL:  AMPAC Total Score: 19    Treatment & Education:  Educated on role of OT and POC.  Functional transfers/ADL tasks as follows:  Sit>stand from bedside chair and ambulating short household distance bedside chair<>bathroom doorway with RW and CGA/SBA for steadying/safety due to impaired balance/stability. Step transfer bedside chair<>BSC with CGA/SBA and verbal cuing for safe RW  management during task as Pt. With tendency to lift RW during step/turn though with better follow through of cues for proper RW management upon return to bedside chair.  Donned hospital gown as would jonel.  Pt. Denied need for toileting at this time with BSC placed inside room in the even of need to toilet with nursing staff.  Offered assist with grooming/hygiene though Pt. Also refused.  Received review of call light and importance of calling for assist for OOB activity and as needed.     Education:    Patient left up in chair with all lines intact, call button in reach and nursing notified    GOALS:   Multidisciplinary Problems     Occupational Therapy Goals        Problem: Occupational Therapy Goal    Goal Priority Disciplines Outcome Interventions   Occupational Therapy Goal     OT, PT/OT Ongoing, Progressing    Description: Goals to be met by: 3/12/2022     Patient will increase functional independence with ADLs by performing:    LE Dressing with Supervision to don pants.  Grooming while standing at sink with Supervision.  Toileting from bedside commode with Supervision for hygiene and clothing management.   Toilet transfer to bedside commode with Supervision.                     History:     Past Medical History:   Diagnosis Date    COPD (chronic obstructive pulmonary disease)     COVID-19     Depression     Diabetes mellitus     Diabetes mellitus, type 2     Hypertension        Past Surgical History:   Procedure Laterality Date    FOOT AMPUTATION THROUGH METATARSAL Right 9/23/2021    Procedure: AMPUTATION, FOOT, TRANSMETATARSAL right 1st ray resection;  Surgeon: Samuel Toussaint DPM;  Location: [facility] OR;  Service: Podiatry;  Laterality: Right;    INCISION AND DRAINAGE FOOT Bilateral 9/21/2021    Procedure: INCISION AND DRAINAGE, FOOT - Bilateral;  Surgeon: Lavonne Vigil DPM;  Location: [facility] OR;  Service: Podiatry;  Laterality: Bilateral;    REPLACEMENT OF WOUND DRESSING Right 2/24/2022    Procedure:  REPLACEMENT, DRESSING, WOUND;  Surgeon: Jesus Flores Jr., MD;  Location: [facility];  Service: Vascular;  Laterality: Right;  wound vac dressing changed    WOUND DEBRIDEMENT Right 2/22/2022    Procedure: DEBRIDEMENT, WOUND - RIGHT FOOT;  Surgeon: Jesus Flores Jr., MD;  Location: [facility];  Service: Vascular;  Laterality: Right;    WOUND DEBRIDEMENT Bilateral 2/24/2022    Procedure: DEBRIDEMENT, WOUND / BILATERAL DEBRIDEMENT FEET;  Surgeon: Jesus Flores Jr., MD;  Location: [facility];  Service: Vascular;  Laterality: Bilateral;       Time Tracking:     OT Date of Treatment: 02/26/22  OT Start Time: 1307  OT Stop Time: 1339  OT Total Time (min): 32 min    Billable Minutes:Evaluation 22  Therapeutic Activity 10    2/26/2022

## 2022-02-27 PROBLEM — R78.81 MRSA BACTEREMIA: Status: RESOLVED | Noted: 2022-02-22 | Resolved: 2022-02-27

## 2022-02-27 PROBLEM — B95.62 MRSA BACTEREMIA: Status: RESOLVED | Noted: 2022-02-22 | Resolved: 2022-02-27

## 2022-02-27 LAB
POCT GLUCOSE: 113 MG/DL (ref 70–110)
POCT GLUCOSE: 166 MG/DL (ref 70–110)
POCT GLUCOSE: 181 MG/DL (ref 70–110)
POCT GLUCOSE: 182 MG/DL (ref 70–110)

## 2022-02-27 PROCEDURE — 25000003 PHARM REV CODE 250: Performed by: INTERNAL MEDICINE

## 2022-02-27 PROCEDURE — A4216 STERILE WATER/SALINE, 10 ML: HCPCS | Performed by: INTERNAL MEDICINE

## 2022-02-27 PROCEDURE — 11000001 HC ACUTE MED/SURG PRIVATE ROOM

## 2022-02-27 PROCEDURE — 63600175 PHARM REV CODE 636 W HCPCS: Performed by: INTERNAL MEDICINE

## 2022-02-27 PROCEDURE — C1751 CATH, INF, PER/CENT/MIDLINE: HCPCS

## 2022-02-27 PROCEDURE — 63600175 PHARM REV CODE 636 W HCPCS: Performed by: PHYSICIAN ASSISTANT

## 2022-02-27 PROCEDURE — 99232 SBSQ HOSP IP/OBS MODERATE 35: CPT | Mod: ,,, | Performed by: INTERNAL MEDICINE

## 2022-02-27 PROCEDURE — 36569 INSJ PICC 5 YR+ W/O IMAGING: CPT

## 2022-02-27 PROCEDURE — 76937 US GUIDE VASCULAR ACCESS: CPT

## 2022-02-27 PROCEDURE — 27000207 HC ISOLATION

## 2022-02-27 PROCEDURE — 99232 PR SUBSEQUENT HOSPITAL CARE,LEVL II: ICD-10-PCS | Mod: ,,, | Performed by: INTERNAL MEDICINE

## 2022-02-27 RX ORDER — SODIUM CHLORIDE 0.9 % (FLUSH) 0.9 %
10 SYRINGE (ML) INJECTION
Status: DISCONTINUED | OUTPATIENT
Start: 2022-02-27 | End: 2022-03-07 | Stop reason: HOSPADM

## 2022-02-27 RX ORDER — SODIUM CHLORIDE 0.9 % (FLUSH) 0.9 %
10 SYRINGE (ML) INJECTION EVERY 6 HOURS
Status: DISCONTINUED | OUTPATIENT
Start: 2022-02-27 | End: 2022-03-07 | Stop reason: HOSPADM

## 2022-02-27 RX ADMIN — OXYCODONE AND ACETAMINOPHEN 1 TABLET: 7.5; 325 TABLET ORAL at 09:02

## 2022-02-27 RX ADMIN — INSULIN ASPART 10 UNITS: 100 INJECTION, SOLUTION INTRAVENOUS; SUBCUTANEOUS at 12:02

## 2022-02-27 RX ADMIN — HEPARIN SODIUM 5000 UNITS: 5000 INJECTION INTRAVENOUS; SUBCUTANEOUS at 01:02

## 2022-02-27 RX ADMIN — SODIUM CHLORIDE, PRESERVATIVE FREE 10 ML: 5 INJECTION INTRAVENOUS at 06:02

## 2022-02-27 RX ADMIN — MORPHINE SULFATE 2 MG: 4 INJECTION, SOLUTION INTRAMUSCULAR; INTRAVENOUS at 08:02

## 2022-02-27 RX ADMIN — ATORVASTATIN CALCIUM 40 MG: 20 TABLET, FILM COATED ORAL at 09:02

## 2022-02-27 RX ADMIN — HEPARIN SODIUM 5000 UNITS: 5000 INJECTION INTRAVENOUS; SUBCUTANEOUS at 09:02

## 2022-02-27 RX ADMIN — SODIUM CHLORIDE, PRESERVATIVE FREE 10 ML: 5 INJECTION INTRAVENOUS at 01:02

## 2022-02-27 RX ADMIN — QUETIAPINE FUMARATE 200 MG: 200 TABLET ORAL at 09:02

## 2022-02-27 RX ADMIN — VANCOMYCIN HYDROCHLORIDE 1500 MG: 1.5 INJECTION, POWDER, LYOPHILIZED, FOR SOLUTION INTRAVENOUS at 03:02

## 2022-02-27 RX ADMIN — INSULIN ASPART 10 UNITS: 100 INJECTION, SOLUTION INTRAVENOUS; SUBCUTANEOUS at 09:02

## 2022-02-27 RX ADMIN — OXYCODONE AND ACETAMINOPHEN 1 TABLET: 7.5; 325 TABLET ORAL at 12:02

## 2022-02-27 RX ADMIN — INSULIN DETEMIR 55 UNITS: 100 INJECTION, SOLUTION SUBCUTANEOUS at 09:02

## 2022-02-27 RX ADMIN — OXYCODONE AND ACETAMINOPHEN 1 TABLET: 7.5; 325 TABLET ORAL at 06:02

## 2022-02-27 RX ADMIN — LOSARTAN POTASSIUM 25 MG: 25 TABLET, FILM COATED ORAL at 09:02

## 2022-02-27 RX ADMIN — SODIUM CHLORIDE, PRESERVATIVE FREE 10 ML: 5 INJECTION INTRAVENOUS at 05:02

## 2022-02-27 RX ADMIN — HEPARIN SODIUM 5000 UNITS: 5000 INJECTION INTRAVENOUS; SUBCUTANEOUS at 06:02

## 2022-02-27 RX ADMIN — VANCOMYCIN HYDROCHLORIDE 1500 MG: 1.5 INJECTION, POWDER, LYOPHILIZED, FOR SOLUTION INTRAVENOUS at 01:02

## 2022-02-27 RX ADMIN — ASPIRIN 81 MG: 81 TABLET, COATED ORAL at 09:02

## 2022-02-27 RX ADMIN — MUPIROCIN: 20 OINTMENT TOPICAL at 09:02

## 2022-02-27 RX ADMIN — INSULIN ASPART 10 UNITS: 100 INJECTION, SOLUTION INTRAVENOUS; SUBCUTANEOUS at 05:02

## 2022-02-27 NOTE — PROGRESS NOTES
[facility] Medicine  Progress Note    Patient Name: Dave Good  MRN: [id number]  Patient Class: IP- Inpatient   Admission Date: 2/21/2022  Length of Stay: 6 days  Attending Physician: Marcy Patrick MD  Primary Care Provider: Reyna Jorge NP        Subjective:     Principal Problem:Diabetic wet gangrene of the foot        HPI:  55 yo m with PMH of DM , HTN , PE and COPD presented to ED  for R 2nd digit wound. Pt had amputation by  back on 9/2021 for wet gangrene . Pt is following up with wound care at [facility] twice a week. But the last time was 1.5 weeks ago, he noticed swelling ,pain and foul smelling drainage from the wound. Pt also reports worsening SOB with exertion . With some chest tightness. In ED VS were stable. Started on iv abx . Labs showed leukocytosis and hypokalemia.CTA showed cavitary lesions with possible septic emboli.       Overview/Hospital Course:  Patient admitted with foul smelling drainage from wound of R foot and SOB. Found to have cavitary lung lesions concerning for septic emboli. General surgery and ID consulted. He went to OR on 2/22 debridement and transmetatarsal amputation of 2nd R digit. Wound vac placed. Blood culture with MRSA. PAT ordered but patient unable to tolerate procedure (unable to pass probe, wouldn't swallow). Given presence of septic emboli, planned for empiric treatment of endocarditis for 6 weeks.      Interval History: No events overnight. No new concerns.    Review of Systems   Constitutional:  Negative for chills and fever.   Respiratory:  Negative for shortness of breath.    Cardiovascular:  Negative for chest pain.   Gastrointestinal:  Negative for nausea and vomiting.   Objective:     Vital Signs (Most Recent):  Temp: 98 °F (36.7 °C) (02/27/22 1131)  Pulse: 76 (02/27/22 1131)  Resp: 16 (02/27/22 1131)  BP: (!) 85/61 (02/27/22 1131)  SpO2: 97 % (02/27/22 1131)   Vital Signs (24h Range):  Temp:  [96.4 °F (35.8 °C)-98.3  °F (36.8 °C)] 98 °F (36.7 °C)  Pulse:  [74-84] 76  Resp:  [16-20] 16  SpO2:  [97 %-98 %] 97 %  BP: ()/(61-79) 85/61     Weight: 131.1 kg (289 lb 0.4 oz)  Body mass index is 37.11 kg/m².    Intake/Output Summary (Last 24 hours) at 2/27/2022 1150  Last data filed at 2/27/2022 0200  Gross per 24 hour   Intake 560 ml   Output 1450 ml   Net -890 ml        Physical Exam  Vitals and nursing note reviewed.   Constitutional:       General: He is not in acute distress.     Appearance: Normal appearance. He is well-developed.   Cardiovascular:      Rate and Rhythm: Normal rate and regular rhythm.      Pulses: Normal pulses.   Pulmonary:      Effort: Pulmonary effort is normal.      Breath sounds: No rales.      Comments: shallow  Abdominal:      General: Bowel sounds are normal.      Palpations: Abdomen is soft.      Tenderness: There is no abdominal tenderness.   Musculoskeletal:      Right lower leg: Edema present.      Left lower leg: Edema present.   Skin:     General: Skin is warm and dry.      Comments: Post-op dressing on bilateral feet. Wound vac present on R foot   Neurological:      Mental Status: He is alert and oriented to person, place, and time. Mental status is at baseline.   Psychiatric:         Behavior: Behavior normal.       Significant Labs: All pertinent labs within the past 24 hours have been reviewed.  BMP:   Recent Labs   Lab 02/26/22  0513   *      K 4.4      CO2 29   BUN 14   CREATININE 1.2   CALCIUM 9.6       CBC:   Recent Labs   Lab 02/26/22  0513   WBC 13.62*   HGB 10.4*   HCT 34.8*   *         Significant Imaging: I have reviewed all pertinent imaging results/findings within the past 24 hours.      Assessment/Plan:      * Diabetic wet gangrene of the foot  MRSA bacteremia  Septic emboli / cavitary lung lesion  Diabetic foot infection of left foot  - R 2nd digit with wet gangrene and necrotic ulcer on L foot  - CTA without PE but with bilateral cavitary lesion with  possible septic emboli   - Quantiferon gold negative. Sputum AFB smear negative x 3. AFB culture pending   - Echo without vegetation but given septic emboli, PAT attempted  - PAT canceled, pt unable to swallow probe on 3 attempts then refused  - Blood culture with MRSA. Repeat blood culture (2/23 and 2/25) NGTD  - ID, general surgery. Appreciate input  - Continue wound vac and local wound care   - S/p 2nd digit transmetatarsal amputation and debridement 2/22 and b/l debridement 2/24  - Continue PT/OT   - Continue percocet and morphine for pain  - Plan to treat for presumed endocarditis given septic emboli. Continue vancomycin. End date 4/6/22  - Given the need for wound care, wound vac and prolonged IV antibiotics, I do not think he will be able to manage at home  - SW for LTAC    Chronic respiratory failure with hypoxia  - On 2L home O2 2/2 COVID-19  - Continue oxygen protocol    Cavitary lesion of lung  - As above    COPD with asthma  - Duoneb PRN    Depression  - Continue seroquel    HTN (hypertension)  - Continue losartan 25 mg qd    Type 2 diabetes mellitus without complication, with long-term current use of insulin  - A1c 9.8  - Continue levemir 55U qHS, aspart 10U TIDWM, and low dose SSI AC/HS PRN    VTE Risk Mitigation (From admission, onward)         Ordered     heparin (porcine) injection 5,000 Units  Every 8 hours         02/21/22 7502                      Marcy Patrick MD  Department of Hospital Medicine   [facility] ([place])

## 2022-02-27 NOTE — ASSESSMENT & PLAN NOTE
MRSA bacteremia  Septic emboli / cavitary lung lesion  Diabetic foot infection of left foot  - R 2nd digit with wet gangrene and necrotic ulcer on L foot  - CTA without PE but with bilateral cavitary lesion with possible septic emboli vs organizing pneumonia   - Quantiferon gold negative. Sputum AFB smear negative x 3. AFB culture pending   - Echo without vegetation but given septic emboli, PAT attempted  - PAT canceled, pt unable to swallow probe on 3 attempts then refused  - Blood culture with MRSA. Repeat blood culture (2/23 and 2/25) NGTD  - ID, general surgery. Appreciate input  - Continue vancomycin with pharmacy dosing. Continue wound vac and local wound care   - S/p 2nd digit transmetatarsal amputation and debridement 2/22 and b/l debridement 2/24  - Continue PT/OT   - Continue percocet and morphine for pain  - Plan to treat empirically for endocarditis given septic emboli  - Given the need for wound care, wound vac and prolonged IV antibiotics, I do not think he will be able to manage at home  - SW for LTAC

## 2022-02-27 NOTE — PLAN OF CARE
Plan of care reviewed with patient.  Purposeful rounding q2h to address pt needs and safety.  Pt turns q2h to preserve skin integrity.  Pt is alert and oriented x 4.  Vital signs are stable. Pt on BNC @ 2L. Pain managed prn per MD orders.  Pt voids via urinal. PICC line inserted for adequate access. Pt remains free of fall during my shift.  Pt's bed in low, wheels locked, call light and personal belongings within reach. Will continue to monitor until handoff to oncoming nurse

## 2022-02-27 NOTE — PROCEDURES
"Dave Good is a 56 y.o. male patient.    Temp: 97.5 °F (36.4 °C) (02/27/22 0050)  Pulse: 81 (02/27/22 0050)  Resp: 18 (02/27/22 0050)  BP: 123/70 (02/27/22 0050)  SpO2: 97 % (02/27/22 0050)  Weight: 131.1 kg (289 lb 0.4 oz) (02/23/22 1100)  Height: 6' 2" (188 cm) (02/23/22 1100)    PICC  Date/Time: 2/27/2022 1:30 AM  Performed by: Sergio Bernardo RN  Consent Done: Yes  Time out: Immediately prior to procedure a time out was called to verify the correct patient, procedure, equipment, support staff and site/side marked as required  Indications: med administration and vascular access  Anesthesia: local infiltration  Local anesthetic: lidocaine 1% without epinephrine  Anesthetic Total (mL): 2  Preparation: skin prepped with ChloraPrep  Skin prep agent dried: skin prep agent completely dried prior to procedure  Sterile barriers: all five maximum sterile barriers used - cap, mask, sterile gown, sterile gloves, and large sterile sheet  Hand hygiene: hand hygiene performed prior to central venous catheter insertion  Location details: left basilic  Catheter type: double lumen  Catheter size: 5 Fr  Catheter Length: 47cm    Ultrasound guidance: yes  Vessel Caliber: large, compressibility normal  Vascular Doppler: not done  Needle advanced into vessel with real time Ultrasound guidance.  Guidewire confirmed in vessel.  Number of attempts: 1  Post-procedure: blood return through all ports, sterile dressing applied and chlorhexidine patch    Assessment: placement verified by x-ray, tip termination and successful placement          Name Sergio Bernardo RN   2/27/2022    "

## 2022-02-27 NOTE — PROGRESS NOTES
Pharmacokinetic Assessment Follow Up: IV Vancomycin    Vancomycin serum concentration assessment(s):    The random level was drawn correctly and can be used to guide therapy at this time. The measurement is within the desired definitive target range of 10 to 20 mcg/mL.    Vancomycin Regimen Plan:    Continue regimen of Vancomycin 1500 mg IV every 12 hours for two more doses. Therapy will be discontinued thereafter as originally ordered for only 5 days of therapy.     Drug levels (last 3 results):  Recent Labs   Lab Result Units 02/25/22  0243 02/25/22  2138 02/26/22  0513 02/26/22  2145   Vancomycin, Random ug/mL  --  21.2 17.4 16.0   Vancomycin-Trough ug/mL 26.9*  --   --   --        Pharmacy will continue to follow and monitor vancomycin.    Please contact pharmacy at extension [phone] for questions regarding this assessment.    Thank you for the consult,   Tanya Boggs       Patient brief summary:  Dave Good is a 56 y.o. male initiated on antimicrobial therapy with IV Vancomycin for treatment of pneumonia        Drug Allergies:   Review of patient's allergies indicates:   Allergen Reactions    Ibuprofen Swelling     Facial swelling       Actual Body Weight:   131.1 kg    Renal Function:   Estimated Creatinine Clearance: 96.6 mL/min (based on SCr of 1.2 mg/dL).,     Dialysis Method (if applicable):  N/A    CBC (last 72 hours):  Recent Labs   Lab Result Units 02/24/22  0821 02/26/22  0513   WBC K/uL 14.24* 13.62*   Hemoglobin g/dL 9.7* 10.4*   Hematocrit % 31.3* 34.8*   Platelets K/uL 563* 653*   Gran % % 67.5  --    Lymph % % 17.7*  --    Mono % % 9.5  --    Eosinophil % % 2.6  --    Basophil % % 0.3  --    Differential Method  Automated  --        Metabolic Panel (last 72 hours):  Recent Labs   Lab Result Units 02/24/22  0821 02/26/22  0513   Sodium mmol/L 135* 137   Potassium mmol/L 3.9 4.4   Chloride mmol/L 97 100   CO2 mmol/L 31* 29   Glucose mg/dL 182* 184*   BUN mg/dL 13 14   Creatinine mg/dL 1.1 1.2    Albumin g/dL 1.7* 1.8*   Phosphorus mg/dL 2.9 2.8       Vancomycin Administrations:  vancomycin given in the last 96 hours                   vancomycin 1.5 g in dextrose 5 % 250 mL IVPB (ready to mix) (mg) 1,500 mg New Bag 02/26/22 0848    vancomycin 1.75 g in 5 % dextrose 500 mL IVPB (mg) 1,750 mg New Bag 02/25/22 0308      Restarted 02/24/22 1703     1,750 mg New Bag  1505     1,750 mg New Bag  0348    vancomycin 2 g in dextrose 5 % 500 mL IVPB (mg) 2,000 mg New Bag 02/23/22 1633     2,000 mg New Bag  0510                Microbiologic Results:  Microbiology Results (last 7 days)     Procedure Component Value Units Date/Time    Blood culture [[id number]] Collected: 02/25/22 0548    Order Status: Completed Specimen: Blood Updated: 02/26/22 1212     Blood Culture, Routine No Growth to date      No Growth to date    Narrative:      Collection has been rescheduled by WAD1 at 02/25/2022 05:55 Reason:   Patient unavailable labs collected  Collection has been rescheduled by WAD1 at 02/25/2022 05:55 Reason:   Patient unavailable labs collected    Blood culture [[id number]] Collected: 02/23/22 0550    Order Status: Completed Specimen: Blood Updated: 02/26/22 1012     Blood Culture, Routine No Growth to date      No Growth to date      No Growth to date      No Growth to date    Narrative:      Collection has been rescheduled by WAD1 at 02/23/2022 05:53 Reason:   Patient unavailable labs collected  Collection has been rescheduled by WAD1 at 02/23/2022 05:53 Reason:   Patient unavailable labs collected    Aerobic culture [[id number]]  (Abnormal)  (Susceptibility) Collected: 02/22/22 1153    Order Status: Completed Specimen: Wound from Foot, Right Updated: 02/25/22 1055     Aerobic Bacterial Culture METHICILLIN RESISTANT STAPHYLOCOCCUS AUREUS  Many  No other significant isolate      Blood culture x two cultures. Draw prior to antibiotics. [[id number]]  (Abnormal) Collected: 02/21/22 1231    Order Status: Completed Specimen:  Blood from Peripheral, Hand, Left Updated: 02/24/22 0948     Blood Culture, Routine Gram stain aer bottle: Gram positive cocci      Gram stain jose bottle: Gram positive cocci      Results called to and read back by: KRISTINA ESCOTO RN  02/22/2022  11:03      METHICILLIN RESISTANT STAPHYLOCOCCUS AUREUS  ID consult required at [facility] and [facility].  For susceptibility see order #[id number]  ID consult required at [facility] and [facility].      Narrative:      Aerobic and anaerobic    Blood culture x two cultures. Draw prior to antibiotics. [[id number]]  (Abnormal)  (Susceptibility) Collected: 02/21/22 1218    Order Status: Completed Specimen: Blood from Peripheral, Hand, Right Updated: 02/24/22 0945     Blood Culture, Routine Gram stain aer bottle: Gram positive cocci       Positive results previously called 02/22/2022  12:12      Gram stain jose bottle: Gram positive cocci      METHICILLIN RESISTANT STAPHYLOCOCCUS AUREUS  ID consult required at [facility] and [facility] locations.      Narrative:      Aerobic and anaerobic    AFB Culture & Smear [[id number]] Collected: 02/22/22 1955    Order Status: Completed Specimen: Respiratory from Mouth Updated: 02/24/22 0927     AFB Culture & Smear Culture in progress     AFB CULTURE STAIN No acid fast bacilli seen.    AFB Culture & Smear [[id number]] Collected: 02/22/22 1153    Order Status: Completed Specimen: Wound from Foot, Right Updated: 02/24/22 0927     AFB Culture & Smear Culture in progress     AFB CULTURE STAIN No acid fast bacilli seen.    Culture, Anaerobic [[id number]] Collected: 02/22/22 1153    Order Status: Completed Specimen: Wound from Foot, Right Updated: 02/24/22 0925     Anaerobic Culture Culture in progress    AFB Culture & Smear [[id number]]     Order Status: No result Specimen: Respiratory     AFB Culture & Smear [[id number]]     Order Status: No result Specimen: Respiratory     Gram stain [[id number]] Collected: 02/22/22  1153    Order Status: Completed Specimen: Wound from Foot, Right Updated: 02/22/22 2156     Gram Stain Result Rare WBC's      Few Gram positive cocci      Rare Gram positive rods    Fungus culture [[id number]] Collected: 02/22/22 1153    Order Status: Sent Specimen: Wound from Foot, Right Updated: 02/22/22 1937

## 2022-02-27 NOTE — SUBJECTIVE & OBJECTIVE
Interval History: No events overnight. No new concerns.    Review of Systems   Constitutional:  Negative for chills and fever.   Respiratory:  Negative for shortness of breath.    Cardiovascular:  Negative for chest pain.   Gastrointestinal:  Negative for nausea and vomiting.   Objective:     Vital Signs (Most Recent):  Temp: 98 °F (36.7 °C) (02/27/22 1131)  Pulse: 76 (02/27/22 1131)  Resp: 16 (02/27/22 1131)  BP: (!) 85/61 (02/27/22 1131)  SpO2: 97 % (02/27/22 1131)   Vital Signs (24h Range):  Temp:  [96.4 °F (35.8 °C)-98.3 °F (36.8 °C)] 98 °F (36.7 °C)  Pulse:  [74-84] 76  Resp:  [16-20] 16  SpO2:  [97 %-98 %] 97 %  BP: ()/(61-79) 85/61     Weight: 131.1 kg (289 lb 0.4 oz)  Body mass index is 37.11 kg/m².    Intake/Output Summary (Last 24 hours) at 2/27/2022 1150  Last data filed at 2/27/2022 0200  Gross per 24 hour   Intake 560 ml   Output 1450 ml   Net -890 ml        Physical Exam  Vitals and nursing note reviewed.   Constitutional:       General: He is not in acute distress.     Appearance: Normal appearance. He is well-developed.   Cardiovascular:      Rate and Rhythm: Normal rate and regular rhythm.      Pulses: Normal pulses.   Pulmonary:      Effort: Pulmonary effort is normal.      Breath sounds: No rales.      Comments: shallow  Abdominal:      General: Bowel sounds are normal.      Palpations: Abdomen is soft.      Tenderness: There is no abdominal tenderness.   Musculoskeletal:      Right lower leg: Edema present.      Left lower leg: Edema present.   Skin:     General: Skin is warm and dry.      Comments: Post-op dressing on bilateral feet. Wound vac present on R foot   Neurological:      Mental Status: He is alert and oriented to person, place, and time. Mental status is at baseline.   Psychiatric:         Behavior: Behavior normal.       Significant Labs: All pertinent labs within the past 24 hours have been reviewed.  BMP:   Recent Labs   Lab 02/26/22  0513   *      K 4.4       CO2 29   BUN 14   CREATININE 1.2   CALCIUM 9.6       CBC:   Recent Labs   Lab 02/26/22  0513   WBC 13.62*   HGB 10.4*   HCT 34.8*   *         Significant Imaging: I have reviewed all pertinent imaging results/findings within the past 24 hours.

## 2022-02-28 PROBLEM — R63.8 INCREASED NUTRITIONAL NEEDS: Status: ACTIVE | Noted: 2022-02-28

## 2022-02-28 LAB
BACTERIA BLD CULT: NORMAL
BACTERIA SPEC ANAEROBE CULT: ABNORMAL
GAMMA INTERFERON BACKGROUND BLD IA-ACNC: 0.03 IU/ML
M TB IFN-G CD4+ BCKGRND COR BLD-ACNC: 0 IU/ML
M TB IFN-G CD4+CD8+ BCKGRND COR BLD-ACNC: -0.01 IU/ML
MITOGEN IGNF BCKGRD COR BLD-ACNC: 0.07 IU/ML
POCT GLUCOSE: 162 MG/DL (ref 70–110)
POCT GLUCOSE: 197 MG/DL (ref 70–110)
POCT GLUCOSE: 204 MG/DL (ref 70–110)
POCT GLUCOSE: 221 MG/DL (ref 70–110)
TB GOLD PLUS: ABNORMAL
VANCOMYCIN TROUGH SERPL-MCNC: 24.1 UG/ML (ref 10–22)

## 2022-02-28 PROCEDURE — 36415 COLL VENOUS BLD VENIPUNCTURE: CPT | Performed by: INTERNAL MEDICINE

## 2022-02-28 PROCEDURE — A4216 STERILE WATER/SALINE, 10 ML: HCPCS | Performed by: ANESTHESIOLOGY

## 2022-02-28 PROCEDURE — 99233 PR SUBSEQUENT HOSPITAL CARE,LEVL III: ICD-10-PCS | Mod: ,,, | Performed by: INTERNAL MEDICINE

## 2022-02-28 PROCEDURE — 25000003 PHARM REV CODE 250: Performed by: INTERNAL MEDICINE

## 2022-02-28 PROCEDURE — 99233 SBSQ HOSP IP/OBS HIGH 50: CPT | Mod: ,,, | Performed by: INTERNAL MEDICINE

## 2022-02-28 PROCEDURE — 11000001 HC ACUTE MED/SURG PRIVATE ROOM

## 2022-02-28 PROCEDURE — 94761 N-INVAS EAR/PLS OXIMETRY MLT: CPT

## 2022-02-28 PROCEDURE — 97535 SELF CARE MNGMENT TRAINING: CPT

## 2022-02-28 PROCEDURE — 63600175 PHARM REV CODE 636 W HCPCS: Performed by: INTERNAL MEDICINE

## 2022-02-28 PROCEDURE — 97116 GAIT TRAINING THERAPY: CPT

## 2022-02-28 PROCEDURE — 80202 ASSAY OF VANCOMYCIN: CPT | Performed by: INTERNAL MEDICINE

## 2022-02-28 PROCEDURE — 97530 THERAPEUTIC ACTIVITIES: CPT

## 2022-02-28 PROCEDURE — 27000221 HC OXYGEN, UP TO 24 HOURS

## 2022-02-28 PROCEDURE — 99232 PR SUBSEQUENT HOSPITAL CARE,LEVL II: ICD-10-PCS | Mod: ,,, | Performed by: INTERNAL MEDICINE

## 2022-02-28 PROCEDURE — 99232 SBSQ HOSP IP/OBS MODERATE 35: CPT | Mod: ,,, | Performed by: INTERNAL MEDICINE

## 2022-02-28 PROCEDURE — 25000003 PHARM REV CODE 250: Performed by: ANESTHESIOLOGY

## 2022-02-28 PROCEDURE — A4216 STERILE WATER/SALINE, 10 ML: HCPCS | Performed by: INTERNAL MEDICINE

## 2022-02-28 PROCEDURE — 63600175 PHARM REV CODE 636 W HCPCS: Performed by: PHYSICIAN ASSISTANT

## 2022-02-28 PROCEDURE — C1751 CATH, INF, PER/CENT/MIDLINE: HCPCS

## 2022-02-28 PROCEDURE — 27000207 HC ISOLATION

## 2022-02-28 RX ORDER — METRONIDAZOLE 500 MG/1
500 TABLET ORAL EVERY 8 HOURS
Status: DISCONTINUED | OUTPATIENT
Start: 2022-02-28 | End: 2022-03-03

## 2022-02-28 RX ORDER — SODIUM CHLORIDE 9 MG/ML
INJECTION, SOLUTION INTRAVENOUS
Status: DISCONTINUED | OUTPATIENT
Start: 2022-02-28 | End: 2022-03-07 | Stop reason: HOSPADM

## 2022-02-28 RX ADMIN — METRONIDAZOLE 500 MG: 500 TABLET ORAL at 10:02

## 2022-02-28 RX ADMIN — HEPARIN SODIUM 5000 UNITS: 5000 INJECTION INTRAVENOUS; SUBCUTANEOUS at 05:02

## 2022-02-28 RX ADMIN — QUETIAPINE FUMARATE 200 MG: 200 TABLET ORAL at 10:02

## 2022-02-28 RX ADMIN — MORPHINE SULFATE 2 MG: 4 INJECTION, SOLUTION INTRAMUSCULAR; INTRAVENOUS at 02:02

## 2022-02-28 RX ADMIN — SODIUM CHLORIDE, PRESERVATIVE FREE 10 ML: 5 INJECTION INTRAVENOUS at 12:02

## 2022-02-28 RX ADMIN — OXYCODONE AND ACETAMINOPHEN 1 TABLET: 7.5; 325 TABLET ORAL at 03:02

## 2022-02-28 RX ADMIN — LOSARTAN POTASSIUM 25 MG: 25 TABLET, FILM COATED ORAL at 08:02

## 2022-02-28 RX ADMIN — VANCOMYCIN HYDROCHLORIDE 1500 MG: 1.5 INJECTION, POWDER, LYOPHILIZED, FOR SOLUTION INTRAVENOUS at 01:02

## 2022-02-28 RX ADMIN — SODIUM CHLORIDE, PRESERVATIVE FREE 10 ML: 5 INJECTION INTRAVENOUS at 05:02

## 2022-02-28 RX ADMIN — SODIUM CHLORIDE, PRESERVATIVE FREE 10 ML: 5 INJECTION INTRAVENOUS at 06:02

## 2022-02-28 RX ADMIN — INSULIN ASPART 10 UNITS: 100 INJECTION, SOLUTION INTRAVENOUS; SUBCUTANEOUS at 04:02

## 2022-02-28 RX ADMIN — LOSARTAN POTASSIUM 25 MG: 25 TABLET, FILM COATED ORAL at 09:02

## 2022-02-28 RX ADMIN — INSULIN DETEMIR 55 UNITS: 100 INJECTION, SOLUTION SUBCUTANEOUS at 10:02

## 2022-02-28 RX ADMIN — HEPARIN SODIUM 5000 UNITS: 5000 INJECTION INTRAVENOUS; SUBCUTANEOUS at 10:02

## 2022-02-28 RX ADMIN — ATORVASTATIN CALCIUM 40 MG: 20 TABLET, FILM COATED ORAL at 09:02

## 2022-02-28 RX ADMIN — OXYCODONE AND ACETAMINOPHEN 1 TABLET: 7.5; 325 TABLET ORAL at 01:02

## 2022-02-28 RX ADMIN — ASPIRIN 81 MG: 81 TABLET, COATED ORAL at 09:02

## 2022-02-28 RX ADMIN — INSULIN ASPART 10 UNITS: 100 INJECTION, SOLUTION INTRAVENOUS; SUBCUTANEOUS at 11:02

## 2022-02-28 RX ADMIN — MORPHINE SULFATE 2 MG: 4 INJECTION, SOLUTION INTRAMUSCULAR; INTRAVENOUS at 05:02

## 2022-02-28 RX ADMIN — INSULIN ASPART 2 UNITS: 100 INJECTION, SOLUTION INTRAVENOUS; SUBCUTANEOUS at 06:02

## 2022-02-28 RX ADMIN — Medication 3 ML: at 12:02

## 2022-02-28 RX ADMIN — MORPHINE SULFATE 2 MG: 4 INJECTION, SOLUTION INTRAMUSCULAR; INTRAVENOUS at 07:02

## 2022-02-28 RX ADMIN — HEPARIN SODIUM 5000 UNITS: 5000 INJECTION INTRAVENOUS; SUBCUTANEOUS at 02:02

## 2022-02-28 NOTE — PROGRESS NOTES
Status post debridement right and left lower extremities  VAC on right foot  For VAC dressing change possible wound closure on Wednesday

## 2022-02-28 NOTE — SUBJECTIVE & OBJECTIVE
Interval History:   NAEO. Reports some foot pain. Tolerating antibiotics    Review of Systems   All other systems reviewed and are negative.  Objective:     Vital Signs (Most Recent):  Temp: 97.6 °F (36.4 °C) (02/28/22 1204)  Pulse: 76 (02/28/22 1204)  Resp: 14 (02/28/22 1318)  BP: 116/74 (02/28/22 1204)  SpO2: 98 % (02/28/22 1204) Vital Signs (24h Range):  Temp:  [97.5 °F (36.4 °C)-98.3 °F (36.8 °C)] 97.6 °F (36.4 °C)  Pulse:  [75-84] 76  Resp:  [14-20] 14  SpO2:  [95 %-99 %] 98 %  BP: ()/(54-83) 116/74     Weight: 131.1 kg (289 lb 0.4 oz)  Body mass index is 37.11 kg/m².    Estimated Creatinine Clearance: 96.6 mL/min (based on SCr of 1.2 mg/dL).    Physical Exam  Vitals and nursing note reviewed.   Constitutional:       General: He is not in acute distress.     Appearance: He is well-developed. He is not diaphoretic.   HENT:      Head: Normocephalic and atraumatic.      Right Ear: External ear normal.      Left Ear: External ear normal.      Nose: Nose normal.      Mouth/Throat:      Pharynx: No oropharyngeal exudate.   Eyes:      General: No scleral icterus.        Right eye: No discharge.         Left eye: No discharge.      Conjunctiva/sclera: Conjunctivae normal.      Pupils: Pupils are equal, round, and reactive to light.   Neck:      Thyroid: No thyromegaly.      Vascular: No JVD.      Trachea: No tracheal deviation.   Cardiovascular:      Rate and Rhythm: Normal rate and regular rhythm.      Heart sounds: No murmur heard.    No friction rub. No gallop.   Pulmonary:      Effort: Pulmonary effort is normal. No respiratory distress.      Breath sounds: Normal breath sounds. No stridor. No wheezing or rales.   Chest:      Chest wall: No tenderness.   Abdominal:      General: Bowel sounds are normal. There is no distension.      Palpations: Abdomen is soft. There is no mass.      Tenderness: There is no abdominal tenderness. There is no guarding or rebound.   Musculoskeletal:      Cervical back: Normal  range of motion and neck supple.      Comments: Feet wrapped bilaterally.   Lymphadenopathy:      Cervical: No cervical adenopathy.   Skin:     General: Skin is warm.      Coloration: Skin is not pale.      Findings: No erythema or rash.   Neurological:      Mental Status: He is alert and oriented to person, place, and time.      Cranial Nerves: No cranial nerve deficit.      Motor: No abnormal muscle tone.      Coordination: Coordination normal.      Deep Tendon Reflexes: Reflexes are normal and symmetric. Reflexes normal.   Psychiatric:         Behavior: Behavior normal.         Thought Content: Thought content normal.         Judgment: Judgment normal.       Significant Labs:   Microbiology Results (last 7 days)       Procedure Component Value Units Date/Time    Blood culture [[id number]] Collected: 02/25/22 0548    Order Status: Completed Specimen: Blood Updated: 02/28/22 1212     Blood Culture, Routine No Growth to date      No Growth to date      No Growth to date      No Growth to date    Narrative:      Collection has been rescheduled by WAD1 at 02/25/2022 05:55 Reason:   Patient unavailable labs collected  Collection has been rescheduled by WAD1 at 02/25/2022 05:55 Reason:   Patient unavailable labs collected    Blood culture [[id number]] Collected: 02/23/22 0550    Order Status: Completed Specimen: Blood Updated: 02/28/22 1012     Blood Culture, Routine No growth after 5 days.    Narrative:      Collection has been rescheduled by WAD1 at 02/23/2022 05:53 Reason:   Patient unavailable labs collected  Collection has been rescheduled by WAD1 at 02/23/2022 05:53 Reason:   Patient unavailable labs collected    Culture, Anaerobic [[id number]]  (Abnormal) Collected: 02/22/22 1153    Order Status: Completed Specimen: Wound from Foot, Right Updated: 02/28/22 0710     Anaerobic Culture PRESUMPTIVE PREVOTELLA/PORPHYROMONAS GROUP  Many      Aerobic culture [[id number]]  (Abnormal)  (Susceptibility) Collected: 02/22/22  1153    Order Status: Completed Specimen: Wound from Foot, Right Updated: 02/25/22 1055     Aerobic Bacterial Culture METHICILLIN RESISTANT STAPHYLOCOCCUS AUREUS  Many  No other significant isolate      Blood culture x two cultures. Draw prior to antibiotics. [[id number]]  (Abnormal) Collected: 02/21/22 1231    Order Status: Completed Specimen: Blood from Peripheral, Hand, Left Updated: 02/24/22 0948     Blood Culture, Routine Gram stain aer bottle: Gram positive cocci      Gram stain jose bottle: Gram positive cocci      Results called to and read back by: KRISTINA ESCOTO RN  02/22/2022  11:03      METHICILLIN RESISTANT STAPHYLOCOCCUS AUREUS  [facility].  For susceptibility see order #[id number]  [facility] consult required [facility].      Narrative:      Aerobic and anaerobic    Blood culture x two cultures. Draw prior to antibiotics. [[id number]]  (Abnormal)  (Susceptibility) Collected: 02/21/22 1218    Order Status: Completed Specimen: Blood from Peripheral, Hand, Right Updated: 02/24/22 0945     Blood Culture, Routine Gram stain aer bottle: Gram positive cocci       Positive results previously called 02/22/2022  12:12      Gram stain jose bottle: Gram positive cocci      METHICILLIN RESISTANT STAPHYLOCOCCUS AUREUS  ID consult required at [facility] and [facility].      Narrative:      Aerobic and anaerobic    AFB Culture & Smear [[id number]] Collected: 02/22/22 1955    Order Status: Completed Specimen: Respiratory from Mouth Updated: 02/24/22 0927     AFB Culture & Smear Culture in progress     AFB CULTURE STAIN No acid fast bacilli seen.    AFB Culture & Smear [[id number]] Collected: 02/22/22 1153    Order Status: Completed Specimen: Wound from Foot, Right Updated: 02/24/22 0927     AFB Culture & Smear Culture in progress     AFB CULTURE STAIN No acid fast bacilli seen.    AFB Culture & Smear [[id number]]     Order Status: No  result Specimen: Respiratory     AFB Culture & Smear [[id number]]     Order Status: No result Specimen: Respiratory     Gram stain [[id number]] Collected: 02/22/22 1153    Order Status: Completed Specimen: Wound from Foot, Right Updated: 02/22/22 2156     Gram Stain Result Rare WBC's      Few Gram positive cocci      Rare Gram positive rods    Fungus culture [[id number]] Collected: 02/22/22 1153    Order Status: Sent Specimen: Wound from Foot, Right Updated: 02/22/22 1937            Significant Imaging: I have reviewed all pertinent imaging results/findings within the past 24 hours.

## 2022-02-28 NOTE — PROGRESS NOTES
02/28/22 1336   Delvis Risk Assessment   Sensory Perception 3-->slightly limited   Moisture 4-->rarely moist   Activity 1-->bedfast   Mobility 2-->very limited   Nutrition 4-->excellent   Friction and Shear 3-->no apparent problem   Delvis Score 17   [facility]  Wound Care  Progress Note    Patient Name: Dave Good  MRN: [id number]  Admission Date: 2/21/2022  Hospital Length of Stay: 7 days  Attending Physician: Marcy Patrick MD  Primary Care Provider: Reyna Jorge NP   No new subjective & objective note has been filed under this hospital service since the last note was generated.    Assessment/Plan:         HAPI prevention , Delvis <18 orders placed     Jannet Hebert LPN  Wound Care  [facility] ([place]

## 2022-02-28 NOTE — PROGRESS NOTES
[facility] Medicine  Progress Note    Patient Name: Dave Good  MRN: [id number]  Patient Class: IP- Inpatient   Admission Date: 2/21/2022  Length of Stay: 7 days  Attending Physician: Marcy Patrick MD  Primary Care Provider: Reyna Jorge NP        Subjective:     Principal Problem:Diabetic wet gangrene of the foot        HPI:  55 yo m with PMH of DM , HTN , PE and COPD presented to ED  for R 2nd digit wound. Pt had amputation by  back on 9/2021 for wet gangrene . Pt is following up with wound care at [facility] twice a week. But the last time was 1.5 weeks ago, he noticed swelling ,pain and foul smelling drainage from the wound. Pt also reports worsening SOB with exertion . With some chest tightness. In ED VS were stable. Started on iv abx . Labs showed leukocytosis and hypokalemia.CTA showed cavitary lesions with possible septic emboli.       Overview/Hospital Course:  Patient admitted with foul smelling drainage from wound of R foot and SOB. Found to have cavitary lung lesions concerning for septic emboli. General surgery and ID consulted. He went to OR on 2/22 debridement and transmetatarsal amputation of 2nd R digit. Wound vac placed. Blood culture with MRSA. PAT ordered but patient unable to tolerate procedure (unable to pass probe, wouldn't swallow). Given presence of septic emboli, planned for empiric treatment of endocarditis for 6 weeks.      Interval History: No events overnight. No new concerns.    Review of Systems   Constitutional:  Negative for chills and fever.   Respiratory:  Negative for shortness of breath.    Cardiovascular:  Negative for chest pain.   Gastrointestinal:  Negative for nausea and vomiting.   Objective:     Vital Signs (Most Recent):  Temp: 97.6 °F (36.4 °C) (02/28/22 1204)  Pulse: 76 (02/28/22 1204)  Resp: 16 (02/28/22 1442)  BP: 116/74 (02/28/22 1204)  SpO2: 98 % (02/28/22 1204)   Vital Signs (24h Range):  Temp:  [97.5 °F (36.4 °C)-98.3  °F (36.8 °C)] 97.6 °F (36.4 °C)  Pulse:  [75-84] 76  Resp:  [14-20] 16  SpO2:  [95 %-99 %] 98 %  BP: ()/(54-83) 116/74     Weight: 131.1 kg (289 lb 0.4 oz)  Body mass index is 37.11 kg/m².    Intake/Output Summary (Last 24 hours) at 2/28/2022 1500  Last data filed at 2/28/2022 0339  Gross per 24 hour   Intake 1410 ml   Output 1950 ml   Net -540 ml        Physical Exam  Vitals and nursing note reviewed.   Constitutional:       General: He is not in acute distress.     Appearance: Normal appearance. He is well-developed.   Cardiovascular:      Rate and Rhythm: Normal rate and regular rhythm.      Pulses: Normal pulses.   Pulmonary:      Effort: Pulmonary effort is normal.      Breath sounds: No rales.      Comments: shallow  Abdominal:      General: Bowel sounds are normal.      Palpations: Abdomen is soft.      Tenderness: There is no abdominal tenderness.   Musculoskeletal:      Right lower leg: Edema present.      Left lower leg: Edema present.   Skin:     General: Skin is warm and dry.      Comments: Post-op dressing on bilateral feet. Wound vac present on R foot   Neurological:      Mental Status: He is alert and oriented to person, place, and time. Mental status is at baseline.   Psychiatric:         Behavior: Behavior normal.       Significant Labs: All pertinent labs within the past 24 hours have been reviewed.  BMP:   No results for input(s): GLU, NA, K, CL, CO2, BUN, CREATININE, CALCIUM, MG in the last 48 hours.    CBC:   No results for input(s): WBC, HGB, HCT, PLT in the last 48 hours.      Significant Imaging: I have reviewed all pertinent imaging results/findings within the past 24 hours.      Assessment/Plan:      * Diabetic wet gangrene of the foot  MRSA bacteremia  Septic emboli / cavitary lung lesion  Diabetic foot infection of left foot  - R 2nd digit with wet gangrene and necrotic ulcer on L foot  - CTA without PE but with bilateral cavitary lesion with possible septic emboli vs organizing  pneumonia   - Quantiferon gold negative. Sputum AFB smear negative x 3.   - Echo without vegetation but given septic emboli, PAT attempted  - PAT canceled, pt unable to swallow probe on 3 attempts then refused  - Blood culture with MRSA. Repeat blood culture (2/23 and 2/25) NGTD  - ID, general surgery. Appreciate input  - Continue vancomycin with pharmacy dosing. Continue wound vac and local wound care   - S/p 2nd digit transmetatarsal amputation and debridement 2/22 and b/l debridement 2/24  - Continue PT/OT   - Continue percocet and morphine for pain  - Plan to treat for presumed endocarditis given septic emboli  - Given the need for wound care, wound vac and prolonged IV antibiotics, I do not think he will be able to manage at home  - SW for LTAC    Chronic respiratory failure with hypoxia  - On 2L home O2 2/2 COVID-19  - Continue oxygen protocol    Cavitary lesion of lung  - As above    COPD with asthma  - Duoneb PRN    Depression  - Continue seroquel    HTN (hypertension)  - Continue losartan 25 mg qd    Type 2 diabetes mellitus without complication, with long-term current use of insulin  - A1c 9.8  - Continue levemir 55U qHS, aspart 10U TIDWM, and low dose SSI AC/HS PRN    VTE Risk Mitigation (From admission, onward)         Ordered     heparin (porcine) injection 5,000 Units  Every 8 hours         02/21/22 1876                      Marcy Patrick MD  Department of Hospital Medicine   [facility] ([place])

## 2022-02-28 NOTE — SUBJECTIVE & OBJECTIVE
Interval History: No events overnight. No new concerns.    Review of Systems   Constitutional:  Negative for chills and fever.   Respiratory:  Negative for shortness of breath.    Cardiovascular:  Negative for chest pain.   Gastrointestinal:  Negative for nausea and vomiting.   Objective:     Vital Signs (Most Recent):  Temp: 97.6 °F (36.4 °C) (02/28/22 1204)  Pulse: 76 (02/28/22 1204)  Resp: 16 (02/28/22 1442)  BP: 116/74 (02/28/22 1204)  SpO2: 98 % (02/28/22 1204)   Vital Signs (24h Range):  Temp:  [97.5 °F (36.4 °C)-98.3 °F (36.8 °C)] 97.6 °F (36.4 °C)  Pulse:  [75-84] 76  Resp:  [14-20] 16  SpO2:  [95 %-99 %] 98 %  BP: ()/(54-83) 116/74     Weight: 131.1 kg (289 lb 0.4 oz)  Body mass index is 37.11 kg/m².    Intake/Output Summary (Last 24 hours) at 2/28/2022 1500  Last data filed at 2/28/2022 0339  Gross per 24 hour   Intake 1410 ml   Output 1950 ml   Net -540 ml        Physical Exam  Vitals and nursing note reviewed.   Constitutional:       General: He is not in acute distress.     Appearance: Normal appearance. He is well-developed.   Cardiovascular:      Rate and Rhythm: Normal rate and regular rhythm.      Pulses: Normal pulses.   Pulmonary:      Effort: Pulmonary effort is normal.      Breath sounds: No rales.      Comments: shallow  Abdominal:      General: Bowel sounds are normal.      Palpations: Abdomen is soft.      Tenderness: There is no abdominal tenderness.   Musculoskeletal:      Right lower leg: Edema present.      Left lower leg: Edema present.   Skin:     General: Skin is warm and dry.      Comments: Post-op dressing on bilateral feet. Wound vac present on R foot   Neurological:      Mental Status: He is alert and oriented to person, place, and time. Mental status is at baseline.   Psychiatric:         Behavior: Behavior normal.       Significant Labs: All pertinent labs within the past 24 hours have been reviewed.  BMP:   No results for input(s): GLU, NA, K, CL, CO2, BUN, CREATININE,  CALCIUM, MG in the last 48 hours.    CBC:   No results for input(s): WBC, HGB, HCT, PLT in the last 48 hours.      Significant Imaging: I have reviewed all pertinent imaging results/findings within the past 24 hours.

## 2022-02-28 NOTE — PLAN OF CARE
Report given care assumed.Assessment per flow sheet . AAOX3 Wound vac to right foot @ 125mm/hg bilateral ace wraps to feet C/D/I wife at bedside. Safety maintained will continue to monitor

## 2022-02-28 NOTE — NURSING
Oriented x 4. Plan of care reviewed. Vitals stable on 2 L via nasal cannula. PRN morphine and percocet administered for pain. No relief given per patient. Wound Vac intact at 125 ml/hr. Urinal provided. Glucose monitoring continued. Purposeful rounding completed. Will continue to monitor.

## 2022-02-28 NOTE — PLAN OF CARE
Problem: Occupational Therapy Goal  Goal: Occupational Therapy Goal  Description: Goals to be met by: 3/12/2022     Patient will increase functional independence with ADLs by performing:    LE Dressing with Supervision to don pants.  Grooming while standing at sink with Supervision.  Toileting from bedside commode with Supervision for hygiene and clothing management.   Toilet transfer to bedside commode with Supervision.    Outcome: Met

## 2022-02-28 NOTE — PLAN OF CARE
Problem: Adult Inpatient Plan of Care  Goal: Plan of Care Review  Outcome: Ongoing, Progressing  Goal: Patient-Specific Goal (Individualized)  Outcome: Ongoing, Progressing  Goal: Absence of Hospital-Acquired Illness or Injury  Outcome: Ongoing, Progressing  Goal: Optimal Comfort and Wellbeing  Outcome: Ongoing, Progressing  Goal: Readiness for Transition of Care  Outcome: Ongoing, Progressing     Problem: Diabetes Comorbidity  Goal: Blood Glucose Level Within Targeted Range  Outcome: Ongoing, Progressing     Problem: Impaired Wound Healing  Goal: Optimal Wound Healing  Outcome: Ongoing, Progressing     Problem: Fall Injury Risk  Goal: Absence of Fall and Fall-Related Injury  Outcome: Ongoing, Progressing     Problem: Infection  Goal: Absence of Infection Signs and Symptoms  Outcome: Ongoing, Progressing

## 2022-02-28 NOTE — ASSESSMENT & PLAN NOTE
MRSA bacteremia  Septic emboli / cavitary lung lesion  Diabetic foot infection of left foot  - R 2nd digit with wet gangrene and necrotic ulcer on L foot  - CTA without PE but with bilateral cavitary lesion with possible septic emboli vs organizing pneumonia   - Quantiferon gold negative. Sputum AFB smear negative x 3.   - Echo without vegetation but given septic emboli, PAT attempted  - PAT canceled, pt unable to swallow probe on 3 attempts then refused  - Blood culture with MRSA. Repeat blood culture (2/23 and 2/25) NGTD  - ID, general surgery. Appreciate input  - Continue vancomycin with pharmacy dosing. Continue wound vac and local wound care   - S/p 2nd digit transmetatarsal amputation and debridement 2/22 and b/l debridement 2/24  - Continue PT/OT   - Continue percocet and morphine for pain  - Plan to treat for presumed endocarditis given septic emboli  - Given the need for wound care, wound vac and prolonged IV antibiotics, I do not think he will be able to manage at home  - SW for LTAC

## 2022-02-28 NOTE — ASSESSMENT & PLAN NOTE
S/p surgical debridement right foot debridement on February 22 and bilateral foot debridements on February 24.  Suspect there may be some residual bone infection.  Blood cultures positive for MRSA on 2/21 and clear 2/23 and 2/25. Surgical cultures positive for MRSA and anerobes. Path pending. Unclear if this surgical culture was from clean margin. PAT was aborted, but there is concern for possible infective endocarditis given septic emboli    Recommendations:   - agree with plan for vancomycin x 6 weeks for bacteremia, IE, osteomyelitis   - add metronidazole x 2 weeks for SSTI  - f/u pending pathology  - ensure he has adequate perfusion to heal wound  - needs outpatient dental evaluation    Outpatient Antibiotic Therapy Plan:    Please send referral to [facility].    1) Infection: osteomyelitis, suspect IE, MRSA bacteremia    2) Discharge Antibiotics:    Intravenous antibiotics:   IV vancomycin    Oral antibiotics:   metronidazole    3) Therapy Duration:  6 weeks iv antibiotics    Estimated end date of IV antibiotics: 4/5/22    4) Outpatient Weekly Labs:    Order the following labs to be drawn on Mondays:    CBC   CMP    ESR    CRP   Vancomycin trough. Target 15-20    If discharged on vancomycin IV, order the following additional labs to be drawn on Thursdays:   CMP    Vancomycin trough. Target 15-20    If vancomycin trough is not at target (15-20) prior to discharge, schedule vancomycin trough to be drawn before their fourth outpatient dose.    5) Fax Lab Results to Infectious Diseases Provider: dr Kapoor    [facility] Fax Number: [phone]    6) Outpatient Infectious Diseases Follow-up     Follow-up appointment will be arranged by the ID clinic and will be found in the patient's appointments tab.     Prior to discharge, please ensure the patient's follow-up has been scheduled.     If there is still no follow-up scheduled prior to discharge, please send an  EPIC message to Xenia Stuart in Infectious Diseases.

## 2022-02-28 NOTE — PROGRESS NOTES
"[facility] - Med [facility] ([place])  Adult Nutrition  Progress Note    SUMMARY       Recommendations  1. Continue Diabetic diet as ordered   2. Recommend James TID to promote wound healing (See second sign).  3.Encourage good PO intake as needed.     Goals: pt to meet at least 85% EPN by f/u  Nutrition Goal Status: progressing towards goal  Communication of RD Recs:  (POC)    Assessment and Plan    Increased nutritional needs  Nutrition Problem:  Inadequate protein intake     Related to (etiology):   wound     Signs and Symptoms (as evidenced by):   S/p 2nd R toe amputation, foot gangrene   100% PO intake   Without ONS to aid in wound healing     Interventions:  Carbohydrate modified diet  [employer] TID - James  Collaboration with other providers     Nutrition Diagnosis Status:   Continues     Malnutrition Assessment     Skin (Micronutrient):  (Delvis Score 17)       Reason for Assessment    Reason For Assessment: RD follow-up  Diagnosis: diabetes diagnosis/complications  Relevant Medical History: COPD, COVID-19, depression, T2DM. HTN  Interdisciplinary Rounds: attended  General Information Comments: 2/28- RD f/u. + edema. On consistent carb. PO intake 100%. No ONS ordered.    Nutrition Discharge Planning: Diabetic/Low sodium with increased protein for wound healing    Nutrition Risk Screen    Nutrition Risk Screen: no indicators present    Nutrition/Diet History    Spiritual, Cultural Beliefs, Jew Practices, Values that Affect Care:  (None stated.)  Food Allergies: NKFA  Factors Affecting Nutritional Intake: None identified at this time    Anthropometrics    Temp: 97.6 °F (36.4 °C)  Height Method: Stated  Height: 6' 2" (188 cm)  Height (inches): 74 in  Weight Method: Bed Scale  Weight: 131.1 kg (289 lb 0.4 oz)  Weight (lb): 289.03 lb  Ideal Body Weight (IBW), Male: 190 lb  % Ideal Body Weight, Male (lb): 152.12 %  BMI (Calculated): 37.1  BMI Grade: 35 - 39.9 - obesity - grade II  Weight Change Amount: 15 lb " (Since September)     Lab/Procedures/Meds    Pertinent Labs Reviewed: reviewed  Pertinent Labs Comments: Glu 184, WBC 13.62, H/H 10.4/34.8, platelets 653  Pertinent Medications Reviewed: reviewed  Pertinent Medications Comments: atorvastatin, heparin, insulin aspart, insulin detemir, losartan, quetiapine, NaCl flush, vanc.    Estimated/Assessed Needs    Weight Used For Calorie Calculations: 129.3 kg (285 lb 0.9 oz)  Energy Calorie Requirements (kcal): 2182  Energy Need Method: Pendleton-St Jeor (no AF)  Protein Requirements: 155g (1.2 g/kg)  Weight Used For Protein Calculations: 129.3 kg (285 lb 0.9 oz)  Estimated Fluid Requirement Method: RDA Method  RDA Method (mL): 2182  CHO Requirement: 272 g    Nutrition Prescription Ordered    Current Diet Order: Diabetic diet    Evaluation of Received Nutrient/Fluid Intake    Energy Calories Required: meeting needs  Protein Required: meeting needs  Fluid Required: meeting needs  % Intake of Estimated Energy Needs: 75 - 100 %  % Meal Intake: 75 - 100 %    Nutrition Risk    Level of Risk/Frequency of Follow-up: moderate     Monitor and Evaluation    Food and Nutrient Intake: energy intake, food and beverage intake  Food and Nutrient Adminstration: diet order  Physical Activity and Function: nutrition-related ADLs and IADLs  Anthropometric Measurements: weight, weight change, body mass index  Biochemical Data, Medical Tests and Procedures: electrolyte and renal panel, lipid profile, gastrointestinal profile, glucose/endocrine profile, inflammatory profile  Nutrition-Focused Physical Findings: overall appearance     Nutrition Follow-Up    RD Follow-up?: Yes

## 2022-02-28 NOTE — PT/OT/SLP PROGRESS
Physical Therapy Treatment    Patient Name:  Dave Good   MRN:  [id number]    Recommendations:     Discharge Recommendations:   (LTAC per medical team)   Discharge Equipment Recommendations: bath bench, bedside commode, cane, straight   Barriers to discharge: None    Assessment:     Dave Good is a 56 y.o. male admitted with a medical diagnosis of Diabetic wet gangrene of the foot.  He presents with the following impairments/functional limitations:  weakness, impaired endurance, impaired self care skills, impaired functional mobilty, gait instability, impaired balance, decreased lower extremity function, pain, decreased safety awareness, impaired skin, impaired cardiopulmonary response to activity, impaired joint extensibility, orthopedic precautions .    Progressing with gait training - ambulated x80 ft with SPC and SBA-supervision without significant increase in pain. Stair training initiated with rec to lead with LLE d/t increased pain in RLE. Rec for dc per medical team to LTAC. Frequency decreased d/t good progress towards goals.     Rehab Prognosis: Good; patient would benefit from acute skilled PT services to address these deficits and reach maximum level of function.    Recent Surgery: Procedure(s) (LRB):  Transesophageal echo (PAT) intra-procedure log documentation (N/A) 3 Days Post-Op    Plan:     During this hospitalization, patient to be seen 2 x/week to address the identified rehab impairments via gait training, therapeutic activities, therapeutic exercises, neuromuscular re-education and progress toward the following goals:    · Plan of Care Expires:  03/11/22    Subjective     Chief Complaint: Pain in BLE R>L  Patient/Family Comments/goals: Goal to know when wound vac can be dc (deferred to medical team); Patient agreeable to PT treatment. Fiance at bedside encouraging pt.  Pain/Comfort:  Pain Rating 1:  (unrated)  Location - Side 1: Right (R>L)  Location 1: foot  Pain Addressed 1: Reposition,  Distraction  Pain Rating Post-Intervention 1:  (unrated, appears comfortable at rest)      Objective:     Communicated with RN prior to session.  Patient found HOB elevated with peripheral IV, wound vac, PICC line, oxygen upon PT entry to room.     General Precautions: Standard, diabetic, fall, contact, respiratory   Orthopedic Precautions:RLE weight bearing as tolerated   Braces:  (sx shoes BLE)  Respiratory Status: Nasal cannula, flow 2 L/min     Patient donned darco shoes for OOB mobility.     Functional Mobility:  · Bed Mobility:     · Supine to Sit: modified independence  · Transfers:     · Sit to Stand:  modified independence and supervision with straight cane  · Gait: x80 ft with SPC and SBA.   · Demo of use of SPC in LUE d/t pain in RLE  · Pt performed with appropriate sequencing, antalgic gait with increased step with with increased lateral sway  · No overt LOB, pt denies significant increase in pain  · SPO2 90% at end of gait bout on 2L NC  · Stairs:  Pt ascended/descended 10 stair(s) with No Assistive Device with right handrail with Contact Guard Assistance.   · Demo of leading with LLE to limit pain in RLE  · Performed well without increased SOB      AM-PAC 6 CLICK MOBILITY  Turning over in bed (including adjusting bedclothes, sheets and blankets)?: 4  Sitting down on and standing up from a chair with arms (e.g., wheelchair, bedside commode, etc.): 4  Moving from lying on back to sitting on the side of the bed?: 4  Moving to and from a bed to a chair (including a wheelchair)?: 3  Need to walk in hospital room?: 3  Climbing 3-5 steps with a railing?: 3  Basic Mobility Total Score: 21       Therapeutic Activities and Exercises:  · Discussed POC and progression of gait training with SPC  · Ordered new surgical shoes d/t soiled and old darco shoes at bedside    Patient left sitting edge of bed with all lines intact, call button in reach, RN notified and fiance present..    GOALS:   Multidisciplinary Problems      Physical Therapy Goals        Problem: Physical Therapy Goal    Goal Priority Disciplines Outcome Goal Variances Interventions   Physical Therapy Goal     PT, PT/OT Ongoing, Progressing     Description: Goals to be met by: 3/11/22    Patient will increase functional independence with mobility by performin. Gait x 100 feet with supervision with LRAD.  2. Ascend/descend 10 step(s) with least restrictive assistive device and uni HR with supervision.  3. Standing activity x5 min without significant increased in foot pain with surgical shoes donned.                    Time Tracking:     PT Received On: 22  PT Start Time: 1449     PT Stop Time: 1512  PT Total Time (min): 23 min     Billable Minutes: Gait Training 15 and Therapeutic Activity 8    Treatment Type: Treatment  PT/PTA: PT     PTA Visit Number: 0     2022

## 2022-02-28 NOTE — PT/OT/SLP PROGRESS
Occupational Therapy   Treatment and Discharge    Name: Dave Good  MRN: [id number]  Admitting Diagnosis:  Diabetic wet gangrene of the foot  3 Days Post-Op    Recommendations:     Discharge Recommendations:  (LTAC per medical team)  Discharge Equipment Recommendations:  bath bench, bedside commode, cane, straight  Barriers to discharge:   (Medical status)    Assessment:     Dave Good is a 56 y.o. male with a medical diagnosis of Diabetic wet gangrene of the foot.  He presents with girlfriend in room.  Pt is grossly SBA with ADL and ADL mobility.   Pt's girlfriend stating she is going to assist pt at discharge with all pt's needs.  Pt issued surgical shoe and instructed pt and girlfriend in correct donning which pt demonstrated understanding.  Performance deficits affecting function are weakness, impaired cardiopulmonary response to activity, decreased safety awareness, impaired functional mobilty, impaired balance, gait instability, decreased lower extremity function, orthopedic precautions, impaired skin, pain.     Rehab Prognosis:  Good; patient would benefit from acute skilled OT services to address these deficits and reach maximum level of function.       Plan:     Patient to be seen  (Discharge from OT this date.) to address the above listed problems via self-care/home management  · Plan of Care Expires: 02/28/22  · Plan of Care Reviewed with: patient, significant other    Subjective     Pain/Comfort:  · Pain Rating 1:  (Pt not rating pain or complaining of pain)  · Location - Side 1: Bilateral  · Location 1: foot  · Pain Addressed 1: Reposition, Distraction  · Pain Rating Post-Intervention 1:  (Not rating pain)    Objective:     Communicated with: PT prior to session.  Patient found HOB elevated with wound vac, peripheral IV, oxygen upon OT entry to room. Girlfriend in room.    General Precautions: Standard, diabetic, fall, contact, respiratory   Orthopedic Precautions:RLE weight bearing as tolerated    Braces:  (Bilateral surgical shoes)  Respiratory Status: Nasal cannula, flow 2 L/min     Occupational Performance:     Bed Mobility:    · Supine to Sit: Indep  · Sit to Supine: Indep    Functional Mobility/Transfers:  · Sit to stand: SBA with RW; cues for hand placement  · Bed<>BSC transfer: SBA with RW  · Functional Mobility: No LOB    Activities of Daily Living:  · Feeding: Indep  · LB Dressing: SBA/Set up for socks and shoes; pt educated on donning of new surgical shoe and educated on precautions  · Pt is performing self care tasks at SBA/Set up level.  Pt is in agreement no further OT acute care services in need at this time.       [facility] 6 Click ADL:      Treatment & Education:  Role of OT, POC, safety with ADL and ADL mobility, call for assist before getting OOB, must have shoes/coverings of ACE bandages on his feet to decrease fall risk when standing or sitting EOB    Patient left HOB elevated with all lines intact and call button in reachEducation:   and girlfriend in room.    GOALS:   Multidisciplinary Problems     Occupational Therapy Goals     Not on file          Multidisciplinary Problems (Resolved)        Problem: Occupational Therapy Goal    Goal Priority Disciplines Outcome Interventions   Occupational Therapy Goal   (Resolved)     OT, PT/OT Met    Description: Goals to be met by: 3/12/2022     Patient will increase functional independence with ADLs by performing:    LE Dressing with Supervision to don pants.  Grooming while standing at sink with Supervision.  Toileting from bedside commode with Supervision for hygiene and clothing management.   Toilet transfer to bedside commode with Supervision.                     Time Tracking:     OT Date of Treatment: 02/28/22  OT Start Time: 1700  OT Stop Time: 1715  OT Total Time (min): 15 min    Billable Minutes:Self Care/Home Management 15    OT/DEREK: OT          2/28/2022

## 2022-02-28 NOTE — ASSESSMENT & PLAN NOTE
Nutrition Problem:  Inadequate protein intake     Related to (etiology):   wound     Signs and Symptoms (as evidenced by):   S/p 2nd R toe amputation, foot gangrene   100% PO intake   Without ONS to aid in wound healing     Interventions:  Carbohydrate modified diet  [employer] TID - James  Collaboration with other providers     Nutrition Diagnosis Status:   Continues

## 2022-02-28 NOTE — PROGRESS NOTES
Pharmacokinetic Assessment Follow Up: IV Vancomycin    Vancomycin serum concentration assessment(s):    The trough level was drawn correctly and can be used to guide therapy at this time. The measurement is above the desired definitive target range of 10 to 20 mcg/mL.    Vancomycin Regimen Plan:    Change regimen to Vancomycin 1500 mg IV every 24 hours with next serum trough concentration measured at 0030 prior to 0100 dose on 3/2    Drug levels (last 3 results):  Recent Labs   Lab Result Units 02/25/22 2138 02/26/22  0513 02/26/22 2145 02/28/22  1235   Vancomycin, Random ug/mL 21.2 17.4 16.0  --    Vancomycin-Trough ug/mL  --   --   --  24.1*       Pharmacy will continue to follow and monitor vancomycin.    Please contact pharmacy at extension [phone] for questions regarding this assessment.    Thank you for the consult,   Donna Barros       Patient brief summary:  Dave Good is a 56 y.o. male initiated on antimicrobial therapy with IV Vancomycin for treatment of  pneumonia    The patient's current regimen is Vancomycin 1500mg IVPB every 24 hours    Drug Allergies:   Review of patient's allergies indicates:   Allergen Reactions    Ibuprofen Swelling     Facial swelling       Actual Body Weight:   131.1kg    Renal Function:   Estimated Creatinine Clearance: 96.6 mL/min (based on SCr of 1.2 mg/dL).,     Dialysis Method (if applicable):  N/A    CBC (last 72 hours):  Recent Labs   Lab Result Units 02/26/22  0513   WBC K/uL 13.62*   Hemoglobin g/dL 10.4*   Hematocrit % 34.8*   Platelets K/uL 653*       Metabolic Panel (last 72 hours):  Recent Labs   Lab Result Units 02/26/22  0513   Sodium mmol/L 137   Potassium mmol/L 4.4   Chloride mmol/L 100   CO2 mmol/L 29   Glucose mg/dL 184*   BUN mg/dL 14   Creatinine mg/dL 1.2   Albumin g/dL 1.8*   Phosphorus mg/dL 2.8       Vancomycin Administrations:  vancomycin given in the last 96 hours                     vancomycin 1.5 g in dextrose 5 % 250 mL IVPB (ready to mix) (mg)  1,500 mg New Bag 02/28/22 0102    vancomycin 1.5 g in dextrose 5 % 250 mL IVPB (ready to mix) (mg) 1,500 mg New Bag 02/27/22 1300     1,500 mg New Bag  0338    vancomycin 1.5 g in dextrose 5 % 250 mL IVPB (ready to mix) (mg) 1,500 mg New Bag 02/26/22 0848    vancomycin 1.75 g in 5 % dextrose 500 mL IVPB (mg) 1,750 mg New Bag 02/25/22 0308      Restarted 02/24/22 1703     1,750 mg New Bag  1505                    Microbiologic Results:  Microbiology Results (last 7 days)       Procedure Component Value Units Date/Time    Blood culture [[id number]] Collected: 02/25/22 0548    Order Status: Completed Specimen: Blood Updated: 02/28/22 1212     Blood Culture, Routine No Growth to date      No Growth to date      No Growth to date      No Growth to date    Narrative:      Collection has been rescheduled by [email or web address] at 02/25/2022 05:55 Reason:   Patient unavailable labs collected  Collection has been rescheduled by [email or web address] at 02/25/2022 05:55 Reason:   Patient unavailable labs collected    Blood culture [[id number]] Collected: 02/23/22 0550    Order Status: Completed Specimen: Blood Updated: 02/28/22 1012     Blood Culture, Routine No growth after 5 days.    Narrative:      Collection has been rescheduled by [email or web address] at 02/23/2022 05:53 Reason:   Patient unavailable labs collected  Collection has been rescheduled by WAD1 at 02/23/2022 05:53 Reason:   Patient unavailable labs collected    Culture, Anaerobic [[id number]]  (Abnormal) Collected: 02/22/22 1153    Order Status: Completed Specimen: Wound from Foot, Right Updated: 02/28/22 0710     Anaerobic Culture PRESUMPTIVE PREVOTELLA/PORPHYROMONAS GROUP  Many      Aerobic culture [[id number]]  (Abnormal)  (Susceptibility) Collected: 02/22/22 1153    Order Status: Completed Specimen: Wound from Foot, Right Updated: 02/25/22 1055     Aerobic Bacterial Culture METHICILLIN RESISTANT STAPHYLOCOCCUS AUREUS  Many  No other significant isolate      Blood culture x two cultures. Draw prior to  antibiotics. [[id number]]  (Abnormal) Collected: 02/21/22 1231    Order Status: Completed Specimen: Blood from Peripheral, Hand, Left Updated: 02/24/22 0948     Blood Culture, Routine Gram stain aer bottle: Gram positive cocci      Gram stain jose bottle: Gram positive cocci      Results called to and read back by: KRISTINA ESCOTO RN  02/22/2022  11:03      METHICILLIN RESISTANT STAPHYLOCOCCUS AUREUS  ID consult required at [facility].  For susceptibility see order #[id number]  ID consult required at [facility] and [facility].      Narrative:      Aerobic and anaerobic    Blood culture x two cultures. Draw prior to antibiotics. [[id number]]  (Abnormal)  (Susceptibility) Collected: 02/21/22 1218    Order Status: Completed Specimen: Blood from Peripheral, Hand, Right Updated: 02/24/22 0945     Blood Culture, Routine Gram stain aer bottle: Gram positive cocci       Positive results previously called 02/22/2022  12:12      Gram stain jose bottle: Gram positive cocci      METHICILLIN RESISTANT STAPHYLOCOCCUS AUREUS  ID consult required at [facility] and [facility] locations.      Narrative:      Aerobic and anaerobic    AFB Culture & Smear [[id number]] Collected: 02/22/22 1955    Order Status: Completed Specimen: Respiratory from Mouth Updated: 02/24/22 0927     AFB Culture & Smear Culture in progress     AFB CULTURE STAIN No acid fast bacilli seen.    AFB Culture & Smear [[id number]] Collected: 02/22/22 1153    Order Status: Completed Specimen: Wound from Foot, Right Updated: 02/24/22 0927     AFB Culture & Smear Culture in progress     AFB CULTURE STAIN No acid fast bacilli seen.    AFB Culture & Smear [[id number]]     Order Status: No result Specimen: Respiratory     AFB Culture & Smear [[id number]]     Order Status: No result Specimen: Respiratory     Gram stain [[id number]] Collected: 02/22/22 1153    Order Status: Completed Specimen: Wound from Foot, Right Updated: 02/22/22  2156     Gram Stain Result Rare WBC's      Few Gram positive cocci      Rare Gram positive rods    Fungus culture [[id number]] Collected: 02/22/22 1153    Order Status: Sent Specimen: Wound from Foot, Right Updated: 02/22/22 1937

## 2022-02-28 NOTE — CONSULTS
[facility] ([place])  Infectious Disease  Consult Note    Patient Name: Dave Good  MRN: [id number]  Admission Date: 2/21/2022  Hospital Length of Stay: 7 days  Attending Physician: Marcy Patrick MD  Primary Care Provider: Reyna Jorge NP     Isolation Status: Contact    Patient information was obtained from patient and ER records.      Consults  Assessment/Plan:     * Diabetic wet gangrene of the foot  S/p surgical debridement right foot debridement on February 22 and bilateral foot debridements on February 24.  Suspect there may be some residual bone infection.  Blood cultures positive for MRSA on 2/21 and clear 2/23 and 2/25. Surgical cultures positive for MRSA and anerobes. Path pending. Unclear if this surgical culture was from clean margin. PAT was aborted, but there is concern for possible infective endocarditis given septic emboli    Recommendations:   - agree with plan for vancomycin x 6 weeks for bacteremia, IE, osteomyelitis   - add metronidazole x 2 weeks for SSTI  - f/u pending pathology  - ensure he has adequate perfusion to heal wound  - needs outpatient dental evaluation    Outpatient Antibiotic Therapy Plan:    Please send referral to [facility].    1) Infection: osteomyelitis, suspect IE, MRSA bacteremia    2) Discharge Antibiotics:    Intravenous antibiotics:   IV vancomycin    Oral antibiotics:   metronidazole    3) Therapy Duration:  6 weeks iv antibiotics    Estimated end date of IV antibiotics: 4/5/22    4) Outpatient Weekly Labs:    Order the following labs to be drawn on Mondays:    CBC   CMP    ESR    CRP   Vancomycin trough. Target 15-20    If discharged on vancomycin IV, order the following additional labs to be drawn on Thursdays:   CMP    Vancomycin trough. Target 15-20    If vancomycin trough is not at target (15-20) prior to discharge, schedule vancomycin trough to be drawn before their fourth outpatient dose.    5) Fax Lab  Results to Infectious Diseases Provider: dr Kapoor    [facility] Fax Number: [phone]    6) Outpatient Infectious Diseases Follow-up     Follow-up appointment will be arranged by the ID clinic and will be found in the patient's appointments tab.     Prior to discharge, please ensure the patient's follow-up has been scheduled.     If there is still no follow-up scheduled prior to discharge, please send an EPIC message to Xenia Stuart in Infectious Diseases.                  Thank you for your consult. I will follow-up with patient. Please contact us if you have any additional questions.    Brenda Kapoor MD  Infectious Disease  [facility] ([place])    Subjective:     Principal Problem: Diabetic wet gangrene of the foot    HPI: 56 year old male with a history of DM and chronic wounds to both feet worse on the right foot.  He has a history of right foot transmetatarsal amputation of the 1st digit of the right foot in September 2021.  He had been following with wound care for his non-healing wound.  Cultures obtained from his right foot wound in November 2021 were positive for MSSA.  He was treated with oral bactrim for about 2 weeks.  A few days prior to admission, he developed fevers, increased pain in his foot and ultimately presented to the ER.  In the ER, he was noted to have a gangrenous 2nd digit of the right foot.  CTA of chest in the ER revealed multiple cavitary lung lesions.  Blood cultures obtained are positive in 3 out of 4 bottles with GPCs.  TTE is negative.  He has now underwent surgical debridement of the right foot with amputation of the 2nd digit on February 22, 2022.  ID is consulted to assist with management.  Of note, he states that he has lost around 15 lbs since September of 2021.  He has previously been homeless for many years but secured an apartment 1 year ago and hasn't been homeless since.      Interval History:   NAEO. Reports some foot pain. Tolerating  antibiotics    Review of Systems   All other systems reviewed and are negative.  Objective:     Vital Signs (Most Recent):  Temp: 97.6 °F (36.4 °C) (02/28/22 1204)  Pulse: 76 (02/28/22 1204)  Resp: 14 (02/28/22 1318)  BP: 116/74 (02/28/22 1204)  SpO2: 98 % (02/28/22 1204) Vital Signs (24h Range):  Temp:  [97.5 °F (36.4 °C)-98.3 °F (36.8 °C)] 97.6 °F (36.4 °C)  Pulse:  [75-84] 76  Resp:  [14-20] 14  SpO2:  [95 %-99 %] 98 %  BP: ()/(54-83) 116/74     Weight: 131.1 kg (289 lb 0.4 oz)  Body mass index is 37.11 kg/m².    Estimated Creatinine Clearance: 96.6 mL/min (based on SCr of 1.2 mg/dL).    Physical Exam  Vitals and nursing note reviewed.   Constitutional:       General: He is not in acute distress.     Appearance: He is well-developed. He is not diaphoretic.   HENT:      Head: Normocephalic and atraumatic.      Right Ear: External ear normal.      Left Ear: External ear normal.      Nose: Nose normal.      Mouth/Throat:      Pharynx: No oropharyngeal exudate.   Eyes:      General: No scleral icterus.        Right eye: No discharge.         Left eye: No discharge.      Conjunctiva/sclera: Conjunctivae normal.      Pupils: Pupils are equal, round, and reactive to light.   Neck:      Thyroid: No thyromegaly.      Vascular: No JVD.      Trachea: No tracheal deviation.   Cardiovascular:      Rate and Rhythm: Normal rate and regular rhythm.      Heart sounds: No murmur heard.    No friction rub. No gallop.   Pulmonary:      Effort: Pulmonary effort is normal. No respiratory distress.      Breath sounds: Normal breath sounds. No stridor. No wheezing or rales.   Chest:      Chest wall: No tenderness.   Abdominal:      General: Bowel sounds are normal. There is no distension.      Palpations: Abdomen is soft. There is no mass.      Tenderness: There is no abdominal tenderness. There is no guarding or rebound.   Musculoskeletal:      Cervical back: Normal range of motion and neck supple.      Comments: Feet wrapped  bilaterally.   Lymphadenopathy:      Cervical: No cervical adenopathy.   Skin:     General: Skin is warm.      Coloration: Skin is not pale.      Findings: No erythema or rash.   Neurological:      Mental Status: He is alert and oriented to person, place, and time.      Cranial Nerves: No cranial nerve deficit.      Motor: No abnormal muscle tone.      Coordination: Coordination normal.      Deep Tendon Reflexes: Reflexes are normal and symmetric. Reflexes normal.   Psychiatric:         Behavior: Behavior normal.         Thought Content: Thought content normal.         Judgment: Judgment normal.       Significant Labs:   Microbiology Results (last 7 days)       Procedure Component Value Units Date/Time    Blood culture [[id number]] Collected: 02/25/22 0548    Order Status: Completed Specimen: Blood Updated: 02/28/22 1212     Blood Culture, Routine No Growth to date      No Growth to date      No Growth to date      No Growth to date    Narrative:      Collection has been rescheduled by WAD1 at 02/25/2022 05:55 Reason:   Patient unavailable labs collected  Collection has been rescheduled by WAD1 at 02/25/2022 05:55 Reason:   Patient unavailable labs collected    Blood culture [[id number]] Collected: 02/23/22 0550    Order Status: Completed Specimen: Blood Updated: 02/28/22 1012     Blood Culture, Routine No growth after 5 days.    Narrative:      Collection has been rescheduled by WAD1 at 02/23/2022 05:53 Reason:   Patient unavailable labs collected  Collection has been rescheduled by WAD1 at 02/23/2022 05:53 Reason:   Patient unavailable labs collected    Culture, Anaerobic [[id number]]  (Abnormal) Collected: 02/22/22 1153    Order Status: Completed Specimen: Wound from Foot, Right Updated: 02/28/22 0710     Anaerobic Culture PRESUMPTIVE PREVOTELLA/PORPHYROMONAS GROUP  Many      Aerobic culture [[id number]]  (Abnormal)  (Susceptibility) Collected: 02/22/22 1153    Order Status: Completed Specimen: Wound from Foot, Right  Updated: 02/25/22 1055     Aerobic Bacterial Culture METHICILLIN RESISTANT STAPHYLOCOCCUS AUREUS  Many  No other significant isolate      Blood culture x two cultures. Draw prior to antibiotics. [[id number]]  (Abnormal) Collected: 02/21/22 1231    Order Status: Completed Specimen: Blood from Peripheral, Hand, Left Updated: 02/24/22 0948     Blood Culture, Routine Gram stain aer bottle: Gram positive cocci      Gram stain jose bottle: Gram positive cocci      Results called to and read back by: KRISTINA ESCOTO RN  02/22/2022  11:03      METHICILLIN RESISTANT STAPHYLOCOCCUS AUREUS  ID consult required at [facility] and [facility].  For susceptibility see order #[id number]  ID consult required at [facility] and [facility].      Narrative:      Aerobic and anaerobic    Blood culture x two cultures. Draw prior to antibiotics. [[id number]]  (Abnormal)  (Susceptibility) Collected: 02/21/22 1218    Order Status: Completed Specimen: Blood from Peripheral, Hand, Right Updated: 02/24/22 0945     Blood Culture, Routine Gram stain aer bottle: Gram positive cocci       Positive results previously called 02/22/2022  12:12      Gram stain jose bottle: Gram positive cocci      METHICILLIN RESISTANT STAPHYLOCOCCUS AUREUS  ID consult required at [facility] and [facility] locations.      Narrative:      Aerobic and anaerobic    AFB Culture & Smear [[id number]] Collected: 02/22/22 1955    Order Status: Completed Specimen: Respiratory from Mouth Updated: 02/24/22 0927     AFB Culture & Smear Culture in progress     AFB CULTURE STAIN No acid fast bacilli seen.    AFB Culture & Smear [[id number]] Collected: 02/22/22 1153    Order Status: Completed Specimen: Wound from Foot, Right Updated: 02/24/22 0927     AFB Culture & Smear Culture in progress     AFB CULTURE STAIN No acid fast bacilli seen.    AFB Culture & Smear [[id number]]     Order Status: No result Specimen: Respiratory     AFB Culture & Smear [[id number]]      Order Status: No result Specimen: Respiratory     Gram stain [[id number]] Collected: 02/22/22 1153    Order Status: Completed Specimen: Wound from Foot, Right Updated: 02/22/22 2156     Gram Stain Result Rare WBC's      Few Gram positive cocci      Rare Gram positive rods    Fungus culture [[id number]] Collected: 02/22/22 1153    Order Status: Sent Specimen: Wound from Foot, Right Updated: 02/22/22 1937            Significant Imaging: I have reviewed all pertinent imaging results/findings within the past 24 hours.

## 2022-02-28 NOTE — PROGRESS NOTES
Pharmacokinetic Assessment Follow Up: IV Vancomycin    Vancomycin resumed.    Vancomycin Regimen Plan:    Continue regimen to Vancomycin 1500 mg IV every 12 hours with next serum trough concentration measured at 1230 prior to the dose on 2/28/22    Drug levels (last 3 results):  Recent Labs   Lab Result Units 02/25/22  0243 02/25/22  2138 02/26/22  0513 02/26/22  2145   Vancomycin, Random ug/mL  --  21.2 17.4 16.0   Vancomycin-Trough ug/mL 26.9*  --   --   --        Pharmacy will continue to follow and monitor vancomycin.    Please contact pharmacy at extension [phone] for questions regarding this assessment.    Thank you for the consult,   Tanya Boggs       Patient brief summary:  Dave Good is a 56 y.o. male initiated on antimicrobial therapy with IV Vancomycin for treatment of bacteremia      Drug Allergies:   Review of patient's allergies indicates:   Allergen Reactions    Ibuprofen Swelling     Facial swelling       Actual Body Weight:   131.1 kg    Renal Function:   Estimated Creatinine Clearance: 96.6 mL/min (based on SCr of 1.2 mg/dL).,     Dialysis Method (if applicable):  N/A    CBC (last 72 hours):  Recent Labs   Lab Result Units 02/26/22  0513   WBC K/uL 13.62*   Hemoglobin g/dL 10.4*   Hematocrit % 34.8*   Platelets K/uL 653*       Metabolic Panel (last 72 hours):  Recent Labs   Lab Result Units 02/26/22  0513   Sodium mmol/L 137   Potassium mmol/L 4.4   Chloride mmol/L 100   CO2 mmol/L 29   Glucose mg/dL 184*   BUN mg/dL 14   Creatinine mg/dL 1.2   Albumin g/dL 1.8*   Phosphorus mg/dL 2.8       Vancomycin Administrations:  vancomycin given in the last 96 hours                   vancomycin 1.5 g in dextrose 5 % 250 mL IVPB (ready to mix) (mg) 1,500 mg New Bag 02/27/22 1300     1,500 mg New Bag  0338    vancomycin 1.5 g in dextrose 5 % 250 mL IVPB (ready to mix) (mg) 1,500 mg New Bag 02/26/22 0848    vancomycin 1.75 g in 5 % dextrose 500 mL IVPB (mg) 1,750 mg New Bag 02/25/22 0308      Restarted  02/24/22 1703     1,750 mg New Bag  1505     1,750 mg New Bag  0348                Microbiologic Results:  Microbiology Results (last 7 days)     Procedure Component Value Units Date/Time    Blood culture [[id number]] Collected: 02/25/22 0548    Order Status: Completed Specimen: Blood Updated: 02/27/22 1212     Blood Culture, Routine No Growth to date      No Growth to date      No Growth to date    Narrative:      Collection has been rescheduled by WAD1 at 02/25/2022 05:55 Reason:   Patient unavailable labs collected  Collection has been rescheduled by WAD1 at 02/25/2022 05:55 Reason:   Patient unavailable labs collected    Blood culture [[id number]] Collected: 02/23/22 0550    Order Status: Completed Specimen: Blood Updated: 02/27/22 1012     Blood Culture, Routine No Growth to date      No Growth to date      No Growth to date      No Growth to date      No Growth to date    Narrative:      Collection has been rescheduled by WAD1 at 02/23/2022 05:53 Reason:   Patient unavailable labs collected  Collection has been rescheduled by WAD1 at 02/23/2022 05:53 Reason:   Patient unavailable labs collected    Aerobic culture [[id number]]  (Abnormal)  (Susceptibility) Collected: 02/22/22 1153    Order Status: Completed Specimen: Wound from Foot, Right Updated: 02/25/22 1055     Aerobic Bacterial Culture METHICILLIN RESISTANT STAPHYLOCOCCUS AUREUS  Many  No other significant isolate      Blood culture x two cultures. Draw prior to antibiotics. [[id number]]  (Abnormal) Collected: 02/21/22 1231    Order Status: Completed Specimen: Blood from Peripheral, Hand, Left Updated: 02/24/22 0948     Blood Culture, Routine Gram stain aer bottle: Gram positive cocci      Gram stain jose bottle: Gram positive cocci      Results called to and read back by: KRISTINA ESCOTO RN  02/22/2022  11:03      METHICILLIN RESISTANT STAPHYLOCOCCUS AUREUS  ID consult required at [facility].Pending [facility] to [facility].  For susceptibility see order  #[id number]  ID consult required at [facility] and [facility].      Narrative:      Aerobic and anaerobic    Blood culture x two cultures. Draw prior to antibiotics. [[id number]]  (Abnormal)  (Susceptibility) Collected: 02/21/22 1218    Order Status: Completed Specimen: Blood from Peripheral, Hand, Right Updated: 02/24/22 0945     Blood Culture, Routine Gram stain aer bottle: Gram positive cocci       Positive results previously called 02/22/2022  12:12      Gram stain jose bottle: Gram positive cocci      METHICILLIN RESISTANT STAPHYLOCOCCUS AUREUS  ID consult required at [facility] and [facility].      Narrative:      Aerobic and anaerobic    AFB Culture & Smear [[id number]] Collected: 02/22/22 1955    Order Status: Completed Specimen: Respiratory from Mouth Updated: 02/24/22 0927     AFB Culture & Smear Culture in progress     AFB CULTURE STAIN No acid fast bacilli seen.    AFB Culture & Smear [[id number]] Collected: 02/22/22 1153    Order Status: Completed Specimen: Wound from Foot, Right Updated: 02/24/22 0927     AFB Culture & Smear Culture in progress     AFB CULTURE STAIN No acid fast bacilli seen.    Culture, Anaerobic [[id number]] Collected: 02/22/22 1153    Order Status: Completed Specimen: Wound from Foot, Right Updated: 02/24/22 0925     Anaerobic Culture Culture in progress    AFB Culture & Smear [[id number]]     Order Status: No result Specimen: Respiratory     AFB Culture & Smear [[id number]]     Order Status: No result Specimen: Respiratory     Gram stain [[id number]] Collected: 02/22/22 1153    Order Status: Completed Specimen: Wound from Foot, Right Updated: 02/22/22 2156     Gram Stain Result Rare WBC's      Few Gram positive cocci      Rare Gram positive rods    Fungus culture [[id number]] Collected: 02/22/22 1153    Order Status: Sent Specimen: Wound from Foot, Right Updated: 02/22/22 1937

## 2022-02-28 NOTE — PLAN OF CARE
Recommendations  1. Continue Diabetic diet as ordered   2. Recommend James TID to promote wound healing (See second sign).  3.Encourage good PO intake as needed.     Goals: pt to meet at least 85% EPN by f/u  Nutrition Goal Status: progressing towards goal  Communication of RD Recs:  (POC)

## 2022-03-01 LAB
ALBUMIN SERPL BCP-MCNC: 1.9 G/DL (ref 3.5–5.2)
ANION GAP SERPL CALC-SCNC: 6 MMOL/L (ref 8–16)
BUN SERPL-MCNC: 15 MG/DL (ref 6–20)
CALCIUM SERPL-MCNC: 10 MG/DL (ref 8.7–10.5)
CHLORIDE SERPL-SCNC: 103 MMOL/L (ref 95–110)
CO2 SERPL-SCNC: 27 MMOL/L (ref 23–29)
CREAT SERPL-MCNC: 1.1 MG/DL (ref 0.5–1.4)
ERYTHROCYTE [DISTWIDTH] IN BLOOD BY AUTOMATED COUNT: 15.2 % (ref 11.5–14.5)
EST. GFR  (AFRICAN AMERICAN): >60 ML/MIN/1.73 M^2
EST. GFR  (NON AFRICAN AMERICAN): >60 ML/MIN/1.73 M^2
GLUCOSE SERPL-MCNC: 166 MG/DL (ref 70–110)
HCT VFR BLD AUTO: 32.5 % (ref 40–54)
HGB BLD-MCNC: 9.8 G/DL (ref 14–18)
MCH RBC QN AUTO: 26.9 PG (ref 27–31)
MCHC RBC AUTO-ENTMCNC: 30.2 G/DL (ref 32–36)
MCV RBC AUTO: 89 FL (ref 82–98)
PHOSPHATE SERPL-MCNC: 3.8 MG/DL (ref 2.7–4.5)
PLATELET # BLD AUTO: 751 K/UL (ref 150–450)
PMV BLD AUTO: 8.9 FL (ref 9.2–12.9)
POCT GLUCOSE: 121 MG/DL (ref 70–110)
POCT GLUCOSE: 135 MG/DL (ref 70–110)
POCT GLUCOSE: 136 MG/DL (ref 70–110)
POCT GLUCOSE: 164 MG/DL (ref 70–110)
POTASSIUM SERPL-SCNC: 4.6 MMOL/L (ref 3.5–5.1)
RBC # BLD AUTO: 3.64 M/UL (ref 4.6–6.2)
SODIUM SERPL-SCNC: 136 MMOL/L (ref 136–145)
WBC # BLD AUTO: 11.12 K/UL (ref 3.9–12.7)

## 2022-03-01 PROCEDURE — 63600175 PHARM REV CODE 636 W HCPCS: Performed by: INTERNAL MEDICINE

## 2022-03-01 PROCEDURE — 85027 COMPLETE CBC AUTOMATED: CPT | Performed by: INTERNAL MEDICINE

## 2022-03-01 PROCEDURE — 11000001 HC ACUTE MED/SURG PRIVATE ROOM

## 2022-03-01 PROCEDURE — 27000221 HC OXYGEN, UP TO 24 HOURS

## 2022-03-01 PROCEDURE — 25000003 PHARM REV CODE 250: Performed by: INTERNAL MEDICINE

## 2022-03-01 PROCEDURE — 99233 SBSQ HOSP IP/OBS HIGH 50: CPT | Mod: ,,, | Performed by: INTERNAL MEDICINE

## 2022-03-01 PROCEDURE — 94761 N-INVAS EAR/PLS OXIMETRY MLT: CPT

## 2022-03-01 PROCEDURE — 27000207 HC ISOLATION

## 2022-03-01 PROCEDURE — 99233 PR SUBSEQUENT HOSPITAL CARE,LEVL III: ICD-10-PCS | Mod: ,,, | Performed by: INTERNAL MEDICINE

## 2022-03-01 PROCEDURE — C1751 CATH, INF, PER/CENT/MIDLINE: HCPCS

## 2022-03-01 PROCEDURE — 63600175 PHARM REV CODE 636 W HCPCS: Performed by: PHYSICIAN ASSISTANT

## 2022-03-01 PROCEDURE — 80069 RENAL FUNCTION PANEL: CPT | Performed by: INTERNAL MEDICINE

## 2022-03-01 PROCEDURE — A4216 STERILE WATER/SALINE, 10 ML: HCPCS | Performed by: INTERNAL MEDICINE

## 2022-03-01 PROCEDURE — 25000003 PHARM REV CODE 250: Performed by: NURSE PRACTITIONER

## 2022-03-01 PROCEDURE — 36415 COLL VENOUS BLD VENIPUNCTURE: CPT | Performed by: INTERNAL MEDICINE

## 2022-03-01 RX ORDER — DOCUSATE SODIUM 100 MG/1
100 CAPSULE, LIQUID FILLED ORAL 2 TIMES DAILY PRN
Status: DISCONTINUED | OUTPATIENT
Start: 2022-03-01 | End: 2022-03-06

## 2022-03-01 RX ADMIN — METRONIDAZOLE 500 MG: 500 TABLET ORAL at 09:03

## 2022-03-01 RX ADMIN — SODIUM CHLORIDE, PRESERVATIVE FREE 10 ML: 5 INJECTION INTRAVENOUS at 05:03

## 2022-03-01 RX ADMIN — ASPIRIN 81 MG: 81 TABLET, COATED ORAL at 08:03

## 2022-03-01 RX ADMIN — MORPHINE SULFATE 2 MG: 4 INJECTION, SOLUTION INTRAMUSCULAR; INTRAVENOUS at 01:03

## 2022-03-01 RX ADMIN — INSULIN DETEMIR 55 UNITS: 100 INJECTION, SOLUTION SUBCUTANEOUS at 09:03

## 2022-03-01 RX ADMIN — SODIUM CHLORIDE, PRESERVATIVE FREE 10 ML: 5 INJECTION INTRAVENOUS at 11:03

## 2022-03-01 RX ADMIN — INSULIN ASPART 10 UNITS: 100 INJECTION, SOLUTION INTRAVENOUS; SUBCUTANEOUS at 05:03

## 2022-03-01 RX ADMIN — INSULIN ASPART 10 UNITS: 100 INJECTION, SOLUTION INTRAVENOUS; SUBCUTANEOUS at 11:03

## 2022-03-01 RX ADMIN — INSULIN ASPART 10 UNITS: 100 INJECTION, SOLUTION INTRAVENOUS; SUBCUTANEOUS at 08:03

## 2022-03-01 RX ADMIN — DOCUSATE SODIUM 100 MG: 100 CAPSULE ORAL at 09:03

## 2022-03-01 RX ADMIN — VANCOMYCIN HYDROCHLORIDE 1500 MG: 1.5 INJECTION, POWDER, LYOPHILIZED, FOR SOLUTION INTRAVENOUS at 01:03

## 2022-03-01 RX ADMIN — QUETIAPINE FUMARATE 200 MG: 200 TABLET ORAL at 09:03

## 2022-03-01 RX ADMIN — OXYCODONE AND ACETAMINOPHEN 1 TABLET: 7.5; 325 TABLET ORAL at 09:03

## 2022-03-01 RX ADMIN — METRONIDAZOLE 500 MG: 500 TABLET ORAL at 05:03

## 2022-03-01 RX ADMIN — OXYCODONE AND ACETAMINOPHEN 1 TABLET: 7.5; 325 TABLET ORAL at 03:03

## 2022-03-01 RX ADMIN — SODIUM CHLORIDE: 0.9 INJECTION, SOLUTION INTRAVENOUS at 01:03

## 2022-03-01 RX ADMIN — METRONIDAZOLE 500 MG: 500 TABLET ORAL at 01:03

## 2022-03-01 RX ADMIN — SODIUM CHLORIDE, PRESERVATIVE FREE 10 ML: 5 INJECTION INTRAVENOUS at 01:03

## 2022-03-01 RX ADMIN — HEPARIN SODIUM 5000 UNITS: 5000 INJECTION INTRAVENOUS; SUBCUTANEOUS at 09:03

## 2022-03-01 RX ADMIN — HEPARIN SODIUM 5000 UNITS: 5000 INJECTION INTRAVENOUS; SUBCUTANEOUS at 05:03

## 2022-03-01 RX ADMIN — SODIUM CHLORIDE, PRESERVATIVE FREE 10 ML: 5 INJECTION INTRAVENOUS at 12:03

## 2022-03-01 RX ADMIN — HEPARIN SODIUM 5000 UNITS: 5000 INJECTION INTRAVENOUS; SUBCUTANEOUS at 01:03

## 2022-03-01 RX ADMIN — LOSARTAN POTASSIUM 25 MG: 25 TABLET, FILM COATED ORAL at 08:03

## 2022-03-01 RX ADMIN — ATORVASTATIN CALCIUM 40 MG: 20 TABLET, FILM COATED ORAL at 08:03

## 2022-03-01 NOTE — NURSING
Was informed by pt of mild left nostril nose bleed no c/o weakness or dizziness, pt c/o mild headache 5/10 and foot pain 9/10 pt was informed to lean forward and apply pressure, pt was given prn pain medication per md order. Md was notified of nose bleed, new order was given to place pt 2L NC on humidifier and comt to monitor. No distressed noted.

## 2022-03-01 NOTE — PROGRESS NOTES
[facility] Medicine  Progress Note    Patient Name: Dave Good  MRN: [id number]  Patient Class: IP- Inpatient   Admission Date: 2/21/2022  Length of Stay: 8 days  Attending Physician: GITA Correa MD  Primary Care Provider: Reyna Jorge NP        Subjective:     Principal Problem:Diabetic wet gangrene of the foot        HPI:  57 yo m with PMH of DM , HTN , PE and COPD presented to ED  for R 2nd digit wound. Pt had amputation by  back on 9/2021 for wet gangrene . Pt is following up with wound care at [facility] twice a week. But the last time was 1.5 weeks ago, he noticed swelling ,pain and foul smelling drainage from the wound. Pt also reports worsening SOB with exertion . With some chest tightness. In ED VS were stable. Started on iv abx . Labs showed leukocytosis and hypokalemia.CTA showed cavitary lesions with possible septic emboli.       Overview/Hospital Course:  Patient admitted with foul smelling drainage from wound of R foot and SOB. Found to have cavitary lung lesions concerning for septic emboli. General surgery and ID consulted. He went to OR on 2/22 debridement and transmetatarsal amputation of 2nd R digit. Wound vac placed. Blood culture with MRSA. PAT ordered but patient unable to tolerate procedure (unable to pass probe, wouldn't swallow). Given presence of septic emboli, planned for empiric treatment of endocarditis for 6 weeks. Planned for removal of wound vac and closure on 3/02.      Interval History: No acute events overnight. Feeling well. No new concerns at this time.    Review of Systems   Constitutional:  Negative for chills and fever.   Respiratory:  Negative for cough and shortness of breath.    Cardiovascular:  Negative for chest pain and palpitations.   Gastrointestinal:  Negative for abdominal pain, nausea and vomiting.   Objective:     Vital Signs (Most Recent):  Temp: 98.6 °F (37 °C) (03/01/22 1122)  Pulse: 75 (03/01/22 1122)  Resp: 18  (03/01/22 1122)  BP: 94/60 (03/01/22 1122)  SpO2: 98 % (03/01/22 1122) Vital Signs (24h Range):  Temp:  [97.8 °F (36.6 °C)-98.6 °F (37 °C)] 98.6 °F (37 °C)  Pulse:  [75-88] 75  Resp:  [18-19] 18  SpO2:  [93 %-98 %] 98 %  BP: ()/(60-75) 94/60     Weight: 131.1 kg (289 lb 0.4 oz)  Body mass index is 37.11 kg/m².    Intake/Output Summary (Last 24 hours) at 3/1/2022 1455  Last data filed at 3/1/2022 0900  Gross per 24 hour   Intake 632.38 ml   Output 1100 ml   Net -467.62 ml      Physical Exam  Vitals and nursing note reviewed.   Constitutional:       General: He is not in acute distress.     Appearance: He is well-developed.   HENT:      Head: Normocephalic and atraumatic.   Eyes:      General:         Right eye: No discharge.         Left eye: No discharge.      Conjunctiva/sclera: Conjunctivae normal.   Cardiovascular:      Rate and Rhythm: Normal rate.      Pulses: Normal pulses.   Pulmonary:      Effort: Pulmonary effort is normal. No respiratory distress.   Abdominal:      Palpations: Abdomen is soft.      Tenderness: There is no abdominal tenderness.   Musculoskeletal:         General: No tenderness. Normal range of motion.      Comments: Post-op dressing in place to feet bilaterally with wound vac on R.   Skin:     General: Skin is warm and dry.   Neurological:      Mental Status: He is alert and oriented to person, place, and time.       Significant Labs:   CBC:  Recent Labs   Lab 03/01/22  0842   WBC 11.12   HGB 9.8*   HCT 32.5*   *   BMP:  Recent Labs   Lab 03/01/22  0842      K 4.6      CO2 27   BUN 15   CREATININE 1.1   *   CALCIUM 10.0   PHOS 3.8     Significant Imaging:   No new imaging this morning.      Assessment/Plan:      * Diabetic wet gangrene of the foot  MRSA bacteremia  Septic emboli / cavitary lung lesion  Diabetic foot infection of left foot  - R 2nd digit with wet gangrene and necrotic ulcer on L foot  - CTA without PE but with bilateral cavitary lesion with  possible septic emboli vs organizing pneumonia   - Quantiferon gold negative. Sputum AFB smear negative x 3.   - Echo without vegetation but given septic emboli, PAT attempted  - PAT canceled, pt unable to swallow probe on 3 attempts then refused  - Blood culture with MRSA. Repeat blood culture (2/23 and 2/25) NGTD  - ID, general surgery. Appreciate input  - Continue vancomycin with pharmacy dosing. Continue wound vac and local wound care   - S/p 2nd digit transmetatarsal amputation and debridement 2/22 and b/l debridement 2/24; plan for closure 3/02.  - Continue PT/OT   - Continue percocet and morphine for pain  - Plan to treat for presumed endocarditis given septic emboli  - Given combined wound care and prolonged IV antibiotic needs, suspect he will not be able to handle his condition at home.  - SW consulted for placement.    Chronic respiratory failure with hypoxia  - On 2L home O2 2/2 COVID-19  - Continue oxygen protocol    Cavitary lesion of lung  - As above    COPD with asthma  - Duoneb PRN    Depression  - Continue seroquel    HTN (hypertension)  - Continue losartan 25 mg qd    Type 2 diabetes mellitus without complication, with long-term current use of insulin  - A1c 9.8  - Continue levemir 55U qHS, aspart 10U TIDWM, and low dose SSI AC/HS PRN    VTE Risk Mitigation (From admission, onward)         Ordered     heparin (porcine) injection 5,000 Units  Every 8 hours         02/21/22 1627                Discharge Planning   LOIS:      Code Status: Full Code   Is the patient medically ready for discharge?:     Reason for patient still in hospital (select all that apply): Treatment  Discharge Plan A: Long-term acute care facility (LTAC)                  D Valentín Correa MD  Department of Hospital Medicine   [facility] ([place])

## 2022-03-01 NOTE — PROGRESS NOTES
Pharmacy to dose Vancomycin:    Patient reviewed, renal function stable, cultures reviewed, no new levels, continue current therapy; Next levels due: 03/02/22 @ 0030.    Thank you, Virginia Corado, Pharm.D.

## 2022-03-01 NOTE — SUBJECTIVE & OBJECTIVE
Interval History: No acute events overnight. Feeling well. No new concerns at this time.    Review of Systems   Constitutional:  Negative for chills and fever.   Respiratory:  Negative for cough and shortness of breath.    Cardiovascular:  Negative for chest pain and palpitations.   Gastrointestinal:  Negative for abdominal pain, nausea and vomiting.   Objective:     Vital Signs (Most Recent):  Temp: 98.6 °F (37 °C) (03/01/22 1122)  Pulse: 75 (03/01/22 1122)  Resp: 18 (03/01/22 1122)  BP: 94/60 (03/01/22 1122)  SpO2: 98 % (03/01/22 1122) Vital Signs (24h Range):  Temp:  [97.8 °F (36.6 °C)-98.6 °F (37 °C)] 98.6 °F (37 °C)  Pulse:  [75-88] 75  Resp:  [18-19] 18  SpO2:  [93 %-98 %] 98 %  BP: ()/(60-75) 94/60     Weight: 131.1 kg (289 lb 0.4 oz)  Body mass index is 37.11 kg/m².    Intake/Output Summary (Last 24 hours) at 3/1/2022 1455  Last data filed at 3/1/2022 0900  Gross per 24 hour   Intake 632.38 ml   Output 1100 ml   Net -467.62 ml      Physical Exam  Vitals and nursing note reviewed.   Constitutional:       General: He is not in acute distress.     Appearance: He is well-developed.   HENT:      Head: Normocephalic and atraumatic.   Eyes:      General:         Right eye: No discharge.         Left eye: No discharge.      Conjunctiva/sclera: Conjunctivae normal.   Cardiovascular:      Rate and Rhythm: Normal rate.      Pulses: Normal pulses.   Pulmonary:      Effort: Pulmonary effort is normal. No respiratory distress.   Abdominal:      Palpations: Abdomen is soft.      Tenderness: There is no abdominal tenderness.   Musculoskeletal:         General: No tenderness. Normal range of motion.      Comments: Post-op dressing in place to feet bilaterally with wound vac on R.   Skin:     General: Skin is warm and dry.   Neurological:      Mental Status: He is alert and oriented to person, place, and time.       Significant Labs:   CBC:  Recent Labs   Lab 03/01/22  0842   WBC 11.12   HGB 9.8*   HCT 32.5*   *    BMP:  Recent Labs   Lab 03/01/22  0842      K 4.6      CO2 27   BUN 15   CREATININE 1.1   *   CALCIUM 10.0   PHOS 3.8     Significant Imaging:   No new imaging this morning.

## 2022-03-01 NOTE — ASSESSMENT & PLAN NOTE
MRSA bacteremia  Septic emboli / cavitary lung lesion  Diabetic foot infection of left foot  - R 2nd digit with wet gangrene and necrotic ulcer on L foot  - CTA without PE but with bilateral cavitary lesion with possible septic emboli vs organizing pneumonia   - Quantiferon gold negative. Sputum AFB smear negative x 3.   - Echo without vegetation but given septic emboli, PAT attempted  - PAT canceled, pt unable to swallow probe on 3 attempts then refused  - Blood culture with MRSA. Repeat blood culture (2/23 and 2/25) NGTD  - ID, general surgery. Appreciate input  - Continue vancomycin with pharmacy dosing. Continue wound vac and local wound care   - S/p 2nd digit transmetatarsal amputation and debridement 2/22 and b/l debridement 2/24; plan for closure 3/02.  - Continue PT/OT   - Continue percocet and morphine for pain  - Plan to treat for presumed endocarditis given septic emboli  - Given combined wound care and prolonged IV antibiotic needs, suspect he will not be able to handle his condition at home.  - SW consulted for placement.

## 2022-03-01 NOTE — PLAN OF CARE
During routine rounds, assessed pt for spiritual distress, and assured pt aware  of spiritual care available.  While providing reflective listening and compassionate presence, pt concerns were explored.  Wishes for peace and healing were shared.     No distress or particular spiritual care needs were reported nor presented at this time.  Pt reported and presented with relational, faiza, and emotional support.  Pt reported gratitude for the spiritual wellness check.   Follow-up was offered upon request, and will also be offered at staff's discretion, should pt's conditions change, and/or if hospital admission continues significantly.

## 2022-03-02 ENCOUNTER — ANESTHESIA EVENT (OUTPATIENT)
Dept: SURGERY | Facility: OTHER | Age: 57
DRG: 255 | End: 2022-03-02
Payer: MEDICAID

## 2022-03-02 ENCOUNTER — ANESTHESIA (OUTPATIENT)
Dept: SURGERY | Facility: OTHER | Age: 57
DRG: 255 | End: 2022-03-02
Payer: MEDICAID

## 2022-03-02 LAB
BACTERIA BLD CULT: NORMAL
FINAL PATHOLOGIC DIAGNOSIS: NORMAL
GROSS: NORMAL
Lab: NORMAL
POCT GLUCOSE: 112 MG/DL (ref 70–110)
POCT GLUCOSE: 117 MG/DL (ref 70–110)
POCT GLUCOSE: 128 MG/DL (ref 70–110)
POCT GLUCOSE: 158 MG/DL (ref 70–110)
POCT GLUCOSE: 196 MG/DL (ref 70–110)
SARS-COV-2 RDRP RESP QL NAA+PROBE: NEGATIVE
VANCOMYCIN TROUGH SERPL-MCNC: 14 UG/ML (ref 10–22)

## 2022-03-02 PROCEDURE — 37000008 HC ANESTHESIA 1ST 15 MINUTES: Performed by: SPECIALIST

## 2022-03-02 PROCEDURE — 11000001 HC ACUTE MED/SURG PRIVATE ROOM

## 2022-03-02 PROCEDURE — A4216 STERILE WATER/SALINE, 10 ML: HCPCS | Performed by: INTERNAL MEDICINE

## 2022-03-02 PROCEDURE — C1751 CATH, INF, PER/CENT/MIDLINE: HCPCS

## 2022-03-02 PROCEDURE — 27000207 HC ISOLATION

## 2022-03-02 PROCEDURE — 25000003 PHARM REV CODE 250: Performed by: INTERNAL MEDICINE

## 2022-03-02 PROCEDURE — 36000706: Performed by: SPECIALIST

## 2022-03-02 PROCEDURE — 63600175 PHARM REV CODE 636 W HCPCS: Performed by: ANESTHESIOLOGY

## 2022-03-02 PROCEDURE — 63600175 PHARM REV CODE 636 W HCPCS: Performed by: INTERNAL MEDICINE

## 2022-03-02 PROCEDURE — 76942 ECHO GUIDE FOR BIOPSY: CPT | Performed by: ANESTHESIOLOGY

## 2022-03-02 PROCEDURE — 63600175 PHARM REV CODE 636 W HCPCS: Performed by: NURSE ANESTHETIST, CERTIFIED REGISTERED

## 2022-03-02 PROCEDURE — 12044 PR LAYR CLOS WND REST BODY 7.6-12.5 CM: ICD-10-PCS | Mod: 58,,, | Performed by: SPECIALIST

## 2022-03-02 PROCEDURE — 80202 ASSAY OF VANCOMYCIN: CPT | Performed by: INTERNAL MEDICINE

## 2022-03-02 PROCEDURE — 12044 INTMD RPR N-HF/GENIT7.6-12.5: CPT | Mod: 58,,, | Performed by: SPECIALIST

## 2022-03-02 PROCEDURE — U0002 COVID-19 LAB TEST NON-CDC: HCPCS | Performed by: SPECIALIST

## 2022-03-02 PROCEDURE — 63600175 PHARM REV CODE 636 W HCPCS

## 2022-03-02 PROCEDURE — 36000707: Performed by: SPECIALIST

## 2022-03-02 PROCEDURE — 27201423 OPTIME MED/SURG SUP & DEVICES STERILE SUPPLY: Performed by: SPECIALIST

## 2022-03-02 PROCEDURE — 99232 PR SUBSEQUENT HOSPITAL CARE,LEVL II: ICD-10-PCS | Mod: 95,,, | Performed by: STUDENT IN AN ORGANIZED HEALTH CARE EDUCATION/TRAINING PROGRAM

## 2022-03-02 PROCEDURE — 71000033 HC RECOVERY, INTIAL HOUR: Performed by: SPECIALIST

## 2022-03-02 PROCEDURE — 99232 SBSQ HOSP IP/OBS MODERATE 35: CPT | Mod: 95,,, | Performed by: STUDENT IN AN ORGANIZED HEALTH CARE EDUCATION/TRAINING PROGRAM

## 2022-03-02 PROCEDURE — 37000009 HC ANESTHESIA EA ADD 15 MINS: Performed by: SPECIALIST

## 2022-03-02 PROCEDURE — 63600175 PHARM REV CODE 636 W HCPCS: Performed by: PHYSICIAN ASSISTANT

## 2022-03-02 PROCEDURE — 97116 GAIT TRAINING THERAPY: CPT | Mod: CQ

## 2022-03-02 RX ORDER — FENTANYL CITRATE 50 UG/ML
INJECTION, SOLUTION INTRAMUSCULAR; INTRAVENOUS
Status: DISCONTINUED | OUTPATIENT
Start: 2022-03-02 | End: 2022-03-02

## 2022-03-02 RX ORDER — SODIUM CHLORIDE 0.9 % (FLUSH) 0.9 %
3 SYRINGE (ML) INJECTION
Status: DISCONTINUED | OUTPATIENT
Start: 2022-03-02 | End: 2022-03-07 | Stop reason: HOSPADM

## 2022-03-02 RX ORDER — OXYCODONE HYDROCHLORIDE 5 MG/1
5 TABLET ORAL
Status: DISCONTINUED | OUTPATIENT
Start: 2022-03-02 | End: 2022-03-05

## 2022-03-02 RX ORDER — HYDROMORPHONE HYDROCHLORIDE 2 MG/ML
0.4 INJECTION, SOLUTION INTRAMUSCULAR; INTRAVENOUS; SUBCUTANEOUS EVERY 5 MIN PRN
Status: DISCONTINUED | OUTPATIENT
Start: 2022-03-02 | End: 2022-03-05

## 2022-03-02 RX ORDER — ROPIVACAINE HYDROCHLORIDE 5 MG/ML
INJECTION, SOLUTION EPIDURAL; INFILTRATION; PERINEURAL
Status: COMPLETED | OUTPATIENT
Start: 2022-03-02 | End: 2022-03-02

## 2022-03-02 RX ORDER — PROPOFOL 10 MG/ML
VIAL (ML) INTRAVENOUS CONTINUOUS PRN
Status: DISCONTINUED | OUTPATIENT
Start: 2022-03-02 | End: 2022-03-02

## 2022-03-02 RX ORDER — PROCHLORPERAZINE EDISYLATE 5 MG/ML
5 INJECTION INTRAMUSCULAR; INTRAVENOUS EVERY 30 MIN PRN
Status: DISCONTINUED | OUTPATIENT
Start: 2022-03-02 | End: 2022-03-05

## 2022-03-02 RX ORDER — MEPERIDINE HYDROCHLORIDE 25 MG/ML
12.5 INJECTION INTRAMUSCULAR; INTRAVENOUS; SUBCUTANEOUS ONCE AS NEEDED
Status: ACTIVE | OUTPATIENT
Start: 2022-03-02 | End: 2022-03-03

## 2022-03-02 RX ORDER — ONDANSETRON 2 MG/ML
INJECTION INTRAMUSCULAR; INTRAVENOUS
Status: DISCONTINUED | OUTPATIENT
Start: 2022-03-02 | End: 2022-03-02

## 2022-03-02 RX ADMIN — MORPHINE SULFATE 2 MG: 4 INJECTION, SOLUTION INTRAMUSCULAR; INTRAVENOUS at 05:03

## 2022-03-02 RX ADMIN — ONDANSETRON HYDROCHLORIDE 4 MG: 2 INJECTION INTRAMUSCULAR; INTRAVENOUS at 09:03

## 2022-03-02 RX ADMIN — MORPHINE SULFATE 2 MG: 4 INJECTION, SOLUTION INTRAMUSCULAR; INTRAVENOUS at 08:03

## 2022-03-02 RX ADMIN — ATORVASTATIN CALCIUM 40 MG: 20 TABLET, FILM COATED ORAL at 08:03

## 2022-03-02 RX ADMIN — SODIUM CHLORIDE, PRESERVATIVE FREE 10 ML: 5 INJECTION INTRAVENOUS at 05:03

## 2022-03-02 RX ADMIN — HEPARIN SODIUM 5000 UNITS: 5000 INJECTION INTRAVENOUS; SUBCUTANEOUS at 02:03

## 2022-03-02 RX ADMIN — MORPHINE SULFATE 2 MG: 4 INJECTION, SOLUTION INTRAMUSCULAR; INTRAVENOUS at 12:03

## 2022-03-02 RX ADMIN — QUETIAPINE FUMARATE 200 MG: 200 TABLET ORAL at 10:03

## 2022-03-02 RX ADMIN — INSULIN ASPART 10 UNITS: 100 INJECTION, SOLUTION INTRAVENOUS; SUBCUTANEOUS at 05:03

## 2022-03-02 RX ADMIN — HEPARIN SODIUM 5000 UNITS: 5000 INJECTION INTRAVENOUS; SUBCUTANEOUS at 05:03

## 2022-03-02 RX ADMIN — FENTANYL CITRATE 100 MCG: 50 INJECTION, SOLUTION INTRAMUSCULAR; INTRAVENOUS at 08:03

## 2022-03-02 RX ADMIN — SODIUM CHLORIDE, PRESERVATIVE FREE 10 ML: 5 INJECTION INTRAVENOUS at 12:03

## 2022-03-02 RX ADMIN — PROPOFOL 50 MCG/KG/MIN: 10 INJECTION, EMULSION INTRAVENOUS at 09:03

## 2022-03-02 RX ADMIN — INSULIN DETEMIR 55 UNITS: 100 INJECTION, SOLUTION SUBCUTANEOUS at 10:03

## 2022-03-02 RX ADMIN — METRONIDAZOLE 500 MG: 500 TABLET ORAL at 05:03

## 2022-03-02 RX ADMIN — VANCOMYCIN HYDROCHLORIDE 1500 MG: 1.5 INJECTION, POWDER, LYOPHILIZED, FOR SOLUTION INTRAVENOUS at 12:03

## 2022-03-02 RX ADMIN — OXYCODONE AND ACETAMINOPHEN 1 TABLET: 7.5; 325 TABLET ORAL at 10:03

## 2022-03-02 RX ADMIN — ASPIRIN 81 MG: 81 TABLET, COATED ORAL at 08:03

## 2022-03-02 RX ADMIN — METRONIDAZOLE 500 MG: 500 TABLET ORAL at 10:03

## 2022-03-02 RX ADMIN — SODIUM CHLORIDE, SODIUM LACTATE, POTASSIUM CHLORIDE, AND CALCIUM CHLORIDE: .6; .31; .03; .02 INJECTION, SOLUTION INTRAVENOUS at 08:03

## 2022-03-02 RX ADMIN — SODIUM CHLORIDE: 0.9 INJECTION, SOLUTION INTRAVENOUS at 12:03

## 2022-03-02 RX ADMIN — HEPARIN SODIUM 5000 UNITS: 5000 INJECTION INTRAVENOUS; SUBCUTANEOUS at 10:03

## 2022-03-02 RX ADMIN — METRONIDAZOLE 500 MG: 500 TABLET ORAL at 02:03

## 2022-03-02 RX ADMIN — ROPIVACAINE HYDROCHLORIDE 30 ML: 5 INJECTION, SOLUTION EPIDURAL; INFILTRATION; PERINEURAL at 08:03

## 2022-03-02 RX ADMIN — LOSARTAN POTASSIUM 25 MG: 25 TABLET, FILM COATED ORAL at 08:03

## 2022-03-02 NOTE — PT/OT/SLP PROGRESS
Physical Therapy Treatment    Patient Name:  Dave Good   MRN:  [id number]    Recommendations:     Discharge Recommendations:   (LTAC per medical team)   Discharge Equipment Recommendations: bath bench, bedside commode, cane, straight   Barriers to discharge: None    Assessment:     Dave Good is a 56 y.o. male admitted with a medical diagnosis of Diabetic wet gangrene of the foot.  He presents with the following impairments/functional limitations:  weakness, impaired endurance, impaired functional mobilty, gait instability, impaired balance, decreased lower extremity function, impaired skin, edema ;pt with good mobility today, a little sleepy from earlier procedure, though willing to participate.    Rehab Prognosis: Good; patient would benefit from acute skilled PT services to address these deficits and reach maximum level of function.    Recent Surgery: Procedure(s) (LRB):  DEBRIDEMENT, LOWER EXTREMITY (Bilateral) Day of Surgery    Plan:     During this hospitalization, patient to be seen 2 x/week to address the identified rehab impairments via gait training, therapeutic activities, therapeutic exercises, neuromuscular re-education and progress toward the following goals:    · Plan of Care Expires:  03/11/22    Subjective     Chief Complaint: none stated  Patient/Family Comments/goals: pt agreeable to session, fiance present  Pain/Comfort:  · Pain Rating 1: 0/10  · Pain Rating Post-Intervention 1: 0/10      Objective:     Communicated with nurse prior to session.  Patient found HOB elevated with peripheral IV upon PT entry to room.     General Precautions: Standard, diabetic, fall, contact, respiratory   Orthopedic Precautions:RLE weight bearing as tolerated   Braces:  (B Darco shoes)  Respiratory Status: Room air     Functional Mobility:  · Bed Mobility:     · Supine to Sit: modified independence  · Sit to Supine: modified independence  · Transfers:     · Sit to Stand:  stand by assistance with rolling  walker  · Gait: amb'd ~50' w/ RW and SBA, WBAT BLE's w/ Darco shoes donned      AM-PAC 6 CLICK MOBILITY  Turning over in bed (including adjusting bedclothes, sheets and blankets)?: 4  Sitting down on and standing up from a chair with arms (e.g., wheelchair, bedside commode, etc.): 4  Moving from lying on back to sitting on the side of the bed?: 4  Moving to and from a bed to a chair (including a wheelchair)?: 3  Need to walk in hospital room?: 3  Climbing 3-5 steps with a railing?: 3  Basic Mobility Total Score: 21       Therapeutic Activities and Exercises:   assisted RN w/ putting bed extender on foot of bed (pt's feet against railing at bottom upon arrival).   Pt perf'd seated LAQ's x 20 ea.     Patient left HOB elevated with all lines intact, call button in reach, nurse notified and fiance present.    GOALS:   Multidisciplinary Problems     Physical Therapy Goals        Problem: Physical Therapy Goal    Goal Priority Disciplines Outcome Goal Variances Interventions   Physical Therapy Goal     PT, PT/OT Ongoing, Progressing     Description: Goals to be met by: 3/11/22    Patient will increase functional independence with mobility by performin. Gait x 100 feet with supervision with LRAD.  2. Ascend/descend 10 step(s) with least restrictive assistive device and uni HR with supervision.  3. Standing activity x5 min without significant increased in foot pain with surgical shoes donned.                    Time Tracking:     PT Received On: 22  PT Start Time: 1345     PT Stop Time: 1400  PT Total Time (min): 15 min     Billable Minutes: Gait Training 15    Treatment Type: Treatment  PT/PTA: PTA     PTA Visit Number: 1     2022

## 2022-03-02 NOTE — OP NOTE
[facility])  Surgery Department  Operative Note    SUMMARY     Patient: Dave Good    Medical Record: [id number]    Date of Procedure: 3/2/2022     Surgeon: Surgeon(s) and Role:     * Jesus Flores Jr., MD - Primary    Assisting Surgeon: None    Pre-Operative Diagnosis: Diabetic wet gangrene of the foot [E11.52]    Post-Operative Diagnosis: Post-Op Diagnosis Codes:     * Diabetic wet gangrene of the foot [E11.52]    Procedure:  Delayed layered closure of right foot incision.  10 cm in length    Procedure in Detail:  The patient was brought to the operating placed in the supine position.  Both feet prepped and draped in a sterile fashion.  The wounds on the right foot showed excellent granulation without evidence of active infection or necrosis.  The wounds were then closed in 2 layers with interrupted 3-0 Vicryl on the subcutaneous tissue and interrupted 3-0 Vicryl simple sutures on the skin.  Attention then paid to extremity.  There was no evidence of necrotic debris or tissue present.  The wounds were then sterilely dressed.  Patient tolerated procedure the operating good condition.  At the end the procedure all sponge lap and instrument counts were correct.

## 2022-03-02 NOTE — ANESTHESIA PREPROCEDURE EVALUATION
03/02/2022  Dave Good is a 56 y.o., male.    Pre-op Assessment    I have reviewed the Patient Summary Reports.    I have reviewed the Nursing Notes. I have reviewed the NPO Status.   I have reviewed the Medications.     Review of Systems  Anesthesia Hx:  Denies Family Hx of Anesthesia complications.   Denies Personal Hx of Anesthesia complications.   Social:  Non-Smoker    Hematology/Oncology:     Oncology Normal    -- Anemia:   EENT/Dental:EENT/Dental Normal   Cardiovascular:   Exercise tolerance: poor Hypertension CHF PVD hyperlipidemia    Pulmonary:   COPD, severe Shortness of breath Home o2   Renal/:   Chronic Renal Disease (improved since admit), ARF    Hepatic/GI:  Hepatic/GI Normal    Musculoskeletal:  Musculoskeletal Normal    Neurological:  Neurology Normal    Endocrine:   Diabetes, poorly controlled, type 2, using insulin    Dermatological:  Skin Normal    Psych:   Psychiatric History          Physical Exam  General:  Morbid Obesity        Dental:  Dental Findings:Multiple missing, none loose          Mental Status:  Mental Status Findings:  Cooperative, Alert and Oriented         Anesthesia Plan  Type of Anesthesia, risks & benefits discussed:  Anesthesia Type:  MAC    Patient's Preference:   Plan Factors:          Intra-op Monitoring Plan: standard ASA monitors  Intra-op Monitoring Plan Comments:   Post Op Pain Control Plan: per primary service following discharge from PACU, multimodal analgesia and peripheral nerve block  Post Op Pain Control Plan Comments:     Induction:   IV  Beta Blocker:         Informed Consent: Informed consent signed with the Patient and all parties understand the risks and agree with anesthesia plan.  All questions answered.  Anesthesia consent signed with patient.  ASA Score: 4     Day of Surgery Review of History & Physical:              Ready For Surgery From  Anesthesia Perspective.           Physical Exam  General: Morbid Obesity          Anesthesia Plan  Type of Anesthesia, risks & benefits discussed:    Anesthesia Type: MAC  Intra-op Monitoring Plan: standard ASA monitors  Post Op Pain Control Plan: per primary service following discharge from PACU, multimodal analgesia and peripheral nerve block  Induction:  IV  Informed Consent: Informed consent signed with the Patient and all parties understand the risks and agree with anesthesia plan.  All questions answered.   ASA Score: 4    Ready For Surgery From Anesthesia Perspective.       .

## 2022-03-02 NOTE — CONSULTS
Thank you for your consult to [facility]. We have reviewed the patient chart. This patient does meet criteria for [facility] service at this time. Will assume care on 03/02/22 at 6AM.

## 2022-03-02 NOTE — PROGRESS NOTES
Pharmacokinetic Assessment Follow Up: IV Vancomycin    Vancomycin serum concentration assessment(s):    The trough level was drawn correctly and can be used to guide therapy at this time. The measurement is within the desired definitive target range of 10 to 20 mcg/mL.    Vancomycin Regimen Plan:    Continue regimen to Vancomycin 1500 mg IV every 24 hours with next serum trough concentration measured at 0030 prior to the dose on 3/4/22    Drug levels (last 3 results):  Recent Labs   Lab Result Units 02/28/22  1235 03/02/22  0002   Vancomycin-Trough ug/mL 24.1* 14.0       Pharmacy will continue to follow and monitor vancomycin.    Please contact pharmacy at extension [phone] for questions regarding this assessment.    Thank you for the consult,   Tanya Boggs       Patient brief summary:  Dave Good is a 56 y.o. male initiated on antimicrobial therapy with IV Vancomycin for treatment of pneumonia      Drug Allergies:   Review of patient's allergies indicates:   Allergen Reactions    Ibuprofen Swelling     Facial swelling       Actual Body Weight:   131.1 kg    Renal Function:   Estimated Creatinine Clearance: 105.4 mL/min (based on SCr of 1.1 mg/dL).,     Dialysis Method (if applicable):  N/A    CBC (last 72 hours):  Recent Labs   Lab Result Units 03/01/22  0842   WBC K/uL 11.12   Hemoglobin g/dL 9.8*   Hematocrit % 32.5*   Platelets K/uL 751*       Metabolic Panel (last 72 hours):  Recent Labs   Lab Result Units 03/01/22  0842   Sodium mmol/L 136   Potassium mmol/L 4.6   Chloride mmol/L 103   CO2 mmol/L 27   Glucose mg/dL 166*   BUN mg/dL 15   Creatinine mg/dL 1.1   Albumin g/dL 1.9*   Phosphorus mg/dL 3.8       Vancomycin Administrations:  vancomycin given in the last 96 hours                   vancomycin 1.5 g in dextrose 5 % 250 mL IVPB (ready to mix) (mg) 1,500 mg New Bag 03/02/22 0032      Restarted 03/01/22 0137     1,500 mg New Bag  0130    vancomycin 1.5 g in dextrose 5 % 250 mL IVPB (ready to mix) (mg)  1,500 mg New Bag 02/28/22 0102    vancomycin 1.5 g in dextrose 5 % 250 mL IVPB (ready to mix) (mg) 1,500 mg New Bag 02/27/22 1300     1,500 mg New Bag  0338    vancomycin 1.5 g in dextrose 5 % 250 mL IVPB (ready to mix) (mg) 1,500 mg New Bag 02/26/22 0848                Microbiologic Results:  Microbiology Results (last 7 days)     Procedure Component Value Units Date/Time    Blood culture [[id number]] Collected: 02/25/22 0548    Order Status: Completed Specimen: Blood Updated: 03/01/22 1212     Blood Culture, Routine No Growth to date      No Growth to date      No Growth to date      No Growth to date      No Growth to date    Narrative:      Collection has been rescheduled by [email or web address] at 02/25/2022 05:55 Reason:   Patient unavailable labs collected  Collection has been rescheduled by WAD1 at 02/25/2022 05:55 Reason:   Patient unavailable labs collected    Blood culture [[id number]] Collected: 02/23/22 0550    Order Status: Completed Specimen: Blood Updated: 02/28/22 1012     Blood Culture, Routine No growth after 5 days.    Narrative:      Collection has been rescheduled by WAD1 at 02/23/2022 05:53 Reason:   Patient unavailable labs collected  Collection has been rescheduled by WAD1 at 02/23/2022 05:53 Reason:   Patient unavailable labs collected    Culture, Anaerobic [[id number]]  (Abnormal) Collected: 02/22/22 1153    Order Status: Completed Specimen: Wound from Foot, Right Updated: 02/28/22 0710     Anaerobic Culture PRESUMPTIVE PREVOTELLA/PORPHYROMONAS GROUP  Many      Aerobic culture [[id number]]  (Abnormal)  (Susceptibility) Collected: 02/22/22 1153    Order Status: Completed Specimen: Wound from Foot, Right Updated: 02/25/22 1055     Aerobic Bacterial Culture METHICILLIN RESISTANT STAPHYLOCOCCUS AUREUS  Many  No other significant isolate      Blood culture x two cultures. Draw prior to antibiotics. [[id number]]  (Abnormal) Collected: 02/21/22 1231    Order Status: Completed Specimen: Blood from Peripheral, Hand,  Left Updated: 02/24/22 0948     Blood Culture, Routine Gram stain aer bottle: Gram positive cocci      Gram stain jose bottle: Gram positive cocci      Results called to and read back by: KRISTINA ESCOTO RN  02/22/2022  11:03      METHICILLIN RESISTANT STAPHYLOCOCCUS AUREUS  ID consult required at [facility] and [facility].  For susceptibility see order #[id number]  ID consult required at [facility] and [facility].      Narrative:      Aerobic and anaerobic    Blood culture x two cultures. Draw prior to antibiotics. [[id number]]  (Abnormal)  (Susceptibility) Collected: 02/21/22 1218    Order Status: Completed Specimen: Blood from Peripheral, Hand, Right Updated: 02/24/22 0945     Blood Culture, Routine Gram stain aer bottle: Gram positive cocci       Positive results previously called 02/22/2022  12:12      Gram stain jose bottle: Gram positive cocci      METHICILLIN RESISTANT STAPHYLOCOCCUS AUREUS  ID consult required at [facility] and [facility] locations.      Narrative:      Aerobic and anaerobic    AFB Culture & Smear [[id number]] Collected: 02/22/22 1955    Order Status: Completed Specimen: Respiratory from Mouth Updated: 02/24/22 0927     AFB Culture & Smear Culture in progress     AFB CULTURE STAIN No acid fast bacilli seen.    AFB Culture & Smear [[id number]] Collected: 02/22/22 1153    Order Status: Completed Specimen: Wound from Foot, Right Updated: 02/24/22 0927     AFB Culture & Smear Culture in progress     AFB CULTURE STAIN No acid fast bacilli seen.

## 2022-03-02 NOTE — PLAN OF CARE
Pt remained free of falls and injuries throughout shift. AAOx4. Pt calm and cooperative. Purposeful hourly rounding performed. Pt swallows meds whole. Pt received IV vancomycin as scheduled, PICC line double lumens both with blood return, flushed, saline locked, and curos capped. Pt has wound vac to R foot, continuous therapy @ 125 mmHg, sanguineous drainage in collection container. Dsg to L foot CDI. Managed c/o bilateral foot pain with PRN Percocet and IV morphine. Patient ambulates with standby assist, urinal in reach. BP low this shift, KEVYN Shepherd aware. VSS on 2L O2 NC. Pt resting in bed, NADN, denies needs at this time. Wife at bedside supportive of pt. Bed low and locked, call light in reach. Side rails up x2. Will continue to monitor.

## 2022-03-02 NOTE — ANESTHESIA PROCEDURE NOTES
Peripheral Block    Patient location during procedure: holding area    Reason for block: primary anesthetic    Diagnosis: diabetic foot ulcer   Timeout: 3/2/2022 8:48 AM     Staffing  Authorizing Provider: Zeus Hughes MD  Performing Provider: Zeus Hughes MD    Preanesthetic Checklist  Completed: patient identified, IV checked, site marked, risks and benefits discussed, surgical consent, monitors and equipment checked, pre-op evaluation and timeout performed  Peripheral Block  Patient position: supine  Prep: ChloraPrep  Patient monitoring: heart rate, cardiac monitor, continuous pulse ox and frequent blood pressure checks  Block type: popliteal  Laterality: right  Injection technique: single shot  Needle  Needle type: Stimuplex   Needle gauge: 22 G  Needle length: 3.5 in  Needle localization: ultrasound guidance and nerve stimulator   -ultrasound image captured on disc.  Assessment  Injection assessment: negative aspiration, negative parasthesia and local visualized surrounding nerve  Paresthesia pain: none  Heart rate change: no  Slow fractionated injection: yes  Pain Tolerance: comfortable throughout block and no complaints  Medications:    Medications: ropivacaine (NAROPIN) injection 0.5% - Perineural   30 mL - 3/2/2022 8:54:00 AM

## 2022-03-02 NOTE — TRANSFER OF CARE
"Anesthesia Transfer of Care Note    Patient: Dave Good    Procedure(s) Performed: Procedure(s) (LRB):  DEBRIDEMENT, LOWER EXTREMITY (Bilateral)    Patient location: PACU    Anesthesia Type: regional    Transport from OR: Transported from OR on 2-3 L/min O2 by NC with adequate spontaneous ventilation    Post pain: adequate analgesia    Post assessment: no apparent anesthetic complications    Post vital signs: stable    Level of consciousness: awake    Nausea/Vomiting: no nausea/vomiting    Complications: none    Transfer of care protocol was followed      Last vitals:   Visit Vitals  /76 (BP Location: Right arm, Patient Position: Lying)   Pulse 75   Temp 36.7 °C (98.1 °F) (Oral)   Resp 14   Ht 6' 2" (1.88 m)   Wt 131.1 kg (289 lb 0.4 oz)   SpO2 97%   BMI 37.11 kg/m²     "

## 2022-03-02 NOTE — OR NURSING
Pt without change from previous assessment. Continues wihtout c/o pain at this time. Prepared for transfer to inpt room. Report called to Jai RN 3CC. Pt placed on transport.

## 2022-03-02 NOTE — PLAN OF CARE
Clinic Care Coordination Contact  OUTREACH    Referral Information:     Reason for Contact: foot ulcers, confusion,           Clinical Concerns:  Current Medical Concerns: Patient was seen by ortho this past week.  Amputation of foot was put on hold as it appeared that there was slight inprovement of foot but more so as patient keeps refusing any kind of amputation.  I spoke to nurse, Naty, at [facility]  She is concerned about patient foot healing and drainage will cease and he will end up with a huge infection leading to sepsis. She states patient mentation continues to be somewhat confused. She did states she was able to convince patient to start paperwork to allow them to stay at assisted living facility once funds are depleted. She states they did allow him to bring one of his dogs to the assisted living facility which did help patient to admit he did need help with things. According to naty, the son has informed them he is trying to clean up the house but had a pipe break as home not being heated. And basement is full of water.  Home is pretty much unihabitable.  Patient has had some episodes of hypotension so nurse states they got order to hold metoprolol if BP less than 100 systolic. They continue to watch condition of foot and packing ulcers. She states he does use that foot to stand and to balance and also propels his wheelchair with that foot. Unsure how long he will be managed by care coordination. If long term assisted living will discharge patient from care coordination.    Current Behavioral Concerns: patient very confused        Medication Management: He is residing in assisted living so they are managing medications      Functional Status: he is not standing or walking. Wheelchair bound at this time        Transportation: he is not driving           Psychosocial:     Financial/Insurance: per Naty at assisted living, she and patient did paperwork to ensure  Pt not seen today. Chart reviewed. Foot wound closed today, no signs of active infection. No new cultures sent. Continue vanc/metronidazole. F/u final path, still pending.   he and his wife, can remain there when funds are depleted       Resources and Interventions:  Current Resources:  ;  PCP, orth, anticoagulation clinic, care coordination. Patient does have a son but lots of issues with that relationship                 Goals: to prevent infection and get wounds to heal.                 Barriers: age, diabetes  Strengths: is residing at assisted living so he can get proper care.  Patient/Caregiver understanding: spoke to assisted living nurse.           Plan: follow up in 6 weeks to check on status.      Jessa Storey RN  Care Coordination

## 2022-03-02 NOTE — ANESTHESIA POSTPROCEDURE EVALUATION
Anesthesia Post Evaluation    Patient: Dave Good    Procedure(s) Performed: Procedure(s) (LRB):  DEBRIDEMENT, LOWER EXTREMITY (Bilateral)    Final Anesthesia Type: regional      Patient location during evaluation: PACU  Patient participation: Yes- Able to Participate  Level of consciousness: awake and alert  Post-procedure vital signs: reviewed and stable  Pain management: adequate  Airway patency: patent    PONV status at discharge: No PONV  Anesthetic complications: no      Cardiovascular status: blood pressure returned to baseline  Respiratory status: unassisted  Hydration status: euvolemic  Follow-up not needed.          Vitals Value Taken Time   /83 03/02/22 1136   Temp 36.4 °C (97.6 °F) 03/02/22 1136   Pulse 73 03/02/22 1136   Resp 18 03/02/22 1136   SpO2 92 % 03/02/22 1136         Event Time   Out of Recovery 03/02/2022 10:46:24         Pain/Mignon Score: Pain Rating Prior to Med Admin: 10 (3/2/2022  8:07 AM)  Pain Rating Post Med Admin: 5 (3/2/2022  8:37 AM)  Mignon Score: 8 (3/2/2022 10:36 AM)

## 2022-03-03 LAB
ANION GAP SERPL CALC-SCNC: 6 MMOL/L (ref 8–16)
BASOPHILS # BLD AUTO: 0.05 K/UL (ref 0–0.2)
BASOPHILS NFR BLD: 0.5 % (ref 0–1.9)
BUN SERPL-MCNC: 18 MG/DL (ref 6–20)
CALCIUM SERPL-MCNC: 9.3 MG/DL (ref 8.7–10.5)
CHLORIDE SERPL-SCNC: 100 MMOL/L (ref 95–110)
CO2 SERPL-SCNC: 29 MMOL/L (ref 23–29)
CREAT SERPL-MCNC: 1.3 MG/DL (ref 0.5–1.4)
DIFFERENTIAL METHOD: ABNORMAL
EOSINOPHIL # BLD AUTO: 0.3 K/UL (ref 0–0.5)
EOSINOPHIL NFR BLD: 3.1 % (ref 0–8)
ERYTHROCYTE [DISTWIDTH] IN BLOOD BY AUTOMATED COUNT: 15 % (ref 11.5–14.5)
EST. GFR  (AFRICAN AMERICAN): >60 ML/MIN/1.73 M^2
EST. GFR  (NON AFRICAN AMERICAN): >60 ML/MIN/1.73 M^2
GLUCOSE SERPL-MCNC: 113 MG/DL (ref 70–110)
HCT VFR BLD AUTO: 30.2 % (ref 40–54)
HGB BLD-MCNC: 9.3 G/DL (ref 14–18)
IMM GRANULOCYTES # BLD AUTO: 0.08 K/UL (ref 0–0.04)
IMM GRANULOCYTES NFR BLD AUTO: 0.8 % (ref 0–0.5)
LYMPHOCYTES # BLD AUTO: 3.1 K/UL (ref 1–4.8)
LYMPHOCYTES NFR BLD: 33.3 % (ref 18–48)
MCH RBC QN AUTO: 27.3 PG (ref 27–31)
MCHC RBC AUTO-ENTMCNC: 30.8 G/DL (ref 32–36)
MCV RBC AUTO: 89 FL (ref 82–98)
MONOCYTES # BLD AUTO: 1 K/UL (ref 0.3–1)
MONOCYTES NFR BLD: 10.8 % (ref 4–15)
NEUTROPHILS # BLD AUTO: 4.9 K/UL (ref 1.8–7.7)
NEUTROPHILS NFR BLD: 51.5 % (ref 38–73)
NRBC BLD-RTO: 0 /100 WBC
PLATELET # BLD AUTO: 747 K/UL (ref 150–450)
PMV BLD AUTO: 9 FL (ref 9.2–12.9)
POCT GLUCOSE: 173 MG/DL (ref 70–110)
POCT GLUCOSE: 196 MG/DL (ref 70–110)
POCT GLUCOSE: 229 MG/DL (ref 70–110)
POCT GLUCOSE: 98 MG/DL (ref 70–110)
POTASSIUM SERPL-SCNC: 4.5 MMOL/L (ref 3.5–5.1)
RBC # BLD AUTO: 3.41 M/UL (ref 4.6–6.2)
SARS-COV-2 RDRP RESP QL NAA+PROBE: NEGATIVE
SODIUM SERPL-SCNC: 135 MMOL/L (ref 136–145)
WBC # BLD AUTO: 9.44 K/UL (ref 3.9–12.7)

## 2022-03-03 PROCEDURE — 25000003 PHARM REV CODE 250: Performed by: INTERNAL MEDICINE

## 2022-03-03 PROCEDURE — 99232 SBSQ HOSP IP/OBS MODERATE 35: CPT | Mod: 95,,, | Performed by: STUDENT IN AN ORGANIZED HEALTH CARE EDUCATION/TRAINING PROGRAM

## 2022-03-03 PROCEDURE — 11000001 HC ACUTE MED/SURG PRIVATE ROOM

## 2022-03-03 PROCEDURE — A4216 STERILE WATER/SALINE, 10 ML: HCPCS | Performed by: INTERNAL MEDICINE

## 2022-03-03 PROCEDURE — 25000003 PHARM REV CODE 250: Performed by: STUDENT IN AN ORGANIZED HEALTH CARE EDUCATION/TRAINING PROGRAM

## 2022-03-03 PROCEDURE — 63600175 PHARM REV CODE 636 W HCPCS: Performed by: PHYSICIAN ASSISTANT

## 2022-03-03 PROCEDURE — 27000207 HC ISOLATION

## 2022-03-03 PROCEDURE — 99232 PR SUBSEQUENT HOSPITAL CARE,LEVL II: ICD-10-PCS | Mod: 95,,, | Performed by: STUDENT IN AN ORGANIZED HEALTH CARE EDUCATION/TRAINING PROGRAM

## 2022-03-03 PROCEDURE — 63600175 PHARM REV CODE 636 W HCPCS: Performed by: INTERNAL MEDICINE

## 2022-03-03 PROCEDURE — 85025 COMPLETE CBC W/AUTO DIFF WBC: CPT | Performed by: STUDENT IN AN ORGANIZED HEALTH CARE EDUCATION/TRAINING PROGRAM

## 2022-03-03 PROCEDURE — 80048 BASIC METABOLIC PNL TOTAL CA: CPT | Performed by: STUDENT IN AN ORGANIZED HEALTH CARE EDUCATION/TRAINING PROGRAM

## 2022-03-03 PROCEDURE — U0002 COVID-19 LAB TEST NON-CDC: HCPCS | Performed by: STUDENT IN AN ORGANIZED HEALTH CARE EDUCATION/TRAINING PROGRAM

## 2022-03-03 RX ORDER — POLYETHYLENE GLYCOL 3350 17 G/17G
17 POWDER, FOR SOLUTION ORAL DAILY
Status: DISCONTINUED | OUTPATIENT
Start: 2022-03-03 | End: 2022-03-07 | Stop reason: HOSPADM

## 2022-03-03 RX ORDER — SYRING-NEEDL,DISP,INSUL,0.3 ML 29 G X1/2"
296 SYRINGE, EMPTY DISPOSABLE MISCELLANEOUS ONCE
Status: COMPLETED | OUTPATIENT
Start: 2022-03-03 | End: 2022-03-03

## 2022-03-03 RX ADMIN — HEPARIN SODIUM 5000 UNITS: 5000 INJECTION INTRAVENOUS; SUBCUTANEOUS at 06:03

## 2022-03-03 RX ADMIN — SODIUM CHLORIDE, PRESERVATIVE FREE 10 ML: 5 INJECTION INTRAVENOUS at 01:03

## 2022-03-03 RX ADMIN — INSULIN ASPART 10 UNITS: 100 INJECTION, SOLUTION INTRAVENOUS; SUBCUTANEOUS at 07:03

## 2022-03-03 RX ADMIN — LOSARTAN POTASSIUM 25 MG: 25 TABLET, FILM COATED ORAL at 09:03

## 2022-03-03 RX ADMIN — ATORVASTATIN CALCIUM 40 MG: 20 TABLET, FILM COATED ORAL at 09:03

## 2022-03-03 RX ADMIN — OXYCODONE AND ACETAMINOPHEN 1 TABLET: 7.5; 325 TABLET ORAL at 11:03

## 2022-03-03 RX ADMIN — MORPHINE SULFATE 2 MG: 4 INJECTION, SOLUTION INTRAMUSCULAR; INTRAVENOUS at 09:03

## 2022-03-03 RX ADMIN — SODIUM CHLORIDE, PRESERVATIVE FREE 10 ML: 5 INJECTION INTRAVENOUS at 06:03

## 2022-03-03 RX ADMIN — HEPARIN SODIUM 5000 UNITS: 5000 INJECTION INTRAVENOUS; SUBCUTANEOUS at 02:03

## 2022-03-03 RX ADMIN — QUETIAPINE FUMARATE 200 MG: 200 TABLET ORAL at 10:03

## 2022-03-03 RX ADMIN — POLYETHYLENE GLYCOL 3350 17 G: 17 POWDER, FOR SOLUTION ORAL at 12:03

## 2022-03-03 RX ADMIN — INSULIN ASPART 10 UNITS: 100 INJECTION, SOLUTION INTRAVENOUS; SUBCUTANEOUS at 05:03

## 2022-03-03 RX ADMIN — METRONIDAZOLE 500 MG: 500 TABLET ORAL at 06:03

## 2022-03-03 RX ADMIN — SODIUM CHLORIDE, PRESERVATIVE FREE 10 ML: 5 INJECTION INTRAVENOUS at 05:03

## 2022-03-03 RX ADMIN — HEPARIN SODIUM 5000 UNITS: 5000 INJECTION INTRAVENOUS; SUBCUTANEOUS at 10:03

## 2022-03-03 RX ADMIN — OXYCODONE AND ACETAMINOPHEN 1 TABLET: 7.5; 325 TABLET ORAL at 05:03

## 2022-03-03 RX ADMIN — OXYCODONE AND ACETAMINOPHEN 1 TABLET: 7.5; 325 TABLET ORAL at 09:03

## 2022-03-03 RX ADMIN — ASPIRIN 81 MG: 81 TABLET, COATED ORAL at 09:03

## 2022-03-03 RX ADMIN — INSULIN ASPART 10 UNITS: 100 INJECTION, SOLUTION INTRAVENOUS; SUBCUTANEOUS at 12:03

## 2022-03-03 RX ADMIN — INSULIN DETEMIR 55 UNITS: 100 INJECTION, SOLUTION SUBCUTANEOUS at 09:03

## 2022-03-03 RX ADMIN — Medication 296 ML: at 12:03

## 2022-03-03 RX ADMIN — VANCOMYCIN HYDROCHLORIDE 1500 MG: 1.5 INJECTION, POWDER, LYOPHILIZED, FOR SOLUTION INTRAVENOUS at 01:03

## 2022-03-03 RX ADMIN — SODIUM CHLORIDE, PRESERVATIVE FREE 10 ML: 5 INJECTION INTRAVENOUS at 12:03

## 2022-03-03 NOTE — SUBJECTIVE & OBJECTIVE
Interval History: Patient to the OR today with Dr. Flores for wound vac removal and closure. No other complaints voiced at this time.    Review of Systems   Constitutional:  Negative for chills and fever.   Respiratory:  Negative for cough and shortness of breath.    Cardiovascular:  Negative for chest pain and palpitations.   Gastrointestinal:  Negative for abdominal pain, nausea and vomiting.   Objective:     Vital Signs (Most Recent):  Temp: 98.5 °F (36.9 °C) (03/02/22 1713)  Pulse: 77 (03/02/22 1713)  Resp: 14 (03/02/22 1742)  BP: 100/68 (03/02/22 1713)  SpO2: (!) 93 % (03/02/22 1713) Vital Signs (24h Range):  Temp:  [97.6 °F (36.4 °C)-98.5 °F (36.9 °C)] 98.5 °F (36.9 °C)  Pulse:  [70-85] 77  Resp:  [12-20] 14  SpO2:  [92 %-100 %] 93 %  BP: ()/(54-83) 100/68     Weight: 131.1 kg (289 lb 0.4 oz)  Body mass index is 37.11 kg/m².    Intake/Output Summary (Last 24 hours) at 3/2/2022 1934  Last data filed at 3/2/2022 1806  Gross per 24 hour   Intake 2600.06 ml   Output 3200 ml   Net -599.94 ml        Physical Exam  Vitals and nursing note reviewed.   Constitutional:       General: He is not in acute distress.     Appearance: He is well-developed.   HENT:      Head: Normocephalic and atraumatic.   Pulmonary:      Effort: Pulmonary effort is normal. No respiratory distress.   Musculoskeletal:         General: No tenderness. Normal range of motion.      Comments: Post-op dressing in place to feet bilaterally with wound vac on R.   Skin:     General: Skin is warm and dry.   Neurological:      Mental Status: He is alert and oriented to person, place, and time.       Significant Labs:   CBC:  Recent Labs   Lab 03/01/22  0842   WBC 11.12   HGB 9.8*   HCT 32.5*   *     BMP:  Recent Labs   Lab 03/01/22  0842      K 4.6      CO2 27   BUN 15   CREATININE 1.1   *   CALCIUM 10.0   PHOS 3.8       Significant Imaging:   No new imaging this morning.

## 2022-03-03 NOTE — ASSESSMENT & PLAN NOTE
MRSA bacteremia  Septic emboli / cavitary lung lesion  Diabetic foot infection of left foot  - R 2nd digit with wet gangrene and necrotic ulcer on L foot  - CTA without PE but with bilateral cavitary lesion with possible septic emboli vs organizing pneumonia   - Quantiferon gold negative. Sputum AFB smear negative x 3.   - Echo without vegetation but given septic emboli, PAT attempted  - PAT canceled, pt unable to swallow probe on 3 attempts then refused  - Blood culture with MRSA. Repeat blood culture (2/23 and 2/25) NGTD  - ID, general surgery. Appreciate input  - Continue vancomycin with pharmacy dosing. Continue wound vac and local wound care   - S/p 2nd digit transmetatarsal amputation and debridement 2/22 and b/l debridement 2/24; plan for closure 3/02.  - Continue PT/OT   - Continue percocet and morphine for pain  - Plan to treat for presumed endocarditis given septic emboli  - Given combined wound care and prolonged IV antibiotic needs, suspect he will not be able to handle his condition at home.  - SW consulted for placement.  - RIGHT 2ND TOE AND TISSUE, AMPUTATION: Toe skin with severe ulceration, gangrenous necrosis and cellulitis. Underlying osteonecrosis, no acute osteomyelitis. Bone and soft tissue margins are unremarkable  - ID recommending prolonged course of IV vancomycin

## 2022-03-03 NOTE — PROGRESS NOTES
[facility] Medicine  Telemedicine Progress Note    Patient Name: Dave Good  MRN: [id number]  Patient Class: IP- Inpatient   Admission Date: 2/21/2022  Length of Stay: 10 days  Attending Physician: Vineet Salinas MD  Primary Care Provider: Reyna Jorge NP          Subjective:     Principal Problem:Diabetic wet gangrene of the foot        HPI:  55 yo m with PMH of DM , HTN , PE and COPD presented to ED  for R 2nd digit wound. Pt had amputation by  back on 9/2021 for wet gangrene . Pt is following up with wound care at [facility] twice a week. But the last time was 1.5 weeks ago, he noticed swelling ,pain and foul smelling drainage from the wound. Pt also reports worsening SOB with exertion . With some chest tightness. In ED VS were stable. Started on iv abx . Labs showed leukocytosis and hypokalemia.CTA showed cavitary lesions with possible septic emboli.       Overview/Hospital Course:  Patient admitted with foul smelling drainage from wound of R foot and SOB. Found to have cavitary lung lesions concerning for septic emboli. General surgery and ID consulted. He went to OR on 2/22 debridement and transmetatarsal amputation of 2nd R digit. Wound vac placed. Blood culture with MRSA. PAT ordered but patient unable to tolerate procedure (unable to pass probe, wouldn't swallow). Given presence of septic emboli, planned for empiric treatment of endocarditis for 6 weeks. Planned for removal of wound vac and closure on 3/02.      Interval History: Patient doing well post-op day 1, Mag citrate ordered as well as daily miralax. Pain controlled, patient seen ambulating.     Review of Systems   Constitutional:  Negative for chills and fever.   Respiratory:  Negative for cough and shortness of breath.    Cardiovascular:  Negative for chest pain and palpitations.   Gastrointestinal:  Negative for abdominal pain, nausea and vomiting.   Objective:     Vital Signs (Most  Recent):  Temp: 97.9 °F (36.6 °C) (03/03/22 1706)  Pulse: 77 (03/03/22 1706)  Resp: 18 (03/03/22 1706)  BP: 109/75 (03/03/22 1706)  SpO2: 97 % (03/03/22 1706) Vital Signs (24h Range):  Temp:  [97.7 °F (36.5 °C)-97.9 °F (36.6 °C)] 97.9 °F (36.6 °C)  Pulse:  [75-84] 77  Resp:  [14-20] 18  SpO2:  [91 %-97 %] 97 %  BP: (101-129)/(61-85) 109/75     Weight: 131.1 kg (289 lb 0.4 oz)  Body mass index is 37.11 kg/m².    Intake/Output Summary (Last 24 hours) at 3/3/2022 1732  Last data filed at 3/3/2022 0724  Gross per 24 hour   Intake 1208.21 ml   Output 1900 ml   Net -691.79 ml        Physical Exam  Vitals and nursing note reviewed.   Constitutional:       General: He is not in acute distress.     Appearance: He is well-developed.   HENT:      Head: Normocephalic and atraumatic.   Pulmonary:      Effort: Pulmonary effort is normal. No respiratory distress.   Musculoskeletal:         General: No tenderness. Normal range of motion.      Comments: Post-op dressing in place    Skin:     General: Skin is warm and dry.   Neurological:      Mental Status: He is alert and oriented to person, place, and time.       Significant Labs:   CBC:  Recent Labs   Lab 03/01/22  0842 03/03/22  0547   WBC 11.12 9.44   HGB 9.8* 9.3*   HCT 32.5* 30.2*   * 747*   GRAN  --  51.5  4.9   LYMPH  --  33.3  3.1   MONO  --  10.8  1.0   EOS  --  0.3   BASO  --  0.05     BMP:  Recent Labs   Lab 03/01/22  0842 03/03/22  0547    135*   K 4.6 4.5    100   CO2 27 29   BUN 15 18   CREATININE 1.1 1.3   * 113*   CALCIUM 10.0 9.3   PHOS 3.8  --        Significant Imaging:   No new imaging this morning.      Assessment/Plan:      * Diabetic wet gangrene of the foot  MRSA bacteremia  Septic emboli / cavitary lung lesion  Diabetic foot infection of left foot  - R 2nd digit with wet gangrene and necrotic ulcer on L foot  - CTA without PE but with bilateral cavitary lesion with possible septic emboli vs organizing pneumonia   - Quantiferon  gold negative. Sputum AFB smear negative x 3.   - Echo without vegetation but given septic emboli, PAT attempted  - PAT canceled, pt unable to swallow probe on 3 attempts then refused  - Blood culture with MRSA. Repeat blood culture (2/23 and 2/25) NGTD  - ID, general surgery. Appreciate input  - Continue vancomycin with pharmacy dosing. Continue wound vac and local wound care   - S/p 2nd digit transmetatarsal amputation and debridement 2/22 and b/l debridement 2/24; plan for closure 3/02.  - Continue PT/OT   - Continue percocet and morphine for pain  - Plan to treat for presumed endocarditis given septic emboli  - Given combined wound care and prolonged IV antibiotic needs, suspect he will not be able to handle his condition at home.  - SW consulted for placement.  - RIGHT 2ND TOE AND TISSUE, AMPUTATION: Toe skin with severe ulceration, gangrenous necrosis and cellulitis. Underlying osteonecrosis, no acute osteomyelitis. Bone and soft tissue margins are unremarkable  - ID recommending prolonged course of IV vancomycin    Increased nutritional needs        Chronic respiratory failure with hypoxia  - On 2L home O2 2/2 COVID-19  - Continue oxygen protocol    Cavitary lesion of lung  - As above    COPD with asthma  - Duoneb PRN    Depression  - Continue seroquel    HTN (hypertension)  - Continue losartan 25 mg qd    Type 2 diabetes mellitus without complication, with long-term current use of insulin  - A1c 9.8  - Continue levemir 55U qHS, aspart 10U TIDWM, and low dose SSI AC/HS PRN      VTE Risk Mitigation (From admission, onward)         Ordered     heparin (porcine) injection 5,000 Units  Every 8 hours         02/21/22 1627                      I have assessed these finding virtually using telemed platform and with assistance of bedside nurse                 The attending portion of this evaluation, treatment, and documentation was performed per Vineet Salinas MD via Telemedicine AudioVisual using the secure  [email or web address] software platform with 2 way audio/video. The provider was located off-site and the patient is located in the hospital. The aforementioned video software was utilized to document the relevant history and physical exam    Vineet Salinas MD  Department of Hospital Medicine   [facility] ([place])

## 2022-03-03 NOTE — SUBJECTIVE & OBJECTIVE
Interval History: Patient doing well post-op day 1, Mag citrate ordered as well as daily miralax. Pain controlled, patient seen ambulating.     Review of Systems   Constitutional:  Negative for chills and fever.   Respiratory:  Negative for cough and shortness of breath.    Cardiovascular:  Negative for chest pain and palpitations.   Gastrointestinal:  Negative for abdominal pain, nausea and vomiting.   Objective:     Vital Signs (Most Recent):  Temp: 97.9 °F (36.6 °C) (03/03/22 1706)  Pulse: 77 (03/03/22 1706)  Resp: 18 (03/03/22 1706)  BP: 109/75 (03/03/22 1706)  SpO2: 97 % (03/03/22 1706) Vital Signs (24h Range):  Temp:  [97.7 °F (36.5 °C)-97.9 °F (36.6 °C)] 97.9 °F (36.6 °C)  Pulse:  [75-84] 77  Resp:  [14-20] 18  SpO2:  [91 %-97 %] 97 %  BP: (101-129)/(61-85) 109/75     Weight: 131.1 kg (289 lb 0.4 oz)  Body mass index is 37.11 kg/m².    Intake/Output Summary (Last 24 hours) at 3/3/2022 1732  Last data filed at 3/3/2022 0724  Gross per 24 hour   Intake 1208.21 ml   Output 1900 ml   Net -691.79 ml        Physical Exam  Vitals and nursing note reviewed.   Constitutional:       General: He is not in acute distress.     Appearance: He is well-developed.   HENT:      Head: Normocephalic and atraumatic.   Pulmonary:      Effort: Pulmonary effort is normal. No respiratory distress.   Musculoskeletal:         General: No tenderness. Normal range of motion.      Comments: Post-op dressing in place    Skin:     General: Skin is warm and dry.   Neurological:      Mental Status: He is alert and oriented to person, place, and time.       Significant Labs:   CBC:  Recent Labs   Lab 03/01/22  0842 03/03/22  0547   WBC 11.12 9.44   HGB 9.8* 9.3*   HCT 32.5* 30.2*   * 747*   GRAN  --  51.5  4.9   LYMPH  --  33.3  3.1   MONO  --  10.8  1.0   EOS  --  0.3   BASO  --  0.05     BMP:  Recent Labs   Lab 03/01/22  0842 03/03/22  0547    135*   K 4.6 4.5    100   CO2 27 29   BUN 15 18   CREATININE 1.1 1.3   GLU  166* 113*   CALCIUM 10.0 9.3   PHOS 3.8  --        Significant Imaging:   No new imaging this morning.

## 2022-03-03 NOTE — PLAN OF CARE
03/03/22 0918   Discharge Reassessment   Assessment Type Discharge Planning Reassessment   Did the patient's condition or plan change since previous assessment? Yes   Discharge Plan discussed with: Patient;Spouse/sig other   Discharge Plan A Long-term acute care facility (LTAC)   Discharge Plan B Rehab   DME Needed Upon Discharge  none   Discharge Barriers Identified None   Post-Acute Status   Post-Acute Authorization Placement   Post-Acute Placement Status Pending Bed Availability   Discharge Delays (!) Post-Acute Set-up    [facility]

## 2022-03-03 NOTE — PLAN OF CARE
Report received care assumed . Assessment per flow sheet . AAOX3 .No C/O pain or apparent distress . Dressings to Bilateral extremities C/D/I safety maintained call bell within reach bed in low position.

## 2022-03-03 NOTE — PLAN OF CARE
reviewed path:  RIGHT 2ND TOE AND TISSUE, AMPUTATION:   Toe skin with severe ulceration, gangrenous necrosis and cellulitis.   Underlying osteonecrosis, no acute osteomyelitis.   Bone and soft tissue margins are unremarkable.      Bone cultures from 2/22 positive, but not clear if this was from prox margin.       Recommendations:   - continue vancomycin x 4-6 weeks for complicated bacteremia, possible endocarditis  - stop metronidazole  - ensure he has adequate perfusion to heal wound  - needs outpatient dental evaluation     Outpatient Antibiotic Therapy Plan:     Please send referral to [facility].     1) Infection: osteomyelitis, suspect IE, MRSA bacteremia     2) Discharge Antibiotics:     Intravenous antibiotics:  · IV vancomycin  ·      3) Therapy Duration:  approx  6 weeks iv antibiotics (could consider stopping two weeks earlier)     Estimated end date of IV antibiotics: 4/5/22     4) Outpatient Weekly Labs:     Order the following labs to be drawn on Mondays:   · CBC  · CMP   · ESR   · CRP  · Vancomycin trough. Target 15-20     If discharged on vancomycin IV, order the following additional labs to be drawn on Thursdays:  · CMP   · Vancomycin trough. Target 15-20     If vancomycin trough is not at target (15-20) prior to discharge, schedule vancomycin trough to be drawn before their fourth outpatient dose.     5) Fax Lab Results to Infectious Diseases Provider: dr Kapoor     [facility] Fax Number: [phone]     6) Outpatient Infectious Diseases Follow-up     · Follow-up appointment will be arranged by the ID clinic and will be found in the patient's appointments tab.     · Prior to discharge, please ensure the patient's follow-up has been scheduled.    · If there is still no follow-up scheduled prior to discharge, please send an EPIC message to Xenia Stuart in Infectious Diseases.

## 2022-03-03 NOTE — PROGRESS NOTES
[facility] Medicine  Telemedicine Progress Note    Patient Name: Dave Good  MRN: [id number]  Patient Class: IP- Inpatient   Admission Date: 2/21/2022  Length of Stay: 9 days  Attending Physician: Vineet Salinas MD  Primary Care Provider: Reyna Jorge NP          Subjective:     Principal Problem:Diabetic wet gangrene of the foot        HPI:  57 yo m with PMH of DM , HTN , PE and COPD presented to ED  for R 2nd digit wound. Pt had amputation by  back on 9/2021 for wet gangrene . Pt is following up with wound care at [facility] twice a week. But the last time was 1.5 weeks ago, he noticed swelling ,pain and foul smelling drainage from the wound. Pt also reports worsening SOB with exertion . With some chest tightness. In ED VS were stable. Started on iv abx . Labs showed leukocytosis and hypokalemia.CTA showed cavitary lesions with possible septic emboli.       Overview/Hospital Course:  Patient admitted with foul smelling drainage from wound of R foot and SOB. Found to have cavitary lung lesions concerning for septic emboli. General surgery and ID consulted. He went to OR on 2/22 debridement and transmetatarsal amputation of 2nd R digit. Wound vac placed. Blood culture with MRSA. PAT ordered but patient unable to tolerate procedure (unable to pass probe, wouldn't swallow). Given presence of septic emboli, planned for empiric treatment of endocarditis for 6 weeks. Planned for removal of wound vac and closure on 3/02.      Interval History: Patient to the OR today with Dr. Flores for wound vac removal and closure. No other complaints voiced at this time.    Review of Systems   Constitutional:  Negative for chills and fever.   Respiratory:  Negative for cough and shortness of breath.    Cardiovascular:  Negative for chest pain and palpitations.   Gastrointestinal:  Negative for abdominal pain, nausea and vomiting.   Objective:     Vital Signs (Most Recent):  Temp:  98.5 °F (36.9 °C) (03/02/22 1713)  Pulse: 77 (03/02/22 1713)  Resp: 14 (03/02/22 1742)  BP: 100/68 (03/02/22 1713)  SpO2: (!) 93 % (03/02/22 1713) Vital Signs (24h Range):  Temp:  [97.6 °F (36.4 °C)-98.5 °F (36.9 °C)] 98.5 °F (36.9 °C)  Pulse:  [70-85] 77  Resp:  [12-20] 14  SpO2:  [92 %-100 %] 93 %  BP: ()/(54-83) 100/68     Weight: 131.1 kg (289 lb 0.4 oz)  Body mass index is 37.11 kg/m².    Intake/Output Summary (Last 24 hours) at 3/2/2022 1934  Last data filed at 3/2/2022 1806  Gross per 24 hour   Intake 2600.06 ml   Output 3200 ml   Net -599.94 ml        Physical Exam  Vitals and nursing note reviewed.   Constitutional:       General: He is not in acute distress.     Appearance: He is well-developed.   HENT:      Head: Normocephalic and atraumatic.   Pulmonary:      Effort: Pulmonary effort is normal. No respiratory distress.   Musculoskeletal:         General: No tenderness. Normal range of motion.      Comments: Post-op dressing in place to feet bilaterally with wound vac on R.   Skin:     General: Skin is warm and dry.   Neurological:      Mental Status: He is alert and oriented to person, place, and time.       Significant Labs:   CBC:  Recent Labs   Lab 03/01/22  0842   WBC 11.12   HGB 9.8*   HCT 32.5*   *     BMP:  Recent Labs   Lab 03/01/22  0842      K 4.6      CO2 27   BUN 15   CREATININE 1.1   *   CALCIUM 10.0   PHOS 3.8       Significant Imaging:   No new imaging this morning.      Assessment/Plan:      * Diabetic wet gangrene of the foot  MRSA bacteremia  Septic emboli / cavitary lung lesion  Diabetic foot infection of left foot  - R 2nd digit with wet gangrene and necrotic ulcer on L foot  - CTA without PE but with bilateral cavitary lesion with possible septic emboli vs organizing pneumonia   - Quantiferon gold negative. Sputum AFB smear negative x 3.   - Echo without vegetation but given septic emboli, PAT attempted  - PAT canceled, pt unable to swallow probe on 3  attempts then refused  - Blood culture with MRSA. Repeat blood culture (2/23 and 2/25) NGTD  - ID, general surgery. Appreciate input  - Continue vancomycin with pharmacy dosing. Continue wound vac and local wound care   - S/p 2nd digit transmetatarsal amputation and debridement 2/22 and b/l debridement 2/24; plan for closure 3/02.  - Continue PT/OT   - Continue percocet and morphine for pain  - Plan to treat for presumed endocarditis given septic emboli  - Given combined wound care and prolonged IV antibiotic needs, suspect he will not be able to handle his condition at home.  - SW consulted for placement.    Increased nutritional needs        Chronic respiratory failure with hypoxia  - On 2L home O2 2/2 COVID-19  - Continue oxygen protocol    Cavitary lesion of lung  - As above    COPD with asthma  - Duoneb PRN    Depression  - Continue seroquel    HTN (hypertension)  - Continue losartan 25 mg qd    Type 2 diabetes mellitus without complication, with long-term current use of insulin  - A1c 9.8  - Continue levemir 55U qHS, aspart 10U TIDWM, and low dose SSI AC/HS PRN      VTE Risk Mitigation (From admission, onward)         Ordered     heparin (porcine) injection 5,000 Units  Every 8 hours         02/21/22 1627                      I have assessed these finding virtually using telemed platform and with assistance of bedside nurse                 The attending portion of this evaluation, treatment, and documentation was performed per Vineet Salinas MD via Telemedicine AudioVisual using the secure [employer] software platform with 2 way audio/video. The provider was located off-site and the patient is located in the hospital. The aforementioned video software was utilized to document the relevant history and physical exam    Vineet Salinas MD  Department of Hospital Medicine   [facility] ([place])

## 2022-03-03 NOTE — PROGRESS NOTES
Postop day 1.  Status post delayed closure of right foot incision  No complaints  Participating in PT/ambulating    Impression/plan  Surgically stable  Okay to bear weight on incision

## 2022-03-04 LAB
POCT GLUCOSE: 191 MG/DL (ref 70–110)
POCT GLUCOSE: 191 MG/DL (ref 70–110)
POCT GLUCOSE: 197 MG/DL (ref 70–110)
VANCOMYCIN TROUGH SERPL-MCNC: 11.2 UG/ML (ref 10–22)

## 2022-03-04 PROCEDURE — 99231 SBSQ HOSP IP/OBS SF/LOW 25: CPT | Mod: 95,,, | Performed by: STUDENT IN AN ORGANIZED HEALTH CARE EDUCATION/TRAINING PROGRAM

## 2022-03-04 PROCEDURE — 63600175 PHARM REV CODE 636 W HCPCS: Performed by: INTERNAL MEDICINE

## 2022-03-04 PROCEDURE — 25000003 PHARM REV CODE 250: Performed by: ANESTHESIOLOGY

## 2022-03-04 PROCEDURE — 94761 N-INVAS EAR/PLS OXIMETRY MLT: CPT

## 2022-03-04 PROCEDURE — 25000003 PHARM REV CODE 250: Performed by: NURSE PRACTITIONER

## 2022-03-04 PROCEDURE — 99233 SBSQ HOSP IP/OBS HIGH 50: CPT | Mod: ,,, | Performed by: INTERNAL MEDICINE

## 2022-03-04 PROCEDURE — 63600175 PHARM REV CODE 636 W HCPCS: Performed by: PHYSICIAN ASSISTANT

## 2022-03-04 PROCEDURE — 25000003 PHARM REV CODE 250: Performed by: INTERNAL MEDICINE

## 2022-03-04 PROCEDURE — 11000001 HC ACUTE MED/SURG PRIVATE ROOM

## 2022-03-04 PROCEDURE — 99231 PR SUBSEQUENT HOSPITAL CARE,LEVL I: ICD-10-PCS | Mod: 95,,, | Performed by: STUDENT IN AN ORGANIZED HEALTH CARE EDUCATION/TRAINING PROGRAM

## 2022-03-04 PROCEDURE — 97116 GAIT TRAINING THERAPY: CPT | Mod: CQ

## 2022-03-04 PROCEDURE — A4216 STERILE WATER/SALINE, 10 ML: HCPCS | Performed by: INTERNAL MEDICINE

## 2022-03-04 PROCEDURE — 27000207 HC ISOLATION

## 2022-03-04 PROCEDURE — 99233 PR SUBSEQUENT HOSPITAL CARE,LEVL III: ICD-10-PCS | Mod: ,,, | Performed by: INTERNAL MEDICINE

## 2022-03-04 PROCEDURE — 80202 ASSAY OF VANCOMYCIN: CPT | Performed by: STUDENT IN AN ORGANIZED HEALTH CARE EDUCATION/TRAINING PROGRAM

## 2022-03-04 PROCEDURE — 25000003 PHARM REV CODE 250: Performed by: STUDENT IN AN ORGANIZED HEALTH CARE EDUCATION/TRAINING PROGRAM

## 2022-03-04 PROCEDURE — 97110 THERAPEUTIC EXERCISES: CPT | Mod: CQ

## 2022-03-04 PROCEDURE — 99900035 HC TECH TIME PER 15 MIN (STAT)

## 2022-03-04 RX ADMIN — SODIUM CHLORIDE, PRESERVATIVE FREE 10 ML: 5 INJECTION INTRAVENOUS at 11:03

## 2022-03-04 RX ADMIN — QUETIAPINE FUMARATE 200 MG: 200 TABLET ORAL at 09:03

## 2022-03-04 RX ADMIN — INSULIN ASPART 10 UNITS: 100 INJECTION, SOLUTION INTRAVENOUS; SUBCUTANEOUS at 05:03

## 2022-03-04 RX ADMIN — MORPHINE SULFATE 2 MG: 4 INJECTION, SOLUTION INTRAMUSCULAR; INTRAVENOUS at 05:03

## 2022-03-04 RX ADMIN — HEPARIN SODIUM 5000 UNITS: 5000 INJECTION INTRAVENOUS; SUBCUTANEOUS at 02:03

## 2022-03-04 RX ADMIN — SODIUM CHLORIDE, PRESERVATIVE FREE 10 ML: 5 INJECTION INTRAVENOUS at 12:03

## 2022-03-04 RX ADMIN — MORPHINE SULFATE 2 MG: 4 INJECTION, SOLUTION INTRAMUSCULAR; INTRAVENOUS at 09:03

## 2022-03-04 RX ADMIN — DOCUSATE SODIUM 100 MG: 100 CAPSULE ORAL at 09:03

## 2022-03-04 RX ADMIN — LOSARTAN POTASSIUM 25 MG: 25 TABLET, FILM COATED ORAL at 09:03

## 2022-03-04 RX ADMIN — ASPIRIN 81 MG: 81 TABLET, COATED ORAL at 09:03

## 2022-03-04 RX ADMIN — INSULIN ASPART 1 UNITS: 100 INJECTION, SOLUTION INTRAVENOUS; SUBCUTANEOUS at 09:03

## 2022-03-04 RX ADMIN — INSULIN DETEMIR 55 UNITS: 100 INJECTION, SOLUTION SUBCUTANEOUS at 09:03

## 2022-03-04 RX ADMIN — OXYCODONE AND ACETAMINOPHEN 1 TABLET: 7.5; 325 TABLET ORAL at 02:03

## 2022-03-04 RX ADMIN — HEPARIN SODIUM 5000 UNITS: 5000 INJECTION INTRAVENOUS; SUBCUTANEOUS at 09:03

## 2022-03-04 RX ADMIN — INSULIN ASPART 10 UNITS: 100 INJECTION, SOLUTION INTRAVENOUS; SUBCUTANEOUS at 07:03

## 2022-03-04 RX ADMIN — INSULIN ASPART 10 UNITS: 100 INJECTION, SOLUTION INTRAVENOUS; SUBCUTANEOUS at 11:03

## 2022-03-04 RX ADMIN — SODIUM CHLORIDE, PRESERVATIVE FREE 10 ML: 5 INJECTION INTRAVENOUS at 05:03

## 2022-03-04 RX ADMIN — VANCOMYCIN HYDROCHLORIDE 1500 MG: 1.5 INJECTION, POWDER, LYOPHILIZED, FOR SOLUTION INTRAVENOUS at 01:03

## 2022-03-04 RX ADMIN — MORPHINE SULFATE 2 MG: 4 INJECTION, SOLUTION INTRAMUSCULAR; INTRAVENOUS at 01:03

## 2022-03-04 RX ADMIN — POLYETHYLENE GLYCOL 3350 17 G: 17 POWDER, FOR SOLUTION ORAL at 09:03

## 2022-03-04 RX ADMIN — OXYCODONE 5 MG: 5 TABLET ORAL at 06:03

## 2022-03-04 RX ADMIN — ATORVASTATIN CALCIUM 40 MG: 20 TABLET, FILM COATED ORAL at 09:03

## 2022-03-04 RX ADMIN — HEPARIN SODIUM 5000 UNITS: 5000 INJECTION INTRAVENOUS; SUBCUTANEOUS at 05:03

## 2022-03-04 RX ADMIN — OXYCODONE AND ACETAMINOPHEN 1 TABLET: 7.5; 325 TABLET ORAL at 11:03

## 2022-03-04 NOTE — PLAN OF CARE
Report received care assumed . Assessment per flow sheet . AAOX4 . Sitting up in bed . Safety maintained will continue to monitor. Bed in low position  call bell within reach.

## 2022-03-04 NOTE — PROGRESS NOTES
Postop day 2.  Status post delayed wound closure right foot  No complaints referable to lower extremities  Dressing change right lower extremity-wounds healing without evidence of infection

## 2022-03-04 NOTE — PT/OT/SLP PROGRESS
Physical Therapy Treatment    Patient Name:  Dave Good   MRN:  [id number]    Recommendations:     Discharge Recommendations:   (LTAC per medical team)   Discharge Equipment Recommendations: bath bench, bedside commode, cane, straight   Barriers to discharge: None (pt needs IV medication)    Assessment:     Dave Good is a 56 y.o. male admitted with a medical diagnosis of Diabetic wet gangrene of the foot.  He presents with the following impairments/functional limitations:  weakness, impaired endurance, impaired self care skills, impaired functional mobilty, impaired sensation, gait instability, impaired balance, decreased lower extremity function, impaired skin, edema, orthopedic precautions ;pt with good mobility today, worked w/ amb w/ cane, pt w/ slow step-to gt pattern.    Rehab Prognosis: Good; patient would benefit from acute skilled PT services to address these deficits and reach maximum level of function.    Recent Surgery: Procedure(s) (LRB):  DEBRIDEMENT, LOWER EXTREMITY (Bilateral) 2 Days Post-Op    Plan:     During this hospitalization, patient to be seen 2 x/week to address the identified rehab impairments via gait training, therapeutic activities, therapeutic exercises, neuromuscular re-education and progress toward the following goals:    · Plan of Care Expires:  03/11/22    Subjective     Chief Complaint: none stated  Patient/Family Comments/goals: pt agreeable to session, fiance present  Pain/Comfort:  · Pain Rating 1: 0/10  · Pain Rating Post-Intervention 1: 0/10      Objective:     Communicated with nurse prior to session.  Patient found up in chair with peripheral IV upon PT entry to room.     General Precautions: Standard, diabetic, fall, contact   Orthopedic Precautions:RLE weight bearing as tolerated   Braces:  (B Darco shoes)  Respiratory Status: Room air     Functional Mobility:  · Transfers:     · Sit to Stand:  stand by assistance with straight cane  · Gait: amb'd 50' w/ RW and SBA,  then amb'd 50' x 2 w/ st cane and CGA, slight unsteadiness noted w/ turns, though  no LOB noted. Pt w/ slow , step-to gt pattern, despite cueing for step through pattern      AM-PAC 6 CLICK MOBILITY  Turning over in bed (including adjusting bedclothes, sheets and blankets)?: 4  Sitting down on and standing up from a chair with arms (e.g., wheelchair, bedside commode, etc.): 4  Moving from lying on back to sitting on the side of the bed?: 4  Moving to and from a bed to a chair (including a wheelchair)?: 3  Need to walk in hospital room?: 3  Climbing 3-5 steps with a railing?: 3  Basic Mobility Total Score: 21       Therapeutic Activities and Exercises:   perf'd seated LE ex's of hip flex, LAQ's, AP's x 10 ea.     Patient left up in chair with all lines intact, call button in reach and fiance present..    GOALS:   Multidisciplinary Problems     Physical Therapy Goals        Problem: Physical Therapy Goal    Goal Priority Disciplines Outcome Goal Variances Interventions   Physical Therapy Goal     PT, PT/OT Ongoing, Progressing     Description: Goals to be met by: 3/11/22    Patient will increase functional independence with mobility by performin. Gait x 100 feet with supervision with LRAD.  2. Ascend/descend 10 step(s) with least restrictive assistive device and uni HR with supervision.  3. Standing activity x5 min without significant increased in foot pain with surgical shoes donned.                    Time Tracking:     PT Received On: 22  PT Start Time: 1105     PT Stop Time: 1129  PT Total Time (min): 24 min     Billable Minutes: Gait Training 16 and Therapeutic Exercise 8    Treatment Type: Treatment  PT/PTA: PTA     PTA Visit Number: 2     2022

## 2022-03-04 NOTE — PROGRESS NOTES
Pharmacokinetic Assessment Follow Up: IV Vancomycin    Vancomycin serum concentration assessment(s):    The trough level was drawn correctly and can be used to guide therapy at this time. The measurement is within the desired definitive target range of 10 to 20 mcg/mL.    Vancomycin Regimen Plan:    Continue regimen to Vancomycin 1500 mg IV every 24 hours with next serum trough concentration measured at 0030 prior to 3rd dose on 3/6/22    Drug levels (last 3 results):  Recent Labs   Lab Result Units 03/02/22  0002 03/04/22  0029   Vancomycin-Trough ug/mL 14.0 11.2       Pharmacy will continue to follow and monitor vancomycin.    Please contact pharmacy at extension [phone] for questions regarding this assessment.    Thank you for the consult,   Alin Ulloa       Patient brief summary:  Dave Good is a 56 y.o. male initiated on antimicrobial therapy with IV Vancomycin for treatment of  pneumonia    The patient's current regimen is 1500 mg q 24 h    Drug Allergies:   Review of patient's allergies indicates:   Allergen Reactions    Ibuprofen Swelling     Facial swelling       Actual Body Weight:   131.1 kg    Renal Function:   Estimated Creatinine Clearance: 89.2 mL/min (based on SCr of 1.3 mg/dL).,     Dialysis Method (if applicable):  N/A    CBC (last 72 hours):  Recent Labs   Lab Result Units 03/01/22  0842 03/03/22  0547   WBC K/uL 11.12 9.44   Hemoglobin g/dL 9.8* 9.3*   Hematocrit % 32.5* 30.2*   Platelets K/uL 751* 747*   Gran % %  --  51.5   Lymph % %  --  33.3   Mono % %  --  10.8   Eosinophil % %  --  3.1   Basophil % %  --  0.5   Differential Method   --  Automated       Metabolic Panel (last 72 hours):  Recent Labs   Lab Result Units 03/01/22  0842 03/03/22  0547   Sodium mmol/L 136 135*   Potassium mmol/L 4.6 4.5   Chloride mmol/L 103 100   CO2 mmol/L 27 29   Glucose mg/dL 166* 113*   BUN mg/dL 15 18   Creatinine mg/dL 1.1 1.3   Albumin g/dL 1.9*  --    Phosphorus mg/dL 3.8  --        Vancomycin  Administrations:  vancomycin given in the last 96 hours                     vancomycin 1.5 g in dextrose 5 % 250 mL IVPB (ready to mix) (mg) 1,500 mg New Bag 03/04/22 0115     1,500 mg New Bag 03/03/22 0153     1,500 mg New Bag 03/02/22 0032      Restarted 03/01/22 0137     1,500 mg New Bag  0130                    Microbiologic Results:  Microbiology Results (last 7 days)       Procedure Component Value Units Date/Time    Fungus culture [[id number]] Collected: 02/22/22 1153    Order Status: Completed Specimen: Wound from Foot, Right Updated: 03/02/22 1435     Fungus (Mycology) Culture Culture in progress    Blood culture [[id number]] Collected: 02/25/22 0548    Order Status: Completed Specimen: Blood Updated: 03/02/22 1212     Blood Culture, Routine No growth after 5 days.    Narrative:      Collection has been rescheduled by [email or web address] at 02/25/2022 05:55 Reason:   Patient unavailable labs collected  Collection has been rescheduled by [email or web address] at 02/25/2022 05:55 Reason:   Patient unavailable labs collected    Blood culture [[id number]] Collected: 02/23/22 0550    Order Status: Completed Specimen: Blood Updated: 02/28/22 1012     Blood Culture, Routine No growth after 5 days.    Narrative:      Collection has been rescheduled by [email or web address] at 02/23/2022 05:53 Reason:   Patient unavailable labs collected  Collection has been rescheduled by WAD1 at 02/23/2022 05:53 Reason:   Patient unavailable labs collected    Culture, Anaerobic [[id number]]  (Abnormal) Collected: 02/22/22 1153    Order Status: Completed Specimen: Wound from Foot, Right Updated: 02/28/22 0710     Anaerobic Culture PRESUMPTIVE PREVOTELLA/PORPHYROMONAS GROUP  Many      Aerobic culture [[id number]]  (Abnormal)  (Susceptibility) Collected: 02/22/22 1153    Order Status: Completed Specimen: Wound from Foot, Right Updated: 02/25/22 1055     Aerobic Bacterial Culture METHICILLIN RESISTANT STAPHYLOCOCCUS AUREUS  Many  No other significant isolate

## 2022-03-04 NOTE — PLAN OF CARE
03/04/22 1518   Post-Acute Status   Post-Acute Authorization Placement   Post-Acute Placement Status Pending medical clearance/testing   Hospital Resources/Appts/Education Provided Provided patient/caregiver with written discharge plan information;Appointments scheduled by Navigator/Coordinator   Patient choice form signed by patient/caregiver List with quality metrics by geographic area provided   Discharge Delays None known at this time   Discharge Plan   Discharge Plan A Long-term acute care facility (LTAC)   Discharge Plan B Rehab        Collete from [facility] stated she received Auth.

## 2022-03-05 LAB
POCT GLUCOSE: 106 MG/DL (ref 70–110)
POCT GLUCOSE: 158 MG/DL (ref 70–110)
POCT GLUCOSE: 161 MG/DL (ref 70–110)
POCT GLUCOSE: 192 MG/DL (ref 70–110)
POCT GLUCOSE: 215 MG/DL (ref 70–110)
TROPONIN I SERPL DL<=0.01 NG/ML-MCNC: <0.006 NG/ML (ref 0–0.03)
TROPONIN I SERPL DL<=0.01 NG/ML-MCNC: <0.006 NG/ML (ref 0–0.03)

## 2022-03-05 PROCEDURE — 25000003 PHARM REV CODE 250: Performed by: INTERNAL MEDICINE

## 2022-03-05 PROCEDURE — A4216 STERILE WATER/SALINE, 10 ML: HCPCS | Performed by: INTERNAL MEDICINE

## 2022-03-05 PROCEDURE — C1751 CATH, INF, PER/CENT/MIDLINE: HCPCS

## 2022-03-05 PROCEDURE — 11000001 HC ACUTE MED/SURG PRIVATE ROOM

## 2022-03-05 PROCEDURE — 25000003 PHARM REV CODE 250: Performed by: EMERGENCY MEDICINE

## 2022-03-05 PROCEDURE — 99231 SBSQ HOSP IP/OBS SF/LOW 25: CPT | Mod: 95,,, | Performed by: STUDENT IN AN ORGANIZED HEALTH CARE EDUCATION/TRAINING PROGRAM

## 2022-03-05 PROCEDURE — A4216 STERILE WATER/SALINE, 10 ML: HCPCS | Performed by: SPECIALIST

## 2022-03-05 PROCEDURE — 25000003 PHARM REV CODE 250: Performed by: STUDENT IN AN ORGANIZED HEALTH CARE EDUCATION/TRAINING PROGRAM

## 2022-03-05 PROCEDURE — 25000003 PHARM REV CODE 250: Performed by: ANESTHESIOLOGY

## 2022-03-05 PROCEDURE — 25000003 PHARM REV CODE 250: Performed by: SPECIALIST

## 2022-03-05 PROCEDURE — 25000003 PHARM REV CODE 250: Performed by: NURSE PRACTITIONER

## 2022-03-05 PROCEDURE — 63600175 PHARM REV CODE 636 W HCPCS: Performed by: PHYSICIAN ASSISTANT

## 2022-03-05 PROCEDURE — 27000207 HC ISOLATION

## 2022-03-05 PROCEDURE — 63600175 PHARM REV CODE 636 W HCPCS: Performed by: INTERNAL MEDICINE

## 2022-03-05 PROCEDURE — 84484 ASSAY OF TROPONIN QUANT: CPT | Mod: 91 | Performed by: EMERGENCY MEDICINE

## 2022-03-05 PROCEDURE — 63600175 PHARM REV CODE 636 W HCPCS: Performed by: SPECIALIST

## 2022-03-05 PROCEDURE — 99231 PR SUBSEQUENT HOSPITAL CARE,LEVL I: ICD-10-PCS | Mod: 95,,, | Performed by: STUDENT IN AN ORGANIZED HEALTH CARE EDUCATION/TRAINING PROGRAM

## 2022-03-05 RX ORDER — SODIUM CHLORIDE 0.9 % (FLUSH) 0.9 %
10 SYRINGE (ML) INJECTION
Status: DISCONTINUED | OUTPATIENT
Start: 2022-03-05 | End: 2022-03-05 | Stop reason: SDUPTHER

## 2022-03-05 RX ORDER — TALC
6 POWDER (GRAM) TOPICAL NIGHTLY PRN
Status: DISCONTINUED | OUTPATIENT
Start: 2022-03-05 | End: 2022-03-07 | Stop reason: HOSPADM

## 2022-03-05 RX ADMIN — HEPARIN SODIUM 5000 UNITS: 5000 INJECTION INTRAVENOUS; SUBCUTANEOUS at 10:03

## 2022-03-05 RX ADMIN — Medication 6 MG: at 10:03

## 2022-03-05 RX ADMIN — ATORVASTATIN CALCIUM 40 MG: 20 TABLET, FILM COATED ORAL at 08:03

## 2022-03-05 RX ADMIN — OXYCODONE AND ACETAMINOPHEN 1 TABLET: 7.5; 325 TABLET ORAL at 01:03

## 2022-03-05 RX ADMIN — SODIUM CHLORIDE, PRESERVATIVE FREE 10 ML: 5 INJECTION INTRAVENOUS at 11:03

## 2022-03-05 RX ADMIN — VANCOMYCIN HYDROCHLORIDE 1500 MG: 1.5 INJECTION, POWDER, LYOPHILIZED, FOR SOLUTION INTRAVENOUS at 01:03

## 2022-03-05 RX ADMIN — OXYCODONE AND ACETAMINOPHEN 1 TABLET: 7.5; 325 TABLET ORAL at 07:03

## 2022-03-05 RX ADMIN — INSULIN DETEMIR 55 UNITS: 100 INJECTION, SOLUTION SUBCUTANEOUS at 10:03

## 2022-03-05 RX ADMIN — INSULIN ASPART 10 UNITS: 100 INJECTION, SOLUTION INTRAVENOUS; SUBCUTANEOUS at 04:03

## 2022-03-05 RX ADMIN — INSULIN ASPART 10 UNITS: 100 INJECTION, SOLUTION INTRAVENOUS; SUBCUTANEOUS at 11:03

## 2022-03-05 RX ADMIN — ASPIRIN 81 MG: 81 TABLET, COATED ORAL at 08:03

## 2022-03-05 RX ADMIN — OXYCODONE 5 MG: 5 TABLET ORAL at 08:03

## 2022-03-05 RX ADMIN — DOCUSATE SODIUM 100 MG: 100 CAPSULE ORAL at 08:03

## 2022-03-05 RX ADMIN — OXYCODONE AND ACETAMINOPHEN 1 TABLET: 7.5; 325 TABLET ORAL at 05:03

## 2022-03-05 RX ADMIN — MORPHINE SULFATE 2 MG: 4 INJECTION, SOLUTION INTRAMUSCULAR; INTRAVENOUS at 01:03

## 2022-03-05 RX ADMIN — SODIUM CHLORIDE, PRESERVATIVE FREE 10 ML: 5 INJECTION INTRAVENOUS at 05:03

## 2022-03-05 RX ADMIN — HEPARIN SODIUM 5000 UNITS: 5000 INJECTION INTRAVENOUS; SUBCUTANEOUS at 01:03

## 2022-03-05 RX ADMIN — INSULIN ASPART 10 UNITS: 100 INJECTION, SOLUTION INTRAVENOUS; SUBCUTANEOUS at 08:03

## 2022-03-05 RX ADMIN — HEPARIN SODIUM 5000 UNITS: 5000 INJECTION INTRAVENOUS; SUBCUTANEOUS at 05:03

## 2022-03-05 RX ADMIN — QUETIAPINE FUMARATE 200 MG: 200 TABLET ORAL at 10:03

## 2022-03-05 NOTE — PROGRESS NOTES
Patient reviewed, renal function stable, cultures reviewed, no new levels, continue current therapy; Next levels due: 3/6 @0036

## 2022-03-05 NOTE — SUBJECTIVE & OBJECTIVE
Interval History:   NAEO. Reports some foot pain. Tolerating antibiotics. Agreeable to iv abx on d/c    Review of Systems   All other systems reviewed and are negative.  Objective:     Vital Signs (Most Recent):  Temp: 97.9 °F (36.6 °C) (03/04/22 1647)  Pulse: 76 (03/04/22 2101)  Resp: 20 (03/04/22 2101)  BP: 129/85 (03/04/22 1647)  SpO2: 97 % (03/04/22 2101) Vital Signs (24h Range):  Temp:  [97.5 °F (36.4 °C)-98.1 °F (36.7 °C)] 97.9 °F (36.6 °C)  Pulse:  [76-88] 76  Resp:  [14-20] 20  SpO2:  [94 %-98 %] 97 %  BP: ()/(69-85) 129/85     Weight: 131.1 kg (289 lb 0.4 oz)  Body mass index is 37.11 kg/m².    Estimated Creatinine Clearance: 89.2 mL/min (based on SCr of 1.3 mg/dL).    Physical Exam  Vitals and nursing note reviewed.   Constitutional:       General: He is not in acute distress.     Appearance: He is well-developed. He is not diaphoretic.   HENT:      Head: Normocephalic and atraumatic.      Right Ear: External ear normal.      Left Ear: External ear normal.      Nose: Nose normal.      Mouth/Throat:      Pharynx: No oropharyngeal exudate.   Eyes:      General: No scleral icterus.        Right eye: No discharge.         Left eye: No discharge.      Conjunctiva/sclera: Conjunctivae normal.      Pupils: Pupils are equal, round, and reactive to light.   Neck:      Thyroid: No thyromegaly.      Vascular: No JVD.      Trachea: No tracheal deviation.   Cardiovascular:      Rate and Rhythm: Normal rate and regular rhythm.      Heart sounds: No murmur heard.    No friction rub. No gallop.   Pulmonary:      Effort: Pulmonary effort is normal. No respiratory distress.      Breath sounds: Normal breath sounds. No stridor. No wheezing or rales.   Chest:      Chest wall: No tenderness.   Abdominal:      General: Bowel sounds are normal. There is no distension.      Palpations: Abdomen is soft. There is no mass.      Tenderness: There is no abdominal tenderness. There is no guarding or rebound.   Musculoskeletal:       Cervical back: Normal range of motion and neck supple.      Comments: Feet wrapped bilaterally.   Lymphadenopathy:      Cervical: No cervical adenopathy.   Skin:     General: Skin is warm.      Coloration: Skin is not pale.      Findings: No erythema or rash.   Neurological:      Mental Status: He is alert and oriented to person, place, and time.      Cranial Nerves: No cranial nerve deficit.      Motor: No abnormal muscle tone.      Coordination: Coordination normal.      Deep Tendon Reflexes: Reflexes are normal and symmetric. Reflexes normal.   Psychiatric:         Behavior: Behavior normal.         Thought Content: Thought content normal.         Judgment: Judgment normal.       Significant Labs:   Microbiology Results (last 7 days)       Procedure Component Value Units Date/Time    Fungus culture [[id number]] Collected: 02/22/22 1153    Order Status: Completed Specimen: Wound from Foot, Right Updated: 03/02/22 1435     Fungus (Mycology) Culture Culture in progress    Blood culture [[id number]] Collected: 02/25/22 0548    Order Status: Completed Specimen: Blood Updated: 03/02/22 1212     Blood Culture, Routine No growth after 5 days.    Narrative:      Collection has been rescheduled by [email or web address] at 02/25/2022 05:55 Reason:   Patient unavailable labs collected  Collection has been rescheduled by [email or web address] at 02/25/2022 05:55 Reason:   Patient unavailable labs collected    Blood culture [[id number]] Collected: 02/23/22 0550    Order Status: Completed Specimen: Blood Updated: 02/28/22 1012     Blood Culture, Routine No growth after 5 days.    Narrative:      Collection has been rescheduled by [email or web address] at 02/23/2022 05:53 Reason:   Patient unavailable labs collected  Collection has been rescheduled by WAD1 at 02/23/2022 05:53 Reason:   Patient unavailable labs collected    Culture, Anaerobic [[id number]]  (Abnormal) Collected: 02/22/22 1153    Order Status: Completed Specimen: Wound from Foot, Right Updated: 02/28/22 0710      Anaerobic Culture PRESUMPTIVE PREVOTELLA/PORPHYROMONAS GROUP  Many              Significant Imaging: I have reviewed all pertinent imaging results/findings within the past 24 hours.

## 2022-03-05 NOTE — ASSESSMENT & PLAN NOTE
S/p surgical debridement right foot debridement on February 22 and bilateral foot debridements on February 24.  Suspect there may be some residual bone infection.  Blood cultures positive for MRSA on 2/21 and clear 2/23 and 2/25. Surgical cultures positive for MRSA and anerobes. Path with clear margins.  PAT was aborted, but there is concern for possible infective endocarditis given septic emboli    Recommendations:   - agree with plan for vancomycin x 6 weeks for bacteremia, possible IE  - ensure he has adequate perfusion to heal wound  - needs outpatient dental evaluation    Outpatient Antibiotic Therapy Plan:    Please send referral to [facility].    1) Infection: osteomyelitis, suspect IE, MRSA bacteremia    2) Discharge Antibiotics:    Intravenous antibiotics:   IV vancomycin    Oral antibiotics:   metronidazole    3) Therapy Duration:  6 weeks iv antibiotics    Estimated end date of IV antibiotics: 4/5/22    4) Outpatient Weekly Labs:    Order the following labs to be drawn on Mondays:    CBC   CMP    ESR    CRP   Vancomycin trough. Target 15-20    If discharged on vancomycin IV, order the following additional labs to be drawn on Thursdays:   CMP    Vancomycin trough. Target 15-20    If vancomycin trough is not at target (15-20) prior to discharge, schedule vancomycin trough to be drawn before their fourth outpatient dose.    5) Fax Lab Results to Infectious Diseases Provider: dr Kapoor    [facility] Fax Number: [phone]    6) Outpatient Infectious Diseases Follow-up     Follow-up appointment will be arranged by the ID clinic and will be found in the patient's appointments tab.     Prior to discharge, please ensure the patient's follow-up has been scheduled.     If there is still no follow-up scheduled prior to discharge, please send an EPIC message to Xenia Stuart in Infectious Diseases.

## 2022-03-05 NOTE — PROGRESS NOTES
[facility] Medicine  Telemedicine Progress Note    Patient Name: Dave Good  MRN: [id number]  Patient Class: IP- Inpatient   Admission Date: 2/21/2022  Length of Stay: 11 days  Attending Physician: Vineet Salinas MD  Primary Care Provider: Reyna Jorge NP          Subjective:     Principal Problem:Diabetic wet gangrene of the foot        HPI:  55 yo m with PMH of DM , HTN , PE and COPD presented to ED  for R 2nd digit wound. Pt had amputation by  back on 9/2021 for wet gangrene . Pt is following up with wound care at [facility] twice a week. But the last time was 1.5 weeks ago, he noticed swelling ,pain and foul smelling drainage from the wound. Pt also reports worsening SOB with exertion . With some chest tightness. In ED VS were stable. Started on iv abx . Labs showed leukocytosis and hypokalemia.CTA showed cavitary lesions with possible septic emboli.       Overview/Hospital Course:  Patient admitted with foul smelling drainage from wound of R foot and SOB. Found to have cavitary lung lesions concerning for septic emboli. General surgery and ID consulted. He went to OR on 2/22 debridement and transmetatarsal amputation of 2nd R digit. Wound vac placed. Blood culture with MRSA. PAT ordered but patient unable to tolerate procedure (unable to pass probe, wouldn't swallow). Given presence of septic emboli, planned for empiric treatment of endocarditis for 6 weeks. Planned for removal of wound vac and closure on 3/02.       Interval History: Patient doing well post-op day 2, patient had a bowel movement. Dr. Flores following, patient accepted to LTAC, will likely discharge there on Monday. Pain controlled.     Review of Systems   Constitutional:  Negative for chills and fever.   Respiratory:  Negative for cough and shortness of breath.    Cardiovascular:  Negative for chest pain and palpitations.   Gastrointestinal:  Negative for abdominal pain, nausea and vomiting.    Objective:     Vital Signs (Most Recent):  Temp: 97.9 °F (36.6 °C) (03/04/22 1647)  Pulse: 79 (03/04/22 1647)  Resp: 14 (03/04/22 1801)  BP: 129/85 (03/04/22 1647)  SpO2: (!) 94 % (03/04/22 1647) Vital Signs (24h Range):  Temp:  [97.5 °F (36.4 °C)-98.1 °F (36.7 °C)] 97.9 °F (36.6 °C)  Pulse:  [78-88] 79  Resp:  [14-20] 14  SpO2:  [94 %-98 %] 94 %  BP: ()/(69-92) 129/85     Weight: 131.1 kg (289 lb 0.4 oz)  Body mass index is 37.11 kg/m².    Intake/Output Summary (Last 24 hours) at 3/4/2022 1847  Last data filed at 3/4/2022 1700  Gross per 24 hour   Intake 540 ml   Output 1750 ml   Net -1210 ml        Physical Exam  Vitals and nursing note reviewed.   Constitutional:       General: He is not in acute distress.     Appearance: He is well-developed.   HENT:      Head: Normocephalic and atraumatic.   Pulmonary:      Effort: Pulmonary effort is normal. No respiratory distress.   Musculoskeletal:         General: No tenderness. Normal range of motion.      Comments: Post-op dressing in place    Skin:     General: Skin is warm and dry.   Neurological:      Mental Status: He is alert and oriented to person, place, and time.       Significant Labs:   CBC:  Recent Labs   Lab 03/03/22  0547   WBC 9.44   HGB 9.3*   HCT 30.2*   *   GRAN 51.5  4.9   LYMPH 33.3  3.1   MONO 10.8  1.0   EOS 0.3   BASO 0.05     BMP:  Recent Labs   Lab 03/03/22  0547   *   K 4.5      CO2 29   BUN 18   CREATININE 1.3   *   CALCIUM 9.3       Significant Imaging:   No new imaging this morning.      Assessment/Plan:      * Diabetic wet gangrene of the foot  MRSA bacteremia  Septic emboli / cavitary lung lesion  Diabetic foot infection of left foot  - R 2nd digit with wet gangrene and necrotic ulcer on L foot  - CTA without PE but with bilateral cavitary lesion with possible septic emboli vs organizing pneumonia   - Quantiferon gold negative. Sputum AFB smear negative x 3.   - Echo without vegetation but given septic  emboli, PAT attempted  - PAT canceled, pt unable to swallow probe on 3 attempts then refused  - Blood culture with MRSA. Repeat blood culture (2/23 and 2/25) NGTD  - ID, general surgery. Appreciate input  - Continue vancomycin with pharmacy dosing. Continue wound vac and local wound care   - S/p 2nd digit transmetatarsal amputation and debridement 2/22 and b/l debridement 2/24; plan for closure 3/02.  - Continue PT/OT   - Continue percocet and morphine for pain  - Plan to treat for presumed endocarditis given septic emboli  - Given combined wound care and prolonged IV antibiotic needs, suspect he will not be able to handle his condition at home.  - SW consulted for placement.  - RIGHT 2ND TOE AND TISSUE, AMPUTATION: Toe skin with severe ulceration, gangrenous necrosis and cellulitis. Underlying osteonecrosis, no acute osteomyelitis. Bone and soft tissue margins are unremarkable  - ID recommending prolonged course of IV vancomycin    Increased nutritional needs        Chronic respiratory failure with hypoxia  - On 2L home O2 2/2 COVID-19  - Continue oxygen protocol  - now on room air    Cavitary lesion of lung  - As above    COPD with asthma  - Duoneb PRN    Depression  - Continue seroquel    HTN (hypertension)  - Continue losartan 25 mg qd    Type 2 diabetes mellitus without complication, with long-term current use of insulin  - A1c 9.8  - Continue levemir 55U qHS, aspart 10U TIDWM, and low dose SSI AC/HS PRN      VTE Risk Mitigation (From admission, onward)         Ordered     heparin (porcine) injection 5,000 Units  Every 8 hours         02/21/22 1627                      I have assessed these finding virtually using telemed platform and with assistance of bedside nurse                 The attending portion of this evaluation, treatment, and documentation was performed per Vineet Salinas MD via Telemedicine AudioVisual using the secure [employer] software platform with 2 way audio/video. The provider was located  off-site and the patient is located in the hospital. The aforementioned video software was utilized to document the relevant history and physical exam    Vineet Salinas MD  Department of Hospital Medicine   [facility] Med Surg ([place])

## 2022-03-05 NOTE — CONSULTS
[facility] ([place])  Infectious Disease  Consult Note    Patient Name: Dave Good  MRN: [id number]  Admission Date: 2/21/2022  Hospital Length of Stay: 11 days  Attending Physician: Vineet Salinas MD  Primary Care Provider: Reyna Jorge NP     Isolation Status: Contact    Patient information was obtained from patient and ER records.      Consults  Assessment/Plan:     * Diabetic wet gangrene of the foot  S/p surgical debridement right foot debridement on February 22 and bilateral foot debridements on February 24.  Suspect there may be some residual bone infection.  Blood cultures positive for MRSA on 2/21 and clear 2/23 and 2/25. Surgical cultures positive for MRSA and anerobes. Path with clear margins.  PAT was aborted, but there is concern for possible infective endocarditis given septic emboli    Recommendations:   - agree with plan for vancomycin x 6 weeks for bacteremia, possible IE  - ensure he has adequate perfusion to heal wound  - needs outpatient dental evaluation    Outpatient Antibiotic Therapy Plan:    Please send referral to [facility].    1) Infection: osteomyelitis, suspect IE, MRSA bacteremia    2) Discharge Antibiotics:    Intravenous antibiotics:   IV vancomycin    Oral antibiotics:   metronidazole    3) Therapy Duration:  6 weeks iv antibiotics    Estimated end date of IV antibiotics: 4/5/22    4) Outpatient Weekly Labs:    Order the following labs to be drawn on Mondays:    CBC   CMP    ESR    CRP   Vancomycin trough. Target 15-20    If discharged on vancomycin IV, order the following additional labs to be drawn on Thursdays:   CMP    Vancomycin trough. Target 15-20    If vancomycin trough is not at target (15-20) prior to discharge, schedule vancomycin trough to be drawn before their fourth outpatient dose.    5) Fax Lab Results to Infectious Diseases Provider: dr Kapoor    [facility] Fax Number: [phone]    6) Outpatient  Infectious Diseases Follow-up     Follow-up appointment will be arranged by the ID clinic and will be found in the patient's appointments tab.     Prior to discharge, please ensure the patient's follow-up has been scheduled.     If there is still no follow-up scheduled prior to discharge, please send an EPIC message to Xenia Stuart in Infectious Diseases.                  Thank you for your consult. I will sign off. Please contact us if you have any additional questions.    Brenda Kapoor MD  Infectious Disease  [facility] ([place])    Subjective:     Principal Problem: Diabetic wet gangrene of the foot    HPI: 56 year old male with a history of DM and chronic wounds to both feet worse on the right foot.  He has a history of right foot transmetatarsal amputation of the 1st digit of the right foot in September 2021.  He had been following with wound care for his non-healing wound.  Cultures obtained from his right foot wound in November 2021 were positive for MSSA.  He was treated with oral bactrim for about 2 weeks.  A few days prior to admission, he developed fevers, increased pain in his foot and ultimately presented to the ER.  In the ER, he was noted to have a gangrenous 2nd digit of the right foot.  CTA of chest in the ER revealed multiple cavitary lung lesions.  Blood cultures obtained are positive in 3 out of 4 bottles with GPCs.  TTE is negative.  He has now underwent surgical debridement of the right foot with amputation of the 2nd digit on February 22, 2022.  ID is consulted to assist with management.  Of note, he states that he has lost around 15 lbs since September of 2021.  He has previously been homeless for many years but secured an apartment 1 year ago and hasn't been homeless since.      Interval History:   NAEO. Reports some foot pain. Tolerating antibiotics. Agreeable to iv abx on d/c    Review of Systems   All other systems reviewed and are negative.  Objective:     Vital Signs (Most  Recent):  Temp: 97.9 °F (36.6 °C) (03/04/22 1647)  Pulse: 76 (03/04/22 2101)  Resp: 20 (03/04/22 2101)  BP: 129/85 (03/04/22 1647)  SpO2: 97 % (03/04/22 2101) Vital Signs (24h Range):  Temp:  [97.5 °F (36.4 °C)-98.1 °F (36.7 °C)] 97.9 °F (36.6 °C)  Pulse:  [76-88] 76  Resp:  [14-20] 20  SpO2:  [94 %-98 %] 97 %  BP: ()/(69-85) 129/85     Weight: 131.1 kg (289 lb 0.4 oz)  Body mass index is 37.11 kg/m².    Estimated Creatinine Clearance: 89.2 mL/min (based on SCr of 1.3 mg/dL).    Physical Exam  Vitals and nursing note reviewed.   Constitutional:       General: He is not in acute distress.     Appearance: He is well-developed. He is not diaphoretic.   HENT:      Head: Normocephalic and atraumatic.      Right Ear: External ear normal.      Left Ear: External ear normal.      Nose: Nose normal.      Mouth/Throat:      Pharynx: No oropharyngeal exudate.   Eyes:      General: No scleral icterus.        Right eye: No discharge.         Left eye: No discharge.      Conjunctiva/sclera: Conjunctivae normal.      Pupils: Pupils are equal, round, and reactive to light.   Neck:      Thyroid: No thyromegaly.      Vascular: No JVD.      Trachea: No tracheal deviation.   Cardiovascular:      Rate and Rhythm: Normal rate and regular rhythm.      Heart sounds: No murmur heard.    No friction rub. No gallop.   Pulmonary:      Effort: Pulmonary effort is normal. No respiratory distress.      Breath sounds: Normal breath sounds. No stridor. No wheezing or rales.   Chest:      Chest wall: No tenderness.   Abdominal:      General: Bowel sounds are normal. There is no distension.      Palpations: Abdomen is soft. There is no mass.      Tenderness: There is no abdominal tenderness. There is no guarding or rebound.   Musculoskeletal:      Cervical back: Normal range of motion and neck supple.      Comments: Feet wrapped bilaterally.   Lymphadenopathy:      Cervical: No cervical adenopathy.   Skin:     General: Skin is warm.       Coloration: Skin is not pale.      Findings: No erythema or rash.   Neurological:      Mental Status: He is alert and oriented to person, place, and time.      Cranial Nerves: No cranial nerve deficit.      Motor: No abnormal muscle tone.      Coordination: Coordination normal.      Deep Tendon Reflexes: Reflexes are normal and symmetric. Reflexes normal.   Psychiatric:         Behavior: Behavior normal.         Thought Content: Thought content normal.         Judgment: Judgment normal.       Significant Labs:   Microbiology Results (last 7 days)       Procedure Component Value Units Date/Time    Fungus culture [[id number]] Collected: 02/22/22 1153    Order Status: Completed Specimen: Wound from Foot, Right Updated: 03/02/22 1435     Fungus (Mycology) Culture Culture in progress    Blood culture [[id number]] Collected: 02/25/22 0548    Order Status: Completed Specimen: Blood Updated: 03/02/22 1212     Blood Culture, Routine No growth after 5 days.    Narrative:      Collection has been rescheduled by [email or web address] at 02/25/2022 05:55 Reason:   Patient unavailable labs collected  Collection has been rescheduled by [email or web address] at 02/25/2022 05:55 Reason:   Patient unavailable labs collected    Blood culture [[id number]] Collected: 02/23/22 0550    Order Status: Completed Specimen: Blood Updated: 02/28/22 1012     Blood Culture, Routine No growth after 5 days.    Narrative:      Collection has been rescheduled by [email or web address] at 02/23/2022 05:53 Reason:   Patient unavailable labs collected  Collection has been rescheduled by WAD1 at 02/23/2022 05:53 Reason:   Patient unavailable labs collected    Culture, Anaerobic [[id number]]  (Abnormal) Collected: 02/22/22 1153    Order Status: Completed Specimen: Wound from Foot, Right Updated: 02/28/22 0710     Anaerobic Culture PRESUMPTIVE PREVOTELLA/PORPHYROMONAS GROUP  Many              Significant Imaging: I have reviewed all pertinent imaging results/findings within the past 24 hours.

## 2022-03-05 NOTE — SUBJECTIVE & OBJECTIVE
Interval History: Patient doing well post-op day 2, patient had a bowel movement. Dr. Flores following, patient accepted to LTAC, will likely discharge there on Monday. Pain controlled.     Review of Systems   Constitutional:  Negative for chills and fever.   Respiratory:  Negative for cough and shortness of breath.    Cardiovascular:  Negative for chest pain and palpitations.   Gastrointestinal:  Negative for abdominal pain, nausea and vomiting.   Objective:     Vital Signs (Most Recent):  Temp: 97.9 °F (36.6 °C) (03/04/22 1647)  Pulse: 79 (03/04/22 1647)  Resp: 14 (03/04/22 1801)  BP: 129/85 (03/04/22 1647)  SpO2: (!) 94 % (03/04/22 1647) Vital Signs (24h Range):  Temp:  [97.5 °F (36.4 °C)-98.1 °F (36.7 °C)] 97.9 °F (36.6 °C)  Pulse:  [78-88] 79  Resp:  [14-20] 14  SpO2:  [94 %-98 %] 94 %  BP: ()/(69-92) 129/85     Weight: 131.1 kg (289 lb 0.4 oz)  Body mass index is 37.11 kg/m².    Intake/Output Summary (Last 24 hours) at 3/4/2022 1847  Last data filed at 3/4/2022 1700  Gross per 24 hour   Intake 540 ml   Output 1750 ml   Net -1210 ml        Physical Exam  Vitals and nursing note reviewed.   Constitutional:       General: He is not in acute distress.     Appearance: He is well-developed.   HENT:      Head: Normocephalic and atraumatic.   Pulmonary:      Effort: Pulmonary effort is normal. No respiratory distress.   Musculoskeletal:         General: No tenderness. Normal range of motion.      Comments: Post-op dressing in place    Skin:     General: Skin is warm and dry.   Neurological:      Mental Status: He is alert and oriented to person, place, and time.       Significant Labs:   CBC:  Recent Labs   Lab 03/03/22  0547   WBC 9.44   HGB 9.3*   HCT 30.2*   *   GRAN 51.5  4.9   LYMPH 33.3  3.1   MONO 10.8  1.0   EOS 0.3   BASO 0.05     BMP:  Recent Labs   Lab 03/03/22  0547   *   K 4.5      CO2 29   BUN 18   CREATININE 1.3   *   CALCIUM 9.3       Significant Imaging:   No new  imaging this morning.

## 2022-03-06 LAB
ANION GAP SERPL CALC-SCNC: 8 MMOL/L (ref 8–16)
BASOPHILS # BLD AUTO: 0.04 K/UL (ref 0–0.2)
BASOPHILS NFR BLD: 0.5 % (ref 0–1.9)
BUN SERPL-MCNC: 17 MG/DL (ref 6–20)
CALCIUM SERPL-MCNC: 9.4 MG/DL (ref 8.7–10.5)
CHLORIDE SERPL-SCNC: 100 MMOL/L (ref 95–110)
CO2 SERPL-SCNC: 28 MMOL/L (ref 23–29)
CREAT SERPL-MCNC: 1.3 MG/DL (ref 0.5–1.4)
DIFFERENTIAL METHOD: ABNORMAL
EOSINOPHIL # BLD AUTO: 0.5 K/UL (ref 0–0.5)
EOSINOPHIL NFR BLD: 5.4 % (ref 0–8)
ERYTHROCYTE [DISTWIDTH] IN BLOOD BY AUTOMATED COUNT: 15.3 % (ref 11.5–14.5)
EST. GFR  (AFRICAN AMERICAN): >60 ML/MIN/1.73 M^2
EST. GFR  (NON AFRICAN AMERICAN): >60 ML/MIN/1.73 M^2
GLUCOSE SERPL-MCNC: 137 MG/DL (ref 70–110)
HCT VFR BLD AUTO: 30.5 % (ref 40–54)
HGB BLD-MCNC: 9.4 G/DL (ref 14–18)
HYPOCHROMIA BLD QL SMEAR: ABNORMAL
IMM GRANULOCYTES # BLD AUTO: 0.05 K/UL (ref 0–0.04)
IMM GRANULOCYTES NFR BLD AUTO: 0.6 % (ref 0–0.5)
LYMPHOCYTES # BLD AUTO: 3.7 K/UL (ref 1–4.8)
LYMPHOCYTES NFR BLD: 42.9 % (ref 18–48)
MCH RBC QN AUTO: 27.5 PG (ref 27–31)
MCHC RBC AUTO-ENTMCNC: 30.8 G/DL (ref 32–36)
MCV RBC AUTO: 89 FL (ref 82–98)
MONOCYTES # BLD AUTO: 0.9 K/UL (ref 0.3–1)
MONOCYTES NFR BLD: 10.2 % (ref 4–15)
NEUTROPHILS # BLD AUTO: 3.5 K/UL (ref 1.8–7.7)
NEUTROPHILS NFR BLD: 40.4 % (ref 38–73)
NRBC BLD-RTO: 0 /100 WBC
PLATELET # BLD AUTO: 599 K/UL (ref 150–450)
PLATELET BLD QL SMEAR: ABNORMAL
PMV BLD AUTO: 9.3 FL (ref 9.2–12.9)
POCT GLUCOSE: 118 MG/DL (ref 70–110)
POCT GLUCOSE: 126 MG/DL (ref 70–110)
POCT GLUCOSE: 134 MG/DL (ref 70–110)
POCT GLUCOSE: 215 MG/DL (ref 70–110)
POTASSIUM SERPL-SCNC: 4.4 MMOL/L (ref 3.5–5.1)
RBC # BLD AUTO: 3.42 M/UL (ref 4.6–6.2)
SODIUM SERPL-SCNC: 136 MMOL/L (ref 136–145)
VANCOMYCIN TROUGH SERPL-MCNC: 9.2 UG/ML (ref 10–22)
WBC # BLD AUTO: 8.63 K/UL (ref 3.9–12.7)

## 2022-03-06 PROCEDURE — C1751 CATH, INF, PER/CENT/MIDLINE: HCPCS

## 2022-03-06 PROCEDURE — 85025 COMPLETE CBC W/AUTO DIFF WBC: CPT | Performed by: STUDENT IN AN ORGANIZED HEALTH CARE EDUCATION/TRAINING PROGRAM

## 2022-03-06 PROCEDURE — 99231 SBSQ HOSP IP/OBS SF/LOW 25: CPT | Mod: 95,,, | Performed by: STUDENT IN AN ORGANIZED HEALTH CARE EDUCATION/TRAINING PROGRAM

## 2022-03-06 PROCEDURE — 99231 PR SUBSEQUENT HOSPITAL CARE,LEVL I: ICD-10-PCS | Mod: 95,,, | Performed by: STUDENT IN AN ORGANIZED HEALTH CARE EDUCATION/TRAINING PROGRAM

## 2022-03-06 PROCEDURE — 25000003 PHARM REV CODE 250: Performed by: STUDENT IN AN ORGANIZED HEALTH CARE EDUCATION/TRAINING PROGRAM

## 2022-03-06 PROCEDURE — 25000003 PHARM REV CODE 250

## 2022-03-06 PROCEDURE — 63600175 PHARM REV CODE 636 W HCPCS

## 2022-03-06 PROCEDURE — A4216 STERILE WATER/SALINE, 10 ML: HCPCS | Performed by: SPECIALIST

## 2022-03-06 PROCEDURE — 21400001 HC TELEMETRY ROOM

## 2022-03-06 PROCEDURE — 80048 BASIC METABOLIC PNL TOTAL CA: CPT | Performed by: STUDENT IN AN ORGANIZED HEALTH CARE EDUCATION/TRAINING PROGRAM

## 2022-03-06 PROCEDURE — 94761 N-INVAS EAR/PLS OXIMETRY MLT: CPT

## 2022-03-06 PROCEDURE — 80202 ASSAY OF VANCOMYCIN: CPT | Performed by: STUDENT IN AN ORGANIZED HEALTH CARE EDUCATION/TRAINING PROGRAM

## 2022-03-06 PROCEDURE — 27000207 HC ISOLATION

## 2022-03-06 PROCEDURE — 63600175 PHARM REV CODE 636 W HCPCS: Performed by: SPECIALIST

## 2022-03-06 PROCEDURE — 25000003 PHARM REV CODE 250: Performed by: SPECIALIST

## 2022-03-06 PROCEDURE — 99900035 HC TECH TIME PER 15 MIN (STAT)

## 2022-03-06 RX ORDER — DOCUSATE SODIUM 100 MG/1
100 CAPSULE, LIQUID FILLED ORAL 2 TIMES DAILY
Status: DISCONTINUED | OUTPATIENT
Start: 2022-03-06 | End: 2022-03-07 | Stop reason: HOSPADM

## 2022-03-06 RX ADMIN — SODIUM CHLORIDE, PRESERVATIVE FREE 10 ML: 5 INJECTION INTRAVENOUS at 01:03

## 2022-03-06 RX ADMIN — SODIUM CHLORIDE, PRESERVATIVE FREE 10 ML: 5 INJECTION INTRAVENOUS at 03:03

## 2022-03-06 RX ADMIN — QUETIAPINE FUMARATE 200 MG: 200 TABLET ORAL at 10:03

## 2022-03-06 RX ADMIN — OXYCODONE AND ACETAMINOPHEN 1 TABLET: 7.5; 325 TABLET ORAL at 08:03

## 2022-03-06 RX ADMIN — INSULIN DETEMIR 55 UNITS: 100 INJECTION, SOLUTION SUBCUTANEOUS at 10:03

## 2022-03-06 RX ADMIN — DOCUSATE SODIUM 100 MG: 100 CAPSULE ORAL at 10:03

## 2022-03-06 RX ADMIN — MORPHINE SULFATE 2 MG: 4 INJECTION, SOLUTION INTRAMUSCULAR; INTRAVENOUS at 12:03

## 2022-03-06 RX ADMIN — HEPARIN SODIUM 5000 UNITS: 5000 INJECTION INTRAVENOUS; SUBCUTANEOUS at 02:03

## 2022-03-06 RX ADMIN — VANCOMYCIN HYDROCHLORIDE 1500 MG: 1.5 INJECTION, POWDER, LYOPHILIZED, FOR SOLUTION INTRAVENOUS at 01:03

## 2022-03-06 RX ADMIN — SODIUM CHLORIDE, PRESERVATIVE FREE 10 ML: 5 INJECTION INTRAVENOUS at 12:03

## 2022-03-06 RX ADMIN — HEPARIN SODIUM 5000 UNITS: 5000 INJECTION INTRAVENOUS; SUBCUTANEOUS at 10:03

## 2022-03-06 RX ADMIN — DOCUSATE SODIUM 100 MG: 100 CAPSULE ORAL at 11:03

## 2022-03-06 RX ADMIN — INSULIN ASPART 10 UNITS: 100 INJECTION, SOLUTION INTRAVENOUS; SUBCUTANEOUS at 12:03

## 2022-03-06 RX ADMIN — ASPIRIN 81 MG: 81 TABLET, COATED ORAL at 08:03

## 2022-03-06 RX ADMIN — POLYETHYLENE GLYCOL 3350 17 G: 17 POWDER, FOR SOLUTION ORAL at 08:03

## 2022-03-06 RX ADMIN — MORPHINE SULFATE 2 MG: 4 INJECTION, SOLUTION INTRAMUSCULAR; INTRAVENOUS at 08:03

## 2022-03-06 RX ADMIN — INSULIN ASPART 10 UNITS: 100 INJECTION, SOLUTION INTRAVENOUS; SUBCUTANEOUS at 08:03

## 2022-03-06 RX ADMIN — ATORVASTATIN CALCIUM 40 MG: 20 TABLET, FILM COATED ORAL at 08:03

## 2022-03-06 RX ADMIN — OXYCODONE AND ACETAMINOPHEN 1 TABLET: 7.5; 325 TABLET ORAL at 03:03

## 2022-03-06 RX ADMIN — LOSARTAN POTASSIUM 25 MG: 25 TABLET, FILM COATED ORAL at 10:03

## 2022-03-06 RX ADMIN — INSULIN ASPART 10 UNITS: 100 INJECTION, SOLUTION INTRAVENOUS; SUBCUTANEOUS at 04:03

## 2022-03-06 RX ADMIN — LOSARTAN POTASSIUM 25 MG: 25 TABLET, FILM COATED ORAL at 08:03

## 2022-03-06 RX ADMIN — HEPARIN SODIUM 5000 UNITS: 5000 INJECTION INTRAVENOUS; SUBCUTANEOUS at 05:03

## 2022-03-06 RX ADMIN — SODIUM CHLORIDE, PRESERVATIVE FREE 10 ML: 5 INJECTION INTRAVENOUS at 05:03

## 2022-03-06 RX ADMIN — VANCOMYCIN HYDROCHLORIDE 1250 MG: 1.25 INJECTION, POWDER, LYOPHILIZED, FOR SOLUTION INTRAVENOUS at 12:03

## 2022-03-06 NOTE — PROGRESS NOTES
Pharmacokinetic Assessment Follow Up: IV Vancomycin    Vancomycin serum concentration assessment(s):    The trough level was drawn correctly and can be used to guide therapy at this time. The measurement is below the desired definitive target range of 10 to 20 mcg/mL.    Vancomycin Regimen Plan:    Change regimen to Vancomycin 1250 mg IV every 12 hours with next serum trough concentration measured at 1230 prior to 4th dose on 3/7/22    Drug levels (last 3 results):  Recent Labs   Lab Result Units 03/04/22  0029 03/06/22  0053   Vancomycin-Trough ug/mL 11.2 9.2*       Pharmacy will continue to follow and monitor vancomycin.    Please contact pharmacy at extension [phone] for questions regarding this assessment.    Thank you for the consult,   Alin Ulloa       Patient brief summary:  Dave Good is a 56 y.o. male initiated on antimicrobial therapy with IV Vancomycin for treatment of Pneumonia    The patient's current regimen is 1250mg iv q 12h    Drug Allergies:   Review of patient's allergies indicates:   Allergen Reactions    Ibuprofen Swelling     Facial swelling       Actual Body Weight:   131 kg    Renal Function:   Estimated Creatinine Clearance: 89.2 mL/min (based on SCr of 1.3 mg/dL).,     Dialysis Method (if applicable):  N/A    CBC (last 72 hours):  Recent Labs   Lab Result Units 03/03/22  0547   WBC K/uL 9.44   Hemoglobin g/dL 9.3*   Hematocrit % 30.2*   Platelets K/uL 747*   Gran % % 51.5   Lymph % % 33.3   Mono % % 10.8   Eosinophil % % 3.1   Basophil % % 0.5   Differential Method  Automated       Metabolic Panel (last 72 hours):  Recent Labs   Lab Result Units 03/03/22  0547   Sodium mmol/L 135*   Potassium mmol/L 4.5   Chloride mmol/L 100   CO2 mmol/L 29   Glucose mg/dL 113*   BUN mg/dL 18   Creatinine mg/dL 1.3       Vancomycin Administrations:  vancomycin given in the last 96 hours                     vancomycin 1.5 g in dextrose 5 % 250 mL IVPB (ready to mix) (mg) 1,500 mg New Bag 03/06/22  0109     1,500 mg New Bag 03/05/22 0122     1,500 mg New Bag 03/04/22 0115     1,500 mg New Bag 03/03/22 0153                    Microbiologic Results:  Microbiology Results (last 7 days)       Procedure Component Value Units Date/Time    Fungus culture [[id number]] Collected: 02/22/22 1153    Order Status: Completed Specimen: Wound from Foot, Right Updated: 03/02/22 1435     Fungus (Mycology) Culture Culture in progress    Blood culture [[id number]] Collected: 02/25/22 0548    Order Status: Completed Specimen: Blood Updated: 03/02/22 1212     Blood Culture, Routine No growth after 5 days.    Narrative:      Collection has been rescheduled by [email or web address] at 02/25/2022 05:55 Reason:   Patient unavailable labs collected  Collection has been rescheduled by WAD1 at 02/25/2022 05:55 Reason:   Patient unavailable labs collected    Blood culture [[id number]] Collected: 02/23/22 0550    Order Status: Completed Specimen: Blood Updated: 02/28/22 1012     Blood Culture, Routine No growth after 5 days.    Narrative:      Collection has been rescheduled by WAD1 at 02/23/2022 05:53 Reason:   Patient unavailable labs collected  Collection has been rescheduled by WAD1 at 02/23/2022 05:53 Reason:   Patient unavailable labs collected    Culture, Anaerobic [[id number]]  (Abnormal) Collected: 02/22/22 1153    Order Status: Completed Specimen: Wound from Foot, Right Updated: 02/28/22 0710     Anaerobic Culture PRESUMPTIVE PREVOTELLA/PORPHYROMONAS GROUP  Many

## 2022-03-06 NOTE — PROGRESS NOTES
[facility]  Telemedicine Progress Note    Patient Name: Dave Good  MRN: [id number]  Patient Class: IP- Inpatient   Admission Date: 2/21/2022  Length of Stay: 12 days  Attending Physician: Vineet Salinas MD  Primary Care Provider: Reyna Jorge NP          Subjective:     Principal Problem:Diabetic wet gangrene of the foot        HPI:  55 yo m with PMH of DM , HTN , PE and COPD presented to ED  for R 2nd digit wound. Pt had amputation by  back on 9/2021 for wet gangrene . Pt is following up with wound care at [facility] twice a week. But the last time was 1.5 weeks ago, he noticed swelling ,pain and foul smelling drainage from the wound. Pt also reports worsening SOB with exertion . With some chest tightness. In ED VS were stable. Started on iv abx . Labs showed leukocytosis and hypokalemia.CTA showed cavitary lesions with possible septic emboli.       Overview/Hospital Course:  Patient admitted with foul smelling drainage from wound of R foot and SOB. Found to have cavitary lung lesions concerning for septic emboli. General surgery and ID consulted. He went to OR on 2/22 debridement and transmetatarsal amputation of 2nd R digit. Wound vac placed. Blood culture with MRSA. PAT ordered but patient unable to tolerate procedure (unable to pass probe, wouldn't swallow). Given presence of septic emboli, planned for empiric treatment of endocarditis for 6 weeks. Planned for removal of wound vac and closure on 3/02.       Interval History: Patient doing well post-op day 3, patient had a bowel movement. Dr. Flores following, patient accepted to LTAC, will likely discharge there on Monday. Pain controlled.     Review of Systems   Constitutional:  Negative for chills and fever.   Respiratory:  Negative for cough and shortness of breath.    Cardiovascular:  Negative for chest pain and palpitations.   Gastrointestinal:  Negative for abdominal pain, nausea and vomiting.    Objective:     Vital Signs (Most Recent):  Temp: 98.1 °F (36.7 °C) (03/05/22 1951)  Pulse: 83 (03/05/22 1951)  Resp: 18 (03/05/22 1952)  BP: 110/77 (03/05/22 1951)  SpO2: (!) 93 % (03/05/22 1951) Vital Signs (24h Range):  Temp:  [97.5 °F (36.4 °C)-98.1 °F (36.7 °C)] 98.1 °F (36.7 °C)  Pulse:  [76-91] 83  Resp:  [16-18] 18  SpO2:  [93 %-95 %] 93 %  BP: ()/(62-89) 110/77     Weight: 131.1 kg (289 lb 0.4 oz)  Body mass index is 37.11 kg/m².    Intake/Output Summary (Last 24 hours) at 3/5/2022 2345  Last data filed at 3/5/2022 1828  Gross per 24 hour   Intake 1220 ml   Output 1200 ml   Net 20 ml        Physical Exam  Vitals and nursing note reviewed.   Constitutional:       General: He is not in acute distress.     Appearance: He is well-developed.   HENT:      Head: Normocephalic and atraumatic.   Pulmonary:      Effort: Pulmonary effort is normal. No respiratory distress.   Musculoskeletal:         General: No tenderness. Normal range of motion.      Comments: Post-op dressing in place    Skin:     General: Skin is warm and dry.   Neurological:      Mental Status: He is alert and oriented to person, place, and time.       Significant Labs:   CBC:  Recent Labs   Lab 03/03/22  0547   WBC 9.44   HGB 9.3*   HCT 30.2*   *   GRAN 51.5  4.9   LYMPH 33.3  3.1   MONO 10.8  1.0   EOS 0.3   BASO 0.05     BMP:  Recent Labs   Lab 03/03/22  0547   *   K 4.5      CO2 29   BUN 18   CREATININE 1.3   *   CALCIUM 9.3       Significant Imaging:   No new imaging this morning.      Assessment/Plan:      * Diabetic wet gangrene of the foot  MRSA bacteremia  Septic emboli / cavitary lung lesion  Diabetic foot infection of left foot  - R 2nd digit with wet gangrene and necrotic ulcer on L foot  - CTA without PE but with bilateral cavitary lesion with possible septic emboli vs organizing pneumonia   - Quantiferon gold negative. Sputum AFB smear negative x 3.   - Echo without vegetation but given septic  emboli, PAT attempted  - PAT canceled, pt unable to swallow probe on 3 attempts then refused  - Blood culture with MRSA. Repeat blood culture (2/23 and 2/25) NGTD  - ID, general surgery. Appreciate input  - Continue vancomycin with pharmacy dosing. Continue wound vac and local wound care   - S/p 2nd digit transmetatarsal amputation and debridement 2/22 and b/l debridement 2/24; plan for closure 3/02.  - Continue PT/OT   - Continue percocet and morphine for pain  - Plan to treat for presumed endocarditis given septic emboli  - Given combined wound care and prolonged IV antibiotic needs, suspect he will not be able to handle his condition at home.  - SW consulted for placement.  - RIGHT 2ND TOE AND TISSUE, AMPUTATION: Toe skin with severe ulceration, gangrenous necrosis and cellulitis. Underlying osteonecrosis, no acute osteomyelitis. Bone and soft tissue margins are unremarkable  - ID recommending prolonged course of IV vancomycin    Increased nutritional needs        Chronic respiratory failure with hypoxia  - On 2L home O2 2/2 COVID-19  - Continue oxygen protocol  - now on room air    Cavitary lesion of lung  - As above    COPD with asthma  - Duoneb PRN    Depression  - Continue seroquel    HTN (hypertension)  - Continue losartan 25 mg qd    Type 2 diabetes mellitus without complication, with long-term current use of insulin  - A1c 9.8  - Continue levemir 55U qHS, aspart 10U TIDWM, and low dose SSI AC/HS PRN      VTE Risk Mitigation (From admission, onward)         Ordered     IP VTE HIGH RISK PATIENT  Once         03/05/22 1646     heparin (porcine) injection 5,000 Units  Every 8 hours         02/21/22 1627                      I have assessed these finding virtually using telemed platform and with assistance of bedside nurse                 The attending portion of this evaluation, treatment, and documentation was performed per Vineet Salinas MD via Telemedicine AudioVisual using the secure relocality software  platform with 2 way audio/video. The provider was located off-site and the patient is located in the hospital. The aforementioned video software was utilized to document the relevant history and physical exam    Vineet Salinas MD  Department of Hospital Medicine   [facility] ([place])

## 2022-03-06 NOTE — SUBJECTIVE & OBJECTIVE
Interval History: Patient doing well post-op day 3, patient had a bowel movement. Dr. Flores following, patient accepted to LTAC, will likely discharge there on Monday. Pain controlled.     Review of Systems   Constitutional:  Negative for chills and fever.   Respiratory:  Negative for cough and shortness of breath.    Cardiovascular:  Negative for chest pain and palpitations.   Gastrointestinal:  Negative for abdominal pain, nausea and vomiting.   Objective:     Vital Signs (Most Recent):  Temp: 98.1 °F (36.7 °C) (03/05/22 1951)  Pulse: 83 (03/05/22 1951)  Resp: 18 (03/05/22 1952)  BP: 110/77 (03/05/22 1951)  SpO2: (!) 93 % (03/05/22 1951) Vital Signs (24h Range):  Temp:  [97.5 °F (36.4 °C)-98.1 °F (36.7 °C)] 98.1 °F (36.7 °C)  Pulse:  [76-91] 83  Resp:  [16-18] 18  SpO2:  [93 %-95 %] 93 %  BP: ()/(62-89) 110/77     Weight: 131.1 kg (289 lb 0.4 oz)  Body mass index is 37.11 kg/m².    Intake/Output Summary (Last 24 hours) at 3/5/2022 2345  Last data filed at 3/5/2022 1828  Gross per 24 hour   Intake 1220 ml   Output 1200 ml   Net 20 ml        Physical Exam  Vitals and nursing note reviewed.   Constitutional:       General: He is not in acute distress.     Appearance: He is well-developed.   HENT:      Head: Normocephalic and atraumatic.   Pulmonary:      Effort: Pulmonary effort is normal. No respiratory distress.   Musculoskeletal:         General: No tenderness. Normal range of motion.      Comments: Post-op dressing in place    Skin:     General: Skin is warm and dry.   Neurological:      Mental Status: He is alert and oriented to person, place, and time.       Significant Labs:   CBC:  Recent Labs   Lab 03/03/22  0547   WBC 9.44   HGB 9.3*   HCT 30.2*   *   GRAN 51.5  4.9   LYMPH 33.3  3.1   MONO 10.8  1.0   EOS 0.3   BASO 0.05     BMP:  Recent Labs   Lab 03/03/22  0547   *   K 4.5      CO2 29   BUN 18   CREATININE 1.3   *   CALCIUM 9.3       Significant Imaging:   No new  imaging this morning.

## 2022-03-06 NOTE — PROGRESS NOTES
[facility] Medicine  Telemedicine Progress Note    Patient Name: Dave Good  MRN: [id number]  Patient Class: IP- Inpatient   Admission Date: 2/21/2022  Length of Stay: 13 days  Attending Physician: Vineet Salinas MD  Primary Care Provider: Reyna Jorge NP          Subjective:     Principal Problem:Diabetic wet gangrene of the foot        HPI:  55 yo m with PMH of DM , HTN , PE and COPD presented to ED  for R 2nd digit wound. Pt had amputation by  back on 9/2021 for wet gangrene . Pt is following up with wound care at [facility] twice a week. But the last time was 1.5 weeks ago, he noticed swelling ,pain and foul smelling drainage from the wound. Pt also reports worsening SOB with exertion . With some chest tightness. In ED VS were stable. Started on iv abx . Labs showed leukocytosis and hypokalemia.CTA showed cavitary lesions with possible septic emboli.       Overview/Hospital Course:  Patient admitted with foul smelling drainage from wound of R foot and SOB. Found to have cavitary lung lesions concerning for septic emboli. General surgery and ID consulted. He went to OR on 2/22 debridement and transmetatarsal amputation of 2nd R digit. Wound vac placed. Blood culture with MRSA. PAT ordered but patient unable to tolerate procedure (unable to pass probe, wouldn't swallow). Given presence of septic emboli, planned for empiric treatment of endocarditis for 6 weeks. Planned for removal of wound vac and closure on 3/02.       Interval History: Patient doing well post-op day 4. Dr. Flores following, patient accepted to LTAC, will likely discharge there tomorrow. Pain controlled. Labs stable, continuing IV vancomycin per ID recs    Review of Systems   Constitutional:  Negative for chills and fever.   Respiratory:  Negative for cough and shortness of breath.    Cardiovascular:  Negative for chest pain and palpitations.   Gastrointestinal:  Negative for abdominal pain,  nausea and vomiting.   Objective:     Vital Signs (Most Recent):  Temp: 97.4 °F (36.3 °C) (03/06/22 1213)  Pulse: 73 (03/06/22 1213)  Resp: 18 (03/06/22 1224)  BP: 110/73 (03/06/22 1213)  SpO2: 98 % (03/06/22 1213) Vital Signs (24h Range):  Temp:  [97.4 °F (36.3 °C)-98.1 °F (36.7 °C)] 97.4 °F (36.3 °C)  Pulse:  [70-83] 73  Resp:  [16-18] 18  SpO2:  [93 %-100 %] 98 %  BP: (110-136)/(71-89) 110/73     Weight: 131.1 kg (289 lb 0.4 oz)  Body mass index is 37.11 kg/m².    Intake/Output Summary (Last 24 hours) at 3/6/2022 1256  Last data filed at 3/6/2022 1211  Gross per 24 hour   Intake 1080 ml   Output 2200 ml   Net -1120 ml        Physical Exam  Vitals and nursing note reviewed.   Constitutional:       General: He is not in acute distress.     Appearance: He is well-developed.   HENT:      Head: Normocephalic and atraumatic.   Pulmonary:      Effort: Pulmonary effort is normal. No respiratory distress.   Musculoskeletal:         General: No tenderness. Normal range of motion.      Comments: Post-op dressing in place    Skin:     General: Skin is warm and dry.   Neurological:      Mental Status: He is alert and oriented to person, place, and time.       Significant Labs:   CBC:  Recent Labs   Lab 03/06/22  0519   WBC 8.63   HGB 9.4*   HCT 30.5*   *   GRAN 40.4  3.5   LYMPH 42.9  3.7   MONO 10.2  0.9   EOS 0.5   BASO 0.04     BMP:  Recent Labs   Lab 03/06/22  0519      K 4.4      CO2 28   BUN 17   CREATININE 1.3   *   CALCIUM 9.4       Significant Imaging:   No new imaging this morning.      Assessment/Plan:      * Diabetic wet gangrene of the foot  MRSA bacteremia  Septic emboli / cavitary lung lesion  Diabetic foot infection of left foot  - R 2nd digit with wet gangrene and necrotic ulcer on L foot  - CTA without PE but with bilateral cavitary lesion with possible septic emboli vs organizing pneumonia   - Quantiferon gold negative. Sputum AFB smear negative x 3.   - Echo without vegetation  but given septic emboli, PAT attempted  - PAT canceled, pt unable to swallow probe on 3 attempts then refused  - Blood culture with MRSA. Repeat blood culture (2/23 and 2/25) NGTD  - ID, general surgery. Appreciate input  - Continue vancomycin with pharmacy dosing. Continue wound vac and local wound care   - S/p 2nd digit transmetatarsal amputation and debridement 2/22 and b/l debridement 2/24; plan for closure 3/02.  - Continue PT/OT   - Continue percocet and morphine for pain  - Plan to treat for presumed endocarditis given septic emboli  - Given combined wound care and prolonged IV antibiotic needs, suspect he will not be able to handle his condition at home.  - SW consulted for placement.  - RIGHT 2ND TOE AND TISSUE, AMPUTATION: Toe skin with severe ulceration, gangrenous necrosis and cellulitis. Underlying osteonecrosis, no acute osteomyelitis. Bone and soft tissue margins are unremarkable  - ID recommending prolonged course of IV vancomycin    Increased nutritional needs        Chronic respiratory failure with hypoxia  - On 2L home O2 2/2 COVID-19  - Continue oxygen protocol  - now on room air    Cavitary lesion of lung  - As above    COPD with asthma  - Duoneb PRN    Depression  - Continue seroquel    HTN (hypertension)  - Continue losartan 25 mg qd    Type 2 diabetes mellitus without complication, with long-term current use of insulin  - A1c 9.8  - Continue levemir 55U qHS, aspart 10U TIDWM, and low dose SSI AC/HS PRN      VTE Risk Mitigation (From admission, onward)         Ordered     IP VTE HIGH RISK PATIENT  Once         03/05/22 1646     heparin (porcine) injection 5,000 Units  Every 8 hours         02/21/22 1627                      I have assessed these finding virtually using telemed platform and with assistance of bedside nurse                 The attending portion of this evaluation, treatment, and documentation was performed per Vineet Salinas MD via Telemedicine AudioVisual using the secure  [employer] software platform with 2 way audio/video. The provider was located off-site and the patient is located in the hospital. The aforementioned video software was utilized to document the relevant history and physical exam    Vineet Salinas MD  Department of Hospital Medicine   [facility] ([place])

## 2022-03-06 NOTE — SUBJECTIVE & OBJECTIVE
Interval History: Patient doing well post-op day 4. Dr. Flores following, patient accepted to LTAC, will likely discharge there tomorrow. Pain controlled. Labs stable, continuing IV vancomycin per ID recs    Review of Systems   Constitutional:  Negative for chills and fever.   Respiratory:  Negative for cough and shortness of breath.    Cardiovascular:  Negative for chest pain and palpitations.   Gastrointestinal:  Negative for abdominal pain, nausea and vomiting.   Objective:     Vital Signs (Most Recent):  Temp: 97.4 °F (36.3 °C) (03/06/22 1213)  Pulse: 73 (03/06/22 1213)  Resp: 18 (03/06/22 1224)  BP: 110/73 (03/06/22 1213)  SpO2: 98 % (03/06/22 1213) Vital Signs (24h Range):  Temp:  [97.4 °F (36.3 °C)-98.1 °F (36.7 °C)] 97.4 °F (36.3 °C)  Pulse:  [70-83] 73  Resp:  [16-18] 18  SpO2:  [93 %-100 %] 98 %  BP: (110-136)/(71-89) 110/73     Weight: 131.1 kg (289 lb 0.4 oz)  Body mass index is 37.11 kg/m².    Intake/Output Summary (Last 24 hours) at 3/6/2022 1256  Last data filed at 3/6/2022 1211  Gross per 24 hour   Intake 1080 ml   Output 2200 ml   Net -1120 ml        Physical Exam  Vitals and nursing note reviewed.   Constitutional:       General: He is not in acute distress.     Appearance: He is well-developed.   HENT:      Head: Normocephalic and atraumatic.   Pulmonary:      Effort: Pulmonary effort is normal. No respiratory distress.   Musculoskeletal:         General: No tenderness. Normal range of motion.      Comments: Post-op dressing in place    Skin:     General: Skin is warm and dry.   Neurological:      Mental Status: He is alert and oriented to person, place, and time.       Significant Labs:   CBC:  Recent Labs   Lab 03/06/22  0519   WBC 8.63   HGB 9.4*   HCT 30.5*   *   GRAN 40.4  3.5   LYMPH 42.9  3.7   MONO 10.2  0.9   EOS 0.5   BASO 0.04     BMP:  Recent Labs   Lab 03/06/22  0519      K 4.4      CO2 28   BUN 17   CREATININE 1.3   *   CALCIUM 9.4       Significant  Imaging:   No new imaging this morning.

## 2022-03-06 NOTE — PLAN OF CARE
POC reviewed w/ pt. AAOx4. No significant events this shift. Cardiac monitor maintained. VSS on RA. C/o pain treated w/ pain medication per MAR. Pt voids and shifts in bed independently. Turn Q2/frequent weight shift encouraged. Instructed to call for help ambulating. No injuries, falls, or trauma occurred during shift. Purposeful rounding completed. Bed alarm set, bed low and locked, side rails up x3, call light within reach.

## 2022-03-07 VITALS
RESPIRATION RATE: 14 BRPM | OXYGEN SATURATION: 98 % | SYSTOLIC BLOOD PRESSURE: 139 MMHG | BODY MASS INDEX: 37.09 KG/M2 | HEART RATE: 80 BPM | DIASTOLIC BLOOD PRESSURE: 89 MMHG | HEIGHT: 74 IN | WEIGHT: 289 LBS | TEMPERATURE: 98 F

## 2022-03-07 LAB
POCT GLUCOSE: 112 MG/DL (ref 70–110)
POCT GLUCOSE: 156 MG/DL (ref 70–110)
POCT GLUCOSE: 172 MG/DL (ref 70–110)
VANCOMYCIN TROUGH SERPL-MCNC: 16.3 UG/ML (ref 10–22)

## 2022-03-07 PROCEDURE — 63600175 PHARM REV CODE 636 W HCPCS: Performed by: SPECIALIST

## 2022-03-07 PROCEDURE — 25000003 PHARM REV CODE 250: Performed by: STUDENT IN AN ORGANIZED HEALTH CARE EDUCATION/TRAINING PROGRAM

## 2022-03-07 PROCEDURE — 99900035 HC TECH TIME PER 15 MIN (STAT)

## 2022-03-07 PROCEDURE — 63600175 PHARM REV CODE 636 W HCPCS

## 2022-03-07 PROCEDURE — 25000003 PHARM REV CODE 250

## 2022-03-07 PROCEDURE — 80202 ASSAY OF VANCOMYCIN: CPT | Performed by: STUDENT IN AN ORGANIZED HEALTH CARE EDUCATION/TRAINING PROGRAM

## 2022-03-07 PROCEDURE — A4216 STERILE WATER/SALINE, 10 ML: HCPCS | Performed by: SPECIALIST

## 2022-03-07 PROCEDURE — 94761 N-INVAS EAR/PLS OXIMETRY MLT: CPT

## 2022-03-07 PROCEDURE — 99239 HOSP IP/OBS DSCHRG MGMT >30: CPT | Mod: 95,,, | Performed by: STUDENT IN AN ORGANIZED HEALTH CARE EDUCATION/TRAINING PROGRAM

## 2022-03-07 PROCEDURE — 25000003 PHARM REV CODE 250: Performed by: SPECIALIST

## 2022-03-07 PROCEDURE — 99239 PR HOSPITAL DISCHARGE DAY,>30 MIN: ICD-10-PCS | Mod: 95,,, | Performed by: STUDENT IN AN ORGANIZED HEALTH CARE EDUCATION/TRAINING PROGRAM

## 2022-03-07 RX ORDER — POLYETHYLENE GLYCOL 3350 17 G/17G
17 POWDER, FOR SOLUTION ORAL DAILY
Refills: 0
Start: 2022-03-08 | End: 2022-06-16

## 2022-03-07 RX ORDER — OXYCODONE AND ACETAMINOPHEN 7.5; 325 MG/1; MG/1
1 TABLET ORAL EVERY 6 HOURS PRN
Refills: 0
Start: 2022-03-07 | End: 2022-06-15

## 2022-03-07 RX ADMIN — ASPIRIN 81 MG: 81 TABLET, COATED ORAL at 08:03

## 2022-03-07 RX ADMIN — INSULIN ASPART 10 UNITS: 100 INJECTION, SOLUTION INTRAVENOUS; SUBCUTANEOUS at 01:03

## 2022-03-07 RX ADMIN — POLYETHYLENE GLYCOL 3350 17 G: 17 POWDER, FOR SOLUTION ORAL at 08:03

## 2022-03-07 RX ADMIN — SODIUM CHLORIDE, PRESERVATIVE FREE 10 ML: 5 INJECTION INTRAVENOUS at 01:03

## 2022-03-07 RX ADMIN — MORPHINE SULFATE 2 MG: 4 INJECTION, SOLUTION INTRAMUSCULAR; INTRAVENOUS at 07:03

## 2022-03-07 RX ADMIN — LOSARTAN POTASSIUM 25 MG: 25 TABLET, FILM COATED ORAL at 08:03

## 2022-03-07 RX ADMIN — HEPARIN SODIUM 5000 UNITS: 5000 INJECTION INTRAVENOUS; SUBCUTANEOUS at 05:03

## 2022-03-07 RX ADMIN — MORPHINE SULFATE 2 MG: 4 INJECTION, SOLUTION INTRAMUSCULAR; INTRAVENOUS at 11:03

## 2022-03-07 RX ADMIN — DOCUSATE SODIUM 100 MG: 100 CAPSULE ORAL at 08:03

## 2022-03-07 RX ADMIN — ATORVASTATIN CALCIUM 40 MG: 20 TABLET, FILM COATED ORAL at 08:03

## 2022-03-07 RX ADMIN — VANCOMYCIN HYDROCHLORIDE 1250 MG: 1.25 INJECTION, POWDER, LYOPHILIZED, FOR SOLUTION INTRAVENOUS at 01:03

## 2022-03-07 RX ADMIN — INSULIN ASPART 10 UNITS: 100 INJECTION, SOLUTION INTRAVENOUS; SUBCUTANEOUS at 09:03

## 2022-03-07 RX ADMIN — INSULIN ASPART 10 UNITS: 100 INJECTION, SOLUTION INTRAVENOUS; SUBCUTANEOUS at 05:03

## 2022-03-07 RX ADMIN — OXYCODONE AND ACETAMINOPHEN 1 TABLET: 7.5; 325 TABLET ORAL at 02:03

## 2022-03-07 RX ADMIN — SODIUM CHLORIDE, PRESERVATIVE FREE 10 ML: 5 INJECTION INTRAVENOUS at 05:03

## 2022-03-07 RX ADMIN — MORPHINE SULFATE 2 MG: 4 INJECTION, SOLUTION INTRAMUSCULAR; INTRAVENOUS at 05:03

## 2022-03-07 RX ADMIN — SODIUM CHLORIDE, PRESERVATIVE FREE 10 ML: 5 INJECTION INTRAVENOUS at 12:03

## 2022-03-07 RX ADMIN — HEPARIN SODIUM 5000 UNITS: 5000 INJECTION INTRAVENOUS; SUBCUTANEOUS at 02:03

## 2022-03-07 NOTE — ASSESSMENT & PLAN NOTE
Nutrition Problem:  Inadequate protein intake     Related to (etiology):   wound     Signs and Symptoms (as evidenced by):   S/p 2nd R toe amputation, foot gangrene   100% PO intake   ONS to aid in wound healing     Interventions:  Carbohydrate modified diet  [employer] TID - James  Collaboration with other providers     Nutrition Diagnosis Status:   Continues

## 2022-03-07 NOTE — PROGRESS NOTES
Pharmacokinetic Assessment Follow Up: IV Vancomycin    Vancomycin serum concentration assessment(s):    The trough level was drawn correctly and can be used to guide therapy at this time. The measurement is within the desired definitive target range of 10 to 20 mcg/mL.    Vancomycin Regimen Plan:    Continue regimen to Vancomycin 1250 mg IV every 12 hours with next serum trough concentration measured at 1230 prior to 1330 dose on 3/9 .    Drug levels (last 3 results):  Recent Labs   Lab Result Units 03/06/22  0053 03/07/22  1242   Vancomycin-Trough ug/mL 9.2* 16.3       Pharmacy will continue to follow and monitor vancomycin.    Please contact pharmacy at extension [phone] for questions regarding this assessment.    Thank you for the consult,   Marcos Dutta PharmD       Patient brief summary:  Dave Good is a 56 y.o. male initiated on antimicrobial therapy with IV Vancomycin for treatment of lower respiratory infection    The patient's current regimen is 1250 mg q12h    Drug Allergies:   Review of patient's allergies indicates:   Allergen Reactions    Ibuprofen Swelling     Facial swelling       Actual Body Weight:   89.2 kg    Renal Function:   Estimated Creatinine Clearance: 89.2 mL/min (based on SCr of 1.3 mg/dL).,     Dialysis Method (if applicable):  N/A    CBC (last 72 hours):  Recent Labs   Lab Result Units 03/06/22  0519   WBC K/uL 8.63   Hemoglobin g/dL 9.4*   Hematocrit % 30.5*   Platelets K/uL 599*   Gran % % 40.4   Lymph % % 42.9   Mono % % 10.2   Eosinophil % % 5.4   Basophil % % 0.5   Differential Method  Automated       Metabolic Panel (last 72 hours):  Recent Labs   Lab Result Units 03/06/22  0519   Sodium mmol/L 136   Potassium mmol/L 4.4   Chloride mmol/L 100   CO2 mmol/L 28   Glucose mg/dL 137*   BUN mg/dL 17   Creatinine mg/dL 1.3       Vancomycin Administrations:  vancomycin given in the last 96 hours                     vancomycin 1.25 g in dextrose 5% 250 mL IVPB (ready to mix) (mg) 1,250 mg  New Bag 03/07/22 1333     1,250 mg New Bag  0128     1,250 mg New Bag 03/06/22 1211    vancomycin 1.5 g in dextrose 5 % 250 mL IVPB (ready to mix) (mg) 1,500 mg New Bag 03/06/22 0109     1,500 mg New Bag 03/05/22 0122     1,500 mg New Bag 03/04/22 0115                    Microbiologic Results:  Microbiology Results (last 7 days)       Procedure Component Value Units Date/Time    Fungus culture [[id number]] Collected: 02/22/22 1153    Order Status: Completed Specimen: Wound from Foot, Right Updated: 03/02/22 1435     Fungus (Mycology) Culture Culture in progress    Blood culture [[id number]] Collected: 02/25/22 0548    Order Status: Completed Specimen: Blood Updated: 03/02/22 1212     Blood Culture, Routine No growth after 5 days.    Narrative:      Collection has been rescheduled by WAD1 at 02/25/2022 05:55 Reason:   Patient unavailable labs collected  Collection has been rescheduled by WAD1 at 02/25/2022 05:55 Reason:   Patient unavailable labs collected

## 2022-03-07 NOTE — PLAN OF CARE
Patient and wife educated on discharge instructions and verbalized understanding.  All questions answered to patient's satisfaction.  PICC will remain in for longterm antibx per MD.  Patient to be transported off unit via wheelchair.

## 2022-03-07 NOTE — PLAN OF CARE
"Patient states he lives independently at home with his spouse. Patient will discharge to [facility]  in [place].     CM placed a ADT 30 to provide transportation [facility] located in [place] at [address], [address]  [place] [address] .     All D/C needs have been addressed from CM a perspective.      Patient room number number is 109. Please call report to [phone]       03/07/22 8398   Final Note   Assessment Type Final Discharge Note   Anticipated Discharge Disposition LTAC   Hospital Resources/Appts/Education Provided Provided patient/caregiver with written discharge plan information;Appointments scheduled by Navigator/Coordinator   Post-Acute Status   Discharge Delays None known at this time     "

## 2022-03-07 NOTE — PROGRESS NOTES
"[facility] ([place]  Adult Nutrition  Progress Note    SUMMARY       Recommendations    1. Continue Diabetic diet as ordered. -Continue James BID.   2.If pt's intake is adequate, also consider adding double protein to diet order.   3.Encourage good PO intake.    Goals: pt to meet at least 85% EPN by f/u  Nutrition Goal Status: goal met  Communication of RD Recs:  (POC)    Assessment and Plan    Increased nutritional needs  Nutrition Problem:  Inadequate protein intake     Related to (etiology):   wound     Signs and Symptoms (as evidenced by):   S/p 2nd R toe amputation, foot gangrene   100% PO intake   ONS to aid in wound healing     Interventions:  Carbohydrate modified diet  [employer] TID - James  Collaboration with other providers     Nutrition Diagnosis Status:   Continues     Malnutrition Assessment     Skin (Micronutrient):  (Delvis Score 20)     Reason for Assessment    Reason For Assessment: RD follow-up  Diagnosis: diabetes diagnosis/complications  Relevant Medical History: COPD, COVID-19, depression, T2DM. HTN  Interdisciplinary Rounds: attended  General Information Comments: 3/7- RD f/u. Plan for d/c today. Post Op Day 5. 2000kcal DM diet with James BID. PO intake 100%. Delvis Score 20.    Nutrition Discharge Planning: Diabetic/Low sodium with increased protein for wound healing    Nutrition Risk Screen    Nutrition Risk Screen: no indicators present    Nutrition/Diet History    Spiritual, Cultural Beliefs, Muslim Practices, Values that Affect Care: no  Food Allergies: NKFA  Factors Affecting Nutritional Intake: None identified at this time    Anthropometrics    Temp: 97.9 °F (36.6 °C)  Height Method: Stated  Height: 6' 2" (188 cm)  Height (inches): 74 in  Weight Method: Bed Scale  Weight: 131.1 kg (289 lb 0.4 oz)  Weight (lb): 289.03 lb  Ideal Body Weight (IBW), Male: 190 lb  % Ideal Body Weight, Male (lb): 152.12 %  BMI (Calculated): 37.1  BMI Grade: 35 - 39.9 - obesity - grade " II  Weight Change Amount: 15 lb (Since September)     Lab/Procedures/Meds    Pertinent Labs Reviewed: reviewed  Pertinent Labs Comments: Glu 137, H/H 9.4/30.5  Pertinent Medications Reviewed: reviewed  Pertinent Medications Comments: Alteplase, atorvastatin, docusate Na, heparin, insulin aspart, insulin detemir, losartan, polyethylene glycol, quetiapine, NaCl flush, vanc.    Estimated/Assessed Needs    Weight Used For Calorie Calculations: 129.3 kg (285 lb 0.9 oz)  Energy Calorie Requirements (kcal): 2182  Energy Need Method: Walnut-St Jeor (no AF)  Protein Requirements: 155g (1.2 g/kg)  Weight Used For Protein Calculations: 129.3 kg (285 lb 0.9 oz)  Estimated Fluid Requirement Method: RDA Method  RDA Method (mL): 2182  CHO Requirement: 272 g    Nutrition Prescription Ordered    Current Diet Order: 2000 kcal DM diet    Evaluation of Received Nutrient/Fluid Intake    Energy Calories Required: meeting needs  Protein Required: meeting needs  Fluid Required: meeting needs  % Intake of Estimated Energy Needs: 75 - 100 %  % Meal Intake: 75 - 100 %    Nutrition Risk    Level of Risk/Frequency of Follow-up: moderate     Monitor and Evaluation    Food and Nutrient Intake: energy intake, food and beverage intake  Food and Nutrient Adminstration: diet order  Physical Activity and Function: nutrition-related ADLs and IADLs  Anthropometric Measurements: weight, weight change, body mass index  Biochemical Data, Medical Tests and Procedures: electrolyte and renal panel, lipid profile, gastrointestinal profile, glucose/endocrine profile, inflammatory profile  Nutrition-Focused Physical Findings: overall appearance     Nutrition Follow-Up    RD Follow-up?: Yes

## 2022-03-07 NOTE — PLAN OF CARE
[facility]    FACILITY TRANSFER ORDERS      Patient Name: Dave Good  YOB: 1965    PCP: Reyna Jorge NP   PCP Address: [facility] [id number]  PCP Phone Number: [phone]  PCP Fax: [phone]    Encounter Date: 03/07/2022    Admit to: LTAC    Vital Signs:  Routine    Diagnoses:   Active Hospital Problems    Diagnosis  POA    *Diabetic wet gangrene of the foot [E11.52]  Yes    Increased nutritional needs [R63.8]  Yes    Chronic respiratory failure with hypoxia [J96.11]  Yes    Cavitary lesion of lung [J98.4]  Yes    COPD with asthma [J44.9]  Yes    Type 2 diabetes mellitus without complication, with long-term current use of insulin [E11.9, Z79.4]  Not Applicable    HTN (hypertension) [I10]  Yes    Depression [F32.A]  Yes      Resolved Hospital Problems    Diagnosis Date Resolved POA    MRSA bacteremia [R78.81, B95.62] 02/27/2022 Yes    Hypokalemia [E87.6] 02/24/2022 Yes       Allergies:  Review of patient's allergies indicates:   Allergen Reactions    Ibuprofen Swelling     Facial swelling       Diet: cardiac diet and diabetic diet: 2000 calorie    Activities: Activity as tolerated    Nursing: per facility protocol    Labs:     Order the following labs to be drawn on Mondays:   · CBC  · CMP   · ESR   · CRP  · Vancomycin trough. Target 15-20     If discharged on vancomycin IV, order the following additional labs to be drawn on Thursdays:  · CMP   · Vancomycin trough. Target 15-20     If vancomycin trough is not at target (15-20) prior to discharge, schedule vancomycin trough to be drawn before their fourth outpatient dose.      Fax Lab Results to Infectious Diseases Provider: dr Kapoor     [facility] Fax Number: [phone]    Outpatient Infectious Diseases Follow-up     · Follow-up appointment will be arranged by the ID clinic and will be found in the patient's appointments tab.     · Prior to discharge, please ensure the patient's  follow-up has been scheduled.    · If there is still no follow-up scheduled prior to discharge, please send an EPIC message to Xenia Stuart in Infectious Diseases.      CONSULTS:    Physical Therapy to evaluate and treat. , Occupational Therapy to evaluate and treat. and  to evaluate for community resources/long-range planning.    MISCELLANEOUS CARE:  Diabetes Care:   SN to perform and educate Diabetic management with blood glucose monitoring:, Fingerstick blood sugar AC and HS and Report CBG < 60 or > 350 to physician.      Infusion Therapy:   SN to perform Infusion Therapy/Central Line Care.  Review Central Line Care & Central Line Flush with patient.    Administer (drug and dose): IV vancomycin 1250 mg q12h  (please consult pharmacy to dose) End Date: 4/5/22  Last dose given: 3/7/22                     Home dose due: 3/7/22    Scrub the Hub: Prior to accessing the line, always perform a 30 second alcohol scrub  Each lumen of the central line is to be flushed at least daily with 10 mL Normal Saline and 3 mL Heparin flush (10 units/mL)  Skilled Nurse (SN) may draw blood from IV access  Blood Draw Procedure:   - Aspirate at least 5 mL of blood   - Discard   - Obtain specimen   - Change injection cap   - Flush with 20 mL Normal Saline followed by a                 3-5 mL Heparin flush (10 units/mL)  Central :   - Sterile dressing changes are done weekly and as needed.   - Use chlor-hexadine scrub to cleanse site, apply Biopatch to insertion site,       apply securement device dressing   - Injection caps are changed weekly and after EVERY lab draw.   - If sterile gauze is under dressing to control oozing,                 dressing change must be performed every 24 hours until gauze is not needed.      WOUND CARE ORDERS  Yes: s/p surgical flap closure, please consult wound care at LTAC    Medications: Review discharge medications with patient and family and provide education.      Current  Discharge Medication List      START taking these medications    Details   oxyCODONE-acetaminophen (PERCOCET) 7.5-325 mg per tablet Take 1 tablet by mouth every 6 (six) hours as needed for Pain.  Refills: 0    Comments: Quantity prescribed more than 7 day supply? No      polyethylene glycol (GLYCOLAX) 17 gram PwPk Take 17 g by mouth once daily.  Refills: 0      vancomycin HCl (VANCOMYCIN 1.25 G/250 ML D5W, READY TO MIX,) Inject 250 mLs (1,250 mg total) into the vein every 12 (twelve) hours.         CONTINUE these medications which have NOT CHANGED    Details   ammonium lactate 12 % Crea Apply twice daily to affected parts both feet as needed.  Qty: 140 g, Refills: 11      aspirin (ECOTRIN) 81 MG EC tablet Take 81 mg by mouth once daily.      atorvastatin (LIPITOR) 20 MG tablet Take 40 mg by mouth once daily.       ciclopirox (PENLAC) 8 % Soln Apply topically nightly.  Qty: 6.6 mL, Refills: 11      collagenase (SANTYL) ointment With each dressing change  Qty: 30 g, Refills: 3    Associated Diagnoses: Amputation of right great toe; Surgical wound dehiscence, initial encounter; Open wound of right foot, initial encounter      lisinopriL 10 MG tablet Take 10 mg by mouth once daily.      losartan (COZAAR) 25 MG tablet Take 25 mg by mouth 2 (two) times daily.      metFORMIN (GLUCOPHAGE) 1000 MG tablet Take 1,000 mg by mouth 2 (two) times daily.      QUEtiapine (SEROQUEL) 200 MG Tab Take 200 mg by mouth nightly.      insulin aspart U-100 (NOVOLOG) 100 unit/mL (3 mL) InPn pen Inject 10 Units into the skin 3 (three) times daily.  Refills: 0      insulin detemir U-100 (LEVEMIR FLEXTOUCH) 100 unit/mL (3 mL) SubQ InPn pen Inject 50 Units into the skin every evening.  Refills: 0         STOP taking these medications       hydroCHLOROthiazide (HYDRODIURIL) 25 MG tablet Comments:   Reason for Stopping:         LANTUS SOLOSTAR U-100 INSULIN glargine 100 units/mL (3mL) SubQ pen Comments:   Reason for Stopping:         pulse oximeter  (PULSE OXIMETER) device Comments:   Reason for Stopping:                  Immunizations Administered as of 3/7/2022     No immunizations on file.            _________________________________  Vineet Salinas MD  03/07/2022

## 2022-03-07 NOTE — PLAN OF CARE
Recommendations    1. Continue Diabetic diet as ordered. -Continue James BID.   2.If pt's intake is adequate, also consider adding double protein to diet order.   3.Encourage good PO intake.    Goals: pt to meet at least 85% EPN by f/u  Nutrition Goal Status: goal met  Communication of RD Recs:  (POC)

## 2022-03-07 NOTE — NURSING
Left arm picc line flushed to each lumen, sluggish at first, then was able to flush easily. Has good blood return to red lumen. Reported to nurse.

## 2022-03-18 NOTE — DISCHARGE SUMMARY
[facility] Surg [facility] Medicine  Discharge Summary      Patient Name: Dave Good  MRN: [id number]  Patient Class: IP- Inpatient  Admission Date: 2/21/2022  Hospital Length of Stay: 14 days  Discharge Date and Time: 3/7/2022  5:57 PM  Attending Physician: No att. providers found   Discharging Provider: Vineet Salinas MD  Primary Care Provider: Reyna Jorge NP      HPI:   55 yo m with PMH of DM , HTN , PE and COPD presented to ED  for R 2nd digit wound. Pt had amputation by  back on 9/2021 for wet gangrene . Pt is following up with wound care at [facility] twice a week. But the last time was 1.5 weeks ago, he noticed swelling ,pain and foul smelling drainage from the wound. Pt also reports worsening SOB with exertion . With some chest tightness. In ED VS were stable. Started on iv abx . Labs showed leukocytosis and hypokalemia.CTA showed cavitary lesions with possible septic emboli.       Procedure(s) (LRB):  DEBRIDEMENT, LOWER EXTREMITY (Bilateral)      Hospital Course:   Patient admitted with foul smelling drainage from wound of R foot and SOB. Found to have cavitary lung lesions concerning for septic emboli. General surgery and ID consulted. He went to OR on 2/22 debridement and transmetatarsal amputation of 2nd R digit. Wound vac placed. Blood culture with MRSA. PAT ordered but patient unable to tolerate procedure (unable to pass probe, wouldn't swallow). Given presence of septic emboli, planned for empiric treatment of endocarditis for 6 weeks. Planned for removal of wound vac and closure on 3/02 which went well. He was discharged to LTAC in stable condition to continue IV abx for endocarditis and wound care.       Goals of Care Treatment Preferences:  Code Status: Full Code      Consults:   Consults (From admission, onward)        Status Ordering Provider     Inpatient virtual consult to Hospital Medicine  Once        Provider:  Dale Mayen MD    Completed GITA VARGAS      Inpatient consult to Cardiology  Once        Provider:  Chavo Vigil MD    Completed TATO GARCIA     Inpatient consult to Registered Dietitian/Nutritionist  Once        Provider:  (Not yet assigned)    Completed TATO GARCIA     Inpatient consult to Infectious Diseases  Once        Provider:  Jean Munguia MD    Completed OVIDIO FOREMAN          * Diabetic wet gangrene of the foot  MRSA bacteremia  Septic emboli / cavitary lung lesion  Diabetic foot infection of left foot  - R 2nd digit with wet gangrene and necrotic ulcer on L foot  - CTA without PE but with bilateral cavitary lesion with possible septic emboli vs organizing pneumonia   - Quantiferon gold negative. Sputum AFB smear negative x 3.   - Echo without vegetation but given septic emboli, PAT attempted  - PAT canceled, pt unable to swallow probe on 3 attempts then refused  - Blood culture with MRSA. Repeat blood culture (2/23 and 2/25) NGTD  - ID, general surgery. Appreciate input  - Continue vancomycin with pharmacy dosing. Continue wound vac and local wound care   - S/p 2nd digit transmetatarsal amputation and debridement 2/22 and b/l debridement 2/24; plan for closure 3/02.  - Continue PT/OT   - Continue percocet and morphine for pain  - Plan to treat for presumed endocarditis given septic emboli  - Given combined wound care and prolonged IV antibiotic needs, suspect he will not be able to handle his condition at home.  - SW consulted for placement.  - RIGHT 2ND TOE AND TISSUE, AMPUTATION: Toe skin with severe ulceration, gangrenous necrosis and cellulitis. Underlying osteonecrosis, no acute osteomyelitis. Bone and soft tissue margins are unremarkable  - ID recommending prolonged course of IV vancomycin    Increased nutritional needs        Chronic respiratory failure with hypoxia  - On 2L home O2 2/2 COVID-19  - Continue oxygen protocol  - now on room air    Cavitary lesion of lung  - As above    COPD with asthma  - Duoneb  PRN    Depression  - Continue seroquel    HTN (hypertension)  - Continue losartan 25 mg qd    Type 2 diabetes mellitus without complication, with long-term current use of insulin  - A1c 9.8  - Continue levemir 55U qHS, aspart 10U TIDWM, and low dose SSI AC/HS PRN      Final Active Diagnoses:    Diagnosis Date Noted POA    PRINCIPAL PROBLEM:  Diabetic wet gangrene of the foot [E11.52] 09/20/2021 Yes    Increased nutritional needs [R63.8] 02/28/2022 Yes    Chronic respiratory failure with hypoxia [J96.11] 02/22/2022 Yes    Cavitary lesion of lung [J98.4] 02/21/2022 Yes    COPD with asthma [J44.9] 01/07/2022 Yes    Type 2 diabetes mellitus without complication, with long-term current use of insulin [E11.9, Z79.4] 09/20/2021 Not Applicable    HTN (hypertension) [I10] 09/20/2021 Yes    Depression [F32.A] 09/20/2021 Yes      Problems Resolved During this Admission:    Diagnosis Date Noted Date Resolved POA    MRSA bacteremia [R78.81, B95.62] 02/22/2022 02/27/2022 Yes    Hypokalemia [E87.6] 02/21/2022 02/24/2022 Yes       Discharged Condition: stable    Disposition: Long Term Care    Follow Up:    Patient Instructions:   No discharge procedures on file.    Significant Diagnostic Studies: Cardiac Graphics: Echocardiogram:   2D echo with color flow doppler: No results found for this or any previous visit. and Transthoracic echo (TTE) complete (Cupid Only):   Results for orders placed or performed during the hospital encounter of 02/21/22   Echo   Result Value Ref Range    BSA 2.6 m2    TDI SEPTAL 0.08 m/s    LA WIDTH 3.48 cm    Right Atrial Pressure (from IVC) 3 mmHg    TDI LATERAL 0.09 m/s    PV PEAK VELOCITY 1.08 cm/s    LVIDd 4.98 3.5 - 6.0 cm    IVS 0.99 0.6 - 1.1 cm    Posterior Wall 1.26 (A) 0.6 - 1.1 cm    LVIDs 3.57 2.1 - 4.0 cm    FS 28 28 - 44 %    LA volume 54.35 cm3    Sinus 3.68 cm    STJ 2.95 cm    Ascending aorta 3.35 cm    LV mass 212.28 g    LA size 3.37 cm    TAPSE 2.67 cm    RV S' 15.01 cm/s     "Left Ventricle Relative Wall Thickness 0.51 cm    AV mean gradient 7 mmHg    AV valve area 3.28 cm2    AV Velocity Ratio 0.90     AV index (prosthetic) 0.83     Mean e' 0.09 m/s    MV "A" wave duration 11.99 msec    Pulm vein S/D ratio 1.14     LVOT diameter 2.24 cm    LVOT area 3.9 cm2    LVOT peak ap 1.55 m/s    LVOT peak VTI 25.79 cm    Ao peak ap 1.72 m/s    Ao VTI 30.99 cm    LVOT stroke volume 101.58 cm3    AV peak gradient 12 mmHg    PV Peak S Ap 0.33 m/s    PV Peak D Ap 0.29 m/s    LV Systolic Volume 53.20 mL    LV Systolic Volume Index 21.0 mL/m2    LV Diastolic Volume 117.10 mL    LV Diastolic Volume Index 46.28 mL/m2    LA Volume Index 21.5 mL/m2    LV Mass Index 84 g/m2    RA Major Axis 4.55 cm    Left Atrium Minor Axis 5.91 cm    Left Atrium Major Axis 5.06 cm    LA Volume Index (Mod) 22.5 mL/m2    LA volume (mod) 57.00 cm3    RA Width 3.15 cm    Narrative    · The left ventricle is normal in size with concentric remodeling and   normal systolic function.  · Normal central venous pressure (3 mmHg).  · There is abnormal septal wall motion.  · Normal left ventricular diastolic function.  · Normal right ventricular size with normal right ventricular systolic   function.          Pending Diagnostic Studies:     None         Medications:  Reconciled Home Medications:      Medication List      START taking these medications    oxyCODONE-acetaminophen 7.5-325 mg per tablet  Commonly known as: PERCOCET  Take 1 tablet by mouth every 6 (six) hours as needed for Pain.     polyethylene glycol 17 gram Pwpk  Commonly known as: GLYCOLAX  Take 17 g by mouth once daily.     VANCOMYCIN 1.25 G/250 ML D5W (READY TO MIX)  Inject 250 mLs (1,250 mg total) into the vein every 12 (twelve) hours.        CONTINUE taking these medications    ammonium lactate 12 % Crea  Apply twice daily to affected parts both feet as needed.     aspirin 81 MG EC tablet  Commonly known as: ECOTRIN  Take 81 mg by mouth once daily.   "   atorvastatin 20 MG tablet  Commonly known as: LIPITOR  Take 40 mg by mouth once daily.     ciclopirox 8 % Soln  Commonly known as: PENLAC  Apply topically nightly.     collagenase ointment  Commonly known as: SANTYL  With each dressing change     insulin aspart U-100 100 unit/mL (3 mL) Inpn pen  Commonly known as: NovoLOG  Inject 10 Units into the skin 3 (three) times daily.     insulin detemir U-100 100 unit/mL (3 mL) Inpn pen  Commonly known as: Levemir FLEXTOUCH  Inject 50 Units into the skin every evening.     lisinopriL 10 MG tablet  Take 10 mg by mouth once daily.     losartan 25 MG tablet  Commonly known as: COZAAR  Take 25 mg by mouth 2 (two) times daily.     metFORMIN 1000 MG tablet  Commonly known as: GLUCOPHAGE  Take 1,000 mg by mouth 2 (two) times daily.     QUEtiapine 200 MG Tab  Commonly known as: SEROQUEL  Take 200 mg by mouth nightly.        STOP taking these medications    hydroCHLOROthiazide 25 MG tablet  Commonly known as: HYDRODIURIL     LANTUS SOLOSTAR U-100 INSULIN glargine 100 units/mL (3mL) SubQ pen  Generic drug: insulin     pulse oximeter device  Commonly known as: pulse oximeter            Indwelling Lines/Drains at time of discharge:   Lines/Drains/Airways     Peripherally Inserted Central Catheter Line  Duration           PICC Double Lumen 02/27/22 0130 left basilic 19 days                Time spent on the discharge of patient: > 35 minutes         The attending portion of this evaluation, treatment, and documentation was performed per Vineet Salinas MD via Telemedicine AudioVisual using the secure Course[employer] software platform with 2 way audio/video. The provider was located off-site and the patient is located in the hospital. The aforementioned video software was utilized to document the relevant history and physical exam    Vineet Salinas MD  Department of Hospital Medicine  [facility]

## 2022-03-24 LAB — FUNGUS SPEC CULT: NORMAL

## 2022-04-11 ENCOUNTER — HOSPITAL ENCOUNTER (OUTPATIENT)
Dept: WOUND CARE | Facility: HOSPITAL | Age: 57
Discharge: HOME | End: 2022-04-11
Attending: SURGERY
Payer: MEDICAID

## 2022-04-11 VITALS
HEIGHT: 74 IN | TEMPERATURE: 98 F | DIASTOLIC BLOOD PRESSURE: 87 MMHG | WEIGHT: 285 LBS | SYSTOLIC BLOOD PRESSURE: 125 MMHG | BODY MASS INDEX: 36.57 KG/M2 | HEART RATE: 82 BPM

## 2022-04-11 DIAGNOSIS — E11.8 DIABETIC FOOT: ICD-10-CM

## 2022-04-11 DIAGNOSIS — T81.31XS SURGICAL WOUND DEHISCENCE, SEQUELA: ICD-10-CM

## 2022-04-11 DIAGNOSIS — Z91.89 AT RISK FOR FOOT PROBLEM: Primary | ICD-10-CM

## 2022-04-11 PROCEDURE — 99203 OFFICE O/P NEW LOW 30 MIN: CPT

## 2022-04-11 NOTE — PROGRESS NOTES
Subjective:       Patient ID: Dave Good is a 56 y.o. male.    Chief Complaint: Diabetic Foot Ulcer (Right foot)  10/21/21: Patient admitted to wound care clinic s/p right great toe Ray amputation (9/23/2021 for wet gangrene) with open wound and soft tissue skin flap. Patient has a hx of DM2, COPD and HTN,  Patient verbalized a pain level of 7 and takes oxycodone prn. Spouse present, verbalized Dr. Toussaint did surgery at [facility], does not have home health, last dressing change was done in hospital (at in rehab) last week. Wound is opened at right top medial foot and is connected to skin flap with 3 intact sutures. Soft tissue flap is not adherent to deep tissue and medial proximal wounds communicate with distal open wounds. Patient verbalized wound does not drain much. Wound packed with santyl and Aquacel rope gently and lightly to avoid opening of surgical incision. Cx taken to right medial foot per Dr. Walker, patient tolerated well. Will fax paperwork to home health  10/28/2021:  Wound cx reviewed. Bactrim re-ordered after last visit. Discussed HBO with pt's surgeon who agrees with this. Patient to wound care for right DFU, new orders noted. Patient educated and evaluated for HBO, possible start on 11/01/21.  11/8/21: Follow up with  S/p right great toe amputation with partial closure 9/23/21 with . Reports transportation issues with insurance. Denies any issues related to wound. States he has appointment tomorrow with Dr. Toussaint Thursday in his office for dressing change.  Tolerated debridement today per Dr. Walker. New orders initiated in clinic. Approved for HBOT pending labs and EKG.  Patient educated to get labs/EKG today to complete HBOT workup. Discussed having visits with us and Dr. Toussaint on days that the dressing is due to be changed (as pt is unable to have HH per his insurance). Patient verbalized understanding and will arrange transportation through his insurance  when approved for HBOT. RTC for  11/15/21.   11/29/2021:  Patient to wound care center for BL foot wounds, new orders noted.  Patient to follow up with Cardiology to be cleared for HBO, and Dr. Toussaint for nail and callus care. Repeat wound tissue cx taken.  12/6/2021:  Patient to wound care center for bilateral foot wounds, progressing well. Continue with the same treatment plan.   Patient to see Cardiology on 12/07/21 and Dr. Toussaint on 12/14/21 12/13/2021:  Patient to wound care center for bilateral foot wounds, progressing well, but still present wet. Increase frequency of dressing changes and patient to start HBO. 12/14 12/20/2021:   Pt presents to the wound center for a scheduled visit. He is tolerating HBOT well. No new wound c/o. It appears to be healing well.     1/27/2021:  Pt hasn't been seen for >1mo due to his hospitalization. Patient to wound care for right foot surgical wound, progressing well. 2 small wounds remain, much improved, new orders noted. Pt requests referral to a different podiatrist for a second opinion regarding foot care. Referral placed to Dr. Winkler in his clinic for foot and callus care.   2/10/2022:  Patient to wound care center for BL foot wounds, new orders noted. New wound to the left foot. Right foot surgical wound not completely healed. Pt has been soaking his feet. No new c/o.  He would like to establish care with a new podiatrist. Patient to follow up with Dr. Bunn on 02/22 4/11/22: 55 y/o male patient readmit for follow-up of right 1st toe amputation and left foot plantar wound. Patient recently hospitalized then sent to LTAC for 1 mo. He was d/c from LTAC last week. Both wound sites are resolved. Patient seen by Dr. Walker today. Patient to make appt. with Dr. Bunn in the office for diabetic foot care. Patient also has f/u appt. With Dr. Flores ([facility]) 4/12/22. Return as needed to wound clinic.    Review of Systems    All systems were reviewed and are  negative, except that mentioned in the HPI.    Objective:      Temp:  [98.2 °F (36.8 °C)]   Pulse:  [82]   BP: (125)/(87)   Physical Exam  Constitutional:       Appearance: He is well-developed.   HENT:      Head: Normocephalic and atraumatic.   Eyes:      Pupils: Pupils are equal, round, and reactive to light.   Cardiovascular:      Rate and Rhythm: Normal rate.   Pulmonary:      Effort: Pulmonary effort is normal. No respiratory distress.      Breath sounds: No stridor.   Abdominal:      General: There is no distension.   Musculoskeletal:      Cervical back: Normal range of motion.   Skin:     Comments: See Synopsis for wound details   Neurological:      Mental Status: He is alert and oriented to person, place, and time.            Incision/Site 03/02/22 0957 Right Foot (Active)   03/02/22 0957   Present Prior to Hospital Arrival?:    Side: Right   Location: Foot   Orientation:    Incision Type:    Closure Method:    Additional Comments:    Removal Indication and Assessment:    Wound Outcome:    Removal Indications:    Wound Image   04/11/22 1400   Dressing Appearance Dry;Clean;Intact 04/11/22 1400   Drainage Amount None 04/11/22 1400   Appearance Closed/resurfaced 04/11/22 1400   Red (%), Wound Tissue Color 100 % 04/11/22 1400   Periwound Area Dry 04/11/22 1400   Wound Edges Approximated 04/11/22 1400   Wound Length (cm) 0 cm 04/11/22 1400   Wound Width (cm) 0 cm 04/11/22 1400   Wound Depth (cm) 0 cm 04/11/22 1400   Wound Volume (cm^3) 0 cm^3 04/11/22 1400   Wound Surface Area (cm^2) 0 cm^2 04/11/22 1400         Assessment:         ICD-10-CM ICD-9-CM   1. At risk for foot problem  Z91.89 V15.89   2. Diabetic foot  E11.8 250.80   3. Type II diabetes mellitus with peripheral circulatory disorder, uncontrolled  E11.51 250.72    E11.65 443.81   4. Surgical wound dehiscence, sequela  T81.31XS 909.3         Plan:   Tissue pathology and/or culture taken:  [] Yes [x] No   Sharp debridement performed:   [] Yes [x] No    Labs ordered this visit:   [] Yes [x] No   Imaging ordered this visit:   [] Yes [x] No           Orders Placed This Encounter   Procedures    Ambulatory referral/consult to Podiatry     Standing Status:   Future     Standing Expiration Date:   5/11/2023     Referral Priority:   Routine     Referral Type:   Consultation     Referral Reason:   Specialty Services Required     Requested Specialty:   Podiatry     Number of Visits Requested:   1    Change dressing     Right foot incision and left plantar foot wound are resolved.  Follow-up with Dr. Flores 4/12/22.  Follow-up with Dr. Bunn in his office.      Pt would like to establish care with Dr. Bunn for his foot care needs (foot/toe care, shoe recommendations, ?prosthesis in shoe).  Referral placed.  All questions were answered.  Follow up Return as needed.

## 2022-04-12 ENCOUNTER — OFFICE VISIT (OUTPATIENT)
Dept: SURGERY | Facility: CLINIC | Age: 57
End: 2022-04-12
Attending: SPECIALIST
Payer: MEDICAID

## 2022-04-12 VITALS — HEART RATE: 93 BPM | OXYGEN SATURATION: 97 % | SYSTOLIC BLOOD PRESSURE: 131 MMHG | DIASTOLIC BLOOD PRESSURE: 84 MMHG

## 2022-04-12 DIAGNOSIS — E11.52 DIABETIC WET GANGRENE OF THE FOOT: Primary | ICD-10-CM

## 2022-04-12 PROCEDURE — 99024 POSTOP FOLLOW-UP VISIT: CPT | Mod: ,,, | Performed by: SPECIALIST

## 2022-04-12 PROCEDURE — 99999 PR PBB SHADOW E&M-EST. PATIENT-LVL II: CPT | Mod: PBBFAC,,, | Performed by: SPECIALIST

## 2022-04-12 PROCEDURE — 3046F PR MOST RECENT HEMOGLOBIN A1C LEVEL > 9.0%: ICD-10-PCS | Mod: CPTII,,, | Performed by: SPECIALIST

## 2022-04-12 PROCEDURE — 4010F PR ACE/ARB THEARPY RXD/TAKEN: ICD-10-PCS | Mod: CPTII,,, | Performed by: SPECIALIST

## 2022-04-12 PROCEDURE — 99212 OFFICE O/P EST SF 10 MIN: CPT | Mod: PBBFAC | Performed by: SPECIALIST

## 2022-04-12 PROCEDURE — 3075F SYST BP GE 130 - 139MM HG: CPT | Mod: CPTII,,, | Performed by: SPECIALIST

## 2022-04-12 PROCEDURE — 3075F PR MOST RECENT SYSTOLIC BLOOD PRESS GE 130-139MM HG: ICD-10-PCS | Mod: CPTII,,, | Performed by: SPECIALIST

## 2022-04-12 PROCEDURE — 3079F DIAST BP 80-89 MM HG: CPT | Mod: CPTII,,, | Performed by: SPECIALIST

## 2022-04-12 PROCEDURE — 99024 PR POST-OP FOLLOW-UP VISIT: ICD-10-PCS | Mod: ,,, | Performed by: SPECIALIST

## 2022-04-12 PROCEDURE — 3079F PR MOST RECENT DIASTOLIC BLOOD PRESSURE 80-89 MM HG: ICD-10-PCS | Mod: CPTII,,, | Performed by: SPECIALIST

## 2022-04-12 PROCEDURE — 4010F ACE/ARB THERAPY RXD/TAKEN: CPT | Mod: CPTII,,, | Performed by: SPECIALIST

## 2022-04-12 PROCEDURE — 99999 PR PBB SHADOW E&M-EST. PATIENT-LVL II: ICD-10-PCS | Mod: PBBFAC,,, | Performed by: SPECIALIST

## 2022-04-12 PROCEDURE — 3046F HEMOGLOBIN A1C LEVEL >9.0%: CPT | Mod: CPTII,,, | Performed by: SPECIALIST

## 2022-04-12 NOTE — PROGRESS NOTES
Status post amputation of digits 1 through 3 right lower extremity.  Subsequent delayed primary closure  Complaints  Wounds healed  Sutures removed  RTC 3 months

## 2022-04-13 ENCOUNTER — OFFICE VISIT (OUTPATIENT)
Dept: PODIATRY | Facility: CLINIC | Age: 57
End: 2022-04-13
Payer: MEDICAID

## 2022-04-13 VITALS
HEIGHT: 74 IN | SYSTOLIC BLOOD PRESSURE: 152 MMHG | DIASTOLIC BLOOD PRESSURE: 96 MMHG | HEART RATE: 93 BPM | WEIGHT: 285 LBS | BODY MASS INDEX: 36.57 KG/M2

## 2022-04-13 DIAGNOSIS — E11.42 TYPE 2 DIABETES MELLITUS WITH DIABETIC POLYNEUROPATHY, UNSPECIFIED WHETHER LONG TERM INSULIN USE: Primary | ICD-10-CM

## 2022-04-13 DIAGNOSIS — Z91.89 AT RISK FOR FOOT PROBLEM: ICD-10-CM

## 2022-04-13 DIAGNOSIS — Z89.431 PARTIAL NONTRAUMATIC AMPUTATION OF FOOT, RIGHT: ICD-10-CM

## 2022-04-13 DIAGNOSIS — B35.1 ONYCHOMYCOSIS DUE TO DERMATOPHYTE: ICD-10-CM

## 2022-04-13 DIAGNOSIS — E11.8 DIABETIC FOOT: ICD-10-CM

## 2022-04-13 LAB
ACID FAST MOD KINY STN SPEC: NORMAL
ACID FAST MOD KINY STN SPEC: NORMAL
MYCOBACTERIUM SPEC QL CULT: NORMAL
MYCOBACTERIUM SPEC QL CULT: NORMAL

## 2022-04-13 PROCEDURE — 4010F ACE/ARB THERAPY RXD/TAKEN: CPT | Mod: CPTII,,, | Performed by: PODIATRIST

## 2022-04-13 PROCEDURE — 3080F DIAST BP >= 90 MM HG: CPT | Mod: CPTII,,, | Performed by: PODIATRIST

## 2022-04-13 PROCEDURE — 99214 OFFICE O/P EST MOD 30 MIN: CPT | Mod: 25,S$PBB,, | Performed by: PODIATRIST

## 2022-04-13 PROCEDURE — 1159F PR MEDICATION LIST DOCUMENTED IN MEDICAL RECORD: ICD-10-PCS | Mod: CPTII,,, | Performed by: PODIATRIST

## 2022-04-13 PROCEDURE — 3046F HEMOGLOBIN A1C LEVEL >9.0%: CPT | Mod: CPTII,,, | Performed by: PODIATRIST

## 2022-04-13 PROCEDURE — 11721 DEBRIDE NAIL 6 OR MORE: CPT | Mod: S$PBB,,, | Performed by: PODIATRIST

## 2022-04-13 PROCEDURE — 99214 OFFICE O/P EST MOD 30 MIN: CPT | Mod: PBBFAC,PN | Performed by: PODIATRIST

## 2022-04-13 PROCEDURE — 3046F PR MOST RECENT HEMOGLOBIN A1C LEVEL > 9.0%: ICD-10-PCS | Mod: CPTII,,, | Performed by: PODIATRIST

## 2022-04-13 PROCEDURE — 3077F PR MOST RECENT SYSTOLIC BLOOD PRESSURE >= 140 MM HG: ICD-10-PCS | Mod: CPTII,,, | Performed by: PODIATRIST

## 2022-04-13 PROCEDURE — 3080F PR MOST RECENT DIASTOLIC BLOOD PRESSURE >= 90 MM HG: ICD-10-PCS | Mod: CPTII,,, | Performed by: PODIATRIST

## 2022-04-13 PROCEDURE — 3008F PR BODY MASS INDEX (BMI) DOCUMENTED: ICD-10-PCS | Mod: CPTII,,, | Performed by: PODIATRIST

## 2022-04-13 PROCEDURE — 11721 PR DEBRIDEMENT OF NAILS, 6 OR MORE: ICD-10-PCS | Mod: S$PBB,,, | Performed by: PODIATRIST

## 2022-04-13 PROCEDURE — 1159F MED LIST DOCD IN RCRD: CPT | Mod: CPTII,,, | Performed by: PODIATRIST

## 2022-04-13 PROCEDURE — 3008F BODY MASS INDEX DOCD: CPT | Mod: CPTII,,, | Performed by: PODIATRIST

## 2022-04-13 PROCEDURE — 1160F PR REVIEW ALL MEDS BY PRESCRIBER/CLIN PHARMACIST DOCUMENTED: ICD-10-PCS | Mod: CPTII,,, | Performed by: PODIATRIST

## 2022-04-13 PROCEDURE — 99214 PR OFFICE/OUTPT VISIT, EST, LEVL IV, 30-39 MIN: ICD-10-PCS | Mod: 25,S$PBB,, | Performed by: PODIATRIST

## 2022-04-13 PROCEDURE — 3077F SYST BP >= 140 MM HG: CPT | Mod: CPTII,,, | Performed by: PODIATRIST

## 2022-04-13 PROCEDURE — 99999 PR PBB SHADOW E&M-EST. PATIENT-LVL IV: ICD-10-PCS | Mod: PBBFAC,,, | Performed by: PODIATRIST

## 2022-04-13 PROCEDURE — 99999 PR PBB SHADOW E&M-EST. PATIENT-LVL IV: CPT | Mod: PBBFAC,,, | Performed by: PODIATRIST

## 2022-04-13 PROCEDURE — 1160F RVW MEDS BY RX/DR IN RCRD: CPT | Mod: CPTII,,, | Performed by: PODIATRIST

## 2022-04-13 PROCEDURE — 4010F PR ACE/ARB THEARPY RXD/TAKEN: ICD-10-PCS | Mod: CPTII,,, | Performed by: PODIATRIST

## 2022-04-13 PROCEDURE — 11721 DEBRIDE NAIL 6 OR MORE: CPT | Mod: Q7,PBBFAC,PN | Performed by: PODIATRIST

## 2022-04-13 RX ORDER — AMMONIUM LACTATE 12 G/100G
CREAM TOPICAL
Qty: 140 G | Refills: 11 | Status: SHIPPED | OUTPATIENT
Start: 2022-04-13 | End: 2022-07-21

## 2022-04-13 RX ORDER — INSULIN GLARGINE 100 [IU]/ML
INJECTION, SOLUTION SUBCUTANEOUS
COMMUNITY
Start: 2022-03-19 | End: 2022-06-15

## 2022-04-13 RX ORDER — CICLOPIROX 80 MG/ML
SOLUTION TOPICAL NIGHTLY
Qty: 6.6 ML | Refills: 11 | Status: SHIPPED | OUTPATIENT
Start: 2022-04-13 | End: 2022-06-15

## 2022-04-13 RX ORDER — BLOOD-GLUCOSE METER
EACH MISCELLANEOUS
Status: ON HOLD | COMMUNITY
Start: 2022-03-31 | End: 2022-12-06 | Stop reason: HOSPADM

## 2022-04-13 RX ORDER — HYDROCHLOROTHIAZIDE 25 MG/1
25 TABLET ORAL DAILY
Status: ON HOLD | COMMUNITY
Start: 2022-04-05 | End: 2022-07-15 | Stop reason: SDUPTHER

## 2022-04-13 NOTE — PROGRESS NOTES
Subjective:      Patient ID: Dave Good is a 56 y.o. male.    Chief Complaint: Foot Problem (Bilateral foot) and Nail Care    Dave is a 56 y.o. male who presents to the clinic for evaluation and treatment of high risk feet. Dave has a past medical history of COPD (chronic obstructive pulmonary disease), COVID-19, Depression, Diabetes mellitus, Diabetes mellitus, type 2, and Hypertension. The patient's chief complaint is long, thick toenails. Gradual onset, worsening over past several weeks, aggravated by increased weight bearing, shoe gear, pressure.  No previous medical treatment.  OTC pain med not helping. Denies trauma, - prior amputation partial rays 1,2 right foot.    PCP: Reyna Jorge NP    Date Last Seen by PCP:   Chief Complaint   Patient presents with    Foot Problem     Bilateral foot    Nail Care        Current shoe gear:  Affected Foot: Casual shoes     Unaffected Foot: Casual shoes    Hemoglobin A1C   Date Value Ref Range Status   02/21/2022 9.8 (H) 4.0 - 5.6 % Final     Comment:     ADA Screening Guidelines:  5.7-6.4%  Consistent with prediabetes  >or=6.5%  Consistent with diabetes    High levels of fetal hemoglobin interfere with the HbA1C  assay. Heterozygous hemoglobin variants (HbS, HgC, etc)do  not significantly interfere with this assay.   However, presence of multiple variants may affect accuracy.     11/08/2021 9.3 (H) 4.0 - 5.6 % Final     Comment:     ADA Screening Guidelines:  5.7-6.4%  Consistent with prediabetes  >or=6.5%  Consistent with diabetes    High levels of fetal hemoglobin interfere with the HbA1C  assay. Heterozygous hemoglobin variants (HbS, HgC, etc)do  not significantly interfere with this assay.   However, presence of multiple variants may affect accuracy.     09/20/2021 12.6 (H) 4.0 - 5.6 % Final     Comment:     ADA Screening Guidelines:  5.7-6.4%  Consistent with prediabetes  >or=6.5%  Consistent with diabetes    High levels of fetal hemoglobin interfere with the  HbA1C  assay. Heterozygous hemoglobin variants (HbS, HgC, etc)do  not significantly interfere with this assay.   However, presence of multiple variants may affect accuracy.         Review of Systems   Skin: Positive for nail changes.   Neurological: Positive for numbness, paresthesias and sensory change.           Objective:      Physical Exam  Constitutional:       General: He is not in acute distress.     Appearance: He is well-developed. He is not diaphoretic.   Cardiovascular:      Pulses:           Popliteal pulses are 2+ on the right side and 2+ on the left side.        Dorsalis pedis pulses are 2+ on the right side and 2+ on the left side.        Posterior tibial pulses are 2+ on the right side and 2+ on the left side.      Comments: Capillary refill 3 seconds all toes/distal feet, all toes/both feet warm to touch.      Negative lymphadenopathy bilateral popliteal fossa and tarsal tunnel.      Negavie lower extremity edema bilateral.    Musculoskeletal:      Right ankle: No swelling, deformity, ecchymosis or lacerations. Normal range of motion. Normal pulse.      Right Achilles Tendon: Normal. No defects. Gonzáles's test negative.      Comments: Amputation partial rays 1,2 right well healed.     Otherwise, Normal angle, base, station of gait. All toes without clubbing, cyanosis, or signs of ischemia.  No pain to palpation bilateral lower extremities.  Range of motion, stability, muscle strength, and muscle tone normal bilateral feet and legs.    Lymphadenopathy:      Lower Body: No right inguinal adenopathy. No left inguinal adenopathy.      Comments: Negative lymphadenopathy bilateral popliteal fossa and tarsal tunnel.    Negative lymphangitic streaking bilateral feet/ankles/legs.   Skin:     General: Skin is warm and dry.      Capillary Refill: Capillary refill takes 2 to 3 seconds.      Coloration: Skin is not pale.      Findings: No abrasion, bruising, burn, ecchymosis, erythema, laceration, lesion or  rash.      Nails: There is no clubbing.      Comments: Generalized keratosis bottom both feet.      Otherwise, Skin is normal age and health appropriate color, turgor, texture, and temperature bilateral lower extremities without ulceration, hyperpigmentation, discoloration, masses nodules or cords palpated.  No ecchymosis, erythema, edema, or cardinal signs of infection bilateral lower extremities.    Toenails  3rd, 4th, 5th  Right, and 1,2,3,4,5, left bilateral are hypertrophic thickened 2-3 mm, dystrophic, discolored tanish brown with tan, gray crumbly subungual debris.  Tender to distal nail plate pressure, without periungual skin abnormality of each.       Neurological:      Mental Status: He is alert and oriented to person, place, and time.      Sensory: Sensory deficit present.      Motor: No tremor, atrophy or abnormal muscle tone.      Gait: Gait normal.      Comments: Diminished/loss of protective sensation all toes bilateral to 10 gram monofilament.         Psychiatric:         Behavior: Behavior is cooperative.               Assessment:       Encounter Diagnoses   Name Primary?    Type 2 diabetes mellitus with diabetic polyneuropathy, unspecified whether long term insulin use Yes    Partial nontraumatic amputation of foot, right     Onychomycosis due to dermatophyte          Plan:       Dave was seen today for foot problem and nail care.    Diagnoses and all orders for this visit:    Type 2 diabetes mellitus with diabetic polyneuropathy, unspecified whether long term insulin use  -      DIABETES FOOT EXAM  -     DIABETIC SHOES FOR HOME USE    Partial nontraumatic amputation of foot, right  -      DIABETES FOOT EXAM  -     DIABETIC SHOES FOR HOME USE    Onychomycosis due to dermatophyte  -      DIABETES FOOT EXAM  -     DIABETIC SHOES FOR HOME USE    Other orders  -     ammonium lactate 12 % Crea; Apply twice daily to affected parts both feet as needed.  -     ciclopirox (PENLAC) 8 % Soln; Apply  topically nightly.      I counseled the patient on his conditions, their implications and medical management.        - Shoe inspection. Diabetic Foot Education. Patient reminded of the importance of good nutrition and blood sugar control to help prevent podiatric complications of diabetes. Patient instructed on proper foot hygeine. We discussed wearing proper shoe gear, daily foot inspections, never walking without protective shoe gear, never putting sharp instruments to feet, routine podiatric visits as needed - at least once annually.    Penlac, lac hydrin, Rx DM shoes inserts, filler.      - With patient's permission, nails were aggressively reduced and debrided x 8 to their soft tissue attachment mechanically and with electric , removing all offending nail and debris. Patient relates relief following the procedure. He will continue to monitor the areas daily, inspect his feet, wear protective shoe gear when ambulatory, moisturizer to maintain skin integrity and follow in this office in approximately 2-3 months, sooner p.r.n.          Follow up in about 1 year (around 4/13/2023).

## 2022-05-09 NOTE — H&P (VIEW-ONLY)
Status post delayed clip closure of amputation site right foot 03/02/2022  Small area of dehiscence on the medial aspect of foot  No fever chills    PE  Incisions healed, 1 x 1 cm area of dehiscence, no evidence of cellulitis or abscess    Impression/plan  Surgically stable  Local care outlined  RTC 2 weeks

## 2022-05-11 ENCOUNTER — OFFICE VISIT (OUTPATIENT)
Dept: SURGERY | Facility: CLINIC | Age: 57
End: 2022-05-11
Attending: SPECIALIST
Payer: MEDICAID

## 2022-05-11 VITALS
BODY MASS INDEX: 36.57 KG/M2 | HEIGHT: 74 IN | DIASTOLIC BLOOD PRESSURE: 85 MMHG | SYSTOLIC BLOOD PRESSURE: 128 MMHG | WEIGHT: 285 LBS

## 2022-05-11 DIAGNOSIS — E11.52 DIABETIC WET GANGRENE OF THE FOOT: Primary | ICD-10-CM

## 2022-05-11 PROCEDURE — 3079F DIAST BP 80-89 MM HG: CPT | Mod: CPTII,,, | Performed by: SPECIALIST

## 2022-05-11 PROCEDURE — 1160F RVW MEDS BY RX/DR IN RCRD: CPT | Mod: CPTII,,, | Performed by: SPECIALIST

## 2022-05-11 PROCEDURE — 1159F PR MEDICATION LIST DOCUMENTED IN MEDICAL RECORD: ICD-10-PCS | Mod: CPTII,,, | Performed by: SPECIALIST

## 2022-05-11 PROCEDURE — 3046F PR MOST RECENT HEMOGLOBIN A1C LEVEL > 9.0%: ICD-10-PCS | Mod: CPTII,,, | Performed by: SPECIALIST

## 2022-05-11 PROCEDURE — 3074F SYST BP LT 130 MM HG: CPT | Mod: CPTII,,, | Performed by: SPECIALIST

## 2022-05-11 PROCEDURE — 99024 POSTOP FOLLOW-UP VISIT: CPT | Mod: ,,, | Performed by: SPECIALIST

## 2022-05-11 PROCEDURE — 3079F PR MOST RECENT DIASTOLIC BLOOD PRESSURE 80-89 MM HG: ICD-10-PCS | Mod: CPTII,,, | Performed by: SPECIALIST

## 2022-05-11 PROCEDURE — 99999 PR PBB SHADOW E&M-EST. PATIENT-LVL IV: CPT | Mod: PBBFAC,,, | Performed by: SPECIALIST

## 2022-05-11 PROCEDURE — 4010F ACE/ARB THERAPY RXD/TAKEN: CPT | Mod: CPTII,,, | Performed by: SPECIALIST

## 2022-05-11 PROCEDURE — 3046F HEMOGLOBIN A1C LEVEL >9.0%: CPT | Mod: CPTII,,, | Performed by: SPECIALIST

## 2022-05-11 PROCEDURE — 99024 PR POST-OP FOLLOW-UP VISIT: ICD-10-PCS | Mod: ,,, | Performed by: SPECIALIST

## 2022-05-11 PROCEDURE — 3008F PR BODY MASS INDEX (BMI) DOCUMENTED: ICD-10-PCS | Mod: CPTII,,, | Performed by: SPECIALIST

## 2022-05-11 PROCEDURE — 99214 OFFICE O/P EST MOD 30 MIN: CPT | Mod: PBBFAC | Performed by: SPECIALIST

## 2022-05-11 PROCEDURE — 4010F PR ACE/ARB THEARPY RXD/TAKEN: ICD-10-PCS | Mod: CPTII,,, | Performed by: SPECIALIST

## 2022-05-11 PROCEDURE — 99999 PR PBB SHADOW E&M-EST. PATIENT-LVL IV: ICD-10-PCS | Mod: PBBFAC,,, | Performed by: SPECIALIST

## 2022-05-11 PROCEDURE — 3074F PR MOST RECENT SYSTOLIC BLOOD PRESSURE < 130 MM HG: ICD-10-PCS | Mod: CPTII,,, | Performed by: SPECIALIST

## 2022-05-11 PROCEDURE — 1159F MED LIST DOCD IN RCRD: CPT | Mod: CPTII,,, | Performed by: SPECIALIST

## 2022-05-11 PROCEDURE — 1160F PR REVIEW ALL MEDS BY PRESCRIBER/CLIN PHARMACIST DOCUMENTED: ICD-10-PCS | Mod: CPTII,,, | Performed by: SPECIALIST

## 2022-05-11 PROCEDURE — 3008F BODY MASS INDEX DOCD: CPT | Mod: CPTII,,, | Performed by: SPECIALIST

## 2022-05-26 ENCOUNTER — OFFICE VISIT (OUTPATIENT)
Dept: SURGERY | Facility: CLINIC | Age: 57
End: 2022-05-26
Attending: SPECIALIST
Payer: MEDICAID

## 2022-05-26 VITALS — OXYGEN SATURATION: 100 % | HEART RATE: 83 BPM | SYSTOLIC BLOOD PRESSURE: 115 MMHG | DIASTOLIC BLOOD PRESSURE: 74 MMHG

## 2022-05-26 DIAGNOSIS — E11.52 DIABETIC WET GANGRENE OF THE FOOT: Primary | ICD-10-CM

## 2022-05-26 PROCEDURE — 1159F MED LIST DOCD IN RCRD: CPT | Mod: CPTII,,, | Performed by: SPECIALIST

## 2022-05-26 PROCEDURE — 3074F PR MOST RECENT SYSTOLIC BLOOD PRESSURE < 130 MM HG: ICD-10-PCS | Mod: CPTII,,, | Performed by: SPECIALIST

## 2022-05-26 PROCEDURE — 4010F PR ACE/ARB THEARPY RXD/TAKEN: ICD-10-PCS | Mod: CPTII,,, | Performed by: SPECIALIST

## 2022-05-26 PROCEDURE — 3078F PR MOST RECENT DIASTOLIC BLOOD PRESSURE < 80 MM HG: ICD-10-PCS | Mod: CPTII,,, | Performed by: SPECIALIST

## 2022-05-26 PROCEDURE — 3074F SYST BP LT 130 MM HG: CPT | Mod: CPTII,,, | Performed by: SPECIALIST

## 2022-05-26 PROCEDURE — 4010F ACE/ARB THERAPY RXD/TAKEN: CPT | Mod: CPTII,,, | Performed by: SPECIALIST

## 2022-05-26 PROCEDURE — 99213 PR OFFICE/OUTPT VISIT, EST, LEVL III, 20-29 MIN: ICD-10-PCS | Mod: S$PBB,,, | Performed by: SPECIALIST

## 2022-05-26 PROCEDURE — 99999 PR PBB SHADOW E&M-EST. PATIENT-LVL III: CPT | Mod: PBBFAC,,, | Performed by: SPECIALIST

## 2022-05-26 PROCEDURE — 3078F DIAST BP <80 MM HG: CPT | Mod: CPTII,,, | Performed by: SPECIALIST

## 2022-05-26 PROCEDURE — 3046F HEMOGLOBIN A1C LEVEL >9.0%: CPT | Mod: CPTII,,, | Performed by: SPECIALIST

## 2022-05-26 PROCEDURE — 99213 OFFICE O/P EST LOW 20 MIN: CPT | Mod: PBBFAC | Performed by: SPECIALIST

## 2022-05-26 PROCEDURE — 3046F PR MOST RECENT HEMOGLOBIN A1C LEVEL > 9.0%: ICD-10-PCS | Mod: CPTII,,, | Performed by: SPECIALIST

## 2022-05-26 PROCEDURE — 1159F PR MEDICATION LIST DOCUMENTED IN MEDICAL RECORD: ICD-10-PCS | Mod: CPTII,,, | Performed by: SPECIALIST

## 2022-05-26 PROCEDURE — 99999 PR PBB SHADOW E&M-EST. PATIENT-LVL III: ICD-10-PCS | Mod: PBBFAC,,, | Performed by: SPECIALIST

## 2022-05-26 PROCEDURE — 99213 OFFICE O/P EST LOW 20 MIN: CPT | Mod: S$PBB,,, | Performed by: SPECIALIST

## 2022-05-26 NOTE — PROGRESS NOTES
History & Physical    SUBJECTIVE:     History of Present Illness:  Patient is a 56 y.o. male presents with foul smelling wound right foot.  Patient is status post transmetatarsal amputation of digits 1 through 3. Wounds were healing well until he suffered trauma.  Attempts at local care at home a been unsuccessful.  Patient with history of diabetes mellitus and peripheral neuropathy.  Patient for debridement and evaluation of wound  .  No fever, chills, diaphoresis  Chief Complaint   Patient presents with    Post-op Evaluation       Review of patient's allergies indicates:   Allergen Reactions    Ibuprofen Swelling     Facial swelling       Current Outpatient Medications   Medication Sig Dispense Refill    ammonium lactate 12 % Crea Apply twice daily to affected parts both feet as needed. 140 g 11    aspirin (ECOTRIN) 81 MG EC tablet Take 81 mg by mouth once daily.      atorvastatin (LIPITOR) 20 MG tablet Take 40 mg by mouth once daily.       ciclopirox (PENLAC) 8 % Soln Apply topically nightly. 6.6 mL 11    collagenase (SANTYL) ointment With each dressing change 30 g 3    hydroCHLOROthiazide (HYDRODIURIL) 25 MG tablet       insulin aspart U-100 (NOVOLOG) 100 unit/mL (3 mL) InPn pen Inject 10 Units into the skin 3 (three) times daily.  0    insulin detemir U-100 (LEVEMIR FLEXTOUCH) 100 unit/mL (3 mL) SubQ InPn pen Inject 50 Units into the skin every evening.  0    LANTUS SOLOSTAR U-100 INSULIN glargine 100 units/mL (3mL) SubQ pen Inject into the skin.      lisinopriL 10 MG tablet Take 10 mg by mouth once daily.      losartan (COZAAR) 25 MG tablet Take 25 mg by mouth 2 (two) times daily.      metFORMIN (GLUCOPHAGE) 1000 MG tablet Take 1,000 mg by mouth 2 (two) times daily.      ONETOUCH VERIO TEST STRIPS Strp SMARTSIG:Via Meter      oxyCODONE-acetaminophen (PERCOCET) 7.5-325 mg per tablet Take 1 tablet by mouth every 6 (six) hours as needed for Pain.  0    polyethylene glycol (GLYCOLAX) 17 gram PwPk  Take 17 g by mouth once daily.  0    QUEtiapine (SEROQUEL) 200 MG Tab Take 200 mg by mouth nightly.       No current facility-administered medications for this visit.       Past Medical History:   Diagnosis Date    COPD (chronic obstructive pulmonary disease)     COVID-19     Depression     Diabetes mellitus     Diabetes mellitus, type 2     Hypertension      Past Surgical History:   Procedure Laterality Date    DEBRIDEMENT OF LOWER EXTREMITY Bilateral 3/2/2022    Procedure: DEBRIDEMENT, LOWER EXTREMITY;  Surgeon: Jesus Flores Jr., MD;  Location: [facility];  Service: General;  Laterality: Bilateral;    FOOT AMPUTATION THROUGH METATARSAL Right 9/23/2021    Procedure: AMPUTATION, FOOT, TRANSMETATARSAL right 1st ray resection;  Surgeon: Samuel Toussaint DPM;  Location: [facility];  Service: Podiatry;  Laterality: Right;    INCISION AND DRAINAGE FOOT Bilateral 9/21/2021    Procedure: INCISION AND DRAINAGE, FOOT - Bilateral;  Surgeon: Lavonne Vigil DPM;  Location: [facility];  Service: Podiatry;  Laterality: Bilateral;    REPLACEMENT OF WOUND DRESSING Right 2/24/2022    Procedure: REPLACEMENT, DRESSING, WOUND;  Surgeon: Jesus Flores Jr., MD;  Location: [facility];  Service: Vascular;  Laterality: Right;  wound vac dressing changed    WOUND DEBRIDEMENT Right 2/22/2022    Procedure: DEBRIDEMENT, WOUND - RIGHT FOOT;  Surgeon: Jesus Flores Jr., MD;  Location: [facility];  Service: Vascular;  Laterality: Right;    WOUND DEBRIDEMENT Bilateral 2/24/2022    Procedure: DEBRIDEMENT, WOUND / BILATERAL DEBRIDEMENT FEET;  Surgeon: Jesus Flores Jr., MD;  Location: [facility];  Service: Vascular;  Laterality: Bilateral;     History reviewed. No pertinent family history.  Social History     Tobacco Use    Smoking status: Former Smoker    Smokeless tobacco: Never Used   Substance Use Topics    Alcohol use: Yes     Comment: whiskey and beer every other day    Drug use: Not Currently        Review of Systems:  Review of Systems    Constitutional: Negative.    HENT: Negative.    Eyes: Negative.    Respiratory: Negative.    Cardiovascular: Negative.    Gastrointestinal: Negative.    Endocrine: Negative.    Genitourinary: Negative.    Musculoskeletal: Negative.    Skin: Positive for wound.   Allergic/Immunologic: Negative.    Neurological: Positive for numbness.        Patient with numbness and tingling both upper extremities to forearm and both feet.   Hematological: Negative.        OBJECTIVE:     Vital Signs (Most Recent)  Pulse: 83 (05/26/22 1403)  BP: 115/74 (05/26/22 1403)  SpO2: 100 % (05/26/22 1403)           Physical Exam:  Physical Exam  Constitutional:       General: He is not in acute distress.     Appearance: Normal appearance. He is not ill-appearing.   HENT:      Head: Normocephalic and atraumatic.   Eyes:      General: No scleral icterus.        Right eye: No discharge.         Left eye: No discharge.      Extraocular Movements: Extraocular movements intact.      Conjunctiva/sclera: Conjunctivae normal.   Cardiovascular:      Rate and Rhythm: Normal rate and regular rhythm.      Heart sounds: No murmur heard.    No friction rub. No gallop.   Pulmonary:      Effort: Pulmonary effort is normal. No respiratory distress.      Breath sounds: Normal breath sounds.   Abdominal:      General: Abdomen is flat. There is no distension.      Palpations: Abdomen is soft.      Tenderness: There is no abdominal tenderness.   Musculoskeletal:         General: Signs of injury present.      Cervical back: Normal range of motion and neck supple. No rigidity or tenderness.      Comments: Status post amputation digits 1 through 3 of the right foot.  Open wound along medial aspect of foot with nonviable tissue and foul smelling odor   Skin:     General: Skin is warm and dry.      Findings: No bruising, erythema or rash.   Neurological:      General: No focal deficit present.      Mental Status: He is alert and oriented to person, place, and time.       Motor: No weakness.      Coordination: Coordination normal.      Gait: Gait abnormal.   Psychiatric:         Mood and Affect: Mood normal.         Behavior: Behavior normal.         Thought Content: Thought content normal.         Judgment: Judgment normal.           ASSESSMENT/PLAN:     Open wound right foot, infected, diabetic neuropathy

## 2022-05-26 NOTE — H&P (VIEW-ONLY)
History & Physical    SUBJECTIVE:     History of Present Illness:  Patient is a 56 y.o. male presents with foul smelling wound right foot.  Patient is status post transmetatarsal amputation of digits 1 through 3. Wounds were healing well until he suffered trauma.  Attempts at local care at home a been unsuccessful.  Patient with history of diabetes mellitus and peripheral neuropathy.  Patient for debridement and evaluation of wound  .  No fever, chills, diaphoresis  Chief Complaint   Patient presents with    Post-op Evaluation       Review of patient's allergies indicates:   Allergen Reactions    Ibuprofen Swelling     Facial swelling       Current Outpatient Medications   Medication Sig Dispense Refill    ammonium lactate 12 % Crea Apply twice daily to affected parts both feet as needed. 140 g 11    aspirin (ECOTRIN) 81 MG EC tablet Take 81 mg by mouth once daily.      atorvastatin (LIPITOR) 20 MG tablet Take 40 mg by mouth once daily.       ciclopirox (PENLAC) 8 % Soln Apply topically nightly. 6.6 mL 11    collagenase (SANTYL) ointment With each dressing change 30 g 3    hydroCHLOROthiazide (HYDRODIURIL) 25 MG tablet       insulin aspart U-100 (NOVOLOG) 100 unit/mL (3 mL) InPn pen Inject 10 Units into the skin 3 (three) times daily.  0    insulin detemir U-100 (LEVEMIR FLEXTOUCH) 100 unit/mL (3 mL) SubQ InPn pen Inject 50 Units into the skin every evening.  0    LANTUS SOLOSTAR U-100 INSULIN glargine 100 units/mL (3mL) SubQ pen Inject into the skin.      lisinopriL 10 MG tablet Take 10 mg by mouth once daily.      losartan (COZAAR) 25 MG tablet Take 25 mg by mouth 2 (two) times daily.      metFORMIN (GLUCOPHAGE) 1000 MG tablet Take 1,000 mg by mouth 2 (two) times daily.      ONETOUCH VERIO TEST STRIPS Strp SMARTSIG:Via Meter      oxyCODONE-acetaminophen (PERCOCET) 7.5-325 mg per tablet Take 1 tablet by mouth every 6 (six) hours as needed for Pain.  0    polyethylene glycol (GLYCOLAX) 17 gram PwPk  Take 17 g by mouth once daily.  0    QUEtiapine (SEROQUEL) 200 MG Tab Take 200 mg by mouth nightly.       No current facility-administered medications for this visit.       Past Medical History:   Diagnosis Date    COPD (chronic obstructive pulmonary disease)     COVID-19     Depression     Diabetes mellitus     Diabetes mellitus, type 2     Hypertension      Past Surgical History:   Procedure Laterality Date    DEBRIDEMENT OF LOWER EXTREMITY Bilateral 3/2/2022    Procedure: DEBRIDEMENT, LOWER EXTREMITY;  Surgeon: Jesus Flores Jr., MD;  Location: [facility];  Service: General;  Laterality: Bilateral;    FOOT AMPUTATION THROUGH METATARSAL Right 9/23/2021    Procedure: AMPUTATION, FOOT, TRANSMETATARSAL right 1st ray resection;  Surgeon: Samuel Toussaint DPM;  Location: [facility];  Service: Podiatry;  Laterality: Right;    INCISION AND DRAINAGE FOOT Bilateral 9/21/2021    Procedure: INCISION AND DRAINAGE, FOOT - Bilateral;  Surgeon: Lavonne Vigil DPM;  Location: [facility];  Service: Podiatry;  Laterality: Bilateral;    REPLACEMENT OF WOUND DRESSING Right 2/24/2022    Procedure: REPLACEMENT, DRESSING, WOUND;  Surgeon: Jesus Flores Jr., MD;  Location: [facility];  Service: Vascular;  Laterality: Right;  wound vac dressing changed    WOUND DEBRIDEMENT Right 2/22/2022    Procedure: DEBRIDEMENT, WOUND - RIGHT FOOT;  Surgeon: Jesus Flores Jr., MD;  Location: [facility];  Service: Vascular;  Laterality: Right;    WOUND DEBRIDEMENT Bilateral 2/24/2022    Procedure: DEBRIDEMENT, WOUND / BILATERAL DEBRIDEMENT FEET;  Surgeon: Jesus Flores Jr., MD;  Location: [facility];  Service: Vascular;  Laterality: Bilateral;     History reviewed. No pertinent family history.  Social History     Tobacco Use    Smoking status: Former Smoker    Smokeless tobacco: Never Used   Substance Use Topics    Alcohol use: Yes     Comment: whiskey and beer every other day    Drug use: Not Currently        Review of Systems:  Review of Systems    Constitutional: Negative.    HENT: Negative.    Eyes: Negative.    Respiratory: Negative.    Cardiovascular: Negative.    Gastrointestinal: Negative.    Endocrine: Negative.    Genitourinary: Negative.    Musculoskeletal: Negative.    Skin: Positive for wound.   Allergic/Immunologic: Negative.    Neurological: Positive for numbness.        Patient with numbness and tingling both upper extremities to forearm and both feet.   Hematological: Negative.        OBJECTIVE:     Vital Signs (Most Recent)  Pulse: 83 (05/26/22 1403)  BP: 115/74 (05/26/22 1403)  SpO2: 100 % (05/26/22 1403)           Physical Exam:  Physical Exam  Constitutional:       General: He is not in acute distress.     Appearance: Normal appearance. He is not ill-appearing.   HENT:      Head: Normocephalic and atraumatic.   Eyes:      General: No scleral icterus.        Right eye: No discharge.         Left eye: No discharge.      Extraocular Movements: Extraocular movements intact.      Conjunctiva/sclera: Conjunctivae normal.   Cardiovascular:      Rate and Rhythm: Normal rate and regular rhythm.      Heart sounds: No murmur heard.    No friction rub. No gallop.   Pulmonary:      Effort: Pulmonary effort is normal. No respiratory distress.      Breath sounds: Normal breath sounds.   Abdominal:      General: Abdomen is flat. There is no distension.      Palpations: Abdomen is soft.      Tenderness: There is no abdominal tenderness.   Musculoskeletal:         General: Signs of injury present.      Cervical back: Normal range of motion and neck supple. No rigidity or tenderness.      Comments: Status post amputation digits 1 through 3 of the right foot.  Open wound along medial aspect of foot with nonviable tissue and foul smelling odor   Skin:     General: Skin is warm and dry.      Findings: No bruising, erythema or rash.   Neurological:      General: No focal deficit present.      Mental Status: He is alert and oriented to person, place, and time.       Motor: No weakness.      Coordination: Coordination normal.      Gait: Gait abnormal.   Psychiatric:         Mood and Affect: Mood normal.         Behavior: Behavior normal.         Thought Content: Thought content normal.         Judgment: Judgment normal.           ASSESSMENT/PLAN:     Open wound right foot, infected, diabetic neuropathy

## 2022-05-27 ENCOUNTER — ANESTHESIA (OUTPATIENT)
Dept: SURGERY | Facility: OTHER | Age: 57
End: 2022-05-27
Payer: MEDICAID

## 2022-05-27 ENCOUNTER — ANESTHESIA EVENT (OUTPATIENT)
Dept: SURGERY | Facility: OTHER | Age: 57
End: 2022-05-27
Payer: MEDICAID

## 2022-05-27 ENCOUNTER — HOSPITAL ENCOUNTER (OUTPATIENT)
Facility: OTHER | Age: 57
Discharge: HOME | End: 2022-05-27
Attending: SPECIALIST | Admitting: SPECIALIST
Payer: MEDICAID

## 2022-05-27 DIAGNOSIS — L97.512 RIGHT FOOT ULCER, WITH FAT LAYER EXPOSED: ICD-10-CM

## 2022-05-27 DIAGNOSIS — J98.4 CAVITARY LESION OF LUNG: Primary | ICD-10-CM

## 2022-05-27 DIAGNOSIS — I96 GANGRENE OF RIGHT FOOT: ICD-10-CM

## 2022-05-27 LAB
ANION GAP SERPL CALC-SCNC: 12 MMOL/L (ref 8–16)
BASOPHILS # BLD AUTO: 0.04 K/UL (ref 0–0.2)
BASOPHILS NFR BLD: 0.5 % (ref 0–1.9)
BUN SERPL-MCNC: 20 MG/DL (ref 6–20)
CALCIUM SERPL-MCNC: 10.3 MG/DL (ref 8.7–10.5)
CHLORIDE SERPL-SCNC: 104 MMOL/L (ref 95–110)
CO2 SERPL-SCNC: 25 MMOL/L (ref 23–29)
CREAT SERPL-MCNC: 1.2 MG/DL (ref 0.5–1.4)
DIFFERENTIAL METHOD: ABNORMAL
EOSINOPHIL # BLD AUTO: 0.4 K/UL (ref 0–0.5)
EOSINOPHIL NFR BLD: 5.1 % (ref 0–8)
ERYTHROCYTE [DISTWIDTH] IN BLOOD BY AUTOMATED COUNT: 15.1 % (ref 11.5–14.5)
EST. GFR  (AFRICAN AMERICAN): >60 ML/MIN/1.73 M^2
EST. GFR  (NON AFRICAN AMERICAN): >60 ML/MIN/1.73 M^2
ESTIMATED AVG GLUCOSE: 177 MG/DL (ref 68–131)
GLUCOSE SERPL-MCNC: 146 MG/DL (ref 70–110)
HBA1C MFR BLD: 7.8 % (ref 4–5.6)
HCT VFR BLD AUTO: 42.5 % (ref 40–54)
HGB BLD-MCNC: 13.2 G/DL (ref 14–18)
IMM GRANULOCYTES # BLD AUTO: 0.02 K/UL (ref 0–0.04)
IMM GRANULOCYTES NFR BLD AUTO: 0.2 % (ref 0–0.5)
LYMPHOCYTES # BLD AUTO: 3 K/UL (ref 1–4.8)
LYMPHOCYTES NFR BLD: 35.6 % (ref 18–48)
MCH RBC QN AUTO: 28.2 PG (ref 27–31)
MCHC RBC AUTO-ENTMCNC: 31.1 G/DL (ref 32–36)
MCV RBC AUTO: 91 FL (ref 82–98)
MONOCYTES # BLD AUTO: 0.9 K/UL (ref 0.3–1)
MONOCYTES NFR BLD: 11.1 % (ref 4–15)
NEUTROPHILS # BLD AUTO: 4 K/UL (ref 1.8–7.7)
NEUTROPHILS NFR BLD: 47.5 % (ref 38–73)
NRBC BLD-RTO: 0 /100 WBC
PLATELET # BLD AUTO: 377 K/UL (ref 150–450)
PMV BLD AUTO: 8.8 FL (ref 9.2–12.9)
POCT GLUCOSE: 152 MG/DL (ref 70–110)
POTASSIUM SERPL-SCNC: 4.7 MMOL/L (ref 3.5–5.1)
RBC # BLD AUTO: 4.68 M/UL (ref 4.6–6.2)
SODIUM SERPL-SCNC: 141 MMOL/L (ref 136–145)
WBC # BLD AUTO: 8.5 K/UL (ref 3.9–12.7)

## 2022-05-27 PROCEDURE — 11042 PR DEBRIDEMENT, SKIN, SUB-Q TISSUE,=<20 SQ CM: ICD-10-PCS | Mod: ,,, | Performed by: SPECIALIST

## 2022-05-27 PROCEDURE — 76942 ECHO GUIDE FOR BIOPSY: CPT | Performed by: ANESTHESIOLOGY

## 2022-05-27 PROCEDURE — 36415 COLL VENOUS BLD VENIPUNCTURE: CPT | Performed by: SPECIALIST

## 2022-05-27 PROCEDURE — 85025 COMPLETE CBC W/AUTO DIFF WBC: CPT | Performed by: SPECIALIST

## 2022-05-27 PROCEDURE — 37000008 HC ANESTHESIA 1ST 15 MINUTES: Performed by: SPECIALIST

## 2022-05-27 PROCEDURE — 11042 DBRDMT SUBQ TIS 1ST 20SQCM/<: CPT | Mod: ,,, | Performed by: SPECIALIST

## 2022-05-27 PROCEDURE — 00400 ANES INTEGUMENTARY SYS NOS: CPT | Performed by: SPECIALIST

## 2022-05-27 PROCEDURE — 36000706: Performed by: SPECIALIST

## 2022-05-27 PROCEDURE — 63600175 PHARM REV CODE 636 W HCPCS: Performed by: ANESTHESIOLOGY

## 2022-05-27 PROCEDURE — 80048 BASIC METABOLIC PNL TOTAL CA: CPT | Performed by: SPECIALIST

## 2022-05-27 PROCEDURE — 71000016 HC POSTOP RECOV ADDL HR: Performed by: SPECIALIST

## 2022-05-27 PROCEDURE — 83036 HEMOGLOBIN GLYCOSYLATED A1C: CPT | Performed by: SPECIALIST

## 2022-05-27 PROCEDURE — 25000003 PHARM REV CODE 250: Performed by: SPECIALIST

## 2022-05-27 PROCEDURE — 82962 GLUCOSE BLOOD TEST: CPT | Performed by: SPECIALIST

## 2022-05-27 PROCEDURE — 71000015 HC POSTOP RECOV 1ST HR: Performed by: SPECIALIST

## 2022-05-27 PROCEDURE — 25000003 PHARM REV CODE 250: Performed by: ANESTHESIOLOGY

## 2022-05-27 PROCEDURE — 63600175 PHARM REV CODE 636 W HCPCS: Performed by: NURSE ANESTHETIST, CERTIFIED REGISTERED

## 2022-05-27 PROCEDURE — 36000707: Performed by: SPECIALIST

## 2022-05-27 PROCEDURE — 37000009 HC ANESTHESIA EA ADD 15 MINS: Performed by: SPECIALIST

## 2022-05-27 RX ORDER — ROPIVACAINE HYDROCHLORIDE 5 MG/ML
INJECTION, SOLUTION EPIDURAL; INFILTRATION; PERINEURAL
Status: COMPLETED | OUTPATIENT
Start: 2022-05-27 | End: 2022-05-27

## 2022-05-27 RX ORDER — ACETAMINOPHEN 500 MG
1000 TABLET ORAL
Status: COMPLETED | OUTPATIENT
Start: 2022-05-27 | End: 2022-05-27

## 2022-05-27 RX ORDER — MUPIROCIN 20 MG/G
OINTMENT TOPICAL
Status: DISCONTINUED | OUTPATIENT
Start: 2022-05-27 | End: 2022-06-16

## 2022-05-27 RX ORDER — FENTANYL CITRATE 50 UG/ML
INJECTION, SOLUTION INTRAMUSCULAR; INTRAVENOUS
Status: DISCONTINUED | OUTPATIENT
Start: 2022-05-27 | End: 2022-05-27

## 2022-05-27 RX ORDER — MIDAZOLAM HYDROCHLORIDE 1 MG/ML
INJECTION INTRAMUSCULAR; INTRAVENOUS
Status: DISCONTINUED | OUTPATIENT
Start: 2022-05-27 | End: 2022-05-27

## 2022-05-27 RX ORDER — LIDOCAINE HYDROCHLORIDE 10 MG/ML
1 INJECTION, SOLUTION EPIDURAL; INFILTRATION; INTRACAUDAL; PERINEURAL ONCE
Status: ACTIVE | OUTPATIENT
Start: 2022-05-27

## 2022-05-27 RX ORDER — CLINDAMYCIN PHOSPHATE 900 MG/50ML
900 INJECTION, SOLUTION INTRAVENOUS
Status: COMPLETED | OUTPATIENT
Start: 2022-05-27 | End: 2022-05-27

## 2022-05-27 RX ORDER — PHENYLEPHRINE HYDROCHLORIDE 10 MG/ML
INJECTION INTRAVENOUS
Status: DISCONTINUED | OUTPATIENT
Start: 2022-05-27 | End: 2022-05-27

## 2022-05-27 RX ORDER — PROPOFOL 10 MG/ML
VIAL (ML) INTRAVENOUS CONTINUOUS PRN
Status: DISCONTINUED | OUTPATIENT
Start: 2022-05-27 | End: 2022-05-27

## 2022-05-27 RX ORDER — OXYCODONE AND ACETAMINOPHEN 7.5; 325 MG/1; MG/1
1 TABLET ORAL EVERY 6 HOURS PRN
Qty: 28 TABLET | Refills: 0 | Status: SHIPPED | OUTPATIENT
Start: 2022-05-27 | End: 2022-06-15

## 2022-05-27 RX ORDER — SODIUM CHLORIDE 9 MG/ML
INJECTION, SOLUTION INTRAVENOUS CONTINUOUS
Status: ACTIVE | OUTPATIENT
Start: 2022-05-27

## 2022-05-27 RX ADMIN — FENTANYL CITRATE 100 MCG: 50 INJECTION, SOLUTION INTRAMUSCULAR; INTRAVENOUS at 03:05

## 2022-05-27 RX ADMIN — MIDAZOLAM HYDROCHLORIDE 2 MG: 1 INJECTION, SOLUTION INTRAMUSCULAR; INTRAVENOUS at 03:05

## 2022-05-27 RX ADMIN — CLINDAMYCIN PHOSPHATE 900 MG: 18 INJECTION, SOLUTION INTRAVENOUS at 03:05

## 2022-05-27 RX ADMIN — ACETAMINOPHEN 1000 MG: 500 TABLET, FILM COATED ORAL at 11:05

## 2022-05-27 RX ADMIN — SODIUM CHLORIDE, SODIUM LACTATE, POTASSIUM CHLORIDE, AND CALCIUM CHLORIDE: .6; .31; .03; .02 INJECTION, SOLUTION INTRAVENOUS at 02:05

## 2022-05-27 RX ADMIN — PROPOFOL 75 MCG/KG/MIN: 10 INJECTION, EMULSION INTRAVENOUS at 03:05

## 2022-05-27 RX ADMIN — ROPIVACAINE HYDROCHLORIDE 30 ML: 5 INJECTION, SOLUTION EPIDURAL; INFILTRATION; PERINEURAL at 03:05

## 2022-05-27 RX ADMIN — PHENYLEPHRINE HYDROCHLORIDE 200 MCG: 10 INJECTION INTRAVENOUS at 03:05

## 2022-05-27 NOTE — ANESTHESIA PREPROCEDURE EVALUATION
05/27/2022  Dave Good is a 56 y.o., male.    Pre-op Assessment    I have reviewed the Patient Summary Reports.    I have reviewed the Nursing Notes. I have reviewed the NPO Status.   I have reviewed the Medications.     Review of Systems  Anesthesia Hx:  Denies Family Hx of Anesthesia complications.   Denies Personal Hx of Anesthesia complications.   Social:  Non-Smoker    Hematology/Oncology:     Oncology Normal    -- Anemia:   EENT/Dental:EENT/Dental Normal   Cardiovascular:   Exercise tolerance: poor Hypertension CHF PVD hyperlipidemia    Pulmonary:   COPD, severe Shortness of breath Home o2   Renal/:   Chronic Renal Disease, CRI    Hepatic/GI:  Hepatic/GI Normal    Musculoskeletal:  Musculoskeletal Normal    Neurological:  Neurology Normal    Endocrine:   Diabetes, poorly controlled, type 2, using insulin    Dermatological:  Skin Normal    Psych:   Psychiatric History          Physical Exam  General:  Well nourished      Airway/Jaw/Neck:  TM Distance: Normal   Neck ROM: Normal ROM       Dental:  Dental Findings: Edentulous  Multiple missing, none loose          Mental Status:  Mental Status Findings:  Cooperative, Alert and Oriented         Anesthesia Plan  Type of Anesthesia, risks & benefits discussed:  Anesthesia Type:  Regional    Patient's Preference:   Plan Factors:          Intra-op Monitoring Plan: standard ASA monitors  Intra-op Monitoring Plan Comments:   Post Op Pain Control Plan: per primary service following discharge from PACU, multimodal analgesia and peripheral nerve block  Post Op Pain Control Plan Comments:     Induction:   IV  Beta Blocker:         Informed Consent: Informed consent signed with the Patient and all parties understand the risks and agree with anesthesia plan.  All questions answered.  Anesthesia consent signed with patient.  ASA Score: 4     Day of Surgery Review of  History & Physical:              Ready For Surgery From Anesthesia Perspective.           Physical Exam  General: Well nourished    Airway:  TM Distance: Normal  Neck ROM: Normal ROM    Dental:  Edentulous          Anesthesia Plan  Type of Anesthesia, risks & benefits discussed:    Anesthesia Type: Regional  Intra-op Monitoring Plan: standard ASA monitors  Post Op Pain Control Plan: per primary service following discharge from PACU, multimodal analgesia and peripheral nerve block  Induction:  IV  Informed Consent: Informed consent signed with the Patient and all parties understand the risks and agree with anesthesia plan.  All questions answered.   ASA Score: 4    Ready For Surgery From Anesthesia Perspective.       .

## 2022-05-27 NOTE — OP NOTE
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  Patient was brought to the operating room and placed in the supine position the right lower extremity prepped and draped in sterile fashion.  A 10. Blade was used to excise nonviable skin and subcutaneous tissue back to areas of healthy bleeding.  Hemostasis was obtained with electrocautery Bovie.  There was no evidence of deep space infection below the skin subcutaneous tissue.  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  What changed: Another medication with the same name was added. Make sure you understand how and when to take each.     * oxyCODONE-acetaminophen 7.5-325 mg per tablet  Commonly known as: PERCOCET  Take 1 tablet by mouth every 6 (six) hours as needed.  What  changed: You were already taking a medication with the same name, and this prescription was added. Make sure you understand how and when to take each.         * This list has 2 medication(s) that are the same as other medications prescribed for you. Read the directions carefully, and ask your doctor or other care provider to review them with you.            CONTINUE taking these medications    ammonium lactate 12 % Crea  Apply twice daily to affected parts both feet as needed.     aspirin 81 MG EC tablet  Commonly known as: ECOTRIN  Take 81 mg by mouth once daily.     atorvastatin 20 MG tablet  Commonly known as: LIPITOR  Take 40 mg by mouth once daily.     ciclopirox 8 % Soln  Commonly known as: PENLAC  Apply topically nightly.     collagenase ointment  Commonly known as: SANTYL  With each dressing change     hydroCHLOROthiazide 25 MG tablet  Commonly known as: HYDRODIURIL     insulin aspart U-100 100 unit/mL (3 mL) Inpn pen  Commonly known as: NovoLOG  Inject 10 Units into the skin 3 (three) times daily.     insulin detemir U-100 100 unit/mL (3 mL) Inpn pen  Commonly known as: Levemir FLEXTOUCH  Inject 50 Units into the skin every evening.     LANTUS SOLOSTAR U-100 INSULIN glargine 100 units/mL (3mL) SubQ pen  Generic drug: insulin  Inject into the skin.     lisinopriL 10 MG tablet  Take 10 mg by mouth once daily.     losartan 25 MG tablet  Commonly known as: COZAAR  Take 25 mg by mouth 2 (two) times daily.     metFORMIN 1000 MG tablet  Commonly known as: GLUCOPHAGE  Take 1,000 mg by mouth 2 (two) times daily.     ONETOUCH VERIO TEST STRIPS Strp  Generic drug: blood sugar diagnostic  SMARTSIG:Via Meter     polyethylene glycol 17 gram Pwpk  Commonly known as: GLYCOLAX  Take 17 g by mouth once daily.     QUEtiapine 200 MG Tab  Commonly known as: SEROQUEL  Take 200 mg by mouth nightly.          Discharge Procedure Orders   CRUTCHES FOR HOME USE     Order Specific Question Answer Comments   Type: Axillary    Height:  "6' 2" (1.88 m)    Weight: 129.3 kg (285 lb 0.9 oz)    Length of need (1-99 months): 1 day     Diet general     Call MD for:  temperature >100.4     Call MD for:  persistent nausea and vomiting     Call MD for:  severe uncontrolled pain     Call MD for:  redness, tenderness, or signs of infection (pain, swelling, redness, odor or green/yellow discharge around incision site)     Wound care routine (specify)   Order Comments: Wound care routine: cover wound with bactroban/gauze every other day       "

## 2022-05-27 NOTE — ANESTHESIA PROCEDURE NOTES
Peripheral Block    Patient location during procedure: holding area    Reason for block: primary anesthetic    Diagnosis: Osteomyelitis foot    End time: 5/27/2022 3:15 PM    Staffing  Authorizing Provider: Kell Mercedes MD  Performing Provider: Kell Mercedes MD    Preanesthetic Checklist  Completed: patient identified, IV checked, site marked, risks and benefits discussed, surgical consent, monitors and equipment checked, pre-op evaluation and timeout performed  Peripheral Block  Patient position: supine  Prep: ChloraPrep  Patient monitoring: heart rate, cardiac monitor, continuous pulse ox and frequent blood pressure checks  Block type: popliteal  Laterality: right  Injection technique: single shot  Needle  Needle type: Stimuplex   Needle gauge: 22 G  Needle length: 4 in  Needle localization: nerve stimulator and ultrasound guidance   -ultrasound image captured on disc.  Assessment  Injection assessment: negative aspiration, negative parasthesia and local visualized surrounding nerve  Paresthesia pain: none  Slow fractionated injection: yes  Pain Tolerance: comfortable throughout block and no complaints  Medications:    Medications: ropivacaine (NAROPIN) injection 0.5% - Perineural   30 mL - 5/27/2022 3:16:00 PM

## 2022-05-27 NOTE — ANESTHESIA POSTPROCEDURE EVALUATION
Anesthesia Post Evaluation    Patient: Dave Good    Procedure(s) Performed: Procedure(s) (LRB):  DEBRIDEMENT, WOUND (Right)    Final Anesthesia Type: general      Patient location during evaluation: PACU  Patient participation: Yes- Able to Participate  Level of consciousness: awake and alert  Post-procedure vital signs: reviewed and stable  Pain management: adequate  Airway patency: patent    PONV status at discharge: No PONV  Anesthetic complications: no      Cardiovascular status: blood pressure returned to baseline  Respiratory status: unassisted and spontaneous ventilation  Hydration status: euvolemic  Follow-up not needed.          Vitals Value Taken Time   /67 05/27/22 1635   Temp 37 °C (98.6 °F) 05/27/22 1605   Pulse 76 05/27/22 1635   Resp 16 05/27/22 1635   SpO2 98 % 05/27/22 1635         No case tracking events are documented in the log.      Pain/Mignon Score: Pain Rating Prior to Med Admin: 8 (5/27/2022 11:17 AM)  Mignon Score: 10 (5/27/2022  4:35 PM)

## 2022-05-27 NOTE — ANESTHESIA POSTPROCEDURE EVALUATION
Anesthesia Post Evaluation    Patient: Dave Good    Procedure(s) Performed: Procedure(s) (LRB):  DEBRIDEMENT, WOUND (Right)    Final Anesthesia Type: regional      Patient location during evaluation: [facility]  Patient participation: Yes- Able to Participate  Level of consciousness: awake and alert  Post-procedure vital signs: reviewed and stable (B/P- 145/70,, HR-54, RR-17, O2%-98)  Pain management: adequate  Airway patency: patent    PONV status at discharge: No PONV  Anesthetic complications: no      Cardiovascular status: hemodynamically stable  Respiratory status: unassisted, room air and spontaneous ventilation  Hydration status: euvolemic  Follow-up not needed.          Vitals Value Taken Time   /83 05/27/22 1056   Temp 36.9 °C (98.4 °F) 05/27/22 1056   Pulse 77 05/27/22 1056   Resp 18 05/27/22 1056   SpO2 100 % 05/27/22 1056         No case tracking events are documented in the log.      Pain/Mignon Score: Pain Rating Prior to Med Admin: 8 (5/27/2022 11:17 AM)

## 2022-05-27 NOTE — OR NURSING
Crutches provided to pt and adjusted to proper height. Crutch walking reviewed.  Pt ambulates with crutches without difficulty.

## 2022-05-27 NOTE — PLAN OF CARE
Dave Good has met all discharge criteria from Phase II. Vital Signs are stable, ambulating  without difficulty. Discharge instructions given, patient verbalized understanding. Discharged from facility via wheelchair in stable condition.

## 2022-05-30 VITALS
BODY MASS INDEX: 36.58 KG/M2 | HEIGHT: 74 IN | SYSTOLIC BLOOD PRESSURE: 110 MMHG | HEART RATE: 76 BPM | OXYGEN SATURATION: 98 % | TEMPERATURE: 99 F | WEIGHT: 285.06 LBS | DIASTOLIC BLOOD PRESSURE: 67 MMHG | RESPIRATION RATE: 16 BRPM

## 2022-06-14 ENCOUNTER — OFFICE VISIT (OUTPATIENT)
Dept: SURGERY | Facility: CLINIC | Age: 57
DRG: 623 | End: 2022-06-14
Attending: SPECIALIST
Payer: MEDICAID

## 2022-06-14 VITALS — OXYGEN SATURATION: 96 % | DIASTOLIC BLOOD PRESSURE: 69 MMHG | SYSTOLIC BLOOD PRESSURE: 112 MMHG | HEART RATE: 100 BPM

## 2022-06-14 DIAGNOSIS — L97.512 RIGHT FOOT ULCER, WITH FAT LAYER EXPOSED: Primary | ICD-10-CM

## 2022-06-14 PROCEDURE — 3051F HG A1C>EQUAL 7.0%<8.0%: CPT | Mod: CPTII,,, | Performed by: SPECIALIST

## 2022-06-14 PROCEDURE — 99999 PR PBB SHADOW E&M-EST. PATIENT-LVL III: ICD-10-PCS | Mod: PBBFAC,,, | Performed by: SPECIALIST

## 2022-06-14 PROCEDURE — 3074F PR MOST RECENT SYSTOLIC BLOOD PRESSURE < 130 MM HG: ICD-10-PCS | Mod: CPTII,,, | Performed by: SPECIALIST

## 2022-06-14 PROCEDURE — 3078F PR MOST RECENT DIASTOLIC BLOOD PRESSURE < 80 MM HG: ICD-10-PCS | Mod: CPTII,,, | Performed by: SPECIALIST

## 2022-06-14 PROCEDURE — 3074F SYST BP LT 130 MM HG: CPT | Mod: CPTII,,, | Performed by: SPECIALIST

## 2022-06-14 PROCEDURE — 4010F PR ACE/ARB THEARPY RXD/TAKEN: ICD-10-PCS | Mod: CPTII,,, | Performed by: SPECIALIST

## 2022-06-14 PROCEDURE — 3078F DIAST BP <80 MM HG: CPT | Mod: CPTII,,, | Performed by: SPECIALIST

## 2022-06-14 PROCEDURE — 99999 PR PBB SHADOW E&M-EST. PATIENT-LVL III: CPT | Mod: PBBFAC,,, | Performed by: SPECIALIST

## 2022-06-14 PROCEDURE — 99212 OFFICE O/P EST SF 10 MIN: CPT | Mod: S$PBB,,, | Performed by: SPECIALIST

## 2022-06-14 PROCEDURE — 99212 PR OFFICE/OUTPT VISIT, EST, LEVL II, 10-19 MIN: ICD-10-PCS | Mod: S$PBB,,, | Performed by: SPECIALIST

## 2022-06-14 PROCEDURE — 1159F MED LIST DOCD IN RCRD: CPT | Mod: CPTII,,, | Performed by: SPECIALIST

## 2022-06-14 PROCEDURE — 1160F RVW MEDS BY RX/DR IN RCRD: CPT | Mod: CPTII,,, | Performed by: SPECIALIST

## 2022-06-14 PROCEDURE — 3051F PR MOST RECENT HEMOGLOBIN A1C LEVEL 7.0 - < 8.0%: ICD-10-PCS | Mod: CPTII,,, | Performed by: SPECIALIST

## 2022-06-14 PROCEDURE — 4010F ACE/ARB THERAPY RXD/TAKEN: CPT | Mod: CPTII,,, | Performed by: SPECIALIST

## 2022-06-14 PROCEDURE — 1159F PR MEDICATION LIST DOCUMENTED IN MEDICAL RECORD: ICD-10-PCS | Mod: CPTII,,, | Performed by: SPECIALIST

## 2022-06-14 PROCEDURE — 1160F PR REVIEW ALL MEDS BY PRESCRIBER/CLIN PHARMACIST DOCUMENTED: ICD-10-PCS | Mod: CPTII,,, | Performed by: SPECIALIST

## 2022-06-14 PROCEDURE — 99213 OFFICE O/P EST LOW 20 MIN: CPT | Mod: PBBFAC | Performed by: SPECIALIST

## 2022-06-14 NOTE — PROGRESS NOTES
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  Patient status post amputation of several toes right foot.  Patient  With difficulty obtaining wound care with result in breakdown of both feet  No fever chills  Blood sugar has been under reasonable control per patient and significant other    PE  Bounding posterior tibial pulses bilaterally  Maceration of skin with trophic ulcers plantar surface of both feet    Impression/plan  Suspect possible osteo of either or both feet  Recommend admission however patient on able to enter hospital at this time  Patient to return for hospitalization when opportunity arises

## 2022-06-15 ENCOUNTER — HOSPITAL ENCOUNTER (INPATIENT)
Facility: OTHER | Age: 57
LOS: 30 days | Discharge: HOME | DRG: 623 | End: 2022-07-15
Attending: EMERGENCY MEDICINE | Admitting: EMERGENCY MEDICINE
Payer: MEDICAID

## 2022-06-15 ENCOUNTER — OFFICE VISIT (OUTPATIENT)
Dept: SURGERY | Facility: CLINIC | Age: 57
DRG: 623 | End: 2022-06-15
Attending: SPECIALIST
Payer: MEDICAID

## 2022-06-15 VITALS
HEIGHT: 74 IN | OXYGEN SATURATION: 98 % | DIASTOLIC BLOOD PRESSURE: 84 MMHG | WEIGHT: 285 LBS | SYSTOLIC BLOOD PRESSURE: 123 MMHG | BODY MASS INDEX: 36.57 KG/M2 | HEART RATE: 86 BPM

## 2022-06-15 DIAGNOSIS — L97.512 RIGHT FOOT ULCER, WITH FAT LAYER EXPOSED: ICD-10-CM

## 2022-06-15 DIAGNOSIS — Z79.4 TYPE 2 DIABETES MELLITUS WITH DIABETIC PERIPHERAL ANGIOPATHY WITHOUT GANGRENE, WITH LONG-TERM CURRENT USE OF INSULIN: ICD-10-CM

## 2022-06-15 DIAGNOSIS — K59.01 SLOW TRANSIT CONSTIPATION: ICD-10-CM

## 2022-06-15 DIAGNOSIS — L97.509 FOOT ULCER: ICD-10-CM

## 2022-06-15 DIAGNOSIS — S91.302D OPEN WOUND OF LEFT FOOT, SUBSEQUENT ENCOUNTER: ICD-10-CM

## 2022-06-15 DIAGNOSIS — L97.522 ULCERATED, FOOT, LEFT, WITH FAT LAYER EXPOSED: ICD-10-CM

## 2022-06-15 DIAGNOSIS — M86.171 OTHER ACUTE OSTEOMYELITIS OF RIGHT FOOT: ICD-10-CM

## 2022-06-15 DIAGNOSIS — L97.512 ULCERATED, FOOT, RIGHT, WITH FAT LAYER EXPOSED: ICD-10-CM

## 2022-06-15 DIAGNOSIS — I10 PRIMARY HYPERTENSION: ICD-10-CM

## 2022-06-15 DIAGNOSIS — S98.111A AMPUTATION OF RIGHT GREAT TOE: ICD-10-CM

## 2022-06-15 DIAGNOSIS — L97.522 ULCER OF BOTH FEET WITH FAT LAYER EXPOSED: ICD-10-CM

## 2022-06-15 DIAGNOSIS — L97.509 ULCER OF FOOT, UNSPECIFIED LATERALITY, UNSPECIFIED ULCER STAGE: ICD-10-CM

## 2022-06-15 DIAGNOSIS — L97.512 ULCER OF BOTH FEET WITH FAT LAYER EXPOSED: ICD-10-CM

## 2022-06-15 DIAGNOSIS — E11.51 TYPE 2 DIABETES MELLITUS WITH DIABETIC PERIPHERAL ANGIOPATHY WITHOUT GANGRENE, WITH LONG-TERM CURRENT USE OF INSULIN: ICD-10-CM

## 2022-06-15 DIAGNOSIS — L97.512 RIGHT FOOT ULCER, WITH FAT LAYER EXPOSED: Primary | ICD-10-CM

## 2022-06-15 DIAGNOSIS — R07.9 CHEST PAIN: ICD-10-CM

## 2022-06-15 DIAGNOSIS — M86.9 OSTEOMYELITIS OF RIGHT FOOT: ICD-10-CM

## 2022-06-15 DIAGNOSIS — N17.9 AKI (ACUTE KIDNEY INJURY): ICD-10-CM

## 2022-06-15 DIAGNOSIS — M86.671 OTHER CHRONIC OSTEOMYELITIS OF RIGHT FOOT: ICD-10-CM

## 2022-06-15 DIAGNOSIS — M86.9 OSTEOMYELITIS OF RIGHT FOOT, UNSPECIFIED TYPE: Primary | ICD-10-CM

## 2022-06-15 LAB
ALBUMIN SERPL BCP-MCNC: 3.9 G/DL (ref 3.5–5.2)
ALP SERPL-CCNC: 104 U/L (ref 55–135)
ALT SERPL W/O P-5'-P-CCNC: 27 U/L (ref 10–44)
ANION GAP SERPL CALC-SCNC: 14 MMOL/L (ref 8–16)
AST SERPL-CCNC: 20 U/L (ref 10–40)
BASOPHILS # BLD AUTO: 0.05 K/UL (ref 0–0.2)
BASOPHILS NFR BLD: 0.5 % (ref 0–1.9)
BILIRUB SERPL-MCNC: 0.3 MG/DL (ref 0.1–1)
BUN SERPL-MCNC: 28 MG/DL (ref 6–20)
CALCIUM SERPL-MCNC: 10.4 MG/DL (ref 8.7–10.5)
CHLORIDE SERPL-SCNC: 99 MMOL/L (ref 95–110)
CO2 SERPL-SCNC: 23 MMOL/L (ref 23–29)
CREAT SERPL-MCNC: 1.5 MG/DL (ref 0.5–1.4)
CRP SERPL-MCNC: 26.5 MG/L (ref 0–8.2)
DIFFERENTIAL METHOD: ABNORMAL
EOSINOPHIL # BLD AUTO: 0.4 K/UL (ref 0–0.5)
EOSINOPHIL NFR BLD: 3.9 % (ref 0–8)
ERYTHROCYTE [DISTWIDTH] IN BLOOD BY AUTOMATED COUNT: 14.6 % (ref 11.5–14.5)
ERYTHROCYTE [SEDIMENTATION RATE] IN BLOOD: 80 MM/HR (ref 0–10)
EST. GFR  (AFRICAN AMERICAN): 58 ML/MIN/1.73 M^2
EST. GFR  (NON AFRICAN AMERICAN): 51 ML/MIN/1.73 M^2
GLUCOSE SERPL-MCNC: 194 MG/DL (ref 70–110)
HCT VFR BLD AUTO: 42.4 % (ref 40–54)
HGB BLD-MCNC: 13.2 G/DL (ref 14–18)
IMM GRANULOCYTES # BLD AUTO: 0.04 K/UL (ref 0–0.04)
IMM GRANULOCYTES NFR BLD AUTO: 0.4 % (ref 0–0.5)
LYMPHOCYTES # BLD AUTO: 3.1 K/UL (ref 1–4.8)
LYMPHOCYTES NFR BLD: 32.6 % (ref 18–48)
MCH RBC QN AUTO: 27.6 PG (ref 27–31)
MCHC RBC AUTO-ENTMCNC: 31.1 G/DL (ref 32–36)
MCV RBC AUTO: 89 FL (ref 82–98)
MONOCYTES # BLD AUTO: 1 K/UL (ref 0.3–1)
MONOCYTES NFR BLD: 9.9 % (ref 4–15)
NEUTROPHILS # BLD AUTO: 5 K/UL (ref 1.8–7.7)
NEUTROPHILS NFR BLD: 52.7 % (ref 38–73)
NRBC BLD-RTO: 0 /100 WBC
PLATELET # BLD AUTO: 451 K/UL (ref 150–450)
PMV BLD AUTO: 8.7 FL (ref 9.2–12.9)
POTASSIUM SERPL-SCNC: 4.8 MMOL/L (ref 3.5–5.1)
PROT SERPL-MCNC: 9.4 G/DL (ref 6–8.4)
RBC # BLD AUTO: 4.79 M/UL (ref 4.6–6.2)
SODIUM SERPL-SCNC: 136 MMOL/L (ref 136–145)
WBC # BLD AUTO: 9.55 K/UL (ref 3.9–12.7)

## 2022-06-15 PROCEDURE — 4010F PR ACE/ARB THEARPY RXD/TAKEN: ICD-10-PCS | Mod: CPTII,,, | Performed by: SPECIALIST

## 2022-06-15 PROCEDURE — 1159F PR MEDICATION LIST DOCUMENTED IN MEDICAL RECORD: ICD-10-PCS | Mod: CPTII,,, | Performed by: SPECIALIST

## 2022-06-15 PROCEDURE — 99999 PR PBB SHADOW E&M-EST. PATIENT-LVL IV: ICD-10-PCS | Mod: PBBFAC,,, | Performed by: SPECIALIST

## 2022-06-15 PROCEDURE — 3051F PR MOST RECENT HEMOGLOBIN A1C LEVEL 7.0 - < 8.0%: ICD-10-PCS | Mod: CPTII,,, | Performed by: SPECIALIST

## 2022-06-15 PROCEDURE — 86140 C-REACTIVE PROTEIN: CPT | Performed by: NURSE PRACTITIONER

## 2022-06-15 PROCEDURE — 63600175 PHARM REV CODE 636 W HCPCS: Performed by: EMERGENCY MEDICINE

## 2022-06-15 PROCEDURE — 1159F MED LIST DOCD IN RCRD: CPT | Mod: CPTII,,, | Performed by: SPECIALIST

## 2022-06-15 PROCEDURE — 3008F PR BODY MASS INDEX (BMI) DOCUMENTED: ICD-10-PCS | Mod: CPTII,,, | Performed by: SPECIALIST

## 2022-06-15 PROCEDURE — 99212 PR OFFICE/OUTPT VISIT, EST, LEVL II, 10-19 MIN: ICD-10-PCS | Mod: S$PBB,,, | Performed by: SPECIALIST

## 2022-06-15 PROCEDURE — 25000003 PHARM REV CODE 250: Performed by: EMERGENCY MEDICINE

## 2022-06-15 PROCEDURE — 12000002 HC ACUTE/MED SURGE SEMI-PRIVATE ROOM

## 2022-06-15 PROCEDURE — 63600175 PHARM REV CODE 636 W HCPCS: Performed by: NURSE PRACTITIONER

## 2022-06-15 PROCEDURE — 3079F PR MOST RECENT DIASTOLIC BLOOD PRESSURE 80-89 MM HG: ICD-10-PCS | Mod: CPTII,,, | Performed by: SPECIALIST

## 2022-06-15 PROCEDURE — 85025 COMPLETE CBC W/AUTO DIFF WBC: CPT | Performed by: NURSE PRACTITIONER

## 2022-06-15 PROCEDURE — 99999 PR PBB SHADOW E&M-EST. PATIENT-LVL IV: CPT | Mod: PBBFAC,,, | Performed by: SPECIALIST

## 2022-06-15 PROCEDURE — 99223 1ST HOSP IP/OBS HIGH 75: CPT | Mod: CS,,, | Performed by: NURSE PRACTITIONER

## 2022-06-15 PROCEDURE — 25000003 PHARM REV CODE 250: Performed by: NURSE PRACTITIONER

## 2022-06-15 PROCEDURE — 3074F PR MOST RECENT SYSTOLIC BLOOD PRESSURE < 130 MM HG: ICD-10-PCS | Mod: CPTII,,, | Performed by: SPECIALIST

## 2022-06-15 PROCEDURE — 3074F SYST BP LT 130 MM HG: CPT | Mod: CPTII,,, | Performed by: SPECIALIST

## 2022-06-15 PROCEDURE — 1160F PR REVIEW ALL MEDS BY PRESCRIBER/CLIN PHARMACIST DOCUMENTED: ICD-10-PCS | Mod: CPTII,,, | Performed by: SPECIALIST

## 2022-06-15 PROCEDURE — 1160F RVW MEDS BY RX/DR IN RCRD: CPT | Mod: CPTII,,, | Performed by: SPECIALIST

## 2022-06-15 PROCEDURE — 87040 BLOOD CULTURE FOR BACTERIA: CPT | Mod: 59 | Performed by: NURSE PRACTITIONER

## 2022-06-15 PROCEDURE — 3051F HG A1C>EQUAL 7.0%<8.0%: CPT | Mod: CPTII,,, | Performed by: SPECIALIST

## 2022-06-15 PROCEDURE — 99223 PR INITIAL HOSPITAL CARE,LEVL III: ICD-10-PCS | Mod: CS,,, | Performed by: NURSE PRACTITIONER

## 2022-06-15 PROCEDURE — 4010F ACE/ARB THERAPY RXD/TAKEN: CPT | Mod: CPTII,,, | Performed by: SPECIALIST

## 2022-06-15 PROCEDURE — 3079F DIAST BP 80-89 MM HG: CPT | Mod: CPTII,,, | Performed by: SPECIALIST

## 2022-06-15 PROCEDURE — 85651 RBC SED RATE NONAUTOMATED: CPT | Performed by: NURSE PRACTITIONER

## 2022-06-15 PROCEDURE — 99212 OFFICE O/P EST SF 10 MIN: CPT | Mod: S$PBB,,, | Performed by: SPECIALIST

## 2022-06-15 PROCEDURE — 80053 COMPREHEN METABOLIC PANEL: CPT | Performed by: NURSE PRACTITIONER

## 2022-06-15 PROCEDURE — 99214 OFFICE O/P EST MOD 30 MIN: CPT | Mod: PBBFAC | Performed by: SPECIALIST

## 2022-06-15 PROCEDURE — 99285 EMERGENCY DEPT VISIT HI MDM: CPT | Mod: 25,27

## 2022-06-15 PROCEDURE — 3008F BODY MASS INDEX DOCD: CPT | Mod: CPTII,,, | Performed by: SPECIALIST

## 2022-06-15 RX ORDER — ACETAMINOPHEN 325 MG/1
650 TABLET ORAL EVERY 4 HOURS PRN
Status: DISCONTINUED | OUTPATIENT
Start: 2022-06-15 | End: 2022-07-15 | Stop reason: HOSPADM

## 2022-06-15 RX ORDER — SODIUM CHLORIDE 0.9 % (FLUSH) 0.9 %
10 SYRINGE (ML) INJECTION
Status: DISCONTINUED | OUTPATIENT
Start: 2022-06-15 | End: 2022-07-15 | Stop reason: HOSPADM

## 2022-06-15 RX ORDER — CEFEPIME HYDROCHLORIDE 1 G/50ML
2 INJECTION, SOLUTION INTRAVENOUS
Status: COMPLETED | OUTPATIENT
Start: 2022-06-15 | End: 2022-06-15

## 2022-06-15 RX ORDER — HYDROCODONE BITARTRATE AND ACETAMINOPHEN 10; 325 MG/1; MG/1
1 TABLET ORAL EVERY 6 HOURS PRN
Status: DISCONTINUED | OUTPATIENT
Start: 2022-06-15 | End: 2022-06-17

## 2022-06-15 RX ORDER — ONDANSETRON 2 MG/ML
4 INJECTION INTRAMUSCULAR; INTRAVENOUS EVERY 6 HOURS PRN
Status: DISCONTINUED | OUTPATIENT
Start: 2022-06-15 | End: 2022-07-15 | Stop reason: HOSPADM

## 2022-06-15 RX ORDER — CEFEPIME HYDROCHLORIDE 1 G/50ML
2 INJECTION, SOLUTION INTRAVENOUS
Status: DISCONTINUED | OUTPATIENT
Start: 2022-06-16 | End: 2022-06-16

## 2022-06-15 RX ORDER — INSULIN ASPART 100 [IU]/ML
1-10 INJECTION, SOLUTION INTRAVENOUS; SUBCUTANEOUS
Status: DISCONTINUED | OUTPATIENT
Start: 2022-06-15 | End: 2022-07-15 | Stop reason: HOSPADM

## 2022-06-15 RX ORDER — HEPARIN SODIUM 5000 [USP'U]/ML
5000 INJECTION, SOLUTION INTRAVENOUS; SUBCUTANEOUS EVERY 8 HOURS
Status: DISCONTINUED | OUTPATIENT
Start: 2022-06-15 | End: 2022-06-16

## 2022-06-15 RX ORDER — QUETIAPINE FUMARATE 200 MG/1
200 TABLET, FILM COATED ORAL NIGHTLY
Status: DISCONTINUED | OUTPATIENT
Start: 2022-06-15 | End: 2022-07-15 | Stop reason: HOSPADM

## 2022-06-15 RX ORDER — GLUCAGON 1 MG
1 KIT INJECTION
Status: DISCONTINUED | OUTPATIENT
Start: 2022-06-15 | End: 2022-07-15 | Stop reason: HOSPADM

## 2022-06-15 RX ORDER — NALOXONE HCL 0.4 MG/ML
0.02 VIAL (ML) INJECTION
Status: DISCONTINUED | OUTPATIENT
Start: 2022-06-15 | End: 2022-07-15 | Stop reason: HOSPADM

## 2022-06-15 RX ORDER — SODIUM CHLORIDE 9 MG/ML
INJECTION, SOLUTION INTRAVENOUS CONTINUOUS
Status: ACTIVE | OUTPATIENT
Start: 2022-06-15 | End: 2022-06-16

## 2022-06-15 RX ORDER — POLYETHYLENE GLYCOL 3350 17 G/17G
17 POWDER, FOR SOLUTION ORAL DAILY PRN
Status: DISCONTINUED | OUTPATIENT
Start: 2022-06-15 | End: 2022-07-06

## 2022-06-15 RX ORDER — LISINOPRIL 10 MG/1
10 TABLET ORAL DAILY
Status: DISCONTINUED | OUTPATIENT
Start: 2022-06-16 | End: 2022-07-15 | Stop reason: HOSPADM

## 2022-06-15 RX ORDER — IBUPROFEN 200 MG
24 TABLET ORAL
Status: DISCONTINUED | OUTPATIENT
Start: 2022-06-15 | End: 2022-07-15 | Stop reason: HOSPADM

## 2022-06-15 RX ORDER — HYDROCODONE BITARTRATE AND ACETAMINOPHEN 5; 325 MG/1; MG/1
1 TABLET ORAL EVERY 6 HOURS PRN
Status: DISCONTINUED | OUTPATIENT
Start: 2022-06-15 | End: 2022-06-17

## 2022-06-15 RX ORDER — IBUPROFEN 200 MG
16 TABLET ORAL
Status: DISCONTINUED | OUTPATIENT
Start: 2022-06-15 | End: 2022-07-15 | Stop reason: HOSPADM

## 2022-06-15 RX ORDER — ATORVASTATIN CALCIUM 20 MG/1
40 TABLET, FILM COATED ORAL DAILY
Status: DISCONTINUED | OUTPATIENT
Start: 2022-06-16 | End: 2022-07-15 | Stop reason: HOSPADM

## 2022-06-15 RX ORDER — TALC
6 POWDER (GRAM) TOPICAL NIGHTLY PRN
Status: DISCONTINUED | OUTPATIENT
Start: 2022-06-15 | End: 2022-07-15 | Stop reason: HOSPADM

## 2022-06-15 RX ADMIN — QUETIAPINE FUMARATE 200 MG: 200 TABLET ORAL at 09:06

## 2022-06-15 RX ADMIN — HYDROCODONE BITARTRATE AND ACETAMINOPHEN 1 TABLET: 10; 325 TABLET ORAL at 09:06

## 2022-06-15 RX ADMIN — VANCOMYCIN HYDROCHLORIDE 2000 MG: 500 INJECTION, POWDER, LYOPHILIZED, FOR SOLUTION INTRAVENOUS at 09:06

## 2022-06-15 RX ADMIN — CEFEPIME HYDROCHLORIDE 2 G: 2 INJECTION, SOLUTION INTRAVENOUS at 08:06

## 2022-06-15 RX ADMIN — HEPARIN SODIUM 5000 UNITS: 5000 INJECTION INTRAVENOUS; SUBCUTANEOUS at 09:06

## 2022-06-15 NOTE — ED PROVIDER NOTES
Encounter Date: 6/15/2022    SCRIBE #1 NOTE: ITiff, otis scribing for, and in the presence of, Negra Richards MD.       History     Chief Complaint   Patient presents with    Wound Check     Sent for admission by Dr. Flores for BL feet infection.      Time seen by provider: 6:20 PM    This is a 56 y.o. male with PMHx DM, HTN, and COPD who presents with bilateral foot ulcers. The patient reports he saw Dr. Flores this morning who instructed him to the ED as he suspected osteomyelitis or deep space infection. He notes having bilateral foot pain and bilateral lower extremity swelling. He notes his right foot ulcer is more severe. The patient states his sugars have been in the 100's recently. H he has a PSHx of transmetatarsal amputation on his right foot and multiple wound debridement procedures with his last being 5/27/2022. This is the extent of the patient's complaints at this time.    The history is provided by the patient.     Review of patient's allergies indicates:   Allergen Reactions    Ibuprofen Swelling     Facial swelling     Past Medical History:   Diagnosis Date    COPD (chronic obstructive pulmonary disease)     COVID-19     Depression     Diabetes mellitus     Diabetes mellitus, type 2     Hypertension      Past Surgical History:   Procedure Laterality Date    DEBRIDEMENT OF LOWER EXTREMITY Bilateral 3/2/2022    Procedure: DEBRIDEMENT, LOWER EXTREMITY;  Surgeon: Jesus Flores Jr., MD;  Location: [facility];  Service: General;  Laterality: Bilateral;    FOOT AMPUTATION THROUGH METATARSAL Right 9/23/2021    Procedure: AMPUTATION, FOOT, TRANSMETATARSAL right 1st ray resection;  Surgeon: Samuel Toussaint DPM;  Location: [facility] OR;  Service: Podiatry;  Laterality: Right;    INCISION AND DRAINAGE FOOT Bilateral 9/21/2021    Procedure: INCISION AND DRAINAGE, FOOT - Bilateral;  Surgeon: Lavonne Vigil DPM;  Location: [facility] OR;  Service: Podiatry;  Laterality: Bilateral;    REPLACEMENT OF WOUND  DRESSING Right 2/24/2022    Procedure: REPLACEMENT, DRESSING, WOUND;  Surgeon: Jesus Flores Jr., MD;  Location: [facility];  Service: Vascular;  Laterality: Right;  wound vac dressing changed    WOUND DEBRIDEMENT Right 2/22/2022    Procedure: DEBRIDEMENT, WOUND - RIGHT FOOT;  Surgeon: Jesus Flores Jr., MD;  Location: [facility] OR;  Service: Vascular;  Laterality: Right;    WOUND DEBRIDEMENT Bilateral 2/24/2022    Procedure: DEBRIDEMENT, WOUND / BILATERAL DEBRIDEMENT FEET;  Surgeon: Jesus Flores Jr., MD;  Location: [facility] OR;  Service: Vascular;  Laterality: Bilateral;    WOUND DEBRIDEMENT Right 5/27/2022    Procedure: DEBRIDEMENT, WOUND;  Surgeon: Jesus Flores Jr., MD;  Location: [facility];  Service: General;  Laterality: Right;     History reviewed. No pertinent family history.  Social History     Tobacco Use    Smoking status: Former Smoker    Smokeless tobacco: Never Used   Substance Use Topics    Alcohol use: Yes     Comment: whiskey and beer every other day    Drug use: Not Currently     Review of Systems   Constitutional: Negative for chills and fever.   HENT: Negative for sore throat.    Respiratory: Negative for shortness of breath.    Cardiovascular: Positive for leg swelling. Negative for chest pain.   Gastrointestinal: Negative for nausea.   Genitourinary: Negative for dysuria.   Musculoskeletal: Negative for back pain and neck pain.        Positive for bilateral foot ulcers. Positive for bilateral foot pain. Positive for bilateral lower extremity swelling.   Skin: Positive for wound. Negative for rash.   Neurological: Negative for dizziness, weakness and headaches.   Hematological: Does not bruise/bleed easily.       Physical Exam     Initial Vitals [06/15/22 1549]   BP Pulse Resp Temp SpO2   121/86 85 16 97.6 °F (36.4 °C) 98 %      MAP       --         Physical Exam    Nursing note and vitals reviewed.  Constitutional: He appears well-developed and well-nourished. He is not diaphoretic. No distress.    HENT:   Head: Normocephalic and atraumatic.   Eyes: Conjunctivae are normal. Pupils are equal, round, and reactive to light.   Neck: Neck supple.   Cardiovascular: Normal rate and regular rhythm.   Pulmonary/Chest: Breath sounds normal. No respiratory distress. He has no wheezes.   Abdominal: Abdomen is soft. Bowel sounds are normal.   Musculoskeletal:      Cervical back: Neck supple.      Comments: Lower extremity pitting edema. Right foot 3x5 cm open wound along plantar aspect at base of 1st metatarsal. Purulent drainage noted. Left foot 3.5x3.5 cm superficial wound at plantar surface.     Neurological: He is alert and oriented to person, place, and time.   Ambulatory with steady gait.   Skin: Skin is warm and dry.   Psychiatric: He has a normal mood and affect.         ED Course   Procedures  Labs Reviewed   CBC W/ AUTO DIFFERENTIAL - Abnormal; Notable for the following components:       Result Value    Hemoglobin 13.2 (*)     MCHC 31.1 (*)     RDW 14.6 (*)     Platelets 451 (*)     MPV 8.7 (*)     All other components within normal limits   COMPREHENSIVE METABOLIC PANEL - Abnormal; Notable for the following components:    Glucose 194 (*)     BUN 28 (*)     Creatinine 1.5 (*)     Total Protein 9.4 (*)     eGFR if  58 (*)     eGFR if non  51 (*)     All other components within normal limits   SEDIMENTATION RATE - Abnormal; Notable for the following components:    Sed Rate 80 (*)     All other components within normal limits   C-REACTIVE PROTEIN - Abnormal; Notable for the following components:    CRP 26.5 (*)     All other components within normal limits   CULTURE, BLOOD   CULTURE, BLOOD   SARS-COV-2 RDRP GENE          Imaging Results           X-Ray Foot Complete Left (Final result)  Result time 06/15/22 19:04:04    Final result by Sohail Russell MD (06/15/22 19:04:04)                 Impression:      1. Soft tissue ulceration at the level the proximal phalanx of the 1st digit.   Recommend clinical correlation.  2. Mild erosion/diminished density of the distal portion of the distal phalanx of the 4th digit.  This could be chronic.  Infection/osteomyelitis is not excluded.  Recommend clinical correlation and follow-up.  3.  This report was flagged in Epic as abnormal.      Electronically signed by: Sohail Russell  Date:    06/15/2022  Time:    19:04             Narrative:    EXAMINATION:  XR FOOT COMPLETE 3 VIEW LEFT    CLINICAL HISTORY:  .  Non-pressure chronic ulcer of other part of unspecified foot with unspecified severity    TECHNIQUE:  AP, lateral and oblique views of the left foot were performed.    COMPARISON:  None    FINDINGS:  Mild erosion/diminished density of the distal portion of the distal phalanx of the 4th digit.  Infection/osteomyelitis is not excluded.  Recommend clinical correlation and follow-up.    Soft tissue ulceration anteriorly at the level of the proximal phalanx of the 1st digit.  Recommend clinical correlation.    Calcaneal spurring inferiorly.                                X-Ray Foot Complete Right (Final result)  Result time 06/15/22 18:57:07    Final result by Sohail Russell MD (06/15/22 18:57:07)                 Impression:      1. Dislocation of the 3rd digit at the metatarsophalangeal joint.  Recommend reduction and follow-up.  2. Mild irregularity of the distal stumps of the 1st and 2nd metatarsals.  This could be associated with osteomyelitis.  Probable adjacent soft tissue edema or cellulitis also.  Recommend clinical correlation.  3.  This report was flagged in Epic as abnormal.      Electronically signed by: Sohail Russell  Date:    06/15/2022  Time:    18:57             Narrative:    EXAMINATION:  XR FOOT COMPLETE 3 VIEW RIGHT    CLINICAL HISTORY:  . Non-pressure chronic ulcer of other part of unspecified foot with unspecified severity    TECHNIQUE:  AP, lateral, and oblique views of the right foot were  performed.    COMPARISON:  02/21/2022    FINDINGS:  First digit has been amputated at the level the mid 1st metatarsal.  Second digit is amputated at the level the distal 2nd metatarsal.    There is dislocation of the 3rd phalange E at the metatarsophalangeal joint.  Recommend reduction and follow-up.    Mild irregularity of the distal stump of the 1st and 2nd metatarsals could be associated with osteomyelitis.  Follow-up recommended.    Calcaneal spurring inferiorly.  Osteophytic spurring of the midfoot dorsal surface.                              X-Rays:     Medications   vancomycin 2 g in dextrose 5 % 500 mL IVPB (has no administration in time range)   cefepime in dextrose 5 % IVPB 2 g (has no administration in time range)     Medical Decision Making:   History:   Old Medical Records: I decided to obtain old medical records.  Old Records Summarized: other records, records from clinic visits and records from another hospital.  Initial Assessment:   6:20PM:  Patient is a 56-year-old male who presents to the emergency department with bilateral foot ulcers, right greater than left.  There is concern for osteomyelitis.  Will plan for labs, cultures, imaging, will continue to follow and reassess.  Independently Interpreted Test(s):   I have ordered and independently interpreted X-rays - see prior notes.  Clinical Tests:   Lab Tests: Ordered and Reviewed  Radiological Study: Ordered and Reviewed  Other:   I have discussed this case with another health care provider.    7:49 PM:  Patient's labs show increase in his inflammatory markers along with an x-ray concerning for osteomyelitis.  Will plan to start IV antibiotics and admit for further observation and management.  I discussed with Frederic Shepherd NP, who is agreeable to plan for admission.            Scribe Attestation:   Scribe #1: I performed the above scribed service and the documentation accurately describes the services I performed. I attest to the accuracy of  the note.               Physician Attestation for Scribe: I, Negra Richards, reviewed documentation as scribed in my presence, which is both accurate and complete.  Clinical Impression:   Final diagnoses:  [L97.509] Foot ulcer  [M86.9] Osteomyelitis of right foot, unspecified type (Primary)          ED Disposition Condition    Admit               Negra Richards MD  06/15/22 2037

## 2022-06-15 NOTE — Clinical Note
Diagnosis: Osteomyelitis of right foot, unspecified type [[id number]]   Admitting Provider:: EMILY BEE [[id number]]   Future Attending Provider: SANJIV KAY [[id number]]   Reason for IP Medical Treatment  (Clinical interventions that can only be accomplished in the IP setting? ) :: iv antibiotics   Estimated Length of Stay:: 2 midnights   I certify that Inpatient services for greater than or equal to 2 midnights are medically necessary:: Yes   Plans for Post-Acute care--if anticipated (pick the single best option):: A. No post acute care anticipated at this time   Special Needs:: No Special Needs [1]

## 2022-06-15 NOTE — PROGRESS NOTES
56-year-old male with longstanding diabetes mellitus and history of trophic ulcers both feet.  Status post right digital amputation by Podiatry  Patient now with a new area of ulceration along metatarsal heads of the left foot.  Significant eschar.  Bounding pulses both posterior tibial vessels    Impression/plan  Suspect possible osteomyelitis or deep space infection  Will refer to ER for further eval

## 2022-06-15 NOTE — FIRST PROVIDER EVALUATION
"Medical screening exam completed.  I have conducted a focused provider triage encounter, findings are as follows:    Brief history of present illness:  Sent to ED by Dr. Flores for admission for bilateral feet osteomyelitis    Vitals:    06/15/22 1549   BP: 121/86   BP Location: Right arm   Patient Position: Sitting   Pulse: 85   Resp: 16   Temp: 97.6 °F (36.4 °C)   TempSrc: Oral   SpO2: 98%   Weight: 129.3 kg (285 lb)   Height: 6' 2" (1.88 m)       Pertinent physical exam:  Bandages noted to bilateral feet    Brief workup plan:  Labs and admission    Preliminary workup initiated; this workup will be continued and followed by the physician or advanced practice provider that is assigned to the patient when roomed.  "

## 2022-06-16 PROBLEM — R63.8 INCREASED NUTRITIONAL NEEDS: Status: RESOLVED | Noted: 2022-02-28 | Resolved: 2022-06-16

## 2022-06-16 PROBLEM — U07.1 PNEUMONIA DUE TO COVID-19 VIRUS: Status: RESOLVED | Noted: 2022-01-07 | Resolved: 2022-06-16

## 2022-06-16 PROBLEM — E11.59 TYPE 2 DIABETES MELLITUS WITH CIRCULATORY DISORDER, WITH LONG-TERM CURRENT USE OF INSULIN: Status: ACTIVE | Noted: 2021-09-20

## 2022-06-16 PROBLEM — J12.82 PNEUMONIA DUE TO COVID-19 VIRUS: Status: RESOLVED | Noted: 2022-01-07 | Resolved: 2022-06-16

## 2022-06-16 PROBLEM — U07.1 ACUTE RESPIRATORY FAILURE DUE TO COVID-19: Status: RESOLVED | Noted: 2022-01-07 | Resolved: 2022-06-16

## 2022-06-16 PROBLEM — E11.51 TYPE 2 DIABETES MELLITUS WITH DIABETIC PERIPHERAL ANGIOPATHY WITHOUT GANGRENE, WITH LONG-TERM CURRENT USE OF INSULIN: Status: ACTIVE | Noted: 2021-09-20

## 2022-06-16 PROBLEM — J96.00 ACUTE RESPIRATORY FAILURE DUE TO COVID-19: Status: RESOLVED | Noted: 2022-01-07 | Resolved: 2022-06-16

## 2022-06-16 PROBLEM — J96.11 CHRONIC RESPIRATORY FAILURE WITH HYPOXIA: Status: RESOLVED | Noted: 2022-02-22 | Resolved: 2022-06-16

## 2022-06-16 PROBLEM — R94.31 NONSPECIFIC ABNORMAL ELECTROCARDIOGRAM (ECG) (EKG): Status: RESOLVED | Noted: 2021-12-07 | Resolved: 2022-06-16

## 2022-06-16 LAB
ANION GAP SERPL CALC-SCNC: 12 MMOL/L (ref 8–16)
BUN SERPL-MCNC: 24 MG/DL (ref 6–20)
CALCIUM SERPL-MCNC: 9.2 MG/DL (ref 8.7–10.5)
CHLORIDE SERPL-SCNC: 103 MMOL/L (ref 95–110)
CO2 SERPL-SCNC: 22 MMOL/L (ref 23–29)
CREAT SERPL-MCNC: 1.1 MG/DL (ref 0.5–1.4)
ERYTHROCYTE [DISTWIDTH] IN BLOOD BY AUTOMATED COUNT: 14.6 % (ref 11.5–14.5)
EST. GFR  (AFRICAN AMERICAN): >60 ML/MIN/1.73 M^2
EST. GFR  (NON AFRICAN AMERICAN): >60 ML/MIN/1.73 M^2
GLUCOSE SERPL-MCNC: 201 MG/DL (ref 70–110)
HCT VFR BLD AUTO: 37.2 % (ref 40–54)
HGB BLD-MCNC: 11.7 G/DL (ref 14–18)
MCH RBC QN AUTO: 27.9 PG (ref 27–31)
MCHC RBC AUTO-ENTMCNC: 31.5 G/DL (ref 32–36)
MCV RBC AUTO: 89 FL (ref 82–98)
PLATELET # BLD AUTO: 384 K/UL (ref 150–450)
PMV BLD AUTO: 9.4 FL (ref 9.2–12.9)
POCT GLUCOSE: 181 MG/DL (ref 70–110)
POCT GLUCOSE: 189 MG/DL (ref 70–110)
POCT GLUCOSE: 223 MG/DL (ref 70–110)
POCT GLUCOSE: 232 MG/DL (ref 70–110)
POCT GLUCOSE: 254 MG/DL (ref 70–110)
POTASSIUM SERPL-SCNC: 4.3 MMOL/L (ref 3.5–5.1)
RBC # BLD AUTO: 4.2 M/UL (ref 4.6–6.2)
SARS-COV-2 RDRP RESP QL NAA+PROBE: NEGATIVE
SODIUM SERPL-SCNC: 137 MMOL/L (ref 136–145)
WBC # BLD AUTO: 8.57 K/UL (ref 3.9–12.7)

## 2022-06-16 PROCEDURE — 87075 CULTR BACTERIA EXCEPT BLOOD: CPT | Performed by: NURSE PRACTITIONER

## 2022-06-16 PROCEDURE — 87076 CULTURE ANAEROBE IDENT EACH: CPT | Mod: 59 | Performed by: NURSE PRACTITIONER

## 2022-06-16 PROCEDURE — 63600175 PHARM REV CODE 636 W HCPCS: Performed by: NURSE PRACTITIONER

## 2022-06-16 PROCEDURE — 11000001 HC ACUTE MED/SURG PRIVATE ROOM

## 2022-06-16 PROCEDURE — 25000003 PHARM REV CODE 250: Performed by: NURSE PRACTITIONER

## 2022-06-16 PROCEDURE — 63600175 PHARM REV CODE 636 W HCPCS: Performed by: EMERGENCY MEDICINE

## 2022-06-16 PROCEDURE — 99222 PR INITIAL HOSPITAL CARE,LEVL II: ICD-10-PCS | Mod: ,,, | Performed by: PODIATRIST

## 2022-06-16 PROCEDURE — 87077 CULTURE AEROBIC IDENTIFY: CPT | Performed by: NURSE PRACTITIONER

## 2022-06-16 PROCEDURE — U0002 COVID-19 LAB TEST NON-CDC: HCPCS | Performed by: INTERNAL MEDICINE

## 2022-06-16 PROCEDURE — 87070 CULTURE OTHR SPECIMN AEROBIC: CPT | Performed by: NURSE PRACTITIONER

## 2022-06-16 PROCEDURE — 25000003 PHARM REV CODE 250: Performed by: INTERNAL MEDICINE

## 2022-06-16 PROCEDURE — 85027 COMPLETE CBC AUTOMATED: CPT | Performed by: NURSE PRACTITIONER

## 2022-06-16 PROCEDURE — 99233 SBSQ HOSP IP/OBS HIGH 50: CPT | Mod: ,,, | Performed by: INTERNAL MEDICINE

## 2022-06-16 PROCEDURE — 63600175 PHARM REV CODE 636 W HCPCS: Performed by: INTERNAL MEDICINE

## 2022-06-16 PROCEDURE — 25000003 PHARM REV CODE 250: Performed by: EMERGENCY MEDICINE

## 2022-06-16 PROCEDURE — 99233 PR SUBSEQUENT HOSPITAL CARE,LEVL III: ICD-10-PCS | Mod: ,,, | Performed by: INTERNAL MEDICINE

## 2022-06-16 PROCEDURE — C9399 UNCLASSIFIED DRUGS OR BIOLOG: HCPCS | Performed by: INTERNAL MEDICINE

## 2022-06-16 PROCEDURE — 99222 1ST HOSP IP/OBS MODERATE 55: CPT | Mod: ,,, | Performed by: PODIATRIST

## 2022-06-16 PROCEDURE — 80048 BASIC METABOLIC PNL TOTAL CA: CPT | Performed by: NURSE PRACTITIONER

## 2022-06-16 PROCEDURE — 87186 SC STD MICRODIL/AGAR DIL: CPT | Mod: 59 | Performed by: NURSE PRACTITIONER

## 2022-06-16 RX ORDER — ASPIRIN 81 MG/1
81 TABLET ORAL DAILY
Status: DISCONTINUED | OUTPATIENT
Start: 2022-06-16 | End: 2022-07-15 | Stop reason: HOSPADM

## 2022-06-16 RX ORDER — LORAZEPAM 2 MG/ML
1 INJECTION INTRAMUSCULAR
Status: COMPLETED | OUTPATIENT
Start: 2022-06-16 | End: 2022-06-16

## 2022-06-16 RX ORDER — INSULIN ASPART 100 [IU]/ML
5 INJECTION, SOLUTION INTRAVENOUS; SUBCUTANEOUS
Status: DISCONTINUED | OUTPATIENT
Start: 2022-06-16 | End: 2022-06-17

## 2022-06-16 RX ORDER — CEFEPIME HYDROCHLORIDE 1 G/50ML
2 INJECTION, SOLUTION INTRAVENOUS
Status: DISCONTINUED | OUTPATIENT
Start: 2022-06-16 | End: 2022-07-02

## 2022-06-16 RX ORDER — ENOXAPARIN SODIUM 100 MG/ML
40 INJECTION SUBCUTANEOUS EVERY 24 HOURS
Status: DISCONTINUED | OUTPATIENT
Start: 2022-06-16 | End: 2022-07-15 | Stop reason: HOSPADM

## 2022-06-16 RX ORDER — SODIUM CHLORIDE 9 MG/ML
INJECTION, SOLUTION INTRAVENOUS
Status: DISCONTINUED | OUTPATIENT
Start: 2022-06-16 | End: 2022-07-15 | Stop reason: HOSPADM

## 2022-06-16 RX ADMIN — CEFEPIME 2 G: 2 INJECTION, POWDER, FOR SOLUTION INTRAVENOUS at 01:06

## 2022-06-16 RX ADMIN — VANCOMYCIN HYDROCHLORIDE 2000 MG: 500 INJECTION, POWDER, LYOPHILIZED, FOR SOLUTION INTRAVENOUS at 09:06

## 2022-06-16 RX ADMIN — INSULIN ASPART 5 UNITS: 100 INJECTION, SOLUTION INTRAVENOUS; SUBCUTANEOUS at 05:06

## 2022-06-16 RX ADMIN — LISINOPRIL 10 MG: 10 TABLET ORAL at 09:06

## 2022-06-16 RX ADMIN — SODIUM CHLORIDE: 0.9 INJECTION, SOLUTION INTRAVENOUS at 05:06

## 2022-06-16 RX ADMIN — INSULIN DETEMIR 40 UNITS: 100 INJECTION, SOLUTION SUBCUTANEOUS at 08:06

## 2022-06-16 RX ADMIN — LORAZEPAM 1 MG: 2 INJECTION INTRAMUSCULAR; INTRAVENOUS at 07:06

## 2022-06-16 RX ADMIN — HYDROCODONE BITARTRATE AND ACETAMINOPHEN 1 TABLET: 10; 325 TABLET ORAL at 03:06

## 2022-06-16 RX ADMIN — SODIUM CHLORIDE: 0.9 INJECTION, SOLUTION INTRAVENOUS at 12:06

## 2022-06-16 RX ADMIN — ENOXAPARIN SODIUM 40 MG: 100 INJECTION SUBCUTANEOUS at 06:06

## 2022-06-16 RX ADMIN — CEFEPIME 2 G: 2 INJECTION, POWDER, FOR SOLUTION INTRAVENOUS at 05:06

## 2022-06-16 RX ADMIN — ATORVASTATIN CALCIUM 40 MG: 20 TABLET, FILM COATED ORAL at 09:06

## 2022-06-16 RX ADMIN — INSULIN ASPART 4 UNITS: 100 INJECTION, SOLUTION INTRAVENOUS; SUBCUTANEOUS at 11:06

## 2022-06-16 RX ADMIN — HYDROCODONE BITARTRATE AND ACETAMINOPHEN 1 TABLET: 5; 325 TABLET ORAL at 08:06

## 2022-06-16 RX ADMIN — QUETIAPINE FUMARATE 200 MG: 200 TABLET ORAL at 08:06

## 2022-06-16 RX ADMIN — VANCOMYCIN HYDROCHLORIDE 2000 MG: 500 INJECTION, POWDER, LYOPHILIZED, FOR SOLUTION INTRAVENOUS at 08:06

## 2022-06-16 RX ADMIN — ASPIRIN 81 MG: 81 TABLET, COATED ORAL at 09:06

## 2022-06-16 RX ADMIN — INSULIN ASPART 6 UNITS: 100 INJECTION, SOLUTION INTRAVENOUS; SUBCUTANEOUS at 05:06

## 2022-06-16 RX ADMIN — HEPARIN SODIUM 5000 UNITS: 5000 INJECTION INTRAVENOUS; SUBCUTANEOUS at 05:06

## 2022-06-16 NOTE — ASSESSMENT & PLAN NOTE
Patient has mild increased creatinine to 1.5 from a baseline of 1.2/1.3.  Will rehydrate and monitor kidney function.  Will hold HCTZ and losartan.

## 2022-06-16 NOTE — CONSULTS
[facility] - Med Surg ([place])  Wound Care    Patient Name:  Dave Good   MRN:  [id number]  Date: 6/16/2022  Diagnosis: Osteomyelitis of right foot    History:     Past Medical History:   Diagnosis Date    COPD (chronic obstructive pulmonary disease)     COVID-19     Depression     Diabetes mellitus     Diabetes mellitus, type 2     Hypertension        Social History     Socioeconomic History    Marital status: Single   Tobacco Use    Smoking status: Former Smoker    Smokeless tobacco: Never Used   Substance and Sexual Activity    Alcohol use: Yes     Comment: whiskey and beer every other day    Drug use: Not Currently    Sexual activity: Yes     Partners: Female       Precautions:     Allergies as of 06/15/2022 - Reviewed 06/15/2022   Allergen Reaction Noted    Ibuprofen Swelling 07/29/2014       [facility] Assessment Details/Treatment     Wound care consult received for assessment of bilateral foot wounds. Patient is a 56 yr old male with a past medical history of DM2, COPD, HTN, depression, and right foot transmetatarsal amputation. Patient was sent to the ED for evaluation of osteomyelitis by Dr. Flores. Pt with history of chronic wounds to bilateral feet with multiple debridements. He has seen podiatry and wound care previously for the management of his foot wounds. Podiatry consulted as well.    Upon assessment to left plantar foot noted 2 chronic full thickness wounds. Wound directly under 3rd toe tunnels approximately 1 cm deep likely to bone. Right medial foot with chronic wound. Unable to tell by palpation if area probes to bone. Right shin wound appears to be 2 ulcerations with dried exudate to wound bed.     Recommend daily vashe wet- to dry dressings to promote healing and reduce bio-burden until patient can be seen by podiatry. Nursing and MD team notified with recommendations. Orders placed. Nursing to maintain pressure injury prevention interventions. Wound care to assist with pt prn.      06/16/22  1040        Altered Skin Integrity 06/16/22 Left plantar Foot #1 Diabetic Ulcer   Date First Assessed: 06/16/22   Altered Skin Integrity Present on Admission: yes  Side: Left  Orientation: plantar  Location: Foot  Wound Number: #1  Is this injury device related?: No  Primary Wound Type: Diabetic Ulcer   Wound Image     Dressing Appearance Dry;Intact;Dried drainage   Drainage Amount Small   Drainage Characteristics/Odor Serous;Malodorous   Appearance Red;Pink;Yellow;Slough   Tissue loss description Full thickness   Periwound Area Dry;Scar tissue   Wound Edges Undefined;Callused   Wound Length (cm) 4 cm   Wound Width (cm) 6 cm   Wound Surface Area (cm^2) 24 cm^2   Care Cleansed with:;Antimicrobial agent   Dressing Applied;Gauze, wet to dry;Foam;Rolled gauze  (Vashe)   Dressing Change Due 06/17/22        Altered Skin Integrity 06/16/22 Right medial Foot #2 Non pressure chronic ulcer   Date First Assessed: 06/16/22   Altered Skin Integrity Present on Admission: yes  Side: Right  Orientation: medial  Location: Foot  Wound Number: #2  Is this injury device related?: No  Primary Wound Type: Non pressure chronic ulcer   Wound Image    Dressing Appearance Dry;Intact;Moist drainage   Drainage Amount Small   Drainage Characteristics/Odor Creamy;Serosanguineous;Malodorous   Appearance Pink;Red;Yellow;Moist   Tissue loss description Full thickness   Periwound Area Dry;Scar tissue   Wound Edges Callused   Wound Length (cm) 6 cm   Wound Width (cm) 3 cm   Wound Surface Area (cm^2) 18 cm^2   Care Cleansed with:;Antimicrobial agent   Dressing Applied;Gauze, wet to moist;Gauze, wet to dry;Silicone;Foam;Rolled gauze  (Vashe)   Dressing Change Due 06/17/22        Altered Skin Integrity 06/16/22 Right anterior;distal;lower Leg #3 Ulceration   Date First Assessed: 06/16/22   Altered Skin Integrity Present on Admission: yes  Side: Right  Orientation: anterior;distal;lower  Location: Leg  Wound Number: #3  Is this injury device related?: No   Primary Wound Type: Ulceration   Wound Image    Dressing Appearance Open to air   Drainage Amount None   Appearance Closed/resurfaced  (dried exudate to wound beds)   Tissue loss description Not applicable   Periwound Area Dry   Wound Length (cm) 5 cm   Wound Width (cm) 2.5 cm   Wound Surface Area (cm^2) 12.5 cm^2   Care Cleansed with:;Antimicrobial agent   Dressing Applied;Silicone;Foam       06/16/2022

## 2022-06-16 NOTE — PROGRESS NOTES
Pharmacist Renal Dose Adjustment Note    Dave Good is a 56 y.o. male being treated with the medication cefepime    Patient Data:    Vital Signs (Most Recent):  Temp: 97.7 °F (36.5 °C) (06/15/22 2149)  Pulse: 73 (06/16/22 0601)  Resp: 16 (06/16/22 0600)  BP: 111/72 (06/16/22 0601)  SpO2: 98 % (06/16/22 0600) Vital Signs (72h Range):  Temp:  [97.6 °F (36.4 °C)-97.7 °F (36.5 °C)]   Pulse:  []   Resp:  [16-17]   BP: (111-128)/(69-86)   SpO2:  [96 %-100 %]      Recent Labs   Lab 06/15/22  1708 06/16/22  0549   CREATININE 1.5* 1.1     Serum creatinine: 1.1 mg/dL 06/16/22 0549  Estimated creatinine clearance: 104.6 mL/min     Medication Dose Route Frequency   Previous Order Cefepime 2 q12h   New Order Cefepime 2g q8h     Renal dose adjustments performed as noted above.  We will continue monitoring and adjusting as necessary.    Pharmacist: Donna Barros, PharmD  [phone]

## 2022-06-16 NOTE — PROGRESS NOTES
Patient reviewed, renal function stable, cultures reviewed, no new levels, continue current therapy; Next levels due: 6/17@8530

## 2022-06-16 NOTE — PLAN OF CARE
Initial Discharge Planning Assessment:  Patient admitted on: 6/15/22     Chart reviewed, Care plan discussed with treatment team,  attending Dr. Patrick     PCP updated in Epic: NP. [facility]  Pharmacy, updated in Epic: [facility]      DME at home: [facility]      Current dispo: Home with family vs Home health      Transportation: Family can provide      Power of  or Living Will: none      Anticipated DC needs from CM perspective:     06/16/22 1610   Discharge Assessment   Assessment Type Discharge Planning Assessment   Confirmed/corrected address, phone number and insurance Yes   Confirmed Demographics Correct on Facesheet   Source of Information patient;health record   Lives With spouse   Do you expect to return to your current living situation? Yes   Do you have help at home or someone to help you manage your care at home? Yes   Who are your caregiver(s) and their phone number(s)? Spouse   Prior to hospitilization cognitive status: Alert/Oriented   Current cognitive status: Alert/Oriented   Walking or Climbing Stairs Difficulty none   Dressing/Bathing Difficulty none   Equipment Currently Used at Home cane, straight   Readmission within 30 days? No   Patient currently being followed by outpatient case management? No   Do you currently have service(s) that help you manage your care at home? No   Do you take prescription medications? Yes   Do you have prescription coverage? Yes   Do you have any problems affording any of your prescribed medications? No   Is the patient taking medications as prescribed? yes   How do you get to doctors appointments? family or friend will provide;health plan transportation   Are you on dialysis? No   Do you take coumadin? No   Discharge Plan A Home with family   Discharge Plan B Home Health   Discharge Plan discussed with: Patient   Discharge Barriers Identified None

## 2022-06-16 NOTE — SUBJECTIVE & OBJECTIVE
Past Medical History:   Diagnosis Date    COPD (chronic obstructive pulmonary disease)     COVID-19     Depression     Diabetes mellitus     Diabetes mellitus, type 2     Hypertension        Past Surgical History:   Procedure Laterality Date    DEBRIDEMENT OF LOWER EXTREMITY Bilateral 3/2/2022    Procedure: DEBRIDEMENT, LOWER EXTREMITY;  Surgeon: Jesus Flores Jr., MD;  Location: [facility];  Service: General;  Laterality: Bilateral;    FOOT AMPUTATION THROUGH METATARSAL Right 9/23/2021    Procedure: AMPUTATION, FOOT, TRANSMETATARSAL right 1st ray resection;  Surgeon: Samuel Toussaint DPM;  Location: [facility];  Service: Podiatry;  Laterality: Right;    INCISION AND DRAINAGE FOOT Bilateral 9/21/2021    Procedure: INCISION AND DRAINAGE, FOOT - Bilateral;  Surgeon: Lavonne Vigil DPM;  Location: [facility];  Service: Podiatry;  Laterality: Bilateral;    REPLACEMENT OF WOUND DRESSING Right 2/24/2022    Procedure: REPLACEMENT, DRESSING, WOUND;  Surgeon: Jesus Flores Jr., MD;  Location: [facility];  Service: Vascular;  Laterality: Right;  wound vac dressing changed    WOUND DEBRIDEMENT Right 2/22/2022    Procedure: DEBRIDEMENT, WOUND - RIGHT FOOT;  Surgeon: Jesus Flores Jr., MD;  Location: [facility];  Service: Vascular;  Laterality: Right;    WOUND DEBRIDEMENT Bilateral 2/24/2022    Procedure: DEBRIDEMENT, WOUND / BILATERAL DEBRIDEMENT FEET;  Surgeon: Jesus Flores Jr., MD;  Location: [facility];  Service: Vascular;  Laterality: Bilateral;    WOUND DEBRIDEMENT Right 5/27/2022    Procedure: DEBRIDEMENT, WOUND;  Surgeon: Jesus Flores Jr., MD;  Location: [facility];  Service: General;  Laterality: Right;       Review of patient's allergies indicates:   Allergen Reactions    Ibuprofen Swelling     Facial swelling       Current Facility-Administered Medications on File Prior to Encounter   Medication    0.9%  NaCl infusion    LIDOcaine (PF) 10 mg/ml (1%) injection 10 mg    mupirocin 2 % ointment     Current Outpatient Medications on File Prior  to Encounter   Medication Sig    ammonium lactate 12 % Crea Apply twice daily to affected parts both feet as needed.    aspirin (ECOTRIN) 81 MG EC tablet Take 81 mg by mouth once daily.    atorvastatin (LIPITOR) 20 MG tablet Take 40 mg by mouth once daily.     hydroCHLOROthiazide (HYDRODIURIL) 25 MG tablet     insulin aspart U-100 (NOVOLOG) 100 unit/mL (3 mL) InPn pen INJECT 10 UNITS INTO THE SKIN BEFORE MEALS THREE TIMES A DAY (50 DAYS)    lisinopriL 10 MG tablet Take 10 mg by mouth once daily.    losartan (COZAAR) 25 MG tablet Take 25 mg by mouth 2 (two) times daily.    metFORMIN (GLUCOPHAGE) 1000 MG tablet Take 1,000 mg by mouth 2 (two) times daily.    QUEtiapine (SEROQUEL) 200 MG Tab Take 200 mg by mouth nightly.    collagenase (SANTYL) ointment With each dressing change    insulin detemir U-100 (LEVEMIR FLEXTOUCH) 100 unit/mL (3 mL) SubQ InPn pen Inject 50 Units into the skin every evening.    ONETOUCH VERIO TEST STRIPS Strp SMARTSIG:Via Meter    polyethylene glycol (GLYCOLAX) 17 gram PwPk Take 17 g by mouth once daily.    [DISCONTINUED] ciclopirox (PENLAC) 8 % Soln Apply topically nightly.    [DISCONTINUED] LANTUS SOLOSTAR U-100 INSULIN glargine 100 units/mL (3mL) SubQ pen Inject into the skin.    [DISCONTINUED] oxyCODONE-acetaminophen (PERCOCET) 7.5-325 mg per tablet Take 1 tablet by mouth every 6 (six) hours as needed for Pain.    [DISCONTINUED] oxyCODONE-acetaminophen (PERCOCET) 7.5-325 mg per tablet Take 1 tablet by mouth every 6 (six) hours as needed.     Family History    None       Tobacco Use    Smoking status: Former Smoker    Smokeless tobacco: Never Used   Substance and Sexual Activity    Alcohol use: Yes     Comment: whiskey and beer every other day    Drug use: Not Currently    Sexual activity: Yes     Partners: Female     Review of Systems   Constitutional: Negative.  Negative for activity change, appetite change, chills and fever.   HENT: Negative.  Negative for congestion, mouth sores and  trouble swallowing.    Eyes: Negative.  Negative for photophobia, pain and visual disturbance.   Respiratory: Negative.  Negative for cough, choking, chest tightness and shortness of breath.    Cardiovascular: Negative.  Negative for chest pain, palpitations and leg swelling.   Gastrointestinal: Negative.  Negative for abdominal distention, abdominal pain, blood in stool, constipation, diarrhea, nausea and vomiting.   Endocrine: Negative.  Negative for polydipsia, polyphagia and polyuria.   Genitourinary: Negative.  Negative for decreased urine volume, difficulty urinating, dysuria and enuresis.   Musculoskeletal:  Positive for gait problem. Negative for arthralgias and back pain.   Skin:  Positive for wound. Negative for pallor and rash.   Allergic/Immunologic: Negative.  Negative for environmental allergies and food allergies.   Neurological:  Negative for dizziness, facial asymmetry, speech difficulty, weakness and headaches.   Hematological: Negative.    Psychiatric/Behavioral: Negative.  Negative for agitation, behavioral problems and confusion.    Objective:     Vital Signs (Most Recent):  Temp: 97.7 °F (36.5 °C) (06/15/22 2149)  Pulse: 72 (06/15/22 2149)  Resp: 16 (06/15/22 2149)  BP: 125/77 (06/15/22 2149)  SpO2: 97 % (06/15/22 2149) Vital Signs (24h Range):  Temp:  [97.6 °F (36.4 °C)-97.7 °F (36.5 °C)] 97.7 °F (36.5 °C)  Pulse:  [72-86] 72  Resp:  [16] 16  SpO2:  [97 %-98 %] 97 %  BP: (121-125)/(77-86) 125/77     Weight: 129.3 kg (285 lb)  Body mass index is 36.59 kg/m².    Physical Exam  Vitals reviewed.   Constitutional:       General: He is not in acute distress.     Appearance: Normal appearance.   HENT:      Head: Normocephalic and atraumatic.      Mouth/Throat:      Mouth: Mucous membranes are moist.      Pharynx: Oropharynx is clear. No posterior oropharyngeal erythema.   Eyes:      Extraocular Movements: Extraocular movements intact.      Pupils: Pupils are equal, round, and reactive to light.    Cardiovascular:      Rate and Rhythm: Normal rate and regular rhythm.      Heart sounds: Normal heart sounds. No murmur heard.     Comments: Unable to palpate BL dorsalis pedal pulses  Pulmonary:      Effort: Pulmonary effort is normal. No respiratory distress.      Breath sounds: Normal breath sounds. No wheezing.   Abdominal:      General: Bowel sounds are normal. There is no distension.      Palpations: Abdomen is soft.      Tenderness: There is no abdominal tenderness. There is no guarding.   Musculoskeletal:         General: No swelling, tenderness or signs of injury. Normal range of motion.      Cervical back: Normal range of motion and neck supple. No rigidity or tenderness.   Skin:     General: Skin is warm and dry.      Comments: BL foot wounds, right shin wound   Neurological:      General: No focal deficit present.      Mental Status: He is alert and oriented to person, place, and time.      Cranial Nerves: No cranial nerve deficit.      Sensory: No sensory deficit.      Motor: No weakness.   Psychiatric:         Mood and Affect: Mood normal.         Behavior: Behavior normal.         CRANIAL NERVES     CN III, IV, VI   Pupils are equal, round, and reactive to light.     Significant Labs: All pertinent labs within the past 24 hours have been reviewed.  A1C:   Recent Labs   Lab 02/21/22  1544 05/27/22  1010   HGBA1C 9.8* 7.8*     Blood Culture: No results for input(s): LABBLOO in the last 48 hours.  BMP:   Recent Labs   Lab 06/15/22  1708   *      K 4.8   CL 99   CO2 23   BUN 28*   CREATININE 1.5*   CALCIUM 10.4     Lactic Acid: No results for input(s): LACTATE in the last 48 hours.    Significant Imaging: I have reviewed all pertinent imaging results/findings within the past 24 hours.  X-Ray Foot Complete Left  Narrative: EXAMINATION:  XR FOOT COMPLETE 3 VIEW LEFT    CLINICAL HISTORY:  .  Non-pressure chronic ulcer of other part of unspecified foot with unspecified  severity    TECHNIQUE:  AP, lateral and oblique views of the left foot were performed.    COMPARISON:  None    FINDINGS:  Mild erosion/diminished density of the distal portion of the distal phalanx of the 4th digit.  Infection/osteomyelitis is not excluded.  Recommend clinical correlation and follow-up.    Soft tissue ulceration anteriorly at the level of the proximal phalanx of the 1st digit.  Recommend clinical correlation.    Calcaneal spurring inferiorly.  Impression: 1. Soft tissue ulceration at the level the proximal phalanx of the 1st digit.  Recommend clinical correlation.  2. Mild erosion/diminished density of the distal portion of the distal phalanx of the 4th digit.  This could be chronic.  Infection/osteomyelitis is not excluded.  Recommend clinical correlation and follow-up.  3.  This report was flagged in Epic as abnormal.    Electronically signed by: Sohail Merchant  Date:    06/15/2022  Time:    19:04  X-Ray Foot Complete Right  Narrative: EXAMINATION:  XR FOOT COMPLETE 3 VIEW RIGHT    CLINICAL HISTORY:  . Non-pressure chronic ulcer of other part of unspecified foot with unspecified severity    TECHNIQUE:  AP, lateral, and oblique views of the right foot were performed.    COMPARISON:  02/21/2022    FINDINGS:  First digit has been amputated at the level the mid 1st metatarsal.  Second digit is amputated at the level the distal 2nd metatarsal.    There is dislocation of the 3rd phalange E at the metatarsophalangeal joint.  Recommend reduction and follow-up.    Mild irregularity of the distal stump of the 1st and 2nd metatarsals could be associated with osteomyelitis.  Follow-up recommended.    Calcaneal spurring inferiorly.  Osteophytic spurring of the midfoot dorsal surface.  Impression: 1. Dislocation of the 3rd digit at the metatarsophalangeal joint.  Recommend reduction and follow-up.  2. Mild irregularity of the distal stumps of the 1st and 2nd metatarsals.  This could be associated with  osteomyelitis.  Probable adjacent soft tissue edema or cellulitis also.  Recommend clinical correlation.  3.  This report was flagged in Epic as abnormal.    Electronically signed by: Sohail Merchant  Date:    06/15/2022  Time:    18:57

## 2022-06-16 NOTE — NURSING
Pt transferred from ED. STAT covid test ordered in ED but there is no evidence that a test was administered. Updated Dr. Patrick who stated to to go ahead and administer covid test. Verified with lab that a new order must be placed since ED covid test orders and floor covid test orders are different.

## 2022-06-16 NOTE — ASSESSMENT & PLAN NOTE
Hyperlipidemia  - Last A1c 7.8  - On Levemir 50 U qHS and aspart 10 U TID WM at home  - Start Levemir 40U qHS and aspart 5U TIDWM. Titrate PRN  - Continue moderate SSI AC/HS PRN  - Continue aspirin and statin

## 2022-06-16 NOTE — ASSESSMENT & PLAN NOTE
Left Foot ulcer  R 3rd metatarsophalangeal dislocation  - Presents from surgery clinic for concern for infection/osteomyelitis  - Prior history of MRSA bacteremia 2/2 gangrene treated with 6 weeks IV abx for presumed endocarditis  - X-ray R foot with mild irregularity of distal stumps of 1st and 2nd metatarsal with adjacent cellulitis  - X-ray L foot with soft tissue ulceration of 1st proximal phalanx, erosion of 4th distal phalanx  - ESR/CRP elevated but have down trended since prior hospitalization  - Some findings may be chronic. Will obtain foot MRI to further evaluate for osteo  - Cultures obtained in the ED, pending  - Continue empiric cefepime and vancomycin with pharmacy dosing  - Podiatry and wound care consulted

## 2022-06-16 NOTE — ASSESSMENT & PLAN NOTE
CRP is 26.5 down from 289 three months ago. ESR is elevated at 80 but down from 120 three months ago. Radiograph of left foot reports soft tissue ulceration at the level the proximal phalanx of the 1st digit mild erosion/diminished density of the distal portion of the distal phalanx of the 4th digit.  This could be chronic.  Infection/osteomyelitis is not excluded.  Radiograph of the right foot reports dislocation of the 3rd digit at the metatarsophalangeal joint.   Mild irregularity of the distal stumps of the 1st and 2nd metatarsals.  This could be associated with osteomyelitis.  Probable adjacent soft tissue edema or cellulitis also.   White blood count 9.55.  Patient afebrile.  Empiric antibiotics started with vancomycin and cefepime.  Wound cultures ordered.  Podiatry has been consulted.  Wound Care has been consulted.  Appreciate recommendations.

## 2022-06-16 NOTE — ASSESSMENT & PLAN NOTE
Patient takes insulin aspart at home.  Will order moderate correction SSI.  Last A1c is 7.8 in May.

## 2022-06-16 NOTE — HPI
Mr Dave is a 56 yr old male with a PMH that includes DB 2, COPD, HTN, depression, and right foot transmetatarsal amputation. He was sent to the ED for evaluation of osteomyelitis from a follow up appt with Dr. Flores. Pt had I&D and amputation for wet gangrene in Sept 2021 and debridements in Feb, March, and May of this year. Per his chart, his feet were healing well until some foot trauma occurred. Pt has has open wounds and drainage to BL feet with right foot wound deeper than left. He also has wound to left shin. Pt reports associated 10/10 pain and difficulty walking. He denies fever and chills at home. Podiatry consulted and pt admitted to hospital medicine.

## 2022-06-16 NOTE — ED TRIAGE NOTES
Pt sent to ER by Dr. Flores with concern for bilateral foot infection.  Pt present with bilateral foot ulcers to plantar aspect of feet.  R foot ulcer noted to base of 1st metatarsal approx. 3cm x 5 cm with purulent drainage noted.  L foot ulcer approx 3.5cm x 3.5 cm.  Pt denies any recent fever.

## 2022-06-16 NOTE — SUBJECTIVE & OBJECTIVE
Interval History: No events overnight. Pt without new complaints. Reports left foot pain. Denies fever, chills.    Review of Systems   Constitutional:  Negative for chills and fever.   Respiratory:  Negative for shortness of breath.    Cardiovascular:  Negative for chest pain.   Gastrointestinal:  Negative for nausea and vomiting.   Musculoskeletal:         L foot pain   Objective:     Vital Signs (Most Recent):  Temp: 97.7 °F (36.5 °C) (06/15/22 2149)  Pulse: 80 (06/16/22 1116)  Resp: 16 (06/16/22 0600)  BP: 119/79 (06/16/22 1116)  SpO2: 99 % (06/16/22 1116) Vital Signs (24h Range):  Temp:  [97.6 °F (36.4 °C)-97.7 °F (36.5 °C)] 97.7 °F (36.5 °C)  Pulse:  [72-85] 80  Resp:  [16-17] 16  SpO2:  [97 %-100 %] 99 %  BP: (111-128)/(72-86) 119/79     Weight: 129.3 kg (285 lb)  Body mass index is 36.59 kg/m².    Intake/Output Summary (Last 24 hours) at 6/16/2022 1447  Last data filed at 6/16/2022 1153  Gross per 24 hour   Intake 1488.33 ml   Output --   Net 1488.33 ml      Physical Exam  Vitals and nursing note reviewed.   Constitutional:       General: He is not in acute distress.     Appearance: Normal appearance. He is well-developed.   Cardiovascular:      Rate and Rhythm: Normal rate and regular rhythm.      Pulses: Normal pulses.   Pulmonary:      Effort: Pulmonary effort is normal. No respiratory distress.      Breath sounds: Normal breath sounds.   Abdominal:      General: Bowel sounds are normal.      Palpations: Abdomen is soft.      Tenderness: There is no abdominal tenderness.   Musculoskeletal:      Right lower leg: Edema present.      Left lower leg: Edema present.   Skin:     General: Skin is warm and dry.      Comments: L foot with 2 plantar ulcerations. R foot with open wound and purulent drainage from prior amputation site. See pictures in media   Neurological:      Mental Status: He is alert and oriented to person, place, and time. Mental status is at baseline.   Psychiatric:         Behavior: Behavior  normal.       Significant Labs: All pertinent labs within the past 24 hours have been reviewed.  CBC:   Recent Labs   Lab 06/15/22  1708 06/16/22  0549   WBC 9.55 8.57   HGB 13.2* 11.7*   HCT 42.4 37.2*   * 384     CMP:   Recent Labs   Lab 06/15/22  1708 06/16/22  0549    137   K 4.8 4.3   CL 99 103   CO2 23 22*   * 201*   BUN 28* 24*   CREATININE 1.5* 1.1   CALCIUM 10.4 9.2   PROT 9.4*  --    ALBUMIN 3.9  --    BILITOT 0.3  --    ALKPHOS 104  --    AST 20  --    ALT 27  --    ANIONGAP 14 12   EGFRNONAA 51* >60       Significant Imaging: I have reviewed all pertinent imaging results/findings within the past 24 hours.

## 2022-06-16 NOTE — PROGRESS NOTES
[facility] Emergency Dept  [facility]  Progress Note    Patient Name: Dave Good  MRN: [id number]  Patient Class: IP- Inpatient   Admission Date: 6/15/2022  Length of Stay: 1 days  Attending Physician: Marcy Patrick MD  Primary Care Provider: Reyna Jorge NP        Subjective:     Principal Problem:Osteomyelitis of right foot        HPI:  Mr Acosta is a 56 yr old male with a PMH that includes DB 2, COPD, HTN, depression, and right foot transmetatarsal amputation. He was sent to the ED for evaluation of osteomyelitis from a follow up appt with Dr. Flores. Pt had I&D and amputation for wet gangrene in Sept 2021 and debridements in Feb, March, and May of this year. Per his chart, his feet were healing well until some foot trauma occurred. Pt has has open wounds and drainage to BL feet with right foot wound deeper than left. He also has wound to left shin. Pt reports associated 10/10 pain and difficulty walking. He denies fever and chills at home. Podiatry consulted and pt admitted to hospital medicine.       Overview/Hospital Course:  No notes on file    Interval History: No events overnight. Pt without new complaints. Reports left foot pain. Denies fever, chills.    Review of Systems   Constitutional:  Negative for chills and fever.   Respiratory:  Negative for shortness of breath.    Cardiovascular:  Negative for chest pain.   Gastrointestinal:  Negative for nausea and vomiting.   Musculoskeletal:         L foot pain   Objective:     Vital Signs (Most Recent):  Temp: 97.7 °F (36.5 °C) (06/15/22 2149)  Pulse: 80 (06/16/22 1116)  Resp: 16 (06/16/22 0600)  BP: 119/79 (06/16/22 1116)  SpO2: 99 % (06/16/22 1116) Vital Signs (24h Range):  Temp:  [97.6 °F (36.4 °C)-97.7 °F (36.5 °C)] 97.7 °F (36.5 °C)  Pulse:  [72-85] 80  Resp:  [16-17] 16  SpO2:  [97 %-100 %] 99 %  BP: (111-128)/(72-86) 119/79     Weight: 129.3 kg (285 lb)  Body mass index is 36.59 kg/m².    Intake/Output Summary (Last 24 hours) at 6/16/2022  1447  Last data filed at 6/16/2022 1153  Gross per 24 hour   Intake 1488.33 ml   Output --   Net 1488.33 ml      Physical Exam  Vitals and nursing note reviewed.   Constitutional:       General: He is not in acute distress.     Appearance: Normal appearance. He is well-developed.   Cardiovascular:      Rate and Rhythm: Normal rate and regular rhythm.      Pulses: Normal pulses.   Pulmonary:      Effort: Pulmonary effort is normal. No respiratory distress.      Breath sounds: Normal breath sounds.   Abdominal:      General: Bowel sounds are normal.      Palpations: Abdomen is soft.      Tenderness: There is no abdominal tenderness.   Musculoskeletal:      Right lower leg: Edema present.      Left lower leg: Edema present.   Skin:     General: Skin is warm and dry.      Comments: L foot with 2 plantar ulcerations. R foot with open wound and purulent drainage from prior amputation site. See pictures in media   Neurological:      Mental Status: He is alert and oriented to person, place, and time. Mental status is at baseline.   Psychiatric:         Behavior: Behavior normal.       Significant Labs: All pertinent labs within the past 24 hours have been reviewed.  CBC:   Recent Labs   Lab 06/15/22  1708 06/16/22  0549   WBC 9.55 8.57   HGB 13.2* 11.7*   HCT 42.4 37.2*   * 384     CMP:   Recent Labs   Lab 06/15/22  1708 06/16/22  0549    137   K 4.8 4.3   CL 99 103   CO2 23 22*   * 201*   BUN 28* 24*   CREATININE 1.5* 1.1   CALCIUM 10.4 9.2   PROT 9.4*  --    ALBUMIN 3.9  --    BILITOT 0.3  --    ALKPHOS 104  --    AST 20  --    ALT 27  --    ANIONGAP 14 12   EGFRNONAA 51* >60       Significant Imaging: I have reviewed all pertinent imaging results/findings within the past 24 hours.      Assessment/Plan:      * Osteomyelitis of right foot  Left Foot ulcer  R 3rd metatarsophalangeal dislocation  - Presents from surgery clinic for concern for infection/osteomyelitis  - Prior history of MRSA bacteremia 2/2  gangrene treated with 6 weeks IV abx for presumed endocarditis  - X-ray R foot with mild irregularity of distal stumps of 1st and 2nd metatarsal with adjacent cellulitis  - X-ray L foot with soft tissue ulceration of 1st proximal phalanx, erosion of 4th distal phalanx  - ESR/CRP elevated but have down trended since prior hospitalization  - Some findings may be chronic. Will obtain foot MRI to further evaluate for osteo  - Cultures obtained in the ED, pending  - Continue empiric cefepime and vancomycin with pharmacy dosing  - Podiatry and wound care consulted    SHIV (acute kidney injury)  - Baseline Cr ~ 1.1  - Cr up to 1.5 on admit. Improved back to baseline with IVF  - Monitor    HTN (hypertension)  - Reports taking lisinopril and losartan at home  - Continue lisinopril 10 mg qd. Titrate PRN    Type 2 diabetes mellitus with diabetic peripheral angiopathy without gangrene, with long-term current use of insulin  Hyperlipidemia  - Last A1c 7.8  - On Levemir 50 U qHS and aspart 10 U TID WM at home  - Start Levemir 40U qHS and aspart 5U TIDWM. Titrate PRN  - Continue moderate SSI AC/HS PRN  - Continue aspirin and statin    VTE Risk Mitigation (From admission, onward)         Ordered     enoxaparin injection 40 mg  Daily         06/16/22 0658     IP VTE HIGH RISK PATIENT  Once         06/15/22 2119     Place sequential compression device  Until discontinued         06/15/22 2119                  Marcy Patrick MD  Department of Hospital Medicine   [facility] - Emergency Dept

## 2022-06-16 NOTE — H&P
[facility]  History & Physical    Patient Name: Dave Good  MRN: [id number]  Patient Class: IP- Inpatient  Admission Date: 6/15/2022  Attending Physician: Negra Richards MD   Primary Care Provider: Reyna Jorge NP         Patient information was obtained from patient and ER records.     Subjective:     Principal Problem:Osteomyelitis of right foot    Chief Complaint:   Chief Complaint   Patient presents with    Wound Check     Sent for admission by Dr. Flores for BL feet infection.         HPI: Mr Acosta is a 56 yr old male with a PMH that includes DB 2, COPD, HTN, depression, and right foot transmetatarsal amputation. He was sent to the ED for evaluation of osteomyelitis from a follow up appt with Dr. Flores. Pt had I&D and amputation for wet gangrene in Sept 2021 and debridements in Feb, March, and May of this year. Per his chart, his feet were healing well until some foot trauma occurred. Pt has has open wounds and drainage to BL feet with right foot wound deeper than left. He also has wound to left shin. Pt reports associated 10/10 pain and difficulty walking. He denies fever and chills at home. Podiatry consulted and pt admitted to hospital medicine.       Past Medical History:   Diagnosis Date    COPD (chronic obstructive pulmonary disease)     COVID-19     Depression     Diabetes mellitus     Diabetes mellitus, type 2     Hypertension        Past Surgical History:   Procedure Laterality Date    DEBRIDEMENT OF LOWER EXTREMITY Bilateral 3/2/2022    Procedure: DEBRIDEMENT, LOWER EXTREMITY;  Surgeon: Jesus Flores Jr., MD;  Location: [facility], [place], operated by [facility] OR;  Service: General;  Laterality: Bilateral;    FOOT AMPUTATION THROUGH METATARSAL Right 9/23/2021    Procedure: AMPUTATION, FOOT, TRANSMETATARSAL right 1st ray resection;  Surgeon: Samuel Toussaint DPM;  Location: [facility], [place], operated by [facility] OR;  Service: Podiatry;  Laterality: Right;    INCISION AND DRAINAGE FOOT Bilateral 9/21/2021    Procedure: INCISION  AND DRAINAGE, FOOT - Bilateral;  Surgeon: Lavonne Vigil DPM;  Location: [facility];  Service: Podiatry;  Laterality: Bilateral;    REPLACEMENT OF WOUND DRESSING Right 2/24/2022    Procedure: REPLACEMENT, DRESSING, WOUND;  Surgeon: Jesus Flores Jr., MD;  Location: [facility] OR;  Service: Vascular;  Laterality: Right;  wound vac dressing changed    WOUND DEBRIDEMENT Right 2/22/2022    Procedure: DEBRIDEMENT, WOUND - RIGHT FOOT;  Surgeon: Jesus Flores Jr., MD;  Location: [facility] OR;  Service: Vascular;  Laterality: Right;    WOUND DEBRIDEMENT Bilateral 2/24/2022    Procedure: DEBRIDEMENT, WOUND / BILATERAL DEBRIDEMENT FEET;  Surgeon: Jesus Flores Jr., MD;  Location: [facility] OR;  Service: Vascular;  Laterality: Bilateral;    WOUND DEBRIDEMENT Right 5/27/2022    Procedure: DEBRIDEMENT, WOUND;  Surgeon: Jesus Flores Jr., MD;  Location: [facility];  Service: General;  Laterality: Right;       Review of patient's allergies indicates:   Allergen Reactions    Ibuprofen Swelling     Facial swelling       Current Facility-Administered Medications on File Prior to Encounter   Medication    0.9%  NaCl infusion    LIDOcaine (PF) 10 mg/ml (1%) injection 10 mg    mupirocin 2 % ointment     Current Outpatient Medications on File Prior to Encounter   Medication Sig    ammonium lactate 12 % Crea Apply twice daily to affected parts both feet as needed.    aspirin (ECOTRIN) 81 MG EC tablet Take 81 mg by mouth once daily.    atorvastatin (LIPITOR) 20 MG tablet Take 40 mg by mouth once daily.     hydroCHLOROthiazide (HYDRODIURIL) 25 MG tablet     insulin aspart U-100 (NOVOLOG) 100 unit/mL (3 mL) InPn pen INJECT 10 UNITS INTO THE SKIN BEFORE MEALS THREE TIMES A DAY (50 DAYS)    lisinopriL 10 MG tablet Take 10 mg by mouth once daily.    losartan (COZAAR) 25 MG tablet Take 25 mg by mouth 2 (two) times daily.    metFORMIN (GLUCOPHAGE) 1000 MG tablet Take 1,000 mg by mouth 2 (two) times daily.    QUEtiapine (SEROQUEL) 200 MG Tab Take 200 mg  by mouth nightly.    collagenase (SANTYL) ointment With each dressing change    insulin detemir U-100 (LEVEMIR FLEXTOUCH) 100 unit/mL (3 mL) SubQ InPn pen Inject 50 Units into the skin every evening.    ONETOUCH VERIO TEST STRIPS Strp SMARTSIG:Via Meter    polyethylene glycol (GLYCOLAX) 17 gram PwPk Take 17 g by mouth once daily.    [DISCONTINUED] ciclopirox (PENLAC) 8 % Soln Apply topically nightly.    [DISCONTINUED] LANTUS SOLOSTAR U-100 INSULIN glargine 100 units/mL (3mL) SubQ pen Inject into the skin.    [DISCONTINUED] oxyCODONE-acetaminophen (PERCOCET) 7.5-325 mg per tablet Take 1 tablet by mouth every 6 (six) hours as needed for Pain.    [DISCONTINUED] oxyCODONE-acetaminophen (PERCOCET) 7.5-325 mg per tablet Take 1 tablet by mouth every 6 (six) hours as needed.     Family History    None       Tobacco Use    Smoking status: Former Smoker    Smokeless tobacco: Never Used   Substance and Sexual Activity    Alcohol use: Yes     Comment: whiskey and beer every other day    Drug use: Not Currently    Sexual activity: Yes     Partners: Female     Review of Systems   Constitutional: Negative.  Negative for activity change, appetite change, chills and fever.   HENT: Negative.  Negative for congestion, mouth sores and trouble swallowing.    Eyes: Negative.  Negative for photophobia, pain and visual disturbance.   Respiratory: Negative.  Negative for cough, choking, chest tightness and shortness of breath.    Cardiovascular: Negative.  Negative for chest pain, palpitations and leg swelling.   Gastrointestinal: Negative.  Negative for abdominal distention, abdominal pain, blood in stool, constipation, diarrhea, nausea and vomiting.   Endocrine: Negative.  Negative for polydipsia, polyphagia and polyuria.   Genitourinary: Negative.  Negative for decreased urine volume, difficulty urinating, dysuria and enuresis.   Musculoskeletal:  Positive for gait problem. Negative for arthralgias and back pain.   Skin:   Positive for wound. Negative for pallor and rash.   Allergic/Immunologic: Negative.  Negative for environmental allergies and food allergies.   Neurological:  Negative for dizziness, facial asymmetry, speech difficulty, weakness and headaches.   Hematological: Negative.    Psychiatric/Behavioral: Negative.  Negative for agitation, behavioral problems and confusion.    Objective:     Vital Signs (Most Recent):  Temp: 97.7 °F (36.5 °C) (06/15/22 2149)  Pulse: 72 (06/15/22 2149)  Resp: 16 (06/15/22 2149)  BP: 125/77 (06/15/22 2149)  SpO2: 97 % (06/15/22 2149) Vital Signs (24h Range):  Temp:  [97.6 °F (36.4 °C)-97.7 °F (36.5 °C)] 97.7 °F (36.5 °C)  Pulse:  [72-86] 72  Resp:  [16] 16  SpO2:  [97 %-98 %] 97 %  BP: (121-125)/(77-86) 125/77     Weight: 129.3 kg (285 lb)  Body mass index is 36.59 kg/m².    Physical Exam  Vitals reviewed.   Constitutional:       General: He is not in acute distress.     Appearance: Normal appearance.   HENT:      Head: Normocephalic and atraumatic.      Mouth/Throat:      Mouth: Mucous membranes are moist.      Pharynx: Oropharynx is clear. No posterior oropharyngeal erythema.   Eyes:      Extraocular Movements: Extraocular movements intact.      Pupils: Pupils are equal, round, and reactive to light.   Cardiovascular:      Rate and Rhythm: Normal rate and regular rhythm.      Heart sounds: Normal heart sounds. No murmur heard.     Comments: Unable to palpate BL dorsalis pedal pulses  Pulmonary:      Effort: Pulmonary effort is normal. No respiratory distress.      Breath sounds: Normal breath sounds. No wheezing.   Abdominal:      General: Bowel sounds are normal. There is no distension.      Palpations: Abdomen is soft.      Tenderness: There is no abdominal tenderness. There is no guarding.   Musculoskeletal:         General: No swelling, tenderness or signs of injury. Normal range of motion.      Cervical back: Normal range of motion and neck supple. No rigidity or tenderness.   Skin:      General: Skin is warm and dry.      Comments: BL foot wounds, right shin wound   Neurological:      General: No focal deficit present.      Mental Status: He is alert and oriented to person, place, and time.      Cranial Nerves: No cranial nerve deficit.      Sensory: No sensory deficit.      Motor: No weakness.   Psychiatric:         Mood and Affect: Mood normal.         Behavior: Behavior normal.         CRANIAL NERVES     CN III, IV, VI   Pupils are equal, round, and reactive to light.     Significant Labs: All pertinent labs within the past 24 hours have been reviewed.  A1C:   Recent Labs   Lab 02/21/22  1544 05/27/22  1010   HGBA1C 9.8* 7.8*     Blood Culture: No results for input(s): LABBLOO in the last 48 hours.  BMP:   Recent Labs   Lab 06/15/22  1708   *      K 4.8   CL 99   CO2 23   BUN 28*   CREATININE 1.5*   CALCIUM 10.4     Lactic Acid: No results for input(s): LACTATE in the last 48 hours.    Significant Imaging: I have reviewed all pertinent imaging results/findings within the past 24 hours.  X-Ray Foot Complete Left  Narrative: EXAMINATION:  XR FOOT COMPLETE 3 VIEW LEFT    CLINICAL HISTORY:  .  Non-pressure chronic ulcer of other part of unspecified foot with unspecified severity    TECHNIQUE:  AP, lateral and oblique views of the left foot were performed.    COMPARISON:  None    FINDINGS:  Mild erosion/diminished density of the distal portion of the distal phalanx of the 4th digit.  Infection/osteomyelitis is not excluded.  Recommend clinical correlation and follow-up.    Soft tissue ulceration anteriorly at the level of the proximal phalanx of the 1st digit.  Recommend clinical correlation.    Calcaneal spurring inferiorly.  Impression: 1. Soft tissue ulceration at the level the proximal phalanx of the 1st digit.  Recommend clinical correlation.  2. Mild erosion/diminished density of the distal portion of the distal phalanx of the 4th digit.  This could be chronic.   Infection/osteomyelitis is not excluded.  Recommend clinical correlation and follow-up.  3.  This report was flagged in Epic as abnormal.    Electronically signed by: Sohail Merchant  Date:    06/15/2022  Time:    19:04  X-Ray Foot Complete Right  Narrative: EXAMINATION:  XR FOOT COMPLETE 3 VIEW RIGHT    CLINICAL HISTORY:  . Non-pressure chronic ulcer of other part of unspecified foot with unspecified severity    TECHNIQUE:  AP, lateral, and oblique views of the right foot were performed.    COMPARISON:  02/21/2022    FINDINGS:  First digit has been amputated at the level the mid 1st metatarsal.  Second digit is amputated at the level the distal 2nd metatarsal.    There is dislocation of the 3rd phalange E at the metatarsophalangeal joint.  Recommend reduction and follow-up.    Mild irregularity of the distal stump of the 1st and 2nd metatarsals could be associated with osteomyelitis.  Follow-up recommended.    Calcaneal spurring inferiorly.  Osteophytic spurring of the midfoot dorsal surface.  Impression: 1. Dislocation of the 3rd digit at the metatarsophalangeal joint.  Recommend reduction and follow-up.  2. Mild irregularity of the distal stumps of the 1st and 2nd metatarsals.  This could be associated with osteomyelitis.  Probable adjacent soft tissue edema or cellulitis also.  Recommend clinical correlation.  3.  This report was flagged in Epic as abnormal.    Electronically signed by: Sohail Merchant  Date:    06/15/2022  Time:    18:57      Assessment/Plan:     * Osteomyelitis of right foot     Left foot ulcer  CRP is 26.5 down from 289 three months ago. ESR is elevated at 80 but down from 120 three months ago. Radiograph of left foot reports soft tissue ulceration at the level the proximal phalanx of the 1st digit mild erosion/diminished density of the distal portion of the distal phalanx of the 4th digit.  This could be chronic.  Infection/osteomyelitis is not excluded.  Radiograph of the right foot reports  dislocation of the 3rd digit at the metatarsophalangeal joint.   Mild irregularity of the distal stumps of the 1st and 2nd metatarsals.  This could be associated with osteomyelitis.  Probable adjacent soft tissue edema or cellulitis also.   White blood count 9.55.  Patient afebrile.  Empiric antibiotics started with vancomycin and cefepime.  Wound cultures ordered.  Podiatry has been consulted.  Wound Care has been consulted.  Appreciate recommendations.    HTN (hypertension)  Continue lisinopril.      SHIV (acute kidney injury)  Patient has mild increased creatinine to 1.5 from a baseline of 1.2/1.3.  Will rehydrate and monitor kidney function.  Will hold HCTZ and losartan.    Type 2 diabetes mellitus without complication, with long-term current use of insulin  Patient takes insulin aspart at home.  Will order moderate correction SSI.  Last A1c is 7.8 in May.        VTE Risk Mitigation (From admission, onward)         Ordered     heparin (porcine) injection 5,000 Units  Every 8 hours         06/15/22 2119     IP VTE HIGH RISK PATIENT  Once         06/15/22 2119     Place sequential compression device  Until discontinued         06/15/22 2119                   Bonnie Bauer DNP  Department of Hospital Medicine   [facility] - Emergency Dept

## 2022-06-16 NOTE — PROGRESS NOTES
Pharmacokinetic Initial Assessment: IV Vancomycin    Assessment/Plan:    Initiate intravenous vancomycin with loading dose of 2000  mg once followed by a maintenance dose of vancomycin 2000 mg IV every 12 hours  Desired empiric serum trough concentration is 10 to 20 mcg/mL  Draw vancomycin trough level 30 min prior to fourth dose on 6/17/22 at approximately 0830  Pharmacy will continue to follow and monitor vancomycin.      Please contact pharmacy at extension [phone] with any questions regarding this assessment.     Thank you for the consult,   Tanya Flakita       Patient brief summary:  Dave Good is a 56 y.o. male initiated on antimicrobial therapy with IV Vancomycin for treatment of suspected bone/joint infection    Drug Allergies:   Review of patient's allergies indicates:   Allergen Reactions    Ibuprofen Swelling     Facial swelling       Actual Body Weight:   129.3 kg    Renal Function:   Estimated Creatinine Clearance: 76.7 mL/min (A) (based on SCr of 1.5 mg/dL (H)).,     Dialysis Method (if applicable):  N/A    CBC (last 72 hours):  Recent Labs   Lab Result Units 06/15/22  1708   WBC K/uL 9.55   Hemoglobin g/dL 13.2*   Hematocrit % 42.4   Platelets K/uL 451*   Gran % % 52.7   Lymph % % 32.6   Mono % % 9.9   Eosinophil % % 3.9   Basophil % % 0.5   Differential Method  Automated       Metabolic Panel (last 72 hours):  Recent Labs   Lab Result Units 06/15/22  1708   Sodium mmol/L 136   Potassium mmol/L 4.8   Chloride mmol/L 99   CO2 mmol/L 23   Glucose mg/dL 194*   BUN mg/dL 28*   Creatinine mg/dL 1.5*   Albumin g/dL 3.9   Total Bilirubin mg/dL 0.3   Alkaline Phosphatase U/L 104   AST U/L 20   ALT U/L 27       Drug levels (last 3 results):  No results for input(s): VANCOMYCINRA, VANCORANDOM, VANCOMYCINPE, VANCOPEAK, VANCOMYCINTR, VANCOTROUGH in the last 72 hours.    Microbiologic Results:  Microbiology Results (last 7 days)       Procedure Component Value Units Date/Time    Blood Culture #2 **CANNOT BE  ORDERED STAT** [[id number]] Collected: 06/15/22 2006    Order Status: Sent Specimen: Blood from Peripheral, Antecubital, Right Updated: 06/15/22 2006    Blood Culture #1 **CANNOT BE ORDERED STAT** [[id number]] Collected: 06/15/22 1708    Order Status: Sent Specimen: Blood from Peripheral, Antecubital, Right Updated: 06/15/22 1928

## 2022-06-16 NOTE — CONSULTS
[facility] - [facility] Surg ([place])  Podiatry  Consult Note    Patient Name: Dave Good  MRN: [id number]  Admission Date: 6/15/2022  Hospital Length of Stay: 1 days  Attending Physician: Marcy Patrick MD  Primary Care Provider: Reyna Jorge NP     Consults  Subjective:     History of Present Illness: wounds both feet.  Following with Dr. Flores since may 11.  Wounds were closed 4/13/22 at his last visit.  Re opened due to WB without DM shoes, custom inserts.  Denies pain.  Admitted due to suspicion of worsening osteomyelitis/acute/chronic.  xrays and MRI show abnormality of distal aspect of rays 1,2 right, distal tuft left hallux and 4th toe. Prompting consultation.    Scheduled Meds:   aspirin  81 mg Oral Daily    atorvastatin  40 mg Oral Daily    ceFEPime (MAXIPIME) IVPB  2 g Intravenous Q8H    enoxaparin  40 mg Subcutaneous Daily    insulin aspart U-100  5 Units Subcutaneous TIDWM    insulin detemir U-100  40 Units Subcutaneous QHS    lisinopriL  10 mg Oral Daily    QUEtiapine  200 mg Oral Nightly    vancomycin (VANCOCIN) IVPB  2,000 mg Intravenous Q12H     Continuous Infusions:  PRN Meds:acetaminophen, dextrose 10%, dextrose 10%, glucagon (human recombinant), glucose, glucose, HYDROcodone-acetaminophen, HYDROcodone-acetaminophen, insulin aspart U-100, melatonin, naloxone, ondansetron, polyethylene glycol, sodium chloride 0.9%, Pharmacy to dose Vancomycin consult **AND** vancomycin - pharmacy to dose    Review of patient's allergies indicates:   Allergen Reactions    Ibuprofen Swelling     Facial swelling        Past Medical History:   Diagnosis Date    COPD (chronic obstructive pulmonary disease)     COVID-19     Depression     Diabetes mellitus     Diabetes mellitus, type 2     Hypertension      Past Surgical History:   Procedure Laterality Date    DEBRIDEMENT OF LOWER EXTREMITY Bilateral 3/2/2022    Procedure: DEBRIDEMENT, LOWER EXTREMITY;  Surgeon: Jesus Flores Jr., MD;  Location: [facility] OR;   Service: General;  Laterality: Bilateral;    FOOT AMPUTATION THROUGH METATARSAL Right 9/23/2021    Procedure: AMPUTATION, FOOT, TRANSMETATARSAL right 1st ray resection;  Surgeon: Samuel Toussaint DPM;  Location: [facility] OR;  Service: Podiatry;  Laterality: Right;    INCISION AND DRAINAGE FOOT Bilateral 9/21/2021    Procedure: INCISION AND DRAINAGE, FOOT - Bilateral;  Surgeon: Lavonne Vigil DPM;  Location: [facility] OR;  Service: Podiatry;  Laterality: Bilateral;    REPLACEMENT OF WOUND DRESSING Right 2/24/2022    Procedure: REPLACEMENT, DRESSING, WOUND;  Surgeon: Jesus Flores Jr., MD;  Location: [facility];  Service: Vascular;  Laterality: Right;  wound vac dressing changed    WOUND DEBRIDEMENT Right 2/22/2022    Procedure: DEBRIDEMENT, WOUND - RIGHT FOOT;  Surgeon: Jesus Flores Jr., MD;  Location: [facility];  Service: Vascular;  Laterality: Right;    WOUND DEBRIDEMENT Bilateral 2/24/2022    Procedure: DEBRIDEMENT, WOUND / BILATERAL DEBRIDEMENT FEET;  Surgeon: Jesus Flores Jr., MD;  Location: [facility];  Service: Vascular;  Laterality: Bilateral;    WOUND DEBRIDEMENT Right 5/27/2022    Procedure: DEBRIDEMENT, WOUND;  Surgeon: Jesus Flores Jr., MD;  Location: [facility];  Service: General;  Laterality: Right;       Family History    None       Tobacco Use    Smoking status: Former Smoker    Smokeless tobacco: Never Used   Substance and Sexual Activity    Alcohol use: Yes     Comment: whiskey and beer every other day    Drug use: Not Currently    Sexual activity: Yes     Partners: Female     Review of Systems  Objective:     Vital Signs (Most Recent):  Temp: 97.4 °F (36.3 °C) (06/16/22 1515)  Pulse: 78 (06/16/22 1515)  Resp: 16 (06/16/22 1515)  BP: 134/87 (06/16/22 1515)  SpO2: 97 % (06/16/22 1515) Vital Signs (24h Range):  Temp:  [97.4 °F (36.3 °C)-97.7 °F (36.5 °C)] 97.4 °F (36.3 °C)  Pulse:  [72-80] 78  Resp:  [16-17] 16  SpO2:  [97 %-100 %] 97 %  BP: (111-134)/(72-87) 134/87     Weight: 129.3 kg (285 lb)  Body mass  index is 36.59 kg/m².    Foot Exam    Laboratory:  A1C:   Recent Labs   Lab 02/21/22  1544 05/27/22  1010   HGBA1C 9.8* 7.8*     Blood Cultures:   Recent Labs   Lab 06/15/22  1708 06/15/22  2006   LABBLOO No Growth to date No Growth to date     BMP:   Recent Labs   Lab 06/16/22  0549   *      K 4.3      CO2 22*   BUN 24*   CREATININE 1.1   CALCIUM 9.2     CBC:   Recent Labs   Lab 06/16/22  0549   WBC 8.57   RBC 4.20*   HGB 11.7*   HCT 37.2*      MCV 89   MCH 27.9   MCHC 31.5*     CMP:   Recent Labs   Lab 06/15/22  1708 06/16/22  0549   * 201*   CALCIUM 10.4 9.2   ALBUMIN 3.9  --    PROT 9.4*  --     137   K 4.8 4.3   CO2 23 22*   CL 99 103   BUN 28* 24*   CREATININE 1.5* 1.1   ALKPHOS 104  --    ALT 27  --    AST 20  --    BILITOT 0.3  --      Coagulation: No results for input(s): PT, INR, APTT in the last 168 hours.  CRP:   Recent Labs   Lab 06/15/22  1708   CRP 26.5*     ESR:   Recent Labs   Lab 06/15/22  1708   SEDRATE 80*     Prealbumin: No results for input(s): PREALBUMIN in the last 48 hours.  Wound Cultures:   Recent Labs   Lab 02/22/22  1153   LABAERO METHICILLIN RESISTANT STAPHYLOCOCCUS AUREUS  Many  No other significant isolate  *     Microbiology Results (last 7 days)     Procedure Component Value Units Date/Time    Aerobic culture [[id number]] Collected: 06/16/22 0050    Order Status: Sent Specimen: Wound from Foot, Left Updated: 06/16/22 0943    Culture, Anaerobe [[id number]] Collected: 06/16/22 0050    Order Status: Sent Specimen: Wound from Foot, Left Updated: 06/16/22 0943    Aerobic culture [[id number]] Collected: 06/16/22 0050    Order Status: Sent Specimen: Wound from Foot, Right Updated: 06/16/22 0943    Culture, Anaerobe [[id number]] Collected: 06/16/22 0050    Order Status: Sent Specimen: Wound from Foot, Right Updated: 06/16/22 0943    Blood Culture #2 **CANNOT BE ORDERED STAT** [[id number]] Collected: 06/15/22 2006    Order Status: Completed Specimen: Blood  from Peripheral, Antecubital, Right Updated: 06/16/22 0715     Blood Culture, Routine No Growth to date    Blood Culture #1 **CANNOT BE ORDERED STAT** [[id number]] Collected: 06/15/22 1708    Order Status: Completed Specimen: Blood from Peripheral, Antecubital, Right Updated: 06/16/22 0315     Blood Culture, Routine No Growth to date        Specimen (24h ago, onward)            None          Diagnostic Results:  MRI: I have reviewed all pertinent results/findings within the past 24 hours.  abnormality tistal toes 1,4 left not supported by clinical picture presently.  Expected abnormal findings at residual rays 1,2 right, can't exclude osteomyelitis  X-Ray: I have reviewed all pertinent results/findings within the past 24 hours.  expected findings post surgical rays 1,2 right, abnormality distal 4th toe left.    Clinical Findings:  This chronic ulceration of the distal medial inferior residual rays 1 and 2 right foot with ulceration extending to subcu plane with scarring and chronic fibrosis of surrounding tissue and undermined skin borders.  I do not currently appreciate probe to bone but did not debride wound as the patient was seen in the ER.  I appreciate no pus, tracking, fluctuance, malodor, cardinal signs of infection local to the wound of the right foot.    There is chronic ulceration at the plantar aspect of the 1st metatarsal head left which extends to the subcutaneous plane with this sclerotic tendon black covering and hyperkeratosis surrounding it also without tracking pus fluctuance malodor cardinal signs of infection local to the wound.    There is no abnormality at distal skin of the 4th toe the left foot.    Pulses are palpable at the DP and PT pulses bilateral all remaining toes are warm both feet are warm, capillary refill distal most aspect of the foot right and left is approximately 3 seconds.  Patient does have edema bilateral lower extremities.    Loss protective sensation both feet to 10 g  monofilament.  Decreased vibratory sensation bilateral to 128 hertz tuning fork.  No pain to palpate palpation percussion the posterior tibial saphenous deep peroneal superficial peroneal or sural nerves right and left lower extremities.  Negative allodynia both feet    Assessment/Plan:     Active Diagnoses:    Diagnosis Date Noted POA    PRINCIPAL PROBLEM:  Osteomyelitis of right foot [M86.9] 06/15/2022 Yes    Type 2 diabetes mellitus with diabetic peripheral angiopathy without gangrene, with long-term current use of insulin [E11.51, Z79.4] 09/20/2021 Not Applicable    HTN (hypertension) [I10] 09/20/2021 Yes    SHIV (acute kidney injury) [N17.9] 09/20/2021 Yes      Problems Resolved During this Admission:       Biceps made at this time is normal postoperative inflammatory changes of bone at the distal 1st and 2nd rays right with chronic ulceration from ambulation and pressure verses chronic osteomyelitis contributing to nonhealing wound plantar right foot.  Recommend bone biopsy remaining portions rays 1 2 right to help clarify and guide treatment.    Abnormal findings on MRI at the distal tuft of the left hallux in the left 4th toe are not supported with clinical findings of infection or ulceration continues to these areas.  This combined with the small size of the bone likelihood of fragmentation and poor sample make me currently recommend against biopsying these areas of the left foot.    All defer to Wound Care Service for recommended dressings daily in the hospital.  I am happy to follow this patient for his wound or otherwise on his discharge.    [place]ell to healing his wounds and keeping them healed will be offloading both in the short term and long term with diabetic shoes custom inserts.  Surgical revision for optimization weight-bearing surface and position may be necessary, though not during this hospitalization MI current estimation.    Will follow.    Bedside wound debridements right and left feet  and bone biopsies tomorrow right foot.    Thank you for your consult. I will follow-up with patient. Please contact us if you have any additional questions.    Samuel Toussaint DPM  Podiatry  [facility] - [facility] ([place])

## 2022-06-16 NOTE — ED NOTES
Report received from VIRGINIA Mcpherson. Pt resting comfortably in bed, AAO x4. Resp even & unlabored, denies dyspnea or pain. Comfort/restroom needs addressed. Call light and personal items within reach. bed in lowest position, wheels locked, siderails up x2. Pt on continuous pulse ox with BP cycling every 30 min.

## 2022-06-16 NOTE — PLAN OF CARE
Problem: Adult Inpatient Plan of Care  Goal: Plan of Care Review  Outcome: Ongoing, Progressing  Goal: Patient-Specific Goal (Individualized)  Outcome: Ongoing, Progressing  Goal: Absence of Hospital-Acquired Illness or Injury  Outcome: Ongoing, Progressing  Goal: Optimal Comfort and Wellbeing  Outcome: Ongoing, Progressing  Goal: Readiness for Transition of Care  Outcome: Ongoing, Progressing     Problem: Fall Injury Risk  Goal: Absence of Fall and Fall-Related Injury  Outcome: Ongoing, Progressing     Problem: Diabetes Comorbidity  Goal: Blood Glucose Level Within Targeted Range  Outcome: Ongoing, Progressing     Problem: Fluid and Electrolyte Imbalance (Acute Kidney Injury/Impairment)  Goal: Fluid and Electrolyte Balance  Outcome: Ongoing, Progressing     Problem: Oral Intake Inadequate (Acute Kidney Injury/Impairment)  Goal: Optimal Nutrition Intake  Outcome: Ongoing, Progressing     Problem: Renal Function Impairment (Acute Kidney Injury/Impairment)  Goal: Effective Renal Function  Outcome: Ongoing, Progressing     Problem: Infection  Goal: Absence of Infection Signs and Symptoms  Outcome: Ongoing, Progressing     Problem: Impaired Wound Healing  Goal: Optimal Wound Healing  Outcome: Ongoing, Progressing

## 2022-06-16 NOTE — PLAN OF CARE
06/16/22 1614   Physical Activity   On average, how many days per week do you engage in moderate to strenuous exercise (like a brisk walk)? 0 days   On average, how many minutes do you engage in exercise at this level? 0 min   Financial Resource Strain   How hard is it for you to pay for the very basics like food, housing, medical care, and heating? Not very   Housing Stability   In the last 12 months, was there a time when you were not able to pay the mortgage or rent on time? N   In the last 12 months, was there a time when you did not have a steady place to sleep or slept in a shelter (including now)? N   Transportation Needs   In the past 12 months, has lack of transportation kept you from medical appointments or from getting medications? no   In the past 12 months, has lack of transportation kept you from meetings, work, or from getting things needed for daily living? No   Food Insecurity   Within the past 12 months, you worried that your food would run out before you got the money to buy more. Often true   Within the past 12 months, the food you bought just didn't last and you didn't have money to get more. Often true   Stress   Do you feel stress - tense, restless, nervous, or anxious, or unable to sleep at night because your mind is troubled all the time - these days? Not at all   Social Connections   In a typical week, how many times do you talk on the phone with family, friends, or neighbors? Twice a week   How often do you get together with friends or relatives? Twice   How often do you attend Mormonism or [employer] services? 1 to 4   Do you belong to any clubs or organizations such as Mormonism groups, unions, fraternal or athletic groups, or school groups? No   How often do you attend meetings of the clubs or organizations you belong to? Never   Are you , , , , never , or living with a partner? Living with   Alcohol Use   Q1: How often do you have a drink containing  alcohol? 2-3 per wk   Q2: How many drinks containing alcohol do you have on a typical day when you are drinking? 1 or 2   Q3: How often do you have six or more drinks on one occasion? Weekly

## 2022-06-17 PROBLEM — N17.9 AKI (ACUTE KIDNEY INJURY): Status: RESOLVED | Noted: 2021-09-20 | Resolved: 2022-06-17

## 2022-06-17 LAB
ANION GAP SERPL CALC-SCNC: 12 MMOL/L (ref 8–16)
BUN SERPL-MCNC: 21 MG/DL (ref 6–20)
CALCIUM SERPL-MCNC: 9.1 MG/DL (ref 8.7–10.5)
CHLORIDE SERPL-SCNC: 102 MMOL/L (ref 95–110)
CO2 SERPL-SCNC: 21 MMOL/L (ref 23–29)
CREAT SERPL-MCNC: 1.1 MG/DL (ref 0.5–1.4)
ERYTHROCYTE [DISTWIDTH] IN BLOOD BY AUTOMATED COUNT: 14.3 % (ref 11.5–14.5)
EST. GFR  (AFRICAN AMERICAN): >60 ML/MIN/1.73 M^2
EST. GFR  (NON AFRICAN AMERICAN): >60 ML/MIN/1.73 M^2
GLUCOSE SERPL-MCNC: 202 MG/DL (ref 70–110)
GRAM STN SPEC: NORMAL
HCT VFR BLD AUTO: 38.2 % (ref 40–54)
HGB BLD-MCNC: 11.9 G/DL (ref 14–18)
MCH RBC QN AUTO: 27.5 PG (ref 27–31)
MCHC RBC AUTO-ENTMCNC: 31.2 G/DL (ref 32–36)
MCV RBC AUTO: 88 FL (ref 82–98)
PLATELET # BLD AUTO: 412 K/UL (ref 150–450)
PMV BLD AUTO: 9.4 FL (ref 9.2–12.9)
POCT GLUCOSE: 173 MG/DL (ref 70–110)
POCT GLUCOSE: 176 MG/DL (ref 70–110)
POCT GLUCOSE: 176 MG/DL (ref 70–110)
POCT GLUCOSE: 220 MG/DL (ref 70–110)
POTASSIUM SERPL-SCNC: 4.4 MMOL/L (ref 3.5–5.1)
RBC # BLD AUTO: 4.33 M/UL (ref 4.6–6.2)
SODIUM SERPL-SCNC: 135 MMOL/L (ref 136–145)
VANCOMYCIN TROUGH SERPL-MCNC: 21.9 UG/ML (ref 10–22)
WBC # BLD AUTO: 7.01 K/UL (ref 3.9–12.7)

## 2022-06-17 PROCEDURE — 99232 PR SUBSEQUENT HOSPITAL CARE,LEVL II: ICD-10-PCS | Mod: 25,,, | Performed by: PODIATRIST

## 2022-06-17 PROCEDURE — 88307 TISSUE EXAM BY PATHOLOGIST: CPT | Mod: 26,,, | Performed by: PATHOLOGY

## 2022-06-17 PROCEDURE — 87075 CULTR BACTERIA EXCEPT BLOOD: CPT | Performed by: PODIATRIST

## 2022-06-17 PROCEDURE — 87070 CULTURE OTHR SPECIMN AEROBIC: CPT | Mod: 59 | Performed by: PODIATRIST

## 2022-06-17 PROCEDURE — 63600175 PHARM REV CODE 636 W HCPCS: Performed by: EMERGENCY MEDICINE

## 2022-06-17 PROCEDURE — 88307 PR  SURG PATH,LEVEL V: ICD-10-PCS | Mod: 26,59,, | Performed by: PATHOLOGY

## 2022-06-17 PROCEDURE — 80202 ASSAY OF VANCOMYCIN: CPT | Performed by: NURSE PRACTITIONER

## 2022-06-17 PROCEDURE — 11042 DBRDMT SUBQ TIS 1ST 20SQCM/<: CPT

## 2022-06-17 PROCEDURE — 88307 PR  SURG PATH,LEVEL V: ICD-10-PCS | Mod: 26,,, | Performed by: PATHOLOGY

## 2022-06-17 PROCEDURE — 25000003 PHARM REV CODE 250: Performed by: INTERNAL MEDICINE

## 2022-06-17 PROCEDURE — 88311 PR  DECALCIFY TISSUE: ICD-10-PCS | Mod: 26,,, | Performed by: PATHOLOGY

## 2022-06-17 PROCEDURE — 20220 BONE BIOPSY TROCAR/NDL SUPFC: CPT | Mod: 51,,, | Performed by: PODIATRIST

## 2022-06-17 PROCEDURE — 88307 TISSUE EXAM BY PATHOLOGIST: CPT | Mod: 59 | Performed by: PATHOLOGY

## 2022-06-17 PROCEDURE — 87077 CULTURE AEROBIC IDENTIFY: CPT | Performed by: PODIATRIST

## 2022-06-17 PROCEDURE — 11042 DBRDMT SUBQ TIS 1ST 20SQCM/<: CPT | Mod: LT,,, | Performed by: PODIATRIST

## 2022-06-17 PROCEDURE — 20220 BONE BIOPSY TROCAR/NDL SUPFC: CPT

## 2022-06-17 PROCEDURE — 63600175 PHARM REV CODE 636 W HCPCS: Performed by: NURSE PRACTITIONER

## 2022-06-17 PROCEDURE — 99232 PR SUBSEQUENT HOSPITAL CARE,LEVL II: ICD-10-PCS | Mod: ,,, | Performed by: INTERNAL MEDICINE

## 2022-06-17 PROCEDURE — 87186 SC STD MICRODIL/AGAR DIL: CPT | Performed by: PODIATRIST

## 2022-06-17 PROCEDURE — 88307 TISSUE EXAM BY PATHOLOGIST: CPT | Performed by: PATHOLOGY

## 2022-06-17 PROCEDURE — 88307 TISSUE EXAM BY PATHOLOGIST: CPT | Mod: 26,59,, | Performed by: PATHOLOGY

## 2022-06-17 PROCEDURE — 88311 DECALCIFY TISSUE: CPT | Mod: 59 | Performed by: PATHOLOGY

## 2022-06-17 PROCEDURE — 20220 PR BONE BIOPSY,TROCAR/NEEDLE SUPERF: ICD-10-PCS | Mod: 51,,, | Performed by: PODIATRIST

## 2022-06-17 PROCEDURE — 11042 PR DEBRIDEMENT, SKIN, SUB-Q TISSUE,=<20 SQ CM: ICD-10-PCS | Mod: LT,,, | Performed by: PODIATRIST

## 2022-06-17 PROCEDURE — 88311 DECALCIFY TISSUE: CPT | Mod: 26,,, | Performed by: PATHOLOGY

## 2022-06-17 PROCEDURE — 88311 DECALCIFY TISSUE: CPT | Performed by: PATHOLOGY

## 2022-06-17 PROCEDURE — 25000003 PHARM REV CODE 250: Performed by: NURSE PRACTITIONER

## 2022-06-17 PROCEDURE — 36415 COLL VENOUS BLD VENIPUNCTURE: CPT | Performed by: NURSE PRACTITIONER

## 2022-06-17 PROCEDURE — 99232 SBSQ HOSP IP/OBS MODERATE 35: CPT | Mod: 25,,, | Performed by: PODIATRIST

## 2022-06-17 PROCEDURE — 63600175 PHARM REV CODE 636 W HCPCS: Performed by: INTERNAL MEDICINE

## 2022-06-17 PROCEDURE — 80048 BASIC METABOLIC PNL TOTAL CA: CPT | Performed by: NURSE PRACTITIONER

## 2022-06-17 PROCEDURE — 85027 COMPLETE CBC AUTOMATED: CPT | Performed by: NURSE PRACTITIONER

## 2022-06-17 PROCEDURE — 11000001 HC ACUTE MED/SURG PRIVATE ROOM

## 2022-06-17 PROCEDURE — 99232 SBSQ HOSP IP/OBS MODERATE 35: CPT | Mod: ,,, | Performed by: INTERNAL MEDICINE

## 2022-06-17 PROCEDURE — 25000003 PHARM REV CODE 250: Performed by: EMERGENCY MEDICINE

## 2022-06-17 PROCEDURE — 87205 SMEAR GRAM STAIN: CPT | Performed by: PODIATRIST

## 2022-06-17 RX ORDER — INSULIN ASPART 100 [IU]/ML
8 INJECTION, SOLUTION INTRAVENOUS; SUBCUTANEOUS
Status: DISCONTINUED | OUTPATIENT
Start: 2022-06-17 | End: 2022-06-18

## 2022-06-17 RX ORDER — HYDROCODONE BITARTRATE AND ACETAMINOPHEN 5; 325 MG/1; MG/1
1 TABLET ORAL EVERY 4 HOURS PRN
Status: DISCONTINUED | OUTPATIENT
Start: 2022-06-17 | End: 2022-07-15 | Stop reason: HOSPADM

## 2022-06-17 RX ADMIN — VANCOMYCIN HYDROCHLORIDE 2000 MG: 500 INJECTION, POWDER, LYOPHILIZED, FOR SOLUTION INTRAVENOUS at 10:06

## 2022-06-17 RX ADMIN — ASPIRIN 81 MG: 81 TABLET, COATED ORAL at 09:06

## 2022-06-17 RX ADMIN — HYDROCODONE BITARTRATE AND ACETAMINOPHEN 1 TABLET: 5; 325 TABLET ORAL at 11:06

## 2022-06-17 RX ADMIN — VANCOMYCIN HYDROCHLORIDE 1750 MG: 750 INJECTION, POWDER, LYOPHILIZED, FOR SOLUTION INTRAVENOUS at 09:06

## 2022-06-17 RX ADMIN — QUETIAPINE FUMARATE 200 MG: 200 TABLET ORAL at 08:06

## 2022-06-17 RX ADMIN — ATORVASTATIN CALCIUM 40 MG: 20 TABLET, FILM COATED ORAL at 09:06

## 2022-06-17 RX ADMIN — INSULIN ASPART 5 UNITS: 100 INJECTION, SOLUTION INTRAVENOUS; SUBCUTANEOUS at 11:06

## 2022-06-17 RX ADMIN — INSULIN ASPART 4 UNITS: 100 INJECTION, SOLUTION INTRAVENOUS; SUBCUTANEOUS at 04:06

## 2022-06-17 RX ADMIN — INSULIN ASPART 2 UNITS: 100 INJECTION, SOLUTION INTRAVENOUS; SUBCUTANEOUS at 09:06

## 2022-06-17 RX ADMIN — ENOXAPARIN SODIUM 40 MG: 100 INJECTION SUBCUTANEOUS at 04:06

## 2022-06-17 RX ADMIN — HYDROCODONE BITARTRATE AND ACETAMINOPHEN 1 TABLET: 5; 325 TABLET ORAL at 02:06

## 2022-06-17 RX ADMIN — HYDROCODONE BITARTRATE AND ACETAMINOPHEN 1 TABLET: 5; 325 TABLET ORAL at 08:06

## 2022-06-17 RX ADMIN — INSULIN ASPART 5 UNITS: 100 INJECTION, SOLUTION INTRAVENOUS; SUBCUTANEOUS at 09:06

## 2022-06-17 RX ADMIN — COLLAGENASE SANTYL: 250 OINTMENT TOPICAL at 02:06

## 2022-06-17 RX ADMIN — HYDROCODONE BITARTRATE AND ACETAMINOPHEN 1 TABLET: 10; 325 TABLET ORAL at 06:06

## 2022-06-17 RX ADMIN — CEFEPIME 2 G: 2 INJECTION, POWDER, FOR SOLUTION INTRAVENOUS at 09:06

## 2022-06-17 RX ADMIN — INSULIN ASPART 8 UNITS: 100 INJECTION, SOLUTION INTRAVENOUS; SUBCUTANEOUS at 04:06

## 2022-06-17 RX ADMIN — LISINOPRIL 10 MG: 10 TABLET ORAL at 09:06

## 2022-06-17 RX ADMIN — HYDROCODONE BITARTRATE AND ACETAMINOPHEN 1 TABLET: 5; 325 TABLET ORAL at 04:06

## 2022-06-17 RX ADMIN — CEFEPIME 2 G: 2 INJECTION, POWDER, FOR SOLUTION INTRAVENOUS at 05:06

## 2022-06-17 RX ADMIN — CEFEPIME 2 G: 2 INJECTION, POWDER, FOR SOLUTION INTRAVENOUS at 02:06

## 2022-06-17 NOTE — PROGRESS NOTES
[facility] Surg [facility] Medicine  Progress Note    Patient Name: Dave Good  MRN: [id number]  Patient Class: IP- Inpatient   Admission Date: 6/15/2022  Length of Stay: 2 days  Attending Physician: Marcy Patrick MD  Primary Care Provider: Reyna Jorge NP        Subjective:     Principal Problem:Osteomyelitis of right foot        HPI:  Mr Acosta is a 56 yr old male with a PMH that includes DB 2, COPD, HTN, depression, and right foot transmetatarsal amputation. He was sent to the ED for evaluation of osteomyelitis from a follow up appt with Dr. Flores. Pt had I&D and amputation for wet gangrene in Sept 2021 and debridements in Feb, March, and May of this year. Per his chart, his feet were healing well until some foot trauma occurred. Pt has has open wounds and drainage to BL feet with right foot wound deeper than left. He also has wound to left shin. Pt reports associated 10/10 pain and difficulty walking. He denies fever and chills at home. Podiatry consulted and pt admitted to hospital medicine.       Overview/Hospital Course:  Patient admitted with bilateral foot wounds and concern for osteomyelitis following R foot TMA requiring debridements since for continued wounds. He was started on empiric antibiotics. Bilateral foot MRI done. MRI of left foot with abnormal findings but not consistent with osteomyelitis. MRI of right foot showed changes of 1st and 2nd transmetatarsal amputation with cortical regularity and abnormal signal at distal stumps noting overlying extensive edema and regions of skin ulceration.  Findings may represent early osteomyelitis vs post-operative changes. Bone biopsy done for further evaluation and to guide treatment.      Interval History: No events overnight. Pt had debridement and bone biopsy done at bedside today. Complains of foot pain, requesting pain medication every 4 hours.    Review of Systems   Constitutional:  Negative for chills and fever.   Respiratory:  Negative  for shortness of breath.    Cardiovascular:  Negative for chest pain.   Gastrointestinal:  Negative for nausea and vomiting.   Musculoskeletal:         L foot pain   Objective:     Vital Signs (Most Recent):  Temp: 97.8 °F (36.6 °C) (06/17/22 1142)  Pulse: 71 (06/17/22 1142)  Resp: 18 (06/17/22 1150)  BP: 126/84 (06/17/22 1142)  SpO2: 97 % (06/17/22 1142) Vital Signs (24h Range):  Temp:  [97.4 °F (36.3 °C)-98.2 °F (36.8 °C)] 97.8 °F (36.6 °C)  Pulse:  [71-79] 71  Resp:  [16-20] 18  SpO2:  [95 %-97 %] 97 %  BP: (111-134)/(73-87) 126/84     Weight: 129.3 kg (285 lb)  Body mass index is 36.59 kg/m².    Intake/Output Summary (Last 24 hours) at 6/17/2022 1422  Last data filed at 6/17/2022 0600  Gross per 24 hour   Intake 341.7 ml   Output 700 ml   Net -358.3 ml        Physical Exam  Vitals and nursing note reviewed.   Constitutional:       General: He is not in acute distress.     Appearance: Normal appearance. He is well-developed.   Cardiovascular:      Rate and Rhythm: Normal rate and regular rhythm.      Pulses: Normal pulses.   Pulmonary:      Effort: Pulmonary effort is normal. No respiratory distress.      Breath sounds: Normal breath sounds.   Abdominal:      General: Bowel sounds are normal.      Palpations: Abdomen is soft.      Tenderness: There is no abdominal tenderness.   Musculoskeletal:      Right lower leg: Edema present.      Left lower leg: Edema present.   Skin:     General: Skin is warm and dry.      Comments: B/l feet with new dressing in place s/p debridement   Neurological:      Mental Status: He is alert and oriented to person, place, and time. Mental status is at baseline.   Psychiatric:         Behavior: Behavior normal.       Significant Labs: All pertinent labs within the past 24 hours have been reviewed.  CBC:   Recent Labs   Lab 06/15/22  1708 06/16/22  0549 06/17/22  0516   WBC 9.55 8.57 7.01   HGB 13.2* 11.7* 11.9*   HCT 42.4 37.2* 38.2*   * 384 412       CMP:   Recent Labs   Lab  06/15/22  1708 06/16/22  0549 06/17/22  0516    137 135*   K 4.8 4.3 4.4   CL 99 103 102   CO2 23 22* 21*   * 201* 202*   BUN 28* 24* 21*   CREATININE 1.5* 1.1 1.1   CALCIUM 10.4 9.2 9.1   PROT 9.4*  --   --    ALBUMIN 3.9  --   --    BILITOT 0.3  --   --    ALKPHOS 104  --   --    AST 20  --   --    ALT 27  --   --    ANIONGAP 14 12 12   EGFRNONAA 51* >60 >60         Significant Imaging: I have reviewed all pertinent imaging results/findings within the past 24 hours.      Assessment/Plan:      * Osteomyelitis of right foot  Left Foot ulcer  - Presents from surgery clinic for concern for infection/osteomyelitis  - Prior history of MRSA bacteremia 2/2 gangrene treated with 6 weeks IV abx for presumed endocarditis  - ESR/CRP elevated but have down trended since prior hospitalization  - MRI L foot with mild marrow edema of tuft of great toe. Findings and exam not consistent with osteo  - MRI R foot with post-op changes of 1st & 2nd transmetatarsal with cortical regularity and abnormal signal at distal stumps with extensive edema. Findings concerning for osteo vs chronic changes  - Bedside cultures and debridement done of R foot. Biopsy pending to determine if osteo treatment needed  - Continue empiric cefepime and vancomycin with pharmacy dosing  - Podiatry and wound care consulted    HTN (hypertension)  - Reports taking lisinopril and losartan at home  - Continue lisinopril 10 mg qd. Titrate PRN    Type 2 diabetes mellitus with diabetic peripheral angiopathy without gangrene, with long-term current use of insulin  Hyperlipidemia  - Last A1c 7.8  - On Levemir 50 U qHS and aspart 10 U TID WM at home  - Hyperglycemic. Increase Levemir to 50U qHS and aspart to 8U TIDWM. Titrate PRN  - Continue moderate SSI AC/HS PRN  - Continue aspirin and statin    VTE Risk Mitigation (From admission, onward)         Ordered     enoxaparin injection 40 mg  Daily         06/16/22 0658     IP VTE HIGH RISK PATIENT  Once          06/15/22 2119     Place sequential compression device  Until discontinued         06/15/22 2119                  Marcy Patrick MD  Department of Hospital Medicine   [facility] - Med Surg ([place])

## 2022-06-17 NOTE — RESEARCH
Sponsor: Dr. Rory Beltran M.D.    Study Title/IRB Number: A computer vision approach to prevention of injury from falling  IRB #: 2020.256    Principle Investigator: Rory Beltran M.D.    Present for Discussion: Yes/Patient and patient's fiance as stated by patient.     Is LAR Consenting for Subject: No    Prior to the Informed Consent (IC) being signed, or any study protocol required data collection, testing, procedure, or intervention being performed, the following was done and/or discussed:   Patient was given a copy of the IC for review    Purpose of the study and qualifications to participate    Study design, follow up schedule, and tests or procedures done at each visit   Confidentiality and HIPAA Authorization for Release of Medical Records for the research trial/ subject's rights/research related injury   Risk, Benefits, Alternative Treatments, Compensation and Costs   Participation in the research trial is voluntary and patient may withdraw at anytime   Contact information for study related questions    Patient verbalizes understanding of the above: Yes  Contact information for CRC and PI given to patient: Yes  Patient able to adequately summarize: the purpose of the study, the risks associated with the study, and all procedures, testing, and follow-ups associated with the study: Yes    Patient signed the informed consent form for the A Computer Vision Approach to Prevent Injury From Falling research study with an IRB approval date of 07/09/2020.  Each page of the consent form was reviewed with patient and all questions answered satisfactorily. Patient signed the consent form and received a copy of same. The original consent was scanned into electronic medical records (EPIC) and filed into the subject's research study chart.    Mr. Good was able to complete the following upon entry of the study:    - Subject was outfitted with white and black checkered gown: Yes   - Subject understands  that they must wear the white and black checkered gown for the entirety of the 24 hour observation period: Yes   - Subject understands that visitors and staff will also be recorded while in the view of visual recording equipment: Yes  - Subject was able to read consent and understands that they'll only be visually recorded for 24 hours and not audio recorded: Yes  -Subject states that they understand that this is an investigational study and that the WOW (Workstation on Wheels) doesn't prevent or lower risks of falls or injuries, and that they should always follow their provider's orders and use their call bell to alert hospital staff before ambulating or becoming mobile: Yes    Dr. Rory Beltran M.D. has reviewed the following inclusion/ exclusion criteria and confirms that subject meets all Inclusion and no Exclusion criteria at this time:      Inclusion-   - Subject is currently admitted as an inpatient with acute care needs to [facility] and is at risk for falling.  - Subject is awake, alert, oriented and can speak and understand English without        difficulty.  - Subject can stand and ambulate with or without assistance.  - Subject must be literate.  - Subject must have a BMI that allows for proper fit of white and black      checkered gown.  - Subject must be able to provide informed consent.  - Subject will be admitted for > 24 hours.    Exclusion-  - Subject cannot read.  - Subject is immobile e.g. (paralyzed)  - Subject has BMI that prevents them from properly fitting in white and black checkered      gown.  - Subject has COVID-19 or other airborne infectious diseases.  - Subject is on reverse-isolation for immunosuppressive diseases.  - Subject has altered mental status or diagnosis of dementia.  - Subject is expected to be discharged in < 24 hours.    Pt AAO x 4, lying quietly supine with HOB elevated X 60 degrees. Pt is accompanied by female he refers to as his  fiance.  Respirations are even and unlabored without distress or complaints voiced. Patient safely placed in gown without incident. Pt reminded that WOW and video recording is not monitored and provides no emergency benefits or reduction or elimination from the risk of injury from falls; pt voiced understanding. Pt reminded to follow plan of care put forth by provider and to call RN for all assistance to reduce risk of problematic situations and potential injuries; pt voiced understanding. Recording started without incident. Advised pt that their participation is voluntary and if for any reasons they decide to cease the study that they only needed to inform their nurse and study session would be stopped; pt voiced understanding. Call bell within reach. Charge nurse notified of continuous video monitoring and of other study tenants.

## 2022-06-17 NOTE — PROGRESS NOTES
"Wound Debridement    Date/Time: 6/17/2022 5:40 AM  Performed by: Samuel Toussaint DPM  Authorized by: Samuel Toussaint DPM     Time out: Immediately prior to procedure a "time out" was called to verify the correct patient, procedure, equipment, support staff and site/side marked as required.    Consent Done?:  Yes (Verbal)    Preparation: Patient was prepped and draped in usual sterile fashion    Local anesthesia used?: No      Wound Details:    Location:  Right foot    Location:  Right Midfoot    Type of Debridement:  Excisional       Length (cm):  1.9       Area (sq cm):  1.71       Width (cm):  0.9       Percent Debrided (%):  100       Depth (cm):  0.4       Total Area Debrided (sq cm):  1.71    Depth of debridement:  Subcutaneous tissue    Tissue debrided:  Dermis, Epidermis and Subcutaneous    Devitalized tissue debrided:  Biofilm, Callus, Fibrin, Necrotic/Eschar and Slough    Instruments:  Blade    Bleeding:  Minimal  Hemostasis Achieved: Yes    Method Used:  Pressure  Patient tolerance:  Patient tolerated the procedure well with no immediate complications  Wound Debridement    Date/Time: 6/15/2022 5:45 AM  Performed by: Samuel Toussaint DPM  Authorized by: Samuel Toussaint DPM     Time out: Immediately prior to procedure a "time out" was called to verify the correct patient, procedure, equipment, support staff and site/side marked as required.    Consent Done?:  Yes (Verbal)    Preparation: Patient was prepped and draped in usual sterile fashion      Wound Details:    Location:  Left foot    Location:  Left 1st Metatarsal Head    Type of Debridement:  Excisional       Length (cm):  0.9       Area (sq cm):  0.81       Width (cm):  0.9       Percent Debrided (%):  100       Depth (cm):  0.3       Total Area Debrided (sq cm):  0.81    Depth of debridement:  Subcutaneous tissue    Tissue debrided:  Dermis, Epidermis and Subcutaneous    Devitalized tissue debrided:  Biofilm, Callus and Necrotic/Eschar    Instruments: "  Blade    Additional wounds:  1    2nd Wound Details:     Location:  Left foot    Location:  Left 4th Metatarsal Head    Location:  Left 4th Metatarsal Head    Type of Debridement:  Excisional       Length (cm):  0.5       Area (sq cm):  0.25       Width (cm):  0.5       Percent Debrided (%):  100       Depth (cm):  0.3       Total Area Debrided (sq cm):  0.25    Depth of debridement:  Subcutaneous tissue    Tissue debrided:  Dermis, Epidermis and Subcutaneous    Devitalized tissue debrided:  Biofilm, Callus and Necrotic/Eschar    Instruments:  Blade    Bleeding:  Minimal  Hemostasis Achieved: Yes    Method Used:  Pressure  Patient tolerance:  Patient tolerated the procedure well with no immediate complications

## 2022-06-17 NOTE — SUBJECTIVE & OBJECTIVE
Interval History: No events overnight. Pt had debridement and bone biopsy done at bedside today. Complains of foot pain, requesting pain medication every 4 hours.    Review of Systems   Constitutional:  Negative for chills and fever.   Respiratory:  Negative for shortness of breath.    Cardiovascular:  Negative for chest pain.   Gastrointestinal:  Negative for nausea and vomiting.   Musculoskeletal:         L foot pain   Objective:     Vital Signs (Most Recent):  Temp: 97.8 °F (36.6 °C) (06/17/22 1142)  Pulse: 71 (06/17/22 1142)  Resp: 18 (06/17/22 1150)  BP: 126/84 (06/17/22 1142)  SpO2: 97 % (06/17/22 1142) Vital Signs (24h Range):  Temp:  [97.4 °F (36.3 °C)-98.2 °F (36.8 °C)] 97.8 °F (36.6 °C)  Pulse:  [71-79] 71  Resp:  [16-20] 18  SpO2:  [95 %-97 %] 97 %  BP: (111-134)/(73-87) 126/84     Weight: 129.3 kg (285 lb)  Body mass index is 36.59 kg/m².    Intake/Output Summary (Last 24 hours) at 6/17/2022 1422  Last data filed at 6/17/2022 0600  Gross per 24 hour   Intake 341.7 ml   Output 700 ml   Net -358.3 ml        Physical Exam  Vitals and nursing note reviewed.   Constitutional:       General: He is not in acute distress.     Appearance: Normal appearance. He is well-developed.   Cardiovascular:      Rate and Rhythm: Normal rate and regular rhythm.      Pulses: Normal pulses.   Pulmonary:      Effort: Pulmonary effort is normal. No respiratory distress.      Breath sounds: Normal breath sounds.   Abdominal:      General: Bowel sounds are normal.      Palpations: Abdomen is soft.      Tenderness: There is no abdominal tenderness.   Musculoskeletal:      Right lower leg: Edema present.      Left lower leg: Edema present.   Skin:     General: Skin is warm and dry.      Comments: B/l feet with new dressing in place s/p debridement   Neurological:      Mental Status: He is alert and oriented to person, place, and time. Mental status is at baseline.   Psychiatric:         Behavior: Behavior normal.       Significant  Labs: All pertinent labs within the past 24 hours have been reviewed.  CBC:   Recent Labs   Lab 06/15/22  1708 06/16/22  0549 06/17/22  0516   WBC 9.55 8.57 7.01   HGB 13.2* 11.7* 11.9*   HCT 42.4 37.2* 38.2*   * 384 412       CMP:   Recent Labs   Lab 06/15/22  1708 06/16/22  0549 06/17/22  0516    137 135*   K 4.8 4.3 4.4   CL 99 103 102   CO2 23 22* 21*   * 201* 202*   BUN 28* 24* 21*   CREATININE 1.5* 1.1 1.1   CALCIUM 10.4 9.2 9.1   PROT 9.4*  --   --    ALBUMIN 3.9  --   --    BILITOT 0.3  --   --    ALKPHOS 104  --   --    AST 20  --   --    ALT 27  --   --    ANIONGAP 14 12 12   EGFRNONAA 51* >60 >60         Significant Imaging: I have reviewed all pertinent imaging results/findings within the past 24 hours.

## 2022-06-17 NOTE — PLAN OF CARE
06/17/22 1415   Discharge Reassessment   Did the patient's condition or plan change since previous assessment? Yes   Discharge Plan discussed with: Patient   Communicated LOIS with patient/caregiver Date not available/Unable to determine   DME Needed Upon Discharge  none   Discharge Barriers Identified None   Post-Acute Status   Discharge Delays None known at this time   CM talked to patient bedside about needs or concerns. Patient stated that he doesn't have any needs or concerns at this time.

## 2022-06-17 NOTE — PLAN OF CARE
Problem: Adult Inpatient Plan of Care  Goal: Plan of Care Review  Outcome: Ongoing, Progressing  Goal: Patient-Specific Goal (Individualized)  Outcome: Ongoing, Progressing  Goal: Absence of Hospital-Acquired Illness or Injury  Outcome: Ongoing, Progressing  Goal: Optimal Comfort and Wellbeing  Outcome: Ongoing, Progressing  Goal: Readiness for Transition of Care  Outcome: Ongoing, Progressing     Problem: Fall Injury Risk  Goal: Absence of Fall and Fall-Related Injury  Outcome: Ongoing, Progressing     Problem: Diabetes Comorbidity  Goal: Blood Glucose Level Within Targeted Range  Outcome: Ongoing, Progressing     Problem: Fluid and Electrolyte Imbalance (Acute Kidney Injury/Impairment)  Goal: Fluid and Electrolyte Balance  Outcome: Ongoing, Progressing     Problem: Oral Intake Inadequate (Acute Kidney Injury/Impairment)  Goal: Optimal Nutrition Intake  Outcome: Ongoing, Progressing     Problem: Renal Function Impairment (Acute Kidney Injury/Impairment)  Goal: Effective Renal Function  Outcome: Ongoing, Progressing     Problem: Infection  Goal: Absence of Infection Signs and Symptoms  Outcome: Ongoing, Progressing     Problem: Impaired Wound Healing  Goal: Optimal Wound Healing  Outcome: Ongoing, Progressing     Problem: Skin Injury Risk Increased  Goal: Skin Health and Integrity  Outcome: Ongoing, Progressing

## 2022-06-17 NOTE — ASSESSMENT & PLAN NOTE
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 Findings and exam not consistent with osteo  - MRI R foot with post-op changes of 1st & 2nd transmetatarsal with cortical regularity and abnormal signal at distal stumps with extensive edema. Findings concerning for osteo vs chronic changes  - Bedside cultures and debridement done of R foot. Biopsy pending to determine if osteo treatment needed  - Continue empiric cefepime and vancomycin with pharmacy dosing  - Podiatry and wound care consulted

## 2022-06-17 NOTE — PROGRESS NOTES
Pharmacokinetic Assessment Follow Up: IV Vancomycin    Vancomycin serum concentration assessment(s):    The trough level was drawn correctly and can be used to guide therapy at this time. The measurement is above the desired definitive target range of 10 to 20 mcg/mL.    Vancomycin Regimen Plan:    Change regimen to Vancomycin 1750 mg IV every 12 hours with next serum trough concentration measured at 0930 prior to 1000 dose on 6/19    Drug levels (last 3 results):  Recent Labs   Lab Result Units 06/17/22  0916   Vancomycin-Trough ug/mL 21.9       Pharmacy will continue to follow and monitor vancomycin.    Please contact pharmacy at extension [phone] for questions regarding this assessment.    Thank you for the consult,   Donna Barros       Patient brief summary:  Dave Good is a 56 y.o. male initiated on antimicrobial therapy with IV Vancomycin for treatment of bone/joint infection    The patient's current regimen is Vancomycin 1750mg IVPB every 12 hours    Drug Allergies:   Review of patient's allergies indicates:   Allergen Reactions    Ibuprofen Swelling     Facial swelling       Actual Body Weight:   129.3kg    Renal Function:   Estimated Creatinine Clearance: 104.6 mL/min (based on SCr of 1.1 mg/dL).,     Dialysis Method (if applicable):  N/A    CBC (last 72 hours):  Recent Labs   Lab Result Units 06/15/22  1708 06/16/22  0549 06/17/22  0516   WBC K/uL 9.55 8.57 7.01   Hemoglobin g/dL 13.2* 11.7* 11.9*   Hematocrit % 42.4 37.2* 38.2*   Platelets K/uL 451* 384 412   Gran % % 52.7  --   --    Lymph % % 32.6  --   --    Mono % % 9.9  --   --    Eosinophil % % 3.9  --   --    Basophil % % 0.5  --   --    Differential Method  Automated  --   --        Metabolic Panel (last 72 hours):  Recent Labs   Lab Result Units 06/15/22  1708 06/16/22  0549 06/17/22  0516   Sodium mmol/L 136 137 135*   Potassium mmol/L 4.8 4.3 4.4   Chloride mmol/L 99 103 102   CO2 mmol/L 23 22* 21*   Glucose mg/dL 194* 201* 202*   BUN mg/dL  28* 24* 21*   Creatinine mg/dL 1.5* 1.1 1.1   Albumin g/dL 3.9  --   --    Total Bilirubin mg/dL 0.3  --   --    Alkaline Phosphatase U/L 104  --   --    AST U/L 20  --   --    ALT U/L 27  --   --        Vancomycin Administrations:  vancomycin given in the last 96 hours                     vancomycin 2 g in dextrose 5 % 500 mL IVPB (mg) 2,000 mg New Bag 06/17/22 1005     2,000 mg New Bag 06/16/22 2049     2,000 mg New Bag  0953    vancomycin 2 g in dextrose 5 % 500 mL IVPB (mg) 2,000 mg New Bag 06/15/22 2124                    Microbiologic Results:  Microbiology Results (last 7 days)       Procedure Component Value Units Date/Time    Aerobic culture [[id number]] Collected: 06/17/22 0601    Order Status: Sent Specimen: Wound from Foot, Right Updated: 06/17/22 0740    Culture, Anaerobic [[id number]] Collected: 06/17/22 0601    Order Status: Sent Specimen: Wound from Foot, Right Updated: 06/17/22 0740    Culture, Anaerobic [[id number]] Collected: 06/17/22 0601    Order Status: Sent Specimen: Wound from Foot, Right Updated: 06/17/22 0727    Aerobic culture [[id number]] Collected: 06/17/22 0601    Order Status: Sent Specimen: Wound from Foot, Right Updated: 06/17/22 0727    Aerobic culture [[id number]]  (Abnormal) Collected: 06/16/22 0050    Order Status: Completed Specimen: Wound from Foot, Left Updated: 06/17/22 0727     Aerobic Bacterial Culture GRAM NEGATIVE MATTHIEU  Few  Identification and susceptibility pending      Gram stain [[id number]] Collected: 06/17/22 0601    Order Status: No result Specimen: Wound from Foot, Right Updated: 06/17/22 0724    Gram stain [[id number]] Collected: 06/17/22 0601    Order Status: No result Specimen: Wound from Foot, Right Updated: 06/17/22 0724    Culture, Anaerobe [[id number]] Collected: 06/16/22 0050    Order Status: Completed Specimen: Wound from Foot, Left Updated: 06/17/22 0721     Anaerobic Culture Culture in progress    Culture, Anaerobe [[id number]] Collected: 06/16/22 0050    Order  Status: Completed Specimen: Wound from Foot, Right Updated: 06/17/22 0721     Anaerobic Culture Culture in progress    Aerobic culture [[id number]]  (Abnormal) Collected: 06/16/22 0050    Order Status: Completed Specimen: Wound from Foot, Right Updated: 06/17/22 0721     Aerobic Bacterial Culture PRESUMPTIVE PROTEUS SPECIES  Many  Identification and susceptibility pending      Gram stain [[id number]]     Order Status: Completed Specimen: Wound from Toe, Right Foot     Blood Culture #2 **CANNOT BE ORDERED STAT** [[id number]] Collected: 06/15/22 2006    Order Status: Completed Specimen: Blood from Peripheral, Antecubital, Right Updated: 06/17/22 0613     Blood Culture, Routine No Growth to date      No Growth to date    Blood Culture #1 **CANNOT BE ORDERED STAT** [[id number]] Collected: 06/15/22 1708    Order Status: Completed Specimen: Blood from Peripheral, Antecubital, Right Updated: 06/16/22 2012     Blood Culture, Routine No Growth to date      No Growth to date

## 2022-06-17 NOTE — PROGRESS NOTES
[facility] ([place])  Wound Care    Patient Name:  Dave Good   MRN:  [id number]  Date: 6/17/2022  Diagnosis: Osteomyelitis of right foot    History:     Past Medical History:   Diagnosis Date    COPD (chronic obstructive pulmonary disease)     COVID-19     Depression     Diabetes mellitus     Diabetes mellitus, type 2     Hypertension        Social History     Socioeconomic History    Marital status: Single   Tobacco Use    Smoking status: Former Smoker    Smokeless tobacco: Never Used   Substance and Sexual Activity    Alcohol use: Yes     Comment: whiskey and beer every other day    Drug use: Not Currently    Sexual activity: Yes     Partners: Female     Social Determinants of Health     Financial Resource Strain: Low Risk     Difficulty of Paying Living Expenses: Not very hard   Food Insecurity: Food Insecurity Present    Worried About Running Out of Food in the Last Year: Often true    Ran Out of Food in the Last Year: Often true   Transportation Needs: No Transportation Needs    Lack of Transportation (Medical): No    Lack of Transportation (Non-Medical): No   Physical Activity: Inactive    Days of Exercise per Week: 0 days    Minutes of Exercise per Session: 0 min   Stress: No Stress Concern Present    Feeling of Stress : Not at all   Social Connections: Moderately Integrated    Frequency of Communication with Friends and Family: Twice a week    Frequency of Social Gatherings with Friends and Family: Twice a week    Attends Muslim Services: 1 to 4 times per year    Active Member of Clubs or Organizations: No    Attends Club or Organization Meetings: Never    Marital Status: Living with partner   Housing Stability: Unknown    Unable to Pay for Housing in the Last Year: No    Unstable Housing in the Last Year: No       Precautions:     Allergies as of 06/15/2022 - Reviewed 06/15/2022   Allergen Reaction Noted    Ibuprofen Swelling 07/29/2014       WOC Assessment Details/Treatment     Podiatry asked  wound care to reassess patient after podiatry completed bedside debridement on patient this morning. Orders were placed for Santyl ointment daily to wound beds to right and left wounds. Dressing changed completed. Wound care to assist with pt prn.           06/17/22 1537        Altered Skin Integrity 06/16/22 Left plantar Foot #1 Diabetic Ulcer   Date First Assessed: 06/16/22   Altered Skin Integrity Present on Admission: yes  Side: Left  Orientation: plantar  Location: Foot  Wound Number: #1  Is this injury device related?: No  Primary Wound Type: Diabetic Ulcer   Wound Image    Dressing Appearance Dry;Intact;Dried drainage   Drainage Amount Small   Drainage Characteristics/Odor Serosanguineous;No odor   Appearance Red;Pink;Tan;Dry   Tissue loss description Full thickness   Periwound Area Dry;Scar tissue   Wound Edges Callused;Undefined   Wound Length (cm) 4 cm   Wound Width (cm) 7 cm   Wound Depth (cm) 0.4 cm   Wound Volume (cm^3) 11.2 cm^3   Wound Surface Area (cm^2) 28 cm^2   Care Cleansed with:;Antimicrobial agent  (Vashe)   Dressing Applied;Island/border;Rolled gauze;Elastic bandage  (Santyl ointment)        Altered Skin Integrity 06/16/22 Right medial Foot #2 Non pressure chronic ulcer   Date First Assessed: 06/16/22   Altered Skin Integrity Present on Admission: yes  Side: Right  Orientation: medial  Location: Foot  Wound Number: #2  Is this injury device related?: No  Primary Wound Type: Non pressure chronic ulcer   Wound Image    Dressing Appearance Dry;Intact;Dried drainage   Drainage Amount Small   Drainage Characteristics/Odor Serosanguineous;No odor   Appearance Pink;Red;Dry   Tissue loss description Full thickness   Periwound Area Dry;Scar tissue   Wound Edges Callused;Undefined   Wound Length (cm) 6 cm   Wound Width (cm) 2 cm   Wound Depth (cm) 0.3 cm   Wound Volume (cm^3) 3.6 cm^3   Wound Surface Area (cm^2) 12 cm^2   Care Cleansed with:;Antimicrobial agent  (Vashe)   Dressing  Applied;Island/border;Rolled gauze;Elastic bandage  (Santyl ointment)   Dressing Change Due 06/18/22 06/17/2022

## 2022-06-17 NOTE — PROGRESS NOTES
06/17/2022  Procedure: Bone biopsy of the right foot 1st and 2nd metatarsals (separate procedures)    Diagnosis: Osteomyelitis  Performing provider: Dr. Norbert CARNES     Procedure in detail: A time out was performed following WHO surgical safety checklist guidelines. All present are in agreement.     Attention to the distal right foot at the residual 1st and 2nd metatarsal bones. A local block was performed using 5 mL of 1% lidocaine plain. The site was then prepped and draped in cleanly. A stab incision was made and a jamshidi needle was inserted to extract a small piece of bone from both the first and second metatarsals. The bone samples were split and sent for cultures and pathology. Hemostasis was achieved with pressure. The wound was painted with betadine and dressed with xeroform and DSD.

## 2022-06-17 NOTE — ASSESSMENT & PLAN NOTE
Hyperlipidemia  - Last A1c 7.8  - On Levemir 50 U qHS and aspart 10 U TID WM at home  - Hyperglycemic. Increase Levemir to 50U qHS and aspart to 8U TIDWM. Titrate PRN  - Continue moderate SSI AC/HS PRN  - Continue aspirin and statin

## 2022-06-17 NOTE — PROGRESS NOTES
CHRISTINA BAPTISTE  : 1951  ACCOUNT:  [id number]  [phone]  PCP: Dr. Peter Stephen     TODAY'S DATE: 2018  DICTATED BY:  [Dr. Chris De Leon]      CHIEF COMPLAINT: [Followup of Atrial fibrillation, paroxysmal, Followup of Pacemaker B.S   and not dependent.]    HPI:    [On 2018, Christina Baptiste, a 67 year old female, presented with no interim cardiac complaints.]    An 67-year-old here for annual follow-up regarding A. fib sick sinus and pacemaker also had questions regarding watchman.  Does have a history of Crohn's disease and she had microscopic colitis earlier in the year had a 50 pound weight loss is finally back to eating normally and since she is on blood thinners was looking for alternatives.  Her chads BASC score is equal to 3 with gender and age and hypertension.  She has no lightheadedness dizziness syncope she get energy back and had her device checked today    Her pacemaker check showed 5 years of battery life 77 atrial and 2% ventricular paced less than 1% A. fib burden continues on Xarelto and no VT.    RISK FACTORS:  CAD - Hypertension Family    REVIEW OF SYSTEMS:    CONS: weakness. EYES: denies significant visual changes. ENMT: denies difficulties with hearing, otherwise negative. CV: occasional palpitations. RESP: denies chronic cough, hemoptysis. GI: nausea. : no hematuria. INTEG: no new rashes, lesions. MS: mild knee pain. NEURO: lightheaded,dizziness. ALL: no new allergies.      PAST HISTORY: peptic ulcer disease, renal calculi, seizure disorder, pyelonephritis, Crohn's Disease, cholecystectomy , hysterectomy , knee replacement and     PAST CV HISTORY: , cardiac catheterization, cardiac catheterization 2004, dyslipidemia and hypertension    FAMILY HISTORY: Significant for premature CAD. Negative for AAA.  SOCIAL HISTORY: SMOKING: Former tobacco use. 30-40 pack year history smoking, quit . CAFFEINE: none. ALCOHOL: drinks rarely. EXERCISE: regular  [facility] - Med Surg ([place])  Podiatry  Progress Note    Patient Name: Dave Good  MRN: [id number]  Admission Date: 6/15/2022  Hospital Length of Stay: 2 days  Attending Physician: Marcy Patrick MD  Primary Care Provider: Reyna Jorge NP     Subjective:     Interval History: wounds both feet with possible osteomyelitis.  Dressings both feet CDI.  Resting in bed.  WB for RR only. Mild decrease WBC today. GLU still about 200         Scheduled Meds:   aspirin  81 mg Oral Daily    atorvastatin  40 mg Oral Daily    ceFEPime (MAXIPIME) IVPB  2 g Intravenous Q8H    enoxaparin  40 mg Subcutaneous Daily    insulin aspart U-100  5 Units Subcutaneous TIDWM    insulin detemir U-100  50 Units Subcutaneous QHS    lisinopriL  10 mg Oral Daily    QUEtiapine  200 mg Oral Nightly    vancomycin (VANCOCIN) IVPB  2,000 mg Intravenous Q12H     Continuous Infusions:  PRN Meds:sodium chloride 0.9%, acetaminophen, dextrose 10%, dextrose 10%, glucagon (human recombinant), glucose, glucose, HYDROcodone-acetaminophen, HYDROcodone-acetaminophen, insulin aspart U-100, melatonin, naloxone, ondansetron, polyethylene glycol, sodium chloride 0.9%, Pharmacy to dose Vancomycin consult **AND** vancomycin - pharmacy to dose    Review of Systems  Objective:     Vital Signs (Most Recent):  Temp: 98.2 °F (36.8 °C) (06/17/22 0445)  Pulse: 75 (06/17/22 0445)  Resp: 18 (06/17/22 0603)  BP: 111/77 (06/17/22 0445)  SpO2: 95 % (06/17/22 0445) Vital Signs (24h Range):  Temp:  [97.4 °F (36.3 °C)-98.2 °F (36.8 °C)] 98.2 °F (36.8 °C)  Pulse:  [73-80] 75  Resp:  [16-18] 18  SpO2:  [95 %-99 %] 95 %  BP: (111-134)/(73-87) 111/77     Weight: 129.3 kg (285 lb)  Body mass index is 36.59 kg/m².    Foot Exam    Laboratory:  A1C:   Recent Labs   Lab 02/21/22  1544 05/27/22  1010   HGBA1C 9.8* 7.8*     Blood Cultures:   Recent Labs   Lab 06/15/22  1708 06/15/22  2006   LABBLOO No Growth to date  No Growth to date No Growth to date  No Growth to date     BMP:    Recent Labs   Lab 06/17/22  0516   *   *   K 4.4      CO2 21*   BUN 21*   CREATININE 1.1   CALCIUM 9.1     CBC:   Recent Labs   Lab 06/17/22  0516   WBC 7.01   RBC 4.33*   HGB 11.9*   HCT 38.2*      MCV 88   MCH 27.5   MCHC 31.2*     CMP:   Recent Labs   Lab 06/15/22  1708 06/16/22  0549 06/17/22  0516   *   < > 202*   CALCIUM 10.4   < > 9.1   ALBUMIN 3.9  --   --    PROT 9.4*  --   --       < > 135*   K 4.8   < > 4.4   CO2 23   < > 21*   CL 99   < > 102   BUN 28*   < > 21*   CREATININE 1.5*   < > 1.1   ALKPHOS 104  --   --    ALT 27  --   --    AST 20  --   --    BILITOT 0.3  --   --     < > = values in this interval not displayed.     Coagulation: No results for input(s): PT, INR, APTT in the last 168 hours.  CRP:   Recent Labs   Lab 06/15/22  1708   CRP 26.5*     ESR:   Recent Labs   Lab 06/15/22  1708   SEDRATE 80*     Prealbumin: No results for input(s): PREALBUMIN in the last 48 hours.  Wound Cultures:   Recent Labs   Lab 02/22/22  1153 06/16/22  0050   LABAERO METHICILLIN RESISTANT STAPHYLOCOCCUS AUREUS  Many  No other significant isolate  * GRAM NEGATIVE MATTHIEU  Few  Identification and susceptibility pending  *  PRESUMPTIVE PROTEUS SPECIES  Many  Identification and susceptibility pending  *     Microbiology Results (last 7 days)     Procedure Component Value Units Date/Time    Aerobic culture [[id number]] Collected: 06/17/22 0601    Order Status: Sent Specimen: Wound from Foot, Right Updated: 06/17/22 0740    Culture, Anaerobic [[id number]] Collected: 06/17/22 0601    Order Status: Sent Specimen: Wound from Foot, Right Updated: 06/17/22 0740    Culture, Anaerobic [[id number]] Collected: 06/17/22 0601    Order Status: Sent Specimen: Wound from Foot, Right Updated: 06/17/22 0727    Aerobic culture [[id number]] Collected: 06/17/22 0601    Order Status: Sent Specimen: Wound from Foot, Right Updated: 06/17/22 0727    Aerobic culture [[id number]]  (Abnormal) Collected: 06/16/22  exercise. DIET: low fat, low cholesterol. MARITAL STATUS: . OCCUPATION: .     ALLERGIES: No Known Allergies    MEDICATIONS: Selected prescriptions see below    VITAL SIGNS: [B/P - 170/86 , Pulse - 60, Respiration - 18, Weight -  152, Height -   65 , BMI - 25.3 ]    CONS: well developed, well nourished. WEIGHT: BMI parameters reviewed and discussed. HEAD/FACE: no trauma and normocephalic. EYES: conjunctivae not injected and no xanthelasma. ENT: mucosa pink and moist. NECK: jugular venous pressure not elevated. RESP: respirations with normal rate and rhythm, clear to auscultation. GI: no masses, no tenderness and no hepatomegaly. MS: adequate gait for exercise/testing. EXT: no clubbing or cyanosis.  SKIN: no rashes, lesions, ulcers.  NEURO/PSYCH: alert and oriented to time, place and person and normal affect.      CV: PALP: PMI not displaced, no lifts and thrills or rub. AUSC:  regular rhythm, normal S1, S2 without S3; no pathologic murmurs. PEDAL: pedal pulses intact. EXT: no peripheral edema.     LABORATORY DATA: [pacer check with 5 years of battery life less than 1% A. fib]    DECISION MAKIN.  Atrial fibrillation paroxysmal asymptomatic low burden continues on Xarelto but given a chads vas score 3 and bleeding problems and colitis wants look for alternatives asked about watchman discussed risk benefits alternatives and details will schedule pre-watchman teed to see if she is a candidate and expressed understanding wish to proceed  2.  Sick sinus syndrome pacemaker not dependent routine checks 5 years battery life  3.  Hypertension controlled on medications  4.  Dementia she is early Alzheimer's fairly functional she was on Aricept but potential side effects was stopped and they are doing other workup and treatment at this point.  She appears pretty appropriate.    ASSESSMENT:  1. Sick sinus syndrome  2. Atrial fibrillation, paroxysmal  3. Hypercholesteremia, pure  4. Hypertension,  0050    Order Status: Completed Specimen: Wound from Foot, Left Updated: 06/17/22 0727     Aerobic Bacterial Culture GRAM NEGATIVE MATTHIEU  Few  Identification and susceptibility pending      Gram stain [[id number]] Collected: 06/17/22 0601    Order Status: No result Specimen: Wound from Foot, Right Updated: 06/17/22 0724    Gram stain [[id number]] Collected: 06/17/22 0601    Order Status: No result Specimen: Wound from Foot, Right Updated: 06/17/22 0724    Culture, Anaerobe [[id number]] Collected: 06/16/22 0050    Order Status: Completed Specimen: Wound from Foot, Left Updated: 06/17/22 0721     Anaerobic Culture Culture in progress    Culture, Anaerobe [[id number]] Collected: 06/16/22 0050    Order Status: Completed Specimen: Wound from Foot, Right Updated: 06/17/22 0721     Anaerobic Culture Culture in progress    Aerobic culture [[id number]]  (Abnormal) Collected: 06/16/22 0050    Order Status: Completed Specimen: Wound from Foot, Right Updated: 06/17/22 0721     Aerobic Bacterial Culture PRESUMPTIVE PROTEUS SPECIES  Many  Identification and susceptibility pending      Gram stain [[id number]]     Order Status: Completed Specimen: Wound from Toe, Right Foot     Blood Culture #2 **CANNOT BE ORDERED STAT** [[id number]] Collected: 06/15/22 2006    Order Status: Completed Specimen: Blood from Peripheral, Antecubital, Right Updated: 06/17/22 0613     Blood Culture, Routine No Growth to date      No Growth to date    Blood Culture #1 **CANNOT BE ORDERED STAT** [[id number]] Collected: 06/15/22 1708    Order Status: Completed Specimen: Blood from Peripheral, Antecubital, Right Updated: 06/16/22 2012     Blood Culture, Routine No Growth to date      No Growth to date        Specimen (24h ago, onward)             Start     Ordered    06/17/22 0555  Specimen to Pathology, Surgery Orthopedics  Once        Comments: Pre-op Diagnosis: * No surgery found * Number of specimens: 1Name of specimens: bone 2nd metatarsal right     References:    benign  5. Pacemaker B.S  7/12, not dependent  6. Pacer reprogramming  7. Precordial pain      PLAN:  [1.  Will call to schedule transesophageal echocardiogram for pre-watchman workup  2.  Continue medications  3 workup for annual follow-up in pacer clinic with myself scheduled]    PRESCRIPTIONS:   11/15/17 *Xarelto              15MG      1 TABLET DAILY AT     6:00PM             06/12/15 Aspirin               81MG      1 tablet every day                       07/09/14 Nadolol               20MG      1/2 tablet daily                         09/18/13 Requip                1MG       1 tablet twice daily                               Click here for ordering Quick Tip   Question Answer Comment   Procedure Type: Orthopedics    Which provider would you like to cc? RAFAEL AMADOR    Release to patient Immediate        06/17/22 0555    06/17/22 0554  Specimen to Pathology, Surgery Orthopedics  Once        Comments: Pre-op Diagnosis: * No surgery found * Number of specimens: 1Name of specimens: bone 1st metatarsal right     References:    Click here for ordering Quick Tip   Question Answer Comment   Procedure Type: Orthopedics    Which provider would you like to cc? RAFAEL AMADOR    Release to patient Immediate        06/17/22 0555                Diagnostic Results:  None    Clinical Findings:  Wounds both feet plantar right midfoot bneath residual rays 1,2.  Wound:  Plantar right rays 1,2/midfoot area    Size:    Pre:[id number]  Post: [id number]    Base:  Granular, pink and tan fibrous mix with moderate serous/serosanaguinous drainage only.  No pus, tracking, fluctuance, malodor, or cardinal signs infection.    Borders:  Hyperkeratotic, debriding to flat, pink, blanchable skin edges without undermining.    Wound:  Plantar left 1st mtpj    Size:    Pre:[id number]  Post: [id number]    Base:  Granular, pink with moderate serous/serosanaguinous drainage only.  No pus, tracking, fluctuance, malodor, or cardinal signs infection.    Borders:  Hyperkeratotic, debriding to flat, pink, blanchable skin edges without undermining.     Wound:  Plantar left 4th mtp    Size:    Pre:[id number]  Post: [id number]    Base:  Granular, pink with moderate serous/serosanaguinous drainage only.  No pus, tracking, fluctuance, malodor, or cardinal signs infection.    Borders:  Hyperkeratotic, debriding to flat, pink, blanchable skin edges without undermining.     Pulses palpable bilateral feet DP/PT.  All toes warm.  <2+ pitting edema.    Assessment/Plan:     Active Diagnoses:    Diagnosis Date Noted POA    PRINCIPAL PROBLEM:  Osteomyelitis of right foot [M86.9] 06/15/2022 Yes    Type 2  diabetes mellitus with diabetic peripheral angiopathy without gangrene, with long-term current use of insulin [E11.51, Z79.4] 09/20/2021 Not Applicable    HTN (hypertension) [I10] 09/20/2021 Yes    SHIV (acute kidney injury) [N17.9] 09/20/2021 Yes      Problems Resolved During this Admission:     Await cultures of wounds    Discussed risk/benefit of bone biopsy and wound debridement today to diagnose and best treat bone infection if present, and promote wound healing.    Discussed short and long term need for good offloading to heal and prevent constant recurrence.    Defer to wound care for dressing change orders, but recommend santyl to right wound base.    Patient not surgical/acute presently.    Verbal consent for wound debridement all wounds both feet and bone biopsy metatarsals 1,2 right.    Procedures:  Debrided all 3 wounds.  Biopsied metatarsals 1,2 right.    Will follow.          Samuel Toussaint DPM  Podiatry  [facility] - [facility] ([place])

## 2022-06-18 LAB
ANION GAP SERPL CALC-SCNC: 12 MMOL/L (ref 8–16)
BUN SERPL-MCNC: 18 MG/DL (ref 6–20)
CALCIUM SERPL-MCNC: 8.9 MG/DL (ref 8.7–10.5)
CHLORIDE SERPL-SCNC: 100 MMOL/L (ref 95–110)
CO2 SERPL-SCNC: 23 MMOL/L (ref 23–29)
CREAT SERPL-MCNC: 1 MG/DL (ref 0.5–1.4)
ERYTHROCYTE [DISTWIDTH] IN BLOOD BY AUTOMATED COUNT: 14.4 % (ref 11.5–14.5)
EST. GFR  (AFRICAN AMERICAN): >60 ML/MIN/1.73 M^2
EST. GFR  (NON AFRICAN AMERICAN): >60 ML/MIN/1.73 M^2
GLUCOSE SERPL-MCNC: 184 MG/DL (ref 70–110)
HCT VFR BLD AUTO: 37.4 % (ref 40–54)
HGB BLD-MCNC: 11.6 G/DL (ref 14–18)
MCH RBC QN AUTO: 27.6 PG (ref 27–31)
MCHC RBC AUTO-ENTMCNC: 31 G/DL (ref 32–36)
MCV RBC AUTO: 89 FL (ref 82–98)
PLATELET # BLD AUTO: 395 K/UL (ref 150–450)
PMV BLD AUTO: 9.3 FL (ref 9.2–12.9)
POCT GLUCOSE: 148 MG/DL (ref 70–110)
POCT GLUCOSE: 205 MG/DL (ref 70–110)
POCT GLUCOSE: 255 MG/DL (ref 70–110)
POCT GLUCOSE: 258 MG/DL (ref 70–110)
POTASSIUM SERPL-SCNC: 4.3 MMOL/L (ref 3.5–5.1)
RBC # BLD AUTO: 4.21 M/UL (ref 4.6–6.2)
SODIUM SERPL-SCNC: 135 MMOL/L (ref 136–145)
WBC # BLD AUTO: 6.95 K/UL (ref 3.9–12.7)

## 2022-06-18 PROCEDURE — 11000001 HC ACUTE MED/SURG PRIVATE ROOM

## 2022-06-18 PROCEDURE — 25000003 PHARM REV CODE 250: Performed by: INTERNAL MEDICINE

## 2022-06-18 PROCEDURE — 63600175 PHARM REV CODE 636 W HCPCS: Performed by: INTERNAL MEDICINE

## 2022-06-18 PROCEDURE — 63600175 PHARM REV CODE 636 W HCPCS: Performed by: NURSE PRACTITIONER

## 2022-06-18 PROCEDURE — 85027 COMPLETE CBC AUTOMATED: CPT | Performed by: NURSE PRACTITIONER

## 2022-06-18 PROCEDURE — 25000003 PHARM REV CODE 250: Performed by: NURSE PRACTITIONER

## 2022-06-18 PROCEDURE — 99232 SBSQ HOSP IP/OBS MODERATE 35: CPT | Mod: ,,, | Performed by: INTERNAL MEDICINE

## 2022-06-18 PROCEDURE — 99232 PR SUBSEQUENT HOSPITAL CARE,LEVL II: ICD-10-PCS | Mod: ,,, | Performed by: INTERNAL MEDICINE

## 2022-06-18 PROCEDURE — 36415 COLL VENOUS BLD VENIPUNCTURE: CPT | Performed by: NURSE PRACTITIONER

## 2022-06-18 PROCEDURE — 80048 BASIC METABOLIC PNL TOTAL CA: CPT | Performed by: NURSE PRACTITIONER

## 2022-06-18 PROCEDURE — 94761 N-INVAS EAR/PLS OXIMETRY MLT: CPT

## 2022-06-18 RX ORDER — INSULIN ASPART 100 [IU]/ML
10 INJECTION, SOLUTION INTRAVENOUS; SUBCUTANEOUS
Status: DISCONTINUED | OUTPATIENT
Start: 2022-06-18 | End: 2022-07-02

## 2022-06-18 RX ADMIN — HYDROCODONE BITARTRATE AND ACETAMINOPHEN 1 TABLET: 5; 325 TABLET ORAL at 02:06

## 2022-06-18 RX ADMIN — CEFEPIME 2 G: 2 INJECTION, POWDER, FOR SOLUTION INTRAVENOUS at 09:06

## 2022-06-18 RX ADMIN — HYDROCODONE BITARTRATE AND ACETAMINOPHEN 1 TABLET: 5; 325 TABLET ORAL at 09:06

## 2022-06-18 RX ADMIN — INSULIN ASPART 8 UNITS: 100 INJECTION, SOLUTION INTRAVENOUS; SUBCUTANEOUS at 09:06

## 2022-06-18 RX ADMIN — INSULIN ASPART 2 UNITS: 100 INJECTION, SOLUTION INTRAVENOUS; SUBCUTANEOUS at 09:06

## 2022-06-18 RX ADMIN — CEFEPIME 2 G: 2 INJECTION, POWDER, FOR SOLUTION INTRAVENOUS at 06:06

## 2022-06-18 RX ADMIN — CEFEPIME 2 G: 2 INJECTION, POWDER, FOR SOLUTION INTRAVENOUS at 01:06

## 2022-06-18 RX ADMIN — INSULIN ASPART 10 UNITS: 100 INJECTION, SOLUTION INTRAVENOUS; SUBCUTANEOUS at 05:06

## 2022-06-18 RX ADMIN — ENOXAPARIN SODIUM 40 MG: 100 INJECTION SUBCUTANEOUS at 05:06

## 2022-06-18 RX ADMIN — HYDROCODONE BITARTRATE AND ACETAMINOPHEN 1 TABLET: 5; 325 TABLET ORAL at 01:06

## 2022-06-18 RX ADMIN — LISINOPRIL 10 MG: 10 TABLET ORAL at 09:06

## 2022-06-18 RX ADMIN — COLLAGENASE SANTYL: 250 OINTMENT TOPICAL at 09:06

## 2022-06-18 RX ADMIN — VANCOMYCIN HYDROCHLORIDE 1750 MG: 750 INJECTION, POWDER, LYOPHILIZED, FOR SOLUTION INTRAVENOUS at 10:06

## 2022-06-18 RX ADMIN — HYDROCODONE BITARTRATE AND ACETAMINOPHEN 1 TABLET: 5; 325 TABLET ORAL at 06:06

## 2022-06-18 RX ADMIN — ASPIRIN 81 MG: 81 TABLET, COATED ORAL at 09:06

## 2022-06-18 RX ADMIN — INSULIN ASPART 10 UNITS: 100 INJECTION, SOLUTION INTRAVENOUS; SUBCUTANEOUS at 12:06

## 2022-06-18 RX ADMIN — ATORVASTATIN CALCIUM 40 MG: 20 TABLET, FILM COATED ORAL at 09:06

## 2022-06-18 RX ADMIN — QUETIAPINE FUMARATE 200 MG: 200 TABLET ORAL at 09:06

## 2022-06-18 RX ADMIN — INSULIN ASPART 6 UNITS: 100 INJECTION, SOLUTION INTRAVENOUS; SUBCUTANEOUS at 12:06

## 2022-06-18 RX ADMIN — INSULIN ASPART 3 UNITS: 100 INJECTION, SOLUTION INTRAVENOUS; SUBCUTANEOUS at 10:06

## 2022-06-18 NOTE — ASSESSMENT & PLAN NOTE
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 Findings and exam not consistent with osteo  - MRI R foot with post-op changes of 1st & 2nd transmetatarsal with cortical regularity and abnormal signal at distal stumps with extensive edema. Findings concerning for osteo vs chronic changes  - Biopsy pending to determine if osteo treatment needed  - Wound cultures so far with Staph aureus, Providencia rettgeri, Proteus mirabilis, and GNR  - Continue empiric cefepime and vancomycin with pharmacy dosing pending susceptibility  - Podiatry and wound care following

## 2022-06-18 NOTE — SUBJECTIVE & OBJECTIVE
Interval History: No events overnight. Pt doing well. L foot pain controlled with current meds. No new concerns.    Review of Systems   Constitutional:  Negative for chills and fever.   Respiratory:  Negative for shortness of breath.    Cardiovascular:  Negative for chest pain.   Gastrointestinal:  Negative for nausea and vomiting.   Musculoskeletal:         L foot pain   Objective:     Vital Signs (Most Recent):  Temp: 97.6 °F (36.4 °C) (06/18/22 0920)  Pulse: 80 (06/18/22 0920)  Resp: 18 (06/18/22 0936)  BP: 126/71 (06/18/22 0920)  SpO2: 95 % (06/18/22 0920) Vital Signs (24h Range):  Temp:  [97.6 °F (36.4 °C)-98.5 °F (36.9 °C)] 97.6 °F (36.4 °C)  Pulse:  [67-80] 80  Resp:  [17-20] 18  SpO2:  [95 %-98 %] 95 %  BP: (115-147)/(71-90) 126/71     Weight: 129.3 kg (285 lb)  Body mass index is 36.59 kg/m².    Intake/Output Summary (Last 24 hours) at 6/18/2022 1118  Last data filed at 6/18/2022 0600  Gross per 24 hour   Intake 1361.74 ml   Output 2000 ml   Net -638.26 ml        Physical Exam  Vitals and nursing note reviewed.   Constitutional:       General: He is not in acute distress.     Appearance: Normal appearance. He is well-developed.   Cardiovascular:      Rate and Rhythm: Normal rate and regular rhythm.      Pulses: Normal pulses.   Pulmonary:      Effort: Pulmonary effort is normal. No respiratory distress.      Breath sounds: Normal breath sounds.   Abdominal:      General: Bowel sounds are normal.      Palpations: Abdomen is soft.      Tenderness: There is no abdominal tenderness.   Musculoskeletal:      Right lower leg: Edema present.      Left lower leg: Edema present.   Skin:     General: Skin is warm and dry.      Comments: B/l feet with new dressing in place s/p debridement   Neurological:      Mental Status: He is alert and oriented to person, place, and time. Mental status is at baseline.   Psychiatric:         Behavior: Behavior normal.       Significant Labs: All pertinent labs within the past 24  hours have been reviewed.  CBC:   Recent Labs   Lab 06/17/22  0516 06/18/22  0437   WBC 7.01 6.95   HGB 11.9* 11.6*   HCT 38.2* 37.4*    395       CMP:   Recent Labs   Lab 06/17/22  0516 06/18/22  0437   * 135*   K 4.4 4.3    100   CO2 21* 23   * 184*   BUN 21* 18   CREATININE 1.1 1.0   CALCIUM 9.1 8.9   ANIONGAP 12 12   EGFRNONAA >60 >60         Significant Imaging: I have reviewed all pertinent imaging results/findings within the past 24 hours.   5

## 2022-06-18 NOTE — ASSESSMENT & PLAN NOTE
Hyperlipidemia  - Last A1c 7.8  - On Levemir 50 U qHS and aspart 10 U TID WM at home  - Hyperglycemic. Continue Levemir to 50U qHS and increase aspart to 10U TIDWM. Titrate PRN  - Continue moderate SSI AC/HS PRN  - Continue aspirin and statin

## 2022-06-18 NOTE — PLAN OF CARE
POC reviewed with pt. AxOx4. Purposeful rounding q2h to address pt needs and safety.  Pt encouraged to reposition/shift weight q2h to preserve skin integrity. VSS on room air. Pain managed prn per MD orders.  Pt voids via urinal. Pt remains free of falls during my shift.  Pt's bed in low, wheels locked, call light and personal belongings within reach. No significant events to report at this time. Will continue to monitor.     Problem: Adult Inpatient Plan of Care  Goal: Plan of Care Review  Outcome: Ongoing, Progressing  Goal: Patient-Specific Goal (Individualized)  Outcome: Ongoing, Progressing  Goal: Absence of Hospital-Acquired Illness or Injury  Outcome: Ongoing, Progressing  Goal: Optimal Comfort and Wellbeing  Outcome: Ongoing, Progressing  Goal: Readiness for Transition of Care  Outcome: Ongoing, Progressing     Problem: Fall Injury Risk  Goal: Absence of Fall and Fall-Related Injury  Outcome: Ongoing, Progressing     Problem: Diabetes Comorbidity  Goal: Blood Glucose Level Within Targeted Range  Outcome: Ongoing, Progressing     Problem: Fluid and Electrolyte Imbalance (Acute Kidney Injury/Impairment)  Goal: Fluid and Electrolyte Balance  Outcome: Ongoing, Progressing     Problem: Oral Intake Inadequate (Acute Kidney Injury/Impairment)  Goal: Optimal Nutrition Intake  Outcome: Ongoing, Progressing     Problem: Renal Function Impairment (Acute Kidney Injury/Impairment)  Goal: Effective Renal Function  Outcome: Ongoing, Progressing     Problem: Infection  Goal: Absence of Infection Signs and Symptoms  Outcome: Ongoing, Progressing     Problem: Impaired Wound Healing  Goal: Optimal Wound Healing  Outcome: Ongoing, Progressing     Problem: Skin Injury Risk Increased  Goal: Skin Health and Integrity  Outcome: Ongoing, Progressing

## 2022-06-18 NOTE — PROGRESS NOTES
[facility] Medicine  Progress Note    Patient Name: Dave Good  MRN: [id number]  Patient Class: IP- Inpatient   Admission Date: 6/15/2022  Length of Stay: 3 days  Attending Physician: Marcy Patrick MD  Primary Care Provider: Reyna Jorge NP        Subjective:     Principal Problem:Osteomyelitis of right foot        HPI:  Mr Acosta is a 56 yr old male with a PMH that includes DB 2, COPD, HTN, depression, and right foot transmetatarsal amputation. He was sent to the ED for evaluation of osteomyelitis from a follow up appt with Dr. Flores. Pt had I&D and amputation for wet gangrene in Sept 2021 and debridements in Feb, March, and May of this year. Per his chart, his feet were healing well until some foot trauma occurred. Pt has has open wounds and drainage to BL feet with right foot wound deeper than left. He also has wound to left shin. Pt reports associated 10/10 pain and difficulty walking. He denies fever and chills at home. Podiatry consulted and pt admitted to hospital medicine.       Overview/Hospital Course:  Patient admitted with bilateral foot wounds and concern for osteomyelitis following R foot TMA requiring debridements since for continued wounds. He was started on empiric antibiotics. Bilateral foot MRI done. MRI of left foot with abnormal findings but not consistent with osteomyelitis. MRI of right foot showed changes of 1st and 2nd transmetatarsal amputation with cortical regularity and abnormal signal at distal stumps noting overlying extensive edema and regions of skin ulceration.  Findings may represent early osteomyelitis vs post-operative changes. Bone biopsy done for further evaluation and to guide treatment.      Interval History: No events overnight. Pt doing well. L foot pain controlled with current meds. No new concerns.    Review of Systems   Constitutional:  Negative for chills and fever.   Respiratory:  Negative for shortness of breath.    Cardiovascular:   Negative for chest pain.   Gastrointestinal:  Negative for nausea and vomiting.   Musculoskeletal:         L foot pain   Objective:     Vital Signs (Most Recent):  Temp: 97.6 °F (36.4 °C) (06/18/22 0920)  Pulse: 80 (06/18/22 0920)  Resp: 18 (06/18/22 0936)  BP: 126/71 (06/18/22 0920)  SpO2: 95 % (06/18/22 0920) Vital Signs (24h Range):  Temp:  [97.6 °F (36.4 °C)-98.5 °F (36.9 °C)] 97.6 °F (36.4 °C)  Pulse:  [67-80] 80  Resp:  [17-20] 18  SpO2:  [95 %-98 %] 95 %  BP: (115-147)/(71-90) 126/71     Weight: 129.3 kg (285 lb)  Body mass index is 36.59 kg/m².    Intake/Output Summary (Last 24 hours) at 6/18/2022 1118  Last data filed at 6/18/2022 0600  Gross per 24 hour   Intake 1361.74 ml   Output 2000 ml   Net -638.26 ml        Physical Exam  Vitals and nursing note reviewed.   Constitutional:       General: He is not in acute distress.     Appearance: Normal appearance. He is well-developed.   Cardiovascular:      Rate and Rhythm: Normal rate and regular rhythm.      Pulses: Normal pulses.   Pulmonary:      Effort: Pulmonary effort is normal. No respiratory distress.      Breath sounds: Normal breath sounds.   Abdominal:      General: Bowel sounds are normal.      Palpations: Abdomen is soft.      Tenderness: There is no abdominal tenderness.   Musculoskeletal:      Right lower leg: Edema present.      Left lower leg: Edema present.   Skin:     General: Skin is warm and dry.      Comments: B/l feet with new dressing in place s/p debridement   Neurological:      Mental Status: He is alert and oriented to person, place, and time. Mental status is at baseline.   Psychiatric:         Behavior: Behavior normal.       Significant Labs: All pertinent labs within the past 24 hours have been reviewed.  CBC:   Recent Labs   Lab 06/17/22  0516 06/18/22  0437   WBC 7.01 6.95   HGB 11.9* 11.6*   HCT 38.2* 37.4*    395       CMP:   Recent Labs   Lab 06/17/22  0516 06/18/22  0437   * 135*   K 4.4 4.3    100   CO2  21* 23   * 184*   BUN 21* 18   CREATININE 1.1 1.0   CALCIUM 9.1 8.9   ANIONGAP 12 12   EGFRNONAA >60 >60         Significant Imaging: I have reviewed all pertinent imaging results/findings within the past 24 hours.      Assessment/Plan:      * Osteomyelitis of right foot  Left Foot ulcer  - Presents from surgery clinic for concern for infection/osteomyelitis  - Prior history of MRSA bacteremia 2/2 gangrene treated with 6 weeks IV abx for presumed endocarditis  - ESR/CRP elevated but have down trended since prior hospitalization  - MRI L foot with mild marrow edema of tuft of great toe. Findings and exam not consistent with osteo  - MRI R foot with post-op changes of 1st & 2nd transmetatarsal with cortical regularity and abnormal signal at distal stumps with extensive edema. Findings concerning for osteo vs chronic changes  - Biopsy pending to determine if osteo treatment needed  - Wound cultures so far with Staph aureus, Providencia rettgeri, Proteus mirabilis, and GNR  - Continue empiric cefepime and vancomycin with pharmacy dosing pending susceptibility  - Podiatry and wound care following    HTN (hypertension)  - Reports taking lisinopril and losartan at home  - Continue lisinopril 10 mg qd. Titrate PRN    Type 2 diabetes mellitus with diabetic peripheral angiopathy without gangrene, with long-term current use of insulin  Hyperlipidemia  - Last A1c 7.8  - On Levemir 50 U qHS and aspart 10 U TID WM at home  - Hyperglycemic. Continue Levemir to 50U qHS and increase aspart to 10U TIDWM. Titrate PRN  - Continue moderate SSI AC/HS PRN  - Continue aspirin and statin    VTE Risk Mitigation (From admission, onward)         Ordered     enoxaparin injection 40 mg  Daily         06/16/22 0658     IP VTE HIGH RISK PATIENT  Once         06/15/22 2119     Place sequential compression device  Until discontinued         06/15/22 2119                  Marcy Patrick MD  Department of Hospital Medicine   [facility]  ([place])

## 2022-06-19 LAB
POCT GLUCOSE: 163 MG/DL (ref 70–110)
POCT GLUCOSE: 177 MG/DL (ref 70–110)
POCT GLUCOSE: 185 MG/DL (ref 70–110)
POCT GLUCOSE: 232 MG/DL (ref 70–110)
VANCOMYCIN TROUGH SERPL-MCNC: 24.9 UG/ML (ref 10–22)

## 2022-06-19 PROCEDURE — 63600175 PHARM REV CODE 636 W HCPCS: Performed by: INTERNAL MEDICINE

## 2022-06-19 PROCEDURE — 11000001 HC ACUTE MED/SURG PRIVATE ROOM

## 2022-06-19 PROCEDURE — 80202 ASSAY OF VANCOMYCIN: CPT | Performed by: INTERNAL MEDICINE

## 2022-06-19 PROCEDURE — 25000003 PHARM REV CODE 250: Performed by: NURSE PRACTITIONER

## 2022-06-19 PROCEDURE — 36415 COLL VENOUS BLD VENIPUNCTURE: CPT | Performed by: INTERNAL MEDICINE

## 2022-06-19 PROCEDURE — 99232 SBSQ HOSP IP/OBS MODERATE 35: CPT | Mod: ,,, | Performed by: INTERNAL MEDICINE

## 2022-06-19 PROCEDURE — 63600175 PHARM REV CODE 636 W HCPCS: Performed by: NURSE PRACTITIONER

## 2022-06-19 PROCEDURE — 25000003 PHARM REV CODE 250: Performed by: INTERNAL MEDICINE

## 2022-06-19 PROCEDURE — 99232 PR SUBSEQUENT HOSPITAL CARE,LEVL II: ICD-10-PCS | Mod: ,,, | Performed by: INTERNAL MEDICINE

## 2022-06-19 RX ORDER — HYDROCHLOROTHIAZIDE 25 MG/1
25 TABLET ORAL DAILY
Status: DISCONTINUED | OUTPATIENT
Start: 2022-06-19 | End: 2022-07-15 | Stop reason: HOSPADM

## 2022-06-19 RX ADMIN — HYDROCODONE BITARTRATE AND ACETAMINOPHEN 1 TABLET: 5; 325 TABLET ORAL at 05:06

## 2022-06-19 RX ADMIN — CEFEPIME 2 G: 2 INJECTION, POWDER, FOR SOLUTION INTRAVENOUS at 09:06

## 2022-06-19 RX ADMIN — INSULIN ASPART 10 UNITS: 100 INJECTION, SOLUTION INTRAVENOUS; SUBCUTANEOUS at 09:06

## 2022-06-19 RX ADMIN — INSULIN ASPART 4 UNITS: 100 INJECTION, SOLUTION INTRAVENOUS; SUBCUTANEOUS at 01:06

## 2022-06-19 RX ADMIN — QUETIAPINE FUMARATE 200 MG: 200 TABLET ORAL at 08:06

## 2022-06-19 RX ADMIN — VANCOMYCIN HYDROCHLORIDE 1250 MG: 1.25 INJECTION, POWDER, LYOPHILIZED, FOR SOLUTION INTRAVENOUS at 11:06

## 2022-06-19 RX ADMIN — INSULIN DETEMIR 55 UNITS: 100 INJECTION, SOLUTION SUBCUTANEOUS at 08:06

## 2022-06-19 RX ADMIN — INSULIN ASPART 2 UNITS: 100 INJECTION, SOLUTION INTRAVENOUS; SUBCUTANEOUS at 09:06

## 2022-06-19 RX ADMIN — INSULIN ASPART 2 UNITS: 100 INJECTION, SOLUTION INTRAVENOUS; SUBCUTANEOUS at 05:06

## 2022-06-19 RX ADMIN — HYDROCODONE BITARTRATE AND ACETAMINOPHEN 1 TABLET: 5; 325 TABLET ORAL at 06:06

## 2022-06-19 RX ADMIN — ASPIRIN 81 MG: 81 TABLET, COATED ORAL at 09:06

## 2022-06-19 RX ADMIN — COLLAGENASE SANTYL: 250 OINTMENT TOPICAL at 01:06

## 2022-06-19 RX ADMIN — CEFEPIME 2 G: 2 INJECTION, POWDER, FOR SOLUTION INTRAVENOUS at 05:06

## 2022-06-19 RX ADMIN — VANCOMYCIN HYDROCHLORIDE 1750 MG: 750 INJECTION, POWDER, LYOPHILIZED, FOR SOLUTION INTRAVENOUS at 11:06

## 2022-06-19 RX ADMIN — INSULIN ASPART 10 UNITS: 100 INJECTION, SOLUTION INTRAVENOUS; SUBCUTANEOUS at 05:06

## 2022-06-19 RX ADMIN — ATORVASTATIN CALCIUM 40 MG: 20 TABLET, FILM COATED ORAL at 10:06

## 2022-06-19 RX ADMIN — HYDROCODONE BITARTRATE AND ACETAMINOPHEN 1 TABLET: 5; 325 TABLET ORAL at 10:06

## 2022-06-19 RX ADMIN — HYDROCODONE BITARTRATE AND ACETAMINOPHEN 1 TABLET: 5; 325 TABLET ORAL at 01:06

## 2022-06-19 RX ADMIN — LISINOPRIL 10 MG: 10 TABLET ORAL at 09:06

## 2022-06-19 RX ADMIN — HYDROCODONE BITARTRATE AND ACETAMINOPHEN 1 TABLET: 5; 325 TABLET ORAL at 02:06

## 2022-06-19 RX ADMIN — HYDROCHLOROTHIAZIDE 25 MG: 25 TABLET ORAL at 10:06

## 2022-06-19 RX ADMIN — CEFEPIME 2 G: 2 INJECTION, POWDER, FOR SOLUTION INTRAVENOUS at 01:06

## 2022-06-19 RX ADMIN — ENOXAPARIN SODIUM 40 MG: 100 INJECTION SUBCUTANEOUS at 04:06

## 2022-06-19 RX ADMIN — INSULIN ASPART 10 UNITS: 100 INJECTION, SOLUTION INTRAVENOUS; SUBCUTANEOUS at 01:06

## 2022-06-19 NOTE — PLAN OF CARE
AAOx4. POC reviewed. No significant events this shift. Wound care done today per orders. Complaints of pain treated with PRN medications per MAR. Pt received IV antibiotics per MAR. Pt voids and shifts in bed independently. Bed low and locked, side rails up x3, call light within reach.    Problem: Adult Inpatient Plan of Care  Goal: Plan of Care Review  Outcome: Ongoing, Progressing  Goal: Patient-Specific Goal (Individualized)  Outcome: Ongoing, Progressing  Goal: Absence of Hospital-Acquired Illness or Injury  Outcome: Ongoing, Progressing  Goal: Optimal Comfort and Wellbeing  Outcome: Ongoing, Progressing  Goal: Readiness for Transition of Care  Outcome: Ongoing, Progressing     Problem: Fall Injury Risk  Goal: Absence of Fall and Fall-Related Injury  Outcome: Ongoing, Progressing     Problem: Diabetes Comorbidity  Goal: Blood Glucose Level Within Targeted Range  Outcome: Ongoing, Progressing     Problem: Fluid and Electrolyte Imbalance (Acute Kidney Injury/Impairment)  Goal: Fluid and Electrolyte Balance  Outcome: Ongoing, Progressing     Problem: Oral Intake Inadequate (Acute Kidney Injury/Impairment)  Goal: Optimal Nutrition Intake  Outcome: Ongoing, Progressing     Problem: Renal Function Impairment (Acute Kidney Injury/Impairment)  Goal: Effective Renal Function  Outcome: Ongoing, Progressing     Problem: Infection  Goal: Absence of Infection Signs and Symptoms  Outcome: Ongoing, Progressing     Problem: Impaired Wound Healing  Goal: Optimal Wound Healing  Outcome: Ongoing, Progressing     Problem: Skin Injury Risk Increased  Goal: Skin Health and Integrity  Outcome: Ongoing, Progressing

## 2022-06-19 NOTE — PROGRESS NOTES
[facility] Medicine  Progress Note    Patient Name: Dave Good  MRN: [id number]  Patient Class: IP- Inpatient   Admission Date: 6/15/2022  Length of Stay: 4 days  Attending Physician: Marcy Patrick MD  Primary Care Provider: Reyna Jorge NP        Subjective:     Principal Problem:Osteomyelitis of right foot        HPI:  Mr Acosta is a 56 yr old male with a PMH that includes DB 2, COPD, HTN, depression, and right foot transmetatarsal amputation. He was sent to the ED for evaluation of osteomyelitis from a follow up appt with Dr. Flores. Pt had I&D and amputation for wet gangrene in Sept 2021 and debridements in Feb, March, and May of this year. Per his chart, his feet were healing well until some foot trauma occurred. Pt has has open wounds and drainage to BL feet with right foot wound deeper than left. He also has wound to left shin. Pt reports associated 10/10 pain and difficulty walking. He denies fever and chills at home. Podiatry consulted and pt admitted to hospital medicine.       Overview/Hospital Course:  Patient admitted with bilateral foot wounds and concern for osteomyelitis following R foot TMA requiring debridements since for continued wounds. He was started on empiric antibiotics. Bilateral foot MRI done. MRI of left foot with abnormal findings but not consistent with osteomyelitis. MRI of right foot showed changes of 1st and 2nd transmetatarsal amputation with cortical regularity and abnormal signal at distal stumps noting overlying extensive edema and regions of skin ulceration.  Findings may represent early osteomyelitis vs post-operative changes. Bone biopsy done for further evaluation and to guide treatment.      Interval History: No events overnight. Pt doing well. L foot pain controlled with current meds. No new concerns. Updated on POC.    Review of Systems   Constitutional:  Negative for chills and fever.   Respiratory:  Negative for shortness of breath.     Cardiovascular:  Negative for chest pain.   Gastrointestinal:  Negative for nausea and vomiting.   Musculoskeletal:         L foot pain   Objective:     Vital Signs (Most Recent):  Temp: 97.4 °F (36.3 °C) (06/19/22 0733)  Pulse: 71 (06/19/22 0733)  Resp: 16 (06/19/22 0733)  BP: 118/60 (06/19/22 0733)  SpO2: 96 % (06/19/22 0733) Vital Signs (24h Range):  Temp:  [97.4 °F (36.3 °C)-98.4 °F (36.9 °C)] 97.4 °F (36.3 °C)  Pulse:  [67-73] 71  Resp:  [16-20] 16  SpO2:  [96 %-98 %] 96 %  BP: (118-152)/(60-82) 118/60     Weight: 129.3 kg (285 lb)  Body mass index is 36.59 kg/m².    Intake/Output Summary (Last 24 hours) at 6/19/2022 0934  Last data filed at 6/18/2022 1800  Gross per 24 hour   Intake --   Output 450 ml   Net -450 ml        Physical Exam  Vitals and nursing note reviewed.   Constitutional:       General: He is not in acute distress.     Appearance: Normal appearance. He is well-developed.   Cardiovascular:      Rate and Rhythm: Normal rate and regular rhythm.      Pulses: Normal pulses.   Pulmonary:      Effort: Pulmonary effort is normal. No respiratory distress.      Breath sounds: Normal breath sounds.   Abdominal:      General: Bowel sounds are normal.      Palpations: Abdomen is soft.      Tenderness: There is no abdominal tenderness.   Musculoskeletal:      Right lower leg: Edema present.      Left lower leg: Edema present.   Skin:     General: Skin is warm and dry.      Comments: B/l feet with new dressing in place s/p debridement   Neurological:      Mental Status: He is alert and oriented to person, place, and time. Mental status is at baseline.   Psychiatric:         Behavior: Behavior normal.       Significant Labs: All pertinent labs within the past 24 hours have been reviewed.  CBC:   Recent Labs   Lab 06/18/22 0437   WBC 6.95   HGB 11.6*   HCT 37.4*          CMP:   Recent Labs   Lab 06/18/22 0437   *   K 4.3      CO2 23   *   BUN 18   CREATININE 1.0   CALCIUM 8.9    ANIONGAP 12   EGFRNONAA >60         Significant Imaging: I have reviewed all pertinent imaging results/findings within the past 24 hours.      Assessment/Plan:      * Osteomyelitis of right foot  Left Foot ulcer  - Presents from surgery clinic for concern for infection/osteomyelitis  - Prior history of MRSA bacteremia 2/2 gangrene treated with 6 weeks IV abx for presumed endocarditis  - ESR/CRP elevated but have down trended since prior hospitalization  - MRI L foot with mild marrow edema of tuft of great toe. Findings and exam not consistent with osteo  - MRI R foot with post-op changes of 1st & 2nd transmetatarsal with cortical regularity and abnormal signal at distal stumps with extensive edema. Findings concerning for osteo vs chronic changes  - Biopsy pending to determine if osteo treatment needed  - Wound cultures from ED with Staph aureus, Providencia rettgeri, Proteus mirabilis, and GNR  - Cultures from bone NGTD. Will follow.   - Continue empiric cefepime and vancomycin with pharmacy dosing pending susceptibility  - If no evidence of osteo, can treat with course of PO antibiotics  - Podiatry and wound care following    HTN (hypertension)  - Reports taking lisinopril and losartan at home  - Continue lisinopril 10 mg qd. Titrate PRN    Type 2 diabetes mellitus with diabetic peripheral angiopathy without gangrene, with long-term current use of insulin  Hyperlipidemia  - Last A1c 7.8  - On Levemir 50 U qHS and aspart 10 U TID WM at home  - Hyperglycemic. Increase Levemir to 55U qHS and continue aspart 10U TIDWM. Titrate PRN  - Continue moderate SSI AC/HS PRN  - Continue aspirin and statin    VTE Risk Mitigation (From admission, onward)         Ordered     enoxaparin injection 40 mg  Daily         06/16/22 0658     IP VTE HIGH RISK PATIENT  Once         06/15/22 2119     Place sequential compression device  Until discontinued         06/15/22 2119                  Marcy Patrick MD  Department of Hospital  [facility] ([place])

## 2022-06-19 NOTE — ASSESSMENT & PLAN NOTE
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 Findings and exam not consistent with osteo  - MRI R foot with post-op changes of 1st & 2nd transmetatarsal with cortical regularity and abnormal signal at distal stumps with extensive edema. Findings concerning for osteo vs chronic changes  - Biopsy pending to determine if osteo treatment needed  - Wound cultures from ED with Staph aureus, Providencia rettgeri, Proteus mirabilis, and GNR  - Cultures from bone NGTD. Will follow.   - Continue empiric cefepime and vancomycin with pharmacy dosing pending susceptibility  - If no evidence of osteo, can treat with course of PO antibiotics  - Podiatry and wound care following

## 2022-06-19 NOTE — SUBJECTIVE & OBJECTIVE
Interval History: No events overnight. Pt doing well. L foot pain controlled with current meds. No new concerns. Updated on POC.    Review of Systems   Constitutional:  Negative for chills and fever.   Respiratory:  Negative for shortness of breath.    Cardiovascular:  Negative for chest pain.   Gastrointestinal:  Negative for nausea and vomiting.   Musculoskeletal:         L foot pain   Objective:     Vital Signs (Most Recent):  Temp: 97.4 °F (36.3 °C) (06/19/22 0733)  Pulse: 71 (06/19/22 0733)  Resp: 16 (06/19/22 0733)  BP: 118/60 (06/19/22 0733)  SpO2: 96 % (06/19/22 0733) Vital Signs (24h Range):  Temp:  [97.4 °F (36.3 °C)-98.4 °F (36.9 °C)] 97.4 °F (36.3 °C)  Pulse:  [67-73] 71  Resp:  [16-20] 16  SpO2:  [96 %-98 %] 96 %  BP: (118-152)/(60-82) 118/60     Weight: 129.3 kg (285 lb)  Body mass index is 36.59 kg/m².    Intake/Output Summary (Last 24 hours) at 6/19/2022 0934  Last data filed at 6/18/2022 1800  Gross per 24 hour   Intake --   Output 450 ml   Net -450 ml        Physical Exam  Vitals and nursing note reviewed.   Constitutional:       General: He is not in acute distress.     Appearance: Normal appearance. He is well-developed.   Cardiovascular:      Rate and Rhythm: Normal rate and regular rhythm.      Pulses: Normal pulses.   Pulmonary:      Effort: Pulmonary effort is normal. No respiratory distress.      Breath sounds: Normal breath sounds.   Abdominal:      General: Bowel sounds are normal.      Palpations: Abdomen is soft.      Tenderness: There is no abdominal tenderness.   Musculoskeletal:      Right lower leg: Edema present.      Left lower leg: Edema present.   Skin:     General: Skin is warm and dry.      Comments: B/l feet with new dressing in place s/p debridement   Neurological:      Mental Status: He is alert and oriented to person, place, and time. Mental status is at baseline.   Psychiatric:         Behavior: Behavior normal.       Significant Labs: All pertinent labs within the past 24  hours have been reviewed.  CBC:   Recent Labs   Lab 06/18/22 0437   WBC 6.95   HGB 11.6*   HCT 37.4*          CMP:   Recent Labs   Lab 06/18/22 0437   *   K 4.3      CO2 23   *   BUN 18   CREATININE 1.0   CALCIUM 8.9   ANIONGAP 12   EGFRNONAA >60         Significant Imaging: I have reviewed all pertinent imaging results/findings within the past 24 hours.

## 2022-06-19 NOTE — ASSESSMENT & PLAN NOTE
Hyperlipidemia  - Last A1c 7.8  - On Levemir 50 U qHS and aspart 10 U TID WM at home  - Hyperglycemic. Increase Levemir to 55U qHS and continue aspart 10U TIDWM. Titrate PRN  - Continue moderate SSI AC/HS PRN  - Continue aspirin and statin

## 2022-06-19 NOTE — PROGRESS NOTES
Pharmacokinetic Assessment Follow Up: IV Vancomycin    Vancomycin serum concentration assessment(s):    The trough level was drawn correctly and can be used to guide therapy at this time. The measurement is above the desired definitive target range of 15 to 20 mcg/mL.    Vancomycin Regimen Plan:    Change regimen to Vancomycin 1250 mg IV every 12  hours with next serum trough concentration measured at 1030 prior to next dose on 6/21    Drug levels (last 3 results):  Recent Labs   Lab Result Units 06/17/22  0916 06/19/22  1018   Vancomycin-Trough ug/mL 21.9 24.9*       Pharmacy will continue to follow and monitor vancomycin.    Please contact pharmacy at extension [phone] for questions regarding this assessment.    Thank you for the consult,   Ling Call       Patient brief summary:  Dave oGod is a 56 y.o. male initiated on antimicrobial therapy with IV Vancomycin for treatment of bone/joint infection    The patient's current regimen is 1250 mg every 12 hours    Drug Allergies:   Review of patient's allergies indicates:   Allergen Reactions    Ibuprofen Swelling     Facial swelling       Actual Body Weight:   129.3kg    Renal Function:   Estimated Creatinine Clearance: 115 mL/min (based on SCr of 1 mg/dL).,     Dialysis Method (if applicable)    N/A    CBC (last 72 hours):  Recent Labs   Lab Result Units 06/17/22  0516 06/18/22  0437   WBC K/uL 7.01 6.95   Hemoglobin g/dL 11.9* 11.6*   Hematocrit % 38.2* 37.4*   Platelets K/uL 412 395       Metabolic Panel (last 72 hours):  Recent Labs   Lab Result Units 06/17/22  0516 06/18/22  0437   Sodium mmol/L 135* 135*   Potassium mmol/L 4.4 4.3   Chloride mmol/L 102 100   CO2 mmol/L 21* 23   Glucose mg/dL 202* 184*   BUN mg/dL 21* 18   Creatinine mg/dL 1.1 1.0       Vancomycin Administrations:  vancomycin given in the last 96 hours                     vancomycin 1.75 g in 5 % dextrose 500 mL IVPB (mg) 1,750 mg New Bag 06/19/22 1109     1,750 mg New Bag 06/18/22  2234     1,750 mg New Bag  1009     1,750 mg New Bag 06/17/22 2114    vancomycin 2 g in dextrose 5 % 500 mL IVPB (mg) 2,000 mg New Bag 06/17/22 1005      Restarted 06/16/22 2248     2,000 mg New Bag  2049     2,000 mg New Bag  0953    vancomycin 2 g in dextrose 5 % 500 mL IVPB (mg) 2,000 mg New Bag 06/15/22 2124                    Microbiologic Results:  Microbiology Results (last 7 days)       Procedure Component Value Units Date/Time    Aerobic culture [[id number]]  (Abnormal)  (Susceptibility) Collected: 06/16/22 0050    Order Status: Completed Specimen: Wound from Foot, Left Updated: 06/19/22 0703     Aerobic Bacterial Culture GRAM NEGATIVE MATTHIEU  Few  Identification and susceptibility pending        PROVIDENCIA RETTGERI  Few        STAPHYLOCOCCUS AUREUS  Moderate  Susceptibility pending      Blood Culture #2 **CANNOT BE ORDERED STAT** [[id number]] Collected: 06/15/22 2006    Order Status: Completed Specimen: Blood from Peripheral, Antecubital, Right Updated: 06/19/22 0612     Blood Culture, Routine No Growth to date      No Growth to date      No Growth to date      No Growth to date    Blood Culture #1 **CANNOT BE ORDERED STAT** [[id number]] Collected: 06/15/22 1708    Order Status: Completed Specimen: Blood from Peripheral, Antecubital, Right Updated: 06/18/22 2012     Blood Culture, Routine No Growth to date      No Growth to date      No Growth to date      No Growth to date    Culture, Anaerobic [[id number]] Collected: 06/17/22 0601    Order Status: Completed Specimen: Wound from Foot, Right Updated: 06/18/22 1448     Anaerobic Culture Culture in progress    Narrative:      Right 1st metatarsal - piece of bone.    Culture, Anaerobic [[id number]] Collected: 06/17/22 0601    Order Status: Completed Specimen: Wound from Foot, Right Updated: 06/18/22 1448     Anaerobic Culture Culture in progress    Narrative:      Piece of bone 2nd metatarsal    Aerobic culture [[id number]] Collected: 06/17/22 0601    Order Status:  Completed Specimen: Wound from Foot, Right Updated: 06/18/22 1240     Aerobic Bacterial Culture No significant isolate to date    Narrative:      1st metatarsal - piece of bone    Aerobic culture [[id number]]  (Abnormal)  (Susceptibility) Collected: 06/16/22 0050    Order Status: Completed Specimen: Wound from Foot, Right Updated: 06/18/22 1043     Aerobic Bacterial Culture PROTEUS MIRABILIS  Many        STAPHYLOCOCCUS AUREUS  Many  Susceptibility pending      Aerobic culture [[id number]] Collected: 06/17/22 0601    Order Status: Completed Specimen: Wound from Foot, Right Updated: 06/18/22 0727     Aerobic Bacterial Culture No growth    Narrative:      Piece of bone 2nd metatarsal    Gram stain [[id number]] Collected: 06/17/22 0601    Order Status: Completed Specimen: Wound from Foot, Right Updated: 06/17/22 2125     Gram Stain Result No WBC's      No organisms seen    Narrative:      gram stain for right 1st metatarsal ([id number],CXANA   /[id number],CXAER)    Gram stain [[id number]] Collected: 06/17/22 0601    Order Status: Completed Specimen: Wound from Foot, Right Updated: 06/17/22 1955     Gram Stain Result No WBC's      No organisms seen    Narrative:      gram stain for PIECE OF BONE 2ND metatarsal ([id number],CXANA   /[id number],CXAER)    Culture, Anaerobe [[id number]] Collected: 06/16/22 0050    Order Status: Completed Specimen: Wound from Foot, Left Updated: 06/17/22 0721     Anaerobic Culture Culture in progress    Culture, Anaerobe [[id number]] Collected: 06/16/22 0050    Order Status: Completed Specimen: Wound from Foot, Right Updated: 06/17/22 0721     Anaerobic Culture Culture in progress    Gram stain [[id number]]     Order Status: Completed Specimen: Wound from Toe, Right Foot

## 2022-06-20 LAB
ALBUMIN SERPL BCP-MCNC: 3 G/DL (ref 3.5–5.2)
ANION GAP SERPL CALC-SCNC: 10 MMOL/L (ref 8–16)
BACTERIA BLD CULT: NORMAL
BACTERIA SPEC AEROBE CULT: ABNORMAL
BACTERIA SPEC AEROBE CULT: NO GROWTH
BACTERIA SPEC ANAEROBE CULT: ABNORMAL
BACTERIA SPEC ANAEROBE CULT: NORMAL
BUN SERPL-MCNC: 15 MG/DL (ref 6–20)
CALCIUM SERPL-MCNC: 9.2 MG/DL (ref 8.7–10.5)
CHLORIDE SERPL-SCNC: 103 MMOL/L (ref 95–110)
CO2 SERPL-SCNC: 23 MMOL/L (ref 23–29)
CREAT SERPL-MCNC: 1.2 MG/DL (ref 0.5–1.4)
EST. GFR  (AFRICAN AMERICAN): >60 ML/MIN/1.73 M^2
EST. GFR  (NON AFRICAN AMERICAN): >60 ML/MIN/1.73 M^2
FINAL PATHOLOGIC DIAGNOSIS: NORMAL
GLUCOSE SERPL-MCNC: 174 MG/DL (ref 70–110)
GROSS: NORMAL
Lab: NORMAL
PHOSPHATE SERPL-MCNC: 2.5 MG/DL (ref 2.7–4.5)
POCT GLUCOSE: 148 MG/DL (ref 70–110)
POCT GLUCOSE: 169 MG/DL (ref 70–110)
POCT GLUCOSE: 177 MG/DL (ref 70–110)
POCT GLUCOSE: 194 MG/DL (ref 70–110)
POTASSIUM SERPL-SCNC: 4.6 MMOL/L (ref 3.5–5.1)
SODIUM SERPL-SCNC: 136 MMOL/L (ref 136–145)
VANCOMYCIN TROUGH SERPL-MCNC: 22.2 UG/ML (ref 10–22)

## 2022-06-20 PROCEDURE — 99233 SBSQ HOSP IP/OBS HIGH 50: CPT | Mod: ,,, | Performed by: PODIATRIST

## 2022-06-20 PROCEDURE — 36415 COLL VENOUS BLD VENIPUNCTURE: CPT | Performed by: INTERNAL MEDICINE

## 2022-06-20 PROCEDURE — 99232 SBSQ HOSP IP/OBS MODERATE 35: CPT | Mod: ,,, | Performed by: INTERNAL MEDICINE

## 2022-06-20 PROCEDURE — 63600175 PHARM REV CODE 636 W HCPCS: Performed by: INTERNAL MEDICINE

## 2022-06-20 PROCEDURE — 25000003 PHARM REV CODE 250: Performed by: NURSE PRACTITIONER

## 2022-06-20 PROCEDURE — 11000001 HC ACUTE MED/SURG PRIVATE ROOM

## 2022-06-20 PROCEDURE — 80069 RENAL FUNCTION PANEL: CPT | Performed by: INTERNAL MEDICINE

## 2022-06-20 PROCEDURE — 99232 PR SUBSEQUENT HOSPITAL CARE,LEVL II: ICD-10-PCS | Mod: ,,, | Performed by: INTERNAL MEDICINE

## 2022-06-20 PROCEDURE — 25000003 PHARM REV CODE 250: Performed by: INTERNAL MEDICINE

## 2022-06-20 PROCEDURE — 94761 N-INVAS EAR/PLS OXIMETRY MLT: CPT

## 2022-06-20 PROCEDURE — 63600175 PHARM REV CODE 636 W HCPCS: Performed by: NURSE PRACTITIONER

## 2022-06-20 PROCEDURE — 80202 ASSAY OF VANCOMYCIN: CPT | Performed by: INTERNAL MEDICINE

## 2022-06-20 PROCEDURE — 99233 PR SUBSEQUENT HOSPITAL CARE,LEVL III: ICD-10-PCS | Mod: ,,, | Performed by: PODIATRIST

## 2022-06-20 RX ORDER — SODIUM,POTASSIUM PHOSPHATES 280-250MG
1 POWDER IN PACKET (EA) ORAL ONCE
Status: COMPLETED | OUTPATIENT
Start: 2022-06-20 | End: 2022-06-20

## 2022-06-20 RX ADMIN — HYDROCODONE BITARTRATE AND ACETAMINOPHEN 1 TABLET: 5; 325 TABLET ORAL at 11:06

## 2022-06-20 RX ADMIN — VANCOMYCIN HYDROCHLORIDE 1250 MG: 1.25 INJECTION, POWDER, LYOPHILIZED, FOR SOLUTION INTRAVENOUS at 11:06

## 2022-06-20 RX ADMIN — INSULIN ASPART 10 UNITS: 100 INJECTION, SOLUTION INTRAVENOUS; SUBCUTANEOUS at 09:06

## 2022-06-20 RX ADMIN — ASPIRIN 81 MG: 81 TABLET, COATED ORAL at 09:06

## 2022-06-20 RX ADMIN — CEFEPIME 2 G: 2 INJECTION, POWDER, FOR SOLUTION INTRAVENOUS at 05:06

## 2022-06-20 RX ADMIN — ATORVASTATIN CALCIUM 40 MG: 20 TABLET, FILM COATED ORAL at 09:06

## 2022-06-20 RX ADMIN — INSULIN ASPART 2 UNITS: 100 INJECTION, SOLUTION INTRAVENOUS; SUBCUTANEOUS at 09:06

## 2022-06-20 RX ADMIN — INSULIN DETEMIR 55 UNITS: 100 INJECTION, SOLUTION SUBCUTANEOUS at 09:06

## 2022-06-20 RX ADMIN — HYDROCODONE BITARTRATE AND ACETAMINOPHEN 1 TABLET: 5; 325 TABLET ORAL at 02:06

## 2022-06-20 RX ADMIN — CEFEPIME 2 G: 2 INJECTION, POWDER, FOR SOLUTION INTRAVENOUS at 11:06

## 2022-06-20 RX ADMIN — QUETIAPINE FUMARATE 200 MG: 200 TABLET ORAL at 09:06

## 2022-06-20 RX ADMIN — HYDROCHLOROTHIAZIDE 25 MG: 25 TABLET ORAL at 09:06

## 2022-06-20 RX ADMIN — ENOXAPARIN SODIUM 40 MG: 100 INJECTION SUBCUTANEOUS at 05:06

## 2022-06-20 RX ADMIN — POTASSIUM & SODIUM PHOSPHATES POWDER PACK 280-160-250 MG 1 PACKET: 280-160-250 PACK at 09:06

## 2022-06-20 RX ADMIN — HYDROCODONE BITARTRATE AND ACETAMINOPHEN 1 TABLET: 5; 325 TABLET ORAL at 03:06

## 2022-06-20 RX ADMIN — INSULIN ASPART 2 UNITS: 100 INJECTION, SOLUTION INTRAVENOUS; SUBCUTANEOUS at 01:06

## 2022-06-20 RX ADMIN — HYDROCODONE BITARTRATE AND ACETAMINOPHEN 1 TABLET: 5; 325 TABLET ORAL at 06:06

## 2022-06-20 RX ADMIN — COLLAGENASE SANTYL: 250 OINTMENT TOPICAL at 09:06

## 2022-06-20 RX ADMIN — INSULIN ASPART 10 UNITS: 100 INJECTION, SOLUTION INTRAVENOUS; SUBCUTANEOUS at 12:06

## 2022-06-20 RX ADMIN — HYDROCODONE BITARTRATE AND ACETAMINOPHEN 1 TABLET: 5; 325 TABLET ORAL at 07:06

## 2022-06-20 RX ADMIN — INSULIN ASPART 10 UNITS: 100 INJECTION, SOLUTION INTRAVENOUS; SUBCUTANEOUS at 05:06

## 2022-06-20 RX ADMIN — LISINOPRIL 10 MG: 10 TABLET ORAL at 09:06

## 2022-06-20 RX ADMIN — INSULIN ASPART 2 UNITS: 100 INJECTION, SOLUTION INTRAVENOUS; SUBCUTANEOUS at 05:06

## 2022-06-20 RX ADMIN — CEFEPIME 2 G: 2 INJECTION, POWDER, FOR SOLUTION INTRAVENOUS at 02:06

## 2022-06-20 RX ADMIN — VANCOMYCIN HYDROCHLORIDE 1250 MG: 1.25 INJECTION, POWDER, LYOPHILIZED, FOR SOLUTION INTRAVENOUS at 02:06

## 2022-06-20 NOTE — SUBJECTIVE & OBJECTIVE
Interval History: No events overnight. Pt doing well. L foot pain controlled with current meds. No new concerns. Updated on POC.    Review of Systems   Constitutional:  Negative for chills and fever.   Respiratory:  Negative for shortness of breath.    Cardiovascular:  Negative for chest pain.   Gastrointestinal:  Negative for nausea and vomiting.   Musculoskeletal:         L foot pain   Objective:     Vital Signs (Most Recent):  Temp: 97.9 °F (36.6 °C) (06/20/22 0730)  Pulse: 68 (06/20/22 0730)  Resp: 16 (06/20/22 0730)  BP: 129/64 (06/20/22 0730)  SpO2: 98 % (06/20/22 0730) Vital Signs (24h Range):  Temp:  [97.3 °F (36.3 °C)-98 °F (36.7 °C)] 97.9 °F (36.6 °C)  Pulse:  [64-71] 68  Resp:  [16-18] 16  SpO2:  [96 %-99 %] 98 %  BP: (118-136)/(60-82) 129/64     Weight: 129.3 kg (285 lb)  Body mass index is 36.59 kg/m².    Intake/Output Summary (Last 24 hours) at 6/20/2022 0859  Last data filed at 6/20/2022 0735  Gross per 24 hour   Intake 1020 ml   Output 2450 ml   Net -1430 ml        Physical Exam  Vitals and nursing note reviewed.   Constitutional:       General: He is not in acute distress.     Appearance: Normal appearance. He is well-developed.   Cardiovascular:      Rate and Rhythm: Normal rate and regular rhythm.      Pulses: Normal pulses.   Pulmonary:      Effort: Pulmonary effort is normal. No respiratory distress.      Breath sounds: Normal breath sounds.   Abdominal:      General: Bowel sounds are normal.      Palpations: Abdomen is soft.      Tenderness: There is no abdominal tenderness.   Musculoskeletal:      Right lower leg: Edema present.      Left lower leg: Edema present.   Skin:     General: Skin is warm and dry.      Comments: B/l feet with new dressing in place s/p debridement   Neurological:      Mental Status: He is alert and oriented to person, place, and time. Mental status is at baseline.   Psychiatric:         Behavior: Behavior normal.       Significant Labs: All pertinent labs within the  past 24 hours have been reviewed.      CMP:   Recent Labs   Lab 06/20/22  0444      K 4.6      CO2 23   *   BUN 15   CREATININE 1.2   CALCIUM 9.2   ALBUMIN 3.0*   ANIONGAP 10   EGFRNONAA >60         Significant Imaging: I have reviewed all pertinent imaging results/findings within the past 24 hours.

## 2022-06-20 NOTE — ASSESSMENT & PLAN NOTE
Hyperlipidemia  - Last A1c 7.8  - On Levemir 50 U qHS and aspart 10 U TID WM at home  - Improving. Continue Levemir 55U qHS and continue aspart 10U TIDWM. Titrate PRN  - Continue moderate SSI AC/HS PRN  - Continue aspirin and statin

## 2022-06-20 NOTE — PLAN OF CARE
POC reviewed with patient. AAOx4. VS stable on room air. No complaints verbalized during shift.  Received IV antibiotics according to MAR. Pain meds given and were adequate for pain control, repositioned, elevated heels. Urinal at bedside. No injuries, falls, or trauma occurred during shift. Purposeful rounding completed. Bed low and locked with side rails up x 3 and call light within reach. Will continue to monitor.

## 2022-06-20 NOTE — PROGRESS NOTES
Patient reviewed, renal function stable, cultures reviewed, no new levels, continue current therapy; Next levels due: 6/21 @6000

## 2022-06-20 NOTE — PROGRESS NOTES
[facility] Medicine  Progress Note    Patient Name: Dave Good  MRN: [id number]  Patient Class: IP- Inpatient   Admission Date: 6/15/2022  Length of Stay: 5 days  Attending Physician: Marcy Patrick MD  Primary Care Provider: Reyna Jorge NP        Subjective:     Principal Problem:Osteomyelitis of right foot        HPI:  Mr Acosta is a 56 yr old male with a PMH that includes DB 2, COPD, HTN, depression, and right foot transmetatarsal amputation. He was sent to the ED for evaluation of osteomyelitis from a follow up appt with Dr. Flores. Pt had I&D and amputation for wet gangrene in Sept 2021 and debridements in Feb, March, and May of this year. Per his chart, his feet were healing well until some foot trauma occurred. Pt has has open wounds and drainage to BL feet with right foot wound deeper than left. He also has wound to left shin. Pt reports associated 10/10 pain and difficulty walking. He denies fever and chills at home. Podiatry consulted and pt admitted to hospital medicine.       Overview/Hospital Course:  Patient admitted with bilateral foot wounds and concern for osteomyelitis following R foot TMA requiring debridements since for continued wounds. He was started on empiric antibiotics. Bilateral foot MRI done. MRI of left foot with abnormal findings but not consistent with osteomyelitis. MRI of right foot showed changes of 1st and 2nd transmetatarsal amputation with cortical regularity and abnormal signal at distal stumps noting overlying extensive edema and regions of skin ulceration.  Findings may represent early osteomyelitis vs post-operative changes. Bone biopsy done for further evaluation and to guide treatment.      Interval History: No events overnight. Pt doing well. L foot pain controlled with current meds. No new concerns. Updated on POC.    Review of Systems   Constitutional:  Negative for chills and fever.   Respiratory:  Negative for shortness of breath.     Cardiovascular:  Negative for chest pain.   Gastrointestinal:  Negative for nausea and vomiting.   Musculoskeletal:         L foot pain   Objective:     Vital Signs (Most Recent):  Temp: 97.9 °F (36.6 °C) (06/20/22 0730)  Pulse: 68 (06/20/22 0730)  Resp: 16 (06/20/22 0730)  BP: 129/64 (06/20/22 0730)  SpO2: 98 % (06/20/22 0730) Vital Signs (24h Range):  Temp:  [97.3 °F (36.3 °C)-98 °F (36.7 °C)] 97.9 °F (36.6 °C)  Pulse:  [64-71] 68  Resp:  [16-18] 16  SpO2:  [96 %-99 %] 98 %  BP: (118-136)/(60-82) 129/64     Weight: 129.3 kg (285 lb)  Body mass index is 36.59 kg/m².    Intake/Output Summary (Last 24 hours) at 6/20/2022 0859  Last data filed at 6/20/2022 0735  Gross per 24 hour   Intake 1020 ml   Output 2450 ml   Net -1430 ml        Physical Exam  Vitals and nursing note reviewed.   Constitutional:       General: He is not in acute distress.     Appearance: Normal appearance. He is well-developed.   Cardiovascular:      Rate and Rhythm: Normal rate and regular rhythm.      Pulses: Normal pulses.   Pulmonary:      Effort: Pulmonary effort is normal. No respiratory distress.      Breath sounds: Normal breath sounds.   Abdominal:      General: Bowel sounds are normal.      Palpations: Abdomen is soft.      Tenderness: There is no abdominal tenderness.   Musculoskeletal:      Right lower leg: Edema present.      Left lower leg: Edema present.   Skin:     General: Skin is warm and dry.      Comments: B/l feet with new dressing in place s/p debridement   Neurological:      Mental Status: He is alert and oriented to person, place, and time. Mental status is at baseline.   Psychiatric:         Behavior: Behavior normal.       Significant Labs: All pertinent labs within the past 24 hours have been reviewed.      CMP:   Recent Labs   Lab 06/20/22  0444      K 4.6      CO2 23   *   BUN 15   CREATININE 1.2   CALCIUM 9.2   ALBUMIN 3.0*   ANIONGAP 10   EGFRNONAA >60         Significant Imaging: I have  reviewed all pertinent imaging results/findings within the past 24 hours.      Assessment/Plan:      * Osteomyelitis of right foot  Left Foot ulcer  - Presents from surgery clinic for concern for infection/osteomyelitis  - Prior history of MRSA bacteremia 2/2 gangrene treated with 6 weeks IV abx for presumed endocarditis  - ESR/CRP elevated but have down trended since prior hospitalization  - MRI L foot with mild marrow edema of tuft of great toe. Findings and exam not consistent with osteo  - MRI R foot with post-op changes of 1st & 2nd transmetatarsal with cortical regularity and abnormal signal at distal stumps with extensive edema. Findings concerning for osteo vs chronic changes  - Biopsy pending to determine if osteo treatment needed  - Wound cultures from ED with MRSA, Providencia rettgeri, Proteus mirabilis, and GNR  - Cultures from bone NGTD. Will follow.   - Continue empiric cefepime and vancomycin with pharmacy dosing pending susceptibility  - If no evidence of osteo, can treat with course of PO antibiotics  - Podiatry and wound care following    HTN (hypertension)  - Reports taking lisinopril and losartan at home  - Continue lisinopril 10 mg qd. Titrate PRN    Type 2 diabetes mellitus with diabetic peripheral angiopathy without gangrene, with long-term current use of insulin  Hyperlipidemia  - Last A1c 7.8  - On Levemir 50 U qHS and aspart 10 U TID WM at home  - Improving. Continue Levemir 55U qHS and continue aspart 10U TIDWM. Titrate PRN  - Continue moderate SSI AC/HS PRN  - Continue aspirin and statin    VTE Risk Mitigation (From admission, onward)         Ordered     enoxaparin injection 40 mg  Daily         06/16/22 0658     IP VTE HIGH RISK PATIENT  Once         06/15/22 2119     Place sequential compression device  Until discontinued         06/15/22 2119                  Marcy Patrick MD  Department of Hospital Medicine   [facility] ([place])

## 2022-06-20 NOTE — ASSESSMENT & PLAN NOTE
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 Findings and exam not consistent with osteo  - MRI R foot with post-op changes of 1st & 2nd transmetatarsal with cortical regularity and abnormal signal at distal stumps with extensive edema. Findings concerning for osteo vs chronic changes  - Biopsy pending to determine if osteo treatment needed  - Wound cultures from ED with MRSA, Providencia rettgeri, Proteus mirabilis, and GNR  - Cultures from bone NGTD. Will follow.   - Continue empiric cefepime and vancomycin with pharmacy dosing pending susceptibility  - If no evidence of osteo, can treat with course of PO antibiotics  - Podiatry and wound care following

## 2022-06-21 LAB
BACTERIA BLD CULT: NORMAL
BACTERIA SPEC ANAEROBE CULT: NORMAL
BACTERIA SPEC ANAEROBE CULT: NORMAL
POCT GLUCOSE: 175 MG/DL (ref 70–110)
POCT GLUCOSE: 178 MG/DL (ref 70–110)
POCT GLUCOSE: 190 MG/DL (ref 70–110)
POCT GLUCOSE: 195 MG/DL (ref 70–110)
VANCOMYCIN TROUGH SERPL-MCNC: 20.4 UG/ML (ref 10–22)

## 2022-06-21 PROCEDURE — 63600175 PHARM REV CODE 636 W HCPCS: Performed by: NURSE PRACTITIONER

## 2022-06-21 PROCEDURE — 11000001 HC ACUTE MED/SURG PRIVATE ROOM

## 2022-06-21 PROCEDURE — 25000003 PHARM REV CODE 250: Performed by: NURSE PRACTITIONER

## 2022-06-21 PROCEDURE — 80202 ASSAY OF VANCOMYCIN: CPT | Performed by: INTERNAL MEDICINE

## 2022-06-21 PROCEDURE — 94761 N-INVAS EAR/PLS OXIMETRY MLT: CPT

## 2022-06-21 PROCEDURE — 99233 SBSQ HOSP IP/OBS HIGH 50: CPT | Mod: ,,, | Performed by: INTERNAL MEDICINE

## 2022-06-21 PROCEDURE — 63600175 PHARM REV CODE 636 W HCPCS: Performed by: INTERNAL MEDICINE

## 2022-06-21 PROCEDURE — 25000003 PHARM REV CODE 250: Performed by: INTERNAL MEDICINE

## 2022-06-21 PROCEDURE — 36415 COLL VENOUS BLD VENIPUNCTURE: CPT | Performed by: INTERNAL MEDICINE

## 2022-06-21 PROCEDURE — 27000207 HC ISOLATION

## 2022-06-21 PROCEDURE — 99233 PR SUBSEQUENT HOSPITAL CARE,LEVL III: ICD-10-PCS | Mod: ,,, | Performed by: INTERNAL MEDICINE

## 2022-06-21 RX ORDER — GABAPENTIN 100 MG/1
100 CAPSULE ORAL NIGHTLY
Status: DISCONTINUED | OUTPATIENT
Start: 2022-06-21 | End: 2022-07-15 | Stop reason: HOSPADM

## 2022-06-21 RX ORDER — VANCOMYCIN HCL IN 5 % DEXTROSE 1G/250ML
1000 PLASTIC BAG, INJECTION (ML) INTRAVENOUS
Status: COMPLETED | OUTPATIENT
Start: 2022-06-22 | End: 2022-06-27

## 2022-06-21 RX ADMIN — HYDROCODONE BITARTRATE AND ACETAMINOPHEN 1 TABLET: 5; 325 TABLET ORAL at 12:06

## 2022-06-21 RX ADMIN — ENOXAPARIN SODIUM 40 MG: 100 INJECTION SUBCUTANEOUS at 06:06

## 2022-06-21 RX ADMIN — QUETIAPINE FUMARATE 200 MG: 200 TABLET ORAL at 09:06

## 2022-06-21 RX ADMIN — HYDROCODONE BITARTRATE AND ACETAMINOPHEN 1 TABLET: 5; 325 TABLET ORAL at 02:06

## 2022-06-21 RX ADMIN — LISINOPRIL 10 MG: 10 TABLET ORAL at 08:06

## 2022-06-21 RX ADMIN — ATORVASTATIN CALCIUM 40 MG: 20 TABLET, FILM COATED ORAL at 08:06

## 2022-06-21 RX ADMIN — INSULIN ASPART 10 UNITS: 100 INJECTION, SOLUTION INTRAVENOUS; SUBCUTANEOUS at 09:06

## 2022-06-21 RX ADMIN — INSULIN ASPART 2 UNITS: 100 INJECTION, SOLUTION INTRAVENOUS; SUBCUTANEOUS at 06:06

## 2022-06-21 RX ADMIN — ASPIRIN 81 MG: 81 TABLET, COATED ORAL at 08:06

## 2022-06-21 RX ADMIN — INSULIN ASPART 2 UNITS: 100 INJECTION, SOLUTION INTRAVENOUS; SUBCUTANEOUS at 09:06

## 2022-06-21 RX ADMIN — HYDROCODONE BITARTRATE AND ACETAMINOPHEN 1 TABLET: 5; 325 TABLET ORAL at 08:06

## 2022-06-21 RX ADMIN — CEFEPIME 2 G: 2 INJECTION, POWDER, FOR SOLUTION INTRAVENOUS at 02:06

## 2022-06-21 RX ADMIN — INSULIN ASPART 10 UNITS: 100 INJECTION, SOLUTION INTRAVENOUS; SUBCUTANEOUS at 06:06

## 2022-06-21 RX ADMIN — GABAPENTIN 100 MG: 100 CAPSULE ORAL at 08:06

## 2022-06-21 RX ADMIN — COLLAGENASE SANTYL: 250 OINTMENT TOPICAL at 08:06

## 2022-06-21 RX ADMIN — INSULIN ASPART 10 UNITS: 100 INJECTION, SOLUTION INTRAVENOUS; SUBCUTANEOUS at 12:06

## 2022-06-21 RX ADMIN — CEFEPIME 2 G: 2 INJECTION, POWDER, FOR SOLUTION INTRAVENOUS at 09:06

## 2022-06-21 RX ADMIN — CEFEPIME 2 G: 2 INJECTION, POWDER, FOR SOLUTION INTRAVENOUS at 06:06

## 2022-06-21 RX ADMIN — HYDROCHLOROTHIAZIDE 25 MG: 25 TABLET ORAL at 09:06

## 2022-06-21 RX ADMIN — HYDROCODONE BITARTRATE AND ACETAMINOPHEN 1 TABLET: 5; 325 TABLET ORAL at 04:06

## 2022-06-21 RX ADMIN — VANCOMYCIN HYDROCHLORIDE 1250 MG: 1.25 INJECTION, POWDER, LYOPHILIZED, FOR SOLUTION INTRAVENOUS at 12:06

## 2022-06-21 RX ADMIN — INSULIN DETEMIR 55 UNITS: 100 INJECTION, SOLUTION SUBCUTANEOUS at 09:06

## 2022-06-21 RX ADMIN — INSULIN ASPART 2 UNITS: 100 INJECTION, SOLUTION INTRAVENOUS; SUBCUTANEOUS at 12:06

## 2022-06-21 NOTE — PLAN OF CARE
06/21/22 1623   Discharge Reassessment   Assessment Type Discharge Planning Reassessment   Did the patient's condition or plan change since previous assessment? No   Why the patient remains in the hospital Requires continued medical care   Post-Acute Status   Discharge Delays None known at this time   Patient was visited bedside.Patient stated that he's ready to go home. Patient stated that he didn't need anything at this time.

## 2022-06-21 NOTE — PROGRESS NOTES
Pharmacokinetic Assessment Follow Up: IV Vancomycin    Vancomycin serum concentration assessment(s):    The trough level was drawn correctly and can be used to guide therapy at this time. The measurement is above the desired definitive target range of 15 to 20 mcg/mL.    Vancomycin Regimen Plan:    Change regimen to Vancomycin 1000 mg IV every 12 hours with next serum trough concentration measured at 1130 prior to next dose on 6/23/22.    Drug levels (last 3 results):  Recent Labs   Lab Result Units 06/19/22  1018 06/20/22  1336 06/21/22  1112   Vancomycin-Trough ug/mL 24.9* 22.2* 20.4       Pharmacy will continue to follow and monitor vancomycin.    Please contact pharmacy at extension [phone] for questions regarding this assessment.    Thank you for the consult,   Virginia Corado       Patient brief summary:  Dave Good is a 56 y.o. male initiated on antimicrobial therapy with IV Vancomycin for treatment of bone/joint infection    The patient's current regimen is 1000 mg every 12 hours.    Drug Allergies:   Review of patient's allergies indicates:   Allergen Reactions    Ibuprofen Swelling     Facial swelling       Actual Body Weight:   129.3 kg    Renal Function:   Estimated Creatinine Clearance: 95.9 mL/min (based on SCr of 1.2 mg/dL).,     Dialysis Method (if applicable):  N/A    CBC (last 72 hours):  No results for input(s): WHITE BLOOD CELL COUNT, HEMOGLOBIN, HEMATOCRIT, PLATELETS, GRAN%, LYMPH%, MONO%, EOSINOPHIL%, BASOPHIL%, DIFFERENTIAL METHOD in the last 72 hours.    Metabolic Panel (last 72 hours):  Recent Labs   Lab Result Units 06/20/22  0444   Sodium mmol/L 136   Potassium mmol/L 4.6   Chloride mmol/L 103   CO2 mmol/L 23   Glucose mg/dL 174*   BUN mg/dL 15   Creatinine mg/dL 1.2   Albumin g/dL 3.0*   Phosphorus mg/dL 2.5*       Vancomycin Administrations:  vancomycin given in the last 96 hours                     vancomycin 1.25 g in dextrose 5% 250 mL IVPB (ready to mix) (mg) 1,250 mg New Bag 06/21/22  1217     1,250 mg New Bag 06/20/22 2302     1,250 mg New Bag  1427     1,250 mg New Bag 06/19/22 2319    vancomycin 1.75 g in 5 % dextrose 500 mL IVPB (mg) 1,750 mg New Bag 06/19/22 1109     1,750 mg New Bag 06/18/22 2234     1,750 mg New Bag  1009     1,750 mg New Bag 06/17/22 2114                    Microbiologic Results:  Microbiology Results (last 7 days)       Procedure Component Value Units Date/Time    Aerobic culture [[id number]]  (Abnormal) Collected: 06/17/22 0601    Order Status: Completed Specimen: Wound from Foot, Right Updated: 06/21/22 0838     Aerobic Bacterial Culture DIPHTHEROIDS  Few        ENTEROCOCCUS FAECALIS  Few  Susceptibility pending      Narrative:      1st metatarsal - piece of bone    Culture, Anaerobic [[id number]] Collected: 06/17/22 0601    Order Status: Completed Specimen: Wound from Foot, Right Updated: 06/21/22 0814     Anaerobic Culture No anaerobes isolated    Narrative:      Piece of bone 2nd metatarsal    Culture, Anaerobic [[id number]] Collected: 06/17/22 0601    Order Status: Completed Specimen: Wound from Foot, Right Updated: 06/21/22 0813     Anaerobic Culture No anaerobes isolated    Narrative:      Right 1st metatarsal - piece of bone.    Blood Culture #2 **CANNOT BE ORDERED STAT** [[id number]] Collected: 06/15/22 2006    Order Status: Completed Specimen: Blood from Peripheral, Antecubital, Right Updated: 06/21/22 0612     Blood Culture, Routine No growth after 5 days.    Blood Culture #1 **CANNOT BE ORDERED STAT** [[id number]] Collected: 06/15/22 1708    Order Status: Completed Specimen: Blood from Peripheral, Antecubital, Right Updated: 06/20/22 2012     Blood Culture, Routine No growth after 5 days.    Culture, Anaerobe [[id number]] Collected: 06/16/22 0050    Order Status: Completed Specimen: Wound from Foot, Left Updated: 06/20/22 1215     Anaerobic Culture No anaerobes isolated    Aerobic culture [[id number]]  (Abnormal)  (Susceptibility) Collected: 06/16/22 0050    Order  Status: Completed Specimen: Wound from Foot, Right Updated: 06/20/22 1050     Aerobic Bacterial Culture PROTEUS MIRABILIS  Many        METHICILLIN RESISTANT STAPHYLOCOCCUS AUREUS  Many      Aerobic culture [[id number]] Collected: 06/17/22 0601    Order Status: Completed Specimen: Wound from Foot, Right Updated: 06/20/22 0834     Aerobic Bacterial Culture No growth    Narrative:      Piece of bone 2nd metatarsal    Culture, Anaerobe [[id number]]  (Abnormal) Collected: 06/16/22 0050    Order Status: Completed Specimen: Wound from Foot, Right Updated: 06/20/22 0824     Anaerobic Culture PRESUMPTIVE PREVOTELLA/PORPHYROMONAS GROUP  Unable to quantitate      Aerobic culture [[id number]]  (Abnormal)  (Susceptibility) Collected: 06/16/22 0050    Order Status: Completed Specimen: Wound from Foot, Left Updated: 06/20/22 0756     Aerobic Bacterial Culture ACINETOBACTER LWOFFII GROUP  Few        PROVIDENCIA RETTGERI  Few        METHICILLIN RESISTANT STAPHYLOCOCCUS AUREUS  Moderate      Gram stain [[id number]] Collected: 06/17/22 0601    Order Status: Completed Specimen: Wound from Foot, Right Updated: 06/17/22 2125     Gram Stain Result No WBC's      No organisms seen    Narrative:      gram stain for right 1st metatarsal ([id number],CXANA   /[id number],CXAER)    Gram stain [[id number]] Collected: 06/17/22 0601    Order Status: Completed Specimen: Wound from Foot, Right Updated: 06/17/22 1955     Gram Stain Result No WBC's      No organisms seen    Narrative:      gram stain for PIECE OF BONE 2ND metatarsal ([id number],CXANA   /[id number],CXAER)    Gram stain [[id number]]     Order Status: Completed Specimen: Wound from Toe, Right Foot

## 2022-06-21 NOTE — CONSULTS
Thank you for your consult to [facility]. We have reviewed the patient chart. This patient does meet criteria for [facility] service at this time. Will assume care on 06/22/22 at 6AM     Vineet Salinas MD  [facility]

## 2022-06-21 NOTE — PLAN OF CARE
POC reviewed with patient. AAOx4. VS stable on room air. No complaints verbalized during shift.  Received IV antibiotics according to MAR. Drsg to BLE clean dry intact. Heels elevated on blankets. Pt's current pain meds are adequate in pain relief. Urinal at bedside. No injuries, falls, or trauma occurred during shift. Purposeful rounding completed. Bed low and locked with side rails up x 3 and call light within reach. Will continue to monitor.

## 2022-06-21 NOTE — NURSING
"Upon entering pt room to do bedside handoff pt stated he did not need assistance to ambulate. Attempted to set bed alarm and educate pt on importance of calling before ambulating d/t pt being a fall risk, pt refused. Pt stated, "Do not turn that on, if I need to use the bathroom I cannot wait for you to come. Y'all take too long." Encouraged pt to still call before ambulating and to try and use the urinal.  "

## 2022-06-21 NOTE — PLAN OF CARE
POC reviewed w/ pt and spouse. AAOx4. No significant events this shift. VSS on RA. C/o pain treated w/ PRN pain medications. Pt voids and shifts in bed independently. Instructed to call for help ambulating. No injuries, falls, or trauma occurred during shift. Purposeful rounding completed. Bed low and locked, side rails up x3, call light within reach.

## 2022-06-21 NOTE — SUBJECTIVE & OBJECTIVE
Interval History: No events overnight. Reports pain from foot but doing okay otherwise. Discussed plan of care.    Review of Systems   Constitutional:  Negative for chills and fever.   Respiratory:  Negative for cough and shortness of breath.    Cardiovascular:  Negative for chest pain and palpitations.   Gastrointestinal:  Negative for abdominal pain, nausea and vomiting.   Musculoskeletal:         L foot pain     Objective:     Vital Signs (Most Recent):  Temp: 97.5 °F (36.4 °C) (06/21/22 1141)  Pulse: 80 (06/21/22 1141)  Resp: 18 (06/21/22 1214)  BP: 121/83 (06/21/22 1141)  SpO2: 96 % (06/21/22 1141) Vital Signs (24h Range):  Temp:  [97.5 °F (36.4 °C)-98.2 °F (36.8 °C)] 97.5 °F (36.4 °C)  Pulse:  [68-80] 80  Resp:  [16-20] 18  SpO2:  [96 %-100 %] 96 %  BP: (121-146)/(78-85) 121/83     Weight: 129.3 kg (285 lb)  Body mass index is 36.59 kg/m².    Intake/Output Summary (Last 24 hours) at 6/21/2022 1529  Last data filed at 6/21/2022 1500  Gross per 24 hour   Intake 1568.61 ml   Output 575 ml   Net 993.61 ml        Physical Exam  Vitals and nursing note reviewed.   Constitutional:       General: He is not in acute distress.     Appearance: Normal appearance. He is well-developed.   HENT:      Head: Normocephalic and atraumatic.   Eyes:      General:         Right eye: No discharge.         Left eye: No discharge.      Extraocular Movements: Extraocular movements intact.   Cardiovascular:      Rate and Rhythm: Normal rate and regular rhythm.      Pulses: Normal pulses.   Pulmonary:      Effort: Pulmonary effort is normal. No respiratory distress.      Breath sounds: Normal breath sounds.   Abdominal:      General: Bowel sounds are normal.      Palpations: Abdomen is soft.      Tenderness: There is no abdominal tenderness.   Musculoskeletal:      Right lower leg: No edema.      Left lower leg: No edema.      Comments: Bilateral feet with dressings in place.   Skin:     General: Skin is warm and dry.   Neurological:       Mental Status: He is alert and oriented to person, place, and time.     Significant Labs:   BMP:  Recent Labs   Lab 06/20/22  0444      K 4.6      CO2 23   BUN 15   CREATININE 1.2   *   CALCIUM 9.2   PHOS 2.5*   ALBUMIN 3.0*   ANIONGAP 10      Significant Imaging: I have reviewed all pertinent imaging results/findings within the past 24 hours.

## 2022-06-21 NOTE — ASSESSMENT & PLAN NOTE
Left Foot ulcer  - Presented from surgery clinic for concern for infection/osteomyelitis  - Prior history of MRSA bacteremia 2/2 gangrene treated with 6 weeks IV abx for presumed endocarditis  - ESR/CRP elevated but have down trended since prior hospitalization.  - MRI L foot with mild marrow edema of tuft of great toe. Findings and exam not consistent with acute osteomyelitis.  - MRI R foot with post-op changes of 1st & 2nd transmetatarsal with cortical regularity and abnormal signal at distal stumps with extensive edema. Findings concerning for osteo vs chronic changes.  - Biopsies from 6/17 showing no evidence of osteomyelitis involving 2nd metatarsal; 1st metatarsal still in process.  - Wound cultures from ED with MRSA, Providencia rettgeri, Proteus mirabilis, and GNR.  - 1st metatarsal bone culture 6/17 showing E faecalis, diphtheroids.  - Continue empiric cefepime and vancomycin with pharmacy dosing pending susceptibilities.  - Trial of gabapentin 100mg PO qHS with reported symptoms consistent with neuropathic pain.  - Podiatry and wound care following, appreciate assistance.  - ID consulted, midline placed

## 2022-06-21 NOTE — PROGRESS NOTES
[facility] - Med Surg [facility])  Podiatry  Progress Note    Patient Name: Dave Good  MRN: [id number]  Admission Date: 6/15/2022  Hospital Length of Stay: 5 days  Attending Physician: Marcy Patrick MD  Primary Care Provider: Reyna Jorge NP     Subjective:     Interval History: wounds both feet with possible osteomyelitis.  Bone biopsies done  last Friday.   Dressings both feet CDI.  Resting in bed.  WB for RR only.           Scheduled Meds:   aspirin  81 mg Oral Daily    atorvastatin  40 mg Oral Daily    ceFEPime (MAXIPIME) IVPB  2 g Intravenous Q8H    collagenase   Topical (Top) Daily    enoxaparin  40 mg Subcutaneous Daily    hydroCHLOROthiazide  25 mg Oral Daily    insulin aspart U-100  10 Units Subcutaneous TIDWM    insulin detemir U-100  55 Units Subcutaneous QHS    lisinopriL  10 mg Oral Daily    QUEtiapine  200 mg Oral Nightly    vancomycin (VANCOCIN) IVPB  1,250 mg Intravenous Q12H     Continuous Infusions:  PRN Meds:sodium chloride 0.9%, acetaminophen, dextrose 10%, dextrose 10%, glucagon (human recombinant), glucose, glucose, HYDROcodone-acetaminophen, insulin aspart U-100, melatonin, naloxone, ondansetron, polyethylene glycol, sodium chloride 0.9%, Pharmacy to dose Vancomycin consult **AND** vancomycin - pharmacy to dose    Review of Systems: no fevers, chills, nausea or vomiting, shortness of breath or coughing. Eating well at bedside.       Objective:     Vital Signs (Most Recent):  Temp: 97.7 °F (36.5 °C) (06/20/22 1929)  Pulse: 73 (06/20/22 1929)  Resp: 18 (06/20/22 1929)  BP: 139/80 (06/20/22 1929)  SpO2: 98 % (06/20/22 1929) Vital Signs (24h Range):  Temp:  [97.5 °F (36.4 °C)-98.7 °F (37.1 °C)] 97.7 °F (36.5 °C)  Pulse:  [64-73] 73  Resp:  [16-20] 18  SpO2:  [97 %-99 %] 98 %  BP: (118-146)/(64-85) 139/80     Weight: 129.3 kg (285 lb)  Body mass index is 36.59 kg/m².    Foot Exam    Laboratory:  A1C:   Recent Labs   Lab 02/21/22  1544 05/27/22  1010   HGBA1C 9.8* 7.8*     Blood  Cultures:   No results for input(s): LABBLOO in the last 48 hours.  BMP:   Recent Labs   Lab 06/20/22  0444   *      K 4.6      CO2 23   BUN 15   CREATININE 1.2   CALCIUM 9.2     CBC:   Recent Labs   Lab 06/18/22  0437   WBC 6.95   RBC 4.21*   HGB 11.6*   HCT 37.4*      MCV 89   MCH 27.6   MCHC 31.0*     CMP:   Recent Labs   Lab 06/15/22  1708 06/16/22  0549 06/20/22  0444   *   < > 174*   CALCIUM 10.4   < > 9.2   ALBUMIN 3.9  --  3.0*   PROT 9.4*  --   --       < > 136   K 4.8   < > 4.6   CO2 23   < > 23   CL 99   < > 103   BUN 28*   < > 15   CREATININE 1.5*   < > 1.2   ALKPHOS 104  --   --    ALT 27  --   --    AST 20  --   --    BILITOT 0.3  --   --     < > = values in this interval not displayed.     Coagulation: No results for input(s): PT, INR, APTT in the last 168 hours.  CRP:   Recent Labs   Lab 06/15/22  1708   CRP 26.5*     ESR:   Recent Labs   Lab 06/15/22  1708   SEDRATE 80*     Prealbumin: No results for input(s): PREALBUMIN in the last 48 hours.      Wound Cultures:   RIGHT 1st metatarsal bone:  Prelim results:       Aerobic Bacterial Culture  Abnormal   DIPHTHEROIDS   Few   P      Aerobic Bacterial Culture  Abnormal   ENTEROCOCCUS FAECALIS   Few   Susceptibility pending                 Diagnostic Results:  6/16/2022 MRI LEFT FOREFOOT:     Mild marrow edema in the tuft of the great toe, possibly reactive vs early/mild osteomyelitis.  Short-term imaging follow-up could be performed to ensure resolution/stability, if indicated.  Probable chronic marrow changes in the distal phalanx of the 4th toe.     6/20/2022 MRI RIGHT FOREFOOT: Impression:      Postoperative changes of 1st and 2nd transmetatarsal amputation with cortical regularity and abnormal signal at distal stumps noting overlying extensive edema and regions of skin ulceration.  Findings may represent early osteomyelitis however postoperative change at distal stumps may have a similar appearance.  Recommend  correlation with clinical history/exam.      Clinical Findings:  Wounds both feet plantar right midfoot beneath residual rays 1,2.  Wound:  Plantar right rays 1,2/midfoot area      Base:  Granular, pink and tan fibrous mix with moderate serous/serosanaguinous drainage only.  No pus, tracking, fluctuance, malodor, or cardinal signs infection.    Borders:  Hyperkeratotic,  pink, blanchable skin edges without undermining.    Wound:  Plantar left 1st mtpj    Base:  Granular, pink with moderate serous/serosanaguinous drainage only.  No pus, tracking, fluctuance, malodor, or cardinal signs infection.    Borders:  Hyperkeratotic, debriding to flat, pink, blanchable skin edges without undermining.     Wound:  Plantar left 4th mtp  Base:  Granular, pink with no drainage.  No pus, tracking, fluctuance, malodor, or cardinal signs infection.    Borders:  Hyperkeratotic, pink, blanchable skin edges without undermining.     Pulses palpable bilateral feet DP/PT.  All toes warm.  <2+ pitting edema.        Assessment/Plan:     Active Diagnoses:    Diagnosis Date Noted POA    PRINCIPAL PROBLEM:  Osteomyelitis of right foot [M86.9] 06/15/2022 Yes    Type 2 diabetes mellitus with diabetic peripheral angiopathy without gangrene, with long-term current use of insulin [E11.51, Z79.4] 09/20/2021 Not Applicable    HTN (hypertension) [I10] 09/20/2021 Yes      Problems Resolved During this Admission:    Diagnosis Date Noted Date Resolved POA    SHIV (acute kidney injury) [N17.9] 09/20/2021 06/17/2022 Yes     Await cultures of wounds.  No final culture results yet.     Continue broad spectrum antibiotics.     Wound care daily as ordered.   (Santyl to wound)    Heel weight bearing only.     Patient not surgical/acute presently.    Podiatry will follow.              Lavonne Vigil DPM  Podiatry  [facility] - [facility] ([place])

## 2022-06-21 NOTE — ASSESSMENT & PLAN NOTE
Left Foot ulcer  - Presented from surgery clinic for concern for infection/osteomyelitis  - Prior history of MRSA bacteremia 2/2 gangrene treated with 6 weeks IV abx for presumed endocarditis  - ESR/CRP elevated but have down trended since prior hospitalization.  - MRI L foot with mild marrow edema of tuft of great toe. Findings and exam not consistent with acute osteomyelitis.  - MRI R foot with post-op changes of 1st & 2nd transmetatarsal with cortical regularity and abnormal signal at distal stumps with extensive edema. Findings concerning for osteo vs chronic changes.  - Biopsies from 6/17 showing no evidence of osteomyelitis involving 2nd metatarsal; 1st metatarsal still in process.  - Wound cultures from ED with MRSA, Providencia rettgeri, Proteus mirabilis, and GNR.  - 1st metatarsal bone culture 6/17 showing E faecalis, diphtheroids.  - Continue empiric cefepime and vancomycin with pharmacy dosing pending susceptibilities.  - Podiatry and wound care following, appreciate assistance.

## 2022-06-21 NOTE — PROGRESS NOTES
[facility] Surg [facility] Medicine  Progress Note    Patient Name: Dave Good  MRN: [id number]  Patient Class: IP- Inpatient   Admission Date: 6/15/2022  Length of Stay: 6 days  Attending Physician: GITA Correa MD  Primary Care Provider: Reyna Jorge NP        Subjective:     Principal Problem:Osteomyelitis of right foot        HPI:  Mr Dave is a 56 yr old male with a PMH that includes DB 2, COPD, HTN, depression, and right foot transmetatarsal amputation. He was sent to the ED for evaluation of osteomyelitis from a follow up appt with Dr. Flores. Pt had I&D and amputation for wet gangrene in Sept 2021 and debridements in Feb, March, and May of this year. Per his chart, his feet were healing well until some foot trauma occurred. Pt has has open wounds and drainage to BL feet with right foot wound deeper than left. He also has wound to left shin. Pt reports associated 10/10 pain and difficulty walking. He denies fever and chills at home. Podiatry consulted and pt admitted to hospital medicine.       Overview/Hospital Course:  Patient admitted with bilateral foot wounds and concern for osteomyelitis following R foot TMA requiring debridements since for continued wounds. He was started on empiric antibiotics. Bilateral foot MRI done. MRI of left foot with abnormal findings but not consistent with osteomyelitis. MRI of right foot showed changes of 1st and 2nd transmetatarsal amputation with cortical regularity and abnormal signal at distal stumps noting overlying extensive edema and regions of skin ulceration.  Findings may represent early osteomyelitis vs post-operative changes. Bone biopsy done for further evaluation and to guide treatment.      Interval History: No events overnight. Reports pain from foot but doing okay otherwise. Discussed plan of care.    Review of Systems   Constitutional:  Negative for chills and fever.   Respiratory:  Negative for cough and shortness of breath.     Cardiovascular:  Negative for chest pain and palpitations.   Gastrointestinal:  Negative for abdominal pain, nausea and vomiting.   Musculoskeletal:         L foot pain     Objective:     Vital Signs (Most Recent):  Temp: 97.5 °F (36.4 °C) (06/21/22 1141)  Pulse: 80 (06/21/22 1141)  Resp: 18 (06/21/22 1214)  BP: 121/83 (06/21/22 1141)  SpO2: 96 % (06/21/22 1141) Vital Signs (24h Range):  Temp:  [97.5 °F (36.4 °C)-98.2 °F (36.8 °C)] 97.5 °F (36.4 °C)  Pulse:  [68-80] 80  Resp:  [16-20] 18  SpO2:  [96 %-100 %] 96 %  BP: (121-146)/(78-85) 121/83     Weight: 129.3 kg (285 lb)  Body mass index is 36.59 kg/m².    Intake/Output Summary (Last 24 hours) at 6/21/2022 1529  Last data filed at 6/21/2022 1500  Gross per 24 hour   Intake 1568.61 ml   Output 575 ml   Net 993.61 ml        Physical Exam  Vitals and nursing note reviewed.   Constitutional:       General: He is not in acute distress.     Appearance: Normal appearance. He is well-developed.   HENT:      Head: Normocephalic and atraumatic.   Eyes:      General:         Right eye: No discharge.         Left eye: No discharge.      Extraocular Movements: Extraocular movements intact.   Cardiovascular:      Rate and Rhythm: Normal rate and regular rhythm.      Pulses: Normal pulses.   Pulmonary:      Effort: Pulmonary effort is normal. No respiratory distress.      Breath sounds: Normal breath sounds.   Abdominal:      General: Bowel sounds are normal.      Palpations: Abdomen is soft.      Tenderness: There is no abdominal tenderness.   Musculoskeletal:      Right lower leg: No edema.      Left lower leg: No edema.      Comments: Bilateral feet with dressings in place.   Skin:     General: Skin is warm and dry.   Neurological:      Mental Status: He is alert and oriented to person, place, and time.     Significant Labs:   BMP:  Recent Labs   Lab 06/20/22  0444      K 4.6      CO2 23   BUN 15   CREATININE 1.2   *   CALCIUM 9.2   PHOS 2.5*   ALBUMIN  3.0*   ANIONGAP 10      Significant Imaging: I have reviewed all pertinent imaging results/findings within the past 24 hours.      Assessment/Plan:      * Osteomyelitis of right foot  Left Foot ulcer  - Presented from surgery clinic for concern for infection/osteomyelitis  - Prior history of MRSA bacteremia 2/2 gangrene treated with 6 weeks IV abx for presumed endocarditis  - ESR/CRP elevated but have down trended since prior hospitalization.  - MRI L foot with mild marrow edema of tuft of great toe. Findings and exam not consistent with acute osteomyelitis.  - MRI R foot with post-op changes of 1st & 2nd transmetatarsal with cortical regularity and abnormal signal at distal stumps with extensive edema. Findings concerning for osteo vs chronic changes.  - Biopsies from 6/17 showing no evidence of osteomyelitis involving 2nd metatarsal; 1st metatarsal still in process.  - Wound cultures from ED with MRSA, Providencia rettgeri, Proteus mirabilis, and GNR.  - 1st metatarsal bone culture 6/17 showing E faecalis, diphtheroids.  - Continue empiric cefepime and vancomycin with pharmacy dosing pending susceptibilities.  - Trial of gabapentin 100mg PO qHS with reported symptoms consistent with neuropathic pain.  - Podiatry and wound care following, appreciate assistance.    HTN (hypertension)  - Reports taking lisinopril and losartan at home.  - Continue lisinopril 10mg PO daily.    Type 2 diabetes mellitus with diabetic peripheral angiopathy without gangrene, with long-term current use of insulin  Hyperlipidemia  - Last A1c 7.8  - On Levemir 50 U qHS and aspart 10 U TID WM at home  - Improving. Continue insulin detemir 55U subQ qHS, insulin aspart 10U subQ TIDWM, moderate-dose sliding scale insulin aspart 1-10U subQ TIDWM PRN.  - Continue aspirin 81mg PO daily, atorvastatin 40mg PO daily.    VTE Risk Mitigation (From admission, onward)         Ordered     enoxaparin injection 40 mg  Daily         06/16/22 0658     IP VTE  HIGH RISK PATIENT  Once         06/15/22 2119     Place sequential compression device  Until discontinued         06/15/22 2119                Discharge Planning   LOIS:      Code Status: Full Code   Is the patient medically ready for discharge?:     Reason for patient still in hospital (select all that apply): Treatment  Discharge Plan A: Home with family   Discharge Delays: None known at this time              VENANCIO Correa MD  Department of Hospital Medicine   [facility] Surg ([place])

## 2022-06-21 NOTE — ASSESSMENT & PLAN NOTE
Hyperlipidemia  - Last A1c 7.8  - On Levemir 50 U qHS and aspart 10 U TID WM at home  - Improving. Continue insulin detemir 55U subQ qHS, insulin aspart 10U subQ TIDWM, moderate-dose sliding scale insulin aspart 1-10U subQ TIDWM PRN.  - Continue aspirin 81mg PO daily, atorvastatin 40mg PO daily.

## 2022-06-22 LAB
BACTERIA SPEC AEROBE CULT: ABNORMAL
BACTERIA SPEC AEROBE CULT: ABNORMAL
FINAL PATHOLOGIC DIAGNOSIS: NORMAL
GROSS: NORMAL
Lab: NORMAL
POCT GLUCOSE: 201 MG/DL (ref 70–110)
POCT GLUCOSE: 205 MG/DL (ref 70–110)
POCT GLUCOSE: 214 MG/DL (ref 70–110)
POCT GLUCOSE: 236 MG/DL (ref 70–110)

## 2022-06-22 PROCEDURE — 63600175 PHARM REV CODE 636 W HCPCS: Performed by: NURSE PRACTITIONER

## 2022-06-22 PROCEDURE — 25000003 PHARM REV CODE 250: Performed by: STUDENT IN AN ORGANIZED HEALTH CARE EDUCATION/TRAINING PROGRAM

## 2022-06-22 PROCEDURE — C1751 CATH, INF, PER/CENT/MIDLINE: HCPCS

## 2022-06-22 PROCEDURE — C9399 UNCLASSIFIED DRUGS OR BIOLOG: HCPCS | Performed by: INTERNAL MEDICINE

## 2022-06-22 PROCEDURE — 36410 VNPNXR 3YR/> PHY/QHP DX/THER: CPT

## 2022-06-22 PROCEDURE — 25000003 PHARM REV CODE 250: Performed by: INTERNAL MEDICINE

## 2022-06-22 PROCEDURE — 97162 PT EVAL MOD COMPLEX 30 MIN: CPT

## 2022-06-22 PROCEDURE — 76937 US GUIDE VASCULAR ACCESS: CPT

## 2022-06-22 PROCEDURE — 11000001 HC ACUTE MED/SURG PRIVATE ROOM

## 2022-06-22 PROCEDURE — 99223 1ST HOSP IP/OBS HIGH 75: CPT | Mod: ,,, | Performed by: INTERNAL MEDICINE

## 2022-06-22 PROCEDURE — 63600175 PHARM REV CODE 636 W HCPCS: Performed by: INTERNAL MEDICINE

## 2022-06-22 PROCEDURE — 99232 PR SUBSEQUENT HOSPITAL CARE,LEVL II: ICD-10-PCS | Mod: 95,,, | Performed by: STUDENT IN AN ORGANIZED HEALTH CARE EDUCATION/TRAINING PROGRAM

## 2022-06-22 PROCEDURE — 25000003 PHARM REV CODE 250: Performed by: NURSE PRACTITIONER

## 2022-06-22 PROCEDURE — 97165 OT EVAL LOW COMPLEX 30 MIN: CPT

## 2022-06-22 PROCEDURE — 27000207 HC ISOLATION

## 2022-06-22 PROCEDURE — 97116 GAIT TRAINING THERAPY: CPT

## 2022-06-22 PROCEDURE — 94761 N-INVAS EAR/PLS OXIMETRY MLT: CPT

## 2022-06-22 PROCEDURE — 99223 PR INITIAL HOSPITAL CARE,LEVL III: ICD-10-PCS | Mod: ,,, | Performed by: INTERNAL MEDICINE

## 2022-06-22 PROCEDURE — 99232 SBSQ HOSP IP/OBS MODERATE 35: CPT | Mod: 95,,, | Performed by: STUDENT IN AN ORGANIZED HEALTH CARE EDUCATION/TRAINING PROGRAM

## 2022-06-22 RX ORDER — HYDROCODONE BITARTRATE AND ACETAMINOPHEN 7.5; 325 MG/1; MG/1
1 TABLET ORAL EVERY 4 HOURS PRN
Status: DISCONTINUED | OUTPATIENT
Start: 2022-06-22 | End: 2022-07-15 | Stop reason: HOSPADM

## 2022-06-22 RX ADMIN — INSULIN ASPART 10 UNITS: 100 INJECTION, SOLUTION INTRAVENOUS; SUBCUTANEOUS at 08:06

## 2022-06-22 RX ADMIN — INSULIN ASPART 10 UNITS: 100 INJECTION, SOLUTION INTRAVENOUS; SUBCUTANEOUS at 12:06

## 2022-06-22 RX ADMIN — INSULIN ASPART 2 UNITS: 100 INJECTION, SOLUTION INTRAVENOUS; SUBCUTANEOUS at 10:06

## 2022-06-22 RX ADMIN — HYDROCODONE BITARTRATE AND ACETAMINOPHEN 1 TABLET: 5; 325 TABLET ORAL at 11:06

## 2022-06-22 RX ADMIN — ASPIRIN 81 MG: 81 TABLET, COATED ORAL at 08:06

## 2022-06-22 RX ADMIN — INSULIN ASPART 4 UNITS: 100 INJECTION, SOLUTION INTRAVENOUS; SUBCUTANEOUS at 12:06

## 2022-06-22 RX ADMIN — INSULIN ASPART 10 UNITS: 100 INJECTION, SOLUTION INTRAVENOUS; SUBCUTANEOUS at 05:06

## 2022-06-22 RX ADMIN — ATORVASTATIN CALCIUM 40 MG: 20 TABLET, FILM COATED ORAL at 08:06

## 2022-06-22 RX ADMIN — GABAPENTIN 100 MG: 100 CAPSULE ORAL at 08:06

## 2022-06-22 RX ADMIN — INSULIN DETEMIR 55 UNITS: 100 INJECTION, SOLUTION SUBCUTANEOUS at 08:06

## 2022-06-22 RX ADMIN — INSULIN ASPART 4 UNITS: 100 INJECTION, SOLUTION INTRAVENOUS; SUBCUTANEOUS at 08:06

## 2022-06-22 RX ADMIN — COLLAGENASE SANTYL: 250 OINTMENT TOPICAL at 08:06

## 2022-06-22 RX ADMIN — HYDROCODONE BITARTRATE AND ACETAMINOPHEN 1 TABLET: 5; 325 TABLET ORAL at 07:06

## 2022-06-22 RX ADMIN — HYDROCODONE BITARTRATE AND ACETAMINOPHEN 1 TABLET: 7.5; 325 TABLET ORAL at 03:06

## 2022-06-22 RX ADMIN — VANCOMYCIN HYDROCHLORIDE 1000 MG: 1 INJECTION, POWDER, LYOPHILIZED, FOR SOLUTION INTRAVENOUS at 11:06

## 2022-06-22 RX ADMIN — ENOXAPARIN SODIUM 40 MG: 100 INJECTION SUBCUTANEOUS at 05:06

## 2022-06-22 RX ADMIN — HYDROCHLOROTHIAZIDE 25 MG: 25 TABLET ORAL at 08:06

## 2022-06-22 RX ADMIN — CEFEPIME 2 G: 2 INJECTION, POWDER, FOR SOLUTION INTRAVENOUS at 05:06

## 2022-06-22 RX ADMIN — INSULIN ASPART 4 UNITS: 100 INJECTION, SOLUTION INTRAVENOUS; SUBCUTANEOUS at 05:06

## 2022-06-22 RX ADMIN — HYDROCODONE BITARTRATE AND ACETAMINOPHEN 1 TABLET: 5; 325 TABLET ORAL at 12:06

## 2022-06-22 RX ADMIN — HYDROCODONE BITARTRATE AND ACETAMINOPHEN 1 TABLET: 5; 325 TABLET ORAL at 08:06

## 2022-06-22 RX ADMIN — LISINOPRIL 10 MG: 10 TABLET ORAL at 08:06

## 2022-06-22 RX ADMIN — CEFEPIME 2 G: 2 INJECTION, POWDER, FOR SOLUTION INTRAVENOUS at 10:06

## 2022-06-22 RX ADMIN — VANCOMYCIN HYDROCHLORIDE 1000 MG: 1 INJECTION, POWDER, LYOPHILIZED, FOR SOLUTION INTRAVENOUS at 12:06

## 2022-06-22 RX ADMIN — CEFEPIME 2 G: 2 INJECTION, POWDER, FOR SOLUTION INTRAVENOUS at 01:06

## 2022-06-22 RX ADMIN — QUETIAPINE FUMARATE 200 MG: 200 TABLET ORAL at 10:06

## 2022-06-22 NOTE — ASSESSMENT & PLAN NOTE
- chronic  - awaiting bone biopsy results  - based on bone culture, however, he can be treated with vancomycin after HD  - awaitng final path and culture results  - okay to continue cefepime for SSTI

## 2022-06-22 NOTE — PROGRESS NOTES
[facility] Surg [facility] Medicine  Telemedicine Progress Note    Patient Name: Dave Good  MRN: [id number]  Patient Class: IP- Inpatient   Admission Date: 6/15/2022  Length of Stay: 7 days  Attending Physician: Vineet Salinas MD  Primary Care Provider: Reyna Jorge NP          Subjective:     Principal Problem:Osteomyelitis of right foot        HPI:  Mr Acosta is a 56 yr old male with a PMH that includes DB 2, COPD, HTN, depression, and right foot transmetatarsal amputation. He was sent to the ED for evaluation of osteomyelitis from a follow up appt with Dr. Flores. Pt had I&D and amputation for wet gangrene in Sept 2021 and debridements in Feb, March, and May of this year. Per his chart, his feet were healing well until some foot trauma occurred. Pt has has open wounds and drainage to BL feet with right foot wound deeper than left. He also has wound to left shin. Pt reports associated 10/10 pain and difficulty walking. He denies fever and chills at home. Podiatry consulted and pt admitted to hospital medicine.       Overview/Hospital Course:  Patient admitted with bilateral foot wounds and concern for osteomyelitis following R foot TMA requiring debridements since for continued wounds. He was started on empiric antibiotics. Bilateral foot MRI done. MRI of left foot with abnormal findings but not consistent with osteomyelitis. MRI of right foot showed changes of 1st and 2nd transmetatarsal amputation with cortical regularity and abnormal signal at distal stumps noting overlying extensive edema and regions of skin ulceration.  Findings may represent early osteomyelitis vs post-operative changes. Bone biopsy done for further evaluation and to guide treatment.      Interval History: No events overnight. Reports pain from foot but doing okay otherwise - pain meds adjusted. ID consulted, waiting on pathology, continuing IV abx.    Review of Systems   Constitutional:  Negative for chills and fever.    Respiratory:  Negative for cough and shortness of breath.    Cardiovascular:  Negative for chest pain and palpitations.   Gastrointestinal:  Negative for abdominal pain, nausea and vomiting.   Musculoskeletal:         Foot pain     Objective:     Vital Signs (Most Recent):  Temp: 97.7 °F (36.5 °C) (06/22/22 1136)  Pulse: 79 (06/22/22 1136)  Resp: 20 (06/22/22 1136)  BP: 118/72 (06/22/22 1136)  SpO2: 95 % (06/22/22 1136) Vital Signs (24h Range):  Temp:  [97.5 °F (36.4 °C)-98.2 °F (36.8 °C)] 97.7 °F (36.5 °C)  Pulse:  [74-88] 79  Resp:  [16-20] 20  SpO2:  [95 %-98 %] 95 %  BP: (111-146)/(72-96) 118/72     Weight: 129.3 kg (285 lb)  Body mass index is 36.59 kg/m².    Intake/Output Summary (Last 24 hours) at 6/22/2022 1518  Last data filed at 6/22/2022 1455  Gross per 24 hour   Intake 1366.55 ml   Output 2425 ml   Net -1058.45 ml        Physical Exam  Vitals reviewed.   Constitutional:       Appearance: Normal appearance.   HENT:      Head: Normocephalic and atraumatic.   Eyes:      General: No scleral icterus.     Conjunctiva/sclera: Conjunctivae normal.   Pulmonary:      Effort: Pulmonary effort is normal. No respiratory distress.   Musculoskeletal:      Comments: Feet with dressings in place   Skin:     Coloration: Skin is not jaundiced.      Findings: No erythema.   Neurological:      General: No focal deficit present.      Mental Status: He is alert and oriented to person, place, and time.   Psychiatric:         Mood and Affect: Mood normal.         Behavior: Behavior normal.     Significant Labs:   BMP:  Recent Labs   Lab 06/20/22  0444      K 4.6      CO2 23   BUN 15   CREATININE 1.2   *   CALCIUM 9.2   PHOS 2.5*   ALBUMIN 3.0*   ANIONGAP 10        Significant Imaging: I have reviewed all pertinent imaging results/findings within the past 24 hours.      Assessment/Plan:      * Osteomyelitis of right foot  Left Foot ulcer  - Presented from surgery clinic for concern for  infection/osteomyelitis  - Prior history of MRSA bacteremia 2/2 gangrene treated with 6 weeks IV abx for presumed endocarditis  - ESR/CRP elevated but have down trended since prior hospitalization.  - MRI L foot with mild marrow edema of tuft of great toe. Findings and exam not consistent with acute osteomyelitis.  - MRI R foot with post-op changes of 1st & 2nd transmetatarsal with cortical regularity and abnormal signal at distal stumps with extensive edema. Findings concerning for osteo vs chronic changes.  - Biopsies from 6/17 showing no evidence of osteomyelitis involving 2nd metatarsal; 1st metatarsal still in process.  - Wound cultures from ED with MRSA, Providencia rettgeri, Proteus mirabilis, and GNR.  - 1st metatarsal bone culture 6/17 showing E faecalis, diphtheroids.  - Continue empiric cefepime and vancomycin with pharmacy dosing pending susceptibilities.  - Trial of gabapentin 100mg PO qHS with reported symptoms consistent with neuropathic pain.  - Podiatry and wound care following, appreciate assistance.  - ID consulted, midline placed    Foot ulcer        HTN (hypertension)  - Reports taking lisinopril and losartan at home.  - Continue lisinopril 10mg PO daily.    Type 2 diabetes mellitus with diabetic peripheral angiopathy without gangrene, with long-term current use of insulin  Hyperlipidemia  - Last A1c 7.8  - On Levemir 50 U qHS and aspart 10 U TID WM at home  - Improving. Continue insulin detemir 55U subQ qHS, insulin aspart 10U subQ TIDWM, moderate-dose sliding scale insulin aspart 1-10U subQ TIDWM PRN.  - Continue aspirin 81mg PO daily, atorvastatin 40mg PO daily.      VTE Risk Mitigation (From admission, onward)         Ordered     enoxaparin injection 40 mg  Daily         06/16/22 0658     IP VTE HIGH RISK PATIENT  Once         06/15/22 2119     Place sequential compression device  Until discontinued         06/15/22 2119                      I have assessed these finding virtually using  telemed platform and with assistance of bedside nurse                 The attending portion of this evaluation, treatment, and documentation was performed per Vineet Salinas MD via Telemedicine AudioVisual using the secure [employer] software platform with 2 way audio/video. The provider was located off-site and the patient is located in the hospital. The aforementioned video software was utilized to document the relevant history and physical exam    Vineet Salinas MD  Department of Hospital Medicine   [facility] Surg ([place])

## 2022-06-22 NOTE — HPI
This is a 56 y.o. male with PMHx DM, HTN, and COPD who presents with bilateral foot ulcers. The patient reports he saw Dr. Flores this morning who instructed him to the ED as he suspected osteomyelitis or deep space infection.  H he has a PSHx of transmetatarsal amputation on his right foot and multiple wound debridement procedures with his last being 5/27/2022. The patient was admitted and started on empiric abx. Podiatry was consulted and performed MRI, suggesting osteomyelitis, and subsequent bone biopsies (left foot negative for osteomyelitis, right pending) and cultures were obtained. ID is consulted for abx recs for osteomyelitis. The patient is currently ohn cefepime and vancomycin. Superficial wound cultures were performed on the 15th and bone cultures were done the following day. Bone culture is only growing diphtheroids and Enterococcus.

## 2022-06-22 NOTE — SUBJECTIVE & OBJECTIVE
Past Medical History:   Diagnosis Date    COPD (chronic obstructive pulmonary disease)     COVID-19     Depression     Diabetes mellitus     Diabetes mellitus, type 2     Hypertension        Past Surgical History:   Procedure Laterality Date    DEBRIDEMENT OF LOWER EXTREMITY Bilateral 3/2/2022    Procedure: DEBRIDEMENT, LOWER EXTREMITY;  Surgeon: Jesus Flores Jr., MD;  Location: [facility];  Service: General;  Laterality: Bilateral;    FOOT AMPUTATION THROUGH METATARSAL Right 9/23/2021    Procedure: AMPUTATION, FOOT, TRANSMETATARSAL right 1st ray resection;  Surgeon: Samuel Toussaint DPM;  Location: [facility];  Service: Podiatry;  Laterality: Right;    INCISION AND DRAINAGE FOOT Bilateral 9/21/2021    Procedure: INCISION AND DRAINAGE, FOOT - Bilateral;  Surgeon: Lavonne Vigil DPM;  Location: [facility];  Service: Podiatry;  Laterality: Bilateral;    REPLACEMENT OF WOUND DRESSING Right 2/24/2022    Procedure: REPLACEMENT, DRESSING, WOUND;  Surgeon: Jesus Flores Jr., MD;  Location: [facility];  Service: Vascular;  Laterality: Right;  wound vac dressing changed    WOUND DEBRIDEMENT Right 2/22/2022    Procedure: DEBRIDEMENT, WOUND - RIGHT FOOT;  Surgeon: Jesus Flores Jr., MD;  Location: [facility];  Service: Vascular;  Laterality: Right;    WOUND DEBRIDEMENT Bilateral 2/24/2022    Procedure: DEBRIDEMENT, WOUND / BILATERAL DEBRIDEMENT FEET;  Surgeon: Jesus Flores Jr., MD;  Location: [facility];  Service: Vascular;  Laterality: Bilateral;    WOUND DEBRIDEMENT Right 5/27/2022    Procedure: DEBRIDEMENT, WOUND;  Surgeon: Jesus Flores Jr., MD;  Location: [facility];  Service: General;  Laterality: Right;       Review of patient's allergies indicates:   Allergen Reactions    Ibuprofen Swelling     Facial swelling       Medications:  Facility-Administered Medications Prior to Admission   Medication    [DISCONTINUED] mupirocin 2 % ointment     Medications Prior to Admission   Medication Sig    ammonium lactate 12 % Crea Apply twice daily to  "affected parts both feet as needed.    aspirin (ECOTRIN) 81 MG EC tablet Take 81 mg by mouth once daily.    atorvastatin (LIPITOR) 20 MG tablet Take 40 mg by mouth once daily.     hydroCHLOROthiazide (HYDRODIURIL) 25 MG tablet Take 25 mg by mouth once daily.    insulin aspart U-100 (NOVOLOG) 100 unit/mL (3 mL) InPn pen INJECT 10 UNITS INTO THE SKIN BEFORE MEALS THREE TIMES A DAY (50 DAYS)    insulin detemir U-100 (LEVEMIR) 100 unit/mL injection Inject 50 Units into the skin every evening.    lisinopriL 10 MG tablet Take 10 mg by mouth once daily.    losartan (COZAAR) 25 MG tablet Take 25 mg by mouth 2 (two) times daily.    metFORMIN (GLUCOPHAGE) 1000 MG tablet Take 1,000 mg by mouth 2 (two) times daily.    QUEtiapine (SEROQUEL) 200 MG Tab Take 200 mg by mouth nightly.    collagenase (SANTYL) ointment With each dressing change    ONETOUCH VERIO TEST STRIPS Strp SMARTSIG:Via Meter     Antibiotics (From admission, onward)                Start     Stop Route Frequency Ordered    06/22/22 0000  vancomycin in dextrose 5 % 1 gram/250 mL IVPB 1,000 mg         -- IV Every 12 hours (non-standard times) 06/21/22 1333    06/16/22 1000  cefepime in dextrose 5 % IVPB 2 g         -- IV Every 8 hours (non-standard times) 06/16/22 0918    06/15/22 2237  vancomycin - pharmacy to dose  (vancomycin IVPB)        "And" Linked Group Details    -- IV pharmacy to manage frequency 06/15/22 2148          Antifungals (From admission, onward)                None          Antivirals (From admission, onward)      None             There is no immunization history for the selected administration types on file for this patient.    Family History    None       Social History     Socioeconomic History    Marital status: Single   Tobacco Use    Smoking status: Former Smoker    Smokeless tobacco: Never Used   Substance and Sexual Activity    Alcohol use: Yes     Comment: whiskey and beer every other day    Drug use: Not Currently    Sexual activity: Yes "     Partners: Female     Social Determinants of Health     Financial Resource Strain: Low Risk     Difficulty of Paying Living Expenses: Not very hard   Food Insecurity: Food Insecurity Present    Worried About Running Out of Food in the Last Year: Often true    Ran Out of Food in the Last Year: Often true   Transportation Needs: No Transportation Needs    Lack of Transportation (Medical): No    Lack of Transportation (Non-Medical): No   Physical Activity: Inactive    Days of Exercise per Week: 0 days    Minutes of Exercise per Session: 0 min   Stress: No Stress Concern Present    Feeling of Stress : Not at all   Social Connections: Moderately Integrated    Frequency of Communication with Friends and Family: Twice a week    Frequency of Social Gatherings with Friends and Family: Twice a week    Attends Muslim Services: 1 to 4 times per year    Active Member of Clubs or Organizations: No    Attends Club or Organization Meetings: Never    Marital Status: Living with partner   Housing Stability: Unknown    Unable to Pay for Housing in the Last Year: No    Unstable Housing in the Last Year: No     Review of Systems   Constitutional:  Negative for chills and fever.   Skin:  Positive for wound.   All other systems reviewed and are negative.  Objective:     Vital Signs (Most Recent):  Temp: 98.2 °F (36.8 °C) (06/22/22 0723)  Pulse: 74 (06/22/22 0723)  Resp: 16 (06/22/22 0723)  BP: 111/76 (06/22/22 0723)  SpO2: 95 % (06/22/22 0723) Vital Signs (24h Range):  Temp:  [97.5 °F (36.4 °C)-98.2 °F (36.8 °C)] 98.2 °F (36.8 °C)  Pulse:  [74-88] 74  Resp:  [16-20] 16  SpO2:  [95 %-98 %] 95 %  BP: (111-146)/(76-96) 111/76     Weight: 129.3 kg (285 lb)  Body mass index is 36.59 kg/m².    Estimated Creatinine Clearance: 95.9 mL/min (based on SCr of 1.2 mg/dL).    Physical Exam  Vitals and nursing note reviewed.   Constitutional:       General: He is not in acute distress.     Appearance: Normal appearance. He is normal weight. He is  not ill-appearing, toxic-appearing or diaphoretic.   HENT:      Head: Normocephalic and atraumatic.      Right Ear: External ear normal.      Left Ear: External ear normal.   Eyes:      Extraocular Movements: Extraocular movements intact.      Conjunctiva/sclera: Conjunctivae normal.      Pupils: Pupils are equal, round, and reactive to light.   Cardiovascular:      Rate and Rhythm: Normal rate and regular rhythm.   Neurological:      General: No focal deficit present.      Mental Status: He is alert and oriented to person, place, and time.   Psychiatric:         Mood and Affect: Mood normal.         Behavior: Behavior normal.         Judgment: Judgment normal.   Feet dressed.    Significant Labs: Blood Culture:   Recent Labs   Lab 02/21/22  1231 02/23/22  0550 02/25/22  0548 06/15/22  1708 06/15/22  2006   LABBLOO Gram stain aer bottle: Gram positive cocci  Gram stain jose bottle: Gram positive cocci  Results called to and read back by: KRISTINA ESCOTO RN  02/22/2022  11:03  METHICILLIN RESISTANT STAPHYLOCOCCUS AUREUS  ID consult required at [facility] and [facility].  For susceptibility see order #[id number]  ID consult required at [facility] and [facility].  * No growth after 5 days. No growth after 5 days. No growth after 5 days. No growth after 5 days.     CBC: No results for input(s): WBC, HGB, HCT, PLT in the last 48 hours.  Wound Culture:   Recent Labs   Lab 02/22/22  1153 06/16/22  0050 06/17/22  0601   LABAERO METHICILLIN RESISTANT STAPHYLOCOCCUS AUREUS  Many  No other significant isolate  * PROTEUS MIRABILIS  Many  *  METHICILLIN RESISTANT STAPHYLOCOCCUS AUREUS  Many  *  ACINETOBACTER LWOFFII GROUP  Few  *  PROVIDENCIA RETTGERI  Few  *  METHICILLIN RESISTANT STAPHYLOCOCCUS AUREUS  Moderate  * DIPHTHEROIDS  Few  *  ENTEROCOCCUS FAECALIS  Few  Susceptibility pending  *  No growth       Significant Imaging: I have reviewed all pertinent imaging results/findings within the  past 24 hours.

## 2022-06-22 NOTE — PLAN OF CARE
Patient alert and oriented x 4 and able to make needs known. VS Wnl. Complaints of pain treated with prn pain medication per MAR. Wound care provided per order. Ambulating in room independently. Reinforced importance of using darking boots when ambulating. Verbalized understanding. Loss of peripheral iv. Order for midline. Location RUE. Saline locked. POC reviewed. SO at bedside. No needs at this time.

## 2022-06-22 NOTE — SUBJECTIVE & OBJECTIVE
Interval History: No events overnight. Reports pain from foot but doing okay otherwise - pain meds adjusted. ID consulted, waiting on pathology, continuing IV abx.    Review of Systems   Constitutional:  Negative for chills and fever.   Respiratory:  Negative for cough and shortness of breath.    Cardiovascular:  Negative for chest pain and palpitations.   Gastrointestinal:  Negative for abdominal pain, nausea and vomiting.   Musculoskeletal:         Foot pain     Objective:     Vital Signs (Most Recent):  Temp: 97.7 °F (36.5 °C) (06/22/22 1136)  Pulse: 79 (06/22/22 1136)  Resp: 20 (06/22/22 1136)  BP: 118/72 (06/22/22 1136)  SpO2: 95 % (06/22/22 1136) Vital Signs (24h Range):  Temp:  [97.5 °F (36.4 °C)-98.2 °F (36.8 °C)] 97.7 °F (36.5 °C)  Pulse:  [74-88] 79  Resp:  [16-20] 20  SpO2:  [95 %-98 %] 95 %  BP: (111-146)/(72-96) 118/72     Weight: 129.3 kg (285 lb)  Body mass index is 36.59 kg/m².    Intake/Output Summary (Last 24 hours) at 6/22/2022 1518  Last data filed at 6/22/2022 1455  Gross per 24 hour   Intake 1366.55 ml   Output 2425 ml   Net -1058.45 ml        Physical Exam  Vitals reviewed.   Constitutional:       Appearance: Normal appearance.   HENT:      Head: Normocephalic and atraumatic.   Eyes:      General: No scleral icterus.     Conjunctiva/sclera: Conjunctivae normal.   Pulmonary:      Effort: Pulmonary effort is normal. No respiratory distress.   Musculoskeletal:      Comments: Feet with dressings in place   Skin:     Coloration: Skin is not jaundiced.      Findings: No erythema.   Neurological:      General: No focal deficit present.      Mental Status: He is alert and oriented to person, place, and time.   Psychiatric:         Mood and Affect: Mood normal.         Behavior: Behavior normal.     Significant Labs:   BMP:  Recent Labs   Lab 06/20/22  0444      K 4.6      CO2 23   BUN 15   CREATININE 1.2   *   CALCIUM 9.2   PHOS 2.5*   ALBUMIN 3.0*   ANIONGAP 10        Significant  Imaging: I have reviewed all pertinent imaging results/findings within the past 24 hours.

## 2022-06-22 NOTE — PLAN OF CARE
Problem: Occupational Therapy  Goal: Occupational Therapy Goal  Description: Orders received and chart reviewed.  Evaluation complete and no further acute care OT needs.  Pt's girlfriend present and both pt and girlfriend receptive to all education and training on safety, precautions and use of correct donning of [place]co shoes.  Nursing to assist pt with ADL and ADL mobility using RW.  OT signing off.  Outcome: Met

## 2022-06-22 NOTE — PROGRESS NOTES
[facility] - Med Surg ([place])  Podiatry  Progress Note    Patient Name: Dave Good  MRN: [id number]  Admission Date: 6/15/2022  Hospital Length of Stay: 7 days  Attending Physician: Vineet Salinas MD  Primary Care Provider: Reyna Jorge NP     Subjective:     Interval History: bilateral foot wounds with osteomyelitis suspect.  Cultures covered with Abx by ID. Denies pain.          Scheduled Meds:   aspirin  81 mg Oral Daily    atorvastatin  40 mg Oral Daily    ceFEPime (MAXIPIME) IVPB  2 g Intravenous Q8H    collagenase   Topical (Top) Daily    enoxaparin  40 mg Subcutaneous Daily    gabapentin  100 mg Oral QHS    hydroCHLOROthiazide  25 mg Oral Daily    insulin aspart U-100  10 Units Subcutaneous TIDWM    insulin detemir U-100  55 Units Subcutaneous QHS    lisinopriL  10 mg Oral Daily    QUEtiapine  200 mg Oral Nightly    vancomycin (VANCOCIN) IVPB  1,000 mg Intravenous Q12H     Continuous Infusions:  PRN Meds:sodium chloride 0.9%, acetaminophen, dextrose 10%, dextrose 10%, glucagon (human recombinant), glucose, glucose, HYDROcodone-acetaminophen, insulin aspart U-100, melatonin, naloxone, ondansetron, polyethylene glycol, sodium chloride 0.9%, Pharmacy to dose Vancomycin consult **AND** vancomycin - pharmacy to dose    Review of Systems  Objective:     Vital Signs (Most Recent):  Temp: 97.7 °F (36.5 °C) (06/22/22 1136)  Pulse: 79 (06/22/22 1136)  Resp: 20 (06/22/22 1136)  BP: 118/72 (06/22/22 1136)  SpO2: 95 % (06/22/22 1136) Vital Signs (24h Range):  Temp:  [97.5 °F (36.4 °C)-98.2 °F (36.8 °C)] 97.7 °F (36.5 °C)  Pulse:  [74-88] 79  Resp:  [16-20] 20  SpO2:  [95 %-98 %] 95 %  BP: (111-146)/(72-96) 118/72     Weight: 129.3 kg (285 lb)  Body mass index is 36.59 kg/m².    Foot Exam    Laboratory:  A1C:   Recent Labs   Lab 02/21/22  1544 05/27/22  1010   HGBA1C 9.8* 7.8*     Blood Cultures: No results for input(s): LABBLOO in the last 48 hours.  BMP:   Recent Labs   Lab 06/20/22  0444   *       K 4.6      CO2 23   BUN 15   CREATININE 1.2   CALCIUM 9.2     CBC:   Recent Labs   Lab 06/18/22  0437   WBC 6.95   RBC 4.21*   HGB 11.6*   HCT 37.4*      MCV 89   MCH 27.6   MCHC 31.0*     CMP:   Recent Labs   Lab 06/15/22  1708 06/16/22  0549 06/20/22  0444   *   < > 174*   CALCIUM 10.4   < > 9.2   ALBUMIN 3.9  --  3.0*   PROT 9.4*  --   --       < > 136   K 4.8   < > 4.6   CO2 23   < > 23   CL 99   < > 103   BUN 28*   < > 15   CREATININE 1.5*   < > 1.2   ALKPHOS 104  --   --    ALT 27  --   --    AST 20  --   --    BILITOT 0.3  --   --     < > = values in this interval not displayed.     Coagulation: No results for input(s): PT, INR, APTT in the last 168 hours.  CRP:   Recent Labs   Lab 06/15/22  1708   CRP 26.5*     ESR:   Recent Labs   Lab 06/15/22  1708   SEDRATE 80*     Prealbumin: No results for input(s): PREALBUMIN in the last 48 hours.  Wound Cultures:   Recent Labs   Lab 02/22/22  1153 06/16/22  0050 06/17/22  0601   LABAERO METHICILLIN RESISTANT STAPHYLOCOCCUS AUREUS  Many  No other significant isolate  * PROTEUS MIRABILIS  Many  *  METHICILLIN RESISTANT STAPHYLOCOCCUS AUREUS  Many  *  ACINETOBACTER LWOFFII GROUP  Few  *  PROVIDENCIA RETTGERI  Few  *  METHICILLIN RESISTANT STAPHYLOCOCCUS AUREUS  Moderate  * DIPHTHEROIDS  Few  *  ENTEROCOCCUS FAECALIS  Few  *  No growth     Microbiology Results (last 7 days)     Procedure Component Value Units Date/Time    Aerobic culture [[id number]]  (Abnormal)  (Susceptibility) Collected: 06/17/22 0601    Order Status: Completed Specimen: Wound from Foot, Right Updated: 06/22/22 1031     Aerobic Bacterial Culture DIPHTHEROIDS  Few        ENTEROCOCCUS FAECALIS  Few      Narrative:      1st metatarsal - piece of bone    Culture, Anaerobic [[id number]] Collected: 06/17/22 0601    Order Status: Completed Specimen: Wound from Foot, Right Updated: 06/21/22 0814     Anaerobic Culture No anaerobes isolated    Narrative:      Piece of  bone 2nd metatarsal    Culture, Anaerobic [[id number]] Collected: 06/17/22 0601    Order Status: Completed Specimen: Wound from Foot, Right Updated: 06/21/22 0813     Anaerobic Culture No anaerobes isolated    Narrative:      Right 1st metatarsal - piece of bone.    Blood Culture #2 **CANNOT BE ORDERED STAT** [[id number]] Collected: 06/15/22 2006    Order Status: Completed Specimen: Blood from Peripheral, Antecubital, Right Updated: 06/21/22 0612     Blood Culture, Routine No growth after 5 days.    Blood Culture #1 **CANNOT BE ORDERED STAT** [[id number]] Collected: 06/15/22 1708    Order Status: Completed Specimen: Blood from Peripheral, Antecubital, Right Updated: 06/20/22 2012     Blood Culture, Routine No growth after 5 days.    Culture, Anaerobe [[id number]] Collected: 06/16/22 0050    Order Status: Completed Specimen: Wound from Foot, Left Updated: 06/20/22 1215     Anaerobic Culture No anaerobes isolated    Aerobic culture [[id number]]  (Abnormal)  (Susceptibility) Collected: 06/16/22 0050    Order Status: Completed Specimen: Wound from Foot, Right Updated: 06/20/22 1050     Aerobic Bacterial Culture PROTEUS MIRABILIS  Many        METHICILLIN RESISTANT STAPHYLOCOCCUS AUREUS  Many      Aerobic culture [[id number]] Collected: 06/17/22 0601    Order Status: Completed Specimen: Wound from Foot, Right Updated: 06/20/22 0834     Aerobic Bacterial Culture No growth    Narrative:      Piece of bone 2nd metatarsal    Culture, Anaerobe [[id number]]  (Abnormal) Collected: 06/16/22 0050    Order Status: Completed Specimen: Wound from Foot, Right Updated: 06/20/22 0824     Anaerobic Culture PRESUMPTIVE PREVOTELLA/PORPHYROMONAS GROUP  Unable to quantitate      Aerobic culture [[id number]]  (Abnormal)  (Susceptibility) Collected: 06/16/22 0050    Order Status: Completed Specimen: Wound from Foot, Left Updated: 06/20/22 0756     Aerobic Bacterial Culture ACINETOBACTER LWOFFII GROUP  Few        PROVIDENCIA RETTGERI  Few         METHICILLIN RESISTANT STAPHYLOCOCCUS AUREUS  Moderate      Gram stain [[id number]] Collected: 06/17/22 0601    Order Status: Completed Specimen: Wound from Foot, Right Updated: 06/17/22 2125     Gram Stain Result No WBC's      No organisms seen    Narrative:      gram stain for right 1st metatarsal ([id number],CXANA   /[id number],CXAER)    Gram stain [[id number]] Collected: 06/17/22 0601    Order Status: Completed Specimen: Wound from Foot, Right Updated: 06/17/22 1955     Gram Stain Result No WBC's      No organisms seen    Narrative:      gram stain for PIECE OF BONE 2ND metatarsal ([id number],CXANA   /[id number],CXAER)    Gram stain [[id number]]     Order Status: Completed Specimen: Wound from Toe, Right Foot         Specimen (24h ago, onward)            None          Diagnostic Results:  None    Clinical Findings:  Wounds both feet improved, grossly slightly smaller  without pus, tracking, fluctuance, malodor, or cardinal signs infection.     Otherwise, Skin thin, shiny, atrophic, with decreased density and distribution of pedal hair bilateral, but without hyperpigmentation, lizy discoloration,  ulcers, masses, nodules or cords palpated bilateral feet and legs.     Pulses palpable bilateral feet DP/PT. Distal feet/all toes warm.    Diminished/loss of protective sensation all toes bilateral to 10 gram monofilament.      Assessment/Plan:     Active Diagnoses:    Diagnosis Date Noted POA    PRINCIPAL PROBLEM:  Osteomyelitis of right foot [M86.9] 06/15/2022 Yes    Foot ulcer [L97.509]  Yes    Type 2 diabetes mellitus with diabetic peripheral angiopathy without gangrene, with long-term current use of insulin [E11.51, Z79.4] 09/20/2021 Not Applicable    HTN (hypertension) [I10] 09/20/2021 Yes      Problems Resolved During this Admission:    Diagnosis Date Noted Date Resolved POA    SHIV (acute kidney injury) [N17.9] 09/20/2021 06/17/2022 Yes       Wounds both feet improving with wound care and antibiotic  therapy.    Continue current wound care as ordered.    Bone path 1st metatarsal pending still.    Will follow.    Will follow outpatient on D/C      Samuel Toussaint DPM  Podiatry  [facility] - Med Surg ([place])

## 2022-06-22 NOTE — CONSULTS
[facility] ([place])  Infectious Disease  Consult Note    Patient Name: Dave Good  MRN: [id number]  Admission Date: 6/15/2022  Hospital Length of Stay: 7 days  Attending Physician: Vineet Salinas MD  Primary Care Provider: Reyna Jorge NP     Isolation Status: Contact    Patient information was obtained from patient, past medical records and ER records.      Inpatient consult to Infectious Diseases  Consult performed by: Isidro Duran MD  Consult ordered by: Vineet Salinas MD        Assessment/Plan:     * Osteomyelitis of right foot  - chronic  - awaiting bone biopsy results  - based on bone culture, however, he can be treated with vancomycin after HD  - awaitng final path and culture results  - okay to continue cefepime for SSTI      Thank you for your consult. I will follow-up with patient. Please contact us if you have any additional questions.    Isidro Duran MD  Infectious Disease  [facility] ([place])    Subjective:     Principal Problem: Osteomyelitis of right foot    HPI: This is a 56 y.o. male with PMHx DM, HTN, and COPD who presents with bilateral foot ulcers. The patient reports he saw Dr. Flores this morning who instructed him to the ED as he suspected osteomyelitis or deep space infection.  H he has a PSHx of transmetatarsal amputation on his right foot and multiple wound debridement procedures with his last being 5/27/2022. The patient was admitted and started on empiric abx. Podiatry was consulted and performed MRI, suggesting osteomyelitis, and subsequent bone biopsies (left foot negative for osteomyelitis, right pending) and cultures were obtained. ID is consulted for abx recs for osteomyelitis. The patient is currently ohn cefepime and vancomycin. Superficial wound cultures were performed on the 15th and bone cultures were done the following day. Bone culture is only growing diphtheroids and Enterococcus.       Past Medical History:   Diagnosis Date     COPD (chronic obstructive pulmonary disease)     COVID-19     Depression     Diabetes mellitus     Diabetes mellitus, type 2     Hypertension        Past Surgical History:   Procedure Laterality Date    DEBRIDEMENT OF LOWER EXTREMITY Bilateral 3/2/2022    Procedure: DEBRIDEMENT, LOWER EXTREMITY;  Surgeon: Jesus Flores Jr., MD;  Location: [facility];  Service: General;  Laterality: Bilateral;    FOOT AMPUTATION THROUGH METATARSAL Right 9/23/2021    Procedure: AMPUTATION, FOOT, TRANSMETATARSAL right 1st ray resection;  Surgeon: Samuel Toussaint DPM;  Location: [facility];  Service: Podiatry;  Laterality: Right;    INCISION AND DRAINAGE FOOT Bilateral 9/21/2021    Procedure: INCISION AND DRAINAGE, FOOT - Bilateral;  Surgeon: Lavonne Vigil DPM;  Location: [facility];  Service: Podiatry;  Laterality: Bilateral;    REPLACEMENT OF WOUND DRESSING Right 2/24/2022    Procedure: REPLACEMENT, DRESSING, WOUND;  Surgeon: Jesus Flores Jr., MD;  Location: [facility];  Service: Vascular;  Laterality: Right;  wound vac dressing changed    WOUND DEBRIDEMENT Right 2/22/2022    Procedure: DEBRIDEMENT, WOUND - RIGHT FOOT;  Surgeon: Jesus Flores Jr., MD;  Location: [facility];  Service: Vascular;  Laterality: Right;    WOUND DEBRIDEMENT Bilateral 2/24/2022    Procedure: DEBRIDEMENT, WOUND / BILATERAL DEBRIDEMENT FEET;  Surgeon: Jesus Flores Jr., MD;  Location: [facility];  Service: Vascular;  Laterality: Bilateral;    WOUND DEBRIDEMENT Right 5/27/2022    Procedure: DEBRIDEMENT, WOUND;  Surgeon: Jesus Flores Jr., MD;  Location: [facility];  Service: General;  Laterality: Right;       Review of patient's allergies indicates:   Allergen Reactions    Ibuprofen Swelling     Facial swelling       Medications:  Facility-Administered Medications Prior to Admission   Medication    [DISCONTINUED] mupirocin 2 % ointment     Medications Prior to Admission   Medication Sig    ammonium lactate 12 % Crea Apply twice daily to affected parts both feet as  "needed.    aspirin (ECOTRIN) 81 MG EC tablet Take 81 mg by mouth once daily.    atorvastatin (LIPITOR) 20 MG tablet Take 40 mg by mouth once daily.     hydroCHLOROthiazide (HYDRODIURIL) 25 MG tablet Take 25 mg by mouth once daily.    insulin aspart U-100 (NOVOLOG) 100 unit/mL (3 mL) InPn pen INJECT 10 UNITS INTO THE SKIN BEFORE MEALS THREE TIMES A DAY (50 DAYS)    insulin detemir U-100 (LEVEMIR) 100 unit/mL injection Inject 50 Units into the skin every evening.    lisinopriL 10 MG tablet Take 10 mg by mouth once daily.    losartan (COZAAR) 25 MG tablet Take 25 mg by mouth 2 (two) times daily.    metFORMIN (GLUCOPHAGE) 1000 MG tablet Take 1,000 mg by mouth 2 (two) times daily.    QUEtiapine (SEROQUEL) 200 MG Tab Take 200 mg by mouth nightly.    collagenase (SANTYL) ointment With each dressing change    ONETOUCH VERIO TEST STRIPS Strp SMARTSIG:Via Meter     Antibiotics (From admission, onward)                Start     Stop Route Frequency Ordered    06/22/22 0000  vancomycin in dextrose 5 % 1 gram/250 mL IVPB 1,000 mg         -- IV Every 12 hours (non-standard times) 06/21/22 1333    06/16/22 1000  cefepime in dextrose 5 % IVPB 2 g         -- IV Every 8 hours (non-standard times) 06/16/22 0918    06/15/22 2237  vancomycin - pharmacy to dose  (vancomycin IVPB)        "And" Linked Group Details    -- IV pharmacy to manage frequency 06/15/22 2148          Antifungals (From admission, onward)                None          Antivirals (From admission, onward)      None             There is no immunization history for the selected administration types on file for this patient.    Family History    None       Social History     Socioeconomic History    Marital status: Single   Tobacco Use    Smoking status: Former Smoker    Smokeless tobacco: Never Used   Substance and Sexual Activity    Alcohol use: Yes     Comment: whiskey and beer every other day    Drug use: Not Currently    Sexual activity: Yes     " Partners: Female     Social Determinants of Health     Financial Resource Strain: Low Risk     Difficulty of Paying Living Expenses: Not very hard   Food Insecurity: Food Insecurity Present    Worried About Running Out of Food in the Last Year: Often true    Ran Out of Food in the Last Year: Often true   Transportation Needs: No Transportation Needs    Lack of Transportation (Medical): No    Lack of Transportation (Non-Medical): No   Physical Activity: Inactive    Days of Exercise per Week: 0 days    Minutes of Exercise per Session: 0 min   Stress: No Stress Concern Present    Feeling of Stress : Not at all   Social Connections: Moderately Integrated    Frequency of Communication with Friends and Family: Twice a week    Frequency of Social Gatherings with Friends and Family: Twice a week    Attends Christianity Services: 1 to 4 times per year    Active Member of Clubs or Organizations: No    Attends Club or Organization Meetings: Never    Marital Status: Living with partner   Housing Stability: Unknown    Unable to Pay for Housing in the Last Year: No    Unstable Housing in the Last Year: No     Review of Systems   Constitutional:  Negative for chills and fever.   Skin:  Positive for wound.   All other systems reviewed and are negative.  Objective:     Vital Signs (Most Recent):  Temp: 98.2 °F (36.8 °C) (06/22/22 0723)  Pulse: 74 (06/22/22 0723)  Resp: 16 (06/22/22 0723)  BP: 111/76 (06/22/22 0723)  SpO2: 95 % (06/22/22 0723) Vital Signs (24h Range):  Temp:  [97.5 °F (36.4 °C)-98.2 °F (36.8 °C)] 98.2 °F (36.8 °C)  Pulse:  [74-88] 74  Resp:  [16-20] 16  SpO2:  [95 %-98 %] 95 %  BP: (111-146)/(76-96) 111/76     Weight: 129.3 kg (285 lb)  Body mass index is 36.59 kg/m².    Estimated Creatinine Clearance: 95.9 mL/min (based on SCr of 1.2 mg/dL).    Physical Exam  Vitals and nursing note reviewed.   Constitutional:       General: He is not in acute distress.     Appearance: Normal appearance. He is normal  weight. He is not ill-appearing, toxic-appearing or diaphoretic.   HENT:      Head: Normocephalic and atraumatic.      Right Ear: External ear normal.      Left Ear: External ear normal.   Eyes:      Extraocular Movements: Extraocular movements intact.      Conjunctiva/sclera: Conjunctivae normal.      Pupils: Pupils are equal, round, and reactive to light.   Cardiovascular:      Rate and Rhythm: Normal rate and regular rhythm.   Neurological:      General: No focal deficit present.      Mental Status: He is alert and oriented to person, place, and time.   Psychiatric:         Mood and Affect: Mood normal.         Behavior: Behavior normal.         Judgment: Judgment normal.   Feet dressed.    Significant Labs: Blood Culture:   Recent Labs   Lab 02/21/22  1231 02/23/22  0550 02/25/22  0548 06/15/22  1708 06/15/22  2006   LABBLOO Gram stain aer bottle: Gram positive cocci  Gram stain jose bottle: Gram positive cocci  Results called to and read back by: KRISTINA ESCOTO RN  02/22/2022  11:03  METHICILLIN RESISTANT STAPHYLOCOCCUS AUREUS  ID consult required at [facility] and [facility].  For susceptibility see order #[id number]  ID consult required at [facility] and [facility].  * No growth after 5 days. No growth after 5 days. No growth after 5 days. No growth after 5 days.     CBC: No results for input(s): WBC, HGB, HCT, PLT in the last 48 hours.  Wound Culture:   Recent Labs   Lab 02/22/22  1153 06/16/22  0050 06/17/22  0601   LABAERO METHICILLIN RESISTANT STAPHYLOCOCCUS AUREUS  Many  No other significant isolate  * PROTEUS MIRABILIS  Many  *  METHICILLIN RESISTANT STAPHYLOCOCCUS AUREUS  Many  *  ACINETOBACTER LWOFFII GROUP  Few  *  PROVIDENCIA RETTGERI  Few  *  METHICILLIN RESISTANT STAPHYLOCOCCUS AUREUS  Moderate  * DIPHTHEROIDS  Few  *  ENTEROCOCCUS FAECALIS  Few  Susceptibility pending  *  No growth       Significant Imaging: I have reviewed all pertinent imaging  results/findings within the past 24 hours.

## 2022-06-22 NOTE — PLAN OF CARE
Problem: Physical Therapy  Goal: Physical Therapy Goal  Description: Goals to be met by: 22    Patient will increase functional independence with mobility by performin. Tibialis anterior strengthening (DF with heel on ground) in seated position x10 reps with 15 sec holds without cueing for technique.   2. Gait x 10 feet with mod(I) with RW without cueing for heel WB (B).  3. Ascend/descend 2 step(s) with least restrictive assistive device and uni HR via lateral technique to maintain heel WB with supervision.   Outcome: Ongoing, Progressing     Patient evaluated by PT and goals established. Patient with frequent admissions for B foot wounds, most recently seen by this rehab team May 2022. Patient ambulatory to bathroom, reports he has not been adhering to heel WB per podiatry recs. Provided DARCO shoes and RW for improved heel WB, discussed importance of limiting WB to allow healing. PT will continue to follow and progress as tolerated at low frequency with focus on NWB therEx, gait limited to bathroom only. Rec for dc to home, strongly recommend HH if covered by insurance d/t no elevator access and navigating stairs for appointments would increase risk of skin breakdown. Please see progress note for detailed plan of care and recommendations.

## 2022-06-22 NOTE — PT/OT/SLP EVAL
Physical Therapy Evaluation and Treatment    Patient Name:  Dave Good   MRN:  [id number]    Recommendations:     Discharge Recommendations:  home with home health   Discharge Equipment Recommendations: none (had RW not used)   Barriers to discharge: Inaccessible home    Assessment:     Dave Good is a 56 y.o. male admitted with a medical diagnosis of Osteomyelitis of right foot. 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 Pmhx significant for DM, COPD, HTN. He presents with the following impairments/functional limitations:  impaired sensation, gait instability, impaired balance, decreased lower extremity function, decreased safety awareness, impaired skin.    Patient evaluated by PT and goals established. Patient with frequent admissions for B foot wounds, most recently seen by this rehab team May 2022. Patient ambulatory to bathroom prior to PT consult, reports he has not been adhering to heel WB per podiatry recs. Provided DARCO shoes and RW for improved heel WB, discussed importance of limiting WB to allow healing.     PT will continue to follow and progress as tolerated at low frequency with focus on NWB therEx, gait limited to bathroom only. Rec for dc to home, strongly recommend HH if covered by insurance d/t no elevator access and navigating stairs for appointments would increase risk of skin breakdown.     Rehab Prognosis: Fair; patient would benefit from acute skilled PT services to address these deficits and reach maximum level of function.    Recent Surgery: * No surgery found *      Plan:     During this hospitalization, patient to be seen 2 x/week to address the identified rehab impairments via gait training, therapeutic activities, therapeutic exercises, neuromuscular re-education and progress toward the following goals:    · Plan of Care Expires:  07/06/22    Subjective     Chief Complaint: Pain in B feet  Patient/Family Comments/goals: Goal to  have his feet healed; Patient agreeable to evaluation.  Pain/Comfort:  · Pain Rating 1:  (unrated)  · Location - Side 1: Bilateral  · Location 1: foot  · Pain Addressed 1: Reposition, Distraction, Cessation of Activity  · Pain Rating Post-Intervention 1:  (unrated, appears comfortable at rest)    Patients cultural, spiritual, Amish conflicts given the current situation: no    Living Environment:  · Pt lives with his fiance in a 2nd floor apartment with flight of steps to enter (no elevator access).   · Upon discharge, patient will have assistance from his fiance PRN.  Prior level of function:  · Ambulation: Indep  · ADL's: Indep  · IADLs: Indep  ·  Equipment used at home: none  · Equipment used at home: none.  DME owned (not currently used): rolling walker and single point cane.     Objective:     Communicated with VIRGINIA Luis prior to session.  Patient found HOB elevated with peripheral IV, telemetry  upon PT entry to room.    General Precautions: Standard, fall, contact   Orthopedic Precautions:RLE partial weight bearing, LLE partial weight bearing (heel WB for bathroom only)   Braces: N/A  Respiratory Status: Room air    Patient donned yellow socks and gait belt for OOB mobility. Patient donned mask for hallway ambulation.    Exams:  · Cognition:   ? Patient is oriented x4.  ? Pt follows approximately 100% of one step commands.    ? Mood: Pleasant and cooperative.   ? Safety Awareness: Impaired  · Musculoskeletal:  ? BMI: 36.59  ? Posture:  Forward head, rounded shoulders, wide stance width  ? LE ROM/Strength:   § R ROM: WFL, limited DF  § L ROM: WFL, limited DF  § R Strength: WFL  § L Strength: WFL  · Neuromuscular:  ? Coordination/Tone/Reflexes: No impairments identified with functional mobility. No formal testing performed.   ? Balance:   § Static sitting: Good  § Static standing: Good  § Dynamic standing: Fair  ? Visual-vestibular: No impairments identified with functional mobility. No formal testing  performed.  · Integument: Dry and B foot dressing c/d/i BLE   · Cardiopulmonary:  ? O2 Requirements: RA      Functional Mobility:  · Bed Mobility:     · Supine to Sit: stand by assistance  · Sit to Supine: stand by assistance  · Transfers:     · Sit to Stand:  supervision and stand by assistance with rolling walker  · Gait: 2x12 ft with RW and SBA.   · Appropriate gait with increased B stance time.   · Maintains forward flexion of trunk.     Therapeutic Activities and Exercises:  ·  Gait:  · Demo of heel WB B with decreased step length to limit toe off and increased WB thru RW to decrease pressure on BLE  · Pt requiring continuous cueing for heel WB  · Instructed in seated DF with hold for TA strengthening - cueing to perform with legs under COG to maximize DF ROM  · White board updated with therEx  PT educated patient and SO re:   PT plan of care/role of PT  Safety with OOB mobility  Use of RW, darco shoes  Discharge disposition    Pt verbalized understanding       AM-PAC 6 CLICK MOBILITY  Total Score:22     Patient left HOB elevated with all lines intact, call button in reach, RN Janelle notified, SO present and white board updated.    GOALS:   Multidisciplinary Problems     Physical Therapy Goals        Problem: Physical Therapy    Goal Priority Disciplines Outcome Goal Variances Interventions   Physical Therapy Goal     PT, PT/OT Ongoing, Progressing     Description: Goals to be met by: 22    Patient will increase functional independence with mobility by performin. Tibialis anterior strengthening (DF with heel on ground) in seated position x10 reps with 15 sec holds without cueing for technique.   2. Gait x 10 feet with mod(I) with RW without cueing for heel WB (B).  3. Ascend/descend 2 step(s) with least restrictive assistive device and uni HR via lateral technique to maintain heel WB with supervision.                    History:     Past Medical History:   Diagnosis Date    COPD (chronic obstructive  pulmonary disease)     COVID-19     Depression     Diabetes mellitus     Diabetes mellitus, type 2     Hypertension        Past Surgical History:   Procedure Laterality Date    DEBRIDEMENT OF LOWER EXTREMITY Bilateral 3/2/2022    Procedure: DEBRIDEMENT, LOWER EXTREMITY;  Surgeon: Jesus Flores Jr., MD;  Location: [facility];  Service: General;  Laterality: Bilateral;    FOOT AMPUTATION THROUGH METATARSAL Right 9/23/2021    Procedure: AMPUTATION, FOOT, TRANSMETATARSAL right 1st ray resection;  Surgeon: Samuel Toussaint DPM;  Location[facility];  Service: Podiatry;  Laterality: Right;    INCISION AND DRAINAGE FOOT Bilateral 9/21/2021    Procedure: INCISION AND DRAINAGE, FOOT - Bilateral;  Surgeon: Lavonne Vigil DPM;  Location: [facility];  Service: Podiatry;  Laterality: Bilateral;    REPLACEMENT OF WOUND DRESSING Right 2/24/2022    Procedure: REPLACEMENT, DRESSING, WOUND;  Surgeon: Jesus Flores Jr., MD;  Location: [facility];  Service: Vascular;  Laterality: Right;  wound vac dressing changed    WOUND DEBRIDEMENT Right 2/22/2022    Procedure: DEBRIDEMENT, WOUND - RIGHT FOOT;  Surgeon: Jesus Flores Jr., MD;  Location: [facility];  Service: Vascular;  Laterality: Right;    WOUND DEBRIDEMENT Bilateral 2/24/2022    Procedure: DEBRIDEMENT, WOUND / BILATERAL DEBRIDEMENT FEET;  Surgeon: Jesus Flores Jr., MD;  Location: [facility];  Service: Vascular;  Laterality: Bilateral;    WOUND DEBRIDEMENT Right 5/27/2022    Procedure: DEBRIDEMENT, WOUND;  Surgeon: Jesus Flores Jr., MD;  Location: [facility];  Service: General;  Laterality: Right;       Time Tracking:     PT Received On: 06/22/22  PT Start Time: 1411     PT Stop Time: 1432  PT Total Time (min): 21 min     Overlap with OT for portions of session due to complex nature of patient, tolerance for therapeutic modalities, and safety with mobility to decrease fall risk for patient and caregiver injury requiring two skilled therapists to provide interventions.    Billable Minutes:  Evaluation 10 and Gait Training 8      06/22/2022

## 2022-06-22 NOTE — PT/OT/SLP EVAL
Occupational Therapy   Evaluation and Discharge    Name: Dave Good  MRN: [id number]  Admitting Diagnosis:  Osteomyelitis of right foot  Recent Surgery: * No surgery found *      Recommendations:     Discharge Recommendations: home with home health  Discharge Equipment Recommendations:   (Has RW but is not using it as needed at home to decrease pressure on bilateral forefeet.)  Barriers to discharge:  None    Assessment:     Dave Good is a 56 y.o. male with a medical diagnosis of Osteomyelitis of right foot.  He presents alert and agreeable to participating in OT evaluation. Performance deficits affecting function: impaired sensation, gait instability, impaired balance, decreased lower extremity function, decreased safety awareness, impaired skin.  Pt is needing SBA for ADL and ADL mobility using RW with bilateral Darco shoes donned and heel weight bearing only.    Rehab Prognosis: Good if he adheres to precautions for bilateral feet.    Plan:   No further acute care OT needs.  Evaluation and Discharge this date.  · Plan of Care Expires: 06/22/22  · Plan of Care Reviewed with: patient, significant other    Subjective     Chief Complaint: Bilateral foot pain  Patient/Family Comments/goals: Healing of feet so he can return home.    Occupational Profile:  Living Environment: Lives in 2nd floor apt, elevator access, girlfriend lives with him  Previous level of function: Mod I/Indep; limited by medical status and pain  Equipment Used at Home:  none; Has RW and SPC but not using them  Assistance upon Discharge: Girlfriend    Pain/Comfort:  · Pain Rating 1:  (Unrated)  · Location - Side 1: Bilateral  · Location 1: foot  · Pain Addressed 1: Reposition, Distraction, Cessation of Activity  · Pain Rating Post-Intervention 1:  (Unrated)    Patients cultural, spiritual, Sikhism conflicts given the current situation: no    Objective:     Communicated with: nurse prior to session.  Patient found HOB elevated with peripheral  IV, telemetry (Dressings/ACE wraps on bilateral feet.) upon OT entry to room.  Pt's girlfriend in room.    General Precautions: Standard, diabetic, fall, contact   Orthopedic Precautions:RLE partial weight bearing, LLE partial weight bearing (Bilateral heel weight bearing only to go to bathroom.)   Braces:  (Bilateral Darco shoes)  Respiratory Status: Room air    Occupational Performance:    Bed Mobility:    · Supine to sit: Mod I  · Sit to supine: Mod I    Functional Mobility/Transfers:  · Sit to stand: SBA with RW; cues for adhering to weight bearing through heels  · Toilet transfer: SBA with RW, cues for technique and safety  · Functional Mobility: No LOB, see PT evaluation    Activities of Daily Living:  · LB dressing: SBA except Min A for donning Darco shoes correctly; pt's girlfriend assists him as needed  · UB dressing: Set up  · Toileting: SBA; Pt declined use of BSC or BSC over low toilet; pt using grab bar on wall next to toilet    Cognitive/Visual Perceptual:  Cognitive/Psychosocial Skills:     -       Oriented to: Person, Place, Time and Situation   -       Follows Commands/attention:Follows one-step commands  -       Communication: clear/fluent  -       Memory: No Deficits noted  -       Safety awareness/insight to disability: impaired   -       Mood/Affect/Coping skills/emotional control: Cooperative and Pleasant    Physical Exam:  Sitting Balance: Good  Standing Balance: SBA with RW wearing Darco shoes  UE AROM: WFL for self care  UE Strength: WFL for self care  UE Coordination: WFL for self care  Sensation: Impaired bilateral feet  Edema: unable to visualize pt's feet due to ACE wraps    AMPA 6 Click ADL:  AMPAC Total Score: 19    Treatment & Education:  Role of OT, donning Darco shoes and precautions, precautions, safety    Education:    Patient left sitting edge of bed with all lines intact, call button in reach and PT, Natasha and pt's girlfriend. present    GOALS:   Multidisciplinary Problems      Occupational Therapy Goals     Not on file          Multidisciplinary Problems (Resolved)        Problem: Occupational Therapy    Goal Priority Disciplines Outcome Interventions   Occupational Therapy Goal   (Resolved)     OT, PT/OT Met    Description: Orders received and chart reviewed.  Evaluation complete and no further acute care OT needs.  Pt's girlfriend present and both pt and girlfriend receptive to all education and training on safety, precautions and use of correct donning of Darco shoes.  Nursing to assist pt with ADL and ADL mobility using RW.  OT signing off.                   History:     Past Medical History:   Diagnosis Date    COPD (chronic obstructive pulmonary disease)     COVID-19     Depression     Diabetes mellitus     Diabetes mellitus, type 2     Hypertension        Past Surgical History:   Procedure Laterality Date    DEBRIDEMENT OF LOWER EXTREMITY Bilateral 3/2/2022    Procedure: DEBRIDEMENT, LOWER EXTREMITY;  Surgeon: Jesus Flores Jr., MD;  Location: [facility];  Service: General;  Laterality: Bilateral;    FOOT AMPUTATION THROUGH METATARSAL Right 9/23/2021    Procedure: AMPUTATION, FOOT, TRANSMETATARSAL right 1st ray resection;  Surgeon: Samuel Toussaint DPM;  Location: [facility];  Service: Podiatry;  Laterality: Right;    INCISION AND DRAINAGE FOOT Bilateral 9/21/2021    Procedure: INCISION AND DRAINAGE, FOOT - Bilateral;  Surgeon: Lavonne Vigil DPM;  Location: [facility];  Service: Podiatry;  Laterality: Bilateral;    REPLACEMENT OF WOUND DRESSING Right 2/24/2022    Procedure: REPLACEMENT, DRESSING, WOUND;  Surgeon: Jesus Flores Jr., MD;  Location: [facility];  Service: Vascular;  Laterality: Right;  wound vac dressing changed    WOUND DEBRIDEMENT Right 2/22/2022    Procedure: DEBRIDEMENT, WOUND - RIGHT FOOT;  Surgeon: Jesus Flores Jr., MD;  Location: [facility];  Service: Vascular;  Laterality: Right;    WOUND DEBRIDEMENT Bilateral 2/24/2022    Procedure: DEBRIDEMENT, WOUND / BILATERAL  DEBRIDEMENT FEET;  Surgeon: Jesus Flores Jr., MD;  Location: [facility];  Service: Vascular;  Laterality: Bilateral;    WOUND DEBRIDEMENT Right 5/27/2022    Procedure: DEBRIDEMENT, WOUND;  Surgeon: Jesus Flores Jr., MD;  Location: [facility];  Service: General;  Laterality: Right;       Time Tracking:     OT Date of Treatment: 06/22/22  OT Start Time: 1411  OT Stop Time: 1430  OT Total Time (min): 19 min    Billable Minutes:Evaluation 19 6/22/2022

## 2022-06-22 NOTE — PROGRESS NOTES
Active pharmacy to dose vancomycin consult:    Patient reviewed, renal function reviewed (SCr ordered, I/Os ok), cultures reviewed, no new levels, continue current therapy; Next levels due: 6/23/22 @ 6519    Please contact inpatient pharmacy with any questions: [phone]  Thanks, Adela Leo PharmD

## 2022-06-23 LAB
CREAT SERPL-MCNC: 1.1 MG/DL (ref 0.5–1.4)
EST. GFR  (AFRICAN AMERICAN): >60 ML/MIN/1.73 M^2
EST. GFR  (NON AFRICAN AMERICAN): >60 ML/MIN/1.73 M^2
POCT GLUCOSE: 176 MG/DL (ref 70–110)
POCT GLUCOSE: 184 MG/DL (ref 70–110)
POCT GLUCOSE: 191 MG/DL (ref 70–110)
POCT GLUCOSE: 210 MG/DL (ref 70–110)
VANCOMYCIN TROUGH SERPL-MCNC: 18.5 UG/ML (ref 10–22)

## 2022-06-23 PROCEDURE — 97116 GAIT TRAINING THERAPY: CPT | Mod: CQ

## 2022-06-23 PROCEDURE — 94761 N-INVAS EAR/PLS OXIMETRY MLT: CPT

## 2022-06-23 PROCEDURE — 63600175 PHARM REV CODE 636 W HCPCS: Performed by: INTERNAL MEDICINE

## 2022-06-23 PROCEDURE — 25000003 PHARM REV CODE 250: Performed by: INTERNAL MEDICINE

## 2022-06-23 PROCEDURE — 11000001 HC ACUTE MED/SURG PRIVATE ROOM

## 2022-06-23 PROCEDURE — 97110 THERAPEUTIC EXERCISES: CPT | Mod: CQ

## 2022-06-23 PROCEDURE — 36415 COLL VENOUS BLD VENIPUNCTURE: CPT | Performed by: STUDENT IN AN ORGANIZED HEALTH CARE EDUCATION/TRAINING PROGRAM

## 2022-06-23 PROCEDURE — 25000003 PHARM REV CODE 250: Performed by: NURSE PRACTITIONER

## 2022-06-23 PROCEDURE — 25000003 PHARM REV CODE 250: Performed by: STUDENT IN AN ORGANIZED HEALTH CARE EDUCATION/TRAINING PROGRAM

## 2022-06-23 PROCEDURE — 80202 ASSAY OF VANCOMYCIN: CPT | Performed by: INTERNAL MEDICINE

## 2022-06-23 PROCEDURE — 82565 ASSAY OF CREATININE: CPT | Performed by: STUDENT IN AN ORGANIZED HEALTH CARE EDUCATION/TRAINING PROGRAM

## 2022-06-23 PROCEDURE — 63600175 PHARM REV CODE 636 W HCPCS: Performed by: NURSE PRACTITIONER

## 2022-06-23 PROCEDURE — 99232 SBSQ HOSP IP/OBS MODERATE 35: CPT | Mod: 95,,, | Performed by: HOSPITALIST

## 2022-06-23 PROCEDURE — 99232 PR SUBSEQUENT HOSPITAL CARE,LEVL II: ICD-10-PCS | Mod: 95,,, | Performed by: HOSPITALIST

## 2022-06-23 PROCEDURE — 27000207 HC ISOLATION

## 2022-06-23 RX ADMIN — VANCOMYCIN HYDROCHLORIDE 1000 MG: 1 INJECTION, POWDER, LYOPHILIZED, FOR SOLUTION INTRAVENOUS at 12:06

## 2022-06-23 RX ADMIN — INSULIN ASPART 2 UNITS: 100 INJECTION, SOLUTION INTRAVENOUS; SUBCUTANEOUS at 05:06

## 2022-06-23 RX ADMIN — INSULIN ASPART 10 UNITS: 100 INJECTION, SOLUTION INTRAVENOUS; SUBCUTANEOUS at 12:06

## 2022-06-23 RX ADMIN — INSULIN ASPART 2 UNITS: 100 INJECTION, SOLUTION INTRAVENOUS; SUBCUTANEOUS at 08:06

## 2022-06-23 RX ADMIN — ASPIRIN 81 MG: 81 TABLET, COATED ORAL at 08:06

## 2022-06-23 RX ADMIN — HYDROCODONE BITARTRATE AND ACETAMINOPHEN 1 TABLET: 5; 325 TABLET ORAL at 04:06

## 2022-06-23 RX ADMIN — QUETIAPINE FUMARATE 200 MG: 200 TABLET ORAL at 09:06

## 2022-06-23 RX ADMIN — INSULIN ASPART 4 UNITS: 100 INJECTION, SOLUTION INTRAVENOUS; SUBCUTANEOUS at 12:06

## 2022-06-23 RX ADMIN — CEFEPIME 2 G: 2 INJECTION, POWDER, FOR SOLUTION INTRAVENOUS at 05:06

## 2022-06-23 RX ADMIN — INSULIN DETEMIR 55 UNITS: 100 INJECTION, SOLUTION SUBCUTANEOUS at 08:06

## 2022-06-23 RX ADMIN — GABAPENTIN 100 MG: 100 CAPSULE ORAL at 08:06

## 2022-06-23 RX ADMIN — LISINOPRIL 10 MG: 10 TABLET ORAL at 08:06

## 2022-06-23 RX ADMIN — HYDROCODONE BITARTRATE AND ACETAMINOPHEN 1 TABLET: 7.5; 325 TABLET ORAL at 12:06

## 2022-06-23 RX ADMIN — HYDROCHLOROTHIAZIDE 25 MG: 25 TABLET ORAL at 08:06

## 2022-06-23 RX ADMIN — COLLAGENASE SANTYL: 250 OINTMENT TOPICAL at 08:06

## 2022-06-23 RX ADMIN — INSULIN ASPART 10 UNITS: 100 INJECTION, SOLUTION INTRAVENOUS; SUBCUTANEOUS at 05:06

## 2022-06-23 RX ADMIN — HYDROCODONE BITARTRATE AND ACETAMINOPHEN 1 TABLET: 5; 325 TABLET ORAL at 08:06

## 2022-06-23 RX ADMIN — ATORVASTATIN CALCIUM 40 MG: 20 TABLET, FILM COATED ORAL at 08:06

## 2022-06-23 RX ADMIN — CEFEPIME 2 G: 2 INJECTION, POWDER, FOR SOLUTION INTRAVENOUS at 01:06

## 2022-06-23 RX ADMIN — INSULIN ASPART 10 UNITS: 100 INJECTION, SOLUTION INTRAVENOUS; SUBCUTANEOUS at 08:06

## 2022-06-23 RX ADMIN — HYDROCODONE BITARTRATE AND ACETAMINOPHEN 1 TABLET: 7.5; 325 TABLET ORAL at 08:06

## 2022-06-23 RX ADMIN — ENOXAPARIN SODIUM 40 MG: 100 INJECTION SUBCUTANEOUS at 04:06

## 2022-06-23 RX ADMIN — HYDROCODONE BITARTRATE AND ACETAMINOPHEN 1 TABLET: 7.5; 325 TABLET ORAL at 04:06

## 2022-06-23 RX ADMIN — CEFEPIME 2 G: 2 INJECTION, POWDER, FOR SOLUTION INTRAVENOUS at 09:06

## 2022-06-23 NOTE — PLAN OF CARE
Pt received in bed. A&O*4. Breathing in room air.  - Worked with PT and OT today.   - Wound care done.   - On blood glucose monitoring AC and HS. Insulin given.   - On Cefepime and Vancomycin for Osteomyelitis.  - Getting Pain meds over the clock Q4H PRN. Given with good relief.   - On contact precaution for MRSA from foot wound.   - No other distress at this moment. Will cont to monitor.      Problem: Adult Inpatient Plan of Care  Goal: Plan of Care Review  Outcome: Ongoing, Progressing  Goal: Patient-Specific Goal (Individualized)  Outcome: Ongoing, Progressing  Goal: Absence of Hospital-Acquired Illness or Injury  Outcome: Ongoing, Progressing  Goal: Optimal Comfort and Wellbeing  Outcome: Ongoing, Progressing  Goal: Readiness for Transition of Care  Outcome: Ongoing, Progressing     Problem: Fall Injury Risk  Goal: Absence of Fall and Fall-Related Injury  Outcome: Ongoing, Progressing     Problem: Diabetes Comorbidity  Goal: Blood Glucose Level Within Targeted Range  Outcome: Ongoing, Progressing     Problem: Fluid and Electrolyte Imbalance (Acute Kidney Injury/Impairment)  Goal: Fluid and Electrolyte Balance  Outcome: Ongoing, Progressing     Problem: Oral Intake Inadequate (Acute Kidney Injury/Impairment)  Goal: Optimal Nutrition Intake  Outcome: Ongoing, Progressing     Problem: Renal Function Impairment (Acute Kidney Injury/Impairment)  Goal: Effective Renal Function  Outcome: Ongoing, Progressing     Problem: Infection  Goal: Absence of Infection Signs and Symptoms  Outcome: Ongoing, Progressing     Problem: Impaired Wound Healing  Goal: Optimal Wound Healing  Outcome: Ongoing, Progressing     Problem: Skin Injury Risk Increased  Goal: Skin Health and Integrity  Outcome: Ongoing, Progressing

## 2022-06-23 NOTE — PROGRESS NOTES
[facility] Surg [facility] Medicine  Telemedicine Progress Note    Patient Name: Dave Good  MRN: [id number]  Patient Class: IP- Inpatient   Admission Date: 6/15/2022  Length of Stay: 8 days  Attending Physician: Daja Mayen MD  Primary Care Provider: Reyna Jorge NP          Subjective:     Principal Problem:Osteomyelitis of right foot        HPI:  Mr Dave is a 56 yr old male with a PMH that includes DB 2, COPD, HTN, depression, and right foot transmetatarsal amputation. He was sent to the ED for evaluation of osteomyelitis from a follow up appt with Dr. Flores. Pt had I&D and amputation for wet gangrene in Sept 2021 and debridements in Feb, March, and May of this year. Per his chart, his feet were healing well until some foot trauma occurred. Pt has has open wounds and drainage to BL feet with right foot wound deeper than left. He also has wound to left shin. Pt reports associated 10/10 pain and difficulty walking. He denies fever and chills at home. Podiatry consulted and pt admitted to hospital medicine.       Overview/Hospital Course:  Patient admitted with bilateral foot wounds and concern for osteomyelitis following R foot TMA requiring debridements since for continued wounds. He was started on empiric antibiotics. Bilateral foot MRI done. MRI of left foot with abnormal findings but not consistent with osteomyelitis. MRI of right foot showed changes of 1st and 2nd transmetatarsal amputation with cortical regularity and abnormal signal at distal stumps noting overlying extensive edema and regions of skin ulceration.  Findings may represent early osteomyelitis vs post-operative changes. Bone biopsy done for further evaluation and to guide treatment.      Interval History: No acute events. Continuing current antibiotics. Awaiting final recs from podiatry and ID. PT/OT rec home health.     Review of Systems   Constitutional:  Negative for chills, fatigue and fever.   HENT: Negative.     Eyes:  Negative.    Respiratory:  Negative for cough and shortness of breath.    Cardiovascular:  Negative for chest pain, palpitations and leg swelling.   Gastrointestinal:  Negative for abdominal pain and vomiting.   Genitourinary: Negative.    Skin: Negative.    Neurological:  Negative for seizures and speech difficulty.   Psychiatric/Behavioral:  Negative for agitation and confusion. The patient is not nervous/anxious.    Objective:     Vital Signs (Most Recent):  Temp: 97.6 °F (36.4 °C) (06/23/22 1151)  Pulse: 69 (06/23/22 1151)  Resp: 15 (06/23/22 1220)  BP: 120/72 (06/23/22 1151)  SpO2: 97 % (06/23/22 1151) Vital Signs (24h Range):  Temp:  [97.6 °F (36.4 °C)-98.6 °F (37 °C)] 97.6 °F (36.4 °C)  Pulse:  [69-81] 69  Resp:  [15-20] 15  SpO2:  [94 %-100 %] 97 %  BP: (107-140)/(67-92) 120/72     Weight: 129.3 kg (285 lb)  Body mass index is 36.59 kg/m².    Intake/Output Summary (Last 24 hours) at 6/23/2022 1235  Last data filed at 6/23/2022 0200  Gross per 24 hour   Intake 780 ml   Output 1450 ml   Net -670 ml      Physical Exam  Vitals and nursing note reviewed.   Constitutional:       General: He is awake. He is not in acute distress.     Appearance: Normal appearance. He is well-developed and well-groomed. He is not ill-appearing, toxic-appearing or diaphoretic.   HENT:      Head: Normocephalic and atraumatic.   Cardiovascular:      Rate and Rhythm: Normal rate.   Pulmonary:      Effort: Pulmonary effort is normal. No tachypnea, accessory muscle usage or respiratory distress.   Neurological:      Mental Status: He is alert and oriented to person, place, and time. Mental status is at baseline.   Psychiatric:         Attention and Perception: Attention normal. He is attentive.         Mood and Affect: Mood normal. Mood is not anxious.         Speech: Speech normal.         Behavior: Behavior normal. Behavior is cooperative.         Thought Content: Thought content normal.         Cognition and Memory: Cognition and  "memory normal. Cognition is not impaired. Memory is not impaired. He does not exhibit impaired recent memory.         Judgment: Judgment normal.       Significant Labs: All pertinent labs within the past 24 hours have been reviewed.    Significant Imaging: I have reviewed all pertinent imaging results/findings within the past 24 hours.      Assessment/Plan:      * Osteomyelitis of right foot  Left Foot ulcer  - Presented from surgery clinic for concern for infection/osteomyelitis  - Prior history of MRSA bacteremia 2/2 gangrene treated with 6 weeks IV abx for presumed endocarditis  - ESR/CRP elevated but have down trended since prior hospitalization.  - MRI L foot with mild marrow edema of tuft of great toe. Findings and exam not consistent with acute osteomyelitis.  - MRI R foot with post-op changes of 1st & 2nd transmetatarsal with cortical regularity and abnormal signal at distal stumps with extensive edema. Findings concerning for osteo vs chronic changes.  - Biopsies from 6/17 showing no evidence of osteomyelitis involving 2nd metatarsal; 1st metatarsal still in process.  - Wound cultures from ED with MRSA, Providencia rettgeri, Proteus mirabilis, and GNR.  - 1st metatarsal bone culture 6/17 showing E faecalis, diphtheroids.  - Trial of gabapentin 100mg PO qHS with reported symptoms consistent with neuropathic pain.  - Podiatry and wound care following, appreciate assistance.  - ID consulted: Continue empiric cefepime and vancomycin with pharmacy dosing   - midline placed    Antibiotics (From admission, onward)            Start     Stop Route Frequency Ordered    06/22/22 0000  vancomycin in dextrose 5 % 1 gram/250 mL IVPB 1,000 mg         -- IV Every 12 hours (non-standard times) 06/21/22 1333    06/16/22 1000  cefepime in dextrose 5 % IVPB 2 g         -- IV Every 8 hours (non-standard times) 06/16/22 0918    06/15/22 2237  vancomycin - pharmacy to dose  (vancomycin IVPB)        "And" Linked Group Details    " -- IV pharmacy to manage frequency 06/15/22 2148        Cultures were taken-   Microbiology Results (last 7 days)     Procedure Component Value Units Date/Time    Aerobic culture [[id number]]  (Abnormal)  (Susceptibility) Collected: 06/17/22 0601    Order Status: Completed Specimen: Wound from Foot, Right Updated: 06/22/22 1031     Aerobic Bacterial Culture DIPHTHEROIDS  Few        ENTEROCOCCUS FAECALIS  Few      Narrative:      1st metatarsal - piece of bone    Culture, Anaerobic [[id number]] Collected: 06/17/22 0601    Order Status: Completed Specimen: Wound from Foot, Right Updated: 06/21/22 0814     Anaerobic Culture No anaerobes isolated    Narrative:      Piece of bone 2nd metatarsal    Culture, Anaerobic [[id number]] Collected: 06/17/22 0601    Order Status: Completed Specimen: Wound from Foot, Right Updated: 06/21/22 0813     Anaerobic Culture No anaerobes isolated    Narrative:      Right 1st metatarsal - piece of bone.    Blood Culture #2 **CANNOT BE ORDERED STAT** [[id number]] Collected: 06/15/22 2006    Order Status: Completed Specimen: Blood from Peripheral, Antecubital, Right Updated: 06/21/22 0612     Blood Culture, Routine No growth after 5 days.    Blood Culture #1 **CANNOT BE ORDERED STAT** [[id number]] Collected: 06/15/22 1708    Order Status: Completed Specimen: Blood from Peripheral, Antecubital, Right Updated: 06/20/22 2012     Blood Culture, Routine No growth after 5 days.    Culture, Anaerobe [[id number]] Collected: 06/16/22 0050    Order Status: Completed Specimen: Wound from Foot, Left Updated: 06/20/22 1215     Anaerobic Culture No anaerobes isolated    Aerobic culture [[id number]]  (Abnormal)  (Susceptibility) Collected: 06/16/22 0050    Order Status: Completed Specimen: Wound from Foot, Right Updated: 06/20/22 1050     Aerobic Bacterial Culture PROTEUS MIRABILIS  Many        METHICILLIN RESISTANT STAPHYLOCOCCUS AUREUS  Many      Aerobic culture [[id number]] Collected: 06/17/22 0601    Order  Status: Completed Specimen: Wound from Foot, Right Updated: 06/20/22 0834     Aerobic Bacterial Culture No growth    Narrative:      Piece of bone 2nd metatarsal    Culture, Anaerobe [[id number]]  (Abnormal) Collected: 06/16/22 0050    Order Status: Completed Specimen: Wound from Foot, Right Updated: 06/20/22 0824     Anaerobic Culture PRESUMPTIVE PREVOTELLA/PORPHYROMONAS GROUP  Unable to quantitate      Aerobic culture [[id number]]  (Abnormal)  (Susceptibility) Collected: 06/16/22 0050    Order Status: Completed Specimen: Wound from Foot, Left Updated: 06/20/22 0756     Aerobic Bacterial Culture ACINETOBACTER LWOFFII GROUP  Few        PROVIDENCIA RETTGERI  Few        METHICILLIN RESISTANT STAPHYLOCOCCUS AUREUS  Moderate      Gram stain [[id number]] Collected: 06/17/22 0601    Order Status: Completed Specimen: Wound from Foot, Right Updated: 06/17/22 2125     Gram Stain Result No WBC's      No organisms seen    Narrative:      gram stain for right 1st metatarsal ([id number],CXANA   /[id number],CXAER)    Gram stain [[id number]] Collected: 06/17/22 0601    Order Status: Completed Specimen: Wound from Foot, Right Updated: 06/17/22 1955     Gram Stain Result No WBC's      No organisms seen    Narrative:      gram stain for PIECE OF BONE 2ND metatarsal ([id number],CXANA   /[id number],CXAER)    Gram stain [[id number]]     Order Status: Completed Specimen: Wound from Toe, Right Foot           Foot ulcer  Plan as above.       HTN (hypertension)  - Reports taking lisinopril and losartan at home.  - Continue lisinopril 10mg PO daily.    Type 2 diabetes mellitus with diabetic peripheral angiopathy without gangrene, with long-term current use of insulin  Hyperlipidemia  - Last A1c 7.8  - On Levemir 50 U qHS and aspart 10 U TID WM at home  - Improving. Continue insulin detemir 55U subQ qHS, insulin aspart 10U subQ TIDWM, moderate-dose sliding scale insulin aspart 1-10U subQ TIDWM PRN.  - Continue aspirin 81mg PO daily, atorvastatin  40mg PO daily.      VTE Risk Mitigation (From admission, onward)         Ordered     enoxaparin injection 40 mg  Daily         06/16/22 0658     IP VTE HIGH RISK PATIENT  Once         06/15/22 2119     Place sequential compression device  Until discontinued         06/15/22 2119                      I have assessed these finding virtually using telemed platform and with assistance of bedside nurse                 The attending portion of this evaluation, treatment, and documentation was performed per Daja Lubin MD via Telemedicine AudioVisual using the secure [employer] software platform with 2 way audio/video. The provider was located off-site and the patient is located in the hospital. The aforementioned video software was utilized to document the relevant history and physical exam    Daja Lubin MD  Department of Hospital Medicine   [facility]

## 2022-06-23 NOTE — PLAN OF CARE
06/23/22 1624   Discharge Reassessment   Assessment Type Discharge Planning Reassessment   Did the patient's condition or plan change since previous assessment? Yes   Discharge Plan discussed with: Patient   Discharge Plan A Home   Discharge Plan B Home Health   DME Needed Upon Discharge  none   Discharge Barriers Identified None   Post-Acute Status   Discharge Delays None known at this time

## 2022-06-23 NOTE — ASSESSMENT & PLAN NOTE
Contributing Nutrition Diagnosis  Increased nutrient needs; kcal/protein     Related to (etiology):   Increase demand for nutrients- acute/chronic wounds    Signs and Symptoms (as evidenced by):   Delvis Score 19  Multiple ulcer/ altered skin to LE   PO intake 100%      Interventions/Recommendations (treatment strategy):  Collaboration with other healthcare providers  2000 kcal DM diet  ONS    Nutrition Diagnosis Status:   New

## 2022-06-23 NOTE — PLAN OF CARE
Recommendations  1.Continue 2000kcal DM diet as tolerated.   2.Recommend James BID to promote wound healing.    -if PO intake <50% recommend Boost Glucose BID as tolerated.   3.Encourage good PO intake as needed.    Goals: Pt to receive ONS by RD f/u.  Nutrition Goal Status: new  Communication of RD Recs:  (POC)

## 2022-06-23 NOTE — PROGRESS NOTES
Pharmacokinetic Assessment Follow Up: IV Vancomycin    Vancomycin serum concentration assessment(s):    The trough level was drawn correctly and can be used to guide therapy at this time. The measurement is within the desired definitive target range of 10 to 20 mcg/mL.    Vancomycin Regimen Plan:    Continue regimen to Vancomycin 1000 mg IV every 12 hours with next serum trough concentration measured at 1130 prior to 1200 dose on 6/25/22    Drug levels (last 3 results):  Recent Labs   Lab Result Units 06/21/22  1112 06/23/22  1146   Vancomycin-Trough ug/mL 20.4 18.5       Pharmacy will continue to follow and monitor vancomycin.    Please contact pharmacy at extension [phone] for questions regarding this assessment.    Thank you for the consult,   Adela Leo       Patient brief summary:  Dave Good is a 56 y.o. male initiated on antimicrobial therapy with IV Vancomycin for treatment of bone/joint infection    The patient's current regimen is vancomycin IV 1000 mg q12h    Drug Allergies:   Review of patient's allergies indicates:   Allergen Reactions    Ibuprofen Swelling     Facial swelling       Actual Body Weight:   129.3 kg    Renal Function:   Estimated Creatinine Clearance: 104.6 mL/min (based on SCr of 1.1 mg/dL).,     Dialysis Method (if applicable):  N/A    CBC (last 72 hours):  No results for input(s): WHITE BLOOD CELL COUNT, HEMOGLOBIN, HEMATOCRIT, PLATELETS, GRAN%, LYMPH%, MONO%, EOSINOPHIL%, BASOPHIL%, DIFFERENTIAL METHOD in the last 72 hours.    Metabolic Panel (last 72 hours):  Recent Labs   Lab Result Units 06/23/22  0526   Creatinine mg/dL 1.1       Vancomycin Administrations:  vancomycin given in the last 96 hours                     vancomycin in dextrose 5 % 1 gram/250 mL IVPB 1,000 mg (mg) 1,000 mg New Bag 06/23/22 1202     1,000 mg New Bag 06/22/22 2353      Restarted  1341     1,000 mg New Bag  1203     1,000 mg New Bag  0018    vancomycin 1.25 g in dextrose 5% 250 mL IVPB (ready to mix)  (mg) 1,250 mg New Bag 06/21/22 1217     1,250 mg New Bag 06/20/22 2302     1,250 mg New Bag  1427     1,250 mg New Bag 06/19/22 2319                    Microbiologic Results:  Microbiology Results (last 7 days)       Procedure Component Value Units Date/Time    Aerobic culture [[id number]]  (Abnormal)  (Susceptibility) Collected: 06/17/22 0601    Order Status: Completed Specimen: Wound from Foot, Right Updated: 06/22/22 1031     Aerobic Bacterial Culture DIPHTHEROIDS  Few        ENTEROCOCCUS FAECALIS  Few      Narrative:      1st metatarsal - piece of bone    Culture, Anaerobic [[id number]] Collected: 06/17/22 0601    Order Status: Completed Specimen: Wound from Foot, Right Updated: 06/21/22 0814     Anaerobic Culture No anaerobes isolated    Narrative:      Piece of bone 2nd metatarsal    Culture, Anaerobic [[id number]] Collected: 06/17/22 0601    Order Status: Completed Specimen: Wound from Foot, Right Updated: 06/21/22 0813     Anaerobic Culture No anaerobes isolated    Narrative:      Right 1st metatarsal - piece of bone.    Blood Culture #2 **CANNOT BE ORDERED STAT** [[id number]] Collected: 06/15/22 2006    Order Status: Completed Specimen: Blood from Peripheral, Antecubital, Right Updated: 06/21/22 0612     Blood Culture, Routine No growth after 5 days.    Blood Culture #1 **CANNOT BE ORDERED STAT** [[id number]] Collected: 06/15/22 1708    Order Status: Completed Specimen: Blood from Peripheral, Antecubital, Right Updated: 06/20/22 2012     Blood Culture, Routine No growth after 5 days.    Culture, Anaerobe [[id number]] Collected: 06/16/22 0050    Order Status: Completed Specimen: Wound from Foot, Left Updated: 06/20/22 1215     Anaerobic Culture No anaerobes isolated    Aerobic culture [[id number]]  (Abnormal)  (Susceptibility) Collected: 06/16/22 0050    Order Status: Completed Specimen: Wound from Foot, Right Updated: 06/20/22 1050     Aerobic Bacterial Culture PROTEUS MIRABILIS  Many        METHICILLIN RESISTANT  STAPHYLOCOCCUS AUREUS  Many      Aerobic culture [[id number]] Collected: 06/17/22 0601    Order Status: Completed Specimen: Wound from Foot, Right Updated: 06/20/22 0834     Aerobic Bacterial Culture No growth    Narrative:      Piece of bone 2nd metatarsal    Culture, Anaerobe [[id number]]  (Abnormal) Collected: 06/16/22 0050    Order Status: Completed Specimen: Wound from Foot, Right Updated: 06/20/22 0824     Anaerobic Culture PRESUMPTIVE PREVOTELLA/PORPHYROMONAS GROUP  Unable to quantitate      Aerobic culture [[id number]]  (Abnormal)  (Susceptibility) Collected: 06/16/22 0050    Order Status: Completed Specimen: Wound from Foot, Left Updated: 06/20/22 0756     Aerobic Bacterial Culture ACINETOBACTER LWOFFII GROUP  Few        PROVIDENCIA RETTGERI  Few        METHICILLIN RESISTANT STAPHYLOCOCCUS AUREUS  Moderate      Gram stain [[id number]] Collected: 06/17/22 0601    Order Status: Completed Specimen: Wound from Foot, Right Updated: 06/17/22 2125     Gram Stain Result No WBC's      No organisms seen    Narrative:      gram stain for right 1st metatarsal ([id number],CXANA   /[id number],CXAER)    Gram stain [[id number]] Collected: 06/17/22 0601    Order Status: Completed Specimen: Wound from Foot, Right Updated: 06/17/22 1955     Gram Stain Result No WBC's      No organisms seen    Narrative:      gram stain for PIECE OF BONE 2ND metatarsal ([id number],CXANA   /[id number],CXAER)    Gram stain [[id number]]     Order Status: Completed Specimen: Wound from Toe, Right Foot

## 2022-06-23 NOTE — PROGRESS NOTES
[facility] ([facility])  Adult Nutrition  Progress Note    SUMMARY       Recommendations  1.Continue 2000kcal DM diet as tolerated.   2.Recommend James BID to promote wound healing.    -if PO intake <50% recommend Boost Glucose BID as tolerated.   3.Encourage good PO intake as needed.    Goals: Pt to receive ONS by RD f/u.  Nutrition Goal Status: new  Communication of RD Recs:  (POC)    Assessment and Plan    Increased nutritional needs  Contributing Nutrition Diagnosis  Increased nutrient needs; kcal/protein     Related to (etiology):   Increase demand for nutrients- acute/chronic wounds    Signs and Symptoms (as evidenced by):   Delvis Score 19  Multiple ulcer/ altered skin to LE   PO intake 100%      Interventions/Recommendations (treatment strategy):  Collaboration with other healthcare providers  2000 kcal DM diet  ONS    Nutrition Diagnosis Status:   New     Malnutrition Assessment     Skin (Micronutrient):  (Delvis Score 19)      Reason for Assessment    Reason For Assessment: length of stay  Diagnosis:  (osteomyeltitis of R foot)  Relevant Medical History:   Past Medical History:   Diagnosis Date    COPD (chronic obstructive pulmonary disease)     COVID-19     Depression     Diabetes mellitus     Diabetes mellitus, type 2     Hypertension        Interdisciplinary Rounds: did not attend  General Information Comments: 6/23- Seen 2/2 LOS. Pt with RN at time of assessment. On 2000kcal DM diet. No ONS. PO intake 100%. Pt with multiple wounds to R foot with DM ulcer and L foot with non-pressure chronic ulcer and another ulcer. Delvis Score 19. Pending ID pathology. NFPE not warranted. Will continue to monitor.    Nutrition Discharge Planning: Pt to follow a DM diet with tetxure modified diet and ONS to aid in wound healing as tolerateed.    Nutrition Risk Screen    Nutrition Risk Screen: no indicators present    Nutrition/Diet History    Spiritual, Cultural Beliefs, [facility] Practices, Values that Affect  "Care: no  Factors Affecting Nutritional Intake: None identified at this time    Anthropometrics    Temp: 97.6 °F (36.4 °C)  Height Method: Stated  Height: 6' 2" (188 cm)  Height (inches): 74 in  Weight Method: Stated  Weight: 129.3 kg (285 lb)  Weight (lb): 285 lb  Ideal Body Weight (IBW), Male: 190 lb  % Ideal Body Weight, Male (lb): 150 %  BMI (Calculated): 36.6  BMI Grade: 35 - 39.9 - obesity - grade II    Lab/Procedures/Meds    Pertinent Labs Reviewed: reviewed  Pertinent Labs Comments: none  Pertinent Medications Reviewed: reviewed  Pertinent Medications Comments: atorvastatin, cefepi,me, collagenase, enoxparin, gabapentin, hydrochlorothiazide, insulin aspart, insul detemir, lisinopril, quetiapine, vanc.    Estimated/Assessed Needs    Weight Used For Calorie Calculations: 129.3 kg (285 lb 0.9 oz)  Energy Calorie Requirements (kcal): 2100 kcal day  Energy Need Method: Moffit-St Kilpatrick  Protein Requirements: 155 g day (1.2 g/kg wound)  Weight Used For Protein Calculations: 129.3 kg (285 lb 0.9 oz)     Estimated Fluid Requirement Method: RDA Method  RDA Method (mL): 2100  CHO Requirement: 263 g day    Nutrition Prescription Ordered    Current Diet Order: 2000kcal DM diet    Evaluation of Received Nutrient/Fluid Intake    I/O: -2.43L since admit  Energy Calories Required: meeting needs  Protein Required: meeting needs  Fluid Required: meeting needs  Comments: LBM 6/21  Tolerance: tolerating  % Intake of Estimated Energy Needs: 75 - 100 %  % Meal Intake: 75 - 100 %    Nutrition Risk    Level of Risk/Frequency of Follow-up: low (1 x weekly)     Monitor and Evaluation    Food and Nutrient Intake: food and beverage intake, energy intake  Food and Nutrient Adminstration: diet order  Knowledge/Beliefs/Attitudes: food and nutrition knowledge/skill  Physical Activity and Function: nutrition-related ADLs and IADLs  Anthropometric Measurements: weight, weight change, body mass index  Biochemical Data, Medical Tests and " Procedures: glucose/endocrine profile, gastrointestinal profile, electrolyte and renal panel  Nutrition-Focused Physical Findings: overall appearance, skin     Nutrition Follow-Up    RD Follow-up?: Yes

## 2022-06-23 NOTE — PT/OT/SLP PROGRESS
Physical Therapy Treatment    Patient Name:  Dave Good   MRN:  [id number]    Recommendations:     Discharge Recommendations:  home with home health   Discharge Equipment Recommendations: none   Barriers to discharge: None    Assessment:     Dave Good is a 56 y.o. male admitted with a medical diagnosis of Osteomyelitis of right foot.  He presents with the following impairments/functional limitations:  impaired sensation, impaired functional mobilty, gait instability, pain, decreased lower extremity function, decreased safety awareness, impaired skin, orthopedic precautions ;pt with good mobility in room, amb short distance w/ RW and Supervision/SBA.    Rehab Prognosis: Good; patient would benefit from acute skilled PT services to address these deficits and reach maximum level of function.    Recent Surgery: * No surgery found *      Plan:     During this hospitalization, patient to be seen 2 x/week to address the identified rehab impairments via gait training, therapeutic activities, therapeutic exercises, neuromuscular re-education and progress toward the following goals:    · Plan of Care Expires:  07/06/22    Subjective     Chief Complaint: none stated  Patient/Family Comments/goals: pt agreeable to session  Pain/Comfort:  · Pain Rating 1: 7/10  · Location - Side 1: Bilateral  · Location - Orientation 1: generalized  · Location 1: foot  · Pain Addressed 1: Reposition, Distraction  · Pain Rating Post-Intervention 1: 7/10      Objective:     Communicated with nruse prior to session.  Patient found HOB elevated with peripheral IV, telemetry upon PT entry to room.     General Precautions: Standard, diabetic, fall, contact   Orthopedic Precautions:RLE partial weight bearing, LLE partial weight bearing (in Darco shoes ,heel wt bearing)   Braces:  (B Darco shoes)  Respiratory Status: Room air     Functional Mobility:  · Bed Mobility:     · Supine to Sit: modified independence  · Sit to Supine: modified  independence  · Transfers:     · Sit to Stand:  supervision with rolling walker  · Gait: amb'd 20' w/ RW and SBA/Supervision, PWB in BLE's in Darco shoes      AM-PAC 6 CLICK MOBILITY  Turning over in bed (including adjusting bedclothes, sheets and blankets)?: 4  Sitting down on and standing up from a chair with arms (e.g., wheelchair, bedside commode, etc.): 4  Moving from lying on back to sitting on the side of the bed?: 4  Moving to and from a bed to a chair (including a wheelchair)?: 4  Need to walk in hospital room?: 3  Climbing 3-5 steps with a railing?: 3  Basic Mobility Total Score: 22       Therapeutic Activities and Exercises:   per'fd seated LE ex's of hip flex, LAQ's 2 x 10 ea. Donned Darco shoes w/ assistance (though pt probably could have perf'd on his own)    Patient left HOB elevated with all lines intact, call button in reach, nurse notified and heels elevated off bed w/ blanket rolls..    GOALS:   Multidisciplinary Problems     Physical Therapy Goals        Problem: Physical Therapy    Goal Priority Disciplines Outcome Goal Variances Interventions   Physical Therapy Goal     PT, PT/OT Ongoing, Progressing     Description: Goals to be met by: 22    Patient will increase functional independence with mobility by performin. Tibialis anterior strengthening (DF with heel on ground) in seated position x10 reps with 15 sec holds without cueing for technique.   2. Gait x 10 feet with mod(I) with RW without cueing for heel WB (B).  3. Ascend/descend 2 step(s) with least restrictive assistive device and uni HR via lateral technique to maintain heel WB with supervision.                    Time Tracking:     PT Received On: 22  PT Start Time: 1442     PT Stop Time: 1506  PT Total Time (min): 24 min     Billable Minutes: Gait Training 14 and Therapeutic Exercise 10    Treatment Type: Treatment  PT/PTA: PTA     PTA Visit Number: 2022

## 2022-06-23 NOTE — SUBJECTIVE & OBJECTIVE
Interval History: No acute events. Continuing current antibiotics. Awaiting final recs from podiatry and ID. PT/OT rec home health.     Review of Systems   Constitutional:  Negative for chills, fatigue and fever.   HENT: Negative.     Eyes: Negative.    Respiratory:  Negative for cough and shortness of breath.    Cardiovascular:  Negative for chest pain, palpitations and leg swelling.   Gastrointestinal:  Negative for abdominal pain and vomiting.   Genitourinary: Negative.    Skin: Negative.    Neurological:  Negative for seizures and speech difficulty.   Psychiatric/Behavioral:  Negative for agitation and confusion. The patient is not nervous/anxious.    Objective:     Vital Signs (Most Recent):  Temp: 97.6 °F (36.4 °C) (06/23/22 1151)  Pulse: 69 (06/23/22 1151)  Resp: 15 (06/23/22 1220)  BP: 120/72 (06/23/22 1151)  SpO2: 97 % (06/23/22 1151) Vital Signs (24h Range):  Temp:  [97.6 °F (36.4 °C)-98.6 °F (37 °C)] 97.6 °F (36.4 °C)  Pulse:  [69-81] 69  Resp:  [15-20] 15  SpO2:  [94 %-100 %] 97 %  BP: (107-140)/(67-92) 120/72     Weight: 129.3 kg (285 lb)  Body mass index is 36.59 kg/m².    Intake/Output Summary (Last 24 hours) at 6/23/2022 1235  Last data filed at 6/23/2022 0200  Gross per 24 hour   Intake 780 ml   Output 1450 ml   Net -670 ml      Physical Exam  Vitals and nursing note reviewed.   Constitutional:       General: He is awake. He is not in acute distress.     Appearance: Normal appearance. He is well-developed and well-groomed. He is not ill-appearing, toxic-appearing or diaphoretic.   HENT:      Head: Normocephalic and atraumatic.   Cardiovascular:      Rate and Rhythm: Normal rate.   Pulmonary:      Effort: Pulmonary effort is normal. No tachypnea, accessory muscle usage or respiratory distress.   Neurological:      Mental Status: He is alert and oriented to person, place, and time. Mental status is at baseline.   Psychiatric:         Attention and Perception: Attention normal. He is attentive.          Mood and Affect: Mood normal. Mood is not anxious.         Speech: Speech normal.         Behavior: Behavior normal. Behavior is cooperative.         Thought Content: Thought content normal.         Cognition and Memory: Cognition and memory normal. Cognition is not impaired. Memory is not impaired. He does not exhibit impaired recent memory.         Judgment: Judgment normal.       Significant Labs: All pertinent labs within the past 24 hours have been reviewed.    Significant Imaging: I have reviewed all pertinent imaging results/findings within the past 24 hours.

## 2022-06-23 NOTE — ASSESSMENT & PLAN NOTE
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  - MRI L foot with mild marrow edema of tuft of great toe. Findings and exam not consistent with acute osteomyelitis.  - MRI R foot with post-op changes of 1st & 2nd transmetatarsal with cortical regularity and abnormal signal at distal stumps with extensive edema. Findings concerning for osteo vs chronic changes.  - Biopsies from 6/17 showing no evidence of osteomyelitis involving 2nd metatarsal; 1st metatarsal still in process.  - Wound cultures from ED with MRSA, Providencia rettgeri, Proteus mirabilis, and GNR.  - 1st metatarsal bone culture 6/17 showing E faecalis, diphtheroids.  - Trial of gabapentin 100mg PO qHS with reported symptoms consistent with neuropathic pain.  - Podiatry and wound care following, appreciate assistance.  - ID consulted: Continue empiric cefepime and vancomycin with pharmacy dosing   - midline placed    Antibiotics (From admission, onward)            Start     Stop Route Frequency Ordered    06/22/22 0000  vancomycin in dextrose 5 % 1 gram/250 mL IVPB 1,000 mg         -- IV Every 12 hours (non-standard times) 06/21/22 1333    06/16/22 1000  cefepime in dextrose 5 % IVPB 2 g         -- IV Every 8 hours (non-standard times) 06/16/22 0918    06/15/22 2237  vancomycin - pharmacy to dose  (vancomycin IVPB)        "And" Linked Group Details    -- IV pharmacy to manage frequency 06/15/22 2148        Cultures were taken-   Microbiology Results (last 7 days)     Procedure Component Value Units Date/Time    Aerobic culture [[id number]]  (Abnormal)  (Susceptibility) Collected: 06/17/22 0601    Order Status: Completed Specimen: Wound from Foot, Right Updated: 06/22/22 1031     Aerobic Bacterial Culture DIPHTHEROIDS  Few        ENTEROCOCCUS " FAECALIS  Few      Narrative:      1st metatarsal - piece of bone    Culture, Anaerobic [[id number]] Collected: 06/17/22 0601    Order Status: Completed Specimen: Wound from Foot, Right Updated: 06/21/22 0814     Anaerobic Culture No anaerobes isolated    Narrative:      Piece of bone 2nd metatarsal    Culture, Anaerobic [[id number]] Collected: 06/17/22 0601    Order Status: Completed Specimen: Wound from Foot, Right Updated: 06/21/22 0813     Anaerobic Culture No anaerobes isolated    Narrative:      Right 1st metatarsal - piece of bone.    Blood Culture #2 **CANNOT BE ORDERED STAT** [[id number]] Collected: 06/15/22 2006    Order Status: Completed Specimen: Blood from Peripheral, Antecubital, Right Updated: 06/21/22 0612     Blood Culture, Routine No growth after 5 days.    Blood Culture #1 **CANNOT BE ORDERED STAT** [[id number]] Collected: 06/15/22 1708    Order Status: Completed Specimen: Blood from Peripheral, Antecubital, Right Updated: 06/20/22 2012     Blood Culture, Routine No growth after 5 days.    Culture, Anaerobe [[id number]] Collected: 06/16/22 0050    Order Status: Completed Specimen: Wound from Foot, Left Updated: 06/20/22 1215     Anaerobic Culture No anaerobes isolated    Aerobic culture [[id number]]  (Abnormal)  (Susceptibility) Collected: 06/16/22 0050    Order Status: Completed Specimen: Wound from Foot, Right Updated: 06/20/22 1050     Aerobic Bacterial Culture PROTEUS MIRABILIS  Many        METHICILLIN RESISTANT STAPHYLOCOCCUS AUREUS  Many      Aerobic culture [[id number]] Collected: 06/17/22 0601    Order Status: Completed Specimen: Wound from Foot, Right Updated: 06/20/22 0834     Aerobic Bacterial Culture No growth    Narrative:      Piece of bone 2nd metatarsal    Culture, Anaerobe [[id number]]  (Abnormal) Collected: 06/16/22 0050    Order Status: Completed Specimen: Wound from Foot, Right Updated: 06/20/22 0824     Anaerobic Culture PRESUMPTIVE PREVOTELLA/PORPHYROMONAS GROUP  Unable to  quantitate      Aerobic culture [[id number]]  (Abnormal)  (Susceptibility) Collected: 06/16/22 0050    Order Status: Completed Specimen: Wound from Foot, Left Updated: 06/20/22 0756     Aerobic Bacterial Culture ACINETOBACTER LWOFFII GROUP  Few        PROVIDENCIA RETTGERI  Few        METHICILLIN RESISTANT STAPHYLOCOCCUS AUREUS  Moderate      Gram stain [[id number]] Collected: 06/17/22 0601    Order Status: Completed Specimen: Wound from Foot, Right Updated: 06/17/22 2125     Gram Stain Result No WBC's      No organisms seen    Narrative:      gram stain for right 1st metatarsal ([id number],CXANA   /[id number],CXAER)    Gram stain [[id number]] Collected: 06/17/22 0601    Order Status: Completed Specimen: Wound from Foot, Right Updated: 06/17/22 1955     Gram Stain Result No WBC's      No organisms seen    Narrative:      gram stain for PIECE OF BONE 2ND metatarsal ([id number],CXANA   /[id number],CXAER)    Gram stain [[id number]]     Order Status: Completed Specimen: Wound from Toe, Right Foot

## 2022-06-24 LAB
CREAT SERPL-MCNC: 1.1 MG/DL (ref 0.5–1.4)
EST. GFR  (AFRICAN AMERICAN): >60 ML/MIN/1.73 M^2
EST. GFR  (NON AFRICAN AMERICAN): >60 ML/MIN/1.73 M^2
POCT GLUCOSE: 197 MG/DL (ref 70–110)
POCT GLUCOSE: 205 MG/DL (ref 70–110)
POCT GLUCOSE: 216 MG/DL (ref 70–110)
POCT GLUCOSE: 225 MG/DL (ref 70–110)

## 2022-06-24 PROCEDURE — 36415 COLL VENOUS BLD VENIPUNCTURE: CPT | Performed by: HOSPITALIST

## 2022-06-24 PROCEDURE — 94761 N-INVAS EAR/PLS OXIMETRY MLT: CPT

## 2022-06-24 PROCEDURE — 63600175 PHARM REV CODE 636 W HCPCS: Performed by: INTERNAL MEDICINE

## 2022-06-24 PROCEDURE — 99233 PR SUBSEQUENT HOSPITAL CARE,LEVL III: ICD-10-PCS | Mod: ,,, | Performed by: INTERNAL MEDICINE

## 2022-06-24 PROCEDURE — 99233 SBSQ HOSP IP/OBS HIGH 50: CPT | Mod: ,,, | Performed by: INTERNAL MEDICINE

## 2022-06-24 PROCEDURE — 82565 ASSAY OF CREATININE: CPT | Performed by: HOSPITALIST

## 2022-06-24 PROCEDURE — 25000003 PHARM REV CODE 250: Performed by: INTERNAL MEDICINE

## 2022-06-24 PROCEDURE — 99232 SBSQ HOSP IP/OBS MODERATE 35: CPT | Mod: 95,,, | Performed by: HOSPITALIST

## 2022-06-24 PROCEDURE — 99232 PR SUBSEQUENT HOSPITAL CARE,LEVL II: ICD-10-PCS | Mod: 95,,, | Performed by: HOSPITALIST

## 2022-06-24 PROCEDURE — 97116 GAIT TRAINING THERAPY: CPT | Mod: CQ

## 2022-06-24 PROCEDURE — 11000001 HC ACUTE MED/SURG PRIVATE ROOM

## 2022-06-24 PROCEDURE — 25000003 PHARM REV CODE 250: Performed by: STUDENT IN AN ORGANIZED HEALTH CARE EDUCATION/TRAINING PROGRAM

## 2022-06-24 PROCEDURE — 27000207 HC ISOLATION

## 2022-06-24 PROCEDURE — 63600175 PHARM REV CODE 636 W HCPCS: Performed by: NURSE PRACTITIONER

## 2022-06-24 PROCEDURE — 25000003 PHARM REV CODE 250: Performed by: NURSE PRACTITIONER

## 2022-06-24 RX ORDER — VANCOMYCIN HCL IN 5 % DEXTROSE 1G/250ML
1000 PLASTIC BAG, INJECTION (ML) INTRAVENOUS EVERY 12 HOURS
Start: 2022-06-24 | End: 2022-07-15 | Stop reason: HOSPADM

## 2022-06-24 RX ORDER — CEFEPIME HYDROCHLORIDE 1 G/50ML
2 INJECTION, SOLUTION INTRAVENOUS EVERY 8 HOURS
Start: 2022-06-24 | End: 2022-07-15 | Stop reason: HOSPADM

## 2022-06-24 RX ADMIN — LISINOPRIL 10 MG: 10 TABLET ORAL at 08:06

## 2022-06-24 RX ADMIN — HYDROCHLOROTHIAZIDE 25 MG: 25 TABLET ORAL at 08:06

## 2022-06-24 RX ADMIN — INSULIN ASPART 4 UNITS: 100 INJECTION, SOLUTION INTRAVENOUS; SUBCUTANEOUS at 04:06

## 2022-06-24 RX ADMIN — INSULIN ASPART 2 UNITS: 100 INJECTION, SOLUTION INTRAVENOUS; SUBCUTANEOUS at 09:06

## 2022-06-24 RX ADMIN — HYDROCODONE BITARTRATE AND ACETAMINOPHEN 1 TABLET: 7.5; 325 TABLET ORAL at 08:06

## 2022-06-24 RX ADMIN — INSULIN ASPART 10 UNITS: 100 INJECTION, SOLUTION INTRAVENOUS; SUBCUTANEOUS at 04:06

## 2022-06-24 RX ADMIN — INSULIN ASPART 10 UNITS: 100 INJECTION, SOLUTION INTRAVENOUS; SUBCUTANEOUS at 08:06

## 2022-06-24 RX ADMIN — INSULIN ASPART 2 UNITS: 100 INJECTION, SOLUTION INTRAVENOUS; SUBCUTANEOUS at 08:06

## 2022-06-24 RX ADMIN — CEFEPIME 2 G: 2 INJECTION, POWDER, FOR SOLUTION INTRAVENOUS at 01:06

## 2022-06-24 RX ADMIN — HYDROCODONE BITARTRATE AND ACETAMINOPHEN 1 TABLET: 7.5; 325 TABLET ORAL at 04:06

## 2022-06-24 RX ADMIN — GABAPENTIN 100 MG: 100 CAPSULE ORAL at 08:06

## 2022-06-24 RX ADMIN — COLLAGENASE SANTYL: 250 OINTMENT TOPICAL at 08:06

## 2022-06-24 RX ADMIN — HYDROCODONE BITARTRATE AND ACETAMINOPHEN 1 TABLET: 7.5; 325 TABLET ORAL at 12:06

## 2022-06-24 RX ADMIN — CEFEPIME 2 G: 2 INJECTION, POWDER, FOR SOLUTION INTRAVENOUS at 09:06

## 2022-06-24 RX ADMIN — VANCOMYCIN HYDROCHLORIDE 1000 MG: 1 INJECTION, POWDER, LYOPHILIZED, FOR SOLUTION INTRAVENOUS at 12:06

## 2022-06-24 RX ADMIN — INSULIN ASPART 4 UNITS: 100 INJECTION, SOLUTION INTRAVENOUS; SUBCUTANEOUS at 12:06

## 2022-06-24 RX ADMIN — QUETIAPINE FUMARATE 200 MG: 200 TABLET ORAL at 09:06

## 2022-06-24 RX ADMIN — ENOXAPARIN SODIUM 40 MG: 100 INJECTION SUBCUTANEOUS at 04:06

## 2022-06-24 RX ADMIN — INSULIN DETEMIR 55 UNITS: 100 INJECTION, SOLUTION SUBCUTANEOUS at 09:06

## 2022-06-24 RX ADMIN — INSULIN ASPART 10 UNITS: 100 INJECTION, SOLUTION INTRAVENOUS; SUBCUTANEOUS at 12:06

## 2022-06-24 RX ADMIN — ASPIRIN 81 MG: 81 TABLET, COATED ORAL at 08:06

## 2022-06-24 RX ADMIN — HYDROCODONE BITARTRATE AND ACETAMINOPHEN 1 TABLET: 5; 325 TABLET ORAL at 12:06

## 2022-06-24 RX ADMIN — HYDROCODONE BITARTRATE AND ACETAMINOPHEN 1 TABLET: 5; 325 TABLET ORAL at 04:06

## 2022-06-24 RX ADMIN — HYDROCODONE BITARTRATE AND ACETAMINOPHEN 1 TABLET: 5; 325 TABLET ORAL at 08:06

## 2022-06-24 RX ADMIN — ATORVASTATIN CALCIUM 40 MG: 20 TABLET, FILM COATED ORAL at 08:06

## 2022-06-24 RX ADMIN — CEFEPIME 2 G: 2 INJECTION, POWDER, FOR SOLUTION INTRAVENOUS at 06:06

## 2022-06-24 NOTE — ASSESSMENT & PLAN NOTE
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  - MRI L foot with mild marrow edema of tuft of great toe. Findings and exam not consistent with acute osteomyelitis.  - MRI R foot with post-op changes of 1st & 2nd transmetatarsal with cortical regularity and abnormal signal at distal stumps with extensive edema. Findings concerning for osteo vs chronic changes.  - Biopsies from 6/17 showing no evidence of osteomyelitis involving 2nd metatarsal; 1st metatarsal still in process.  - Wound cultures from ED with MRSA, Providencia rettgeri, Proteus mirabilis, and GNR.  - 1st metatarsal bone culture 6/17 showing E faecalis, diphtheroids.  - Trial of gabapentin 100mg PO qHS with reported symptoms consistent with neuropathic pain.  - Podiatry and wound care following, appreciate assistance.  - ID consulted: given MRSA isolated from a previous culture, ok to treat with vancomycin x 4 weeks (EOC 07/15/22). finish 2 weeks of cefepime for left foot SSTI (EOC 7/1/22)   - midline placed    Antibiotics (From admission, onward)            Start     Stop Route Frequency Ordered    06/22/22 0000  vancomycin in dextrose 5 % 1 gram/250 mL IVPB 1,000 mg         -- IV Every 12 hours (non-standard times) 06/21/22 1333    06/16/22 1000  cefepime in dextrose 5 % IVPB 2 g         -- IV Every 8 hours (non-standard times) 06/16/22 0918    06/15/22 2237  vancomycin - pharmacy to dose  (vancomycin IVPB)        "And" Linked Group Details    -- IV pharmacy to manage frequency 06/15/22 2148        Cultures were taken-   Microbiology Results (last 7 days)     Procedure Component Value Units Date/Time    Aerobic culture [[id number]]  (Abnormal)  (Susceptibility) Collected: 06/17/22 0601    Order Status: Completed Specimen: Wound from Foot, " Right Updated: 06/22/22 1031     Aerobic Bacterial Culture DIPHTHEROIDS  Few        ENTEROCOCCUS FAECALIS  Few      Narrative:      1st metatarsal - piece of bone    Culture, Anaerobic [[id number]] Collected: 06/17/22 0601    Order Status: Completed Specimen: Wound from Foot, Right Updated: 06/21/22 0814     Anaerobic Culture No anaerobes isolated    Narrative:      Piece of bone 2nd metatarsal    Culture, Anaerobic [[id number]] Collected: 06/17/22 0601    Order Status: Completed Specimen: Wound from Foot, Right Updated: 06/21/22 0813     Anaerobic Culture No anaerobes isolated    Narrative:      Right 1st metatarsal - piece of bone.    Blood Culture #2 **CANNOT BE ORDERED STAT** [[id number]] Collected: 06/15/22 2006    Order Status: Completed Specimen: Blood from Peripheral, Antecubital, Right Updated: 06/21/22 0612     Blood Culture, Routine No growth after 5 days.    Blood Culture #1 **CANNOT BE ORDERED STAT** [[id number]] Collected: 06/15/22 1708    Order Status: Completed Specimen: Blood from Peripheral, Antecubital, Right Updated: 06/20/22 2012     Blood Culture, Routine No growth after 5 days.    Culture, Anaerobe [[id number]] Collected: 06/16/22 0050    Order Status: Completed Specimen: Wound from Foot, Left Updated: 06/20/22 1215     Anaerobic Culture No anaerobes isolated    Aerobic culture [[id number]]  (Abnormal)  (Susceptibility) Collected: 06/16/22 0050    Order Status: Completed Specimen: Wound from Foot, Right Updated: 06/20/22 1050     Aerobic Bacterial Culture PROTEUS MIRABILIS  Many        METHICILLIN RESISTANT STAPHYLOCOCCUS AUREUS  Many      Aerobic culture [[id number]] Collected: 06/17/22 0601    Order Status: Completed Specimen: Wound from Foot, Right Updated: 06/20/22 0834     Aerobic Bacterial Culture No growth    Narrative:      Piece of bone 2nd metatarsal    Culture, Anaerobe [[id number]]  (Abnormal) Collected: 06/16/22 0050    Order Status: Completed Specimen: Wound from Foot, Right Updated:  06/20/22 0824     Anaerobic Culture PRESUMPTIVE PREVOTELLA/PORPHYROMONAS GROUP  Unable to quantitate      Aerobic culture [[id number]]  (Abnormal)  (Susceptibility) Collected: 06/16/22 0050    Order Status: Completed Specimen: Wound from Foot, Left Updated: 06/20/22 0756     Aerobic Bacterial Culture ACINETOBACTER LWOFFII GROUP  Few        PROVIDENCIA RETTGERI  Few        METHICILLIN RESISTANT STAPHYLOCOCCUS AUREUS  Moderate      Gram stain [[id number]] Collected: 06/17/22 0601    Order Status: Completed Specimen: Wound from Foot, Right Updated: 06/17/22 2125     Gram Stain Result No WBC's      No organisms seen    Narrative:      gram stain for right 1st metatarsal ([id number],CXANA   /[id number],CXAER)    Gram stain [[id number]] Collected: 06/17/22 0601    Order Status: Completed Specimen: Wound from Foot, Right Updated: 06/17/22 1955     Gram Stain Result No WBC's      No organisms seen    Narrative:      gram stain for PIECE OF BONE 2ND metatarsal ([id number],CXANA   /[id number],CXAER)

## 2022-06-24 NOTE — PT/OT/SLP PROGRESS
Physical Therapy Treatment    Patient Name:  Dave Good   MRN:  [id number]    Recommendations:     Discharge Recommendations:  home with home health   Discharge Equipment Recommendations: none   Barriers to discharge: None    Assessment:     Dave Good is a 56 y.o. male admitted with a medical diagnosis of Osteomyelitis of right foot.  He presents with the following impairments/functional limitations:  pain, orthopedic precautions, gait instability, impaired functional mobilty, impaired sensation, impaired skin, decreased lower extremity function, decreased safety awareness.    Sit>stand with RW and SBA  Pt donned both DARCO shoes ind'ly  Amb 40' with RW and SPV, PWB B LE (heel wt bearing)  Pt with good mobility, adhering to precautions  Rec Home with home health    Rehab Prognosis: Good; patient would benefit from acute skilled PT services to address these deficits and reach maximum level of function.    Recent Surgery: * No surgery found *      Plan:     During this hospitalization, patient to be seen 2 x/week to address the identified rehab impairments via gait training, therapeutic activities, therapeutic exercises, neuromuscular re-education and progress toward the following goals:    · Plan of Care Expires:  07/06/22    Subjective     Chief Complaint: none stated  Patient/Family Comments/goals: 6 weeks of antibiotics  Pain/Comfort:  · Pain Rating 1: 0/10  · Pain Rating Post-Intervention 1: 0/10      Objective:     Communicated with nurse MD prior to session.  Patient found sitting edge of bed with peripheral IV, telemetry upon PT entry to room.     General Precautions: Standard, diabetic, fall, contact   Orthopedic Precautions:RLE partial weight bearing, LLE partial weight bearing (in Darco shoes ,heel wt bearing)   Braces:    Respiratory Status: Room air     Functional Mobility:  · Transfers:     · Sit to Stand:  stand by assistance with rolling walker  · Gait: 40' with RW and SPV, PWB B LE with DARCO  shoes (heel wt bearing), no LoB      AM-PAC 6 CLICK MOBILITY  Turning over in bed (including adjusting bedclothes, sheets and blankets)?: 4  Sitting down on and standing up from a chair with arms (e.g., wheelchair, bedside commode, etc.): 4  Moving from lying on back to sitting on the side of the bed?: 4  Moving to and from a bed to a chair (including a wheelchair)?: 4  Need to walk in hospital room?: 3  Climbing 3-5 steps with a railing?: 3  Basic Mobility Total Score: 22       Therapeutic Activities and Exercises:   Seated therex: B toe raises (ant tib strengthening) x 5 reps, 15 secs each rep    Patient left sitting edge of bed with all lines intact, call button in reach and family member present..    GOALS:   Multidisciplinary Problems     Physical Therapy Goals        Problem: Physical Therapy    Goal Priority Disciplines Outcome Goal Variances Interventions   Physical Therapy Goal     PT, PT/OT Ongoing, Progressing     Description: Goals to be met by: 22    Patient will increase functional independence with mobility by performin. Tibialis anterior strengthening (DF with heel on ground) in seated position x10 reps with 15 sec holds without cueing for technique.   2. Gait x 10 feet with mod(I) with RW without cueing for heel WB (B).  3. Ascend/descend 2 step(s) with least restrictive assistive device and uni HR via lateral technique to maintain heel WB with supervision.                    Time Tracking:     PT Received On: 22  PT Start Time: 1320     PT Stop Time: 1334  PT Total Time (min): 14 min     Billable Minutes: Gait Training 14    Treatment Type: Treatment  PT/PTA: PTA     PTA Visit Number: 2     2022

## 2022-06-24 NOTE — PLAN OF CARE
06/24/22 1151   Post-Acute Status   Post-Acute Authorization Placement;IV Infusion   Post-Acute Placement Status Referrals Sent   IV Infusion Status Referral(s) sent   Discharge Delays (!) Post-Acute Set-up   Discharge Plan   Discharge Plan A Long-term acute care facility (LTAC)   Discharge Plan B Home Health     CM sent met with patient & his spouse at bedside concerning his discharge placement. CM explained benefits of LTAC vs Home health with IV antibiotics. CM provided patient with a list of LTAC facilities.       CM sent patient referral to [facility] for discharge placement. [facility] replied they are not in network with patient insurance and will need a single case agreement to accept the patient.   Ariana Dubon RN Clinical Liaison  [phone]    CM met with patient at bedside concerning his discharge placement.  Patient reported he open to attending [facility]. CM updated attending MD and nurse.

## 2022-06-24 NOTE — ASSESSMENT & PLAN NOTE
- chronic by MR and pathology  - bone culture grew diphtheroids and Enterococcus but no good po options  - given MRSA isolated from a previous culture, ok to treat with vancomycin x 4 weeks  - finish 2 weeks of cefepime for left foot SSTI

## 2022-06-24 NOTE — PROGRESS NOTES
[facility] ([place])  Infectious Disease  Progress Note    Patient Name: Dave Good  MRN: [id number]  Admission Date: 6/15/2022  Length of Stay: 9 days  Attending Physician: Daja Mayen MD  Primary Care Provider: Reyna Jorge NP    Isolation Status: Contact     Assessment/Plan:      * Osteomyelitis of right foot  - chronic by MR and pathology  - bone culture grew diphtheroids and Enterococcus but no good po options  - given MRSA isolated from a previous culture, ok to treat with vancomycin x 4 weeks (EOC 07/15/22)  - finish 2 weeks of cefepime for left foot SSTI (EOC 7/1/22)      Isidro Duran MD  Infectious Disease  [facility] ([place])    Subjective:     Principal Problem:Osteomyelitis of right foot    HPI: This is a 56 y.o. male with PMHx DM, HTN, and COPD who presents with bilateral foot ulcers. The patient reports he saw Dr. Flores this morning who instructed him to the ED as he suspected osteomyelitis or deep space infection.  H he has a PSHx of transmetatarsal amputation on his right foot and multiple wound debridement procedures with his last being 5/27/2022. The patient was admitted and started on empiric abx. Podiatry was consulted and performed MRI, suggesting osteomyelitis, and subsequent bone biopsies (left foot negative for osteomyelitis, right pending) and cultures were obtained. ID is consulted for abx recs for osteomyelitis. The patient is currently ohn cefepime and vancomycin. Superficial wound cultures were performed on the 15th and bone cultures were done the following day. Bone culture is only growing diphtheroids and Enterococcus.     Interval History: Doing okay. Path back: left no osteo; right - chronic osteomyelitis.Denies fever or chills.    Review of Systems   Constitutional:  Negative for chills and fever.   Skin:  Positive for wound.   Objective:     Vital Signs (Most Recent):  Temp: 97.4 °F (36.3 °C) (06/24/22 0711)  Pulse: 68 (06/24/22 0711)  Resp: 16 (06/24/22  0816)  BP: 119/68 (06/24/22 0816)  SpO2: 97 % (06/24/22 0711) Vital Signs (24h Range):  Temp:  [97.4 °F (36.3 °C)-98.2 °F (36.8 °C)] 97.4 °F (36.3 °C)  Pulse:  [68-73] 68  Resp:  [15-19] 16  SpO2:  [95 %-97 %] 97 %  BP: (111-128)/(57-78) 119/68     Weight: 129.3 kg (285 lb)  Body mass index is 36.59 kg/m².    Estimated Creatinine Clearance: 104.6 mL/min (based on SCr of 1.1 mg/dL).    Physical Exam  Vitals and nursing note reviewed.   Constitutional:       General: He is not in acute distress.     Appearance: Normal appearance. He is not ill-appearing or toxic-appearing.   HENT:      Head: Normocephalic and atraumatic.      Right Ear: External ear normal.      Left Ear: External ear normal.   Eyes:      Extraocular Movements: Extraocular movements intact.      Conjunctiva/sclera: Conjunctivae normal.      Pupils: Pupils are equal, round, and reactive to light.   Neurological:      General: No focal deficit present.      Mental Status: He is alert and oriented to person, place, and time.   Psychiatric:         Mood and Affect: Mood normal.         Behavior: Behavior normal.         Thought Content: Thought content normal.         Judgment: Judgment normal.       Significant Labs: Blood Culture:   Recent Labs   Lab 02/21/22  1231 02/23/22  0550 02/25/22  0548 06/15/22  1708 06/15/22  2006   LABBLOO Gram stain aer bottle: Gram positive cocci  Gram stain joes bottle: Gram positive cocci  Results called to and read back by: KRISTINA ESCOTO RN  02/22/2022  11:03  METHICILLIN RESISTANT STAPHYLOCOCCUS AUREUS  ID consult required at [facility] [place].  For susceptibility see order #[id number]  ID consult required at [facility].Sandra Valencia and [facility]  * No growth after 5 days. No growth after 5 days. No growth after 5 days. No growth after 5 days.     BMP:   Recent Labs   Lab 06/24/22  0450   CREATININE 1.1     CBC: No results for input(s): WBC, HGB, HCT, PLT in the last 48 hours.  Wound  Culture:   Recent Labs   Lab 02/22/22  1153 06/16/22  0050 06/17/22  0601   LABAERO METHICILLIN RESISTANT STAPHYLOCOCCUS AUREUS  Many  No other significant isolate  * PROTEUS MIRABILIS  Many  *  METHICILLIN RESISTANT STAPHYLOCOCCUS AUREUS  Many  *  ACINETOBACTER LWOFFII GROUP  Few  *  PROVIDENCIA RETTGERI  Few  *  METHICILLIN RESISTANT STAPHYLOCOCCUS AUREUS  Moderate  * DIPHTHEROIDS  Few  *  ENTEROCOCCUS FAECALIS  Few  *  No growth       Significant Imaging: I have reviewed all pertinent imaging results/findings within the past 24 hours.

## 2022-06-24 NOTE — SUBJECTIVE & OBJECTIVE
Interval History: Doing okay. Path back: left no osteo; right - chronic osteomyelitis.Denies fever or chills.    Review of Systems   Constitutional:  Negative for chills and fever.   Skin:  Positive for wound.   Objective:     Vital Signs (Most Recent):  Temp: 97.4 °F (36.3 °C) (06/24/22 0711)  Pulse: 68 (06/24/22 0711)  Resp: 16 (06/24/22 0816)  BP: 119/68 (06/24/22 0816)  SpO2: 97 % (06/24/22 0711) Vital Signs (24h Range):  Temp:  [97.4 °F (36.3 °C)-98.2 °F (36.8 °C)] 97.4 °F (36.3 °C)  Pulse:  [68-73] 68  Resp:  [15-19] 16  SpO2:  [95 %-97 %] 97 %  BP: (111-128)/(57-78) 119/68     Weight: 129.3 kg (285 lb)  Body mass index is 36.59 kg/m².    Estimated Creatinine Clearance: 104.6 mL/min (based on SCr of 1.1 mg/dL).    Physical Exam  Vitals and nursing note reviewed.   Constitutional:       General: He is not in acute distress.     Appearance: Normal appearance. He is not ill-appearing or toxic-appearing.   HENT:      Head: Normocephalic and atraumatic.      Right Ear: External ear normal.      Left Ear: External ear normal.   Eyes:      Extraocular Movements: Extraocular movements intact.      Conjunctiva/sclera: Conjunctivae normal.      Pupils: Pupils are equal, round, and reactive to light.   Neurological:      General: No focal deficit present.      Mental Status: He is alert and oriented to person, place, and time.   Psychiatric:         Mood and Affect: Mood normal.         Behavior: Behavior normal.         Thought Content: Thought content normal.         Judgment: Judgment normal.       Significant Labs: Blood Culture:   Recent Labs   Lab 02/21/22  1231 02/23/22  0550 02/25/22  0548 06/15/22  1708 06/15/22  2006   LABBLOO Gram stain aer bottle: Gram positive cocci  Gram stain jose bottle: Gram positive cocci  Results called to and read back by: KRISTINA ESCOTO RN  02/22/2022  11:03  METHICILLIN RESISTANT STAPHYLOCOCCUS AUREUS  ID consult required at [facility].Sandra Valencia and [facility].  For  susceptibility see order #[id number]  ID consult required at [facility].Pending sale to [facility] and [facility] locations.  * No growth after 5 days. No growth after 5 days. No growth after 5 days. No growth after 5 days.     BMP:   Recent Labs   Lab 06/24/22  0450   CREATININE 1.1     CBC: No results for input(s): WBC, HGB, HCT, PLT in the last 48 hours.  Wound Culture:   Recent Labs   Lab 02/22/22  1153 06/16/22  0050 06/17/22  0601   LABAERO METHICILLIN RESISTANT STAPHYLOCOCCUS AUREUS  Many  No other significant isolate  * PROTEUS MIRABILIS  Many  *  METHICILLIN RESISTANT STAPHYLOCOCCUS AUREUS  Many  *  ACINETOBACTER LWOFFII GROUP  Few  *  PROVIDENCIA RETTGERI  Few  *  METHICILLIN RESISTANT STAPHYLOCOCCUS AUREUS  Moderate  * DIPHTHEROIDS  Few  *  ENTEROCOCCUS FAECALIS  Few  *  No growth       Significant Imaging: I have reviewed all pertinent imaging results/findings within the past 24 hours.

## 2022-06-24 NOTE — PLAN OF CARE
Pt received in bed. A&O*4. Breathing in room air.  - Worked with PT and OT today.   - Wound care done by podiatry.   - On blood glucose monitoring AC and HS. Insulin given.   - On Cefepime and Vancomycin for Osteomyelitis.  - Getting Pain meds over the clock Q4H PRN. Given with good relief.   - On contact precaution for MRSA from foot wound.   - No other distress at this moment. Will cont to monitor.        Problem: Adult Inpatient Plan of Care  Goal: Plan of Care Review  Outcome: Ongoing, Progressing  Goal: Patient-Specific Goal (Individualized)  Outcome: Ongoing, Progressing  Goal: Absence of Hospital-Acquired Illness or Injury  Outcome: Ongoing, Progressing  Goal: Optimal Comfort and Wellbeing  Outcome: Ongoing, Progressing  Goal: Readiness for Transition of Care  Outcome: Ongoing, Progressing     Problem: Fall Injury Risk  Goal: Absence of Fall and Fall-Related Injury  Outcome: Ongoing, Progressing     Problem: Diabetes Comorbidity  Goal: Blood Glucose Level Within Targeted Range  Outcome: Ongoing, Progressing     Problem: Fluid and Electrolyte Imbalance (Acute Kidney Injury/Impairment)  Goal: Fluid and Electrolyte Balance  Outcome: Ongoing, Progressing     Problem: Oral Intake Inadequate (Acute Kidney Injury/Impairment)  Goal: Optimal Nutrition Intake  Outcome: Ongoing, Progressing     Problem: Renal Function Impairment (Acute Kidney Injury/Impairment)  Goal: Effective Renal Function  Outcome: Ongoing, Progressing     Problem: Infection  Goal: Absence of Infection Signs and Symptoms  Outcome: Ongoing, Progressing     Problem: Impaired Wound Healing  Goal: Optimal Wound Healing  Outcome: Ongoing, Progressing     Problem: Skin Injury Risk Increased  Goal: Skin Health and Integrity  Outcome: Ongoing, Progressing

## 2022-06-24 NOTE — PROGRESS NOTES
Active pharmacy to dose vancomycin consult:    Patient reviewed, renal function reviewed, cultures reviewed, no new levels, continue current therapy; Next levels due: 6/25/22 @ 6182    Please contact inpatient pharmacy with any questions: [phone]  Thanks, Adela Leo PharmD

## 2022-06-25 LAB
POCT GLUCOSE: 137 MG/DL (ref 70–110)
POCT GLUCOSE: 170 MG/DL (ref 70–110)
POCT GLUCOSE: 198 MG/DL (ref 70–110)
POCT GLUCOSE: 246 MG/DL (ref 70–110)
VANCOMYCIN TROUGH SERPL-MCNC: 16.6 UG/ML (ref 10–22)

## 2022-06-25 PROCEDURE — 63600175 PHARM REV CODE 636 W HCPCS: Performed by: INTERNAL MEDICINE

## 2022-06-25 PROCEDURE — 27000207 HC ISOLATION

## 2022-06-25 PROCEDURE — 11000001 HC ACUTE MED/SURG PRIVATE ROOM

## 2022-06-25 PROCEDURE — 94761 N-INVAS EAR/PLS OXIMETRY MLT: CPT

## 2022-06-25 PROCEDURE — 25000003 PHARM REV CODE 250: Performed by: STUDENT IN AN ORGANIZED HEALTH CARE EDUCATION/TRAINING PROGRAM

## 2022-06-25 PROCEDURE — 25000003 PHARM REV CODE 250: Performed by: INTERNAL MEDICINE

## 2022-06-25 PROCEDURE — 25000003 PHARM REV CODE 250: Performed by: NURSE PRACTITIONER

## 2022-06-25 PROCEDURE — 63600175 PHARM REV CODE 636 W HCPCS: Performed by: NURSE PRACTITIONER

## 2022-06-25 PROCEDURE — 36415 COLL VENOUS BLD VENIPUNCTURE: CPT | Performed by: HOSPITALIST

## 2022-06-25 PROCEDURE — 80202 ASSAY OF VANCOMYCIN: CPT | Performed by: HOSPITALIST

## 2022-06-25 RX ADMIN — HYDROCODONE BITARTRATE AND ACETAMINOPHEN 1 TABLET: 5; 325 TABLET ORAL at 04:06

## 2022-06-25 RX ADMIN — INSULIN ASPART 10 UNITS: 100 INJECTION, SOLUTION INTRAVENOUS; SUBCUTANEOUS at 12:06

## 2022-06-25 RX ADMIN — HYDROCODONE BITARTRATE AND ACETAMINOPHEN 1 TABLET: 7.5; 325 TABLET ORAL at 04:06

## 2022-06-25 RX ADMIN — ENOXAPARIN SODIUM 40 MG: 100 INJECTION SUBCUTANEOUS at 04:06

## 2022-06-25 RX ADMIN — ASPIRIN 81 MG: 81 TABLET, COATED ORAL at 08:06

## 2022-06-25 RX ADMIN — HYDROCODONE BITARTRATE AND ACETAMINOPHEN 1 TABLET: 5; 325 TABLET ORAL at 08:06

## 2022-06-25 RX ADMIN — CEFEPIME 2 G: 2 INJECTION, POWDER, FOR SOLUTION INTRAVENOUS at 05:06

## 2022-06-25 RX ADMIN — INSULIN ASPART 4 UNITS: 100 INJECTION, SOLUTION INTRAVENOUS; SUBCUTANEOUS at 12:06

## 2022-06-25 RX ADMIN — HYDROCODONE BITARTRATE AND ACETAMINOPHEN 1 TABLET: 5; 325 TABLET ORAL at 12:06

## 2022-06-25 RX ADMIN — CEFEPIME 2 G: 2 INJECTION, POWDER, FOR SOLUTION INTRAVENOUS at 09:06

## 2022-06-25 RX ADMIN — INSULIN ASPART 10 UNITS: 100 INJECTION, SOLUTION INTRAVENOUS; SUBCUTANEOUS at 08:06

## 2022-06-25 RX ADMIN — ATORVASTATIN CALCIUM 40 MG: 20 TABLET, FILM COATED ORAL at 08:06

## 2022-06-25 RX ADMIN — HYDROCODONE BITARTRATE AND ACETAMINOPHEN 1 TABLET: 7.5; 325 TABLET ORAL at 08:06

## 2022-06-25 RX ADMIN — QUETIAPINE FUMARATE 200 MG: 200 TABLET ORAL at 09:06

## 2022-06-25 RX ADMIN — LISINOPRIL 10 MG: 10 TABLET ORAL at 08:06

## 2022-06-25 RX ADMIN — INSULIN ASPART 4 UNITS: 100 INJECTION, SOLUTION INTRAVENOUS; SUBCUTANEOUS at 05:06

## 2022-06-25 RX ADMIN — VANCOMYCIN HYDROCHLORIDE 1000 MG: 1 INJECTION, POWDER, LYOPHILIZED, FOR SOLUTION INTRAVENOUS at 12:06

## 2022-06-25 RX ADMIN — HYDROCHLOROTHIAZIDE 25 MG: 25 TABLET ORAL at 08:06

## 2022-06-25 RX ADMIN — INSULIN DETEMIR 55 UNITS: 100 INJECTION, SOLUTION SUBCUTANEOUS at 08:06

## 2022-06-25 RX ADMIN — GABAPENTIN 100 MG: 100 CAPSULE ORAL at 08:06

## 2022-06-25 RX ADMIN — INSULIN ASPART 10 UNITS: 100 INJECTION, SOLUTION INTRAVENOUS; SUBCUTANEOUS at 05:06

## 2022-06-25 RX ADMIN — INSULIN ASPART 2 UNITS: 100 INJECTION, SOLUTION INTRAVENOUS; SUBCUTANEOUS at 08:06

## 2022-06-25 RX ADMIN — CEFEPIME 2 G: 2 INJECTION, POWDER, FOR SOLUTION INTRAVENOUS at 01:06

## 2022-06-25 RX ADMIN — COLLAGENASE SANTYL: 250 OINTMENT TOPICAL at 08:06

## 2022-06-25 RX ADMIN — HYDROCODONE BITARTRATE AND ACETAMINOPHEN 1 TABLET: 7.5; 325 TABLET ORAL at 12:06

## 2022-06-25 NOTE — PLAN OF CARE
Pt received in bed. A&O*4. Breathing in room air.  - Wound care done.   - On blood glucose monitoring AC and HS. Insulin given.   - On Cefepime and Vancomycin for Osteomyelitis.  - Getting Pain meds over the clock Q4H PRN. Given with good relief.   - On contact precaution for MRSA from foot wound.   - Pending  LTAC placement.   - No other distress at this moment. Will cont to monitor.      Problem: Adult Inpatient Plan of Care  Goal: Plan of Care Review  Outcome: Ongoing, Progressing  Goal: Patient-Specific Goal (Individualized)  Outcome: Ongoing, Progressing  Goal: Absence of Hospital-Acquired Illness or Injury  Outcome: Ongoing, Progressing  Goal: Optimal Comfort and Wellbeing  Outcome: Ongoing, Progressing  Goal: Readiness for Transition of Care  Outcome: Ongoing, Progressing     Problem: Fall Injury Risk  Goal: Absence of Fall and Fall-Related Injury  Outcome: Ongoing, Progressing     Problem: Diabetes Comorbidity  Goal: Blood Glucose Level Within Targeted Range  Outcome: Ongoing, Progressing     Problem: Fluid and Electrolyte Imbalance (Acute Kidney Injury/Impairment)  Goal: Fluid and Electrolyte Balance  Outcome: Ongoing, Progressing     Problem: Oral Intake Inadequate (Acute Kidney Injury/Impairment)  Goal: Optimal Nutrition Intake  Outcome: Ongoing, Progressing     Problem: Renal Function Impairment (Acute Kidney Injury/Impairment)  Goal: Effective Renal Function  Outcome: Ongoing, Progressing     Problem: Infection  Goal: Absence of Infection Signs and Symptoms  Outcome: Ongoing, Progressing     Problem: Impaired Wound Healing  Goal: Optimal Wound Healing  Outcome: Ongoing, Progressing     Problem: Skin Injury Risk Increased  Goal: Skin Health and Integrity  Outcome: Ongoing, Progressing

## 2022-06-25 NOTE — PROGRESS NOTES
[facility] Medicine  Telemedicine Progress Note    Patient Name: Dave Good  MRN: [id number]  Patient Class: IP- Inpatient   Admission Date: 6/15/2022  Length of Stay: 9 days  Attending Physician: Daja Mayen MD  Primary Care Provider: Reyna Jorge NP          Subjective:     Principal Problem:Osteomyelitis of right foot        HPI:  Mr Dave is a 56 yr old male with a PMH that includes DB 2, COPD, HTN, depression, and right foot transmetatarsal amputation. He was sent to the ED for evaluation of osteomyelitis from a follow up appt with Dr. Flores. Pt had I&D and amputation for wet gangrene in Sept 2021 and debridements in Feb, March, and May of this year. Per his chart, his feet were healing well until some foot trauma occurred. Pt has has open wounds and drainage to BL feet with right foot wound deeper than left. He also has wound to left shin. Pt reports associated 10/10 pain and difficulty walking. He denies fever and chills at home. Podiatry consulted and pt admitted to hospital medicine.       Overview/Hospital Course:  Patient admitted with bilateral foot wounds and concern for osteomyelitis following R foot TMA requiring debridements since for continued wounds. He was started on empiric antibiotics. Bilateral foot MRI done. MRI of left foot with abnormal findings but not consistent with osteomyelitis. MRI of right foot showed changes of 1st and 2nd transmetatarsal amputation with cortical regularity and abnormal signal at distal stumps noting overlying extensive edema and regions of skin ulceration.  Findings may represent early osteomyelitis vs post-operative changes. Bone biopsy done for further evaluation and to guide treatment.      Interval History: No acute events. Continuing current antibiotics. Attempting LTAC placement     Review of Systems   Constitutional:  Negative for chills, fatigue and fever.   HENT: Negative.     Eyes: Negative.    Respiratory:  Negative  for cough and shortness of breath.    Cardiovascular:  Negative for chest pain, palpitations and leg swelling.   Gastrointestinal:  Negative for abdominal pain and vomiting.   Genitourinary: Negative.    Skin: Negative.    Neurological:  Negative for seizures and speech difficulty.   Psychiatric/Behavioral:  Negative for agitation and confusion. The patient is not nervous/anxious.    Objective:     Vital Signs (Most Recent):  Temp: 97.8 °F (36.6 °C) (06/24/22 1903)  Pulse: 84 (06/24/22 1903)  Resp: 18 (06/24/22 1903)  BP: (!) 110/55 (06/24/22 1903)  SpO2: (!) 93 % (06/24/22 1903) Vital Signs (24h Range):  Temp:  [97.4 °F (36.3 °C)-98.2 °F (36.8 °C)] 97.8 °F (36.6 °C)  Pulse:  [68-84] 84  Resp:  [16-20] 18  SpO2:  [93 %-97 %] 93 %  BP: (110-128)/(55-84) 110/55     Weight: 131.7 kg (290 lb 5.5 oz)  Body mass index is 37.28 kg/m².    Intake/Output Summary (Last 24 hours) at 6/24/2022 1907  Last data filed at 6/24/2022 1800  Gross per 24 hour   Intake 960 ml   Output 2300 ml   Net -1340 ml        Physical Exam  Vitals and nursing note reviewed.   Constitutional:       General: He is awake. He is not in acute distress.     Appearance: Normal appearance. He is well-developed and well-groomed. He is not ill-appearing, toxic-appearing or diaphoretic.   HENT:      Head: Normocephalic and atraumatic.   Cardiovascular:      Rate and Rhythm: Normal rate.   Pulmonary:      Effort: Pulmonary effort is normal. No tachypnea, accessory muscle usage or respiratory distress.   Neurological:      Mental Status: He is alert and oriented to person, place, and time. Mental status is at baseline.   Psychiatric:         Attention and Perception: Attention normal. He is attentive.         Mood and Affect: Mood normal. Mood is not anxious.         Speech: Speech normal.         Behavior: Behavior normal. Behavior is cooperative.         Thought Content: Thought content normal.         Cognition and Memory: Cognition and memory normal.  Cognition is not impaired. Memory is not impaired. He does not exhibit impaired recent memory.         Judgment: Judgment normal.       Significant Labs: All pertinent labs within the past 24 hours have been reviewed.    Significant Imaging: I have reviewed all pertinent imaging results/findings within the past 24 hours.      Assessment/Plan:      * Osteomyelitis of right foot  Left Foot ulcer  - Presented from surgery clinic for concern for infection/osteomyelitis  - Prior history of MRSA bacteremia 2/2 gangrene treated with 6 weeks IV abx for presumed endocarditis  - ESR/CRP elevated but have down trended since prior hospitalization.  - MRI L foot with mild marrow edema of tuft of great toe. Findings and exam not consistent with acute osteomyelitis.  - MRI R foot with post-op changes of 1st & 2nd transmetatarsal with cortical regularity and abnormal signal at distal stumps with extensive edema. Findings concerning for osteo vs chronic changes.  - Biopsies from 6/17 showing no evidence of osteomyelitis involving 2nd metatarsal; 1st metatarsal still in process.  - Wound cultures from ED with MRSA, Providencia rettgeri, Proteus mirabilis, and GNR.  - 1st metatarsal bone culture 6/17 showing E faecalis, diphtheroids.  - Trial of gabapentin 100mg PO qHS with reported symptoms consistent with neuropathic pain.  - Podiatry and wound care following, appreciate assistance.  - ID consulted: given MRSA isolated from a previous culture, ok to treat with vancomycin x 4 weeks (EOC 07/15/22). finish 2 weeks of cefepime for left foot SSTI (EOC 7/1/22)   - midline placed    Antibiotics (From admission, onward)            Start     Stop Route Frequency Ordered    06/22/22 0000  vancomycin in dextrose 5 % 1 gram/250 mL IVPB 1,000 mg         -- IV Every 12 hours (non-standard times) 06/21/22 1333    06/16/22 1000  cefepime in dextrose 5 % IVPB 2 g         -- IV Every 8 hours (non-standard times) 06/16/22 0918    06/15/22 8909   "vancomycin - pharmacy to dose  (vancomycin IVPB)        "And" Linked Group Details    -- IV pharmacy to manage frequency 06/15/22 2148        Cultures were taken-   Microbiology Results (last 7 days)     Procedure Component Value Units Date/Time    Aerobic culture [[id number]]  (Abnormal)  (Susceptibility) Collected: 06/17/22 0601    Order Status: Completed Specimen: Wound from Foot, Right Updated: 06/22/22 1031     Aerobic Bacterial Culture DIPHTHEROIDS  Few        ENTEROCOCCUS FAECALIS  Few      Narrative:      1st metatarsal - piece of bone    Culture, Anaerobic [[id number]] Collected: 06/17/22 0601    Order Status: Completed Specimen: Wound from Foot, Right Updated: 06/21/22 0814     Anaerobic Culture No anaerobes isolated    Narrative:      Piece of bone 2nd metatarsal    Culture, Anaerobic [[id number]] Collected: 06/17/22 0601    Order Status: Completed Specimen: Wound from Foot, Right Updated: 06/21/22 0813     Anaerobic Culture No anaerobes isolated    Narrative:      Right 1st metatarsal - piece of bone.    Blood Culture #2 **CANNOT BE ORDERED STAT** [[id number]] Collected: 06/15/22 2006    Order Status: Completed Specimen: Blood from Peripheral, Antecubital, Right Updated: 06/21/22 0612     Blood Culture, Routine No growth after 5 days.    Blood Culture #1 **CANNOT BE ORDERED STAT** [[id number]] Collected: 06/15/22 1708    Order Status: Completed Specimen: Blood from Peripheral, Antecubital, Right Updated: 06/20/22 2012     Blood Culture, Routine No growth after 5 days.    Culture, Anaerobe [[id number]] Collected: 06/16/22 0050    Order Status: Completed Specimen: Wound from Foot, Left Updated: 06/20/22 1215     Anaerobic Culture No anaerobes isolated    Aerobic culture [[id number]]  (Abnormal)  (Susceptibility) Collected: 06/16/22 0050    Order Status: Completed Specimen: Wound from Foot, Right Updated: 06/20/22 1050     Aerobic Bacterial Culture PROTEUS MIRABILIS  Many        METHICILLIN RESISTANT " STAPHYLOCOCCUS AUREUS  Many      Aerobic culture [[id number]] Collected: 06/17/22 0601    Order Status: Completed Specimen: Wound from Foot, Right Updated: 06/20/22 0834     Aerobic Bacterial Culture No growth    Narrative:      Piece of bone 2nd metatarsal    Culture, Anaerobe [[id number]]  (Abnormal) Collected: 06/16/22 0050    Order Status: Completed Specimen: Wound from Foot, Right Updated: 06/20/22 0824     Anaerobic Culture PRESUMPTIVE PREVOTELLA/PORPHYROMONAS GROUP  Unable to quantitate      Aerobic culture [[id number]]  (Abnormal)  (Susceptibility) Collected: 06/16/22 0050    Order Status: Completed Specimen: Wound from Foot, Left Updated: 06/20/22 0756     Aerobic Bacterial Culture ACINETOBACTER LWOFFII GROUP  Few        PROVIDENCIA RETTGERI  Few        METHICILLIN RESISTANT STAPHYLOCOCCUS AUREUS  Moderate      Gram stain [[id number]] Collected: 06/17/22 0601    Order Status: Completed Specimen: Wound from Foot, Right Updated: 06/17/22 2125     Gram Stain Result No WBC's      No organisms seen    Narrative:      gram stain for right 1st metatarsal ([id number],CXANA   /[id number],CXAER)    Gram stain [[id number]] Collected: 06/17/22 0601    Order Status: Completed Specimen: Wound from Foot, Right Updated: 06/17/22 1955     Gram Stain Result No WBC's      No organisms seen    Narrative:      gram stain for PIECE OF BONE 2ND metatarsal ([id number],CXANA   /[id number],CXAER)          Foot ulcer  Plan as above.       Increased nutritional needs        HTN (hypertension)  - Reports taking lisinopril and losartan at home.  - Continue lisinopril 10mg PO daily.    Type 2 diabetes mellitus with diabetic peripheral angiopathy without gangrene, with long-term current use of insulin  Hyperlipidemia  - Last A1c 7.8  - On Levemir 50 U qHS and aspart 10 U TID WM at home  - Improving. Continue insulin detemir 55U subQ qHS, insulin aspart 10U subQ TIDWM, moderate-dose sliding scale insulin aspart 1-10U subQ TIDWM PRN.  - Continue  aspirin 81mg PO daily, atorvastatin 40mg PO daily.      VTE Risk Mitigation (From admission, onward)         Ordered     enoxaparin injection 40 mg  Daily         06/16/22 0658     IP VTE HIGH RISK PATIENT  Once         06/15/22 2119     Place sequential compression device  Until discontinued         06/15/22 2119                      I have assessed these finding virtually using telemed platform and with assistance of bedside nurse                 The attending portion of this evaluation, treatment, and documentation was performed per Daja Lubin MD via Telemedicine AudioVisual using the secure [email or web address] software platform with 2 way audio/video. The provider was located off-site and the patient is located in the hospital. The aforementioned video software was utilized to document the relevant history and physical exam    Daja Lubin MD  Department of Hospital Medicine   [facility] ([place])

## 2022-06-25 NOTE — SUBJECTIVE & OBJECTIVE
Interval History: No acute events. Continuing current antibiotics. Attempting LTAC placement     Review of Systems   Constitutional:  Negative for chills, fatigue and fever.   HENT: Negative.     Eyes: Negative.    Respiratory:  Negative for cough and shortness of breath.    Cardiovascular:  Negative for chest pain, palpitations and leg swelling.   Gastrointestinal:  Negative for abdominal pain and vomiting.   Genitourinary: Negative.    Skin: Negative.    Neurological:  Negative for seizures and speech difficulty.   Psychiatric/Behavioral:  Negative for agitation and confusion. The patient is not nervous/anxious.    Objective:     Vital Signs (Most Recent):  Temp: 97.8 °F (36.6 °C) (06/24/22 1903)  Pulse: 84 (06/24/22 1903)  Resp: 18 (06/24/22 1903)  BP: (!) 110/55 (06/24/22 1903)  SpO2: (!) 93 % (06/24/22 1903) Vital Signs (24h Range):  Temp:  [97.4 °F (36.3 °C)-98.2 °F (36.8 °C)] 97.8 °F (36.6 °C)  Pulse:  [68-84] 84  Resp:  [16-20] 18  SpO2:  [93 %-97 %] 93 %  BP: (110-128)/(55-84) 110/55     Weight: 131.7 kg (290 lb 5.5 oz)  Body mass index is 37.28 kg/m².    Intake/Output Summary (Last 24 hours) at 6/24/2022 1907  Last data filed at 6/24/2022 1800  Gross per 24 hour   Intake 960 ml   Output 2300 ml   Net -1340 ml        Physical Exam  Vitals and nursing note reviewed.   Constitutional:       General: He is awake. He is not in acute distress.     Appearance: Normal appearance. He is well-developed and well-groomed. He is not ill-appearing, toxic-appearing or diaphoretic.   HENT:      Head: Normocephalic and atraumatic.   Cardiovascular:      Rate and Rhythm: Normal rate.   Pulmonary:      Effort: Pulmonary effort is normal. No tachypnea, accessory muscle usage or respiratory distress.   Neurological:      Mental Status: He is alert and oriented to person, place, and time. Mental status is at baseline.   Psychiatric:         Attention and Perception: Attention normal. He is attentive.         Mood and Affect:  Mood normal. Mood is not anxious.         Speech: Speech normal.         Behavior: Behavior normal. Behavior is cooperative.         Thought Content: Thought content normal.         Cognition and Memory: Cognition and memory normal. Cognition is not impaired. Memory is not impaired. He does not exhibit impaired recent memory.         Judgment: Judgment normal.       Significant Labs: All pertinent labs within the past 24 hours have been reviewed.    Significant Imaging: I have reviewed all pertinent imaging results/findings within the past 24 hours.

## 2022-06-25 NOTE — PROGRESS NOTES
Pharmacokinetic Assessment Follow Up: IV Vancomycin    Vancomycin serum concentration assessment(s):    The trough level was drawn correctly and can be used to guide therapy at this time. The measurement is within the desired definitive target range of 10 to 20 mcg/mL.    Vancomycin Regimen Plan:    Continue regimen to Vancomycin 1000 mg IV every 12 hours with next serum trough concentration measured at 1130 prior to 1200 dose on 6/27    Drug levels (last 3 results):  Recent Labs   Lab Result Units 06/23/22  1146 06/25/22  1201   Vancomycin-Trough ug/mL 18.5 16.6       Pharmacy will continue to follow and monitor vancomycin.    Please contact pharmacy at extension [phone] for questions regarding this assessment.    Thank you for the consult,   Donna Barros       Patient brief summary:  Dave Good is a 56 y.o. male initiated on antimicrobial therapy with IV Vancomycin for treatment of bone/joint infection    The patient's current regimen is Vancomycin 1000mg IVPB every 12 hours    Drug Allergies:   Review of patient's allergies indicates:   Allergen Reactions    Ibuprofen Swelling     Facial swelling       Actual Body Weight:   131.7kg    Renal Function:   Estimated Creatinine Clearance: 105.6 mL/min (based on SCr of 1.1 mg/dL).,     Dialysis Method (if applicable):  N/A    CBC (last 72 hours):  No results for input(s): WHITE BLOOD CELL COUNT, HEMOGLOBIN, HEMATOCRIT, PLATELETS, GRAN%, LYMPH%, MONO%, EOSINOPHIL%, BASOPHIL%, DIFFERENTIAL METHOD in the last 72 hours.    Metabolic Panel (last 72 hours):  Recent Labs   Lab Result Units 06/23/22  0526 06/24/22  0450   Creatinine mg/dL 1.1 1.1       Vancomycin Administrations:  vancomycin given in the last 96 hours                     vancomycin in dextrose 5 % 1 gram/250 mL IVPB 1,000 mg (mg) 1,000 mg New Bag 06/25/22 1222     1,000 mg New Bag  0021     1,000 mg New Bag 06/24/22 1208     1,000 mg New Bag  0011     1,000 mg New Bag 06/23/22 1202     1,000 mg New Bag  06/22/22 2353      Restarted  1341     1,000 mg New Bag  1203     1,000 mg New Bag  0018                    Microbiologic Results:  Microbiology Results (last 7 days)       Procedure Component Value Units Date/Time    Aerobic culture [[id number]]  (Abnormal)  (Susceptibility) Collected: 06/17/22 0601    Order Status: Completed Specimen: Wound from Foot, Right Updated: 06/22/22 1031     Aerobic Bacterial Culture DIPHTHEROIDS  Few        ENTEROCOCCUS FAECALIS  Few      Narrative:      1st metatarsal - piece of bone    Culture, Anaerobic [[id number]] Collected: 06/17/22 0601    Order Status: Completed Specimen: Wound from Foot, Right Updated: 06/21/22 0814     Anaerobic Culture No anaerobes isolated    Narrative:      Piece of bone 2nd metatarsal    Culture, Anaerobic [[id number]] Collected: 06/17/22 0601    Order Status: Completed Specimen: Wound from Foot, Right Updated: 06/21/22 0813     Anaerobic Culture No anaerobes isolated    Narrative:      Right 1st metatarsal - piece of bone.    Blood Culture #2 **CANNOT BE ORDERED STAT** [[id number]] Collected: 06/15/22 2006    Order Status: Completed Specimen: Blood from Peripheral, Antecubital, Right Updated: 06/21/22 0612     Blood Culture, Routine No growth after 5 days.    Blood Culture #1 **CANNOT BE ORDERED STAT** [[id number]] Collected: 06/15/22 1708    Order Status: Completed Specimen: Blood from Peripheral, Antecubital, Right Updated: 06/20/22 2012     Blood Culture, Routine No growth after 5 days.    Culture, Anaerobe [[id number]] Collected: 06/16/22 0050    Order Status: Completed Specimen: Wound from Foot, Left Updated: 06/20/22 1215     Anaerobic Culture No anaerobes isolated    Aerobic culture [[id number]]  (Abnormal)  (Susceptibility) Collected: 06/16/22 0050    Order Status: Completed Specimen: Wound from Foot, Right Updated: 06/20/22 1050     Aerobic Bacterial Culture PROTEUS MIRABILIS  Many        METHICILLIN RESISTANT STAPHYLOCOCCUS AUREUS  Many      Aerobic  culture [[id number]] Collected: 06/17/22 0601    Order Status: Completed Specimen: Wound from Foot, Right Updated: 06/20/22 0834     Aerobic Bacterial Culture No growth    Narrative:      Piece of bone 2nd metatarsal    Culture, Anaerobe [[id number]]  (Abnormal) Collected: 06/16/22 0050    Order Status: Completed Specimen: Wound from Foot, Right Updated: 06/20/22 0824     Anaerobic Culture PRESUMPTIVE PREVOTELLA/PORPHYROMONAS GROUP  Unable to quantitate      Aerobic culture [[id number]]  (Abnormal)  (Susceptibility) Collected: 06/16/22 0050    Order Status: Completed Specimen: Wound from Foot, Left Updated: 06/20/22 0756     Aerobic Bacterial Culture ACINETOBACTER LWOFFII GROUP  Few        PROVIDENCIA RETTGERI  Few        METHICILLIN RESISTANT STAPHYLOCOCCUS AUREUS  Moderate

## 2022-06-26 LAB
POCT GLUCOSE: 169 MG/DL (ref 70–110)
POCT GLUCOSE: 186 MG/DL (ref 70–110)
POCT GLUCOSE: 197 MG/DL (ref 70–110)
POCT GLUCOSE: 252 MG/DL (ref 70–110)

## 2022-06-26 PROCEDURE — 63600175 PHARM REV CODE 636 W HCPCS: Performed by: INTERNAL MEDICINE

## 2022-06-26 PROCEDURE — 25000003 PHARM REV CODE 250: Performed by: NURSE PRACTITIONER

## 2022-06-26 PROCEDURE — 27000207 HC ISOLATION

## 2022-06-26 PROCEDURE — 94761 N-INVAS EAR/PLS OXIMETRY MLT: CPT

## 2022-06-26 PROCEDURE — 25000003 PHARM REV CODE 250: Performed by: INTERNAL MEDICINE

## 2022-06-26 PROCEDURE — 11000001 HC ACUTE MED/SURG PRIVATE ROOM

## 2022-06-26 PROCEDURE — 99232 PR SUBSEQUENT HOSPITAL CARE,LEVL II: ICD-10-PCS | Mod: 95,,, | Performed by: HOSPITALIST

## 2022-06-26 PROCEDURE — 63600175 PHARM REV CODE 636 W HCPCS: Performed by: NURSE PRACTITIONER

## 2022-06-26 PROCEDURE — 99232 SBSQ HOSP IP/OBS MODERATE 35: CPT | Mod: 95,,, | Performed by: HOSPITALIST

## 2022-06-26 PROCEDURE — 25000003 PHARM REV CODE 250: Performed by: STUDENT IN AN ORGANIZED HEALTH CARE EDUCATION/TRAINING PROGRAM

## 2022-06-26 RX ADMIN — HYDROCODONE BITARTRATE AND ACETAMINOPHEN 1 TABLET: 7.5; 325 TABLET ORAL at 12:06

## 2022-06-26 RX ADMIN — INSULIN ASPART 10 UNITS: 100 INJECTION, SOLUTION INTRAVENOUS; SUBCUTANEOUS at 12:06

## 2022-06-26 RX ADMIN — GABAPENTIN 100 MG: 100 CAPSULE ORAL at 08:06

## 2022-06-26 RX ADMIN — INSULIN ASPART 2 UNITS: 100 INJECTION, SOLUTION INTRAVENOUS; SUBCUTANEOUS at 05:06

## 2022-06-26 RX ADMIN — CEFEPIME 2 G: 2 INJECTION, POWDER, FOR SOLUTION INTRAVENOUS at 05:06

## 2022-06-26 RX ADMIN — INSULIN ASPART 2 UNITS: 100 INJECTION, SOLUTION INTRAVENOUS; SUBCUTANEOUS at 08:06

## 2022-06-26 RX ADMIN — VANCOMYCIN HYDROCHLORIDE 1000 MG: 1 INJECTION, POWDER, LYOPHILIZED, FOR SOLUTION INTRAVENOUS at 12:06

## 2022-06-26 RX ADMIN — INSULIN ASPART 10 UNITS: 100 INJECTION, SOLUTION INTRAVENOUS; SUBCUTANEOUS at 05:06

## 2022-06-26 RX ADMIN — ATORVASTATIN CALCIUM 40 MG: 20 TABLET, FILM COATED ORAL at 08:06

## 2022-06-26 RX ADMIN — INSULIN ASPART 3 UNITS: 100 INJECTION, SOLUTION INTRAVENOUS; SUBCUTANEOUS at 08:06

## 2022-06-26 RX ADMIN — INSULIN ASPART 2 UNITS: 100 INJECTION, SOLUTION INTRAVENOUS; SUBCUTANEOUS at 12:06

## 2022-06-26 RX ADMIN — INSULIN DETEMIR 55 UNITS: 100 INJECTION, SOLUTION SUBCUTANEOUS at 08:06

## 2022-06-26 RX ADMIN — HYDROCODONE BITARTRATE AND ACETAMINOPHEN 1 TABLET: 7.5; 325 TABLET ORAL at 08:06

## 2022-06-26 RX ADMIN — HYDROCODONE BITARTRATE AND ACETAMINOPHEN 1 TABLET: 5; 325 TABLET ORAL at 04:06

## 2022-06-26 RX ADMIN — ENOXAPARIN SODIUM 40 MG: 100 INJECTION SUBCUTANEOUS at 04:06

## 2022-06-26 RX ADMIN — CEFEPIME 2 G: 2 INJECTION, POWDER, FOR SOLUTION INTRAVENOUS at 02:06

## 2022-06-26 RX ADMIN — HYDROCODONE BITARTRATE AND ACETAMINOPHEN 1 TABLET: 5; 325 TABLET ORAL at 08:06

## 2022-06-26 RX ADMIN — INSULIN ASPART 10 UNITS: 100 INJECTION, SOLUTION INTRAVENOUS; SUBCUTANEOUS at 08:06

## 2022-06-26 RX ADMIN — CEFEPIME 2 G: 2 INJECTION, POWDER, FOR SOLUTION INTRAVENOUS at 10:06

## 2022-06-26 RX ADMIN — LISINOPRIL 10 MG: 10 TABLET ORAL at 08:06

## 2022-06-26 RX ADMIN — HYDROCHLOROTHIAZIDE 25 MG: 25 TABLET ORAL at 08:06

## 2022-06-26 RX ADMIN — HYDROCODONE BITARTRATE AND ACETAMINOPHEN 1 TABLET: 5; 325 TABLET ORAL at 12:06

## 2022-06-26 RX ADMIN — HYDROCODONE BITARTRATE AND ACETAMINOPHEN 1 TABLET: 7.5; 325 TABLET ORAL at 04:06

## 2022-06-26 RX ADMIN — QUETIAPINE FUMARATE 200 MG: 200 TABLET ORAL at 10:06

## 2022-06-26 RX ADMIN — COLLAGENASE SANTYL: 250 OINTMENT TOPICAL at 08:06

## 2022-06-26 RX ADMIN — ASPIRIN 81 MG: 81 TABLET, COATED ORAL at 08:06

## 2022-06-26 NOTE — PLAN OF CARE
Pt received in bed. A&O*4. Breathing in room air.  - Worked with PT and OT today.   - Wound care done and pictures updated.   - On blood glucose monitoring AC and HS. Insulin given.   - On Cefepime and Vancomycin for Osteomyelitis.  - Getting Pain meds over the clock Q4H PRN. Given with good relief.   - On contact precaution for MRSA from foot wound.   - Pending  LTAC placement.   - No other distress at this moment. Will cont to monitor.        Problem: Adult Inpatient Plan of Care  Goal: Plan of Care Review  Outcome: Ongoing, Progressing  Goal: Patient-Specific Goal (Individualized)  Outcome: Ongoing, Progressing  Goal: Absence of Hospital-Acquired Illness or Injury  Outcome: Ongoing, Progressing  Goal: Optimal Comfort and Wellbeing  Outcome: Ongoing, Progressing  Goal: Readiness for Transition of Care  Outcome: Ongoing, Progressing     Problem: Fall Injury Risk  Goal: Absence of Fall and Fall-Related Injury  Outcome: Ongoing, Progressing     Problem: Diabetes Comorbidity  Goal: Blood Glucose Level Within Targeted Range  Outcome: Ongoing, Progressing     Problem: Fluid and Electrolyte Imbalance (Acute Kidney Injury/Impairment)  Goal: Fluid and Electrolyte Balance  Outcome: Ongoing, Progressing     Problem: Oral Intake Inadequate (Acute Kidney Injury/Impairment)  Goal: Optimal Nutrition Intake  Outcome: Ongoing, Progressing     Problem: Renal Function Impairment (Acute Kidney Injury/Impairment)  Goal: Effective Renal Function  Outcome: Ongoing, Progressing     Problem: Infection  Goal: Absence of Infection Signs and Symptoms  Outcome: Ongoing, Progressing     Problem: Impaired Wound Healing  Goal: Optimal Wound Healing  Outcome: Ongoing, Progressing     Problem: Skin Injury Risk Increased  Goal: Skin Health and Integrity  Outcome: Ongoing, Progressing

## 2022-06-26 NOTE — PROGRESS NOTES
[facility] Medicine  Telemedicine Progress Note    Patient Name: Dave Good  MRN: [id number]  Patient Class: IP- Inpatient   Admission Date: 6/15/2022  Length of Stay: 11 days  Attending Physician: Daja Mayen MD  Primary Care Provider: Reyna Jorge NP          Subjective:     Principal Problem:Osteomyelitis of right foot        HPI:  Mr Dave is a 56 yr old male with a PMH that includes DB 2, COPD, HTN, depression, and right foot transmetatarsal amputation. He was sent to the ED for evaluation of osteomyelitis from a follow up appt with Dr. Flores. Pt had I&D and amputation for wet gangrene in Sept 2021 and debridements in Feb, March, and May of this year. Per his chart, his feet were healing well until some foot trauma occurred. Pt has has open wounds and drainage to BL feet with right foot wound deeper than left. He also has wound to left shin. Pt reports associated 10/10 pain and difficulty walking. He denies fever and chills at home. Podiatry consulted and pt admitted to hospital medicine.       Overview/Hospital Course:  Patient admitted with bilateral foot wounds and concern for osteomyelitis following R foot TMA requiring debridements since for continued wounds. He was started on empiric antibiotics. Bilateral foot MRI done. MRI of left foot with abnormal findings but not consistent with osteomyelitis. MRI of right foot showed changes of 1st and 2nd transmetatarsal amputation with cortical regularity and abnormal signal at distal stumps noting overlying extensive edema and regions of skin ulceration.  Findings may represent early osteomyelitis vs post-operative changes. Bone biopsy done for further evaluation and to guide treatment.      Interval History: No acute events. Continuing current antibiotics. Attempting LTAC placement     Review of Systems   Constitutional:  Negative for chills, fatigue and fever.   HENT: Negative.     Eyes: Negative.    Respiratory:  Negative  for cough and shortness of breath.    Cardiovascular:  Negative for chest pain, palpitations and leg swelling.   Gastrointestinal:  Negative for abdominal pain and vomiting.   Genitourinary: Negative.    Skin: Negative.    Neurological:  Negative for seizures and speech difficulty.   Psychiatric/Behavioral:  Negative for agitation and confusion. The patient is not nervous/anxious.    Objective:     Vital Signs (Most Recent):  Temp: 98.1 °F (36.7 °C) (06/26/22 0704)  Pulse: 70 (06/26/22 0704)  Resp: 16 (06/26/22 0819)  BP: 120/74 (06/26/22 0819)  SpO2: 95 % (06/26/22 0704) Vital Signs (24h Range):  Temp:  [97.1 °F (36.2 °C)-98.5 °F (36.9 °C)] 98.1 °F (36.7 °C)  Pulse:  [68-79] 70  Resp:  [14-20] 16  SpO2:  [95 %-98 %] 95 %  BP: (107-138)/(62-77) 120/74     Weight: 131.7 kg (290 lb 5.5 oz)  Body mass index is 37.28 kg/m².    Intake/Output Summary (Last 24 hours) at 6/26/2022 0954  Last data filed at 6/26/2022 0800  Gross per 24 hour   Intake 960 ml   Output 1800 ml   Net -840 ml        Physical Exam  Vitals and nursing note reviewed.   Constitutional:       General: He is awake. He is not in acute distress.     Appearance: Normal appearance. He is well-developed and well-groomed. He is not ill-appearing, toxic-appearing or diaphoretic.   HENT:      Head: Normocephalic and atraumatic.   Cardiovascular:      Rate and Rhythm: Normal rate.   Pulmonary:      Effort: Pulmonary effort is normal. No tachypnea, accessory muscle usage or respiratory distress.   Neurological:      Mental Status: He is alert and oriented to person, place, and time. Mental status is at baseline.   Psychiatric:         Attention and Perception: Attention normal. He is attentive.         Mood and Affect: Mood normal. Mood is not anxious.         Speech: Speech normal.         Behavior: Behavior normal. Behavior is cooperative.         Thought Content: Thought content normal.         Cognition and Memory: Cognition and memory normal. Cognition is not  impaired. Memory is not impaired. He does not exhibit impaired recent memory.         Judgment: Judgment normal.       Significant Labs: All pertinent labs within the past 24 hours have been reviewed.    Significant Imaging: I have reviewed all pertinent imaging results/findings within the past 24 hours.      Assessment/Plan:      * Osteomyelitis of right foot  Left Foot ulcer  - Presented from surgery clinic for concern for infection/osteomyelitis  - Prior history of MRSA bacteremia 2/2 gangrene treated with 6 weeks IV abx for presumed endocarditis  - ESR/CRP elevated but have down trended since prior hospitalization.  - MRI L foot with mild marrow edema of tuft of great toe. Findings and exam not consistent with acute osteomyelitis.  - MRI R foot with post-op changes of 1st & 2nd transmetatarsal with cortical regularity and abnormal signal at distal stumps with extensive edema. Findings concerning for osteo vs chronic changes.  - Biopsies from 6/17 showing no evidence of osteomyelitis involving 2nd metatarsal; 1st metatarsal still in process.  - Wound cultures from ED with MRSA, Providencia rettgeri, Proteus mirabilis, and GNR.  - 1st metatarsal bone culture 6/17 showing E faecalis, diphtheroids.  - Trial of gabapentin 100mg PO qHS with reported symptoms consistent with neuropathic pain.  - Podiatry and wound care following, appreciate assistance.  - ID consulted: given MRSA isolated from a previous culture, ok to treat with vancomycin x 4 weeks (EOC 07/15/22). finish 2 weeks of cefepime for left foot SSTI (EOC 7/1/22)   - midline placed    Antibiotics (From admission, onward)            Start     Stop Route Frequency Ordered    06/22/22 0000  vancomycin in dextrose 5 % 1 gram/250 mL IVPB 1,000 mg         -- IV Every 12 hours (non-standard times) 06/21/22 1333    06/16/22 1000  cefepime in dextrose 5 % IVPB 2 g         -- IV Every 8 hours (non-standard times) 06/16/22 0918    06/15/22 2237  vancomycin - pharmacy  "to dose  (vancomycin IVPB)        "And" Linked Group Details    -- IV pharmacy to manage frequency 06/15/22 2148        Cultures were taken-   Microbiology Results (last 7 days)     Procedure Component Value Units Date/Time    Aerobic culture [[id number]]  (Abnormal)  (Susceptibility) Collected: 06/17/22 0601    Order Status: Completed Specimen: Wound from Foot, Right Updated: 06/22/22 1031     Aerobic Bacterial Culture DIPHTHEROIDS  Few        ENTEROCOCCUS FAECALIS  Few      Narrative:      1st metatarsal - piece of bone    Culture, Anaerobic [[id number]] Collected: 06/17/22 0601    Order Status: Completed Specimen: Wound from Foot, Right Updated: 06/21/22 0814     Anaerobic Culture No anaerobes isolated    Narrative:      Piece of bone 2nd metatarsal    Culture, Anaerobic [[id number]] Collected: 06/17/22 0601    Order Status: Completed Specimen: Wound from Foot, Right Updated: 06/21/22 0813     Anaerobic Culture No anaerobes isolated    Narrative:      Right 1st metatarsal - piece of bone.    Blood Culture #2 **CANNOT BE ORDERED STAT** [[id number]] Collected: 06/15/22 2006    Order Status: Completed Specimen: Blood from Peripheral, Antecubital, Right Updated: 06/21/22 0612     Blood Culture, Routine No growth after 5 days.    Blood Culture #1 **CANNOT BE ORDERED STAT** [[id number]] Collected: 06/15/22 1708    Order Status: Completed Specimen: Blood from Peripheral, Antecubital, Right Updated: 06/20/22 2012     Blood Culture, Routine No growth after 5 days.    Culture, Anaerobe [[id number]] Collected: 06/16/22 0050    Order Status: Completed Specimen: Wound from Foot, Left Updated: 06/20/22 1215     Anaerobic Culture No anaerobes isolated    Aerobic culture [[id number]]  (Abnormal)  (Susceptibility) Collected: 06/16/22 0050    Order Status: Completed Specimen: Wound from Foot, Right Updated: 06/20/22 1050     Aerobic Bacterial Culture PROTEUS MIRABILIS  Many        METHICILLIN RESISTANT STAPHYLOCOCCUS AUREUS  Many      " Aerobic culture [[id number]] Collected: 06/17/22 0601    Order Status: Completed Specimen: Wound from Foot, Right Updated: 06/20/22 0834     Aerobic Bacterial Culture No growth    Narrative:      Piece of bone 2nd metatarsal    Culture, Anaerobe [[id number]]  (Abnormal) Collected: 06/16/22 0050    Order Status: Completed Specimen: Wound from Foot, Right Updated: 06/20/22 0824     Anaerobic Culture PRESUMPTIVE PREVOTELLA/PORPHYROMONAS GROUP  Unable to quantitate      Aerobic culture [[id number]]  (Abnormal)  (Susceptibility) Collected: 06/16/22 0050    Order Status: Completed Specimen: Wound from Foot, Left Updated: 06/20/22 0756     Aerobic Bacterial Culture ACINETOBACTER LWOFFII GROUP  Few        PROVIDENCIA RETTGERI  Few        METHICILLIN RESISTANT STAPHYLOCOCCUS AUREUS  Moderate            Foot ulcer  Plan as above.       Increased nutritional needs        HTN (hypertension)  - Reports taking lisinopril and losartan at home.  - Continue lisinopril 10mg PO daily.    Type 2 diabetes mellitus with diabetic peripheral angiopathy without gangrene, with long-term current use of insulin  Hyperlipidemia  - Last A1c 7.8  - On Levemir 50 U qHS and aspart 10 U TID WM at home  - Improving. Continue insulin detemir 55U subQ qHS, insulin aspart 10U subQ TIDWM, moderate-dose sliding scale insulin aspart 1-10U subQ TIDWM PRN.  - Continue aspirin 81mg PO daily, atorvastatin 40mg PO daily.      VTE Risk Mitigation (From admission, onward)         Ordered     enoxaparin injection 40 mg  Daily         06/16/22 0658     IP VTE HIGH RISK PATIENT  Once         06/15/22 2119     Place sequential compression device  Until discontinued         06/15/22 2119                      I have assessed these finding virtually using telemed platform and with assistance of bedside nurse                 The attending portion of this evaluation, treatment, and documentation was performed per Daja Lubin MD via Telemedicine AudioVisual using the  secure [employer] software platform with 2 way audio/video. The provider was located off-site and the patient is located in the hospital. The aforementioned video software was utilized to document the relevant history and physical exam    Daja Lubin MD  Department of Hospital Medicine   [facility] ([place])

## 2022-06-26 NOTE — PROGRESS NOTES
Pharmacy to dose Vancomycin:    Patient reviewed, renal function-see below, cultures reviewed, no new levels, continue current therapy; Next levels due: 6/27/22 @ 1130.    No recent SCr to compare. Urine output good.    Thank you,     Virginia Corado, Pharm.D.

## 2022-06-26 NOTE — PLAN OF CARE
POC reviewed with pt. AxOx4. Purposeful rounding q2h to address pt needs and safety.  Pt able to self position and shift weight q2h to preserve skin integrity. VSS on room air. Pain managed prn per MD orders.  Pt voids via urinal,  ambulates to BSC. Patient on contact precaution for MRSA.  Wound care performed per orders. Pt remains free of falls during my shift.  Pt's bed in low, wheels locked, call light and personal belongings within reach. No significant events to report at this time. Will continue to monitor.  Problem: Adult Inpatient Plan of Care  Goal: Plan of Care Review  Outcome: Ongoing, Progressing  Goal: Patient-Specific Goal (Individualized)  Outcome: Ongoing, Progressing  Goal: Absence of Hospital-Acquired Illness or Injury  Outcome: Ongoing, Progressing  Goal: Optimal Comfort and Wellbeing  Outcome: Ongoing, Progressing  Goal: Readiness for Transition of Care  Outcome: Ongoing, Progressing     Problem: Fall Injury Risk  Goal: Absence of Fall and Fall-Related Injury  Outcome: Ongoing, Progressing     Problem: Diabetes Comorbidity  Goal: Blood Glucose Level Within Targeted Range  Outcome: Ongoing, Progressing     Problem: Fluid and Electrolyte Imbalance (Acute Kidney Injury/Impairment)  Goal: Fluid and Electrolyte Balance  Outcome: Ongoing, Progressing     Problem: Oral Intake Inadequate (Acute Kidney Injury/Impairment)  Goal: Optimal Nutrition Intake  Outcome: Ongoing, Progressing     Problem: Renal Function Impairment (Acute Kidney Injury/Impairment)  Goal: Effective Renal Function  Outcome: Ongoing, Progressing     Problem: Infection  Goal: Absence of Infection Signs and Symptoms  Outcome: Ongoing, Progressing     Problem: Impaired Wound Healing  Goal: Optimal Wound Healing  Outcome: Ongoing, Progressing     Problem: Skin Injury Risk Increased  Goal: Skin Health and Integrity  Outcome: Ongoing, Progressing

## 2022-06-26 NOTE — ASSESSMENT & PLAN NOTE
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  - MRI L foot with mild marrow edema of tuft of great toe. Findings and exam not consistent with acute osteomyelitis.  - MRI R foot with post-op changes of 1st & 2nd transmetatarsal with cortical regularity and abnormal signal at distal stumps with extensive edema. Findings concerning for osteo vs chronic changes.  - Biopsies from 6/17 showing no evidence of osteomyelitis involving 2nd metatarsal; 1st metatarsal still in process.  - Wound cultures from ED with MRSA, Providencia rettgeri, Proteus mirabilis, and GNR.  - 1st metatarsal bone culture 6/17 showing E faecalis, diphtheroids.  - Trial of gabapentin 100mg PO qHS with reported symptoms consistent with neuropathic pain.  - Podiatry and wound care following, appreciate assistance.  - ID consulted: given MRSA isolated from a previous culture, ok to treat with vancomycin x 4 weeks (EOC 07/15/22). finish 2 weeks of cefepime for left foot SSTI (EOC 7/1/22)   - midline placed    Antibiotics (From admission, onward)            Start     Stop Route Frequency Ordered    06/22/22 0000  vancomycin in dextrose 5 % 1 gram/250 mL IVPB 1,000 mg         -- IV Every 12 hours (non-standard times) 06/21/22 1333    06/16/22 1000  cefepime in dextrose 5 % IVPB 2 g         -- IV Every 8 hours (non-standard times) 06/16/22 0918    06/15/22 2237  vancomycin - pharmacy to dose  (vancomycin IVPB)        "And" Linked Group Details    -- IV pharmacy to manage frequency 06/15/22 2148        Cultures were taken-   Microbiology Results (last 7 days)     Procedure Component Value Units Date/Time    Aerobic culture [[id number]]  (Abnormal)  (Susceptibility) Collected: 06/17/22 0601    Order Status: Completed Specimen: Wound from Foot, " Right Updated: 06/22/22 1031     Aerobic Bacterial Culture DIPHTHEROIDS  Few        ENTEROCOCCUS FAECALIS  Few      Narrative:      1st metatarsal - piece of bone    Culture, Anaerobic [[id number]] Collected: 06/17/22 0601    Order Status: Completed Specimen: Wound from Foot, Right Updated: 06/21/22 0814     Anaerobic Culture No anaerobes isolated    Narrative:      Piece of bone 2nd metatarsal    Culture, Anaerobic [[id number]] Collected: 06/17/22 0601    Order Status: Completed Specimen: Wound from Foot, Right Updated: 06/21/22 0813     Anaerobic Culture No anaerobes isolated    Narrative:      Right 1st metatarsal - piece of bone.    Blood Culture #2 **CANNOT BE ORDERED STAT** [[id number]] Collected: 06/15/22 2006    Order Status: Completed Specimen: Blood from Peripheral, Antecubital, Right Updated: 06/21/22 0612     Blood Culture, Routine No growth after 5 days.    Blood Culture #1 **CANNOT BE ORDERED STAT** [[id number]] Collected: 06/15/22 1708    Order Status: Completed Specimen: Blood from Peripheral, Antecubital, Right Updated: 06/20/22 2012     Blood Culture, Routine No growth after 5 days.    Culture, Anaerobe [[id number]] Collected: 06/16/22 0050    Order Status: Completed Specimen: Wound from Foot, Left Updated: 06/20/22 1215     Anaerobic Culture No anaerobes isolated    Aerobic culture [[id number]]  (Abnormal)  (Susceptibility) Collected: 06/16/22 0050    Order Status: Completed Specimen: Wound from Foot, Right Updated: 06/20/22 1050     Aerobic Bacterial Culture PROTEUS MIRABILIS  Many        METHICILLIN RESISTANT STAPHYLOCOCCUS AUREUS  Many      Aerobic culture [[id number]] Collected: 06/17/22 0601    Order Status: Completed Specimen: Wound from Foot, Right Updated: 06/20/22 0834     Aerobic Bacterial Culture No growth    Narrative:      Piece of bone 2nd metatarsal    Culture, Anaerobe [[id number]]  (Abnormal) Collected: 06/16/22 0050    Order Status: Completed Specimen: Wound from Foot, Right Updated:  06/20/22 0824     Anaerobic Culture PRESUMPTIVE PREVOTELLA/PORPHYROMONAS GROUP  Unable to quantitate      Aerobic culture [[id number]]  (Abnormal)  (Susceptibility) Collected: 06/16/22 0050    Order Status: Completed Specimen: Wound from Foot, Left Updated: 06/20/22 0756     Aerobic Bacterial Culture ACINETOBACTER LWOFFII GROUP  Few        PROVIDENCIA RETTGERI  Few        METHICILLIN RESISTANT STAPHYLOCOCCUS AUREUS  Moderate

## 2022-06-27 LAB
CREAT SERPL-MCNC: 1.2 MG/DL (ref 0.5–1.4)
EST. GFR  (AFRICAN AMERICAN): >60 ML/MIN/1.73 M^2
EST. GFR  (NON AFRICAN AMERICAN): >60 ML/MIN/1.73 M^2
POCT GLUCOSE: 181 MG/DL (ref 70–110)
POCT GLUCOSE: 191 MG/DL (ref 70–110)
POCT GLUCOSE: 230 MG/DL (ref 70–110)
POCT GLUCOSE: 280 MG/DL (ref 70–110)
VANCOMYCIN TROUGH SERPL-MCNC: 13.1 UG/ML (ref 10–22)

## 2022-06-27 PROCEDURE — 80202 ASSAY OF VANCOMYCIN: CPT | Performed by: HOSPITALIST

## 2022-06-27 PROCEDURE — 63600175 PHARM REV CODE 636 W HCPCS: Performed by: INTERNAL MEDICINE

## 2022-06-27 PROCEDURE — 25000003 PHARM REV CODE 250: Performed by: INTERNAL MEDICINE

## 2022-06-27 PROCEDURE — 99232 PR SUBSEQUENT HOSPITAL CARE,LEVL II: ICD-10-PCS | Mod: 95,,, | Performed by: HOSPITALIST

## 2022-06-27 PROCEDURE — 25000003 PHARM REV CODE 250: Performed by: NURSE PRACTITIONER

## 2022-06-27 PROCEDURE — 36415 COLL VENOUS BLD VENIPUNCTURE: CPT | Performed by: HOSPITALIST

## 2022-06-27 PROCEDURE — 99232 SBSQ HOSP IP/OBS MODERATE 35: CPT | Mod: 95,,, | Performed by: HOSPITALIST

## 2022-06-27 PROCEDURE — 11000001 HC ACUTE MED/SURG PRIVATE ROOM

## 2022-06-27 PROCEDURE — 25000003 PHARM REV CODE 250: Performed by: STUDENT IN AN ORGANIZED HEALTH CARE EDUCATION/TRAINING PROGRAM

## 2022-06-27 PROCEDURE — 27000207 HC ISOLATION

## 2022-06-27 PROCEDURE — 97116 GAIT TRAINING THERAPY: CPT | Mod: CQ

## 2022-06-27 PROCEDURE — 82565 ASSAY OF CREATININE: CPT | Performed by: HOSPITALIST

## 2022-06-27 PROCEDURE — 63600175 PHARM REV CODE 636 W HCPCS: Performed by: NURSE PRACTITIONER

## 2022-06-27 RX ADMIN — ASPIRIN 81 MG: 81 TABLET, COATED ORAL at 08:06

## 2022-06-27 RX ADMIN — INSULIN ASPART 10 UNITS: 100 INJECTION, SOLUTION INTRAVENOUS; SUBCUTANEOUS at 01:06

## 2022-06-27 RX ADMIN — INSULIN DETEMIR 55 UNITS: 100 INJECTION, SOLUTION SUBCUTANEOUS at 08:06

## 2022-06-27 RX ADMIN — INSULIN ASPART 4 UNITS: 100 INJECTION, SOLUTION INTRAVENOUS; SUBCUTANEOUS at 04:06

## 2022-06-27 RX ADMIN — CEFEPIME 2 G: 2 INJECTION, POWDER, FOR SOLUTION INTRAVENOUS at 06:06

## 2022-06-27 RX ADMIN — INSULIN ASPART 2 UNITS: 100 INJECTION, SOLUTION INTRAVENOUS; SUBCUTANEOUS at 08:06

## 2022-06-27 RX ADMIN — INSULIN ASPART 10 UNITS: 100 INJECTION, SOLUTION INTRAVENOUS; SUBCUTANEOUS at 08:06

## 2022-06-27 RX ADMIN — INSULIN ASPART 10 UNITS: 100 INJECTION, SOLUTION INTRAVENOUS; SUBCUTANEOUS at 04:06

## 2022-06-27 RX ADMIN — ENOXAPARIN SODIUM 40 MG: 100 INJECTION SUBCUTANEOUS at 04:06

## 2022-06-27 RX ADMIN — QUETIAPINE FUMARATE 200 MG: 200 TABLET ORAL at 09:06

## 2022-06-27 RX ADMIN — HYDROCODONE BITARTRATE AND ACETAMINOPHEN 1 TABLET: 5; 325 TABLET ORAL at 12:06

## 2022-06-27 RX ADMIN — HYDROCODONE BITARTRATE AND ACETAMINOPHEN 1 TABLET: 5; 325 TABLET ORAL at 04:06

## 2022-06-27 RX ADMIN — GABAPENTIN 100 MG: 100 CAPSULE ORAL at 08:06

## 2022-06-27 RX ADMIN — ATORVASTATIN CALCIUM 40 MG: 20 TABLET, FILM COATED ORAL at 08:06

## 2022-06-27 RX ADMIN — CEFEPIME 2 G: 2 INJECTION, POWDER, FOR SOLUTION INTRAVENOUS at 10:06

## 2022-06-27 RX ADMIN — COLLAGENASE SANTYL: 250 OINTMENT TOPICAL at 08:06

## 2022-06-27 RX ADMIN — VANCOMYCIN HYDROCHLORIDE 1000 MG: 1 INJECTION, POWDER, LYOPHILIZED, FOR SOLUTION INTRAVENOUS at 12:06

## 2022-06-27 RX ADMIN — INSULIN ASPART 3 UNITS: 100 INJECTION, SOLUTION INTRAVENOUS; SUBCUTANEOUS at 08:06

## 2022-06-27 RX ADMIN — HYDROCODONE BITARTRATE AND ACETAMINOPHEN 1 TABLET: 7.5; 325 TABLET ORAL at 12:06

## 2022-06-27 RX ADMIN — INSULIN ASPART 2 UNITS: 100 INJECTION, SOLUTION INTRAVENOUS; SUBCUTANEOUS at 01:06

## 2022-06-27 RX ADMIN — HYDROCODONE BITARTRATE AND ACETAMINOPHEN 1 TABLET: 5; 325 TABLET ORAL at 08:06

## 2022-06-27 RX ADMIN — CEFEPIME 2 G: 2 INJECTION, POWDER, FOR SOLUTION INTRAVENOUS at 02:06

## 2022-06-27 RX ADMIN — HYDROCODONE BITARTRATE AND ACETAMINOPHEN 1 TABLET: 7.5; 325 TABLET ORAL at 04:06

## 2022-06-27 RX ADMIN — HYDROCODONE BITARTRATE AND ACETAMINOPHEN 1 TABLET: 7.5; 325 TABLET ORAL at 08:06

## 2022-06-27 RX ADMIN — HYDROCHLOROTHIAZIDE 25 MG: 25 TABLET ORAL at 08:06

## 2022-06-27 RX ADMIN — LISINOPRIL 10 MG: 10 TABLET ORAL at 08:06

## 2022-06-27 NOTE — PT/OT/SLP PROGRESS
Physical Therapy Treatment    Patient Name:  Dave Good   MRN:  [id number]    Recommendations:     Discharge Recommendations:  home with home health   Discharge Equipment Recommendations: none   Barriers to discharge: None    Assessment:     Dave Good is a 56 y.o. male admitted with a medical diagnosis of Osteomyelitis of right foot.  He presents with the following impairments/functional limitations:  pain, orthopedic precautions, gait instability, impaired functional mobilty, impaired sensation, impaired skin, decreased lower extremity function, decreased safety awareness.    Supine<>sit with SPV  Sit>stand with RW and SBA  Amb 75' with RW and SPV, PWB B LE (heel WB) in DARCO shoes  Pt progressing well toward goals  Rec Home with HH    Rehab Prognosis: Good; patient would benefit from acute skilled PT services to address these deficits and reach maximum level of function.    Recent Surgery: * No surgery found *      Plan:     During this hospitalization, patient to be seen 2 x/week to address the identified rehab impairments via gait training, therapeutic activities, therapeutic exercises, neuromuscular re-education and progress toward the following goals:    · Plan of Care Expires:  07/06/22    Subjective     Chief Complaint: none stated  Patient/Family Comments/goals: hoping to get to LTACH soon  Pain/Comfort:  · Pain Rating 1: 0/10  · Pain Rating Post-Intervention 1: 0/10      Objective:     Communicated with nurse MD prior to session.  Patient found HOB elevated with peripheral IV, telemetry upon PT entry to room.     General Precautions: Standard, diabetic, fall, contact   Orthopedic Precautions:RLE partial weight bearing, LLE partial weight bearing (in Dar[employer] shoes ,heel wt bearing)   Braces:    Respiratory Status: Room air     Functional Mobility:  · Bed Mobility:     · Supine to Sit: supervision  · Sit to Supine: supervision  · Transfers:     · Sit to Stand:  stand by assistance with rolling  walker  · Gait: 2 x 35' with RW and SPV, PWB B LE (heel WB) with DAR[employer] shoes      AM-PAC 6 CLICK MOBILITY  Turning over in bed (including adjusting bedclothes, sheets and blankets)?: 4  Sitting down on and standing up from a chair with arms (e.g., wheelchair, bedside commode, etc.): 4  Moving from lying on back to sitting on the side of the bed?: 4  Moving to and from a bed to a chair (including a wheelchair)?: 4  Need to walk in hospital room?: 3  Climbing 3-5 steps with a railing?: 3  Basic Mobility Total Score: 22       Therapeutic Activities and Exercises:   gait training as noted    Patient left HOB elevated with all lines intact, call button in reach and spouse present..    GOALS:   Multidisciplinary Problems     Physical Therapy Goals        Problem: Physical Therapy    Goal Priority Disciplines Outcome Goal Variances Interventions   Physical Therapy Goal     PT, PT/OT Ongoing, Progressing     Description: Goals to be met by: 22    Patient will increase functional independence with mobility by performin. Tibialis anterior strengthening (DF with heel on ground) in seated position x10 reps with 15 sec holds without cueing for technique.   2. Gait x 10 feet with mod(I) with RW without cueing for heel WB (B).  3. Ascend/descend 2 step(s) with least restrictive assistive device and uni HR via lateral technique to maintain heel WB with supervision.                    Time Tracking:     PT Received On: 22  PT Start Time: 1207     PT Stop Time: 1223  PT Total Time (min): 16 min     Billable Minutes: Gait Training 16    Treatment Type: Treatment  PT/PTA: PTA     PTA Visit Number: 3     2022

## 2022-06-27 NOTE — PLAN OF CARE
ARLENE contacted Winsome at [facility] concerning the patient discharge placement. Winsome reported she will review the patient  for LTAC services & she will respond tomorrow with an acceptance status.      [phone] --Winsome   06/27/22 3689   Post-Acute Status   Post-Acute Authorization Placement;IV Infusion   Post-Acute Placement Status Referrals Sent   Discharge Delays (!) Post-Acute Set-up   Discharge Plan   Discharge Plan A Long-term acute care facility (LTAC)

## 2022-06-27 NOTE — PLAN OF CARE
06/27/22 1254   Post-Acute Status   Post-Acute Authorization Placement   Post-Acute Placement Status Referrals Sent   Discharge Delays (!) Post-Acute Set-up   Discharge Plan   Discharge Plan A Long-term acute care facility (LTAC)        ARLENE received a call from [facility] concerning the patient discharge placement. ARLENE spoke with Elizabeth, Elizabeth reported she can accept the patient but do not have any opens beds at the moment.     [phone] [facility]

## 2022-06-27 NOTE — PROGRESS NOTES
[facility] Medicine  Telemedicine Progress Note    Patient Name: Dave Good  MRN: [id number]  Patient Class: IP- Inpatient   Admission Date: 6/15/2022  Length of Stay: 12 days  Attending Physician: Daja Mayen MD  Primary Care Provider: Reyna Jorge NP          Subjective:     Principal Problem:Osteomyelitis of right foot        HPI:  Mr Dave is a 56 yr old male with a PMH that includes DB 2, COPD, HTN, depression, and right foot transmetatarsal amputation. He was sent to the ED for evaluation of osteomyelitis from a follow up appt with Dr. Flores. Pt had I&D and amputation for wet gangrene in Sept 2021 and debridements in Feb, March, and May of this year. Per his chart, his feet were healing well until some foot trauma occurred. Pt has has open wounds and drainage to BL feet with right foot wound deeper than left. He also has wound to left shin. Pt reports associated 10/10 pain and difficulty walking. He denies fever and chills at home. Podiatry consulted and pt admitted to hospital medicine.       Overview/Hospital Course:  Patient admitted with bilateral foot wounds and concern for osteomyelitis following R foot TMA requiring debridements since for continued wounds. He was started on empiric antibiotics. Bilateral foot MRI done. MRI of left foot with abnormal findings but not consistent with osteomyelitis. MRI of right foot showed changes of 1st and 2nd transmetatarsal amputation with cortical regularity and abnormal signal at distal stumps noting overlying extensive edema and regions of skin ulceration.  Findings may represent early osteomyelitis vs post-operative changes. Bone biopsy done for further evaluation and to guide treatment.      Interval History: No acute events. Continuing current antibiotics. Attempting LTAC placement     Review of Systems   Constitutional:  Negative for chills, fatigue and fever.   HENT: Negative.     Eyes: Negative.    Respiratory:  Negative  for cough and shortness of breath.    Cardiovascular:  Negative for chest pain, palpitations and leg swelling.   Gastrointestinal:  Negative for abdominal pain and vomiting.   Genitourinary: Negative.    Skin: Negative.    Neurological:  Negative for seizures and speech difficulty.   Psychiatric/Behavioral:  Negative for agitation and confusion. The patient is not nervous/anxious.    Objective:     Vital Signs (Most Recent):  Temp: 97.4 °F (36.3 °C) (06/27/22 1233)  Pulse: 75 (06/27/22 1233)  Resp: 16 (06/27/22 1233)  BP: 125/80 (06/27/22 1233)  SpO2: 98 % (06/27/22 1233) Vital Signs (24h Range):  Temp:  [97.1 °F (36.2 °C)-98.2 °F (36.8 °C)] 97.4 °F (36.3 °C)  Pulse:  [70-84] 75  Resp:  [15-20] 16  SpO2:  [95 %-98 %] 98 %  BP: (120-159)/(71-95) 125/80     Weight: 131.7 kg (290 lb 5.5 oz)  Body mass index is 37.28 kg/m².    Intake/Output Summary (Last 24 hours) at 6/27/2022 1310  Last data filed at 6/27/2022 0800  Gross per 24 hour   Intake 1200 ml   Output 1000 ml   Net 200 ml        Physical Exam  Vitals and nursing note reviewed.   Constitutional:       General: He is awake. He is not in acute distress.     Appearance: Normal appearance. He is well-developed and well-groomed. He is not ill-appearing, toxic-appearing or diaphoretic.   HENT:      Head: Normocephalic and atraumatic.   Cardiovascular:      Rate and Rhythm: Normal rate.   Pulmonary:      Effort: Pulmonary effort is normal. No tachypnea, accessory muscle usage or respiratory distress.   Neurological:      Mental Status: He is alert and oriented to person, place, and time. Mental status is at baseline.   Psychiatric:         Attention and Perception: Attention normal. He is attentive.         Mood and Affect: Mood normal. Mood is not anxious.         Speech: Speech normal.         Behavior: Behavior normal. Behavior is cooperative.         Thought Content: Thought content normal.         Cognition and Memory: Cognition and memory normal. Cognition is not  impaired. Memory is not impaired. He does not exhibit impaired recent memory.         Judgment: Judgment normal.       Significant Labs: All pertinent labs within the past 24 hours have been reviewed.    Significant Imaging: I have reviewed all pertinent imaging results/findings within the past 24 hours.      Assessment/Plan:      * Osteomyelitis of right foot  Left Foot ulcer  - Presented from surgery clinic for concern for infection/osteomyelitis  - Prior history of MRSA bacteremia 2/2 gangrene treated with 6 weeks IV abx for presumed endocarditis  - ESR/CRP elevated but have down trended since prior hospitalization.  - MRI L foot with mild marrow edema of tuft of great toe. Findings and exam not consistent with acute osteomyelitis.  - MRI R foot with post-op changes of 1st & 2nd transmetatarsal with cortical regularity and abnormal signal at distal stumps with extensive edema. Findings concerning for osteo vs chronic changes.  - Biopsies from 6/17 showing no evidence of osteomyelitis involving 2nd metatarsal; 1st metatarsal still in process.  - Wound cultures from ED with MRSA, Providencia rettgeri, Proteus mirabilis, and GNR.  - 1st metatarsal bone culture 6/17 showing E faecalis, diphtheroids.  - Trial of gabapentin 100mg PO qHS with reported symptoms consistent with neuropathic pain.  - Podiatry and wound care following, appreciate assistance.  - ID consulted: given MRSA isolated from a previous culture, ok to treat with vancomycin x 4 weeks (EOC 07/15/22). finish 2 weeks of cefepime for left foot SSTI (EOC 7/1/22)   - midline placed    Antibiotics (From admission, onward)            Start     Stop Route Frequency Ordered    06/22/22 0000  vancomycin in dextrose 5 % 1 gram/250 mL IVPB 1,000 mg         -- IV Every 12 hours (non-standard times) 06/21/22 1333    06/16/22 1000  cefepime in dextrose 5 % IVPB 2 g         -- IV Every 8 hours (non-standard times) 06/16/22 0918    06/15/22 2237  vancomycin - pharmacy  "to dose  (vancomycin IVPB)        "And" Linked Group Details    -- IV pharmacy to manage frequency 06/15/22 2148        Cultures were taken-   Microbiology Results (last 7 days)     Procedure Component Value Units Date/Time    Aerobic culture [[id number]]  (Abnormal)  (Susceptibility) Collected: 06/17/22 0601    Order Status: Completed Specimen: Wound from Foot, Right Updated: 06/22/22 1031     Aerobic Bacterial Culture DIPHTHEROIDS  Few        ENTEROCOCCUS FAECALIS  Few      Narrative:      1st metatarsal - piece of bone    Culture, Anaerobic [[id number]] Collected: 06/17/22 0601    Order Status: Completed Specimen: Wound from Foot, Right Updated: 06/21/22 0814     Anaerobic Culture No anaerobes isolated    Narrative:      Piece of bone 2nd metatarsal    Culture, Anaerobic [[id number]] Collected: 06/17/22 0601    Order Status: Completed Specimen: Wound from Foot, Right Updated: 06/21/22 0813     Anaerobic Culture No anaerobes isolated    Narrative:      Right 1st metatarsal - piece of bone.    Blood Culture #2 **CANNOT BE ORDERED STAT** [[id number]] Collected: 06/15/22 2006    Order Status: Completed Specimen: Blood from Peripheral, Antecubital, Right Updated: 06/21/22 0612     Blood Culture, Routine No growth after 5 days.    Blood Culture #1 **CANNOT BE ORDERED STAT** [[id number]] Collected: 06/15/22 1708    Order Status: Completed Specimen: Blood from Peripheral, Antecubital, Right Updated: 06/20/22 2012     Blood Culture, Routine No growth after 5 days.    Culture, Anaerobe [[id number]] Collected: 06/16/22 0050    Order Status: Completed Specimen: Wound from Foot, Left Updated: 06/20/22 1215     Anaerobic Culture No anaerobes isolated    Aerobic culture [[id number]]  (Abnormal)  (Susceptibility) Collected: 06/16/22 0050    Order Status: Completed Specimen: Wound from Foot, Right Updated: 06/20/22 1050     Aerobic Bacterial Culture PROTEUS MIRABILIS  Many        METHICILLIN RESISTANT STAPHYLOCOCCUS AUREUS  Many      " Aerobic culture [[id number]] Collected: 06/17/22 0601    Order Status: Completed Specimen: Wound from Foot, Right Updated: 06/20/22 0834     Aerobic Bacterial Culture No growth    Narrative:      Piece of bone 2nd metatarsal    Culture, Anaerobe [[id number]]  (Abnormal) Collected: 06/16/22 0050    Order Status: Completed Specimen: Wound from Foot, Right Updated: 06/20/22 0824     Anaerobic Culture PRESUMPTIVE PREVOTELLA/PORPHYROMONAS GROUP  Unable to quantitate      Aerobic culture [[id number]]  (Abnormal)  (Susceptibility) Collected: 06/16/22 0050    Order Status: Completed Specimen: Wound from Foot, Left Updated: 06/20/22 0756     Aerobic Bacterial Culture ACINETOBACTER LWOFFII GROUP  Few        PROVIDENCIA RETTGERI  Few        METHICILLIN RESISTANT STAPHYLOCOCCUS AUREUS  Moderate            Foot ulcer  Plan as above.       Increased nutritional needs        HTN (hypertension)  - Reports taking lisinopril and losartan at home.  - Continue lisinopril 10mg PO daily.    Type 2 diabetes mellitus with diabetic peripheral angiopathy without gangrene, with long-term current use of insulin  Hyperlipidemia  - Last A1c 7.8  - On Levemir 50 U qHS and aspart 10 U TID WM at home  - Improving. Continue insulin detemir 55U subQ qHS, insulin aspart 10U subQ TIDWM, moderate-dose sliding scale insulin aspart 1-10U subQ TIDWM PRN.  - Continue aspirin 81mg PO daily, atorvastatin 40mg PO daily.      VTE Risk Mitigation (From admission, onward)         Ordered     enoxaparin injection 40 mg  Daily         06/16/22 0658     IP VTE HIGH RISK PATIENT  Once         06/15/22 2119     Place sequential compression device  Until discontinued         06/15/22 2119                      I have assessed these finding virtually using telemed platform and with assistance of bedside nurse                 The attending portion of this evaluation, treatment, and documentation was performed per Daja Lubin MD via Telemedicine AudioVisual using the  secure Neteven software platform with 2 way audio/video. The provider was located off-site and the patient is located in the hospital. The aforementioned video software was utilized to document the relevant history and physical exam    Daja Lubin MD  Department of Hospital Medicine   [facility] ([place])

## 2022-06-27 NOTE — PROGRESS NOTES
Pharmacokinetic Assessment Follow Up: IV Vancomycin    Vancomycin serum concentration assessment(s):    The trough level was drawn correctly and can be used to guide therapy at this time. The measurement is below the desired definitive target range of 15 to 20 mcg/mL.    Vancomycin Regimen Plan:    Change regimen to Vancomycin 1250 mg IV every 12 hours with next serum trough concentration measured at 1200 prior to next dose on 06/29/22.    Drug levels (last 3 results):  Recent Labs   Lab Result Units 06/25/22  1201 06/27/22  1122   Vancomycin-Trough ug/mL 16.6 13.1       Pharmacy will continue to follow and monitor vancomycin.    Please contact pharmacy at extension [phone] for questions regarding this assessment.    Thank you for the consult,   Virginia Corado       Patient brief summary:  Dave Good is a 56 y.o. male initiated on antimicrobial therapy with IV Vancomycin for treatment of endocarditis    The patient's current regimen is 1250 mg ivpb every 12 hours.    Drug Allergies:   Review of patient's allergies indicates:   Allergen Reactions    Ibuprofen Swelling     Facial swelling       Actual Body Weight:   131.7 kg    Renal Function:   Estimated Creatinine Clearance: 96.8 mL/min (based on SCr of 1.2 mg/dL).,     Dialysis Method (if applicable):  N/A    CBC (last 72 hours):  No results for input(s): WHITE BLOOD CELL COUNT, HEMOGLOBIN, HEMATOCRIT, PLATELETS, GRAN%, LYMPH%, MONO%, EOSINOPHIL%, BASOPHIL%, DIFFERENTIAL METHOD in the last 72 hours.    Metabolic Panel (last 72 hours):  Recent Labs   Lab Result Units 06/27/22  1126   Creatinine mg/dL 1.2       Vancomycin Administrations:  vancomycin given in the last 96 hours                     vancomycin in dextrose 5 % 1 gram/250 mL IVPB 1,000 mg (mg) 1,000 mg New Bag 06/27/22 1228     1,000 mg New Bag  0026     1,000 mg New Bag 06/26/22 1201     1,000 mg New Bag  0012     1,000 mg New Bag 06/25/22 1222     1,000 mg New Bag  0021     1,000 mg New Bag 06/24/22 1208      1,000 mg New Bag  0011                    Microbiologic Results:  Microbiology Results (last 7 days)       Procedure Component Value Units Date/Time    Aerobic culture [[id number]]  (Abnormal)  (Susceptibility) Collected: 06/17/22 0601    Order Status: Completed Specimen: Wound from Foot, Right Updated: 06/22/22 1031     Aerobic Bacterial Culture DIPHTHEROIDS  Few        ENTEROCOCCUS FAECALIS  Few      Narrative:      1st metatarsal - piece of bone    Culture, Anaerobic [[id number]] Collected: 06/17/22 0601    Order Status: Completed Specimen: Wound from Foot, Right Updated: 06/21/22 0814     Anaerobic Culture No anaerobes isolated    Narrative:      Piece of bone 2nd metatarsal    Culture, Anaerobic [[id number]] Collected: 06/17/22 0601    Order Status: Completed Specimen: Wound from Foot, Right Updated: 06/21/22 0813     Anaerobic Culture No anaerobes isolated    Narrative:      Right 1st metatarsal - piece of bone.    Blood Culture #2 **CANNOT BE ORDERED STAT** [[id number]] Collected: 06/15/22 2006    Order Status: Completed Specimen: Blood from Peripheral, Antecubital, Right Updated: 06/21/22 0612     Blood Culture, Routine No growth after 5 days.    Blood Culture #1 **CANNOT BE ORDERED STAT** [[id number]] Collected: 06/15/22 1708    Order Status: Completed Specimen: Blood from Peripheral, Antecubital, Right Updated: 06/20/22 2012     Blood Culture, Routine No growth after 5 days.

## 2022-06-27 NOTE — SUBJECTIVE & OBJECTIVE
Interval History: No acute events. Continuing current antibiotics. Attempting LTAC placement     Review of Systems   Constitutional:  Negative for chills, fatigue and fever.   HENT: Negative.     Eyes: Negative.    Respiratory:  Negative for cough and shortness of breath.    Cardiovascular:  Negative for chest pain, palpitations and leg swelling.   Gastrointestinal:  Negative for abdominal pain and vomiting.   Genitourinary: Negative.    Skin: Negative.    Neurological:  Negative for seizures and speech difficulty.   Psychiatric/Behavioral:  Negative for agitation and confusion. The patient is not nervous/anxious.    Objective:     Vital Signs (Most Recent):  Temp: 97.4 °F (36.3 °C) (06/27/22 1233)  Pulse: 75 (06/27/22 1233)  Resp: 16 (06/27/22 1233)  BP: 125/80 (06/27/22 1233)  SpO2: 98 % (06/27/22 1233) Vital Signs (24h Range):  Temp:  [97.1 °F (36.2 °C)-98.2 °F (36.8 °C)] 97.4 °F (36.3 °C)  Pulse:  [70-84] 75  Resp:  [15-20] 16  SpO2:  [95 %-98 %] 98 %  BP: (120-159)/(71-95) 125/80     Weight: 131.7 kg (290 lb 5.5 oz)  Body mass index is 37.28 kg/m².    Intake/Output Summary (Last 24 hours) at 6/27/2022 1310  Last data filed at 6/27/2022 0800  Gross per 24 hour   Intake 1200 ml   Output 1000 ml   Net 200 ml        Physical Exam  Vitals and nursing note reviewed.   Constitutional:       General: He is awake. He is not in acute distress.     Appearance: Normal appearance. He is well-developed and well-groomed. He is not ill-appearing, toxic-appearing or diaphoretic.   HENT:      Head: Normocephalic and atraumatic.   Cardiovascular:      Rate and Rhythm: Normal rate.   Pulmonary:      Effort: Pulmonary effort is normal. No tachypnea, accessory muscle usage or respiratory distress.   Neurological:      Mental Status: He is alert and oriented to person, place, and time. Mental status is at baseline.   Psychiatric:         Attention and Perception: Attention normal. He is attentive.         Mood and Affect: Mood  normal. Mood is not anxious.         Speech: Speech normal.         Behavior: Behavior normal. Behavior is cooperative.         Thought Content: Thought content normal.         Cognition and Memory: Cognition and memory normal. Cognition is not impaired. Memory is not impaired. He does not exhibit impaired recent memory.         Judgment: Judgment normal.       Significant Labs: All pertinent labs within the past 24 hours have been reviewed.    Significant Imaging: I have reviewed all pertinent imaging results/findings within the past 24 hours.

## 2022-06-28 LAB
POCT GLUCOSE: 150 MG/DL (ref 70–110)
POCT GLUCOSE: 168 MG/DL (ref 70–110)
POCT GLUCOSE: 181 MG/DL (ref 70–110)
POCT GLUCOSE: 201 MG/DL (ref 70–110)

## 2022-06-28 PROCEDURE — 25000003 PHARM REV CODE 250: Performed by: STUDENT IN AN ORGANIZED HEALTH CARE EDUCATION/TRAINING PROGRAM

## 2022-06-28 PROCEDURE — 11000001 HC ACUTE MED/SURG PRIVATE ROOM

## 2022-06-28 PROCEDURE — 63600175 PHARM REV CODE 636 W HCPCS: Performed by: NURSE PRACTITIONER

## 2022-06-28 PROCEDURE — 99232 SBSQ HOSP IP/OBS MODERATE 35: CPT | Mod: 95,,, | Performed by: HOSPITALIST

## 2022-06-28 PROCEDURE — 25000003 PHARM REV CODE 250: Performed by: NURSE PRACTITIONER

## 2022-06-28 PROCEDURE — 25000003 PHARM REV CODE 250: Performed by: INTERNAL MEDICINE

## 2022-06-28 PROCEDURE — 63600175 PHARM REV CODE 636 W HCPCS: Performed by: HOSPITALIST

## 2022-06-28 PROCEDURE — 25000003 PHARM REV CODE 250: Performed by: HOSPITALIST

## 2022-06-28 PROCEDURE — C9399 UNCLASSIFIED DRUGS OR BIOLOG: HCPCS | Performed by: INTERNAL MEDICINE

## 2022-06-28 PROCEDURE — 94760 N-INVAS EAR/PLS OXIMETRY 1: CPT

## 2022-06-28 PROCEDURE — 99232 PR SUBSEQUENT HOSPITAL CARE,LEVL II: ICD-10-PCS | Mod: 95,,, | Performed by: HOSPITALIST

## 2022-06-28 PROCEDURE — 27000207 HC ISOLATION

## 2022-06-28 PROCEDURE — 63600175 PHARM REV CODE 636 W HCPCS: Performed by: INTERNAL MEDICINE

## 2022-06-28 RX ADMIN — VANCOMYCIN HYDROCHLORIDE 1250 MG: 1.25 INJECTION, POWDER, LYOPHILIZED, FOR SOLUTION INTRAVENOUS at 11:06

## 2022-06-28 RX ADMIN — HYDROCODONE BITARTRATE AND ACETAMINOPHEN 1 TABLET: 5; 325 TABLET ORAL at 12:06

## 2022-06-28 RX ADMIN — ASPIRIN 81 MG: 81 TABLET, COATED ORAL at 08:06

## 2022-06-28 RX ADMIN — HYDROCODONE BITARTRATE AND ACETAMINOPHEN 1 TABLET: 7.5; 325 TABLET ORAL at 08:06

## 2022-06-28 RX ADMIN — HYDROCODONE BITARTRATE AND ACETAMINOPHEN 1 TABLET: 7.5; 325 TABLET ORAL at 04:06

## 2022-06-28 RX ADMIN — ATORVASTATIN CALCIUM 40 MG: 20 TABLET, FILM COATED ORAL at 08:06

## 2022-06-28 RX ADMIN — INSULIN ASPART 10 UNITS: 100 INJECTION, SOLUTION INTRAVENOUS; SUBCUTANEOUS at 08:06

## 2022-06-28 RX ADMIN — INSULIN DETEMIR 55 UNITS: 100 INJECTION, SOLUTION SUBCUTANEOUS at 08:06

## 2022-06-28 RX ADMIN — INSULIN ASPART 2 UNITS: 100 INJECTION, SOLUTION INTRAVENOUS; SUBCUTANEOUS at 05:06

## 2022-06-28 RX ADMIN — ENOXAPARIN SODIUM 40 MG: 100 INJECTION SUBCUTANEOUS at 04:06

## 2022-06-28 RX ADMIN — INSULIN ASPART 10 UNITS: 100 INJECTION, SOLUTION INTRAVENOUS; SUBCUTANEOUS at 05:06

## 2022-06-28 RX ADMIN — COLLAGENASE SANTYL: 250 OINTMENT TOPICAL at 08:06

## 2022-06-28 RX ADMIN — CEFEPIME 2 G: 2 INJECTION, POWDER, FOR SOLUTION INTRAVENOUS at 05:06

## 2022-06-28 RX ADMIN — HYDROCHLOROTHIAZIDE 25 MG: 25 TABLET ORAL at 08:06

## 2022-06-28 RX ADMIN — HYDROCODONE BITARTRATE AND ACETAMINOPHEN 1 TABLET: 7.5; 325 TABLET ORAL at 12:06

## 2022-06-28 RX ADMIN — CEFEPIME 2 G: 2 INJECTION, POWDER, FOR SOLUTION INTRAVENOUS at 09:06

## 2022-06-28 RX ADMIN — VANCOMYCIN HYDROCHLORIDE 1250 MG: 1.25 INJECTION, POWDER, LYOPHILIZED, FOR SOLUTION INTRAVENOUS at 12:06

## 2022-06-28 RX ADMIN — HYDROCODONE BITARTRATE AND ACETAMINOPHEN 1 TABLET: 5; 325 TABLET ORAL at 08:06

## 2022-06-28 RX ADMIN — LISINOPRIL 10 MG: 10 TABLET ORAL at 08:06

## 2022-06-28 RX ADMIN — INSULIN ASPART 10 UNITS: 100 INJECTION, SOLUTION INTRAVENOUS; SUBCUTANEOUS at 11:06

## 2022-06-28 RX ADMIN — HYDROCODONE BITARTRATE AND ACETAMINOPHEN 1 TABLET: 5; 325 TABLET ORAL at 04:06

## 2022-06-28 RX ADMIN — CEFEPIME 2 G: 2 INJECTION, POWDER, FOR SOLUTION INTRAVENOUS at 02:06

## 2022-06-28 RX ADMIN — INSULIN ASPART 4 UNITS: 100 INJECTION, SOLUTION INTRAVENOUS; SUBCUTANEOUS at 11:06

## 2022-06-28 RX ADMIN — GABAPENTIN 100 MG: 100 CAPSULE ORAL at 08:06

## 2022-06-28 RX ADMIN — QUETIAPINE FUMARATE 200 MG: 200 TABLET ORAL at 08:06

## 2022-06-28 NOTE — SUBJECTIVE & OBJECTIVE
Interval History: No acute events. Continuing current antibiotics. Attempting LTAC placement     Review of Systems   Constitutional:  Negative for chills, fatigue and fever.   HENT: Negative.     Eyes: Negative.    Respiratory:  Negative for cough and shortness of breath.    Cardiovascular:  Negative for chest pain, palpitations and leg swelling.   Gastrointestinal:  Negative for abdominal pain and vomiting.   Genitourinary: Negative.    Skin: Negative.    Neurological:  Negative for seizures and speech difficulty.   Psychiatric/Behavioral:  Negative for agitation and confusion. The patient is not nervous/anxious.    Objective:     Vital Signs (Most Recent):  Temp: 98 °F (36.7 °C) (06/28/22 1631)  Pulse: 73 (06/28/22 1631)  Resp: 16 (06/28/22 1631)  BP: 110/71 (06/28/22 1631)  SpO2: 97 % (06/28/22 1631) Vital Signs (24h Range):  Temp:  [97.4 °F (36.3 °C)-98 °F (36.7 °C)] 98 °F (36.7 °C)  Pulse:  [72-84] 73  Resp:  [16-19] 16  SpO2:  [95 %-97 %] 97 %  BP: (110-133)/(68-90) 110/71     Weight: 131.7 kg (290 lb 5.5 oz)  Body mass index is 37.28 kg/m².    Intake/Output Summary (Last 24 hours) at 6/28/2022 1817  Last data filed at 6/28/2022 1758  Gross per 24 hour   Intake 1200 ml   Output 1050 ml   Net 150 ml        Physical Exam  Vitals and nursing note reviewed.   Constitutional:       General: He is awake. He is not in acute distress.     Appearance: Normal appearance. He is well-developed and well-groomed. He is not ill-appearing, toxic-appearing or diaphoretic.   HENT:      Head: Normocephalic and atraumatic.   Cardiovascular:      Rate and Rhythm: Normal rate.   Pulmonary:      Effort: Pulmonary effort is normal. No tachypnea, accessory muscle usage or respiratory distress.   Neurological:      Mental Status: He is alert and oriented to person, place, and time. Mental status is at baseline.   Psychiatric:         Attention and Perception: Attention normal. He is attentive.         Mood and Affect: Mood normal. Mood  is not anxious.         Speech: Speech normal.         Behavior: Behavior normal. Behavior is cooperative.         Thought Content: Thought content normal.         Cognition and Memory: Cognition and memory normal. Cognition is not impaired. Memory is not impaired. He does not exhibit impaired recent memory.         Judgment: Judgment normal.       Significant Labs: All pertinent labs within the past 24 hours have been reviewed.    Significant Imaging: I have reviewed all pertinent imaging results/findings within the past 24 hours.

## 2022-06-28 NOTE — PLAN OF CARE
06/28/22 1345   Post-Acute Status   Post-Acute Authorization Placement   Post-Acute Placement Status Pending payor review/awaiting authorization (if required)   IV Infusion Status Pending payor review/awaiting authorization (if required)   Discharge Delays (!) Post-Acute Set-up   Discharge Plan   Discharge Plan A Long-term acute care facility (LTAC)   Discharge Plan B [facility]    received a call from [employer] informing me that she will submit for authorization today.

## 2022-06-28 NOTE — PROGRESS NOTES
Patient reviewed, renal function stable, cultures reviewed, no new levels, continue current therapy; Next levels due: 6/29 @1200

## 2022-06-28 NOTE — PLAN OF CARE
ARLENE called Winsome at [facility]. [facility]ome said that they should have a decision in about 2 hours. [employer] said that she would call us back. [phone].

## 2022-06-28 NOTE — PROGRESS NOTES
[facility] Medicine  Telemedicine Progress Note    Patient Name: Dave Good  MRN: [id number]  Patient Class: IP- Inpatient   Admission Date: 6/15/2022  Length of Stay: 13 days  Attending Physician: Daja Mayen MD  Primary Care Provider: Reyna Jorge NP          Subjective:     Principal Problem:Osteomyelitis of right foot        HPI:  Mr Dave is a 56 yr old male with a PMH that includes DB 2, COPD, HTN, depression, and right foot transmetatarsal amputation. He was sent to the ED for evaluation of osteomyelitis from a follow up appt with Dr. Flores. Pt had I&D and amputation for wet gangrene in Sept 2021 and debridements in Feb, March, and May of this year. Per his chart, his feet were healing well until some foot trauma occurred. Pt has has open wounds and drainage to BL feet with right foot wound deeper than left. He also has wound to left shin. Pt reports associated 10/10 pain and difficulty walking. He denies fever and chills at home. Podiatry consulted and pt admitted to hospital medicine.       Overview/Hospital Course:  Patient admitted with bilateral foot wounds and concern for osteomyelitis following R foot TMA requiring debridements since for continued wounds. He was started on empiric antibiotics. Bilateral foot MRI done. MRI of left foot with abnormal findings but not consistent with osteomyelitis. MRI of right foot showed changes of 1st and 2nd transmetatarsal amputation with cortical regularity and abnormal signal at distal stumps noting overlying extensive edema and regions of skin ulceration.  Findings may represent early osteomyelitis vs post-operative changes. Bone biopsy done for further evaluation and to guide treatment.      Interval History: No acute events. Continuing current antibiotics. Attempting LTAC placement     Review of Systems   Constitutional:  Negative for chills, fatigue and fever.   HENT: Negative.     Eyes: Negative.    Respiratory:  Negative  for cough and shortness of breath.    Cardiovascular:  Negative for chest pain, palpitations and leg swelling.   Gastrointestinal:  Negative for abdominal pain and vomiting.   Genitourinary: Negative.    Skin: Negative.    Neurological:  Negative for seizures and speech difficulty.   Psychiatric/Behavioral:  Negative for agitation and confusion. The patient is not nervous/anxious.    Objective:     Vital Signs (Most Recent):  Temp: 98 °F (36.7 °C) (06/28/22 1631)  Pulse: 73 (06/28/22 1631)  Resp: 16 (06/28/22 1631)  BP: 110/71 (06/28/22 1631)  SpO2: 97 % (06/28/22 1631) Vital Signs (24h Range):  Temp:  [97.4 °F (36.3 °C)-98 °F (36.7 °C)] 98 °F (36.7 °C)  Pulse:  [72-84] 73  Resp:  [16-19] 16  SpO2:  [95 %-97 %] 97 %  BP: (110-133)/(68-90) 110/71     Weight: 131.7 kg (290 lb 5.5 oz)  Body mass index is 37.28 kg/m².    Intake/Output Summary (Last 24 hours) at 6/28/2022 1817  Last data filed at 6/28/2022 1758  Gross per 24 hour   Intake 1200 ml   Output 1050 ml   Net 150 ml        Physical Exam  Vitals and nursing note reviewed.   Constitutional:       General: He is awake. He is not in acute distress.     Appearance: Normal appearance. He is well-developed and well-groomed. He is not ill-appearing, toxic-appearing or diaphoretic.   HENT:      Head: Normocephalic and atraumatic.   Cardiovascular:      Rate and Rhythm: Normal rate.   Pulmonary:      Effort: Pulmonary effort is normal. No tachypnea, accessory muscle usage or respiratory distress.   Neurological:      Mental Status: He is alert and oriented to person, place, and time. Mental status is at baseline.   Psychiatric:         Attention and Perception: Attention normal. He is attentive.         Mood and Affect: Mood normal. Mood is not anxious.         Speech: Speech normal.         Behavior: Behavior normal. Behavior is cooperative.         Thought Content: Thought content normal.         Cognition and Memory: Cognition and memory normal. Cognition is not  impaired. Memory is not impaired. He does not exhibit impaired recent memory.         Judgment: Judgment normal.       Significant Labs: All pertinent labs within the past 24 hours have been reviewed.    Significant Imaging: I have reviewed all pertinent imaging results/findings within the past 24 hours.      Assessment/Plan:      * Osteomyelitis of right foot  Left Foot ulcer  - Presented from surgery clinic for concern for infection/osteomyelitis  - Prior history of MRSA bacteremia 2/2 gangrene treated with 6 weeks IV abx for presumed endocarditis  - ESR/CRP elevated but have down trended since prior hospitalization.  - MRI L foot with mild marrow edema of tuft of great toe. Findings and exam not consistent with acute osteomyelitis.  - MRI R foot with post-op changes of 1st & 2nd transmetatarsal with cortical regularity and abnormal signal at distal stumps with extensive edema. Findings concerning for osteo vs chronic changes.  - Biopsies from 6/17 showing no evidence of osteomyelitis involving 2nd metatarsal; 1st metatarsal still in process.  - Wound cultures from ED with MRSA, Providencia rettgeri, Proteus mirabilis, and GNR.  - 1st metatarsal bone culture 6/17 showing E faecalis, diphtheroids.  - Trial of gabapentin 100mg PO qHS with reported symptoms consistent with neuropathic pain.  - Podiatry and wound care following, appreciate assistance.  - ID consulted: given MRSA isolated from a previous culture, ok to treat with vancomycin x 4 weeks (EOC 07/15/22). finish 2 weeks of cefepime for left foot SSTI (EOC 7/1/22)   - midline placed    Antibiotics (From admission, onward)            Start     Stop Route Frequency Ordered    06/22/22 0000  vancomycin in dextrose 5 % 1 gram/250 mL IVPB 1,000 mg         -- IV Every 12 hours (non-standard times) 06/21/22 1333    06/16/22 1000  cefepime in dextrose 5 % IVPB 2 g         -- IV Every 8 hours (non-standard times) 06/16/22 0918    06/15/22 2237  vancomycin - pharmacy  "to dose  (vancomycin IVPB)        "And" Linked Group Details    -- IV pharmacy to manage frequency 06/15/22 2148        Cultures were taken-   Microbiology Results (last 7 days)     Procedure Component Value Units Date/Time    Aerobic culture [[id number]]  (Abnormal)  (Susceptibility) Collected: 06/17/22 0601    Order Status: Completed Specimen: Wound from Foot, Right Updated: 06/22/22 1031     Aerobic Bacterial Culture DIPHTHEROIDS  Few        ENTEROCOCCUS FAECALIS  Few      Narrative:      1st metatarsal - piece of bone    Culture, Anaerobic [[id number]] Collected: 06/17/22 0601    Order Status: Completed Specimen: Wound from Foot, Right Updated: 06/21/22 0814     Anaerobic Culture No anaerobes isolated    Narrative:      Piece of bone 2nd metatarsal    Culture, Anaerobic [[id number]] Collected: 06/17/22 0601    Order Status: Completed Specimen: Wound from Foot, Right Updated: 06/21/22 0813     Anaerobic Culture No anaerobes isolated    Narrative:      Right 1st metatarsal - piece of bone.    Blood Culture #2 **CANNOT BE ORDERED STAT** [[id number]] Collected: 06/15/22 2006    Order Status: Completed Specimen: Blood from Peripheral, Antecubital, Right Updated: 06/21/22 0612     Blood Culture, Routine No growth after 5 days.    Blood Culture #1 **CANNOT BE ORDERED STAT** [[id number]] Collected: 06/15/22 1708    Order Status: Completed Specimen: Blood from Peripheral, Antecubital, Right Updated: 06/20/22 2012     Blood Culture, Routine No growth after 5 days.    Culture, Anaerobe [[id number]] Collected: 06/16/22 0050    Order Status: Completed Specimen: Wound from Foot, Left Updated: 06/20/22 1215     Anaerobic Culture No anaerobes isolated    Aerobic culture [[id number]]  (Abnormal)  (Susceptibility) Collected: 06/16/22 0050    Order Status: Completed Specimen: Wound from Foot, Right Updated: 06/20/22 1050     Aerobic Bacterial Culture PROTEUS MIRABILIS  Many        METHICILLIN RESISTANT STAPHYLOCOCCUS AUREUS  Many      " Aerobic culture [[id number]] Collected: 06/17/22 0601    Order Status: Completed Specimen: Wound from Foot, Right Updated: 06/20/22 0834     Aerobic Bacterial Culture No growth    Narrative:      Piece of bone 2nd metatarsal    Culture, Anaerobe [[id number]]  (Abnormal) Collected: 06/16/22 0050    Order Status: Completed Specimen: Wound from Foot, Right Updated: 06/20/22 0824     Anaerobic Culture PRESUMPTIVE PREVOTELLA/PORPHYROMONAS GROUP  Unable to quantitate      Aerobic culture [[id number]]  (Abnormal)  (Susceptibility) Collected: 06/16/22 0050    Order Status: Completed Specimen: Wound from Foot, Left Updated: 06/20/22 0756     Aerobic Bacterial Culture ACINETOBACTER LWOFFII GROUP  Few        PROVIDENCIA RETTGERI  Few        METHICILLIN RESISTANT STAPHYLOCOCCUS AUREUS  Moderate            Foot ulcer  Plan as above.       Increased nutritional needs        HTN (hypertension)  - Reports taking lisinopril and losartan at home.  - Continue lisinopril 10mg PO daily.    Type 2 diabetes mellitus with diabetic peripheral angiopathy without gangrene, with long-term current use of insulin  Hyperlipidemia  - Last A1c 7.8  - On Levemir 50 U qHS and aspart 10 U TID WM at home  - Improving. Continue insulin detemir 55U subQ qHS, insulin aspart 10U subQ TIDWM, moderate-dose sliding scale insulin aspart 1-10U subQ TIDWM PRN.  - Continue aspirin 81mg PO daily, atorvastatin 40mg PO daily.      VTE Risk Mitigation (From admission, onward)         Ordered     enoxaparin injection 40 mg  Daily         06/16/22 0658     IP VTE HIGH RISK PATIENT  Once         06/15/22 2119     Place sequential compression device  Until discontinued         06/15/22 2119                      I have assessed these finding virtually using telemed platform and with assistance of bedside nurse                 The attending portion of this evaluation, treatment, and documentation was performed per Daja Lubin MD via Telemedicine AudioVisual using the  secure [employer] software platform with 2 way audio/video. The provider was located off-site and the patient is located in the hospital. The aforementioned video software was utilized to document the relevant history and physical exam    Daja Lubin MD  Department of Hospital Medicine   [facility] ([place])

## 2022-06-28 NOTE — PLAN OF CARE
Pt received in bed. A&O*4. Breathing in room air.  - Wound care done and pictures updated.   - On blood glucose monitoring AC and HS. Insulin given.   - On Cefepime and Vancomycin for Osteomyelitis.  - Vancomycin dose increased to 1.25 mg.   - Getting Pain meds over the clock Q4H PRN. Given with good relief.   - On contact precaution for MRSA from foot wound.   - Pending  LTAC placement.   - No other distress at this moment. Will cont to monitor.      Problem: Adult Inpatient Plan of Care  Goal: Plan of Care Review  Outcome: Ongoing, Progressing  Goal: Patient-Specific Goal (Individualized)  Outcome: Ongoing, Progressing  Goal: Absence of Hospital-Acquired Illness or Injury  Outcome: Ongoing, Progressing  Goal: Optimal Comfort and Wellbeing  Outcome: Ongoing, Progressing  Goal: Readiness for Transition of Care  Outcome: Ongoing, Progressing     Problem: Fall Injury Risk  Goal: Absence of Fall and Fall-Related Injury  Outcome: Ongoing, Progressing     Problem: Diabetes Comorbidity  Goal: Blood Glucose Level Within Targeted Range  Outcome: Ongoing, Progressing     Problem: Fluid and Electrolyte Imbalance (Acute Kidney Injury/Impairment)  Goal: Fluid and Electrolyte Balance  Outcome: Ongoing, Progressing     Problem: Oral Intake Inadequate (Acute Kidney Injury/Impairment)  Goal: Optimal Nutrition Intake  Outcome: Ongoing, Progressing     Problem: Renal Function Impairment (Acute Kidney Injury/Impairment)  Goal: Effective Renal Function  Outcome: Ongoing, Progressing     Problem: Infection  Goal: Absence of Infection Signs and Symptoms  Outcome: Ongoing, Progressing     Problem: Impaired Wound Healing  Goal: Optimal Wound Healing  Outcome: Ongoing, Progressing     Problem: Skin Injury Risk Increased  Goal: Skin Health and Integrity  Outcome: Ongoing, Progressing

## 2022-06-29 LAB
POCT GLUCOSE: 135 MG/DL (ref 70–110)
POCT GLUCOSE: 166 MG/DL (ref 70–110)
POCT GLUCOSE: 209 MG/DL (ref 70–110)
POCT GLUCOSE: 212 MG/DL (ref 70–110)
VANCOMYCIN TROUGH SERPL-MCNC: 13.1 UG/ML (ref 10–22)

## 2022-06-29 PROCEDURE — 80202 ASSAY OF VANCOMYCIN: CPT | Performed by: HOSPITALIST

## 2022-06-29 PROCEDURE — 99232 PR SUBSEQUENT HOSPITAL CARE,LEVL II: ICD-10-PCS | Mod: 95,,, | Performed by: HOSPITALIST

## 2022-06-29 PROCEDURE — 63600175 PHARM REV CODE 636 W HCPCS: Performed by: INTERNAL MEDICINE

## 2022-06-29 PROCEDURE — 25000003 PHARM REV CODE 250: Performed by: HOSPITALIST

## 2022-06-29 PROCEDURE — 25000003 PHARM REV CODE 250: Performed by: INTERNAL MEDICINE

## 2022-06-29 PROCEDURE — 99232 SBSQ HOSP IP/OBS MODERATE 35: CPT | Mod: 95,,, | Performed by: HOSPITALIST

## 2022-06-29 PROCEDURE — 36415 COLL VENOUS BLD VENIPUNCTURE: CPT | Performed by: HOSPITALIST

## 2022-06-29 PROCEDURE — 25000003 PHARM REV CODE 250: Performed by: NURSE PRACTITIONER

## 2022-06-29 PROCEDURE — 99233 PR SUBSEQUENT HOSPITAL CARE,LEVL III: ICD-10-PCS | Mod: ,,, | Performed by: INTERNAL MEDICINE

## 2022-06-29 PROCEDURE — C9399 UNCLASSIFIED DRUGS OR BIOLOG: HCPCS | Performed by: INTERNAL MEDICINE

## 2022-06-29 PROCEDURE — 94761 N-INVAS EAR/PLS OXIMETRY MLT: CPT

## 2022-06-29 PROCEDURE — 63600175 PHARM REV CODE 636 W HCPCS: Performed by: HOSPITALIST

## 2022-06-29 PROCEDURE — 97110 THERAPEUTIC EXERCISES: CPT

## 2022-06-29 PROCEDURE — 63600175 PHARM REV CODE 636 W HCPCS: Performed by: NURSE PRACTITIONER

## 2022-06-29 PROCEDURE — 11000001 HC ACUTE MED/SURG PRIVATE ROOM

## 2022-06-29 PROCEDURE — 27000207 HC ISOLATION

## 2022-06-29 PROCEDURE — 25000003 PHARM REV CODE 250: Performed by: STUDENT IN AN ORGANIZED HEALTH CARE EDUCATION/TRAINING PROGRAM

## 2022-06-29 PROCEDURE — 99233 SBSQ HOSP IP/OBS HIGH 50: CPT | Mod: ,,, | Performed by: INTERNAL MEDICINE

## 2022-06-29 RX ADMIN — INSULIN DETEMIR 55 UNITS: 100 INJECTION, SOLUTION SUBCUTANEOUS at 08:06

## 2022-06-29 RX ADMIN — ASPIRIN 81 MG: 81 TABLET, COATED ORAL at 08:06

## 2022-06-29 RX ADMIN — INSULIN ASPART 10 UNITS: 100 INJECTION, SOLUTION INTRAVENOUS; SUBCUTANEOUS at 12:06

## 2022-06-29 RX ADMIN — CEFEPIME 2 G: 2 INJECTION, POWDER, FOR SOLUTION INTRAVENOUS at 02:06

## 2022-06-29 RX ADMIN — HYDROCODONE BITARTRATE AND ACETAMINOPHEN 1 TABLET: 5; 325 TABLET ORAL at 04:06

## 2022-06-29 RX ADMIN — ENOXAPARIN SODIUM 40 MG: 100 INJECTION SUBCUTANEOUS at 05:06

## 2022-06-29 RX ADMIN — HYDROCODONE BITARTRATE AND ACETAMINOPHEN 1 TABLET: 7.5; 325 TABLET ORAL at 08:06

## 2022-06-29 RX ADMIN — INSULIN ASPART 2 UNITS: 100 INJECTION, SOLUTION INTRAVENOUS; SUBCUTANEOUS at 04:06

## 2022-06-29 RX ADMIN — LISINOPRIL 10 MG: 10 TABLET ORAL at 08:06

## 2022-06-29 RX ADMIN — INSULIN ASPART 4 UNITS: 100 INJECTION, SOLUTION INTRAVENOUS; SUBCUTANEOUS at 12:06

## 2022-06-29 RX ADMIN — GABAPENTIN 100 MG: 100 CAPSULE ORAL at 08:06

## 2022-06-29 RX ADMIN — HYDROCHLOROTHIAZIDE 25 MG: 25 TABLET ORAL at 08:06

## 2022-06-29 RX ADMIN — VANCOMYCIN HYDROCHLORIDE 1250 MG: 1.25 INJECTION, POWDER, LYOPHILIZED, FOR SOLUTION INTRAVENOUS at 02:06

## 2022-06-29 RX ADMIN — ATORVASTATIN CALCIUM 40 MG: 20 TABLET, FILM COATED ORAL at 08:06

## 2022-06-29 RX ADMIN — HYDROCODONE BITARTRATE AND ACETAMINOPHEN 1 TABLET: 7.5; 325 TABLET ORAL at 04:06

## 2022-06-29 RX ADMIN — HYDROCODONE BITARTRATE AND ACETAMINOPHEN 1 TABLET: 5; 325 TABLET ORAL at 12:06

## 2022-06-29 RX ADMIN — INSULIN ASPART 10 UNITS: 100 INJECTION, SOLUTION INTRAVENOUS; SUBCUTANEOUS at 08:06

## 2022-06-29 RX ADMIN — HYDROCODONE BITARTRATE AND ACETAMINOPHEN 1 TABLET: 7.5; 325 TABLET ORAL at 12:06

## 2022-06-29 RX ADMIN — CEFEPIME 2 G: 2 INJECTION, POWDER, FOR SOLUTION INTRAVENOUS at 05:06

## 2022-06-29 RX ADMIN — QUETIAPINE FUMARATE 200 MG: 200 TABLET ORAL at 08:06

## 2022-06-29 RX ADMIN — INSULIN ASPART 10 UNITS: 100 INJECTION, SOLUTION INTRAVENOUS; SUBCUTANEOUS at 04:06

## 2022-06-29 RX ADMIN — COLLAGENASE SANTYL: 250 OINTMENT TOPICAL at 10:06

## 2022-06-29 RX ADMIN — INSULIN ASPART 4 UNITS: 100 INJECTION, SOLUTION INTRAVENOUS; SUBCUTANEOUS at 08:06

## 2022-06-29 RX ADMIN — VANCOMYCIN HYDROCHLORIDE 1250 MG: 1.25 INJECTION, POWDER, LYOPHILIZED, FOR SOLUTION INTRAVENOUS at 12:06

## 2022-06-29 RX ADMIN — CEFEPIME 2 G: 2 INJECTION, POWDER, FOR SOLUTION INTRAVENOUS at 10:06

## 2022-06-29 NOTE — PT/OT/SLP PROGRESS
Physical Therapy Treatment    Patient Name:  Dave Good   MRN:  [id number]    Recommendations:     Discharge Recommendations:  home with home health (vs LTAC per MD rec)   Discharge Equipment Recommendations: none   Barriers to discharge: None    Assessment:     Dave Good is a 56 y.o. male admitted with a medical diagnosis of Osteomyelitis of right foot.  He presents with the following impairments/functional limitations:  impaired endurance, gait instability, impaired balance, decreased lower extremity function, pain, impaired skin, orthopedic precautions.    Patient with continued self reported difficulty with ambulation with heel WB - cueing for shorter step lengths to improve WB adherence. Review of therEx including NWB calf stretch. rec for DC per MD - HH vs LTAC.    Rehab Prognosis: Good; patient would benefit from acute skilled PT services to address these deficits and reach maximum level of function.    Recent Surgery: * No surgery found *      Plan:     During this hospitalization, patient to be seen 2 x/week to address the identified rehab impairments via gait training, therapeutic activities, therapeutic exercises, neuromuscular re-education and progress toward the following goals:    · Plan of Care Expires:  07/06/22    Subjective     Chief Complaint: none stated  Patient/Family Comments/goals: Wanting to continue PT to address gait stability and therEx, Patient agreeable to PT treatment.  Pain/Comfort:  Pain Rating 1: 0/10  Pain Rating Post-Intervention 1: 0/10 (muscle fatigue with therEx)      Objective:     Communicated with VIRGINIA William prior to session.  Patient found HOB elevated with peripheral IV upon PT entry to room.     General Precautions: Standard, diabetic, contact   Orthopedic Precautions:RLE partial weight bearing, LLE partial weight bearing (B heel WB, limit ambulation to restroom)   Braces:  (B darco shoes)  Respiratory Status: Room air     Functional Mobility:  · Bed Mobility:      · Supine to Sit: modified independence  · Sit to Supine: modified independence  · Transfers:     · Sit to Stand:  modified independence with rolling walker  · Gait: x40 ft with RW and supervision.   · Pt reports good adherence to heel WB until last 10 ft amb bout - attempts to walk further despite declining gait sequencing and educated on importance of sitting to rest prior to forefoot WB.   · No overt LOB.   · Initial larger step lengths, encouraged smaller step lengths to ensure heel WB only.       AM-PAC 6 CLICK MOBILITY  Turning over in bed (including adjusting bedclothes, sheets and blankets)?: 4  Sitting down on and standing up from a chair with arms (e.g., wheelchair, bedside commode, etc.): 4  Moving from lying on back to sitting on the side of the bed?: 4  Moving to and from a bed to a chair (including a wheelchair)?: 4  Need to walk in hospital room?: 3  Climbing 3-5 steps with a railing?: 3  Basic Mobility Total Score: 22       Therapeutic Activities and Exercises:  Pt performed seated therapeutic exercises including LAQs with DF 10 sec holds for calf/hamstring stretch and seated toe raises with 10 sec holds for ant tib strengthening x 10 reps with verbal and tactile cues.      Patient left HOB elevated with all lines intact, call button in reach and Rn Nataliia notified..    GOALS:   Multidisciplinary Problems     Physical Therapy Goals        Problem: Physical Therapy    Goal Priority Disciplines Outcome Goal Variances Interventions   Physical Therapy Goal     PT, PT/OT Ongoing, Progressing     Description: Goals to be met by: 22    Patient will increase functional independence with mobility by performin. Tibialis anterior strengthening (DF with heel on ground) in seated position x10 reps with 15 sec holds without cueing for technique.   2. Gait x 10 feet with mod(I) with RW without cueing for heel WB (B).  3. Ascend/descend 2 step(s) with least restrictive assistive device and uni HR via  lateral technique to maintain heel WB with supervision.                    Time Tracking:     PT Received On: 06/29/22  PT Start Time: 1615     PT Stop Time: 1633  PT Total Time (min): 18 min     Billable Minutes: Gait Training 8 (unbilled) and Therapeutic Exercise 10    Treatment Type: Treatment  PT/PTA: PT     PTA Visit Number: 0     06/29/2022

## 2022-06-29 NOTE — PROGRESS NOTES
[facility] Surg [facility] Medicine  Telemedicine Progress Note    Patient Name: Dave Good  MRN: [id number]  Patient Class: IP- Inpatient   Admission Date: 6/15/2022  Length of Stay: 14 days  Attending Physician: Daja Mayen MD  Primary Care Provider: Reyan Jorge NP          Subjective:     Principal Problem:Osteomyelitis of right foot        HPI:  Mr Dave is a 56 yr old male with a PMH that includes DB 2, COPD, HTN, depression, and right foot transmetatarsal amputation. He was sent to the ED for evaluation of osteomyelitis from a follow up appt with Dr. Flores. Pt had I&D and amputation for wet gangrene in Sept 2021 and debridements in Feb, March, and May of this year. Per his chart, his feet were healing well until some foot trauma occurred. Pt has has open wounds and drainage to BL feet with right foot wound deeper than left. He also has wound to left shin. Pt reports associated 10/10 pain and difficulty walking. He denies fever and chills at home. Podiatry consulted and pt admitted to hospital medicine.       Overview/Hospital Course:  Patient admitted with bilateral foot wounds and concern for osteomyelitis following R foot TMA requiring debridements since for continued wounds. He was started on empiric antibiotics. Bilateral foot MRI done. MRI of left foot with abnormal findings but not consistent with osteomyelitis. MRI of right foot showed changes of 1st and 2nd transmetatarsal amputation with cortical regularity and abnormal signal at distal stumps noting overlying extensive edema and regions of skin ulceration.  Findings may represent early osteomyelitis vs post-operative changes. Bone biopsy done for further evaluation and to guide treatment.      Interval History: No acute events. Continuing current antibiotics. Attempting LTAC placement     Review of Systems   Constitutional:  Negative for chills, fatigue and fever.   HENT: Negative.     Eyes: Negative.    Respiratory:  Negative  for cough and shortness of breath.    Cardiovascular:  Negative for chest pain, palpitations and leg swelling.   Gastrointestinal:  Negative for abdominal pain and vomiting.   Genitourinary: Negative.    Skin: Negative.    Neurological:  Negative for seizures and speech difficulty.   Psychiatric/Behavioral:  Negative for agitation and confusion. The patient is not nervous/anxious.    Objective:     Vital Signs (Most Recent):  Temp: 97.6 °F (36.4 °C) (06/29/22 0845)  Pulse: 85 (06/29/22 0845)  Resp: 18 (06/29/22 0846)  BP: 118/65 (06/29/22 0845)  SpO2: 96 % (06/29/22 0845) Vital Signs (24h Range):  Temp:  [97.6 °F (36.4 °C)-98.1 °F (36.7 °C)] 97.6 °F (36.4 °C)  Pulse:  [73-85] 85  Resp:  [16-18] 18  SpO2:  [93 %-97 %] 96 %  BP: (110-133)/(59-90) 118/65     Weight: 131.7 kg (290 lb 5.5 oz)  Body mass index is 37.28 kg/m².    Intake/Output Summary (Last 24 hours) at 6/29/2022 0920  Last data filed at 6/29/2022 0424  Gross per 24 hour   Intake 720 ml   Output 1000 ml   Net -280 ml        Physical Exam  Vitals and nursing note reviewed.   Constitutional:       General: He is awake. He is not in acute distress.     Appearance: Normal appearance. He is well-developed and well-groomed. He is not ill-appearing, toxic-appearing or diaphoretic.   HENT:      Head: Normocephalic and atraumatic.   Cardiovascular:      Rate and Rhythm: Normal rate.   Pulmonary:      Effort: Pulmonary effort is normal. No tachypnea, accessory muscle usage or respiratory distress.   Neurological:      Mental Status: He is alert and oriented to person, place, and time. Mental status is at baseline.   Psychiatric:         Attention and Perception: Attention normal. He is attentive.         Mood and Affect: Mood normal. Mood is not anxious.         Speech: Speech normal.         Behavior: Behavior normal. Behavior is cooperative.         Thought Content: Thought content normal.         Cognition and Memory: Cognition and memory normal. Cognition is not  impaired. Memory is not impaired. He does not exhibit impaired recent memory.         Judgment: Judgment normal.       Significant Labs: All pertinent labs within the past 24 hours have been reviewed.    Significant Imaging: I have reviewed all pertinent imaging results/findings within the past 24 hours.      Assessment/Plan:      * Osteomyelitis of right foot  Left Foot ulcer  - Presented from surgery clinic for concern for infection/osteomyelitis  - Prior history of MRSA bacteremia 2/2 gangrene treated with 6 weeks IV abx for presumed endocarditis  - ESR/CRP elevated but have down trended since prior hospitalization.  - MRI L foot with mild marrow edema of tuft of great toe. Findings and exam not consistent with acute osteomyelitis.  - MRI R foot with post-op changes of 1st & 2nd transmetatarsal with cortical regularity and abnormal signal at distal stumps with extensive edema. Findings concerning for osteo vs chronic changes.  - Biopsies from 6/17 showing no evidence of osteomyelitis involving 2nd metatarsal; 1st metatarsal still in process.  - Wound cultures from ED with MRSA, Providencia rettgeri, Proteus mirabilis, and GNR.  - 1st metatarsal bone culture 6/17 showing E faecalis, diphtheroids.  - Trial of gabapentin 100mg PO qHS with reported symptoms consistent with neuropathic pain.  - Podiatry and wound care following, appreciate assistance.  - ID consulted: given MRSA isolated from a previous culture, ok to treat with vancomycin x 4 weeks (EOC 07/15/22). finish 2 weeks of cefepime for left foot SSTI (EOC 7/1/22)   - midline placed    Antibiotics (From admission, onward)            Start     Stop Route Frequency Ordered    06/28/22 0030  vancomycin 1.25 g in dextrose 5% 250 mL IVPB (ready to mix)         -- IV Every 12 hours (non-standard times) 06/27/22 1308    06/16/22 1000  cefepime in dextrose 5 % IVPB 2 g         -- IV Every 8 hours (non-standard times) 06/16/22 0918    06/15/22 2237  vancomycin -  "pharmacy to dose  (vancomycin IVPB)        "And" Linked Group Details    -- IV pharmacy to manage frequency 06/15/22 2148        Cultures were taken-   Microbiology Results (last 7 days)     Procedure Component Value Units Date/Time    Aerobic culture [[id number]]  (Abnormal)  (Susceptibility) Collected: 06/17/22 0601    Order Status: Completed Specimen: Wound from Foot, Right Updated: 06/22/22 1031     Aerobic Bacterial Culture DIPHTHEROIDS  Few        ENTEROCOCCUS FAECALIS  Few      Narrative:      1st metatarsal - piece of bone          Foot ulcer  Plan as above.       Increased nutritional needs        HTN (hypertension)  - Reports taking lisinopril and losartan at home.  - Continue lisinopril 10mg PO daily.    Type 2 diabetes mellitus with diabetic peripheral angiopathy without gangrene, with long-term current use of insulin  Hyperlipidemia  - Last A1c 7.8  - On Levemir 50 U qHS and aspart 10 U TID WM at home  - Improving. Continue insulin detemir 55U subQ qHS, insulin aspart 10U subQ TIDWM, moderate-dose sliding scale insulin aspart 1-10U subQ TIDWM PRN.  - Continue aspirin 81mg PO daily, atorvastatin 40mg PO daily.      VTE Risk Mitigation (From admission, onward)         Ordered     enoxaparin injection 40 mg  Daily         06/16/22 0658     IP VTE HIGH RISK PATIENT  Once         06/15/22 2119     Place sequential compression device  Until discontinued         06/15/22 2119                      I have assessed these finding virtually using telemed platform and with assistance of bedside nurse                 The attending portion of this evaluation, treatment, and documentation was performed per Daja Lubin MD via Telemedicine AudioVisual using the secure [employer] software platform with 2 way audio/video. The provider was located off-site and the patient is located in the hospital. The aforementioned video software was utilized to document the relevant history and physical exam    Daja Lubin, " MD  Department of Hospital Medicine   [facility] - Med Surg ([place])

## 2022-06-29 NOTE — PLAN OF CARE
Reached out to Winsome at [facility] ([facility] Patient is awaiting authorization for placement. Winsome didn't answer her phone. ([phone].        06/29/22 1255   Post-Acute Status   Post-Acute Authorization Placement   Post-Acute Placement Status Pending payor review/awaiting authorization (if required)   IV Infusion Status Referral(s) sent   Discharge Delays (!) Other  (Waiting on Authorization from LTAC ([facility]))   Discharge Plan   Discharge Plan A Long-term acute care facility (LTAC)

## 2022-06-29 NOTE — PROGRESS NOTES
[facility] - Med Surg ([place])  Podiatry  Progress Note    Patient Name: Dave Good  MRN: [id number]  Admission Date: 6/15/2022  Hospital Length of Stay: 14 days  Attending Physician: Daja Mayen MD  Primary Care Provider: Reyna Jorge NP     Subjective:     Interval History: awaiting placement.  Cultures covered with Abx.  Dressing orders placed, appropriate.  Patient has diabetic shoes with custom inserts for offloading on healing riight foot.  Left foot healed.  Cushioned with dressing.        Scheduled Meds:   aspirin  81 mg Oral Daily    atorvastatin  40 mg Oral Daily    ceFEPime (MAXIPIME) IVPB  2 g Intravenous Q8H    collagenase   Topical (Top) Daily    enoxaparin  40 mg Subcutaneous Daily    gabapentin  100 mg Oral QHS    hydroCHLOROthiazide  25 mg Oral Daily    insulin aspart U-100  10 Units Subcutaneous TIDWM    insulin detemir U-100  55 Units Subcutaneous QHS    lisinopriL  10 mg Oral Daily    QUEtiapine  200 mg Oral Nightly    vancomycin (VANCOCIN) IVPB  1,250 mg Intravenous Q12H     Continuous Infusions:  PRN Meds:sodium chloride 0.9%, acetaminophen, dextrose 10%, dextrose 10%, glucagon (human recombinant), glucose, glucose, HYDROcodone-acetaminophen, HYDROcodone-acetaminophen, insulin aspart U-100, melatonin, naloxone, ondansetron, polyethylene glycol, sodium chloride 0.9%, Pharmacy to dose Vancomycin consult **AND** vancomycin - pharmacy to dose    Review of Systems  Objective:     Vital Signs (Most Recent):  Temp: 97.5 °F (36.4 °C) (06/29/22 1127)  Pulse: 82 (06/29/22 1127)  Resp: 18 (06/29/22 1228)  BP: 121/69 (06/29/22 1127)  SpO2: 96 % (06/29/22 1127) Vital Signs (24h Range):  Temp:  [97.5 °F (36.4 °C)-98.1 °F (36.7 °C)] 97.5 °F (36.4 °C)  Pulse:  [73-85] 82  Resp:  [16-18] 18  SpO2:  [93 %-97 %] 96 %  BP: (110-121)/(59-72) 121/69     Weight: 131.7 kg (290 lb 5.5 oz)  Body mass index is 37.28 kg/m².    Foot Exam    Laboratory:  A1C:   Recent Labs   Lab 02/21/22  1544 05/27/22  1010    HGBA1C 9.8* 7.8*     Blood Cultures: No results for input(s): LABBLOO in the last 48 hours.  BMP:   Recent Labs   Lab 06/27/22  1126   CREATININE 1.2     CBC: No results for input(s): WBC, RBC, HGB, HCT, PLT, MCV, MCH, MCHC in the last 168 hours.  CMP:   Recent Labs   Lab 06/27/22  1126   CREATININE 1.2     Coagulation: No results for input(s): PT, INR, APTT in the last 168 hours.  CRP: No results for input(s): CRP in the last 168 hours.  ESR: No results for input(s): SEDRATE in the last 168 hours.  Prealbumin: No results for input(s): PREALBUMIN in the last 48 hours.  Wound Cultures:   Recent Labs   Lab 02/22/22  1153 06/16/22  0050 06/17/22  0601   LABAERO METHICILLIN RESISTANT STAPHYLOCOCCUS AUREUS  Many  No other significant isolate  * PROTEUS MIRABILIS  Many  *  METHICILLIN RESISTANT STAPHYLOCOCCUS AUREUS  Many  *  ACINETOBACTER LWOFFII GROUP  Few  *  PROVIDENCIA RETTGERI  Few  *  METHICILLIN RESISTANT STAPHYLOCOCCUS AUREUS  Moderate  * DIPHTHEROIDS  Few  *  ENTEROCOCCUS FAECALIS  Few  *  No growth     Microbiology Results (last 7 days)     ** No results found for the last 168 hours. **        Specimen (24h ago, onward)            None          Diagnostic Results:  None    Clinical Findings:  Left foot wounds closed since last week.  Dressed/offloaded cushion.    Right foot improved well, smaller with granular/fibrous base  without , pus, tracking, fluctuance, malodor, or cardinal signs infection.     Pulses palpable. Toes warm.    lops    Assessment/Plan:     Active Diagnoses:    Diagnosis Date Noted POA    PRINCIPAL PROBLEM:  Osteomyelitis of right foot [M86.9] 06/15/2022 Yes    Foot ulcer [L97.509]  Yes    Type 2 diabetes mellitus with diabetic peripheral angiopathy without gangrene, with long-term current use of insulin [E11.51, Z79.4] 09/20/2021 Not Applicable    HTN (hypertension) [I10] 09/20/2021 Yes      Problems Resolved During this Admission:    Diagnosis Date Noted Date Resolved POA     SHIV (acute kidney injury) [N17.9] 09/20/2021 06/17/2022 Yes       D/c to ltac when possible.  Will follow outpatient in clinic if wounds not closed by d/c from ltac.      WB to tolerance in DM shoes with custom inserts on wound closure.    Until wound closure, patient to wb to tolerance in DM shoe, custom insert left, football dressing/sx shoe right.    Samuel Toussaint DPM  Podiatry  [facility] - Med Surg ([place])

## 2022-06-29 NOTE — PLAN OF CARE
06/29/22 0905   Discharge Reassessment   Assessment Type Discharge Planning Reassessment   Did the patient's condition or plan change since previous assessment? Yes   Discharge Plan discussed with: Patient;Spouse/sig other   Discharge Plan A Long-term acute care facility (LTAC)   Discharge Plan B Skilled Nursing Facility   DME Needed Upon Discharge  none   Discharge Barriers Identified None   Post-Acute Status   Post-Acute Authorization IV Infusion   IV Infusion Status Referral(s) sent   Discharge Delays (!) Post-Acute Set-up

## 2022-06-29 NOTE — PROGRESS NOTES
[facility] ([place])  Infectious Disease  Progress Note    Patient Name: Dave Good  MRN: [id number]  Admission Date: 6/15/2022  Length of Stay: 14 days  Attending Physician: Daja Mayen MD  Primary Care Provider: Reyna Jorge NP    Isolation Status: Contact  Assessment/Plan:      * Osteomyelitis of right foot  - chronic by MR and pathology  - bone culture grew diphtheroids and Enterococcus but no good po options  - given MRSA isolated from a previous culture, ok to treat with vancomycin x 4 weeks  - finish 2 weeks of cefepime for left foot SSTI      Isidro Duran MD  Infectious Disease  [facility] ([place])    Subjective:     Principal Problem:Osteomyelitis of right foot    HPI: This is a 56 y.o. male with PMHx DM, HTN, and COPD who presents with bilateral foot ulcers. The patient reports he saw Dr. Flores this morning who instructed him to the ED as he suspected osteomyelitis or deep space infection.  H he has a PSHx of transmetatarsal amputation on his right foot and multiple wound debridement procedures with his last being 5/27/2022. The patient was admitted and started on empiric abx. Podiatry was consulted and performed MRI, suggesting osteomyelitis, and subsequent bone biopsies (left foot negative for osteomyelitis, right pending) and cultures were obtained. ID is consulted for abx recs for osteomyelitis. The patient is currently ohn cefepime and vancomycin. Superficial wound cultures were performed on the 15th and bone cultures were done the following day. Bone culture is only growing diphtheroids and Enterococcus.     Interval History: Doing okay. Tolerating abx. Has bed in LTAC in [place].     Review of Systems   Constitutional:  Negative for chills and fever.   Musculoskeletal:  Positive for arthralgias.   All other systems reviewed and are negative.  Objective:     Vital Signs (Most Recent):  Temp: 97.5 °F (36.4 °C) (06/29/22 1127)  Pulse: 82 (06/29/22 1127)  Resp: 18 (06/29/22  1228)  BP: 121/69 (06/29/22 1127)  SpO2: 96 % (06/29/22 1127) Vital Signs (24h Range):  Temp:  [97.5 °F (36.4 °C)-98.1 °F (36.7 °C)] 97.5 °F (36.4 °C)  Pulse:  [73-85] 82  Resp:  [16-18] 18  SpO2:  [93 %-97 %] 96 %  BP: (110-121)/(59-72) 121/69     Weight: 131.7 kg (290 lb 5.5 oz)  Body mass index is 37.28 kg/m².    Estimated Creatinine Clearance: 96.8 mL/min (based on SCr of 1.2 mg/dL).    Physical Exam  Vitals and nursing note reviewed.   Constitutional:       General: He is not in acute distress.     Appearance: Normal appearance. He is obese. He is not ill-appearing, toxic-appearing or diaphoretic.   HENT:      Head: Normocephalic and atraumatic.      Right Ear: External ear normal.      Left Ear: External ear normal.   Eyes:      Extraocular Movements: Extraocular movements intact.      Conjunctiva/sclera: Conjunctivae normal.      Pupils: Pupils are equal, round, and reactive to light.   Musculoskeletal:         General: Deformity present.      Comments: Dressings dry, no erythema or warmth   Neurological:      General: No focal deficit present.      Mental Status: He is alert and oriented to person, place, and time. Mental status is at baseline.   Psychiatric:         Mood and Affect: Mood normal.         Behavior: Behavior normal.         Thought Content: Thought content normal.         Judgment: Judgment normal.       Significant Labs: Blood Culture:   Recent Labs   Lab 02/21/22  1231 02/23/22  0550 02/25/22  0548 06/15/22  1708 06/15/22  2006   LABBLOO Gram stain aer bottle: Gram positive cocci  Gram stain jose bottle: Gram positive cocci  Results called to and read back by: KRISTINA ESCOTO RN  02/22/2022  11:03  METHICILLIN RESISTANT STAPHYLOCOCCUS AUREUS  ID consult required at [facility] and [facility].  For susceptibility see order #[id number]  ID consult required at [facility] and [facility].  * No growth after 5 days. No growth after 5 days. No growth after 5 days. No  growth after 5 days.     Bone Marrow Culture: No results for input(s): BONEMARROWCU in the last 4320 hours.  CBC: No results for input(s): WBC, HGB, HCT, PLT in the last 48 hours.  CMP: No results for input(s): NA, K, CL, CO2, GLU, BUN, CREATININE, CALCIUM, PROT, ALBUMIN, BILITOT, ALKPHOS, AST, ALT, ANIONGAP, EGFRNONAA in the last 48 hours.    Invalid input(s): ESTGFAFR[place]  Wound Culture:   Recent Labs   Lab 02/22/22  1153 06/16/22  0050 06/17/22  0601   LABAERO METHICILLIN RESISTANT STAPHYLOCOCCUS AUREUS  Many  No other significant isolate  * PROTEUS MIRABILIS  Many  *  METHICILLIN RESISTANT STAPHYLOCOCCUS AUREUS  Many  *  ACINETOBACTER LWOFFII GROUP  Few  *  PROVIDENCIA RETTGERI  Few  *  METHICILLIN RESISTANT STAPHYLOCOCCUS AUREUS  Moderate  * DIPHTHEROIDS  Few  *  ENTEROCOCCUS FAECALIS  Few  *  No growth       Significant Imaging: I have reviewed all pertinent imaging results/findings within the past 24 hours.

## 2022-06-29 NOTE — SUBJECTIVE & OBJECTIVE
Interval History: Doing okay. Tolerating abx. Has bed in LTAC in [place].     Review of Systems   Constitutional:  Negative for chills and fever.   Musculoskeletal:  Positive for arthralgias.   All other systems reviewed and are negative.  Objective:     Vital Signs (Most Recent):  Temp: 97.5 °F (36.4 °C) (06/29/22 1127)  Pulse: 82 (06/29/22 1127)  Resp: 18 (06/29/22 1228)  BP: 121/69 (06/29/22 1127)  SpO2: 96 % (06/29/22 1127) Vital Signs (24h Range):  Temp:  [97.5 °F (36.4 °C)-98.1 °F (36.7 °C)] 97.5 °F (36.4 °C)  Pulse:  [73-85] 82  Resp:  [16-18] 18  SpO2:  [93 %-97 %] 96 %  BP: (110-121)/(59-72) 121/69     Weight: 131.7 kg (290 lb 5.5 oz)  Body mass index is 37.28 kg/m².    Estimated Creatinine Clearance: 96.8 mL/min (based on SCr of 1.2 mg/dL).    Physical Exam  Vitals and nursing note reviewed.   Constitutional:       General: He is not in acute distress.     Appearance: Normal appearance. He is obese. He is not ill-appearing, toxic-appearing or diaphoretic.   HENT:      Head: Normocephalic and atraumatic.      Right Ear: External ear normal.      Left Ear: External ear normal.   Eyes:      Extraocular Movements: Extraocular movements intact.      Conjunctiva/sclera: Conjunctivae normal.      Pupils: Pupils are equal, round, and reactive to light.   Musculoskeletal:         General: Deformity present.      Comments: Dressings dry, no erythema or warmth   Neurological:      General: No focal deficit present.      Mental Status: He is alert and oriented to person, place, and time. Mental status is at baseline.   Psychiatric:         Mood and Affect: Mood normal.         Behavior: Behavior normal.         Thought Content: Thought content normal.         Judgment: Judgment normal.       Significant Labs: Blood Culture:   Recent Labs   Lab 02/21/22  1231 02/23/22  0550 02/25/22  0548 06/15/22  1708 06/15/22  2006   LABBLOO Gram stain aer bottle: Gram positive cocci  Gram stain jose bottle: Gram positive cocci   Results called to and read back by: KRISTINA ESCOTO RN  02/22/2022  11:03  METHICILLIN RESISTANT STAPHYLOCOCCUS AUREUS  ID consult required at [facility] and [facility] locations.  For susceptibility see order #[id number]  ID consult required at [facility] and [facility].  * No growth after 5 days. No growth after 5 days. No growth after 5 days. No growth after 5 days.     Bone Marrow Culture: No results for input(s): BONEMARROWCU in the last 4320 hours.  CBC: No results for input(s): WBC, HGB, HCT, PLT in the last 48 hours.  CMP: No results for input(s): NA, K, CL, CO2, GLU, BUN, CREATININE, CALCIUM, PROT, ALBUMIN, BILITOT, ALKPHOS, AST, ALT, ANIONGAP, EGFRNONAA in the last 48 hours.    Invalid input(s): ESTGFAFR[place]  Wound Culture:   Recent Labs   Lab 02/22/22  1153 06/16/22  0050 06/17/22  0601   LABAERO METHICILLIN RESISTANT STAPHYLOCOCCUS AUREUS  Many  No other significant isolate  * PROTEUS MIRABILIS  Many  *  METHICILLIN RESISTANT STAPHYLOCOCCUS AUREUS  Many  *  ACINETOBACTER LWOFFII GROUP  Few  *  PROVIDENCIA RETTGERI  Few  *  METHICILLIN RESISTANT STAPHYLOCOCCUS AUREUS  Moderate  * DIPHTHEROIDS  Few  *  ENTEROCOCCUS FAECALIS  Few  *  No growth       Significant Imaging: I have reviewed all pertinent imaging results/findings within the past 24 hours.

## 2022-06-29 NOTE — ASSESSMENT & PLAN NOTE
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  - MRI L foot with mild marrow edema of tuft of great toe. Findings and exam not consistent with acute osteomyelitis.  - MRI R foot with post-op changes of 1st & 2nd transmetatarsal with cortical regularity and abnormal signal at distal stumps with extensive edema. Findings concerning for osteo vs chronic changes.  - Biopsies from 6/17 showing no evidence of osteomyelitis involving 2nd metatarsal; 1st metatarsal still in process.  - Wound cultures from ED with MRSA, Providencia rettgeri, Proteus mirabilis, and GNR.  - 1st metatarsal bone culture 6/17 showing E faecalis, diphtheroids.  - Trial of gabapentin 100mg PO qHS with reported symptoms consistent with neuropathic pain.  - Podiatry and wound care following, appreciate assistance.  - ID consulted: given MRSA isolated from a previous culture, ok to treat with vancomycin x 4 weeks (EOC 07/15/22). finish 2 weeks of cefepime for left foot SSTI (EOC 7/1/22)   - midline placed    Antibiotics (From admission, onward)            Start     Stop Route Frequency Ordered    06/28/22 0030  vancomycin 1.25 g in dextrose 5% 250 mL IVPB (ready to mix)         -- IV Every 12 hours (non-standard times) 06/27/22 1308    06/16/22 1000  cefepime in dextrose 5 % IVPB 2 g         -- IV Every 8 hours (non-standard times) 06/16/22 0918    06/15/22 2237  vancomycin - pharmacy to dose  (vancomycin IVPB)        "And" Linked Group Details    -- IV pharmacy to manage frequency 06/15/22 2148        Cultures were taken-   Microbiology Results (last 7 days)     Procedure Component Value Units Date/Time    Aerobic culture [[id number]]  (Abnormal)  (Susceptibility) Collected: 06/17/22 0601    Order Status: Completed Specimen: Wound from " Foot, Right Updated: 06/22/22 1031     Aerobic Bacterial Culture DIPHTHEROIDS  Few        ENTEROCOCCUS FAECALIS  Few      Narrative:      1st metatarsal - piece of bone

## 2022-06-29 NOTE — PROGRESS NOTES
Pharmacokinetic Assessment Follow Up: IV Vancomycin    Vancomycin serum concentration assessment(s):    The trough level was drawn correctly and can be used to guide therapy at this time. The measurement is below the desired definitive target range of 15 to 20 mcg/mL.    Vancomycin Regimen Plan:    Change regimen to Vancomycin 1500 mg IV every 12 hours with next serum trough concentration measured at 1130 prior to next dose on 7/1/22.    Drug levels (last 3 results):  Recent Labs   Lab Result Units 06/27/22  1122 06/29/22  1408   Vancomycin-Trough ug/mL 13.1 13.1       Pharmacy will continue to follow and monitor vancomycin.    Please contact pharmacy at extension [phone] for questions regarding this assessment.    Thank you for the consult,   Virginia Corado       Patient brief summary:  Dave Good is a 56 y.o. male initiated on antimicrobial therapy with IV Vancomycin for treatment of bone/joint infection/endocarditis.    The patient's current regimen is 1500 mg every 12 hours.    Drug Allergies:   Review of patient's allergies indicates:   Allergen Reactions    Ibuprofen Swelling     Facial swelling       Actual Body Weight:   131.7 kg    Renal Function:   Estimated Creatinine Clearance: 96.8 mL/min (based on SCr of 1.2 mg/dL).,     Dialysis Method (if applicable):  N/A    CBC (last 72 hours):  No results for input(s): WHITE BLOOD CELL COUNT, HEMOGLOBIN, HEMATOCRIT, PLATELETS, GRAN%, LYMPH%, MONO%, EOSINOPHIL%, BASOPHIL%, DIFFERENTIAL METHOD in the last 72 hours.    Metabolic Panel (last 72 hours):  Recent Labs   Lab Result Units 06/27/22  1126   Creatinine mg/dL 1.2       Vancomycin Administrations:  vancomycin given in the last 96 hours                     vancomycin 1.25 g in dextrose 5% 250 mL IVPB (ready to mix) (mg) 1,250 mg New Bag 06/29/22 1408     1,250 mg New Bag  0042     1,250 mg New Bag 06/28/22 1142     1,250 mg New Bag  0021    vancomycin in dextrose 5 % 1 gram/250 mL IVPB 1,000 mg (mg) 1,000 mg New  Bag 06/27/22 1228     1,000 mg New Bag  0026     1,000 mg New Bag 06/26/22 1201     1,000 mg New Bag  0012                    Microbiologic Results:  Microbiology Results (last 7 days)       ** No results found for the last 168 hours. **

## 2022-06-29 NOTE — SUBJECTIVE & OBJECTIVE
Interval History: No acute events. Continuing current antibiotics. Attempting LTAC placement     Review of Systems   Constitutional:  Negative for chills, fatigue and fever.   HENT: Negative.     Eyes: Negative.    Respiratory:  Negative for cough and shortness of breath.    Cardiovascular:  Negative for chest pain, palpitations and leg swelling.   Gastrointestinal:  Negative for abdominal pain and vomiting.   Genitourinary: Negative.    Skin: Negative.    Neurological:  Negative for seizures and speech difficulty.   Psychiatric/Behavioral:  Negative for agitation and confusion. The patient is not nervous/anxious.    Objective:     Vital Signs (Most Recent):  Temp: 97.6 °F (36.4 °C) (06/29/22 0845)  Pulse: 85 (06/29/22 0845)  Resp: 18 (06/29/22 0846)  BP: 118/65 (06/29/22 0845)  SpO2: 96 % (06/29/22 0845) Vital Signs (24h Range):  Temp:  [97.6 °F (36.4 °C)-98.1 °F (36.7 °C)] 97.6 °F (36.4 °C)  Pulse:  [73-85] 85  Resp:  [16-18] 18  SpO2:  [93 %-97 %] 96 %  BP: (110-133)/(59-90) 118/65     Weight: 131.7 kg (290 lb 5.5 oz)  Body mass index is 37.28 kg/m².    Intake/Output Summary (Last 24 hours) at 6/29/2022 0920  Last data filed at 6/29/2022 0424  Gross per 24 hour   Intake 720 ml   Output 1000 ml   Net -280 ml        Physical Exam  Vitals and nursing note reviewed.   Constitutional:       General: He is awake. He is not in acute distress.     Appearance: Normal appearance. He is well-developed and well-groomed. He is not ill-appearing, toxic-appearing or diaphoretic.   HENT:      Head: Normocephalic and atraumatic.   Cardiovascular:      Rate and Rhythm: Normal rate.   Pulmonary:      Effort: Pulmonary effort is normal. No tachypnea, accessory muscle usage or respiratory distress.   Neurological:      Mental Status: He is alert and oriented to person, place, and time. Mental status is at baseline.   Psychiatric:         Attention and Perception: Attention normal. He is attentive.         Mood and Affect: Mood  normal. Mood is not anxious.         Speech: Speech normal.         Behavior: Behavior normal. Behavior is cooperative.         Thought Content: Thought content normal.         Cognition and Memory: Cognition and memory normal. Cognition is not impaired. Memory is not impaired. He does not exhibit impaired recent memory.         Judgment: Judgment normal.       Significant Labs: All pertinent labs within the past 24 hours have been reviewed.    Significant Imaging: I have reviewed all pertinent imaging results/findings within the past 24 hours.

## 2022-06-30 LAB
POCT GLUCOSE: 148 MG/DL (ref 70–110)
POCT GLUCOSE: 166 MG/DL (ref 70–110)
POCT GLUCOSE: 181 MG/DL (ref 70–110)
POCT GLUCOSE: 234 MG/DL (ref 70–110)

## 2022-06-30 PROCEDURE — 25000003 PHARM REV CODE 250: Performed by: HOSPITALIST

## 2022-06-30 PROCEDURE — 25000003 PHARM REV CODE 250: Performed by: INTERNAL MEDICINE

## 2022-06-30 PROCEDURE — 63600175 PHARM REV CODE 636 W HCPCS: Performed by: HOSPITALIST

## 2022-06-30 PROCEDURE — 25000003 PHARM REV CODE 250: Performed by: NURSE PRACTITIONER

## 2022-06-30 PROCEDURE — 63600175 PHARM REV CODE 636 W HCPCS: Performed by: NURSE PRACTITIONER

## 2022-06-30 PROCEDURE — 99232 PR SUBSEQUENT HOSPITAL CARE,LEVL II: ICD-10-PCS | Mod: 95,,, | Performed by: HOSPITALIST

## 2022-06-30 PROCEDURE — 11000001 HC ACUTE MED/SURG PRIVATE ROOM

## 2022-06-30 PROCEDURE — 27000207 HC ISOLATION

## 2022-06-30 PROCEDURE — 63600175 PHARM REV CODE 636 W HCPCS: Performed by: INTERNAL MEDICINE

## 2022-06-30 PROCEDURE — 99232 SBSQ HOSP IP/OBS MODERATE 35: CPT | Mod: 95,,, | Performed by: HOSPITALIST

## 2022-06-30 PROCEDURE — 25000003 PHARM REV CODE 250: Performed by: STUDENT IN AN ORGANIZED HEALTH CARE EDUCATION/TRAINING PROGRAM

## 2022-06-30 PROCEDURE — 94761 N-INVAS EAR/PLS OXIMETRY MLT: CPT

## 2022-06-30 RX ADMIN — INSULIN DETEMIR 55 UNITS: 100 INJECTION, SOLUTION SUBCUTANEOUS at 09:06

## 2022-06-30 RX ADMIN — VANCOMYCIN HYDROCHLORIDE 1500 MG: 1.5 INJECTION, POWDER, LYOPHILIZED, FOR SOLUTION INTRAVENOUS at 02:06

## 2022-06-30 RX ADMIN — INSULIN ASPART 4 UNITS: 100 INJECTION, SOLUTION INTRAVENOUS; SUBCUTANEOUS at 04:06

## 2022-06-30 RX ADMIN — INSULIN ASPART 1 UNITS: 100 INJECTION, SOLUTION INTRAVENOUS; SUBCUTANEOUS at 09:06

## 2022-06-30 RX ADMIN — HYDROCODONE BITARTRATE AND ACETAMINOPHEN 1 TABLET: 7.5; 325 TABLET ORAL at 04:06

## 2022-06-30 RX ADMIN — QUETIAPINE FUMARATE 200 MG: 200 TABLET ORAL at 08:06

## 2022-06-30 RX ADMIN — HYDROCODONE BITARTRATE AND ACETAMINOPHEN 1 TABLET: 7.5; 325 TABLET ORAL at 12:06

## 2022-06-30 RX ADMIN — GABAPENTIN 100 MG: 100 CAPSULE ORAL at 08:06

## 2022-06-30 RX ADMIN — VANCOMYCIN HYDROCHLORIDE 1500 MG: 1.5 INJECTION, POWDER, LYOPHILIZED, FOR SOLUTION INTRAVENOUS at 01:06

## 2022-06-30 RX ADMIN — HYDROCHLOROTHIAZIDE 25 MG: 25 TABLET ORAL at 08:06

## 2022-06-30 RX ADMIN — HYDROCODONE BITARTRATE AND ACETAMINOPHEN 1 TABLET: 7.5; 325 TABLET ORAL at 08:06

## 2022-06-30 RX ADMIN — CEFEPIME 2 G: 2 INJECTION, POWDER, FOR SOLUTION INTRAVENOUS at 01:06

## 2022-06-30 RX ADMIN — CEFEPIME 2 G: 2 INJECTION, POWDER, FOR SOLUTION INTRAVENOUS at 10:06

## 2022-06-30 RX ADMIN — ASPIRIN 81 MG: 81 TABLET, COATED ORAL at 08:06

## 2022-06-30 RX ADMIN — ATORVASTATIN CALCIUM 40 MG: 20 TABLET, FILM COATED ORAL at 08:06

## 2022-06-30 RX ADMIN — ENOXAPARIN SODIUM 40 MG: 100 INJECTION SUBCUTANEOUS at 04:06

## 2022-06-30 RX ADMIN — INSULIN ASPART 10 UNITS: 100 INJECTION, SOLUTION INTRAVENOUS; SUBCUTANEOUS at 11:06

## 2022-06-30 RX ADMIN — INSULIN ASPART 2 UNITS: 100 INJECTION, SOLUTION INTRAVENOUS; SUBCUTANEOUS at 11:06

## 2022-06-30 RX ADMIN — INSULIN ASPART 10 UNITS: 100 INJECTION, SOLUTION INTRAVENOUS; SUBCUTANEOUS at 04:06

## 2022-06-30 RX ADMIN — LISINOPRIL 10 MG: 10 TABLET ORAL at 08:06

## 2022-06-30 RX ADMIN — CEFEPIME 2 G: 2 INJECTION, POWDER, FOR SOLUTION INTRAVENOUS at 06:06

## 2022-06-30 RX ADMIN — INSULIN ASPART 10 UNITS: 100 INJECTION, SOLUTION INTRAVENOUS; SUBCUTANEOUS at 08:06

## 2022-06-30 NOTE — PLAN OF CARE
Problem: Adult Inpatient Plan of Care  Goal: Plan of Care Review  Outcome: Ongoing, Progressing  Goal: Patient-Specific Goal (Individualized)  Outcome: Ongoing, Progressing  Goal: Absence of Hospital-Acquired Illness or Injury  Outcome: Ongoing, Progressing  Goal: Optimal Comfort and Wellbeing  Outcome: Ongoing, Progressing  Goal: Readiness for Transition of Care  Outcome: Ongoing, Progressing     Problem: Fall Injury Risk  Goal: Absence of Fall and Fall-Related Injury  Outcome: Ongoing, Progressing     Problem: Diabetes Comorbidity  Goal: Blood Glucose Level Within Targeted Range  Outcome: Ongoing, Progressing     Problem: Fluid and Electrolyte Imbalance (Acute Kidney Injury/Impairment)  Goal: Fluid and Electrolyte Balance  Outcome: Ongoing, Progressing     Problem: Oral Intake Inadequate (Acute Kidney Injury/Impairment)  Goal: Optimal Nutrition Intake  Outcome: Ongoing, Progressing

## 2022-06-30 NOTE — ASSESSMENT & PLAN NOTE
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  - MRI L foot with mild marrow edema of tuft of great toe. Findings and exam not consistent with acute osteomyelitis.  - MRI R foot with post-op changes of 1st & 2nd transmetatarsal with cortical regularity and abnormal signal at distal stumps with extensive edema. Findings concerning for osteo vs chronic changes.  - Biopsies from 6/17 showing no evidence of osteomyelitis involving 2nd metatarsal; 1st metatarsal still in process.  - Wound cultures from ED with MRSA, Providencia rettgeri, Proteus mirabilis, and GNR.  - 1st metatarsal bone culture 6/17 showing E faecalis, diphtheroids.  - Trial of gabapentin 100mg PO qHS with reported symptoms consistent with neuropathic pain.  - Podiatry and wound care following, appreciate assistance.  - ID consulted: given MRSA isolated from a previous culture, ok to treat with vancomycin x 4 weeks (EOC 07/15/22). finish 2 weeks of cefepime for left foot SSTI (EOC 7/1/22)   - midline placed    Antibiotics (From admission, onward)            Start     Stop Route Frequency Ordered    06/30/22 0200  vancomycin 1.5 g in dextrose 5 % 250 mL IVPB (ready to mix)         -- IV Every 12 hours (non-standard times) 06/29/22 1547    06/16/22 1000  cefepime in dextrose 5 % IVPB 2 g         -- IV Every 8 hours (non-standard times) 06/16/22 0918    06/15/22 2237  vancomycin - pharmacy to dose  (vancomycin IVPB)        "And" Linked Group Details    -- IV pharmacy to manage frequency 06/15/22 2148        Cultures were taken-   Microbiology Results (last 7 days)     ** No results found for the last 168 hours. **        "

## 2022-06-30 NOTE — SUBJECTIVE & OBJECTIVE
Interval History: Pt afebrile and HDS. No acute complaints today, no new events overnight. Pending LTAC placement. Continue IV abx.     Review of Systems   Constitutional:  Negative for fever.   Respiratory:  Negative for cough and shortness of breath.    Cardiovascular:  Negative for chest pain.   Gastrointestinal:  Negative for abdominal pain.   Neurological:  Negative for dizziness and headaches.   Psychiatric/Behavioral:  Negative for confusion.    All other systems reviewed and are negative.  Objective:     Vital Signs (Most Recent):  Temp: 97.4 °F (36.3 °C) (06/30/22 1150)  Pulse: 75 (06/30/22 1150)  Resp: 20 (06/30/22 1233)  BP: 133/72 (06/30/22 1150)  SpO2: (!) 93 % (06/30/22 1150)   Vital Signs (24h Range):  Temp:  [97.4 °F (36.3 °C)-98.9 °F (37.2 °C)] 97.4 °F (36.3 °C)  Pulse:  [] 75  Resp:  [16-20] 20  SpO2:  [93 %-99 %] 93 %  BP: (130-139)/(70-79) 133/72     Weight: 131.7 kg (290 lb 5.5 oz)  Body mass index is 37.28 kg/m².    Intake/Output Summary (Last 24 hours) at 6/30/2022 1318  Last data filed at 6/30/2022 0950  Gross per 24 hour   Intake 840 ml   Output 2350 ml   Net -1510 ml      Physical Exam  Vitals and nursing note reviewed.   Constitutional:       Appearance: Normal appearance.   HENT:      Head: Normocephalic and atraumatic.   Eyes:      Extraocular Movements: Extraocular movements intact.      Pupils: Pupils are equal, round, and reactive to light.   Cardiovascular:      Rate and Rhythm: Normal rate and regular rhythm.   Pulmonary:      Effort: Pulmonary effort is normal. No respiratory distress.   Abdominal:      General: Abdomen is flat. There is no distension.   Musculoskeletal:         General: Normal range of motion.      Cervical back: Normal range of motion.      Comments: Left foot in bandage   Neurological:      General: No focal deficit present.      Mental Status: He is alert and oriented to person, place, and time.   Psychiatric:         Mood and Affect: Mood normal.          Behavior: Behavior normal.       Significant Labs: All pertinent labs within the past 24 hours have been reviewed.  CBC: No results for input(s): WBC, HGB, HCT, PLT in the last 48 hours.  CMP: No results for input(s): NA, K, CL, CO2, GLU, BUN, CREATININE, CALCIUM, PROT, ALBUMIN, BILITOT, ALKPHOS, AST, ALT, ANIONGAP, EGFRNONAA in the last 48 hours.    Invalid input(s): [place]    Significant Imaging: I have reviewed all pertinent imaging results/findings within the past 24 hours.

## 2022-06-30 NOTE — PLAN OF CARE
Recommendations  1.Continue 2000kcal DM diet as tolerated.   2.Continue James BID to promote wound healing.    -if PO intake <50% recommend Boost Glucose BID as tolerated.   3.Encourage good PO intake as needed.    Goals: Pt to receive ONS by RD f/u.  Nutrition Goal Status: goal met  Communication of RD Recs:  (POC)

## 2022-06-30 NOTE — ASSESSMENT & PLAN NOTE
Contributing Nutrition Diagnosis  Increased nutrient needs; kcal/protein     Related to (etiology):   Increase demand for nutrients- acute/chronic wounds    Signs and Symptoms (as evidenced by):   Delvis Score 18  Multiple ulcer/ altered skin to LE   PO intake 100%      Interventions/Recommendations (treatment strategy):  Collaboration with other healthcare providers  2000 kcal DM diet  ONS    Nutrition Diagnosis Status:   Continues

## 2022-06-30 NOTE — PROGRESS NOTES
[facility] Medicine  Telemedicine Progress Note    Patient Name: Dave Good  MRN: [id number]  Patient Class: IP- Inpatient   Admission Date: 6/15/2022  Length of Stay: 15 days  Attending Physician: Aminah Ricardo MD  Primary Care Provider: Reyna Jorge NP          Subjective:     Principal Problem:Osteomyelitis of right foot        HPI:  Mr Acosta is a 56 yr old male with a PMH that includes DB 2, COPD, HTN, depression, and right foot transmetatarsal amputation. He was sent to the ED for evaluation of osteomyelitis from a follow up appt with Dr. Flores. Pt had I&D and amputation for wet gangrene in Sept 2021 and debridements in Feb, March, and May of this year. Per his chart, his feet were healing well until some foot trauma occurred. Pt has has open wounds and drainage to BL feet with right foot wound deeper than left. He also has wound to left shin. Pt reports associated 10/10 pain and difficulty walking. He denies fever and chills at home. Podiatry consulted and pt admitted to hospital medicine.       Overview/Hospital Course:  Patient admitted with bilateral foot wounds and concern for osteomyelitis following R foot TMA requiring debridements since for continued wounds. He was started on empiric antibiotics. Bilateral foot MRI done. MRI of left foot with abnormal findings but not consistent with osteomyelitis. MRI of right foot showed changes of 1st and 2nd transmetatarsal amputation with cortical regularity and abnormal signal at distal stumps noting overlying extensive edema and regions of skin ulceration.  Findings may represent early osteomyelitis vs post-operative changes. Bone biopsy done for further evaluation and to guide treatment. ID and podiatry consulted. Bone culture grew diphtheroids and Enterococcus but no good po options, Per ID, given MRSA isolated from a previous culture, ok to treat with vancomycin x 4 weeks. finish 2 weeks of cefepime for left foot SSTI. Pt  now medically stable for DC, pending placement to LTAC.       Interval History: Pt afebrile and HDS. No acute complaints today, no new events overnight. Pending LTAC placement. Continue IV abx.     Review of Systems   Constitutional:  Negative for fever.   Respiratory:  Negative for cough and shortness of breath.    Cardiovascular:  Negative for chest pain.   Gastrointestinal:  Negative for abdominal pain.   Neurological:  Negative for dizziness and headaches.   Psychiatric/Behavioral:  Negative for confusion.    All other systems reviewed and are negative.  Objective:     Vital Signs (Most Recent):  Temp: 97.4 °F (36.3 °C) (06/30/22 1150)  Pulse: 75 (06/30/22 1150)  Resp: 20 (06/30/22 1233)  BP: 133/72 (06/30/22 1150)  SpO2: (!) 93 % (06/30/22 1150)   Vital Signs (24h Range):  Temp:  [97.4 °F (36.3 °C)-98.9 °F (37.2 °C)] 97.4 °F (36.3 °C)  Pulse:  [] 75  Resp:  [16-20] 20  SpO2:  [93 %-99 %] 93 %  BP: (130-139)/(70-79) 133/72     Weight: 131.7 kg (290 lb 5.5 oz)  Body mass index is 37.28 kg/m².    Intake/Output Summary (Last 24 hours) at 6/30/2022 1318  Last data filed at 6/30/2022 0950  Gross per 24 hour   Intake 840 ml   Output 2350 ml   Net -1510 ml      Physical Exam  Vitals and nursing note reviewed.   Constitutional:       Appearance: Normal appearance.   HENT:      Head: Normocephalic and atraumatic.   Eyes:      Extraocular Movements: Extraocular movements intact.      Pupils: Pupils are equal, round, and reactive to light.   Cardiovascular:      Rate and Rhythm: Normal rate and regular rhythm.   Pulmonary:      Effort: Pulmonary effort is normal. No respiratory distress.   Abdominal:      General: Abdomen is flat. There is no distension.   Musculoskeletal:         General: Normal range of motion.      Cervical back: Normal range of motion.      Comments: Left foot in bandage   Neurological:      General: No focal deficit present.      Mental Status: He is alert and oriented to person, place, and  time.   Psychiatric:         Mood and Affect: Mood normal.         Behavior: Behavior normal.       Significant Labs: All pertinent labs within the past 24 hours have been reviewed.  CBC: No results for input(s): WBC, HGB, HCT, PLT in the last 48 hours.  CMP: No results for input(s): NA, K, CL, CO2, GLU, BUN, CREATININE, CALCIUM, PROT, ALBUMIN, BILITOT, ALKPHOS, AST, ALT, ANIONGAP, EGFRNONAA in the last 48 hours.    Invalid input(s): [place]    Significant Imaging: I have reviewed all pertinent imaging results/findings within the past 24 hours.      Assessment/Plan:      * Osteomyelitis of right foot  Left Foot ulcer  - Presented from surgery clinic for concern for infection/osteomyelitis  - Prior history of MRSA bacteremia 2/2 gangrene treated with 6 weeks IV abx for presumed endocarditis  - ESR/CRP elevated but have down trended since prior hospitalization.  - MRI L foot with mild marrow edema of tuft of great toe. Findings and exam not consistent with acute osteomyelitis.  - MRI R foot with post-op changes of 1st & 2nd transmetatarsal with cortical regularity and abnormal signal at distal stumps with extensive edema. Findings concerning for osteo vs chronic changes.  - Biopsies from 6/17 showing no evidence of osteomyelitis involving 2nd metatarsal; 1st metatarsal still in process.  - Wound cultures from ED with MRSA, Providencia rettgeri, Proteus mirabilis, and GNR.  - 1st metatarsal bone culture 6/17 showing E faecalis, diphtheroids.  - Trial of gabapentin 100mg PO qHS with reported symptoms consistent with neuropathic pain.  - Podiatry and wound care following, appreciate assistance.  - ID consulted: given MRSA isolated from a previous culture, ok to treat with vancomycin x 4 weeks (EOC 07/15/22). finish 2 weeks of cefepime for left foot SSTI (EOC 7/1/22)   - midline placed    Antibiotics (From admission, onward)            Start     Stop Route Frequency Ordered    06/30/22 0200  vancomycin 1.5 g in  "dextrose 5 % 250 mL IVPB (ready to mix)         -- IV Every 12 hours (non-standard times) 06/29/22 1547    06/16/22 1000  cefepime in dextrose 5 % IVPB 2 g         -- IV Every 8 hours (non-standard times) 06/16/22 0918    06/15/22 2237  vancomycin - pharmacy to dose  (vancomycin IVPB)        "And" Linked Group Details    -- IV pharmacy to manage frequency 06/15/22 2148        Cultures were taken-   Microbiology Results (last 7 days)     ** No results found for the last 168 hours. **          Increased nutritional needs        Foot ulcer  Plan as above.       HTN (hypertension)  - Reports taking lisinopril and losartan at home.  - Continue lisinopril 10mg PO daily.    Type 2 diabetes mellitus with diabetic peripheral angiopathy without gangrene, with long-term current use of insulin  Hyperlipidemia  - Last A1c 7.8  - On Levemir 50 U qHS and aspart 10 U TID WM at home  - Improving. Continue insulin detemir 55U subQ qHS, insulin aspart 10U subQ TIDWM, moderate-dose sliding scale insulin aspart 1-10U subQ TIDWM PRN.  - Continue aspirin 81mg PO daily, atorvastatin 40mg PO daily.      VTE Risk Mitigation (From admission, onward)         Ordered     enoxaparin injection 40 mg  Daily         06/16/22 0658     IP VTE HIGH RISK PATIENT  Once         06/15/22 2119     Place sequential compression device  Until discontinued         06/15/22 2119                      I have assessed these finding virtually using telemed platform and with assistance of bedside nurse                 The attending portion of this evaluation, treatment, and documentation was performed per Aminah Ricardo MD via Telemedicine AudioVisual using the secure [employer] software platform with 2 way audio/video. The provider was located off-site and the patient is located in the hospital. The aforementioned video software was utilized to document the relevant history and physical exam    Aminah Ricardo MD  Department of Hospital Medicine   [facility] " ([place])

## 2022-06-30 NOTE — PROGRESS NOTES
[facility] ([place])  Adult Nutrition  Progress Note    SUMMARY       Recommendations  1.Continue 2000kcal DM diet as tolerated.   2.Continue James BID to promote wound healing.    -if PO intake <50% recommend Boost Glucose BID as tolerated.   3.Encourage good PO intake as needed.    Goals: Pt to receive ONS by RD f/u.  Nutrition Goal Status: goal met  Communication of RD Recs:  (POC)    Assessment and Plan    Increased nutritional needs  Contributing Nutrition Diagnosis  Increased nutrient needs; kcal/protein     Related to (etiology):   Increase demand for nutrients- acute/chronic wounds    Signs and Symptoms (as evidenced by):   Delvis Score 18  Multiple ulcer/ altered skin to LE   PO intake 100%      Interventions/Recommendations (treatment strategy):  Collaboration with other healthcare providers  2000 kcal DM diet  ONS    Nutrition Diagnosis Status:   Continues    Malnutrition Assessment     Skin (Micronutrient):  (Delvis Score 19)     Reason for Assessment    Reason For Assessment: RD follow-up  Diagnosis:  (osteomyeltitis of R foot)  Relevant Medical History:   Past Medical History:   Diagnosis Date    COPD (chronic obstructive pulmonary disease)     COVID-19     Depression     Diabetes mellitus     Diabetes mellitus, type 2     Hypertension        Interdisciplinary Rounds: did not attend  General Information Comments: 6/30-RD f/u. Continues on 2000kcal DM diet with ONS James BID. Continues to tolerated. PO intake 100%. Toelrating abx. Pending placement. Delvis Score 19. LBM 6/29. Will continue to monitor.    Nutrition Discharge Planning: Pt to follow a DM diet with tetxure modified diet and ONS to aid in wound healing as tolerateed.    Nutrition Risk Screen    Nutrition Risk Screen: no indicators present    Nutrition/Diet History    Spiritual, Cultural Beliefs, Spiritism Practices, Values that Affect Care: no  Factors Affecting Nutritional Intake: None identified at this  "time    Anthropometrics    Temp: 98 °F (36.7 °C)  Height Method: Stated  Height: 6' 2" (188 cm)  Height (inches): 74 in  Weight Method: Bed Scale  Weight: 131.7 kg (290 lb 5.5 oz)  Weight (lb): 290.35 lb  Ideal Body Weight (IBW), Male: 190 lb  % Ideal Body Weight, Male (lb): 150 %  BMI (Calculated): 37.3  BMI Grade: 35 - 39.9 - obesity - grade II     Lab/Procedures/Meds    Pertinent Labs Reviewed: reviewed  Pertinent Labs Comments: none  Pertinent Medications Reviewed: reviewed  Pertinent Medications Comments: atorvastatin, cefepimine, collagenase, enoxaparin, gabapentin, insulin aspart, insulin detemir, lisinopril, quetiapine, vanc.    Estimated/Assessed Needs    Weight Used For Calorie Calculations: 129.3 kg (285 lb 0.9 oz)  Energy Calorie Requirements (kcal): 2100 kcal day  Energy Need Method: [place]-St Jeor  Protein Requirements: 155 g day (1.2 g/kg wound)  Weight Used For Protein Calculations: 129.3 kg (285 lb 0.9 oz)  Estimated Fluid Requirement Method: RDA Method  RDA Method (mL): 2100  CHO Requirement: 263 g day    Nutrition Prescription Ordered    Current Diet Order: 2000kcal DM diet    Evaluation of Received Nutrient/Fluid Intake    I/O: -6.43L since admit  Energy Calories Required: meeting needs  Protein Required: meeting needs  Fluid Required: meeting needs  Comments: LBM 6/29  Tolerance: tolerating  % Intake of Estimated Energy Needs: 75 - 100 %  % Meal Intake: 75 - 100 %    Nutrition Risk    Level of Risk/Frequency of Follow-up: low (1 x weekly)     Monitor and Evaluation    Food and Nutrient Intake: food and beverage intake, energy intake  Food and Nutrient Adminstration: diet order  Knowledge/Beliefs/Attitudes: food and nutrition knowledge/skill  Physical Activity and Function: nutrition-related ADLs and IADLs  Anthropometric Measurements: weight, weight change, body mass index  Biochemical Data, Medical Tests and Procedures: glucose/endocrine profile, gastrointestinal profile, electrolyte and renal " panel  Nutrition-Focused Physical Findings: overall appearance, skin     Nutrition Follow-Up    RD Follow-up?: Yes

## 2022-06-30 NOTE — PLAN OF CARE
06/30/22 0936   Post-Acute Status   Post-Acute Authorization Placement   Post-Acute Placement Status Pending payor review/awaiting authorization (if required)   IV Infusion Status Referral(s) sent   Discharge Delays (!) Post-Acute Set-up   Discharge Plan   Discharge Plan A Long-term acute care facility (LTAC)   Discharge Plan B Home Health      CM contacted concerning the patient discharge placement. Winsome reported the patient insurance denied him for LTAC services , Winsome went on to state the are appealing the denial and will update me when she receive a response.  ARLENE updated patient family & attending MD     [phone]---- Winsome at [facility]

## 2022-07-01 LAB
ANION GAP SERPL CALC-SCNC: 9 MMOL/L (ref 8–16)
BASOPHILS # BLD AUTO: 0.05 K/UL (ref 0–0.2)
BASOPHILS NFR BLD: 0.7 % (ref 0–1.9)
BUN SERPL-MCNC: 25 MG/DL (ref 6–20)
CALCIUM SERPL-MCNC: 10 MG/DL (ref 8.7–10.5)
CHLORIDE SERPL-SCNC: 99 MMOL/L (ref 95–110)
CO2 SERPL-SCNC: 30 MMOL/L (ref 23–29)
CREAT SERPL-MCNC: 1.2 MG/DL (ref 0.5–1.4)
CREAT SERPL-MCNC: 1.4 MG/DL (ref 0.5–1.4)
DIFFERENTIAL METHOD: ABNORMAL
EOSINOPHIL # BLD AUTO: 0.6 K/UL (ref 0–0.5)
EOSINOPHIL NFR BLD: 7.7 % (ref 0–8)
ERYTHROCYTE [DISTWIDTH] IN BLOOD BY AUTOMATED COUNT: 14.5 % (ref 11.5–14.5)
EST. GFR  (AFRICAN AMERICAN): >60 ML/MIN/1.73 M^2
EST. GFR  (AFRICAN AMERICAN): >60 ML/MIN/1.73 M^2
EST. GFR  (NON AFRICAN AMERICAN): 55 ML/MIN/1.73 M^2
EST. GFR  (NON AFRICAN AMERICAN): >60 ML/MIN/1.73 M^2
GLUCOSE SERPL-MCNC: 225 MG/DL (ref 70–110)
HCT VFR BLD AUTO: 41.6 % (ref 40–54)
HGB BLD-MCNC: 12.8 G/DL (ref 14–18)
IMM GRANULOCYTES # BLD AUTO: 0.02 K/UL (ref 0–0.04)
IMM GRANULOCYTES NFR BLD AUTO: 0.3 % (ref 0–0.5)
LYMPHOCYTES # BLD AUTO: 2.8 K/UL (ref 1–4.8)
LYMPHOCYTES NFR BLD: 37.9 % (ref 18–48)
MAGNESIUM SERPL-MCNC: 1.9 MG/DL (ref 1.6–2.6)
MCH RBC QN AUTO: 27.8 PG (ref 27–31)
MCHC RBC AUTO-ENTMCNC: 30.8 G/DL (ref 32–36)
MCV RBC AUTO: 90 FL (ref 82–98)
MONOCYTES # BLD AUTO: 0.9 K/UL (ref 0.3–1)
MONOCYTES NFR BLD: 12 % (ref 4–15)
NEUTROPHILS # BLD AUTO: 3.1 K/UL (ref 1.8–7.7)
NEUTROPHILS NFR BLD: 41.4 % (ref 38–73)
NRBC BLD-RTO: 0 /100 WBC
PHOSPHATE SERPL-MCNC: 3.3 MG/DL (ref 2.7–4.5)
PLATELET # BLD AUTO: 319 K/UL (ref 150–450)
PMV BLD AUTO: 9.1 FL (ref 9.2–12.9)
POCT GLUCOSE: 184 MG/DL (ref 70–110)
POCT GLUCOSE: 198 MG/DL (ref 70–110)
POCT GLUCOSE: 204 MG/DL (ref 70–110)
POCT GLUCOSE: 219 MG/DL (ref 70–110)
POTASSIUM SERPL-SCNC: 4.6 MMOL/L (ref 3.5–5.1)
RBC # BLD AUTO: 4.6 M/UL (ref 4.6–6.2)
SODIUM SERPL-SCNC: 138 MMOL/L (ref 136–145)
VANCOMYCIN TROUGH SERPL-MCNC: 15.2 UG/ML (ref 10–22)
WBC # BLD AUTO: 7.39 K/UL (ref 3.9–12.7)

## 2022-07-01 PROCEDURE — 25000003 PHARM REV CODE 250: Performed by: STUDENT IN AN ORGANIZED HEALTH CARE EDUCATION/TRAINING PROGRAM

## 2022-07-01 PROCEDURE — 99232 SBSQ HOSP IP/OBS MODERATE 35: CPT | Mod: 95,,, | Performed by: HOSPITALIST

## 2022-07-01 PROCEDURE — 25000003 PHARM REV CODE 250: Performed by: INTERNAL MEDICINE

## 2022-07-01 PROCEDURE — 83735 ASSAY OF MAGNESIUM: CPT | Performed by: HOSPITALIST

## 2022-07-01 PROCEDURE — 63600175 PHARM REV CODE 636 W HCPCS: Performed by: NURSE PRACTITIONER

## 2022-07-01 PROCEDURE — 27000207 HC ISOLATION

## 2022-07-01 PROCEDURE — 85025 COMPLETE CBC W/AUTO DIFF WBC: CPT | Performed by: HOSPITALIST

## 2022-07-01 PROCEDURE — 94761 N-INVAS EAR/PLS OXIMETRY MLT: CPT

## 2022-07-01 PROCEDURE — 63600175 PHARM REV CODE 636 W HCPCS: Performed by: INTERNAL MEDICINE

## 2022-07-01 PROCEDURE — 80202 ASSAY OF VANCOMYCIN: CPT | Performed by: HOSPITALIST

## 2022-07-01 PROCEDURE — 63600175 PHARM REV CODE 636 W HCPCS: Performed by: HOSPITALIST

## 2022-07-01 PROCEDURE — 25000003 PHARM REV CODE 250: Performed by: NURSE PRACTITIONER

## 2022-07-01 PROCEDURE — 25000003 PHARM REV CODE 250: Performed by: HOSPITALIST

## 2022-07-01 PROCEDURE — 82565 ASSAY OF CREATININE: CPT | Performed by: HOSPITALIST

## 2022-07-01 PROCEDURE — 11000001 HC ACUTE MED/SURG PRIVATE ROOM

## 2022-07-01 PROCEDURE — 36415 COLL VENOUS BLD VENIPUNCTURE: CPT | Performed by: HOSPITALIST

## 2022-07-01 PROCEDURE — 99232 PR SUBSEQUENT HOSPITAL CARE,LEVL II: ICD-10-PCS | Mod: 95,,, | Performed by: HOSPITALIST

## 2022-07-01 PROCEDURE — 80048 BASIC METABOLIC PNL TOTAL CA: CPT | Performed by: HOSPITALIST

## 2022-07-01 PROCEDURE — 84100 ASSAY OF PHOSPHORUS: CPT | Performed by: HOSPITALIST

## 2022-07-01 RX ADMIN — HYDROCODONE BITARTRATE AND ACETAMINOPHEN 1 TABLET: 7.5; 325 TABLET ORAL at 12:07

## 2022-07-01 RX ADMIN — INSULIN ASPART 10 UNITS: 100 INJECTION, SOLUTION INTRAVENOUS; SUBCUTANEOUS at 12:07

## 2022-07-01 RX ADMIN — VANCOMYCIN HYDROCHLORIDE 1500 MG: 1.5 INJECTION, POWDER, LYOPHILIZED, FOR SOLUTION INTRAVENOUS at 02:07

## 2022-07-01 RX ADMIN — HYDROCODONE BITARTRATE AND ACETAMINOPHEN 1 TABLET: 7.5; 325 TABLET ORAL at 05:07

## 2022-07-01 RX ADMIN — INSULIN ASPART 10 UNITS: 100 INJECTION, SOLUTION INTRAVENOUS; SUBCUTANEOUS at 04:07

## 2022-07-01 RX ADMIN — ATORVASTATIN CALCIUM 40 MG: 20 TABLET, FILM COATED ORAL at 08:07

## 2022-07-01 RX ADMIN — CEFEPIME 2 G: 2 INJECTION, POWDER, FOR SOLUTION INTRAVENOUS at 12:07

## 2022-07-01 RX ADMIN — INSULIN DETEMIR 55 UNITS: 100 INJECTION, SOLUTION SUBCUTANEOUS at 09:07

## 2022-07-01 RX ADMIN — HYDROCODONE BITARTRATE AND ACETAMINOPHEN 1 TABLET: 7.5; 325 TABLET ORAL at 01:07

## 2022-07-01 RX ADMIN — LISINOPRIL 10 MG: 10 TABLET ORAL at 08:07

## 2022-07-01 RX ADMIN — INSULIN ASPART 4 UNITS: 100 INJECTION, SOLUTION INTRAVENOUS; SUBCUTANEOUS at 08:07

## 2022-07-01 RX ADMIN — QUETIAPINE FUMARATE 200 MG: 200 TABLET ORAL at 09:07

## 2022-07-01 RX ADMIN — CEFEPIME 2 G: 2 INJECTION, POWDER, FOR SOLUTION INTRAVENOUS at 10:07

## 2022-07-01 RX ADMIN — CEFEPIME 2 G: 2 INJECTION, POWDER, FOR SOLUTION INTRAVENOUS at 06:07

## 2022-07-01 RX ADMIN — INSULIN ASPART 4 UNITS: 100 INJECTION, SOLUTION INTRAVENOUS; SUBCUTANEOUS at 04:07

## 2022-07-01 RX ADMIN — INSULIN ASPART 2 UNITS: 100 INJECTION, SOLUTION INTRAVENOUS; SUBCUTANEOUS at 12:07

## 2022-07-01 RX ADMIN — HYDROCHLOROTHIAZIDE 25 MG: 25 TABLET ORAL at 08:07

## 2022-07-01 RX ADMIN — ASPIRIN 81 MG: 81 TABLET, COATED ORAL at 08:07

## 2022-07-01 RX ADMIN — INSULIN ASPART 10 UNITS: 100 INJECTION, SOLUTION INTRAVENOUS; SUBCUTANEOUS at 08:07

## 2022-07-01 RX ADMIN — HYDROCODONE BITARTRATE AND ACETAMINOPHEN 1 TABLET: 7.5; 325 TABLET ORAL at 09:07

## 2022-07-01 RX ADMIN — VANCOMYCIN HYDROCHLORIDE 1500 MG: 1.5 INJECTION, POWDER, LYOPHILIZED, FOR SOLUTION INTRAVENOUS at 01:07

## 2022-07-01 RX ADMIN — INSULIN ASPART 1 UNITS: 100 INJECTION, SOLUTION INTRAVENOUS; SUBCUTANEOUS at 09:07

## 2022-07-01 RX ADMIN — ENOXAPARIN SODIUM 40 MG: 100 INJECTION SUBCUTANEOUS at 04:07

## 2022-07-01 RX ADMIN — GABAPENTIN 100 MG: 100 CAPSULE ORAL at 09:07

## 2022-07-01 NOTE — PROGRESS NOTES
Pharmacokinetic Assessment Follow Up: IV Vancomycin    Vancomycin serum concentration assessment(s):    The trough level was drawn correctly and can be used to guide therapy at this time. The measurement is within the desired definitive target range of 15 to 20 mcg/mL.    Vancomycin Regimen Plan:    Continue regimen to Vancomycin 1500 mg IV every 12 hours with next serum trough concentration measured at 1330 prior to next dose on 7/3/22.    Drug levels (last 3 results):  Recent Labs   Lab Result Units 06/29/22  1408 07/01/22  1258   Vancomycin-Trough ug/mL 13.1 15.2       Pharmacy will continue to follow and monitor vancomycin.    Please contact pharmacy at extension [phone] for questions regarding this assessment.    Thank you for the consult,   Virginia Corado       Patient brief summary:  Dave Good is a 56 y.o. male initiated on antimicrobial therapy with IV Vancomycin for treatment of endocarditis/bone/joint.    The patient's current regimen is 1500 mg IVPB every 12 hours.    Drug Allergies:   Review of patient's allergies indicates:   Allergen Reactions    Ibuprofen Swelling     Facial swelling       Actual Body Weight:   131.7 kg    Renal Function:   Estimated Creatinine Clearance: 96.8 mL/min (based on SCr of 1.2 mg/dL).,     Dialysis Method (if applicable):  N/A    CBC (last 72 hours):  Recent Labs   Lab Result Units 07/01/22  0522   WBC K/uL 7.39   Hemoglobin g/dL 12.8*   Hematocrit % 41.6   Platelets K/uL 319   Gran % % 41.4   Lymph % % 37.9   Mono % % 12.0   Eosinophil % % 7.7   Basophil % % 0.7   Differential Method  Automated       Metabolic Panel (last 72 hours):  Recent Labs   Lab Result Units 07/01/22  0522 07/01/22  1258   Sodium mmol/L 138  --    Potassium mmol/L 4.6  --    Chloride mmol/L 99  --    CO2 mmol/L 30*  --    Glucose mg/dL 225*  --    BUN mg/dL 25*  --    Creatinine mg/dL 1.4 1.2   Magnesium mg/dL 1.9  --    Phosphorus mg/dL 3.3  --        Vancomycin Administrations:  vancomycin given in  the last 96 hours                     vancomycin 1.5 g in dextrose 5 % 250 mL IVPB (ready to mix) (mg) 1,500 mg New Bag 07/01/22 0132     1,500 mg New Bag 06/30/22 1431     1,500 mg New Bag  0146    vancomycin 1.25 g in dextrose 5% 250 mL IVPB (ready to mix) (mg) 1,250 mg New Bag 06/29/22 1408     1,250 mg New Bag  0042     1,250 mg New Bag 06/28/22 1142     1,250 mg New Bag  0021                    Microbiologic Results:  Microbiology Results (last 7 days)       ** No results found for the last 168 hours. **

## 2022-07-01 NOTE — SUBJECTIVE & OBJECTIVE
Interval History: Pt afebrile and HDS. No acute complaints today, no new events overnight. Pending LTAC placement.     Review of Systems   Constitutional:  Negative for fever.   Respiratory:  Negative for cough and shortness of breath.    Cardiovascular:  Negative for chest pain.   Gastrointestinal:  Negative for abdominal pain.   Neurological:  Negative for dizziness and headaches.   Psychiatric/Behavioral:  Negative for confusion.    All other systems reviewed and are negative.  Objective:     Vital Signs (Most Recent):  Temp: 97.6 °F (36.4 °C) (07/01/22 0716)  Pulse: 69 (07/01/22 0716)  Resp: 20 (07/01/22 0905)  BP: (!) 141/74 (07/01/22 0716)  SpO2: 95 % (07/01/22 0716)   Vital Signs (24h Range):  Temp:  [97.6 °F (36.4 °C)-98.1 °F (36.7 °C)] 97.6 °F (36.4 °C)  Pulse:  [69-77] 69  Resp:  [16-20] 20  SpO2:  [94 %-98 %] 95 %  BP: (122-147)/(63-81) 141/74     Weight: 131.7 kg (290 lb 5.5 oz)  Body mass index is 37.28 kg/m².    Intake/Output Summary (Last 24 hours) at 7/1/2022 1203  Last data filed at 7/1/2022 0504  Gross per 24 hour   Intake 1200 ml   Output 2300 ml   Net -1100 ml        Physical Exam  Vitals and nursing note reviewed.   Constitutional:       Appearance: Normal appearance.   HENT:      Head: Normocephalic and atraumatic.   Eyes:      Extraocular Movements: Extraocular movements intact.      Pupils: Pupils are equal, round, and reactive to light.   Cardiovascular:      Rate and Rhythm: Normal rate and regular rhythm.   Pulmonary:      Effort: Pulmonary effort is normal. No respiratory distress.   Abdominal:      General: Abdomen is flat. There is no distension.   Musculoskeletal:         General: Normal range of motion.      Cervical back: Normal range of motion.      Comments: Left foot in bandage   Neurological:      General: No focal deficit present.      Mental Status: He is alert and oriented to person, place, and time.   Psychiatric:         Mood and Affect: Mood normal.         Behavior:  Behavior normal.       Significant Labs: All pertinent labs within the past 24 hours have been reviewed.  CBC:   Recent Labs   Lab 07/01/22 0522   WBC 7.39   HGB 12.8*   HCT 41.6        CMP:   Recent Labs   Lab 07/01/22 0522      K 4.6   CL 99   CO2 30*   *   BUN 25*   CREATININE 1.4   CALCIUM 10.0   ANIONGAP 9   EGFRNONAA 55*       Significant Imaging: I have reviewed all pertinent imaging results/findings within the past 24 hours.

## 2022-07-01 NOTE — PLAN OF CARE
POC reviewed with pt he voiced understanding. Pt noted AAOx4 throughout shift able to make all needs know. Pt encouraged to shift weight in bed to prevent pressure area he voiced understanding. Pt medicated for pain this shift see MAR. All dressings noted clean dry and intact. Cb in reach will monitor pt.   Problem: Adult Inpatient Plan of Care  Goal: Plan of Care Review  Outcome: Ongoing, Progressing  Goal: Patient-Specific Goal (Individualized)  Outcome: Ongoing, Progressing  Goal: Absence of Hospital-Acquired Illness or Injury  Outcome: Ongoing, Progressing  Goal: Optimal Comfort and Wellbeing  Outcome: Ongoing, Progressing  Goal: Readiness for Transition of Care  Outcome: Ongoing, Progressing     Problem: Fall Injury Risk  Goal: Absence of Fall and Fall-Related Injury  Outcome: Ongoing, Progressing     Problem: Diabetes Comorbidity  Goal: Blood Glucose Level Within Targeted Range  Outcome: Ongoing, Progressing     Problem: Fluid and Electrolyte Imbalance (Acute Kidney Injury/Impairment)  Goal: Fluid and Electrolyte Balance  Outcome: Ongoing, Progressing     Problem: Oral Intake Inadequate (Acute Kidney Injury/Impairment)  Goal: Optimal Nutrition Intake  Outcome: Ongoing, Progressing     Problem: Renal Function Impairment (Acute Kidney Injury/Impairment)  Goal: Effective Renal Function  Outcome: Ongoing, Progressing

## 2022-07-01 NOTE — PROGRESS NOTES
[facility] Medicine  Telemedicine Progress Note    Patient Name: Dave Good  MRN: [id number]  Patient Class: IP- Inpatient   Admission Date: 6/15/2022  Length of Stay: 16 days  Attending Physician: Aminah Ricardo MD  Primary Care Provider: Reyna Jorge NP          Subjective:     Principal Problem:Osteomyelitis of right foot        HPI:  Mr Acosta is a 56 yr old male with a PMH that includes DB 2, COPD, HTN, depression, and right foot transmetatarsal amputation. He was sent to the ED for evaluation of osteomyelitis from a follow up appt with Dr. Flores. Pt had I&D and amputation for wet gangrene in Sept 2021 and debridements in Feb, March, and May of this year. Per his chart, his feet were healing well until some foot trauma occurred. Pt has has open wounds and drainage to BL feet with right foot wound deeper than left. He also has wound to left shin. Pt reports associated 10/10 pain and difficulty walking. He denies fever and chills at home. Podiatry consulted and pt admitted to hospital medicine.       Overview/Hospital Course:  Patient admitted with bilateral foot wounds and concern for osteomyelitis following R foot TMA requiring debridements since for continued wounds. He was started on empiric antibiotics. Bilateral foot MRI done. MRI of left foot with abnormal findings but not consistent with osteomyelitis. MRI of right foot showed changes of 1st and 2nd transmetatarsal amputation with cortical regularity and abnormal signal at distal stumps noting overlying extensive edema and regions of skin ulceration.  Findings may represent early osteomyelitis vs post-operative changes. Bone biopsy done for further evaluation and to guide treatment. ID and podiatry consulted. Bone culture grew diphtheroids and Enterococcus but no good po options, Per ID, given MRSA isolated from a previous culture, ok to treat with vancomycin x 4 weeks. finish 2 weeks of cefepime for left foot SSTI. Pt  now medically stable for DC, pending placement to LTAC.       Interval History: Pt afebrile and HDS. No acute complaints today, no new events overnight. Pending LTAC placement.     Review of Systems   Constitutional:  Negative for fever.   Respiratory:  Negative for cough and shortness of breath.    Cardiovascular:  Negative for chest pain.   Gastrointestinal:  Negative for abdominal pain.   Neurological:  Negative for dizziness and headaches.   Psychiatric/Behavioral:  Negative for confusion.    All other systems reviewed and are negative.  Objective:     Vital Signs (Most Recent):  Temp: 97.6 °F (36.4 °C) (07/01/22 0716)  Pulse: 69 (07/01/22 0716)  Resp: 20 (07/01/22 0905)  BP: (!) 141/74 (07/01/22 0716)  SpO2: 95 % (07/01/22 0716)   Vital Signs (24h Range):  Temp:  [97.6 °F (36.4 °C)-98.1 °F (36.7 °C)] 97.6 °F (36.4 °C)  Pulse:  [69-77] 69  Resp:  [16-20] 20  SpO2:  [94 %-98 %] 95 %  BP: (122-147)/(63-81) 141/74     Weight: 131.7 kg (290 lb 5.5 oz)  Body mass index is 37.28 kg/m².    Intake/Output Summary (Last 24 hours) at 7/1/2022 1203  Last data filed at 7/1/2022 0504  Gross per 24 hour   Intake 1200 ml   Output 2300 ml   Net -1100 ml        Physical Exam  Vitals and nursing note reviewed.   Constitutional:       Appearance: Normal appearance.   HENT:      Head: Normocephalic and atraumatic.   Eyes:      Extraocular Movements: Extraocular movements intact.      Pupils: Pupils are equal, round, and reactive to light.   Cardiovascular:      Rate and Rhythm: Normal rate and regular rhythm.   Pulmonary:      Effort: Pulmonary effort is normal. No respiratory distress.   Abdominal:      General: Abdomen is flat. There is no distension.   Musculoskeletal:         General: Normal range of motion.      Cervical back: Normal range of motion.      Comments: Left foot in bandage   Neurological:      General: No focal deficit present.      Mental Status: He is alert and oriented to person, place, and time.   Psychiatric:          Mood and Affect: Mood normal.         Behavior: Behavior normal.       Significant Labs: All pertinent labs within the past 24 hours have been reviewed.  CBC:   Recent Labs   Lab 07/01/22  0522   WBC 7.39   HGB 12.8*   HCT 41.6        CMP:   Recent Labs   Lab 07/01/22  0522      K 4.6   CL 99   CO2 30*   *   BUN 25*   CREATININE 1.4   CALCIUM 10.0   ANIONGAP 9   EGFRNONAA 55*       Significant Imaging: I have reviewed all pertinent imaging results/findings within the past 24 hours.      Assessment/Plan:      * Osteomyelitis of right foot  Left Foot ulcer  - Presented from surgery clinic for concern for infection/osteomyelitis  - Prior history of MRSA bacteremia 2/2 gangrene treated with 6 weeks IV abx for presumed endocarditis  - ESR/CRP elevated but have down trended since prior hospitalization.  - MRI L foot with mild marrow edema of tuft of great toe. Findings and exam not consistent with acute osteomyelitis.  - MRI R foot with post-op changes of 1st & 2nd transmetatarsal with cortical regularity and abnormal signal at distal stumps with extensive edema. Findings concerning for osteo vs chronic changes.  - Biopsies from 6/17 showing no evidence of osteomyelitis involving 2nd metatarsal; 1st metatarsal still in process.  - Wound cultures from ED with MRSA, Providencia rettgeri, Proteus mirabilis, and GNR.  - 1st metatarsal bone culture 6/17 showing E faecalis, diphtheroids.  - Trial of gabapentin 100mg PO qHS with reported symptoms consistent with neuropathic pain.  - Podiatry and wound care following, appreciate assistance.  - ID consulted: given MRSA isolated from a previous culture, ok to treat with vancomycin x 4 weeks (EOC 07/15/22). finish 2 weeks of cefepime for left foot SSTI (EOC 7/1/22)   - midline placed    Antibiotics (From admission, onward)            Start     Stop Route Frequency Ordered    06/30/22 0200  vancomycin 1.5 g in dextrose 5 % 250 mL IVPB (ready to mix)          "-- IV Every 12 hours (non-standard times) 06/29/22 1547    06/16/22 1000  cefepime in dextrose 5 % IVPB 2 g         -- IV Every 8 hours (non-standard times) 06/16/22 0918    06/15/22 2237  vancomycin - pharmacy to dose  (vancomycin IVPB)        "And" Linked Group Details    -- IV pharmacy to manage frequency 06/15/22 2148        Cultures were taken-   Microbiology Results (last 7 days)     ** No results found for the last 168 hours. **          Increased nutritional needs        Foot ulcer  Plan as above.       HTN (hypertension)  - Reports taking lisinopril and losartan at home.  - Continue lisinopril 10mg PO daily.    Type 2 diabetes mellitus with diabetic peripheral angiopathy without gangrene, with long-term current use of insulin  Hyperlipidemia  - Last A1c 7.8  - On Levemir 50 U qHS and aspart 10 U TID WM at home  - Improving. Continue insulin detemir 55U subQ qHS, insulin aspart 10U subQ TIDWM, moderate-dose sliding scale insulin aspart 1-10U subQ TIDWM PRN.  - Continue aspirin 81mg PO daily, atorvastatin 40mg PO daily.      VTE Risk Mitigation (From admission, onward)         Ordered     enoxaparin injection 40 mg  Daily         06/16/22 0658     IP VTE HIGH RISK PATIENT  Once         06/15/22 2119     Place sequential compression device  Until discontinued         06/15/22 2119                      I have assessed these finding virtually using telemed platform and with assistance of bedside nurse                 The attending portion of this evaluation, treatment, and documentation was performed per Aminah Ricardo MD via Telemedicine AudioVisual using the secure [employer] software platform with 2 way audio/video. The provider was located off-site and the patient is located in the hospital. The aforementioned video software was utilized to document the relevant history and physical exam    Aminah Ricardo MD  Department of Hospital Medicine   [facility] ([place])  "

## 2022-07-01 NOTE — PLAN OF CARE
ARLENE was informed by Winsome with [facility] , she is submitting a expiated appeal to the patient insurance. ARLENE updated patient family and attending MD.             07/01/22 8441   Post-Acute Status   Post-Acute Authorization Placement   Post-Acute Placement Status Pending payor medical review/second level review   IV Infusion Status Referral(s) sent   Discharge Delays (!) Post-Acute Set-up   Discharge Plan   Discharge Plan A Long-term acute care facility (LTAC)

## 2022-07-02 LAB
POCT GLUCOSE: 174 MG/DL (ref 70–110)
POCT GLUCOSE: 206 MG/DL (ref 70–110)
POCT GLUCOSE: 209 MG/DL (ref 70–110)
POCT GLUCOSE: 232 MG/DL (ref 70–110)

## 2022-07-02 PROCEDURE — 63600175 PHARM REV CODE 636 W HCPCS: Performed by: HOSPITALIST

## 2022-07-02 PROCEDURE — 25000003 PHARM REV CODE 250: Performed by: NURSE PRACTITIONER

## 2022-07-02 PROCEDURE — 63600175 PHARM REV CODE 636 W HCPCS: Performed by: INTERNAL MEDICINE

## 2022-07-02 PROCEDURE — 99232 PR SUBSEQUENT HOSPITAL CARE,LEVL II: ICD-10-PCS | Mod: 95,,, | Performed by: HOSPITALIST

## 2022-07-02 PROCEDURE — 63600175 PHARM REV CODE 636 W HCPCS: Performed by: NURSE PRACTITIONER

## 2022-07-02 PROCEDURE — 99232 SBSQ HOSP IP/OBS MODERATE 35: CPT | Mod: 95,,, | Performed by: HOSPITALIST

## 2022-07-02 PROCEDURE — 25000003 PHARM REV CODE 250: Performed by: HOSPITALIST

## 2022-07-02 PROCEDURE — 25000003 PHARM REV CODE 250: Performed by: STUDENT IN AN ORGANIZED HEALTH CARE EDUCATION/TRAINING PROGRAM

## 2022-07-02 PROCEDURE — 94761 N-INVAS EAR/PLS OXIMETRY MLT: CPT

## 2022-07-02 PROCEDURE — 25000003 PHARM REV CODE 250: Performed by: INTERNAL MEDICINE

## 2022-07-02 PROCEDURE — 11000001 HC ACUTE MED/SURG PRIVATE ROOM

## 2022-07-02 PROCEDURE — 27000207 HC ISOLATION

## 2022-07-02 RX ORDER — INSULIN ASPART 100 [IU]/ML
13 INJECTION, SOLUTION INTRAVENOUS; SUBCUTANEOUS
Status: DISCONTINUED | OUTPATIENT
Start: 2022-07-02 | End: 2022-07-15 | Stop reason: HOSPADM

## 2022-07-02 RX ADMIN — CEFEPIME 2 G: 2 INJECTION, POWDER, FOR SOLUTION INTRAVENOUS at 10:07

## 2022-07-02 RX ADMIN — HYDROCHLOROTHIAZIDE 25 MG: 25 TABLET ORAL at 08:07

## 2022-07-02 RX ADMIN — QUETIAPINE FUMARATE 200 MG: 200 TABLET ORAL at 08:07

## 2022-07-02 RX ADMIN — ENOXAPARIN SODIUM 40 MG: 100 INJECTION SUBCUTANEOUS at 04:07

## 2022-07-02 RX ADMIN — VANCOMYCIN HYDROCHLORIDE 1500 MG: 1.5 INJECTION, POWDER, LYOPHILIZED, FOR SOLUTION INTRAVENOUS at 01:07

## 2022-07-02 RX ADMIN — INSULIN ASPART 4 UNITS: 100 INJECTION, SOLUTION INTRAVENOUS; SUBCUTANEOUS at 07:07

## 2022-07-02 RX ADMIN — ATORVASTATIN CALCIUM 40 MG: 20 TABLET, FILM COATED ORAL at 08:07

## 2022-07-02 RX ADMIN — ASPIRIN 81 MG: 81 TABLET, COATED ORAL at 08:07

## 2022-07-02 RX ADMIN — INSULIN ASPART 4 UNITS: 100 INJECTION, SOLUTION INTRAVENOUS; SUBCUTANEOUS at 05:07

## 2022-07-02 RX ADMIN — INSULIN ASPART 10 UNITS: 100 INJECTION, SOLUTION INTRAVENOUS; SUBCUTANEOUS at 12:07

## 2022-07-02 RX ADMIN — INSULIN ASPART 10 UNITS: 100 INJECTION, SOLUTION INTRAVENOUS; SUBCUTANEOUS at 07:07

## 2022-07-02 RX ADMIN — HYDROCODONE BITARTRATE AND ACETAMINOPHEN 1 TABLET: 7.5; 325 TABLET ORAL at 01:07

## 2022-07-02 RX ADMIN — HYDROCODONE BITARTRATE AND ACETAMINOPHEN 1 TABLET: 7.5; 325 TABLET ORAL at 08:07

## 2022-07-02 RX ADMIN — INSULIN DETEMIR 55 UNITS: 100 INJECTION, SOLUTION SUBCUTANEOUS at 09:07

## 2022-07-02 RX ADMIN — INSULIN ASPART 2 UNITS: 100 INJECTION, SOLUTION INTRAVENOUS; SUBCUTANEOUS at 09:07

## 2022-07-02 RX ADMIN — GABAPENTIN 100 MG: 100 CAPSULE ORAL at 08:07

## 2022-07-02 RX ADMIN — HYDROCODONE BITARTRATE AND ACETAMINOPHEN 1 TABLET: 7.5; 325 TABLET ORAL at 12:07

## 2022-07-02 RX ADMIN — VANCOMYCIN HYDROCHLORIDE 1500 MG: 1.5 INJECTION, POWDER, LYOPHILIZED, FOR SOLUTION INTRAVENOUS at 02:07

## 2022-07-02 RX ADMIN — INSULIN ASPART 13 UNITS: 100 INJECTION, SOLUTION INTRAVENOUS; SUBCUTANEOUS at 05:07

## 2022-07-02 RX ADMIN — CEFEPIME 2 G: 2 INJECTION, POWDER, FOR SOLUTION INTRAVENOUS at 01:07

## 2022-07-02 RX ADMIN — COLLAGENASE SANTYL: 250 OINTMENT TOPICAL at 06:07

## 2022-07-02 RX ADMIN — HYDROCODONE BITARTRATE AND ACETAMINOPHEN 1 TABLET: 7.5; 325 TABLET ORAL at 04:07

## 2022-07-02 RX ADMIN — LISINOPRIL 10 MG: 10 TABLET ORAL at 08:07

## 2022-07-02 RX ADMIN — INSULIN ASPART 2 UNITS: 100 INJECTION, SOLUTION INTRAVENOUS; SUBCUTANEOUS at 12:07

## 2022-07-02 NOTE — PROGRESS NOTES
[facility] [place] [facility] Medicine  Telemedicine Progress Note    Patient Name: Dave Good  MRN: [id number]  Patient Class: IP- Inpatient   Admission Date: 6/15/2022  Length of Stay: 17 days  Attending Physician: Jayde Saucedo MD  Primary Care Provider: Reyna Jorge NP          Subjective:     Principal Problem:Osteomyelitis of right foot        HPI:  Mr Acosta is a 56 yr old male with a PMH that includes DB 2, COPD, HTN, depression, and right foot transmetatarsal amputation. He was sent to the ED for evaluation of osteomyelitis from a follow up appt with Dr. Flores. Pt had I&D and amputation for wet gangrene in Sept 2021 and debridements in Feb, March, and May of this year. Per his chart, his feet were healing well until some foot trauma occurred. Pt has has open wounds and drainage to BL feet with right foot wound deeper than left. He also has wound to left shin. Pt reports associated 10/10 pain and difficulty walking. He denies fever and chills at home. Podiatry consulted and pt admitted to hospital medicine.       Overview/Hospital Course:  Patient admitted with bilateral foot wounds and concern for osteomyelitis following R foot TMA requiring debridements since for continued wounds. He was started on empiric antibiotics. Bilateral foot MRI done. MRI of left foot with abnormal findings but not consistent with osteomyelitis. MRI of right foot showed changes of 1st and 2nd transmetatarsal amputation with cortical regularity and abnormal signal at distal stumps noting overlying extensive edema and regions of skin ulceration.  Findings may represent early osteomyelitis vs post-operative changes. Bone biopsy done for further evaluation and to guide treatment. ID and podiatry consulted. Bone culture grew diphtheroids and Enterococcus but no good po options. Per ID, given MRSA isolated from a previous culture, ok to treat with vancomycin x 4 weeks. Finished 2 weeks of cefepime for left foot SSTI. Pt now  medically stable for DC, pending placement to LTAC.       Interval History: Increased aspart to 13 units for suboptimal glucose control. Stop cefepime today as he has already completed > 2 weeks of tx. Pending LTAC placement.     Review of Systems   Constitutional:  Negative for fever.   Respiratory:  Negative for cough and shortness of breath.    Cardiovascular:  Negative for chest pain.   Gastrointestinal:  Negative for abdominal pain.   Neurological:  Negative for dizziness and headaches.   Psychiatric/Behavioral:  Negative for confusion.    All other systems reviewed and are negative.  Objective:     Vital Signs (Most Recent):  Temp: 98.1 °F (36.7 °C) (07/02/22 1150)  Pulse: 75 (07/02/22 1150)  Resp: 20 (07/02/22 1232)  BP: 118/74 (07/02/22 1150)  SpO2: 96 % (07/02/22 1150)   Vital Signs (24h Range):  Temp:  [98 °F (36.7 °C)-98.9 °F (37.2 °C)] 98.1 °F (36.7 °C)  Pulse:  [70-90] 75  Resp:  [16-20] 20  SpO2:  [93 %-96 %] 96 %  BP: (118-133)/(61-82) 118/74     Weight: 131.7 kg (290 lb 5.5 oz)  Body mass index is 37.28 kg/m².    Intake/Output Summary (Last 24 hours) at 7/2/2022 1411  Last data filed at 7/2/2022 0728  Gross per 24 hour   Intake --   Output 1750 ml   Net -1750 ml        Physical Exam  Vitals and nursing note reviewed.   Constitutional:       Appearance: Normal appearance.   HENT:      Head: Normocephalic and atraumatic.   Eyes:      Extraocular Movements: Extraocular movements intact.      Pupils: Pupils are equal, round, and reactive to light.   Cardiovascular:      Rate and Rhythm: Normal rate and regular rhythm.   Pulmonary:      Effort: Pulmonary effort is normal. No respiratory distress.   Abdominal:      General: Abdomen is flat. There is no distension.   Musculoskeletal:         General: Normal range of motion.      Cervical back: Normal range of motion.      Comments: Left foot in bandage   Neurological:      General: No focal deficit present.      Mental Status: He is alert and oriented to  person, place, and time.   Psychiatric:         Mood and Affect: Mood normal.         Behavior: Behavior normal.       Significant Labs: All pertinent labs within the past 24 hours have been reviewed.  CBC:   Recent Labs   Lab 07/01/22 0522   WBC 7.39   HGB 12.8*   HCT 41.6          CMP:   Recent Labs   Lab 07/01/22 0522 07/01/22  1258     --    K 4.6  --    CL 99  --    CO2 30*  --    *  --    BUN 25*  --    CREATININE 1.4 1.2   CALCIUM 10.0  --    ANIONGAP 9  --    EGFRNONAA 55* >60         Significant Imaging: I have reviewed all pertinent imaging results/findings within the past 24 hours.      Assessment/Plan:      * Osteomyelitis of right foot  Left Foot ulcer  - Presented from surgery clinic for concern for infection/osteomyelitis  - Prior history of MRSA bacteremia 2/2 gangrene treated with 6 weeks IV abx for presumed endocarditis  - ESR/CRP elevated but have down trended since prior hospitalization.  - MRI L foot with mild marrow edema of tuft of great toe. Findings and exam not consistent with acute osteomyelitis.  - MRI R foot with post-op changes of 1st & 2nd transmetatarsal with cortical regularity and abnormal signal at distal stumps with extensive edema. Findings concerning for osteo vs chronic changes.  - Biopsies from 6/17 showing no evidence of osteomyelitis involving 2nd metatarsal; 1st metatarsal still in process.  - Wound cultures from ED with MRSA, Providencia rettgeri, Proteus mirabilis, and GNR.  - 1st metatarsal bone culture 6/17 showing E faecalis, diphtheroids.  - Trial of gabapentin 100mg PO qHS with reported symptoms consistent with neuropathic pain.  - Podiatry and wound care following, appreciate assistance.  - ID consulted: given MRSA isolated from a previous culture, ok to treat with vancomycin x 4 weeks (EOC 07/15/22). Finished 2 weeks of cefepime for left foot SSTI.   - midline placed    Antibiotics (From admission, onward)            Start     Stop Route  "Frequency Ordered    06/30/22 0200  vancomycin 1.5 g in dextrose 5 % 250 mL IVPB (ready to mix)         -- IV Every 12 hours (non-standard times) 06/29/22 1547    06/15/22 2237  vancomycin - pharmacy to dose  (vancomycin IVPB)        "And" Linked Group Details    -- IV pharmacy to manage frequency 06/15/22 2148        Cultures were taken-   Microbiology Results (last 7 days)     ** No results found for the last 168 hours. **          Increased nutritional needs        Foot ulcer  Plan as above.       HTN (hypertension)  - Reports taking lisinopril and losartan at home.  - Continue lisinopril 10mg PO daily.    Type 2 diabetes mellitus with diabetic peripheral angiopathy without gangrene, with long-term current use of insulin  Hyperlipidemia  - Last A1c 7.8  - On Levemir 50 U qHS and aspart 10 U TID WM at home  - Improving. Continue insulin detemir 55U subQ qHS, insulin aspart 13 U subQ TIDWM, moderate-dose sliding scale insulin aspart 1-10U subQ TIDWM PRN.  - Continue aspirin 81mg PO daily, atorvastatin 40mg PO daily.      VTE Risk Mitigation (From admission, onward)         Ordered     enoxaparin injection 40 mg  Daily         06/16/22 0658     IP VTE HIGH RISK PATIENT  Once         06/15/22 2119     Place sequential compression device  Until discontinued         06/15/22 2119                      I have assessed these finding virtually using telemed platform and with assistance of bedside nurse                 The attending portion of this evaluation, treatment, and documentation was performed per Jayde Saucedo MD via Telemedicine AudioVisual using the secure [employer] software platform with 2 way audio/video. The provider was located off-site and the patient is located in the hospital. The aforementioned video software was utilized to document the relevant history and physical exam    Jayde Saucedo MD  Department of Hospital Medicine   [facility] ([place])  "

## 2022-07-02 NOTE — ASSESSMENT & PLAN NOTE
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  - MRI L foot with mild marrow edema of tuft of great toe. Findings and exam not consistent with acute osteomyelitis.  - MRI R foot with post-op changes of 1st & 2nd transmetatarsal with cortical regularity and abnormal signal at distal stumps with extensive edema. Findings concerning for osteo vs chronic changes.  - Biopsies from 6/17 showing no evidence of osteomyelitis involving 2nd metatarsal; 1st metatarsal still in process.  - Wound cultures from ED with MRSA, Providencia rettgeri, Proteus mirabilis, and GNR.  - 1st metatarsal bone culture 6/17 showing E faecalis, diphtheroids.  - Trial of gabapentin 100mg PO qHS with reported symptoms consistent with neuropathic pain.  - Podiatry and wound care following, appreciate assistance.  - ID consulted: given MRSA isolated from a previous culture, ok to treat with vancomycin x 4 weeks (EOC 07/15/22). Finished 2 weeks of cefepime for left foot SSTI.   - midline placed    Antibiotics (From admission, onward)            Start     Stop Route Frequency Ordered    06/30/22 0200  vancomycin 1.5 g in dextrose 5 % 250 mL IVPB (ready to mix)         -- IV Every 12 hours (non-standard times) 06/29/22 1547    06/15/22 2237  vancomycin - pharmacy to dose  (vancomycin IVPB)        "And" Linked Group Details    -- IV pharmacy to manage frequency 06/15/22 2148        Cultures were taken-   Microbiology Results (last 7 days)     ** No results found for the last 168 hours. **        "

## 2022-07-02 NOTE — SUBJECTIVE & OBJECTIVE
Interval History: Increased aspart to 13 units for suboptimal glucose control. Stop cefepime today as he has already completed > 2 weeks of tx. Pending LTAC placement.     Review of Systems   Constitutional:  Negative for fever.   Respiratory:  Negative for cough and shortness of breath.    Cardiovascular:  Negative for chest pain.   Gastrointestinal:  Negative for abdominal pain.   Neurological:  Negative for dizziness and headaches.   Psychiatric/Behavioral:  Negative for confusion.    All other systems reviewed and are negative.  Objective:     Vital Signs (Most Recent):  Temp: 98.1 °F (36.7 °C) (07/02/22 1150)  Pulse: 75 (07/02/22 1150)  Resp: 20 (07/02/22 1232)  BP: 118/74 (07/02/22 1150)  SpO2: 96 % (07/02/22 1150)   Vital Signs (24h Range):  Temp:  [98 °F (36.7 °C)-98.9 °F (37.2 °C)] 98.1 °F (36.7 °C)  Pulse:  [70-90] 75  Resp:  [16-20] 20  SpO2:  [93 %-96 %] 96 %  BP: (118-133)/(61-82) 118/74     Weight: 131.7 kg (290 lb 5.5 oz)  Body mass index is 37.28 kg/m².    Intake/Output Summary (Last 24 hours) at 7/2/2022 1411  Last data filed at 7/2/2022 0728  Gross per 24 hour   Intake --   Output 1750 ml   Net -1750 ml        Physical Exam  Vitals and nursing note reviewed.   Constitutional:       Appearance: Normal appearance.   HENT:      Head: Normocephalic and atraumatic.   Eyes:      Extraocular Movements: Extraocular movements intact.      Pupils: Pupils are equal, round, and reactive to light.   Cardiovascular:      Rate and Rhythm: Normal rate and regular rhythm.   Pulmonary:      Effort: Pulmonary effort is normal. No respiratory distress.   Abdominal:      General: Abdomen is flat. There is no distension.   Musculoskeletal:         General: Normal range of motion.      Cervical back: Normal range of motion.      Comments: Left foot in bandage   Neurological:      General: No focal deficit present.      Mental Status: He is alert and oriented to person, place, and time.   Psychiatric:         Mood and  Affect: Mood normal.         Behavior: Behavior normal.       Significant Labs: All pertinent labs within the past 24 hours have been reviewed.  CBC:   Recent Labs   Lab 07/01/22 0522   WBC 7.39   HGB 12.8*   HCT 41.6          CMP:   Recent Labs   Lab 07/01/22 0522 07/01/22  1258     --    K 4.6  --    CL 99  --    CO2 30*  --    *  --    BUN 25*  --    CREATININE 1.4 1.2   CALCIUM 10.0  --    ANIONGAP 9  --    EGFRNONAA 55* >60         Significant Imaging: I have reviewed all pertinent imaging results/findings within the past 24 hours.

## 2022-07-02 NOTE — ASSESSMENT & PLAN NOTE
Hyperlipidemia  - Last A1c 7.8  - On Levemir 50 U qHS and aspart 10 U TID WM at home  - Improving. Continue insulin detemir 55U subQ qHS, insulin aspart 13 U subQ TIDWM, moderate-dose sliding scale insulin aspart 1-10U subQ TIDWM PRN.  - Continue aspirin 81mg PO daily, atorvastatin 40mg PO daily.

## 2022-07-03 LAB
POCT GLUCOSE: 149 MG/DL (ref 70–110)
POCT GLUCOSE: 154 MG/DL (ref 70–110)
POCT GLUCOSE: 163 MG/DL (ref 70–110)
POCT GLUCOSE: 207 MG/DL (ref 70–110)
VANCOMYCIN TROUGH SERPL-MCNC: 26.7 UG/ML (ref 10–22)

## 2022-07-03 PROCEDURE — 63600175 PHARM REV CODE 636 W HCPCS: Performed by: INTERNAL MEDICINE

## 2022-07-03 PROCEDURE — 25000003 PHARM REV CODE 250: Performed by: INTERNAL MEDICINE

## 2022-07-03 PROCEDURE — 11000001 HC ACUTE MED/SURG PRIVATE ROOM

## 2022-07-03 PROCEDURE — 80202 ASSAY OF VANCOMYCIN: CPT | Performed by: HOSPITALIST

## 2022-07-03 PROCEDURE — 25000003 PHARM REV CODE 250: Performed by: STUDENT IN AN ORGANIZED HEALTH CARE EDUCATION/TRAINING PROGRAM

## 2022-07-03 PROCEDURE — 25000003 PHARM REV CODE 250: Performed by: HOSPITALIST

## 2022-07-03 PROCEDURE — 63600175 PHARM REV CODE 636 W HCPCS: Performed by: HOSPITALIST

## 2022-07-03 PROCEDURE — 94761 N-INVAS EAR/PLS OXIMETRY MLT: CPT

## 2022-07-03 PROCEDURE — 36415 COLL VENOUS BLD VENIPUNCTURE: CPT | Performed by: HOSPITALIST

## 2022-07-03 PROCEDURE — 27000207 HC ISOLATION

## 2022-07-03 PROCEDURE — 25000003 PHARM REV CODE 250: Performed by: NURSE PRACTITIONER

## 2022-07-03 RX ADMIN — QUETIAPINE FUMARATE 200 MG: 200 TABLET ORAL at 09:07

## 2022-07-03 RX ADMIN — INSULIN ASPART 13 UNITS: 100 INJECTION, SOLUTION INTRAVENOUS; SUBCUTANEOUS at 04:07

## 2022-07-03 RX ADMIN — GABAPENTIN 100 MG: 100 CAPSULE ORAL at 09:07

## 2022-07-03 RX ADMIN — HYDROCODONE BITARTRATE AND ACETAMINOPHEN 1 TABLET: 7.5; 325 TABLET ORAL at 04:07

## 2022-07-03 RX ADMIN — LISINOPRIL 10 MG: 10 TABLET ORAL at 09:07

## 2022-07-03 RX ADMIN — VANCOMYCIN HYDROCHLORIDE 1500 MG: 1.5 INJECTION, POWDER, LYOPHILIZED, FOR SOLUTION INTRAVENOUS at 01:07

## 2022-07-03 RX ADMIN — INSULIN ASPART 2 UNITS: 100 INJECTION, SOLUTION INTRAVENOUS; SUBCUTANEOUS at 01:07

## 2022-07-03 RX ADMIN — ASPIRIN 81 MG: 81 TABLET, COATED ORAL at 09:07

## 2022-07-03 RX ADMIN — INSULIN ASPART 2 UNITS: 100 INJECTION, SOLUTION INTRAVENOUS; SUBCUTANEOUS at 04:07

## 2022-07-03 RX ADMIN — COLLAGENASE SANTYL: 250 OINTMENT TOPICAL at 09:07

## 2022-07-03 RX ADMIN — HYDROCODONE BITARTRATE AND ACETAMINOPHEN 1 TABLET: 7.5; 325 TABLET ORAL at 01:07

## 2022-07-03 RX ADMIN — HYDROCODONE BITARTRATE AND ACETAMINOPHEN 1 TABLET: 7.5; 325 TABLET ORAL at 12:07

## 2022-07-03 RX ADMIN — HYDROCODONE BITARTRATE AND ACETAMINOPHEN 1 TABLET: 7.5; 325 TABLET ORAL at 09:07

## 2022-07-03 RX ADMIN — INSULIN ASPART 4 UNITS: 100 INJECTION, SOLUTION INTRAVENOUS; SUBCUTANEOUS at 09:07

## 2022-07-03 RX ADMIN — INSULIN ASPART 13 UNITS: 100 INJECTION, SOLUTION INTRAVENOUS; SUBCUTANEOUS at 09:07

## 2022-07-03 RX ADMIN — ENOXAPARIN SODIUM 40 MG: 100 INJECTION SUBCUTANEOUS at 04:07

## 2022-07-03 RX ADMIN — VANCOMYCIN HYDROCHLORIDE 1500 MG: 1.5 INJECTION, POWDER, LYOPHILIZED, FOR SOLUTION INTRAVENOUS at 02:07

## 2022-07-03 RX ADMIN — HYDROCHLOROTHIAZIDE 25 MG: 25 TABLET ORAL at 09:07

## 2022-07-03 RX ADMIN — INSULIN DETEMIR 55 UNITS: 100 INJECTION, SOLUTION SUBCUTANEOUS at 09:07

## 2022-07-03 RX ADMIN — INSULIN ASPART 13 UNITS: 100 INJECTION, SOLUTION INTRAVENOUS; SUBCUTANEOUS at 01:07

## 2022-07-03 RX ADMIN — ATORVASTATIN CALCIUM 40 MG: 20 TABLET, FILM COATED ORAL at 09:07

## 2022-07-03 NOTE — PROGRESS NOTES
Pharmacokinetic Assessment Follow Up: IV Vancomycin    Vancomycin serum concentration assessment(s):    The trough level was drawn incorrectly and cannot be used to guide therapy at this time. Infusion was started at 1313 and level was drawn mid-infusion at 1340.    Vancomycin Regimen Plan:    Will plan to re-draw level prior to the afternoon dose tomorrow to assess.    Drug levels (last 3 results):  Recent Labs   Lab Result Units 07/01/22  1258 07/03/22  1340   Vancomycin-Trough ug/mL 15.2 26.7*       Pharmacy will continue to follow and monitor vancomycin.    Please contact pharmacy at extension [phone] for questions regarding this assessment.    Thank you for the consult,   Monik Hill       Patient brief summary:  Dave Good is a 56 y.o. male initiated on antimicrobial therapy with IV Vancomycin for treatment of bone/joint infection/endocarditis    The patient's current regimen is 1500 mg vancomycin q12h    Drug Allergies:   Review of patient's allergies indicates:   Allergen Reactions    Ibuprofen Swelling     Facial swelling       Actual Body Weight:   131.7 kg    Renal Function:   Estimated Creatinine Clearance: 96.8 mL/min (based on SCr of 1.2 mg/dL).,     Dialysis Method (if applicable):  N/A    CBC (last 72 hours):  Recent Labs   Lab Result Units 07/01/22  0522   WBC K/uL 7.39   Hemoglobin g/dL 12.8*   Hematocrit % 41.6   Platelets K/uL 319   Gran % % 41.4   Lymph % % 37.9   Mono % % 12.0   Eosinophil % % 7.7   Basophil % % 0.7   Differential Method  Automated       Metabolic Panel (last 72 hours):  Recent Labs   Lab Result Units 07/01/22  0522 07/01/22  1258   Sodium mmol/L 138  --    Potassium mmol/L 4.6  --    Chloride mmol/L 99  --    CO2 mmol/L 30*  --    Glucose mg/dL 225*  --    BUN mg/dL 25*  --    Creatinine mg/dL 1.4 1.2   Magnesium mg/dL 1.9  --    Phosphorus mg/dL 3.3  --        Vancomycin Administrations:  vancomycin given in the last 96 hours                     vancomycin 1.5 g in  dextrose 5 % 250 mL IVPB (ready to mix) (mg) 1,500 mg New Bag 07/03/22 1313     1,500 mg New Bag  0218     1,500 mg New Bag 07/02/22 1449     1,500 mg New Bag  0153     1,500 mg New Bag 07/01/22 1416     1,500 mg New Bag  0132     1,500 mg New Bag 06/30/22 1431     1,500 mg New Bag  0146                    Microbiologic Results:  Microbiology Results (last 7 days)       ** No results found for the last 168 hours. **

## 2022-07-03 NOTE — PLAN OF CARE
Pt tolerated wound care    Problem: Adult Inpatient Plan of Care  Goal: Plan of Care Review  Outcome: Ongoing, Progressing  Goal: Patient-Specific Goal (Individualized)  Outcome: Ongoing, Progressing  Goal: Absence of Hospital-Acquired Illness or Injury  Outcome: Ongoing, Progressing  Goal: Optimal Comfort and Wellbeing  Outcome: Ongoing, Progressing  Goal: Readiness for Transition of Care  Outcome: Ongoing, Progressing     Problem: Fall Injury Risk  Goal: Absence of Fall and Fall-Related Injury  Outcome: Ongoing, Progressing     Problem: Diabetes Comorbidity  Goal: Blood Glucose Level Within Targeted Range  Outcome: Ongoing, Progressing     Problem: Fluid and Electrolyte Imbalance (Acute Kidney Injury/Impairment)  Goal: Fluid and Electrolyte Balance  Outcome: Ongoing, Progressing     Problem: Oral Intake Inadequate (Acute Kidney Injury/Impairment)  Goal: Optimal Nutrition Intake  Outcome: Ongoing, Progressing     Problem: Renal Function Impairment (Acute Kidney Injury/Impairment)  Goal: Effective Renal Function  Outcome: Ongoing, Progressing     Problem: Infection  Goal: Absence of Infection Signs and Symptoms  Outcome: Ongoing, Progressing     Problem: Impaired Wound Healing  Goal: Optimal Wound Healing  Outcome: Ongoing, Progressing     Problem: Skin Injury Risk Increased  Goal: Skin Health and Integrity  Outcome: Ongoing, Progressing

## 2022-07-04 LAB
POCT GLUCOSE: 158 MG/DL (ref 70–110)
POCT GLUCOSE: 166 MG/DL (ref 70–110)
POCT GLUCOSE: 168 MG/DL (ref 70–110)
POCT GLUCOSE: 186 MG/DL (ref 70–110)
VANCOMYCIN TROUGH SERPL-MCNC: 29.5 UG/ML (ref 10–22)

## 2022-07-04 PROCEDURE — 63600175 PHARM REV CODE 636 W HCPCS: Performed by: HOSPITALIST

## 2022-07-04 PROCEDURE — 94761 N-INVAS EAR/PLS OXIMETRY MLT: CPT

## 2022-07-04 PROCEDURE — 25000003 PHARM REV CODE 250: Performed by: HOSPITALIST

## 2022-07-04 PROCEDURE — 80202 ASSAY OF VANCOMYCIN: CPT | Performed by: INTERNAL MEDICINE

## 2022-07-04 PROCEDURE — 25000003 PHARM REV CODE 250: Performed by: INTERNAL MEDICINE

## 2022-07-04 PROCEDURE — 25000003 PHARM REV CODE 250: Performed by: STUDENT IN AN ORGANIZED HEALTH CARE EDUCATION/TRAINING PROGRAM

## 2022-07-04 PROCEDURE — 36415 COLL VENOUS BLD VENIPUNCTURE: CPT | Performed by: INTERNAL MEDICINE

## 2022-07-04 PROCEDURE — 99233 PR SUBSEQUENT HOSPITAL CARE,LEVL III: ICD-10-PCS | Mod: ,,, | Performed by: PODIATRIST

## 2022-07-04 PROCEDURE — 27000207 HC ISOLATION

## 2022-07-04 PROCEDURE — 63600175 PHARM REV CODE 636 W HCPCS: Performed by: INTERNAL MEDICINE

## 2022-07-04 PROCEDURE — 11000001 HC ACUTE MED/SURG PRIVATE ROOM

## 2022-07-04 PROCEDURE — 99233 SBSQ HOSP IP/OBS HIGH 50: CPT | Mod: ,,, | Performed by: PODIATRIST

## 2022-07-04 PROCEDURE — 25000003 PHARM REV CODE 250: Performed by: NURSE PRACTITIONER

## 2022-07-04 RX ADMIN — HYDROCODONE BITARTRATE AND ACETAMINOPHEN 1 TABLET: 7.5; 325 TABLET ORAL at 09:07

## 2022-07-04 RX ADMIN — VANCOMYCIN HYDROCHLORIDE 1500 MG: 1.5 INJECTION, POWDER, LYOPHILIZED, FOR SOLUTION INTRAVENOUS at 01:07

## 2022-07-04 RX ADMIN — INSULIN ASPART 2 UNITS: 100 INJECTION, SOLUTION INTRAVENOUS; SUBCUTANEOUS at 09:07

## 2022-07-04 RX ADMIN — HYDROCODONE BITARTRATE AND ACETAMINOPHEN 1 TABLET: 7.5; 325 TABLET ORAL at 01:07

## 2022-07-04 RX ADMIN — ATORVASTATIN CALCIUM 40 MG: 20 TABLET, FILM COATED ORAL at 09:07

## 2022-07-04 RX ADMIN — COLLAGENASE SANTYL: 250 OINTMENT TOPICAL at 09:07

## 2022-07-04 RX ADMIN — HYDROCODONE BITARTRATE AND ACETAMINOPHEN 1 TABLET: 7.5; 325 TABLET ORAL at 05:07

## 2022-07-04 RX ADMIN — INSULIN ASPART 13 UNITS: 100 INJECTION, SOLUTION INTRAVENOUS; SUBCUTANEOUS at 09:07

## 2022-07-04 RX ADMIN — QUETIAPINE FUMARATE 200 MG: 200 TABLET ORAL at 09:07

## 2022-07-04 RX ADMIN — HYDROCHLOROTHIAZIDE 25 MG: 25 TABLET ORAL at 09:07

## 2022-07-04 RX ADMIN — INSULIN ASPART 2 UNITS: 100 INJECTION, SOLUTION INTRAVENOUS; SUBCUTANEOUS at 12:07

## 2022-07-04 RX ADMIN — ENOXAPARIN SODIUM 40 MG: 100 INJECTION SUBCUTANEOUS at 05:07

## 2022-07-04 RX ADMIN — INSULIN ASPART 2 UNITS: 100 INJECTION, SOLUTION INTRAVENOUS; SUBCUTANEOUS at 05:07

## 2022-07-04 RX ADMIN — ASPIRIN 81 MG: 81 TABLET, COATED ORAL at 09:07

## 2022-07-04 RX ADMIN — INSULIN ASPART 13 UNITS: 100 INJECTION, SOLUTION INTRAVENOUS; SUBCUTANEOUS at 12:07

## 2022-07-04 RX ADMIN — GABAPENTIN 100 MG: 100 CAPSULE ORAL at 09:07

## 2022-07-04 RX ADMIN — LISINOPRIL 10 MG: 10 TABLET ORAL at 09:07

## 2022-07-04 RX ADMIN — INSULIN DETEMIR 55 UNITS: 100 INJECTION, SOLUTION SUBCUTANEOUS at 09:07

## 2022-07-04 RX ADMIN — INSULIN ASPART 1 UNITS: 100 INJECTION, SOLUTION INTRAVENOUS; SUBCUTANEOUS at 09:07

## 2022-07-04 RX ADMIN — INSULIN ASPART 13 UNITS: 100 INJECTION, SOLUTION INTRAVENOUS; SUBCUTANEOUS at 05:07

## 2022-07-04 NOTE — PROGRESS NOTES
[facility] Surg [facility] Medicine  Telemedicine Progress Note    Patient Name: Dave Good  MRN: [id number]  Patient Class: IP- Inpatient   Admission Date: 6/15/2022  Length of Stay: 18 days  Attending Physician: Nancy Carvajal MD  Primary Care Provider: Reyna Jorge NP          Subjective:     Principal Problem:Osteomyelitis of right foot        HPI:  Mr Acosta is a 56 yr old male with a PMH that includes DB 2, COPD, HTN, depression, and right foot transmetatarsal amputation. He was sent to the ED for evaluation of osteomyelitis from a follow up appt with Dr. Flores. Pt had I&D and amputation for wet gangrene in Sept 2021 and debridements in Feb, March, and May of this year. Per his chart, his feet were healing well until some foot trauma occurred. Pt has has open wounds and drainage to BL feet with right foot wound deeper than left. He also has wound to left shin. Pt reports associated 10/10 pain and difficulty walking. He denies fever and chills at home. Podiatry consulted and pt admitted to hospital medicine.       Overview/Hospital Course:  Patient admitted with bilateral foot wounds and concern for osteomyelitis following R foot TMA requiring debridements since for continued wounds. He was started on empiric antibiotics. Bilateral foot MRI done. MRI of left foot with abnormal findings but not consistent with osteomyelitis. MRI of right foot showed changes of 1st and 2nd transmetatarsal amputation with cortical regularity and abnormal signal at distal stumps noting overlying extensive edema and regions of skin ulceration.  Findings may represent early osteomyelitis vs post-operative changes. Bone biopsy done for further evaluation and to guide treatment. ID and podiatry consulted. Bone culture grew diphtheroids and Enterococcus but no good po options. Per ID, given MRSA isolated from a previous culture, ok to treat with vancomycin x 4 weeks. Finished 2 weeks of cefepime for left foot SSTI. Pt  now medically stable for DC, pending placement to LTAC.       Interval History: patient lying in bed resting. Denies complaints. Awaiting LTAC placement.     Review of Systems   Constitutional:  Negative for fever.   Respiratory:  Negative for shortness of breath.    Cardiovascular:  Negative for chest pain.   Gastrointestinal:  Negative for abdominal pain, nausea and vomiting.   Objective:     Vital Signs (Most Recent):  Temp: 97.6 °F (36.4 °C) (07/03/22 2111)  Pulse: 76 (07/03/22 2111)  Resp: 18 (07/03/22 2111)  BP: 139/81 (07/03/22 2111)  SpO2: 96 % (07/03/22 2111) Vital Signs (24h Range):  Temp:  [97.6 °F (36.4 °C)-98.7 °F (37.1 °C)] 97.6 °F (36.4 °C)  Pulse:  [76-90] 76  Resp:  [16-18] 18  SpO2:  [96 %-98 %] 96 %  BP: (118-140)/(69-83) 139/81     Weight: 131.7 kg (290 lb 5.5 oz)  Body mass index is 37.28 kg/m².    Intake/Output Summary (Last 24 hours) at 7/3/2022 2325  Last data filed at 7/3/2022 1800  Gross per 24 hour   Intake 1560 ml   Output 2400 ml   Net -840 ml      Physical Exam  Constitutional:       Appearance: He is not ill-appearing.   HENT:      Head: Normocephalic.   Pulmonary:      Effort: Pulmonary effort is normal. No respiratory distress.   Neurological:      Mental Status: He is alert and oriented to person, place, and time.   Psychiatric:         Mood and Affect: Mood normal.       Significant Labs: All pertinent labs within the past 24 hours have been reviewed.  CBC: No results for input(s): WBC, HGB, HCT, PLT in the last 48 hours.  CMP: No results for input(s): NA, K, CL, CO2, GLU, BUN, CREATININE, CALCIUM, PROT, ALBUMIN, BILITOT, ALKPHOS, AST, ALT, ANIONGAP, EGFRNONAA in the last 48 hours.    Invalid input(s): ESTGFAFRICA  Troponin: No results for input(s): TROPONINI in the last 48 hours.    Significant Imaging: I have reviewed all pertinent imaging results/findings within the past 24 hours.      Assessment/Plan:      * Osteomyelitis of right foot  Left Foot ulcer  - Presented from surgery  "clinic for concern for infection/osteomyelitis  - Prior history of MRSA bacteremia 2/2 gangrene treated with 6 weeks IV abx for presumed endocarditis  - ESR/CRP elevated but have down trended since prior hospitalization.  - MRI L foot with mild marrow edema of tuft of great toe. Findings and exam not consistent with acute osteomyelitis.  - MRI R foot with post-op changes of 1st & 2nd transmetatarsal with cortical regularity and abnormal signal at distal stumps with extensive edema. Findings concerning for osteo vs chronic changes.  - Biopsies from 6/17 showing no evidence of osteomyelitis involving 2nd metatarsal; 1st metatarsal still in process.  - Wound cultures from ED with MRSA, Providencia rettgeri, Proteus mirabilis, and GNR.  - 1st metatarsal bone culture 6/17 showing E faecalis, diphtheroids.  - Trial of gabapentin 100mg PO qHS with reported symptoms consistent with neuropathic pain.  - Podiatry and wound care following, appreciate assistance.  - ID consulted: given MRSA isolated from a previous culture, ok to treat with vancomycin x 4 weeks (EOC 07/15/22). Finished 2 weeks of cefepime for left foot SSTI.   - midline placed    Antibiotics (From admission, onward)            Start     Stop Route Frequency Ordered    06/30/22 0200  vancomycin 1.5 g in dextrose 5 % 250 mL IVPB (ready to mix)         -- IV Every 12 hours (non-standard times) 06/29/22 1547    06/15/22 2237  vancomycin - pharmacy to dose  (vancomycin IVPB)        "And" Linked Group Details    -- IV pharmacy to manage frequency 06/15/22 2148        Cultures were taken-   Microbiology Results (last 7 days)     ** No results found for the last 168 hours. **          Increased nutritional needs        Foot ulcer  Plan as above.       HTN (hypertension)  - Reports taking lisinopril and losartan at home.  - Continue lisinopril 10mg PO daily.    Type 2 diabetes mellitus with diabetic peripheral angiopathy without gangrene, with long-term current use of " insulin  Hyperlipidemia  - Last A1c 7.8  - On Levemir 50 U qHS and aspart 10 U TID WM at home  - Improving. Continue insulin detemir 55U subQ qHS, insulin aspart 13 U subQ TIDWM, moderate-dose sliding scale insulin aspart 1-10U subQ TIDWM PRN.  - Continue aspirin 81mg PO daily, atorvastatin 40mg PO daily.      VTE Risk Mitigation (From admission, onward)         Ordered     enoxaparin injection 40 mg  Daily         06/16/22 0658     IP VTE HIGH RISK PATIENT  Once         06/15/22 2119     Place sequential compression device  Until discontinued         06/15/22 2119                      I have assessed these finding virtually using telemed platform and with assistance of bedside nurse                 The attending portion of this evaluation, treatment, and documentation was performed per Kelly Eduardo NP via Telemedicine AudioVisual using the secure BView software platform with 2 way audio/video. The provider was located off-site and the patient is located in the hospital. The aforementioned video software was utilized to document the relevant history and physical exam    Kelly Eduardo NP  Department of Hospital Medicine   [facility] Med S[facility] ([place])

## 2022-07-04 NOTE — ASSESSMENT & PLAN NOTE
D/c to ltac when possible.  Will follow outpatient in clinic if wounds not closed by d/c from ltac.       WB to tolerance in DM shoes with custom inserts on wound closure.     Until wound closure, patient to wb to tolerance in DM shoe, custom insert left, football dressing/sx shoe right.

## 2022-07-04 NOTE — SUBJECTIVE & OBJECTIVE
Subjective:     Interval History: Hypertensive, other vitals stable. No new pedal complaints. Dressings clean dry, intact.       Scheduled Meds:   aspirin  81 mg Oral Daily    atorvastatin  40 mg Oral Daily    collagenase   Topical (Top) Daily    enoxaparin  40 mg Subcutaneous Daily    gabapentin  100 mg Oral QHS    hydroCHLOROthiazide  25 mg Oral Daily    insulin aspart U-100  13 Units Subcutaneous TIDWM    insulin detemir U-100  55 Units Subcutaneous QHS    lisinopriL  10 mg Oral Daily    QUEtiapine  200 mg Oral Nightly    vancomycin (VANCOCIN) IVPB  1,500 mg Intravenous Q12H     Continuous Infusions:  PRN Meds:sodium chloride 0.9%, acetaminophen, dextrose 10%, dextrose 10%, glucagon (human recombinant), glucose, glucose, HYDROcodone-acetaminophen, HYDROcodone-acetaminophen, insulin aspart U-100, melatonin, naloxone, ondansetron, polyethylene glycol, sodium chloride 0.9%, Pharmacy to dose Vancomycin consult **AND** vancomycin - pharmacy to dose    Review of Systems   Constitutional:  Negative for fever.   Respiratory:  Negative for shortness of breath.    Cardiovascular:  Negative for chest pain.   Gastrointestinal:  Negative for abdominal pain, nausea and vomiting.   Musculoskeletal:  Positive for gait problem.   Skin:  Positive for wound.   Neurological:  Positive for numbness.   Objective:     Vital Signs (Most Recent):  Temp: 98.6 °F (37 °C) (07/04/22 0744)  Pulse: 73 (07/04/22 0744)  Resp: 18 (07/04/22 0916)  BP: (!) 142/84 (07/04/22 0744)  SpO2: 95 % (07/04/22 0744)   Vital Signs (24h Range):  Temp:  [97.6 °F (36.4 °C)-98.6 °F (37 °C)] 98.6 °F (37 °C)  Pulse:  [73-79] 73  Resp:  [16-18] 18  SpO2:  [95 %-99 %] 95 %  BP: (122-151)/(73-84) 142/84     Weight: 131.7 kg (290 lb 5.5 oz)  Body mass index is 37.28 kg/m².    Foot Exam:  Left foot wounds closed since last week.  Dressed/offloaded cushion.     Right foot improved well, smaller with granular/fibrous base  without , pus, tracking, fluctuance, malodor,  or cardinal signs infection.      Pulses palpable. Toes warm.     lops      R medial plantar foot wound      R medial-plantar foot wound      R anterior leg partial thickness wounds      L foot postulcerative skin          Laboratory:  Blood Cultures: No results for input(s): LABBLOO in the last 48 hours.  CBC:   Recent Labs   Lab 07/01/22 0522   WBC 7.39   RBC 4.60   HGB 12.8*   HCT 41.6      MCV 90   MCH 27.8   MCHC 30.8*     CMP:   Recent Labs   Lab 07/01/22 0522 07/01/22  1258   *  --    CALCIUM 10.0  --      --    K 4.6  --    CO2 30*  --    CL 99  --    BUN 25*  --    CREATININE 1.4 1.2     CRP: No results for input(s): CRP in the last 168 hours.  ESR: No results for input(s): SEDRATE in the last 168 hours.  Microbiology Results (last 7 days)       ** No results found for the last 168 hours. **          Specimen (24h ago, onward)                None

## 2022-07-04 NOTE — PROGRESS NOTES
[facility] - Med Surg ([place])  Podiatry  Progress Note    Patient Name: Dave Good  MRN: [id number]  Admission Date: 6/15/2022  Hospital Length of Stay: 19 days  Attending Physician: Nancy Carvajal MD  Primary Care Provider: Reyna Jorge NP     Subjective:     Interval History: Hypertensive, other vitals stable. No new pedal complaints. Dressings clean dry, intact.       Scheduled Meds:   aspirin  81 mg Oral Daily    atorvastatin  40 mg Oral Daily    collagenase   Topical (Top) Daily    enoxaparin  40 mg Subcutaneous Daily    gabapentin  100 mg Oral QHS    hydroCHLOROthiazide  25 mg Oral Daily    insulin aspart U-100  13 Units Subcutaneous TIDWM    insulin detemir U-100  55 Units Subcutaneous QHS    lisinopriL  10 mg Oral Daily    QUEtiapine  200 mg Oral Nightly    vancomycin (VANCOCIN) IVPB  1,500 mg Intravenous Q12H     Continuous Infusions:  PRN Meds:sodium chloride 0.9%, acetaminophen, dextrose 10%, dextrose 10%, glucagon (human recombinant), glucose, glucose, HYDROcodone-acetaminophen, HYDROcodone-acetaminophen, insulin aspart U-100, melatonin, naloxone, ondansetron, polyethylene glycol, sodium chloride 0.9%, Pharmacy to dose Vancomycin consult **AND** vancomycin - pharmacy to dose    Review of Systems   Constitutional:  Negative for fever.   Respiratory:  Negative for shortness of breath.    Cardiovascular:  Negative for chest pain.   Gastrointestinal:  Negative for abdominal pain, nausea and vomiting.   Musculoskeletal:  Positive for gait problem.   Skin:  Positive for wound.   Neurological:  Positive for numbness.   Objective:     Vital Signs (Most Recent):  Temp: 98.6 °F (37 °C) (07/04/22 0744)  Pulse: 73 (07/04/22 0744)  Resp: 18 (07/04/22 0916)  BP: (!) 142/84 (07/04/22 0744)  SpO2: 95 % (07/04/22 0744)   Vital Signs (24h Range):  Temp:  [97.6 °F (36.4 °C)-98.6 °F (37 °C)] 98.6 °F (37 °C)  Pulse:  [73-79] 73  Resp:  [16-18] 18  SpO2:  [95 %-99 %] 95 %  BP: (122-151)/(73-84) 142/84      Weight: 131.7 kg (290 lb 5.5 oz)  Body mass index is 37.28 kg/m².    Foot Exam:  Left foot wounds closed since last week.  Dressed/offloaded cushion.     Right foot improved well, smaller with granular/fibrous base  without , pus, tracking, fluctuance, malodor, or cardinal signs infection.      Pulses palpable. Toes warm.     lops      R medial plantar foot wound      R medial-plantar foot wound      R anterior leg partial thickness wounds      L foot postulcerative skin          Laboratory:  Blood Cultures: No results for input(s): LABBLOO in the last 48 hours.  CBC:   Recent Labs   Lab 07/01/22 0522   WBC 7.39   RBC 4.60   HGB 12.8*   HCT 41.6      MCV 90   MCH 27.8   MCHC 30.8*     CMP:   Recent Labs   Lab 07/01/22 0522 07/01/22  1258   *  --    CALCIUM 10.0  --      --    K 4.6  --    CO2 30*  --    CL 99  --    BUN 25*  --    CREATININE 1.4 1.2     CRP: No results for input(s): CRP in the last 168 hours.  ESR: No results for input(s): SEDRATE in the last 168 hours.  Microbiology Results (last 7 days)       ** No results found for the last 168 hours. **          Specimen (24h ago, onward)                None          Assessment/Plan:     Foot ulcer  D/c to ltac when possible.  Will follow outpatient in clinic if wounds not closed by d/c from ltac.       WB to tolerance in DM shoes with custom inserts on wound closure.     Until wound closure, patient to wb to tolerance in DM shoe, custom insert left, football dressing/sx shoe right.        Dequan Gomez DPM PGY-3  Podiatric Medicine & Surgery  [facility]  Secure Chat Preferred  Mobile: [phone]  Pager: [phone]

## 2022-07-04 NOTE — SUBJECTIVE & OBJECTIVE
Interval History: patient lying in bed resting. Denies complaints. Awaiting LTAC placement.     Review of Systems   Constitutional:  Negative for fever.   Respiratory:  Negative for shortness of breath.    Cardiovascular:  Negative for chest pain.   Gastrointestinal:  Negative for abdominal pain, nausea and vomiting.   Objective:     Vital Signs (Most Recent):  Temp: 97.6 °F (36.4 °C) (07/04/22 1149)  Pulse: 83 (07/04/22 1149)  Resp: 18 (07/04/22 1149)  BP: 131/72 (07/04/22 1149)  SpO2: 96 % (07/04/22 1149) Vital Signs (24h Range):  Temp:  [97.6 °F (36.4 °C)-98.6 °F (37 °C)] 97.6 °F (36.4 °C)  Pulse:  [73-83] 83  Resp:  [16-18] 18  SpO2:  [95 %-99 %] 96 %  BP: (122-151)/(72-84) 131/72     Weight: 131.7 kg (290 lb 5.5 oz)  Body mass index is 37.28 kg/m².    Intake/Output Summary (Last 24 hours) at 7/4/2022 1156  Last data filed at 7/4/2022 0431  Gross per 24 hour   Intake 1080 ml   Output 2300 ml   Net -1220 ml        Physical Exam  Constitutional:       Appearance: He is not ill-appearing.   HENT:      Head: Normocephalic.   Pulmonary:      Effort: Pulmonary effort is normal. No respiratory distress.   Neurological:      Mental Status: He is alert and oriented to person, place, and time.   Psychiatric:         Mood and Affect: Mood normal.       Significant Labs: All pertinent labs within the past 24 hours have been reviewed.  CBC: No results for input(s): WBC, HGB, HCT, PLT in the last 48 hours.  CMP: No results for input(s): NA, K, CL, CO2, GLU, BUN, CREATININE, CALCIUM, PROT, ALBUMIN, BILITOT, ALKPHOS, AST, ALT, ANIONGAP, EGFRNONAA in the last 48 hours.    Invalid input(s): ESTGFAFRICA  Troponin: No results for input(s): TROPONINI in the last 48 hours.    Significant Imaging: I have reviewed all pertinent imaging results/findings within the past 24 hours.

## 2022-07-04 NOTE — SUBJECTIVE & OBJECTIVE
Interval History: patient lying in bed resting. Denies complaints. Awaiting LTAC placement.     Review of Systems   Constitutional:  Negative for fever.   Respiratory:  Negative for shortness of breath.    Cardiovascular:  Negative for chest pain.   Gastrointestinal:  Negative for abdominal pain, nausea and vomiting.   Objective:     Vital Signs (Most Recent):  Temp: 97.6 °F (36.4 °C) (07/03/22 2111)  Pulse: 76 (07/03/22 2111)  Resp: 18 (07/03/22 2111)  BP: 139/81 (07/03/22 2111)  SpO2: 96 % (07/03/22 2111) Vital Signs (24h Range):  Temp:  [97.6 °F (36.4 °C)-98.7 °F (37.1 °C)] 97.6 °F (36.4 °C)  Pulse:  [76-90] 76  Resp:  [16-18] 18  SpO2:  [96 %-98 %] 96 %  BP: (118-140)/(69-83) 139/81     Weight: 131.7 kg (290 lb 5.5 oz)  Body mass index is 37.28 kg/m².    Intake/Output Summary (Last 24 hours) at 7/3/2022 2325  Last data filed at 7/3/2022 1800  Gross per 24 hour   Intake 1560 ml   Output 2400 ml   Net -840 ml      Physical Exam  Constitutional:       Appearance: He is not ill-appearing.   HENT:      Head: Normocephalic.   Pulmonary:      Effort: Pulmonary effort is normal. No respiratory distress.   Neurological:      Mental Status: He is alert and oriented to person, place, and time.   Psychiatric:         Mood and Affect: Mood normal.       Significant Labs: All pertinent labs within the past 24 hours have been reviewed.  CBC: No results for input(s): WBC, HGB, HCT, PLT in the last 48 hours.  CMP: No results for input(s): NA, K, CL, CO2, GLU, BUN, CREATININE, CALCIUM, PROT, ALBUMIN, BILITOT, ALKPHOS, AST, ALT, ANIONGAP, EGFRNONAA in the last 48 hours.    Invalid input(s): ESTGFAFRICA  Troponin: No results for input(s): TROPONINI in the last 48 hours.    Significant Imaging: I have reviewed all pertinent imaging results/findings within the past 24 hours.

## 2022-07-04 NOTE — PROGRESS NOTES
[facility] Medicine  Telemedicine Progress Note    Patient Name: Dave Good  MRN: [id number]  Patient Class: IP- Inpatient   Admission Date: 6/15/2022  Length of Stay: 19 days  Attending Physician: Nancy Carvajal MD  Primary Care Provider: Reyna Jorge NP          Subjective:     Principal Problem:Osteomyelitis of right foot        HPI:  Mr Acosta is a 56 yr old male with a PMH that includes DB 2, COPD, HTN, depression, and right foot transmetatarsal amputation. He was sent to the ED for evaluation of osteomyelitis from a follow up appt with Dr. Flores. Pt had I&D and amputation for wet gangrene in Sept 2021 and debridements in Feb, March, and May of this year. Per his chart, his feet were healing well until some foot trauma occurred. Pt has has open wounds and drainage to BL feet with right foot wound deeper than left. He also has wound to left shin. Pt reports associated 10/10 pain and difficulty walking. He denies fever and chills at home. Podiatry consulted and pt admitted to hospital medicine.       Overview/Hospital Course:  Patient admitted with bilateral foot wounds and concern for osteomyelitis following R foot TMA requiring debridements since for continued wounds. He was started on empiric antibiotics. Bilateral foot MRI done. MRI of left foot with abnormal findings but not consistent with osteomyelitis. MRI of right foot showed changes of 1st and 2nd transmetatarsal amputation with cortical regularity and abnormal signal at distal stumps noting overlying extensive edema and regions of skin ulceration.  Findings may represent early osteomyelitis vs post-operative changes. Bone biopsy done for further evaluation and to guide treatment. ID and podiatry consulted. Bone culture grew diphtheroids and Enterococcus but no good po options. Per ID, given MRSA isolated from a previous culture, ok to treat with vancomycin x 4 weeks End date 7/15/22. Finished 2 weeks of cefepime for  left foot SSTI. Pt now medically stable for DC, pending placement to LTAC.       Interval History: patient lying in bed resting. Denies complaints. Awaiting LTAC placement.     Review of Systems   Constitutional:  Negative for fever.   Respiratory:  Negative for shortness of breath.    Cardiovascular:  Negative for chest pain.   Gastrointestinal:  Negative for abdominal pain, nausea and vomiting.   Objective:     Vital Signs (Most Recent):  Temp: 97.6 °F (36.4 °C) (07/04/22 1149)  Pulse: 83 (07/04/22 1149)  Resp: 18 (07/04/22 1149)  BP: 131/72 (07/04/22 1149)  SpO2: 96 % (07/04/22 1149) Vital Signs (24h Range):  Temp:  [97.6 °F (36.4 °C)-98.6 °F (37 °C)] 97.6 °F (36.4 °C)  Pulse:  [73-83] 83  Resp:  [16-18] 18  SpO2:  [95 %-99 %] 96 %  BP: (122-151)/(72-84) 131/72     Weight: 131.7 kg (290 lb 5.5 oz)  Body mass index is 37.28 kg/m².    Intake/Output Summary (Last 24 hours) at 7/4/2022 1156  Last data filed at 7/4/2022 0431  Gross per 24 hour   Intake 1080 ml   Output 2300 ml   Net -1220 ml        Physical Exam  Constitutional:       Appearance: He is not ill-appearing.   HENT:      Head: Normocephalic.   Pulmonary:      Effort: Pulmonary effort is normal. No respiratory distress.   Neurological:      Mental Status: He is alert and oriented to person, place, and time.   Psychiatric:         Mood and Affect: Mood normal.       Significant Labs: All pertinent labs within the past 24 hours have been reviewed.  CBC: No results for input(s): WBC, HGB, HCT, PLT in the last 48 hours.  CMP: No results for input(s): NA, K, CL, CO2, GLU, BUN, CREATININE, CALCIUM, PROT, ALBUMIN, BILITOT, ALKPHOS, AST, ALT, ANIONGAP, EGFRNONAA in the last 48 hours.    Invalid input(s): ESTGFAFRICA  Troponin: No results for input(s): TROPONINI in the last 48 hours.    Significant Imaging: I have reviewed all pertinent imaging results/findings within the past 24 hours.      Assessment/Plan:      * Osteomyelitis of right foot  Left Foot ulcer  -  "Presented from surgery clinic for concern for infection/osteomyelitis  - Prior history of MRSA bacteremia 2/2 gangrene treated with 6 weeks IV abx for presumed endocarditis  - ESR/CRP elevated but have down trended since prior hospitalization.  - MRI L foot with mild marrow edema of tuft of great toe. Findings and exam not consistent with acute osteomyelitis.  - MRI R foot with post-op changes of 1st & 2nd transmetatarsal with cortical regularity and abnormal signal at distal stumps with extensive edema. Findings concerning for osteo vs chronic changes.  - Biopsies from 6/17 showing no evidence of osteomyelitis involving 2nd metatarsal; 1st metatarsal still in process.  - Wound cultures from ED with MRSA, Providencia rettgeri, Proteus mirabilis, and GNR.  - 1st metatarsal bone culture 6/17 showing E faecalis, diphtheroids.  - Trial of gabapentin 100mg PO qHS with reported symptoms consistent with neuropathic pain.  - Podiatry and wound care following, appreciate assistance.  - ID consulted: given MRSA isolated from a previous culture, ok to treat with vancomycin x 4 weeks (EOC 07/15/22). Finished 2 weeks of cefepime for left foot SSTI.   - midline placed    Antibiotics (From admission, onward)            Start     Stop Route Frequency Ordered    06/30/22 0200  vancomycin 1.5 g in dextrose 5 % 250 mL IVPB (ready to mix)         -- IV Every 12 hours (non-standard times) 06/29/22 1547    06/15/22 2237  vancomycin - pharmacy to dose  (vancomycin IVPB)        "And" Linked Group Details    -- IV pharmacy to manage frequency 06/15/22 2148        Cultures were taken-   Microbiology Results (last 7 days)     ** No results found for the last 168 hours. **          Increased nutritional needs        Foot ulcer  Plan as above.       HTN (hypertension)  - Reports taking lisinopril and losartan at home.  - Continue lisinopril 10mg PO daily.    Type 2 diabetes mellitus with diabetic peripheral angiopathy without gangrene, with " long-term current use of insulin  Hyperlipidemia  - Last A1c 7.8  - On Levemir 50 U qHS and aspart 10 U TID WM at home  - Improving. Continue insulin detemir 55U subQ qHS, insulin aspart 13 U subQ TIDWM, moderate-dose sliding scale insulin aspart 1-10U subQ TIDWM PRN.  - Continue aspirin 81mg PO daily, atorvastatin 40mg PO daily.      VTE Risk Mitigation (From admission, onward)         Ordered     enoxaparin injection 40 mg  Daily         06/16/22 0658     IP VTE HIGH RISK PATIENT  Once         06/15/22 2119     Place sequential compression device  Until discontinued         06/15/22 2119                      I have assessed these finding virtually using telemed platform and with assistance of bedside nurse                 The attending portion of this evaluation, treatment, and documentation was performed per Kelly Eduardo NP via Telemedicine AudioVisual using the secure Vidyo software platform with 2 way audio/video. The provider was located off-site and the patient is located in the hospital. The aforementioned video software was utilized to document the relevant history and physical exam    Kelly Eduardo NP  Department of Hospital Medicine   [facility] - Med Surg ([place])

## 2022-07-04 NOTE — PROGRESS NOTES
Pharmacokinetic Assessment Follow Up: IV Vancomycin    Vancomycin serum concentration assessment(s):    The vancomycin trough level was drawn after dose started.    Vancomycin Regimen Plan:    Continue vancomycin 1500 mg IV every 12 hours.  Trough level to be drawn at 1300 on 7/5/22 before evening dose.      Drug levels (last 3 results):  Recent Labs   Lab Result Units 07/03/22  1340 07/04/22  1339   Vancomycin-Trough ug/mL 26.7* 29.5*       Pharmacy will continue to follow and monitor vancomycin.    Please contact pharmacy at [email or web address] [phone] for questions regarding this assessment.    Thank you for the consult,   Jay L Schwab       Patient brief summary:  Dave Good is a 56 y.o. male initiated on antimicrobial therapy with IV Vancomycin for treatment of endocarditis/bone/joint      Drug Allergies:   Review of patient's allergies indicates:   Allergen Reactions    Ibuprofen Swelling     Facial swelling       Actual Body Weight:  131.7 kg    Renal Function:   Estimated Creatinine Clearance: 96.8 mL/min (based on SCr of 1.2 mg/dL).,     Dialysis Method (if applicable):  N/A    CBC (last 72 hours):  No results for input(s): WHITE BLOOD CELL COUNT, HEMOGLOBIN, HEMATOCRIT, PLATELETS, GRAN%, LYMPH%, MONO%, EOSINOPHIL%, BASOPHIL%, DIFFERENTIAL METHOD in the last 72 hours.    Metabolic Panel (last 72 hours):  No results for input(s): SODIUM, POTASSIUM, CHLORIDE, CO2, GLUCOSE, BUN BLD, CREATININE, ALBUMIN, BILIRUBIN TOTAL, ALK PHOS, AST, ALT, MAGNESIUM, PHOSPHORUS in the last 72 hours.    Vancomycin Administrations:  vancomycin given in the last 96 hours                   vancomycin 1.5 g in dextrose 5 % 250 mL IVPB (ready to mix) (mg) 1,500 mg New Bag 07/04/22 1314     1,500 mg New Bag  0143     1,500 mg New Bag 07/03/22 1313     1,500 mg New Bag  0218     1,500 mg New Bag 07/02/22 1449     1,500 mg New Bag  0153     1,500 mg New Bag 07/01/22 1416     1,500 mg New Bag  0132                Microbiologic  Results:  Microbiology Results (last 7 days)     ** No results found for the last 168 hours. **

## 2022-07-05 LAB
POCT GLUCOSE: 115 MG/DL (ref 70–110)
POCT GLUCOSE: 136 MG/DL (ref 70–110)
POCT GLUCOSE: 163 MG/DL (ref 70–110)
POCT GLUCOSE: 171 MG/DL (ref 70–110)
VANCOMYCIN TROUGH SERPL-MCNC: 21.7 UG/ML (ref 10–22)

## 2022-07-05 PROCEDURE — 63600175 PHARM REV CODE 636 W HCPCS: Performed by: HOSPITALIST

## 2022-07-05 PROCEDURE — 97116 GAIT TRAINING THERAPY: CPT

## 2022-07-05 PROCEDURE — 11000001 HC ACUTE MED/SURG PRIVATE ROOM

## 2022-07-05 PROCEDURE — 25000003 PHARM REV CODE 250: Performed by: NURSE PRACTITIONER

## 2022-07-05 PROCEDURE — 99232 SBSQ HOSP IP/OBS MODERATE 35: CPT | Mod: 95,,, | Performed by: HOSPITALIST

## 2022-07-05 PROCEDURE — 80202 ASSAY OF VANCOMYCIN: CPT | Performed by: INTERNAL MEDICINE

## 2022-07-05 PROCEDURE — 25000003 PHARM REV CODE 250: Performed by: HOSPITALIST

## 2022-07-05 PROCEDURE — 94761 N-INVAS EAR/PLS OXIMETRY MLT: CPT

## 2022-07-05 PROCEDURE — 27000207 HC ISOLATION

## 2022-07-05 PROCEDURE — 97110 THERAPEUTIC EXERCISES: CPT

## 2022-07-05 PROCEDURE — 63600175 PHARM REV CODE 636 W HCPCS: Performed by: INTERNAL MEDICINE

## 2022-07-05 PROCEDURE — 25000003 PHARM REV CODE 250: Performed by: INTERNAL MEDICINE

## 2022-07-05 PROCEDURE — 36415 COLL VENOUS BLD VENIPUNCTURE: CPT | Performed by: INTERNAL MEDICINE

## 2022-07-05 PROCEDURE — 99232 PR SUBSEQUENT HOSPITAL CARE,LEVL II: ICD-10-PCS | Mod: 95,,, | Performed by: HOSPITALIST

## 2022-07-05 PROCEDURE — 25000003 PHARM REV CODE 250: Performed by: STUDENT IN AN ORGANIZED HEALTH CARE EDUCATION/TRAINING PROGRAM

## 2022-07-05 RX ADMIN — INSULIN ASPART 13 UNITS: 100 INJECTION, SOLUTION INTRAVENOUS; SUBCUTANEOUS at 09:07

## 2022-07-05 RX ADMIN — INSULIN ASPART 13 UNITS: 100 INJECTION, SOLUTION INTRAVENOUS; SUBCUTANEOUS at 12:07

## 2022-07-05 RX ADMIN — HYDROCODONE BITARTRATE AND ACETAMINOPHEN 1 TABLET: 7.5; 325 TABLET ORAL at 11:07

## 2022-07-05 RX ADMIN — ASPIRIN 81 MG: 81 TABLET, COATED ORAL at 09:07

## 2022-07-05 RX ADMIN — GABAPENTIN 100 MG: 100 CAPSULE ORAL at 09:07

## 2022-07-05 RX ADMIN — VANCOMYCIN HYDROCHLORIDE 1500 MG: 1.5 INJECTION, POWDER, LYOPHILIZED, FOR SOLUTION INTRAVENOUS at 02:07

## 2022-07-05 RX ADMIN — HYDROCODONE BITARTRATE AND ACETAMINOPHEN 1 TABLET: 7.5; 325 TABLET ORAL at 02:07

## 2022-07-05 RX ADMIN — HYDROCODONE BITARTRATE AND ACETAMINOPHEN 1 TABLET: 7.5; 325 TABLET ORAL at 03:07

## 2022-07-05 RX ADMIN — QUETIAPINE FUMARATE 200 MG: 200 TABLET ORAL at 09:07

## 2022-07-05 RX ADMIN — HYDROCODONE BITARTRATE AND ACETAMINOPHEN 1 TABLET: 7.5; 325 TABLET ORAL at 07:07

## 2022-07-05 RX ADMIN — ATORVASTATIN CALCIUM 40 MG: 20 TABLET, FILM COATED ORAL at 09:07

## 2022-07-05 RX ADMIN — COLLAGENASE SANTYL: 250 OINTMENT TOPICAL at 09:07

## 2022-07-05 RX ADMIN — INSULIN ASPART 2 UNITS: 100 INJECTION, SOLUTION INTRAVENOUS; SUBCUTANEOUS at 09:07

## 2022-07-05 RX ADMIN — ENOXAPARIN SODIUM 40 MG: 100 INJECTION SUBCUTANEOUS at 05:07

## 2022-07-05 RX ADMIN — INSULIN ASPART 13 UNITS: 100 INJECTION, SOLUTION INTRAVENOUS; SUBCUTANEOUS at 05:07

## 2022-07-05 RX ADMIN — HYDROCHLOROTHIAZIDE 25 MG: 25 TABLET ORAL at 09:07

## 2022-07-05 RX ADMIN — HYDROCODONE BITARTRATE AND ACETAMINOPHEN 1 TABLET: 7.5; 325 TABLET ORAL at 10:07

## 2022-07-05 RX ADMIN — INSULIN DETEMIR 55 UNITS: 100 INJECTION, SOLUTION SUBCUTANEOUS at 10:07

## 2022-07-05 RX ADMIN — LISINOPRIL 10 MG: 10 TABLET ORAL at 09:07

## 2022-07-05 NOTE — PLAN OF CARE
Pt remained free of falls and injuries throughout shift. AAOx4. Pt calm and pleasant. Purposeful hourly rounding performed. Pt swallows meds whole. IV flushed and saline locked. Pt has wounds to bilateral feet, dsgs and ACE wrap CDI. Managed c/o bilateral foot pain with PRN Percocet.  last night, 1 unit SSI given per MAR. Patient ambulates independently to toilet, urinal in reach. VSS on room air. Pt resting comfortably in bed, denies needs at this time. Bed low and locked, call light in reach. Side rails up x2. Report given to VIRGINIA Peterson.

## 2022-07-05 NOTE — PLAN OF CARE
[facility] - Med Surg ([place])  Discharge Reassessment    Primary Care Provider: Reyna Jorge NP    Expected Discharge Date:     Reassessment (most recent)       Discharge Reassessment - 07/05/22 1554          Discharge Reassessment    Assessment Type Discharge Planning Reassessment     Did the patient's condition or plan change since previous assessment? No (P)      Discharge Plan A Long-term acute care facility (LTAC) (P)      DME Needed Upon Discharge  none (P)      Discharge Barriers Identified None (P)      Why the patient remains in the hospital Requires continued medical care (P)         Post-Acute Status    Discharge Delays None known at this time (P)                    (Awaiting LTAC placement)  [facility] we can take pt once he has approx 25 days of abx left. please update once he has met this and i will submit auth request

## 2022-07-05 NOTE — PROGRESS NOTES
Pharmacokinetic Assessment Follow Up: IV Vancomycin    Vancomycin serum concentration assessment(s):    The trough level was drawn correctly and can be used to guide therapy at this time. The measurement is above the desired definitive target range of 15 to 20 mcg/mL.    Vancomycin Regimen Plan:    Change regimen to Vancomycin 1250 mg IV every 12 hours with next serum trough concentration measured at 1330 prior to next dose on 7/7/22.    Drug levels (last 3 results):  Recent Labs   Lab Result Units 07/03/22  1340 07/04/22  1339 07/05/22  1331   Vancomycin-Trough ug/mL 26.7* 29.5* 21.7       Pharmacy will continue to follow and monitor vancomycin.    Please contact pharmacy at extension [phone] for questions regarding this assessment.    Thank you for the consult,   Virginia Corado       Patient brief summary:  Dave Good is a 56 y.o. male initiated on antimicrobial therapy with IV Vancomycin for treatment of bone/joint infection    The patient's current regimen is 1250 mg ivpb every 12 hours.    Drug Allergies:   Review of patient's allergies indicates:   Allergen Reactions    Ibuprofen Swelling     Facial swelling       Actual Body Weight:   131.7 kg    Renal Function:   Estimated Creatinine Clearance: 96.8 mL/min (based on SCr of 1.2 mg/dL).,     Dialysis Method (if applicable):  N/A    CBC (last 72 hours):  No results for input(s): WHITE BLOOD CELL COUNT, HEMOGLOBIN, HEMATOCRIT, PLATELETS, GRAN%, LYMPH%, MONO%, EOSINOPHIL%, BASOPHIL%, DIFFERENTIAL METHOD in the last 72 hours.    Metabolic Panel (last 72 hours):  No results for input(s): SODIUM, POTASSIUM, CHLORIDE, CO2, GLUCOSE, BUN BLD, CREATININE, ALBUMIN, BILIRUBIN TOTAL, ALK PHOS, AST, ALT, MAGNESIUM, PHOSPHORUS in the last 72 hours.    Vancomycin Administrations:  vancomycin given in the last 96 hours                     vancomycin 1.5 g in dextrose 5 % 250 mL IVPB (ready to mix) (mg) 1,500 mg New Bag 07/05/22 1413     1,500 mg New Bag  0207     1,500 mg New  Bag 07/04/22 1314     1,500 mg New Bag  0143     1,500 mg New Bag 07/03/22 1313     1,500 mg New Bag  0218     1,500 mg New Bag 07/02/22 1449     1,500 mg New Bag  0153                    Microbiologic Results:  Microbiology Results (last 7 days)       ** No results found for the last 168 hours. **

## 2022-07-05 NOTE — SUBJECTIVE & OBJECTIVE
Interval History: Pt afebrile and HDS. No acute complaints today, no new events overnight. Pending LTAC placement.     Review of Systems   Constitutional:  Negative for fever.   Respiratory:  Negative for cough and shortness of breath.    Cardiovascular:  Negative for chest pain.   Gastrointestinal:  Negative for abdominal pain.   Neurological:  Negative for dizziness and headaches.   Psychiatric/Behavioral:  Negative for confusion.    All other systems reviewed and are negative.  Objective:     Vital Signs (Most Recent):  Temp: 98.9 °F (37.2 °C) (07/05/22 1213)  Pulse: 101 (07/05/22 1213)  Resp: 20 (07/05/22 1213)  BP: 117/78 (07/05/22 1213)  SpO2: (!) 93 % (07/05/22 1213)   Vital Signs (24h Range):  Temp:  [97.9 °F (36.6 °C)-98.9 °F (37.2 °C)] 98.9 °F (37.2 °C)  Pulse:  [] 101  Resp:  [16-20] 20  SpO2:  [92 %-96 %] 93 %  BP: (101-124)/(64-78) 117/78     Weight: 131.7 kg (290 lb 5.5 oz)  Body mass index is 37.28 kg/m².    Intake/Output Summary (Last 24 hours) at 7/5/2022 1310  Last data filed at 7/5/2022 0800  Gross per 24 hour   Intake 1650 ml   Output 1250 ml   Net 400 ml        Physical Exam  Vitals and nursing note reviewed.   Constitutional:       Appearance: Normal appearance.   HENT:      Head: Normocephalic and atraumatic.   Eyes:      Extraocular Movements: Extraocular movements intact.      Pupils: Pupils are equal, round, and reactive to light.   Cardiovascular:      Rate and Rhythm: Normal rate and regular rhythm.   Pulmonary:      Effort: Pulmonary effort is normal. No respiratory distress.   Abdominal:      General: Abdomen is flat. There is no distension.   Musculoskeletal:         General: Normal range of motion.      Cervical back: Normal range of motion.      Comments: Left foot in bandage   Neurological:      General: No focal deficit present.      Mental Status: He is alert and oriented to person, place, and time.   Psychiatric:         Mood and Affect: Mood normal.         Behavior:  Behavior normal.       Significant Labs: All pertinent labs within the past 24 hours have been reviewed.  CBC: No results for input(s): WBC, HGB, HCT, PLT in the last 48 hours.    CMP: No results for input(s): NA, K, CL, CO2, GLU, BUN, CREATININE, CALCIUM, PROT, ALBUMIN, BILITOT, ALKPHOS, AST, ALT, ANIONGAP, EGFRNONAA in the last 48 hours.    Invalid input(s): ESTGFAFRICA      Significant Imaging: I have reviewed all pertinent imaging results/findings within the past 24 hours.

## 2022-07-05 NOTE — PT/OT/SLP PROGRESS
Physical Therapy Treatment    Patient Name:  Dave Good   MRN:  [id number]    Recommendations:     Discharge Recommendations:  home with home health (vs LTAC per MD rec)   Discharge Equipment Recommendations: none   Barriers to discharge: None    Assessment:     Dave Good is a 56 y.o. male admitted with a medical diagnosis of Osteomyelitis of right foot.  He presents with the following impairments/functional limitations:  impaired endurance, gait instability, decreased coordination, decreased lower extremity function, decreased safety awareness, impaired skin.    Patient continues to progress towards goals, continues to be limited by impaired safety awareness with pt reporting he has been ambulating without protective shoes in room. Reinforcement provided for importance of protective shoes to allow skin healing per MD recs. Rec for dc per MD - HH vs LTAC.    Rehab Prognosis: Good; patient would benefit from acute skilled PT services to address these deficits and reach maximum level of function.    Recent Surgery: * No surgery found *      Plan:     During this hospitalization, patient to be seen 2 x/week to address the identified rehab impairments via gait training, therapeutic activities, therapeutic exercises, neuromuscular re-education and progress toward the following goals:    · Plan of Care Expires:  07/06/22    Subjective     Chief Complaint: None verbalized, reports feeling off balance when heel WB  Patient/Family Comments/goals: Goal to go to LTAC; Patient agreeable to PT treatment.  Pain/Comfort:  Pain Rating 1: 0/10  Pain Rating Post-Intervention 1: 0/10      Objective:     Communicated with VIRGINIA William prior to session.  Patient found HOB elevated with peripheral IV upon PT entry to room.     General Precautions: Standard, diabetic, contact   Orthopedic Precautions:RLE partial weight bearing, LLE partial weight bearing (B heel WB, limit ambulation to restroom)   Braces:  (B DAR[employer] shoes)  Respiratory  Status: Room air     Functional Mobility:  · Bed Mobility:     · Supine to Sit: modified independence  · Sit to Supine: modified independence  · Transfers:     · Sit to Stand:  modified independence with rolling walker  · Gait: x60 ft with RW and supervision. Appropriate heel WB and slow gait with offloading with RW. No overt LOB noted      AM-PAC 6 CLICK MOBILITY  Turning over in bed (including adjusting bedclothes, sheets and blankets)?: 4  Sitting down on and standing up from a chair with arms (e.g., wheelchair, bedside commode, etc.): 4  Moving from lying on back to sitting on the side of the bed?: 4  Moving to and from a bed to a chair (including a wheelchair)?: 4  Need to walk in hospital room?: 3  Climbing 3-5 steps with a railing?: 3  Basic Mobility Total Score: 22       Therapeutic Activities and Exercises:  Pt performed seated therapeutic exercises including seated toe raises with 10 sec holds for ant tib strengthening x 10 reps with verbal and tactile cues.      Patient left sitting edge of bed with all lines intact and call button in reach..    GOALS:   Multidisciplinary Problems     Physical Therapy Goals        Problem: Physical Therapy    Goal Priority Disciplines Outcome Goal Variances Interventions   Physical Therapy Goal     PT, PT/OT Ongoing, Progressing     Description: Goals to be met by: 22    Patient will increase functional independence with mobility by performin. Tibialis anterior strengthening (DF with heel on ground) in seated position x10 reps with 15 sec holds without cueing for technique.   2. Gait x 10 feet with mod(I) with RW without cueing for heel WB (B).  3. Ascend/descend 2 step(s) with least restrictive assistive device and uni HR via lateral technique to maintain heel WB with supervision.                    Time Tracking:     PT Received On: 22  PT Start Time: 1250     PT Stop Time: 1313  PT Total Time (min): 23 min     Billable Minutes: Gait Training 15 and  Therapeutic Exercise 8    Treatment Type: Treatment  PT/PTA: PT     PTA Visit Number: 0     07/05/2022

## 2022-07-05 NOTE — PROGRESS NOTES
[facility] Medicine  Telemedicine Progress Note    Patient Name: Dave Good  MRN: [id number]  Patient Class: IP- Inpatient   Admission Date: 6/15/2022  Length of Stay: 20 days  Attending Physician: Aminah Ricardo MD  Primary Care Provider: Reyna Jorge NP          Subjective:     Principal Problem:Osteomyelitis of right foot        HPI:  Mr Acosta is a 56 yr old male with a PMH that includes DB 2, COPD, HTN, depression, and right foot transmetatarsal amputation. He was sent to the ED for evaluation of osteomyelitis from a follow up appt with Dr. Flores. Pt had I&D and amputation for wet gangrene in Sept 2021 and debridements in Feb, March, and May of this year. Per his chart, his feet were healing well until some foot trauma occurred. Pt has has open wounds and drainage to BL feet with right foot wound deeper than left. He also has wound to left shin. Pt reports associated 10/10 pain and difficulty walking. He denies fever and chills at home. Podiatry consulted and pt admitted to hospital medicine.       Overview/Hospital Course:  Patient admitted with bilateral foot wounds and concern for osteomyelitis following R foot TMA requiring debridements since for continued wounds. He was started on empiric antibiotics. Bilateral foot MRI done. MRI of left foot with abnormal findings but not consistent with osteomyelitis. MRI of right foot showed changes of 1st and 2nd transmetatarsal amputation with cortical regularity and abnormal signal at distal stumps noting overlying extensive edema and regions of skin ulceration.  Findings may represent early osteomyelitis vs post-operative changes. Bone biopsy done for further evaluation and to guide treatment. ID and podiatry consulted. Bone culture grew diphtheroids and Enterococcus but no good po options. Per ID, given MRSA isolated from a previous culture, ok to treat with vancomycin x 4 weeks End date 7/15/22. Finished 2 weeks of cefepime for  left foot SSTI. Pt now medically stable for DC, pending placement to LTAC.       Interval History: Pt afebrile and HDS. No acute complaints today, no new events overnight. Pending LTAC placement.     Review of Systems   Constitutional:  Negative for fever.   Respiratory:  Negative for cough and shortness of breath.    Cardiovascular:  Negative for chest pain.   Gastrointestinal:  Negative for abdominal pain.   Neurological:  Negative for dizziness and headaches.   Psychiatric/Behavioral:  Negative for confusion.    All other systems reviewed and are negative.  Objective:     Vital Signs (Most Recent):  Temp: 98.9 °F (37.2 °C) (07/05/22 1213)  Pulse: 101 (07/05/22 1213)  Resp: 20 (07/05/22 1213)  BP: 117/78 (07/05/22 1213)  SpO2: (!) 93 % (07/05/22 1213)   Vital Signs (24h Range):  Temp:  [97.9 °F (36.6 °C)-98.9 °F (37.2 °C)] 98.9 °F (37.2 °C)  Pulse:  [] 101  Resp:  [16-20] 20  SpO2:  [92 %-96 %] 93 %  BP: (101-124)/(64-78) 117/78     Weight: 131.7 kg (290 lb 5.5 oz)  Body mass index is 37.28 kg/m².    Intake/Output Summary (Last 24 hours) at 7/5/2022 1310  Last data filed at 7/5/2022 0800  Gross per 24 hour   Intake 1650 ml   Output 1250 ml   Net 400 ml        Physical Exam  Vitals and nursing note reviewed.   Constitutional:       Appearance: Normal appearance.   HENT:      Head: Normocephalic and atraumatic.   Eyes:      Extraocular Movements: Extraocular movements intact.      Pupils: Pupils are equal, round, and reactive to light.   Cardiovascular:      Rate and Rhythm: Normal rate and regular rhythm.   Pulmonary:      Effort: Pulmonary effort is normal. No respiratory distress.   Abdominal:      General: Abdomen is flat. There is no distension.   Musculoskeletal:         General: Normal range of motion.      Cervical back: Normal range of motion.      Comments: Left foot in bandage   Neurological:      General: No focal deficit present.      Mental Status: He is alert and oriented to person, place, and  time.   Psychiatric:         Mood and Affect: Mood normal.         Behavior: Behavior normal.       Significant Labs: All pertinent labs within the past 24 hours have been reviewed.  CBC: No results for input(s): WBC, HGB, HCT, PLT in the last 48 hours.    CMP: No results for input(s): NA, K, CL, CO2, GLU, BUN, CREATININE, CALCIUM, PROT, ALBUMIN, BILITOT, ALKPHOS, AST, ALT, ANIONGAP, EGFRNONAA in the last 48 hours.    Invalid input(s): [place]      Significant Imaging: I have reviewed all pertinent imaging results/findings within the past 24 hours.      Assessment/Plan:      * Osteomyelitis of right foot  Left Foot ulcer  - Presented from surgery clinic for concern for infection/osteomyelitis  - Prior history of MRSA bacteremia 2/2 gangrene treated with 6 weeks IV abx for presumed endocarditis  - ESR/CRP elevated but have down trended since prior hospitalization.  - MRI L foot with mild marrow edema of tuft of great toe. Findings and exam not consistent with acute osteomyelitis.  - MRI R foot with post-op changes of 1st & 2nd transmetatarsal with cortical regularity and abnormal signal at distal stumps with extensive edema. Findings concerning for osteo vs chronic changes.  - Biopsies from 6/17 showing no evidence of osteomyelitis involving 2nd metatarsal; 1st metatarsal still in process.  - Wound cultures from ED with MRSA, Providencia rettgeri, Proteus mirabilis, and GNR.  - 1st metatarsal bone culture 6/17 showing E faecalis, diphtheroids.  - Trial of gabapentin 100mg PO qHS with reported symptoms consistent with neuropathic pain.  - Podiatry and wound care following, appreciate assistance.  - ID consulted: given MRSA isolated from a previous culture, ok to treat with vancomycin x 4 weeks (EOC 07/15/22). Finished 2 weeks of cefepime for left foot SSTI.   - midline placed    Antibiotics (From admission, onward)            Start     Stop Route Frequency Ordered    06/30/22 0200  vancomycin 1.5 g in dextrose 5 %  "250 mL IVPB (ready to mix)         -- IV Every 12 hours (non-standard times) 06/29/22 1547    06/15/22 2237  vancomycin - pharmacy to dose  (vancomycin IVPB)        "And" Linked Group Details    -- IV pharmacy to manage frequency 06/15/22 2148        Cultures were taken-   Microbiology Results (last 7 days)     ** No results found for the last 168 hours. **          Increased nutritional needs        Foot ulcer  Plan as above.       HTN (hypertension)  - Reports taking lisinopril and losartan at home.  - Continue lisinopril 10mg PO daily.    Type 2 diabetes mellitus with diabetic peripheral angiopathy without gangrene, with long-term current use of insulin  Hyperlipidemia  - Last A1c 7.8  - On Levemir 50 U qHS and aspart 10 U TID WM at home  - Improving. Continue insulin detemir 55U subQ qHS, insulin aspart 13 U subQ TIDWM, moderate-dose sliding scale insulin aspart 1-10U subQ TIDWM PRN.  - Continue aspirin 81mg PO daily, atorvastatin 40mg PO daily.      VTE Risk Mitigation (From admission, onward)         Ordered     enoxaparin injection 40 mg  Daily         06/16/22 0658     IP VTE HIGH RISK PATIENT  Once         06/15/22 2119     Place sequential compression device  Until discontinued         06/15/22 2119                      I have assessed these finding virtually using telemed platform and with assistance of bedside nurse                 The attending portion of this evaluation, treatment, and documentation was performed per Aminah Ricardo MD via Telemedicine AudioVisual using the secure [employer] software platform with 2 way audio/video. The provider was located off-site and the patient is located in the hospital. The aforementioned video software was utilized to document the relevant history and physical exam    Aminah Ricardo MD  Department of Hospital Medicine   [facility] ([place])  "

## 2022-07-06 PROBLEM — K59.01 SLOW TRANSIT CONSTIPATION: Status: ACTIVE | Noted: 2022-07-06

## 2022-07-06 LAB
POCT GLUCOSE: 191 MG/DL (ref 70–110)
POCT GLUCOSE: 197 MG/DL (ref 70–110)

## 2022-07-06 PROCEDURE — 63600175 PHARM REV CODE 636 W HCPCS: Performed by: INTERNAL MEDICINE

## 2022-07-06 PROCEDURE — 25000003 PHARM REV CODE 250: Performed by: STUDENT IN AN ORGANIZED HEALTH CARE EDUCATION/TRAINING PROGRAM

## 2022-07-06 PROCEDURE — 27000207 HC ISOLATION

## 2022-07-06 PROCEDURE — 97116 GAIT TRAINING THERAPY: CPT | Mod: CQ

## 2022-07-06 PROCEDURE — 99232 SBSQ HOSP IP/OBS MODERATE 35: CPT | Mod: 95,,, | Performed by: HOSPITALIST

## 2022-07-06 PROCEDURE — 25000003 PHARM REV CODE 250: Performed by: INTERNAL MEDICINE

## 2022-07-06 PROCEDURE — 25000003 PHARM REV CODE 250: Performed by: NURSE PRACTITIONER

## 2022-07-06 PROCEDURE — 99232 PR SUBSEQUENT HOSPITAL CARE,LEVL II: ICD-10-PCS | Mod: 95,,, | Performed by: HOSPITALIST

## 2022-07-06 PROCEDURE — 11000001 HC ACUTE MED/SURG PRIVATE ROOM

## 2022-07-06 PROCEDURE — 25000003 PHARM REV CODE 250: Performed by: HOSPITALIST

## 2022-07-06 PROCEDURE — 63600175 PHARM REV CODE 636 W HCPCS: Performed by: HOSPITALIST

## 2022-07-06 RX ORDER — POLYETHYLENE GLYCOL 3350 17 G/17G
17 POWDER, FOR SOLUTION ORAL DAILY
Status: DISCONTINUED | OUTPATIENT
Start: 2022-07-07 | End: 2022-07-15 | Stop reason: HOSPADM

## 2022-07-06 RX ORDER — ADHESIVE BANDAGE
30 BANDAGE TOPICAL DAILY PRN
Status: DISCONTINUED | OUTPATIENT
Start: 2022-07-06 | End: 2022-07-15 | Stop reason: HOSPADM

## 2022-07-06 RX ORDER — AMOXICILLIN 250 MG
1 CAPSULE ORAL DAILY PRN
Status: DISCONTINUED | OUTPATIENT
Start: 2022-07-06 | End: 2022-07-15 | Stop reason: HOSPADM

## 2022-07-06 RX ADMIN — INSULIN ASPART 2 UNITS: 100 INJECTION, SOLUTION INTRAVENOUS; SUBCUTANEOUS at 04:07

## 2022-07-06 RX ADMIN — ATORVASTATIN CALCIUM 40 MG: 20 TABLET, FILM COATED ORAL at 08:07

## 2022-07-06 RX ADMIN — INSULIN DETEMIR 55 UNITS: 100 INJECTION, SOLUTION SUBCUTANEOUS at 09:07

## 2022-07-06 RX ADMIN — ENOXAPARIN SODIUM 40 MG: 100 INJECTION SUBCUTANEOUS at 04:07

## 2022-07-06 RX ADMIN — INSULIN ASPART 13 UNITS: 100 INJECTION, SOLUTION INTRAVENOUS; SUBCUTANEOUS at 09:07

## 2022-07-06 RX ADMIN — COLLAGENASE SANTYL: 250 OINTMENT TOPICAL at 06:07

## 2022-07-06 RX ADMIN — INSULIN ASPART 13 UNITS: 100 INJECTION, SOLUTION INTRAVENOUS; SUBCUTANEOUS at 04:07

## 2022-07-06 RX ADMIN — HYDROCHLOROTHIAZIDE 25 MG: 25 TABLET ORAL at 08:07

## 2022-07-06 RX ADMIN — QUETIAPINE FUMARATE 200 MG: 200 TABLET ORAL at 11:07

## 2022-07-06 RX ADMIN — VANCOMYCIN HYDROCHLORIDE 1250 MG: 1.25 INJECTION, POWDER, LYOPHILIZED, FOR SOLUTION INTRAVENOUS at 02:07

## 2022-07-06 RX ADMIN — POLYETHYLENE GLYCOL 3350 17 G: 17 POWDER, FOR SOLUTION ORAL at 03:07

## 2022-07-06 RX ADMIN — SENNOSIDES AND DOCUSATE SODIUM 1 TABLET: 50; 8.6 TABLET ORAL at 02:07

## 2022-07-06 RX ADMIN — HYDROCODONE BITARTRATE AND ACETAMINOPHEN 1 TABLET: 5; 325 TABLET ORAL at 07:07

## 2022-07-06 RX ADMIN — LISINOPRIL 10 MG: 10 TABLET ORAL at 08:07

## 2022-07-06 RX ADMIN — GABAPENTIN 100 MG: 100 CAPSULE ORAL at 09:07

## 2022-07-06 RX ADMIN — HYDROCODONE BITARTRATE AND ACETAMINOPHEN 1 TABLET: 7.5; 325 TABLET ORAL at 03:07

## 2022-07-06 RX ADMIN — HYDROCODONE BITARTRATE AND ACETAMINOPHEN 1 TABLET: 5; 325 TABLET ORAL at 11:07

## 2022-07-06 RX ADMIN — INSULIN ASPART 13 UNITS: 100 INJECTION, SOLUTION INTRAVENOUS; SUBCUTANEOUS at 12:07

## 2022-07-06 RX ADMIN — HYDROCODONE BITARTRATE AND ACETAMINOPHEN 1 TABLET: 7.5; 325 TABLET ORAL at 11:07

## 2022-07-06 RX ADMIN — HYDROCODONE BITARTRATE AND ACETAMINOPHEN 1 TABLET: 7.5; 325 TABLET ORAL at 07:07

## 2022-07-06 RX ADMIN — MAGNESIUM HYDROXIDE 2400 MG: 400 SUSPENSION ORAL at 02:07

## 2022-07-06 RX ADMIN — ASPIRIN 81 MG: 81 TABLET, COATED ORAL at 08:07

## 2022-07-06 RX ADMIN — INSULIN ASPART 2 UNITS: 100 INJECTION, SOLUTION INTRAVENOUS; SUBCUTANEOUS at 12:07

## 2022-07-06 NOTE — PROGRESS NOTES
"DEWAYNE called Negra at [facility]. She stated that Medicaid has denied the request for LTAC for the patient. DEWAYNE then [facility] She stated that she had tried to get an auth for the LTAC from Medicaid, but they denied it. She filed an Appealed as well, which was also denied.   DEWAYNE spoke with they patient as he had requested. DEWAYNE explained to him that the request for LTAC was denied and that another alternative would need to be arranged. SW talked with him about SNF and going home with HH. He did not like either alternative.He said that SW should speak with his girlfriend, Luz, who is planning to stay with him after d/c to help him recover. DEWAYNE called Luz, who stated that he should "stay where he is until the 7/15/22 when the IV antibiotic is done." She did agreed to look at a list of SNF's that DEWAYNE e-mailed to her at her request. She is to contact this SW with Choices from the SNF list tomorrow.  She was against the patient trying to get HH which means she would be administering most does to the patient herself.   DEWAYNE then sent referrals out to several facilities via [facility].      "

## 2022-07-06 NOTE — SUBJECTIVE & OBJECTIVE
Interval History: Pt afebrile and HDS. No acute complaints today, no new events overnight. Feeling consitpated today. Pending LTAC placement.     Review of Systems   Constitutional:  Negative for fever.   Respiratory:  Negative for cough and shortness of breath.    Cardiovascular:  Negative for chest pain.   Gastrointestinal:  Negative for abdominal pain.   Neurological:  Negative for dizziness and headaches.   Psychiatric/Behavioral:  Negative for confusion.    All other systems reviewed and are negative.  Objective:     Vital Signs (Most Recent):  Temp: 98.9 °F (37.2 °C) (07/06/22 0720)  Pulse: 73 (07/06/22 0720)  Resp: 20 (07/06/22 1132)  BP: 115/68 (07/06/22 0720)  SpO2: (!) 94 % (07/06/22 0720)   Vital Signs (24h Range):  Temp:  [97.6 °F (36.4 °C)-98.9 °F (37.2 °C)] 98.9 °F (37.2 °C)  Pulse:  [] 73  Resp:  [18-20] 20  SpO2:  [93 %-96 %] 94 %  BP: (115-135)/(68-78) 115/68     Weight: 131.7 kg (290 lb 5.5 oz)  Body mass index is 37.28 kg/m².    Intake/Output Summary (Last 24 hours) at 7/6/2022 1135  Last data filed at 7/6/2022 0304  Gross per 24 hour   Intake --   Output 1200 ml   Net -1200 ml        Physical Exam  Vitals and nursing note reviewed.   Constitutional:       Appearance: Normal appearance.   HENT:      Head: Normocephalic and atraumatic.   Eyes:      Extraocular Movements: Extraocular movements intact.      Pupils: Pupils are equal, round, and reactive to light.   Cardiovascular:      Rate and Rhythm: Normal rate and regular rhythm.   Pulmonary:      Effort: Pulmonary effort is normal. No respiratory distress.   Abdominal:      General: Abdomen is flat. There is no distension.   Musculoskeletal:         General: Normal range of motion.      Cervical back: Normal range of motion.      Comments: Left foot in bandage   Neurological:      General: No focal deficit present.      Mental Status: He is alert and oriented to person, place, and time.   Psychiatric:         Mood and Affect: Mood normal.          Behavior: Behavior normal.       Significant Labs: All pertinent labs within the past 24 hours have been reviewed.  CBC: No results for input(s): WBC, HGB, HCT, PLT in the last 48 hours.    CMP: No results for input(s): NA, K, CL, CO2, GLU, BUN, CREATININE, CALCIUM, PROT, ALBUMIN, BILITOT, ALKPHOS, AST, ALT, ANIONGAP, EGFRNONAA in the last 48 hours.    Invalid input(s): [place]      Significant Imaging: I have reviewed all pertinent imaging results/findings within the past 24 hours.

## 2022-07-06 NOTE — PT/OT/SLP PROGRESS
Physical Therapy Treatment    Patient Name:  Dave Good   MRN:  [id number]    Recommendations:     Discharge Recommendations:  home with home health (vs LTAC per MD rec)   Discharge Equipment Recommendations: none   Barriers to discharge: None    Assessment:     Dave Good is a 56 y.o. male admitted with a medical diagnosis of Osteomyelitis of right foot.  He presents with the following impairments/functional limitations:  impaired functional mobilty, impaired skin, orthopedic precautions, impaired self care skills, impaired balance, gait instability, pain .    Supine to sit independent. Donned B darco shoes prior to OOB mobility. Sit<>stand with RW independent. Pt a,bulated 72ft in room with RW & supervision. Pt reports he ambulates to restroom without darco shoes at times due to needing to go quickly- educated pt on importance of donning shoes for WB & calling for assist to restroom.    Rehab Prognosis: Good; patient would benefit from acute skilled PT services to address these deficits and reach maximum level of function.    Recent Surgery: * No surgery found *      Plan:     During this hospitalization, patient to be seen 2 x/week to address the identified rehab impairments via gait training, therapeutic activities, therapeutic exercises, neuromuscular re-education and progress toward the following goals:    · Plan of Care Expires:  07/06/22    Subjective     Chief Complaint: B foot pain  Patient/Family Comments/goals: pt agreeable to therapy session  Pain/Comfort:  · Pain Rating 1: 8/10  · Location - Side 1: Bilateral  · Location 1: foot  · Pain Addressed 1: Reposition, Distraction, Cessation of Activity, Pre-medicate for activity  · Pain Rating Post-Intervention 1: 8/10      Objective:     Communicated with Bre prior to session.  Patient found supine with peripheral IV upon PT entry to room.     General Precautions: Standard, diabetic, contact   Orthopedic Precautions:RLE partial weight bearing, LLE  partial weight bearing (B heel WB, limit ambulation to restroom)   Braces:    Respiratory Status: Room air     Functional Mobility:  · Bed mobility- Supine to sit independent.   · Transfers- Sit<>stand with RW independent.   · Gait- Pt ambulated 72ft in room with RW & supervision. Pt reports he ambulates to restroom without darco shoes at times due to needing to go quickly- educated pt on importance of donning shoes for WB & calling for assist to restroom.      AM-PAC 6 CLICK MOBILITY  Turning over in bed (including adjusting bedclothes, sheets and blankets)?: 4  Sitting down on and standing up from a chair with arms (e.g., wheelchair, bedside commode, etc.): 4  Moving from lying on back to sitting on the side of the bed?: 4  Moving to and from a bed to a chair (including a wheelchair)?: 4  Need to walk in hospital room?: 3  Climbing 3-5 steps with a railing?: 3  Basic Mobility Total Score: 22       Therapeutic Activities and Exercises:  Reviewed LE TE with pt; pt states he has been performing TE throughout the day    Patient left supine with all lines intact and call button in reach..    GOALS:   Multidisciplinary Problems     Physical Therapy Goals        Problem: Physical Therapy    Goal Priority Disciplines Outcome Goal Variances Interventions   Physical Therapy Goal     PT, PT/OT Ongoing, Progressing     Description: Goals to be met by: 22    Patient will increase functional independence with mobility by performin. Tibialis anterior strengthening (DF with heel on ground) in seated position x10 reps with 15 sec holds without cueing for technique.   2. Gait x 10 feet with mod(I) with RW without cueing for heel WB (B).  3. Ascend/descend 2 step(s) with least restrictive assistive device and uni HR via lateral technique to maintain heel WB with supervision.                    Time Tracking:     PT Received On: 22  PT Start Time: 1150     PT Stop Time: 1205  PT Total Time (min): 15 min     Billable  Minutes: Gait Training 15    Treatment Type: Treatment  PT/PTA: PTA     PTA Visit Number: 1 07/06/2022

## 2022-07-06 NOTE — PROGRESS NOTES
[facility] Medicine  Telemedicine Progress Note    Patient Name: Dave Good  MRN: [id number]  Patient Class: IP- Inpatient   Admission Date: 6/15/2022  Length of Stay: 21 days  Attending Physician: Aminah Ricardo MD  Primary Care Provider: Reyna Jorge NP          Subjective:     Principal Problem:Osteomyelitis of right foot        HPI:  Mr Acosta is a 56 yr old male with a PMH that includes DB 2, COPD, HTN, depression, and right foot transmetatarsal amputation. He was sent to the ED for evaluation of osteomyelitis from a follow up appt with Dr. Flores. Pt had I&D and amputation for wet gangrene in Sept 2021 and debridements in Feb, March, and May of this year. Per his chart, his feet were healing well until some foot trauma occurred. Pt has has open wounds and drainage to BL feet with right foot wound deeper than left. He also has wound to left shin. Pt reports associated 10/10 pain and difficulty walking. He denies fever and chills at home. Podiatry consulted and pt admitted to hospital medicine.       Overview/Hospital Course:  Patient admitted with bilateral foot wounds and concern for osteomyelitis following R foot TMA requiring debridements since for continued wounds. He was started on empiric antibiotics. Bilateral foot MRI done. MRI of left foot with abnormal findings but not consistent with osteomyelitis. MRI of right foot showed changes of 1st and 2nd transmetatarsal amputation with cortical regularity and abnormal signal at distal stumps noting overlying extensive edema and regions of skin ulceration.  Findings may represent early osteomyelitis vs post-operative changes. Bone biopsy done for further evaluation and to guide treatment. ID and podiatry consulted. Bone culture grew diphtheroids and Enterococcus but no good po options. Per ID, given MRSA isolated from a previous culture, ok to treat with vancomycin x 4 weeks End date 7/15/22. Finished 2 weeks of cefepime for  left foot SSTI. Pt now medically stable for DC, pending placement to LTAC.       Interval History: Pt afebrile and HDS. No acute complaints today, no new events overnight. Feeling consitpated today. Pending LTAC placement.     Review of Systems   Constitutional:  Negative for fever.   Respiratory:  Negative for cough and shortness of breath.    Cardiovascular:  Negative for chest pain.   Gastrointestinal:  Negative for abdominal pain.   Neurological:  Negative for dizziness and headaches.   Psychiatric/Behavioral:  Negative for confusion.    All other systems reviewed and are negative.  Objective:     Vital Signs (Most Recent):  Temp: 98.9 °F (37.2 °C) (07/06/22 0720)  Pulse: 73 (07/06/22 0720)  Resp: 20 (07/06/22 1132)  BP: 115/68 (07/06/22 0720)  SpO2: (!) 94 % (07/06/22 0720)   Vital Signs (24h Range):  Temp:  [97.6 °F (36.4 °C)-98.9 °F (37.2 °C)] 98.9 °F (37.2 °C)  Pulse:  [] 73  Resp:  [18-20] 20  SpO2:  [93 %-96 %] 94 %  BP: (115-135)/(68-78) 115/68     Weight: 131.7 kg (290 lb 5.5 oz)  Body mass index is 37.28 kg/m².    Intake/Output Summary (Last 24 hours) at 7/6/2022 1135  Last data filed at 7/6/2022 0304  Gross per 24 hour   Intake --   Output 1200 ml   Net -1200 ml        Physical Exam  Vitals and nursing note reviewed.   Constitutional:       Appearance: Normal appearance.   HENT:      Head: Normocephalic and atraumatic.   Eyes:      Extraocular Movements: Extraocular movements intact.      Pupils: Pupils are equal, round, and reactive to light.   Cardiovascular:      Rate and Rhythm: Normal rate and regular rhythm.   Pulmonary:      Effort: Pulmonary effort is normal. No respiratory distress.   Abdominal:      General: Abdomen is flat. There is no distension.   Musculoskeletal:         General: Normal range of motion.      Cervical back: Normal range of motion.      Comments: Left foot in bandage   Neurological:      General: No focal deficit present.      Mental Status: He is alert and oriented  to person, place, and time.   Psychiatric:         Mood and Affect: Mood normal.         Behavior: Behavior normal.       Significant Labs: All pertinent labs within the past 24 hours have been reviewed.  CBC: No results for input(s): WBC, HGB, HCT, PLT in the last 48 hours.    CMP: No results for input(s): NA, K, CL, CO2, GLU, BUN, CREATININE, CALCIUM, PROT, ALBUMIN, BILITOT, ALKPHOS, AST, ALT, ANIONGAP, EGFRNONAA in the last 48 hours.    Invalid input(s): [place]      Significant Imaging: I have reviewed all pertinent imaging results/findings within the past 24 hours.      Assessment/Plan:      * Osteomyelitis of right foot  Left Foot ulcer  - Presented from surgery clinic for concern for infection/osteomyelitis  - Prior history of MRSA bacteremia 2/2 gangrene treated with 6 weeks IV abx for presumed endocarditis  - ESR/CRP elevated but have down trended since prior hospitalization.  - MRI L foot with mild marrow edema of tuft of great toe. Findings and exam not consistent with acute osteomyelitis.  - MRI R foot with post-op changes of 1st & 2nd transmetatarsal with cortical regularity and abnormal signal at distal stumps with extensive edema. Findings concerning for osteo vs chronic changes.  - Biopsies from 6/17 showing no evidence of osteomyelitis involving 2nd metatarsal; 1st metatarsal still in process.  - Wound cultures from ED with MRSA, Providencia rettgeri, Proteus mirabilis, and GNR.  - 1st metatarsal bone culture 6/17 showing E faecalis, diphtheroids.  - Trial of gabapentin 100mg PO qHS with reported symptoms consistent with neuropathic pain.  - Podiatry and wound care following, appreciate assistance.  - ID consulted: given MRSA isolated from a previous culture, ok to treat with vancomycin x 4 weeks (EOC 07/15/22). Finished 2 weeks of cefepime for left foot SSTI.   - midline placed    Antibiotics (From admission, onward)            Start     Stop Route Frequency Ordered    06/30/22 0200  vancomycin  "1.5 g in dextrose 5 % 250 mL IVPB (ready to mix)         -- IV Every 12 hours (non-standard times) 06/29/22 1547    06/15/22 2237  vancomycin - pharmacy to dose  (vancomycin IVPB)        "And" Linked Group Details    -- IV pharmacy to manage frequency 06/15/22 2148        Cultures were taken-   Microbiology Results (last 7 days)     ** No results found for the last 168 hours. **          Slow transit constipation  -continue bowel regimen  -scheduled MiraLax daily    Increased nutritional needs        Foot ulcer  Plan as above.       HTN (hypertension)  - Reports taking lisinopril and losartan at home.  - Continue lisinopril 10mg PO daily.    Type 2 diabetes mellitus with diabetic peripheral angiopathy without gangrene, with long-term current use of insulin  Hyperlipidemia  - Last A1c 7.8  - On Levemir 50 U qHS and aspart 10 U TID WM at home  - Improving. Continue insulin detemir 55U subQ qHS, insulin aspart 13 U subQ TIDWM, moderate-dose sliding scale insulin aspart 1-10U subQ TIDWM PRN.  - Continue aspirin 81mg PO daily, atorvastatin 40mg PO daily.      VTE Risk Mitigation (From admission, onward)         Ordered     enoxaparin injection 40 mg  Daily         06/16/22 0658     IP VTE HIGH RISK PATIENT  Once         06/15/22 2119     Place sequential compression device  Until discontinued         06/15/22 2119                      I have assessed these finding virtually using telemed platform and with assistance of bedside nurse                 The attending portion of this evaluation, treatment, and documentation was performed per Aminah Ricardo MD via Telemedicine AudioVisual using the secure [employer] software platform with 2 way audio/video. The provider was located off-site and the patient is located in the hospital. The aforementioned video software was utilized to document the relevant history and physical exam    mAinah Ricardo MD  Department of Hospital Medicine   [facility]  "

## 2022-07-06 NOTE — PLAN OF CARE
Problem: Adult Inpatient Plan of Care  Goal: Plan of Care Review  Outcome: Ongoing, Progressing  Goal: Patient-Specific Goal (Individualized)  Outcome: Ongoing, Progressing  Goal: Absence of Hospital-Acquired Illness or Injury  Outcome: Ongoing, Progressing  Goal: Optimal Comfort and Wellbeing  Outcome: Ongoing, Progressing  Goal: Readiness for Transition of Care  Outcome: Ongoing, Progressing     Problem: Fall Injury Risk  Goal: Absence of Fall and Fall-Related Injury  Outcome: Ongoing, Progressing     Problem: Diabetes Comorbidity  Goal: Blood Glucose Level Within Targeted Range  Outcome: Ongoing, Progressing     Problem: Oral Intake Inadequate (Acute Kidney Injury/Impairment)  Goal: Optimal Nutrition Intake  Outcome: Ongoing, Progressing     Problem: Renal Function Impairment (Acute Kidney Injury/Impairment)  Goal: Effective Renal Function  Outcome: Ongoing, Progressing     Problem: Infection  Goal: Absence of Infection Signs and Symptoms  Outcome: Ongoing, Progressing

## 2022-07-07 LAB
POCT GLUCOSE: 179 MG/DL (ref 70–110)
POCT GLUCOSE: 181 MG/DL (ref 70–110)
POCT GLUCOSE: 188 MG/DL (ref 70–110)
POCT GLUCOSE: 201 MG/DL (ref 70–110)
VANCOMYCIN TROUGH SERPL-MCNC: 15.7 UG/ML (ref 10–22)

## 2022-07-07 PROCEDURE — 63600175 PHARM REV CODE 636 W HCPCS: Performed by: HOSPITALIST

## 2022-07-07 PROCEDURE — 25000003 PHARM REV CODE 250: Performed by: STUDENT IN AN ORGANIZED HEALTH CARE EDUCATION/TRAINING PROGRAM

## 2022-07-07 PROCEDURE — 11000001 HC ACUTE MED/SURG PRIVATE ROOM

## 2022-07-07 PROCEDURE — 63600175 PHARM REV CODE 636 W HCPCS: Performed by: INTERNAL MEDICINE

## 2022-07-07 PROCEDURE — 25000003 PHARM REV CODE 250: Performed by: NURSE PRACTITIONER

## 2022-07-07 PROCEDURE — 36415 COLL VENOUS BLD VENIPUNCTURE: CPT | Performed by: HOSPITALIST

## 2022-07-07 PROCEDURE — 25000003 PHARM REV CODE 250: Performed by: INTERNAL MEDICINE

## 2022-07-07 PROCEDURE — 27000207 HC ISOLATION

## 2022-07-07 PROCEDURE — C9399 UNCLASSIFIED DRUGS OR BIOLOG: HCPCS | Performed by: INTERNAL MEDICINE

## 2022-07-07 PROCEDURE — 80202 ASSAY OF VANCOMYCIN: CPT | Performed by: HOSPITALIST

## 2022-07-07 PROCEDURE — 94761 N-INVAS EAR/PLS OXIMETRY MLT: CPT

## 2022-07-07 PROCEDURE — 99232 PR SUBSEQUENT HOSPITAL CARE,LEVL II: ICD-10-PCS | Mod: 95,,, | Performed by: HOSPITALIST

## 2022-07-07 PROCEDURE — 99232 SBSQ HOSP IP/OBS MODERATE 35: CPT | Mod: 95,,, | Performed by: HOSPITALIST

## 2022-07-07 PROCEDURE — 25000003 PHARM REV CODE 250: Performed by: HOSPITALIST

## 2022-07-07 RX ORDER — ADHESIVE BANDAGE
30 BANDAGE TOPICAL ONCE
Status: COMPLETED | OUTPATIENT
Start: 2022-07-07 | End: 2022-07-07

## 2022-07-07 RX ADMIN — COLLAGENASE SANTYL: 250 OINTMENT TOPICAL at 01:07

## 2022-07-07 RX ADMIN — GABAPENTIN 100 MG: 100 CAPSULE ORAL at 08:07

## 2022-07-07 RX ADMIN — INSULIN ASPART 2 UNITS: 100 INJECTION, SOLUTION INTRAVENOUS; SUBCUTANEOUS at 04:07

## 2022-07-07 RX ADMIN — HYDROCODONE BITARTRATE AND ACETAMINOPHEN 1 TABLET: 7.5; 325 TABLET ORAL at 11:07

## 2022-07-07 RX ADMIN — INSULIN ASPART 2 UNITS: 100 INJECTION, SOLUTION INTRAVENOUS; SUBCUTANEOUS at 07:07

## 2022-07-07 RX ADMIN — INSULIN ASPART 13 UNITS: 100 INJECTION, SOLUTION INTRAVENOUS; SUBCUTANEOUS at 04:07

## 2022-07-07 RX ADMIN — HYDROCODONE BITARTRATE AND ACETAMINOPHEN 1 TABLET: 7.5; 325 TABLET ORAL at 03:07

## 2022-07-07 RX ADMIN — ASPIRIN 81 MG: 81 TABLET, COATED ORAL at 08:07

## 2022-07-07 RX ADMIN — HYDROCODONE BITARTRATE AND ACETAMINOPHEN 1 TABLET: 7.5; 325 TABLET ORAL at 07:07

## 2022-07-07 RX ADMIN — INSULIN ASPART 2 UNITS: 100 INJECTION, SOLUTION INTRAVENOUS; SUBCUTANEOUS at 11:07

## 2022-07-07 RX ADMIN — INSULIN DETEMIR 55 UNITS: 100 INJECTION, SOLUTION SUBCUTANEOUS at 10:07

## 2022-07-07 RX ADMIN — HYDROCHLOROTHIAZIDE 25 MG: 25 TABLET ORAL at 08:07

## 2022-07-07 RX ADMIN — POLYETHYLENE GLYCOL 3350 17 G: 17 POWDER, FOR SOLUTION ORAL at 08:07

## 2022-07-07 RX ADMIN — HYDROCODONE BITARTRATE AND ACETAMINOPHEN 1 TABLET: 5; 325 TABLET ORAL at 07:07

## 2022-07-07 RX ADMIN — INSULIN ASPART 13 UNITS: 100 INJECTION, SOLUTION INTRAVENOUS; SUBCUTANEOUS at 07:07

## 2022-07-07 RX ADMIN — VANCOMYCIN HYDROCHLORIDE 1250 MG: 1.25 INJECTION, POWDER, LYOPHILIZED, FOR SOLUTION INTRAVENOUS at 01:07

## 2022-07-07 RX ADMIN — SENNOSIDES AND DOCUSATE SODIUM 1 TABLET: 50; 8.6 TABLET ORAL at 01:07

## 2022-07-07 RX ADMIN — LISINOPRIL 10 MG: 10 TABLET ORAL at 08:07

## 2022-07-07 RX ADMIN — QUETIAPINE FUMARATE 200 MG: 200 TABLET ORAL at 11:07

## 2022-07-07 RX ADMIN — ATORVASTATIN CALCIUM 40 MG: 20 TABLET, FILM COATED ORAL at 08:07

## 2022-07-07 RX ADMIN — VANCOMYCIN HYDROCHLORIDE 1250 MG: 1.25 INJECTION, POWDER, LYOPHILIZED, FOR SOLUTION INTRAVENOUS at 02:07

## 2022-07-07 RX ADMIN — INSULIN ASPART 13 UNITS: 100 INJECTION, SOLUTION INTRAVENOUS; SUBCUTANEOUS at 11:07

## 2022-07-07 RX ADMIN — ENOXAPARIN SODIUM 40 MG: 100 INJECTION SUBCUTANEOUS at 04:07

## 2022-07-07 RX ADMIN — MAGNESIUM HYDROXIDE 2400 MG: 400 SUSPENSION ORAL at 01:07

## 2022-07-07 NOTE — PROGRESS NOTES
[facility] Medicine  Telemedicine Progress Note    Patient Name: Dave Good  MRN: [id number]  Patient Class: IP- Inpatient   Admission Date: 6/15/2022  Length of Stay: 22 days  Attending Physician: Aminah Ricardo MD  Primary Care Provider: Reyna Jorge NP          Subjective:     Principal Problem:Osteomyelitis of right foot        HPI:  Mr Acosta is a 56 yr old male with a PMH that includes DB 2, COPD, HTN, depression, and right foot transmetatarsal amputation. He was sent to the ED for evaluation of osteomyelitis from a follow up appt with Dr. Flores. Pt had I&D and amputation for wet gangrene in Sept 2021 and debridements in Feb, March, and May of this year. Per his chart, his feet were healing well until some foot trauma occurred. Pt has has open wounds and drainage to BL feet with right foot wound deeper than left. He also has wound to left shin. Pt reports associated 10/10 pain and difficulty walking. He denies fever and chills at home. Podiatry consulted and pt admitted to hospital medicine.       Overview/Hospital Course:  Patient admitted with bilateral foot wounds and concern for osteomyelitis following R foot TMA requiring debridements since for continued wounds. He was started on empiric antibiotics. Bilateral foot MRI done. MRI of left foot with abnormal findings but not consistent with osteomyelitis. MRI of right foot showed changes of 1st and 2nd transmetatarsal amputation with cortical regularity and abnormal signal at distal stumps noting overlying extensive edema and regions of skin ulceration.  Findings may represent early osteomyelitis vs post-operative changes. Bone biopsy done for further evaluation and to guide treatment. ID and podiatry consulted. Bone culture grew diphtheroids and Enterococcus but no good po options. Per ID, given MRSA isolated from a previous culture, ok to treat with vancomycin x 4 weeks End date 7/15/22. Finished 2 weeks of cefepime for  left foot SSTI. Pt now medically stable for DC, pending placement to LTAC.       Interval History: Pt afebrile and HDS. No acute complaints today, no new events overnight.  Bowel regimen available for constipation.  Pending LTAC placement.     Review of Systems   Constitutional:  Negative for fever.   Respiratory:  Negative for cough and shortness of breath.    Cardiovascular:  Negative for chest pain.   Gastrointestinal:  Positive for constipation. Negative for abdominal pain.   Neurological:  Negative for dizziness and headaches.   Psychiatric/Behavioral:  Negative for confusion.    All other systems reviewed and are negative.  Objective:     Vital Signs (Most Recent):  Temp: 97.6 °F (36.4 °C) (07/07/22 1635)  Pulse: 71 (07/07/22 1635)  Resp: 16 (07/07/22 1635)  BP: 121/75 (07/07/22 1635)  SpO2: 96 % (07/07/22 1635)   Vital Signs (24h Range):  Temp:  [97.6 °F (36.4 °C)-98.9 °F (37.2 °C)] 97.6 °F (36.4 °C)  Pulse:  [69-80] 71  Resp:  [16-21] 16  SpO2:  [95 %-97 %] 96 %  BP: (121-130)/(59-81) 121/75     Weight: 131.7 kg (290 lb 5.5 oz)  Body mass index is 37.28 kg/m².    Intake/Output Summary (Last 24 hours) at 7/7/2022 1646  Last data filed at 7/7/2022 0148  Gross per 24 hour   Intake 500 ml   Output 1700 ml   Net -1200 ml        Physical Exam  Vitals and nursing note reviewed.   Constitutional:       Appearance: Normal appearance.   HENT:      Head: Normocephalic and atraumatic.   Eyes:      Extraocular Movements: Extraocular movements intact.      Pupils: Pupils are equal, round, and reactive to light.   Cardiovascular:      Rate and Rhythm: Normal rate and regular rhythm.   Pulmonary:      Effort: Pulmonary effort is normal. No respiratory distress.   Abdominal:      General: Abdomen is flat. There is no distension.   Musculoskeletal:         General: Normal range of motion.      Cervical back: Normal range of motion.      Comments: Left foot in bandage   Neurological:      General: No focal deficit present.       Mental Status: He is alert and oriented to person, place, and time.   Psychiatric:         Mood and Affect: Mood normal.         Behavior: Behavior normal.       Significant Labs: All pertinent labs within the past 24 hours have been reviewed.  CBC: No results for input(s): WBC, HGB, HCT, PLT in the last 48 hours.    CMP: No results for input(s): NA, K, CL, CO2, GLU, BUN, CREATININE, CALCIUM, PROT, ALBUMIN, BILITOT, ALKPHOS, AST, ALT, ANIONGAP, EGFRNONAA in the last 48 hours.    Invalid input(s): [place]      Significant Imaging: I have reviewed all pertinent imaging results/findings within the past 24 hours.      Assessment/Plan:      * Osteomyelitis of right foot  Left Foot ulcer  - Presented from surgery clinic for concern for infection/osteomyelitis  - Prior history of MRSA bacteremia 2/2 gangrene treated with 6 weeks IV abx for presumed endocarditis  - ESR/CRP elevated but have down trended since prior hospitalization.  - MRI L foot with mild marrow edema of tuft of great toe. Findings and exam not consistent with acute osteomyelitis.  - MRI R foot with post-op changes of 1st & 2nd transmetatarsal with cortical regularity and abnormal signal at distal stumps with extensive edema. Findings concerning for osteo vs chronic changes.  - Biopsies from 6/17 showing no evidence of osteomyelitis involving 2nd metatarsal; 1st metatarsal still in process.  - Wound cultures from ED with MRSA, Providencia rettgeri, Proteus mirabilis, and GNR.  - 1st metatarsal bone culture 6/17 showing E faecalis, diphtheroids.  - Trial of gabapentin 100mg PO qHS with reported symptoms consistent with neuropathic pain.  - Podiatry and wound care following, appreciate assistance.  - ID consulted: given MRSA isolated from a previous culture, ok to treat with vancomycin x 4 weeks (EOC 07/15/22). Finished 2 weeks of cefepime for left foot SSTI.   - midline placed    Antibiotics (From admission, onward)            Start     Stop Route  "Frequency Ordered    06/30/22 0200  vancomycin 1.5 g in dextrose 5 % 250 mL IVPB (ready to mix)         -- IV Every 12 hours (non-standard times) 06/29/22 1547    06/15/22 2237  vancomycin - pharmacy to dose  (vancomycin IVPB)        "And" Linked Group Details    -- IV pharmacy to manage frequency 06/15/22 2148        Cultures were taken-   Microbiology Results (last 7 days)     ** No results found for the last 168 hours. **          Slow transit constipation  -continue bowel regimen  -scheduled MiraLax daily    Increased nutritional needs        Ulcerated, foot, right, with fat layer exposed  Plan as above.       HTN (hypertension)  - Reports taking lisinopril and losartan at home.  - Continue lisinopril 10mg PO daily.    Type 2 diabetes mellitus with diabetic peripheral angiopathy without gangrene, with long-term current use of insulin  Hyperlipidemia  - Last A1c 7.8  - On Levemir 50 U qHS and aspart 10 U TID WM at home  - Improving. Continue insulin detemir 55U subQ qHS, insulin aspart 13 U subQ TIDWM, moderate-dose sliding scale insulin aspart 1-10U subQ TIDWM PRN.  - Continue aspirin 81mg PO daily, atorvastatin 40mg PO daily.      VTE Risk Mitigation (From admission, onward)         Ordered     enoxaparin injection 40 mg  Daily         06/16/22 0658     IP VTE HIGH RISK PATIENT  Once         06/15/22 2119     Place sequential compression device  Until discontinued         06/15/22 2119                      I have assessed these finding virtually using telemed platform and with assistance of bedside nurse                 The attending portion of this evaluation, treatment, and documentation was performed per Aminah Ricardo MD via Telemedicine AudioVisual using the secure [employer] software platform with 2 way audio/video. The provider was located off-site and the patient is located in the hospital. The aforementioned video software was utilized to document the relevant history and physical exam    Aminah Ricardo, " MD  Department of Hospital Medicine   [facility] - Med Surg ([place])

## 2022-07-07 NOTE — PLAN OF CARE
Problem: Fall Injury Risk  Goal: Absence of Fall and Fall-Related Injury  Outcome: Ongoing, Progressing     Problem: Adult Inpatient Plan of Care  Goal: Plan of Care Review  Outcome: Ongoing, Progressing     Problem: Impaired Wound Healing  Goal: Optimal Wound Healing  Outcome: Ongoing, Progressing     Problem: Oral Intake Inadequate (Acute Kidney Injury/Impairment)  Goal: Optimal Nutrition Intake  Outcome: Ongoing, Progressing     Problem: Skin Injury Risk Increased  Goal: Skin Health and Integrity  Outcome: Ongoing, Progressing

## 2022-07-07 NOTE — SUBJECTIVE & OBJECTIVE
Interval History: Pt afebrile and HDS. No acute complaints today, no new events overnight.  Bowel regimen available for constipation.  Pending LTAC placement.     Review of Systems   Constitutional:  Negative for fever.   Respiratory:  Negative for cough and shortness of breath.    Cardiovascular:  Negative for chest pain.   Gastrointestinal:  Positive for constipation. Negative for abdominal pain.   Neurological:  Negative for dizziness and headaches.   Psychiatric/Behavioral:  Negative for confusion.    All other systems reviewed and are negative.  Objective:     Vital Signs (Most Recent):  Temp: 97.6 °F (36.4 °C) (07/07/22 1635)  Pulse: 71 (07/07/22 1635)  Resp: 16 (07/07/22 1635)  BP: 121/75 (07/07/22 1635)  SpO2: 96 % (07/07/22 1635)   Vital Signs (24h Range):  Temp:  [97.6 °F (36.4 °C)-98.9 °F (37.2 °C)] 97.6 °F (36.4 °C)  Pulse:  [69-80] 71  Resp:  [16-21] 16  SpO2:  [95 %-97 %] 96 %  BP: (121-130)/(59-81) 121/75     Weight: 131.7 kg (290 lb 5.5 oz)  Body mass index is 37.28 kg/m².    Intake/Output Summary (Last 24 hours) at 7/7/2022 1646  Last data filed at 7/7/2022 0148  Gross per 24 hour   Intake 500 ml   Output 1700 ml   Net -1200 ml        Physical Exam  Vitals and nursing note reviewed.   Constitutional:       Appearance: Normal appearance.   HENT:      Head: Normocephalic and atraumatic.   Eyes:      Extraocular Movements: Extraocular movements intact.      Pupils: Pupils are equal, round, and reactive to light.   Cardiovascular:      Rate and Rhythm: Normal rate and regular rhythm.   Pulmonary:      Effort: Pulmonary effort is normal. No respiratory distress.   Abdominal:      General: Abdomen is flat. There is no distension.   Musculoskeletal:         General: Normal range of motion.      Cervical back: Normal range of motion.      Comments: Left foot in bandage   Neurological:      General: No focal deficit present.      Mental Status: He is alert and oriented to person, place, and time.    Psychiatric:         Mood and Affect: Mood normal.         Behavior: Behavior normal.       Significant Labs: All pertinent labs within the past 24 hours have been reviewed.  CBC: No results for input(s): WBC, HGB, HCT, PLT in the last 48 hours.    CMP: No results for input(s): NA, K, CL, CO2, GLU, BUN, CREATININE, CALCIUM, PROT, ALBUMIN, BILITOT, ALKPHOS, AST, ALT, ANIONGAP, EGFRNONAA in the last 48 hours.    Invalid input(s): [place]      Significant Imaging: I have reviewed all pertinent imaging results/findings within the past 24 hours.

## 2022-07-07 NOTE — PROGRESS NOTES
"[facility] - Med [facility] ([place])  Adult Nutrition  Progress Note    SUMMARY     Recommendations  1) Continue Diabetic Diet with James BID for wound healing   2) Consider additonal laxative/stool softener for constipation    Goals: pt to have BM by RD follow up  Nutrition Goal Status: new  Communication of RD Recs:  (POC)    Assessment and Plan  Increased nutritional needs  Contributing Nutrition Diagnosis  Increased nutrient needs; kcal/protein      Related to (etiology):   Increase demand for nutrients- acute/chronic wounds     Signs and Symptoms (as evidenced by):   Delvis Score 18  Multiple ulcer/ altered skin to LE   PO intake 100%       Interventions/Recommendations (treatment strategy):  Collaboration with other healthcare providers  Commercial Beverage  Carbohydrate modified diet (Diabetic)     Nutrition Diagnosis Status:   Continues    Malnutrition Assessment     Skin (Micronutrient):  (Delvis Score 19)       Reason for Assessment  Reason For Assessment: RD follow-up  Diagnosis:  (osteomyeltitis of R foot)  Relevant Medical History: .pmh  Interdisciplinary Rounds: did not attend  General Information Comments: Remote assessment for coverage, spoke with pt on room phone, c/o constipation - MD and RN aware. LBM 7/5. Encouraged high fiber foods, pt reports he started ordering salads yesteday. Otherwise, good PO intake, eating 100% meals. No other complaints. RD to monitor and follow up  Nutrition Discharge Planning: Pt to follow a DM diet with tetxure modified diet and ONS to aid in wound healing as tolerateed.    Nutrition Risk Screen  Nutrition Risk Screen: no indicators present    Nutrition/Diet History  Spiritual, Cultural Beliefs, Pent[employer]alism Practices, Values that Affect Care: no  Factors Affecting Nutritional Intake: None identified at this time    Anthropometrics  Temp: 97.7 °F (36.5 °C)  Height Method: Stated  Height: 6' 2" (188 cm)  Height (inches): 74 in  Weight Method: Bed Scale  Weight: 131.7 kg (290 lb 5.5 " oz)  Weight (lb): 290.35 lb  Ideal Body Weight (IBW), Male: 190 lb  % Ideal Body Weight, Male (lb): 150 %  BMI (Calculated): 37.3  BMI Grade: 35 - 39.9 - obesity - grade II       Lab/Procedures/Meds  Pertinent Labs Reviewed: reviewed  Pertinent Labs Comments: POC -197, BUN 25, Glu 225, Hgb A1c 7.8 on 5/27/22  Pertinent Medications Reviewed: reviewed  Pertinent Medications Comments: atorvastatin, gabapentin, insulin, lisinopril, magnesium hydroxide, polyethylene glycol    Physical Findings/Assessment  Altered skin to L and R foot (ulcers)       Estimated/Assessed Needs  Weight Used For Calorie Calculations: 129.3 kg (285 lb 0.9 oz)  Energy Calorie Requirements (kcal): 2100 kcal day  Energy Need Method: [place]  Protein Requirements: 155 g day (1.2 g/kg wound)  Weight Used For Protein Calculations: 129.3 kg (285 lb 0.9 oz)     Estimated Fluid Requirement Method: RDA Method  RDA Method (mL): 2100  CHO Requirement: 263 g day    Nutrition Prescription Ordered  Current Diet Order: Diabetic  Oral Nutrition Supplement: James BID    Evaluation of Received Nutrient/Fluid Intake  I/O: -6.43L since admit  Energy Calories Required: meeting needs  Protein Required: meeting needs  Fluid Required: meeting needs  Comments: LBM 7/5  Tolerance: tolerating  % Intake of Estimated Energy Needs: 75 - 100 %  % Meal Intake: 75 - 100 %    Nutrition Risk  Level of Risk/Frequency of Follow-up:  (1x weekly)     Monitor and Evaluation  Food and Nutrient Intake: food and beverage intake, energy intake  Food and Nutrient Adminstration: diet order  Knowledge/Beliefs/Attitudes: food and nutrition knowledge/skill  Physical Activity and Function: nutrition-related ADLs and IADLs  Anthropometric Measurements: weight, weight change, body mass index  Biochemical Data, Medical Tests and Procedures: glucose/endocrine profile, gastrointestinal profile, electrolyte and renal panel  Nutrition-Focused Physical Findings: overall appearance, skin      Nutrition Follow-Up  RD Follow-up?: Yes

## 2022-07-07 NOTE — PLAN OF CARE
Recommendations  1) Continue Diabetic Diet with James BID for wound healing   2) Consider additonal laxative/stool softener for constipation    Goals: pt to have BM by RD follow up  Nutrition Goal Status: new  Communication of RD Recs:  (POC)    Assessment and Plan  Increased nutritional needs  Contributing Nutrition Diagnosis  Increased nutrient needs; kcal/protein      Related to (etiology):   Increase demand for nutrients- acute/chronic wounds     Signs and Symptoms (as evidenced by):   Delvis Score 18  Multiple ulcer/ altered skin to LE   PO intake 100%       Interventions/Recommendations (treatment strategy):  Collaboration with other healthcare providers  Commercial Beverage  Carbohydrate modified diet (Diabetic)     Nutrition Diagnosis Status:   Continues

## 2022-07-07 NOTE — PROGRESS NOTES
Pharmacokinetic Assessment Follow Up: IV Vancomycin    Vancomycin serum concentration assessment(s):    The trough level was drawn correctly and can be used to guide therapy at this time. The measurement is within the desired definitive target range of 15 to 20 mcg/mL.    Vancomycin Regimen Plan:    Continue regimen to Vancomycin 1250 mg IV every 12 hours with next serum trough concentration measured at 0145 prior to next dose on 7/9    Drug levels (last 3 results):  Recent Labs   Lab Result Units 07/05/22  1331 07/07/22  1406   Vancomycin-Trough ug/mL 21.7 15.7       Pharmacy will continue to follow and monitor vancomycin.    Please contact pharmacy at extension [phone] for questions regarding this assessment.    Thank you for the consult,   Ling Call       Patient brief summary:  Dave Good is a 56 y.o. male initiated on antimicrobial therapy with IV Vancomycin for treatment of bone/joint infection    The patient's current regimen is 1.25 gram every 12 hours    Drug Allergies:   Review of patient's allergies indicates:   Allergen Reactions    Ibuprofen Swelling     Facial swelling       Actual Body Weight:   131.7 kg    Renal Function:   Estimated Creatinine Clearance: 96.8 mL/min (based on SCr of 1.2 mg/dL).,     Dialysis Method (if applicable):  N/A    CBC (last 72 hours):  No results for input(s): WHITE BLOOD CELL COUNT, HEMOGLOBIN, HEMATOCRIT, PLATELETS, GRAN%, LYMPH%, MONO%, EOSINOPHIL%, BASOPHIL%, DIFFERENTIAL METHOD in the last 72 hours.    Metabolic Panel (last 72 hours):  No results for input(s): SODIUM, POTASSIUM, CHLORIDE, CO2, GLUCOSE, BUN BLD, CREATININE, ALBUMIN, BILIRUBIN TOTAL, ALK PHOS, AST, ALT, MAGNESIUM, PHOSPHORUS in the last 72 hours.    Vancomycin Administrations:  vancomycin given in the last 96 hours                   vancomycin 1.25 g in dextrose 5% 250 mL IVPB (ready to mix) (mg) 1,250 mg New Bag 07/07/22 1411     1,250 mg New Bag  0148     1,250 mg New Bag 07/06/22 1423      1,250 mg New Bag  0239    vancomycin 1.5 g in dextrose 5 % 250 mL IVPB (ready to mix) (mg) 1,500 mg New Bag 07/05/22 1413     1,500 mg New Bag  0207     1,500 mg New Bag 07/04/22 1314     1,500 mg New Bag  0143                Microbiologic Results:  Microbiology Results (last 7 days)     ** No results found for the last 168 hours. **

## 2022-07-08 LAB
CREAT SERPL-MCNC: 1.3 MG/DL (ref 0.5–1.4)
EST. GFR  (AFRICAN AMERICAN): >60 ML/MIN/1.73 M^2
EST. GFR  (NON AFRICAN AMERICAN): >60 ML/MIN/1.73 M^2
POCT GLUCOSE: 162 MG/DL (ref 70–110)
POCT GLUCOSE: 174 MG/DL (ref 70–110)
POCT GLUCOSE: 177 MG/DL (ref 70–110)
POCT GLUCOSE: 183 MG/DL (ref 70–110)

## 2022-07-08 PROCEDURE — 25000003 PHARM REV CODE 250: Performed by: NURSE PRACTITIONER

## 2022-07-08 PROCEDURE — 25000003 PHARM REV CODE 250: Performed by: HOSPITALIST

## 2022-07-08 PROCEDURE — 99232 PR SUBSEQUENT HOSPITAL CARE,LEVL II: ICD-10-PCS | Mod: 95,,, | Performed by: HOSPITALIST

## 2022-07-08 PROCEDURE — 63600175 PHARM REV CODE 636 W HCPCS: Performed by: INTERNAL MEDICINE

## 2022-07-08 PROCEDURE — 99232 SBSQ HOSP IP/OBS MODERATE 35: CPT | Mod: 95,,, | Performed by: HOSPITALIST

## 2022-07-08 PROCEDURE — 82565 ASSAY OF CREATININE: CPT | Performed by: HOSPITALIST

## 2022-07-08 PROCEDURE — 27000207 HC ISOLATION

## 2022-07-08 PROCEDURE — 63600175 PHARM REV CODE 636 W HCPCS: Performed by: HOSPITALIST

## 2022-07-08 PROCEDURE — 25000003 PHARM REV CODE 250: Performed by: STUDENT IN AN ORGANIZED HEALTH CARE EDUCATION/TRAINING PROGRAM

## 2022-07-08 PROCEDURE — 25000003 PHARM REV CODE 250: Performed by: INTERNAL MEDICINE

## 2022-07-08 PROCEDURE — 11000001 HC ACUTE MED/SURG PRIVATE ROOM

## 2022-07-08 PROCEDURE — 36415 COLL VENOUS BLD VENIPUNCTURE: CPT | Performed by: HOSPITALIST

## 2022-07-08 RX ORDER — LACTULOSE 10 G/15ML
15 SOLUTION ORAL 3 TIMES DAILY
Status: COMPLETED | OUTPATIENT
Start: 2022-07-08 | End: 2022-07-08

## 2022-07-08 RX ADMIN — HYDROCODONE BITARTRATE AND ACETAMINOPHEN 1 TABLET: 7.5; 325 TABLET ORAL at 07:07

## 2022-07-08 RX ADMIN — VANCOMYCIN HYDROCHLORIDE 1250 MG: 1.25 INJECTION, POWDER, LYOPHILIZED, FOR SOLUTION INTRAVENOUS at 01:07

## 2022-07-08 RX ADMIN — INSULIN ASPART 13 UNITS: 100 INJECTION, SOLUTION INTRAVENOUS; SUBCUTANEOUS at 08:07

## 2022-07-08 RX ADMIN — ATORVASTATIN CALCIUM 40 MG: 20 TABLET, FILM COATED ORAL at 08:07

## 2022-07-08 RX ADMIN — VANCOMYCIN HYDROCHLORIDE 1250 MG: 1.25 INJECTION, POWDER, LYOPHILIZED, FOR SOLUTION INTRAVENOUS at 02:07

## 2022-07-08 RX ADMIN — HYDROCODONE BITARTRATE AND ACETAMINOPHEN 1 TABLET: 7.5; 325 TABLET ORAL at 11:07

## 2022-07-08 RX ADMIN — INSULIN ASPART 13 UNITS: 100 INJECTION, SOLUTION INTRAVENOUS; SUBCUTANEOUS at 05:07

## 2022-07-08 RX ADMIN — LISINOPRIL 10 MG: 10 TABLET ORAL at 08:07

## 2022-07-08 RX ADMIN — INSULIN ASPART 2 UNITS: 100 INJECTION, SOLUTION INTRAVENOUS; SUBCUTANEOUS at 05:07

## 2022-07-08 RX ADMIN — HYDROCODONE BITARTRATE AND ACETAMINOPHEN 1 TABLET: 7.5; 325 TABLET ORAL at 03:07

## 2022-07-08 RX ADMIN — LACTULOSE 15 G: 20 SOLUTION ORAL at 08:07

## 2022-07-08 RX ADMIN — LACTULOSE 15 G: 20 SOLUTION ORAL at 01:07

## 2022-07-08 RX ADMIN — INSULIN DETEMIR 55 UNITS: 100 INJECTION, SOLUTION SUBCUTANEOUS at 08:07

## 2022-07-08 RX ADMIN — LACTULOSE 15 G: 20 SOLUTION ORAL at 05:07

## 2022-07-08 RX ADMIN — HYDROCHLOROTHIAZIDE 25 MG: 25 TABLET ORAL at 08:07

## 2022-07-08 RX ADMIN — INSULIN ASPART 1 UNITS: 100 INJECTION, SOLUTION INTRAVENOUS; SUBCUTANEOUS at 08:07

## 2022-07-08 RX ADMIN — ASPIRIN 81 MG: 81 TABLET, COATED ORAL at 08:07

## 2022-07-08 RX ADMIN — INSULIN ASPART 13 UNITS: 100 INJECTION, SOLUTION INTRAVENOUS; SUBCUTANEOUS at 12:07

## 2022-07-08 RX ADMIN — INSULIN ASPART 2 UNITS: 100 INJECTION, SOLUTION INTRAVENOUS; SUBCUTANEOUS at 12:07

## 2022-07-08 RX ADMIN — GABAPENTIN 100 MG: 100 CAPSULE ORAL at 08:07

## 2022-07-08 RX ADMIN — MAGNESIUM HYDROXIDE 2400 MG: 400 SUSPENSION ORAL at 08:07

## 2022-07-08 RX ADMIN — POLYETHYLENE GLYCOL 3350 17 G: 17 POWDER, FOR SOLUTION ORAL at 08:07

## 2022-07-08 RX ADMIN — QUETIAPINE FUMARATE 200 MG: 200 TABLET ORAL at 08:07

## 2022-07-08 RX ADMIN — INSULIN ASPART 2 UNITS: 100 INJECTION, SOLUTION INTRAVENOUS; SUBCUTANEOUS at 08:07

## 2022-07-08 RX ADMIN — ENOXAPARIN SODIUM 40 MG: 100 INJECTION SUBCUTANEOUS at 05:07

## 2022-07-08 NOTE — PROGRESS NOTES
DEWAYNE contacted the patient's significant other, to discuss his going home with IV antibiotics. She was not willing to learn to do his IV antibiotic treatments, saying he should remain in the hospital for this.

## 2022-07-08 NOTE — PLAN OF CARE
Problem: Adult Inpatient Plan of Care  Goal: Plan of Care Review  Outcome: Ongoing, Progressing     Problem: Fall Injury Risk  Goal: Absence of Fall and Fall-Related Injury  Outcome: Ongoing, Progressing     Problem: Diabetes Comorbidity  Goal: Blood Glucose Level Within Targeted Range  Outcome: Ongoing, Progressing     Problem: Fluid and Electrolyte Imbalance (Acute Kidney Injury/Impairment)  Goal: Fluid and Electrolyte Balance  Outcome: Ongoing, Progressing     Problem: Renal Function Impairment (Acute Kidney Injury/Impairment)  Goal: Effective Renal Function  Outcome: Ongoing, Progressing     Problem: Infection  Goal: Absence of Infection Signs and Symptoms  Outcome: Ongoing, Progressing

## 2022-07-08 NOTE — PROGRESS NOTES
[facility] - Med Surg ([place])  Podiatry  Progress Note    Patient Name: Dave Good  MRN: [id number]  Admission Date: 6/15/2022  Hospital Length of Stay: 23 days  Attending Physician: Aminah Ricardo MD  Primary Care Provider: Reyna Jorge NP     Subjective:     Interval History: wounds both feet, left healed.  Right stable, improving with reduced WB and wound care.  Has new DM shoes/inserts at home.    Awaiting ltac placement to complete abx.          Scheduled Meds:   aspirin  81 mg Oral Daily    atorvastatin  40 mg Oral Daily    collagenase   Topical (Top) Daily    enoxaparin  40 mg Subcutaneous Daily    gabapentin  100 mg Oral QHS    hydroCHLOROthiazide  25 mg Oral Daily    insulin aspart U-100  13 Units Subcutaneous TIDWM    insulin detemir U-100  55 Units Subcutaneous QHS    lactulose  15 g Oral TID    lisinopriL  10 mg Oral Daily    polyethylene glycol  17 g Oral Daily    QUEtiapine  200 mg Oral Nightly    vancomycin (VANCOCIN) IVPB  1,250 mg Intravenous Q12H     Continuous Infusions:  PRN Meds:sodium chloride 0.9%, acetaminophen, dextrose 10%, dextrose 10%, glucagon (human recombinant), glucose, glucose, HYDROcodone-acetaminophen, HYDROcodone-acetaminophen, insulin aspart U-100, magnesium hydroxide 400 mg/5 ml, melatonin, naloxone, ondansetron, senna-docusate 8.6-50 mg, sodium chloride 0.9%, Pharmacy to dose Vancomycin consult **AND** vancomycin - pharmacy to dose    Review of Systems  Objective:     Vital Signs (Most Recent):  Temp: 98 °F (36.7 °C) (07/08/22 1655)  Pulse: 75 (07/08/22 1655)  Resp: 18 (07/08/22 1655)  BP: 120/70 (07/08/22 1655)  SpO2: 96 % (07/08/22 1655) Vital Signs (24h Range):  Temp:  [97.6 °F (36.4 °C)-98.7 °F (37.1 °C)] 98 °F (36.7 °C)  Pulse:  [69-77] 75  Resp:  [16-20] 18  SpO2:  [95 %-99 %] 96 %  BP: (120-136)/(70-85) 120/70     Weight: 131.7 kg (290 lb 5.5 oz)  Body mass index is 37.28 kg/m².    Foot Exam    Laboratory:  A1C:   Recent Labs   Lab 02/21/22  1549  05/27/22  1010   HGBA1C 9.8* 7.8*     Blood Cultures: No results for input(s): LABBLOO in the last 48 hours.  BMP:   Recent Labs   Lab 07/08/22  1035   CREATININE 1.3     CBC: No results for input(s): WBC, RBC, HGB, HCT, PLT, MCV, MCH, MCHC in the last 168 hours.  CMP:   Recent Labs   Lab 07/08/22  1035   CREATININE 1.3     Coagulation: No results for input(s): PT, INR, APTT in the last 168 hours.  CRP: No results for input(s): CRP in the last 168 hours.  ESR: No results for input(s): SEDRATE in the last 168 hours.  Prealbumin: No results for input(s): PREALBUMIN in the last 48 hours.  Wound Cultures:   Recent Labs   Lab 02/22/22  1153 06/16/22  0050 06/17/22  0601   LABAERO METHICILLIN RESISTANT STAPHYLOCOCCUS AUREUS  Many  No other significant isolate  * PROTEUS MIRABILIS  Many  *  METHICILLIN RESISTANT STAPHYLOCOCCUS AUREUS  Many  *  ACINETOBACTER LWOFFII GROUP  Few  *  PROVIDENCIA RETTGERI  Few  *  METHICILLIN RESISTANT STAPHYLOCOCCUS AUREUS  Moderate  * DIPHTHEROIDS  Few  *  ENTEROCOCCUS FAECALIS  Few  *  No growth     Microbiology Results (last 7 days)     ** No results found for the last 168 hours. **        Specimen (24h ago, onward)            None          Diagnostic Results:  None    Clinical Findings:  Wounds left remain closed with moderate generalized keratosis of plantar forefoot  without ulceration, drainage, pus, tracking, fluctuance, malodor, or cardinal signs infection.    Wound right arch/midfoot appears closed today  without ulceration, drainage, pus, tracking, fluctuance, malodor, or cardinal signs infection.    Otherwise, Skin thin, shiny, atrophic, with decreased density and distribution of pedal hair bilateral, but without hyperpigmentation, lizy discoloration,  ulcers, masses, nodules or cords palpated bilateral feet and legs.    Palpable pulses dp and pt both feet.  All toes warm.    Diminished/loss of protective sensation all toes bilateral to 10 gram  monofilament.      Assessment/Plan:     Active Diagnoses:    Diagnosis Date Noted POA    PRINCIPAL PROBLEM:  Osteomyelitis of right foot [M86.9] 06/15/2022 Yes    Slow transit constipation [K59.01] 07/06/2022 Unknown    Ulcerated, foot, right, with fat layer exposed [L97.512]  Unknown    Amputation of right great toe [S98.111A]  Unknown    Type 2 diabetes mellitus with diabetic peripheral angiopathy without gangrene, with long-term current use of insulin [E11.51, Z79.4] 09/20/2021 Not Applicable    HTN (hypertension) [I10] 09/20/2021 Yes      Problems Resolved During this Admission:    Diagnosis Date Noted Date Resolved POA    SHIV (acute kidney injury) [N17.9] 09/20/2021 06/17/2022 Yes       Closed wounds right and left feet.    Dressed per orders to protect/offload while in hospital.    May bear weight in DM shoes, custom inserts for ADL's.    Await ltac placement.    Follow with me on d/c if wounds open.  Otherwise, 2 months [facility] location.    Samuel Toussaint DPM  Podiatry  [facility] - Med Surg ([place])

## 2022-07-08 NOTE — PROGRESS NOTES
[facility] Medicine  Telemedicine Progress Note    Patient Name: Dave Good  MRN: [id number]  Patient Class: IP- Inpatient   Admission Date: 6/15/2022  Length of Stay: 23 days  Attending Physician: Aminah Ricardo MD  Primary Care Provider: Reyna Jorge NP          Subjective:     Principal Problem:Osteomyelitis of right foot        HPI:  Mr Acosta is a 56 yr old male with a PMH that includes DB 2, COPD, HTN, depression, and right foot transmetatarsal amputation. He was sent to the ED for evaluation of osteomyelitis from a follow up appt with Dr. Flores. Pt had I&D and amputation for wet gangrene in Sept 2021 and debridements in Feb, March, and May of this year. Per his chart, his feet were healing well until some foot trauma occurred. Pt has has open wounds and drainage to BL feet with right foot wound deeper than left. He also has wound to left shin. Pt reports associated 10/10 pain and difficulty walking. He denies fever and chills at home. Podiatry consulted and pt admitted to hospital medicine.       Overview/Hospital Course:  Patient admitted with bilateral foot wounds and concern for osteomyelitis following R foot TMA requiring debridements since for continued wounds. He was started on empiric antibiotics. Bilateral foot MRI done. MRI of left foot with abnormal findings but not consistent with osteomyelitis. MRI of right foot showed changes of 1st and 2nd transmetatarsal amputation with cortical regularity and abnormal signal at distal stumps noting overlying extensive edema and regions of skin ulceration.  Findings may represent early osteomyelitis vs post-operative changes. Bone biopsy done for further evaluation and to guide treatment. ID and podiatry consulted. Bone culture grew diphtheroids and Enterococcus but no good po options. Per ID, given MRSA isolated from a previous culture, ok to treat with vancomycin x 4 weeks End date 7/15/22. Finished 2 weeks of cefepime for  left foot SSTI. Pt now medically stable for DC, pending placement to LTAC.       Interval History: Pt afebrile and HDS. No acute complaints today, no new events overnight. Pending LTAC placement. Abx end date 7/15/22    Review of Systems   Constitutional:  Negative for fever.   Respiratory:  Negative for cough and shortness of breath.    Cardiovascular:  Negative for chest pain.   Gastrointestinal:  Negative for abdominal pain.   Neurological:  Negative for dizziness and headaches.   Psychiatric/Behavioral:  Negative for confusion.    All other systems reviewed and are negative.  Objective:     Vital Signs (Most Recent):  Temp: 97.7 °F (36.5 °C) (07/08/22 1151)  Pulse: 77 (07/08/22 1151)  Resp: 16 (07/08/22 1151)  BP: 127/75 (07/08/22 1151)  SpO2: 96 % (07/08/22 1151)   Vital Signs (24h Range):  Temp:  [97.6 °F (36.4 °C)-98.7 °F (37.1 °C)] 97.7 °F (36.5 °C)  Pulse:  [69-77] 77  Resp:  [16-20] 16  SpO2:  [95 %-99 %] 96 %  BP: (121-136)/(73-85) 127/75     Weight: 131.7 kg (290 lb 5.5 oz)  Body mass index is 37.28 kg/m².    Intake/Output Summary (Last 24 hours) at 7/8/2022 1426  Last data filed at 7/8/2022 0336  Gross per 24 hour   Intake 500 ml   Output 1600 ml   Net -1100 ml        Physical Exam  Vitals and nursing note reviewed.   Constitutional:       Appearance: Normal appearance.   HENT:      Head: Normocephalic and atraumatic.   Eyes:      Extraocular Movements: Extraocular movements intact.      Pupils: Pupils are equal, round, and reactive to light.   Cardiovascular:      Rate and Rhythm: Normal rate and regular rhythm.   Pulmonary:      Effort: Pulmonary effort is normal. No respiratory distress.   Abdominal:      General: Abdomen is flat. There is no distension.   Musculoskeletal:         General: Normal range of motion.      Cervical back: Normal range of motion.      Comments: Left foot in bandage   Neurological:      General: No focal deficit present.      Mental Status: He is alert and oriented to  person, place, and time.   Psychiatric:         Mood and Affect: Mood normal.         Behavior: Behavior normal.       Significant Labs: All pertinent labs within the past 24 hours have been reviewed.  CBC: No results for input(s): WBC, HGB, HCT, PLT in the last 48 hours.    CMP:   Recent Labs   Lab 07/08/22  1035   CREATININE 1.3   EGFRNONAA >60         Significant Imaging: I have reviewed all pertinent imaging results/findings within the past 24 hours.      Assessment/Plan:      * Osteomyelitis of right foot  Left Foot ulcer  - Presented from surgery clinic for concern for infection/osteomyelitis  - Prior history of MRSA bacteremia 2/2 gangrene treated with 6 weeks IV abx for presumed endocarditis  - ESR/CRP elevated but have down trended since prior hospitalization.  - MRI L foot with mild marrow edema of tuft of great toe. Findings and exam not consistent with acute osteomyelitis.  - MRI R foot with post-op changes of 1st & 2nd transmetatarsal with cortical regularity and abnormal signal at distal stumps with extensive edema. Findings concerning for osteo vs chronic changes.  - Biopsies from 6/17 showing no evidence of osteomyelitis involving 2nd metatarsal; 1st metatarsal still in process.  - Wound cultures from ED with MRSA, Providencia rettgeri, Proteus mirabilis, and GNR.  - 1st metatarsal bone culture 6/17 showing E faecalis, diphtheroids.  - Trial of gabapentin 100mg PO qHS with reported symptoms consistent with neuropathic pain.  - Podiatry and wound care following, appreciate assistance.  - ID consulted: given MRSA isolated from a previous culture, ok to treat with vancomycin x 4 weeks (EOC 07/15/22). Finished 2 weeks of cefepime for left foot SSTI.   - midline placed    Antibiotics (From admission, onward)            Start     Stop Route Frequency Ordered    06/30/22 0200  vancomycin 1.5 g in dextrose 5 % 250 mL IVPB (ready to mix)         -- IV Every 12 hours (non-standard times) 06/29/22 9697     "06/15/22 2237  vancomycin - pharmacy to dose  (vancomycin IVPB)        "And" Linked Group Details    -- IV pharmacy to manage frequency 06/15/22 2148        Cultures were taken-   Microbiology Results (last 7 days)     ** No results found for the last 168 hours. **          Slow transit constipation  -continue bowel regimen  -scheduled MiraLax daily    Increased nutritional needs        Ulcerated, foot, right, with fat layer exposed  Plan as above.       HTN (hypertension)  - Reports taking lisinopril and losartan at home.  - Continue lisinopril 10mg PO daily.    Type 2 diabetes mellitus with diabetic peripheral angiopathy without gangrene, with long-term current use of insulin  Hyperlipidemia  - Last A1c 7.8  - On Levemir 50 U qHS and aspart 10 U TID WM at home  - Improving. Continue insulin detemir 55U subQ qHS, insulin aspart 13 U subQ TIDWM, moderate-dose sliding scale insulin aspart 1-10U subQ TIDWM PRN.  - Continue aspirin 81mg PO daily, atorvastatin 40mg PO daily.      VTE Risk Mitigation (From admission, onward)         Ordered     enoxaparin injection 40 mg  Daily         06/16/22 0658     IP VTE HIGH RISK PATIENT  Once         06/15/22 2119     Place sequential compression device  Until discontinued         06/15/22 2119                      I have assessed these finding virtually using telemed platform and with assistance of bedside nurse                 The attending portion of this evaluation, treatment, and documentation was performed per Aminah Ricardo MD via Telemedicine AudioVisual using the secure [employer] software platform with 2 way audio/video. The provider was located off-site and the patient is located in the hospital. The aforementioned video software was utilized to document the relevant history and physical exam    Aminah Ricardo MD  Department of Hospital Medicine   [facility] - Med Surg ([place])  "

## 2022-07-08 NOTE — SUBJECTIVE & OBJECTIVE
Interval History: Pt afebrile and HDS. No acute complaints today, no new events overnight. Pending LTAC placement. Abx end date 7/15/22    Review of Systems   Constitutional:  Negative for fever.   Respiratory:  Negative for cough and shortness of breath.    Cardiovascular:  Negative for chest pain.   Gastrointestinal:  Negative for abdominal pain.   Neurological:  Negative for dizziness and headaches.   Psychiatric/Behavioral:  Negative for confusion.    All other systems reviewed and are negative.  Objective:     Vital Signs (Most Recent):  Temp: 97.7 °F (36.5 °C) (07/08/22 1151)  Pulse: 77 (07/08/22 1151)  Resp: 16 (07/08/22 1151)  BP: 127/75 (07/08/22 1151)  SpO2: 96 % (07/08/22 1151)   Vital Signs (24h Range):  Temp:  [97.6 °F (36.4 °C)-98.7 °F (37.1 °C)] 97.7 °F (36.5 °C)  Pulse:  [69-77] 77  Resp:  [16-20] 16  SpO2:  [95 %-99 %] 96 %  BP: (121-136)/(73-85) 127/75     Weight: 131.7 kg (290 lb 5.5 oz)  Body mass index is 37.28 kg/m².    Intake/Output Summary (Last 24 hours) at 7/8/2022 1426  Last data filed at 7/8/2022 0336  Gross per 24 hour   Intake 500 ml   Output 1600 ml   Net -1100 ml        Physical Exam  Vitals and nursing note reviewed.   Constitutional:       Appearance: Normal appearance.   HENT:      Head: Normocephalic and atraumatic.   Eyes:      Extraocular Movements: Extraocular movements intact.      Pupils: Pupils are equal, round, and reactive to light.   Cardiovascular:      Rate and Rhythm: Normal rate and regular rhythm.   Pulmonary:      Effort: Pulmonary effort is normal. No respiratory distress.   Abdominal:      General: Abdomen is flat. There is no distension.   Musculoskeletal:         General: Normal range of motion.      Cervical back: Normal range of motion.      Comments: Left foot in bandage   Neurological:      General: No focal deficit present.      Mental Status: He is alert and oriented to person, place, and time.   Psychiatric:         Mood and Affect: Mood normal.          Behavior: Behavior normal.       Significant Labs: All pertinent labs within the past 24 hours have been reviewed.  CBC: No results for input(s): WBC, HGB, HCT, PLT in the last 48 hours.    CMP:   Recent Labs   Lab 07/08/22  1035   CREATININE 1.3   EGFRNONAA >60         Significant Imaging: I have reviewed all pertinent imaging results/findings within the past 24 hours.

## 2022-07-09 LAB
POCT GLUCOSE: 128 MG/DL (ref 70–110)
POCT GLUCOSE: 135 MG/DL (ref 70–110)
POCT GLUCOSE: 164 MG/DL (ref 70–110)
POCT GLUCOSE: 168 MG/DL (ref 70–110)
VANCOMYCIN TROUGH SERPL-MCNC: 18 UG/ML (ref 10–22)
VANCOMYCIN TROUGH SERPL-MCNC: 28.1 UG/ML (ref 10–22)

## 2022-07-09 PROCEDURE — 25000003 PHARM REV CODE 250: Performed by: STUDENT IN AN ORGANIZED HEALTH CARE EDUCATION/TRAINING PROGRAM

## 2022-07-09 PROCEDURE — 27000207 HC ISOLATION

## 2022-07-09 PROCEDURE — 25000003 PHARM REV CODE 250: Performed by: INTERNAL MEDICINE

## 2022-07-09 PROCEDURE — 80202 ASSAY OF VANCOMYCIN: CPT | Performed by: HOSPITALIST

## 2022-07-09 PROCEDURE — 36415 COLL VENOUS BLD VENIPUNCTURE: CPT | Performed by: HOSPITALIST

## 2022-07-09 PROCEDURE — 25000003 PHARM REV CODE 250: Performed by: NURSE PRACTITIONER

## 2022-07-09 PROCEDURE — 99232 PR SUBSEQUENT HOSPITAL CARE,LEVL II: ICD-10-PCS | Mod: 95,,, | Performed by: HOSPITALIST

## 2022-07-09 PROCEDURE — 11000001 HC ACUTE MED/SURG PRIVATE ROOM

## 2022-07-09 PROCEDURE — 99232 SBSQ HOSP IP/OBS MODERATE 35: CPT | Mod: 95,,, | Performed by: HOSPITALIST

## 2022-07-09 PROCEDURE — 25000003 PHARM REV CODE 250: Performed by: HOSPITALIST

## 2022-07-09 PROCEDURE — 63600175 PHARM REV CODE 636 W HCPCS: Performed by: INTERNAL MEDICINE

## 2022-07-09 PROCEDURE — 94761 N-INVAS EAR/PLS OXIMETRY MLT: CPT

## 2022-07-09 PROCEDURE — 63600175 PHARM REV CODE 636 W HCPCS: Performed by: HOSPITALIST

## 2022-07-09 RX ORDER — BISACODYL 10 MG
10 SUPPOSITORY, RECTAL RECTAL DAILY PRN
Status: DISCONTINUED | OUTPATIENT
Start: 2022-07-09 | End: 2022-07-15 | Stop reason: HOSPADM

## 2022-07-09 RX ORDER — ADHESIVE BANDAGE
30 BANDAGE TOPICAL DAILY
Status: DISCONTINUED | OUTPATIENT
Start: 2022-07-10 | End: 2022-07-15 | Stop reason: HOSPADM

## 2022-07-09 RX ADMIN — INSULIN ASPART 13 UNITS: 100 INJECTION, SOLUTION INTRAVENOUS; SUBCUTANEOUS at 12:07

## 2022-07-09 RX ADMIN — HYDROCODONE BITARTRATE AND ACETAMINOPHEN 1 TABLET: 7.5; 325 TABLET ORAL at 03:07

## 2022-07-09 RX ADMIN — VANCOMYCIN HYDROCHLORIDE 1250 MG: 1.25 INJECTION, POWDER, LYOPHILIZED, FOR SOLUTION INTRAVENOUS at 01:07

## 2022-07-09 RX ADMIN — VANCOMYCIN HYDROCHLORIDE 1250 MG: 1.25 INJECTION, POWDER, LYOPHILIZED, FOR SOLUTION INTRAVENOUS at 02:07

## 2022-07-09 RX ADMIN — HYDROCODONE BITARTRATE AND ACETAMINOPHEN 1 TABLET: 7.5; 325 TABLET ORAL at 08:07

## 2022-07-09 RX ADMIN — ASPIRIN 81 MG: 81 TABLET, COATED ORAL at 08:07

## 2022-07-09 RX ADMIN — INSULIN DETEMIR 55 UNITS: 100 INJECTION, SOLUTION SUBCUTANEOUS at 09:07

## 2022-07-09 RX ADMIN — HYDROCODONE BITARTRATE AND ACETAMINOPHEN 1 TABLET: 7.5; 325 TABLET ORAL at 12:07

## 2022-07-09 RX ADMIN — HYDROCODONE BITARTRATE AND ACETAMINOPHEN 1 TABLET: 7.5; 325 TABLET ORAL at 04:07

## 2022-07-09 RX ADMIN — INSULIN ASPART 13 UNITS: 100 INJECTION, SOLUTION INTRAVENOUS; SUBCUTANEOUS at 08:07

## 2022-07-09 RX ADMIN — ENOXAPARIN SODIUM 40 MG: 100 INJECTION SUBCUTANEOUS at 04:07

## 2022-07-09 RX ADMIN — COLLAGENASE SANTYL: 250 OINTMENT TOPICAL at 08:07

## 2022-07-09 RX ADMIN — QUETIAPINE FUMARATE 200 MG: 200 TABLET ORAL at 08:07

## 2022-07-09 RX ADMIN — POLYETHYLENE GLYCOL 3350 17 G: 17 POWDER, FOR SOLUTION ORAL at 08:07

## 2022-07-09 RX ADMIN — INSULIN ASPART 2 UNITS: 100 INJECTION, SOLUTION INTRAVENOUS; SUBCUTANEOUS at 05:07

## 2022-07-09 RX ADMIN — INSULIN ASPART 2 UNITS: 100 INJECTION, SOLUTION INTRAVENOUS; SUBCUTANEOUS at 08:07

## 2022-07-09 RX ADMIN — GABAPENTIN 100 MG: 100 CAPSULE ORAL at 08:07

## 2022-07-09 RX ADMIN — ATORVASTATIN CALCIUM 40 MG: 20 TABLET, FILM COATED ORAL at 08:07

## 2022-07-09 RX ADMIN — HYDROCHLOROTHIAZIDE 25 MG: 25 TABLET ORAL at 08:07

## 2022-07-09 RX ADMIN — SENNOSIDES AND DOCUSATE SODIUM 1 TABLET: 50; 8.6 TABLET ORAL at 08:07

## 2022-07-09 RX ADMIN — INSULIN ASPART 13 UNITS: 100 INJECTION, SOLUTION INTRAVENOUS; SUBCUTANEOUS at 05:07

## 2022-07-09 RX ADMIN — LISINOPRIL 10 MG: 10 TABLET ORAL at 08:07

## 2022-07-09 NOTE — PLAN OF CARE
POC reviewed with patient. AAOx4. Stable on room air. Complaints of pain treated with PRN pain medication according to MAR. No other complaints verbalized during shift. Received IV antibiotics according to MAR. Urinal utilized for voiding for voiding. Positions self. Turn Q2/frequent weight shift encouraged by LPN. Low air loss mattress in use. Instructed to call for help ambulating. No injuries, falls, or trauma occurred during shift. Purposeful rounding completed. Bed low and locked with side rails up x 3 and call light within reach.

## 2022-07-09 NOTE — SUBJECTIVE & OBJECTIVE
Interval History: Pt afebrile and HDS. No acute complaints today, no new events overnight.  Bowel regimen available for constipation, add daily milk of mag. Pending LTAC placement.     Review of Systems   Constitutional:  Negative for fever.   Respiratory:  Negative for cough and shortness of breath.    Cardiovascular:  Negative for chest pain.   Gastrointestinal:  Positive for constipation. Negative for abdominal pain.   Neurological:  Negative for dizziness and headaches.   Psychiatric/Behavioral:  Negative for confusion.    All other systems reviewed and are negative.  Objective:     Vital Signs (Most Recent):  Temp: 98.7 °F (37.1 °C) (07/09/22 1203)  Pulse: 86 (07/09/22 1203)  Resp: 18 (07/09/22 1203)  BP: 116/72 (07/09/22 1203)  SpO2: 95 % (07/09/22 1203)   Vital Signs (24h Range):  Temp:  [97.6 °F (36.4 °C)-98.7 °F (37.1 °C)] 98.7 °F (37.1 °C)  Pulse:  [68-86] 86  Resp:  [11-20] 18  SpO2:  [95 %-98 %] 95 %  BP: (114-135)/(64-91) 116/72     Weight: 131.7 kg (290 lb 5.5 oz)  Body mass index is 37.28 kg/m².    Intake/Output Summary (Last 24 hours) at 7/9/2022 1225  Last data filed at 7/9/2022 0555  Gross per 24 hour   Intake 420 ml   Output 1850 ml   Net -1430 ml        Physical Exam  Vitals and nursing note reviewed.   Constitutional:       Appearance: Normal appearance.   HENT:      Head: Normocephalic and atraumatic.   Eyes:      Extraocular Movements: Extraocular movements intact.      Pupils: Pupils are equal, round, and reactive to light.   Cardiovascular:      Rate and Rhythm: Normal rate and regular rhythm.   Pulmonary:      Effort: Pulmonary effort is normal. No respiratory distress.   Abdominal:      General: Abdomen is flat. There is no distension.   Musculoskeletal:         General: Normal range of motion.      Cervical back: Normal range of motion.      Comments: Left foot in bandage   Neurological:      General: No focal deficit present.      Mental Status: He is alert and oriented to person, place,  and time.   Psychiatric:         Mood and Affect: Mood normal.         Behavior: Behavior normal.       Significant Labs: All pertinent labs within the past 24 hours have been reviewed.  CBC: No results for input(s): WBC, HGB, HCT, PLT in the last 48 hours.    CMP:   Recent Labs   Lab 07/08/22  1035   CREATININE 1.3   EGFRNONAA >60         Significant Imaging: I have reviewed all pertinent imaging results/findings within the past 24 hours.

## 2022-07-09 NOTE — PROGRESS NOTES
[facility] Surg [facility] Medicine  Telemedicine Progress Note    Patient Name: Dave Good  MRN: [id number]  Patient Class: IP- Inpatient   Admission Date: 6/15/2022  Length of Stay: 24 days  Attending Physician: Aminah Ricardo MD  Primary Care Provider: Reyna Jorge NP          Subjective:     Principal Problem:Osteomyelitis of right foot        HPI:  Mr Acosta is a 56 yr old male with a PMH that includes DB 2, COPD, HTN, depression, and right foot transmetatarsal amputation. He was sent to the ED for evaluation of osteomyelitis from a follow up appt with Dr. Flores. Pt had I&D and amputation for wet gangrene in Sept 2021 and debridements in Feb, March, and May of this year. Per his chart, his feet were healing well until some foot trauma occurred. Pt has has open wounds and drainage to BL feet with right foot wound deeper than left. He also has wound to left shin. Pt reports associated 10/10 pain and difficulty walking. He denies fever and chills at home. Podiatry consulted and pt admitted to hospital medicine.       Overview/Hospital Course:  Patient admitted with bilateral foot wounds and concern for osteomyelitis following R foot TMA requiring debridements since for continued wounds. He was started on empiric antibiotics. Bilateral foot MRI done. MRI of left foot with abnormal findings but not consistent with osteomyelitis. MRI of right foot showed changes of 1st and 2nd transmetatarsal amputation with cortical regularity and abnormal signal at distal stumps noting overlying extensive edema and regions of skin ulceration.  Findings may represent early osteomyelitis vs post-operative changes. Bone biopsy done for further evaluation and to guide treatment. ID and podiatry consulted. Bone culture grew diphtheroids and Enterococcus but no good po options. Per ID, given MRSA isolated from a previous culture, ok to treat with vancomycin x 4 weeks End date 7/15/22. Finished 2 weeks of cefepime for  left foot SSTI. Pt now medically stable for DC, pending placement to LTAC.       Interval History: Pt afebrile and HDS. No acute complaints today, no new events overnight.  Bowel regimen available for constipation, add daily milk of mag. Pending LTAC placement.     Review of Systems   Constitutional:  Negative for fever.   Respiratory:  Negative for cough and shortness of breath.    Cardiovascular:  Negative for chest pain.   Gastrointestinal:  Positive for constipation. Negative for abdominal pain.   Neurological:  Negative for dizziness and headaches.   Psychiatric/Behavioral:  Negative for confusion.    All other systems reviewed and are negative.  Objective:     Vital Signs (Most Recent):  Temp: 98.7 °F (37.1 °C) (07/09/22 1203)  Pulse: 86 (07/09/22 1203)  Resp: 18 (07/09/22 1203)  BP: 116/72 (07/09/22 1203)  SpO2: 95 % (07/09/22 1203)   Vital Signs (24h Range):  Temp:  [97.6 °F (36.4 °C)-98.7 °F (37.1 °C)] 98.7 °F (37.1 °C)  Pulse:  [68-86] 86  Resp:  [11-20] 18  SpO2:  [95 %-98 %] 95 %  BP: (114-135)/(64-91) 116/72     Weight: 131.7 kg (290 lb 5.5 oz)  Body mass index is 37.28 kg/m².    Intake/Output Summary (Last 24 hours) at 7/9/2022 1225  Last data filed at 7/9/2022 0555  Gross per 24 hour   Intake 420 ml   Output 1850 ml   Net -1430 ml        Physical Exam  Vitals and nursing note reviewed.   Constitutional:       Appearance: Normal appearance.   HENT:      Head: Normocephalic and atraumatic.   Eyes:      Extraocular Movements: Extraocular movements intact.      Pupils: Pupils are equal, round, and reactive to light.   Cardiovascular:      Rate and Rhythm: Normal rate and regular rhythm.   Pulmonary:      Effort: Pulmonary effort is normal. No respiratory distress.   Abdominal:      General: Abdomen is flat. There is no distension.   Musculoskeletal:         General: Normal range of motion.      Cervical back: Normal range of motion.      Comments: Left foot in bandage   Neurological:      General: No  focal deficit present.      Mental Status: He is alert and oriented to person, place, and time.   Psychiatric:         Mood and Affect: Mood normal.         Behavior: Behavior normal.       Significant Labs: All pertinent labs within the past 24 hours have been reviewed.  CBC: No results for input(s): WBC, HGB, HCT, PLT in the last 48 hours.    CMP:   Recent Labs   Lab 07/08/22  1035   CREATININE 1.3   EGFRNONAA >60         Significant Imaging: I have reviewed all pertinent imaging results/findings within the past 24 hours.      Assessment/Plan:      * Osteomyelitis of right foot  Left Foot ulcer  - Presented from surgery clinic for concern for infection/osteomyelitis  - Prior history of MRSA bacteremia 2/2 gangrene treated with 6 weeks IV abx for presumed endocarditis  - ESR/CRP elevated but have down trended since prior hospitalization.  - MRI L foot with mild marrow edema of tuft of great toe. Findings and exam not consistent with acute osteomyelitis.  - MRI R foot with post-op changes of 1st & 2nd transmetatarsal with cortical regularity and abnormal signal at distal stumps with extensive edema. Findings concerning for osteo vs chronic changes.  - Biopsies from 6/17 showing no evidence of osteomyelitis involving 2nd metatarsal; 1st metatarsal still in process.  - Wound cultures from ED with MRSA, Providencia rettgeri, Proteus mirabilis, and GNR.  - 1st metatarsal bone culture 6/17 showing E faecalis, diphtheroids.  - Trial of gabapentin 100mg PO qHS with reported symptoms consistent with neuropathic pain.  - Podiatry and wound care following, appreciate assistance.  - ID consulted: given MRSA isolated from a previous culture, ok to treat with vancomycin x 4 weeks (EOC 07/15/22). Finished 2 weeks of cefepime for left foot SSTI.   - midline placed    Antibiotics (From admission, onward)            Start     Stop Route Frequency Ordered    06/30/22 0200  vancomycin 1.5 g in dextrose 5 % 250 mL IVPB (ready to mix)    "      -- IV Every 12 hours (non-standard times) 06/29/22 1547    06/15/22 2237  vancomycin - pharmacy to dose  (vancomycin IVPB)        "And" Linked Group Details    -- IV pharmacy to manage frequency 06/15/22 2148        Cultures were taken-   Microbiology Results (last 7 days)     ** No results found for the last 168 hours. **          Slow transit constipation  -continue bowel regimen  -scheduled MiraLax daily    Increased nutritional needs        Ulcerated, foot, right, with fat layer exposed  Plan as above.       HTN (hypertension)  - Reports taking lisinopril and losartan at home.  - Continue lisinopril 10mg PO daily.    Type 2 diabetes mellitus with diabetic peripheral angiopathy without gangrene, with long-term current use of insulin  Hyperlipidemia  - Last A1c 7.8  - On Levemir 50 U qHS and aspart 10 U TID WM at home  - Improving. Continue insulin detemir 55U subQ qHS, insulin aspart 13 U subQ TIDWM, moderate-dose sliding scale insulin aspart 1-10U subQ TIDWM PRN.  - Continue aspirin 81mg PO daily, atorvastatin 40mg PO daily.      VTE Risk Mitigation (From admission, onward)         Ordered     enoxaparin injection 40 mg  Daily         06/16/22 0658     IP VTE HIGH RISK PATIENT  Once         06/15/22 2119     Place sequential compression device  Until discontinued         06/15/22 2119                      I have assessed these finding virtually using telemed platform and with assistance of bedside nurse                 The attending portion of this evaluation, treatment, and documentation was performed per Aminah Ricardo MD via Telemedicine AudioVisual using the secure [employer] software platform with 2 way audio/video. The provider was located off-site and the patient is located in the hospital. The aforementioned video software was utilized to document the relevant history and physical exam    Aminah Ricardo MD  Department of Hospital Medicine   [facility] ([place]  "

## 2022-07-09 NOTE — CLINICAL REVIEW
Trough was drawn at incorrect time.  Patient's vancomycin was started at 0130 this am and trough drawn at 0205 this am, resulting in incorrect level.  Changed next trough for 1345 on 7/9/22 just prior to next dose.

## 2022-07-09 NOTE — PROGRESS NOTES
Pharmacokinetic Assessment Follow Up: IV Vancomycin    Vancomycin serum concentration assessment(s):    The trough level was drawn correctly and can be used to guide therapy at this time. The measurement is within the desired definitive target range of 15 to 20 mcg/mL.    Vancomycin Regimen Plan:    Continue regimen to Vancomycin 1250 mg IV every 12 hours with next serum trough concentration measured at 1345 prior to next dose on 7/12/22.    Drug levels (last 3 results):  Recent Labs   Lab Result Units 07/07/22  1406 07/09/22  0205 07/09/22  1241   Vancomycin-Trough ug/mL 15.7 28.1* 18.0       Pharmacy will continue to follow and monitor vancomycin.    Please contact pharmacy at extension [phone] for questions regarding this assessment.    Thank you for the consult,   Virginia Corado       Patient brief summary:  Dave Good is a 56 y.o. male initiated on antimicrobial therapy with IV Vancomycin for treatment of bone/joint infection    The patient's current regimen is 1250 mg ivbp every 12 hours.     Drug Allergies:   Review of patient's allergies indicates:   Allergen Reactions    Ibuprofen Swelling     Facial swelling       Actual Body Weight:   131.7 kg    Renal Function:   Estimated Creatinine Clearance: 89.4 mL/min (based on SCr of 1.3 mg/dL).,     Dialysis Method (if applicable):  N/A    CBC (last 72 hours):  No results for input(s): WHITE BLOOD CELL COUNT, HEMOGLOBIN, HEMATOCRIT, PLATELETS, GRAN%, LYMPH%, MONO%, EOSINOPHIL%, BASOPHIL%, DIFFERENTIAL METHOD in the last 72 hours.    Metabolic Panel (last 72 hours):  Recent Labs   Lab Result Units 07/08/22  1035   Creatinine mg/dL 1.3       Vancomycin Administrations:  vancomycin given in the last 96 hours                     vancomycin 1.25 g in dextrose 5% 250 mL IVPB (ready to mix) (mg) 1,250 mg New Bag 07/09/22 0137     1,250 mg New Bag 07/08/22 1412     1,250 mg New Bag  0149     1,250 mg New Bag 07/07/22 1411     1,250 mg New Bag  0148     1,250 mg New Bag  07/06/22 1423     1,250 mg New Bag  0239                    Microbiologic Results:  Microbiology Results (last 7 days)       ** No results found for the last 168 hours. **

## 2022-07-10 LAB
ANION GAP SERPL CALC-SCNC: 10 MMOL/L (ref 8–16)
BASOPHILS # BLD AUTO: 0.04 K/UL (ref 0–0.2)
BASOPHILS NFR BLD: 0.5 % (ref 0–1.9)
BUN SERPL-MCNC: 44 MG/DL (ref 6–20)
CALCIUM SERPL-MCNC: 9.4 MG/DL (ref 8.7–10.5)
CHLORIDE SERPL-SCNC: 99 MMOL/L (ref 95–110)
CO2 SERPL-SCNC: 28 MMOL/L (ref 23–29)
CREAT SERPL-MCNC: 1.2 MG/DL (ref 0.5–1.4)
DIFFERENTIAL METHOD: ABNORMAL
EOSINOPHIL # BLD AUTO: 0.6 K/UL (ref 0–0.5)
EOSINOPHIL NFR BLD: 7.7 % (ref 0–8)
ERYTHROCYTE [DISTWIDTH] IN BLOOD BY AUTOMATED COUNT: 14.4 % (ref 11.5–14.5)
EST. GFR  (AFRICAN AMERICAN): >60 ML/MIN/1.73 M^2
EST. GFR  (NON AFRICAN AMERICAN): >60 ML/MIN/1.73 M^2
GLUCOSE SERPL-MCNC: 177 MG/DL (ref 70–110)
HCT VFR BLD AUTO: 38.5 % (ref 40–54)
HGB BLD-MCNC: 11.5 G/DL (ref 14–18)
IMM GRANULOCYTES # BLD AUTO: 0.02 K/UL (ref 0–0.04)
IMM GRANULOCYTES NFR BLD AUTO: 0.3 % (ref 0–0.5)
LYMPHOCYTES # BLD AUTO: 2.6 K/UL (ref 1–4.8)
LYMPHOCYTES NFR BLD: 35.4 % (ref 18–48)
MAGNESIUM SERPL-MCNC: 2.2 MG/DL (ref 1.6–2.6)
MCH RBC QN AUTO: 26.9 PG (ref 27–31)
MCHC RBC AUTO-ENTMCNC: 29.9 G/DL (ref 32–36)
MCV RBC AUTO: 90 FL (ref 82–98)
MONOCYTES # BLD AUTO: 1 K/UL (ref 0.3–1)
MONOCYTES NFR BLD: 13.5 % (ref 4–15)
NEUTROPHILS # BLD AUTO: 3.2 K/UL (ref 1.8–7.7)
NEUTROPHILS NFR BLD: 42.6 % (ref 38–73)
NRBC BLD-RTO: 0 /100 WBC
PHOSPHATE SERPL-MCNC: 4.3 MG/DL (ref 2.7–4.5)
PLATELET # BLD AUTO: 349 K/UL (ref 150–450)
PMV BLD AUTO: 9.7 FL (ref 9.2–12.9)
POCT GLUCOSE: 168 MG/DL (ref 70–110)
POCT GLUCOSE: 189 MG/DL (ref 70–110)
POCT GLUCOSE: 191 MG/DL (ref 70–110)
POCT GLUCOSE: 198 MG/DL (ref 70–110)
POTASSIUM SERPL-SCNC: 5.1 MMOL/L (ref 3.5–5.1)
RBC # BLD AUTO: 4.27 M/UL (ref 4.6–6.2)
SODIUM SERPL-SCNC: 137 MMOL/L (ref 136–145)
WBC # BLD AUTO: 7.41 K/UL (ref 3.9–12.7)

## 2022-07-10 PROCEDURE — 80048 BASIC METABOLIC PNL TOTAL CA: CPT | Performed by: HOSPITALIST

## 2022-07-10 PROCEDURE — 83735 ASSAY OF MAGNESIUM: CPT | Performed by: HOSPITALIST

## 2022-07-10 PROCEDURE — 25000003 PHARM REV CODE 250: Performed by: STUDENT IN AN ORGANIZED HEALTH CARE EDUCATION/TRAINING PROGRAM

## 2022-07-10 PROCEDURE — 84100 ASSAY OF PHOSPHORUS: CPT | Performed by: HOSPITALIST

## 2022-07-10 PROCEDURE — 25000003 PHARM REV CODE 250: Performed by: NURSE PRACTITIONER

## 2022-07-10 PROCEDURE — 11000001 HC ACUTE MED/SURG PRIVATE ROOM

## 2022-07-10 PROCEDURE — 63600175 PHARM REV CODE 636 W HCPCS: Performed by: INTERNAL MEDICINE

## 2022-07-10 PROCEDURE — 25000003 PHARM REV CODE 250: Performed by: INTERNAL MEDICINE

## 2022-07-10 PROCEDURE — 99232 SBSQ HOSP IP/OBS MODERATE 35: CPT | Mod: 95,,, | Performed by: HOSPITALIST

## 2022-07-10 PROCEDURE — 25000003 PHARM REV CODE 250: Performed by: HOSPITALIST

## 2022-07-10 PROCEDURE — 63600175 PHARM REV CODE 636 W HCPCS: Performed by: HOSPITALIST

## 2022-07-10 PROCEDURE — 85025 COMPLETE CBC W/AUTO DIFF WBC: CPT | Performed by: HOSPITALIST

## 2022-07-10 PROCEDURE — 99232 PR SUBSEQUENT HOSPITAL CARE,LEVL II: ICD-10-PCS | Mod: 95,,, | Performed by: HOSPITALIST

## 2022-07-10 PROCEDURE — 36415 COLL VENOUS BLD VENIPUNCTURE: CPT | Performed by: HOSPITALIST

## 2022-07-10 PROCEDURE — 27000207 HC ISOLATION

## 2022-07-10 RX ADMIN — GABAPENTIN 100 MG: 100 CAPSULE ORAL at 08:07

## 2022-07-10 RX ADMIN — ENOXAPARIN SODIUM 40 MG: 100 INJECTION SUBCUTANEOUS at 04:07

## 2022-07-10 RX ADMIN — INSULIN DETEMIR 55 UNITS: 100 INJECTION, SOLUTION SUBCUTANEOUS at 08:07

## 2022-07-10 RX ADMIN — ATORVASTATIN CALCIUM 40 MG: 20 TABLET, FILM COATED ORAL at 08:07

## 2022-07-10 RX ADMIN — INSULIN ASPART 13 UNITS: 100 INJECTION, SOLUTION INTRAVENOUS; SUBCUTANEOUS at 12:07

## 2022-07-10 RX ADMIN — VANCOMYCIN HYDROCHLORIDE 1250 MG: 1.25 INJECTION, POWDER, LYOPHILIZED, FOR SOLUTION INTRAVENOUS at 02:07

## 2022-07-10 RX ADMIN — HYDROCODONE BITARTRATE AND ACETAMINOPHEN 1 TABLET: 7.5; 325 TABLET ORAL at 04:07

## 2022-07-10 RX ADMIN — COLLAGENASE SANTYL: 250 OINTMENT TOPICAL at 08:07

## 2022-07-10 RX ADMIN — HYDROCODONE BITARTRATE AND ACETAMINOPHEN 1 TABLET: 7.5; 325 TABLET ORAL at 08:07

## 2022-07-10 RX ADMIN — MAGNESIUM HYDROXIDE 2400 MG: 400 SUSPENSION ORAL at 08:07

## 2022-07-10 RX ADMIN — HYDROCHLOROTHIAZIDE 25 MG: 25 TABLET ORAL at 08:07

## 2022-07-10 RX ADMIN — HYDROCODONE BITARTRATE AND ACETAMINOPHEN 1 TABLET: 7.5; 325 TABLET ORAL at 01:07

## 2022-07-10 RX ADMIN — MAGNESIUM HYDROXIDE 2400 MG: 400 SUSPENSION ORAL at 05:07

## 2022-07-10 RX ADMIN — INSULIN ASPART 1 UNITS: 100 INJECTION, SOLUTION INTRAVENOUS; SUBCUTANEOUS at 08:07

## 2022-07-10 RX ADMIN — ASPIRIN 81 MG: 81 TABLET, COATED ORAL at 08:07

## 2022-07-10 RX ADMIN — POLYETHYLENE GLYCOL 3350 17 G: 17 POWDER, FOR SOLUTION ORAL at 08:07

## 2022-07-10 RX ADMIN — LISINOPRIL 10 MG: 10 TABLET ORAL at 08:07

## 2022-07-10 RX ADMIN — INSULIN ASPART 13 UNITS: 100 INJECTION, SOLUTION INTRAVENOUS; SUBCUTANEOUS at 04:07

## 2022-07-10 RX ADMIN — QUETIAPINE FUMARATE 200 MG: 200 TABLET ORAL at 08:07

## 2022-07-10 RX ADMIN — INSULIN ASPART 2 UNITS: 100 INJECTION, SOLUTION INTRAVENOUS; SUBCUTANEOUS at 08:07

## 2022-07-10 RX ADMIN — INSULIN ASPART 13 UNITS: 100 INJECTION, SOLUTION INTRAVENOUS; SUBCUTANEOUS at 08:07

## 2022-07-10 RX ADMIN — HYDROCODONE BITARTRATE AND ACETAMINOPHEN 1 TABLET: 7.5; 325 TABLET ORAL at 12:07

## 2022-07-10 RX ADMIN — VANCOMYCIN HYDROCHLORIDE 1250 MG: 1.25 INJECTION, POWDER, LYOPHILIZED, FOR SOLUTION INTRAVENOUS at 01:07

## 2022-07-10 NOTE — PROGRESS NOTES
Active pharmacy to dose vancomycin consult:    Patient reviewed, renal function reviewed, cultures reviewed, no new levels, continue current therapy; Next levels due: 7/12/22 @ 0276    Please contact inpatient pharmacy with any questions: [phone]  Thanks, Adela Leo PharmD

## 2022-07-11 LAB
POCT GLUCOSE: 157 MG/DL (ref 70–110)
POCT GLUCOSE: 160 MG/DL (ref 70–110)
POCT GLUCOSE: 170 MG/DL (ref 70–110)
POCT GLUCOSE: 186 MG/DL (ref 70–110)

## 2022-07-11 PROCEDURE — 99233 SBSQ HOSP IP/OBS HIGH 50: CPT | Mod: ,,, | Performed by: PODIATRIST

## 2022-07-11 PROCEDURE — 25000003 PHARM REV CODE 250: Performed by: HOSPITALIST

## 2022-07-11 PROCEDURE — 25000003 PHARM REV CODE 250: Performed by: INTERNAL MEDICINE

## 2022-07-11 PROCEDURE — 99233 PR SUBSEQUENT HOSPITAL CARE,LEVL III: ICD-10-PCS | Mod: ,,, | Performed by: PODIATRIST

## 2022-07-11 PROCEDURE — 27000207 HC ISOLATION

## 2022-07-11 PROCEDURE — 63600175 PHARM REV CODE 636 W HCPCS: Performed by: INTERNAL MEDICINE

## 2022-07-11 PROCEDURE — 25000003 PHARM REV CODE 250: Performed by: NURSE PRACTITIONER

## 2022-07-11 PROCEDURE — 63600175 PHARM REV CODE 636 W HCPCS: Performed by: HOSPITALIST

## 2022-07-11 PROCEDURE — 94761 N-INVAS EAR/PLS OXIMETRY MLT: CPT

## 2022-07-11 PROCEDURE — 25000003 PHARM REV CODE 250: Performed by: STUDENT IN AN ORGANIZED HEALTH CARE EDUCATION/TRAINING PROGRAM

## 2022-07-11 PROCEDURE — 11000001 HC ACUTE MED/SURG PRIVATE ROOM

## 2022-07-11 RX ADMIN — INSULIN ASPART 2 UNITS: 100 INJECTION, SOLUTION INTRAVENOUS; SUBCUTANEOUS at 05:07

## 2022-07-11 RX ADMIN — POLYETHYLENE GLYCOL 3350 17 G: 17 POWDER, FOR SOLUTION ORAL at 08:07

## 2022-07-11 RX ADMIN — HYDROCODONE BITARTRATE AND ACETAMINOPHEN 1 TABLET: 7.5; 325 TABLET ORAL at 04:07

## 2022-07-11 RX ADMIN — HYDROCODONE BITARTRATE AND ACETAMINOPHEN 1 TABLET: 7.5; 325 TABLET ORAL at 12:07

## 2022-07-11 RX ADMIN — INSULIN ASPART 1 UNITS: 100 INJECTION, SOLUTION INTRAVENOUS; SUBCUTANEOUS at 09:07

## 2022-07-11 RX ADMIN — HYDROCODONE BITARTRATE AND ACETAMINOPHEN 1 TABLET: 7.5; 325 TABLET ORAL at 08:07

## 2022-07-11 RX ADMIN — ASPIRIN 81 MG: 81 TABLET, COATED ORAL at 08:07

## 2022-07-11 RX ADMIN — LISINOPRIL 10 MG: 10 TABLET ORAL at 08:07

## 2022-07-11 RX ADMIN — HYDROCODONE BITARTRATE AND ACETAMINOPHEN 1 TABLET: 7.5; 325 TABLET ORAL at 09:07

## 2022-07-11 RX ADMIN — INSULIN ASPART 2 UNITS: 100 INJECTION, SOLUTION INTRAVENOUS; SUBCUTANEOUS at 12:07

## 2022-07-11 RX ADMIN — VANCOMYCIN HYDROCHLORIDE 1250 MG: 1.25 INJECTION, POWDER, LYOPHILIZED, FOR SOLUTION INTRAVENOUS at 02:07

## 2022-07-11 RX ADMIN — INSULIN ASPART 13 UNITS: 100 INJECTION, SOLUTION INTRAVENOUS; SUBCUTANEOUS at 12:07

## 2022-07-11 RX ADMIN — INSULIN ASPART 13 UNITS: 100 INJECTION, SOLUTION INTRAVENOUS; SUBCUTANEOUS at 08:07

## 2022-07-11 RX ADMIN — ENOXAPARIN SODIUM 40 MG: 100 INJECTION SUBCUTANEOUS at 05:07

## 2022-07-11 RX ADMIN — INSULIN DETEMIR 55 UNITS: 100 INJECTION, SOLUTION SUBCUTANEOUS at 09:07

## 2022-07-11 RX ADMIN — VANCOMYCIN HYDROCHLORIDE 1250 MG: 1.25 INJECTION, POWDER, LYOPHILIZED, FOR SOLUTION INTRAVENOUS at 04:07

## 2022-07-11 RX ADMIN — HYDROCODONE BITARTRATE AND ACETAMINOPHEN 1 TABLET: 7.5; 325 TABLET ORAL at 05:07

## 2022-07-11 RX ADMIN — INSULIN ASPART 13 UNITS: 100 INJECTION, SOLUTION INTRAVENOUS; SUBCUTANEOUS at 05:07

## 2022-07-11 RX ADMIN — HYDROCHLOROTHIAZIDE 25 MG: 25 TABLET ORAL at 08:07

## 2022-07-11 RX ADMIN — MAGNESIUM HYDROXIDE 2400 MG: 400 SUSPENSION ORAL at 05:07

## 2022-07-11 RX ADMIN — COLLAGENASE SANTYL: 250 OINTMENT TOPICAL at 08:07

## 2022-07-11 RX ADMIN — GABAPENTIN 100 MG: 100 CAPSULE ORAL at 09:07

## 2022-07-11 RX ADMIN — QUETIAPINE FUMARATE 200 MG: 200 TABLET ORAL at 09:07

## 2022-07-11 RX ADMIN — ATORVASTATIN CALCIUM 40 MG: 20 TABLET, FILM COATED ORAL at 08:07

## 2022-07-11 RX ADMIN — INSULIN ASPART 2 UNITS: 100 INJECTION, SOLUTION INTRAVENOUS; SUBCUTANEOUS at 08:07

## 2022-07-11 NOTE — PROGRESS NOTES
[facility] Surg [facility] Medicine  Telemedicine Progress Note    Patient Name: Dave Good  MRN: [id number]  Patient Class: IP- Inpatient   Admission Date: 6/15/2022  Length of Stay: 26 days  Attending Physician: Aminah Ricardo MD  Primary Care Provider: Reyna Jorge NP          Subjective:     Principal Problem:Osteomyelitis of right foot        HPI:  Mr Acosta is a 56 yr old male with a PMH that includes DB 2, COPD, HTN, depression, and right foot transmetatarsal amputation. He was sent to the ED for evaluation of osteomyelitis from a follow up appt with Dr. Flores. Pt had I&D and amputation for wet gangrene in Sept 2021 and debridements in Feb, March, and May of this year. Per his chart, his feet were healing well until some foot trauma occurred. Pt has has open wounds and drainage to BL feet with right foot wound deeper than left. He also has wound to left shin. Pt reports associated 10/10 pain and difficulty walking. He denies fever and chills at home. Podiatry consulted and pt admitted to hospital medicine.       Overview/Hospital Course:  Patient admitted with bilateral foot wounds and concern for osteomyelitis following R foot TMA requiring debridements since for continued wounds. He was started on empiric antibiotics. Bilateral foot MRI done. MRI of left foot with abnormal findings but not consistent with osteomyelitis. MRI of right foot showed changes of 1st and 2nd transmetatarsal amputation with cortical regularity and abnormal signal at distal stumps noting overlying extensive edema and regions of skin ulceration.  Findings may represent early osteomyelitis vs post-operative changes. Bone biopsy done for further evaluation and to guide treatment. ID and podiatry consulted. Bone culture grew diphtheroids and Enterococcus but no good po options. Per ID, given MRSA isolated from a previous culture, ok to treat with vancomycin x 4 weeks End date 7/15/22. Finished 2 weeks of cefepime for  left foot SSTI. Pt now medically stable for DC, pending placement to LTAC.       Interval History: Pt afebrile and HDS. No acute complaints today, no new events overnight. Finally had  BM. Pending LTAC placement. Abx end date 7/15/22. Medically stable to DC to LTAC.     Review of Systems   Constitutional:  Negative for fever.   Respiratory:  Negative for cough and shortness of breath.    Cardiovascular:  Negative for chest pain.   Gastrointestinal:  Negative for abdominal pain.   Neurological:  Negative for dizziness and headaches.   Psychiatric/Behavioral:  Negative for confusion.    All other systems reviewed and are negative.  Objective:     Vital Signs (Most Recent):  Temp: 98.2 °F (36.8 °C) (07/10/22 2322)  Pulse: 82 (07/10/22 2322)  Resp: 18 (07/11/22 0048)  BP: (!) 142/86 (07/10/22 2322)  SpO2: 95 % (07/10/22 2322)   Vital Signs (24h Range):  Temp:  [97.4 °F (36.3 °C)-99.3 °F (37.4 °C)] 98.2 °F (36.8 °C)  Pulse:  [74-87] 82  Resp:  [16-20] 18  SpO2:  [94 %-97 %] 95 %  BP: ()/(65-86) 142/86     Weight: 131.7 kg (290 lb 5.5 oz)  Body mass index is 37.28 kg/m².    Intake/Output Summary (Last 24 hours) at 7/11/2022 0133  Last data filed at 7/10/2022 2300  Gross per 24 hour   Intake --   Output 2101 ml   Net -2101 ml        Physical Exam  Vitals and nursing note reviewed.   Constitutional:       Appearance: Normal appearance.   HENT:      Head: Normocephalic and atraumatic.   Eyes:      Extraocular Movements: Extraocular movements intact.      Pupils: Pupils are equal, round, and reactive to light.   Cardiovascular:      Rate and Rhythm: Normal rate and regular rhythm.   Pulmonary:      Effort: Pulmonary effort is normal. No respiratory distress.   Abdominal:      General: Abdomen is flat. There is no distension.   Musculoskeletal:         General: Normal range of motion.      Cervical back: Normal range of motion.      Comments: Left foot in bandage   Neurological:      General: No focal deficit present.       Mental Status: He is alert and oriented to person, place, and time.   Psychiatric:         Mood and Affect: Mood normal.         Behavior: Behavior normal.       Significant Labs: All pertinent labs within the past 24 hours have been reviewed.  CBC:   Recent Labs   Lab 07/10/22  0423   WBC 7.41   HGB 11.5*   HCT 38.5*          CMP:   Recent Labs   Lab 07/10/22  0423      K 5.1   CL 99   CO2 28   *   BUN 44*   CREATININE 1.2   CALCIUM 9.4   ANIONGAP 10   EGFRNONAA >60         Significant Imaging: I have reviewed all pertinent imaging results/findings within the past 24 hours.      Assessment/Plan:      * Osteomyelitis of right foot  Left Foot ulcer  - Presented from surgery clinic for concern for infection/osteomyelitis  - Prior history of MRSA bacteremia 2/2 gangrene treated with 6 weeks IV abx for presumed endocarditis  - ESR/CRP elevated but have down trended since prior hospitalization.  - MRI L foot with mild marrow edema of tuft of great toe. Findings and exam not consistent with acute osteomyelitis.  - MRI R foot with post-op changes of 1st & 2nd transmetatarsal with cortical regularity and abnormal signal at distal stumps with extensive edema. Findings concerning for osteo vs chronic changes.  - Biopsies from 6/17 showing no evidence of osteomyelitis involving 2nd metatarsal; 1st metatarsal still in process.  - Wound cultures from ED with MRSA, Providencia rettgeri, Proteus mirabilis, and GNR.  - 1st metatarsal bone culture 6/17 showing E faecalis, diphtheroids.  - Trial of gabapentin 100mg PO qHS with reported symptoms consistent with neuropathic pain.  - Podiatry and wound care following, appreciate assistance.  - ID consulted: given MRSA isolated from a previous culture, ok to treat with vancomycin x 4 weeks (EOC 07/15/22). Finished 2 weeks of cefepime for left foot SSTI.   - midline placed    Antibiotics (From admission, onward)            Start     Stop Route Frequency Ordered     "06/30/22 0200  vancomycin 1.5 g in dextrose 5 % 250 mL IVPB (ready to mix)         -- IV Every 12 hours (non-standard times) 06/29/22 1547    06/15/22 2237  vancomycin - pharmacy to dose  (vancomycin IVPB)        "And" Linked Group Details    -- IV pharmacy to manage frequency 06/15/22 2148        Cultures were taken-   Microbiology Results (last 7 days)     ** No results found for the last 168 hours. **          Slow transit constipation  -continue bowel regimen  -scheduled MiraLax daily    Increased nutritional needs        Ulcer of both feet with fat layer exposed  Plan as above.       HTN (hypertension)  - Reports taking lisinopril and losartan at home.  - Continue lisinopril 10mg PO daily.    Type 2 diabetes mellitus with diabetic peripheral angiopathy without gangrene, with long-term current use of insulin  Hyperlipidemia  - Last A1c 7.8  - On Levemir 50 U qHS and aspart 10 U TID WM at home  - Improving. Continue insulin detemir 55U subQ qHS, insulin aspart 13 U subQ TIDWM, moderate-dose sliding scale insulin aspart 1-10U subQ TIDWM PRN.  - Continue aspirin 81mg PO daily, atorvastatin 40mg PO daily.      VTE Risk Mitigation (From admission, onward)         Ordered     enoxaparin injection 40 mg  Daily         06/16/22 0658     IP VTE HIGH RISK PATIENT  Once         06/15/22 2119     Place sequential compression device  Until discontinued         06/15/22 2119                      I have assessed these finding virtually using telemed platform and with assistance of bedside nurse                 The attending portion of this evaluation, treatment, and documentation was performed per Aminah Ricardo MD via Telemedicine AudioVisual using the secure [employer] software platform with 2 way audio/video. The provider was located off-site and the patient is located in the hospital. The aforementioned video software was utilized to document the relevant history and physical exam    Aminah Ricardo MD  Department of Hospital " [facility] Surg ([place])

## 2022-07-11 NOTE — SUBJECTIVE & OBJECTIVE
Interval History: Patient remains afebrile, no new events overnight. Abx end date 7/15/22. Medically cleared for DC pending LTAC placement.    Review of Systems   Constitutional:  Negative for chills and fever.   HENT:  Negative for congestion and trouble swallowing.    Respiratory:  Negative for cough, choking and shortness of breath.    Cardiovascular:  Negative for chest pain and palpitations.   Gastrointestinal:  Negative for nausea and vomiting.   Genitourinary:  Negative for difficulty urinating.   Skin:  Positive for wound. Negative for color change and rash.   Neurological:  Negative for numbness and headaches.   Psychiatric/Behavioral:  Negative for agitation.    Objective:     Vital Signs (Most Recent):  Temp: 98.1 °F (36.7 °C) (07/11/22 1522)  Pulse: 85 (07/11/22 1522)  Resp: 16 (07/11/22 1723)  BP: (!) 108/57 (07/11/22 1522)  SpO2: (!) 94 % (07/11/22 1522)   Vital Signs (24h Range):  Temp:  [97.7 °F (36.5 °C)-98.2 °F (36.8 °C)] 98.1 °F (36.7 °C)  Pulse:  [81-89] 85  Resp:  [16-20] 16  SpO2:  [94 %-97 %] 94 %  BP: ()/(57-86) 108/57     Weight: 131.7 kg (290 lb 5.5 oz)  Body mass index is 37.28 kg/m².    Intake/Output Summary (Last 24 hours) at 7/11/2022 1753  Last data filed at 7/11/2022 1441  Gross per 24 hour   Intake --   Output 1975 ml   Net -1975 ml      Physical Exam  Constitutional:       Appearance: Normal appearance.   HENT:      Head: Atraumatic.   Cardiovascular:      Rate and Rhythm: Normal rate.   Pulmonary:      Effort: Pulmonary effort is normal.   Musculoskeletal:      Comments: Left foot bandaged   Neurological:      Mental Status: He is alert and oriented to person, place, and time.       Significant Labs: All pertinent labs within the past 24 hours have been reviewed.    Significant Imaging: I have reviewed all pertinent imaging results/findings within the past 24 hours.

## 2022-07-11 NOTE — PROGRESS NOTES
[facility] - Med Surg ([place])  Podiatry  Progress Note    Patient Name: Dave Good  MRN: [id number]  Admission Date: 6/15/2022  Hospital Length of Stay: 26 days  Attending Physician: Soo Balderas MD  Primary Care Provider: Reyna Jorge NP     Subjective:     Interval History: wounds both feet, left healed.  Right stable, improving with reduced WB and wound care.  Has new DM shoes/inserts at home.    Awaiting ltac placement to complete abx.          Scheduled Meds:   aspirin  81 mg Oral Daily    atorvastatin  40 mg Oral Daily    collagenase   Topical (Top) Daily    enoxaparin  40 mg Subcutaneous Daily    gabapentin  100 mg Oral QHS    hydroCHLOROthiazide  25 mg Oral Daily    insulin aspart U-100  13 Units Subcutaneous TIDWM    insulin detemir U-100  55 Units Subcutaneous QHS    lisinopriL  10 mg Oral Daily    magnesium hydroxide 400 mg/5 ml  30 mL Oral Daily    polyethylene glycol  17 g Oral Daily    QUEtiapine  200 mg Oral Nightly    vancomycin (VANCOCIN) IVPB  1,250 mg Intravenous Q12H     Continuous Infusions:  PRN Meds:sodium chloride 0.9%, acetaminophen, bisacodyL, dextrose 10%, dextrose 10%, glucagon (human recombinant), glucose, glucose, HYDROcodone-acetaminophen, HYDROcodone-acetaminophen, insulin aspart U-100, magnesium hydroxide 400 mg/5 ml, melatonin, naloxone, ondansetron, senna-docusate 8.6-50 mg, sodium chloride 0.9%, Pharmacy to dose Vancomycin consult **AND** vancomycin - pharmacy to dose    Review of Systems  Objective:     Vital Signs (Most Recent):  Temp: 98.1 °F (36.7 °C) (07/11/22 1522)  Pulse: 85 (07/11/22 1522)  Resp: 16 (07/11/22 1723)  BP: (!) 108/57 (07/11/22 1522)  SpO2: (!) 94 % (07/11/22 1522) Vital Signs (24h Range):  Temp:  [97.7 °F (36.5 °C)-98.2 °F (36.8 °C)] 98.1 °F (36.7 °C)  Pulse:  [81-89] 85  Resp:  [16-20] 16  SpO2:  [94 %-97 %] 94 %  BP: ()/(57-86) 108/57     Weight: 131.7 kg (290 lb 5.5 oz)  Body mass index is 37.28 kg/m².    Foot  Exam    Laboratory:  A1C:   Recent Labs   Lab 02/21/22  1544 05/27/22  1010   HGBA1C 9.8* 7.8*     Blood Cultures: No results for input(s): LABBLOO in the last 48 hours.  BMP:   Recent Labs   Lab 07/10/22  0423   *      K 5.1   CL 99   CO2 28   BUN 44*   CREATININE 1.2   CALCIUM 9.4   MG 2.2     CBC:   Recent Labs   Lab 07/10/22  0423   WBC 7.41   RBC 4.27*   HGB 11.5*   HCT 38.5*      MCV 90   MCH 26.9*   MCHC 29.9*     CMP:   Recent Labs   Lab 07/10/22  0423   *   CALCIUM 9.4      K 5.1   CO2 28   CL 99   BUN 44*   CREATININE 1.2     Coagulation: No results for input(s): PT, INR, APTT in the last 168 hours.  CRP: No results for input(s): CRP in the last 168 hours.  ESR: No results for input(s): SEDRATE in the last 168 hours.  Prealbumin: No results for input(s): PREALBUMIN in the last 48 hours.  Wound Cultures:   Recent Labs   Lab 02/22/22  1153 06/16/22  0050 06/17/22  0601   LABAERO METHICILLIN RESISTANT STAPHYLOCOCCUS AUREUS  Many  No other significant isolate  * PROTEUS MIRABILIS  Many  *  METHICILLIN RESISTANT STAPHYLOCOCCUS AUREUS  Many  *  ACINETOBACTER LWOFFII GROUP  Few  *  PROVIDENCIA RETTGERI  Few  *  METHICILLIN RESISTANT STAPHYLOCOCCUS AUREUS  Moderate  * DIPHTHEROIDS  Few  *  ENTEROCOCCUS FAECALIS  Few  *  No growth     Microbiology Results (last 7 days)     ** No results found for the last 168 hours. **        Specimen (24h ago, onward)            None          Diagnostic Results:  None    Clinical Findings:  Wounds left remain closed with moderate generalized keratosis of plantar forefoot  without ulceration, drainage, pus, tracking, fluctuance, malodor, or cardinal signs infection.    Wound right arch/midfoot appears closed today  without ulceration, drainage, pus, tracking, fluctuance, malodor, or cardinal signs infection.    Otherwise, Skin thin, shiny, atrophic, with decreased density and distribution of pedal hair bilateral, but without hyperpigmentation,  lizy discoloration,  ulcers, masses, nodules or cords palpated bilateral feet and legs.    Palpable pulses dp and pt both feet.  All toes warm.    Diminished/loss of protective sensation all toes bilateral to 10 gram monofilament.      Assessment/Plan:     Active Diagnoses:    Diagnosis Date Noted POA    PRINCIPAL PROBLEM:  Osteomyelitis of right foot [M86.9] 06/15/2022 Yes    Ulcerated, foot, left, with fat layer exposed [L97.522]  Yes    Slow transit constipation [K59.01] 07/06/2022 Yes    Ulcer of both feet with fat layer exposed [L97.512, L97.522]  Yes    Amputation of right great toe [S98.111A]  Yes    Type 2 diabetes mellitus with diabetic peripheral angiopathy without gangrene, with long-term current use of insulin [E11.51, Z79.4] 09/20/2021 Not Applicable    HTN (hypertension) [I10] 09/20/2021 Yes      Problems Resolved During this Admission:    Diagnosis Date Noted Date Resolved POA    SHIV (acute kidney injury) [N17.9] 09/20/2021 06/17/2022 Yes       Closed wounds right and left feet.   1.0cm x 0.5cm ulcer right lower anterior leg, granular without acute infection.     Dressed per orders to protect/offload while in hospital.    May bear weight in DM shoes, custom inserts for ADL's.    Await ltac placement.            Lavonne Vigil DPM  Podiatry  [facility] - Med Surg ([place])

## 2022-07-11 NOTE — PROGRESS NOTES
[facility] Medicine  Telemedicine Progress Note    Patient Name: Dave Good  MRN: [id number]  Patient Class: IP- Inpatient   Admission Date: 6/15/2022  Length of Stay: 26 days  Attending Physician: Soo Balderas MD  Primary Care Provider: Reyna Jorge NP          Subjective:     Principal Problem:Osteomyelitis of right foot        HPI:  Mr Acosta is a 56 yr old male with a PMH that includes DB 2, COPD, HTN, depression, and right foot transmetatarsal amputation. He was sent to the ED for evaluation of osteomyelitis from a follow up appt with Dr. Flores. Pt had I&D and amputation for wet gangrene in Sept 2021 and debridements in Feb, March, and May of this year. Per his chart, his feet were healing well until some foot trauma occurred. Pt has has open wounds and drainage to BL feet with right foot wound deeper than left. He also has wound to left shin. Pt reports associated 10/10 pain and difficulty walking. He denies fever and chills at home. Podiatry consulted and pt admitted to hospital medicine.       Overview/Hospital Course:  Patient admitted with bilateral foot wounds and concern for osteomyelitis following R foot TMA requiring debridements since for continued wounds. He was started on empiric antibiotics. Bilateral foot MRI done. MRI of left foot with abnormal findings but not consistent with osteomyelitis. MRI of right foot showed changes of 1st and 2nd transmetatarsal amputation with cortical regularity and abnormal signal at distal stumps noting overlying extensive edema and regions of skin ulceration.  Findings may represent early osteomyelitis vs post-operative changes. Bone biopsy done for further evaluation and to guide treatment. ID and podiatry consulted. Bone culture grew diphtheroids and Enterococcus but no good po options. Per ID, given MRSA isolated from a previous culture, ok to treat with vancomycin x 4 weeks End date 7/15/22. Finished 2 weeks of cefepime for  left foot SSTI. Pt now medically stable for DC, pending placement to LTAC.       Interval History: Patient remains afebrile, no new events overnight. Abx end date 7/15/22. Medically cleared for DC pending LTAC placement.    Review of Systems   Constitutional:  Negative for chills and fever.   HENT:  Negative for congestion and trouble swallowing.    Respiratory:  Negative for cough, choking and shortness of breath.    Cardiovascular:  Negative for chest pain and palpitations.   Gastrointestinal:  Negative for nausea and vomiting.   Genitourinary:  Negative for difficulty urinating.   Skin:  Positive for wound. Negative for color change and rash.   Neurological:  Negative for numbness and headaches.   Psychiatric/Behavioral:  Negative for agitation.    Objective:     Vital Signs (Most Recent):  Temp: 98.1 °F (36.7 °C) (07/11/22 1522)  Pulse: 85 (07/11/22 1522)  Resp: 16 (07/11/22 1723)  BP: (!) 108/57 (07/11/22 1522)  SpO2: (!) 94 % (07/11/22 1522)   Vital Signs (24h Range):  Temp:  [97.7 °F (36.5 °C)-98.2 °F (36.8 °C)] 98.1 °F (36.7 °C)  Pulse:  [81-89] 85  Resp:  [16-20] 16  SpO2:  [94 %-97 %] 94 %  BP: ()/(57-86) 108/57     Weight: 131.7 kg (290 lb 5.5 oz)  Body mass index is 37.28 kg/m².    Intake/Output Summary (Last 24 hours) at 7/11/2022 1753  Last data filed at 7/11/2022 1441  Gross per 24 hour   Intake --   Output 1975 ml   Net -1975 ml      Physical Exam  Constitutional:       Appearance: Normal appearance.   HENT:      Head: Atraumatic.   Cardiovascular:      Rate and Rhythm: Normal rate.   Pulmonary:      Effort: Pulmonary effort is normal.   Musculoskeletal:      Comments: Left foot bandaged   Neurological:      Mental Status: He is alert and oriented to person, place, and time.       Significant Labs: All pertinent labs within the past 24 hours have been reviewed.    Significant Imaging: I have reviewed all pertinent imaging results/findings within the past 24 hours.      Assessment/Plan:      *  "Osteomyelitis of right foot  Left Foot ulcer  - Presented from surgery clinic for concern for infection/osteomyelitis  - Prior history of MRSA bacteremia 2/2 gangrene treated with 6 weeks IV abx for presumed endocarditis  - ESR/CRP elevated but have down trended since prior hospitalization.  - MRI L foot with mild marrow edema of tuft of great toe. Findings and exam not consistent with acute osteomyelitis.  - MRI R foot with post-op changes of 1st & 2nd transmetatarsal with cortical regularity and abnormal signal at distal stumps with extensive edema. Findings concerning for osteo vs chronic changes.  - Biopsies from 6/17 showing no evidence of osteomyelitis involving 2nd metatarsal; 1st metatarsal still in process.  - Wound cultures from ED with MRSA, Providencia rettgeri, Proteus mirabilis, and GNR.  - 1st metatarsal bone culture 6/17 showing E faecalis, diphtheroids.  - Trial of gabapentin 100mg PO qHS with reported symptoms consistent with neuropathic pain.  - Podiatry and wound care following, appreciate assistance.  - ID consulted: given MRSA isolated from a previous culture, ok to treat with vancomycin x 4 weeks (EOC 07/15/22). Finished 2 weeks of cefepime for left foot SSTI.   - midline placed    Antibiotics (From admission, onward)            Start     Stop Route Frequency Ordered    06/30/22 0200  vancomycin 1.5 g in dextrose 5 % 250 mL IVPB (ready to mix)         -- IV Every 12 hours (non-standard times) 06/29/22 1547    06/15/22 2237  vancomycin - pharmacy to dose  (vancomycin IVPB)        "And" Linked Group Details    -- IV pharmacy to manage frequency 06/15/22 2148        Cultures were taken-   Microbiology Results (last 7 days)     ** No results found for the last 168 hours. **          Slow transit constipation  -continue bowel regimen  -scheduled MiraLax daily      Ulcer of both feet with fat layer exposed  Plan as above.       HTN (hypertension)  - Reports taking lisinopril and losartan at home.  - " Continue lisinopril 10mg PO daily.    Type 2 diabetes mellitus with diabetic peripheral angiopathy without gangrene, with long-term current use of insulin  Hyperlipidemia  - Last A1c 7.8  - On Levemir 50 U qHS and aspart 10 U TID WM at home  - Improving. Continue insulin detemir 55U subQ qHS, insulin aspart 13 U subQ TIDWM, moderate-dose sliding scale insulin aspart 1-10U subQ TIDWM PRN.  - Continue aspirin 81mg PO daily, atorvastatin 40mg PO daily.      VTE Risk Mitigation (From admission, onward)         Ordered     enoxaparin injection 40 mg  Daily         06/16/22 0658     IP VTE HIGH RISK PATIENT  Once         06/15/22 2119     Place sequential compression device  Until discontinued         06/15/22 2119                      I have assessed these finding virtually using telemed platform and with assistance of bedside nurse                 The attending portion of this evaluation, treatment, and documentation was performed per Mary Toure NP via Telemedicine AudioVisual using the secure [employer] software platform with 2 way audio/video. The provider was located off-site and the patient is located in the hospital. The aforementioned video software was utilized to document the relevant history and physical exam    Mary Toure NP  Department of Hospital Medicine   [facility] ([place])

## 2022-07-11 NOTE — SUBJECTIVE & OBJECTIVE
Interval History: Pt afebrile and HDS. No acute complaints today, no new events overnight. Finally had  BM. Pending LTAC placement. Abx end date 7/15/22. Medically stable to DC to LTAC.     Review of Systems   Constitutional:  Negative for fever.   Respiratory:  Negative for cough and shortness of breath.    Cardiovascular:  Negative for chest pain.   Gastrointestinal:  Negative for abdominal pain.   Neurological:  Negative for dizziness and headaches.   Psychiatric/Behavioral:  Negative for confusion.    All other systems reviewed and are negative.  Objective:     Vital Signs (Most Recent):  Temp: 98.2 °F (36.8 °C) (07/10/22 2322)  Pulse: 82 (07/10/22 2322)  Resp: 18 (07/11/22 0048)  BP: (!) 142/86 (07/10/22 2322)  SpO2: 95 % (07/10/22 2322)   Vital Signs (24h Range):  Temp:  [97.4 °F (36.3 °C)-99.3 °F (37.4 °C)] 98.2 °F (36.8 °C)  Pulse:  [74-87] 82  Resp:  [16-20] 18  SpO2:  [94 %-97 %] 95 %  BP: ()/(65-86) 142/86     Weight: 131.7 kg (290 lb 5.5 oz)  Body mass index is 37.28 kg/m².    Intake/Output Summary (Last 24 hours) at 7/11/2022 0133  Last data filed at 7/10/2022 2300  Gross per 24 hour   Intake --   Output 2101 ml   Net -2101 ml        Physical Exam  Vitals and nursing note reviewed.   Constitutional:       Appearance: Normal appearance.   HENT:      Head: Normocephalic and atraumatic.   Eyes:      Extraocular Movements: Extraocular movements intact.      Pupils: Pupils are equal, round, and reactive to light.   Cardiovascular:      Rate and Rhythm: Normal rate and regular rhythm.   Pulmonary:      Effort: Pulmonary effort is normal. No respiratory distress.   Abdominal:      General: Abdomen is flat. There is no distension.   Musculoskeletal:         General: Normal range of motion.      Cervical back: Normal range of motion.      Comments: Left foot in bandage   Neurological:      General: No focal deficit present.      Mental Status: He is alert and oriented to person, place, and time.    Psychiatric:         Mood and Affect: Mood normal.         Behavior: Behavior normal.       Significant Labs: All pertinent labs within the past 24 hours have been reviewed.  CBC:   Recent Labs   Lab 07/10/22  0423   WBC 7.41   HGB 11.5*   HCT 38.5*          CMP:   Recent Labs   Lab 07/10/22  0423      K 5.1   CL 99   CO2 28   *   BUN 44*   CREATININE 1.2   CALCIUM 9.4   ANIONGAP 10   EGFRNONAA >60         Significant Imaging: I have reviewed all pertinent imaging results/findings within the past 24 hours.

## 2022-07-11 NOTE — PROGRESS NOTES
Active pharmacy to dose consult:    Patient reviewed, renal function stable, cultures reviewed, no new levels, continue current therapy; Next levels due: 6/12 @4394

## 2022-07-11 NOTE — PLAN OF CARE
POC reviewed with patient. AAOx4. Stable on room air. Complaints of pain treated with PRN pain medication according to MAR. No other complaints verbalized during shift. Received IV antibiotics according to MAR. Urinal utilized for voiding for voiding. Positions self. Turn Q2/frequent weight shift encouraged by LPN. Low air loss mattress in use. Instructed to call for help ambulating. Wound cared peformed this shift. No injuries, falls, or trauma occurred during shift. Purposeful rounding completed. Bed low and locked with side rails up x 3 and call light within reach.      Problem: Adult Inpatient Plan of Care  Goal: Plan of Care Review  Outcome: Ongoing, Progressing  Goal: Patient-Specific Goal (Individualized)  Outcome: Ongoing, Progressing  Goal: Absence of Hospital-Acquired Illness or Injury  Outcome: Ongoing, Progressing  Goal: Optimal Comfort and Wellbeing  Outcome: Ongoing, Progressing  Goal: Readiness for Transition of Care  Outcome: Ongoing, Progressing     Problem: Fall Injury Risk  Goal: Absence of Fall and Fall-Related Injury  Outcome: Ongoing, Progressing     Problem: Diabetes Comorbidity  Goal: Blood Glucose Level Within Targeted Range  Outcome: Ongoing, Progressing     Problem: Fluid and Electrolyte Imbalance (Acute Kidney Injury/Impairment)  Goal: Fluid and Electrolyte Balance  Outcome: Ongoing, Progressing     Problem: Oral Intake Inadequate (Acute Kidney Injury/Impairment)  Goal: Optimal Nutrition Intake  Outcome: Ongoing, Progressing     Problem: Renal Function Impairment (Acute Kidney Injury/Impairment)  Goal: Effective Renal Function  Outcome: Ongoing, Progressing     Problem: Infection  Goal: Absence of Infection Signs and Symptoms  Outcome: Ongoing, Progressing     Problem: Impaired Wound Healing  Goal: Optimal Wound Healing  Outcome: Ongoing, Progressing     Problem: Skin Injury Risk Increased  Goal: Skin Health and Integrity  Outcome: Ongoing, Progressing

## 2022-07-12 LAB
POCT GLUCOSE: 150 MG/DL (ref 70–110)
POCT GLUCOSE: 153 MG/DL (ref 70–110)
POCT GLUCOSE: 186 MG/DL (ref 70–110)
POCT GLUCOSE: 199 MG/DL (ref 70–110)
VANCOMYCIN TROUGH SERPL-MCNC: 22.2 UG/ML (ref 10–22)

## 2022-07-12 PROCEDURE — 27000207 HC ISOLATION

## 2022-07-12 PROCEDURE — 25000003 PHARM REV CODE 250: Performed by: HOSPITALIST

## 2022-07-12 PROCEDURE — 80202 ASSAY OF VANCOMYCIN: CPT | Performed by: HOSPITALIST

## 2022-07-12 PROCEDURE — 25000003 PHARM REV CODE 250: Performed by: INTERNAL MEDICINE

## 2022-07-12 PROCEDURE — 11000001 HC ACUTE MED/SURG PRIVATE ROOM

## 2022-07-12 PROCEDURE — 25000003 PHARM REV CODE 250: Performed by: STUDENT IN AN ORGANIZED HEALTH CARE EDUCATION/TRAINING PROGRAM

## 2022-07-12 PROCEDURE — 36415 COLL VENOUS BLD VENIPUNCTURE: CPT | Performed by: HOSPITALIST

## 2022-07-12 PROCEDURE — 94761 N-INVAS EAR/PLS OXIMETRY MLT: CPT

## 2022-07-12 PROCEDURE — 25000003 PHARM REV CODE 250: Performed by: NURSE PRACTITIONER

## 2022-07-12 PROCEDURE — C9399 UNCLASSIFIED DRUGS OR BIOLOG: HCPCS | Performed by: INTERNAL MEDICINE

## 2022-07-12 PROCEDURE — 63600175 PHARM REV CODE 636 W HCPCS: Performed by: HOSPITALIST

## 2022-07-12 PROCEDURE — 63600175 PHARM REV CODE 636 W HCPCS: Performed by: INTERNAL MEDICINE

## 2022-07-12 RX ORDER — VANCOMYCIN HCL IN 5 % DEXTROSE 1G/250ML
1000 PLASTIC BAG, INJECTION (ML) INTRAVENOUS
Status: DISCONTINUED | OUTPATIENT
Start: 2022-07-13 | End: 2022-07-15 | Stop reason: HOSPADM

## 2022-07-12 RX ADMIN — HYDROCODONE BITARTRATE AND ACETAMINOPHEN 1 TABLET: 7.5; 325 TABLET ORAL at 05:07

## 2022-07-12 RX ADMIN — HYDROCHLOROTHIAZIDE 25 MG: 25 TABLET ORAL at 08:07

## 2022-07-12 RX ADMIN — INSULIN ASPART 13 UNITS: 100 INJECTION, SOLUTION INTRAVENOUS; SUBCUTANEOUS at 08:07

## 2022-07-12 RX ADMIN — COLLAGENASE SANTYL: 250 OINTMENT TOPICAL at 08:07

## 2022-07-12 RX ADMIN — HYDROCODONE BITARTRATE AND ACETAMINOPHEN 1 TABLET: 7.5; 325 TABLET ORAL at 06:07

## 2022-07-12 RX ADMIN — ENOXAPARIN SODIUM 40 MG: 100 INJECTION SUBCUTANEOUS at 06:07

## 2022-07-12 RX ADMIN — INSULIN ASPART 13 UNITS: 100 INJECTION, SOLUTION INTRAVENOUS; SUBCUTANEOUS at 03:07

## 2022-07-12 RX ADMIN — ATORVASTATIN CALCIUM 40 MG: 20 TABLET, FILM COATED ORAL at 08:07

## 2022-07-12 RX ADMIN — POLYETHYLENE GLYCOL 3350 17 G: 17 POWDER, FOR SOLUTION ORAL at 08:07

## 2022-07-12 RX ADMIN — GABAPENTIN 100 MG: 100 CAPSULE ORAL at 09:07

## 2022-07-12 RX ADMIN — INSULIN ASPART 2 UNITS: 100 INJECTION, SOLUTION INTRAVENOUS; SUBCUTANEOUS at 06:07

## 2022-07-12 RX ADMIN — HYDROCODONE BITARTRATE AND ACETAMINOPHEN 1 TABLET: 7.5; 325 TABLET ORAL at 08:07

## 2022-07-12 RX ADMIN — LISINOPRIL 10 MG: 10 TABLET ORAL at 08:07

## 2022-07-12 RX ADMIN — VANCOMYCIN HYDROCHLORIDE 1250 MG: 1.25 INJECTION, POWDER, LYOPHILIZED, FOR SOLUTION INTRAVENOUS at 01:07

## 2022-07-12 RX ADMIN — INSULIN DETEMIR 55 UNITS: 100 INJECTION, SOLUTION SUBCUTANEOUS at 10:07

## 2022-07-12 RX ADMIN — HYDROCODONE BITARTRATE AND ACETAMINOPHEN 1 TABLET: 7.5; 325 TABLET ORAL at 10:07

## 2022-07-12 RX ADMIN — INSULIN ASPART 2 UNITS: 100 INJECTION, SOLUTION INTRAVENOUS; SUBCUTANEOUS at 08:07

## 2022-07-12 RX ADMIN — VANCOMYCIN HYDROCHLORIDE 1250 MG: 1.25 INJECTION, POWDER, LYOPHILIZED, FOR SOLUTION INTRAVENOUS at 03:07

## 2022-07-12 RX ADMIN — MAGNESIUM HYDROXIDE 2400 MG: 400 SUSPENSION ORAL at 05:07

## 2022-07-12 RX ADMIN — ASPIRIN 81 MG: 81 TABLET, COATED ORAL at 08:07

## 2022-07-12 RX ADMIN — INSULIN ASPART 1 UNITS: 100 INJECTION, SOLUTION INTRAVENOUS; SUBCUTANEOUS at 10:07

## 2022-07-12 RX ADMIN — HYDROCODONE BITARTRATE AND ACETAMINOPHEN 1 TABLET: 7.5; 325 TABLET ORAL at 01:07

## 2022-07-12 RX ADMIN — INSULIN ASPART 13 UNITS: 100 INJECTION, SOLUTION INTRAVENOUS; SUBCUTANEOUS at 06:07

## 2022-07-12 RX ADMIN — HYDROCODONE BITARTRATE AND ACETAMINOPHEN 1 TABLET: 7.5; 325 TABLET ORAL at 02:07

## 2022-07-12 RX ADMIN — QUETIAPINE FUMARATE 200 MG: 200 TABLET ORAL at 10:07

## 2022-07-12 NOTE — PLAN OF CARE
POC reviewed with patient. AAOx4. Stable on room air. Complaints of pain treated with PRN pain medication according to MAR. No other complaints verbalized during shift. Received IV antibiotics according to MAR. Urinal utilized for voiding for voiding. Positions self. Turn Q2/frequent weight shift encouraged by LPN. Low air loss mattress in use. Instructed to call for help ambulating. No injuries, falls, or trauma occurred during shift. Purposeful rounding completed. Bed low and locked with side rails up x 3 and call light within reach.      Problem: Adult Inpatient Plan of Care  Goal: Plan of Care Review  Outcome: Ongoing, Progressing  Goal: Patient-Specific Goal (Individualized)  Outcome: Ongoing, Progressing  Goal: Absence of Hospital-Acquired Illness or Injury  Outcome: Ongoing, Progressing  Goal: Optimal Comfort and Wellbeing  Outcome: Ongoing, Progressing  Goal: Readiness for Transition of Care  Outcome: Ongoing, Progressing     Problem: Fall Injury Risk  Goal: Absence of Fall and Fall-Related Injury  Outcome: Ongoing, Progressing     Problem: Diabetes Comorbidity  Goal: Blood Glucose Level Within Targeted Range  Outcome: Ongoing, Progressing     Problem: Fluid and Electrolyte Imbalance (Acute Kidney Injury/Impairment)  Goal: Fluid and Electrolyte Balance  Outcome: Ongoing, Progressing     Problem: Oral Intake Inadequate (Acute Kidney Injury/Impairment)  Goal: Optimal Nutrition Intake  Outcome: Ongoing, Progressing     Problem: Renal Function Impairment (Acute Kidney Injury/Impairment)  Goal: Effective Renal Function  Outcome: Ongoing, Progressing     Problem: Infection  Goal: Absence of Infection Signs and Symptoms  Outcome: Ongoing, Progressing     Problem: Impaired Wound Healing  Goal: Optimal Wound Healing  Outcome: Ongoing, Progressing     Problem: Skin Injury Risk Increased  Goal: Skin Health and Integrity  Outcome: Ongoing, Progressing

## 2022-07-12 NOTE — PROGRESS NOTES
Pharmacokinetic Assessment Follow Up: IV Vancomycin    Vancomycin serum concentration assessment(s):    The trough level was drawn correctly and can be used to guide therapy at this time. The measurement is above the desired definitive target range of 10 to 20 mcg/mL.    Vancomycin Regimen Plan:    Change regimen to Vancomycin 1000 mg IV every 12 hours with next serum trough concentration measured at 1300 prior to 1330 dose on 7/14    Drug levels (last 3 results):  Recent Labs   Lab Result Units 07/12/22  1408   Vancomycin-Trough ug/mL 22.2*       Pharmacy will continue to follow and monitor vancomycin.    Please contact pharmacy at extension [phone] for questions regarding this assessment.    Thank you for the consult,   Donna Barros       Patient brief summary:  Dave Good is a 56 y.o. male initiated on antimicrobial therapy with IV Vancomycin for treatment of bone/joint infection    The patient's current regimen is Vancomcyin 1000mg IVPB every 12 hours    Drug Allergies:   Review of patient's allergies indicates:   Allergen Reactions    Ibuprofen Swelling     Facial swelling       Actual Body Weight:   131.7kg    Renal Function:   Estimated Creatinine Clearance: 96.8 mL/min (based on SCr of 1.2 mg/dL).,     Dialysis Method (if applicable):  N/A    CBC (last 72 hours):  Recent Labs   Lab Result Units 07/10/22  0423   WBC K/uL 7.41   Hemoglobin g/dL 11.5*   Hematocrit % 38.5*   Platelets K/uL 349   Gran % % 42.6   Lymph % % 35.4   Mono % % 13.5   Eosinophil % % 7.7   Basophil % % 0.5   Differential Method  Automated       Metabolic Panel (last 72 hours):  Recent Labs   Lab Result Units 07/10/22  0423   Sodium mmol/L 137   Potassium mmol/L 5.1   Chloride mmol/L 99   CO2 mmol/L 28   Glucose mg/dL 177*   BUN mg/dL 44*   Creatinine mg/dL 1.2   Magnesium mg/dL 2.2   Phosphorus mg/dL 4.3       Vancomycin Administrations:  vancomycin given in the last 96 hours                     vancomycin 1.25 g in dextrose 5% 250 mL  IVPB (ready to mix) (mg) 1,250 mg New Bag 07/12/22 1524     1,250 mg New Bag  0137     1,250 mg New Bag 07/11/22 1609     1,250 mg New Bag  0237     1,250 mg New Bag 07/10/22 1315     1,250 mg New Bag  0214     1,250 mg New Bag 07/09/22 1433     1,250 mg New Bag  0137                    Microbiologic Results:  Microbiology Results (last 7 days)       ** No results found for the last 168 hours. **

## 2022-07-12 NOTE — PLAN OF CARE
"Spoke with significant other regarding discharge dispo - informed her LTAC auth denied by insurance & the few local SNFs in [facility] with insurance have all declined patient - she remains unwilling to administer IV antibiotics at home stating "i'll be ready to take him home Friday once his antibiotics are done"  "

## 2022-07-13 LAB
POCT GLUCOSE: 165 MG/DL (ref 70–110)
POCT GLUCOSE: 170 MG/DL (ref 70–110)
POCT GLUCOSE: 193 MG/DL (ref 70–110)
POCT GLUCOSE: 196 MG/DL (ref 70–110)

## 2022-07-13 PROCEDURE — 63600175 PHARM REV CODE 636 W HCPCS: Performed by: INTERNAL MEDICINE

## 2022-07-13 PROCEDURE — 11000001 HC ACUTE MED/SURG PRIVATE ROOM

## 2022-07-13 PROCEDURE — 25000003 PHARM REV CODE 250: Performed by: STUDENT IN AN ORGANIZED HEALTH CARE EDUCATION/TRAINING PROGRAM

## 2022-07-13 PROCEDURE — 25000003 PHARM REV CODE 250: Performed by: NURSE PRACTITIONER

## 2022-07-13 PROCEDURE — 25000003 PHARM REV CODE 250: Performed by: INTERNAL MEDICINE

## 2022-07-13 PROCEDURE — 25000003 PHARM REV CODE 250: Performed by: HOSPITALIST

## 2022-07-13 PROCEDURE — 63600175 PHARM REV CODE 636 W HCPCS: Performed by: NURSE PRACTITIONER

## 2022-07-13 PROCEDURE — 27000207 HC ISOLATION

## 2022-07-13 RX ADMIN — INSULIN DETEMIR 55 UNITS: 100 INJECTION, SOLUTION SUBCUTANEOUS at 09:07

## 2022-07-13 RX ADMIN — HYDROCODONE BITARTRATE AND ACETAMINOPHEN 1 TABLET: 7.5; 325 TABLET ORAL at 05:07

## 2022-07-13 RX ADMIN — INSULIN ASPART 2 UNITS: 100 INJECTION, SOLUTION INTRAVENOUS; SUBCUTANEOUS at 12:07

## 2022-07-13 RX ADMIN — ASPIRIN 81 MG: 81 TABLET, COATED ORAL at 08:07

## 2022-07-13 RX ADMIN — VANCOMYCIN HYDROCHLORIDE 1000 MG: 1 INJECTION, POWDER, LYOPHILIZED, FOR SOLUTION INTRAVENOUS at 02:07

## 2022-07-13 RX ADMIN — INSULIN ASPART 2 UNITS: 100 INJECTION, SOLUTION INTRAVENOUS; SUBCUTANEOUS at 05:07

## 2022-07-13 RX ADMIN — GABAPENTIN 100 MG: 100 CAPSULE ORAL at 09:07

## 2022-07-13 RX ADMIN — POLYETHYLENE GLYCOL 3350 17 G: 17 POWDER, FOR SOLUTION ORAL at 08:07

## 2022-07-13 RX ADMIN — INSULIN ASPART 2 UNITS: 100 INJECTION, SOLUTION INTRAVENOUS; SUBCUTANEOUS at 08:07

## 2022-07-13 RX ADMIN — INSULIN ASPART 13 UNITS: 100 INJECTION, SOLUTION INTRAVENOUS; SUBCUTANEOUS at 12:07

## 2022-07-13 RX ADMIN — INSULIN ASPART 1 UNITS: 100 INJECTION, SOLUTION INTRAVENOUS; SUBCUTANEOUS at 09:07

## 2022-07-13 RX ADMIN — HYDROCODONE BITARTRATE AND ACETAMINOPHEN 1 TABLET: 7.5; 325 TABLET ORAL at 10:07

## 2022-07-13 RX ADMIN — LISINOPRIL 10 MG: 10 TABLET ORAL at 08:07

## 2022-07-13 RX ADMIN — INSULIN ASPART 13 UNITS: 100 INJECTION, SOLUTION INTRAVENOUS; SUBCUTANEOUS at 05:07

## 2022-07-13 RX ADMIN — MAGNESIUM HYDROXIDE 2400 MG: 400 SUSPENSION ORAL at 05:07

## 2022-07-13 RX ADMIN — QUETIAPINE FUMARATE 200 MG: 200 TABLET ORAL at 09:07

## 2022-07-13 RX ADMIN — HYDROCHLOROTHIAZIDE 25 MG: 25 TABLET ORAL at 08:07

## 2022-07-13 RX ADMIN — COLLAGENASE SANTYL: 250 OINTMENT TOPICAL at 08:07

## 2022-07-13 RX ADMIN — INSULIN ASPART 13 UNITS: 100 INJECTION, SOLUTION INTRAVENOUS; SUBCUTANEOUS at 08:07

## 2022-07-13 RX ADMIN — ATORVASTATIN CALCIUM 40 MG: 20 TABLET, FILM COATED ORAL at 08:07

## 2022-07-13 RX ADMIN — HYDROCODONE BITARTRATE AND ACETAMINOPHEN 1 TABLET: 7.5; 325 TABLET ORAL at 02:07

## 2022-07-13 RX ADMIN — ENOXAPARIN SODIUM 40 MG: 100 INJECTION SUBCUTANEOUS at 05:07

## 2022-07-13 RX ADMIN — VANCOMYCIN HYDROCHLORIDE 1000 MG: 1 INJECTION, POWDER, LYOPHILIZED, FOR SOLUTION INTRAVENOUS at 03:07

## 2022-07-13 RX ADMIN — HYDROCODONE BITARTRATE AND ACETAMINOPHEN 1 TABLET: 7.5; 325 TABLET ORAL at 06:07

## 2022-07-13 NOTE — PT/OT/SLP PROGRESS
Physical Therapy  Not Seen    Patient Name:  Dave Good   MRN:  [id number]    Patient not seen today secondary to Nursing care (on IV, pt defers mobility). Will follow-up tomorrow, 7/14.

## 2022-07-13 NOTE — PROGRESS NOTES
[facility] Surg [facility] Medicine  Telemedicine Progress Note    Patient Name: Dave Good  MRN: [id number]  Patient Class: IP- Inpatient   Admission Date: 6/15/2022  Length of Stay: 27 days  Attending Physician: Bandar Lewis MD  Primary Care Provider: Reyna Jorge NP          Subjective:     Principal Problem:Osteomyelitis of right foot        HPI:  Mr Acosta is a 56 yr old male with a PMH that includes DB 2, COPD, HTN, depression, and right foot transmetatarsal amputation. He was sent to the ED for evaluation of osteomyelitis from a follow up appt with Dr. Flores. Pt had I&D and amputation for wet gangrene in Sept 2021 and debridements in Feb, March, and May of this year. Per his chart, his feet were healing well until some foot trauma occurred. Pt has has open wounds and drainage to BL feet with right foot wound deeper than left. He also has wound to left shin. Pt reports associated 10/10 pain and difficulty walking. He denies fever and chills at home. Podiatry consulted and pt admitted to hospital medicine.       Overview/Hospital Course:  Patient admitted with bilateral foot wounds and concern for osteomyelitis following R foot TMA requiring debridements since for continued wounds. He was started on empiric antibiotics. Bilateral foot MRI done. MRI of left foot with abnormal findings but not consistent with osteomyelitis. MRI of right foot showed changes of 1st and 2nd transmetatarsal amputation with cortical regularity and abnormal signal at distal stumps noting overlying extensive edema and regions of skin ulceration.  Findings may represent early osteomyelitis vs post-operative changes. Bone biopsy done for further evaluation and to guide treatment. ID and podiatry consulted. Bone culture grew diphtheroids and Enterococcus but no good po options. Per ID, given MRSA isolated from a previous culture, ok to treat with vancomycin x 4 weeks End date 7/15/22. Finished 2 weeks of cefepime for  left foot SSTI. Pt now medically stable for DC, pending placement to LTAC.       Interval History: No acute events overnight. Continue IV antibiotics until 07/15/21.    Review of Systems   Constitutional:  Negative for chills, diaphoresis, fatigue and fever.   Eyes:  Negative for visual disturbance.   Respiratory:  Negative for cough, chest tightness, shortness of breath and wheezing.    Cardiovascular:  Negative for chest pain, palpitations and leg swelling.   Gastrointestinal:  Negative for abdominal pain, constipation, diarrhea, nausea and vomiting.   Genitourinary:  Negative for dysuria, flank pain, frequency and hematuria.   Skin:  Positive for wound. Negative for rash.   Neurological:  Negative for dizziness, seizures, weakness, light-headedness and headaches.   Objective:     Vital Signs (Most Recent):  Temp: 97.9 °F (36.6 °C) (07/12/22 1913)  Pulse: 84 (07/12/22 1913)  Resp: 19 (07/12/22 2200)  BP: (!) 119/58 (07/12/22 1913)  SpO2: 97 % (07/12/22 1913)   Vital Signs (24h Range):  Temp:  [97.8 °F (36.6 °C)-98.1 °F (36.7 °C)] 97.9 °F (36.6 °C)  Pulse:  [71-84] 84  Resp:  [16-22] 19  SpO2:  [94 %-98 %] 97 %  BP: (114-129)/(58-81) 119/58     Weight: 131.7 kg (290 lb 5.5 oz)  Body mass index is 37.28 kg/m².    Intake/Output Summary (Last 24 hours) at 7/12/2022 2231  Last data filed at 7/12/2022 0138  Gross per 24 hour   Intake --   Output 550 ml   Net -550 ml      Physical Exam  Vitals and nursing note reviewed.   Constitutional:       General: He is not in acute distress.     Appearance: He is well-developed. He is not diaphoretic.   HENT:      Head: Normocephalic and atraumatic.      Mouth/Throat:      Pharynx: No oropharyngeal exudate.   Eyes:      General:         Right eye: No discharge.         Left eye: No discharge.      Conjunctiva/sclera: Conjunctivae normal.   Neck:      Thyroid: No thyromegaly.      Vascular: No JVD.   Cardiovascular:      Rate and Rhythm: Normal rate and regular rhythm.      Heart  sounds: Normal heart sounds. No murmur heard.    No friction rub.   Pulmonary:      Effort: Pulmonary effort is normal. No respiratory distress.      Breath sounds: Normal breath sounds. No wheezing, rhonchi or rales.   Chest:      Chest wall: No tenderness.   Abdominal:      General: Bowel sounds are normal. There is no distension.      Palpations: Abdomen is soft. Abdomen is not rigid.      Tenderness: There is no abdominal tenderness. There is no guarding.   Musculoskeletal:      Cervical back: Normal range of motion and neck supple.   Feet:      Comments: Left foot bandaged with clean dressings. No signs of bleeding through or discharge.  Skin:     General: Skin is warm and dry.   Neurological:      Mental Status: He is alert and oriented to person, place, and time.       Significant Labs: All pertinent labs within the past 24 hours have been reviewed.    Significant Imaging: I have reviewed all pertinent imaging results/findings within the past 24 hours.      Assessment/Plan:      * Osteomyelitis of right foot  Left Foot ulcer  - Presented from surgery clinic for concern for infection/osteomyelitis  - Prior history of MRSA bacteremia 2/2 gangrene treated with 6 weeks IV abx for presumed endocarditis  - ESR/CRP elevated but have down trended since prior hospitalization.  - MRI L foot with mild marrow edema of tuft of great toe. Findings and exam not consistent with acute osteomyelitis.  - MRI R foot with post-op changes of 1st & 2nd transmetatarsal with cortical regularity and abnormal signal at distal stumps with extensive edema. Findings concerning for osteo vs chronic changes.  - Biopsies from 6/17 showing no evidence of osteomyelitis involving 2nd metatarsal; 1st metatarsal still in process.  - Wound cultures from ED with MRSA, Providencia rettgeri, Proteus mirabilis, and GNR.  - 1st metatarsal bone culture 6/17 showing E faecalis, diphtheroids.  - Trial of gabapentin 100mg PO qHS with reported symptoms  "consistent with neuropathic pain.  - Podiatry and wound care following, appreciate assistance.  - ID consulted: given MRSA isolated from a previous culture, ok to treat with vancomycin x 4 weeks (EOC 07/15/22). Finished 2 weeks of cefepime for left foot SSTI.   - midline placed    Antibiotics (From admission, onward)            Start     Stop Route Frequency Ordered    07/13/22 0330  vancomycin in dextrose 5 % 1 gram/250 mL IVPB 1,000 mg         -- IV Every 12 hours (non-standard times) 07/12/22 1551    06/15/22 2237  vancomycin - pharmacy to dose  (vancomycin IVPB)        "And" Linked Group Details    -- IV pharmacy to manage frequency 06/15/22 2148        Cultures were taken-   Microbiology Results (last 7 days)     ** No results found for the last 168 hours. **          Slow transit constipation  -continue bowel regimen  -scheduled MiraLax daily    Increased nutritional needs        Ulcer of both feet with fat layer exposed  Plan as above.       HTN (hypertension)  - Reports taking lisinopril and losartan at home.  - Continue lisinopril 10mg PO daily.    Type 2 diabetes mellitus with diabetic peripheral angiopathy without gangrene, with long-term current use of insulin  Hyperlipidemia  - Last A1c 7.8  - On Levemir 50 U qHS and aspart 10 U TID WM at home  - Improving. Continue insulin detemir 55U subQ qHS, insulin aspart 13 U subQ TIDWM, moderate-dose sliding scale insulin aspart 1-10U subQ TIDWM PRN.  - Continue aspirin 81mg PO daily, atorvastatin 40mg PO daily.      VTE Risk Mitigation (From admission, onward)         Ordered     enoxaparin injection 40 mg  Daily         06/16/22 0658     IP VTE HIGH RISK PATIENT  Once         06/15/22 2119     Place sequential compression device  Until discontinued         06/15/22 2119                      I have assessed these finding virtually using telemed platform and with assistance of bedside nurse                 The attending portion of this evaluation, treatment, and " documentation was performed per Bandar Lewis MD via Telemedicine AudioVisual using the secure [employer] software platform with 2 way audio/video. The provider was located off-site and the patient is located in the hospital. The aforementioned video software was utilized to document the relevant history and physical exam    Bandar Lewis MD  Department of Hospital Medicine   [facility] ([place])

## 2022-07-13 NOTE — SUBJECTIVE & OBJECTIVE
Interval History: No acute events overnight. Continue IV antibiotics until 07/15/21.    Review of Systems   Constitutional:  Negative for chills, diaphoresis, fatigue and fever.   Eyes:  Negative for visual disturbance.   Respiratory:  Negative for cough, chest tightness, shortness of breath and wheezing.    Cardiovascular:  Negative for chest pain, palpitations and leg swelling.   Gastrointestinal:  Negative for abdominal pain, constipation, diarrhea, nausea and vomiting.   Genitourinary:  Negative for dysuria, flank pain, frequency and hematuria.   Skin:  Positive for wound. Negative for rash.   Neurological:  Negative for dizziness, seizures, weakness, light-headedness and headaches.   Objective:     Vital Signs (Most Recent):  Temp: 97.9 °F (36.6 °C) (07/12/22 1913)  Pulse: 84 (07/12/22 1913)  Resp: 19 (07/12/22 2200)  BP: (!) 119/58 (07/12/22 1913)  SpO2: 97 % (07/12/22 1913)   Vital Signs (24h Range):  Temp:  [97.8 °F (36.6 °C)-98.1 °F (36.7 °C)] 97.9 °F (36.6 °C)  Pulse:  [71-84] 84  Resp:  [16-22] 19  SpO2:  [94 %-98 %] 97 %  BP: (114-129)/(58-81) 119/58     Weight: 131.7 kg (290 lb 5.5 oz)  Body mass index is 37.28 kg/m².    Intake/Output Summary (Last 24 hours) at 7/12/2022 2231  Last data filed at 7/12/2022 0138  Gross per 24 hour   Intake --   Output 550 ml   Net -550 ml      Physical Exam  Vitals and nursing note reviewed.   Constitutional:       General: He is not in acute distress.     Appearance: He is well-developed. He is not diaphoretic.   HENT:      Head: Normocephalic and atraumatic.      Mouth/Throat:      Pharynx: No oropharyngeal exudate.   Eyes:      General:         Right eye: No discharge.         Left eye: No discharge.      Conjunctiva/sclera: Conjunctivae normal.   Neck:      Thyroid: No thyromegaly.      Vascular: No JVD.   Cardiovascular:      Rate and Rhythm: Normal rate and regular rhythm.      Heart sounds: Normal heart sounds. No murmur heard.    No friction rub.   Pulmonary:       Effort: Pulmonary effort is normal. No respiratory distress.      Breath sounds: Normal breath sounds. No wheezing, rhonchi or rales.   Chest:      Chest wall: No tenderness.   Abdominal:      General: Bowel sounds are normal. There is no distension.      Palpations: Abdomen is soft. Abdomen is not rigid.      Tenderness: There is no abdominal tenderness. There is no guarding.   Musculoskeletal:      Cervical back: Normal range of motion and neck supple.   Feet:      Comments: Left foot bandaged with clean dressings. No signs of bleeding through or discharge.  Skin:     General: Skin is warm and dry.   Neurological:      Mental Status: He is alert and oriented to person, place, and time.       Significant Labs: All pertinent labs within the past 24 hours have been reviewed.    Significant Imaging: I have reviewed all pertinent imaging results/findings within the past 24 hours.

## 2022-07-13 NOTE — ASSESSMENT & PLAN NOTE
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  - MRI L foot with mild marrow edema of tuft of great toe. Findings and exam not consistent with acute osteomyelitis.  - MRI R foot with post-op changes of 1st & 2nd transmetatarsal with cortical regularity and abnormal signal at distal stumps with extensive edema. Findings concerning for osteo vs chronic changes.  - Biopsies from 6/17 showing no evidence of osteomyelitis involving 2nd metatarsal; 1st metatarsal still in process.  - Wound cultures from ED with MRSA, Providencia rettgeri, Proteus mirabilis, and GNR.  - 1st metatarsal bone culture 6/17 showing E faecalis, diphtheroids.  - Trial of gabapentin 100mg PO qHS with reported symptoms consistent with neuropathic pain.  - Podiatry and wound care following, appreciate assistance.  - ID consulted: given MRSA isolated from a previous culture, ok to treat with vancomycin x 4 weeks (EOC 07/15/22). Finished 2 weeks of cefepime for left foot SSTI.   - midline placed    Antibiotics (From admission, onward)            Start     Stop Route Frequency Ordered    07/13/22 0330  vancomycin in dextrose 5 % 1 gram/250 mL IVPB 1,000 mg         -- IV Every 12 hours (non-standard times) 07/12/22 1551    06/15/22 2237  vancomycin - pharmacy to dose  (vancomycin IVPB)        "And" Linked Group Details    -- IV pharmacy to manage frequency 06/15/22 2148        Cultures were taken-   Microbiology Results (last 7 days)     ** No results found for the last 168 hours. **        "

## 2022-07-13 NOTE — PROGRESS NOTES
[facility] Surg [facility] Medicine  Telemedicine Progress Note    Patient Name: Dave Good  MRN: [id number]  Patient Class: IP- Inpatient   Admission Date: 6/15/2022  Length of Stay: 28 days  Attending Physician: Bandar Lewis MD  Primary Care Provider: Reyna Jorge NP          Subjective:     Principal Problem:Osteomyelitis of right foot        HPI:  Mr Acosta is a 56 yr old male with a PMH that includes DB 2, COPD, HTN, depression, and right foot transmetatarsal amputation. He was sent to the ED for evaluation of osteomyelitis from a follow up appt with Dr. Flores. Pt had I&D and amputation for wet gangrene in Sept 2021 and debridements in Feb, March, and May of this year. Per his chart, his feet were healing well until some foot trauma occurred. Pt has has open wounds and drainage to BL feet with right foot wound deeper than left. He also has wound to left shin. Pt reports associated 10/10 pain and difficulty walking. He denies fever and chills at home. Podiatry consulted and pt admitted to hospital medicine.       Overview/Hospital Course:  Patient admitted with bilateral foot wounds and concern for osteomyelitis following R foot TMA requiring debridements since for continued wounds. He was started on empiric antibiotics. Bilateral foot MRI done. MRI of left foot with abnormal findings but not consistent with osteomyelitis. MRI of right foot showed changes of 1st and 2nd transmetatarsal amputation with cortical regularity and abnormal signal at distal stumps noting overlying extensive edema and regions of skin ulceration.  Findings may represent early osteomyelitis vs post-operative changes. Bone biopsy done for further evaluation and to guide treatment. ID and podiatry consulted. Bone culture grew diphtheroids and Enterococcus but no good po options. Per ID, given MRSA isolated from a previous culture, ok to treat with vancomycin x 4 weeks End date 7/15/22. Finished 2 weeks of cefepime for  left foot SSTI. Pt now medically stable for DC, pending placement to LTAC.      Interval History: No acute events overnight. Continue IV antibiotics until 07/15/21.    Review of Systems   Constitutional:  Negative for chills, diaphoresis, fatigue and fever.   Eyes:  Negative for visual disturbance.   Respiratory:  Negative for cough, chest tightness, shortness of breath and wheezing.    Cardiovascular:  Negative for chest pain, palpitations and leg swelling.   Gastrointestinal:  Negative for abdominal pain, constipation, diarrhea, nausea and vomiting.   Genitourinary:  Negative for dysuria, flank pain, frequency and hematuria.   Skin:  Positive for wound. Negative for rash.   Neurological:  Negative for dizziness, seizures, weakness, light-headedness and headaches.   Objective:     Vital Signs (Most Recent):  Temp: 97.7 °F (36.5 °C) (07/13/22 1134)  Pulse: 76 (07/13/22 1134)  Resp: 16 (07/13/22 1134)  BP: 123/70 (07/13/22 1134)  SpO2: (!) 93 % (07/13/22 1134)   Vital Signs (24h Range):  Temp:  [97.7 °F (36.5 °C)-98.3 °F (36.8 °C)] 97.7 °F (36.5 °C)  Pulse:  [76-90] 76  Resp:  [16-22] 16  SpO2:  [93 %-98 %] 93 %  BP: (119-129)/(58-81) 123/70     Weight: 131.7 kg (290 lb 5.5 oz)  Body mass index is 37.28 kg/m².    Intake/Output Summary (Last 24 hours) at 7/13/2022 1157  Last data filed at 7/12/2022 2200  Gross per 24 hour   Intake --   Output 550 ml   Net -550 ml        Physical Exam  Vitals and nursing note reviewed.   Constitutional:       General: He is not in acute distress.     Appearance: He is well-developed. He is not diaphoretic.   HENT:      Head: Normocephalic and atraumatic.      Mouth/Throat:      Pharynx: No oropharyngeal exudate.   Eyes:      General:         Right eye: No discharge.         Left eye: No discharge.      Conjunctiva/sclera: Conjunctivae normal.   Neck:      Thyroid: No thyromegaly.      Vascular: No JVD.   Cardiovascular:      Rate and Rhythm: Normal rate and regular rhythm.      Heart  sounds: Normal heart sounds. No murmur heard.    No friction rub.   Pulmonary:      Effort: Pulmonary effort is normal. No respiratory distress.      Breath sounds: Normal breath sounds. No wheezing, rhonchi or rales.   Chest:      Chest wall: No tenderness.   Abdominal:      General: Bowel sounds are normal. There is no distension.      Palpations: Abdomen is soft. Abdomen is not rigid.      Tenderness: There is no abdominal tenderness. There is no guarding.   Musculoskeletal:      Cervical back: Normal range of motion and neck supple.   Feet:      Comments: Left foot bandaged with clean dressings. No signs of bleeding through or discharge.  Skin:     General: Skin is warm and dry.   Neurological:      Mental Status: He is alert and oriented to person, place, and time.       Significant Labs: All pertinent labs within the past 24 hours have been reviewed.    Significant Imaging: I have reviewed all pertinent imaging results/findings within the past 24 hours.      Assessment/Plan:      * Osteomyelitis of right foot  Left Foot ulcer  - Presented from surgery clinic for concern for infection/osteomyelitis  - Prior history of MRSA bacteremia 2/2 gangrene treated with 6 weeks IV abx for presumed endocarditis  - ESR/CRP elevated but have down trended since prior hospitalization.  - MRI L foot with mild marrow edema of tuft of great toe. Findings and exam not consistent with acute osteomyelitis.  - MRI R foot with post-op changes of 1st & 2nd transmetatarsal with cortical regularity and abnormal signal at distal stumps with extensive edema. Findings concerning for osteo vs chronic changes.  - Biopsies from 6/17 showing no evidence of osteomyelitis involving 2nd metatarsal; 1st metatarsal still in process.  - Wound cultures from ED with MRSA, Providencia rettgeri, Proteus mirabilis, and GNR.  - 1st metatarsal bone culture 6/17 showing E faecalis, diphtheroids.  - Trial of gabapentin 100mg PO qHS with reported symptoms  "consistent with neuropathic pain.  - Podiatry and wound care following, appreciate assistance.  - ID consulted: given MRSA isolated from a previous culture, ok to treat with vancomycin x 4 weeks (EOC 07/15/22). Finished 2 weeks of cefepime for left foot SSTI.   - midline placed    Antibiotics (From admission, onward)            Start     Stop Route Frequency Ordered    07/13/22 0330  vancomycin in dextrose 5 % 1 gram/250 mL IVPB 1,000 mg         -- IV Every 12 hours (non-standard times) 07/12/22 1551    06/15/22 2237  vancomycin - pharmacy to dose  (vancomycin IVPB)        "And" Linked Group Details    -- IV pharmacy to manage frequency 06/15/22 2148        Cultures were taken-   Microbiology Results (last 7 days)     ** No results found for the last 168 hours. **          Slow transit constipation  -continue bowel regimen  -scheduled MiraLax daily    Increased nutritional needs        Ulcer of both feet with fat layer exposed  Plan as above.       HTN (hypertension)  - Reports taking lisinopril and losartan at home.  - Continue lisinopril 10mg PO daily.    Type 2 diabetes mellitus with diabetic peripheral angiopathy without gangrene, with long-term current use of insulin  Hyperlipidemia  - Last A1c 7.8  - On Levemir 50 U qHS and aspart 10 U TID WM at home  - Improving. Continue insulin detemir 55U subQ qHS, insulin aspart 13 U subQ TIDWM, moderate-dose sliding scale insulin aspart 1-10U subQ TIDWM PRN.  - Continue aspirin 81mg PO daily, atorvastatin 40mg PO daily.      VTE Risk Mitigation (From admission, onward)         Ordered     enoxaparin injection 40 mg  Daily         06/16/22 0658     IP VTE HIGH RISK PATIENT  Once         06/15/22 2119     Place sequential compression device  Until discontinued         06/15/22 2119                      I have assessed these finding virtually using telemed platform and with assistance of bedside nurse                 The attending portion of this evaluation, treatment, and " documentation was performed per Bandar Lewis MD via Telemedicine AudioVisual using the secure [employer] software platform with 2 way audio/video. The provider was located off-site and the patient is located in the hospital. The aforementioned video software was utilized to document the relevant history and physical exam    Bandar Lewis MD  Department of Hospital Medicine   [facility] ([place])

## 2022-07-13 NOTE — PLAN OF CARE
POC reviewed with patient. AAOx4. Stable on room air. Complaints of pain treated with PRN pain medication according to MAR. No other complaints verbalized during shift. Received IV antibiotics according to MAR. Urinal utilized for voiding for voiding. Positions self. Wound care performed this shift. Turn Q2/frequent weight shift encouraged by LPN. Low air loss mattress in use. Instructed to call for help ambulating. No injuries, falls, or trauma occurred during shift. Purposeful rounding completed. Bed low and locked with side rails up x 3 and call light within reach.      Problem: Adult Inpatient Plan of Care  Goal: Plan of Care Review  Outcome: Ongoing, Progressing  Goal: Patient-Specific Goal (Individualized)  Outcome: Ongoing, Progressing  Goal: Absence of Hospital-Acquired Illness or Injury  Outcome: Ongoing, Progressing  Goal: Optimal Comfort and Wellbeing  Outcome: Ongoing, Progressing  Goal: Readiness for Transition of Care  Outcome: Ongoing, Progressing     Problem: Fall Injury Risk  Goal: Absence of Fall and Fall-Related Injury  Outcome: Ongoing, Progressing     Problem: Diabetes Comorbidity  Goal: Blood Glucose Level Within Targeted Range  Outcome: Ongoing, Progressing     Problem: Fluid and Electrolyte Imbalance (Acute Kidney Injury/Impairment)  Goal: Fluid and Electrolyte Balance  Outcome: Ongoing, Progressing     Problem: Oral Intake Inadequate (Acute Kidney Injury/Impairment)  Goal: Optimal Nutrition Intake  Outcome: Ongoing, Progressing     Problem: Renal Function Impairment (Acute Kidney Injury/Impairment)  Goal: Effective Renal Function  Outcome: Ongoing, Progressing     Problem: Infection  Goal: Absence of Infection Signs and Symptoms  Outcome: Ongoing, Progressing     Problem: Impaired Wound Healing  Goal: Optimal Wound Healing  Outcome: Ongoing, Progressing     Problem: Skin Injury Risk Increased  Goal: Skin Health and Integrity  Outcome: Ongoing, Progressing

## 2022-07-13 NOTE — PROGRESS NOTES
Patient reviewed, renal function stable, cultures reviewed, no new levels, continue current therapy; Next levels due: 7/14 @1500

## 2022-07-13 NOTE — SUBJECTIVE & OBJECTIVE
Interval History: No acute events overnight. Continue IV antibiotics until 07/15/21.    Review of Systems   Constitutional:  Negative for chills, diaphoresis, fatigue and fever.   Eyes:  Negative for visual disturbance.   Respiratory:  Negative for cough, chest tightness, shortness of breath and wheezing.    Cardiovascular:  Negative for chest pain, palpitations and leg swelling.   Gastrointestinal:  Negative for abdominal pain, constipation, diarrhea, nausea and vomiting.   Genitourinary:  Negative for dysuria, flank pain, frequency and hematuria.   Skin:  Positive for wound. Negative for rash.   Neurological:  Negative for dizziness, seizures, weakness, light-headedness and headaches.   Objective:     Vital Signs (Most Recent):  Temp: 97.7 °F (36.5 °C) (07/13/22 1134)  Pulse: 76 (07/13/22 1134)  Resp: 16 (07/13/22 1134)  BP: 123/70 (07/13/22 1134)  SpO2: (!) 93 % (07/13/22 1134)   Vital Signs (24h Range):  Temp:  [97.7 °F (36.5 °C)-98.3 °F (36.8 °C)] 97.7 °F (36.5 °C)  Pulse:  [76-90] 76  Resp:  [16-22] 16  SpO2:  [93 %-98 %] 93 %  BP: (119-129)/(58-81) 123/70     Weight: 131.7 kg (290 lb 5.5 oz)  Body mass index is 37.28 kg/m².    Intake/Output Summary (Last 24 hours) at 7/13/2022 1157  Last data filed at 7/12/2022 2200  Gross per 24 hour   Intake --   Output 550 ml   Net -550 ml        Physical Exam  Vitals and nursing note reviewed.   Constitutional:       General: He is not in acute distress.     Appearance: He is well-developed. He is not diaphoretic.   HENT:      Head: Normocephalic and atraumatic.      Mouth/Throat:      Pharynx: No oropharyngeal exudate.   Eyes:      General:         Right eye: No discharge.         Left eye: No discharge.      Conjunctiva/sclera: Conjunctivae normal.   Neck:      Thyroid: No thyromegaly.      Vascular: No JVD.   Cardiovascular:      Rate and Rhythm: Normal rate and regular rhythm.      Heart sounds: Normal heart sounds. No murmur heard.    No friction rub.   Pulmonary:       Effort: Pulmonary effort is normal. No respiratory distress.      Breath sounds: Normal breath sounds. No wheezing, rhonchi or rales.   Chest:      Chest wall: No tenderness.   Abdominal:      General: Bowel sounds are normal. There is no distension.      Palpations: Abdomen is soft. Abdomen is not rigid.      Tenderness: There is no abdominal tenderness. There is no guarding.   Musculoskeletal:      Cervical back: Normal range of motion and neck supple.   Feet:      Comments: Left foot bandaged with clean dressings. No signs of bleeding through or discharge.  Skin:     General: Skin is warm and dry.   Neurological:      Mental Status: He is alert and oriented to person, place, and time.       Significant Labs: All pertinent labs within the past 24 hours have been reviewed.    Significant Imaging: I have reviewed all pertinent imaging results/findings within the past 24 hours.

## 2022-07-14 LAB
ALBUMIN SERPL BCP-MCNC: 3.7 G/DL (ref 3.5–5.2)
ALP SERPL-CCNC: 84 U/L (ref 55–135)
ALT SERPL W/O P-5'-P-CCNC: 41 U/L (ref 10–44)
ANION GAP SERPL CALC-SCNC: 13 MMOL/L (ref 8–16)
AST SERPL-CCNC: 32 U/L (ref 10–40)
BASOPHILS # BLD AUTO: 0.04 K/UL (ref 0–0.2)
BASOPHILS NFR BLD: 0.8 % (ref 0–1.9)
BILIRUB SERPL-MCNC: 0.3 MG/DL (ref 0.1–1)
BUN SERPL-MCNC: 40 MG/DL (ref 6–20)
CALCIUM SERPL-MCNC: 10.1 MG/DL (ref 8.7–10.5)
CHLORIDE SERPL-SCNC: 101 MMOL/L (ref 95–110)
CO2 SERPL-SCNC: 21 MMOL/L (ref 23–29)
CREAT SERPL-MCNC: 1.4 MG/DL (ref 0.5–1.4)
DIFFERENTIAL METHOD: ABNORMAL
EOSINOPHIL # BLD AUTO: 0.3 K/UL (ref 0–0.5)
EOSINOPHIL NFR BLD: 6.3 % (ref 0–8)
ERYTHROCYTE [DISTWIDTH] IN BLOOD BY AUTOMATED COUNT: 14.4 % (ref 11.5–14.5)
EST. GFR  (AFRICAN AMERICAN): >60 ML/MIN/1.73 M^2
EST. GFR  (NON AFRICAN AMERICAN): 55 ML/MIN/1.73 M^2
GLUCOSE SERPL-MCNC: 167 MG/DL (ref 70–110)
HCT VFR BLD AUTO: 45.3 % (ref 40–54)
HGB BLD-MCNC: 13.2 G/DL (ref 14–18)
IMM GRANULOCYTES # BLD AUTO: 0.01 K/UL (ref 0–0.04)
IMM GRANULOCYTES NFR BLD AUTO: 0.2 % (ref 0–0.5)
LYMPHOCYTES # BLD AUTO: 1.7 K/UL (ref 1–4.8)
LYMPHOCYTES NFR BLD: 33.8 % (ref 18–48)
MCH RBC QN AUTO: 27.4 PG (ref 27–31)
MCHC RBC AUTO-ENTMCNC: 29.1 G/DL (ref 32–36)
MCV RBC AUTO: 94 FL (ref 82–98)
MONOCYTES # BLD AUTO: 0.7 K/UL (ref 0.3–1)
MONOCYTES NFR BLD: 13.3 % (ref 4–15)
NEUTROPHILS # BLD AUTO: 2.3 K/UL (ref 1.8–7.7)
NEUTROPHILS NFR BLD: 45.6 % (ref 38–73)
NRBC BLD-RTO: 0 /100 WBC
PLATELET # BLD AUTO: 154 K/UL (ref 150–450)
PMV BLD AUTO: 10 FL (ref 9.2–12.9)
POCT GLUCOSE: 188 MG/DL (ref 70–110)
POCT GLUCOSE: 192 MG/DL (ref 70–110)
POCT GLUCOSE: 213 MG/DL (ref 70–110)
POCT GLUCOSE: 274 MG/DL (ref 70–110)
POTASSIUM SERPL-SCNC: 5.5 MMOL/L (ref 3.5–5.1)
PROT SERPL-MCNC: 8.9 G/DL (ref 6–8.4)
RBC # BLD AUTO: 4.81 M/UL (ref 4.6–6.2)
SODIUM SERPL-SCNC: 135 MMOL/L (ref 136–145)
VANCOMYCIN TROUGH SERPL-MCNC: 18.9 UG/ML (ref 10–22)
WBC # BLD AUTO: 5.12 K/UL (ref 3.9–12.7)

## 2022-07-14 PROCEDURE — 11000001 HC ACUTE MED/SURG PRIVATE ROOM

## 2022-07-14 PROCEDURE — 85025 COMPLETE CBC W/AUTO DIFF WBC: CPT | Performed by: HOSPITALIST

## 2022-07-14 PROCEDURE — 36415 COLL VENOUS BLD VENIPUNCTURE: CPT | Performed by: HOSPITALIST

## 2022-07-14 PROCEDURE — 80202 ASSAY OF VANCOMYCIN: CPT | Performed by: INTERNAL MEDICINE

## 2022-07-14 PROCEDURE — 25000003 PHARM REV CODE 250: Performed by: STUDENT IN AN ORGANIZED HEALTH CARE EDUCATION/TRAINING PROGRAM

## 2022-07-14 PROCEDURE — 25000003 PHARM REV CODE 250: Performed by: NURSE PRACTITIONER

## 2022-07-14 PROCEDURE — 25000003 PHARM REV CODE 250: Performed by: HOSPITALIST

## 2022-07-14 PROCEDURE — 99232 SBSQ HOSP IP/OBS MODERATE 35: CPT | Mod: 95,,, | Performed by: HOSPITALIST

## 2022-07-14 PROCEDURE — 25000003 PHARM REV CODE 250: Performed by: INTERNAL MEDICINE

## 2022-07-14 PROCEDURE — 63600175 PHARM REV CODE 636 W HCPCS: Performed by: INTERNAL MEDICINE

## 2022-07-14 PROCEDURE — 63600175 PHARM REV CODE 636 W HCPCS: Performed by: NURSE PRACTITIONER

## 2022-07-14 PROCEDURE — 99232 PR SUBSEQUENT HOSPITAL CARE,LEVL II: ICD-10-PCS | Mod: 95,,, | Performed by: HOSPITALIST

## 2022-07-14 PROCEDURE — 97116 GAIT TRAINING THERAPY: CPT

## 2022-07-14 PROCEDURE — 27000207 HC ISOLATION

## 2022-07-14 PROCEDURE — 80053 COMPREHEN METABOLIC PANEL: CPT | Performed by: HOSPITALIST

## 2022-07-14 RX ORDER — AMMONIUM LACTATE 12 G/100G
LOTION TOPICAL 2 TIMES DAILY PRN
Status: DISCONTINUED | OUTPATIENT
Start: 2022-07-14 | End: 2022-07-15 | Stop reason: HOSPADM

## 2022-07-14 RX ADMIN — ENOXAPARIN SODIUM 40 MG: 100 INJECTION SUBCUTANEOUS at 05:07

## 2022-07-14 RX ADMIN — INSULIN ASPART 2 UNITS: 100 INJECTION, SOLUTION INTRAVENOUS; SUBCUTANEOUS at 08:07

## 2022-07-14 RX ADMIN — POLYETHYLENE GLYCOL 3350 17 G: 17 POWDER, FOR SOLUTION ORAL at 10:07

## 2022-07-14 RX ADMIN — INSULIN ASPART 13 UNITS: 100 INJECTION, SOLUTION INTRAVENOUS; SUBCUTANEOUS at 05:07

## 2022-07-14 RX ADMIN — LISINOPRIL 10 MG: 10 TABLET ORAL at 08:07

## 2022-07-14 RX ADMIN — VANCOMYCIN HYDROCHLORIDE 1000 MG: 1 INJECTION, POWDER, LYOPHILIZED, FOR SOLUTION INTRAVENOUS at 04:07

## 2022-07-14 RX ADMIN — INSULIN ASPART 13 UNITS: 100 INJECTION, SOLUTION INTRAVENOUS; SUBCUTANEOUS at 12:07

## 2022-07-14 RX ADMIN — VANCOMYCIN HYDROCHLORIDE 1000 MG: 1 INJECTION, POWDER, LYOPHILIZED, FOR SOLUTION INTRAVENOUS at 03:07

## 2022-07-14 RX ADMIN — HYDROCODONE BITARTRATE AND ACETAMINOPHEN 1 TABLET: 7.5; 325 TABLET ORAL at 10:07

## 2022-07-14 RX ADMIN — INSULIN ASPART 2 UNITS: 100 INJECTION, SOLUTION INTRAVENOUS; SUBCUTANEOUS at 05:07

## 2022-07-14 RX ADMIN — HYDROCHLOROTHIAZIDE 25 MG: 25 TABLET ORAL at 08:07

## 2022-07-14 RX ADMIN — INSULIN ASPART 3 UNITS: 100 INJECTION, SOLUTION INTRAVENOUS; SUBCUTANEOUS at 08:07

## 2022-07-14 RX ADMIN — HYDROCODONE BITARTRATE AND ACETAMINOPHEN 1 TABLET: 7.5; 325 TABLET ORAL at 02:07

## 2022-07-14 RX ADMIN — HYDROCODONE BITARTRATE AND ACETAMINOPHEN 1 TABLET: 7.5; 325 TABLET ORAL at 06:07

## 2022-07-14 RX ADMIN — INSULIN ASPART 4 UNITS: 100 INJECTION, SOLUTION INTRAVENOUS; SUBCUTANEOUS at 12:07

## 2022-07-14 RX ADMIN — ATORVASTATIN CALCIUM 40 MG: 20 TABLET, FILM COATED ORAL at 08:07

## 2022-07-14 RX ADMIN — COLLAGENASE SANTYL: 250 OINTMENT TOPICAL at 08:07

## 2022-07-14 RX ADMIN — INSULIN ASPART 13 UNITS: 100 INJECTION, SOLUTION INTRAVENOUS; SUBCUTANEOUS at 08:07

## 2022-07-14 RX ADMIN — GABAPENTIN 100 MG: 100 CAPSULE ORAL at 08:07

## 2022-07-14 RX ADMIN — MAGNESIUM HYDROXIDE 2400 MG: 400 SUSPENSION ORAL at 06:07

## 2022-07-14 RX ADMIN — INSULIN DETEMIR 55 UNITS: 100 INJECTION, SOLUTION SUBCUTANEOUS at 08:07

## 2022-07-14 RX ADMIN — QUETIAPINE FUMARATE 200 MG: 200 TABLET ORAL at 08:07

## 2022-07-14 RX ADMIN — ASPIRIN 81 MG: 81 TABLET, COATED ORAL at 08:07

## 2022-07-14 NOTE — PT/OT/SLP PROGRESS
Physical Therapy Treatment    Patient Name:  Dave Good   MRN:  [id number]    Recommendations:     Discharge Recommendations:  home   Discharge Equipment Recommendations: none   Barriers to discharge: None    Assessment:     Dave Good is a 56 y.o. male admitted with a medical diagnosis of Osteomyelitis of right foot.  He presents with the following impairments/functional limitations:  decreased safety awareness, impaired balance, impaired sensation, impaired skin.    Demo's appropriate gait stability with B darco shoes, able to ascend/descend stairs with HR and SBA. Pt with no concerns regarding mobility upon DC home, reinforced need for DM shoes/inserts or darco shoes for any WB. DC acute care PT as patient at maximal functional level at this time.     Rehab Prognosis: Good; patient would benefit from acute skilled PT services to address these deficits and reach maximum level of function.    Recent Surgery: * No surgery found *      Plan:     During this hospitalization, patient to be seen  (DC) to address the identified rehab impairments via  (DC) and progress toward the following goals:    · Plan of Care Expires:  07/14/22    Subjective     Chief Complaint: None verbalized  Patient/Family Comments/goals: Eager for pending DC home tomorrow; Patient agreeable to PT treatment.  Pain/Comfort:  Pain Rating 1: 0/10  Pain Rating Post-Intervention 1: 0/10      Objective:     Communicated with VIRGINIA Moreno prior to session.  Patient found HOB elevated with PICC line upon PT entry to room.     General Precautions: Standard, diabetic, contact   Orthopedic Precautions: (protective footwear per podiatry)   Braces:  (B DARCO shoes)  Respiratory Status: Room air     Donned clean gown, Darco shoes wiped down, clean dressings, and hand hygiene performed for out of room therapeutic walk per isolation guideline.     Functional Mobility:  · Bed Mobility:     · Supine to Sit: modified independence  · Transfers:     · Sit to Stand:   independence with DARCO shoes  · Gait: x150 ft with RW and independence.  · Increased toe out angle suspect d/t stiff DARCO shoes and decreased heel cord flexibility  · Increased lateral sway without overt LOB  · Stairs:  Pt ascended/descended 10 stair(s) with No Assistive Device with bilateral handrails with Stand-by Assistance and Contact Guard Assistance.    · Step to gait pattern  · Demo's slow and careful stepping to ensure safety on stairs without cueing      AM-PAC 6 CLICK MOBILITY  Turning over in bed (including adjusting bedclothes, sheets and blankets)?: 4  Sitting down on and standing up from a chair with arms (e.g., wheelchair, bedside commode, etc.): 4  Moving from lying on back to sitting on the side of the bed?: 4  Moving to and from a bed to a chair (including a wheelchair)?: 4  Need to walk in hospital room?: 4  Climbing 3-5 steps with a railing?: 3  Basic Mobility Total Score: 23       Therapeutic Activities and Exercises:  · Reinforced importance of protective footwear for any mobility      Patient left up in chair with all lines intact and call button in reach..    GOALS:   Multidisciplinary Problems     Physical Therapy Goals        Problem: Physical Therapy    Goal Priority Disciplines Outcome Goal Variances Interventions   Physical Therapy Goal     PT, PT/OT Adequate for Care Transition     Description: Goals to be met by: 22    Patient will increase functional independence with mobility by performin. Tibialis anterior strengthening (DF with heel on ground) in seated position x10 reps with 15 sec holds without cueing for technique.   2. Gait x 10 feet with mod(I) with RW without cueing for heel WB (B).  3. Ascend/descend 2 step(s) with least restrictive assistive device and uni HR via lateral technique to maintain heel WB with supervision.                    Time Tracking:     PT Received On: 22  PT Start Time: 939     PT Stop Time: 953  PT Total Time (min): 14 min      Billable Minutes: Gait Training 14    Treatment Type: Treatment (and DC)  PT/PTA: PT     PTA Visit Number: 0     07/14/2022

## 2022-07-14 NOTE — PROGRESS NOTES
[facility] - [facility] ([place])  Adult Nutrition  Progress Note    SUMMARY       Recommendations  1) Continue Diabetic Diet with James BID for wound healing   2) Consider additonal laxative/stool softener for constipation    Goals: pt to have BM by RD follow up  Nutrition Goal Status: goal met  Communication of RD Recs:  (POC)    Assessment and Plan    Increased nutritional needs  Contributing Nutrition Diagnosis  Increased nutrient needs; kcal/protein      Related to (etiology):   Increase demand for nutrients- acute/chronic wounds     Signs and Symptoms (as evidenced by):   Delvis Score 23  Multiple ulcer/ altered skin to LE   PO intake 100%       Interventions/Recommendations (treatment strategy):  Collaboration with other healthcare providers  [employer]  Carbohydrate modified diet (Diabetic)     Nutrition Diagnosis Status:   Continues    Malnutrition Assessment     Skin (Micronutrient):  (Delvis Score 23)     Reason for Assessment    Reason For Assessment: RD follow-up  Diagnosis:  (osteomyeltitis of R foot)  Relevant Medical History:   Past Medical History:   Diagnosis Date    COPD (chronic obstructive pulmonary disease)     COVID-19     Depression     Diabetes mellitus     Diabetes mellitus, type 2     Hypertension        Interdisciplinary Rounds: did not attend  General Information Comments: 7/14- RD f/u. Pt pending d/c with antibiotics ending 7/15. On 2000kcal DM diet with James BID. Tolerating diet. PO intake remains 100%. BM improving LBM 7/12, Continues with wounds- Delvis Score 23. Will continue to monitor.    Nutrition Discharge Planning: Pt to follow a DM diet with tetxure modified diet and ONS to aid in wound healing as tolerateed.    Nutrition Risk Screen    Nutrition Risk Screen: no indicators present    Nutrition/Diet History    Spiritual, Cultural Beliefs, Jewish Practices, Values that Affect Care: no  Factors Affecting Nutritional Intake: None identified at this  "time    Anthropometrics    Temp: 97.8 °F (36.6 °C)  Height Method: Stated  Height: 6' 2" (188 cm)  Height (inches): 74 in  Weight Method: Bed Scale  Weight: 131.7 kg (290 lb 5.5 oz)  Weight (lb): 290.35 lb  Ideal Body Weight (IBW), Male: 190 lb  % Ideal Body Weight, Male (lb): 150 %  BMI (Calculated): 37.3  BMI Grade: 35 - 39.9 - obesity - grade II     Lab/Procedures/Meds    Pertinent Labs Reviewed: reviewed  Pertinent Labs Comments: no labs  Pertinent Medications Reviewed: reviewed  Pertinent Medications Comments: atorvastatin, collagenase, enoxaparin, gabapentin, hydrochloroethiazdine, insulin aspart, insulin detemir, lisinopril, polyethylene glycol, quetiapine, vanc    Estimated/Assessed Needs    Weight Used For Calorie Calculations: 129.3 kg (285 lb 0.9 oz)  Energy Calorie Requirements (kcal): 2100 kcal day  Energy Need Method: [place]  Protein Requirements: 155 g day (1.2 g/kg wound)  Weight Used For Protein Calculations: 129.3 kg (285 lb 0.9 oz)  Estimated Fluid Requirement Method: RDA Method  RDA Method (mL): 2100  CHO Requirement: 263 g day    Nutrition Prescription Ordered    Current Diet Order: Diabetic  Oral Nutrition Supplement: James BID    Evaluation of Received Nutrient/Fluid Intake    I/O: -15.95L since 6/30  Energy Calories Required: meeting needs  Protein Required: meeting needs  Fluid Required: meeting needs  Comments: LBM 7/12  Tolerance: tolerating  % Intake of Estimated Energy Needs: 75 - 100 %  % Meal Intake: 75 - 100 %    Nutrition Risk    Level of Risk/Frequency of Follow-up:  (1x weekly)     Monitor and Evaluation    Food and Nutrient Intake: food and beverage intake, energy intake  Food and Nutrient Adminstration: diet order  Knowledge/Beliefs/Attitudes: food and nutrition knowledge/skill  Physical Activity and Function: nutrition-related ADLs and IADLs  Anthropometric Measurements: weight, weight change, body mass index  Biochemical Data, Medical Tests and Procedures: " glucose/endocrine profile, gastrointestinal profile, electrolyte and renal panel  Nutrition-Focused Physical Findings: overall appearance, skin     Nutrition Follow-Up    RD Follow-up?: Yes

## 2022-07-14 NOTE — PROGRESS NOTES
[facility] - Med Surg ([place])  Podiatry  Progress Note    Patient Name: Dave Good  MRN: [id number]  Admission Date: 6/15/2022  Hospital Length of Stay: 29 days  Attending Physician: Aminah Ricardo MD  Primary Care Provider: Reyna Jorge NP     Subjective:     Interval History: closed wounds both feet.  Partial ray amputations right rays 1,2.  Has DM shoes at home with custom inserts.  Dressings CDI.          Scheduled Meds:   aspirin  81 mg Oral Daily    atorvastatin  40 mg Oral Daily    collagenase   Topical (Top) Daily    enoxaparin  40 mg Subcutaneous Daily    gabapentin  100 mg Oral QHS    hydroCHLOROthiazide  25 mg Oral Daily    insulin aspart U-100  13 Units Subcutaneous TIDWM    insulin detemir U-100  55 Units Subcutaneous QHS    lisinopriL  10 mg Oral Daily    magnesium hydroxide 400 mg/5 ml  30 mL Oral Daily    polyethylene glycol  17 g Oral Daily    QUEtiapine  200 mg Oral Nightly    vancomycin (VANCOCIN) IVPB  1,000 mg Intravenous Q12H     Continuous Infusions:  PRN Meds:sodium chloride 0.9%, acetaminophen, bisacodyL, dextrose 10%, dextrose 10%, glucagon (human recombinant), glucose, glucose, HYDROcodone-acetaminophen, HYDROcodone-acetaminophen, insulin aspart U-100, magnesium hydroxide 400 mg/5 ml, melatonin, naloxone, ondansetron, senna-docusate 8.6-50 mg, sodium chloride 0.9%, Pharmacy to dose Vancomycin consult **AND** vancomycin - pharmacy to dose    Review of Systems  Objective:     Vital Signs (Most Recent):  Temp: 97.7 °F (36.5 °C) (07/14/22 1225)  Pulse: 83 (07/14/22 1225)  Resp: 16 (07/14/22 1225)  BP: 134/77 (07/14/22 1225)  SpO2: 96 % (07/14/22 1225) Vital Signs (24h Range):  Temp:  [97.6 °F (36.4 °C)-98.4 °F (36.9 °C)] 97.7 °F (36.5 °C)  Pulse:  [76-97] 83  Resp:  [16-20] 16  SpO2:  [95 %-97 %] 96 %  BP: (121-145)/(63-81) 134/77     Weight: 131.7 kg (290 lb 5.5 oz)  Body mass index is 37.28 kg/m².    Foot Exam    Laboratory:  A1C:   Recent Labs   Lab 02/21/22  1548  05/27/22  1010   HGBA1C 9.8* 7.8*     Blood Cultures: No results for input(s): LABBLOO in the last 48 hours.  BMP:   Recent Labs   Lab 07/10/22  0423   *      K 5.1   CL 99   CO2 28   BUN 44*   CREATININE 1.2   CALCIUM 9.4   MG 2.2     CBC:   Recent Labs   Lab 07/10/22  0423   WBC 7.41   RBC 4.27*   HGB 11.5*   HCT 38.5*      MCV 90   MCH 26.9*   MCHC 29.9*     CMP:   Recent Labs   Lab 07/10/22  0423   *   CALCIUM 9.4      K 5.1   CO2 28   CL 99   BUN 44*   CREATININE 1.2     Coagulation: No results for input(s): PT, INR, APTT in the last 168 hours.  CRP: No results for input(s): CRP in the last 168 hours.  ESR: No results for input(s): SEDRATE in the last 168 hours.  Prealbumin: No results for input(s): PREALBUMIN in the last 48 hours.  Wound Cultures:   Recent Labs   Lab 02/22/22  1153 06/16/22  0050 06/17/22  0601   LABAERO METHICILLIN RESISTANT STAPHYLOCOCCUS AUREUS  Many  No other significant isolate  * PROTEUS MIRABILIS  Many  *  METHICILLIN RESISTANT STAPHYLOCOCCUS AUREUS  Many  *  ACINETOBACTER LWOFFII GROUP  Few  *  PROVIDENCIA RETTGERI  Few  *  METHICILLIN RESISTANT STAPHYLOCOCCUS AUREUS  Moderate  * DIPHTHEROIDS  Few  *  ENTEROCOCCUS FAECALIS  Few  *  No growth     Microbiology Results (last 7 days)     ** No results found for the last 168 hours. **        Specimen (24h ago, onward)            None          Diagnostic Results:  None    Clinical Findings:  Wounds plantar distal right and left forefoot/residual limb are closed  without ulceration, drainage, pus, tracking, fluctuance, malodor, or cardinal signs infection.     Patient has generalized hyperkeratosis over bottom both feet.    Skin thin, shiny, atrophic, with decreased density and distribution of pedal hair bilateral, but without hyperpigmentation, lizy discoloration,  ulcers, masses, nodules or cords palpated bilateral feet and legs.'    Palpable pulses, warm temp distal feet bialteral.    Diminished/loss  of protective sensation all toes bilateral to 10 gram monofilament.         Assessment/Plan:     Active Diagnoses:    Diagnosis Date Noted POA    PRINCIPAL PROBLEM:  Osteomyelitis of right foot [M86.9] 06/15/2022 Yes    Ulcerated, foot, left, with fat layer exposed [L97.522]  Yes    Slow transit constipation [K59.01] 07/06/2022 Yes    Ulcer of both feet with fat layer exposed [L97.512, L97.522]  Yes    Amputation of right great toe [S98.111A]  Yes    Type 2 diabetes mellitus with diabetic peripheral angiopathy without gangrene, with long-term current use of insulin [E11.51, Z79.4] 09/20/2021 Not Applicable    HTN (hypertension) [I10] 09/20/2021 Yes      Problems Resolved During this Admission:    Diagnosis Date Noted Date Resolved POA    SHIV (acute kidney injury) [N17.9] 09/20/2021 06/17/2022 Yes       Rx urea cream 45-50% bid both feet to soften hyperkeratosis and prevent ulceration/cracking.    Return to DM shoes, custom inserts all times WB.    Follow with me 1 week after d/c for evaluation/monitoring.        Samuel Toussaint DPM  Podiatry  [facility] - Med [facility] ([place])

## 2022-07-14 NOTE — PLAN OF CARE
Problem: Physical Therapy  Goal: Physical Therapy Goal  Description: Goals to be met by: 22    Patient will increase functional independence with mobility by performin. Tibialis anterior strengthening (DF with heel on ground) in seated position x10 reps with 15 sec holds without cueing for technique.   2. Gait x 10 feet with mod(I) with RW without cueing for heel WB (B).  3. Ascend/descend 2 step(s) with least restrictive assistive device and uni HR via lateral technique to maintain heel WB with supervision.   Outcome: Adequate for Care Transition     Demo's appropriate gait stability with B darco shoes, able to ascend/descend stairs with HR and SBA. Pt with no concerns regarding mobility upon DC home, reinforced need for DM shoes/inserts or darco shoes for any WB.

## 2022-07-14 NOTE — PLAN OF CARE
Problem: Adult Inpatient Plan of Care  Goal: Plan of Care Review  Outcome: Ongoing, Progressing  Goal: Patient-Specific Goal (Individualized)  Outcome: Ongoing, Progressing  Goal: Absence of Hospital-Acquired Illness or Injury  Outcome: Ongoing, Progressing  Goal: Optimal Comfort and Wellbeing  Outcome: Ongoing, Progressing  Goal: Readiness for Transition of Care  Outcome: Ongoing, Progressing     Problem: Fall Injury Risk  Goal: Absence of Fall and Fall-Related Injury  Outcome: Ongoing, Progressing     Problem: Diabetes Comorbidity  Goal: Blood Glucose Level Within Targeted Range  Outcome: Ongoing, Progressing     Problem: Fluid and Electrolyte Imbalance (Acute Kidney Injury/Impairment)  Goal: Fluid and Electrolyte Balance  Outcome: Ongoing, Progressing     Problem: Oral Intake Inadequate (Acute Kidney Injury/Impairment)  Goal: Optimal Nutrition Intake  Outcome: Ongoing, Progressing     Problem: Renal Function Impairment (Acute Kidney Injury/Impairment)  Goal: Effective Renal Function  Outcome: Ongoing, Progressing     Problem: Infection  Goal: Absence of Infection Signs and Symptoms  Outcome: Ongoing, Progressing     Problem: Impaired Wound Healing  Goal: Optimal Wound Healing  Outcome: Ongoing, Progressing     Problem: Skin Injury Risk Increased  Goal: Skin Health and Integrity  Outcome: Ongoing, Progressing      Pain control during shift and BG coverage w/ each meal. Pt free of falls this shift and rested comfortably at bedside

## 2022-07-14 NOTE — PLAN OF CARE
Recommendations  1) Continue Diabetic Diet with James BID for wound healing   2) Consider additonal laxative/stool softener for constipation    Goals: pt to have BM by RD follow up  Nutrition Goal Status: goal met  Communication of RD Recs:  (POC)

## 2022-07-14 NOTE — SUBJECTIVE & OBJECTIVE
Interval History: Pt afebrile and HDS. No acute complaints today, no new events overnight.  Plan to DC home tomorrow after finishing course of antibiotics.      Review of Systems   Constitutional:  Negative for fever.   Respiratory:  Negative for cough and shortness of breath.    Cardiovascular:  Negative for chest pain.   Gastrointestinal:  Positive for constipation. Negative for abdominal pain.   Neurological:  Negative for dizziness and headaches.   Psychiatric/Behavioral:  Negative for confusion.    All other systems reviewed and are negative.  Objective:     Vital Signs (Most Recent):  Temp: 97.7 °F (36.5 °C) (07/14/22 1225)  Pulse: 83 (07/14/22 1225)  Resp: 16 (07/14/22 1413)  BP: 134/77 (07/14/22 1225)  SpO2: 96 % (07/14/22 1225)   Vital Signs (24h Range):  Temp:  [97.6 °F (36.4 °C)-98.4 °F (36.9 °C)] 97.7 °F (36.5 °C)  Pulse:  [76-97] 83  Resp:  [16-20] 16  SpO2:  [95 %-97 %] 96 %  BP: (121-145)/(63-81) 134/77     Weight: 131.7 kg (290 lb 5.5 oz)  Body mass index is 37.28 kg/m².    Intake/Output Summary (Last 24 hours) at 7/14/2022 1618  Last data filed at 7/14/2022 1200  Gross per 24 hour   Intake 860 ml   Output 1950 ml   Net -1090 ml        Physical Exam  Vitals and nursing note reviewed.   Constitutional:       Appearance: Normal appearance.   HENT:      Head: Normocephalic and atraumatic.   Eyes:      Extraocular Movements: Extraocular movements intact.      Pupils: Pupils are equal, round, and reactive to light.   Cardiovascular:      Rate and Rhythm: Normal rate and regular rhythm.   Pulmonary:      Effort: Pulmonary effort is normal. No respiratory distress.   Abdominal:      General: Abdomen is flat. There is no distension.   Musculoskeletal:         General: Normal range of motion.      Cervical back: Normal range of motion.      Comments: Left foot in bandage   Neurological:      General: No focal deficit present.      Mental Status: He is alert and oriented to person, place, and time.    Psychiatric:         Mood and Affect: Mood normal.         Behavior: Behavior normal.       Significant Labs: All pertinent labs within the past 24 hours have been reviewed.  CBC:   Recent Labs   Lab 07/14/22  1435   WBC 5.12   HGB 13.2*   HCT 45.3          CMP:   Recent Labs   Lab 07/14/22  1435   *   K 5.5*      CO2 21*   *   BUN 40*   CREATININE 1.4   CALCIUM 10.1   PROT 8.9*   ALBUMIN 3.7   BILITOT 0.3   ALKPHOS 84   AST 32   ALT 41   ANIONGAP 13   EGFRNONAA 55*         Significant Imaging: I have reviewed all pertinent imaging results/findings within the past 24 hours.

## 2022-07-14 NOTE — PROGRESS NOTES
Pharmacokinetic Assessment Follow Up: IV Vancomycin    Vancomycin serum concentration assessment(s):    The trough level was drawn correctly and can be used to guide therapy at this time. The measurement is within the desired definitive target range of 10 to 20 mcg/mL.    Vancomycin Regimen Plan:    Continue regimen to Vancomycin 1000 mg IV every 12 hours with next serum trough concentration measured at 1500 prior to 1530 dose on 7/16    Drug levels (last 3 results):  Recent Labs   Lab Result Units 07/12/22  1408 07/14/22  1435   Vancomycin-Trough ug/mL 22.2* 18.9       Pharmacy will continue to follow and monitor vancomycin.    Please contact pharmacy at extension [phone] for questions regarding this assessment.    Thank you for the consult,   Donna Barros       Patient brief summary:  Dave Good is a 56 y.o. male initiated on antimicrobial therapy with IV Vancomycin for treatment of bone/joint infection    The patient's current regimen is Vancomycin 1000mg IVPB every 12 hours    Drug Allergies:   Review of patient's allergies indicates:   Allergen Reactions    Ibuprofen Swelling     Facial swelling       Actual Body Weight:   131.7kg    Renal Function:   Estimated Creatinine Clearance: 83 mL/min (based on SCr of 1.4 mg/dL).,     Dialysis Method (if applicable):  N/A    CBC (last 72 hours):  Recent Labs   Lab Result Units 07/14/22  1435   WBC K/uL 5.12   Hemoglobin g/dL 13.2*   Hematocrit % 45.3   Platelets K/uL 154   Gran % % 45.6   Lymph % % 33.8   Mono % % 13.3   Eosinophil % % 6.3   Basophil % % 0.8   Differential Method  Automated       Metabolic Panel (last 72 hours):  Recent Labs   Lab Result Units 07/14/22  1435   Sodium mmol/L 135*   Potassium mmol/L 5.5*   Chloride mmol/L 101   CO2 mmol/L 21*   Glucose mg/dL 167*   BUN mg/dL 40*   Creatinine mg/dL 1.4   Albumin g/dL 3.7   Total Bilirubin mg/dL 0.3   Alkaline Phosphatase U/L 84   AST U/L 32   ALT U/L 41       Vancomycin Administrations:  vancomycin given  in the last 96 hours                     vancomycin in dextrose 5 % 1 gram/250 mL IVPB 1,000 mg (mg) 1,000 mg New Bag 07/14/22 0324     1,000 mg New Bag 07/13/22 1433     1,000 mg New Bag  0315    vancomycin 1.25 g in dextrose 5% 250 mL IVPB (ready to mix) (mg) 1,250 mg New Bag 07/12/22 1524     1,250 mg New Bag  0137     1,250 mg New Bag 07/11/22 1609     1,250 mg New Bag  0237                    Microbiologic Results:  Microbiology Results (last 7 days)       ** No results found for the last 168 hours. **

## 2022-07-14 NOTE — PT/OT/SLP DISCHARGE
Physical Therapy Discharge Summary    Name: Dave Good  MRN: [id number]   Principal Problem: Osteomyelitis of right foot     Patient Discharged from acute Physical Therapy on 22.  Please refer to prior PT noted date on 22 for functional status.     Assessment:     Patient has adequetely met goals. Initial goals established when patient allowed only minimal transferring while wounds healed, now wounds closed and demo's appropriate gait stability within room and on stairs.    Objective:     GOALS:   Multidisciplinary Problems     Physical Therapy Goals        Problem: Physical Therapy    Goal Priority Disciplines Outcome Goal Variances Interventions   Physical Therapy Goal     PT, PT/OT Adequate for Care Transition     Description: Goals to be met by: 22    Patient will increase functional independence with mobility by performin. Tibialis anterior strengthening (DF with heel on ground) in seated position x10 reps with 15 sec holds without cueing for technique.   2. Gait x 10 feet with mod(I) with RW without cueing for heel WB (B).  3. Ascend/descend 2 step(s) with least restrictive assistive device and uni HR via lateral technique to maintain heel WB with supervision.                    Reasons for Discontinuation of Therapy Services  Satisfactory goal achievement.      Plan:     Patient Discharged to: Home no PT services needed.      2022

## 2022-07-14 NOTE — ASSESSMENT & PLAN NOTE
Contributing Nutrition Diagnosis  Increased nutrient needs; kcal/protein      Related to (etiology):   Increase demand for nutrients- acute/chronic wounds     Signs and Symptoms (as evidenced by):   Delvis Score 23  Multiple ulcer/ altered skin to LE   PO intake 100%       Interventions/Recommendations (treatment strategy):  Collaboration with other healthcare providers  [employer]  Carbohydrate modified diet (Diabetic)     Nutrition Diagnosis Status:   Continues

## 2022-07-15 ENCOUNTER — TELEPHONE (OUTPATIENT)
Dept: INFECTIOUS DISEASES | Facility: CLINIC | Age: 57
End: 2022-07-15
Payer: MEDICAID

## 2022-07-15 ENCOUNTER — TELEPHONE (OUTPATIENT)
Dept: PODIATRY | Facility: CLINIC | Age: 57
End: 2022-07-15
Payer: MEDICAID

## 2022-07-15 VITALS
TEMPERATURE: 98 F | OXYGEN SATURATION: 95 % | WEIGHT: 290.38 LBS | HEIGHT: 74 IN | RESPIRATION RATE: 20 BRPM | HEART RATE: 91 BPM | DIASTOLIC BLOOD PRESSURE: 74 MMHG | SYSTOLIC BLOOD PRESSURE: 120 MMHG | BODY MASS INDEX: 37.27 KG/M2

## 2022-07-15 LAB
POCT GLUCOSE: 133 MG/DL (ref 70–110)
POCT GLUCOSE: 167 MG/DL (ref 70–110)

## 2022-07-15 PROCEDURE — 63600175 PHARM REV CODE 636 W HCPCS: Performed by: NURSE PRACTITIONER

## 2022-07-15 PROCEDURE — 99239 PR HOSPITAL DISCHARGE DAY,>30 MIN: ICD-10-PCS | Mod: 95,,, | Performed by: HOSPITALIST

## 2022-07-15 PROCEDURE — 25000003 PHARM REV CODE 250: Performed by: NURSE PRACTITIONER

## 2022-07-15 PROCEDURE — 94761 N-INVAS EAR/PLS OXIMETRY MLT: CPT

## 2022-07-15 PROCEDURE — 25000003 PHARM REV CODE 250: Performed by: INTERNAL MEDICINE

## 2022-07-15 PROCEDURE — 25000003 PHARM REV CODE 250: Performed by: STUDENT IN AN ORGANIZED HEALTH CARE EDUCATION/TRAINING PROGRAM

## 2022-07-15 PROCEDURE — 99239 HOSP IP/OBS DSCHRG MGMT >30: CPT | Mod: 95,,, | Performed by: HOSPITALIST

## 2022-07-15 PROCEDURE — 25000003 PHARM REV CODE 250: Performed by: HOSPITALIST

## 2022-07-15 RX ORDER — METFORMIN HYDROCHLORIDE 1000 MG/1
1000 TABLET ORAL 2 TIMES DAILY
Qty: 60 TABLET | Refills: 0 | Status: SHIPPED | OUTPATIENT
Start: 2022-07-15 | End: 2023-01-03 | Stop reason: SDUPTHER

## 2022-07-15 RX ORDER — INSULIN ASPART 100 [IU]/ML
INJECTION, SOLUTION INTRAVENOUS; SUBCUTANEOUS
Qty: 15 ML | Refills: 0 | Status: SHIPPED | OUTPATIENT
Start: 2022-07-15 | End: 2022-10-27

## 2022-07-15 RX ORDER — QUETIAPINE FUMARATE 200 MG/1
200 TABLET, FILM COATED ORAL NIGHTLY
Qty: 30 TABLET | Refills: 0 | Status: SHIPPED | OUTPATIENT
Start: 2022-07-15 | End: 2023-01-03 | Stop reason: SDUPTHER

## 2022-07-15 RX ORDER — ASPIRIN 81 MG/1
81 TABLET ORAL DAILY
Qty: 30 TABLET | Refills: 0 | Status: SHIPPED | OUTPATIENT
Start: 2022-07-15 | End: 2023-01-03 | Stop reason: SDUPTHER

## 2022-07-15 RX ORDER — ATORVASTATIN CALCIUM 20 MG/1
40 TABLET, FILM COATED ORAL DAILY
Qty: 30 TABLET | Refills: 0 | Status: SHIPPED | OUTPATIENT
Start: 2022-07-15 | End: 2023-01-03 | Stop reason: ALTCHOICE

## 2022-07-15 RX ORDER — LISINOPRIL 10 MG/1
10 TABLET ORAL DAILY
Qty: 30 TABLET | Refills: 0 | Status: SHIPPED | OUTPATIENT
Start: 2022-07-15 | End: 2023-01-03 | Stop reason: SDUPTHER

## 2022-07-15 RX ORDER — HYDROCODONE BITARTRATE AND ACETAMINOPHEN 5; 325 MG/1; MG/1
1 TABLET ORAL EVERY 6 HOURS PRN
Qty: 15 TABLET | Refills: 0 | Status: SHIPPED | OUTPATIENT
Start: 2022-07-15 | End: 2023-01-03

## 2022-07-15 RX ORDER — HYDROCHLOROTHIAZIDE 25 MG/1
25 TABLET ORAL DAILY
Qty: 30 TABLET | Refills: 0 | Status: SHIPPED | OUTPATIENT
Start: 2022-07-15 | End: 2023-01-03 | Stop reason: SDUPTHER

## 2022-07-15 RX ADMIN — LISINOPRIL 10 MG: 10 TABLET ORAL at 08:07

## 2022-07-15 RX ADMIN — MAGNESIUM HYDROXIDE 2400 MG: 400 SUSPENSION ORAL at 06:07

## 2022-07-15 RX ADMIN — HYDROCHLOROTHIAZIDE 25 MG: 25 TABLET ORAL at 08:07

## 2022-07-15 RX ADMIN — INSULIN ASPART 2 UNITS: 100 INJECTION, SOLUTION INTRAVENOUS; SUBCUTANEOUS at 08:07

## 2022-07-15 RX ADMIN — COLLAGENASE SANTYL: 250 OINTMENT TOPICAL at 08:07

## 2022-07-15 RX ADMIN — ATORVASTATIN CALCIUM 40 MG: 20 TABLET, FILM COATED ORAL at 08:07

## 2022-07-15 RX ADMIN — POLYETHYLENE GLYCOL 3350 17 G: 17 POWDER, FOR SOLUTION ORAL at 08:07

## 2022-07-15 RX ADMIN — HYDROCODONE BITARTRATE AND ACETAMINOPHEN 1 TABLET: 7.5; 325 TABLET ORAL at 10:07

## 2022-07-15 RX ADMIN — VANCOMYCIN HYDROCHLORIDE 1000 MG: 1 INJECTION, POWDER, LYOPHILIZED, FOR SOLUTION INTRAVENOUS at 03:07

## 2022-07-15 RX ADMIN — HYDROCODONE BITARTRATE AND ACETAMINOPHEN 1 TABLET: 7.5; 325 TABLET ORAL at 06:07

## 2022-07-15 RX ADMIN — INSULIN ASPART 13 UNITS: 100 INJECTION, SOLUTION INTRAVENOUS; SUBCUTANEOUS at 08:07

## 2022-07-15 RX ADMIN — INSULIN ASPART 13 UNITS: 100 INJECTION, SOLUTION INTRAVENOUS; SUBCUTANEOUS at 12:07

## 2022-07-15 RX ADMIN — MAGNESIUM HYDROXIDE 2400 MG: 400 SUSPENSION ORAL at 12:07

## 2022-07-15 RX ADMIN — ASPIRIN 81 MG: 81 TABLET, COATED ORAL at 08:07

## 2022-07-15 RX ADMIN — HYDROCODONE BITARTRATE AND ACETAMINOPHEN 1 TABLET: 7.5; 325 TABLET ORAL at 02:07

## 2022-07-15 NOTE — PROGRESS NOTES
DEWAYNE contacted the member by phone to discuss his dc. He stated that he needs a ride home, because his girlfriend's uncle  and the  is today. [facility]

## 2022-07-15 NOTE — PROGRESS NOTES
Patient reviewed, renal function stable, cultures reviewed, no new levels, continue current therapy; Next levels due: 7/16 @1500

## 2022-07-15 NOTE — TELEPHONE ENCOUNTER
Staff contacted and scheduled patient for July 19th at 9:15 am with Dr. Toussaint.      Patient verbalized understanding.

## 2022-07-15 NOTE — TELEPHONE ENCOUNTER
Spoke with pt and scheduled appt. Pt understood and confirmed. Pt letter has been mailed as well.

## 2022-07-15 NOTE — PLAN OF CARE
CM spoke with [employer] transportation and set up a ride in a wheelchair van home for patient.  Their number is [phone].  The ETA is set for 3:30 pm today.  However, ride may arrive earlier than 3:30.  Reference number is [id number].

## 2022-07-15 NOTE — TELEPHONE ENCOUNTER
----- Message from Kathy Sánchez MA sent at 7/15/2022  2:36 PM CDT -----  Contact:     ----- Message -----  From: Kaylee Lopez  Sent: 7/15/2022   2:05 PM CDT  To: , #    Pt requesting call back RE: schedule appt from referral nothing available in Epic please call with updates        Confirmed contact below:  Contact Name:Dave Good  Phone Number: [phone] Raheem

## 2022-07-15 NOTE — DISCHARGE SUMMARY
[facility] Medicine  Discharge Summary      Patient Name: Dave Good  MRN: [id number]  Patient Class: IP- Inpatient  Admission Date: 6/15/2022  Hospital Length of Stay: 30 days  Discharge Date and Time:  07/15/2022 11:36 AM  Attending Physician: Aminah Ricardo MD   Discharging Provider: Aminah Ricardo MD  Primary Care Provider: Reyna Jorge NP      HPI:   Mr Acosta is a 56 yr old male with a PMH that includes DB 2, COPD, HTN, depression, and right foot transmetatarsal amputation. He was sent to the ED for evaluation of osteomyelitis from a follow up appt with Dr. Flores. Pt had I&D and amputation for wet gangrene in Sept 2021 and debridements in Feb, March, and May of this year. Per his chart, his feet were healing well until some foot trauma occurred. Pt has has open wounds and drainage to BL feet with right foot wound deeper than left. He also has wound to left shin. Pt reports associated 10/10 pain and difficulty walking. He denies fever and chills at home. Podiatry consulted and pt admitted to hospital medicine.       * No surgery found *      Hospital Course:   Patient admitted with bilateral foot wounds and concern for osteomyelitis following R foot TMA requiring debridements since for continued wounds. He was started on empiric antibiotics. Bilateral foot MRI done. MRI of left foot with abnormal findings but not consistent with osteomyelitis. MRI of right foot showed changes of 1st and 2nd transmetatarsal amputation with cortical regularity and abnormal signal at distal stumps noting overlying extensive edema and regions of skin ulceration.  Findings may represent early osteomyelitis vs post-operative changes. Bone biopsy done for further evaluation and to guide treatment. ID and podiatry consulted. Bone culture grew diphtheroids and Enterococcus but no good po options. Per ID, given MRSA isolated from a previous culture, ok to treat with vancomycin x 4 weeks End date 7/15/22.  "Finished 2 weeks of cefepime for left foot SSTI. Pt now medically stable for DC.  Patient was scheduled to DC to LTAC but was not accepted.  He finished his course of antibiotics in the hospital till 7/15 and will be discharged home in stable condition.  Outpatient follow-up with Infectious Disease, Podiatry, PCP, wound care.       Goals of Care Treatment Preferences:  Code Status: Full Code      Consults:   Consults (From admission, onward)        Status Ordering Provider     Inpatient consult to Midline team  Once        Provider:  (Not yet assigned)    Acknowledged MORENITA JOHNSON     Inpatient consult to Infectious Diseases  Once        Provider:  Isidro Duran MD    Completed MORENITA JOHNSON     Inpatient virtual consult to Hospital Medicine  Once        Provider:  Morenita Johnson MD    Completed GITA VARGAS     Inpatient consult to Podiatry  Once        Provider:  Samuel Toussaint DPM    Completed OBED AGUIRRE     Pharmacy to dose Vancomycin consult  Once        Provider:  (Not yet assigned)   "And" Linked Group Details    Acknowledged OBED AGUIRRE          No new Assessment & Plan notes have been filed under this hospital service since the last note was generated.  Service: Hospital Medicine    Final Active Diagnoses:    Diagnosis Date Noted POA    PRINCIPAL PROBLEM:  Osteomyelitis of right foot [M86.9] 06/15/2022 Yes    Ulcerated, foot, left, with fat layer exposed [L97.522]  Yes    Slow transit constipation [K59.01] 07/06/2022 Yes    Ulcer of both feet with fat layer exposed [L97.512, L97.522]  Yes    Amputation of right great toe [S98.111A]  Yes    Type 2 diabetes mellitus with diabetic peripheral angiopathy without gangrene, with long-term current use of insulin [E11.51, Z79.4] 09/20/2021 Not Applicable    HTN (hypertension) [I10] 09/20/2021 Yes      Problems Resolved During this Admission:    Diagnosis Date Noted Date Resolved POA    SHIV (acute kidney injury) " [N17.9] 09/20/2021 06/17/2022 Yes       Discharged Condition: fair    Disposition: Home or Self Care    Follow Up:   Follow-up Information     Samuel Toussaint DPM. Go on 7/19/2022.    Specialties: Podiatry, Wound Care  Why: The appointment time is 1:30 pm 7/19/22   for wound re-check  Contact information:  [address]  [address]  [facility] [address]  [phone]             Reyna Jorge NP. Schedule an appointment as soon as possible for a visit in 1 week(s).    Specialty: Family Medicine  Contact information:  [address] [facility] [address]  [place] [address]  [phone]             [facility] - Emergency Dept Follow up.    Specialty: Emergency Medicine  Why: As needed, If symptoms worsen  Contact information:  [address] [facility] [id number] [phone]           [facility] INFECTIOUS DISEASE. Schedule an appointment as soon as possible for a visit in 1 week(s).    Specialty: Infectious Diseases  Contact information:  [address] [facility] [address]  [phone]                     Patient Instructions:      Ambulatory referral/consult to Infectious Disease   Standing Status: Future   Referral Priority: Routine Referral Type: Consultation   Referral Reason: Specialty Services Required   Requested Specialty: Infectious Diseases   Number of Visits Requested: 1     Ambulatory referral/consult to Podiatry   Standing Status: Future   Referral Priority: Routine Referral Type: Consultation   Referral Reason: Specialty Services Required   Requested Specialty: Podiatry   Number of Visits Requested: 1     Ambulatory referral/consult to Wound Clinic   Standing Status: Future   Referral Priority: Routine Referral Type: Consultation   Referral Reason: Specialty Services Required   Requested Specialty: Wound Care   Number of Visits Requested: 1     Diet Cardiac     Notify your health care provider if you experience any of the following:  temperature >100.4     Notify your  health care provider if you experience any of the following:  persistent nausea and vomiting or diarrhea     Notify your health care provider if you experience any of the following:  severe uncontrolled pain     Notify your health care provider if you experience any of the following:  redness, tenderness, or signs of infection (pain, swelling, redness, odor or green/yellow discharge around incision site)     Notify your health care provider if you experience any of the following:  difficulty breathing or increased cough     Notify your health care provider if you experience any of the following:  severe persistent headache     Notify your health care provider if you experience any of the following:  worsening rash     Notify your health care provider if you experience any of the following:  persistent dizziness, light-headedness, or visual disturbances     Notify your health care provider if you experience any of the following:  increased confusion or weakness     Send follow up & questions to   Order Comments: Patient's primary care physician: Reyna Jorge NP     Activity as tolerated       Significant Diagnostic Studies: Labs:   CMP   Recent Labs   Lab 07/14/22  1435   *   K 5.5*      CO2 21*   *   BUN 40*   CREATININE 1.4   CALCIUM 10.1   PROT 8.9*   ALBUMIN 3.7   BILITOT 0.3   ALKPHOS 84   AST 32   ALT 41   ANIONGAP 13   ESTGFRAFRICA >60   EGFRNONAA 55*    and CBC   Recent Labs   Lab 07/14/22  1435   WBC 5.12   HGB 13.2*   HCT 45.3        Microbiology:   Blood Culture   Lab Results   Component Value Date    LABBLOO No growth after 5 days. 06/15/2022    and Urine Culture  No results found for: LABURIN    Pending Diagnostic Studies:     None         Medications:  Reconciled Home Medications:      Medication List      START taking these medications    HYDROcodone-acetaminophen 5-325 mg per tablet  Commonly known as: NORCO  Take 1 tablet by mouth every 6 (six) hours as needed for  Pain.        CHANGE how you take these medications    collagenase ointment  Commonly known as: SANTYL  Apply topically once daily.  What changed:   · how to take this  · when to take this  · additional instructions        CONTINUE taking these medications    ammonium lactate 12 % Crea  Apply twice daily to affected parts both feet as needed.     aspirin 81 MG EC tablet  Commonly known as: ECOTRIN  Take 1 tablet (81 mg total) by mouth once daily.     atorvastatin 20 MG tablet  Commonly known as: LIPITOR  Take 2 tablets (40 mg total) by mouth once daily.     hydroCHLOROthiazide 25 MG tablet  Commonly known as: HYDRODIURIL  Take 1 tablet (25 mg total) by mouth once daily.     insulin aspart U-100 100 unit/mL (3 mL) Inpn pen  Commonly known as: NovoLOG  INJECT 10 UNITS INTO THE SKIN BEFORE MEALS THREE TIMES A DAY (50 DAYS)     insulin detemir U-100 100 unit/mL injection  Commonly known as: Levemir  Inject 50 Units into the skin every evening.     lisinopriL 10 MG tablet  Take 1 tablet (10 mg total) by mouth once daily.     metFORMIN 1000 MG tablet  Commonly known as: GLUCOPHAGE  Take 1 tablet (1,000 mg total) by mouth 2 (two) times daily.     ONETOUCH VERIO TEST STRIPS Strp  Generic drug: blood sugar diagnostic  SMARTSIG:Via Meter     QUEtiapine 200 MG Tab  Commonly known as: SEROQUEL  Take 1 tablet (200 mg total) by mouth nightly.        STOP taking these medications    losartan 25 MG tablet  Commonly known as: COZAAR            Indwelling Lines/Drains at time of discharge:   Lines/Drains/Airways     None                 Time spent on the discharge of patient: 39 minutes         The attending portion of this evaluation, treatment, and documentation was performed per Aminah Ricardo MD via Telemedicine AudioVisual using the secure [employer] software platform with 2 way audio/video. The provider was located off-site and the patient is located in the hospital. The aforementioned video software was utilized to document the  relevant history and physical exam    Aminah Ricardo MD  Department of Hospital Medicine  [facility] ([place])

## 2022-07-15 NOTE — PROGRESS NOTES
[facility] Medicine  Telemedicine Progress Note    Patient Name: Dave Good  MRN: [id number]  Patient Class: IP- Inpatient   Admission Date: 6/15/2022  Length of Stay: 30 days  Attending Physician: Aminah Ricardo MD  Primary Care Provider: Reyna Jorge NP          Subjective:     Principal Problem:Osteomyelitis of right foot        HPI:  Mr Acosta is a 56 yr old male with a PMH that includes DB 2, COPD, HTN, depression, and right foot transmetatarsal amputation. He was sent to the ED for evaluation of osteomyelitis from a follow up appt with Dr. Flores. Pt had I&D and amputation for wet gangrene in Sept 2021 and debridements in Feb, March, and May of this year. Per his chart, his feet were healing well until some foot trauma occurred. Pt has has open wounds and drainage to BL feet with right foot wound deeper than left. He also has wound to left shin. Pt reports associated 10/10 pain and difficulty walking. He denies fever and chills at home. Podiatry consulted and pt admitted to hospital medicine.       Overview/Hospital Course:  Patient admitted with bilateral foot wounds and concern for osteomyelitis following R foot TMA requiring debridements since for continued wounds. He was started on empiric antibiotics. Bilateral foot MRI done. MRI of left foot with abnormal findings but not consistent with osteomyelitis. MRI of right foot showed changes of 1st and 2nd transmetatarsal amputation with cortical regularity and abnormal signal at distal stumps noting overlying extensive edema and regions of skin ulceration.  Findings may represent early osteomyelitis vs post-operative changes. Bone biopsy done for further evaluation and to guide treatment. ID and podiatry consulted. Bone culture grew diphtheroids and Enterococcus but no good po options. Per ID, given MRSA isolated from a previous culture, ok to treat with vancomycin x 4 weeks End date 7/15/22. Finished 2 weeks of cefepime for  left foot SSTI. Pt now medically stable for DC.  Patient was scheduled to DC to LTAC was not accepted.  He finished his course of antibiotics in the hospital till 7/15 and will be discharged home      Interval History: Pt afebrile and HDS. No acute complaints today, no new events overnight.  Plan to DC home tomorrow after finishing course of antibiotics.      Review of Systems   Constitutional:  Negative for fever.   Respiratory:  Negative for cough and shortness of breath.    Cardiovascular:  Negative for chest pain.   Gastrointestinal:  Positive for constipation. Negative for abdominal pain.   Neurological:  Negative for dizziness and headaches.   Psychiatric/Behavioral:  Negative for confusion.    All other systems reviewed and are negative.  Objective:     Vital Signs (Most Recent):  Temp: 97.7 °F (36.5 °C) (07/14/22 1225)  Pulse: 83 (07/14/22 1225)  Resp: 16 (07/14/22 1413)  BP: 134/77 (07/14/22 1225)  SpO2: 96 % (07/14/22 1225)   Vital Signs (24h Range):  Temp:  [97.6 °F (36.4 °C)-98.4 °F (36.9 °C)] 97.7 °F (36.5 °C)  Pulse:  [76-97] 83  Resp:  [16-20] 16  SpO2:  [95 %-97 %] 96 %  BP: (121-145)/(63-81) 134/77     Weight: 131.7 kg (290 lb 5.5 oz)  Body mass index is 37.28 kg/m².    Intake/Output Summary (Last 24 hours) at 7/14/2022 1618  Last data filed at 7/14/2022 1200  Gross per 24 hour   Intake 860 ml   Output 1950 ml   Net -1090 ml        Physical Exam  Vitals and nursing note reviewed.   Constitutional:       Appearance: Normal appearance.   HENT:      Head: Normocephalic and atraumatic.   Eyes:      Extraocular Movements: Extraocular movements intact.      Pupils: Pupils are equal, round, and reactive to light.   Cardiovascular:      Rate and Rhythm: Normal rate and regular rhythm.   Pulmonary:      Effort: Pulmonary effort is normal. No respiratory distress.   Abdominal:      General: Abdomen is flat. There is no distension.   Musculoskeletal:         General: Normal range of motion.      Cervical back:  Normal range of motion.      Comments: Left foot in bandage   Neurological:      General: No focal deficit present.      Mental Status: He is alert and oriented to person, place, and time.   Psychiatric:         Mood and Affect: Mood normal.         Behavior: Behavior normal.       Significant Labs: All pertinent labs within the past 24 hours have been reviewed.  CBC:   Recent Labs   Lab 07/14/22  1435   WBC 5.12   HGB 13.2*   HCT 45.3          CMP:   Recent Labs   Lab 07/14/22  1435   *   K 5.5*      CO2 21*   *   BUN 40*   CREATININE 1.4   CALCIUM 10.1   PROT 8.9*   ALBUMIN 3.7   BILITOT 0.3   ALKPHOS 84   AST 32   ALT 41   ANIONGAP 13   EGFRNONAA 55*         Significant Imaging: I have reviewed all pertinent imaging results/findings within the past 24 hours.      Assessment/Plan:      * Osteomyelitis of right foot  Left Foot ulcer  - Presented from surgery clinic for concern for infection/osteomyelitis  - Prior history of MRSA bacteremia 2/2 gangrene treated with 6 weeks IV abx for presumed endocarditis  - ESR/CRP elevated but have down trended since prior hospitalization.  - MRI L foot with mild marrow edema of tuft of great toe. Findings and exam not consistent with acute osteomyelitis.  - MRI R foot with post-op changes of 1st & 2nd transmetatarsal with cortical regularity and abnormal signal at distal stumps with extensive edema. Findings concerning for osteo vs chronic changes.  - Biopsies from 6/17 showing no evidence of osteomyelitis involving 2nd metatarsal; 1st metatarsal still in process.  - Wound cultures from ED with MRSA, Providencia rettgeri, Proteus mirabilis, and GNR.  - 1st metatarsal bone culture 6/17 showing E faecalis, diphtheroids.  - Trial of gabapentin 100mg PO qHS with reported symptoms consistent with neuropathic pain.  - Podiatry and wound care following, appreciate assistance.  - ID consulted: given MRSA isolated from a previous culture, ok to treat with  "vancomycin x 4 weeks (EOC 07/15/22). Finished 2 weeks of cefepime for left foot SSTI.   - midline placed    Antibiotics (From admission, onward)            Start     Stop Route Frequency Ordered    07/13/22 0330  vancomycin in dextrose 5 % 1 gram/250 mL IVPB 1,000 mg         -- IV Every 12 hours (non-standard times) 07/12/22 1551    06/15/22 2237  vancomycin - pharmacy to dose  (vancomycin IVPB)        "And" Linked Group Details    -- IV pharmacy to manage frequency 06/15/22 2148        Cultures were taken-   Microbiology Results (last 7 days)     ** No results found for the last 168 hours. **          Slow transit constipation  -continue bowel regimen  -scheduled MiraLax daily    Increased nutritional needs        Ulcer of both feet with fat layer exposed  Plan as above.       HTN (hypertension)  - Reports taking lisinopril and losartan at home.  - Continue lisinopril 10mg PO daily.    Type 2 diabetes mellitus with diabetic peripheral angiopathy without gangrene, with long-term current use of insulin  Hyperlipidemia  - Last A1c 7.8  - On Levemir 50 U qHS and aspart 10 U TID WM at home  - Improving. Continue insulin detemir 55U subQ qHS, insulin aspart 13 U subQ TIDWM, moderate-dose sliding scale insulin aspart 1-10U subQ TIDWM PRN.  - Continue aspirin 81mg PO daily, atorvastatin 40mg PO daily.      VTE Risk Mitigation (From admission, onward)         Ordered     enoxaparin injection 40 mg  Daily         06/16/22 0658     IP VTE HIGH RISK PATIENT  Once         06/15/22 2119     Place sequential compression device  Until discontinued         06/15/22 2119                      I have assessed these finding virtually using telemed platform and with assistance of bedside nurse                 The attending portion of this evaluation, treatment, and documentation was performed per Aminah Ricardo MD via Telemedicine AudioVisual using the secure [employer] software platform with 2 way audio/video. The provider was located " off-site and the patient is located in the hospital. The aforementioned video software was utilized to document the relevant history and physical exam    Aminah Ricardo MD  Department of Hospital Medicine   [facility] ([place])

## 2022-07-15 NOTE — TELEPHONE ENCOUNTER
----- Message from Alma Delia Metcalf sent at 7/15/2022 10:03 AM CDT -----    Discharge planning nurse called regarding patient.  He needs to be set up for f/u appt  Prior patient of Dr. Toussaint, who also saw pt in hospital  Patient has Medicaid Insurance and wont allow me to schedule  Please reach out to  [phone] (Pat) [facility] Discharge Nurse

## 2022-07-18 ENCOUNTER — PATIENT OUTREACH (OUTPATIENT)
Dept: ADMINISTRATIVE | Facility: CLINIC | Age: 57
End: 2022-07-18
Payer: MEDICAID

## 2022-07-18 NOTE — PROGRESS NOTES
C3 nurse spoke with Dave Good for a TCC post hospital discharge follow up call. The patient reports does not have a scheduled HOSFU appointment. C3 nurse was unable to schedule HOSFU appointment for [facility] PCP. Patient advised to contact their PCP to schedule a HOSPFU within 7 days. Patient mentioned that he will call for appointment on Friday.  Message about pain medication.

## 2022-07-19 ENCOUNTER — TELEPHONE (OUTPATIENT)
Dept: INTERNAL MEDICINE | Facility: CLINIC | Age: 57
End: 2022-07-19
Payer: MEDICAID

## 2022-07-19 NOTE — TELEPHONE ENCOUNTER
----- Message from Jessy Young sent at 7/19/2022  2:55 PM CDT -----  Contact: [phone]  Pt's family member called to advise that the pt had a bone infection and was in the hospital for 6 weeks and needs a hospital f/u as soon as possible. Says they can wait in the lobby if necessary. Please Advise

## 2022-07-21 ENCOUNTER — OFFICE VISIT (OUTPATIENT)
Dept: PODIATRY | Facility: CLINIC | Age: 57
End: 2022-07-21
Payer: MEDICAID

## 2022-07-21 VITALS
HEART RATE: 88 BPM | BODY MASS INDEX: 37.27 KG/M2 | WEIGHT: 290.38 LBS | HEIGHT: 74 IN | SYSTOLIC BLOOD PRESSURE: 117 MMHG | DIASTOLIC BLOOD PRESSURE: 81 MMHG

## 2022-07-21 DIAGNOSIS — Z87.898 HISTORY OF ULCERATION: ICD-10-CM

## 2022-07-21 DIAGNOSIS — Z89.431 PARTIAL NONTRAUMATIC AMPUTATION OF FOOT, RIGHT: ICD-10-CM

## 2022-07-21 DIAGNOSIS — M86.9 OSTEOMYELITIS OF RIGHT FOOT, UNSPECIFIED TYPE: ICD-10-CM

## 2022-07-21 DIAGNOSIS — E11.42 TYPE 2 DIABETES MELLITUS WITH DIABETIC POLYNEUROPATHY, UNSPECIFIED WHETHER LONG TERM INSULIN USE: Primary | ICD-10-CM

## 2022-07-21 PROCEDURE — 3051F HG A1C>EQUAL 7.0%<8.0%: CPT | Mod: CPTII,,, | Performed by: PODIATRIST

## 2022-07-21 PROCEDURE — 99999 PR PBB SHADOW E&M-EST. PATIENT-LVL III: ICD-10-PCS | Mod: PBBFAC,,, | Performed by: PODIATRIST

## 2022-07-21 PROCEDURE — 3008F BODY MASS INDEX DOCD: CPT | Mod: CPTII,,, | Performed by: PODIATRIST

## 2022-07-21 PROCEDURE — 3074F SYST BP LT 130 MM HG: CPT | Mod: CPTII,,, | Performed by: PODIATRIST

## 2022-07-21 PROCEDURE — 3051F PR MOST RECENT HEMOGLOBIN A1C LEVEL 7.0 - < 8.0%: ICD-10-PCS | Mod: CPTII,,, | Performed by: PODIATRIST

## 2022-07-21 PROCEDURE — 99213 OFFICE O/P EST LOW 20 MIN: CPT | Mod: PBBFAC,PN | Performed by: PODIATRIST

## 2022-07-21 PROCEDURE — 4010F ACE/ARB THERAPY RXD/TAKEN: CPT | Mod: CPTII,,, | Performed by: PODIATRIST

## 2022-07-21 PROCEDURE — 3079F PR MOST RECENT DIASTOLIC BLOOD PRESSURE 80-89 MM HG: ICD-10-PCS | Mod: CPTII,,, | Performed by: PODIATRIST

## 2022-07-21 PROCEDURE — 1159F MED LIST DOCD IN RCRD: CPT | Mod: CPTII,,, | Performed by: PODIATRIST

## 2022-07-21 PROCEDURE — 3079F DIAST BP 80-89 MM HG: CPT | Mod: CPTII,,, | Performed by: PODIATRIST

## 2022-07-21 PROCEDURE — 99213 OFFICE O/P EST LOW 20 MIN: CPT | Mod: S$PBB,,, | Performed by: PODIATRIST

## 2022-07-21 PROCEDURE — 99213 PR OFFICE/OUTPT VISIT, EST, LEVL III, 20-29 MIN: ICD-10-PCS | Mod: S$PBB,,, | Performed by: PODIATRIST

## 2022-07-21 PROCEDURE — 4010F PR ACE/ARB THEARPY RXD/TAKEN: ICD-10-PCS | Mod: CPTII,,, | Performed by: PODIATRIST

## 2022-07-21 PROCEDURE — 99999 PR PBB SHADOW E&M-EST. PATIENT-LVL III: CPT | Mod: PBBFAC,,, | Performed by: PODIATRIST

## 2022-07-21 PROCEDURE — 1111F DSCHRG MED/CURRENT MED MERGE: CPT | Mod: CPTII,,, | Performed by: PODIATRIST

## 2022-07-21 PROCEDURE — 1159F PR MEDICATION LIST DOCUMENTED IN MEDICAL RECORD: ICD-10-PCS | Mod: CPTII,,, | Performed by: PODIATRIST

## 2022-07-21 PROCEDURE — 1111F PR DISCHARGE MEDS RECONCILED W/ CURRENT OUTPATIENT MED LIST: ICD-10-PCS | Mod: CPTII,,, | Performed by: PODIATRIST

## 2022-07-21 PROCEDURE — 3074F PR MOST RECENT SYSTOLIC BLOOD PRESSURE < 130 MM HG: ICD-10-PCS | Mod: CPTII,,, | Performed by: PODIATRIST

## 2022-07-21 PROCEDURE — 3008F PR BODY MASS INDEX (BMI) DOCUMENTED: ICD-10-PCS | Mod: CPTII,,, | Performed by: PODIATRIST

## 2022-07-21 RX ORDER — AMMONIUM LACTATE 12 G/100G
CREAM TOPICAL
Qty: 140 G | Refills: 11 | Status: SHIPPED | OUTPATIENT
Start: 2022-07-21 | End: 2023-01-03 | Stop reason: ALTCHOICE

## 2022-07-21 NOTE — PROGRESS NOTES
Subjective:      Patient ID: Dave Good is a 56 y.o. male.    Chief Complaint: Wound Check    Patient follows up after discharge from hospital for management of osteomyelitis and wounds both feet.  The wounds are closed today they are wrapped with Ace bandages to help control edema prevent future ulceration.  Today ambulates in surgical shoes bilateral but brings with him diabetic shoes he has not yet tried on.    Review of Systems   Constitutional: Negative for chills, diaphoresis, fever, malaise/fatigue and night sweats.   Cardiovascular: Positive for leg swelling. Negative for claudication, cyanosis and syncope.   Skin: Positive for nail changes. Negative for color change, dry skin, rash, suspicious lesions and unusual hair distribution.   Musculoskeletal: Negative for falls, joint pain, joint swelling, muscle cramps, muscle weakness and stiffness.   Gastrointestinal: Negative for constipation, diarrhea, nausea and vomiting.   Neurological: Positive for numbness and sensory change. Negative for brief paralysis, disturbances in coordination, focal weakness, paresthesias and tremors.           Objective:      Physical Exam  Constitutional:       General: He is not in acute distress.     Appearance: He is well-developed. He is not diaphoretic.   Cardiovascular:      Pulses:           Popliteal pulses are 2+ on the right side and 2+ on the left side.        Dorsalis pedis pulses are 2+ on the right side and 2+ on the left side.        Posterior tibial pulses are 2+ on the right side and 2+ on the left side.      Comments: Capillary refill 3 seconds all toes/distal feet, all toes/both feet warm to touch.      Negative lymphadenopathy bilateral popliteal fossa and tarsal tunnel.      Negavie lower extremity edema bilateral.    Musculoskeletal:      Right ankle: No swelling, deformity, ecchymosis or lacerations. Normal range of motion. Normal pulse.      Right Achilles Tendon: Normal. No defects. Gonzáles's test  negative.      Comments: Partial amputation rays 1 2 right.   Lymphadenopathy:      Lower Body: No right inguinal adenopathy. No left inguinal adenopathy.      Comments: Negative lymphadenopathy bilateral popliteal fossa and tarsal tunnel.    Negative lymphangitic streaking bilateral feet/ankles/legs.   Skin:     General: Skin is warm and dry.      Capillary Refill: Capillary refill takes 2 to 3 seconds.      Coloration: Skin is not pale.      Findings: No abrasion, bruising, burn, ecchymosis, erythema, laceration, lesion or rash.      Nails: There is no clubbing.      Comments:   Patient has hyperkeratosis both feet in the plantar surfaces without open skin drainage pus tracking fluctuance malodor or signs of infection.    Otherwise, Skin thin, shiny, atrophic, with decreased density and distribution of pedal hair bilateral, but without hyperpigmentation, lizy discoloration,  ulcers, masses, nodules or cords palpated bilateral feet and legs.    Neurological:      Mental Status: He is alert and oriented to person, place, and time.      Sensory: Sensory deficit present.      Motor: No tremor, atrophy or abnormal muscle tone.      Gait: Gait normal.      Comments: Diminished/loss of protective sensation all toes bilateral to 10 gram monofilament.    Paresthesias, and burning bilateral feet with no clearly identified trigger or source.     Psychiatric:         Behavior: Behavior is cooperative.               Assessment:       Encounter Diagnoses   Name Primary?    Osteomyelitis of right foot, unspecified type     Type 2 diabetes mellitus with diabetic polyneuropathy, unspecified whether long term insulin use Yes    Partial nontraumatic amputation of foot, right     History of ulceration          Plan:       Dave was seen today for wound check.    Diagnoses and all orders for this visit:    Type 2 diabetes mellitus with diabetic polyneuropathy, unspecified whether long term insulin use  -     DIABETIC SHOES FOR HOME  USE  -      DIABETES FOOT EXAM    Osteomyelitis of right foot, unspecified type  -     Ambulatory referral/consult to Podiatry  -     DIABETIC SHOES FOR HOME USE  -      DIABETES FOOT EXAM    Partial nontraumatic amputation of foot, right  -     DIABETIC SHOES FOR HOME USE  -      DIABETES FOOT EXAM    History of ulceration  -     DIABETIC SHOES FOR HOME USE  -      DIABETES FOOT EXAM    Other orders  -     ammonium lactate 12 % Crea; Apply twice daily to affected parts both feet as needed.      I counseled the patient on his conditions, their implications and medical management.        New prescription diabetic shoes and custom inserts.  The inserts dispensed with the current pair diabetic shoes are in adequate is they are not custom made for the patient's foot they are off the shelf and without molding to the contours of patient's foot warts like this.    Topical like-twice daily to 3 times daily both feet to help control hyperkeratosis prevent cracking and future ulceration.    The patient has received literature on basic diabetic foot care.  Patient will inspect feet daily, wear protective shoe gear when ambulatory, and apply moisturizer to skin as needed to maintain elasticity and help prevent ulceration.    2 weeks          No follow-ups on file.

## 2022-08-03 NOTE — TELEPHONE ENCOUNTER
----- Message from Maritza Casey sent at 8/3/2022  2:08 PM CDT -----  Regarding: Appointment  Contact: Iris/isai [phone]  Calling to schedule appointment to establish care. Please call and schedule

## 2022-08-04 ENCOUNTER — OFFICE VISIT (OUTPATIENT)
Dept: PODIATRY | Facility: CLINIC | Age: 57
End: 2022-08-04
Payer: MEDICAID

## 2022-08-04 VITALS
SYSTOLIC BLOOD PRESSURE: 110 MMHG | DIASTOLIC BLOOD PRESSURE: 77 MMHG | WEIGHT: 290 LBS | BODY MASS INDEX: 37.22 KG/M2 | HEIGHT: 74 IN | HEART RATE: 96 BPM

## 2022-08-04 DIAGNOSIS — E11.42 TYPE 2 DIABETES MELLITUS WITH DIABETIC POLYNEUROPATHY, UNSPECIFIED WHETHER LONG TERM INSULIN USE: ICD-10-CM

## 2022-08-04 DIAGNOSIS — L57.0 KERATOSIS: Primary | ICD-10-CM

## 2022-08-04 DIAGNOSIS — Z87.898 HISTORY OF ULCERATION: ICD-10-CM

## 2022-08-04 DIAGNOSIS — Z89.431 PARTIAL NONTRAUMATIC AMPUTATION OF FOOT, RIGHT: ICD-10-CM

## 2022-08-04 PROCEDURE — 4010F ACE/ARB THERAPY RXD/TAKEN: CPT | Mod: CPTII,,, | Performed by: PODIATRIST

## 2022-08-04 PROCEDURE — 11721 DEBRIDE NAIL 6 OR MORE: CPT | Mod: Q7,PBBFAC,PN | Performed by: PODIATRIST

## 2022-08-04 PROCEDURE — 3008F BODY MASS INDEX DOCD: CPT | Mod: CPTII,,, | Performed by: PODIATRIST

## 2022-08-04 PROCEDURE — 3078F DIAST BP <80 MM HG: CPT | Mod: CPTII,,, | Performed by: PODIATRIST

## 2022-08-04 PROCEDURE — 4010F PR ACE/ARB THEARPY RXD/TAKEN: ICD-10-PCS | Mod: CPTII,,, | Performed by: PODIATRIST

## 2022-08-04 PROCEDURE — 3051F PR MOST RECENT HEMOGLOBIN A1C LEVEL 7.0 - < 8.0%: ICD-10-PCS | Mod: CPTII,,, | Performed by: PODIATRIST

## 2022-08-04 PROCEDURE — 99999 PR PBB SHADOW E&M-EST. PATIENT-LVL III: CPT | Mod: PBBFAC,,, | Performed by: PODIATRIST

## 2022-08-04 PROCEDURE — 3008F PR BODY MASS INDEX (BMI) DOCUMENTED: ICD-10-PCS | Mod: CPTII,,, | Performed by: PODIATRIST

## 2022-08-04 PROCEDURE — 99213 OFFICE O/P EST LOW 20 MIN: CPT | Mod: PBBFAC,PN | Performed by: PODIATRIST

## 2022-08-04 PROCEDURE — 99999 PR PBB SHADOW E&M-EST. PATIENT-LVL III: ICD-10-PCS | Mod: PBBFAC,,, | Performed by: PODIATRIST

## 2022-08-04 PROCEDURE — 3074F SYST BP LT 130 MM HG: CPT | Mod: CPTII,,, | Performed by: PODIATRIST

## 2022-08-04 PROCEDURE — 1111F DSCHRG MED/CURRENT MED MERGE: CPT | Mod: CPTII,,, | Performed by: PODIATRIST

## 2022-08-04 PROCEDURE — 3078F PR MOST RECENT DIASTOLIC BLOOD PRESSURE < 80 MM HG: ICD-10-PCS | Mod: CPTII,,, | Performed by: PODIATRIST

## 2022-08-04 PROCEDURE — 11721 PR DEBRIDEMENT OF NAILS, 6 OR MORE: ICD-10-PCS | Mod: 59,S$PBB,, | Performed by: PODIATRIST

## 2022-08-04 PROCEDURE — 11056 PARNG/CUTG B9 HYPRKR LES 2-4: CPT | Mod: PBBFAC,PN | Performed by: PODIATRIST

## 2022-08-04 PROCEDURE — 3074F PR MOST RECENT SYSTOLIC BLOOD PRESSURE < 130 MM HG: ICD-10-PCS | Mod: CPTII,,, | Performed by: PODIATRIST

## 2022-08-04 PROCEDURE — 1160F RVW MEDS BY RX/DR IN RCRD: CPT | Mod: CPTII,,, | Performed by: PODIATRIST

## 2022-08-04 PROCEDURE — 11056 PR TRIM BENIGN HYPERKERATOTIC SKIN LESION,2-4: ICD-10-PCS | Mod: S$PBB,,, | Performed by: PODIATRIST

## 2022-08-04 PROCEDURE — 1159F PR MEDICATION LIST DOCUMENTED IN MEDICAL RECORD: ICD-10-PCS | Mod: CPTII,,, | Performed by: PODIATRIST

## 2022-08-04 PROCEDURE — 3051F HG A1C>EQUAL 7.0%<8.0%: CPT | Mod: CPTII,,, | Performed by: PODIATRIST

## 2022-08-04 PROCEDURE — 99214 OFFICE O/P EST MOD 30 MIN: CPT | Mod: 25,S$PBB,, | Performed by: PODIATRIST

## 2022-08-04 PROCEDURE — 11721 DEBRIDE NAIL 6 OR MORE: CPT | Mod: 59,S$PBB,, | Performed by: PODIATRIST

## 2022-08-04 PROCEDURE — 1159F MED LIST DOCD IN RCRD: CPT | Mod: CPTII,,, | Performed by: PODIATRIST

## 2022-08-04 PROCEDURE — 1111F PR DISCHARGE MEDS RECONCILED W/ CURRENT OUTPATIENT MED LIST: ICD-10-PCS | Mod: CPTII,,, | Performed by: PODIATRIST

## 2022-08-04 PROCEDURE — 11056 PARNG/CUTG B9 HYPRKR LES 2-4: CPT | Mod: S$PBB,,, | Performed by: PODIATRIST

## 2022-08-04 PROCEDURE — 99214 PR OFFICE/OUTPT VISIT, EST, LEVL IV, 30-39 MIN: ICD-10-PCS | Mod: 25,S$PBB,, | Performed by: PODIATRIST

## 2022-08-04 PROCEDURE — 1160F PR REVIEW ALL MEDS BY PRESCRIBER/CLIN PHARMACIST DOCUMENTED: ICD-10-PCS | Mod: CPTII,,, | Performed by: PODIATRIST

## 2022-08-04 RX ORDER — KETOCONAZOLE 20 MG/G
CREAM TOPICAL
Status: ON HOLD | COMMUNITY
Start: 2022-06-13 | End: 2023-09-14 | Stop reason: HOSPADM

## 2022-08-04 RX ORDER — DEXTROSE 4 G
TABLET,CHEWABLE ORAL
Status: ON HOLD | COMMUNITY
Start: 2022-06-02 | End: 2022-12-06 | Stop reason: HOSPADM

## 2022-08-04 RX ORDER — POLYETHYLENE GLYCOL 3350 17 G/17G
POWDER, FOR SOLUTION ORAL
Status: ON HOLD | COMMUNITY
Start: 2022-04-06 | End: 2023-09-20 | Stop reason: HOSPADM

## 2022-08-04 RX ORDER — PEN NEEDLE, DIABETIC 29 G X1/2"
NEEDLE, DISPOSABLE MISCELLANEOUS
Status: ON HOLD | COMMUNITY
Start: 2022-04-26 | End: 2022-12-06 | Stop reason: HOSPADM

## 2022-08-04 RX ORDER — UREA 450 MG/G
1 CREAM TOPICAL 2 TIMES DAILY
Qty: 255 G | Refills: 11 | Status: SHIPPED | OUTPATIENT
Start: 2022-08-04 | End: 2023-03-08

## 2022-08-04 NOTE — PROGRESS NOTES
Subjective:      Patient ID: Dave Good is a 56 y.o. male.    Chief Complaint: Foot Ulcer (Wound)    Patient follows up after closing wounds both feet from hospital for management of osteomyelitis and wounds both feet.  The wounds are closed today they are wrapped with Ace bandages to help control edema prevent future ulceration.  Today ambulates in surgical shoes bilateral but brings with him diabetic shoes he has not yet tried on.    Did not pursue DM shoes/inserts    Could not fill lac hydrin - not covered by insurance.    Review of Systems   Constitutional: Negative for chills, diaphoresis, fever, malaise/fatigue and night sweats.   Cardiovascular: Positive for leg swelling. Negative for claudication, cyanosis and syncope.   Skin: Positive for nail changes. Negative for color change, dry skin, rash, suspicious lesions and unusual hair distribution.   Musculoskeletal: Negative for falls, joint pain, joint swelling, muscle cramps, muscle weakness and stiffness.   Gastrointestinal: Negative for constipation, diarrhea, nausea and vomiting.   Neurological: Positive for numbness and sensory change. Negative for brief paralysis, disturbances in coordination, focal weakness, paresthesias and tremors.           Objective:      Physical Exam  Constitutional:       General: He is not in acute distress.     Appearance: He is well-developed. He is not diaphoretic.   Cardiovascular:      Pulses:           Popliteal pulses are 2+ on the right side and 2+ on the left side.        Dorsalis pedis pulses are 2+ on the right side and 2+ on the left side.        Posterior tibial pulses are 2+ on the right side and 2+ on the left side.      Comments: Capillary refill 3 seconds all toes/distal feet, all toes/both feet warm to touch.      Negative lymphadenopathy bilateral popliteal fossa and tarsal tunnel.      Negavie lower extremity edema bilateral.    Musculoskeletal:      Right ankle: No swelling, deformity, ecchymosis or  lacerations. Normal range of motion. Normal pulse.      Right Achilles Tendon: Normal. No defects. Gonzáles's test negative.      Comments: Partial amputation rays 1 2 right.   Lymphadenopathy:      Lower Body: No right inguinal adenopathy. No left inguinal adenopathy.      Comments: Negative lymphadenopathy bilateral popliteal fossa and tarsal tunnel.    Negative lymphangitic streaking bilateral feet/ankles/legs.   Skin:     General: Skin is warm and dry.      Capillary Refill: Capillary refill takes 2 to 3 seconds.      Coloration: Skin is not pale.      Findings: No abrasion, bruising, burn, ecchymosis, erythema, laceration, lesion or rash.      Nails: There is no clubbing.      Comments:   Patient has hyperkeratosis both feet in the plantar surfaces without open skin drainage pus tracking fluctuance malodor or signs of infection.    Otherwise, Skin thin, shiny, atrophic, with decreased density and distribution of pedal hair bilateral, but without hyperpigmentation, lizy discoloration,  ulcers, masses, nodules or cords palpated bilateral feet and legs.    Neurological:      Mental Status: He is alert and oriented to person, place, and time.      Sensory: Sensory deficit present.      Motor: No tremor, atrophy or abnormal muscle tone.      Gait: Gait normal.      Comments: Diminished/loss of protective sensation all toes bilateral to 10 gram monofilament.    Paresthesias, and burning bilateral feet with no clearly identified trigger or source.     Psychiatric:         Behavior: Behavior is cooperative.               Assessment:       Encounter Diagnoses   Name Primary?    Keratosis Yes    History of ulceration     Type 2 diabetes mellitus with diabetic polyneuropathy, unspecified whether long term insulin use     Partial nontraumatic amputation of foot, right          Plan:       Dave was seen today for foot ulcer.    Diagnoses and all orders for this visit:    Keratosis  -     urea 45 % Crea; Apply 1  application topically 2 (two) times daily.    History of ulceration  -     urea 45 % Crea; Apply 1 application topically 2 (two) times daily.    Type 2 diabetes mellitus with diabetic polyneuropathy, unspecified whether long term insulin use  -     urea 45 % Crea; Apply 1 application topically 2 (two) times daily.    Partial nontraumatic amputation of foot, right  -     urea 45 % Crea; Apply 1 application topically 2 (two) times daily.      I counseled the patient on his conditions, their implications and medical management.  With the patient's permission, I debrided all ten toenails with a sterile nipper and curette, removing all offending nail and debris.  Patient tolerated the procedure well and related significant relief.    With the patient's permission, I debrided hyperkeratotic lesion(s) as above totaling       3         to, not  Including dermis with sterile #15 blade.  Patient tolerated the procedure well and related significant relief.          Fill new prescription diabetic shoes and custom inserts.  The inserts dispensed with the current pair diabetic shoes are in adequate is they are not custom made for the patient's foot they are off the shelf and without molding to the contours of patient's foot warts like this.    Rx Urea cream twice daily to 3 times daily both feet to help control hyperkeratosis prevent cracking and future ulceration.    The patient has received literature on basic diabetic foot care.  Patient will inspect feet daily, wear protective shoe gear when ambulatory, and apply moisturizer to skin as needed to maintain elasticity and help prevent ulceration.    prn          No follow-ups on file.

## 2022-10-20 ENCOUNTER — OFFICE VISIT (OUTPATIENT)
Dept: SURGERY | Facility: CLINIC | Age: 57
End: 2022-10-20
Attending: SPECIALIST
Payer: MEDICAID

## 2022-10-20 VITALS
HEIGHT: 74 IN | BODY MASS INDEX: 34.65 KG/M2 | DIASTOLIC BLOOD PRESSURE: 80 MMHG | SYSTOLIC BLOOD PRESSURE: 132 MMHG | OXYGEN SATURATION: 97 % | WEIGHT: 270 LBS | HEART RATE: 85 BPM

## 2022-10-20 DIAGNOSIS — L89.893 PRESSURE INJURY OF LEFT FOOT, STAGE 3: Primary | ICD-10-CM

## 2022-10-20 PROCEDURE — 3075F PR MOST RECENT SYSTOLIC BLOOD PRESS GE 130-139MM HG: ICD-10-PCS | Mod: CPTII,,, | Performed by: SPECIALIST

## 2022-10-20 PROCEDURE — 11045 DEBRIDEMENT: ICD-10-PCS | Mod: S$PBB,,, | Performed by: SPECIALIST

## 2022-10-20 PROCEDURE — 1159F MED LIST DOCD IN RCRD: CPT | Mod: CPTII,,, | Performed by: SPECIALIST

## 2022-10-20 PROCEDURE — 3079F PR MOST RECENT DIASTOLIC BLOOD PRESSURE 80-89 MM HG: ICD-10-PCS | Mod: CPTII,,, | Performed by: SPECIALIST

## 2022-10-20 PROCEDURE — 99499 NO LOS: ICD-10-PCS | Mod: S$PBB,,, | Performed by: SPECIALIST

## 2022-10-20 PROCEDURE — 11042 DBRDMT SUBQ TIS 1ST 20SQCM/<: CPT | Mod: S$PBB,,, | Performed by: SPECIALIST

## 2022-10-20 PROCEDURE — 3051F PR MOST RECENT HEMOGLOBIN A1C LEVEL 7.0 - < 8.0%: ICD-10-PCS | Mod: CPTII,,, | Performed by: SPECIALIST

## 2022-10-20 PROCEDURE — 11045 DBRDMT SUBQ TISS EACH ADDL: CPT | Mod: PBBFAC | Performed by: SPECIALIST

## 2022-10-20 PROCEDURE — 3051F HG A1C>EQUAL 7.0%<8.0%: CPT | Mod: CPTII,,, | Performed by: SPECIALIST

## 2022-10-20 PROCEDURE — 1159F PR MEDICATION LIST DOCUMENTED IN MEDICAL RECORD: ICD-10-PCS | Mod: CPTII,,, | Performed by: SPECIALIST

## 2022-10-20 PROCEDURE — 99214 OFFICE O/P EST MOD 30 MIN: CPT | Mod: PBBFAC,25 | Performed by: SPECIALIST

## 2022-10-20 PROCEDURE — 3079F DIAST BP 80-89 MM HG: CPT | Mod: CPTII,,, | Performed by: SPECIALIST

## 2022-10-20 PROCEDURE — 11042 DBRDMT SUBQ TIS 1ST 20SQCM/<: CPT | Mod: PBBFAC | Performed by: SPECIALIST

## 2022-10-20 PROCEDURE — 99499 UNLISTED E&M SERVICE: CPT | Mod: S$PBB,,, | Performed by: SPECIALIST

## 2022-10-20 PROCEDURE — 4010F PR ACE/ARB THEARPY RXD/TAKEN: ICD-10-PCS | Mod: CPTII,,, | Performed by: SPECIALIST

## 2022-10-20 PROCEDURE — 4010F ACE/ARB THERAPY RXD/TAKEN: CPT | Mod: CPTII,,, | Performed by: SPECIALIST

## 2022-10-20 PROCEDURE — 99999 PR PBB SHADOW E&M-EST. PATIENT-LVL IV: CPT | Mod: PBBFAC,,, | Performed by: SPECIALIST

## 2022-10-20 PROCEDURE — 3075F SYST BP GE 130 - 139MM HG: CPT | Mod: CPTII,,, | Performed by: SPECIALIST

## 2022-10-20 PROCEDURE — 11042 PR DEBRIDEMENT, SKIN, SUB-Q TISSUE,=<20 SQ CM: ICD-10-PCS | Mod: S$PBB,,, | Performed by: SPECIALIST

## 2022-10-20 PROCEDURE — 99999 PR PBB SHADOW E&M-EST. PATIENT-LVL IV: ICD-10-PCS | Mod: PBBFAC,,, | Performed by: SPECIALIST

## 2022-10-20 PROCEDURE — 1160F PR REVIEW ALL MEDS BY PRESCRIBER/CLIN PHARMACIST DOCUMENTED: ICD-10-PCS | Mod: CPTII,,, | Performed by: SPECIALIST

## 2022-10-20 PROCEDURE — 1160F RVW MEDS BY RX/DR IN RCRD: CPT | Mod: CPTII,,, | Performed by: SPECIALIST

## 2022-10-20 NOTE — PROCEDURES
"Dave Good is a 57 y.o. male patient.    Pulse: 85 (10/20/22 1516)  BP: 132/80 (10/20/22 1516)  SpO2: 97 % (10/20/22 1516)  Weight: 122.5 kg (270 lb) (10/20/22 1516)  Height: 6' 2" (188 cm) (10/20/22 1516)       Procedures    10/20/2022    " Patient did not go to school today, still can't hear out of his ear and can barely talk because of the sore throat

## 2022-10-20 NOTE — PROGRESS NOTES
57-year-old male with longstanding diabetes and diabetic neuropathy.  Patient with diabetic ulcer left foot.  Necrotic material along 2nd and 3rd metatarsals.  Wound needs debridement

## 2022-10-20 NOTE — PROCEDURES
"Debridement    Date/Time: 10/20/2022 3:15 PM  Performed by: Jesus Flores Jr., MD  Authorized by: Jesus Flores Jr., MD     Time out: Immediately prior to procedure a "time out" was called to verify the correct patient, procedure, equipment, support staff and site/side marked as required.    Consent Done?:  Yes (Verbal)    Preparation: Patient was prepped and draped in usual sterile fashion    Local anesthesia used?: No      Wound Details:    Location:  Left foot    Location:  Left 3rd Toe    Type of Debridement:  Excisional       Length (cm):  5       Area (sq cm):  25       Width (cm):  5       Percent Debrided (%):  100       Depth (cm):  0.6       Total Area Debrided (sq cm):  25    Depth of debridement:  Subcutaneous tissue    Tissue debrided:  Subcutaneous    Devitalized tissue debrided:  Callus, Necrotic/Eschar and Slough    Instruments:  Forceps and Scissors    Bleeding:  Moderate  Hemostasis Achieved: Yes    Method Used:  Pressure  "

## 2022-10-21 ENCOUNTER — PATIENT MESSAGE (OUTPATIENT)
Dept: SURGERY | Facility: CLINIC | Age: 57
End: 2022-10-21
Payer: MEDICAID

## 2022-10-31 ENCOUNTER — OFFICE VISIT (OUTPATIENT)
Dept: SURGERY | Facility: CLINIC | Age: 57
End: 2022-10-31
Attending: SPECIALIST
Payer: MEDICAID

## 2022-10-31 VITALS
SYSTOLIC BLOOD PRESSURE: 148 MMHG | HEIGHT: 74 IN | WEIGHT: 270 LBS | OXYGEN SATURATION: 98 % | DIASTOLIC BLOOD PRESSURE: 91 MMHG | BODY MASS INDEX: 34.65 KG/M2 | HEART RATE: 83 BPM

## 2022-10-31 DIAGNOSIS — S91.301A OPEN WOUND OF RIGHT FOOT, INITIAL ENCOUNTER: Primary | ICD-10-CM

## 2022-10-31 PROCEDURE — 4010F ACE/ARB THERAPY RXD/TAKEN: CPT | Mod: CPTII,,, | Performed by: SPECIALIST

## 2022-10-31 PROCEDURE — 3077F SYST BP >= 140 MM HG: CPT | Mod: CPTII,,, | Performed by: SPECIALIST

## 2022-10-31 PROCEDURE — 4010F PR ACE/ARB THEARPY RXD/TAKEN: ICD-10-PCS | Mod: CPTII,,, | Performed by: SPECIALIST

## 2022-10-31 PROCEDURE — 1160F PR REVIEW ALL MEDS BY PRESCRIBER/CLIN PHARMACIST DOCUMENTED: ICD-10-PCS | Mod: CPTII,,, | Performed by: SPECIALIST

## 2022-10-31 PROCEDURE — 3080F PR MOST RECENT DIASTOLIC BLOOD PRESSURE >= 90 MM HG: ICD-10-PCS | Mod: CPTII,,, | Performed by: SPECIALIST

## 2022-10-31 PROCEDURE — 1159F MED LIST DOCD IN RCRD: CPT | Mod: CPTII,,, | Performed by: SPECIALIST

## 2022-10-31 PROCEDURE — 1159F PR MEDICATION LIST DOCUMENTED IN MEDICAL RECORD: ICD-10-PCS | Mod: CPTII,,, | Performed by: SPECIALIST

## 2022-10-31 PROCEDURE — 99999 PR PBB SHADOW E&M-EST. PATIENT-LVL IV: ICD-10-PCS | Mod: PBBFAC,,, | Performed by: SPECIALIST

## 2022-10-31 PROCEDURE — 1160F RVW MEDS BY RX/DR IN RCRD: CPT | Mod: CPTII,,, | Performed by: SPECIALIST

## 2022-10-31 PROCEDURE — 99212 OFFICE O/P EST SF 10 MIN: CPT | Mod: S$PBB,,, | Performed by: SPECIALIST

## 2022-10-31 PROCEDURE — 3077F PR MOST RECENT SYSTOLIC BLOOD PRESSURE >= 140 MM HG: ICD-10-PCS | Mod: CPTII,,, | Performed by: SPECIALIST

## 2022-10-31 PROCEDURE — 3051F PR MOST RECENT HEMOGLOBIN A1C LEVEL 7.0 - < 8.0%: ICD-10-PCS | Mod: CPTII,,, | Performed by: SPECIALIST

## 2022-10-31 PROCEDURE — 99212 PR OFFICE/OUTPT VISIT, EST, LEVL II, 10-19 MIN: ICD-10-PCS | Mod: S$PBB,,, | Performed by: SPECIALIST

## 2022-10-31 PROCEDURE — 3051F HG A1C>EQUAL 7.0%<8.0%: CPT | Mod: CPTII,,, | Performed by: SPECIALIST

## 2022-10-31 PROCEDURE — 99999 PR PBB SHADOW E&M-EST. PATIENT-LVL IV: CPT | Mod: PBBFAC,,, | Performed by: SPECIALIST

## 2022-10-31 PROCEDURE — 99214 OFFICE O/P EST MOD 30 MIN: CPT | Mod: PBBFAC | Performed by: SPECIALIST

## 2022-10-31 PROCEDURE — 3080F DIAST BP >= 90 MM HG: CPT | Mod: CPTII,,, | Performed by: SPECIALIST

## 2022-10-31 NOTE — PROGRESS NOTES
Patient with history of longstanding diabetes and neuropathy.  Status post bilateral foot surgery   No evidence of active infection, no cellulitis    Plan   Local care   RTC 3 weeks

## 2022-11-01 ENCOUNTER — PATIENT MESSAGE (OUTPATIENT)
Dept: SURGERY | Facility: CLINIC | Age: 57
End: 2022-11-01
Payer: MEDICAID

## 2022-11-08 ENCOUNTER — HOSPITAL ENCOUNTER (OUTPATIENT)
Dept: WOUND CARE | Facility: HOSPITAL | Age: 57
Discharge: HOME | End: 2022-11-08
Attending: SURGERY
Payer: MEDICAID

## 2022-11-08 VITALS
HEIGHT: 74 IN | BODY MASS INDEX: 34.65 KG/M2 | WEIGHT: 270 LBS | DIASTOLIC BLOOD PRESSURE: 79 MMHG | TEMPERATURE: 98 F | SYSTOLIC BLOOD PRESSURE: 126 MMHG | HEART RATE: 71 BPM

## 2022-11-08 DIAGNOSIS — L97.512 RIGHT FOOT ULCER, WITH FAT LAYER EXPOSED: Primary | ICD-10-CM

## 2022-11-08 DIAGNOSIS — I10 PRIMARY HYPERTENSION: ICD-10-CM

## 2022-11-08 DIAGNOSIS — E66.09 CLASS 1 OBESITY DUE TO EXCESS CALORIES WITH SERIOUS COMORBIDITY AND BODY MASS INDEX (BMI) OF 34.0 TO 34.9 IN ADULT: ICD-10-CM

## 2022-11-08 DIAGNOSIS — E11.51 TYPE 2 DIABETES MELLITUS WITH DIABETIC PERIPHERAL ANGIOPATHY WITHOUT GANGRENE, WITH LONG-TERM CURRENT USE OF INSULIN: ICD-10-CM

## 2022-11-08 DIAGNOSIS — Z79.82 ASPIRIN LONG-TERM USE: ICD-10-CM

## 2022-11-08 DIAGNOSIS — Z79.4 TYPE 2 DIABETES MELLITUS WITH DIABETIC PERIPHERAL ANGIOPATHY WITHOUT GANGRENE, WITH LONG-TERM CURRENT USE OF INSULIN: ICD-10-CM

## 2022-11-08 DIAGNOSIS — L89.893 PRESSURE INJURY OF LEFT FOOT, STAGE 3: ICD-10-CM

## 2022-11-08 PROCEDURE — 99204 OFFICE O/P NEW MOD 45 MIN: CPT | Mod: 25

## 2022-11-08 PROCEDURE — 11042 DBRDMT SUBQ TIS 1ST 20SQCM/<: CPT

## 2022-11-08 NOTE — PROGRESS NOTES
"                     [facility]    Subjective:       Patient ID: Dave Good is a 57 y.o. male.    Chief Complaint: Diabetic Foot Ulcer and Wound Consult    11/8/2022   Consult to Dr Walker related to right and left foot diabetic ulcer.  Patient's fiance states "the right foot formed a crack and then a blister about a month ago", and the left foot "started about a month ago, we aren't sure how it started".  Both wounds are being cleaned with NS, warm water and mild soap, then having ammonium lactate lotion placed and covering with gauze and securing with an ACE wrap 2 to three times per week.  Left leg CELESTE 1.30, right leg CELESTE 1.25.  <3s cap refill to bilateral feet, tingling present to both, patient has history of neuropathy.  Patient tolerated sharp debridement well.  Plan of care established.  2 XL Darcos provided. Follow up with Dr Walker in 1 week on 11/15/2022, nurse visit on 11/11/22 for dressing change.    Review of Systems   All systems were reviewed and are negative, except that mentioned in the HPI.    Objective:      Physical Exam  Constitutional:       Appearance: He is well-developed.   HENT:      Head: Normocephalic and atraumatic.   Eyes:      Pupils: Pupils are equal, round, and reactive to light.   Cardiovascular:      Rate and Rhythm: Normal rate.   Pulmonary:      Effort: Pulmonary effort is normal. No respiratory distress.      Breath sounds: No stridor.   Abdominal:      General: There is no distension.   Musculoskeletal:      Cervical back: Normal range of motion.   Skin:     Comments: See Synopsis for wound details   Neurological:      Mental Status: He is alert and oriented to person, place, and time.          Altered Skin Integrity 11/08/22 1300 Left plantar Foot Diabetic Ulcer Full thickness tissue loss. Subcutaneous fat may be visible but bone, tendon or muscle are not exposed (Active)   11/08/22 1300   Altered Skin Integrity Present on Admission: yes "   Side: Left   Orientation: plantar   Location: Foot   Wound Number:    Is this injury device related?: No   Primary Wound Type: Diabetic ulc   Description of Altered Skin Integrity: Full thickness tissue loss. Subcutaneous fat may be visible but bone, tendon or muscle are not exposed   Ankle-Brachial Index: 1.30   Pulses:    Removal Indication and Assessment:    Wound Outcome:    (Retired) Wound Length (cm):    (Retired) Wound Width (cm):    (Retired) Depth (cm):    Wound Description (Comments):    Removal Indications:    Wound Image    11/08/22 1300   Dressing Appearance Intact;Moist drainage 11/08/22 1300   Drainage Amount Moderate 11/08/22 1300   Drainage Characteristics/Odor Serosanguineous 11/08/22 1300   Appearance Red;Moist 11/08/22 1300   Tissue loss description Full thickness 11/08/22 1300   Red (%), Wound Tissue Color 100 % 11/08/22 1300   Periwound Area Dry;Blistered 11/08/22 1300   Wound Edges Open 11/08/22 1300   Patrick Classification (diabetic foot ulcers only) Grade 2 11/08/22 1300   Wound Length (cm) 1.2 cm 11/08/22 1300   Wound Width (cm) 0.5 cm 11/08/22 1300   Wound Depth (cm) 0.7 cm 11/08/22 1300   Wound Volume (cm^3) 0.42 cm^3 11/08/22 1300   Wound Surface Area (cm^2) 0.6 cm^2 11/08/22 1300   Undermining (depth (cm)/location) 0.2cm@1-5oclock 11/08/22 1300   Care Cleansed with:;Soap and water;Sterile normal saline 11/08/22 1300   Dressing Applied;Silver;Collagen;Foam;Cast padding 11/08/22 1300   Periwound Care Moisturizer applied 11/08/22 1300   Dressing Change Due 11/11/22 11/08/22 1300            Altered Skin Integrity 11/08/22 1300 Right plantar Foot Diabetic Ulcer (Active)   11/08/22 1300   Altered Skin Integrity Present on Admission: yes   Side: Right   Orientation: plantar   Location: Foot   Wound Number:    Is this injury device related?:    Primary Wound Type: Diabetic ulc   Description of Altered Skin Integrity:    Ankle-Brachial Index: 1.25   Pulses:    Removal Indication and  Assessment:    Wound Outcome:    (Retired) Wound Length (cm):    (Retired) Wound Width (cm):    (Retired) Depth (cm):    Wound Description (Comments):    Removal Indications:    Wound Image    11/08/22 1300   Dressing Appearance Intact;Moist drainage 11/08/22 1300   Drainage Amount Moderate 11/08/22 1300   Drainage Characteristics/Odor Serosanguineous 11/08/22 1300   Appearance Yellow;Moist 11/08/22 1300   Tissue loss description Partial thickness 11/08/22 1300   Yellow (%), Wound Tissue Color 100 % 11/08/22 1300   Periwound Area Dry;Intact 11/08/22 1300   Wound Edges Open 11/08/22 1300   Patrick Classification (diabetic foot ulcers only) Grade 1 11/08/22 1300   Wound Length (cm) 2.2 cm 11/08/22 1300   Wound Width (cm) 1.2 cm 11/08/22 1300   Wound Depth (cm) 0 cm 11/08/22 1300   Wound Volume (cm^3) 0 cm^3 11/08/22 1300   Wound Surface Area (cm^2) 2.64 cm^2 11/08/22 1300   Undermining (depth (cm)/location) 0.6cm@3-9oclock 11/08/22 1300   Care Cleansed with:;Soap and water;Sterile normal saline 11/08/22 1300   Dressing Applied;Hydrofiber;Foam;Cast padding 11/08/22 1300   Periwound Care Moisturizer applied 11/08/22 1300   Off Loading Off loading shoe;Football dressing 11/08/22 1300   Dressing Change Due 11/11/22 11/08/22 1300     Lab Results   Component Value Date    HGBA1C 7.8 (H) 05/27/2022           Assessment/Plan:         ICD-10-CM ICD-9-CM   1. Right foot ulcer, with fat layer exposed  L97.512 707.15   2. Pressure injury of left foot, stage 3  L89.893 707.09     707.23   3. Type 2 diabetes mellitus with diabetic peripheral angiopathy without gangrene, with long-term current use of insulin  E11.51 250.70    Z79.4 443.81     V58.67   4. Primary hypertension  I10 401.9   5. Class 1 obesity due to excess calories with serious comorbidity and body mass index (BMI) of 34.0 to 34.9 in adult  E66.09 278.00    Z68.34 V85.34   6. Aspirin long-term use  Z79.82 V58.66           Tissue pathology and/or culture taken:  [] Yes  [x] No   Sharp debridement performed:   [x] Yes [] No   Labs ordered this visit:   [] Yes [x] No   Imaging ordered this visit:   [] Yes [x] No           Orders Placed This Encounter   Procedures    Ambulatory referral/consult to Wound Clinic     Standing Status:   Standing     Number of Occurrences:   1     Referral Priority:   Routine     Referral Type:   Consultation     Referral Reason:   Specialty Services Required     Requested Specialty:   Wound Care     Number of Visits Requested:   1    Change dressing     Left plantar diabetic ulcer    Cleanse wound with: NS  Lidocaine: PRN  Silver nitrate: PRN  Periwound care: gentian violet  Primary dressing: Bety  Secondary dressing: football dressin otf foam to wound, 2 otf foam to heel, 3 cast padding, mefix tape, flexinetting, blue bootie  Offloading: football dressing  Edema control: elevate bilateral legs when able      Right plantar diabetic ulcer    Cleanse wound with: NS  Lidocaine: PRN  Silver nitrate: PRN  Periwound care: gentian violet  Primary dressing: Hydrofera ready  Secondary dressing: football dressin otf foam to wound, 2 otf foam to heel, 3 cast padding, mefix tape, flexinetting, blue bootie  Offloading: football dressing  Edema control: elevate bilateral legs when able    Frequency: weekly  Follow-up: in 1 week with Dr Walker 11/15/22, nurse visit 22 for dressing change  Other order: 2 XL Darco shoes provided      R foot debrided.  L foot appears to be a deep tissue injury.  Offload wounds.  Darco shoe x 2 given.  Wound care ordered.  Control BG and take meds as directed.  All questions were answered.  Follow up in 1 week (on 11/15/2022) for Dr Walker.

## 2022-11-11 ENCOUNTER — HOSPITAL ENCOUNTER (OUTPATIENT)
Dept: WOUND CARE | Facility: HOSPITAL | Age: 57
Discharge: HOME | End: 2022-11-11
Attending: SURGERY
Payer: MEDICAID

## 2022-11-11 DIAGNOSIS — L97.522 ULCERATED, FOOT, LEFT, WITH FAT LAYER EXPOSED: Primary | ICD-10-CM

## 2022-11-11 PROCEDURE — 99213 OFFICE O/P EST LOW 20 MIN: CPT

## 2022-11-11 NOTE — PROGRESS NOTES
[facility]    Subjective:       Patient ID: Dave Good is a 57 y.o. male.    Chief Complaint: Pressure Ulcer (Right Foot Ulcer)    11/11/2022: Patient visited the Wound Care Clinic for a Nurse Visit . Wounds to Left and Right Plantar Foot were assessed, dressings changed and treatment done per present orders. Tolerated it well and instructed to follow up with Wound Care  Clinic in a week.   Review of Systems      Objective:      Physical Exam       Altered Skin Integrity 11/08/22 1300 Left plantar Foot Diabetic Ulcer Full thickness tissue loss. Subcutaneous fat may be visible but bone, tendon or muscle are not exposed (Active)   11/08/22 1300   Altered Skin Integrity Present on Admission: yes   Side: Left   Orientation: plantar   Location: Foot   Wound Number:    Is this injury device related?: No   Primary Wound Type: Diabetic ulc   Description of Altered Skin Integrity: Full thickness tissue loss. Subcutaneous fat may be visible but bone, tendon or muscle are not exposed   Ankle-Brachial Index: 1.30   Pulses:    Removal Indication and Assessment:    Wound Outcome:    (Retired) Wound Length (cm):    (Retired) Wound Width (cm):    (Retired) Depth (cm):    Wound Description (Comments):    Removal Indications:    Dressing Appearance Dry;Intact 11/11/22 1646   Drainage Amount Scant 11/11/22 1646   Appearance Pink;Tan;Yellow;Red 11/11/22 1646   Tissue loss description Full thickness 11/11/22 1646   Red (%), Wound Tissue Color 70 % 11/11/22 1646   Yellow (%), Wound Tissue Color 30 % 11/11/22 1646   Periwound Area Intact;Dry 11/11/22 1646   Wound Edges Irregular;Callused 11/11/22 1646   Care Cleansed with:;Soap and water;Sterile normal saline 11/11/22 1646   Dressing Applied;Cast padding;Foam;Collagen 11/11/22 1646   Periwound Care Moisturizer applied 11/11/22 1646   Off Loading Off loading shoe;Football dressing 11/11/22 1646   Dressing Change Due 11/18/22 11/11/22  1646            Altered Skin Integrity 11/08/22 1300 Right plantar Foot Diabetic Ulcer (Active)   11/08/22 1300   Altered Skin Integrity Present on Admission: yes   Side: Right   Orientation: plantar   Location: Foot   Wound Number:    Is this injury device related?:    Primary Wound Type: Diabetic ulc   Description of Altered Skin Integrity:    Ankle-Brachial Index: 1.25   Pulses:    Removal Indication and Assessment:    Wound Outcome:    (Retired) Wound Length (cm):    (Retired) Wound Width (cm):    (Retired) Depth (cm):    Wound Description (Comments):    Removal Indications:    Description of Altered Skin Integrity Full thickness tissue loss. Subcutaneous fat may be visible but bone, tendon or muscle are not exposed 11/11/22 1646   Dressing Appearance Dry;Intact 11/11/22 1646   Drainage Amount Scant 11/11/22 1646   Drainage Characteristics/Odor Serosanguineous 11/11/22 1646   Appearance Red;Pink;Intact;Moist 11/11/22 1646   Tissue loss description Full thickness 11/11/22 1646   Red (%), Wound Tissue Color 100 % 11/11/22 1646   Periwound Area Intact;Dry 11/11/22 1646   Wound Edges Callused;Defined 11/11/22 1646   Care Cleansed with:;Soap and water;Sterile normal saline 11/11/22 1646   Dressing Applied;Hydrofiber;Foam;Cast padding 11/11/22 1646   Periwound Care Moisturizer applied 11/11/22 1646   Off Loading Football dressing;Off loading shoe 11/11/22 1646   Dressing Change Due 11/18/22 11/11/22 1646         Assessment/Plan:         ICD-10-CM ICD-9-CM   1. Ulcerated, foot, left, with fat layer exposed  L97.522 707.15           Tissue pathology and/or culture taken:  [] Yes [x] No   Sharp debridement performed:   [] Yes [x] No   Labs ordered this visit:   [] Yes [x] No   Imaging ordered this visit:   [] Yes [x] No           Orders Placed This Encounter   Procedures    Change dressing     Left plantar diabetic ulcer     Cleanse wound with: NS   Lidocaine: PRN   Silver nitrate: PRN   Periwound care: gentian violet    Primary dressing: Bety   Secondary dressing: football dressin otf foam to wound, 2 otf foam to heel, 3 cast padding, mefix tape, flexinetting, blue bootie   Offloading: football dressing   Edema control: elevate bilateral legs when able       Right plantar diabetic ulcer     Cleanse wound with: NS   Lidocaine: PRN   Silver nitrate: PRN   Periwound care: gentian violet   Primary dressing: Hydrofera ready   Secondary dressing: football dressin otf foam to wound, 2 otf foam to heel, 3 cast padding, mefix tape, flexinetting, blue bootie   Offloading: football dressing   Edema control: elevate bilateral legs when able     Frequency: weekly   Follow-up: in 1 week with Dr Walker 11/15/22, nurse visit 22 for dressing change   Other order: 2 XL Darco shoes provided        No follow-ups on file.

## 2022-11-11 NOTE — PROCEDURES
"Debridement    Date/Time: 11/8/2022 11:48 AM  Performed by: Miracle Walker DO  Authorized by: Miracle Walker DO     Time out: Immediately prior to procedure a "time out" was called to verify the correct patient, procedure, equipment, support staff and site/side marked as required.    Consent Done?:  Yes (Written)  Local anesthesia used?: Yes    Local anesthetic:  Topical anesthetic    Wound Details:    Location:  Right foot    Type of Debridement:  Excisional       Length (cm):  2.2       Area (sq cm):  2.64       Width (cm):  1.2       Percent Debrided (%):  100       Depth (cm):  0.2       Total Area Debrided (sq cm):  2.64    Depth of debridement:  Subcutaneous tissue    Tissue debrided:  Subcutaneous, Dermis and Epidermis    Devitalized tissue debrided:  Slough, Fibrin and Biofilm    Instruments:  Forceps and Scissors    Bleeding:  Minimal  Hemostasis Achieved: Yes    Method Used:  Pressure  Patient tolerance:  Patient tolerated the procedure well with no immediate complications  "

## 2022-11-22 ENCOUNTER — OFFICE VISIT (OUTPATIENT)
Dept: SURGERY | Facility: CLINIC | Age: 57
DRG: 629 | End: 2022-11-22
Attending: SPECIALIST
Payer: MEDICAID

## 2022-11-22 ENCOUNTER — HOSPITAL ENCOUNTER (INPATIENT)
Facility: OTHER | Age: 57
LOS: 15 days | Discharge: NURSING, REHAB OR LONG-TERM CARE | DRG: 629 | End: 2022-12-07
Attending: EMERGENCY MEDICINE | Admitting: STUDENT IN AN ORGANIZED HEALTH CARE EDUCATION/TRAINING PROGRAM
Payer: MEDICAID

## 2022-11-22 VITALS
SYSTOLIC BLOOD PRESSURE: 145 MMHG | OXYGEN SATURATION: 97 % | DIASTOLIC BLOOD PRESSURE: 78 MMHG | HEIGHT: 74 IN | HEART RATE: 84 BPM | BODY MASS INDEX: 34.65 KG/M2 | WEIGHT: 270 LBS

## 2022-11-22 DIAGNOSIS — S91.109A OPEN WOUND OF TOE, INITIAL ENCOUNTER: ICD-10-CM

## 2022-11-22 DIAGNOSIS — E11.51 TYPE 2 DIABETES MELLITUS WITH DIABETIC PERIPHERAL ANGIOPATHY WITHOUT GANGRENE, WITH LONG-TERM CURRENT USE OF INSULIN: ICD-10-CM

## 2022-11-22 DIAGNOSIS — E11.52 DIABETIC WET GANGRENE OF THE FOOT: ICD-10-CM

## 2022-11-22 DIAGNOSIS — L89.893 PRESSURE INJURY OF LEFT FOOT, STAGE 3: ICD-10-CM

## 2022-11-22 DIAGNOSIS — L97.522 ULCERATED, FOOT, LEFT, WITH FAT LAYER EXPOSED: ICD-10-CM

## 2022-11-22 DIAGNOSIS — L89.893 PRESSURE INJURY OF LEFT FOOT, STAGE 3: Primary | ICD-10-CM

## 2022-11-22 DIAGNOSIS — L97.524 ULCERATED, FOOT, LEFT, WITH NECROSIS OF BONE: ICD-10-CM

## 2022-11-22 DIAGNOSIS — M79.672 LEFT FOOT PAIN: ICD-10-CM

## 2022-11-22 DIAGNOSIS — L97.509: Primary | ICD-10-CM

## 2022-11-22 DIAGNOSIS — S91.302D OPEN WOUND OF LEFT FOOT, SUBSEQUENT ENCOUNTER: ICD-10-CM

## 2022-11-22 DIAGNOSIS — Z79.4 TYPE 2 DIABETES MELLITUS WITH DIABETIC PERIPHERAL ANGIOPATHY WITHOUT GANGRENE, WITH LONG-TERM CURRENT USE OF INSULIN: ICD-10-CM

## 2022-11-22 LAB
ALBUMIN SERPL BCP-MCNC: 3.1 G/DL (ref 3.5–5.2)
ALP SERPL-CCNC: 74 U/L (ref 55–135)
ALT SERPL W/O P-5'-P-CCNC: 17 U/L (ref 10–44)
ANION GAP SERPL CALC-SCNC: 5 MMOL/L (ref 8–16)
APTT BLDCRRT: 28.6 SEC (ref 21–32)
AST SERPL-CCNC: 16 U/L (ref 10–40)
BASOPHILS # BLD AUTO: 0.03 K/UL (ref 0–0.2)
BASOPHILS NFR BLD: 0.2 % (ref 0–1.9)
BILIRUB SERPL-MCNC: 0.6 MG/DL (ref 0.1–1)
BUN SERPL-MCNC: 9 MG/DL (ref 6–20)
CALCIUM SERPL-MCNC: 8.8 MG/DL (ref 8.7–10.5)
CHLORIDE SERPL-SCNC: 103 MMOL/L (ref 95–110)
CO2 SERPL-SCNC: 27 MMOL/L (ref 23–29)
CREAT SERPL-MCNC: 1 MG/DL (ref 0.5–1.4)
CREAT SERPL-MCNC: 1.1 MG/DL (ref 0.5–1.4)
CRP SERPL-MCNC: 125.3 MG/L (ref 0–8.2)
CTP QC/QA: YES
DIFFERENTIAL METHOD: ABNORMAL
EOSINOPHIL # BLD AUTO: 0.2 K/UL (ref 0–0.5)
EOSINOPHIL NFR BLD: 1.1 % (ref 0–8)
ERYTHROCYTE [DISTWIDTH] IN BLOOD BY AUTOMATED COUNT: 14.5 % (ref 11.5–14.5)
ERYTHROCYTE [SEDIMENTATION RATE] IN BLOOD: 79 MM/HR (ref 0–10)
EST. GFR  (NO RACE VARIABLE): >60 ML/MIN/1.73 M^2
EST. GFR  (NO RACE VARIABLE): >60 ML/MIN/1.73 M^2
GLUCOSE SERPL-MCNC: 209 MG/DL (ref 70–110)
HCT VFR BLD AUTO: 35.8 % (ref 40–54)
HCV AB SERPL QL IA: NEGATIVE
HGB BLD-MCNC: 11.4 G/DL (ref 14–18)
HIV 1+2 AB+HIV1 P24 AG SERPL QL IA: NEGATIVE
IMM GRANULOCYTES # BLD AUTO: 0.06 K/UL (ref 0–0.04)
IMM GRANULOCYTES NFR BLD AUTO: 0.4 % (ref 0–0.5)
INR PPP: 1.1 (ref 0.8–1.2)
LACTATE SERPL-SCNC: 1.1 MMOL/L (ref 0.5–2.2)
LYMPHOCYTES # BLD AUTO: 3.2 K/UL (ref 1–4.8)
LYMPHOCYTES NFR BLD: 22.2 % (ref 18–48)
MAGNESIUM SERPL-MCNC: 1.5 MG/DL (ref 1.6–2.6)
MCH RBC QN AUTO: 27.5 PG (ref 27–31)
MCHC RBC AUTO-ENTMCNC: 31.8 G/DL (ref 32–36)
MCV RBC AUTO: 87 FL (ref 82–98)
MONOCYTES # BLD AUTO: 1.7 K/UL (ref 0.3–1)
MONOCYTES NFR BLD: 12.1 % (ref 4–15)
NEUTROPHILS # BLD AUTO: 9.1 K/UL (ref 1.8–7.7)
NEUTROPHILS NFR BLD: 64 % (ref 38–73)
NRBC BLD-RTO: 0 /100 WBC
PLATELET # BLD AUTO: 442 K/UL (ref 150–450)
PMV BLD AUTO: 9.6 FL (ref 9.2–12.9)
POTASSIUM SERPL-SCNC: 3.5 MMOL/L (ref 3.5–5.1)
PROT SERPL-MCNC: 8.3 G/DL (ref 6–8.4)
PROTHROMBIN TIME: 11.5 SEC (ref 9–12.5)
RBC # BLD AUTO: 4.14 M/UL (ref 4.6–6.2)
SARS-COV-2 RDRP RESP QL NAA+PROBE: NEGATIVE
SODIUM SERPL-SCNC: 135 MMOL/L (ref 136–145)
WBC # BLD AUTO: 14.16 K/UL (ref 3.9–12.7)

## 2022-11-22 PROCEDURE — 83036 HEMOGLOBIN GLYCOSYLATED A1C: CPT | Performed by: NURSE PRACTITIONER

## 2022-11-22 PROCEDURE — 63600175 PHARM REV CODE 636 W HCPCS: Performed by: NURSE PRACTITIONER

## 2022-11-22 PROCEDURE — 3077F PR MOST RECENT SYSTOLIC BLOOD PRESSURE >= 140 MM HG: ICD-10-PCS | Mod: CPTII,,, | Performed by: SPECIALIST

## 2022-11-22 PROCEDURE — 96365 THER/PROPH/DIAG IV INF INIT: CPT

## 2022-11-22 PROCEDURE — 3008F PR BODY MASS INDEX (BMI) DOCUMENTED: ICD-10-PCS | Mod: CPTII,,, | Performed by: SPECIALIST

## 2022-11-22 PROCEDURE — 86140 C-REACTIVE PROTEIN: CPT | Performed by: NURSE PRACTITIONER

## 2022-11-22 PROCEDURE — 25000003 PHARM REV CODE 250: Performed by: EMERGENCY MEDICINE

## 2022-11-22 PROCEDURE — 99214 OFFICE O/P EST MOD 30 MIN: CPT | Mod: PBBFAC,25 | Performed by: SPECIALIST

## 2022-11-22 PROCEDURE — 82565 ASSAY OF CREATININE: CPT | Performed by: EMERGENCY MEDICINE

## 2022-11-22 PROCEDURE — 25500020 PHARM REV CODE 255: Performed by: EMERGENCY MEDICINE

## 2022-11-22 PROCEDURE — G0378 HOSPITAL OBSERVATION PER HR: HCPCS

## 2022-11-22 PROCEDURE — 3078F DIAST BP <80 MM HG: CPT | Mod: CPTII,,, | Performed by: SPECIALIST

## 2022-11-22 PROCEDURE — 1159F PR MEDICATION LIST DOCUMENTED IN MEDICAL RECORD: ICD-10-PCS | Mod: CPTII,,, | Performed by: SPECIALIST

## 2022-11-22 PROCEDURE — 99999 PR PBB SHADOW E&M-EST. PATIENT-LVL IV: CPT | Mod: PBBFAC,,, | Performed by: SPECIALIST

## 2022-11-22 PROCEDURE — 36410 VNPNXR 3YR/> PHY/QHP DX/THER: CPT

## 2022-11-22 PROCEDURE — 25000003 PHARM REV CODE 250: Performed by: NURSE PRACTITIONER

## 2022-11-22 PROCEDURE — 99999 PR PBB SHADOW E&M-EST. PATIENT-LVL IV: ICD-10-PCS | Mod: PBBFAC,,, | Performed by: SPECIALIST

## 2022-11-22 PROCEDURE — 85610 PROTHROMBIN TIME: CPT | Performed by: NURSE PRACTITIONER

## 2022-11-22 PROCEDURE — 85025 COMPLETE CBC W/AUTO DIFF WBC: CPT | Performed by: NURSE PRACTITIONER

## 2022-11-22 PROCEDURE — 96366 THER/PROPH/DIAG IV INF ADDON: CPT

## 2022-11-22 PROCEDURE — 4010F PR ACE/ARB THEARPY RXD/TAKEN: ICD-10-PCS | Mod: CPTII,,, | Performed by: SPECIALIST

## 2022-11-22 PROCEDURE — 80053 COMPREHEN METABOLIC PANEL: CPT | Performed by: NURSE PRACTITIONER

## 2022-11-22 PROCEDURE — 85730 THROMBOPLASTIN TIME PARTIAL: CPT | Performed by: NURSE PRACTITIONER

## 2022-11-22 PROCEDURE — 11000001 HC ACUTE MED/SURG PRIVATE ROOM

## 2022-11-22 PROCEDURE — 87389 HIV-1 AG W/HIV-1&-2 AB AG IA: CPT | Performed by: NURSE PRACTITIONER

## 2022-11-22 PROCEDURE — 1159F MED LIST DOCD IN RCRD: CPT | Mod: CPTII,,, | Performed by: SPECIALIST

## 2022-11-22 PROCEDURE — 99212 PR OFFICE/OUTPT VISIT, EST, LEVL II, 10-19 MIN: ICD-10-PCS | Mod: S$PBB,,, | Performed by: SPECIALIST

## 2022-11-22 PROCEDURE — 3077F SYST BP >= 140 MM HG: CPT | Mod: CPTII,,, | Performed by: SPECIALIST

## 2022-11-22 PROCEDURE — 63600175 PHARM REV CODE 636 W HCPCS: Performed by: EMERGENCY MEDICINE

## 2022-11-22 PROCEDURE — 96367 TX/PROPH/DG ADDL SEQ IV INF: CPT

## 2022-11-22 PROCEDURE — 83605 ASSAY OF LACTIC ACID: CPT | Performed by: NURSE PRACTITIONER

## 2022-11-22 PROCEDURE — C1751 CATH, INF, PER/CENT/MIDLINE: HCPCS

## 2022-11-22 PROCEDURE — 3051F PR MOST RECENT HEMOGLOBIN A1C LEVEL 7.0 - < 8.0%: ICD-10-PCS | Mod: CPTII,,, | Performed by: SPECIALIST

## 2022-11-22 PROCEDURE — 3051F HG A1C>EQUAL 7.0%<8.0%: CPT | Mod: CPTII,,, | Performed by: SPECIALIST

## 2022-11-22 PROCEDURE — 85651 RBC SED RATE NONAUTOMATED: CPT | Performed by: NURSE PRACTITIONER

## 2022-11-22 PROCEDURE — 4010F ACE/ARB THERAPY RXD/TAKEN: CPT | Mod: CPTII,,, | Performed by: SPECIALIST

## 2022-11-22 PROCEDURE — 3008F BODY MASS INDEX DOCD: CPT | Mod: CPTII,,, | Performed by: SPECIALIST

## 2022-11-22 PROCEDURE — 1160F PR REVIEW ALL MEDS BY PRESCRIBER/CLIN PHARMACIST DOCUMENTED: ICD-10-PCS | Mod: CPTII,,, | Performed by: SPECIALIST

## 2022-11-22 PROCEDURE — 87040 BLOOD CULTURE FOR BACTERIA: CPT | Mod: 59 | Performed by: NURSE PRACTITIONER

## 2022-11-22 PROCEDURE — 99212 OFFICE O/P EST SF 10 MIN: CPT | Mod: S$PBB,,, | Performed by: SPECIALIST

## 2022-11-22 PROCEDURE — 83735 ASSAY OF MAGNESIUM: CPT | Performed by: NURSE PRACTITIONER

## 2022-11-22 PROCEDURE — 99285 EMERGENCY DEPT VISIT HI MDM: CPT | Mod: 25,27

## 2022-11-22 PROCEDURE — 36415 COLL VENOUS BLD VENIPUNCTURE: CPT | Performed by: NURSE PRACTITIONER

## 2022-11-22 PROCEDURE — 3078F PR MOST RECENT DIASTOLIC BLOOD PRESSURE < 80 MM HG: ICD-10-PCS | Mod: CPTII,,, | Performed by: SPECIALIST

## 2022-11-22 PROCEDURE — 1160F RVW MEDS BY RX/DR IN RCRD: CPT | Mod: CPTII,,, | Performed by: SPECIALIST

## 2022-11-22 PROCEDURE — 87635 SARS-COV-2 COVID-19 AMP PRB: CPT | Performed by: NURSE PRACTITIONER

## 2022-11-22 PROCEDURE — 86803 HEPATITIS C AB TEST: CPT | Performed by: NURSE PRACTITIONER

## 2022-11-22 RX ORDER — ACETAMINOPHEN 325 MG/1
650 TABLET ORAL EVERY 6 HOURS PRN
Status: DISCONTINUED | OUTPATIENT
Start: 2022-11-22 | End: 2022-12-07 | Stop reason: HOSPADM

## 2022-11-22 RX ORDER — TALC
6 POWDER (GRAM) TOPICAL NIGHTLY PRN
Status: DISCONTINUED | OUTPATIENT
Start: 2022-11-22 | End: 2022-12-07 | Stop reason: HOSPADM

## 2022-11-22 RX ORDER — SODIUM CHLORIDE 0.9 % (FLUSH) 0.9 %
10 SYRINGE (ML) INJECTION
Status: DISCONTINUED | OUTPATIENT
Start: 2022-11-22 | End: 2022-12-07 | Stop reason: HOSPADM

## 2022-11-22 RX ORDER — GLUCAGON 1 MG
1 KIT INJECTION
Status: DISCONTINUED | OUTPATIENT
Start: 2022-11-22 | End: 2022-11-24

## 2022-11-22 RX ORDER — ASPIRIN 81 MG/1
81 TABLET ORAL DAILY
Status: DISCONTINUED | OUTPATIENT
Start: 2022-11-23 | End: 2022-12-07 | Stop reason: HOSPADM

## 2022-11-22 RX ORDER — INSULIN ASPART 100 [IU]/ML
0-5 INJECTION, SOLUTION INTRAVENOUS; SUBCUTANEOUS
Status: DISCONTINUED | OUTPATIENT
Start: 2022-11-22 | End: 2022-11-24

## 2022-11-22 RX ORDER — MORPHINE SULFATE 4 MG/ML
6 INJECTION, SOLUTION INTRAMUSCULAR; INTRAVENOUS EVERY 6 HOURS PRN
Status: DISCONTINUED | OUTPATIENT
Start: 2022-11-22 | End: 2022-12-07 | Stop reason: HOSPADM

## 2022-11-22 RX ORDER — QUETIAPINE FUMARATE 200 MG/1
200 TABLET, FILM COATED ORAL NIGHTLY
Status: DISCONTINUED | OUTPATIENT
Start: 2022-11-22 | End: 2022-12-07 | Stop reason: HOSPADM

## 2022-11-22 RX ORDER — HYDROCODONE BITARTRATE AND ACETAMINOPHEN 5; 325 MG/1; MG/1
1 TABLET ORAL EVERY 6 HOURS PRN
Status: DISCONTINUED | OUTPATIENT
Start: 2022-11-22 | End: 2022-12-01

## 2022-11-22 RX ORDER — IBUPROFEN 200 MG
24 TABLET ORAL
Status: DISCONTINUED | OUTPATIENT
Start: 2022-11-22 | End: 2022-11-24

## 2022-11-22 RX ORDER — ONDANSETRON 2 MG/ML
4 INJECTION INTRAMUSCULAR; INTRAVENOUS EVERY 8 HOURS PRN
Status: DISCONTINUED | OUTPATIENT
Start: 2022-11-22 | End: 2022-12-07 | Stop reason: HOSPADM

## 2022-11-22 RX ORDER — IBUPROFEN 200 MG
16 TABLET ORAL
Status: DISCONTINUED | OUTPATIENT
Start: 2022-11-22 | End: 2022-11-24

## 2022-11-22 RX ORDER — HYDROCODONE BITARTRATE AND ACETAMINOPHEN 5; 325 MG/1; MG/1
1 TABLET ORAL
Status: COMPLETED | OUTPATIENT
Start: 2022-11-22 | End: 2022-11-22

## 2022-11-22 RX ORDER — LISINOPRIL 10 MG/1
10 TABLET ORAL DAILY
Status: DISCONTINUED | OUTPATIENT
Start: 2022-11-23 | End: 2022-12-06

## 2022-11-22 RX ORDER — ATORVASTATIN CALCIUM 20 MG/1
40 TABLET, FILM COATED ORAL DAILY
Status: DISCONTINUED | OUTPATIENT
Start: 2022-11-23 | End: 2022-12-07 | Stop reason: HOSPADM

## 2022-11-22 RX ORDER — DIPHENHYDRAMINE HYDROCHLORIDE 50 MG/ML
25 INJECTION INTRAMUSCULAR; INTRAVENOUS NIGHTLY PRN
Status: DISCONTINUED | OUTPATIENT
Start: 2022-11-22 | End: 2022-12-07 | Stop reason: HOSPADM

## 2022-11-22 RX ORDER — HYDROCHLOROTHIAZIDE 25 MG/1
25 TABLET ORAL DAILY
Status: DISCONTINUED | OUTPATIENT
Start: 2022-11-23 | End: 2022-12-06

## 2022-11-22 RX ORDER — LANOLIN ALCOHOL/MO/W.PET/CERES
400 CREAM (GRAM) TOPICAL ONCE
Status: COMPLETED | OUTPATIENT
Start: 2022-11-22 | End: 2022-11-22

## 2022-11-22 RX ADMIN — HYDROCODONE BITARTRATE AND ACETAMINOPHEN 1 TABLET: 5; 325 TABLET ORAL at 07:11

## 2022-11-22 RX ADMIN — Medication 400 MG: at 11:11

## 2022-11-22 RX ADMIN — QUETIAPINE FUMARATE 200 MG: 200 TABLET ORAL at 08:11

## 2022-11-22 RX ADMIN — IOHEXOL 100 ML: 350 INJECTION, SOLUTION INTRAVENOUS at 10:11

## 2022-11-22 RX ADMIN — VANCOMYCIN HYDROCHLORIDE 2000 MG: 500 INJECTION, POWDER, LYOPHILIZED, FOR SOLUTION INTRAVENOUS at 08:11

## 2022-11-22 RX ADMIN — PIPERACILLIN AND TAZOBACTAM 4.5 G: 4; .5 INJECTION, POWDER, LYOPHILIZED, FOR SOLUTION INTRAVENOUS; PARENTERAL at 07:11

## 2022-11-22 NOTE — ED PROVIDER NOTES
Source of History:  Patient    Chief complaint:  Wound Infection (Pt Presented to ER from wound care on 6th floor for left great toe infection. )      HPI:  Dave Good is a 57 y.o. male PMH that includes DM2, diabetic neuropathy, COPD, HTN, depression, right foot transmetatarsal amputation and osteomyelitis presenting from Dr. Flores's office for osteomyelitis workup. In office per Dr. Flores, left lower extremity with foul-smelling necrotic area of distal phalanx concerning for infection and possible osteomyelitis.  Instructed to present to the ER for osteomyelitis workup and possible amputation.  Patient reports pain in the left foot.  He is routinely seen by wound care as he has a neuro trophic ulcer also on the right foot.  He denies fever and chills.    This is the extent to the patients complaints today here in the emergency department.    ROS: As per HPI and below:  General: No fever.  No chills.  Eyes: No visual changes.  ENT: No sore throat. No ear pain  Head: No headache.    Chest: No shortness of breath.  Cardiovascular: No chest pain.  Abdomen: No abdominal pain.  No nausea or vomiting.  Genito-Urinary: No abnormal urination.  Neurologic: No focal weakness.  No numbness.  MSK: no back pain. +left foot pain  Integument: +wounds to bilateral feet  Hematologic: No easy bruising.  Endocrine: No excessive thirst or urination.    Review of patient's allergies indicates:   Allergen Reactions    Ibuprofen Swelling     Facial swelling       PMH:  As per HPI and below:  Past Medical History:   Diagnosis Date    COPD (chronic obstructive pulmonary disease)     COVID-19     Depression     Diabetes mellitus     Diabetes mellitus, type 2     Hypertension      Past Surgical History:   Procedure Laterality Date    DEBRIDEMENT OF LOWER EXTREMITY Bilateral 3/2/2022    Procedure: DEBRIDEMENT, LOWER EXTREMITY;  Surgeon: Jesus Flores Jr., MD;  Location: [facility] OR;  Service: General;  Laterality: Bilateral;    FOOT  "AMPUTATION THROUGH METATARSAL Right 9/23/2021    Procedure: AMPUTATION, FOOT, TRANSMETATARSAL right 1st ray resection;  Surgeon: Samule Toussaint DPM;  Location: [facility];  Service: Podiatry;  Laterality: Right;    INCISION AND DRAINAGE FOOT Bilateral 9/21/2021    Procedure: INCISION AND DRAINAGE, FOOT - Bilateral;  Surgeon: Lavonne Vigil DPM;  Location: [facility];  Service: Podiatry;  Laterality: Bilateral;    REPLACEMENT OF WOUND DRESSING Right 2/24/2022    Procedure: REPLACEMENT, DRESSING, WOUND;  Surgeon: Jesus Flores Jr., MD;  Location: [facility];  Service: Vascular;  Laterality: Right;  wound vac dressing changed    WOUND DEBRIDEMENT Right 2/22/2022    Procedure: DEBRIDEMENT, WOUND - RIGHT FOOT;  Surgeon: Jesus Flores Jr., MD;  Location: [facility];  Service: Vascular;  Laterality: Right;    WOUND DEBRIDEMENT Bilateral 2/24/2022    Procedure: DEBRIDEMENT, WOUND / BILATERAL DEBRIDEMENT FEET;  Surgeon: Jesus Flores Jr., MD;  Location: [facility];  Service: Vascular;  Laterality: Bilateral;    WOUND DEBRIDEMENT Right 5/27/2022    Procedure: DEBRIDEMENT, WOUND;  Surgeon: Jesus Flores Jr., MD;  Location: [facility];  Service: General;  Laterality: Right;       Social History     Tobacco Use    Smoking status: Former    Smokeless tobacco: Never   Substance Use Topics    Alcohol use: Yes     Alcohol/week: 3.0 standard drinks     Types: 3 Drinks containing 0.5 oz of alcohol per week     Comment: whiskey and beer every other day    Drug use: Not Currently     Types: Marijuana       Physical Exam:    BP (!) 145/77 (BP Location: Left arm, Patient Position: Sitting)   Pulse 78   Temp 98.9 °F (37.2 °C) (Oral)   Resp 18   Ht 6' 2" (1.88 m)   Wt 122.5 kg (270 lb)   SpO2 97%   BMI 34.67 kg/m²   Nursing note and vital signs reviewed.  Appearance: No acute distress. Obese.  Eyes: No conjunctival injection.  ENT: Oropharynx clear.    Chest/ Respiratory: Clear to auscultation bilaterally.  Good air movement.  No wheezes.  No rhonchi. " No rales. No accessory muscle use.  Cardiovascular: Regular rate and rhythm.  No murmurs. No gallops. No rubs.  Abdomen: Soft.  Not distended.  Nontender.  No guarding.  No rebound. Non-peritoneal.  Musculoskeletal: Amputation of great and second toe of right foot.   Good range of motion all joints.  Neck supple.  No meningismus.  Skin: No rashes seen.  Good turgor.  No abrasions.  No ecchymoses.  Neurologic: Motor intact.  Sensation intact.  Cerebellar intact.  Cranial nerves intact.  Mental Status:  Alert and oriented x 3.  Appropriate, conversant    RIGHT FOOT        LEFT FOOT          Labs that have been ordered have been independently reviewed and interpreted by myself.    I decided to obtain the patient's medical records.  Summary of Medical Records:  Dr. Flores note from office visit 11/22/22:  57-year-old with longstanding diabetes and peripheral neuropathy   Patient with bilateral neuro trophic ulcers  Left lower extremity with foul-smelling necrotic area of distal phalanx   Lesion would appeared to involve distal phalanx  Foot pink and warm with palpable pulses    Impression/plan   Infection left foot, possible osteo   Sent to ER for further evaluation    Labs Reviewed   CULTURE, BLOOD   CULTURE, BLOOD   HIV 1 / 2 ANTIBODY   HEPATITIS C ANTIBODY   CBC W/ AUTO DIFFERENTIAL   COMPREHENSIVE METABOLIC PANEL   MAGNESIUM   PROTIME-INR   APTT   SEDIMENTATION RATE   C-REACTIVE PROTEIN   LACTIC ACID, PLASMA   CREATININE, SERUM   HEMOGLOBIN A1C   SARS-COV-2 RDRP GENE   POCT GLUCOSE MONITORING CONTINUOUS       Imaging Results              X-Ray Foot Complete Left (Final result)  Result time 11/22/22 17:19:21      Final result by Patrice Alarcon MD (11/22/22 17:19:21)                   Impression:      Resolution of previous ulceration along the plantar aspect of the left forefoot.    Soft tissue swelling of the left foot.    No erosive changes.  Additional evaluation, as clinically warranted.      Electronically signed  by: Patrice Alarcon MD  Date:    11/22/2022  Time:    17:19               Narrative:    EXAMINATION:  XR FOOT COMPLETE 3 VIEW LEFT    CLINICAL HISTORY:  Pain in left foot    TECHNIQUE:  AP, lateral and oblique views of the left foot were performed.    COMPARISON:  06/15/2022    FINDINGS:  The bone mineralization is within normal limits.  There is no cortical step-off.  There is no evidence of periostitis.    There is an enthesophyte at the insertion of the Achilles tendon.  The joint spaces are maintained.  The Lisfranc articulation is within normal limits.    There has been resolution of the previous soft tissue ulceration along the plantar aspect of the forefoot.  There is soft tissue swelling of the left foot.  There are vascular calcifications present.                                      Initial Impression/ Differential Dx:  Differential Diagnosis includes, but is not limited to:  septic joint, cellulitis, osteomyelitis, sepsis, neurotrophic ulcers    MDM:    Urgent evaluation of 57-year-old male with diabetes, peripheral neuropathy, neuro trophic ulcers and prior amputation with osteomyelitis presenting from Dr. Flores's office for osteomyelitis workup of the left foot.  Patient is afebrile and not toxic appearing patient has foul-smelling ulcer on the pad of the right great toe as well as on the sole of the left foot.  Plan for labs, x-ray, General surgery evaluation.  Patient is a difficult stick.  Midline team called out.  Will place patient in EDOU pending workup and further evaluation.    ED Course as of 11/22/22 1839   Tue Nov 22, 2022   1815 X-Ray Foot Complete Left  Resolution of previous ulceration along the plantar aspect of the left forefoot.     Soft tissue swelling of the left foot.     No erosive changes [CU]      ED Course User Index  [CU] Nika Jain NP       Critical Care    Date/Time: 11/22/2022 5:17 PM  Performed by: Nika Jain NP  Authorized by: Rory Llanes MD   Direct patient  critical care time: 6 minutes  Additional history critical care time: 6 minutes  Ordering / reviewing critical care time: 6 minutes  Documentation critical care time: 6 minutes  Consulting other physicians critical care time: 6 minutes  Total critical care time (exclusive of procedural time) : 30 minutes  Critical care was necessary to treat or prevent imminent or life-threatening deterioration of the following conditions: sepsis.  Critical care was time spent personally by me on the following activities: development of treatment plan with patient or surrogate, discussions with consultants, interpretation of cardiac output measurements, evaluation of patient's response to treatment, examination of patient, obtaining history from patient or surrogate, ordering and performing treatments and interventions, ordering and review of laboratory studies, ordering and review of radiographic studies, pulse oximetry, re-evaluation of patient's condition and review of old charts.                 Diagnostic Impression:    1. Neurotrophic ulcer of foot    2. Left foot pain    3. Pressure injury of left foot, stage 3         ED Disposition Condition    Observation Stable                  Nika Jain NP  11/22/22 1957       Nika Jain NP  11/22/22 7330

## 2022-11-22 NOTE — PROGRESS NOTES
57-year-old with longstanding diabetes and peripheral neuropathy   Patient with bilateral neuro trophic ulcers  Left lower extremity with foul-smelling necrotic area of distal phalanx   Lesion would appeared to involve distal phalanx  Foot pink and warm with palpable pulses    Impression/plan   Infection left foot, possible osteo   Sent to ER for further evaluation

## 2022-11-22 NOTE — ED TRIAGE NOTES
Pt presents to the ED w/ c/o infection to L great toe sent by Dr. Flores. Hx of diabetes. Pt states he was sent to rule out osteomyelitis and amputation. Wound also noted to heel of R foot.

## 2022-11-22 NOTE — Clinical Note
Diagnosis: Neurotrophic ulcer of foot [[id number]]   Future Attending Provider: SABRINA VELASQUEZ [[id number]]   Is the patient being sent to ED Observation?: Yes   Admitting Provider:: SABRINA VELASQUEZ [[id number]]

## 2022-11-23 LAB
ALBUMIN SERPL BCP-MCNC: 2.8 G/DL (ref 3.5–5.2)
ALP SERPL-CCNC: 62 U/L (ref 55–135)
ALT SERPL W/O P-5'-P-CCNC: 17 U/L (ref 10–44)
ANION GAP SERPL CALC-SCNC: 4 MMOL/L (ref 8–16)
AST SERPL-CCNC: 14 U/L (ref 10–40)
BASOPHILS # BLD AUTO: 0.02 K/UL (ref 0–0.2)
BASOPHILS NFR BLD: 0.2 % (ref 0–1.9)
BILIRUB SERPL-MCNC: 0.6 MG/DL (ref 0.1–1)
BUN SERPL-MCNC: 9 MG/DL (ref 6–20)
CALCIUM SERPL-MCNC: 8.6 MG/DL (ref 8.7–10.5)
CHLORIDE SERPL-SCNC: 104 MMOL/L (ref 95–110)
CO2 SERPL-SCNC: 28 MMOL/L (ref 23–29)
CREAT SERPL-MCNC: 1 MG/DL (ref 0.5–1.4)
DIFFERENTIAL METHOD: ABNORMAL
EOSINOPHIL # BLD AUTO: 0.2 K/UL (ref 0–0.5)
EOSINOPHIL NFR BLD: 1.8 % (ref 0–8)
ERYTHROCYTE [DISTWIDTH] IN BLOOD BY AUTOMATED COUNT: 14.3 % (ref 11.5–14.5)
EST. GFR  (NO RACE VARIABLE): >60 ML/MIN/1.73 M^2
ESTIMATED AVG GLUCOSE: 214 MG/DL (ref 68–131)
GLUCOSE SERPL-MCNC: 209 MG/DL (ref 70–110)
HBA1C MFR BLD: 9.1 % (ref 4–5.6)
HCT VFR BLD AUTO: 34.1 % (ref 40–54)
HGB BLD-MCNC: 10.6 G/DL (ref 14–18)
IMM GRANULOCYTES # BLD AUTO: 0.05 K/UL (ref 0–0.04)
IMM GRANULOCYTES NFR BLD AUTO: 0.4 % (ref 0–0.5)
LYMPHOCYTES # BLD AUTO: 2.3 K/UL (ref 1–4.8)
LYMPHOCYTES NFR BLD: 19.5 % (ref 18–48)
MCH RBC QN AUTO: 27 PG (ref 27–31)
MCHC RBC AUTO-ENTMCNC: 31.1 G/DL (ref 32–36)
MCV RBC AUTO: 87 FL (ref 82–98)
MONOCYTES # BLD AUTO: 1.6 K/UL (ref 0.3–1)
MONOCYTES NFR BLD: 13.3 % (ref 4–15)
NEUTROPHILS # BLD AUTO: 7.8 K/UL (ref 1.8–7.7)
NEUTROPHILS NFR BLD: 64.8 % (ref 38–73)
NRBC BLD-RTO: 0 /100 WBC
PLATELET # BLD AUTO: 417 K/UL (ref 150–450)
PMV BLD AUTO: 9.7 FL (ref 9.2–12.9)
POCT GLUCOSE: 214 MG/DL (ref 70–110)
POCT GLUCOSE: 220 MG/DL (ref 70–110)
POCT GLUCOSE: 257 MG/DL (ref 70–110)
POTASSIUM SERPL-SCNC: 3.4 MMOL/L (ref 3.5–5.1)
PROT SERPL-MCNC: 7.6 G/DL (ref 6–8.4)
RBC # BLD AUTO: 3.93 M/UL (ref 4.6–6.2)
SODIUM SERPL-SCNC: 136 MMOL/L (ref 136–145)
WBC # BLD AUTO: 11.99 K/UL (ref 3.9–12.7)

## 2022-11-23 PROCEDURE — 25000003 PHARM REV CODE 250: Performed by: EMERGENCY MEDICINE

## 2022-11-23 PROCEDURE — 11000001 HC ACUTE MED/SURG PRIVATE ROOM

## 2022-11-23 PROCEDURE — 99222 1ST HOSP IP/OBS MODERATE 55: CPT | Mod: ,,, | Performed by: STUDENT IN AN ORGANIZED HEALTH CARE EDUCATION/TRAINING PROGRAM

## 2022-11-23 PROCEDURE — 96366 THER/PROPH/DIAG IV INF ADDON: CPT

## 2022-11-23 PROCEDURE — 63600175 PHARM REV CODE 636 W HCPCS: Performed by: EMERGENCY MEDICINE

## 2022-11-23 PROCEDURE — 25000003 PHARM REV CODE 250: Performed by: NURSE PRACTITIONER

## 2022-11-23 PROCEDURE — 80053 COMPREHEN METABOLIC PANEL: CPT | Performed by: NURSE PRACTITIONER

## 2022-11-23 PROCEDURE — 63600175 PHARM REV CODE 636 W HCPCS: Performed by: NURSE PRACTITIONER

## 2022-11-23 PROCEDURE — 63600175 PHARM REV CODE 636 W HCPCS: Performed by: STUDENT IN AN ORGANIZED HEALTH CARE EDUCATION/TRAINING PROGRAM

## 2022-11-23 PROCEDURE — 25000003 PHARM REV CODE 250: Performed by: STUDENT IN AN ORGANIZED HEALTH CARE EDUCATION/TRAINING PROGRAM

## 2022-11-23 PROCEDURE — 99222 PR INITIAL HOSPITAL CARE,LEVL II: ICD-10-PCS | Mod: ,,, | Performed by: STUDENT IN AN ORGANIZED HEALTH CARE EDUCATION/TRAINING PROGRAM

## 2022-11-23 PROCEDURE — 85025 COMPLETE CBC W/AUTO DIFF WBC: CPT | Performed by: NURSE PRACTITIONER

## 2022-11-23 PROCEDURE — 96368 THER/DIAG CONCURRENT INF: CPT

## 2022-11-23 RX ORDER — MUPIROCIN 20 MG/G
OINTMENT TOPICAL 2 TIMES DAILY
Status: DISPENSED | OUTPATIENT
Start: 2022-11-23 | End: 2022-11-28

## 2022-11-23 RX ORDER — DULAGLUTIDE 0.75 MG/.5ML
INJECTION, SOLUTION SUBCUTANEOUS
Status: ON HOLD | COMMUNITY
Start: 2022-11-11 | End: 2023-09-14 | Stop reason: HOSPADM

## 2022-11-23 RX ORDER — IPRATROPIUM BROMIDE AND ALBUTEROL SULFATE 2.5; .5 MG/3ML; MG/3ML
3 SOLUTION RESPIRATORY (INHALATION) EVERY 6 HOURS PRN
Status: DISCONTINUED | OUTPATIENT
Start: 2022-11-23 | End: 2022-12-07 | Stop reason: HOSPADM

## 2022-11-23 RX ORDER — CEFEPIME HYDROCHLORIDE 1 G/50ML
2 INJECTION, SOLUTION INTRAVENOUS
Status: DISCONTINUED | OUTPATIENT
Start: 2022-11-23 | End: 2022-11-29

## 2022-11-23 RX ADMIN — ASPIRIN 81 MG: 81 TABLET, COATED ORAL at 09:11

## 2022-11-23 RX ADMIN — HYDROCHLOROTHIAZIDE 25 MG: 25 TABLET ORAL at 09:11

## 2022-11-23 RX ADMIN — PIPERACILLIN AND TAZOBACTAM 4.5 G: 4; .5 INJECTION, POWDER, LYOPHILIZED, FOR SOLUTION INTRAVENOUS; PARENTERAL at 10:11

## 2022-11-23 RX ADMIN — VANCOMYCIN HYDROCHLORIDE 2000 MG: 500 INJECTION, POWDER, LYOPHILIZED, FOR SOLUTION INTRAVENOUS at 08:11

## 2022-11-23 RX ADMIN — QUETIAPINE FUMARATE 200 MG: 200 TABLET ORAL at 08:11

## 2022-11-23 RX ADMIN — PIPERACILLIN AND TAZOBACTAM 4.5 G: 4; .5 INJECTION, POWDER, LYOPHILIZED, FOR SOLUTION INTRAVENOUS; PARENTERAL at 02:11

## 2022-11-23 RX ADMIN — MUPIROCIN: 20 OINTMENT TOPICAL at 08:11

## 2022-11-23 RX ADMIN — HYDROCODONE BITARTRATE AND ACETAMINOPHEN 1 TABLET: 5; 325 TABLET ORAL at 04:11

## 2022-11-23 RX ADMIN — CEFEPIME HYDROCHLORIDE 2 G: 2 INJECTION, SOLUTION INTRAVENOUS at 04:11

## 2022-11-23 RX ADMIN — ATORVASTATIN CALCIUM 40 MG: 20 TABLET, FILM COATED ORAL at 09:11

## 2022-11-23 RX ADMIN — HYDROCODONE BITARTRATE AND ACETAMINOPHEN 1 TABLET: 5; 325 TABLET ORAL at 10:11

## 2022-11-23 RX ADMIN — LISINOPRIL 10 MG: 10 TABLET ORAL at 09:11

## 2022-11-23 RX ADMIN — MORPHINE SULFATE 6 MG: 4 INJECTION, SOLUTION INTRAMUSCULAR; INTRAVENOUS at 06:11

## 2022-11-23 NOTE — PROGRESS NOTES
ED Observation Unit  Progress Note      HPI   Dave Good is a 57 y.o. male PMH that includes DM2, diabetic neuropathy, COPD, HTN, depression, right foot transmetatarsal amputation and osteomyelitis presenting from Dr. Flores's office for osteomyelitis workup. In office per Dr. Flores, left lower extremity with foul-smelling necrotic area of distal phalanx concerning for infection and possible osteomyelitis.  Instructed to present to the ER for osteomyelitis workup and possible amputation.  Patient reports pain in the left foot.  He is routinely seen by wound care as he has a neuro trophic ulcer also on the right foot.  He denies fever and chills.     This is the extent to the patients complaints today here in the emergency department.    Interval History   CT confirms osteomyelitis. GS at bedside to debride toe and recommends continue dIV ABX     PMHx   Past Medical History:   Diagnosis Date    COPD (chronic obstructive pulmonary disease)     COVID-19     Depression     Diabetes mellitus     Diabetes mellitus, type 2     Hypertension       Past Surgical History:   Procedure Laterality Date    DEBRIDEMENT OF LOWER EXTREMITY Bilateral 3/2/2022    Procedure: DEBRIDEMENT, LOWER EXTREMITY;  Surgeon: Jesus Flores Jr., MD;  Location: [facility];  Service: General;  Laterality: Bilateral;    FOOT AMPUTATION THROUGH METATARSAL Right 9/23/2021    Procedure: AMPUTATION, FOOT, TRANSMETATARSAL right 1st ray resection;  Surgeon: Samuel Toussaint DPM;  Location: [facility] OR;  Service: Podiatry;  Laterality: Right;    INCISION AND DRAINAGE FOOT Bilateral 9/21/2021    Procedure: INCISION AND DRAINAGE, FOOT - Bilateral;  Surgeon: Lavonne Vigil DPM;  Location: [facility] OR;  Service: Podiatry;  Laterality: Bilateral;    REPLACEMENT OF WOUND DRESSING Right 2/24/2022    Procedure: REPLACEMENT, DRESSING, WOUND;  Surgeon: Jesus Floers Jr., MD;  Location: [facility];  Service: Vascular;  Laterality: Right;  wound vac dressing changed    WOUND  DEBRIDEMENT Right 2/22/2022    Procedure: DEBRIDEMENT, WOUND - RIGHT FOOT;  Surgeon: Jesus Flores Jr., MD;  Location: [facility] OR;  Service: Vascular;  Laterality: Right;    WOUND DEBRIDEMENT Bilateral 2/24/2022    Procedure: DEBRIDEMENT, WOUND / BILATERAL DEBRIDEMENT FEET;  Surgeon: Jesus Flores Jr., MD;  Location: [facility] OR;  Service: Vascular;  Laterality: Bilateral;    WOUND DEBRIDEMENT Right 5/27/2022    Procedure: DEBRIDEMENT, WOUND;  Surgeon: Jesus Flores Jr., MD;  Location: [facility] OR;  Service: General;  Laterality: Right;        Family Hx   No family history on file.     Social Hx   Social History     Socioeconomic History    Marital status: Single   Tobacco Use    Smoking status: Former    Smokeless tobacco: Never   Substance and Sexual Activity    Alcohol use: Yes     Alcohol/week: 3.0 standard drinks     Types: 3 Drinks containing 0.5 oz of alcohol per week     Comment: whiskey and beer every other day    Drug use: Not Currently     Types: Marijuana    Sexual activity: Yes     Partners: Female     Social Determinants of Health     Financial Resource Strain: Low Risk     Difficulty of Paying Living Expenses: Not very hard   Food Insecurity: Food Insecurity Present    Worried About Running Out of Food in the Last Year: Often true    Ran Out of Food in the Last Year: Often true   Transportation Needs: No Transportation Needs    Lack of Transportation (Medical): No    Lack of Transportation (Non-Medical): No   Physical Activity: Inactive    Days of Exercise per Week: 0 days    Minutes of Exercise per Session: 0 min   Stress: No Stress Concern Present    Feeling of Stress : Not at all   Social Connections: Moderately Integrated    Frequency of Communication with Friends and Family: Twice a week    Frequency of Social Gatherings with Friends and Family: Twice a week    Attends Shinto Services: 1 to 4 times per year    Active Member of Clubs or Organizations: No    Attends Club or Organization Meetings: Never     "Marital Status: Living with partner   Housing Stability: Unknown    Unable to Pay for Housing in the Last Year: No    Unstable Housing in the Last Year: No        Vital Signs   Vitals:    11/23/22 1700 11/23/22 1818 11/23/22 1931 11/23/22 2222   BP: 123/70  114/78    BP Location: Left arm  Left arm    Patient Position: Lying  Sitting    Pulse: 82  72    Resp: 18 18 20 18   Temp: 98.2 °F (36.8 °C)  97.4 °F (36.3 °C)    TempSrc: Oral  Oral    SpO2: 98%  (!) 94%    Weight: 122.5 kg (270 lb)      Height: 6' 2" (1.88 m)           Review of Systems  Review of Systems   Constitutional:  Negative for chills and fever.   Cardiovascular:  Negative for chest pain.   Gastrointestinal:  Negative for nausea and vomiting.   Musculoskeletal:  Positive for joint pain and myalgias.   Skin:  Positive for rash.   Neurological:  Positive for tingling. Negative for focal weakness and weakness.     Brief Physical Exam/Reassessment   Physical Exam  Vitals and nursing note reviewed.   Constitutional:       Appearance: Normal appearance.   HENT:      Head: Normocephalic and atraumatic.      Nose: Nose normal.      Mouth/Throat:      Pharynx: Oropharynx is clear.   Eyes:      Conjunctiva/sclera: Conjunctivae normal.   Cardiovascular:      Rate and Rhythm: Normal rate.   Pulmonary:      Effort: Pulmonary effort is normal.   Skin:     Comments: Reviewed previous pictures and chart; general surgery had just completed debridement in place dressing and did not want to take down dressing   Neurological:      Sensory: Sensory deficit present.   Psychiatric:         Mood and Affect: Mood normal.         Thought Content: Thought content normal.         Judgment: Judgment normal.       Labs/Imaging   Labs Reviewed   CBC W/ AUTO DIFFERENTIAL - Abnormal; Notable for the following components:       Result Value    WBC 14.16 (*)     RBC 4.14 (*)     Hemoglobin 11.4 (*)     Hematocrit 35.8 (*)     MCHC 31.8 (*)     Gran # (ANC) 9.1 (*)     Immature Grans " (Abs) 0.06 (*)     Mono # 1.7 (*)     All other components within normal limits    Narrative:     Release to patient->Immediate   COMPREHENSIVE METABOLIC PANEL - Abnormal; Notable for the following components:    Sodium 135 (*)     Glucose 209 (*)     Albumin 3.1 (*)     Anion Gap 5 (*)     All other components within normal limits    Narrative:     Release to patient->Immediate   MAGNESIUM - Abnormal; Notable for the following components:    Magnesium 1.5 (*)     All other components within normal limits    Narrative:     Release to patient->Immediate   SEDIMENTATION RATE - Abnormal; Notable for the following components:    Sed Rate 79 (*)     All other components within normal limits    Narrative:     Release to patient->Immediate   C-REACTIVE PROTEIN - Abnormal; Notable for the following components:    .3 (*)     All other components within normal limits    Narrative:     Release to patient->Immediate   HEMOGLOBIN A1C - Abnormal; Notable for the following components:    Hemoglobin A1C 9.1 (*)     Estimated Avg Glucose 214 (*)     All other components within normal limits    Narrative:     Release to patient->Immediate   CBC W/ AUTO DIFFERENTIAL - Abnormal; Notable for the following components:    RBC 3.93 (*)     Hemoglobin 10.6 (*)     Hematocrit 34.1 (*)     MCHC 31.1 (*)     Gran # (ANC) 7.8 (*)     Immature Grans (Abs) 0.05 (*)     Mono # 1.6 (*)     All other components within normal limits   COMPREHENSIVE METABOLIC PANEL - Abnormal; Notable for the following components:    Potassium 3.4 (*)     Glucose 209 (*)     Calcium 8.6 (*)     Albumin 2.8 (*)     Anion Gap 4 (*)     All other components within normal limits   POCT GLUCOSE - Abnormal; Notable for the following components:    POCT Glucose 220 (*)     All other components within normal limits   POCT GLUCOSE - Abnormal; Notable for the following components:    POCT Glucose 257 (*)     All other components within normal limits   POCT GLUCOSE -  Abnormal; Notable for the following components:    POCT Glucose 214 (*)     All other components within normal limits   CULTURE, BLOOD   CULTURE, BLOOD   HIV 1 / 2 ANTIBODY    Narrative:     Release to patient->Immediate   HEPATITIS C ANTIBODY    Narrative:     Release to patient->Immediate   PROTIME-INR    Narrative:     Release to patient->Immediate   APTT    Narrative:     Release to patient->Immediate   LACTIC ACID, PLASMA    Narrative:     Release to patient->Immediate   CREATININE, SERUM   HEMOGLOBIN A1C   SARS-COV-2 RDRP GENE   POCT GLUCOSE MONITORING CONTINUOUS      Imaging Results              CT Foot With Contrast Left (Final result)  Result time 11/22/22 23:02:15      Final result by Lauren Montano MD (11/22/22 23:02:15)                   Impression:      Osteomyelitis involving the tuft of the great toe.  No gas.  Dorsal ulceration or soft tissue toe.    Focal fluid collection at the dorsum of the 1st metatarsophalangeal joint.  Findings may be related to synovitis or tiny abscess.      Electronically signed by: Lauren Montano  Date:    11/22/2022  Time:    23:02               Narrative:    EXAMINATION:  CT FOOT WITH CONTRAST LEFT    CLINICAL HISTORY:  Foot swelling, diabetic, osteomyelitis suspected, xray done;    TECHNIQUE:  0.625 mm axial images were obtained through the left foot.  Coronal and sagittal reformatted images are present.  100 cc Omnipaque was injected.    COMPARISON:  06/15/2022    FINDINGS:  Subcutaneous edema is present.  There is enlargement of the great toe.  There is bony destruction seen at the tuft of the great toe.  A tiny focal fluid collection is seen at the dorsum of the 1st left first metatarsophalangeal joint, measuring 13 x 5 x 13 mm (series 300 axial image 63 and series 301 sagittal image 45).  Ulceration is seen at the dorsal aspect of distal phalanx of the great toe.  There is no acute fracture or dislocation.  Mild degenerative changes are seen in the midfoot.   There is a large plantar spur.                                       X-Ray Foot Complete Left (Final result)  Result time 11/22/22 17:19:21      Final result by Patrice Alarcon MD (11/22/22 17:19:21)                   Impression:      Resolution of previous ulceration along the plantar aspect of the left forefoot.    Soft tissue swelling of the left foot.    No erosive changes.  Additional evaluation, as clinically warranted.      Electronically signed by: Patrice Alarcon MD  Date:    11/22/2022  Time:    17:19               Narrative:    EXAMINATION:  XR FOOT COMPLETE 3 VIEW LEFT    CLINICAL HISTORY:  Pain in left foot    TECHNIQUE:  AP, lateral and oblique views of the left foot were performed.    COMPARISON:  06/15/2022    FINDINGS:  The bone mineralization is within normal limits.  There is no cortical step-off.  There is no evidence of periostitis.    There is an enthesophyte at the insertion of the Achilles tendon.  The joint spaces are maintained.  The Lisfranc articulation is within normal limits.    There has been resolution of the previous soft tissue ulceration along the plantar aspect of the forefoot.  There is soft tissue swelling of the left foot.  There are vascular calcifications present.                                       I reviewed all labs, imaging, consult notes EKGs.     Plan   1. Neurotrophic ulcer of foot    2. Left foot pain    3. Pressure injury of left foot, stage 3      Continue IV antibiotics and wound care as per General surgery recommendations  Secondary to ulcer and osteomyelitis   Pressure injury secondary to diabetic neuropathy.  Decreased sensation.  CT confirms osteomyelitis.  Patient has history of MRSA and broad-spectrum antibiotics ordered.  General surgery recommends continued IV antibiotics with probable plan to DC home with decline in home health for continued treatment.  Given the CT findings and need for additional IV antibiotics patient will be upgraded to Hospital  Medicine    I have discussed this case with ED provider.

## 2022-11-23 NOTE — PROGRESS NOTES
Pharmacokinetic Initial Assessment: IV Vancomycin    Assessment/Plan:    Initiate intravenous vancomycin with loading dose of 2000 mg once followed by a maintenance dose of vancomycin 2000mg IV every 12 hours  Desired empiric serum trough concentration is 10 to 20 mcg/mL  Draw vancomycin trough level 30 min prior to fourth dose on 11/24 at approximately 0730  Pharmacy will continue to follow and monitor vancomycin.      Please contact pharmacy at extension [phone] with any questions regarding this assessment.     Thank you for the consult,   Xin Olegmynra       Patient brief summary:  Dave Good is a 57 y.o. male initiated on antimicrobial therapy with IV Vancomycin for treatment of suspected skin & soft tissue infection    Drug Allergies:   Review of patient's allergies indicates:   Allergen Reactions    Ibuprofen Swelling     Facial swelling       Actual Body Weight:   122.5 kg    Renal Function:   Estimated Creatinine Clearance: 100.5 mL/min (based on SCr of 1.1 mg/dL).,     Dialysis Method (if applicable):  N/A    CBC (last 72 hours):  Recent Labs   Lab Result Units 11/22/22  1928   WBC K/uL 14.16*   Hemoglobin g/dL 11.4*   Hematocrit % 35.8*   Platelets K/uL 442   Gran % % 64.0   Lymph % % 22.2   Mono % % 12.1   Eosinophil % % 1.1   Basophil % % 0.2   Differential Method  Automated       Metabolic Panel (last 72 hours):  Recent Labs   Lab Result Units 11/22/22  1928   Sodium mmol/L 135*   Potassium mmol/L 3.5   Chloride mmol/L 103   CO2 mmol/L 27   Glucose mg/dL 209*   BUN mg/dL 9   Creatinine mg/dL 1.1  1.0   Albumin g/dL 3.1*   Total Bilirubin mg/dL 0.6   Alkaline Phosphatase U/L 74   AST U/L 16   ALT U/L 17   Magnesium mg/dL 1.5*       Drug levels (last 3 results):  No results for input(s): VANCOMYCINRA, VANCORANDOM, VANCOMYCINPE, VANCOPEAK, VANCOMYCINTR, VANCOTROUGH in the last 72 hours.    Microbiologic Results:  Microbiology Results (last 7 days)       Procedure Component Value Units Date/Time    Blood  culture #2 **CANNOT BE ORDERED STAT** [[id number]] Collected: 11/22/22 1900    Order Status: Sent Specimen: Blood from Midline, Basilic, Right Updated: 11/22/22 1929    Blood culture #1 **CANNOT BE ORDERED STAT** [[id number]] Collected: 11/22/22 1928    Order Status: Sent Specimen: Blood from Midline, Basilic, Right Updated: 11/22/22 1929

## 2022-11-23 NOTE — H&P
[facility] Emergency Dept ([facility]  History & Physical    Patient Name: Dave Good  MRN: [id number]  Patient Class: IP- Inpatient  Admission Date: 11/22/2022  Attending Physician: Kulwinder Bedoya MD   Primary Care Provider: Reyna Jorge NP         Patient information was obtained from patient and ER records.     Subjective:     Principal Problem:Diabetic wet gangrene of the foot    Chief Complaint:   Chief Complaint   Patient presents with    Wound Infection     Pt Presented to ER from wound care on 6th floor for left great toe infection.         HPI: DM2, diabetic neuropathy, COPD, HTN, depression, right foot transmetatarsal amputation and osteomyelitis who was initially admitted to [facility] from Dr. Flores's office for osteomyelitis evaluation.  Patient knows progressively worsening foul-smelling discharge from his left great toe for which he sought care with Dr. Flores.  Patient was evaluated [facility] by surgery.  He will require further inpatient evaluation for osteomyelitis.       Past Medical History:   Diagnosis Date    COPD (chronic obstructive pulmonary disease)     COVID-19     Depression     Diabetes mellitus     Diabetes mellitus, type 2     Hypertension        Past Surgical History:   Procedure Laterality Date    DEBRIDEMENT OF LOWER EXTREMITY Bilateral 3/2/2022    Procedure: DEBRIDEMENT, LOWER EXTREMITY;  Surgeon: Jesus Flores Jr., MD;  Location: [facility];  Service: General;  Laterality: Bilateral;    FOOT AMPUTATION THROUGH METATARSAL Right 9/23/2021    Procedure: AMPUTATION, FOOT, TRANSMETATARSAL right 1st ray resection;  Surgeon: Samuel Toussaint DPM;  Location: [facility];  Service: Podiatry;  Laterality: Right;    INCISION AND DRAINAGE FOOT Bilateral 9/21/2021    Procedure: INCISION AND DRAINAGE, FOOT - Bilateral;  Surgeon: Lavonne Vigil DPM;  Location: [facility];  Service: Podiatry;  Laterality: Bilateral;    REPLACEMENT OF WOUND DRESSING Right 2/24/2022    Procedure: REPLACEMENT,  DRESSING, WOUND;  Surgeon: Jesus Flores Jr., MD;  Location: [facility] OR;  Service: Vascular;  Laterality: Right;  wound vac dressing changed    WOUND DEBRIDEMENT Right 2/22/2022    Procedure: DEBRIDEMENT, WOUND - RIGHT FOOT;  Surgeon: Jesus Flores Jr., MD;  Location: [facility] OR;  Service: Vascular;  Laterality: Right;    WOUND DEBRIDEMENT Bilateral 2/24/2022    Procedure: DEBRIDEMENT, WOUND / BILATERAL DEBRIDEMENT FEET;  Surgeon: Jesus Flores Jr., MD;  Location: [facility] OR;  Service: Vascular;  Laterality: Bilateral;    WOUND DEBRIDEMENT Right 5/27/2022    Procedure: DEBRIDEMENT, WOUND;  Surgeon: Jesus Flores Jr., MD;  Location: [facility] OR;  Service: General;  Laterality: Right;       Review of patient's allergies indicates:   Allergen Reactions    Ibuprofen Swelling     Facial swelling       Current Facility-Administered Medications on File Prior to Encounter   Medication    0.9%  NaCl infusion    LIDOcaine (PF) 10 mg/ml (1%) injection 10 mg     Current Outpatient Medications on File Prior to Encounter   Medication Sig    ammonium lactate 12 % Crea Apply twice daily to affected parts both feet as needed.    aspirin (ECOTRIN) 81 MG EC tablet Take 1 tablet (81 mg total) by mouth once daily.    atorvastatin (LIPITOR) 20 MG tablet Take 2 tablets (40 mg total) by mouth once daily.    hydroCHLOROthiazide (HYDRODIURIL) 25 MG tablet Take 1 tablet (25 mg total) by mouth once daily.    ketoconazole (NIZORAL) 2 % cream Apply topically.    metFORMIN (GLUCOPHAGE) 1000 MG tablet Take 1 tablet (1,000 mg total) by mouth 2 (two) times daily. (Patient taking differently: Take 500 mg by mouth 2 (two) times daily.)    QUEtiapine (SEROQUEL) 200 MG Tab Take 1 tablet (200 mg total) by mouth nightly.    TRULICITY 0.75 mg/0.5 mL pen injector Inject into the skin.    urea 45 % Crea Apply 1 application topically 2 (two) times daily.    blood-glucose meter Misc USE TO TEST THREE TIMES A DAY    collagenase (SANTYL) ointment Apply topically once daily.  "   HYDROcodone-acetaminophen (NORCO) 5-325 mg per tablet Take 1 tablet by mouth every 6 (six) hours as needed for Pain. (Patient not taking: Reported on 11/8/2022)    insulin aspart U-100 (NOVOLOG FLEXPEN U-100 INSULIN) 100 unit/mL (3 mL) InPn pen INJECT 10 UNITS INTO THE SKIN BEFORE MEALS THREE TIMES A DAY (50 DAYS)    insulin detemir U-100 (LEVEMIR FLEXTOUCH U-100 INSULN) 100 unit/mL (3 mL) InPn pen Inject 50 Units into the skin every evening.    lisinopriL 10 MG tablet Take 1 tablet (10 mg total) by mouth once daily.    ONETOUCH VERIO METER Misc     ONETOUCH VERIO TEST STRIPS Strp SMARTSIG:Via Meter    pen needle, diabetic 31 gauge x 1/4" Ndle USE 1 PEN NEEDLE EVERY DAY AS DIRECTED    polyethylene glycol (GLYCOLAX) 17 gram/dose powder     [DISCONTINUED] losartan (COZAAR) 25 MG tablet Take 25 mg by mouth 2 (two) times daily.     Family History    None       Tobacco Use    Smoking status: Former    Smokeless tobacco: Never   Substance and Sexual Activity    Alcohol use: Yes     Alcohol/week: 3.0 standard drinks     Types: 3 Drinks containing 0.5 oz of alcohol per week     Comment: whiskey and beer every other day    Drug use: Not Currently     Types: Marijuana    Sexual activity: Yes     Partners: Female     Review of Systems   Constitutional:  Negative for chills, fatigue and fever.   HENT:  Negative for congestion.    Eyes:  Negative for visual disturbance.   Respiratory:  Negative for shortness of breath.    Cardiovascular:  Negative for chest pain and palpitations.   Gastrointestinal:  Negative for abdominal pain.   Endocrine: Negative for polyuria.   Genitourinary:  Negative for dysuria.   Musculoskeletal:  Negative for back pain.   Skin:  Positive for color change and wound.   Neurological:  Negative for dizziness and headaches.   Psychiatric/Behavioral:  Negative for agitation and confusion.    Objective:     Vital Signs (Most Recent):  Temp: 98.5 °F (36.9 °C) (11/23/22 1247)  Pulse: 76 (11/23/22 " 1247)  Resp: 16 (11/23/22 0401)  BP: 130/76 (11/23/22 1247)  SpO2: 96 % (11/23/22 1247) Vital Signs (24h Range):  Temp:  [97.6 °F (36.4 °C)-99 °F (37.2 °C)] 98.5 °F (36.9 °C)  Pulse:  [76-85] 76  Resp:  [16-18] 16  SpO2:  [94 %-100 %] 96 %  BP: (127-145)/(73-91) 130/76     Weight: 122.5 kg (270 lb)  Body mass index is 34.67 kg/m².    Physical Exam  Vitals reviewed.   Constitutional:       General: He is not in acute distress.     Appearance: Normal appearance.   Eyes:      General:         Right eye: No discharge.         Left eye: No discharge.   Cardiovascular:      Rate and Rhythm: Normal rate and regular rhythm.   Pulmonary:      Breath sounds: No wheezing or rales.   Abdominal:      General: Bowel sounds are normal.      Tenderness: There is no abdominal tenderness.   Musculoskeletal:      Cervical back: Neck supple.      Right lower leg: No edema.      Left lower leg: No edema.   Skin:     General: Skin is warm.      Capillary Refill: Capillary refill takes less than 2 seconds.      Comments: Left great toe wound covered in bandage   Neurological:      General: No focal deficit present.      Mental Status: He is alert and oriented to person, place, and time.      Cranial Nerves: No cranial nerve deficit.   Psychiatric:         Mood and Affect: Mood normal.         Behavior: Behavior normal.           Significant Labs: All pertinent labs within the past 24 hours have been reviewed.  CBC:   Recent Labs   Lab 11/22/22 1928 11/23/22  0540   WBC 14.16* 11.99   HGB 11.4* 10.6*   HCT 35.8* 34.1*    417     CMP:   Recent Labs   Lab 11/22/22 1928 11/23/22  0540   * 136   K 3.5 3.4*    104   CO2 27 28   * 209*   BUN 9 9   CREATININE 1.1  1.0 1.0   CALCIUM 8.8 8.6*   PROT 8.3 7.6   ALBUMIN 3.1* 2.8*   BILITOT 0.6 0.6   ALKPHOS 74 62   AST 16 14   ALT 17 17   ANIONGAP 5* 4*       Significant Imaging: I have reviewed all pertinent imaging results/findings within the past 24  hours.    Assessment/Plan:     * Diabetic wet gangrene of the foot  Left great toe wound concerning for wet gangrene.       Admit to inpatient     Continue vancomycin   Continue cefepime  Follow blood cultures   General surgery following  Wound care postoperatively    COPD with asthma  Not in exacerbation  Max p.r.n.    Class 1 obesity due to excess calories with serious comorbidity and body mass index (BMI) of 34.0 to 34.9 in adult  Body mass index is 34.67 kg/m². Morbid obesity complicates all aspects of disease management from diagnostic modalities to treatment. Weight loss encouraged and health benefits explained to patient.         Mixed hyperlipidemia  Continue statin      HTN (hypertension)  Continue HCTZ   Continue lisinopril      Type 2 diabetes mellitus with diabetic peripheral angiopathy without gangrene, with long-term current use of insulin  Hemoglobin A1c 9.1% this admission  Continue basal insulin q.h.s.  Continue insulin sliding scale      Holding DVT prophylaxis overnight, NPO after midnight anticipate a need for surgical debridement.  Resume diet in a.m. if no surgery.    VTE Risk Mitigation (From admission, onward)           Ordered     IP VTE HIGH RISK PATIENT  Once         11/22/22 1724     Place sequential compression device  Until discontinued         11/22/22 1724                       Kulwinder Bedoya MD  Department of Hospital Medicine   [facility] - Emergency Dept (Observation)

## 2022-11-23 NOTE — H&P
[facility] ED Observation Unit  History and Physical      I assumed care of this patient from the Emergency Department at onset of observation time, 7:50 PM on 11/22/2022.       History of Present Illness:  Dave Good is a 57 y.o. male PMH that includes DM2, diabetic neuropathy, COPD, HTN, depression, right foot transmetatarsal amputation and osteomyelitis presenting from Dr. Flores's office for osteomyelitis workup. In office per Dr. Flores, left lower extremity with foul-smelling necrotic area of distal phalanx concerning for infection and possible osteomyelitis.  Instructed to present to the ER for osteomyelitis workup and possible amputation.  Patient reports pain in the left foot.  He is routinely seen by wound care as he has a neurotrophic ulcer also on the right foot.  He denies fever and chills.     ED Course:  CBC with leukocytosis, mild stable anemia  CMP with mild hyponatremia and hyperglycemia, normal kidney function  Lactic WNL  CRP elevated at 125, ESR elevate at 79  Coags WNL  Mild hypomagnesia, replaced orally  Blood cultures in process.  X-ray of left foot with soft tissue swelling and no erosive changes.  CT left foot pending  Patient placed in EDOU pending General surgery evaluation tomorrow.    I reviewed the ED Provider Note dated 11/22/2022 prior to my evaluation of this patient.  I reviewed all labs and imaging performed in the Main ED, prior to patient being placed in Observation. Patient was placed in the ED Observation Unit for osteomyelitis workup    PMHx   Past Medical History:   Diagnosis Date    COPD (chronic obstructive pulmonary disease)     COVID-19     Depression     Diabetes mellitus     Diabetes mellitus, type 2     Hypertension       Past Surgical History:   Procedure Laterality Date    DEBRIDEMENT OF LOWER EXTREMITY Bilateral 3/2/2022    Procedure: DEBRIDEMENT, LOWER EXTREMITY;  Surgeon: Jesus Flores Jr., MD;  Location: [facility] OR;  Service: General;  Laterality: Bilateral;     FOOT AMPUTATION THROUGH METATARSAL Right 9/23/2021    Procedure: AMPUTATION, FOOT, TRANSMETATARSAL right 1st ray resection;  Surgeon: Samuel Toussaint DPM;  Location: [facility];  Service: Podiatry;  Laterality: Right;    INCISION AND DRAINAGE FOOT Bilateral 9/21/2021    Procedure: INCISION AND DRAINAGE, FOOT - Bilateral;  Surgeon: Lavonne Viigl DPM;  Location: [facility];  Service: Podiatry;  Laterality: Bilateral;    REPLACEMENT OF WOUND DRESSING Right 2/24/2022    Procedure: REPLACEMENT, DRESSING, WOUND;  Surgeon: Jesus Flores Jr., MD;  Location: [facility];  Service: Vascular;  Laterality: Right;  wound vac dressing changed    WOUND DEBRIDEMENT Right 2/22/2022    Procedure: DEBRIDEMENT, WOUND - RIGHT FOOT;  Surgeon: Jesus Flores Jr., MD;  Location: [facility];  Service: Vascular;  Laterality: Right;    WOUND DEBRIDEMENT Bilateral 2/24/2022    Procedure: DEBRIDEMENT, WOUND / BILATERAL DEBRIDEMENT FEET;  Surgeon: Jesus Flores Jr., MD;  Location: [facility];  Service: Vascular;  Laterality: Bilateral;    WOUND DEBRIDEMENT Right 5/27/2022    Procedure: DEBRIDEMENT, WOUND;  Surgeon: Jesus Flores Jr., MD;  Location: [facility];  Service: General;  Laterality: Right;        Family Hx   No family history on file.     Social Hx   Social History     Socioeconomic History    Marital status: Single   Tobacco Use    Smoking status: Former    Smokeless tobacco: Never   Substance and Sexual Activity    Alcohol use: Yes     Alcohol/week: 3.0 standard drinks     Types: 3 Drinks containing 0.5 oz of alcohol per week     Comment: whiskey and beer every other day    Drug use: Not Currently     Types: Marijuana    Sexual activity: Yes     Partners: Female     Social Determinants of Health     Financial Resource Strain: Low Risk     Difficulty of Paying Living Expenses: Not very hard   Food Insecurity: Food Insecurity Present    Worried About Running Out of Food in the Last Year: Often true    Ran Out of Food in the Last Year: Often true  "  Transportation Needs: No Transportation Needs    Lack of Transportation (Medical): No    Lack of Transportation (Non-Medical): No   Physical Activity: Inactive    Days of Exercise per Week: 0 days    Minutes of Exercise per Session: 0 min   Stress: No Stress Concern Present    Feeling of Stress : Not at all   Social Connections: Moderately Integrated    Frequency of Communication with Friends and Family: Twice a week    Frequency of Social Gatherings with Friends and Family: Twice a week    Attends Zoroastrian Services: 1 to 4 times per year    Active Member of Clubs or Organizations: No    Attends Club or Organization Meetings: Never    Marital Status: Living with partner   Housing Stability: Unknown    Unable to Pay for Housing in the Last Year: No    Unstable Housing in the Last Year: No        Vital Signs   Vitals:    11/22/22 1548 11/22/22 1931 11/22/22 1952   BP: (!) 145/77 (!) 138/91    Pulse: 78 78    Resp: 18  18   Temp: 98.9 °F (37.2 °C)     TempSrc: Oral     SpO2: 97% 100%    Weight: 122.5 kg (270 lb)     Height: 6' 2" (1.88 m)         Review of Systems  Review of Systems   Constitutional:  Negative for chills, fever and malaise/fatigue.   Respiratory:  Negative for cough and shortness of breath.    Cardiovascular:  Negative for chest pain and palpitations.   Gastrointestinal:  Negative for abdominal pain, nausea and vomiting.   Genitourinary:  Negative for dysuria.   Musculoskeletal:  Negative for back pain and myalgias.        +left foot pain   Skin:  Negative for rash.        +wound   Neurological:  Negative for dizziness and headaches.   Psychiatric/Behavioral:  Negative for depression and suicidal ideas.      Brief Physical Exam/Reassessment   Physical Exam    Constitutional: He appears well-developed and well-nourished. He is Obese . He is cooperative.  Non-toxic appearance. He does not have a sickly appearance. He appears ill (chronically ill appearing). No distress.   HENT:   Head: Normocephalic " and atraumatic.   Eyes: EOM are normal. Pupils are equal, round, and reactive to light. No scleral icterus.   Neck:   Normal range of motion.  Cardiovascular:  Normal rate, regular rhythm and normal heart sounds.           Pulmonary/Chest: Breath sounds normal. No respiratory distress. He exhibits no tenderness.   Abdominal: Abdomen is soft. Bowel sounds are normal. He exhibits no distension. There is no abdominal tenderness.   Musculoskeletal:         General: No tenderness. Normal range of motion.      Cervical back: Normal range of motion.      Comments: Amputation of right great and 2nd toes     Neurological: He is alert and oriented to person, place, and time. He has normal strength. No sensory deficit. GCS score is 15. GCS eye subscore is 4. GCS verbal subscore is 5. GCS motor subscore is 6.   Skin: Skin is warm and dry. Capillary refill takes less than 2 seconds.   Please see associated pictures in chart.  Foul-smelling necrotic area of the distal phalanx of left foot.  Neurotrophic ulcer of bilateral feet   Psychiatric: He has a normal mood and affect. Thought content normal.     Left foot            Right foot        Labs Reviewed   CBC W/ AUTO DIFFERENTIAL - Abnormal; Notable for the following components:       Result Value    WBC 14.16 (*)     RBC 4.14 (*)     Hemoglobin 11.4 (*)     Hematocrit 35.8 (*)     MCHC 31.8 (*)     Gran # (ANC) 9.1 (*)     Immature Grans (Abs) 0.06 (*)     Mono # 1.7 (*)     All other components within normal limits    Narrative:     Release to patient->Immediate   COMPREHENSIVE METABOLIC PANEL - Abnormal; Notable for the following components:    Sodium 135 (*)     Glucose 209 (*)     Albumin 3.1 (*)     Anion Gap 5 (*)     All other components within normal limits    Narrative:     Release to patient->Immediate   MAGNESIUM - Abnormal; Notable for the following components:    Magnesium 1.5 (*)     All other components within normal limits    Narrative:     Release to  patient->Immediate   SEDIMENTATION RATE - Abnormal; Notable for the following components:    Sed Rate 79 (*)     All other components within normal limits    Narrative:     Release to patient->Immediate   C-REACTIVE PROTEIN - Abnormal; Notable for the following components:    .3 (*)     All other components within normal limits    Narrative:     Release to patient->Immediate   CULTURE, BLOOD   CULTURE, BLOOD   HIV 1 / 2 ANTIBODY    Narrative:     Release to patient->Immediate   HEPATITIS C ANTIBODY    Narrative:     Release to patient->Immediate   PROTIME-INR    Narrative:     Release to patient->Immediate   APTT    Narrative:     Release to patient->Immediate   LACTIC ACID, PLASMA    Narrative:     Release to patient->Immediate   CREATININE, SERUM   HEMOGLOBIN A1C   HEMOGLOBIN A1C   SARS-COV-2 RDRP GENE   POCT GLUCOSE MONITORING CONTINUOUS     X-Ray Foot Complete Left   Final Result      Resolution of previous ulceration along the plantar aspect of the left forefoot.      Soft tissue swelling of the left foot.      No erosive changes.  Additional evaluation, as clinically warranted.         Electronically signed by: aPtrice Alarcon MD   Date:    11/22/2022   Time:    17:19      CT Foot With Contrast Left    (Results Pending)         Medications:   Scheduled Meds:   [START ON 11/23/2022] aspirin  81 mg Oral Daily    [START ON 11/23/2022] atorvastatin  40 mg Oral Daily    [START ON 11/23/2022] hydroCHLOROthiazide  25 mg Oral Daily    insulin detemir U-100  40 Units Subcutaneous QHS    [START ON 11/23/2022] lisinopriL  10 mg Oral Daily    QUEtiapine  200 mg Oral Nightly    vancomycin (VANCOCIN) IVPB  2,000 mg Intravenous Once    [START ON 11/23/2022] vancomycin (VANCOCIN) IVPB  2,000 mg Intravenous Q12H     Continuous Infusions:  PRN Meds:.dextrose 10%, dextrose 10%, glucagon (human recombinant), glucose, glucose, insulin aspart U-100, melatonin, sodium chloride 0.9%, Pharmacy to dose Vancomycin consult **AND**  vancomycin - pharmacy to dose      Assessment/Plan:    1. Neurotrophic ulcer of foot    2. Left foot pain    3. Pressure injury of left foot, stage 3      Pressure ulcer of left foot  -ivan Ba  -General surgery consult  -osteomyelitis r/o    Case was discussed with the ED provider, Dr. Mark Acevedo

## 2022-11-23 NOTE — SUBJECTIVE & OBJECTIVE
Past Medical History:   Diagnosis Date    COPD (chronic obstructive pulmonary disease)     COVID-19     Depression     Diabetes mellitus     Diabetes mellitus, type 2     Hypertension        Past Surgical History:   Procedure Laterality Date    DEBRIDEMENT OF LOWER EXTREMITY Bilateral 3/2/2022    Procedure: DEBRIDEMENT, LOWER EXTREMITY;  Surgeon: Jesus Flores Jr., MD;  Location: [facility];  Service: General;  Laterality: Bilateral;    FOOT AMPUTATION THROUGH METATARSAL Right 9/23/2021    Procedure: AMPUTATION, FOOT, TRANSMETATARSAL right 1st ray resection;  Surgeon: Samuel Toussaint DPM;  Location: [facility];  Service: Podiatry;  Laterality: Right;    INCISION AND DRAINAGE FOOT Bilateral 9/21/2021    Procedure: INCISION AND DRAINAGE, FOOT - Bilateral;  Surgeon: Lavonne Vigil DPM;  Location: [facility];  Service: Podiatry;  Laterality: Bilateral;    REPLACEMENT OF WOUND DRESSING Right 2/24/2022    Procedure: REPLACEMENT, DRESSING, WOUND;  Surgeon: Jesus Flores Jr., MD;  Location: [facility];  Service: Vascular;  Laterality: Right;  wound vac dressing changed    WOUND DEBRIDEMENT Right 2/22/2022    Procedure: DEBRIDEMENT, WOUND - RIGHT FOOT;  Surgeon: Jesus Flores Jr., MD;  Location: [facility];  Service: Vascular;  Laterality: Right;    WOUND DEBRIDEMENT Bilateral 2/24/2022    Procedure: DEBRIDEMENT, WOUND / BILATERAL DEBRIDEMENT FEET;  Surgeon: Jesus Flores Jr., MD;  Location: [facility];  Service: Vascular;  Laterality: Bilateral;    WOUND DEBRIDEMENT Right 5/27/2022    Procedure: DEBRIDEMENT, WOUND;  Surgeon: Jesus Flores Jr., MD;  Location: [facility];  Service: General;  Laterality: Right;       Review of patient's allergies indicates:   Allergen Reactions    Ibuprofen Swelling     Facial swelling       Current Facility-Administered Medications on File Prior to Encounter   Medication    0.9%  NaCl infusion    LIDOcaine (PF) 10 mg/ml (1%) injection 10 mg     Current Outpatient Medications on File Prior to Encounter   Medication  "Sig    ammonium lactate 12 % Crea Apply twice daily to affected parts both feet as needed.    aspirin (ECOTRIN) 81 MG EC tablet Take 1 tablet (81 mg total) by mouth once daily.    atorvastatin (LIPITOR) 20 MG tablet Take 2 tablets (40 mg total) by mouth once daily.    hydroCHLOROthiazide (HYDRODIURIL) 25 MG tablet Take 1 tablet (25 mg total) by mouth once daily.    ketoconazole (NIZORAL) 2 % cream Apply topically.    metFORMIN (GLUCOPHAGE) 1000 MG tablet Take 1 tablet (1,000 mg total) by mouth 2 (two) times daily. (Patient taking differently: Take 500 mg by mouth 2 (two) times daily.)    QUEtiapine (SEROQUEL) 200 MG Tab Take 1 tablet (200 mg total) by mouth nightly.    TRULICITY 0.75 mg/0.5 mL pen injector Inject into the skin.    urea 45 % Crea Apply 1 application topically 2 (two) times daily.    blood-glucose meter Misc USE TO TEST THREE TIMES A DAY    collagenase (SANTYL) ointment Apply topically once daily.    HYDROcodone-acetaminophen (NORCO) 5-325 mg per tablet Take 1 tablet by mouth every 6 (six) hours as needed for Pain. (Patient not taking: Reported on 11/8/2022)    insulin aspart U-100 (NOVOLOG FLEXPEN U-100 INSULIN) 100 unit/mL (3 mL) InPn pen INJECT 10 UNITS INTO THE SKIN BEFORE MEALS THREE TIMES A DAY (50 DAYS)    insulin detemir U-100 (LEVEMIR FLEXTOUCH U-100 INSULN) 100 unit/mL (3 mL) InPn pen Inject 50 Units into the skin every evening.    lisinopriL 10 MG tablet Take 1 tablet (10 mg total) by mouth once daily.    [place] [facility] [place]ETOUCH VERIO TEST STRIPS Strp SMARTSIG:Via Meter    pen needle, diabetic 31 gauge x 1/4" Ndle USE 1 PEN NEEDLE EVERY DAY AS DIRECTED    polyethylene glycol (GLYCOLAX) 17 gram/dose powder     [DISCONTINUED] losartan (COZAAR) 25 MG tablet Take 25 mg by mouth 2 (two) times daily.     Family History    None       Tobacco Use    Smoking status: Former    Smokeless tobacco: Never   Substance and Sexual Activity    Alcohol use: Yes     Alcohol/week: 3.0 standard " drinks     Types: 3 Drinks containing 0.5 oz of alcohol per week     Comment: whiskey and beer every other day    Drug use: Not Currently     Types: Marijuana    Sexual activity: Yes     Partners: Female     Review of Systems   Constitutional:  Negative for chills, fatigue and fever.   HENT:  Negative for congestion.    Eyes:  Negative for visual disturbance.   Respiratory:  Negative for shortness of breath.    Cardiovascular:  Negative for chest pain and palpitations.   Gastrointestinal:  Negative for abdominal pain.   Endocrine: Negative for polyuria.   Genitourinary:  Negative for dysuria.   Musculoskeletal:  Negative for back pain.   Skin:  Positive for color change and wound.   Neurological:  Negative for dizziness and headaches.   Psychiatric/Behavioral:  Negative for agitation and confusion.    Objective:     Vital Signs (Most Recent):  Temp: 98.5 °F (36.9 °C) (11/23/22 1247)  Pulse: 76 (11/23/22 1247)  Resp: 16 (11/23/22 0401)  BP: 130/76 (11/23/22 1247)  SpO2: 96 % (11/23/22 1247) Vital Signs (24h Range):  Temp:  [97.6 °F (36.4 °C)-99 °F (37.2 °C)] 98.5 °F (36.9 °C)  Pulse:  [76-85] 76  Resp:  [16-18] 16  SpO2:  [94 %-100 %] 96 %  BP: (127-145)/(73-91) 130/76     Weight: 122.5 kg (270 lb)  Body mass index is 34.67 kg/m².    Physical Exam  Vitals reviewed.   Constitutional:       General: He is not in acute distress.     Appearance: Normal appearance.   Eyes:      General:         Right eye: No discharge.         Left eye: No discharge.   Cardiovascular:      Rate and Rhythm: Normal rate and regular rhythm.   Pulmonary:      Breath sounds: No wheezing or rales.   Abdominal:      General: Bowel sounds are normal.      Tenderness: There is no abdominal tenderness.   Musculoskeletal:      Cervical back: Neck supple.      Right lower leg: No edema.      Left lower leg: No edema.   Skin:     General: Skin is warm.      Capillary Refill: Capillary refill takes less than 2 seconds.      Comments: Left great toe  wound covered in bandage   Neurological:      General: No focal deficit present.      Mental Status: He is alert and oriented to person, place, and time.      Cranial Nerves: No cranial nerve deficit.   Psychiatric:         Mood and Affect: Mood normal.         Behavior: Behavior normal.           Significant Labs: All pertinent labs within the past 24 hours have been reviewed.  CBC:   Recent Labs   Lab 11/22/22 1928 11/23/22  0540   WBC 14.16* 11.99   HGB 11.4* 10.6*   HCT 35.8* 34.1*    417     CMP:   Recent Labs   Lab 11/22/22 1928 11/23/22  0540   * 136   K 3.5 3.4*    104   CO2 27 28   * 209*   BUN 9 9   CREATININE 1.1  1.0 1.0   CALCIUM 8.8 8.6*   PROT 8.3 7.6   ALBUMIN 3.1* 2.8*   BILITOT 0.6 0.6   ALKPHOS 74 62   AST 16 14   ALT 17 17   ANIONGAP 5* 4*       Significant Imaging: I have reviewed all pertinent imaging results/findings within the past 24 hours.

## 2022-11-23 NOTE — ED NOTES
Pharmacy tech secure chatted to complete med rec with patient. Pt unsure of new insulin started last week and is refusing Levemir.

## 2022-11-23 NOTE — ED NOTES
Care assumed. Pt offered gown to change for comfort. Pt oriented to room, bed controls and call bell. Dressings applied to bilat foot wounds. Plan of care discussed. Pt verbal and understanding.

## 2022-11-23 NOTE — PROGRESS NOTES
Patient reviewed, renal function stable, cultures reviewed, no new levels, continue current therapy; Next levels due: 11/24 @ 9595.     Please contact pharmacy with any questions.    Thank you.

## 2022-11-23 NOTE — HPI
DM2, diabetic neuropathy, COPD, HTN, depression, right foot transmetatarsal amputation and osteomyelitis who was initially admitted to [facility] from Dr. Flores's office for osteomyelitis evaluation.  Patient knows progressively worsening foul-smelling discharge from his left great toe for which he sought care with Dr. Flores.  Patient was evaluated [facility] by surgery.  He will require further inpatient evaluation for osteomyelitis.

## 2022-11-23 NOTE — PROGRESS NOTES
57-year-old male with longstanding diabetes mellitus and peripheral neuropathy.  Patient poorly compliant due to transportation issues.  Patient with long history of neurotrophic ulcers both lower extremities.  Imaging studies show osteomyelitis of left foot.  Patient needs long-term antibiotics or partial foot amputation.  This time he wishes to pursue conservative therapy.

## 2022-11-23 NOTE — ASSESSMENT & PLAN NOTE
Left great toe wound concerning for wet gangrene.       Admit to inpatient     Continue vancomycin   Continue cefepime  Follow blood cultures   General surgery following  Wound care postoperatively

## 2022-11-24 LAB
ALBUMIN SERPL BCP-MCNC: 2.7 G/DL (ref 3.5–5.2)
ANION GAP SERPL CALC-SCNC: 7 MMOL/L (ref 8–16)
BUN SERPL-MCNC: 9 MG/DL (ref 6–20)
CALCIUM SERPL-MCNC: 8.7 MG/DL (ref 8.7–10.5)
CHLORIDE SERPL-SCNC: 103 MMOL/L (ref 95–110)
CO2 SERPL-SCNC: 25 MMOL/L (ref 23–29)
CREAT SERPL-MCNC: 1.1 MG/DL (ref 0.5–1.4)
EST. GFR  (NO RACE VARIABLE): >60 ML/MIN/1.73 M^2
GLUCOSE SERPL-MCNC: 181 MG/DL (ref 70–110)
PHOSPHATE SERPL-MCNC: 2.8 MG/DL (ref 2.7–4.5)
POCT GLUCOSE: 229 MG/DL (ref 70–110)
POCT GLUCOSE: 237 MG/DL (ref 70–110)
POTASSIUM SERPL-SCNC: 3.8 MMOL/L (ref 3.5–5.1)
SODIUM SERPL-SCNC: 135 MMOL/L (ref 136–145)
VANCOMYCIN TROUGH SERPL-MCNC: 23 UG/ML (ref 10–22)

## 2022-11-24 PROCEDURE — 25000003 PHARM REV CODE 250: Performed by: STUDENT IN AN ORGANIZED HEALTH CARE EDUCATION/TRAINING PROGRAM

## 2022-11-24 PROCEDURE — 11000001 HC ACUTE MED/SURG PRIVATE ROOM

## 2022-11-24 PROCEDURE — 25000003 PHARM REV CODE 250: Performed by: EMERGENCY MEDICINE

## 2022-11-24 PROCEDURE — 80202 ASSAY OF VANCOMYCIN: CPT | Performed by: EMERGENCY MEDICINE

## 2022-11-24 PROCEDURE — 99232 PR SUBSEQUENT HOSPITAL CARE,LEVL II: ICD-10-PCS | Mod: ,,, | Performed by: STUDENT IN AN ORGANIZED HEALTH CARE EDUCATION/TRAINING PROGRAM

## 2022-11-24 PROCEDURE — 80069 RENAL FUNCTION PANEL: CPT | Performed by: STUDENT IN AN ORGANIZED HEALTH CARE EDUCATION/TRAINING PROGRAM

## 2022-11-24 PROCEDURE — 99232 SBSQ HOSP IP/OBS MODERATE 35: CPT | Mod: ,,, | Performed by: STUDENT IN AN ORGANIZED HEALTH CARE EDUCATION/TRAINING PROGRAM

## 2022-11-24 PROCEDURE — 63600175 PHARM REV CODE 636 W HCPCS: Performed by: EMERGENCY MEDICINE

## 2022-11-24 PROCEDURE — 25000003 PHARM REV CODE 250: Performed by: NURSE PRACTITIONER

## 2022-11-24 PROCEDURE — 94761 N-INVAS EAR/PLS OXIMETRY MLT: CPT

## 2022-11-24 PROCEDURE — 63600175 PHARM REV CODE 636 W HCPCS: Performed by: STUDENT IN AN ORGANIZED HEALTH CARE EDUCATION/TRAINING PROGRAM

## 2022-11-24 RX ORDER — INSULIN ASPART 100 [IU]/ML
1-10 INJECTION, SOLUTION INTRAVENOUS; SUBCUTANEOUS
Status: DISCONTINUED | OUTPATIENT
Start: 2022-11-24 | End: 2022-11-24

## 2022-11-24 RX ORDER — IBUPROFEN 200 MG
24 TABLET ORAL
Status: DISCONTINUED | OUTPATIENT
Start: 2022-11-24 | End: 2022-12-07 | Stop reason: HOSPADM

## 2022-11-24 RX ORDER — INSULIN ASPART 100 [IU]/ML
1-10 INJECTION, SOLUTION INTRAVENOUS; SUBCUTANEOUS
Status: DISCONTINUED | OUTPATIENT
Start: 2022-11-24 | End: 2022-12-07 | Stop reason: HOSPADM

## 2022-11-24 RX ORDER — IBUPROFEN 200 MG
16 TABLET ORAL
Status: DISCONTINUED | OUTPATIENT
Start: 2022-11-24 | End: 2022-12-07 | Stop reason: HOSPADM

## 2022-11-24 RX ORDER — GLUCAGON 1 MG
1 KIT INJECTION
Status: DISCONTINUED | OUTPATIENT
Start: 2022-11-24 | End: 2022-12-07 | Stop reason: HOSPADM

## 2022-11-24 RX ORDER — POLYETHYLENE GLYCOL 3350 17 G/17G
17 POWDER, FOR SOLUTION ORAL 2 TIMES DAILY
Status: COMPLETED | OUTPATIENT
Start: 2022-11-24 | End: 2022-11-24

## 2022-11-24 RX ADMIN — MUPIROCIN: 20 OINTMENT TOPICAL at 08:11

## 2022-11-24 RX ADMIN — CEFEPIME HYDROCHLORIDE 2 G: 2 INJECTION, SOLUTION INTRAVENOUS at 08:11

## 2022-11-24 RX ADMIN — CEFEPIME HYDROCHLORIDE 2 G: 2 INJECTION, SOLUTION INTRAVENOUS at 12:11

## 2022-11-24 RX ADMIN — VANCOMYCIN HYDROCHLORIDE 1500 MG: 1.5 INJECTION, POWDER, LYOPHILIZED, FOR SOLUTION INTRAVENOUS at 10:11

## 2022-11-24 RX ADMIN — VANCOMYCIN HYDROCHLORIDE 2000 MG: 500 INJECTION, POWDER, LYOPHILIZED, FOR SOLUTION INTRAVENOUS at 09:11

## 2022-11-24 RX ADMIN — INSULIN ASPART 3 UNITS: 100 INJECTION, SOLUTION INTRAVENOUS; SUBCUTANEOUS at 08:11

## 2022-11-24 RX ADMIN — HYDROCODONE BITARTRATE AND ACETAMINOPHEN 1 TABLET: 5; 325 TABLET ORAL at 08:11

## 2022-11-24 RX ADMIN — POLYETHYLENE GLYCOL 3350 17 G: 17 POWDER, FOR SOLUTION ORAL at 12:11

## 2022-11-24 RX ADMIN — CEFEPIME HYDROCHLORIDE 2 G: 2 INJECTION, SOLUTION INTRAVENOUS at 04:11

## 2022-11-24 RX ADMIN — HYDROCODONE BITARTRATE AND ACETAMINOPHEN 1 TABLET: 5; 325 TABLET ORAL at 01:11

## 2022-11-24 RX ADMIN — POLYETHYLENE GLYCOL 3350 17 G: 17 POWDER, FOR SOLUTION ORAL at 08:11

## 2022-11-24 RX ADMIN — INSULIN DETEMIR 40 UNITS: 100 INJECTION, SOLUTION SUBCUTANEOUS at 08:11

## 2022-11-24 RX ADMIN — LISINOPRIL 10 MG: 10 TABLET ORAL at 08:11

## 2022-11-24 RX ADMIN — HYDROCODONE BITARTRATE AND ACETAMINOPHEN 1 TABLET: 5; 325 TABLET ORAL at 07:11

## 2022-11-24 RX ADMIN — ASPIRIN 81 MG: 81 TABLET, COATED ORAL at 08:11

## 2022-11-24 RX ADMIN — HYDROCHLOROTHIAZIDE 25 MG: 25 TABLET ORAL at 08:11

## 2022-11-24 RX ADMIN — INSULIN ASPART 4 UNITS: 100 INJECTION, SOLUTION INTRAVENOUS; SUBCUTANEOUS at 12:11

## 2022-11-24 RX ADMIN — ATORVASTATIN CALCIUM 40 MG: 20 TABLET, FILM COATED ORAL at 08:11

## 2022-11-24 RX ADMIN — QUETIAPINE FUMARATE 200 MG: 200 TABLET ORAL at 08:11

## 2022-11-24 NOTE — PLAN OF CARE
PCP Reyna Jorge NP  Pharmacy 1)[facility] 2)[facility]     Lives alone - independent - will need transportation home        11/24/22 0899   Discharge Assessment   Assessment Type Discharge Planning Assessment   Confirmed/corrected address, phone number and insurance Yes   Confirmed Demographics Correct on Facesheet   Source of Information patient   Lives With alone   Do you expect to return to your current living situation? Yes   Prior to hospitilization cognitive status: Alert/Oriented   Current cognitive status: Alert/Oriented   Walking or Climbing Stairs Difficulty none   Dressing/Bathing Difficulty none   Equipment Currently Used at Home none   Readmission within 30 days? No   Do you currently have service(s) that help you manage your care at home? No   Do you take prescription medications? Yes   Do you have prescription coverage? Yes   Do you have any problems affording any of your prescribed medications? No   Is the patient taking medications as prescribed? yes   Who is going to help you get home at discharge? needs transport   How do you get to doctors appointments? health plan transportation   Are you on dialysis? No   Discharge Plan A Home   Discharge Plan discussed with: Patient   Discharge Barriers Identified None

## 2022-11-24 NOTE — ASSESSMENT & PLAN NOTE
Left great toe wound concerning for wet gangrene.      Continue vancomycin   Continue cefepime  Follow blood cultures   General surgery following, will discuss need for bone biopsy with him  Wound care postoperatively

## 2022-11-24 NOTE — PLAN OF CARE
POC reviewed with pt, pt noted Aaox4, pt verbalizes understanding of plan. Right arm midline intact flushed without difficulty. Dressing to bilt feet noted clean dry and intact. PT medicated for pain twice this shift see MAR.  CB in reach, bed low with SR up x2. Urinal at bedside.     Problem: Adult Inpatient Plan of Care  Goal: Plan of Care Review  Outcome: Ongoing, Progressing  Goal: Patient-Specific Goal (Individualized)  Outcome: Ongoing, Progressing  Goal: Absence of Hospital-Acquired Illness or Injury  Outcome: Ongoing, Progressing  Goal: Optimal Comfort and Wellbeing  Outcome: Ongoing, Progressing  Goal: Readiness for Transition of Care  Outcome: Ongoing, Progressing     Problem: Diabetes Comorbidity  Goal: Blood Glucose Level Within Targeted Range  Outcome: Ongoing, Progressing     Problem: Infection  Goal: Absence of Infection Signs and Symptoms  Outcome: Ongoing, Progressing     Problem: Impaired Wound Healing  Goal: Optimal Wound Healing  Outcome: Ongoing, Progressing

## 2022-11-24 NOTE — SUBJECTIVE & OBJECTIVE
Interval History:  No acute events overnight.  He endorses constipation.    Review of Systems   Constitutional:  Negative for chills and fever.   HENT:  Negative for congestion.    Respiratory:  Negative for shortness of breath.    Cardiovascular:  Negative for chest pain and palpitations.   Gastrointestinal:  Positive for constipation. Negative for abdominal pain.   Musculoskeletal:  Negative for back pain.   Skin:  Negative for wound.   Neurological:  Negative for dizziness and headaches.   Objective:     Vital Signs (Most Recent):  Temp: 97.7 °F (36.5 °C) (11/24/22 1112)  Pulse: 74 (11/24/22 1112)  Resp: 20 (11/24/22 1112)  BP: 121/85 (11/24/22 1112)  SpO2: (!) 93 % (11/24/22 1112)   Vital Signs (24h Range):  Temp:  [97.4 °F (36.3 °C)-99 °F (37.2 °C)] 97.7 °F (36.5 °C)  Pulse:  [72-88] 74  Resp:  [16-20] 20  SpO2:  [93 %-98 %] 93 %  BP: (105-130)/(63-85) 121/85     Weight: 122.5 kg (270 lb)  Body mass index is 34.67 kg/m².    Intake/Output Summary (Last 24 hours) at 11/24/2022 1231  Last data filed at 11/23/2022 1647  Gross per 24 hour   Intake 146.77 ml   Output --   Net 146.77 ml      Physical Exam  Vitals reviewed.   Constitutional:       General: He is not in acute distress.     Appearance: Normal appearance.   Cardiovascular:      Rate and Rhythm: Normal rate and regular rhythm.   Pulmonary:      Breath sounds: No wheezing or rales.   Abdominal:      General: Bowel sounds are normal.      Tenderness: There is no abdominal tenderness.   Musculoskeletal:      Cervical back: Neck supple.      Comments: Left toe wound covered by bandage.  Right foot covered by Kerlix bandage.  No visible lower extremity erythema.  Trace bilateral lower extremity edema   Skin:     General: Skin is warm.      Capillary Refill: Capillary refill takes less than 2 seconds.   Neurological:      General: No focal deficit present.      Mental Status: He is alert and oriented to person, place, and time.      Cranial Nerves: No cranial  nerve deficit.   Psychiatric:         Mood and Affect: Mood normal.       Significant Labs: All pertinent labs within the past 24 hours have been reviewed.  CBC:   Recent Labs   Lab 11/22/22 1928 11/23/22  0540   WBC 14.16* 11.99   HGB 11.4* 10.6*   HCT 35.8* 34.1*    417     CMP:   Recent Labs   Lab 11/22/22 1928 11/23/22  0540 11/24/22  0502   * 136 135*   K 3.5 3.4* 3.8    104 103   CO2 27 28 25   * 209* 181*   BUN 9 9 9   CREATININE 1.1  1.0 1.0 1.1   CALCIUM 8.8 8.6* 8.7   PROT 8.3 7.6  --    ALBUMIN 3.1* 2.8* 2.7*   BILITOT 0.6 0.6  --    ALKPHOS 74 62  --    AST 16 14  --    ALT 17 17  --    ANIONGAP 5* 4* 7*       Significant Imaging: I have reviewed all pertinent imaging results/findings within the past 24 hours.

## 2022-11-24 NOTE — NURSING
Patient arrived on unit via wheelchair with patient transport, belongings at the bedside. Pt is AAOX4,on room airat 98%. Patient said, his pain is a 10 on his left foot. Bedwheels locked, siderails upx2 and call light in reach. Will continue with assessment.

## 2022-11-24 NOTE — PROGRESS NOTES
Pharmacokinetic Assessment Follow Up: IV Vancomycin    Vancomycin serum concentration assessment(s):    The trough level was drawn correctly and can be used to guide therapy at this time. The measurement is above the desired definitive target range of 10 to 20 mcg/mL.    Vancomycin Regimen Plan:    Change regimen to Vancomycin 1500 mg IV every 12 hours with next serum trough concentration measured at 2230 prior to 4th dose on 11/25    Drug levels (last 3 results):  Recent Labs   Lab Result Units 11/24/22  0502   Vancomycin-Trough ug/mL 23.0*       Pharmacy will continue to follow and monitor vancomycin.    Please contact pharmacy at extension [phone] for questions regarding this assessment.    Thank you for the consult,   Xin Love       Patient brief summary:  Dave Good is a 57 y.o. male initiated on antimicrobial therapy with IV Vancomycin for treatment of skin & soft tissue infection    The patient's current regimen is 1500 q12h    Drug Allergies:   Review of patient's allergies indicates:   Allergen Reactions    Ibuprofen Swelling     Facial swelling       Actual Body Weight:   122.5 kg    Renal Function:   Estimated Creatinine Clearance: 100.5 mL/min (based on SCr of 1.1 mg/dL).,     Dialysis Method (if applicable):  N/A    CBC (last 72 hours):  Recent Labs   Lab Result Units 11/22/22 1928 11/23/22  0540   WBC K/uL 14.16* 11.99   Hemoglobin g/dL 11.4* 10.6*   Hemoglobin A1C % 9.1*  --    Hematocrit % 35.8* 34.1*   Platelets K/uL 442 417   Gran % % 64.0 64.8   Lymph % % 22.2 19.5   Mono % % 12.1 13.3   Eosinophil % % 1.1 1.8   Basophil % % 0.2 0.2   Differential Method  Automated Automated       Metabolic Panel (last 72 hours):  Recent Labs   Lab Result Units 11/22/22 1928 11/23/22  0540 11/24/22  0502   Sodium mmol/L 135* 136 135*   Potassium mmol/L 3.5 3.4* 3.8   Chloride mmol/L 103 104 103   CO2 mmol/L 27 28 25   Glucose mg/dL 209* 209* 181*   BUN mg/dL 9 9 9   Creatinine mg/dL 1.1  1.0 1.0 1.1    Albumin g/dL 3.1* 2.8* 2.7*   Total Bilirubin mg/dL 0.6 0.6  --    Alkaline Phosphatase U/L 74 62  --    AST U/L 16 14  --    ALT U/L 17 17  --    Magnesium mg/dL 1.5*  --   --    Phosphorus mg/dL  --   --  2.8       Vancomycin Administrations:  vancomycin given in the last 96 hours                     vancomycin 2 g in dextrose 5 % 500 mL IVPB (mg) 2,000 mg New Bag 11/24/22 0923     2,000 mg New Bag 11/23/22 2027     2,000 mg New Bag  0804    vancomycin 2 g in dextrose 5 % 500 mL IVPB (mg) 2,000 mg New Bag 11/22/22 2044                    Microbiologic Results:  Microbiology Results (last 7 days)       Procedure Component Value Units Date/Time    Blood culture #1 **CANNOT BE ORDERED STAT** [[id number]] Collected: 11/22/22 1928    Order Status: Completed Specimen: Blood from Midline, Basilic, Right Updated: 11/24/22 0612     Blood Culture, Routine No Growth to date      No Growth to date    Blood culture #2 **CANNOT BE ORDERED STAT** [[id number]] Collected: 11/22/22 1900    Order Status: Completed Specimen: Blood from Midline, Basilic, Right Updated: 11/24/22 0612     Blood Culture, Routine No Growth to date      No Growth to date

## 2022-11-24 NOTE — PROGRESS NOTES
[facility] Hospital Medicine  Progress Note    Patient Name: Dave Good  MRN: [id number]  Patient Class: IP- Inpatient   Admission Date: 11/22/2022  Length of Stay: 1 days  Attending Physician: Kulwinder Bedoya MD  Primary Care Provider: Reyna Jorge NP        Subjective:     Principal Problem:Diabetic wet gangrene of the foot        HPI:  DM2, diabetic neuropathy, COPD, HTN, depression, right foot transmetatarsal amputation and osteomyelitis who was initially admitted to [facility] from Dr. Flores's office for osteomyelitis evaluation.  Patient knows progressively worsening foul-smelling discharge from his left great toe for which he sought care with Dr. Flores.  Patient was evaluated [facility] by surgery.  He will require further inpatient evaluation for osteomyelitis.       Overview/Hospital Course:  No notes on file    Interval History:  No acute events overnight.  He endorses constipation.    Review of Systems   Constitutional:  Negative for chills and fever.   HENT:  Negative for congestion.    Respiratory:  Negative for shortness of breath.    Cardiovascular:  Negative for chest pain and palpitations.   Gastrointestinal:  Positive for constipation. Negative for abdominal pain.   Musculoskeletal:  Negative for back pain.   Skin:  Negative for wound.   Neurological:  Negative for dizziness and headaches.   Objective:     Vital Signs (Most Recent):  Temp: 97.7 °F (36.5 °C) (11/24/22 1112)  Pulse: 74 (11/24/22 1112)  Resp: 20 (11/24/22 1112)  BP: 121/85 (11/24/22 1112)  SpO2: (!) 93 % (11/24/22 1112)   Vital Signs (24h Range):  Temp:  [97.4 °F (36.3 °C)-99 °F (37.2 °C)] 97.7 °F (36.5 °C)  Pulse:  [72-88] 74  Resp:  [16-20] 20  SpO2:  [93 %-98 %] 93 %  BP: (105-130)/(63-85) 121/85     Weight: 122.5 kg (270 lb)  Body mass index is 34.67 kg/m².    Intake/Output Summary (Last 24 hours) at 11/24/2022 1231  Last data filed at 11/23/2022 1647  Gross per 24 hour   Intake 146.77 ml   Output --   Net 146.77 ml       Physical Exam  Vitals reviewed.   Constitutional:       General: He is not in acute distress.     Appearance: Normal appearance.   Cardiovascular:      Rate and Rhythm: Normal rate and regular rhythm.   Pulmonary:      Breath sounds: No wheezing or rales.   Abdominal:      General: Bowel sounds are normal.      Tenderness: There is no abdominal tenderness.   Musculoskeletal:      Cervical back: Neck supple.      Comments: Left toe wound covered by bandage.  Right foot covered by Kerlix bandage.  No visible lower extremity erythema.  Trace bilateral lower extremity edema   Skin:     General: Skin is warm.      Capillary Refill: Capillary refill takes less than 2 seconds.   Neurological:      General: No focal deficit present.      Mental Status: He is alert and oriented to person, place, and time.      Cranial Nerves: No cranial nerve deficit.   Psychiatric:         Mood and Affect: Mood normal.       Significant Labs: All pertinent labs within the past 24 hours have been reviewed.  CBC:   Recent Labs   Lab 11/22/22 1928 11/23/22  0540   WBC 14.16* 11.99   HGB 11.4* 10.6*   HCT 35.8* 34.1*    417     CMP:   Recent Labs   Lab 11/22/22 1928 11/23/22  0540 11/24/22  0502   * 136 135*   K 3.5 3.4* 3.8    104 103   CO2 27 28 25   * 209* 181*   BUN 9 9 9   CREATININE 1.1  1.0 1.0 1.1   CALCIUM 8.8 8.6* 8.7   PROT 8.3 7.6  --    ALBUMIN 3.1* 2.8* 2.7*   BILITOT 0.6 0.6  --    ALKPHOS 74 62  --    AST 16 14  --    ALT 17 17  --    ANIONGAP 5* 4* 7*       Significant Imaging: I have reviewed all pertinent imaging results/findings within the past 24 hours.      Assessment/Plan:      * Diabetic wet gangrene of the foot  Left great toe wound concerning for wet gangrene.      Continue vancomycin   Continue cefepime  Follow blood cultures   General surgery following, will discuss need for bone biopsy with him  Wound care postoperatively    COPD with asthma  Not in exacerbation  Max  p.r.n.    Class 1 obesity due to excess calories with serious comorbidity and body mass index (BMI) of 34.0 to 34.9 in adult  Body mass index is 34.67 kg/m². Morbid obesity complicates all aspects of disease management from diagnostic modalities to treatment. Weight loss encouraged and health benefits explained to patient.         Mixed hyperlipidemia  Continue statin      HTN (hypertension)  Continue HCTZ   Continue lisinopril      Type 2 diabetes mellitus with diabetic peripheral angiopathy without gangrene, with long-term current use of insulin  Hemoglobin A1c 9.1% this admission  Continue basal insulin q.h.s.  Continue insulin sliding scale      VTE Risk Mitigation (From admission, onward)         Ordered     IP VTE HIGH RISK PATIENT  Once         11/22/22 1724     Place sequential compression device  Until discontinued         11/22/22 1724                Discharge Planning   LOIS:      Code Status: Full Code   Is the patient medically ready for discharge?:     Reason for patient still in hospital (select all that apply): Treatment and Consult recommendations  Discharge Plan A: Home                  Kulwinder Bedoya MD  Department of Hospital Medicine   [facility] - Med Surg ([place])

## 2022-11-24 NOTE — NURSING
Pt CBG is 229. Pt refusing scheduled Levemir. Pt stated he just started taking weekly insulin at home. Pt doesn't want to take any additional insulin. Eulalio BAGLEY notified. No new orders given

## 2022-11-25 LAB
POCT GLUCOSE: 187 MG/DL (ref 70–110)
POCT GLUCOSE: 261 MG/DL (ref 70–110)
VANCOMYCIN TROUGH SERPL-MCNC: 27.1 UG/ML (ref 10–22)

## 2022-11-25 PROCEDURE — 99232 SBSQ HOSP IP/OBS MODERATE 35: CPT | Mod: ,,, | Performed by: STUDENT IN AN ORGANIZED HEALTH CARE EDUCATION/TRAINING PROGRAM

## 2022-11-25 PROCEDURE — 99231 SBSQ HOSP IP/OBS SF/LOW 25: CPT | Mod: ,,, | Performed by: SPECIALIST

## 2022-11-25 PROCEDURE — 11044 DBRDMT BONE 1ST 20 SQ CM/<: CPT

## 2022-11-25 PROCEDURE — 80202 ASSAY OF VANCOMYCIN: CPT | Performed by: STUDENT IN AN ORGANIZED HEALTH CARE EDUCATION/TRAINING PROGRAM

## 2022-11-25 PROCEDURE — 99231 PR SUBSEQUENT HOSPITAL CARE,LEVL I: ICD-10-PCS | Mod: ,,, | Performed by: SPECIALIST

## 2022-11-25 PROCEDURE — 11044 WOUND DEBRIDEMENT: ICD-10-PCS | Mod: ,,, | Performed by: PODIATRIST

## 2022-11-25 PROCEDURE — 36415 COLL VENOUS BLD VENIPUNCTURE: CPT | Performed by: STUDENT IN AN ORGANIZED HEALTH CARE EDUCATION/TRAINING PROGRAM

## 2022-11-25 PROCEDURE — 25000003 PHARM REV CODE 250: Performed by: STUDENT IN AN ORGANIZED HEALTH CARE EDUCATION/TRAINING PROGRAM

## 2022-11-25 PROCEDURE — 63600175 PHARM REV CODE 636 W HCPCS: Performed by: STUDENT IN AN ORGANIZED HEALTH CARE EDUCATION/TRAINING PROGRAM

## 2022-11-25 PROCEDURE — 94761 N-INVAS EAR/PLS OXIMETRY MLT: CPT

## 2022-11-25 PROCEDURE — 25000003 PHARM REV CODE 250: Performed by: NURSE PRACTITIONER

## 2022-11-25 PROCEDURE — 99232 PR SUBSEQUENT HOSPITAL CARE,LEVL II: ICD-10-PCS | Mod: ,,, | Performed by: STUDENT IN AN ORGANIZED HEALTH CARE EDUCATION/TRAINING PROGRAM

## 2022-11-25 PROCEDURE — 11044 DBRDMT BONE 1ST 20 SQ CM/<: CPT | Mod: ,,, | Performed by: PODIATRIST

## 2022-11-25 PROCEDURE — 36406 VNPNXR<3YRS PHY/QHP OTHER VN: CPT

## 2022-11-25 PROCEDURE — 11000001 HC ACUTE MED/SURG PRIVATE ROOM

## 2022-11-25 RX ADMIN — VANCOMYCIN HYDROCHLORIDE 1500 MG: 1.5 INJECTION, POWDER, LYOPHILIZED, FOR SOLUTION INTRAVENOUS at 10:11

## 2022-11-25 RX ADMIN — QUETIAPINE FUMARATE 200 MG: 200 TABLET ORAL at 08:11

## 2022-11-25 RX ADMIN — INSULIN ASPART 2 UNITS: 100 INJECTION, SOLUTION INTRAVENOUS; SUBCUTANEOUS at 08:11

## 2022-11-25 RX ADMIN — CEFEPIME HYDROCHLORIDE 2 G: 2 INJECTION, SOLUTION INTRAVENOUS at 04:11

## 2022-11-25 RX ADMIN — HYDROCODONE BITARTRATE AND ACETAMINOPHEN 1 TABLET: 5; 325 TABLET ORAL at 08:11

## 2022-11-25 RX ADMIN — HYDROCHLOROTHIAZIDE 25 MG: 25 TABLET ORAL at 08:11

## 2022-11-25 RX ADMIN — HYDROCODONE BITARTRATE AND ACETAMINOPHEN 1 TABLET: 5; 325 TABLET ORAL at 07:11

## 2022-11-25 RX ADMIN — HYDROCODONE BITARTRATE AND ACETAMINOPHEN 1 TABLET: 5; 325 TABLET ORAL at 02:11

## 2022-11-25 RX ADMIN — CEFEPIME HYDROCHLORIDE 2 G: 2 INJECTION, SOLUTION INTRAVENOUS at 07:11

## 2022-11-25 RX ADMIN — CEFEPIME HYDROCHLORIDE 2 G: 2 INJECTION, SOLUTION INTRAVENOUS at 12:11

## 2022-11-25 RX ADMIN — ASPIRIN 81 MG: 81 TABLET, COATED ORAL at 08:11

## 2022-11-25 RX ADMIN — LISINOPRIL 10 MG: 10 TABLET ORAL at 08:11

## 2022-11-25 RX ADMIN — INSULIN DETEMIR 40 UNITS: 100 INJECTION, SOLUTION SUBCUTANEOUS at 08:11

## 2022-11-25 RX ADMIN — MUPIROCIN: 20 OINTMENT TOPICAL at 08:11

## 2022-11-25 RX ADMIN — INSULIN ASPART 4 UNITS: 100 INJECTION, SOLUTION INTRAVENOUS; SUBCUTANEOUS at 01:11

## 2022-11-25 RX ADMIN — ATORVASTATIN CALCIUM 40 MG: 20 TABLET, FILM COATED ORAL at 08:11

## 2022-11-25 NOTE — CONSULTS
[facility] Surg ([place])  Podiatry  Consult Note    Patient Name: Dave Good  MRN: [id number]  Admission Date: 11/22/2022  Hospital Length of Stay: 2 days  Attending Physician: Kulwinder Bedoya MD  Primary Care Provider: Reyna Jorge NP     Inpatient consult to Podiatry  Consult performed by: Samuel Toussaint DPM  Consult ordered by: Kulwinder Bedoya MD      Subjective:     History of Present Illness: consult for osteomyeolitis and possible abscess dorsal left hallux.  Ulcer distal tip same.  Unknown onset, present for a few weeks, aggravated by pressure, ambulation.  Denies trauma, surgery.  Managed via general surgery, admitted Tues concern for bone infection reviewed Gen. Surg. Note.      Scheduled Meds:   aspirin  81 mg Oral Daily    atorvastatin  40 mg Oral Daily    ceFEPime (MAXIPIME) IVPB  2 g Intravenous Q8H    hydroCHLOROthiazide  25 mg Oral Daily    insulin detemir U-100  40 Units Subcutaneous QHS    lisinopriL  10 mg Oral Daily    mupirocin   Nasal BID    QUEtiapine  200 mg Oral Nightly    vancomycin (VANCOCIN) IVPB  1,500 mg Intravenous Q12H     Continuous Infusions:  PRN Meds:acetaminophen, albuterol-ipratropium, dextrose 10%, dextrose 10%, dextrose 10%, diphenhydrAMINE, glucagon (human recombinant), glucose, glucose, HYDROcodone-acetaminophen, insulin aspart U-100, melatonin, morphine, ondansetron, promethazine (PHENERGAN) IVPB, sodium chloride 0.9%, Pharmacy to dose Vancomycin consult **AND** vancomycin - pharmacy to dose    Review of patient's allergies indicates:   Allergen Reactions    Ibuprofen Swelling     Facial swelling        Past Medical History:   Diagnosis Date    COPD (chronic obstructive pulmonary disease)     COVID-19     Depression     Diabetes mellitus     Diabetes mellitus, type 2     Hypertension      Past Surgical History:   Procedure Laterality Date    DEBRIDEMENT OF LOWER EXTREMITY Bilateral 3/2/2022    Procedure: DEBRIDEMENT, LOWER EXTREMITY;  Surgeon: Jesus Flores  MD Denice;  Location: [facility];  Service: General;  Laterality: Bilateral;    FOOT AMPUTATION THROUGH METATARSAL Right 9/23/2021    Procedure: AMPUTATION, FOOT, TRANSMETATARSAL right 1st ray resection;  Surgeon: Samuel Toussaint DPM;  Location: [facility];  Service: Podiatry;  Laterality: Right;    INCISION AND DRAINAGE FOOT Bilateral 9/21/2021    Procedure: INCISION AND DRAINAGE, FOOT - Bilateral;  Surgeon: Lavonne iVgil DPM;  Location: [facility];  Service: Podiatry;  Laterality: Bilateral;    REPLACEMENT OF WOUND DRESSING Right 2/24/2022    Procedure: REPLACEMENT, DRESSING, WOUND;  Surgeon: eJsus Flores Jr., MD;  Location: [facility];  Service: Vascular;  Laterality: Right;  wound vac dressing changed    WOUND DEBRIDEMENT Right 2/22/2022    Procedure: DEBRIDEMENT, WOUND - RIGHT FOOT;  Surgeon: Jesus Flores Jr., MD;  Location: [facility];  Service: Vascular;  Laterality: Right;    WOUND DEBRIDEMENT Bilateral 2/24/2022    Procedure: DEBRIDEMENT, WOUND / BILATERAL DEBRIDEMENT FEET;  Surgeon: Jesus Flores Jr., MD;  Location: [facility];  Service: Vascular;  Laterality: Bilateral;    WOUND DEBRIDEMENT Right 5/27/2022    Procedure: DEBRIDEMENT, WOUND;  Surgeon: Jesus Flores Jr., MD;  Location: [facility];  Service: General;  Laterality: Right;       Family History    None       Tobacco Use    Smoking status: Former    Smokeless tobacco: Never   Substance and Sexual Activity    Alcohol use: Yes     Alcohol/week: 3.0 standard drinks     Types: 3 Drinks containing 0.5 oz of alcohol per week     Comment: whiskey and beer every other day    Drug use: Not Currently     Types: Marijuana    Sexual activity: Yes     Partners: Female     Review of Systems   Constitutional:  Negative for activity change, appetite change, chills, diaphoresis and fatigue.   Cardiovascular:  Negative for leg swelling.   Skin:  Positive for wound. Negative for color change, pallor and rash.   Allergic/Immunologic: Negative for environmental allergies, food allergies  and immunocompromised state.   Neurological:  Positive for numbness. Negative for tremors, seizures and weakness.   Psychiatric/Behavioral:  Negative for agitation, confusion, self-injury and suicidal ideas.    Objective:     Vital Signs (Most Recent):  Temp: 98.1 °F (36.7 °C) (11/25/22 1713)  Pulse: 74 (11/25/22 1713)  Resp: 20 (11/25/22 1713)  BP: 129/84 (11/25/22 1713)  SpO2: (!) 93 % (11/25/22 1713)   Vital Signs (24h Range):  Temp:  [97.8 °F (36.6 °C)-98.8 °F (37.1 °C)] 98.1 °F (36.7 °C)  Pulse:  [69-79] 74  Resp:  [16-20] 20  SpO2:  [93 %-98 %] 93 %  BP: (116-139)/(73-84) 129/84     Weight: 122.5 kg (270 lb)  Body mass index is 34.67 kg/m².    Foot Exam    General  Orientation: alert and oriented to person, place, and time   Affect: appropriate       Right Foot/Ankle     Inspection and Palpation  Ecchymosis: none  Tenderness: none   Swelling: none   Skin Exam: skin intact;     Neurovascular  Dorsalis pedis: 1+  Posterior tibial: 1+    Muscle Strength  Ankle dorsiflexion: 5  Ankle plantar flexion: 5  Ankle inversion: 5  Ankle eversion: 5  Great toe extension: 5  Great toe flexion: 5      Left Foot/Ankle      Inspection and Palpation  Ecchymosis: none  Tenderness: none   Swelling: none   Hammertoes: first toe  Skin Exam: skin changes and ulcer;     Neurovascular  Dorsalis pedis: 1+  Posterior tibial: 1+    Muscle Strength  Ankle dorsiflexion: 5  Ankle plantar flexion: 5  Ankle inversion: 5  Ankle eversion: 5  Great toe extension: 5  Great toe flexion: 5        Laboratory:  A1C:   Recent Labs   Lab 11/22/22 1928   HGBA1C 9.1*     Blood Cultures: No results for input(s): LABBLOO in the last 48 hours.  BMP:   Recent Labs   Lab 11/22/22 1928 11/23/22  0540 11/24/22  0502   *   < > 181*   *   < > 135*   K 3.5   < > 3.8      < > 103   CO2 27   < > 25   BUN 9   < > 9   CREATININE 1.1  1.0   < > 1.1   CALCIUM 8.8   < > 8.7   MG 1.5*  --   --     < > = values in this interval not displayed.        Diagnostic Results:  CT: I have reviewed all pertinent results/findings within the past 24 hours.  Abscess/bursa dorsal great toe area left  X-Ray: I have reviewed all pertinent results/findings within the past 24 hours.  No acute bone finding, gas, foreign body left hallux.    Clinical Findings:  Wound:  distal left hallux    Size:    Pre:[id number]  Post: [id number]    Base:  Granular, pink with moderate serous/serosanaguinous drainage only.  No pus, tracking, fluctuance, malodor, or cardinal signs infection. Wound probes to bone distal phalanx left hallux.    Borders:  Hyperkeratotic, debriding to flat, pink, blanchable skin edges without undermining.     Note: abscess/bursal finding in CT is likely serus fluid trapped beneath the superficially undermined skin of the wound removed during debridement.      No clinical finding to support bursa, abscess left hallux/first mtp to my exam.    DP and PT pulses 1/4 bilateral, all toes/both feet warm to touch.  Negative lymphadenopathy bilateral popliteal fossa and tarsal tunnel.  Negavie lower extremity edema bilateral.    Diminished/loss of protective sensation all toes bilateral to 10 gram monofilament.    Partial amputation right foot.    Hallux malleus, rigid left.    Otherwise, ROM, stability, strengh/tone normal BLLE.          Assessment/Plan:     Active Diagnoses:    Diagnosis Date Noted POA    PRINCIPAL PROBLEM:  Diabetic wet gangrene of the foot [E11.52] 09/20/2021 Yes    COPD with asthma [J44.9] 01/07/2022 Yes    Type 2 diabetes mellitus with diabetic peripheral angiopathy without gangrene, with long-term current use of insulin [E11.51, Z79.4] 09/20/2021 Not Applicable    HTN (hypertension) [I10] 09/20/2021 Yes    Mixed hyperlipidemia [E78.2] 09/20/2021 Yes    Class 1 obesity due to excess calories with serious comorbidity and body mass index (BMI) of 34.0 to 34.9 in adult [E66.09, Z68.34] 09/20/2021 Not Applicable      Problems Resolved During this  Admission:       Debrided wound left hallux including soft bone that could be removed via the wound.  Dressed with kerlix, ACE.    Cultures ordered - did not take bedside - bone biopsy more definitive for directing care.    WIll need bone biopsy tomorrow with more definitive dressing change orders.    Thank you for your consult. I will follow-up with patient. Please contact us if you have any additional questions.    Samuel Toussaint DPM  Podiatry  [facility] - Med Surg ([place])

## 2022-11-25 NOTE — PLAN OF CARE
Problem: Adult Inpatient Plan of Care  Goal: Plan of Care Review  Outcome: Ongoing, Progressing  Flowsheets (Taken 11/24/2022 2213)  Plan of Care Reviewed With: patient  Goal: Patient-Specific Goal (Individualized)  Outcome: Ongoing, Progressing  Goal: Absence of Hospital-Acquired Illness or Injury  Outcome: Ongoing, Progressing  Intervention: Identify and Manage Fall Risk  Flowsheets (Taken 11/24/2022 2213)  Safety Promotion/Fall Prevention:   assistive device/personal item within reach   commode/urinal/bedpan at bedside   high risk medications identified   lighting adjusted   medications reviewed   nonskid shoes/socks when out of bed   side rails raised x 2  Intervention: Prevent Skin Injury  Flowsheets (Taken 11/24/2022 2213)  Body Position:   position changed independently   position maintained  Skin Protection:   adhesive use limited   incontinence pads utilized   tubing/devices free from skin contact  Intervention: Prevent and Manage VTE (Venous Thromboembolism) Risk  Flowsheets (Taken 11/24/2022 2213)  Activity Management:   Arm raise - L1   Rolling - L1  VTE Prevention/Management:   ROM (active) performed   ambulation promoted  Range of Motion: active ROM (range of motion) encouraged  Intervention: Prevent Infection  Flowsheets (Taken 11/24/2022 2213)  Infection Prevention:   equipment surfaces disinfected   hand hygiene promoted   rest/sleep promoted   personal protective equipment utilized  Goal: Optimal Comfort and Wellbeing  Outcome: Ongoing, Progressing  Intervention: Monitor Pain and Promote Comfort  Flowsheets (Taken 11/24/2022 2213)  Pain Management Interventions:   care clustered   quiet environment facilitated   relaxation techniques promoted  Intervention: Provide Person-Centered Care  Flowsheets (Taken 11/24/2022 2213)  Trust Relationship/Rapport:   care explained   emotional support provided   thoughts/feelings acknowledged   reassurance provided   empathic listening provided   questions  encouraged   choices provided   questions answered  Goal: Readiness for Transition of Care  Outcome: Ongoing, Progressing  Intervention: Mutually Develop Transition Plan  Flowsheets (Taken 11/24/2022 2213)  Equipment Currently Used at Home: none  Communicated LOIS with patient/caregiver: Date not available/Unable to determine  Do you expect to return to your current living situation?: Yes     Problem: Diabetes Comorbidity  Goal: Blood Glucose Level Within Targeted Range  Outcome: Ongoing, Progressing  Intervention: Monitor and Manage Glycemia  Flowsheets (Taken 11/24/2022 2213)  Glycemic Management:   blood glucose monitored   supplemental insulin given   carbohydrate replacement provided     Problem: Infection  Goal: Absence of Infection Signs and Symptoms  Outcome: Ongoing, Progressing  Intervention: Prevent or Manage Infection  Flowsheets (Taken 11/24/2022 2213)  Fever Reduction/Comfort Measures: lightweight clothing  Infection Management: aseptic technique maintained  Isolation Precautions:   protective   precautions maintained     Problem: Impaired Wound Healing  Goal: Optimal Wound Healing  Outcome: Ongoing, Progressing  Intervention: Promote Wound Healing  Flowsheets (Taken 11/24/2022 2213)  Oral Nutrition Promotion: physical activity promoted  Sleep/Rest Enhancement:   awakenings minimized   relaxation techniques promoted   regular sleep/rest pattern promoted  Activity Management:   Arm raise - L1   Rolling - L1  Pain Management Interventions:   care clustered   quiet environment facilitated   relaxation techniques promoted

## 2022-11-25 NOTE — PROGRESS NOTES
"Wound Debridement    Date/Time: 11/25/2022 5:51 PM  Performed by: Samuel Toussaint DPM  Authorized by: Samuel Toussaint DPM     Time out: Immediately prior to procedure a "time out" was called to verify the correct patient, procedure, equipment, support staff and site/side marked as required.    Consent Done?:  Yes (Verbal)  Local anesthesia used?: No      Wound Details:    Location:  Left foot    Location:  Left 1st Toe    Type of Debridement:  Excisional       Length (cm):  3       Area (sq cm):  7.2       Width (cm):  2.4       Percent Debrided (%):  100       Depth (cm):  0.5       Total Area Debrided (sq cm):  7.2    Depth of debridement:  Bone    Tissue debrided:  Bone, Dermis, Epidermis, Subcutaneous and Adipose    Devitalized tissue debrided:  Callus, Clots, Exudate, Necrotic/Eschar and Slough    Instruments:  Blade    Bleeding:  Minimal  Hemostasis Achieved: Yes    Method Used:  Pressure  Patient tolerance:  Patient tolerated the procedure well with no immediate complications    "

## 2022-11-25 NOTE — PROGRESS NOTES
[facility] Medicine  Progress Note    Patient Name: Dave Good  MRN: [id number]  Patient Class: IP- Inpatient   Admission Date: 11/22/2022  Length of Stay: 2 days  Attending Physician: Kulwinder Bedoya MD  Primary Care Provider: Reyna Jorge NP        Subjective:     Principal Problem:Diabetic wet gangrene of the foot        HPI:  DM2, diabetic neuropathy, COPD, HTN, depression, right foot transmetatarsal amputation and osteomyelitis who was initially admitted to [facility] from Dr. Flores's office for osteomyelitis evaluation.  Patient knows progressively worsening foul-smelling discharge from his left great toe for which he sought care with Dr. Flores.  Patient was evaluated [facility] by surgery.  He will require further inpatient evaluation for osteomyelitis.       Overview/Hospital Course:  Into the hospital from general surgery clinic for evaluation of left great toe osteomyelitis.  CT imaging showing fluid collection at the left 1st MTP.      Interval History:  No acute events overnight.    Review of Systems   Constitutional:  Negative for chills and fever.   HENT:  Negative for congestion.    Respiratory:  Negative for shortness of breath.    Cardiovascular:  Negative for chest pain and palpitations.   Gastrointestinal:  Positive for constipation. Negative for abdominal pain.   Musculoskeletal:  Negative for back pain.   Skin:  Negative for wound.   Neurological:  Negative for dizziness and headaches.   Objective:     Vital Signs (Most Recent):  Temp: 98.8 °F (37.1 °C) (11/25/22 1114)  Pulse: 77 (11/25/22 1114)  Resp: 20 (11/25/22 1404)  BP: 129/73 (11/25/22 1114)  SpO2: (!) 93 % (11/25/22 1114)   Vital Signs (24h Range):  Temp:  [97 °F (36.1 °C)-98.8 °F (37.1 °C)] 98.8 °F (37.1 °C)  Pulse:  [69-79] 77  Resp:  [16-20] 20  SpO2:  [93 %-98 %] 93 %  BP: (116-139)/(73-84) 129/73     Weight: 122.5 kg (270 lb)  Body mass index is 34.67 kg/m².    Intake/Output Summary (Last 24 hours) at  11/25/2022 1425  Last data filed at 11/25/2022 0049  Gross per 24 hour   Intake 120 ml   Output 1250 ml   Net -1130 ml        Physical Exam  Vitals reviewed.   Constitutional:       General: He is not in acute distress.     Appearance: Normal appearance.   Cardiovascular:      Rate and Rhythm: Normal rate and regular rhythm.   Pulmonary:      Breath sounds: No wheezing or rales.   Abdominal:      General: Bowel sounds are normal.      Tenderness: There is no abdominal tenderness.   Musculoskeletal:      Cervical back: Neck supple.      Comments: Left toe wound covered by bandage.  Right foot covered by Kerlix bandage.  No visible lower extremity erythema.  Trace bilateral lower extremity edema   Skin:     General: Skin is warm.      Capillary Refill: Capillary refill takes less than 2 seconds.   Neurological:      General: No focal deficit present.      Mental Status: He is alert and oriented to person, place, and time.      Cranial Nerves: No cranial nerve deficit.   Psychiatric:         Mood and Affect: Mood normal.       Significant Labs: All pertinent labs within the past 24 hours have been reviewed.  CBC:   No results for input(s): WBC, HGB, HCT, PLT in the last 48 hours.    CMP:   Recent Labs   Lab 11/24/22  0502   *   K 3.8      CO2 25   *   BUN 9   CREATININE 1.1   CALCIUM 8.7   ALBUMIN 2.7*   ANIONGAP 7*         Significant Imaging: I have reviewed all pertinent imaging results/findings within the past 24 hours.      Assessment/Plan:      * Diabetic wet gangrene of the foot  Poor foot care with Left great toe wound concerning for wet gangrene.  Left 1st MTP fluid collection on imaging.  The patient declines amputation this time and would prefer medical treatment over transmetatarsal amputation.    Continue vancomycin   Continue cefepime  Follow blood cultures   Podiatry consult in for bone biopsy and eval for I&D  Wound Care Infectious Disease recommendations after incision and  drainage    COPD with asthma  Not in exacerbation  Max p.r.n.    Class 1 obesity due to excess calories with serious comorbidity and body mass index (BMI) of 34.0 to 34.9 in adult  Body mass index is 34.67 kg/m². Morbid obesity complicates all aspects of disease management from diagnostic modalities to treatment. Weight loss encouraged and health benefits explained to patient.         Mixed hyperlipidemia  Continue statin      HTN (hypertension)  Continue HCTZ   Continue lisinopril      Type 2 diabetes mellitus with diabetic peripheral angiopathy without gangrene, with long-term current use of insulin  Hemoglobin A1c 9.1% this admission  Continue basal insulin q.h.s.  Continue insulin sliding scale      VTE Risk Mitigation (From admission, onward)         Ordered     IP VTE HIGH RISK PATIENT  Once         11/22/22 1724     Place sequential compression device  Until discontinued         11/22/22 1724                Discharge Planning   LOIS:      Code Status: Full Code   Is the patient medically ready for discharge?:     Reason for patient still in hospital (select all that apply): Treatment and Consult recommendations  Discharge Plan A: Home                  Kulwinder Bedoya MD  Department of Hospital Medicine   [facility] ([place])

## 2022-11-25 NOTE — ASSESSMENT & PLAN NOTE
Poor foot care with Left great toe wound concerning for wet gangrene.  Left 1st MTP fluid collection on imaging.  The patient declines amputation this time and would prefer medical treatment over transmetatarsal amputation.    Continue vancomycin   Continue cefepime  Follow blood cultures   Podiatry consult in for bone biopsy and eval for I&D  Wound Care Infectious Disease recommendations after incision and drainage

## 2022-11-25 NOTE — PROGRESS NOTES
Diagnosis-trophic ulcers both lower extremities.  Osteomyelitis, status post debridement left great toe    Subjective   No complaints     PE  Afebrile   Vital signs stable   Left lower extremity-foot pink and warm, +1 edema, dressing change, toe lesion clinically improving

## 2022-11-25 NOTE — HOSPITAL COURSE
Diabetic wet gangrene of the foot  Poor foot care with Left great toe wound concerning for wet gangrene.  Left 1st MTP fluid collection on imaging.  The patient declines amputation this time and would prefer medical treatment over transmetatarsal amputation.  Patient was started on IV Vanc and Zosyn in ED and changed to IV Vanc and Cefepime on admission.  Podiatry performed bedside debridement on 11/25 and bone biopsy on 11/27/2022.  Wound cultures sent on 11/27/2022 and pending.  Blood cultures negative to date.  Cefepime changed to Ertapenem on 11/29/2022.  IV Vanc changed to Daptomycin on 11/30/2022.     Plan:  Per ID recommendations -Empiric daptomycin 6 mg/kg IV and ertapenem 1 gm IV Q24 for 6 weeks total. End of care = 1/3/22  Follow up wound cultures - no growth to date  LTAC on discharge     Acute deep vein thrombosis (DVT) of axillary vein of right upper extremity  Acute catheter associated DVT extending to the deep veins of the right upper extremity seen on ultrasound Doppler 11/26.  Patient had a right basilic midline catheter, which was initially placed due to poor vascular access.  He was not taking therapeutic anticoagulation at the time.  Case was discussed with Hematology/Oncology (Dr. Salinas) who advised therapeutic anticoagulation given that the thrombus is extending to the axillary vein.  Left upper extremity PICC line placed 11/26.  RUE midline d/c on 11/28/2022     Plan:  Continue with Lovenox 1 milligram/kilogram x 3 months           COPD with asthma  Not in exacerbation  -Max p.r.n.      Mixed hyperlipidemia  -Continue Statin        Depression  -Continue Seroquel           HTN (hypertension)  Home Meds:  Lisinopril 10mg and HCTZ 25mg  -Continue home meds this admission but held on 12/6 due to low BP

## 2022-11-25 NOTE — SUBJECTIVE & OBJECTIVE
Interval History:  No acute events overnight.    Review of Systems   Constitutional:  Negative for chills and fever.   HENT:  Negative for congestion.    Respiratory:  Negative for shortness of breath.    Cardiovascular:  Negative for chest pain and palpitations.   Gastrointestinal:  Positive for constipation. Negative for abdominal pain.   Musculoskeletal:  Negative for back pain.   Skin:  Negative for wound.   Neurological:  Negative for dizziness and headaches.   Objective:     Vital Signs (Most Recent):  Temp: 98.8 °F (37.1 °C) (11/25/22 1114)  Pulse: 77 (11/25/22 1114)  Resp: 20 (11/25/22 1404)  BP: 129/73 (11/25/22 1114)  SpO2: (!) 93 % (11/25/22 1114)   Vital Signs (24h Range):  Temp:  [97 °F (36.1 °C)-98.8 °F (37.1 °C)] 98.8 °F (37.1 °C)  Pulse:  [69-79] 77  Resp:  [16-20] 20  SpO2:  [93 %-98 %] 93 %  BP: (116-139)/(73-84) 129/73     Weight: 122.5 kg (270 lb)  Body mass index is 34.67 kg/m².    Intake/Output Summary (Last 24 hours) at 11/25/2022 1425  Last data filed at 11/25/2022 0049  Gross per 24 hour   Intake 120 ml   Output 1250 ml   Net -1130 ml        Physical Exam  Vitals reviewed.   Constitutional:       General: He is not in acute distress.     Appearance: Normal appearance.   Cardiovascular:      Rate and Rhythm: Normal rate and regular rhythm.   Pulmonary:      Breath sounds: No wheezing or rales.   Abdominal:      General: Bowel sounds are normal.      Tenderness: There is no abdominal tenderness.   Musculoskeletal:      Cervical back: Neck supple.      Comments: Left toe wound covered by bandage.  Right foot covered by Kerlix bandage.  No visible lower extremity erythema.  Trace bilateral lower extremity edema   Skin:     General: Skin is warm.      Capillary Refill: Capillary refill takes less than 2 seconds.   Neurological:      General: No focal deficit present.      Mental Status: He is alert and oriented to person, place, and time.      Cranial Nerves: No cranial nerve deficit.    Psychiatric:         Mood and Affect: Mood normal.       Significant Labs: All pertinent labs within the past 24 hours have been reviewed.  CBC:   No results for input(s): WBC, HGB, HCT, PLT in the last 48 hours.    CMP:   Recent Labs   Lab 11/24/22  0502   *   K 3.8      CO2 25   *   BUN 9   CREATININE 1.1   CALCIUM 8.7   ALBUMIN 2.7*   ANIONGAP 7*         Significant Imaging: I have reviewed all pertinent imaging results/findings within the past 24 hours.

## 2022-11-26 PROBLEM — I82.A11 ACUTE DEEP VEIN THROMBOSIS (DVT) OF AXILLARY VEIN OF RIGHT UPPER EXTREMITY: Status: ACTIVE | Noted: 2022-11-26

## 2022-11-26 PROBLEM — E11.621 DIABETIC ULCER OF LEFT GREAT TOE: Status: ACTIVE | Noted: 2022-11-26

## 2022-11-26 PROBLEM — L97.529 DIABETIC ULCER OF LEFT GREAT TOE: Status: ACTIVE | Noted: 2022-11-26

## 2022-11-26 LAB
ANION GAP SERPL CALC-SCNC: 5 MMOL/L (ref 8–16)
BUN SERPL-MCNC: 12 MG/DL (ref 6–20)
CALCIUM SERPL-MCNC: 9.2 MG/DL (ref 8.7–10.5)
CHLORIDE SERPL-SCNC: 100 MMOL/L (ref 95–110)
CO2 SERPL-SCNC: 28 MMOL/L (ref 23–29)
CREAT SERPL-MCNC: 1.1 MG/DL (ref 0.5–1.4)
ERYTHROCYTE [DISTWIDTH] IN BLOOD BY AUTOMATED COUNT: 14.3 % (ref 11.5–14.5)
EST. GFR  (NO RACE VARIABLE): >60 ML/MIN/1.73 M^2
GLUCOSE SERPL-MCNC: 187 MG/DL (ref 70–110)
GLUCOSE SERPL-MCNC: 188 MG/DL (ref 70–110)
GLUCOSE SERPL-MCNC: 221 MG/DL (ref 70–110)
GLUCOSE SERPL-MCNC: 257 MG/DL (ref 70–110)
HCT VFR BLD AUTO: 37.7 % (ref 40–54)
HGB BLD-MCNC: 11.7 G/DL (ref 14–18)
MCH RBC QN AUTO: 27 PG (ref 27–31)
MCHC RBC AUTO-ENTMCNC: 31 G/DL (ref 32–36)
MCV RBC AUTO: 87 FL (ref 82–98)
PLATELET # BLD AUTO: 495 K/UL (ref 150–450)
PMV BLD AUTO: 9.5 FL (ref 9.2–12.9)
POCT GLUCOSE: 149 MG/DL (ref 70–110)
POCT GLUCOSE: 188 MG/DL (ref 70–110)
POCT GLUCOSE: 202 MG/DL (ref 70–110)
POCT GLUCOSE: 205 MG/DL (ref 70–110)
POCT GLUCOSE: 217 MG/DL (ref 70–110)
POCT GLUCOSE: 221 MG/DL (ref 70–110)
POCT GLUCOSE: 233 MG/DL (ref 70–110)
POCT GLUCOSE: 257 MG/DL (ref 70–110)
POTASSIUM SERPL-SCNC: 4.1 MMOL/L (ref 3.5–5.1)
RBC # BLD AUTO: 4.34 M/UL (ref 4.6–6.2)
SODIUM SERPL-SCNC: 133 MMOL/L (ref 136–145)
VANCOMYCIN TROUGH SERPL-MCNC: 17.1 UG/ML (ref 10–22)
WBC # BLD AUTO: 7.93 K/UL (ref 3.9–12.7)

## 2022-11-26 PROCEDURE — 36569 INSJ PICC 5 YR+ W/O IMAGING: CPT

## 2022-11-26 PROCEDURE — 99233 PR SUBSEQUENT HOSPITAL CARE,LEVL III: ICD-10-PCS | Mod: ,,, | Performed by: PODIATRIST

## 2022-11-26 PROCEDURE — 11000001 HC ACUTE MED/SURG PRIVATE ROOM

## 2022-11-26 PROCEDURE — 99233 SBSQ HOSP IP/OBS HIGH 50: CPT | Mod: ,,, | Performed by: PODIATRIST

## 2022-11-26 PROCEDURE — 25000003 PHARM REV CODE 250: Performed by: NURSE PRACTITIONER

## 2022-11-26 PROCEDURE — 94761 N-INVAS EAR/PLS OXIMETRY MLT: CPT

## 2022-11-26 PROCEDURE — C1751 CATH, INF, PER/CENT/MIDLINE: HCPCS

## 2022-11-26 PROCEDURE — 36415 COLL VENOUS BLD VENIPUNCTURE: CPT | Performed by: STUDENT IN AN ORGANIZED HEALTH CARE EDUCATION/TRAINING PROGRAM

## 2022-11-26 PROCEDURE — 99232 PR SUBSEQUENT HOSPITAL CARE,LEVL II: ICD-10-PCS | Mod: ,,, | Performed by: STUDENT IN AN ORGANIZED HEALTH CARE EDUCATION/TRAINING PROGRAM

## 2022-11-26 PROCEDURE — 25000003 PHARM REV CODE 250: Performed by: STUDENT IN AN ORGANIZED HEALTH CARE EDUCATION/TRAINING PROGRAM

## 2022-11-26 PROCEDURE — 85027 COMPLETE CBC AUTOMATED: CPT | Performed by: STUDENT IN AN ORGANIZED HEALTH CARE EDUCATION/TRAINING PROGRAM

## 2022-11-26 PROCEDURE — 80048 BASIC METABOLIC PNL TOTAL CA: CPT | Performed by: STUDENT IN AN ORGANIZED HEALTH CARE EDUCATION/TRAINING PROGRAM

## 2022-11-26 PROCEDURE — 80202 ASSAY OF VANCOMYCIN: CPT | Performed by: STUDENT IN AN ORGANIZED HEALTH CARE EDUCATION/TRAINING PROGRAM

## 2022-11-26 PROCEDURE — 63600175 PHARM REV CODE 636 W HCPCS: Performed by: STUDENT IN AN ORGANIZED HEALTH CARE EDUCATION/TRAINING PROGRAM

## 2022-11-26 PROCEDURE — 87040 BLOOD CULTURE FOR BACTERIA: CPT | Performed by: STUDENT IN AN ORGANIZED HEALTH CARE EDUCATION/TRAINING PROGRAM

## 2022-11-26 PROCEDURE — 99232 SBSQ HOSP IP/OBS MODERATE 35: CPT | Mod: ,,, | Performed by: STUDENT IN AN ORGANIZED HEALTH CARE EDUCATION/TRAINING PROGRAM

## 2022-11-26 RX ORDER — ENOXAPARIN SODIUM 100 MG/ML
1 INJECTION SUBCUTANEOUS
Status: COMPLETED | OUTPATIENT
Start: 2022-11-26 | End: 2022-11-27

## 2022-11-26 RX ORDER — ENOXAPARIN SODIUM 100 MG/ML
40 INJECTION SUBCUTANEOUS EVERY 24 HOURS
Status: DISCONTINUED | OUTPATIENT
Start: 2022-11-26 | End: 2022-11-26

## 2022-11-26 RX ORDER — SODIUM CHLORIDE 0.9 % (FLUSH) 0.9 %
10 SYRINGE (ML) INJECTION EVERY 6 HOURS
Status: DISCONTINUED | OUTPATIENT
Start: 2022-11-26 | End: 2022-12-07 | Stop reason: HOSPADM

## 2022-11-26 RX ORDER — SODIUM CHLORIDE 0.9 % (FLUSH) 0.9 %
10 SYRINGE (ML) INJECTION
Status: DISCONTINUED | OUTPATIENT
Start: 2022-11-26 | End: 2022-12-07 | Stop reason: HOSPADM

## 2022-11-26 RX ADMIN — CEFEPIME HYDROCHLORIDE 2 G: 2 INJECTION, SOLUTION INTRAVENOUS at 08:11

## 2022-11-26 RX ADMIN — CEFEPIME HYDROCHLORIDE 2 G: 2 INJECTION, SOLUTION INTRAVENOUS at 12:11

## 2022-11-26 RX ADMIN — HYDROCODONE BITARTRATE AND ACETAMINOPHEN 1 TABLET: 5; 325 TABLET ORAL at 03:11

## 2022-11-26 RX ADMIN — INSULIN ASPART 2 UNITS: 100 INJECTION, SOLUTION INTRAVENOUS; SUBCUTANEOUS at 08:11

## 2022-11-26 RX ADMIN — INSULIN ASPART 6 UNITS: 100 INJECTION, SOLUTION INTRAVENOUS; SUBCUTANEOUS at 12:11

## 2022-11-26 RX ADMIN — INSULIN DETEMIR 40 UNITS: 100 INJECTION, SOLUTION SUBCUTANEOUS at 09:11

## 2022-11-26 RX ADMIN — HYDROCODONE BITARTRATE AND ACETAMINOPHEN 1 TABLET: 5; 325 TABLET ORAL at 09:11

## 2022-11-26 RX ADMIN — LISINOPRIL 10 MG: 10 TABLET ORAL at 08:11

## 2022-11-26 RX ADMIN — QUETIAPINE FUMARATE 200 MG: 200 TABLET ORAL at 09:11

## 2022-11-26 RX ADMIN — ATORVASTATIN CALCIUM 40 MG: 20 TABLET, FILM COATED ORAL at 08:11

## 2022-11-26 RX ADMIN — INSULIN ASPART 4 UNITS: 100 INJECTION, SOLUTION INTRAVENOUS; SUBCUTANEOUS at 06:11

## 2022-11-26 RX ADMIN — INSULIN ASPART 2 UNITS: 100 INJECTION, SOLUTION INTRAVENOUS; SUBCUTANEOUS at 09:11

## 2022-11-26 RX ADMIN — HYDROCHLOROTHIAZIDE 25 MG: 25 TABLET ORAL at 08:11

## 2022-11-26 RX ADMIN — ASPIRIN 81 MG: 81 TABLET, COATED ORAL at 08:11

## 2022-11-26 RX ADMIN — VANCOMYCIN HYDROCHLORIDE 1500 MG: 1.5 INJECTION, POWDER, LYOPHILIZED, FOR SOLUTION INTRAVENOUS at 10:11

## 2022-11-26 RX ADMIN — ENOXAPARIN SODIUM 120 MG: 100 INJECTION SUBCUTANEOUS at 01:11

## 2022-11-26 NOTE — ASSESSMENT & PLAN NOTE
Poor foot care with Left great toe wound concerning for wet gangrene.  Left 1st MTP fluid collection on imaging.  The patient declines amputation this time and would prefer medical treatment over transmetatarsal amputation.  Podiatry performed bedside debridement on 11/25.    Continue vancomycin   Continue cefepime  Follow blood cultures   Bone biopsy recommended, likely Monday  Wound Care Infectious Disease recommendations after bone biopsy

## 2022-11-26 NOTE — SUBJECTIVE & OBJECTIVE
Interval History:  Right upper extremity swelling overnight.    Review of Systems   Constitutional:  Negative for chills and fever.   HENT:  Negative for congestion.    Respiratory:  Negative for shortness of breath.    Cardiovascular:  Negative for chest pain and palpitations.   Gastrointestinal:  Positive for constipation. Negative for abdominal pain.   Musculoskeletal:  Negative for back pain.   Skin:  Negative for wound.   Neurological:  Negative for dizziness and headaches.   Objective:     Vital Signs (Most Recent):  Temp: 97.6 °F (36.4 °C) (11/26/22 0717)  Pulse: 78 (11/26/22 0717)  Resp: 18 (11/26/22 0939)  BP: 135/86 (11/26/22 0717)  SpO2: 95 % (11/26/22 0856)   Vital Signs (24h Range):  Temp:  [97.6 °F (36.4 °C)-98.7 °F (37.1 °C)] 97.6 °F (36.4 °C)  Pulse:  [74-78] 78  Resp:  [16-20] 18  SpO2:  [93 %-96 %] 95 %  BP: (120-135)/(76-86) 135/86     Weight: 122.5 kg (270 lb)  Body mass index is 34.67 kg/m².    Intake/Output Summary (Last 24 hours) at 11/26/2022 1250  Last data filed at 11/26/2022 0341  Gross per 24 hour   Intake --   Output 1300 ml   Net -1300 ml        Physical Exam  Vitals reviewed.   Constitutional:       General: He is not in acute distress.     Appearance: Normal appearance.   Cardiovascular:      Rate and Rhythm: Normal rate and regular rhythm.   Pulmonary:      Breath sounds: No wheezing or rales.   Abdominal:      General: Bowel sounds are normal.      Tenderness: There is no abdominal tenderness.   Musculoskeletal:      Cervical back: Neck supple.      Comments: Left toe wound covered by bandage.  Right foot covered by Kerlix bandage.  No visible lower extremity erythema.  Trace bilateral lower extremity edema.    Right upper extremity swollen.  Right radial pulse intact   Skin:     General: Skin is warm.      Capillary Refill: Capillary refill takes less than 2 seconds.   Neurological:      General: No focal deficit present.      Mental Status: He is alert and oriented to person,  place, and time.      Cranial Nerves: No cranial nerve deficit.   Psychiatric:         Mood and Affect: Mood normal.       Significant Labs: All pertinent labs within the past 24 hours have been reviewed.  CBC:   Recent Labs   Lab 11/26/22 0326   WBC 7.93   HGB 11.7*   HCT 37.7*   *       CMP:   Recent Labs   Lab 11/26/22 0326   *   K 4.1      CO2 28   *   BUN 12   CREATININE 1.1   CALCIUM 9.2   ANIONGAP 5*         Significant Imaging: I have reviewed all pertinent imaging results/findings within the past 24 hours.

## 2022-11-26 NOTE — PLAN OF CARE
Problem: Adult Inpatient Plan of Care  Goal: Plan of Care Review  Outcome: Ongoing, Progressing  Flowsheets (Taken 11/25/2022 2151)  Plan of Care Reviewed With: patient  Goal: Patient-Specific Goal (Individualized)  Outcome: Ongoing, Progressing  Goal: Absence of Hospital-Acquired Illness or Injury  Outcome: Ongoing, Progressing  Intervention: Identify and Manage Fall Risk  Flowsheets (Taken 11/25/2022 2151)  Safety Promotion/Fall Prevention:   assistive device/personal item within reach   commode/urinal/bedpan at bedside   high risk medications identified   lighting adjusted   medications reviewed   nonskid shoes/socks when out of bed   side rails raised x 2  Intervention: Prevent Skin Injury  Flowsheets (Taken 11/25/2022 2151)  Body Position:   position changed independently   position maintained  Skin Protection:   adhesive use limited   incontinence pads utilized   tubing/devices free from skin contact  Intervention: Prevent and Manage VTE (Venous Thromboembolism) Risk  Flowsheets (Taken 11/25/2022 2151)  Activity Management: Arm raise - L1  VTE Prevention/Management:   ambulation promoted   bleeding precations maintained   bleeding risk assessed  Range of Motion: active ROM (range of motion) encouraged  Intervention: Prevent Infection  Flowsheets (Taken 11/25/2022 2151)  Infection Prevention:   equipment surfaces disinfected   hand hygiene promoted   rest/sleep promoted   personal protective equipment utilized  Goal: Optimal Comfort and Wellbeing  Outcome: Ongoing, Progressing  Intervention: Monitor Pain and Promote Comfort  Flowsheets (Taken 11/25/2022 2151)  Pain Management Interventions:   pain management plan reviewed with patient/caregiver   relaxation techniques promoted   quiet environment facilitated  Intervention: Provide Person-Centered Care  Flowsheets (Taken 11/25/2022 2151)  Trust Relationship/Rapport:   care explained   choices provided   emotional support provided   empathic listening  provided   thoughts/feelings acknowledged   reassurance provided   questions encouraged   questions answered  Goal: Readiness for Transition of Care  Outcome: Ongoing, Progressing  Intervention: Mutually Develop Transition Plan  Flowsheets (Taken 11/25/2022 2151)  Equipment Currently Used at Home: none  Communicated LOIS with patient/caregiver: Date not available/Unable to determine     Problem: Diabetes Comorbidity  Goal: Blood Glucose Level Within Targeted Range  Outcome: Ongoing, Progressing  Intervention: Monitor and Manage Glycemia  Flowsheets (Taken 11/25/2022 2151)  Glycemic Management:   blood glucose monitored   supplemental insulin given     Problem: Infection  Goal: Absence of Infection Signs and Symptoms  Outcome: Ongoing, Progressing  Intervention: Prevent or Manage Infection  Flowsheets (Taken 11/25/2022 2151)  Fever Reduction/Comfort Measures:   lightweight bedding   lightweight clothing  Infection Management: aseptic technique maintained  Isolation Precautions: precautions maintained     Problem: Impaired Wound Healing  Goal: Optimal Wound Healing  Outcome: Ongoing, Progressing  Intervention: Promote Wound Healing  Flowsheets (Taken 11/25/2022 2151)  Oral Nutrition Promotion: physical activity promoted  Sleep/Rest Enhancement:   awakenings minimized   regular sleep/rest pattern promoted   relaxation techniques promoted   room darkened  Activity Management: Arm raise - L1  Pain Management Interventions:   pain management plan reviewed with patient/caregiver   relaxation techniques promoted   quiet environment facilitated

## 2022-11-26 NOTE — PROGRESS NOTES
Pharmacokinetic Assessment Follow Up: IV Vancomycin    Vancomycin serum concentration assessment(s):    The trough level was drawn correctly and can be used to guide therapy at this time. The measurement is within the desired definitive target range of 15 to 20 mcg/mL.    Vancomycin Regimen Plan:    Continue regimen to Vancomycin 1500 mg IV every 12 hours with next serum trough concentration measured at 1030 prior to next dose on 11/28/22.    Drug levels (last 3 results):  Recent Labs   Lab Result Units 11/24/22  0502 11/25/22  2253 11/26/22  1123   Vancomycin-Trough ug/mL 23.0* 27.1* 17.1       Pharmacy will continue to follow and monitor vancomycin.    Please contact pharmacy at extension [phone] for questions regarding this assessment.    Thank you for the consult,   Virginia Corado       Patient brief summary:  Dave Good is a 57 y.o. male initiated on antimicrobial therapy with IV Vancomycin for treatment of bone/joint infection.    The patient's current regimen is 1500 mg IVPB every 12 hours.    Drug Allergies:   Review of patient's allergies indicates:   Allergen Reactions    Ibuprofen Swelling     Facial swelling       Actual Body Weight:   122.5 kg    Renal Function:   Estimated Creatinine Clearance: 100.5 mL/min (based on SCr of 1.1 mg/dL).,     Dialysis Method (if applicable):  N/A    CBC (last 72 hours):  Recent Labs   Lab Result Units 11/26/22  0326   WBC K/uL 7.93   Hemoglobin g/dL 11.7*   Hematocrit % 37.7*   Platelets K/uL 495*       Metabolic Panel (last 72 hours):  Recent Labs   Lab Result Units 11/24/22  0502 11/26/22  0326   Sodium mmol/L 135* 133*   Potassium mmol/L 3.8 4.1   Chloride mmol/L 103 100   CO2 mmol/L 25 28   Glucose mg/dL 181* 187*   BUN mg/dL 9 12   Creatinine mg/dL 1.1 1.1   Albumin g/dL 2.7*  --    Phosphorus mg/dL 2.8  --        Vancomycin Administrations:  vancomycin given in the last 96 hours                     vancomycin 1.5 g in dextrose 5 % 250 mL IVPB (ready to mix) (mg)  1,500 mg New Bag 11/25/22 2243     1,500 mg New Bag  1028     1,500 mg New Bag 11/24/22 2229    vancomycin 2 g in dextrose 5 % 500 mL IVPB (mg) 2,000 mg New Bag 11/24/22 0923     2,000 mg New Bag 11/23/22 2027     2,000 mg New Bag  0804    vancomycin 2 g in dextrose 5 % 500 mL IVPB (mg) 2,000 mg New Bag 11/22/22 2044                    Microbiologic Results:  Microbiology Results (last 7 days)       Procedure Component Value Units Date/Time    Blood culture [[id number]] Collected: 11/26/22 0951    Order Status: Sent Specimen: Blood Updated: 11/26/22 0951    Blood culture #1 **CANNOT BE ORDERED STAT** [[id number]] Collected: 11/22/22 1928    Order Status: Completed Specimen: Blood from Midline, Basilic, Right Updated: 11/26/22 0936     Blood Culture, Routine Gram stain jose bottle: Gram positive rods      Results called to and read back by: Viridiana Hernandez RN  11/26/2022  09:36    Blood culture #2 **CANNOT BE ORDERED STAT** [[id number]] Collected: 11/22/22 1900    Order Status: Completed Specimen: Blood from Midline, Basilic, Right Updated: 11/26/22 0612     Blood Culture, Routine No Growth to date      No Growth to date      No Growth to date      No Growth to date    Aerobic culture [[id number]]     Order Status: No result Specimen: Wound from Toe, Left Foot     Culture, Anaerobic [[id number]]     Order Status: No result Specimen: Wound from Toe, Left Foot

## 2022-11-26 NOTE — PROGRESS NOTES
Pharmacokinetic Assessment Follow Up: IV Vancomycin    Vancomycin serum concentration assessment(s):    The trough level was drawn incorrectly and cannot be used to guide therapy at this time.    Vancomycin Regimen Plan:    Continue regimen to Vancomycin 1500 mg IV every 12 hours with next serum trough concentration measured at 1030 prior to next dose on 11/26/22    Drug levels (last 3 results):  Recent Labs   Lab Result Units 11/24/22  0502 11/25/22  2253   Vancomycin-Trough ug/mL 23.0* 27.1*       Pharmacy will continue to follow and monitor vancomycin.    Please contact pharmacy at extension [phone] for questions regarding this assessment.    Thank you for the consult,   Alin Ulloa       Patient brief summary:  Dave Good is a 57 y.o. male initiated on antimicrobial therapy with IV Vancomycin for treatment of skin & soft tissue infection    The patient's current regimen is 1.5gm iv q 12h    Drug Allergies:   Review of patient's allergies indicates:   Allergen Reactions    Ibuprofen Swelling     Facial swelling       Actual Body Weight:   122.5 kg    Renal Function:   Estimated Creatinine Clearance: 100.5 mL/min (based on SCr of 1.1 mg/dL).,     Dialysis Method (if applicable):  N/A    CBC (last 72 hours):  Recent Labs   Lab Result Units 11/23/22  0540   WBC K/uL 11.99   Hemoglobin g/dL 10.6*   Hematocrit % 34.1*   Platelets K/uL 417   Gran % % 64.8   Lymph % % 19.5   Mono % % 13.3   Eosinophil % % 1.8   Basophil % % 0.2   Differential Method  Automated       Metabolic Panel (last 72 hours):  Recent Labs   Lab Result Units 11/23/22  0540 11/24/22  0502   Sodium mmol/L 136 135*   Potassium mmol/L 3.4* 3.8   Chloride mmol/L 104 103   CO2 mmol/L 28 25   Glucose mg/dL 209* 181*   BUN mg/dL 9 9   Creatinine mg/dL 1.0 1.1   Albumin g/dL 2.8* 2.7*   Total Bilirubin mg/dL 0.6  --    Alkaline Phosphatase U/L 62  --    AST U/L 14  --    ALT U/L 17  --    Phosphorus mg/dL  --  2.8       Vancomycin  Administrations:  vancomycin given in the last 96 hours                     vancomycin 1.5 g in dextrose 5 % 250 mL IVPB (ready to mix) (mg) 1,500 mg New Bag 11/25/22 2243     1,500 mg New Bag  1028     1,500 mg New Bag 11/24/22 2229    vancomycin 2 g in dextrose 5 % 500 mL IVPB (mg) 2,000 mg New Bag 11/24/22 0923     2,000 mg New Bag 11/23/22 2027     2,000 mg New Bag  0804    vancomycin 2 g in dextrose 5 % 500 mL IVPB (mg) 2,000 mg New Bag 11/22/22 2044                    Microbiologic Results:  Microbiology Results (last 7 days)       Procedure Component Value Units Date/Time    Aerobic culture [[id number]]     Order Status: No result Specimen: Wound from Toe, Left Foot     Culture, Anaerobic [[id number]]     Order Status: No result Specimen: Wound from Toe, Left Foot     Blood culture #1 **CANNOT BE ORDERED STAT** [[id number]] Collected: 11/22/22 1928    Order Status: Completed Specimen: Blood from Midline, Basilic, Right Updated: 11/25/22 0655     Blood Culture, Routine No Growth to date      No Growth to date      No Growth to date    Blood culture #2 **CANNOT BE ORDERED STAT** [[id number]] Collected: 11/22/22 1900    Order Status: Completed Specimen: Blood from Midline, Basilic, Right Updated: 11/25/22 0655     Blood Culture, Routine No Growth to date      No Growth to date      No Growth to date

## 2022-11-26 NOTE — PROGRESS NOTES
[facility] Medicine  Progress Note    Patient Name: Dave Good  MRN: [id number]  Patient Class: IP- Inpatient   Admission Date: 11/22/2022  Length of Stay: 3 days  Attending Physician: Kulwinder Bedoya MD  Primary Care Provider: Reyna Jorge NP        Subjective:     Principal Problem:Diabetic wet gangrene of the foot        HPI:  DM2, diabetic neuropathy, COPD, HTN, depression, right foot transmetatarsal amputation and osteomyelitis who was initially admitted to [facility] from Dr. Flores's office for osteomyelitis evaluation.  Patient knows progressively worsening foul-smelling discharge from his left great toe for which he sought care with Dr. Flores.  Patient was evaluated [facility] by surgery.  He will require further inpatient evaluation for osteomyelitis.       Overview/Hospital Course:  Into the hospital from general surgery clinic for evaluation of left great toe osteomyelitis.  CT imaging showing fluid collection at the left 1st MTP.  Bedside debridement performed by Podiatry.  Bone biopsy was recommended.    During hospitalization, patient had midline placed vascular access issues.  Later his hospitalization is noted right upper extremity edema.  Ultrasound Doppler right upper extremity revealed DVT extending from the right basilic to the axillary vein.  Per discussion with Hematology/Oncology, initiated therapeutic anticoagulation.      Interval History:  Right upper extremity swelling overnight.    Review of Systems   Constitutional:  Negative for chills and fever.   HENT:  Negative for congestion.    Respiratory:  Negative for shortness of breath.    Cardiovascular:  Negative for chest pain and palpitations.   Gastrointestinal:  Positive for constipation. Negative for abdominal pain.   Musculoskeletal:  Negative for back pain.   Skin:  Negative for wound.   Neurological:  Negative for dizziness and headaches.   Objective:     Vital Signs (Most Recent):  Temp: 97.6 °F (36.4 °C)  (11/26/22 0717)  Pulse: 78 (11/26/22 0717)  Resp: 18 (11/26/22 0939)  BP: 135/86 (11/26/22 0717)  SpO2: 95 % (11/26/22 0856)   Vital Signs (24h Range):  Temp:  [97.6 °F (36.4 °C)-98.7 °F (37.1 °C)] 97.6 °F (36.4 °C)  Pulse:  [74-78] 78  Resp:  [16-20] 18  SpO2:  [93 %-96 %] 95 %  BP: (120-135)/(76-86) 135/86     Weight: 122.5 kg (270 lb)  Body mass index is 34.67 kg/m².    Intake/Output Summary (Last 24 hours) at 11/26/2022 1250  Last data filed at 11/26/2022 0341  Gross per 24 hour   Intake --   Output 1300 ml   Net -1300 ml        Physical Exam  Vitals reviewed.   Constitutional:       General: He is not in acute distress.     Appearance: Normal appearance.   Cardiovascular:      Rate and Rhythm: Normal rate and regular rhythm.   Pulmonary:      Breath sounds: No wheezing or rales.   Abdominal:      General: Bowel sounds are normal.      Tenderness: There is no abdominal tenderness.   Musculoskeletal:      Cervical back: Neck supple.      Comments: Left toe wound covered by bandage.  Right foot covered by Kerlix bandage.  No visible lower extremity erythema.  Trace bilateral lower extremity edema.    Right upper extremity swollen.  Right radial pulse intact   Skin:     General: Skin is warm.      Capillary Refill: Capillary refill takes less than 2 seconds.   Neurological:      General: No focal deficit present.      Mental Status: He is alert and oriented to person, place, and time.      Cranial Nerves: No cranial nerve deficit.   Psychiatric:         Mood and Affect: Mood normal.       Significant Labs: All pertinent labs within the past 24 hours have been reviewed.  CBC:   Recent Labs   Lab 11/26/22 0326   WBC 7.93   HGB 11.7*   HCT 37.7*   *       CMP:   Recent Labs   Lab 11/26/22 0326   *   K 4.1      CO2 28   *   BUN 12   CREATININE 1.1   CALCIUM 9.2   ANIONGAP 5*         Significant Imaging: I have reviewed all pertinent imaging results/findings within the past 24  hours.      Assessment/Plan:      * Diabetic wet gangrene of the foot  Poor foot care with Left great toe wound concerning for wet gangrene.  Left 1st MTP fluid collection on imaging.  The patient declines amputation this time and would prefer medical treatment over transmetatarsal amputation.  Podiatry performed bedside debridement on 11/25.    Continue vancomycin   Continue cefepime  Follow blood cultures   Bone biopsy recommended, likely Monday  Wound Care Infectious Disease recommendations after bone biopsy    Acute deep vein thrombosis (DVT) of axillary vein of right upper extremity  Acute catheter associated DVT extending to the deep veins of the right upper extremity seen on ultrasound Doppler 11/26.  Patient has a right basilic midline catheter, which was initially placed due to poor vascular access.  He was not taking therapeutic anticoagulation at the time.  Case was discussed with Hematology/Oncology (Dr. Salinas), who advised therapeutic anticoagulation given that the thrombus is extending to the axillary vein.    Place left upper extremity PICC line   Lovenox 1 milligram/kilogram Q 12   Remove midline once PICC placed and therapeutic anticoagulation initiated.      COPD with asthma  Not in exacerbation  DuoNebs p.r.n.    Class 1 obesity due to excess calories with serious comorbidity and body mass index (BMI) of 34.0 to 34.9 in adult  Body mass index is 34.67 kg/m². Morbid obesity complicates all aspects of disease management from diagnostic modalities to treatment. Weight loss encouraged and health benefits explained to patient.         Mixed hyperlipidemia  Continue statin      HTN (hypertension)  Continue HCTZ   Continue lisinopril      Type 2 diabetes mellitus with diabetic peripheral angiopathy without gangrene, with long-term current use of insulin  Hemoglobin A1c 9.1% this admission  Continue basal insulin q.h.s.  Continue insulin sliding scale      VTE Risk Mitigation (From admission, onward)          Ordered     enoxaparin injection 120 mg  Every 12 hours (non-standard times)         11/26/22 1209     IP VTE HIGH RISK PATIENT  Once         11/22/22 1724     Place sequential compression device  Until discontinued         11/22/22 1724                Discharge Planning   LOIS:      Code Status: Full Code   Is the patient medically ready for discharge?:     Reason for patient still in hospital (select all that apply): Treatment and Consult recommendations  Discharge Plan A: Home                  Kulwinder Bedoya MD  Department of Hospital Medicine   [facility] ([place])

## 2022-11-26 NOTE — ASSESSMENT & PLAN NOTE
Acute catheter associated DVT extending to the deep veins of the right upper extremity seen on ultrasound Doppler 11/26.  Patient has a right basilic midline catheter, which was initially placed due to poor vascular access.  He was not taking therapeutic anticoagulation at the time.  Case was discussed with Hematology/Oncology (Dr. Salinas), who advised therapeutic anticoagulation given that the thrombus is extending to the axillary vein.    Place left upper extremity PICC line   Lovenox 1 milligram/kilogram Q 12   Remove midline once PICC placed and therapeutic anticoagulation initiated.

## 2022-11-26 NOTE — PROCEDURES
"Dave Good is a 57 y.o. male patient.    Temp: 97.6 °F (36.4 °C) (11/26/22 0717)  Pulse: 78 (11/26/22 0717)  Resp: 18 (11/26/22 0939)  BP: 135/86 (11/26/22 0717)  SpO2: 95 % (11/26/22 0856)  Weight: 122.5 kg (270 lb) (11/23/22 1700)  Height: 6' 2" (188 cm) (11/23/22 1700)    PICC  Date/Time: 11/26/2022 2:36 PM  Performed by: Demetrius Galvan RN  Consent Done: Yes  Time out: Immediately prior to procedure a time out was called to verify the correct patient, procedure, equipment, support staff and site/side marked as required  Indications: med administration and vascular access  Anesthesia: local infiltration  Local anesthetic: lidocaine 1% without epinephrine  Anesthetic Total (mL): 5  Preparation: skin prepped with ChloraPrep  Skin prep agent dried: skin prep agent completely dried prior to procedure  Sterile barriers: all five maximum sterile barriers used - cap, mask, sterile gown, sterile gloves, and large sterile sheet  Hand hygiene: hand hygiene performed prior to central venous catheter insertion  Location details: left basilic  Catheter type: double lumen  Catheter size: 5 Fr  Catheter Length: 50cm    Ultrasound guidance: yes  Vessel Caliber: medium, compressibility normal  Needle advanced into vessel with real time Ultrasound guidance.  Guidewire confirmed in vessel.  Sterile sheath used.  Number of attempts: 1  Post-procedure: blood return through all ports, chlorhexidine patch and sterile dressing applied          Name demetrius galvan  11/26/2022    "

## 2022-11-27 LAB
POCT GLUCOSE: 190 MG/DL (ref 70–110)
POCT GLUCOSE: 197 MG/DL (ref 70–110)
POCT GLUCOSE: 209 MG/DL (ref 70–110)
POCT GLUCOSE: 211 MG/DL (ref 70–110)

## 2022-11-27 PROCEDURE — 63600175 PHARM REV CODE 636 W HCPCS: Performed by: STUDENT IN AN ORGANIZED HEALTH CARE EDUCATION/TRAINING PROGRAM

## 2022-11-27 PROCEDURE — 87186 SC STD MICRODIL/AGAR DIL: CPT

## 2022-11-27 PROCEDURE — 20240 BONE BIOPSY OPEN SUPERFICIAL: CPT | Mod: ,,, | Performed by: PODIATRIST

## 2022-11-27 PROCEDURE — 88311 DECALCIFY TISSUE: CPT | Mod: 26,,, | Performed by: PATHOLOGY

## 2022-11-27 PROCEDURE — 11000001 HC ACUTE MED/SURG PRIVATE ROOM

## 2022-11-27 PROCEDURE — 88311 PR  DECALCIFY TISSUE: ICD-10-PCS | Mod: 26,,, | Performed by: PATHOLOGY

## 2022-11-27 PROCEDURE — 88311 DECALCIFY TISSUE: CPT | Performed by: PATHOLOGY

## 2022-11-27 PROCEDURE — 87075 CULTR BACTERIA EXCEPT BLOOD: CPT

## 2022-11-27 PROCEDURE — 87070 CULTURE OTHR SPECIMN AEROBIC: CPT

## 2022-11-27 PROCEDURE — 99233 PR SUBSEQUENT HOSPITAL CARE,LEVL III: ICD-10-PCS | Mod: 25,,, | Performed by: PODIATRIST

## 2022-11-27 PROCEDURE — 99233 SBSQ HOSP IP/OBS HIGH 50: CPT | Mod: 25,,, | Performed by: PODIATRIST

## 2022-11-27 PROCEDURE — 20240 PR BIOPSY, BONE, OPEN, EXC; SUPERFICIAL: ICD-10-PCS | Mod: ,,, | Performed by: PODIATRIST

## 2022-11-27 PROCEDURE — 94761 N-INVAS EAR/PLS OXIMETRY MLT: CPT

## 2022-11-27 PROCEDURE — 87205 SMEAR GRAM STAIN: CPT

## 2022-11-27 PROCEDURE — 20220 BONE BIOPSY TROCAR/NDL SUPFC: CPT | Mod: ,,,

## 2022-11-27 PROCEDURE — A4216 STERILE WATER/SALINE, 10 ML: HCPCS | Performed by: STUDENT IN AN ORGANIZED HEALTH CARE EDUCATION/TRAINING PROGRAM

## 2022-11-27 PROCEDURE — 20240 BONE BIOPSY OPEN SUPERFICIAL: CPT

## 2022-11-27 PROCEDURE — 88307 TISSUE EXAM BY PATHOLOGIST: CPT | Performed by: PATHOLOGY

## 2022-11-27 PROCEDURE — 99232 SBSQ HOSP IP/OBS MODERATE 35: CPT | Mod: ,,, | Performed by: STUDENT IN AN ORGANIZED HEALTH CARE EDUCATION/TRAINING PROGRAM

## 2022-11-27 PROCEDURE — 63600175 PHARM REV CODE 636 W HCPCS: Performed by: NURSE PRACTITIONER

## 2022-11-27 PROCEDURE — 25000003 PHARM REV CODE 250: Performed by: STUDENT IN AN ORGANIZED HEALTH CARE EDUCATION/TRAINING PROGRAM

## 2022-11-27 PROCEDURE — 20220 BONE BIOPSY: ICD-10-PCS | Mod: ,,,

## 2022-11-27 PROCEDURE — 25000003 PHARM REV CODE 250: Performed by: NURSE PRACTITIONER

## 2022-11-27 PROCEDURE — 88307 PR  SURG PATH,LEVEL V: ICD-10-PCS | Mod: 26,,, | Performed by: PATHOLOGY

## 2022-11-27 PROCEDURE — 88307 TISSUE EXAM BY PATHOLOGIST: CPT | Mod: 26,,, | Performed by: PATHOLOGY

## 2022-11-27 PROCEDURE — 99232 PR SUBSEQUENT HOSPITAL CARE,LEVL II: ICD-10-PCS | Mod: ,,, | Performed by: STUDENT IN AN ORGANIZED HEALTH CARE EDUCATION/TRAINING PROGRAM

## 2022-11-27 RX ADMIN — ENOXAPARIN SODIUM 120 MG: 100 INJECTION SUBCUTANEOUS at 01:11

## 2022-11-27 RX ADMIN — CEFEPIME HYDROCHLORIDE 2 G: 2 INJECTION, SOLUTION INTRAVENOUS at 12:11

## 2022-11-27 RX ADMIN — MORPHINE SULFATE 6 MG: 4 INJECTION, SOLUTION INTRAMUSCULAR; INTRAVENOUS at 02:11

## 2022-11-27 RX ADMIN — INSULIN ASPART 2 UNITS: 100 INJECTION, SOLUTION INTRAVENOUS; SUBCUTANEOUS at 08:11

## 2022-11-27 RX ADMIN — HYDROCHLOROTHIAZIDE 25 MG: 25 TABLET ORAL at 08:11

## 2022-11-27 RX ADMIN — CEFEPIME HYDROCHLORIDE 2 G: 2 INJECTION, SOLUTION INTRAVENOUS at 04:11

## 2022-11-27 RX ADMIN — LISINOPRIL 10 MG: 10 TABLET ORAL at 08:11

## 2022-11-27 RX ADMIN — QUETIAPINE FUMARATE 200 MG: 200 TABLET ORAL at 08:11

## 2022-11-27 RX ADMIN — MORPHINE SULFATE 6 MG: 4 INJECTION, SOLUTION INTRAMUSCULAR; INTRAVENOUS at 08:11

## 2022-11-27 RX ADMIN — INSULIN ASPART 4 UNITS: 100 INJECTION, SOLUTION INTRAVENOUS; SUBCUTANEOUS at 04:11

## 2022-11-27 RX ADMIN — INSULIN ASPART 1 UNITS: 100 INJECTION, SOLUTION INTRAVENOUS; SUBCUTANEOUS at 08:11

## 2022-11-27 RX ADMIN — ATORVASTATIN CALCIUM 40 MG: 20 TABLET, FILM COATED ORAL at 08:11

## 2022-11-27 RX ADMIN — VANCOMYCIN HYDROCHLORIDE 1500 MG: 1.5 INJECTION, POWDER, LYOPHILIZED, FOR SOLUTION INTRAVENOUS at 11:11

## 2022-11-27 RX ADMIN — ENOXAPARIN SODIUM 120 MG: 100 INJECTION SUBCUTANEOUS at 02:11

## 2022-11-27 RX ADMIN — SODIUM CHLORIDE, PRESERVATIVE FREE 10 ML: 5 INJECTION INTRAVENOUS at 06:11

## 2022-11-27 RX ADMIN — HYDROCODONE BITARTRATE AND ACETAMINOPHEN 1 TABLET: 5; 325 TABLET ORAL at 08:11

## 2022-11-27 RX ADMIN — HYDROCODONE BITARTRATE AND ACETAMINOPHEN 1 TABLET: 5; 325 TABLET ORAL at 06:11

## 2022-11-27 RX ADMIN — SODIUM CHLORIDE, PRESERVATIVE FREE 10 ML: 5 INJECTION INTRAVENOUS at 12:11

## 2022-11-27 RX ADMIN — SODIUM CHLORIDE, PRESERVATIVE FREE 10 ML: 5 INJECTION INTRAVENOUS at 11:11

## 2022-11-27 RX ADMIN — INSULIN DETEMIR 30 UNITS: 100 INJECTION, SOLUTION SUBCUTANEOUS at 08:11

## 2022-11-27 RX ADMIN — ASPIRIN 81 MG: 81 TABLET, COATED ORAL at 08:11

## 2022-11-27 RX ADMIN — INSULIN ASPART 4 UNITS: 100 INJECTION, SOLUTION INTRAVENOUS; SUBCUTANEOUS at 11:11

## 2022-11-27 RX ADMIN — VANCOMYCIN HYDROCHLORIDE 1500 MG: 1.5 INJECTION, POWDER, LYOPHILIZED, FOR SOLUTION INTRAVENOUS at 10:11

## 2022-11-27 RX ADMIN — CEFEPIME HYDROCHLORIDE 2 G: 2 INJECTION, SOLUTION INTRAVENOUS at 08:11

## 2022-11-27 RX ADMIN — HYDROCODONE BITARTRATE AND ACETAMINOPHEN 1 TABLET: 5; 325 TABLET ORAL at 02:11

## 2022-11-27 NOTE — ASSESSMENT & PLAN NOTE
Hemoglobin A1c 9.1% this admission  Continue basal insulin q.h.s. (75% of typical basal this tonight in anticipation of NPO status)  Continue insulin sliding scale

## 2022-11-27 NOTE — ASSESSMENT & PLAN NOTE
Acute catheter associated DVT extending to the deep veins of the right upper extremity seen on ultrasound Doppler 11/26.  Patient has a right basilic midline catheter, which was initially placed due to poor vascular access.  He was not taking therapeutic anticoagulation at the time.  Case was discussed with Hematology/Oncology (Dr. Salinas), who advised therapeutic anticoagulation given that the thrombus is extending to the axillary vein.  Left upper extremity PICC line placed 11/26     Lovenox 1 milligram/kilogram Q 12 (holding this evening and tomorrow morning in anticipation of bone biopsy)  Resume anticoagulation after bone biopsy  Remove right upper extremity midline

## 2022-11-27 NOTE — PROCEDURES
"Dave Good is a 57 y.o. male patient.    Temp: 97.9 °F (36.6 °C) (11/27/22 2024)  Pulse: 78 (11/27/22 2024)  Resp: 19 (11/27/22 2024)  BP: (!) 166/77 (11/27/22 2024)  SpO2: 95 % (11/27/22 2024)  Weight: 122.5 kg (270 lb) (11/23/22 1700)  Height: 6' 2" (188 cm) (11/23/22 1700)       Bone Biopsy    Date/Time: 11/27/2022 1:00 PM  Performed by: Lloyd Potts MD  Authorized by: Lloyd Potts MD     Consent Done?: Yes (Written) and Yes (Verbal)   Immediately prior to procedure a time out was called to verify the correct patient, procedure, equipment, support staff and site/side marked as required.   Patient was prepped and draped in the usual sterile fashion.    I was present for the entire procedure.    Position: supine  Anesthesia: digital block and topical anesthetic  Local anesthetic: lidocaine 1% without epinephrine  Aspiration?: No    Biopsy?: Yes    Number of Specimens: 1  Estimated blood loss (cc):2  Patient tolerated the procedure well with no immediate complications.  Post-operative instructions were provided for the patient.    Bedside bone biopsy of left great toe distal phalanx. Cultures and pathology sent.     11/27/2022    "

## 2022-11-27 NOTE — PROGRESS NOTES
[facility] Medicine  Progress Note    Patient Name: Dave Good  MRN: [id number]  Patient Class: IP- Inpatient   Admission Date: 11/22/2022  Length of Stay: 4 days  Attending Physician: Kulwinder Bedoya MD  Primary Care Provider: Reyna Jorge NP        Subjective:     Principal Problem:Diabetic wet gangrene of the foot        HPI:  DM2, diabetic neuropathy, COPD, HTN, depression, right foot transmetatarsal amputation and osteomyelitis who was initially admitted to [facility] from Dr. Flores's office for osteomyelitis evaluation.  Patient knows progressively worsening foul-smelling discharge from his left great toe for which he sought care with Dr. Flores.  Patient was evaluated [facility] by surgery.  He will require further inpatient evaluation for osteomyelitis.       Overview/Hospital Course:  Into the hospital from general surgery clinic for evaluation of left great toe osteomyelitis.  CT imaging showing fluid collection at the left 1st MTP.  Bedside debridement performed by Podiatry.  Bone biopsy was recommended.    During hospitalization, patient had midline placed vascular access issues.  Later his hospitalization is noted right upper extremity edema.  Ultrasound Doppler right upper extremity revealed DVT extending from the right basilic to the axillary vein.  Per discussion with Hematology/Oncology, initiated therapeutic anticoagulation.      Interval History:  No acute events overnight.  Right upper extremity swelling is improving.  Right upper extremity midline has not yet that her refine nursing.    Review of Systems   Constitutional:  Negative for chills and fever.   HENT:  Negative for congestion.    Respiratory:  Negative for shortness of breath.    Cardiovascular:  Negative for chest pain and palpitations.   Gastrointestinal:  Negative for abdominal pain.   Musculoskeletal:  Negative for back pain.   Skin:  Positive for wound.   Neurological:  Negative for dizziness and headaches.    Objective:     Vital Signs (Most Recent):  Temp: 97.7 °F (36.5 °C) (11/27/22 1136)  Pulse: 76 (11/27/22 1136)  Resp: 16 (11/27/22 1404)  BP: (!) 146/78 (11/27/22 1136)  SpO2: 96 % (11/27/22 1136)   Vital Signs (24h Range):  Temp:  [97.6 °F (36.4 °C)-98 °F (36.7 °C)] 97.7 °F (36.5 °C)  Pulse:  [74-84] 76  Resp:  [16-19] 16  SpO2:  [93 %-96 %] 96 %  BP: (141-159)/(73-82) 146/78     Weight: 122.5 kg (270 lb)  Body mass index is 34.67 kg/m².    Intake/Output Summary (Last 24 hours) at 11/27/2022 1537  Last data filed at 11/27/2022 1157  Gross per 24 hour   Intake 10 ml   Output 2670 ml   Net -2660 ml        Physical Exam  Vitals reviewed.   Constitutional:       General: He is not in acute distress.     Appearance: Normal appearance.   Cardiovascular:      Rate and Rhythm: Normal rate and regular rhythm.   Pulmonary:      Breath sounds: No wheezing or rales.   Abdominal:      General: Bowel sounds are normal.      Tenderness: There is no abdominal tenderness.   Musculoskeletal:      Cervical back: Neck supple.      Comments: Left toe wound covered by bandage.  Right foot covered by Kerlix bandage.  No visible lower extremity erythema.  Trace bilateral lower extremity edema.    Right upper extremity swollen, but improving.  Right radial pulse intact.    Left upper extremity PICC line in place.   Skin:     General: Skin is warm.      Capillary Refill: Capillary refill takes less than 2 seconds.   Neurological:      General: No focal deficit present.      Mental Status: He is alert and oriented to person, place, and time.      Cranial Nerves: No cranial nerve deficit.   Psychiatric:         Mood and Affect: Mood normal.       Significant Labs: All pertinent labs within the past 24 hours have been reviewed.  CBC:   Recent Labs   Lab 11/26/22 0326   WBC 7.93   HGB 11.7*   HCT 37.7*   *       CMP:   Recent Labs   Lab 11/26/22 0326   *   K 4.1      CO2 28   *   BUN 12   CREATININE 1.1   CALCIUM  9.2   ANIONGAP 5*         Significant Imaging: I have reviewed all pertinent imaging results/findings within the past 24 hours.      Assessment/Plan:      * Diabetic wet gangrene of the foot  Poor foot care with Left great toe wound concerning for wet gangrene.  Left 1st MTP fluid collection on imaging.  The patient declines amputation this time and would prefer medical treatment over transmetatarsal amputation.  Podiatry performed bedside debridement on 11/25.    Continue vancomycin   Continue cefepime  Follow blood cultures   Bone biopsy recommended, likely Monday (IR consult ordered for biopsy)  Hold anticoagulation overnight , resume after procedure  NPO after midnight  Wound Care & Infectious Disease recommendations after bone biopsy    Diabetic ulcer of left great toe    Lab Results   Component Value Date    HGBA1C 9.1 (H) 11/22/2022     Most recent fingerstick glucose reviewed-   Recent Labs   Lab 11/26/22 2006 11/27/22  0746 11/27/22  1135   POCTGLUCOSE 205* 190* 209*     Current correctional scale    Antihyperglycemics (From admission, onward)    Start     Stop Route Frequency Ordered    11/27/22 2100  insulin detemir U-100 pen 30 Units         -- SubQ Nightly 11/27/22 1536    11/24/22 1330  insulin aspart U-100 pen 1-10 Units         -- SubQ Before meals & nightly PRN 11/24/22 1231        Hold Oral hypoglycemics while patient is in the hospital.    NOTE:  Patient usually takes 40 units basal insulin q.h.s.  75% of his typical basal dose has been ordered as he will be NPO after midnight in anticipation for bone biopsy Monday morning    Acute deep vein thrombosis (DVT) of axillary vein of right upper extremity  Acute catheter associated DVT extending to the deep veins of the right upper extremity seen on ultrasound Doppler 11/26.  Patient has a right basilic midline catheter, which was initially placed due to poor vascular access.  He was not taking therapeutic anticoagulation at the time.  Case was  discussed with Hematology/Oncology (Dr. Salinas), who advised therapeutic anticoagulation given that the thrombus is extending to the axillary vein.  Left upper extremity PICC line placed 11/26     Lovenox 1 milligram/kilogram Q 12 (holding this evening and tomorrow morning in anticipation of bone biopsy)  Resume anticoagulation after bone biopsy  Remove right upper extremity midline      COPD with asthma  Not in exacerbation  DuoNebs p.r.n.    Class 1 obesity due to excess calories with serious comorbidity and body mass index (BMI) of 34.0 to 34.9 in adult  Body mass index is 34.67 kg/m². Morbid obesity complicates all aspects of disease management from diagnostic modalities to treatment. Weight loss encouraged and health benefits explained to patient.         Mixed hyperlipidemia  Continue statin      HTN (hypertension)  Continue HCTZ   Continue lisinopril      Type 2 diabetes mellitus with diabetic peripheral angiopathy without gangrene, with long-term current use of insulin  Hemoglobin A1c 9.1% this admission  Continue basal insulin q.h.s. (75% of typical basal this tonight in anticipation of NPO status)  Continue insulin sliding scale      VTE Risk Mitigation (From admission, onward)         Ordered     IP VTE HIGH RISK PATIENT  Once         11/22/22 1724     Place sequential compression device  Until discontinued         11/22/22 1724                Discharge Planning   LOIS:      Code Status: Full Code   Is the patient medically ready for discharge?:     Reason for patient still in hospital (select all that apply): Treatment and Consult recommendations  Discharge Plan A: Home                  Kulwinder Bedoya MD  Department of Hospital Medicine   [facility] ([place])

## 2022-11-27 NOTE — ASSESSMENT & PLAN NOTE
- No emergent surgical intervention needed from podiatry at this time.   - Plan to get bone biopsy tomorrow   - ABX management per primary/ID  - Wounds dressed with iodosorb, 4x4 gauze, cast padding, and coban  - Podiatry will follow

## 2022-11-27 NOTE — SUBJECTIVE & OBJECTIVE
Interval History:  No acute events overnight.  Right upper extremity swelling is improving.  Right upper extremity midline has not yet that her refine nursing.    Review of Systems   Constitutional:  Negative for chills and fever.   HENT:  Negative for congestion.    Respiratory:  Negative for shortness of breath.    Cardiovascular:  Negative for chest pain and palpitations.   Gastrointestinal:  Negative for abdominal pain.   Musculoskeletal:  Negative for back pain.   Skin:  Positive for wound.   Neurological:  Negative for dizziness and headaches.   Objective:     Vital Signs (Most Recent):  Temp: 97.7 °F (36.5 °C) (11/27/22 1136)  Pulse: 76 (11/27/22 1136)  Resp: 16 (11/27/22 1404)  BP: (!) 146/78 (11/27/22 1136)  SpO2: 96 % (11/27/22 1136)   Vital Signs (24h Range):  Temp:  [97.6 °F (36.4 °C)-98 °F (36.7 °C)] 97.7 °F (36.5 °C)  Pulse:  [74-84] 76  Resp:  [16-19] 16  SpO2:  [93 %-96 %] 96 %  BP: (141-159)/(73-82) 146/78     Weight: 122.5 kg (270 lb)  Body mass index is 34.67 kg/m².    Intake/Output Summary (Last 24 hours) at 11/27/2022 1537  Last data filed at 11/27/2022 1157  Gross per 24 hour   Intake 10 ml   Output 2670 ml   Net -2660 ml        Physical Exam  Vitals reviewed.   Constitutional:       General: He is not in acute distress.     Appearance: Normal appearance.   Cardiovascular:      Rate and Rhythm: Normal rate and regular rhythm.   Pulmonary:      Breath sounds: No wheezing or rales.   Abdominal:      General: Bowel sounds are normal.      Tenderness: There is no abdominal tenderness.   Musculoskeletal:      Cervical back: Neck supple.      Comments: Left toe wound covered by bandage.  Right foot covered by Kerlix bandage.  No visible lower extremity erythema.  Trace bilateral lower extremity edema.    Right upper extremity swollen, but improving.  Right radial pulse intact.    Left upper extremity PICC line in place.   Skin:     General: Skin is warm.      Capillary Refill: Capillary refill takes  less than 2 seconds.   Neurological:      General: No focal deficit present.      Mental Status: He is alert and oriented to person, place, and time.      Cranial Nerves: No cranial nerve deficit.   Psychiatric:         Mood and Affect: Mood normal.       Significant Labs: All pertinent labs within the past 24 hours have been reviewed.  CBC:   Recent Labs   Lab 11/26/22 0326   WBC 7.93   HGB 11.7*   HCT 37.7*   *       CMP:   Recent Labs   Lab 11/26/22 0326   *   K 4.1      CO2 28   *   BUN 12   CREATININE 1.1   CALCIUM 9.2   ANIONGAP 5*         Significant Imaging: I have reviewed all pertinent imaging results/findings within the past 24 hours.

## 2022-11-27 NOTE — ASSESSMENT & PLAN NOTE
Poor foot care with Left great toe wound concerning for wet gangrene.  Left 1st MTP fluid collection on imaging.  The patient declines amputation this time and would prefer medical treatment over transmetatarsal amputation.  Podiatry performed bedside debridement on 11/25.    Continue vancomycin   Continue cefepime  Follow blood cultures   Bone biopsy recommended, likely Monday (IR consult ordered for biopsy)  Hold anticoagulation overnight , resume after procedure  NPO after midnight  Wound Care & Infectious Disease recommendations after bone biopsy

## 2022-11-27 NOTE — PROGRESS NOTES
[facility] ([place])  Podiatry  Progress Note    Patient Name: Dave Good  MRN: [id number]  Admission Date: 11/22/2022  Hospital Length of Stay: 3 days  Attending Physician: Kulwinder Bedoya MD  Primary Care Provider: Reyna Jorge NP     Subjective:     Interval History: Patient is seen laying in bed today. Patient says he feels ok. Patient denies any fever chills nausea or vomiting. Patient is stable at time of this visit. Patient has no other pedal complaints at this time.         Scheduled Meds:   aspirin  81 mg Oral Daily    atorvastatin  40 mg Oral Daily    ceFEPime (MAXIPIME) IVPB  2 g Intravenous Q8H    enoxaparin  1 mg/kg Subcutaneous Q12H    hydroCHLOROthiazide  25 mg Oral Daily    insulin detemir U-100  40 Units Subcutaneous QHS    lisinopriL  10 mg Oral Daily    mupirocin   Nasal BID    QUEtiapine  200 mg Oral Nightly    sodium chloride 0.9%  10 mL Intravenous Q6H    vancomycin (VANCOCIN) IVPB  1,500 mg Intravenous Q12H     Continuous Infusions:  PRN Meds:acetaminophen, albuterol-ipratropium, dextrose 10%, dextrose 10%, dextrose 10%, diphenhydrAMINE, glucagon (human recombinant), glucose, glucose, HYDROcodone-acetaminophen, insulin aspart U-100, melatonin, morphine, ondansetron, promethazine (PHENERGAN) IVPB, sodium chloride 0.9%, Flushing PICC Protocol **AND** sodium chloride 0.9% **AND** sodium chloride 0.9%, Pharmacy to dose Vancomycin consult **AND** vancomycin - pharmacy to dose    Review of Systems   Constitutional:  Negative for chills and fever.   HENT:  Negative for congestion.    Respiratory:  Negative for chest tightness and shortness of breath.    Cardiovascular:  Negative for chest pain and palpitations.   Gastrointestinal:  Negative for abdominal pain, nausea and vomiting.   Musculoskeletal:  Negative for back pain.   Skin:  Negative for wound.   Neurological:  Negative for dizziness and headaches.   Psychiatric/Behavioral:  Negative for agitation and behavioral  problems.    Objective:     Vital Signs (Most Recent):  Temp: 97.6 °F (36.4 °C) (11/26/22 1935)  Pulse: 74 (11/26/22 1935)  Resp: 19 (11/26/22 1935)  BP: (!) 159/81 (11/26/22 1935)  SpO2: 95 % (11/26/22 1935)   Vital Signs (24h Range):  Temp:  [97.6 °F (36.4 °C)-98.7 °F (37.1 °C)] 97.6 °F (36.4 °C)  Pulse:  [74-78] 74  Resp:  [16-20] 19  SpO2:  [93 %-96 %] 95 %  BP: (123-163)/(76-86) 159/81     Weight: 122.5 kg (270 lb)  Body mass index is 34.67 kg/m².    Foot Exam    Right Foot/Ankle     Neurovascular  Dorsalis pedis: 1+  Posterior tibial: 1+  Saphenous nerve sensation: diminished  Tibial nerve sensation: diminished  Superficial peroneal nerve sensation: diminished  Deep peroneal nerve sensation: diminished  Sural nerve sensation: diminished    Comments  Right foot: Patient has a tma on right foot. Diffuse xerosis noted. Area of fluctuance noted on right medial side. Wound measuring 2.5cm x2.4cm x.3cm noted on plantar distal aspect of right foot. No purulent drainage no crepitus noted.     Left Foot/Ankle      Neurovascular  Dorsalis pedis: 1+  Posterior tibial: 1+  Saphenous nerve sensation: diminished  Tibial nerve sensation: diminished  Superficial peroneal nerve sensation: diminished  Deep peroneal nerve sensation: diminished  Sural nerve sensation: diminished    Comments  Left foot: Patient has a wound on the distal plantar aspect of left great toe measuring. 3.0cm x 2.4cm x .5. Wound is granular in nature. Wound probes to bone. No purulent drainage noted. No crepitus or fluctuance noted.     Laboratory:  A1C:   Recent Labs   Lab 11/22/22 1928   HGBA1C 9.1*     Blood Cultures: No results for input(s): LABBLOO in the last 48 hours.  CBC:   Recent Labs   Lab 11/26/22  0326   WBC 7.93   RBC 4.34*   HGB 11.7*   HCT 37.7*   *   MCV 87   MCH 27.0   MCHC 31.0*     CMP:   Recent Labs   Lab 11/23/22  0540 11/24/22  0502 11/26/22  0326   * 181* 187*   CALCIUM 8.6* 8.7 9.2   ALBUMIN 2.8* 2.7*  --    PROT  7.6  --   --     135* 133*   K 3.4* 3.8 4.1   CO2 28 25 28    103 100   BUN 9 9 12   CREATININE 1.0 1.1 1.1   ALKPHOS 62  --   --    ALT 17  --   --    AST 14  --   --    BILITOT 0.6  --   --      CRP:   Recent Labs   Lab 11/22/22 1928   .3*     ESR:   Recent Labs   Lab 11/22/22 1928   SEDRATE 79*     Prealbumin: No results for input(s): PREALBUMIN in the last 48 hours.  Wound Cultures:   Recent Labs   Lab 06/16/22  0050 06/17/22  0601   LABAERO PROTEUS MIRABILIS  Many  *  METHICILLIN RESISTANT STAPHYLOCOCCUS AUREUS  Many  *  ACINETOBACTER LWOFFII GROUP  Few  *  PROVIDENCIA RETTGERI  Few  *  METHICILLIN RESISTANT STAPHYLOCOCCUS AUREUS  Moderate  * DIPHTHEROIDS  Few  *  ENTEROCOCCUS FAECALIS  Few  *  No growth     Microbiology Results (last 7 days)       Procedure Component Value Units Date/Time    Blood culture [[id number]] Collected: 11/26/22 0951    Order Status: Sent Specimen: Blood Updated: 11/26/22 0951    Blood culture #1 **CANNOT BE ORDERED STAT** [[id number]] Collected: 11/22/22 1928    Order Status: Completed Specimen: Blood from Midline, Basilic, Right Updated: 11/26/22 0936     Blood Culture, Routine Gram stain jose bottle: Gram positive rods      Results called to and read back by: Viridiana Hernandez RN  11/26/2022  09:36    Blood culture #2 **CANNOT BE ORDERED STAT** [[id number]] Collected: 11/22/22 1900    Order Status: Completed Specimen: Blood from Midline, Basilic, Right Updated: 11/26/22 0612     Blood Culture, Routine No Growth to date      No Growth to date      No Growth to date      No Growth to date    Aerobic culture [[id number]]     Order Status: No result Specimen: Wound from Toe, Left Foot     Culture, Anaerobic [[id number]]     Order Status: No result Specimen: Wound from Toe, Left Foot           Specimen (24h ago, onward)      None                Assessment/Plan:     Diabetic ulcer of left great toe  - No emergent surgical intervention needed from podiatry at this time.    - Plan to get bone biopsy tomorrow   - ABX management per primary/ID  - Wounds dressed with iodosorb, 4x4 gauze, cast padding, and coban  - Podiatry will follow         Lloyd Potts DPM PGY-1  Podiatric Medicine & Surgery  [facility]  Secure Chat Preferred  Mobile: [phone]  Pager: [phone]

## 2022-11-27 NOTE — ASSESSMENT & PLAN NOTE
Lab Results   Component Value Date    HGBA1C 9.1 (H) 11/22/2022     Most recent fingerstick glucose reviewed-   Recent Labs   Lab 11/26/22 2006 11/27/22  0746 11/27/22  1135   POCTGLUCOSE 205* 190* 209*     Current correctional scale    Antihyperglycemics (From admission, onward)    Start     Stop Route Frequency Ordered    11/27/22 2100  insulin detemir U-100 pen 30 Units         -- SubQ Nightly 11/27/22 1536    11/24/22 1330  insulin aspart U-100 pen 1-10 Units         -- SubQ Before meals & nightly PRN 11/24/22 1231        Hold Oral hypoglycemics while patient is in the hospital.    NOTE:  Patient usually takes 40 units basal insulin q.h.s.  75% of his typical basal dose has been ordered as he will be NPO after midnight in anticipation for bone biopsy Monday morning

## 2022-11-27 NOTE — SUBJECTIVE & OBJECTIVE
Subjective:     Interval History: Patient is seen laying in bed today. Patient says he feels ok. Patient denies any fever chills nausea or vomiting. Patient is stable at time of this visit. Patient has no other pedal complaints at this time.         Scheduled Meds:   aspirin  81 mg Oral Daily    atorvastatin  40 mg Oral Daily    ceFEPime (MAXIPIME) IVPB  2 g Intravenous Q8H    enoxaparin  1 mg/kg Subcutaneous Q12H    hydroCHLOROthiazide  25 mg Oral Daily    insulin detemir U-100  40 Units Subcutaneous QHS    lisinopriL  10 mg Oral Daily    mupirocin   Nasal BID    QUEtiapine  200 mg Oral Nightly    sodium chloride 0.9%  10 mL Intravenous Q6H    vancomycin (VANCOCIN) IVPB  1,500 mg Intravenous Q12H     Continuous Infusions:  PRN Meds:acetaminophen, albuterol-ipratropium, dextrose 10%, dextrose 10%, dextrose 10%, diphenhydrAMINE, glucagon (human recombinant), glucose, glucose, HYDROcodone-acetaminophen, insulin aspart U-100, melatonin, morphine, ondansetron, promethazine (PHENERGAN) IVPB, sodium chloride 0.9%, Flushing PICC Protocol **AND** sodium chloride 0.9% **AND** sodium chloride 0.9%, Pharmacy to dose Vancomycin consult **AND** vancomycin - pharmacy to dose    Review of Systems   Constitutional:  Negative for chills and fever.   HENT:  Negative for congestion.    Respiratory:  Negative for chest tightness and shortness of breath.    Cardiovascular:  Negative for chest pain and palpitations.   Gastrointestinal:  Negative for abdominal pain, nausea and vomiting.   Musculoskeletal:  Negative for back pain.   Skin:  Negative for wound.   Neurological:  Negative for dizziness and headaches.   Psychiatric/Behavioral:  Negative for agitation and behavioral problems.    Objective:     Vital Signs (Most Recent):  Temp: 97.6 °F (36.4 °C) (11/26/22 1935)  Pulse: 74 (11/26/22 1935)  Resp: 19 (11/26/22 1935)  BP: (!) 159/81 (11/26/22 1935)  SpO2: 95 % (11/26/22 1935)   Vital Signs (24h Range):  Temp:  [97.6 °F (36.4  °C)-98.7 °F (37.1 °C)] 97.6 °F (36.4 °C)  Pulse:  [74-78] 74  Resp:  [16-20] 19  SpO2:  [93 %-96 %] 95 %  BP: (123-163)/(76-86) 159/81     Weight: 122.5 kg (270 lb)  Body mass index is 34.67 kg/m².    Foot Exam    Right Foot/Ankle     Neurovascular  Dorsalis pedis: 1+  Posterior tibial: 1+  Saphenous nerve sensation: diminished  Tibial nerve sensation: diminished  Superficial peroneal nerve sensation: diminished  Deep peroneal nerve sensation: diminished  Sural nerve sensation: diminished    Comments  Right foot: Patient has a tma on right foot. Diffuse xerosis noted. Area of fluctuance noted on right medial side. Wound measuring 2.5cm x2.4cm x.3cm noted on plantar distal aspect of right foot. No purulent drainage no crepitus noted.     Left Foot/Ankle      Neurovascular  Dorsalis pedis: 1+  Posterior tibial: 1+  Saphenous nerve sensation: diminished  Tibial nerve sensation: diminished  Superficial peroneal nerve sensation: diminished  Deep peroneal nerve sensation: diminished  Sural nerve sensation: diminished    Comments  Left foot: Patient has a wound on the distal plantar aspect of left great toe measuring. 3.0cm x 2.4cm x .5. Wound is granular in nature. Wound probes to bone. No purulent drainage noted. No crepitus or fluctuance noted.     Laboratory:  A1C:   Recent Labs   Lab 11/22/22  1928   HGBA1C 9.1*     Blood Cultures: No results for input(s): LABBLOO in the last 48 hours.  CBC:   Recent Labs   Lab 11/26/22  0326   WBC 7.93   RBC 4.34*   HGB 11.7*   HCT 37.7*   *   MCV 87   MCH 27.0   MCHC 31.0*     CMP:   Recent Labs   Lab 11/23/22  0540 11/24/22  0502 11/26/22  0326   * 181* 187*   CALCIUM 8.6* 8.7 9.2   ALBUMIN 2.8* 2.7*  --    PROT 7.6  --   --     135* 133*   K 3.4* 3.8 4.1   CO2 28 25 28    103 100   BUN 9 9 12   CREATININE 1.0 1.1 1.1   ALKPHOS 62  --   --    ALT 17  --   --    AST 14  --   --    BILITOT 0.6  --   --      CRP:   Recent Labs   Lab 11/22/22 1928   CRP  125.3*     ESR:   Recent Labs   Lab 11/22/22 1928   SEDRATE 79*     Prealbumin: No results for input(s): PREALBUMIN in the last 48 hours.  Wound Cultures:   Recent Labs   Lab 06/16/22  0050 06/17/22  0601   LABAERO PROTEUS MIRABILIS  Many  *  METHICILLIN RESISTANT STAPHYLOCOCCUS AUREUS  Many  *  ACINETOBACTER LWOFFII GROUP  Few  *  PROVIDENCIA RETTGERI  Few  *  METHICILLIN RESISTANT STAPHYLOCOCCUS AUREUS  Moderate  * DIPHTHEROIDS  Few  *  ENTEROCOCCUS FAECALIS  Few  *  No growth     Microbiology Results (last 7 days)       Procedure Component Value Units Date/Time    Blood culture [[id number]] Collected: 11/26/22 0951    Order Status: Sent Specimen: Blood Updated: 11/26/22 0951    Blood culture #1 **CANNOT BE ORDERED STAT** [[id number]] Collected: 11/22/22 1928    Order Status: Completed Specimen: Blood from Midline, Basilic, Right Updated: 11/26/22 0936     Blood Culture, Routine Gram stain jose bottle: Gram positive rods      Results called to and read back by: Viridiana Hernandez RN  11/26/2022  09:36    Blood culture #2 **CANNOT BE ORDERED STAT** [[id number]] Collected: 11/22/22 1900    Order Status: Completed Specimen: Blood from Midline, Basilic, Right Updated: 11/26/22 0612     Blood Culture, Routine No Growth to date      No Growth to date      No Growth to date      No Growth to date    Aerobic culture [[id number]]     Order Status: No result Specimen: Wound from Toe, Left Foot     Culture, Anaerobic [[id number]]     Order Status: No result Specimen: Wound from Toe, Left Foot           Specimen (24h ago, onward)      None

## 2022-11-28 LAB
ALBUMIN SERPL BCP-MCNC: 2.8 G/DL (ref 3.5–5.2)
ANION GAP SERPL CALC-SCNC: 7 MMOL/L (ref 8–16)
BACTERIA BLD CULT: ABNORMAL
BACTERIA BLD CULT: NORMAL
BUN SERPL-MCNC: 18 MG/DL (ref 6–20)
CALCIUM SERPL-MCNC: 9.3 MG/DL (ref 8.7–10.5)
CHLORIDE SERPL-SCNC: 98 MMOL/L (ref 95–110)
CO2 SERPL-SCNC: 27 MMOL/L (ref 23–29)
CREAT SERPL-MCNC: 1.3 MG/DL (ref 0.5–1.4)
ERYTHROCYTE [DISTWIDTH] IN BLOOD BY AUTOMATED COUNT: 14.6 % (ref 11.5–14.5)
EST. GFR  (NO RACE VARIABLE): >60 ML/MIN/1.73 M^2
GLUCOSE SERPL-MCNC: 194 MG/DL (ref 70–110)
GRAM STN SPEC: NORMAL
GRAM STN SPEC: NORMAL
HCT VFR BLD AUTO: 37 % (ref 40–54)
HGB BLD-MCNC: 11.6 G/DL (ref 14–18)
INR PPP: 1 (ref 0.8–1.2)
MCH RBC QN AUTO: 27.1 PG (ref 27–31)
MCHC RBC AUTO-ENTMCNC: 31.4 G/DL (ref 32–36)
MCV RBC AUTO: 86 FL (ref 82–98)
PHOSPHATE SERPL-MCNC: 2.7 MG/DL (ref 2.7–4.5)
PLATELET # BLD AUTO: 476 K/UL (ref 150–450)
PMV BLD AUTO: 9.4 FL (ref 9.2–12.9)
POCT GLUCOSE: 181 MG/DL (ref 70–110)
POCT GLUCOSE: 192 MG/DL (ref 70–110)
POCT GLUCOSE: 203 MG/DL (ref 70–110)
POCT GLUCOSE: 209 MG/DL (ref 70–110)
POTASSIUM SERPL-SCNC: 4.7 MMOL/L (ref 3.5–5.1)
PROTHROMBIN TIME: 10.7 SEC (ref 9–12.5)
RBC # BLD AUTO: 4.28 M/UL (ref 4.6–6.2)
SODIUM SERPL-SCNC: 132 MMOL/L (ref 136–145)
VANCOMYCIN TROUGH SERPL-MCNC: 19.5 UG/ML (ref 10–22)
WBC # BLD AUTO: 9.08 K/UL (ref 3.9–12.7)

## 2022-11-28 PROCEDURE — 63600175 PHARM REV CODE 636 W HCPCS: Performed by: NURSE PRACTITIONER

## 2022-11-28 PROCEDURE — 27000221 HC OXYGEN, UP TO 24 HOURS

## 2022-11-28 PROCEDURE — 97162 PT EVAL MOD COMPLEX 30 MIN: CPT

## 2022-11-28 PROCEDURE — 63600175 PHARM REV CODE 636 W HCPCS: Performed by: INTERNAL MEDICINE

## 2022-11-28 PROCEDURE — 27000207 HC ISOLATION

## 2022-11-28 PROCEDURE — 80069 RENAL FUNCTION PANEL: CPT | Performed by: STUDENT IN AN ORGANIZED HEALTH CARE EDUCATION/TRAINING PROGRAM

## 2022-11-28 PROCEDURE — A4216 STERILE WATER/SALINE, 10 ML: HCPCS | Performed by: STUDENT IN AN ORGANIZED HEALTH CARE EDUCATION/TRAINING PROGRAM

## 2022-11-28 PROCEDURE — 63600175 PHARM REV CODE 636 W HCPCS: Performed by: STUDENT IN AN ORGANIZED HEALTH CARE EDUCATION/TRAINING PROGRAM

## 2022-11-28 PROCEDURE — 99233 SBSQ HOSP IP/OBS HIGH 50: CPT | Mod: ,,, | Performed by: INTERNAL MEDICINE

## 2022-11-28 PROCEDURE — 99900035 HC TECH TIME PER 15 MIN (STAT)

## 2022-11-28 PROCEDURE — 25000003 PHARM REV CODE 250: Performed by: STUDENT IN AN ORGANIZED HEALTH CARE EDUCATION/TRAINING PROGRAM

## 2022-11-28 PROCEDURE — 85027 COMPLETE CBC AUTOMATED: CPT | Performed by: STUDENT IN AN ORGANIZED HEALTH CARE EDUCATION/TRAINING PROGRAM

## 2022-11-28 PROCEDURE — 80202 ASSAY OF VANCOMYCIN: CPT | Performed by: STUDENT IN AN ORGANIZED HEALTH CARE EDUCATION/TRAINING PROGRAM

## 2022-11-28 PROCEDURE — 11000001 HC ACUTE MED/SURG PRIVATE ROOM

## 2022-11-28 PROCEDURE — 99223 PR INITIAL HOSPITAL CARE,LEVL III: ICD-10-PCS | Mod: ,,, | Performed by: INTERNAL MEDICINE

## 2022-11-28 PROCEDURE — 36415 COLL VENOUS BLD VENIPUNCTURE: CPT | Performed by: STUDENT IN AN ORGANIZED HEALTH CARE EDUCATION/TRAINING PROGRAM

## 2022-11-28 PROCEDURE — 99223 1ST HOSP IP/OBS HIGH 75: CPT | Mod: ,,, | Performed by: INTERNAL MEDICINE

## 2022-11-28 PROCEDURE — 99233 PR SUBSEQUENT HOSPITAL CARE,LEVL III: ICD-10-PCS | Mod: ,,, | Performed by: INTERNAL MEDICINE

## 2022-11-28 PROCEDURE — 85610 PROTHROMBIN TIME: CPT | Performed by: STUDENT IN AN ORGANIZED HEALTH CARE EDUCATION/TRAINING PROGRAM

## 2022-11-28 PROCEDURE — 25000003 PHARM REV CODE 250: Performed by: NURSE PRACTITIONER

## 2022-11-28 PROCEDURE — 94761 N-INVAS EAR/PLS OXIMETRY MLT: CPT

## 2022-11-28 RX ORDER — ENOXAPARIN SODIUM 100 MG/ML
1 INJECTION SUBCUTANEOUS
Status: DISCONTINUED | OUTPATIENT
Start: 2022-11-28 | End: 2022-12-07 | Stop reason: HOSPADM

## 2022-11-28 RX ADMIN — ENOXAPARIN SODIUM 120 MG: 100 INJECTION SUBCUTANEOUS at 12:11

## 2022-11-28 RX ADMIN — INSULIN ASPART 2 UNITS: 100 INJECTION, SOLUTION INTRAVENOUS; SUBCUTANEOUS at 12:11

## 2022-11-28 RX ADMIN — MORPHINE SULFATE 6 MG: 4 INJECTION, SOLUTION INTRAMUSCULAR; INTRAVENOUS at 09:11

## 2022-11-28 RX ADMIN — MORPHINE SULFATE 6 MG: 4 INJECTION, SOLUTION INTRAMUSCULAR; INTRAVENOUS at 03:11

## 2022-11-28 RX ADMIN — MORPHINE SULFATE 6 MG: 4 INJECTION, SOLUTION INTRAMUSCULAR; INTRAVENOUS at 02:11

## 2022-11-28 RX ADMIN — SODIUM CHLORIDE, PRESERVATIVE FREE 10 ML: 5 INJECTION INTRAVENOUS at 08:11

## 2022-11-28 RX ADMIN — HYDROCODONE BITARTRATE AND ACETAMINOPHEN 1 TABLET: 5; 325 TABLET ORAL at 05:11

## 2022-11-28 RX ADMIN — ATORVASTATIN CALCIUM 40 MG: 20 TABLET, FILM COATED ORAL at 09:11

## 2022-11-28 RX ADMIN — HYDROCHLOROTHIAZIDE 25 MG: 25 TABLET ORAL at 09:11

## 2022-11-28 RX ADMIN — SODIUM CHLORIDE, PRESERVATIVE FREE 10 ML: 5 INJECTION INTRAVENOUS at 12:11

## 2022-11-28 RX ADMIN — INSULIN ASPART 1 UNITS: 100 INJECTION, SOLUTION INTRAVENOUS; SUBCUTANEOUS at 08:11

## 2022-11-28 RX ADMIN — QUETIAPINE FUMARATE 200 MG: 200 TABLET ORAL at 08:11

## 2022-11-28 RX ADMIN — HYDROCODONE BITARTRATE AND ACETAMINOPHEN 1 TABLET: 5; 325 TABLET ORAL at 12:11

## 2022-11-28 RX ADMIN — CEFEPIME HYDROCHLORIDE 2 G: 2 INJECTION, SOLUTION INTRAVENOUS at 09:11

## 2022-11-28 RX ADMIN — SODIUM CHLORIDE, PRESERVATIVE FREE 10 ML: 5 INJECTION INTRAVENOUS at 11:11

## 2022-11-28 RX ADMIN — CEFEPIME HYDROCHLORIDE 2 G: 2 INJECTION, SOLUTION INTRAVENOUS at 03:11

## 2022-11-28 RX ADMIN — CEFEPIME HYDROCHLORIDE 2 G: 2 INJECTION, SOLUTION INTRAVENOUS at 12:11

## 2022-11-28 RX ADMIN — CEFEPIME HYDROCHLORIDE 2 G: 2 INJECTION, SOLUTION INTRAVENOUS at 11:11

## 2022-11-28 RX ADMIN — LISINOPRIL 10 MG: 10 TABLET ORAL at 09:11

## 2022-11-28 RX ADMIN — ASPIRIN 81 MG: 81 TABLET, COATED ORAL at 09:11

## 2022-11-28 RX ADMIN — ENOXAPARIN SODIUM 120 MG: 100 INJECTION SUBCUTANEOUS at 11:11

## 2022-11-28 RX ADMIN — VANCOMYCIN HYDROCHLORIDE 1500 MG: 1.5 INJECTION, POWDER, LYOPHILIZED, FOR SOLUTION INTRAVENOUS at 12:11

## 2022-11-28 RX ADMIN — SODIUM CHLORIDE, PRESERVATIVE FREE 10 ML: 5 INJECTION INTRAVENOUS at 05:11

## 2022-11-28 RX ADMIN — HYDROCODONE BITARTRATE AND ACETAMINOPHEN 1 TABLET: 5; 325 TABLET ORAL at 08:11

## 2022-11-28 RX ADMIN — INSULIN DETEMIR 30 UNITS: 100 INJECTION, SOLUTION SUBCUTANEOUS at 08:11

## 2022-11-28 NOTE — PROGRESS NOTES
Pharmacokinetic Assessment Follow Up: IV Vancomycin    Vancomycin serum concentration assessment(s):    The trough level was drawn correctly and can be used to guide therapy at this time. The measurement is within the desired definitive target range of 15 to 20 mcg/mL.    Vancomycin Regimen Plan:    Continue regimen to Vancomycin 1500 mg IV every 12 hours with next serum trough concentration measured at midnight prior to next dose on 11/30    Drug levels (last 3 results):  Recent Labs   Lab Result Units 11/25/22  2253 11/26/22  1123 11/28/22  1128   Vancomycin-Trough ug/mL 27.1* 17.1 19.5       Pharmacy will continue to follow and monitor vancomycin.    Please contact pharmacy at extension [phone] for questions regarding this assessment.    Thank you for the consult,   Ling Call       Patient brief summary:  Dave Good is a 57 y.o. male initiated on antimicrobial therapy with IV Vancomycin for treatment of bone/joint infection    The patient's current regimen is 1500 mg every 12 hours     Drug Allergies:   Review of patient's allergies indicates:   Allergen Reactions    Ibuprofen Swelling     Facial swelling       Actual Body Weight:   122.5 kg    Renal Function:   Estimated Creatinine Clearance: 85.1 mL/min (based on SCr of 1.3 mg/dL).,     Dialysis Method (if applicable):  N/A    CBC (last 72 hours):  Recent Labs   Lab Result Units 11/26/22  0326 11/28/22  0413   WBC K/uL 7.93 9.08   Hemoglobin g/dL 11.7* 11.6*   Hematocrit % 37.7* 37.0*   Platelets K/uL 495* 476*       Metabolic Panel (last 72 hours):  Recent Labs   Lab Result Units 11/26/22  0326 11/28/22  0413   Sodium mmol/L 133* 132*   Potassium mmol/L 4.1 4.7   Chloride mmol/L 100 98   CO2 mmol/L 28 27   Glucose mg/dL 187* 194*   BUN mg/dL 12 18   Creatinine mg/dL 1.1 1.3   Albumin g/dL  --  2.8*   Phosphorus mg/dL  --  2.7       Vancomycin Administrations:  vancomycin given in the last 96 hours                     vancomycin 1.5 g in dextrose 5  % 250 mL IVPB (ready to mix) (mg) 1,500 mg New Bag 11/28/22 1248     1,500 mg New Bag 11/27/22 2255     1,500 mg New Bag  1153     1,500 mg New Bag 11/26/22 2250     1,500 mg New Bag 11/25/22 2243     1,500 mg New Bag  1028     1,500 mg New Bag 11/24/22 2229                    Microbiologic Results:  Microbiology Results (last 7 days)       Procedure Component Value Units Date/Time    Blood culture #1 **CANNOT BE ORDERED STAT** [[id number]]  (Abnormal) Collected: 11/22/22 1928    Order Status: Completed Specimen: Blood from Midline, Basilic, Right Updated: 11/28/22 1031     Blood Culture, Routine Gram stain jose bottle: Gram positive rods      Results called to and read back by: Viridiana Hernandez RN  11/26/2022  09:36      DIPHTHEROIDS    Blood culture [[id number]] Collected: 11/26/22 0951    Order Status: Completed Specimen: Blood Updated: 11/28/22 0613     Blood Culture, Routine No Growth to date      No Growth to date    Blood culture #2 **CANNOT BE ORDERED STAT** [[id number]] Collected: 11/22/22 1900    Order Status: Completed Specimen: Blood from Midline, Basilic, Right Updated: 11/28/22 0612     Blood Culture, Routine No growth after 5 days.    Gram stain [[id number]] Collected: 11/27/22 1419    Order Status: Completed Specimen: Bone from Toe, Left Foot Updated: 11/28/22 0013     Gram Stain Result Rare WBC's      No organisms seen    Narrative:      Left great toe distal phalanx    Aerobic culture [[id number]] Collected: 11/27/22 1419    Order Status: Sent Specimen: Bone from Toe, Left Foot Updated: 11/27/22 2313    Culture, Anaerobic [[id number]] Collected: 11/27/22 1419    Order Status: Sent Specimen: Bone from Toe, Left Foot Updated: 11/27/22 2313    Aerobic culture [[id number]]     Order Status: No result Specimen: Wound from Toe, Left Foot     Culture, Anaerobic [[id number]]     Order Status: No result Specimen: Wound from Toe, Left Foot

## 2022-11-28 NOTE — SUBJECTIVE & OBJECTIVE
Subjective:     Interval History: Patient is seen resting on his bed comfrotbaly. Patient admits to some pain to his left foot. Patient denies any fever chills nausea or vomiting. Patient is stable at time of this visit. Patient has no other pedal complaints at this time.         Scheduled Meds:   aspirin  81 mg Oral Daily    atorvastatin  40 mg Oral Daily    ceFEPime (MAXIPIME) IVPB  2 g Intravenous Q8H    hydroCHLOROthiazide  25 mg Oral Daily    insulin detemir U-100  30 Units Subcutaneous QHS    lisinopriL  10 mg Oral Daily    mupirocin   Nasal BID    QUEtiapine  200 mg Oral Nightly    sodium chloride 0.9%  10 mL Intravenous Q6H    vancomycin (VANCOCIN) IVPB  1,500 mg Intravenous Q12H     Continuous Infusions:  PRN Meds:acetaminophen, albuterol-ipratropium, dextrose 10%, dextrose 10%, dextrose 10%, diphenhydrAMINE, glucagon (human recombinant), glucose, glucose, HYDROcodone-acetaminophen, insulin aspart U-100, melatonin, morphine, ondansetron, promethazine (PHENERGAN) IVPB, sodium chloride 0.9%, Flushing PICC Protocol **AND** sodium chloride 0.9% **AND** sodium chloride 0.9%, Pharmacy to dose Vancomycin consult **AND** vancomycin - pharmacy to dose    Review of Systems   Constitutional:  Negative for chills and fever.   HENT:  Negative for congestion.    Respiratory:  Negative for chest tightness and shortness of breath.    Cardiovascular:  Negative for chest pain and palpitations.   Gastrointestinal:  Negative for abdominal pain, nausea and vomiting.   Musculoskeletal:  Negative for back pain.   Skin:  Negative for wound.   Neurological:  Negative for dizziness and headaches.   Psychiatric/Behavioral:  Negative for agitation and behavioral problems.    Objective:     Vital Signs (Most Recent):  Temp: 97.6 °F (36.4 °C) (11/27/22 1625)  Pulse: 76 (11/27/22 1625)  Resp: 16 (11/27/22 1806)  BP: (!) 143/84 (11/27/22 1625)  SpO2: 98 % (11/27/22 1625)   Vital Signs (24h Range):  Temp:  [97.6 °F (36.4 °C)-98 °F (36.7  °C)] 97.6 °F (36.4 °C)  Pulse:  [74-84] 76  Resp:  [16-19] 16  SpO2:  [93 %-98 %] 98 %  BP: (141-159)/(73-84) 143/84     Weight: 122.5 kg (270 lb)  Body mass index is 34.67 kg/m².    Foot Exam    Right Foot/Ankle     Neurovascular  Dorsalis pedis: 1+  Posterior tibial: 1+  Saphenous nerve sensation: diminished  Tibial nerve sensation: diminished  Superficial peroneal nerve sensation: diminished  Deep peroneal nerve sensation: diminished  Sural nerve sensation: diminished    Comments  Right foot: Patient has a tma on right foot. Diffuse xerosis noted. Area of fluctuance noted on right medial side. Wound measuring 2.5cm x2.4cm x.3cm noted on plantar distal aspect of right foot. No purulent drainage no crepitus noted.     Left Foot/Ankle      Neurovascular  Dorsalis pedis: 1+  Posterior tibial: 1+  Saphenous nerve sensation: diminished  Tibial nerve sensation: diminished  Superficial peroneal nerve sensation: diminished  Deep peroneal nerve sensation: diminished  Sural nerve sensation: diminished    Comments  Left foot: Patient has a wound on the distal plantar aspect of left great toe measuring. 2.5 cm x 2cm x .5. Wound is granular in nature. Wound probes to bone. No purulent drainage noted. No crepitus or fluctuance noted.   Left great toe plantar    Right foot plantar    Left foot plantar        Laboratory:  A1C:   Recent Labs   Lab 11/22/22  1928   HGBA1C 9.1*     Blood Cultures:   Recent Labs   Lab 11/26/22  0951   LABBLOO No Growth to date     CBC:   Recent Labs   Lab 11/26/22  0326   WBC 7.93   RBC 4.34*   HGB 11.7*   HCT 37.7*   *   MCV 87   MCH 27.0   MCHC 31.0*     CMP:   Recent Labs   Lab 11/23/22  0540 11/24/22  0502 11/26/22  0326   * 181* 187*   CALCIUM 8.6* 8.7 9.2   ALBUMIN 2.8* 2.7*  --    PROT 7.6  --   --     135* 133*   K 3.4* 3.8 4.1   CO2 28 25 28    103 100   BUN 9 9 12   CREATININE 1.0 1.1 1.1   ALKPHOS 62  --   --    ALT 17  --   --    AST 14  --   --    BILITOT 0.6   --   --      CRP:   Recent Labs   Lab 11/22/22 1928   .3*     ESR:   Recent Labs   Lab 11/22/22 1928   SEDRATE 79*     Wound Cultures:   Recent Labs   Lab 06/16/22  0050 06/17/22  0601   LABAERO PROTEUS MIRABILIS  Many  *  METHICILLIN RESISTANT STAPHYLOCOCCUS AUREUS  Many  *  ACINETOBACTER LWOFFII GROUP  Few  *  PROVIDENCIA RETTGERI  Few  *  METHICILLIN RESISTANT STAPHYLOCOCCUS AUREUS  Moderate  * DIPHTHEROIDS  Few  *  ENTEROCOCCUS FAECALIS  Few  *  No growth     Microbiology Results (last 7 days)       Procedure Component Value Units Date/Time    Culture, Anaerobic [[id number]] Collected: 11/27/22 1419    Order Status: Sent Specimen: Bone from Toe, Left Foot Updated: 11/27/22 1429    Aerobic culture [[id number]] Collected: 11/27/22 1419    Order Status: Sent Specimen: Bone from Toe, Left Foot Updated: 11/27/22 1429    Gram stain [[id number]] Collected: 11/27/22 1419    Order Status: Sent Specimen: Bone from Toe, Left Foot Updated: 11/27/22 1429    Blood culture #1 **CANNOT BE ORDERED STAT** [[id number]]  (Abnormal) Collected: 11/22/22 1928    Order Status: Completed Specimen: Blood from Midline, Basilic, Right Updated: 11/27/22 1058     Blood Culture, Routine Gram stain jose bottle: Gram positive rods      Results called to and read back by: Viridiana Hernandez RN  11/26/2022  09:36      DIPHTHEROIDS    Blood culture [[id number]] Collected: 11/26/22 0951    Order Status: Completed Specimen: Blood Updated: 11/27/22 0715     Blood Culture, Routine No Growth to date    Blood culture #2 **CANNOT BE ORDERED STAT** [[id number]] Collected: 11/22/22 1900    Order Status: Completed Specimen: Blood from Midline, Basilic, Right Updated: 11/27/22 0612     Blood Culture, Routine No Growth to date      No Growth to date      No Growth to date      No Growth to date      No Growth to date    Aerobic culture [[id number]]     Order Status: No result Specimen: Wound from Toe, Left Foot     Culture, Anaerobic [[id number]]     Order  Status: No result Specimen: Wound from Toe, Left Foot           Specimen (24h ago, onward)       Start     Ordered    11/27/22 1412  Specimen to Pathology, Surgery Other  Once        Comments: Pre-op Diagnosis: Osteomyelitis  Number of specimens: 1Name of specimens: Left great toe distal phalanx bone biopsy     References:    Click here for ordering Quick Tip   Question Answer Comment   Procedure Type: Other    Which provider would you like to cc? USHA BAPTISTE    Release to patient Immediate        11/27/22 1417

## 2022-11-28 NOTE — PROGRESS NOTES
[facility] ([place])  Podiatry  Progress Note    Patient Name: Dave Good  MRN: [id number]  Admission Date: 11/22/2022  Hospital Length of Stay: 4 days  Attending Physician: Kulwinder Bedoya MD  Primary Care Provider: Reyna Jorge NP     Subjective:     Interval History: Patient is seen resting on his bed comfrotbaly. Patient admits to some pain to his left foot. Patient denies any fever chills nausea or vomiting. Patient is stable at time of this visit. Patient has no other pedal complaints at this time.         Scheduled Meds:   aspirin  81 mg Oral Daily    atorvastatin  40 mg Oral Daily    ceFEPime (MAXIPIME) IVPB  2 g Intravenous Q8H    hydroCHLOROthiazide  25 mg Oral Daily    insulin detemir U-100  30 Units Subcutaneous QHS    lisinopriL  10 mg Oral Daily    mupirocin   Nasal BID    QUEtiapine  200 mg Oral Nightly    sodium chloride 0.9%  10 mL Intravenous Q6H    vancomycin (VANCOCIN) IVPB  1,500 mg Intravenous Q12H     Continuous Infusions:  PRN Meds:acetaminophen, albuterol-ipratropium, dextrose 10%, dextrose 10%, dextrose 10%, diphenhydrAMINE, glucagon (human recombinant), glucose, glucose, HYDROcodone-acetaminophen, insulin aspart U-100, melatonin, morphine, ondansetron, promethazine (PHENERGAN) IVPB, sodium chloride 0.9%, Flushing PICC Protocol **AND** sodium chloride 0.9% **AND** sodium chloride 0.9%, Pharmacy to dose Vancomycin consult **AND** vancomycin - pharmacy to dose    Review of Systems   Constitutional:  Negative for chills and fever.   HENT:  Negative for congestion.    Respiratory:  Negative for chest tightness and shortness of breath.    Cardiovascular:  Negative for chest pain and palpitations.   Gastrointestinal:  Negative for abdominal pain, nausea and vomiting.   Musculoskeletal:  Negative for back pain.   Skin:  Negative for wound.   Neurological:  Negative for dizziness and headaches.   Psychiatric/Behavioral:  Negative for agitation and behavioral problems.    Objective:      Vital Signs (Most Recent):  Temp: 97.6 °F (36.4 °C) (11/27/22 1625)  Pulse: 76 (11/27/22 1625)  Resp: 16 (11/27/22 1806)  BP: (!) 143/84 (11/27/22 1625)  SpO2: 98 % (11/27/22 1625)   Vital Signs (24h Range):  Temp:  [97.6 °F (36.4 °C)-98 °F (36.7 °C)] 97.6 °F (36.4 °C)  Pulse:  [74-84] 76  Resp:  [16-19] 16  SpO2:  [93 %-98 %] 98 %  BP: (141-159)/(73-84) 143/84     Weight: 122.5 kg (270 lb)  Body mass index is 34.67 kg/m².    Foot Exam    Right Foot/Ankle     Neurovascular  Dorsalis pedis: 1+  Posterior tibial: 1+  Saphenous nerve sensation: diminished  Tibial nerve sensation: diminished  Superficial peroneal nerve sensation: diminished  Deep peroneal nerve sensation: diminished  Sural nerve sensation: diminished    Comments  Right foot: Patient has a tma on right foot. Diffuse xerosis noted. Area of fluctuance noted on right medial side. Wound measuring 2.5cm x2.4cm x.3cm noted on plantar distal aspect of right foot. No purulent drainage no crepitus noted.     Left Foot/Ankle      Neurovascular  Dorsalis pedis: 1+  Posterior tibial: 1+  Saphenous nerve sensation: diminished  Tibial nerve sensation: diminished  Superficial peroneal nerve sensation: diminished  Deep peroneal nerve sensation: diminished  Sural nerve sensation: diminished    Comments  Left foot: Patient has a wound on the distal plantar aspect of left great toe measuring. 2.5 cm x 2cm x .5. Wound is granular in nature. Wound probes to bone. No purulent drainage noted. No crepitus or fluctuance noted.   Left great toe plantar    Right foot plantar    Left foot plantar        Laboratory:  A1C:   Recent Labs   Lab 11/22/22  1928   HGBA1C 9.1*     Blood Cultures:   Recent Labs   Lab 11/26/22  0951   LABBLOO No Growth to date     CBC:   Recent Labs   Lab 11/26/22  0326   WBC 7.93   RBC 4.34*   HGB 11.7*   HCT 37.7*   *   MCV 87   MCH 27.0   MCHC 31.0*     CMP:   Recent Labs   Lab 11/23/22  0540 11/24/22  0502 11/26/22  0326   * 181* 187*    CALCIUM 8.6* 8.7 9.2   ALBUMIN 2.8* 2.7*  --    PROT 7.6  --   --     135* 133*   K 3.4* 3.8 4.1   CO2 28 25 28    103 100   BUN 9 9 12   CREATININE 1.0 1.1 1.1   ALKPHOS 62  --   --    ALT 17  --   --    AST 14  --   --    BILITOT 0.6  --   --      CRP:   Recent Labs   Lab 11/22/22 1928   .3*     ESR:   Recent Labs   Lab 11/22/22 1928   SEDRATE 79*     Wound Cultures:   Recent Labs   Lab 06/16/22  0050 06/17/22  0601   LABAERO PROTEUS MIRABILIS  Many  *  METHICILLIN RESISTANT STAPHYLOCOCCUS AUREUS  Many  *  ACINETOBACTER LWOFFII GROUP  Few  *  PROVIDENCIA RETTGERI  Few  *  METHICILLIN RESISTANT STAPHYLOCOCCUS AUREUS  Moderate  * DIPHTHEROIDS  Few  *  ENTEROCOCCUS FAECALIS  Few  *  No growth     Microbiology Results (last 7 days)       Procedure Component Value Units Date/Time    Culture, Anaerobic [[id number]] Collected: 11/27/22 1419    Order Status: Sent Specimen: Bone from Toe, Left Foot Updated: 11/27/22 1429    Aerobic culture [[id number]] Collected: 11/27/22 1419    Order Status: Sent Specimen: Bone from Toe, Left Foot Updated: 11/27/22 1429    Gram stain [[id number]] Collected: 11/27/22 1419    Order Status: Sent Specimen: Bone from Toe, Left Foot Updated: 11/27/22 1429    Blood culture #1 **CANNOT BE ORDERED STAT** [[id number]]  (Abnormal) Collected: 11/22/22 1928    Order Status: Completed Specimen: Blood from Midline, Basilic, Right Updated: 11/27/22 1058     Blood Culture, Routine Gram stain jose bottle: Gram positive rods      Results called to and read back by: Viridiana Hernandez RN  11/26/2022  09:36      DIPHTHEROIDS    Blood culture [[id number]] Collected: 11/26/22 0951    Order Status: Completed Specimen: Blood Updated: 11/27/22 0715     Blood Culture, Routine No Growth to date    Blood culture #2 **CANNOT BE ORDERED STAT** [[id number]] Collected: 11/22/22 1900    Order Status: Completed Specimen: Blood from Midline, Basilic, Right Updated: 11/27/22 0612     Blood Culture, Routine  No Growth to date      No Growth to date      No Growth to date      No Growth to date      No Growth to date    Aerobic culture [[id number]]     Order Status: No result Specimen: Wound from Toe, Left Foot     Culture, Anaerobic [[id number]]     Order Status: No result Specimen: Wound from Toe, Left Foot           Specimen (24h ago, onward)       Start     Ordered    11/27/22 1412  Specimen to Pathology, Surgery Other  Once        Comments: Pre-op Diagnosis: Osteomyelitis  Number of specimens: 1Name of specimens: Left great toe distal phalanx bone biopsy     References:    Click here for ordering Quick Tip   Question Answer Comment   Procedure Type: Other    Which provider would you like to cc? USHA BAPTISTE    Release to patient Immediate        11/27/22 1412                        Assessment/Plan:     Diabetic ulcer of left great toe  - No emergent surgical intervention needed from podiatry at this time.   - bone biopsy obtained today, see procedure note  - Bone cultures pending  - Mri right foot ordered  - ABX management per primary/ID  - Left foot wounds dressed with iodosorb, 4x4 gauze, kerlix, and ace wraps loosely  - Right foot wounds dressed with hydrafera blue, kerlix, and ace wraps loosely.   - Podiatry will follow         Lloyd Potts DPM PGY-1  Podiatric Medicine & Surgery  [facility]  Secure Chat Preferred  Mobile: [phone]  Pager: [phone]

## 2022-11-28 NOTE — SUBJECTIVE & OBJECTIVE
Interval History:  Patient is awake and alert this morning.  No acute events overnight.  Right upper extremity swelling is improving.  S/p bone biopsy yesterday.      Review of Systems   Constitutional:  Negative for chills and fever.   HENT:  Negative for congestion and ear pain.    Eyes:  Negative for pain.   Respiratory:  Negative for shortness of breath.    Cardiovascular:  Negative for chest pain and palpitations.   Gastrointestinal:  Negative for abdominal pain.   Genitourinary:  Negative for difficulty urinating.   Musculoskeletal:  Negative for back pain.   Skin:  Positive for wound (LEs).   Neurological:  Negative for dizziness and headaches.   Psychiatric/Behavioral:  Negative for agitation and behavioral problems.    Objective:     Vital Signs (Most Recent):  Temp: 98.4 °F (36.9 °C) (11/28/22 0338)  Pulse: 84 (11/28/22 0338)  Resp: 17 (11/28/22 0542)  BP: (!) 143/84 (11/28/22 0338)  SpO2: (!) 92 % (11/28/22 0338)   Vital Signs (24h Range):  Temp:  [97.6 °F (36.4 °C)-98.4 °F (36.9 °C)] 98.4 °F (36.9 °C)  Pulse:  [76-84] 84  Resp:  [16-20] 17  SpO2:  [92 %-98 %] 92 %  BP: (143-166)/(77-84) 143/84     Weight: 122.5 kg (270 lb)  Body mass index is 34.67 kg/m².    Intake/Output Summary (Last 24 hours) at 11/28/2022 0856  Last data filed at 11/28/2022 0225  Gross per 24 hour   Intake 2021.96 ml   Output 2240 ml   Net -218.04 ml        Physical Exam  Vitals reviewed.   Constitutional:       General: He is not in acute distress.     Appearance: Normal appearance.   HENT:      Head: Normocephalic and atraumatic.      Right Ear: External ear normal.      Left Ear: External ear normal.      Nose: Nose normal.      Mouth/Throat:      Mouth: Mucous membranes are moist.      Pharynx: Oropharynx is clear.   Eyes:      General: No scleral icterus.     Conjunctiva/sclera: Conjunctivae normal.      Pupils: Pupils are equal, round, and reactive to light.   Cardiovascular:      Rate and Rhythm: Normal rate and regular  rhythm.   Pulmonary:      Effort: Pulmonary effort is normal.      Breath sounds: Normal breath sounds. No wheezing or rales.   Abdominal:      General: Bowel sounds are normal.      Tenderness: There is no abdominal tenderness.   Musculoskeletal:      Cervical back: Normal range of motion and neck supple.      Comments: Left toe wound covered by bandage.  Right foot covered by Kerlix bandage.  No visible lower extremity erythema.  Trace bilateral lower extremity edema.  Right upper extremity swollen, but improving.  Right radial pulse intact.  Left upper extremity PICC line in place.   Skin:     General: Skin is warm and dry.      Capillary Refill: Capillary refill takes less than 2 seconds.   Neurological:      General: No focal deficit present.      Mental Status: He is alert and oriented to person, place, and time.      Cranial Nerves: No cranial nerve deficit.   Psychiatric:         Mood and Affect: Mood normal.         Behavior: Behavior normal.       Significant Labs: All pertinent labs within the past 24 hours have been reviewed.  CBC:   Recent Labs   Lab 11/28/22 0413   WBC 9.08   HGB 11.6*   HCT 37.0*   *       CMP:   Recent Labs   Lab 11/28/22 0413   *   K 4.7   CL 98   CO2 27   *   BUN 18   CREATININE 1.3   CALCIUM 9.3   ALBUMIN 2.8*   ANIONGAP 7*         Significant Imaging: I have reviewed all pertinent imaging results/findings within the past 24 hours.

## 2022-11-28 NOTE — ASSESSMENT & PLAN NOTE
Poor foot care with Left great toe wound concerning for wet gangrene.  Left 1st MTP fluid collection on imaging.  The patient declines amputation this time and would prefer medical treatment over transmetatarsal amputation.  Patient was started on IV Vanc and Zosyn in ED and changed to IV Vanc and Cefepime on admission.  Podiatry performed bedside debridement on 11/25 and bone biopsy on 11/27/2022.  Wound cultures sent on 11/27/2022.  Blood cultures negative to date.      Plan:  Follow up with Podiatry recommendations  Consult ID  Continue IV Vanc/Cefepime for now - total antibiotic day #7  Follow up wound cultures

## 2022-11-28 NOTE — PROGRESS NOTES
[facility] Medicine  Progress Note    Patient Name: Dave Good  MRN: [id number]  Patient Class: IP- Inpatient   Admission Date: 11/22/2022  Length of Stay: 5 days  Attending Physician: Zeus Nation MD  Primary Care Provider: Reyna Jorge NP        Subjective:     Principal Problem:Diabetic wet gangrene of the foot        HPI:  DM2, diabetic neuropathy, COPD, HTN, depression, right foot transmetatarsal amputation and osteomyelitis who was initially admitted to [facility] from Dr. Flores's office for osteomyelitis evaluation.  Patient knows progressively worsening foul-smelling discharge from his left great toe for which he sought care with Dr. Flores.  Patient was evaluated [facility] by surgery.  He will require further inpatient evaluation for osteomyelitis.       Overview/Hospital Course:  Into the hospital from general surgery clinic for evaluation of left great toe osteomyelitis.  CT imaging showing fluid collection at the left 1st MTP.  Bedside debridement performed by Podiatry.  Bone biopsy was recommended.    During hospitalization, patient had midline placed vascular access issues.  Later his hospitalization is noted right upper extremity edema.  Ultrasound Doppler right upper extremity revealed DVT extending from the right basilic to the axillary vein.  Per discussion with Hematology/Oncology, initiated therapeutic anticoagulation.      Interval History:  Patient is awake and alert this morning.  No acute events overnight.  Right upper extremity swelling is improving.  S/p bone biopsy yesterday.      Review of Systems   Constitutional:  Negative for chills and fever.   HENT:  Negative for congestion and ear pain.    Eyes:  Negative for pain.   Respiratory:  Negative for shortness of breath.    Cardiovascular:  Negative for chest pain and palpitations.   Gastrointestinal:  Negative for abdominal pain.   Genitourinary:  Negative for difficulty urinating.   Musculoskeletal:  Negative for  back pain.   Skin:  Positive for wound (LEs).   Neurological:  Negative for dizziness and headaches.   Psychiatric/Behavioral:  Negative for agitation and behavioral problems.    Objective:     Vital Signs (Most Recent):  Temp: 98.4 °F (36.9 °C) (11/28/22 0338)  Pulse: 84 (11/28/22 0338)  Resp: 17 (11/28/22 0542)  BP: (!) 143/84 (11/28/22 0338)  SpO2: (!) 92 % (11/28/22 0338)   Vital Signs (24h Range):  Temp:  [97.6 °F (36.4 °C)-98.4 °F (36.9 °C)] 98.4 °F (36.9 °C)  Pulse:  [76-84] 84  Resp:  [16-20] 17  SpO2:  [92 %-98 %] 92 %  BP: (143-166)/(77-84) 143/84     Weight: 122.5 kg (270 lb)  Body mass index is 34.67 kg/m².    Intake/Output Summary (Last 24 hours) at 11/28/2022 0856  Last data filed at 11/28/2022 0225  Gross per 24 hour   Intake 2021.96 ml   Output 2240 ml   Net -218.04 ml        Physical Exam  Vitals reviewed.   Constitutional:       General: He is not in acute distress.     Appearance: Normal appearance.   HENT:      Head: Normocephalic and atraumatic.      Right Ear: External ear normal.      Left Ear: External ear normal.      Nose: Nose normal.      Mouth/Throat:      Mouth: Mucous membranes are moist.      Pharynx: Oropharynx is clear.   Eyes:      General: No scleral icterus.     Conjunctiva/sclera: Conjunctivae normal.      Pupils: Pupils are equal, round, and reactive to light.   Cardiovascular:      Rate and Rhythm: Normal rate and regular rhythm.   Pulmonary:      Effort: Pulmonary effort is normal.      Breath sounds: Normal breath sounds. No wheezing or rales.   Abdominal:      General: Bowel sounds are normal.      Tenderness: There is no abdominal tenderness.   Musculoskeletal:      Cervical back: Normal range of motion and neck supple.      Comments: Left toe wound covered by bandage.  Right foot covered by Kerlix bandage.  No visible lower extremity erythema.  Trace bilateral lower extremity edema.  Right upper extremity swollen, but improving.  Right radial pulse intact.  Left upper  extremity PICC line in place.   Skin:     General: Skin is warm and dry.      Capillary Refill: Capillary refill takes less than 2 seconds.   Neurological:      General: No focal deficit present.      Mental Status: He is alert and oriented to person, place, and time.      Cranial Nerves: No cranial nerve deficit.   Psychiatric:         Mood and Affect: Mood normal.         Behavior: Behavior normal.       Significant Labs: All pertinent labs within the past 24 hours have been reviewed.  CBC:   Recent Labs   Lab 11/28/22 0413   WBC 9.08   HGB 11.6*   HCT 37.0*   *       CMP:   Recent Labs   Lab 11/28/22 0413   *   K 4.7   CL 98   CO2 27   *   BUN 18   CREATININE 1.3   CALCIUM 9.3   ALBUMIN 2.8*   ANIONGAP 7*         Significant Imaging: I have reviewed all pertinent imaging results/findings within the past 24 hours.      Assessment/Plan:      * Diabetic wet gangrene of the foot  Poor foot care with Left great toe wound concerning for wet gangrene.  Left 1st MTP fluid collection on imaging.  The patient declines amputation this time and would prefer medical treatment over transmetatarsal amputation.  Patient was started on IV Vanc and Zosyn in ED and changed to IV Vanc and Cefepime on admission.  Podiatry performed bedside debridement on 11/25 and bone biopsy on 11/27/2022.  Wound cultures sent on 11/27/2022.  Blood cultures negative to date.      Plan:  Follow up with Podiatry recommendations  Consult ID  Continue IV Vanc/Cefepime for now - total antibiotic day #7  Follow up wound cultures      Acute deep vein thrombosis (DVT) of axillary vein of right upper extremity  Acute catheter associated DVT extending to the deep veins of the right upper extremity seen on ultrasound Doppler 11/26.  Patient had a right basilic midline catheter, which was initially placed due to poor vascular access.  He was not taking therapeutic anticoagulation at the time.  Case was discussed with Hematology/Oncology  (Dr. Salinas) who advised therapeutic anticoagulation given that the thrombus is extending to the axillary vein.  Left upper extremity PICC line placed 11/26.    Plan:  Resume Lovenox 1 milligram/kilogram Q 12 if ok by Podiatry   Remove RUE Midline        Diabetic ulcer of left great toe    Lab Results   Component Value Date    HGBA1C 9.1 (H) 11/22/2022     Most recent fingerstick glucose reviewed-   Recent Labs   Lab 11/26/22 2006 11/27/22  0746 11/27/22  1135   POCTGLUCOSE 205* 190* 209*     Current correctional scale    Antihyperglycemics (From admission, onward)    Start     Stop Route Frequency Ordered    11/27/22 2100  insulin detemir U-100 pen 30 Units         -- SubQ Nightly 11/27/22 1536    11/24/22 1330  insulin aspart U-100 pen 1-10 Units         -- SubQ Before meals & nightly PRN 11/24/22 1231        Hold Oral hypoglycemics while patient is in the hospital.    NOTE:  Patient usually takes 40 units basal insulin q.h.s.  75% of his typical basal dose has been ordered as he will be NPO after midnight in anticipation for bone biopsy Monday morning    COPD with asthma  Not in exacerbation  -DuoNebs p.r.n.    Class 1 obesity due to excess calories with serious comorbidity and body mass index (BMI) of 34.0 to 34.9 in adult  Body mass index is 34.67 kg/m². Morbid obesity complicates all aspects of disease management from diagnostic modalities to treatment. Weight loss encouraged and health benefits explained to patient.         Mixed hyperlipidemia  -Continue Statin      Depression  -Continue Seroquel        HTN (hypertension)  Home Meds:  Lisinopril 10mg and HCTZ 25mg  -Continue home meds  -Monitor and adjust as needed      Type 2 diabetes mellitus with diabetic peripheral angiopathy without gangrene, with long-term current use of insulin  Hemoglobin A1c 9.1% this admission  Home Meds:  Metformin 1g bid, Trulicity, Detemir 50 units, Aspart 10 unit TIDWM  Hospital Meds:  Detemir 30 units with moderate dose  SSI  -Continue to hold Metformin and Trulicity  -Continue current regimen  -Diabetic Diet  -Monitor and adjust as needed      VTE Risk Mitigation (From admission, onward)         Ordered     IP VTE HIGH RISK PATIENT  Once         11/22/22 1724     Place sequential compression device  Until discontinued         11/22/22 1724                Discharge Planning   LOIS:      Code Status: Full Code   Is the patient medically ready for discharge?:     Reason for patient still in hospital (select all that apply): Patient trending condition, Treatment and Consult recommendations  Discharge Plan A: Home                  Zeus Nation MD  Department of Hospital Medicine   [facility] ([place])

## 2022-11-28 NOTE — PT/OT/SLP EVAL
"Physical Therapy Evaluation    Patient Name:  Dave Good   MRN:  [id number]    Recommendations:     Discharge Recommendations:   (TBD pending patient ability to ambulate)   Discharge Equipment Recommendations:  (TBD pending patient progress)   Barriers to discharge: Decreased caregiver support    Assessment:     Dave Good is a 57 y.o. male admitted with a medical diagnosis of Diabetic wet gangrene of the foot.  He presents with the following impairments/functional limitations:  impaired endurance, impaired functional mobility, gait instability, decreased lower extremity function, pain .    Patient evaluated by PT and goals established. Patient lives alone and was previously independent with all functional mobility. Patient able to stand with CGA without the use of an AD. Patient reports that he does not feel weak or unstable but reports he is in too much pain to ambulate or attempt stairs today. Discharge recommendation TBD pending patient ability to ambulate. Discharge DME TBD pending patient ability to ambulate.  PT will continue to follow and progress as tolerated. Please see progress note for detailed plan of care and recommendations    Plan to ambulate and practice stairs at next session.     Rehab Prognosis: Good; patient would benefit from acute skilled PT services to address these deficits and reach maximum level of function.    Recent Surgery: * No surgery found *      Plan:     During this hospitalization, patient to be seen 3 x/week to address the identified rehab impairments via gait training, therapeutic activities and progress toward the following goals:    Plan of Care Expires:  12/28/22    Subjective     Chief Complaint: "I am hurting and I just don't think I will be able to walk with you"  Patient/Family Comments/goals: Patient agrees to participate in Pt intervention.   Pain/Comfort:  Pain Rating 1: 10/10  Location - Side 1: Bilateral  Location 1: foot  Pain Addressed 1: Distraction, Cessation " of Activity, Reposition    Patients cultural, spiritual, Mormonism conflicts given the current situation: no    Living Environment:  Patient lives alone in apartment complex with two flights of stairs to enter home.   Prior to admission, patients level of function was independent.  Equipment used at home: none (TBD pending patient progress').  DME owned (not currently used): none.  Upon discharge, patient will have assistance from none.    Objective:     Communicated with nursing prior to session.  Patient found supine with peripheral IV  upon PT entry to room.    General Precautions: Standard, fall   Orthopedic Precautions:N/A   Braces: N/A (bilateral DARCO shoes)  Respiratory Status: Nasal cannula, flow 3 L/min    Exams:  Cognition:   Patient is oriented to person, place, time and situation .  Pt follows approximately 100% of verbal commands.    Mood: Pleasant and cooperative.   Safety Awareness: good  Musculoskeletal:  BMI: 34  Posture:  slouched shoulders, forward head posture   LE ROM/Strength:   R ROM: WNL  L ROM: WNK  R Strength:   Knee extension: 5/5  Dorsiflexion: 5/5   L Strength:   Knee extension: 5/5  Dorsiflexion: 5/5   Neuromuscular:  Sensation: Intact to light touch bilateral LEs.   Tone/Reflexes: No impairments identified with functional mobility. No formal testing performed.   Balance:   Static sitting: good  Static standing: good  Dynamic standing: good  Visual-vestibular: No impairments identified with functional mobility. No formal testing performed.  Integument: Visible skin intact  Cardiopulmonary:  Edema: mild edema noted in BLE     Functional Mobility:  Bed Mobility:     Supine to Sit: stand by assistance  Transfers:     Sit to Stand:  stand by assistance with no AD      AM-PAC 6 CLICK MOBILITY  Total Score:20       Treatment & Education:  Patient educated on POC, purpose of PT and discharge planning.     Standing tolerance: patient stood at EOB with SBA with no AD. Patient able to march in  place with SBA for 2 minutes.      Patient left sitting edge of bed with all lines intact and call button in reach.    GOALS:   Multidisciplinary Problems       Physical Therapy Goals          Problem: Physical Therapy    Goal Priority Disciplines Outcome Goal Variances Interventions   Physical Therapy Goal     PT, PT/OT Ongoing, Progressing     Description: Goals to be met by: 2022    Patient will increase functional independence with mobility by performin. Sit<>stand with supervision with LRAD.  2. Gait x 120 feet with SBA with LRAD.  3. Ascend/descend 1 flight of step(s) with least restrictive assistive device and SBA.                           History:     Past Medical History:   Diagnosis Date    COPD (chronic obstructive pulmonary disease)     COVID-19     Depression     Diabetes mellitus     Diabetes mellitus, type 2     Hypertension        Past Surgical History:   Procedure Laterality Date    DEBRIDEMENT OF LOWER EXTREMITY Bilateral 3/2/2022    Procedure: DEBRIDEMENT, LOWER EXTREMITY;  Surgeon: Jesus Flores Jr., MD;  Location: [facility];  Service: General;  Laterality: Bilateral;    FOOT AMPUTATION THROUGH METATARSAL Right 2021    Procedure: AMPUTATION, FOOT, TRANSMETATARSAL right 1st ray resection;  Surgeon: Samuel Toussaint DPM;  Location: [facility];  Service: Podiatry;  Laterality: Right;    INCISION AND DRAINAGE FOOT Bilateral 2021    Procedure: INCISION AND DRAINAGE, FOOT - Bilateral;  Surgeon: Lavonne Vigil DPM;  Location: [facility];  Service: Podiatry;  Laterality: Bilateral;    REPLACEMENT OF WOUND DRESSING Right 2022    Procedure: REPLACEMENT, DRESSING, WOUND;  Surgeon: Jesus Flores Jr., MD;  Location: [facility];  Service: Vascular;  Laterality: Right;  wound vac dressing changed    WOUND DEBRIDEMENT Right 2022    Procedure: DEBRIDEMENT, WOUND - RIGHT FOOT;  Surgeon: Jesus Flores Jr., MD;  Location: [facility];  Service: Vascular;  Laterality: Right;    WOUND DEBRIDEMENT  Bilateral 2/24/2022    Procedure: DEBRIDEMENT, WOUND / BILATERAL DEBRIDEMENT FEET;  Surgeon: Jesus Flores Jr., MD;  [facility]: [facility] OR;  Service: Vascular;  Laterality: Bilateral;    WOUND DEBRIDEMENT Right 5/27/2022    Procedure: DEBRIDEMENT, WOUND;  Surgeon: Jesus Flores Jr., MD;  Location: [facility];  Service: General;  Laterality: Right;       Time Tracking:     PT Received On: 11/28/22  PT Start Time: 1502     PT Stop Time: 1516  PT Total Time (min): 14 min     Billable Minutes: Evaluation 14      11/28/2022

## 2022-11-28 NOTE — ASSESSMENT & PLAN NOTE
Acute catheter associated DVT extending to the deep veins of the right upper extremity seen on ultrasound Doppler 11/26.  Patient had a right basilic midline catheter, which was initially placed due to poor vascular access.  He was not taking therapeutic anticoagulation at the time.  Case was discussed with Hematology/Oncology (Dr. Salinas) who advised therapeutic anticoagulation given that the thrombus is extending to the axillary vein.  Left upper extremity PICC line placed 11/26.    Plan:  Resume Lovenox 1 milligram/kilogram Q 12 if ok by Podiatry   Remove RUE Midline

## 2022-11-28 NOTE — CONSULTS
[facility] ([place])  Wound Care    Patient Name:  Dave Good   MRN:  [id number]  Date: 11/28/2022  Diagnosis: Diabetic wet gangrene of the foot    History:     Past Medical History:   Diagnosis Date    COPD (chronic obstructive pulmonary disease)     COVID-19     Depression     Diabetes mellitus     Diabetes mellitus, type 2     Hypertension        Social History     Socioeconomic History    Marital status: Single   Tobacco Use    Smoking status: Former    Smokeless tobacco: Never   Substance and Sexual Activity    Alcohol use: Yes     Alcohol/week: 3.0 standard drinks     Types: 3 Drinks containing 0.5 oz of alcohol per week     Comment: whiskey and beer every other day    Drug use: Not Currently     Types: Marijuana    Sexual activity: Yes     Partners: Female     Social Determinants of Health     Financial Resource Strain: Low Risk     Difficulty of Paying Living Expenses: Not very hard   Food Insecurity: Food Insecurity Present    Worried About Running Out of Food in the Last Year: Often true    Ran Out of Food in the Last Year: Often true   Transportation Needs: No Transportation Needs    Lack of Transportation (Medical): No    Lack of Transportation (Non-Medical): No   Physical Activity: Inactive    Days of Exercise per Week: 0 days    Minutes of Exercise per Session: 0 min   Stress: No Stress Concern Present    Feeling of Stress : Not at all   Social Connections: Moderately Integrated    Frequency of Communication with Friends and Family: Twice a week    Frequency of Social Gatherings with Friends and Family: Twice a week    Attends Congregational Services: 1 to 4 times per year    Active Member of Clubs or Organizations: No    Attends Club or Organization Meetings: Never    Marital Status: Living with partner   Housing Stability: Unknown    Unable to Pay for Housing in the Last Year: No    Unstable Housing in the Last Year: No       Precautions:     Allergies as of 11/22/2022 - Reviewed 11/22/2022  "  Allergen Reaction Noted    Ibuprofen Swelling 07/29/2014       [facility] Assessment Details/Treatment     Wound care consult received for assessment of left great toe. Patient is a 57 year old male admitted for diabetic wet gangrene of foot. He is currently s/p wound debridement by podiatry. Podiatry is currently following patient. Change dressings orders placed by podiatry for nursing staff to complete:     1) Irrigate the wound(s) [LEFT great toe & RIGHT plantar foot] with Vashe, sterile normal saline or wound cleanser. Pat dry well.   2)  Apply a thin layer of Iodosorb to the wound(s), or Medi-Honey if Iodosorb is not available.  Cover with 4x4" gauze and 4x4 plain foam (ex. Balbir).   3)  Secure with 4" Kerlix and 4" ACE bandage.   4)  Change daily.   5)  Darco shoe bilateral    Wound care to sign off. Please notify podiatry for any questions or concerns regarding patient's wounds and/or wound care.     11/28/2022    "

## 2022-11-28 NOTE — CONSULTS
It appears bone biopsy was done by podiatry as was appropriate.  IR does not typically perform bone biopsy of open wounds for osteo.

## 2022-11-28 NOTE — ASSESSMENT & PLAN NOTE
Hemoglobin A1c 9.1% this admission  Home Meds:  Metformin 1g bid, Trulicity, Detemir 50 units, Aspart 10 unit TIDWM  Hospital Meds:  Detemir 30 units with moderate dose SSI  -Continue to hold Metformin and Trulicity  -Continue current regimen  -Diabetic Diet  -Monitor and adjust as needed

## 2022-11-28 NOTE — PLAN OF CARE
Pt's insurance is not in network with [facility] infusion.    Additional referral sent to [facility] who is in network with pt's insurance.

## 2022-11-28 NOTE — CONSULTS
Consulting Physician:Consult Note  Infectious Disease    Consult Requested By: Zeus Nation MD    Reason for Consult: management recommendations      IMPRESSION:  58 yo male with DM, PAD, known osteomyelitis of left foot. Bone biopsy performed 11/27. Cultures pending.  Long standing issues with both feet - wound care follow up established. Afebrile, pain at site of bone biopsy, but otherwise asymptomatic.    PLAN/RECOMMENDATIONS:    PICC placed. Recommend 6 weeks of IV daptomycin and ertapenem - antibiotics will be adjusted as outpatient.  Cancel MRI - no need for MRI after bone biopsy.  OK to discharge from ID standpoint. May change to oral antibiotics after culture results return.    Thank you for asking me to see this patient. Please call me for any questions.      Gomez Manrique M.D.        SUBJECTIVE:     History of Present Illness:  58 yo male with DM, HTN, PAD, history of foot ulcers, last seen by ID for left foot ulcer in June 2022.   Admitted on 11/22 with left foot ulcer. Started vancomycin and zosyn, then changed to cefepime.  Afebrile for entire admission, WBC improved quickly.  PICC placed 11/26.  Wound probes to bone, bone biopsy performed 11/27.    Past Medical History:  Past Medical History:   Diagnosis Date    COPD (chronic obstructive pulmonary disease)     COVID-19     Depression     Diabetes mellitus     Diabetes mellitus, type 2     Hypertension        Past Surgical History:  Past Surgical History:   Procedure Laterality Date    DEBRIDEMENT OF LOWER EXTREMITY Bilateral 3/2/2022    Procedure: DEBRIDEMENT, LOWER EXTREMITY;  Surgeon: Jesus Flores Jr., MD;  Location: [facility];  Service: General;  Laterality: Bilateral;    FOOT AMPUTATION THROUGH METATARSAL Right 9/23/2021    Procedure: AMPUTATION, FOOT, TRANSMETATARSAL right 1st ray resection;  Surgeon: Samuel Toussaint DPM;  Location: [facility];  Service: Podiatry;  Laterality: Right;    INCISION AND DRAINAGE FOOT Bilateral 9/21/2021     Procedure: INCISION AND DRAINAGE, FOOT - Bilateral;  Surgeon: Lavonne Vigil DPM;  Location: [facility];  Service: Podiatry;  Laterality: Bilateral;    REPLACEMENT OF WOUND DRESSING Right 2/24/2022    Procedure: REPLACEMENT, DRESSING, WOUND;  Surgeon: Jesus Flores Jr., MD;  Location: [facility];  Service: Vascular;  Laterality: Right;  wound vac dressing changed    WOUND DEBRIDEMENT Right 2/22/2022    Procedure: DEBRIDEMENT, WOUND - RIGHT FOOT;  Surgeon: Jesus Flores Jr., MD;  Location: [facility];  Service: Vascular;  Laterality: Right;    WOUND DEBRIDEMENT Bilateral 2/24/2022    Procedure: DEBRIDEMENT, WOUND / BILATERAL DEBRIDEMENT FEET;  Surgeon: Jesus Flores Jr., MD;  Location: [facility];  Service: Vascular;  Laterality: Bilateral;    WOUND DEBRIDEMENT Right 5/27/2022    Procedure: DEBRIDEMENT, WOUND;  Surgeon: Jesus Flores Jr., MD;  Location: [facility];  Service: General;  Laterality: Right;       Family History:  No family history on file.    Social History:  Social History     Tobacco Use    Smoking status: Former    Smokeless tobacco: Never   Substance Use Topics    Alcohol use: Yes     Alcohol/week: 3.0 standard drinks     Types: 3 Drinks containing 0.5 oz of alcohol per week     Comment: whiskey and beer every other day    Drug use: Not Currently     Types: Marijuana       Allergies:  Review of patient's allergies indicates:   Allergen Reactions    Ibuprofen Swelling     Facial swelling        Pertinent Medications:  Antibiotics (From admission, onward)      Start     Stop Route Frequency Ordered    11/24/22 2300  vancomycin 1.5 g in dextrose 5 % 250 mL IVPB (ready to mix)         -- IV Every 12 hours (non-standard times) 11/24/22 1119    11/23/22 2100  mupirocin 2 % ointment         11/28 2059 Nasl 2 times daily 11/23/22 1502    11/23/22 1615  cefepime in dextrose 5 % IVPB 2 g         -- IV Every 8 hours (non-standard times) 11/23/22 1501    11/22/22 1709  vancomycin - pharmacy to dose  (vancomycin IVPB)        See  Hyperspace for full Linked Orders Report.    -- IV pharmacy to manage frequency 11/22/22 1610              Review of Symptoms:  Constitutional: Denies fevers, weight loss, chills, or weakness.  Eyes: Denies changes in vision.  ENT: Denies dysphagia, nasal discharge, ear pain or discharge.  Cardiovascular: Denies chest pain, palpitations, orthopnea, or claudication.  Respiratory: Denies shortness of breath, cough, hemoptysis, or wheezing.  GI: Denies nausea/vomiting, hematochezia, melena, abd pain, or changes in appetite.  : Denies dysuria, incontinence, or hematuria.  Musculoskeletal: Denies joint pain or myalgias.  Skin/breast: Denies rashes, lumps, lesions, or discharge.  Neurologic: Denies headache, dizziness, vertigo, or paresthesias.  Psychiatric: Denies changes in mood or hallucinations.  Endocrine: Denies polyuria, polydipsia, heat/cold intolerance.  Hematologic/Lymph: Denies lymphadenopathy, easy bruising or easy bleeding.  Allergic/Immunologic: Denies rash, rhinitis.     OBJECTIVE:     Vital Signs (Most Recent)  Temp: 98 °F (36.7 °C) (11/28/22 0924)  Pulse: 74 (11/28/22 0924)  Resp: 16 (11/28/22 0924)  BP: (!) 140/68 (11/28/22 0924)  SpO2: (!) 93 % (11/28/22 0924)    Temperature Range Min/Max (Last 24H):  Temp:  [97.6 °F (36.4 °C)-98.4 °F (36.9 °C)]     Physical Exam:  Physical Exam  Vitals reviewed.   Constitutional:       Appearance: Normal appearance.   HENT:      Head: Normocephalic and atraumatic.   Eyes:      Extraocular Movements: Extraocular movements intact.      Conjunctiva/sclera: Conjunctivae normal.      Pupils: Pupils are equal, round, and reactive to light.   Cardiovascular:      Rate and Rhythm: Normal rate and regular rhythm.      Pulses: Normal pulses.      Heart sounds: Normal heart sounds.   Pulmonary:      Effort: Pulmonary effort is normal.      Breath sounds: Normal breath sounds.   Abdominal:      General: Abdomen is flat. Bowel sounds are normal. There is no distension.       Tenderness: There is no abdominal tenderness.   Musculoskeletal:         General: Normal range of motion.      Right lower leg: No edema.      Left lower leg: No edema.      Comments: Left arm PICC without erythema or tenderness.   Neurological:      Mental Status: He is alert.         Laboratory:  CBC:   Lab Results   Component Value Date    WBC 9.08 11/28/2022    HGB 11.6 (L) 11/28/2022    HCT 37.0 (L) 11/28/2022    MCV 86 11/28/2022     (H) 11/28/2022       BMP:   Recent Labs   Lab 11/28/22  0413   *   *   K 4.7   CL 98   CO2 27   BUN 18   CREATININE 1.3   CALCIUM 9.3       LFT:   Lab Results   Component Value Date    ALT 17 11/23/2022    AST 14 11/23/2022    ALKPHOS 62 11/23/2022    BILITOT 0.6 11/23/2022       Microbiology x 7d:   Microbiology Results (last 7 days)       Procedure Component Value Units Date/Time    Blood culture [[id number]] Collected: 11/26/22 0951    Order Status: Completed Specimen: Blood Updated: 11/28/22 0613     Blood Culture, Routine No Growth to date      No Growth to date    Blood culture #2 **CANNOT BE ORDERED STAT** [[id number]] Collected: 11/22/22 1900    Order Status: Completed Specimen: Blood from Midline, Basilic, Right Updated: 11/28/22 0612     Blood Culture, Routine No growth after 5 days.    Gram stain [[id number]] Collected: 11/27/22 1419    Order Status: Completed Specimen: Bone from Toe, Left Foot Updated: 11/28/22 0013     Gram Stain Result Rare WBC's      No organisms seen    Narrative:      Left great toe distal phalanx    Aerobic culture [[id number]] Collected: 11/27/22 1419    Order Status: Sent Specimen: Bone from Toe, Left Foot Updated: 11/27/22 2313    Culture, Anaerobic [[id number]] Collected: 11/27/22 1419    Order Status: Sent Specimen: Bone from Toe, Left Foot Updated: 11/27/22 2313    Blood culture #1 **CANNOT BE ORDERED STAT** [[id number]]  (Abnormal) Collected: 11/22/22 1928    Order Status: Completed Specimen: Blood from Midline, Basilic,  Right Updated: 11/27/22 1058     Blood Culture, Routine Gram stain jose bottle: Gram positive rods      Results called to and read back by: Viridiana Hernandez RN  11/26/2022  09:36      DIPHTHEROIDS    Aerobic culture [[id number]]     Order Status: No result Specimen: Wound from Toe, Left Foot     Culture, Anaerobic [[id number]]     Order Status: No result Specimen: Wound from Toe, Left Foot                 Diagnostic Results:  CXR - NAD, PICC in place  CT left foot - Osteomyelitis involving the tuft of the great toe.  No gas.  Dorsal ulceration or soft tissue toe.    ASSESSMENT/PLAN:     Active Hospital Problems    Diagnosis  POA    *Diabetic wet gangrene of the foot [E11.52]  Yes    Acute deep vein thrombosis (DVT) of axillary vein of right upper extremity [I82.A11]  No    COPD with asthma [J44.9]  Yes    Type 2 diabetes mellitus with diabetic peripheral angiopathy without gangrene, with long-term current use of insulin [E11.51, Z79.4]  Not Applicable    HTN (hypertension) [I10]  Yes    Mixed hyperlipidemia [E78.2]  Yes    Class 1 obesity due to excess calories with serious comorbidity and body mass index (BMI) of 34.0 to 34.9 in adult [E66.09, Z68.34]  Not Applicable    Depression [F32.A]  Yes      Resolved Hospital Problems   No resolved problems to display.       Plan: Please see top of page

## 2022-11-28 NOTE — PLAN OF CARE
Problem: Physical Therapy  Goal: Physical Therapy Goal  Description: Goals to be met by: 2022    Patient will increase functional independence with mobility by performin. Sit<>stand with supervision with LRAD.  2. Gait x 120 feet with SBA with LRAD.  3. Ascend/descend 1 flight of step(s) with least restrictive assistive device and SBA.      Outcome: Ongoing, Progressing     Patient evaluated by PT and goals established. Patient lives alone and was previously independent with all functional mobility. Patient able to stand with CGA without the use of an AD. Patient reports that he does not feel weak or unstable but reports he is in too much pain to ambulate or attempt stairs today. Discharge recommendation TBD pending patient ability to ambulate. Discharge DME TBD pending patient ability to ambulate.  PT will continue to follow and progress as tolerated. Please see progress note for detailed plan of care and recommendations.

## 2022-11-28 NOTE — ASSESSMENT & PLAN NOTE
- No emergent surgical intervention needed from podiatry at this time.   - bone biopsy obtained today, see procedure note  - Bone cultures pending  - Mri right foot ordered  - ABX management per primary/ID  - Wounds dressed with iodosorb, 4x4 gauze, cast padding, and coban  - Podiatry will follow

## 2022-11-29 LAB
ANION GAP SERPL CALC-SCNC: 7 MMOL/L (ref 8–16)
BASOPHILS # BLD AUTO: 0.04 K/UL (ref 0–0.2)
BASOPHILS NFR BLD: 0.4 % (ref 0–1.9)
BUN SERPL-MCNC: 19 MG/DL (ref 6–20)
CALCIUM SERPL-MCNC: 9.4 MG/DL (ref 8.7–10.5)
CHLORIDE SERPL-SCNC: 98 MMOL/L (ref 95–110)
CO2 SERPL-SCNC: 28 MMOL/L (ref 23–29)
CREAT SERPL-MCNC: 1.2 MG/DL (ref 0.5–1.4)
DIFFERENTIAL METHOD: ABNORMAL
EOSINOPHIL # BLD AUTO: 0.4 K/UL (ref 0–0.5)
EOSINOPHIL NFR BLD: 4.4 % (ref 0–8)
ERYTHROCYTE [DISTWIDTH] IN BLOOD BY AUTOMATED COUNT: 14.4 % (ref 11.5–14.5)
EST. GFR  (NO RACE VARIABLE): >60 ML/MIN/1.73 M^2
FINAL PATHOLOGIC DIAGNOSIS: NORMAL
GLUCOSE SERPL-MCNC: 211 MG/DL (ref 70–110)
GROSS: NORMAL
HCT VFR BLD AUTO: 37.8 % (ref 40–54)
HGB BLD-MCNC: 11.7 G/DL (ref 14–18)
IMM GRANULOCYTES # BLD AUTO: 0.05 K/UL (ref 0–0.04)
IMM GRANULOCYTES NFR BLD AUTO: 0.5 % (ref 0–0.5)
LYMPHOCYTES # BLD AUTO: 3.1 K/UL (ref 1–4.8)
LYMPHOCYTES NFR BLD: 33.7 % (ref 18–48)
Lab: NORMAL
MCH RBC QN AUTO: 27 PG (ref 27–31)
MCHC RBC AUTO-ENTMCNC: 31 G/DL (ref 32–36)
MCV RBC AUTO: 87 FL (ref 82–98)
MONOCYTES # BLD AUTO: 1.1 K/UL (ref 0.3–1)
MONOCYTES NFR BLD: 12.4 % (ref 4–15)
NEUTROPHILS # BLD AUTO: 4.5 K/UL (ref 1.8–7.7)
NEUTROPHILS NFR BLD: 48.6 % (ref 38–73)
NRBC BLD-RTO: 0 /100 WBC
PLATELET # BLD AUTO: 460 K/UL (ref 150–450)
PMV BLD AUTO: 9.3 FL (ref 9.2–12.9)
POCT GLUCOSE: 188 MG/DL (ref 70–110)
POCT GLUCOSE: 197 MG/DL (ref 70–110)
POCT GLUCOSE: 240 MG/DL (ref 70–110)
POCT GLUCOSE: 244 MG/DL (ref 70–110)
POTASSIUM SERPL-SCNC: 4.5 MMOL/L (ref 3.5–5.1)
RBC # BLD AUTO: 4.33 M/UL (ref 4.6–6.2)
SODIUM SERPL-SCNC: 133 MMOL/L (ref 136–145)
WBC # BLD AUTO: 9.19 K/UL (ref 3.9–12.7)

## 2022-11-29 PROCEDURE — 99900035 HC TECH TIME PER 15 MIN (STAT)

## 2022-11-29 PROCEDURE — 99233 SBSQ HOSP IP/OBS HIGH 50: CPT | Mod: ,,, | Performed by: INTERNAL MEDICINE

## 2022-11-29 PROCEDURE — 27000221 HC OXYGEN, UP TO 24 HOURS

## 2022-11-29 PROCEDURE — 27000207 HC ISOLATION

## 2022-11-29 PROCEDURE — 99233 PR SUBSEQUENT HOSPITAL CARE,LEVL III: ICD-10-PCS | Mod: ,,, | Performed by: INTERNAL MEDICINE

## 2022-11-29 PROCEDURE — 25000003 PHARM REV CODE 250: Performed by: INTERNAL MEDICINE

## 2022-11-29 PROCEDURE — 94761 N-INVAS EAR/PLS OXIMETRY MLT: CPT

## 2022-11-29 PROCEDURE — 63600175 PHARM REV CODE 636 W HCPCS: Performed by: STUDENT IN AN ORGANIZED HEALTH CARE EDUCATION/TRAINING PROGRAM

## 2022-11-29 PROCEDURE — 80048 BASIC METABOLIC PNL TOTAL CA: CPT | Performed by: INTERNAL MEDICINE

## 2022-11-29 PROCEDURE — A4216 STERILE WATER/SALINE, 10 ML: HCPCS | Performed by: STUDENT IN AN ORGANIZED HEALTH CARE EDUCATION/TRAINING PROGRAM

## 2022-11-29 PROCEDURE — 63600175 PHARM REV CODE 636 W HCPCS: Performed by: INTERNAL MEDICINE

## 2022-11-29 PROCEDURE — 36415 COLL VENOUS BLD VENIPUNCTURE: CPT | Performed by: INTERNAL MEDICINE

## 2022-11-29 PROCEDURE — 25000003 PHARM REV CODE 250: Performed by: NURSE PRACTITIONER

## 2022-11-29 PROCEDURE — 85025 COMPLETE CBC W/AUTO DIFF WBC: CPT | Performed by: INTERNAL MEDICINE

## 2022-11-29 PROCEDURE — 63600175 PHARM REV CODE 636 W HCPCS: Performed by: NURSE PRACTITIONER

## 2022-11-29 PROCEDURE — 11000001 HC ACUTE MED/SURG PRIVATE ROOM

## 2022-11-29 PROCEDURE — 25000003 PHARM REV CODE 250: Performed by: STUDENT IN AN ORGANIZED HEALTH CARE EDUCATION/TRAINING PROGRAM

## 2022-11-29 RX ADMIN — ATORVASTATIN CALCIUM 40 MG: 20 TABLET, FILM COATED ORAL at 10:11

## 2022-11-29 RX ADMIN — SODIUM CHLORIDE, PRESERVATIVE FREE 10 ML: 5 INJECTION INTRAVENOUS at 11:11

## 2022-11-29 RX ADMIN — ENOXAPARIN SODIUM 120 MG: 100 INJECTION SUBCUTANEOUS at 11:11

## 2022-11-29 RX ADMIN — HYDROCODONE BITARTRATE AND ACETAMINOPHEN 1 TABLET: 5; 325 TABLET ORAL at 04:11

## 2022-11-29 RX ADMIN — ENOXAPARIN SODIUM 120 MG: 100 INJECTION SUBCUTANEOUS at 12:11

## 2022-11-29 RX ADMIN — HYDROCHLOROTHIAZIDE 25 MG: 25 TABLET ORAL at 10:11

## 2022-11-29 RX ADMIN — SODIUM CHLORIDE, PRESERVATIVE FREE 10 ML: 5 INJECTION INTRAVENOUS at 05:11

## 2022-11-29 RX ADMIN — INSULIN DETEMIR 35 UNITS: 100 INJECTION, SOLUTION SUBCUTANEOUS at 10:11

## 2022-11-29 RX ADMIN — VANCOMYCIN HYDROCHLORIDE 1500 MG: 1.5 INJECTION, POWDER, LYOPHILIZED, FOR SOLUTION INTRAVENOUS at 12:11

## 2022-11-29 RX ADMIN — QUETIAPINE FUMARATE 200 MG: 200 TABLET ORAL at 10:11

## 2022-11-29 RX ADMIN — INSULIN ASPART 1 UNITS: 100 INJECTION, SOLUTION INTRAVENOUS; SUBCUTANEOUS at 10:11

## 2022-11-29 RX ADMIN — VANCOMYCIN HYDROCHLORIDE 1500 MG: 1.5 INJECTION, POWDER, LYOPHILIZED, FOR SOLUTION INTRAVENOUS at 01:11

## 2022-11-29 RX ADMIN — HYDROCODONE BITARTRATE AND ACETAMINOPHEN 1 TABLET: 5; 325 TABLET ORAL at 10:11

## 2022-11-29 RX ADMIN — SODIUM CHLORIDE, PRESERVATIVE FREE 10 ML: 5 INJECTION INTRAVENOUS at 07:11

## 2022-11-29 RX ADMIN — CEFEPIME HYDROCHLORIDE 2 G: 2 INJECTION, SOLUTION INTRAVENOUS at 10:11

## 2022-11-29 RX ADMIN — ERTAPENEM 1 G: 1 INJECTION INTRAMUSCULAR; INTRAVENOUS at 12:11

## 2022-11-29 RX ADMIN — MORPHINE SULFATE 6 MG: 4 INJECTION, SOLUTION INTRAMUSCULAR; INTRAVENOUS at 07:11

## 2022-11-29 RX ADMIN — INSULIN ASPART 4 UNITS: 100 INJECTION, SOLUTION INTRAVENOUS; SUBCUTANEOUS at 12:11

## 2022-11-29 RX ADMIN — ASPIRIN 81 MG: 81 TABLET, COATED ORAL at 10:11

## 2022-11-29 RX ADMIN — LISINOPRIL 10 MG: 10 TABLET ORAL at 10:11

## 2022-11-29 RX ADMIN — HYDROCODONE BITARTRATE AND ACETAMINOPHEN 1 TABLET: 5; 325 TABLET ORAL at 03:11

## 2022-11-29 RX ADMIN — SODIUM CHLORIDE, PRESERVATIVE FREE 10 ML: 5 INJECTION INTRAVENOUS at 12:11

## 2022-11-29 RX ADMIN — ONDANSETRON 4 MG: 2 INJECTION INTRAMUSCULAR; INTRAVENOUS at 07:11

## 2022-11-29 NOTE — PROGRESS NOTES
Patient reviewed, renal function stable, cultures reviewed, no new levels, continue current therapy; Next levels due: 11/30 @0000

## 2022-11-29 NOTE — ASSESSMENT & PLAN NOTE
Hemoglobin A1c 9.1% this admission  Home Meds:  Metformin 1g bid, Trulicity, Detemir 50 units, Aspart 10 unit TIDWM  Hospital Meds:  Detemir 35 units with moderate dose SSI  -Continue to hold Metformin and Trulicity  -Continue current regimen  -Diabetic Diet  -Monitor and adjust as needed

## 2022-11-29 NOTE — PLAN OF CARE
Outpatient Antibiotic Therapy Plan:    Please send referral to [facility].    1) Infection: osteomyelitis of left hallux    2) Discharge Antibiotics:    Intravenous antibiotics:  Daptomycin 6mg/kg IV q 24 hours   Ertapenem 1 gm IV q 24 hours     3) Therapy Duration:  6 weeks    Estimated end date of IV antibiotics: 1/3/22    4) Outpatient Weekly Labs:    Order the following labs to be drawn on Mondays:   CBC  CMP   CRP  CPK     5) Fax Lab Results to Infectious Diseases Provider: Hilaria    [facility] Fax Number: [phone]    6) Outpatient Infectious Diseases Follow-up    Follow-up appointment will be arranged by the ID clinic and will be found in the patient's appointments tab.    Prior to discharge, please ensure the patient's follow-up has been scheduled.    If there is still no follow-up scheduled prior to discharge, please send an EPIC message to Xenia Stuart in Infectious Diseases.

## 2022-11-29 NOTE — SUBJECTIVE & OBJECTIVE
Interval History:  Patient is awake and alert this morning.  No acute events overnight.  Right upper extremity swelling is improving.  Likely will need Rehab - will benefit from placement also given need for IV antibiotics and wound care, as patient lives alone.    Review of Systems   Constitutional:  Negative for chills and fever.   HENT:  Negative for congestion and ear pain.    Eyes:  Negative for pain.   Respiratory:  Negative for shortness of breath.    Cardiovascular:  Negative for chest pain and palpitations.   Gastrointestinal:  Negative for abdominal pain.   Genitourinary:  Negative for difficulty urinating.   Musculoskeletal:  Negative for back pain.   Skin:  Positive for wound (LEs).   Neurological:  Negative for dizziness and headaches.   Psychiatric/Behavioral:  Negative for agitation and behavioral problems.    Objective:     Vital Signs (Most Recent):  Temp: 97.9 °F (36.6 °C) (11/29/22 1024)  Pulse: 75 (11/29/22 1024)  Resp: 16 (11/29/22 1024)  BP: (!) 176/81 (11/29/22 1024)  SpO2: 99 % (11/29/22 1024)   Vital Signs (24h Range):  Temp:  [97.5 °F (36.4 °C)-98.2 °F (36.8 °C)] 97.9 °F (36.6 °C)  Pulse:  [68-76] 75  Resp:  [16-20] 16  SpO2:  [90 %-99 %] 99 %  BP: (109-176)/(57-81) 176/81     Weight: 122.5 kg (270 lb)  Body mass index is 34.67 kg/m².    Intake/Output Summary (Last 24 hours) at 11/29/2022 1058  Last data filed at 11/29/2022 0526  Gross per 24 hour   Intake 750 ml   Output 2620 ml   Net -1870 ml        Physical Exam  Vitals reviewed.   Constitutional:       General: He is not in acute distress.     Appearance: Normal appearance.   HENT:      Head: Normocephalic and atraumatic.      Right Ear: External ear normal.      Left Ear: External ear normal.      Nose: Nose normal.      Mouth/Throat:      Mouth: Mucous membranes are moist.      Pharynx: Oropharynx is clear.   Eyes:      General: No scleral icterus.     Conjunctiva/sclera: Conjunctivae normal.      Pupils: Pupils are equal, round, and  reactive to light.   Cardiovascular:      Rate and Rhythm: Normal rate and regular rhythm.   Pulmonary:      Effort: Pulmonary effort is normal.      Breath sounds: Normal breath sounds. No wheezing or rales.   Abdominal:      General: Bowel sounds are normal.      Tenderness: There is no abdominal tenderness.   Musculoskeletal:      Cervical back: Normal range of motion and neck supple.      Comments: Left toe wound covered by bandage.  Right foot covered by Kerlix bandage.  No visible lower extremity erythema.  Trace bilateral lower extremity edema.  Right upper extremity swollen, but improving.  Right radial pulse intact.  Left upper extremity PICC line in place.   Skin:     General: Skin is warm and dry.      Capillary Refill: Capillary refill takes less than 2 seconds.   Neurological:      General: No focal deficit present.      Mental Status: He is alert and oriented to person, place, and time.      Cranial Nerves: No cranial nerve deficit.   Psychiatric:         Mood and Affect: Mood normal.         Behavior: Behavior normal.       Significant Labs: All pertinent labs within the past 24 hours have been reviewed.  CBC:   Recent Labs   Lab 11/28/22 0413 11/29/22  0543   WBC 9.08 9.19   HGB 11.6* 11.7*   HCT 37.0* 37.8*   * 460*       CMP:   Recent Labs   Lab 11/28/22 0413 11/29/22  0543   * 133*   K 4.7 4.5   CL 98 98   CO2 27 28   * 211*   BUN 18 19   CREATININE 1.3 1.2   CALCIUM 9.3 9.4   ALBUMIN 2.8*  --    ANIONGAP 7* 7*         Significant Imaging: I have reviewed all pertinent imaging results/findings within the past 24 hours.

## 2022-11-29 NOTE — ASSESSMENT & PLAN NOTE
Acute catheter associated DVT extending to the deep veins of the right upper extremity seen on ultrasound Doppler 11/26.  Patient had a right basilic midline catheter, which was initially placed due to poor vascular access.  He was not taking therapeutic anticoagulation at the time.  Case was discussed with Hematology/Oncology (Dr. Salinas) who advised therapeutic anticoagulation given that the thrombus is extending to the axillary vein.  Left upper extremity PICC line placed 11/26.  RUE midline d/c on 11/28/2022    Plan:  Continue with Lovenox 1 milligram/kilogram x 3 months

## 2022-11-29 NOTE — PROGRESS NOTES
Infectious Disease Follow up Note      Impression: 56 yo male with DM, osteomyelitis of left hallux. Bone biopsy shows no growth, gram stain negative. Awaiting pathology. Afebrile, no complaints.     Plan: Empiric daptomycin 6 mg/kg IV and ertapenem 1 gm IV Q24 for 6 weeks total. End of care = 1/3/22        Please call for any questions.    Thanks,  Gomez Del Realensing    Subjective and Interval History:  No complaints. Ready to go home.        Review of Symptoms:  Review of Systems   All other systems reviewed and are negative.        Objective:    Medications:  Antibiotics:   Antibiotics (From admission, onward)      Start     Stop Route Frequency Ordered    11/29/22 1215  ertapenem (INVANZ) 1 g in sodium chloride 0.9 % 100 mL IVPB (ready to mix system)         -- IV Every 24 hours (non-standard times) 11/29/22 1103    11/24/22 2300  vancomycin 1.5 g in dextrose 5 % 250 mL IVPB (ready to mix)         -- IV Every 12 hours (non-standard times) 11/24/22 1119    11/23/22 2100  mupirocin 2 % ointment         11/28 2059 Nasl 2 times daily 11/23/22 1502    11/22/22 1709  vancomycin - pharmacy to dose  (vancomycin IVPB)        See Hyperspace for full Linked Orders Report.    -- IV pharmacy to manage frequency 11/22/22 1610            Physical Exam:  VS (24h):   Vitals:    11/29/22 1126   BP: 128/74   Pulse: 72   Resp: 16   Temp: 97.8 °F (36.6 °C)     Temp:  [97.5 °F (36.4 °C)-98.2 °F (36.8 °C)]   Physical Exam  Vitals reviewed.   Constitutional:       Appearance: Normal appearance.   HENT:      Head: Normocephalic and atraumatic.   Eyes:      Extraocular Movements: Extraocular movements intact.      Conjunctiva/sclera: Conjunctivae normal.      Pupils: Pupils are equal, round, and reactive to light.   Cardiovascular:      Rate and Rhythm: Normal rate and regular rhythm.      Pulses: Normal pulses.      Heart sounds: Normal heart sounds.   Pulmonary:      Effort: Pulmonary effort is normal.      Breath sounds: Normal breath  sounds.   Abdominal:      General: Abdomen is flat. Bowel sounds are normal. There is no distension.      Tenderness: There is no abdominal tenderness.   Musculoskeletal:         General: Normal range of motion.      Right lower leg: No edema.      Left lower leg: No edema.      Comments: Left arm PICC without erythema or tenderness.   Neurological:      Mental Status: He is alert.       Bone culture - no growth.    Labs:  CBC:   Lab Results   Component Value Date    WBC 9.19 11/29/2022    WBC 9.08 11/28/2022    WBC 7.93 11/26/2022    WBC 11.99 11/23/2022    WBC 14.16 (H) 11/22/2022    HCT 37.8 (L) 11/29/2022     (H) 11/29/2022       BMP:   Recent Labs   Lab 11/29/22  0543   *   *   K 4.5   CL 98   CO2 28   BUN 19   CREATININE 1.2   CALCIUM 9.4       LFT:   Lab Results   Component Value Date    ALT 17 11/23/2022    AST 14 11/23/2022    ALKPHOS 62 11/23/2022    BILITOT 0.6 11/23/2022         Microbiology x 7d:   Microbiology Results (last 7 days)       Procedure Component Value Units Date/Time    Aerobic culture [[id number]] Collected: 11/27/22 1419    Order Status: Completed Specimen: Bone from Toe, Left Foot Updated: 11/29/22 0738     Aerobic Bacterial Culture No growth    Narrative:      Left great toe distal phalanx    Culture, Anaerobic [[id number]] Collected: 11/27/22 1419    Order Status: Completed Specimen: Bone from Toe, Left Foot Updated: 11/29/22 0714     Anaerobic Culture Culture in progress    Narrative:      Left great toe distal phalanx    Blood culture [[id number]] Collected: 11/26/22 0951    Order Status: Completed Specimen: Blood Updated: 11/29/22 0612     Blood Culture, Routine No Growth to date      No Growth to date      No Growth to date    Blood culture #1 **CANNOT BE ORDERED STAT** [[id number]]  (Abnormal) Collected: 11/22/22 1928    Order Status: Completed Specimen: Blood from Midline, Basilic, Right Updated: 11/28/22 1031     Blood Culture, Routine Gram stain jose bottle: Gram  positive rods      Results called to and read back by: Viridiana Hernandez RN  11/26/2022  09:36      DIPHTHEROIDS    Blood culture #2 **CANNOT BE ORDERED STAT** [[id number]] Collected: 11/22/22 1900    Order Status: Completed Specimen: Blood from Midline, Basilic, Right Updated: 11/28/22 0612     Blood Culture, Routine No growth after 5 days.    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         Plan:  See top of page.

## 2022-11-29 NOTE — RESEARCH
Sponsor: Dr. Rory Beltran M.D.    Study Title/IRB Number: A computer vision approach to prevention of injury from falling  IRB #: 2020.256    Principle Investigator: Rory Beltran M.D.    Present for Discussion: Yes/Patient only     Is LAR Consenting for Subject: No    Prior to the Informed Consent (IC) being signed, or any study protocol required data collection, testing, procedure, or intervention being performed, the following was done and/or discussed:  Patient was given a copy of the IC for review   Purpose of the study and qualifications to participate   Study design, follow up schedule, and tests or procedures done at each visit  Confidentiality and HIPAA Authorization for Release of Medical Records for the research trial/ subject's rights/research related injury  Risk, Benefits, Alternative Treatments, Compensation and Costs  Participation in the research trial is voluntary and patient may withdraw at anytime  Contact information for study related questions    Patient verbalizes understanding of the above: Yes  Contact information for CRC and PI given to patient: Yes  Patient able to adequately summarize: the purpose of the study, the risks associated with the study, and all procedures, testing, and follow-ups associated with the study: Yes    Patient signed the informed consent form for the A Computer Vision Approach to Prevent Injury From Falling research study with an IRB approval date of 07/09/2020.  Each page of the consent form was reviewed with patient and all questions answered satisfactorily. Patient signed the consent form and received a copy of same. The original consent was scanned into electronic medical records (EPIC) and filed into the subject's research study chart.    Mr. Good was able to complete the following upon entry of the study:    - Subject was outfitted with white and black checkered gown: Yes  - Subject understands that they must wear the white and black checkered  gown for the entirety of the 24 hour observation period: Yes   - Subject understands that visitors and staff will also be recorded while in the view of visual recording equipment: Yes  - Subject was able to read consent and understands that they'll only be visually recorded for 24 hours and not audio recorded: Yes  -Subject states that they understand that this is an investigational study and that the WOW (Workstation on Wheels) doesn't prevent or lower risks of falls or injuries, and that they should always follow their provider's orders and use their call bell to alert hospital staff before ambulating or becoming mobile: Yes    Dr. Rory Beltran M.D. has reviewed the following inclusion/ exclusion criteria and confirms that subject meets all Inclusion and no Exclusion criteria at this time:      Inclusion-   - Subject is currently admitted as an inpatient with acute care needs to [facility] and is at risk for falling.  - Subject is awake, alert, oriented and can speak and understand English without        difficulty.  - Subject can stand and ambulate with or without assistance.  - Subject must be literate.  - Subject must have a BMI that allows for proper fit of white and black      checkered gown.  - Subject must be able to provide informed consent.  - Subject will be admitted for > 24 hours.    Exclusion-  - Subject cannot read.  - Subject is immobile e.g. (paralyzed)  - Subject has BMI that prevents them from properly fitting in white and black checkered      gown.  - Subject has COVID-19 or other airborne infectious diseases.  - Subject is on reverse-isolation for immunosuppressive diseases.  - Subject has altered mental status or diagnosis of dementia.  - Subject is expected to be discharged in < 24 hours.    Pt AAO x 4, lying quietly supine in bed with HOB elevated X 75 degrees. Respirations even and unlabored without distress noted or complaints voiced. Pt is calm with a  pleasant affect. Patient safely placed in gown without incident. Pt reminded that WOW and video recording is not monitored and provides no emergency benefits or reduction or elimination from the risk of injury from falls; pt voiced understanding. Pt reminded to follow plan of care put forth by provider and to call RN for all assistance to reduce risk of problematic situations and potential injuries; pt voiced understanding. Recording started without incident. Advised pt that their participation is voluntary and if for any reasons they decide to cease the study that they only needed to inform their nurse and study session would be stopped; pt voiced understanding. Call bell within reach. Charge nurse notified of continuous video monitoring and of other study initiatives.

## 2022-11-29 NOTE — PLAN OF CARE
Met with patient at bedside to discuss discharge dispo - patient will need 6 weeks IV antibiotics, wound care, PT/OT - current therapy recs TBD as patient unable to safely ambulate - patient agrees care too much to handle at home but reports speaking with significant other who voiced desire for patient to return home - attempted to contact significant other but had to LVM - patient signed choice form agreeing to forward referrals to both LTAC & IRF - understands insurance auth will be needed once appropriate level of care determined & accepting facility secured - [facility]

## 2022-11-29 NOTE — ASSESSMENT & PLAN NOTE
Poor foot care with Left great toe wound concerning for wet gangrene.  Left 1st MTP fluid collection on imaging.  The patient declines amputation this time and would prefer medical treatment over transmetatarsal amputation.  Patient was started on IV Vanc and Zosyn in ED and changed to IV Vanc and Cefepime on admission.  Podiatry performed bedside debridement on 11/25 and bone biopsy on 11/27/2022.  Wound cultures sent on 11/27/2022 and pending.  Blood cultures negative to date.  Cefepime changed to Ertapenem today.    Plan:  Follow up with ID recommendations - likely 6 weeks of IV Vanc and Ertapenem  Continue IV Vanc and change Cefepime to Ertapenem today- total antibiotic day #8  Follow up wound cultures - no growth to date

## 2022-11-29 NOTE — PROGRESS NOTES
[facility] Medicine  Progress Note    Patient Name: Dave Good  MRN: [id number]  Patient Class: IP- Inpatient   Admission Date: 11/22/2022  Length of Stay: 6 days  Attending Physician: Zeus Nation MD  Primary Care Provider: Reyna Jorge NP        Subjective:     Principal Problem:Diabetic wet gangrene of the foot        HPI:  DM2, diabetic neuropathy, COPD, HTN, depression, right foot transmetatarsal amputation and osteomyelitis who was initially admitted to [facility] from Dr. Flores's office for osteomyelitis evaluation.  Patient knows progressively worsening foul-smelling discharge from his left great toe for which he sought care with Dr. Flores.  Patient was evaluated [facility] by surgery.  He will require further inpatient evaluation for osteomyelitis.       Overview/Hospital Course:  Into the hospital from general surgery clinic for evaluation of left great toe osteomyelitis.  CT imaging showing fluid collection at the left 1st MTP.  Bedside debridement performed by Podiatry.  Bone biopsy was recommended.    During hospitalization, patient had midline placed vascular access issues.  Later his hospitalization is noted right upper extremity edema.  Ultrasound Doppler right upper extremity revealed DVT extending from the right basilic to the axillary vein.  Per discussion with Hematology/Oncology, initiated therapeutic anticoagulation.      Interval History:  Patient is awake and alert this morning.  No acute events overnight.  Right upper extremity swelling is improving.  Likely will need Rehab - will benefit from placement also given need for IV antibiotics and wound care, as patient lives alone.    Review of Systems   Constitutional:  Negative for chills and fever.   HENT:  Negative for congestion and ear pain.    Eyes:  Negative for pain.   Respiratory:  Negative for shortness of breath.    Cardiovascular:  Negative for chest pain and palpitations.   Gastrointestinal:  Negative for  abdominal pain.   Genitourinary:  Negative for difficulty urinating.   Musculoskeletal:  Negative for back pain.   Skin:  Positive for wound (LEs).   Neurological:  Negative for dizziness and headaches.   Psychiatric/Behavioral:  Negative for agitation and behavioral problems.    Objective:     Vital Signs (Most Recent):  Temp: 97.9 °F (36.6 °C) (11/29/22 1024)  Pulse: 75 (11/29/22 1024)  Resp: 16 (11/29/22 1024)  BP: (!) 176/81 (11/29/22 1024)  SpO2: 99 % (11/29/22 1024)   Vital Signs (24h Range):  Temp:  [97.5 °F (36.4 °C)-98.2 °F (36.8 °C)] 97.9 °F (36.6 °C)  Pulse:  [68-76] 75  Resp:  [16-20] 16  SpO2:  [90 %-99 %] 99 %  BP: (109-176)/(57-81) 176/81     Weight: 122.5 kg (270 lb)  Body mass index is 34.67 kg/m².    Intake/Output Summary (Last 24 hours) at 11/29/2022 1058  Last data filed at 11/29/2022 0526  Gross per 24 hour   Intake 750 ml   Output 2620 ml   Net -1870 ml        Physical Exam  Vitals reviewed.   Constitutional:       General: He is not in acute distress.     Appearance: Normal appearance.   HENT:      Head: Normocephalic and atraumatic.      Right Ear: External ear normal.      Left Ear: External ear normal.      Nose: Nose normal.      Mouth/Throat:      Mouth: Mucous membranes are moist.      Pharynx: Oropharynx is clear.   Eyes:      General: No scleral icterus.     Conjunctiva/sclera: Conjunctivae normal.      Pupils: Pupils are equal, round, and reactive to light.   Cardiovascular:      Rate and Rhythm: Normal rate and regular rhythm.   Pulmonary:      Effort: Pulmonary effort is normal.      Breath sounds: Normal breath sounds. No wheezing or rales.   Abdominal:      General: Bowel sounds are normal.      Tenderness: There is no abdominal tenderness.   Musculoskeletal:      Cervical back: Normal range of motion and neck supple.      Comments: Left toe wound covered by bandage.  Right foot covered by Kerlix bandage.  No visible lower extremity erythema.  Trace bilateral lower extremity  edema.  Right upper extremity swollen, but improving.  Right radial pulse intact.  Left upper extremity PICC line in place.   Skin:     General: Skin is warm and dry.      Capillary Refill: Capillary refill takes less than 2 seconds.   Neurological:      General: No focal deficit present.      Mental Status: He is alert and oriented to person, place, and time.      Cranial Nerves: No cranial nerve deficit.   Psychiatric:         Mood and Affect: Mood normal.         Behavior: Behavior normal.       Significant Labs: All pertinent labs within the past 24 hours have been reviewed.  CBC:   Recent Labs   Lab 11/28/22 0413 11/29/22  0543   WBC 9.08 9.19   HGB 11.6* 11.7*   HCT 37.0* 37.8*   * 460*       CMP:   Recent Labs   Lab 11/28/22 0413 11/29/22  0543   * 133*   K 4.7 4.5   CL 98 98   CO2 27 28   * 211*   BUN 18 19   CREATININE 1.3 1.2   CALCIUM 9.3 9.4   ALBUMIN 2.8*  --    ANIONGAP 7* 7*         Significant Imaging: I have reviewed all pertinent imaging results/findings within the past 24 hours.      Assessment/Plan:      * Diabetic wet gangrene of the foot  Poor foot care with Left great toe wound concerning for wet gangrene.  Left 1st MTP fluid collection on imaging.  The patient declines amputation this time and would prefer medical treatment over transmetatarsal amputation.  Patient was started on IV Vanc and Zosyn in ED and changed to IV Vanc and Cefepime on admission.  Podiatry performed bedside debridement on 11/25 and bone biopsy on 11/27/2022.  Wound cultures sent on 11/27/2022 and pending.  Blood cultures negative to date.  Cefepime changed to Ertapenem today.    Plan:  Follow up with ID recommendations - likely 6 weeks of IV Vanc and Ertapenem  Continue IV Vanc and change Cefepime to Ertapenem today- total antibiotic day #8  Follow up wound cultures - no growth to date      Acute deep vein thrombosis (DVT) of axillary vein of right upper extremity  Acute catheter associated DVT  extending to the deep veins of the right upper extremity seen on ultrasound Doppler 11/26.  Patient had a right basilic midline catheter, which was initially placed due to poor vascular access.  He was not taking therapeutic anticoagulation at the time.  Case was discussed with Hematology/Oncology (Dr. Salinas) who advised therapeutic anticoagulation given that the thrombus is extending to the axillary vein.  Left upper extremity PICC line placed 11/26.  RUE midline d/c on 11/28/2022    Plan:  Continue with Lovenox 1 milligram/kilogram x 3 months        Diabetic ulcer of left great toe    Lab Results   Component Value Date    HGBA1C 9.1 (H) 11/22/2022     Most recent fingerstick glucose reviewed-   Recent Labs   Lab 11/26/22 2006 11/27/22  0746 11/27/22  1135   POCTGLUCOSE 205* 190* 209*     Current correctional scale    Antihyperglycemics (From admission, onward)    Start     Stop Route Frequency Ordered    11/27/22 2100  insulin detemir U-100 pen 30 Units         -- SubQ Nightly 11/27/22 1536    11/24/22 1330  insulin aspart U-100 pen 1-10 Units         -- SubQ Before meals & nightly PRN 11/24/22 1231        Hold Oral hypoglycemics while patient is in the hospital.    NOTE:  Patient usually takes 40 units basal insulin q.h.s.  75% of his typical basal dose has been ordered as he will be NPO after midnight in anticipation for bone biopsy Monday morning    COPD with asthma  Not in exacerbation  -DuoNebs p.r.n.    Class 1 obesity due to excess calories with serious comorbidity and body mass index (BMI) of 34.0 to 34.9 in adult  Body mass index is 34.67 kg/m². Morbid obesity complicates all aspects of disease management from diagnostic modalities to treatment. Weight loss encouraged and health benefits explained to patient.         Mixed hyperlipidemia  -Continue Statin      Depression  -Continue Seroquel        HTN (hypertension)  Home Meds:  Lisinopril 10mg and HCTZ 25mg  -Continue home meds  -Monitor and adjust as  needed      Type 2 diabetes mellitus with diabetic peripheral angiopathy without gangrene, with long-term current use of insulin  Hemoglobin A1c 9.1% this admission  Home Meds:  Metformin 1g bid, Trulicity, Detemir 50 units, Aspart 10 unit TIDWM  [facility] Meds:  Detemir 35 units with moderate dose SSI  -Continue to hold Metformin and Trulicity  -Continue current regimen  -Diabetic Diet  -Monitor and adjust as needed      VTE Risk Mitigation (From admission, onward)         Ordered     enoxaparin injection 120 mg  Every 12 hours (non-standard times)         11/28/22 0957     IP VTE HIGH RISK PATIENT  Once         11/22/22 1724     Place sequential compression device  Until discontinued         11/22/22 1724                Discharge Planning   LOIS:      Code Status: Full Code   Is the patient medically ready for discharge?:     Reason for patient still in hospital (select all that apply): Patient trending condition, Treatment and Consult recommendations  Discharge Plan A: Home                  Zeus Nation MD  Department of Hospital Medicine   [facility] ([place])

## 2022-11-30 LAB
POCT GLUCOSE: 187 MG/DL (ref 70–110)
POCT GLUCOSE: 224 MG/DL (ref 70–110)
POCT GLUCOSE: 236 MG/DL (ref 70–110)
POCT GLUCOSE: 261 MG/DL (ref 70–110)

## 2022-11-30 PROCEDURE — 99232 PR SUBSEQUENT HOSPITAL CARE,LEVL II: ICD-10-PCS | Mod: ,,, | Performed by: INTERNAL MEDICINE

## 2022-11-30 PROCEDURE — 99233 SBSQ HOSP IP/OBS HIGH 50: CPT | Mod: ,,, | Performed by: INTERNAL MEDICINE

## 2022-11-30 PROCEDURE — 99233 PR SUBSEQUENT HOSPITAL CARE,LEVL III: ICD-10-PCS | Mod: ,,, | Performed by: INTERNAL MEDICINE

## 2022-11-30 PROCEDURE — 99232 SBSQ HOSP IP/OBS MODERATE 35: CPT | Mod: ,,, | Performed by: INTERNAL MEDICINE

## 2022-11-30 PROCEDURE — 25000003 PHARM REV CODE 250: Performed by: INTERNAL MEDICINE

## 2022-11-30 PROCEDURE — 63600175 PHARM REV CODE 636 W HCPCS: Performed by: INTERNAL MEDICINE

## 2022-11-30 PROCEDURE — A4216 STERILE WATER/SALINE, 10 ML: HCPCS | Performed by: STUDENT IN AN ORGANIZED HEALTH CARE EDUCATION/TRAINING PROGRAM

## 2022-11-30 PROCEDURE — 97530 THERAPEUTIC ACTIVITIES: CPT

## 2022-11-30 PROCEDURE — 99233 SBSQ HOSP IP/OBS HIGH 50: CPT | Mod: ,,, | Performed by: PODIATRIST

## 2022-11-30 PROCEDURE — 11000001 HC ACUTE MED/SURG PRIVATE ROOM

## 2022-11-30 PROCEDURE — 25000003 PHARM REV CODE 250: Performed by: STUDENT IN AN ORGANIZED HEALTH CARE EDUCATION/TRAINING PROGRAM

## 2022-11-30 PROCEDURE — 25000003 PHARM REV CODE 250: Performed by: NURSE PRACTITIONER

## 2022-11-30 PROCEDURE — 99233 PR SUBSEQUENT HOSPITAL CARE,LEVL III: ICD-10-PCS | Mod: ,,, | Performed by: PODIATRIST

## 2022-11-30 PROCEDURE — 94761 N-INVAS EAR/PLS OXIMETRY MLT: CPT

## 2022-11-30 RX ADMIN — SODIUM CHLORIDE, PRESERVATIVE FREE 10 ML: 5 INJECTION INTRAVENOUS at 05:11

## 2022-11-30 RX ADMIN — ATORVASTATIN CALCIUM 40 MG: 20 TABLET, FILM COATED ORAL at 09:11

## 2022-11-30 RX ADMIN — QUETIAPINE FUMARATE 200 MG: 200 TABLET ORAL at 09:11

## 2022-11-30 RX ADMIN — ENOXAPARIN SODIUM 120 MG: 100 INJECTION SUBCUTANEOUS at 11:11

## 2022-11-30 RX ADMIN — LISINOPRIL 10 MG: 10 TABLET ORAL at 09:11

## 2022-11-30 RX ADMIN — INSULIN ASPART 4 UNITS: 100 INJECTION, SOLUTION INTRAVENOUS; SUBCUTANEOUS at 05:11

## 2022-11-30 RX ADMIN — INSULIN ASPART 2 UNITS: 100 INJECTION, SOLUTION INTRAVENOUS; SUBCUTANEOUS at 09:11

## 2022-11-30 RX ADMIN — SODIUM CHLORIDE, PRESERVATIVE FREE 10 ML: 5 INJECTION INTRAVENOUS at 11:11

## 2022-11-30 RX ADMIN — HYDROCODONE BITARTRATE AND ACETAMINOPHEN 1 TABLET: 5; 325 TABLET ORAL at 09:11

## 2022-11-30 RX ADMIN — INSULIN ASPART 6 UNITS: 100 INJECTION, SOLUTION INTRAVENOUS; SUBCUTANEOUS at 12:11

## 2022-11-30 RX ADMIN — ASPIRIN 81 MG: 81 TABLET, COATED ORAL at 09:11

## 2022-11-30 RX ADMIN — ERTAPENEM 1 G: 1 INJECTION INTRAMUSCULAR; INTRAVENOUS at 11:11

## 2022-11-30 RX ADMIN — DAPTOMYCIN 590 MG: 350 INJECTION, POWDER, LYOPHILIZED, FOR SOLUTION INTRAVENOUS at 09:11

## 2022-11-30 RX ADMIN — INSULIN DETEMIR 35 UNITS: 100 INJECTION, SOLUTION SUBCUTANEOUS at 09:11

## 2022-11-30 RX ADMIN — HYDROCHLOROTHIAZIDE 25 MG: 25 TABLET ORAL at 09:11

## 2022-11-30 RX ADMIN — INSULIN ASPART 2 UNITS: 100 INJECTION, SOLUTION INTRAVENOUS; SUBCUTANEOUS at 07:11

## 2022-11-30 RX ADMIN — HYDROCODONE BITARTRATE AND ACETAMINOPHEN 1 TABLET: 5; 325 TABLET ORAL at 03:11

## 2022-11-30 NOTE — PLAN OF CARE
POC reviewed w/ pt and purposeful rounding complete. Meds administered per MAR. Blood glucose monitored and PRN insulin administered per order.PRN pain meds administered for c/o bilateral foot pain w/ some relief obtained. VSS on RA. Pt AAOx4 and turns independently. Nurse rounding complete. Safety precautions intact; no injuries or falls this shift. Pt denies needs at this time; will continue to monitor.

## 2022-11-30 NOTE — PLAN OF CARE
Problem: Physical Therapy  Goal: Physical Therapy Goal  Description: Goals to be met by: 2022    Patient will increase functional independence with mobility by performin. Sit<>stand with supervision with LRAD.  2. Gait x 120 feet with SBA with LRAD.  3. Ascend/descend 1 flight of step(s) with least restrictive assistive device and SBA.      Outcome: Ongoing, Progressing     Pt tolerated therapy well today and was able to ambulate without AD. Gait in room with SBA and no AD without major increases in pain of bilat LE. PT rec for LTACH to assist with pt's needs for wound care and other medical requirements. Pt does not demonstrate himself as falls risk at this time but does wish to work on stairs during upcoming visits if possible. Will contact nursing and wound care to coordinate with current contact precautions.

## 2022-11-30 NOTE — ASSESSMENT & PLAN NOTE
Poor foot care with Left great toe wound concerning for wet gangrene.  Left 1st MTP fluid collection on imaging.  The patient declines amputation this time and would prefer medical treatment over transmetatarsal amputation.  Patient was started on IV Vanc and Zosyn in ED and changed to IV Vanc and Cefepime on admission.  Podiatry performed bedside debridement on 11/25 and bone biopsy on 11/27/2022.  Wound cultures sent on 11/27/2022 and pending.  Blood cultures negative to date.  Cefepime changed to Ertapenem on 11/29/2022.  IV Vanc changed to Daptomycin on 11/30/2022.    Plan:  Follow up with ID recommendations - likely 6 weeks of antibiotics (including IV Vanc/Defepime) - continue IV Vanc and Ertapenem - total antibiotic day #942.  Follow up wound cultures - no growth to date  Rehab vs LTAC on discharge - pending

## 2022-11-30 NOTE — PROGRESS NOTES
Infectious Disease Follow up Note    Impression: 56 yo male with DM, osteomyelitis of left hallux. Bone biopsy shows no growth, gram stain negative. Awaiting pathology. Afebrile, no complaints.      Plan: Empiric daptomycin 6 mg/kg IV and ertapenem 1 gm IV Q24 for 6 weeks total. End of care = 1/3/22     Please call for any questions. Signing off.     Thanks,  Gomez Ehrensing      Subjective and Interval History:  No complaints. Tolerating daptomycin and ertapenem.         Review of Symptoms:  Review of Systems   All other systems reviewed and are negative.        Objective:    Medications:  Antibiotics:   Antibiotics (From admission, onward)      Start     Stop Route Frequency Ordered    11/30/22 0800  DAPTOmycin (CUBICIN) 590 mg in sodium chloride 0.9% 50 mL IVPB         01/04 0759 IV Every 24 hours (non-standard times) 11/29/22 2124    11/29/22 1215  ertapenem (INVANZ) 1 g in sodium chloride 0.9 % 100 mL IVPB (ready to mix system)         -- IV Every 24 hours (non-standard times) 11/29/22 1103    11/23/22 2100  mupirocin 2 % ointment         11/28 2059 Nasl 2 times daily 11/23/22 1502            Physical Exam:  VS (24h):   Vitals:    11/30/22 1125   BP: 131/77   Pulse: 78   Resp: 16   Temp: 98.1 °F (36.7 °C)     Temp:  [97.4 °F (36.3 °C)-98.3 °F (36.8 °C)]   Physical Exam  Vitals reviewed.   Constitutional:       Appearance: Normal appearance.   HENT:      Head: Normocephalic and atraumatic.   Eyes:      Extraocular Movements: Extraocular movements intact.      Conjunctiva/sclera: Conjunctivae normal.      Pupils: Pupils are equal, round, and reactive to light.   Cardiovascular:      Rate and Rhythm: Normal rate and regular rhythm.      Pulses: Normal pulses.      Heart sounds: Normal heart sounds.   Pulmonary:      Effort: Pulmonary effort is normal.      Breath sounds: Normal breath sounds.   Abdominal:      General: Abdomen is flat. Bowel sounds are normal. There is no distension.      Tenderness: There is no  abdominal tenderness.   Musculoskeletal:         General: Normal range of motion.      Right lower leg: No edema.      Left lower leg: No edema.      Comments: Left arm PICC without erythema or tenderness.   Neurological:      Mental Status: He is alert.       Pathology shows no     Labs:  CBC:   Lab Results   Component Value Date    WBC 9.19 11/29/2022    WBC 9.08 11/28/2022    WBC 7.93 11/26/2022    WBC 11.99 11/23/2022    WBC 14.16 (H) 11/22/2022    HCT 37.8 (L) 11/29/2022     (H) 11/29/2022       BMP: No results for input(s): GLU, NA, K, CL, CO2, BUN, CREATININE, CALCIUM, MG in the last 24 hours.    LFT:   Lab Results   Component Value Date    ALT 17 11/23/2022    AST 14 11/23/2022    ALKPHOS 62 11/23/2022    BILITOT 0.6 11/23/2022         Microbiology x 7d:   Microbiology Results (last 7 days)       Procedure Component Value Units Date/Time    Aerobic culture [[id number]]  (Abnormal) Collected: 11/27/22 1419    Order Status: Completed Specimen: Bone from Toe, Left Foot Updated: 11/30/22 1316     Aerobic Bacterial Culture STREPTOCOCCUS DYSGALACTIAE  Rare  Susceptibility pending      Narrative:      Left great toe distal phalanx    Culture, Anaerobic [[id number]] Collected: 11/27/22 1419    Order Status: Completed Specimen: Bone from Toe, Left Foot Updated: 11/30/22 0911     Anaerobic Culture Culture in progress    Narrative:      Left great toe distal phalanx    Blood culture [[id number]] Collected: 11/26/22 0951    Order Status: Completed Specimen: Blood Updated: 11/30/22 0612     Blood Culture, Routine No Growth to date      No Growth to date      No Growth to date      No Growth to date    Blood culture #1 **CANNOT BE ORDERED STAT** [[id number]]  (Abnormal) Collected: 11/22/22 1928    Order Status: Completed Specimen: Blood from Midline, Basilic, Right Updated: 11/28/22 1031     Blood Culture, Routine Gram stain jose bottle: Gram positive rods      Results called to and read back by: Viridiana Hernandez RN   11/26/2022  09:36      DIPHTHEROIDS    Blood culture #2 **CANNOT BE ORDERED STAT** [[id number]] Collected: 11/22/22 1900    Order Status: Completed Specimen: Blood from Midline, Basilic, Right Updated: 11/28/22 0612     Blood Culture, Routine No growth after 5 days.    Gram stain [[id number]] Collected: 11/27/22 1419    Order Status: Completed Specimen: Bone from Toe, Left Foot Updated: 11/28/22 0013     Gram Stain Result Rare WBC's      No organisms seen    Narrative:      Left great toe distal phalanx    Aerobic culture [[id number]]     Order Status: No result Specimen: Wound from Toe, Left Foot     Culture, Anaerobic [[id number]]     Order Status: No result Specimen: Wound from Toe, Left Foot             Assessment:    Active Hospital Problems    Diagnosis  POA    *Diabetic wet gangrene of the foot [E11.52]  Yes    Acute deep vein thrombosis (DVT) of axillary vein of right upper extremity [I82.A11]  No    COPD with asthma [J44.9]  Yes    Type 2 diabetes mellitus with diabetic peripheral angiopathy without gangrene, with long-term current use of insulin [E11.51, Z79.4]  Not Applicable    HTN (hypertension) [I10]  Yes    Mixed hyperlipidemia [E78.2]  Yes    Class 1 obesity due to excess calories with serious comorbidity and body mass index (BMI) of 34.0 to 34.9 in adult [E66.09, Z68.34]  Not Applicable    Depression [F32.A]  Yes      Resolved Hospital Problems   No resolved problems to display.         Plan:  See top of page.          30-Jun-2018

## 2022-11-30 NOTE — SUBJECTIVE & OBJECTIVE
Interval History:  Patient is awake and alert this morning.  No acute events overnight.  Waiting for  placement.  Tolerating antibiotics well.    Review of Systems   Constitutional:  Negative for chills and fever.   HENT:  Negative for congestion and ear pain.    Eyes:  Negative for pain.   Respiratory:  Negative for shortness of breath.    Cardiovascular:  Negative for chest pain and palpitations.   Gastrointestinal:  Negative for abdominal pain.   Genitourinary:  Negative for difficulty urinating.   Musculoskeletal:  Negative for back pain.   Skin:  Positive for wound (LEs).   Neurological:  Negative for dizziness and headaches.   Psychiatric/Behavioral:  Negative for agitation and behavioral problems.    Objective:     Vital Signs (Most Recent):  Temp: 98.2 °F (36.8 °C) (11/30/22 0802)  Pulse: 80 (11/30/22 0802)  Resp: 20 (11/30/22 0802)  BP: 114/69 (11/30/22 0802)  SpO2: (!) 94 % (11/30/22 0802)   Vital Signs (24h Range):  Temp:  [97.4 °F (36.3 °C)-98.3 °F (36.8 °C)] 98.2 °F (36.8 °C)  Pulse:  [69-86] 80  Resp:  [16-20] 20  SpO2:  [93 %-99 %] 94 %  BP: ()/(55-85) 114/69     Weight: 122.5 kg (270 lb)  Body mass index is 34.67 kg/m².    Intake/Output Summary (Last 24 hours) at 11/30/2022 0907  Last data filed at 11/30/2022 0500  Gross per 24 hour   Intake 750 ml   Output 1100 ml   Net -350 ml        Physical Exam  Vitals reviewed.   Constitutional:       General: He is not in acute distress.     Appearance: Normal appearance.   HENT:      Head: Normocephalic and atraumatic.      Right Ear: External ear normal.      Left Ear: External ear normal.      Nose: Nose normal.      Mouth/Throat:      Mouth: Mucous membranes are moist.      Pharynx: Oropharynx is clear.   Eyes:      General: No scleral icterus.     Conjunctiva/sclera: Conjunctivae normal.      Pupils: Pupils are equal, round, and reactive to light.   Cardiovascular:      Rate and Rhythm: Normal rate and regular rhythm.   Pulmonary:      Effort:  Pulmonary effort is normal.      Breath sounds: Normal breath sounds. No wheezing or rales.   Abdominal:      General: Bowel sounds are normal.      Tenderness: There is no abdominal tenderness.   Musculoskeletal:      Cervical back: Normal range of motion and neck supple.      Comments: Left toe wound covered by bandage.  Right foot covered by Kerlix bandage.  No visible lower extremity erythema.  Trace bilateral lower extremity edema.  Right upper extremity swollen, but improving.  Right radial pulse intact.  Left upper extremity PICC line in place.   Skin:     General: Skin is warm and dry.      Capillary Refill: Capillary refill takes less than 2 seconds.   Neurological:      General: No focal deficit present.      Mental Status: He is alert and oriented to person, place, and time.      Cranial Nerves: No cranial nerve deficit.   Psychiatric:         Mood and Affect: Mood normal.         Behavior: Behavior normal.       Significant Labs: All pertinent labs within the past 24 hours have been reviewed.  CBC:   Recent Labs   Lab 11/29/22  0543   WBC 9.19   HGB 11.7*   HCT 37.8*   *       CMP:   Recent Labs   Lab 11/29/22  0543   *   K 4.5   CL 98   CO2 28   *   BUN 19   CREATININE 1.2   CALCIUM 9.4   ANIONGAP 7*         Significant Imaging: I have reviewed all pertinent imaging results/findings within the past 24 hours.

## 2022-11-30 NOTE — PLAN OF CARE
11/30/22 1641   Post-Acute Status   Post-Acute Authorization Placement   Post-Acute Placement Status Referrals Sent   Discharge Delays (!) Post-Acute Set-up   Discharge Plan   Discharge Plan A Long-term acute care facility (LTAC)   Discharge Plan B Home Health     As of 11/30/22 the patient has not been accepted by any LTACs

## 2022-11-30 NOTE — PROGRESS NOTES
[facility] Medicine  Progress Note    Patient Name: Dave Good  MRN: [id number]  Patient Class: IP- Inpatient   Admission Date: 11/22/2022  Length of Stay: 7 days  Attending Physician: Zeus Nation MD  Primary Care Provider: Reyna Jorge NP        Subjective:     Principal Problem:Diabetic wet gangrene of the foot        HPI:  DM2, diabetic neuropathy, COPD, HTN, depression, right foot transmetatarsal amputation and osteomyelitis who was initially admitted to [facility] from Dr. Flores's office for osteomyelitis evaluation.  Patient knows progressively worsening foul-smelling discharge from his left great toe for which he sought care with Dr. Flores.  Patient was evaluated [facility] by surgery.  He will require further inpatient evaluation for osteomyelitis.       Overview/Hospital Course:  Into the hospital from general surgery clinic for evaluation of left great toe osteomyelitis.  CT imaging showing fluid collection at the left 1st MTP.  Bedside debridement performed by Podiatry.  Bone biopsy was recommended.    During hospitalization, patient had midline placed vascular access issues.  Later his hospitalization is noted right upper extremity edema.  Ultrasound Doppler right upper extremity revealed DVT extending from the right basilic to the axillary vein.  Per discussion with Hematology/Oncology, initiated therapeutic anticoagulation.      Interval History:  Patient is awake and alert this morning.  No acute events overnight.  Waiting for  placement.  Tolerating antibiotics well.    Review of Systems   Constitutional:  Negative for chills and fever.   HENT:  Negative for congestion and ear pain.    Eyes:  Negative for pain.   Respiratory:  Negative for shortness of breath.    Cardiovascular:  Negative for chest pain and palpitations.   Gastrointestinal:  Negative for abdominal pain.   Genitourinary:  Negative for difficulty urinating.   Musculoskeletal:  Negative for back pain.   Skin:   Positive for wound (LEs).   Neurological:  Negative for dizziness and headaches.   Psychiatric/Behavioral:  Negative for agitation and behavioral problems.    Objective:     Vital Signs (Most Recent):  Temp: 98.2 °F (36.8 °C) (11/30/22 0802)  Pulse: 80 (11/30/22 0802)  Resp: 20 (11/30/22 0802)  BP: 114/69 (11/30/22 0802)  SpO2: (!) 94 % (11/30/22 0802)   Vital Signs (24h Range):  Temp:  [97.4 °F (36.3 °C)-98.3 °F (36.8 °C)] 98.2 °F (36.8 °C)  Pulse:  [69-86] 80  Resp:  [16-20] 20  SpO2:  [93 %-99 %] 94 %  BP: ()/(55-85) 114/69     Weight: 122.5 kg (270 lb)  Body mass index is 34.67 kg/m².    Intake/Output Summary (Last 24 hours) at 11/30/2022 0907  Last data filed at 11/30/2022 0500  Gross per 24 hour   Intake 750 ml   Output 1100 ml   Net -350 ml        Physical Exam  Vitals reviewed.   Constitutional:       General: He is not in acute distress.     Appearance: Normal appearance.   HENT:      Head: Normocephalic and atraumatic.      Right Ear: External ear normal.      Left Ear: External ear normal.      Nose: Nose normal.      Mouth/Throat:      Mouth: Mucous membranes are moist.      Pharynx: Oropharynx is clear.   Eyes:      General: No scleral icterus.     Conjunctiva/sclera: Conjunctivae normal.      Pupils: Pupils are equal, round, and reactive to light.   Cardiovascular:      Rate and Rhythm: Normal rate and regular rhythm.   Pulmonary:      Effort: Pulmonary effort is normal.      Breath sounds: Normal breath sounds. No wheezing or rales.   Abdominal:      General: Bowel sounds are normal.      Tenderness: There is no abdominal tenderness.   Musculoskeletal:      Cervical back: Normal range of motion and neck supple.      Comments: Left toe wound covered by bandage.  Right foot covered by Kerlix bandage.  No visible lower extremity erythema.  Trace bilateral lower extremity edema.  Right upper extremity swollen, but improving.  Right radial pulse intact.  Left upper extremity PICC line in place.    Skin:     General: Skin is warm and dry.      Capillary Refill: Capillary refill takes less than 2 seconds.   Neurological:      General: No focal deficit present.      Mental Status: He is alert and oriented to person, place, and time.      Cranial Nerves: No cranial nerve deficit.   Psychiatric:         Mood and Affect: Mood normal.         Behavior: Behavior normal.       Significant Labs: All pertinent labs within the past 24 hours have been reviewed.  CBC:   Recent Labs   Lab 11/29/22  0543   WBC 9.19   HGB 11.7*   HCT 37.8*   *       CMP:   Recent Labs   Lab 11/29/22  0543   *   K 4.5   CL 98   CO2 28   *   BUN 19   CREATININE 1.2   CALCIUM 9.4   ANIONGAP 7*         Significant Imaging: I have reviewed all pertinent imaging results/findings within the past 24 hours.      Assessment/Plan:      * Diabetic wet gangrene of the foot  Poor foot care with Left great toe wound concerning for wet gangrene.  Left 1st MTP fluid collection on imaging.  The patient declines amputation this time and would prefer medical treatment over transmetatarsal amputation.  Patient was started on IV Vanc and Zosyn in ED and changed to IV Vanc and Cefepime on admission.  Podiatry performed bedside debridement on 11/25 and bone biopsy on 11/27/2022.  Wound cultures sent on 11/27/2022 and pending.  Blood cultures negative to date.  Cefepime changed to Ertapenem on 11/29/2022.  IV Vanc changed to Daptomycin on 11/30/2022.    Plan:  Follow up with ID recommendations - likely 6 weeks of antibiotics (including IV Vanc/Defepime) - continue IV Vanc and Ertapenem - total antibiotic day #942.  Follow up wound cultures - no growth to date  Rehab vs LTAC on discharge - pending      Acute deep vein thrombosis (DVT) of axillary vein of right upper extremity  Acute catheter associated DVT extending to the deep veins of the right upper extremity seen on ultrasound Doppler 11/26.  Patient had a right basilic midline catheter,  which was initially placed due to poor vascular access.  He was not taking therapeutic anticoagulation at the time.  Case was discussed with Hematology/Oncology (Dr. Salinas) who advised therapeutic anticoagulation given that the thrombus is extending to the axillary vein.  Left upper extremity PICC line placed 11/26.  BYRON midline d/c on 11/28/2022    Plan:  Continue with Lovenox 1 milligram/kilogram x 3 months        Diabetic ulcer of left great toe    Lab Results   Component Value Date    HGBA1C 9.1 (H) 11/22/2022     Most recent fingerstick glucose reviewed-   Recent Labs   Lab 11/26/22 2006 11/27/22  0746 11/27/22  1135   POCTGLUCOSE 205* 190* 209*     Current correctional scale    Antihyperglycemics (From admission, onward)    Start     Stop Route Frequency Ordered    11/27/22 2100  insulin detemir U-100 pen 30 Units         -- SubQ Nightly 11/27/22 1536    11/24/22 1330  insulin aspart U-100 pen 1-10 Units         -- SubQ Before meals & nightly PRN 11/24/22 1231        Hold Oral hypoglycemics while patient is in the hospital.    NOTE:  Patient usually takes 40 units basal insulin q.h.s.  75% of his typical basal dose has been ordered as he will be NPO after midnight in anticipation for bone biopsy Monday morning    COPD with asthma  Not in exacerbation  -DuoNebs p.r.n.    Class 1 obesity due to excess calories with serious comorbidity and body mass index (BMI) of 34.0 to 34.9 in adult  Body mass index is 34.67 kg/m². Morbid obesity complicates all aspects of disease management from diagnostic modalities to treatment. Weight loss encouraged and health benefits explained to patient.         Mixed hyperlipidemia  -Continue Statin      Depression  -Continue Seroquel        HTN (hypertension)  Home Meds:  Lisinopril 10mg and HCTZ 25mg  -Continue home meds  -Monitor and adjust as needed      Type 2 diabetes mellitus with diabetic peripheral angiopathy without gangrene, with long-term current use of insulin  Hemoglobin  A1c 9.1% this admission  Home Meds:  Metformin 1g bid, Trulicity, Detemir 50 units, Aspart 10 unit TIDWM  Hospital Meds:  Detemir 35 units with moderate dose SSI  -Continue to hold Metformin and Trulicity  -Continue current regimen  -Diabetic Diet  -Monitor and adjust as needed      VTE Risk Mitigation (From admission, onward)         Ordered     enoxaparin injection 120 mg  Every 12 hours (non-standard times)         11/28/22 0957     IP VTE HIGH RISK PATIENT  Once         11/22/22 1724     Place sequential compression device  Until discontinued         11/22/22 1724                Discharge Planning   LOIS:      Code Status: Full Code   Is the patient medically ready for discharge?:     Reason for patient still in hospital (select all that apply): Patient trending condition, Treatment and Consult recommendations  Discharge Plan A: Home                  Zeus Nation MD  Department of Hospital Medicine   [facility] ([place])

## 2022-11-30 NOTE — CONSULTS
Thank you for your consult to [facility]. We have reviewed the patient chart. This patient does meet criteria for [facility] service at this time. Will assume care on 12/01/22 at 6AM

## 2022-11-30 NOTE — PT/OT/SLP PROGRESS
Physical Therapy Treatment    Patient Name:  Dave Good   MRN:  [id number]    Recommendations:     Discharge Recommendations:  LTACH (long-term acute care hospital)   Discharge Equipment Recommendations: none   Barriers to discharge:  pending medical status    Assessment:     Dave Good is a 57 y.o. male admitted with a medical diagnosis of Diabetic wet gangrene of the foot.  He presents with the following impairments/functional limitations:  impaired endurance, impaired functional mobility, gait instability, decreased lower extremity function.    Pt tolerated therapy well today and was able to ambulate without AD. Gait in room with SBA and no AD without major increases in pain of bilat LE. PT rec for LTACH to assist with pt's needs for wound care and other medical requirements. Pt does not demonstrate himself as falls risk at this time but does wish to work on stairs during upcoming visits if possible. Will contact nursing and wound care to coordinate with current contact precautions.     Rehab Prognosis: Good; patient would benefit from acute skilled PT services to address these deficits and reach maximum level of function.    Recent Surgery: * No surgery found *      Plan:     During this hospitalization, patient to be seen 3 x/week to address the identified rehab impairments via gait training, therapeutic activities, therapeutic exercises, neuromuscular re-education and progress toward the following goals:    Plan of Care Expires:  12/28/22    Subjective     Chief Complaint: none  Patient/Family Comments/goals: wants to work on stairs in upcoming PT visits; feet are not hurting too bad with walking; pain comes and goes      Pain/Comfort:  Pain Rating 1: other (see comments) (score not verbalized)  Location - Side 1: Bilateral  Location 1: foot  Pain Addressed 1: Reposition, Distraction, Cessation of Activity      Objective:     Communicated with nurse Weaver prior to session.  Patient found HOB elevated with  "peripheral IV upon PT entry to room.     General Precautions: Standard, fall, contact   Orthopedic Precautions:N/A   Braces: N/A  Respiratory Status: Room air     Functional Mobility:  Bed Mobility:     Supine to Sit: supervision  Transfers:     Sit to Stand:  stand by assistance with no AD  Gait: ambulated 50 ft in room with no AD and demonstrated no near falls or LOB; noted bilat lateral trunk lean with midstance   Balance: static and dynamic standing fair; LE wounds present and painful during some bouts of ambulation    Five Times Sit to Stand Test:  How long the patient takes to completed 5 sit to stand form chair with UE used to push up from chair  "Inability to perform test in less than 13.6 seconds indicates increased disability and Morbidity." (Gato 2000).  Normative values for community dwelling elderly:   60-70y/o: 11.4 seconds  70-78y/o: 12.6 seconds  80-88y/o: 14.8 seconds     Trial 1: 39 seconds   Trial 2: 40 seconds   *UE use to push up from chair       AM-PAC 6 CLICK MOBILITY  Turning over in bed (including adjusting bedclothes, sheets and blankets)?: 4  Sitting down on and standing up from a chair with arms (e.g., wheelchair, bedside commode, etc.): 4  Moving from lying on back to sitting on the side of the bed?: 4  Moving to and from a bed to a chair (including a wheelchair)?: 3  Need to walk in hospital room?: 3  Climbing 3-5 steps with a railing?: 3  Basic Mobility Total Score: 21       Treatment & Education:    SBA for all OOB mobility     PT educated patient re:   PT plan of care/role of PT  Safety with OOB mobility  Discharge disposition    Pt verbalized understanding      Patient left up in chair with all lines intact, call button in reach, and nursing staff notified.    GOALS:   Multidisciplinary Problems       Physical Therapy Goals          Problem: Physical Therapy    Goal Priority Disciplines Outcome Goal Variances Interventions   Physical Therapy Goal     PT, PT/OT Ongoing, " Progressing     Description: Goals to be met by: 2022    Patient will increase functional independence with mobility by performin. Sit<>stand with supervision with LRAD.  2. Gait x 120 feet with SBA with LRAD.  3. Ascend/descend 1 flight of step(s) with least restrictive assistive device and SBA.                           Time Tracking:     PT Received On: 22  PT Start Time: 1329     PT Stop Time: 1350  PT Total Time (min): 21 min     Billable Minutes: Therapeutic Activity 21      Treatment Type: Treatment  PT/PTA: PT     PTA Visit Number: 0     2022

## 2022-12-01 PROBLEM — R04.0 EPISTAXIS: Status: ACTIVE | Noted: 2022-12-01

## 2022-12-01 LAB
BACTERIA SPEC AEROBE CULT: ABNORMAL
POCT GLUCOSE: 187 MG/DL (ref 70–110)
POCT GLUCOSE: 197 MG/DL (ref 70–110)
POCT GLUCOSE: 245 MG/DL (ref 70–110)
POCT GLUCOSE: 282 MG/DL (ref 70–110)

## 2022-12-01 PROCEDURE — 25000003 PHARM REV CODE 250: Performed by: HOSPITALIST

## 2022-12-01 PROCEDURE — 25000003 PHARM REV CODE 250: Performed by: INTERNAL MEDICINE

## 2022-12-01 PROCEDURE — 97116 GAIT TRAINING THERAPY: CPT

## 2022-12-01 PROCEDURE — A4216 STERILE WATER/SALINE, 10 ML: HCPCS | Performed by: STUDENT IN AN ORGANIZED HEALTH CARE EDUCATION/TRAINING PROGRAM

## 2022-12-01 PROCEDURE — 63600175 PHARM REV CODE 636 W HCPCS: Performed by: INTERNAL MEDICINE

## 2022-12-01 PROCEDURE — 25000003 PHARM REV CODE 250: Performed by: STUDENT IN AN ORGANIZED HEALTH CARE EDUCATION/TRAINING PROGRAM

## 2022-12-01 PROCEDURE — 11000001 HC ACUTE MED/SURG PRIVATE ROOM

## 2022-12-01 PROCEDURE — 25000003 PHARM REV CODE 250: Performed by: NURSE PRACTITIONER

## 2022-12-01 PROCEDURE — 94761 N-INVAS EAR/PLS OXIMETRY MLT: CPT

## 2022-12-01 PROCEDURE — 99233 PR SUBSEQUENT HOSPITAL CARE,LEVL III: ICD-10-PCS | Mod: 95,,, | Performed by: HOSPITALIST

## 2022-12-01 PROCEDURE — 99233 SBSQ HOSP IP/OBS HIGH 50: CPT | Mod: 95,,, | Performed by: HOSPITALIST

## 2022-12-01 RX ORDER — HYDROCODONE BITARTRATE AND ACETAMINOPHEN 5; 325 MG/1; MG/1
1 TABLET ORAL EVERY 4 HOURS PRN
Status: DISCONTINUED | OUTPATIENT
Start: 2022-12-01 | End: 2022-12-07 | Stop reason: HOSPADM

## 2022-12-01 RX ADMIN — SODIUM CHLORIDE, PRESERVATIVE FREE 10 ML: 5 INJECTION INTRAVENOUS at 12:12

## 2022-12-01 RX ADMIN — HYDROCODONE BITARTRATE AND ACETAMINOPHEN 1 TABLET: 5; 325 TABLET ORAL at 11:12

## 2022-12-01 RX ADMIN — DAPTOMYCIN 590 MG: 350 INJECTION, POWDER, LYOPHILIZED, FOR SOLUTION INTRAVENOUS at 08:12

## 2022-12-01 RX ADMIN — ENOXAPARIN SODIUM 120 MG: 100 INJECTION SUBCUTANEOUS at 11:12

## 2022-12-01 RX ADMIN — MELATONIN TAB 3 MG 6 MG: 3 TAB at 08:12

## 2022-12-01 RX ADMIN — SODIUM CHLORIDE, PRESERVATIVE FREE 10 ML: 5 INJECTION INTRAVENOUS at 06:12

## 2022-12-01 RX ADMIN — HYDROCODONE BITARTRATE AND ACETAMINOPHEN 1 TABLET: 5; 325 TABLET ORAL at 09:12

## 2022-12-01 RX ADMIN — ATORVASTATIN CALCIUM 40 MG: 20 TABLET, FILM COATED ORAL at 08:12

## 2022-12-01 RX ADMIN — QUETIAPINE FUMARATE 200 MG: 200 TABLET ORAL at 08:12

## 2022-12-01 RX ADMIN — INSULIN ASPART 4 UNITS: 100 INJECTION, SOLUTION INTRAVENOUS; SUBCUTANEOUS at 08:12

## 2022-12-01 RX ADMIN — INSULIN ASPART 1 UNITS: 100 INJECTION, SOLUTION INTRAVENOUS; SUBCUTANEOUS at 09:12

## 2022-12-01 RX ADMIN — HYDROCODONE BITARTRATE AND ACETAMINOPHEN 1 TABLET: 5; 325 TABLET ORAL at 04:12

## 2022-12-01 RX ADMIN — SODIUM CHLORIDE, PRESERVATIVE FREE 10 ML: 5 INJECTION INTRAVENOUS at 05:12

## 2022-12-01 RX ADMIN — ASPIRIN 81 MG: 81 TABLET, COATED ORAL at 08:12

## 2022-12-01 RX ADMIN — INSULIN DETEMIR 35 UNITS: 100 INJECTION, SOLUTION SUBCUTANEOUS at 08:12

## 2022-12-01 RX ADMIN — HYDROCODONE BITARTRATE AND ACETAMINOPHEN 1 TABLET: 5; 325 TABLET ORAL at 03:12

## 2022-12-01 RX ADMIN — LISINOPRIL 10 MG: 10 TABLET ORAL at 08:12

## 2022-12-01 RX ADMIN — INSULIN ASPART 6 UNITS: 100 INJECTION, SOLUTION INTRAVENOUS; SUBCUTANEOUS at 11:12

## 2022-12-01 RX ADMIN — NASAL 2 SPRAY: 6.5 SPRAY NASAL at 04:12

## 2022-12-01 RX ADMIN — HYDROCHLOROTHIAZIDE 25 MG: 25 TABLET ORAL at 08:12

## 2022-12-01 RX ADMIN — HYDROCODONE BITARTRATE AND ACETAMINOPHEN 1 TABLET: 5; 325 TABLET ORAL at 08:12

## 2022-12-01 RX ADMIN — ERTAPENEM 1 G: 1 INJECTION INTRAMUSCULAR; INTRAVENOUS at 11:12

## 2022-12-01 RX ADMIN — SODIUM CHLORIDE, PRESERVATIVE FREE 10 ML: 5 INJECTION INTRAVENOUS at 11:12

## 2022-12-01 NOTE — PLAN OF CARE
Arlene received a call from Winsome at [facility] stating they had submitted for auth today.    Collette will notify ARLENE when auth received.

## 2022-12-01 NOTE — PLAN OF CARE
POC reviewed w/ pt and purposeful rounding complete. Meds administered per MAR. Blood glucose monitored and PRN insulin administered per order. PRN pain meds administered for c/o bilateral foot pain w/ relief obtained. VSS on RA. Pt AAOx4 and ambulatory. Safety precautions intact; no injuries or falls this shift. Pt denies needs at this time; will continue to monitor.

## 2022-12-01 NOTE — PT/OT/SLP PROGRESS
Physical Therapy Treatment    Patient Name:  Dave Good   MRN:  [id number]    Recommendations:     Discharge Recommendations:  LTACH (long-term acute care hospital)   Discharge Equipment Recommendations: none   Barriers to discharge:  Increased mob needs    Assessment:     Dave Good is a 57 y.o. male admitted with a medical diagnosis of Diabetic wet gangrene of the foot.  He presents with the following impairments/functional limitations:  impaired functional mobility, gait instability, impaired balance, weakness, decreased lower extremity function . Upon arrival, pt was elevated HOB on room air. Pt was Independent sup<>sit and SupV sit<>stand w/o AD w/ B/L darco shoes on. Pt amb w/ IV pole 200ft SBA, decreased step length and has a lateral sway during gait.     Rehab Prognosis: Good; patient would benefit from acute skilled PT services to address these deficits and reach maximum level of function.    Recent Surgery: * No surgery found *      Plan:     During this hospitalization, patient to be seen 3 x/week to address the identified rehab impairments via gait training, therapeutic activities, therapeutic exercises, neuromuscular re-education and progress toward the following goals:    Plan of Care Expires:  12/28/22    Subjective     Chief Complaint: I'm not going to lose my balance.  Patient/Family Comments/goals: none stated  Pain/Comfort:  Pain Rating 1: 0/10      Objective:     Communicated with Meg COLEMAN prior to session.  Patient found HOB elevated with peripheral IV upon PT entry to room.     General Precautions: Standard, fall   Orthopedic Precautions:N/A   Braces: N/A  Respiratory Status: Room air     Functional Mobility:  Bed Mobility:     Supine to Sit: independence  Sit to Supine: independence  Transfers:     Sit to Stand:  supervision with no AD  Gait: amb w/ IV pole 200ft SBA, decreased step length and has a lateral sway during gait.       AM-PAC 6 CLICK MOBILITY  Turning over in bed (including  adjusting bedclothes, sheets and blankets)?: 4  Sitting down on and standing up from a chair with arms (e.g., wheelchair, bedside commode, etc.): 4  Moving from lying on back to sitting on the side of the bed?: 4  Moving to and from a bed to a chair (including a wheelchair)?: 4  Need to walk in hospital room?: 3  Climbing 3-5 steps with a railing?: 3  Basic Mobility Total Score: 22       Treatment & Education:  Importance of mob, safety awareness, gait training    Patient left HOB elevated with all lines intact, call button in reach, and RN notified..    GOALS:   Multidisciplinary Problems       Physical Therapy Goals          Problem: Physical Therapy    Goal Priority Disciplines Outcome Goal Variances Interventions   Physical Therapy Goal     PT, PT/OT Ongoing, Progressing     Description: Goals to be met by: 2022    Patient will increase functional independence with mobility by performin. Sit<>stand with supervision with LRAD. MET 2022  2. Gait x 120 feet with SBA with LRAD. MET 2022  3. Ascend/descend 1 flight of step(s) with least restrictive assistive device and SBA.                           Time Tracking:     PT Received On: 22  PT Start Time: 1141     PT Stop Time: 1208  PT Total Time (min): 27 min     Billable Minutes: Gait Training 27    Treatment Type: Treatment  PT/PTA: PT     PTA Visit Number: 0     2022

## 2022-12-01 NOTE — PROGRESS NOTES
[facility] ([place])  Adult Nutrition  Progress Note    SUMMARY       Recommendations  Continue on 2000kcal ADA, cardiac diet per MD  ADDITION of ProMod to promote wound healing  Monitor nutrition related labs    Goals:   Pt to receive ProMod to promote wound healing by RD follow up  Monitored labs to trend toward target ranges    Nutrition Goal Status: new  Communication of RD Recs:  (poc)    Assessment and Plan  Nutrition Problem  Increased nutrient need, protein    Related to (etiology):   Wound healing    Signs and Symptoms (as evidenced by):   Diabetic foot ulcers    Interventions:  CHO modified diet  Fat modified diet  Mineral modified diet  ProMod  Collaboration with other providers    Nutrition Diagnosis Status:   New       Reason for Assessment  Reason For Assessment: length of stay  Diagnosis: diabetes diagnosis/complications (diabetic wet gangrene of foot)  Relevant Medical History:   Patient Active Problem List   Diagnosis    Diabetic wet gangrene of the foot    Type 2 diabetes mellitus with diabetic peripheral angiopathy without gangrene, with long-term current use of insulin    HTN (hypertension)    Depression    Mixed hyperlipidemia    Class 1 obesity due to excess calories with serious comorbidity and body mass index (BMI) of 34.0 to 34.9 in adult    Open wound of right foot    Type II diabetes mellitus with peripheral circulatory disorder, uncontrolled    Amputation of right great toe    Skin flap infection    MRSA (methicillin resistant Staphylococcus aureus) infection    Ulcer of both feet with fat layer exposed    Open wound of right great toe    Open wound of toe    COVID-19    COPD with asthma    Chronic systolic congestive heart failure    Open wound of left foot    Cavitary lesion of lung    Increased nutritional needs    Osteomyelitis of right foot    Slow transit constipation    Ulcerated, foot, left, with fat layer exposed    Acute deep vein thrombosis (DVT) of axillary vein of right  "upper extremity    Diabetic ulcer of left great toe     Interdisciplinary Rounds: did not attend  General Information Comments:   12/1: pt reports good appetite with no complaints at this time. 100% intake at breakfast. Does not follow ADA diet at home, uses meal time insulin. PICC. Mild to moderated edema noted. Delvis 19- diabetic foot ulcer and altered skin integrity, DVT. NFPE- completed 12/1-pt is nourished      Nutrition Discharge Planning: dc on diabetic, cardiac diet, 2000kcal    Nutrition Risk Screen  Nutrition Risk Screen: large or nonhealing wound, burn or pressure injury    Nutrition/Diet History  Patient Reported Diet/Restrictions/Preferences: general  Spiritual, Cultural Beliefs, Shinto Practices, Values that Affect Care: no    Anthropometrics  Temp: 98.2 °F (36.8 °C)  Height Method: Stated  Height: 6' 2" (188 cm)  Height (inches): 74 in  Weight Method: Bed Scale  Weight: 122.5 kg (270 lb 1 oz)  Weight (lb): 270.07 lb  Ideal Body Weight (IBW), Male: 190 lb  % Ideal Body Weight, Male (lb): 142.14 %  BMI (Calculated): 34.7  BMI Grade: 30 - 34.9- obesity - grade I     Lab/Procedures/Meds  Pertinent Labs Reviewed: reviewed  CBC:  No results for input(s): WBC, HGB, HCT, PLT in the last 24 hours.  CMP:  No results for input(s): GLUCOSE, CALCIUM, ALBUMIN, PROT, NA, K, CO2, CL, BUN, CREATININE, ALKPHOS, ALT, AST, BILITOT in the last 24 hours.    Hemoglobin A1C   Date Value Ref Range Status   11/22/2022 9.1 (H) 4.0 - 5.6 % Final     Comment:     ADA Screening Guidelines:  5.7-6.4%  Consistent with prediabetes  >or=6.5%  Consistent with diabetes    High levels of fetal hemoglobin interfere with the HbA1C  assay. Heterozygous hemoglobin variants (HbS, HgC, etc)do  not significantly interfere with this assay.   However, presence of multiple variants may affect accuracy.     05/27/2022 7.8 (H) 4.0 - 5.6 % Final     Comment:     ADA Screening Guidelines:  5.7-6.4%  Consistent with prediabetes  >or=6.5%  " Consistent with diabetes    High levels of fetal hemoglobin interfere with the HbA1C  assay. Heterozygous hemoglobin variants (HbS, HgC, etc)do  not significantly interfere with this assay.   However, presence of multiple variants may affect accuracy.     02/21/2022 9.8 (H) 4.0 - 5.6 % Final     Comment:     ADA Screening Guidelines:  5.7-6.4%  Consistent with prediabetes  >or=6.5%  Consistent with diabetes    High levels of fetal hemoglobin interfere with the HbA1C  assay. Heterozygous hemoglobin variants (HbS, HgC, etc)do  not significantly interfere with this assay.   However, presence of multiple variants may affect accuracy.       Pertinent Medications Reviewed: reviewed  Scheduled Meds:   aspirin  81 mg Oral Daily    atorvastatin  40 mg Oral Daily    DAPTOmycin (CUBICIN) IV (PEDS and ADULTS)  6 mg/kg (Adjusted) Intravenous Q24H    enoxaparin  1 mg/kg Subcutaneous Q12H    ertapenem (INVANZ) IVPB  1 g Intravenous Q24H    hydroCHLOROthiazide  25 mg Oral Daily    insulin detemir U-100  35 Units Subcutaneous QHS    lisinopriL  10 mg Oral Daily    QUEtiapine  200 mg Oral Nightly    sodium chloride  2 spray Each Nostril QID    sodium chloride 0.9%  10 mL Intravenous Q6H     Continuous Infusions:  PRN Meds:.acetaminophen, albuterol-ipratropium, dextrose 10%, dextrose 10%, dextrose 10%, diphenhydrAMINE, glucagon (human recombinant), glucose, glucose, HYDROcodone-acetaminophen, insulin aspart U-100, melatonin, morphine, ondansetron, promethazine (PHENERGAN) IVPB, sodium chloride 0.9%, Flushing PICC Protocol **AND** sodium chloride 0.9% **AND** sodium chloride 0.9%    Estimated/Assessed Needs  Weight Used For Calorie Calculations: 122.5 kg (270 lb 1 oz)  Energy Calorie Requirements (kcal): 2109  Energy Need Method: Dash Kilpatrick (NO PA)  Protein Requirements: 79-105g (15-20% daily energy intake)  Weight Used For Protein Calculations: 122.5 kg (270 lb 1 oz)  Fluid Requirements (mL): 1mL/kcal  Estimated Fluid Requirement  Method:  (or per MD)  RDA Method (mL): 2109  CHO Requirement: 263g    Nutrition Prescription Ordered  Current Diet Order: diabetic, cardiac diet 2000kcal    Evaluation of Received Nutrient/Fluid Intake  I/O: -6.6L since admit  Energy Calories Required: meeting needs  Protein Required: meeting needs  Fluid Required: meeting needs  Comments: LBM 11/29  Tolerance: tolerating  % Intake of Estimated Energy Needs: 75 - 100 %  % Meal Intake: 75 - 100 %  Intake/Output - Last 3 Shifts         11/29 0700  11/30 0659 11/30 0700  12/01 0659 12/01 0700 12/02 0659    P.O. 720      I.V. (mL/kg) 30 (0.2)      Total Intake(mL/kg) 750 (6.1)      Urine (mL/kg/hr) 1100 (0.4) 1100 (0.4) 500 (0.4)    Emesis/NG output       Stool   0    Total Output 1100 1100 500    Net -350 -1100 -500           Stool Occurrence   0 x          Nutrition Risk  Level of Risk/Frequency of Follow-up:  (1-2x/week)     Monitor and Evaluation  Food and Nutrient Intake: energy intake, food and beverage intake  Food and Nutrient Adminstration: diet order  Knowledge/Beliefs/Attitudes: food and nutrition knowledge/skill  Physical Activity and Function: nutrition-related ADLs and IADLs, factors affecting access to physical activity  Anthropometric Measurements: body mass index  Biochemical Data, Medical Tests and Procedures: glucose/endocrine profile, lipid profile, electrolyte and renal panel  Nutrition-Focused Physical Findings: overall appearance     Nutrition Follow-Up  RD Follow-up?: Yes    Doris Bojorquez, Registration Eligible, Provisional LDN

## 2022-12-01 NOTE — SUBJECTIVE & OBJECTIVE
Interval History:  Patient noted to be awake, alert, conversant, pleasant.  Reports he still has pain, p.r.n. Norco increased from q.6 hours p.r.n. to q.4 hours p.r.n. continue current antibiotic and wound care regimen.  Podiatry following.  Reports he had some nosebleed today.  Will need to continue anticoagulation due to his DVT.  Will order nasal saline to moisten mucous membranes.    Review of Systems   Constitutional:  Positive for activity change and fatigue. Negative for fever.   Respiratory:  Negative for cough and shortness of breath.    Cardiovascular:  Negative for chest pain.   Gastrointestinal:  Negative for abdominal pain.   Musculoskeletal:  Positive for arthralgias.   Neurological:  Negative for dizziness and headaches.   Psychiatric/Behavioral:  Negative for confusion.    All other systems reviewed and are negative.  Objective:     Vital Signs (Most Recent):  Temp: 97.9 °F (36.6 °C) (12/01/22 1631)  Pulse: 73 (12/01/22 1631)  Resp: 20 (12/01/22 1631)  BP: 121/74 (12/01/22 1631)  SpO2: (!) 93 % (12/01/22 1631)   Vital Signs (24h Range):  Temp:  [97.5 °F (36.4 °C)-98.5 °F (36.9 °C)] 97.9 °F (36.6 °C)  Pulse:  [70-86] 73  Resp:  [16-20] 20  SpO2:  [91 %-100 %] 93 %  BP: (121-127)/(71-79) 121/74     Weight: 122.5 kg (270 lb 1 oz)  Body mass index is 34.67 kg/m².    Intake/Output Summary (Last 24 hours) at 12/1/2022 1712  Last data filed at 12/1/2022 1408  Gross per 24 hour   Intake --   Output 1600 ml   Net -1600 ml      Physical Exam  Vitals and nursing note reviewed.   Constitutional:       Appearance: Normal appearance.   HENT:      Head: Normocephalic and atraumatic.   Eyes:      Extraocular Movements: Extraocular movements intact.      Pupils: Pupils are equal, round, and reactive to light.   Cardiovascular:      Rate and Rhythm: Normal rate and regular rhythm.   Pulmonary:      Effort: Pulmonary effort is normal. No respiratory distress.   Abdominal:      General: Abdomen is flat. There is no  distension.   Musculoskeletal:         General: Normal range of motion.      Cervical back: Normal range of motion.      Comments: Left toe wound in bandage.  Right foot covered in bandage.   Neurological:      General: No focal deficit present.      Mental Status: He is alert and oriented to person, place, and time.   Psychiatric:         Mood and Affect: Mood normal.         Behavior: Behavior normal.       Significant Labs: All pertinent labs within the past 24 hours have been reviewed.  CBC: No results for input(s): WBC, HGB, HCT, PLT in the last 48 hours.  CMP: No results for input(s): NA, K, CL, CO2, GLU, BUN, CREATININE, CALCIUM, PROT, ALBUMIN, BILITOT, ALKPHOS, AST, ALT, ANIONGAP, EGFRNONAA in the last 48 hours.    Invalid input(s): ESTGFA[place]    Significant Imaging: I have reviewed all pertinent imaging results/findings within the past 24 hours.

## 2022-12-01 NOTE — PROGRESS NOTES
[facility] ([place])  Podiatry  Progress Note    Patient Name: Dave Good  MRN: [id number]  Admission Date: 11/22/2022  Attending Physician: Kulwinder Bedoya MD  Primary Care Provider: Reyna Jorge NP     Subjective:     Interval History: Patient with no new complaints.  Awaiting LTAC.  Iv antibiotics.  Working with Physical therapy.           Scheduled Meds:   aspirin  81 mg Oral Daily    atorvastatin  40 mg Oral Daily    ceFEPime (MAXIPIME) IVPB  2 g Intravenous Q8H    enoxaparin  1 mg/kg Subcutaneous Q12H    hydroCHLOROthiazide  25 mg Oral Daily    insulin detemir U-100  40 Units Subcutaneous QHS    lisinopriL  10 mg Oral Daily    mupirocin   Nasal BID    QUEtiapine  200 mg Oral Nightly    sodium chloride 0.9%  10 mL Intravenous Q6H    vancomycin (VANCOCIN) IVPB  1,500 mg Intravenous Q12H     Continuous Infusions:  PRN Meds:acetaminophen, albuterol-ipratropium, dextrose 10%, dextrose 10%, dextrose 10%, diphenhydrAMINE, glucagon (human recombinant), glucose, glucose, HYDROcodone-acetaminophen, insulin aspart U-100, melatonin, morphine, ondansetron, promethazine (PHENERGAN) IVPB, sodium chloride 0.9%, Flushing PICC Protocol **AND** sodium chloride 0.9% **AND** sodium chloride 0.9%, Pharmacy to dose Vancomycin consult **AND** vancomycin - pharmacy to dose    Review of Systems   Constitutional:  Negative for chills and fever.   HENT:  Negative for congestion.    Respiratory:  Negative for chest tightness and shortness of breath.    Cardiovascular:  Negative for chest pain and palpitations.   Gastrointestinal:  Negative for abdominal pain, nausea and vomiting.   Musculoskeletal:  Negative for back pain.   Skin:  Negative for wound.   Neurological:  Negative for dizziness and headaches.   Psychiatric/Behavioral:  Negative for agitation and behavioral problems.    Objective:     Vital Signs (Most Recent):  Temp: 97.6 °F (36.4 °C) (11/26/22 1935)  Pulse: 74 (11/26/22 1935)  Resp: 19 (11/26/22 1935)  BP:  (!) 159/81 (11/26/22 1935)  SpO2: 95 % (11/26/22 1935)   Vital Signs (24h Range):  Temp:  [97.6 °F (36.4 °C)-98.7 °F (37.1 °C)] 97.6 °F (36.4 °C)  Pulse:  [74-78] 74  Resp:  [16-20] 19  SpO2:  [93 %-96 %] 95 %  BP: (123-163)/(76-86) 159/81     Weight: 122.5 kg (270 lb)  Body mass index is 34.67 kg/m².    Foot Exam    Right Foot/Ankle     Neurovascular  Dorsalis pedis: 1+  Posterior tibial: 1+  Saphenous nerve sensation: diminished  Tibial nerve sensation: diminished  Superficial peroneal nerve sensation: diminished  Deep peroneal nerve sensation: diminished  Sural nerve sensation: diminished    Comments  Right foot: Patient has a tma on right foot. Diffuse xerosis noted. Area of fluctuance noted on right medial side. Wound measuring 2.5cm x2.4cm x.3cm noted on plantar distal aspect of right foot. No purulent drainage no crepitus noted.     Left Foot/Ankle      Neurovascular  Dorsalis pedis: 1+  Posterior tibial: 1+  Saphenous nerve sensation: diminished  Tibial nerve sensation: diminished  Superficial peroneal nerve sensation: diminished  Deep peroneal nerve sensation: diminished  Sural nerve sensation: diminished    Comments  Left foot: Patient has a wound on the distal plantar aspect of left great toe measuring. 3.0cm x 2.4cm x .5. Wound is granular in nature. Wound probes to bone. No purulent drainage noted. No crepitus or fluctuance noted.     Laboratory:    CRP   Date Value Ref Range Status   11/22/2022 125.3 (H) 0.0 - 8.2 mg/L Final       Sed Rate   Date Value Ref Range Status   11/22/2022 79 (H) 0 - 10 mm/Hr Final       WBC   Date Value Ref Range Status   11/29/2022 9.19 3.90 - 12.70 K/uL Final       Hemoglobin A1C   Date Value Ref Range Status   11/22/2022 9.1 (H) 4.0 - 5.6 % Final     Comment:     ADA Screening Guidelines:  5.7-6.4%  Consistent with prediabetes  >or=6.5%  Consistent with diabetes    High levels of fetal hemoglobin interfere with the HbA1C  assay. Heterozygous hemoglobin variants (HbS, HgC,  etc)do  not significantly interfere with this assay.   However, presence of multiple variants may affect accuracy.                Prealbumin: No results for input(s): PREALBUMIN in the last 48 hours.  Wound Cultures:   Recent Labs   Lab 06/16/22  0050 06/17/22  0601   LABAERO PROTEUS MIRABILIS  Many  *  METHICILLIN RESISTANT STAPHYLOCOCCUS AUREUS  Many  *  ACINETOBACTER LWOFFII GROUP  Few  *  PROVIDENCIA RETTGERI  Few  *  METHICILLIN RESISTANT STAPHYLOCOCCUS AUREUS  Moderate  * DIPHTHEROIDS  Few  *  ENTEROCOCCUS FAECALIS  Few  *  No growth     Microbiology Results (last 7 days)       Procedure Component Value Units Date/Time    Blood culture [[id number]] Collected: 11/26/22 0951    Order Status: Sent Specimen: Blood Updated: 11/26/22 0951    Blood culture #1 **CANNOT BE ORDERED STAT** [[id number]] Collected: 11/22/22 1928    Order Status: Completed Specimen: Blood from Midline, Basilic, Right Updated: 11/26/22 0936     Blood Culture, Routine Gram stain jose bottle: Gram positive rods      Results called to and read back by: Viridiana Hernandez RN  11/26/2022  09:36    Blood culture #2 **CANNOT BE ORDERED STAT** [[id number]] Collected: 11/22/22 1900    Order Status: Completed Specimen: Blood from Midline, Basilic, Right Updated: 11/26/22 0612     Blood Culture, Routine No Growth to date      No Growth to date      No Growth to date      No Growth to date    Aerobic culture [[id number]]     Order Status: No result Specimen: Wound from Toe, Left Foot     Culture, Anaerobic [[id number]]     Order Status: No result Specimen: Wound from Toe, Left Foot           Specimen (24h ago, onward)      None                Assessment/Plan:     Diabetic ulcer of left great toe  - s/p bone biopsy with no growth to date.  Empiric antibiotics per ID.    - continue wound care.     - pending ltac.   - Podiatry will follow           Lavonne Vigil DPM  NPI: [id number]         [facility] LOCATION ([facility])  [facility] - [facility] SURG [address]  [place]  [facility] [address]  Dept: [phone]  Dept Fax: [phone]  Loc: [phone]

## 2022-12-01 NOTE — PLAN OF CARE
Recommendations  Continue on 2000kcal ADA, cardiac diet per MD  ADDITION of ProMod to promote wound healing  Monitor nutrition related labs    Goals:   Pt to receive ProMod to promote wound healing by RD follow up  Monitored labs to trend toward target ranges    Nutrition Goal Status: new  Communication of RD Recs:  (poc)

## 2022-12-01 NOTE — PROGRESS NOTES
[facility] Medicine  Telemedicine Progress Note    Patient Name: Dave Good  MRN: [id number]  Patient Class: IP- Inpatient   Admission Date: 11/22/2022  Length of Stay: 8 days  Attending Physician: Aminah Ricardo MD  Primary Care Provider: Reyna Jorge NP          Subjective:     Principal Problem:Diabetic wet gangrene of the foot        HPI:  DM2, diabetic neuropathy, COPD, HTN, depression, right foot transmetatarsal amputation and osteomyelitis who was initially admitted to [facility] from Dr. Flores's office for osteomyelitis evaluation.  Patient knows progressively worsening foul-smelling discharge from his left great toe for which he sought care with Dr. Flores.  Patient was evaluated [facility] by surgery.  He will require further inpatient evaluation for osteomyelitis.       Overview/Hospital Course:  Into the hospital from general surgery clinic for evaluation of left great toe osteomyelitis.  CT imaging showing fluid collection at the left 1st MTP.  Bedside debridement performed by Podiatry.  Bone biopsy was recommended.    During hospitalization, patient had midline placed vascular access issues.  Later his hospitalization is noted right upper extremity edema.  Ultrasound Doppler right upper extremity revealed DVT extending from the right basilic to the axillary vein.  Per discussion with Hematology/Oncology, initiated therapeutic anticoagulation.      Interval History:  Patient noted to be awake, alert, conversant, pleasant.  Reports he still has pain, p.r.n. Norco increased from q.6 hours p.r.n. to q.4 hours p.r.n. continue current antibiotic and wound care regimen.  Podiatry following.  Reports he had some nosebleed today.  Will need to continue anticoagulation due to his DVT.  Will order nasal saline to moisten mucous membranes.    Review of Systems   Constitutional:  Positive for activity change and fatigue. Negative for fever.   Respiratory:  Negative for cough and shortness  of breath.    Cardiovascular:  Negative for chest pain.   Gastrointestinal:  Negative for abdominal pain.   Musculoskeletal:  Positive for arthralgias.   Neurological:  Negative for dizziness and headaches.   Psychiatric/Behavioral:  Negative for confusion.    All other systems reviewed and are negative.  Objective:     Vital Signs (Most Recent):  Temp: 97.9 °F (36.6 °C) (12/01/22 1631)  Pulse: 73 (12/01/22 1631)  Resp: 20 (12/01/22 1631)  BP: 121/74 (12/01/22 1631)  SpO2: (!) 93 % (12/01/22 1631)   Vital Signs (24h Range):  Temp:  [97.5 °F (36.4 °C)-98.5 °F (36.9 °C)] 97.9 °F (36.6 °C)  Pulse:  [70-86] 73  Resp:  [16-20] 20  SpO2:  [91 %-100 %] 93 %  BP: (121-127)/(71-79) 121/74     Weight: 122.5 kg (270 lb 1 oz)  Body mass index is 34.67 kg/m².    Intake/Output Summary (Last 24 hours) at 12/1/2022 1712  Last data filed at 12/1/2022 1408  Gross per 24 hour   Intake --   Output 1600 ml   Net -1600 ml      Physical Exam  Vitals and nursing note reviewed.   Constitutional:       Appearance: Normal appearance.   HENT:      Head: Normocephalic and atraumatic.   Eyes:      Extraocular Movements: Extraocular movements intact.      Pupils: Pupils are equal, round, and reactive to light.   Cardiovascular:      Rate and Rhythm: Normal rate and regular rhythm.   Pulmonary:      Effort: Pulmonary effort is normal. No respiratory distress.   Abdominal:      General: Abdomen is flat. There is no distension.   Musculoskeletal:         General: Normal range of motion.      Cervical back: Normal range of motion.      Comments: Left toe wound in bandage.  Right foot covered in bandage.   Neurological:      General: No focal deficit present.      Mental Status: He is alert and oriented to person, place, and time.   Psychiatric:         Mood and Affect: Mood normal.         Behavior: Behavior normal.       Significant Labs: All pertinent labs within the past 24 hours have been reviewed.  CBC: No results for input(s): WBC, HGB, HCT,  PLT in the last 48 hours.  CMP: No results for input(s): NA, K, CL, CO2, GLU, BUN, CREATININE, CALCIUM, PROT, ALBUMIN, BILITOT, ALKPHOS, AST, ALT, ANIONGAP, EGFRNONAA in the last 48 hours.    Invalid input(s): ESTGFAFR[place]    Significant Imaging: I have reviewed all pertinent imaging results/findings within the past 24 hours.      Assessment/Plan:      * Diabetic wet gangrene of the foot  Poor foot care with Left great toe wound concerning for wet gangrene.  Left 1st MTP fluid collection on imaging.  The patient declines amputation this time and would prefer medical treatment over transmetatarsal amputation.  Patient was started on IV Vanc and Zosyn in ED and changed to IV Vanc and Cefepime on admission.  Podiatry performed bedside debridement on 11/25 and bone biopsy on 11/27/2022.  Wound cultures sent on 11/27/2022 and pending.  Blood cultures negative to date.  Cefepime changed to Ertapenem on 11/29/2022.  IV Vanc changed to Daptomycin on 11/30/2022.    Plan:  Per ID recommendations -Empiric daptomycin 6 mg/kg IV and ertapenem 1 gm IV Q24 for 6 weeks total. End of care = 1/3/22  Follow up wound cultures - no growth to date  Rehab vs LTAC on discharge - pending      Epistaxis    -nasal saline spray    Diabetic ulcer of left great toe    Lab Results   Component Value Date    HGBA1C 9.1 (H) 11/22/2022     Most recent fingerstick glucose reviewed-   Recent Labs   Lab 11/26/22 2006 11/27/22  0746 11/27/22  1135   POCTGLUCOSE 205* 190* 209*     Current correctional scale    Antihyperglycemics (From admission, onward)    Start     Stop Route Frequency Ordered    11/27/22 2100  insulin detemir U-100 pen 30 Units         -- SubQ Nightly 11/27/22 1536    11/24/22 1330  insulin aspart U-100 pen 1-10 Units         -- SubQ Before meals & nightly PRN 11/24/22 1231        Hold Oral hypoglycemics while patient is in the hospital.    NOTE:  Patient usually takes 40 units basal insulin q.h.s.  75% of his typical basal dose has  been ordered as he will be NPO after midnight in anticipation for bone biopsy Monday morning    Acute deep vein thrombosis (DVT) of axillary vein of right upper extremity  Acute catheter associated DVT extending to the deep veins of the right upper extremity seen on ultrasound Doppler 11/26.  Patient had a right basilic midline catheter, which was initially placed due to poor vascular access.  He was not taking therapeutic anticoagulation at the time.  Case was discussed with Hematology/Oncology (Dr. Salinas) who advised therapeutic anticoagulation given that the thrombus is extending to the axillary vein.  Left upper extremity PICC line placed 11/26.  RUE midline d/c on 11/28/2022    Plan:  Continue with Lovenox 1 milligram/kilogram x 3 months        COPD with asthma  Not in exacerbation  -DuoNebs p.r.n.    Class 1 obesity due to excess calories with serious comorbidity and body mass index (BMI) of 34.0 to 34.9 in adult  Body mass index is 34.67 kg/m². Morbid obesity complicates all aspects of disease management from diagnostic modalities to treatment. Weight loss encouraged and health benefits explained to patient.         Mixed hyperlipidemia  -Continue Statin      Depression  -Continue Seroquel        HTN (hypertension)  Home Meds:  Lisinopril 10mg and HCTZ 25mg  -Continue home meds  -Monitor and adjust as needed      Type 2 diabetes mellitus with diabetic peripheral angiopathy without gangrene, with long-term current use of insulin  Hemoglobin A1c 9.1% this admission  Home Meds:  Metformin 1g bid, Trulicity, Detemir 50 units, Aspart 10 unit TIDWM  Hospital Meds:  Detemir 35 units with moderate dose SSI  -Continue to hold Metformin and Trulicity  -Continue current regimen  -Diabetic Diet  -Monitor and adjust as needed      VTE Risk Mitigation (From admission, onward)         Ordered     enoxaparin injection 120 mg  Every 12 hours (non-standard times)         11/28/22 0957     IP VTE HIGH RISK PATIENT  Once          11/22/22 1724     Place sequential compression device  Until discontinued         11/22/22 1724                      I have completed this tele-visit with the assistance of a telepresenter.    The attending portion of this evaluation, treatment, and documentation was performed per Aminah Ricardo MD via Telemedicine AudioVisual using the secure [employer] software platform with 2 way audio/video. The provider was located off-site and the patient is located in the hospital. The aforementioned video software was utilized to document the relevant history and physical exam    Aminah Ricardo MD  Department of Hospital Medicine   [facility], [place], operated by [facility] - Med Surg ([place])

## 2022-12-02 LAB
BACTERIA BLD CULT: NORMAL
BACTERIA SPEC ANAEROBE CULT: NORMAL
POCT GLUCOSE: 193 MG/DL (ref 70–110)
POCT GLUCOSE: 205 MG/DL (ref 70–110)
POCT GLUCOSE: 215 MG/DL (ref 70–110)
POCT GLUCOSE: 243 MG/DL (ref 70–110)

## 2022-12-02 PROCEDURE — 99232 SBSQ HOSP IP/OBS MODERATE 35: CPT | Mod: 95,,, | Performed by: HOSPITALIST

## 2022-12-02 PROCEDURE — A4216 STERILE WATER/SALINE, 10 ML: HCPCS | Performed by: STUDENT IN AN ORGANIZED HEALTH CARE EDUCATION/TRAINING PROGRAM

## 2022-12-02 PROCEDURE — 63600175 PHARM REV CODE 636 W HCPCS: Performed by: INTERNAL MEDICINE

## 2022-12-02 PROCEDURE — 99232 PR SUBSEQUENT HOSPITAL CARE,LEVL II: ICD-10-PCS | Mod: 95,,, | Performed by: HOSPITALIST

## 2022-12-02 PROCEDURE — 25000003 PHARM REV CODE 250: Performed by: INTERNAL MEDICINE

## 2022-12-02 PROCEDURE — 97530 THERAPEUTIC ACTIVITIES: CPT | Mod: CQ

## 2022-12-02 PROCEDURE — 97116 GAIT TRAINING THERAPY: CPT | Mod: CQ

## 2022-12-02 PROCEDURE — 25000003 PHARM REV CODE 250: Performed by: HOSPITALIST

## 2022-12-02 PROCEDURE — 25000003 PHARM REV CODE 250: Performed by: STUDENT IN AN ORGANIZED HEALTH CARE EDUCATION/TRAINING PROGRAM

## 2022-12-02 PROCEDURE — 25000003 PHARM REV CODE 250: Performed by: NURSE PRACTITIONER

## 2022-12-02 PROCEDURE — 11000001 HC ACUTE MED/SURG PRIVATE ROOM

## 2022-12-02 RX ADMIN — DAPTOMYCIN 590 MG: 350 INJECTION, POWDER, LYOPHILIZED, FOR SOLUTION INTRAVENOUS at 09:12

## 2022-12-02 RX ADMIN — ENOXAPARIN SODIUM 120 MG: 100 INJECTION SUBCUTANEOUS at 11:12

## 2022-12-02 RX ADMIN — SODIUM CHLORIDE, PRESERVATIVE FREE 10 ML: 5 INJECTION INTRAVENOUS at 11:12

## 2022-12-02 RX ADMIN — ATORVASTATIN CALCIUM 40 MG: 20 TABLET, FILM COATED ORAL at 09:12

## 2022-12-02 RX ADMIN — INSULIN ASPART 2 UNITS: 100 INJECTION, SOLUTION INTRAVENOUS; SUBCUTANEOUS at 08:12

## 2022-12-02 RX ADMIN — MELATONIN TAB 3 MG 6 MG: 3 TAB at 08:12

## 2022-12-02 RX ADMIN — SODIUM CHLORIDE, PRESERVATIVE FREE 10 ML: 5 INJECTION INTRAVENOUS at 12:12

## 2022-12-02 RX ADMIN — SODIUM CHLORIDE, PRESERVATIVE FREE 10 ML: 5 INJECTION INTRAVENOUS at 05:12

## 2022-12-02 RX ADMIN — NASAL 2 SPRAY: 6.5 SPRAY NASAL at 11:12

## 2022-12-02 RX ADMIN — HYDROCODONE BITARTRATE AND ACETAMINOPHEN 1 TABLET: 5; 325 TABLET ORAL at 03:12

## 2022-12-02 RX ADMIN — HYDROCODONE BITARTRATE AND ACETAMINOPHEN 1 TABLET: 5; 325 TABLET ORAL at 01:12

## 2022-12-02 RX ADMIN — ERTAPENEM 1 G: 1 INJECTION INTRAMUSCULAR; INTRAVENOUS at 12:12

## 2022-12-02 RX ADMIN — NASAL 2 SPRAY: 6.5 SPRAY NASAL at 12:12

## 2022-12-02 RX ADMIN — NASAL 2 SPRAY: 6.5 SPRAY NASAL at 05:12

## 2022-12-02 RX ADMIN — HYDROCODONE BITARTRATE AND ACETAMINOPHEN 1 TABLET: 5; 325 TABLET ORAL at 09:12

## 2022-12-02 RX ADMIN — INSULIN ASPART 4 UNITS: 100 INJECTION, SOLUTION INTRAVENOUS; SUBCUTANEOUS at 12:12

## 2022-12-02 RX ADMIN — ENOXAPARIN SODIUM 120 MG: 100 INJECTION SUBCUTANEOUS at 12:12

## 2022-12-02 RX ADMIN — HYDROCODONE BITARTRATE AND ACETAMINOPHEN 1 TABLET: 5; 325 TABLET ORAL at 12:12

## 2022-12-02 RX ADMIN — ASPIRIN 81 MG: 81 TABLET, COATED ORAL at 09:12

## 2022-12-02 RX ADMIN — QUETIAPINE FUMARATE 200 MG: 200 TABLET ORAL at 08:12

## 2022-12-02 RX ADMIN — INSULIN DETEMIR 35 UNITS: 100 INJECTION, SOLUTION SUBCUTANEOUS at 08:12

## 2022-12-02 RX ADMIN — HYDROCODONE BITARTRATE AND ACETAMINOPHEN 1 TABLET: 5; 325 TABLET ORAL at 05:12

## 2022-12-02 RX ADMIN — INSULIN ASPART 2 UNITS: 100 INJECTION, SOLUTION INTRAVENOUS; SUBCUTANEOUS at 09:12

## 2022-12-02 RX ADMIN — INSULIN ASPART 4 UNITS: 100 INJECTION, SOLUTION INTRAVENOUS; SUBCUTANEOUS at 05:12

## 2022-12-02 NOTE — PROGRESS NOTES
[facility] Medicine  Telemedicine Progress Note    Patient Name: Dave Good  MRN: [id number]  Patient Class: IP- Inpatient   Admission Date: 11/22/2022  Length of Stay: 9 days  Attending Physician: Aminah Ricardo MD  Primary Care Provider: Reyna Jorge NP          Subjective:     Principal Problem:Diabetic wet gangrene of the foot        HPI:  DM2, diabetic neuropathy, COPD, HTN, depression, right foot transmetatarsal amputation and osteomyelitis who was initially admitted to [facility] from Dr. Flores's office for osteomyelitis evaluation.  Patient knows progressively worsening foul-smelling discharge from his left great toe for which he sought care with Dr. Flores.  Patient was evaluated [facility] by surgery.  He will require further inpatient evaluation for osteomyelitis.       Overview/Hospital Course:  Into the hospital from general surgery clinic for evaluation of left great toe osteomyelitis.  CT imaging showing fluid collection at the left 1st MTP.  Bedside debridement performed by Podiatry.  Bone biopsy was recommended.    During hospitalization, patient had midline placed vascular access issues.  Later his hospitalization is noted right upper extremity edema.  Ultrasound Doppler right upper extremity revealed DVT extending from the right basilic to the axillary vein.  Per discussion with Hematology/Oncology, initiated therapeutic anticoagulation.      Interval History:  Patient noted to be HD stable and afebrile. Sitting up in the chair. Pain levels slighlty better than yesterday.  Nosebleeds now resolved.  Continue nasal saline to moist and mucous membranes.  Medically stable for discharge, pending placement to LTAC    Review of Systems   Constitutional:  Positive for activity change and fatigue. Negative for fever.   Respiratory:  Negative for cough and shortness of breath.    Cardiovascular:  Negative for chest pain.   Gastrointestinal:  Negative for abdominal pain.    Musculoskeletal:  Positive for arthralgias.   Neurological:  Negative for dizziness and headaches.   Psychiatric/Behavioral:  Negative for confusion.    All other systems reviewed and are negative.  Objective:     Vital Signs (Most Recent):  Temp: 97.7 °F (36.5 °C) (12/02/22 0750)  Pulse: 66 (12/02/22 0750)  Resp: 16 (12/02/22 0923)  BP: 96/75 (12/02/22 0750)  SpO2: 95 % (12/02/22 0750)   Vital Signs (24h Range):  Temp:  [97.5 °F (36.4 °C)-98.2 °F (36.8 °C)] 97.7 °F (36.5 °C)  Pulse:  [63-79] 66  Resp:  [16-20] 16  SpO2:  [90 %-100 %] 95 %  BP: ()/(60-82) 96/75     Weight: 122.5 kg (270 lb 1 oz)  Body mass index is 34.67 kg/m².    Intake/Output Summary (Last 24 hours) at 12/2/2022 1049  Last data filed at 12/2/2022 0753  Gross per 24 hour   Intake 180 ml   Output 1912 ml   Net -1732 ml        Physical Exam  Vitals and nursing note reviewed.   Constitutional:       Appearance: Normal appearance.   HENT:      Head: Normocephalic and atraumatic.   Eyes:      Extraocular Movements: Extraocular movements intact.      Pupils: Pupils are equal, round, and reactive to light.   Cardiovascular:      Rate and Rhythm: Normal rate and regular rhythm.   Pulmonary:      Effort: Pulmonary effort is normal. No respiratory distress.   Abdominal:      General: Abdomen is flat. There is no distension.   Musculoskeletal:         General: Normal range of motion.      Cervical back: Normal range of motion.      Comments: Left toe wound in bandage.  Right foot covered in bandage.   Neurological:      General: No focal deficit present.      Mental Status: He is alert and oriented to person, place, and time.   Psychiatric:         Mood and Affect: Mood normal.         Behavior: Behavior normal.       Significant Labs: All pertinent labs within the past 24 hours have been reviewed.  CBC: No results for input(s): WBC, HGB, HCT, PLT in the last 48 hours.  CMP: No results for input(s): NA, K, CL, CO2, GLU, BUN, CREATININE, CALCIUM, PROT,  ALBUMIN, BILITOT, ALKPHOS, AST, ALT, ANIONGAP, EGFRNONAA in the last 48 hours.    Invalid input(s): ESTGFAFR[place]    Significant Imaging: I have reviewed all pertinent imaging results/findings within the past 24 hours.      Assessment/Plan:      * Diabetic wet gangrene of the foot  Poor foot care with Left great toe wound concerning for wet gangrene.  Left 1st MTP fluid collection on imaging.  The patient declines amputation this time and would prefer medical treatment over transmetatarsal amputation.  Patient was started on IV Vanc and Zosyn in ED and changed to IV Vanc and Cefepime on admission.  Podiatry performed bedside debridement on 11/25 and bone biopsy on 11/27/2022.  Wound cultures sent on 11/27/2022 and pending.  Blood cultures negative to date.  Cefepime changed to Ertapenem on 11/29/2022.  IV Vanc changed to Daptomycin on 11/30/2022.    Plan:  Per ID recommendations -Empiric daptomycin 6 mg/kg IV and ertapenem 1 gm IV Q24 for 6 weeks total. End of care = 1/3/22  Follow up wound cultures - no growth to date  Rehab vs LTAC on discharge - pending      Epistaxis    -nasal saline spray  -resolved    Diabetic ulcer of left great toe    Lab Results   Component Value Date    HGBA1C 9.1 (H) 11/22/2022     Most recent fingerstick glucose reviewed-   Recent Labs   Lab 11/26/22 2006 11/27/22  0746 11/27/22  1135   POCTGLUCOSE 205* 190* 209*     Current correctional scale    Antihyperglycemics (From admission, onward)    Start     Stop Route Frequency Ordered    11/27/22 2100  insulin detemir U-100 pen 30 Units         -- SubQ Nightly 11/27/22 1536    11/24/22 1330  insulin aspart U-100 pen 1-10 Units         -- SubQ Before meals & nightly PRN 11/24/22 1231        Hold Oral hypoglycemics while patient is in the hospital.    NOTE:  Patient usually takes 40 units basal insulin q.h.s.  75% of his typical basal dose has been ordered as he will be NPO after midnight in anticipation for bone biopsy Monday morning    Acute  deep vein thrombosis (DVT) of axillary vein of right upper extremity  Acute catheter associated DVT extending to the deep veins of the right upper extremity seen on ultrasound Doppler 11/26.  Patient had a right basilic midline catheter, which was initially placed due to poor vascular access.  He was not taking therapeutic anticoagulation at the time.  Case was discussed with Hematology/Oncology (Dr. Salinas) who advised therapeutic anticoagulation given that the thrombus is extending to the axillary vein.  Left upper extremity PICC line placed 11/26.  RUE midline d/c on 11/28/2022    Plan:  Continue with Lovenox 1 milligram/kilogram x 3 months        COPD with asthma  Not in exacerbation  -DuoNebs p.r.n.    Class 1 obesity due to excess calories with serious comorbidity and body mass index (BMI) of 34.0 to 34.9 in adult  Body mass index is 34.67 kg/m². Morbid obesity complicates all aspects of disease management from diagnostic modalities to treatment. Weight loss encouraged and health benefits explained to patient.         Mixed hyperlipidemia  -Continue Statin      Depression  -Continue Seroquel        HTN (hypertension)  Home Meds:  Lisinopril 10mg and HCTZ 25mg  -Continue home meds  -Monitor and adjust as needed      Type 2 diabetes mellitus with diabetic peripheral angiopathy without gangrene, with long-term current use of insulin  Hemoglobin A1c 9.1% this admission  Home Meds:  Metformin 1g bid, Trulicity, Detemir 50 units, Aspart 10 unit TIDWM  Hospital Meds:  Detemir 35 units with moderate dose SSI  -Continue to hold Metformin and Trulicity  -Continue current regimen  -Diabetic Diet  -Monitor and adjust as needed      VTE Risk Mitigation (From admission, onward)         Ordered     enoxaparin injection 120 mg  Every 12 hours (non-standard times)         11/28/22 0957     IP VTE HIGH RISK PATIENT  Once         11/22/22 1724     Place sequential compression device  Until discontinued         11/22/22 1724                       I have completed this tele-visit without the assistance of a telepresenter.    The attending portion of this evaluation, treatment, and documentation was performed per Aminah Ricardo MD via Telemedicine AudioVisual using the secure [employer] software platform with 2 way audio/video. The provider was located off-site and the patient is located in the hospital. The aforementioned video software was utilized to document the relevant history and physical exam    Aminah Ricardo MD  Department of Hospital Medicine   [facility] - Med Surg ([place])

## 2022-12-02 NOTE — SUBJECTIVE & OBJECTIVE
Interval History:  Patient noted to be HD stable and afebrile. Sitting up in the chair. Pain levels slighlty better than yesterday.  Nosebleeds now resolved.  Continue nasal saline to moist and mucous membranes.  Medically stable for discharge, pending placement to LTAC    Review of Systems   Constitutional:  Positive for activity change and fatigue. Negative for fever.   Respiratory:  Negative for cough and shortness of breath.    Cardiovascular:  Negative for chest pain.   Gastrointestinal:  Negative for abdominal pain.   Musculoskeletal:  Positive for arthralgias.   Neurological:  Negative for dizziness and headaches.   Psychiatric/Behavioral:  Negative for confusion.    All other systems reviewed and are negative.  Objective:     Vital Signs (Most Recent):  Temp: 97.7 °F (36.5 °C) (12/02/22 0750)  Pulse: 66 (12/02/22 0750)  Resp: 16 (12/02/22 0923)  BP: 96/75 (12/02/22 0750)  SpO2: 95 % (12/02/22 0750)   Vital Signs (24h Range):  Temp:  [97.5 °F (36.4 °C)-98.2 °F (36.8 °C)] 97.7 °F (36.5 °C)  Pulse:  [63-79] 66  Resp:  [16-20] 16  SpO2:  [90 %-100 %] 95 %  BP: ()/(60-82) 96/75     Weight: 122.5 kg (270 lb 1 oz)  Body mass index is 34.67 kg/m².    Intake/Output Summary (Last 24 hours) at 12/2/2022 1049  Last data filed at 12/2/2022 0753  Gross per 24 hour   Intake 180 ml   Output 1912 ml   Net -1732 ml        Physical Exam  Vitals and nursing note reviewed.   Constitutional:       Appearance: Normal appearance.   HENT:      Head: Normocephalic and atraumatic.   Eyes:      Extraocular Movements: Extraocular movements intact.      Pupils: Pupils are equal, round, and reactive to light.   Cardiovascular:      Rate and Rhythm: Normal rate and regular rhythm.   Pulmonary:      Effort: Pulmonary effort is normal. No respiratory distress.   Abdominal:      General: Abdomen is flat. There is no distension.   Musculoskeletal:         General: Normal range of motion.      Cervical back: Normal range of motion.       Comments: Left toe wound in bandage.  Right foot covered in bandage.   Neurological:      General: No focal deficit present.      Mental Status: He is alert and oriented to person, place, and time.   Psychiatric:         Mood and Affect: Mood normal.         Behavior: Behavior normal.       Significant Labs: All pertinent labs within the past 24 hours have been reviewed.  CBC: No results for input(s): WBC, HGB, HCT, PLT in the last 48 hours.  CMP: No results for input(s): NA, K, CL, CO2, GLU, BUN, CREATININE, CALCIUM, PROT, ALBUMIN, BILITOT, ALKPHOS, AST, ALT, ANIONGAP, EGFRNONAA in the last 48 hours.    Invalid input(s): ESTGFAFR[place]    Significant Imaging: I have reviewed all pertinent imaging results/findings within the past 24 hours.

## 2022-12-02 NOTE — PT/OT/SLP PROGRESS
Physical Therapy Treatment    Patient Name:  Dave Good   MRN:  [id number]    Recommendations:     Discharge Recommendations:  LTACH (long-term acute care hospital)   Discharge Equipment Recommendations: none   Barriers to discharge: None    Assessment:     Dave Good is a 57 y.o. male admitted with a medical diagnosis of Diabetic wet gangrene of the foot.  He presents with the following impairments/functional limitations:  impaired functional mobility, gait instability, impaired balance, weakness, decreased lower extremity function.    Patient completes gait training with minimal verbal cues to include heel to toe and increase step length. Patient is receptive to feedback and completes tx with no c/o discomfort or LOB.     Rehab Prognosis: Good; patient would benefit from acute skilled PT services to address these deficits and reach maximum level of function.    Recent Surgery: * No surgery found *      Plan:     During this hospitalization, patient to be seen 3 x/week to address the identified rehab impairments via gait training, therapeutic activities, therapeutic exercises, neuromuscular re-education and progress toward the following goals:    Plan of Care Expires:  12/28/22    Subjective     Chief Complaint: none stated  Patient/Family Comments/goals: none stated  Pain/Comfort:         Objective:     Communicated with nurse Jara prior to session.  Patient found HOB elevated with peripheral IV upon PT entry to room.     General Precautions: Standard, fall   Orthopedic Precautions:N/A   Braces:    Respiratory Status: Room air     Functional Mobility:  Bed mobility          Supine to sit eob SBA  Scooting forward to EOB SBA    Transfers  X 3 Sit to stand no AD mod I    Bed<>Chair no AD mod I  Gait training    200 ft'' no AD step through pattern, decreased step length, cuing to walk with heel to toe      AM-PAC 6 CLICK MOBILITY  Turning over in bed (including adjusting bedclothes, sheets and blankets)?:  4  Sitting down on and standing up from a chair with arms (e.g., wheelchair, bedside commode, etc.): 4  Moving from lying on back to sitting on the side of the bed?: 4  Moving to and from a bed to a chair (including a wheelchair)?: 4  Need to walk in hospital room?: 3  Climbing 3-5 steps with a railing?: 3  Basic Mobility Total Score: 22       Treatment & Education:     TE at eob: x 15 LAQ, seated marches, toe raises to increase strength and endurance. Patient demonstrates good form and is provided with verbal cues to decrease speed with reps.    Patient left HOB elevated with all lines intact and call button in reach..    GOALS:   Multidisciplinary Problems       Physical Therapy Goals          Problem: Physical Therapy    Goal Priority Disciplines Outcome Goal Variances Interventions   Physical Therapy Goal     PT, PT/OT Ongoing, Progressing     Description: Goals to be met by: 2022    Patient will increase functional independence with mobility by performin. Sit<>stand with supervision with LRAD. MET 2022  2. Gait x 120 feet with SBA with LRAD. MET 2022  3. Ascend/descend 1 flight of step(s) with least restrictive assistive device and SBA.                           Time Tracking:     PT Received On: 22  PT Start Time: 0936     PT Stop Time: 1010  PT Total Time (min): 34 min     Billable Minutes: Gait Training 20 and Therapeutic Activity 14    Treatment Type: Treatment  PT/PTA: PTA     PTA Visit Number: 1     2022

## 2022-12-03 LAB
ERYTHROCYTE [DISTWIDTH] IN BLOOD BY AUTOMATED COUNT: 14.5 % (ref 11.5–14.5)
HCT VFR BLD AUTO: 35.8 % (ref 40–54)
HGB BLD-MCNC: 11 G/DL (ref 14–18)
MCH RBC QN AUTO: 27.1 PG (ref 27–31)
MCHC RBC AUTO-ENTMCNC: 30.7 G/DL (ref 32–36)
MCV RBC AUTO: 88 FL (ref 82–98)
PLATELET # BLD AUTO: 468 K/UL (ref 150–450)
PMV BLD AUTO: 9.1 FL (ref 9.2–12.9)
POCT GLUCOSE: 176 MG/DL (ref 70–110)
POCT GLUCOSE: 185 MG/DL (ref 70–110)
POCT GLUCOSE: 199 MG/DL (ref 70–110)
POCT GLUCOSE: 220 MG/DL (ref 70–110)
RBC # BLD AUTO: 4.06 M/UL (ref 4.6–6.2)
WBC # BLD AUTO: 9.51 K/UL (ref 3.9–12.7)

## 2022-12-03 PROCEDURE — 99232 SBSQ HOSP IP/OBS MODERATE 35: CPT | Mod: 95,,, | Performed by: HOSPITALIST

## 2022-12-03 PROCEDURE — 99232 PR SUBSEQUENT HOSPITAL CARE,LEVL II: ICD-10-PCS | Mod: 95,,, | Performed by: HOSPITALIST

## 2022-12-03 PROCEDURE — 25000003 PHARM REV CODE 250: Performed by: STUDENT IN AN ORGANIZED HEALTH CARE EDUCATION/TRAINING PROGRAM

## 2022-12-03 PROCEDURE — 11000001 HC ACUTE MED/SURG PRIVATE ROOM

## 2022-12-03 PROCEDURE — 25000003 PHARM REV CODE 250: Performed by: NURSE PRACTITIONER

## 2022-12-03 PROCEDURE — A4216 STERILE WATER/SALINE, 10 ML: HCPCS | Performed by: STUDENT IN AN ORGANIZED HEALTH CARE EDUCATION/TRAINING PROGRAM

## 2022-12-03 PROCEDURE — 63600175 PHARM REV CODE 636 W HCPCS: Performed by: INTERNAL MEDICINE

## 2022-12-03 PROCEDURE — 85027 COMPLETE CBC AUTOMATED: CPT | Performed by: HOSPITALIST

## 2022-12-03 PROCEDURE — 25000003 PHARM REV CODE 250: Performed by: HOSPITALIST

## 2022-12-03 PROCEDURE — 25000003 PHARM REV CODE 250: Performed by: INTERNAL MEDICINE

## 2022-12-03 RX ADMIN — DAPTOMYCIN 590 MG: 350 INJECTION, POWDER, LYOPHILIZED, FOR SOLUTION INTRAVENOUS at 08:12

## 2022-12-03 RX ADMIN — INSULIN ASPART 2 UNITS: 100 INJECTION, SOLUTION INTRAVENOUS; SUBCUTANEOUS at 12:12

## 2022-12-03 RX ADMIN — LISINOPRIL 10 MG: 10 TABLET ORAL at 08:12

## 2022-12-03 RX ADMIN — ASPIRIN 81 MG: 81 TABLET, COATED ORAL at 08:12

## 2022-12-03 RX ADMIN — SODIUM CHLORIDE, PRESERVATIVE FREE 10 ML: 5 INJECTION INTRAVENOUS at 06:12

## 2022-12-03 RX ADMIN — HYDROCODONE BITARTRATE AND ACETAMINOPHEN 1 TABLET: 5; 325 TABLET ORAL at 09:12

## 2022-12-03 RX ADMIN — HYDROCODONE BITARTRATE AND ACETAMINOPHEN 1 TABLET: 5; 325 TABLET ORAL at 05:12

## 2022-12-03 RX ADMIN — NASAL 2 SPRAY: 6.5 SPRAY NASAL at 09:12

## 2022-12-03 RX ADMIN — HYDROCHLOROTHIAZIDE 25 MG: 25 TABLET ORAL at 08:12

## 2022-12-03 RX ADMIN — INSULIN ASPART 2 UNITS: 100 INJECTION, SOLUTION INTRAVENOUS; SUBCUTANEOUS at 08:12

## 2022-12-03 RX ADMIN — ATORVASTATIN CALCIUM 40 MG: 20 TABLET, FILM COATED ORAL at 08:12

## 2022-12-03 RX ADMIN — INSULIN ASPART 1 UNITS: 100 INJECTION, SOLUTION INTRAVENOUS; SUBCUTANEOUS at 09:12

## 2022-12-03 RX ADMIN — QUETIAPINE FUMARATE 200 MG: 200 TABLET ORAL at 09:12

## 2022-12-03 RX ADMIN — SODIUM CHLORIDE, PRESERVATIVE FREE 10 ML: 5 INJECTION INTRAVENOUS at 05:12

## 2022-12-03 RX ADMIN — INSULIN ASPART 4 UNITS: 100 INJECTION, SOLUTION INTRAVENOUS; SUBCUTANEOUS at 05:12

## 2022-12-03 RX ADMIN — NASAL 2 SPRAY: 6.5 SPRAY NASAL at 01:12

## 2022-12-03 RX ADMIN — HYDROCODONE BITARTRATE AND ACETAMINOPHEN 1 TABLET: 5; 325 TABLET ORAL at 12:12

## 2022-12-03 RX ADMIN — SODIUM CHLORIDE, PRESERVATIVE FREE 10 ML: 5 INJECTION INTRAVENOUS at 01:12

## 2022-12-03 RX ADMIN — ENOXAPARIN SODIUM 120 MG: 100 INJECTION SUBCUTANEOUS at 11:12

## 2022-12-03 RX ADMIN — ERTAPENEM 1 G: 1 INJECTION INTRAMUSCULAR; INTRAVENOUS at 01:12

## 2022-12-03 RX ADMIN — SODIUM CHLORIDE, PRESERVATIVE FREE 10 ML: 5 INJECTION INTRAVENOUS at 11:12

## 2022-12-03 RX ADMIN — HYDROCODONE BITARTRATE AND ACETAMINOPHEN 1 TABLET: 5; 325 TABLET ORAL at 01:12

## 2022-12-03 RX ADMIN — INSULIN DETEMIR 35 UNITS: 100 INJECTION, SOLUTION SUBCUTANEOUS at 09:12

## 2022-12-03 RX ADMIN — ENOXAPARIN SODIUM 120 MG: 100 INJECTION SUBCUTANEOUS at 12:12

## 2022-12-03 RX ADMIN — NASAL 2 SPRAY: 6.5 SPRAY NASAL at 05:12

## 2022-12-03 NOTE — PLAN OF CARE
Pt requested pain medication when it was available.  Pt up in chair most of day.  Excellent appetite.  Vital signs and blood sugar closely  monitored.  Safety measures in place.

## 2022-12-04 LAB
POCT GLUCOSE: 178 MG/DL (ref 70–110)
POCT GLUCOSE: 191 MG/DL (ref 70–110)
POCT GLUCOSE: 205 MG/DL (ref 70–110)
POCT GLUCOSE: 214 MG/DL (ref 70–110)

## 2022-12-04 PROCEDURE — 25000003 PHARM REV CODE 250: Performed by: HOSPITALIST

## 2022-12-04 PROCEDURE — 99232 SBSQ HOSP IP/OBS MODERATE 35: CPT | Mod: 95,,, | Performed by: HOSPITALIST

## 2022-12-04 PROCEDURE — 25000003 PHARM REV CODE 250: Performed by: INTERNAL MEDICINE

## 2022-12-04 PROCEDURE — 63600175 PHARM REV CODE 636 W HCPCS: Performed by: INTERNAL MEDICINE

## 2022-12-04 PROCEDURE — A4216 STERILE WATER/SALINE, 10 ML: HCPCS | Performed by: STUDENT IN AN ORGANIZED HEALTH CARE EDUCATION/TRAINING PROGRAM

## 2022-12-04 PROCEDURE — 11000001 HC ACUTE MED/SURG PRIVATE ROOM

## 2022-12-04 PROCEDURE — 25000003 PHARM REV CODE 250: Performed by: NURSE PRACTITIONER

## 2022-12-04 PROCEDURE — 25000003 PHARM REV CODE 250: Performed by: STUDENT IN AN ORGANIZED HEALTH CARE EDUCATION/TRAINING PROGRAM

## 2022-12-04 PROCEDURE — 99232 PR SUBSEQUENT HOSPITAL CARE,LEVL II: ICD-10-PCS | Mod: 95,,, | Performed by: HOSPITALIST

## 2022-12-04 RX ADMIN — INSULIN ASPART 4 UNITS: 100 INJECTION, SOLUTION INTRAVENOUS; SUBCUTANEOUS at 12:12

## 2022-12-04 RX ADMIN — HYDROCODONE BITARTRATE AND ACETAMINOPHEN 1 TABLET: 5; 325 TABLET ORAL at 09:12

## 2022-12-04 RX ADMIN — HYDROCODONE BITARTRATE AND ACETAMINOPHEN 1 TABLET: 5; 325 TABLET ORAL at 06:12

## 2022-12-04 RX ADMIN — INSULIN DETEMIR 35 UNITS: 100 INJECTION, SOLUTION SUBCUTANEOUS at 09:12

## 2022-12-04 RX ADMIN — SODIUM CHLORIDE, PRESERVATIVE FREE 10 ML: 5 INJECTION INTRAVENOUS at 11:12

## 2022-12-04 RX ADMIN — SODIUM CHLORIDE, PRESERVATIVE FREE 10 ML: 5 INJECTION INTRAVENOUS at 12:12

## 2022-12-04 RX ADMIN — SODIUM CHLORIDE, PRESERVATIVE FREE 10 ML: 5 INJECTION INTRAVENOUS at 06:12

## 2022-12-04 RX ADMIN — LISINOPRIL 10 MG: 10 TABLET ORAL at 08:12

## 2022-12-04 RX ADMIN — DAPTOMYCIN 590 MG: 350 INJECTION, POWDER, LYOPHILIZED, FOR SOLUTION INTRAVENOUS at 08:12

## 2022-12-04 RX ADMIN — HYDROCODONE BITARTRATE AND ACETAMINOPHEN 1 TABLET: 5; 325 TABLET ORAL at 05:12

## 2022-12-04 RX ADMIN — NASAL 2 SPRAY: 6.5 SPRAY NASAL at 09:12

## 2022-12-04 RX ADMIN — INSULIN ASPART 4 UNITS: 100 INJECTION, SOLUTION INTRAVENOUS; SUBCUTANEOUS at 08:12

## 2022-12-04 RX ADMIN — HYDROCODONE BITARTRATE AND ACETAMINOPHEN 1 TABLET: 5; 325 TABLET ORAL at 01:12

## 2022-12-04 RX ADMIN — ATORVASTATIN CALCIUM 40 MG: 20 TABLET, FILM COATED ORAL at 08:12

## 2022-12-04 RX ADMIN — SODIUM CHLORIDE, PRESERVATIVE FREE 10 ML: 5 INJECTION INTRAVENOUS at 05:12

## 2022-12-04 RX ADMIN — ENOXAPARIN SODIUM 120 MG: 100 INJECTION SUBCUTANEOUS at 12:12

## 2022-12-04 RX ADMIN — ENOXAPARIN SODIUM 120 MG: 100 INJECTION SUBCUTANEOUS at 10:12

## 2022-12-04 RX ADMIN — INSULIN ASPART 1 UNITS: 100 INJECTION, SOLUTION INTRAVENOUS; SUBCUTANEOUS at 09:12

## 2022-12-04 RX ADMIN — HYDROCHLOROTHIAZIDE 25 MG: 25 TABLET ORAL at 08:12

## 2022-12-04 RX ADMIN — ERTAPENEM 1 G: 1 INJECTION INTRAMUSCULAR; INTRAVENOUS at 12:12

## 2022-12-04 RX ADMIN — INSULIN ASPART 2 UNITS: 100 INJECTION, SOLUTION INTRAVENOUS; SUBCUTANEOUS at 05:12

## 2022-12-04 RX ADMIN — QUETIAPINE FUMARATE 200 MG: 200 TABLET ORAL at 09:12

## 2022-12-04 RX ADMIN — ASPIRIN 81 MG: 81 TABLET, COATED ORAL at 08:12

## 2022-12-04 NOTE — SUBJECTIVE & OBJECTIVE
Interval History:  Patient noted to be HD stable and afebrile. No acute events overnight and no new complaints this morning.  Medically stable for discharge, pending placement to LTAC    Review of Systems   Constitutional:  Positive for activity change and fatigue. Negative for fever.   Respiratory:  Negative for cough and shortness of breath.    Cardiovascular:  Negative for chest pain.   Gastrointestinal:  Negative for abdominal pain.   Musculoskeletal:  Positive for arthralgias.   Neurological:  Negative for dizziness and headaches.   Psychiatric/Behavioral:  Negative for confusion.    All other systems reviewed and are negative.  Objective:     Vital Signs (Most Recent):  Temp: 98.5 °F (36.9 °C) (12/03/22 2330)  Pulse: 72 (12/03/22 2330)  Resp: 20 (12/03/22 2330)  BP: 107/66 (12/03/22 2330)  SpO2: (!) 94 % (12/03/22 2330)   Vital Signs (24h Range):  Temp:  [97.9 °F (36.6 °C)-98.5 °F (36.9 °C)] 98.5 °F (36.9 °C)  Pulse:  [67-78] 72  Resp:  [16-20] 20  SpO2:  [94 %-98 %] 94 %  BP: ()/(64-78) 107/66     Weight: 122.5 kg (270 lb 1 oz)  Body mass index is 34.67 kg/m².    Intake/Output Summary (Last 24 hours) at 12/4/2022 0104  Last data filed at 12/3/2022 2100  Gross per 24 hour   Intake 540 ml   Output 1200 ml   Net -660 ml        Physical Exam  Vitals and nursing note reviewed.   Constitutional:       Appearance: Normal appearance.   HENT:      Head: Normocephalic and atraumatic.   Eyes:      Extraocular Movements: Extraocular movements intact.      Pupils: Pupils are equal, round, and reactive to light.   Cardiovascular:      Rate and Rhythm: Normal rate and regular rhythm.   Pulmonary:      Effort: Pulmonary effort is normal. No respiratory distress.   Abdominal:      General: Abdomen is flat. There is no distension.   Musculoskeletal:         General: Normal range of motion.      Cervical back: Normal range of motion.      Comments: Left toe wound in bandage.  Right foot covered in bandage.   Neurological:       General: No focal deficit present.      Mental Status: He is alert and oriented to person, place, and time.   Psychiatric:         Mood and Affect: Mood normal.         Behavior: Behavior normal.       Significant Labs: All pertinent labs within the past 24 hours have been reviewed.  CBC:   Recent Labs   Lab 12/03/22  0352   WBC 9.51   HGB 11.0*   HCT 35.8*   *     CMP: No results for input(s): NA, K, CL, CO2, GLU, BUN, CREATININE, CALCIUM, PROT, ALBUMIN, BILITOT, ALKPHOS, AST, ALT, ANIONGAP, EGFRNONAA in the last 48 hours.    Invalid input(s): [place]    Significant Imaging: I have reviewed all pertinent imaging results/findings within the past 24 hours.

## 2022-12-04 NOTE — PROGRESS NOTES
[facility] Medicine  Telemedicine Progress Note    Patient Name: Dave Good  MRN: [id number]  Patient Class: IP- Inpatient   Admission Date: 11/22/2022  Length of Stay: 11 days  Attending Physician: Aminah Ricardo MD  Primary Care Provider: Reyna Jorge NP          Subjective:     Principal Problem:Diabetic wet gangrene of the foot        HPI:  DM2, diabetic neuropathy, COPD, HTN, depression, right foot transmetatarsal amputation and osteomyelitis who was initially admitted to [facility] from Dr. Flores's office for osteomyelitis evaluation.  Patient knows progressively worsening foul-smelling discharge from his left great toe for which he sought care with Dr. Flores.  Patient was evaluated [facility] by surgery.  He will require further inpatient evaluation for osteomyelitis.       Overview/Hospital Course:  Into the hospital from general surgery clinic for evaluation of left great toe osteomyelitis.  CT imaging showing fluid collection at the left 1st MTP.  Bedside debridement performed by Podiatry.  Bone biopsy was recommended.    During hospitalization, patient had midline placed vascular access issues.  Later his hospitalization is noted right upper extremity edema.  Ultrasound Doppler right upper extremity revealed DVT extending from the right basilic to the axillary vein.  Per discussion with Hematology/Oncology, initiated therapeutic anticoagulation.      Interval History:  Patient noted to be HD stable and afebrile. Sitting up in the chair.  Medically stable for discharge, pending placement to LTAC    Review of Systems   Constitutional:  Positive for activity change and fatigue. Negative for fever.   Respiratory:  Negative for cough and shortness of breath.    Cardiovascular:  Negative for chest pain.   Gastrointestinal:  Negative for abdominal pain.   Musculoskeletal:  Positive for arthralgias.   Neurological:  Negative for dizziness and headaches.   Psychiatric/Behavioral:   Negative for confusion.    All other systems reviewed and are negative.  Objective:     Vital Signs (Most Recent):  Temp: 98 °F (36.7 °C) (12/04/22 1213)  Pulse: 79 (12/04/22 1213)  Resp: 20 (12/04/22 1213)  BP: 98/65 (12/04/22 1213)  SpO2: 96 % (12/04/22 1213)   Vital Signs (24h Range):  Temp:  [97.9 °F (36.6 °C)-98.5 °F (36.9 °C)] 98 °F (36.7 °C)  Pulse:  [67-79] 79  Resp:  [17-20] 20  SpO2:  [94 %-97 %] 96 %  BP: ()/(63-73) 98/65     Weight: 122.5 kg (270 lb 1 oz)  Body mass index is 34.67 kg/m².    Intake/Output Summary (Last 24 hours) at 12/4/2022 1244  Last data filed at 12/4/2022 1215  Gross per 24 hour   Intake 540 ml   Output 2200 ml   Net -1660 ml        Physical Exam  Vitals and nursing note reviewed.   Constitutional:       Appearance: Normal appearance.   HENT:      Head: Normocephalic and atraumatic.   Eyes:      Extraocular Movements: Extraocular movements intact.      Pupils: Pupils are equal, round, and reactive to light.   Cardiovascular:      Rate and Rhythm: Normal rate and regular rhythm.   Pulmonary:      Effort: Pulmonary effort is normal. No respiratory distress.   Abdominal:      General: Abdomen is flat. There is no distension.   Musculoskeletal:         General: Normal range of motion.      Cervical back: Normal range of motion.      Comments: Left toe wound in bandage.  Right foot covered in bandage.   Neurological:      General: No focal deficit present.      Mental Status: He is alert and oriented to person, place, and time.   Psychiatric:         Mood and Affect: Mood normal.         Behavior: Behavior normal.       Significant Labs: All pertinent labs within the past 24 hours have been reviewed.  CBC:   Recent Labs   Lab 12/03/22  0352   WBC 9.51   HGB 11.0*   HCT 35.8*   *     CMP: No results for input(s): NA, K, CL, CO2, GLU, BUN, CREATININE, CALCIUM, PROT, ALBUMIN, BILITOT, ALKPHOS, AST, ALT, ANIONGAP, EGFRNONAA in the last 48 hours.    Invalid input(s):  [place]    Significant Imaging: I have reviewed all pertinent imaging results/findings within the past 24 hours.      Assessment/Plan:      * Diabetic wet gangrene of the foot  Poor foot care with Left great toe wound concerning for wet gangrene.  Left 1st MTP fluid collection on imaging.  The patient declines amputation this time and would prefer medical treatment over transmetatarsal amputation.  Patient was started on IV Vanc and Zosyn in ED and changed to IV Vanc and Cefepime on admission.  Podiatry performed bedside debridement on 11/25 and bone biopsy on 11/27/2022.  Wound cultures sent on 11/27/2022 and pending.  Blood cultures negative to date.  Cefepime changed to Ertapenem on 11/29/2022.  IV Vanc changed to Daptomycin on 11/30/2022.    Plan:  Per ID recommendations -Empiric daptomycin 6 mg/kg IV and ertapenem 1 gm IV Q24 for 6 weeks total. End of care = 1/3/22  Follow up wound cultures - no growth to date  Rehab vs LTAC on discharge - pending      Epistaxis    -nasal saline spray  -resolved    Diabetic ulcer of left great toe    Lab Results   Component Value Date    HGBA1C 9.1 (H) 11/22/2022     Most recent fingerstick glucose reviewed-   Recent Labs   Lab 11/26/22 2006 11/27/22  0746 11/27/22  1135   POCTGLUCOSE 205* 190* 209*     Current correctional scale    Antihyperglycemics (From admission, onward)    Start     Stop Route Frequency Ordered    11/27/22 2100  insulin detemir U-100 pen 30 Units         -- SubQ Nightly 11/27/22 1536    11/24/22 1330  insulin aspart U-100 pen 1-10 Units         -- SubQ Before meals & nightly PRN 11/24/22 1231        Hold Oral hypoglycemics while patient is in the hospital.    NOTE:  Patient usually takes 40 units basal insulin q.h.s.  75% of his typical basal dose has been ordered as he will be NPO after midnight in anticipation for bone biopsy Monday morning    Acute deep vein thrombosis (DVT) of axillary vein of right upper extremity  Acute catheter associated DVT  extending to the deep veins of the right upper extremity seen on ultrasound Doppler 11/26.  Patient had a right basilic midline catheter, which was initially placed due to poor vascular access.  He was not taking therapeutic anticoagulation at the time.  Case was discussed with Hematology/Oncology (Dr. Salinas) who advised therapeutic anticoagulation given that the thrombus is extending to the axillary vein.  Left upper extremity PICC line placed 11/26.  RUE midline d/c on 11/28/2022    Plan:  Continue with Lovenox 1 milligram/kilogram x 3 months        COPD with asthma  Not in exacerbation  -DuoNebs p.r.n.    Class 1 obesity due to excess calories with serious comorbidity and body mass index (BMI) of 34.0 to 34.9 in adult  Body mass index is 34.67 kg/m². Morbid obesity complicates all aspects of disease management from diagnostic modalities to treatment. Weight loss encouraged and health benefits explained to patient.         Mixed hyperlipidemia  -Continue Statin      Depression  -Continue Seroquel        HTN (hypertension)  Home Meds:  Lisinopril 10mg and HCTZ 25mg  -Continue home meds  -Monitor and adjust as needed      Type 2 diabetes mellitus with diabetic peripheral angiopathy without gangrene, with long-term current use of insulin  Hemoglobin A1c 9.1% this admission  Home Meds:  Metformin 1g bid, Trulicity, Detemir 50 units, Aspart 10 unit TIDWM  Hospital Meds:  Detemir 35 units with moderate dose SSI  -Continue to hold Metformin and Trulicity  -Continue current regimen  -Diabetic Diet  -Monitor and adjust as needed      VTE Risk Mitigation (From admission, onward)         Ordered     enoxaparin injection 120 mg  Every 12 hours (non-standard times)         11/28/22 0957     IP VTE HIGH RISK PATIENT  Once         11/22/22 1724     Place sequential compression device  Until discontinued         11/22/22 1724                      I have completed this tele-visit with the assistance of a telepresenter.    The  attending portion of this evaluation, treatment, and documentation was performed per Aminah Ricardo MD via Telemedicine AudioVisual using the secure [employer] software platform with 2 way audio/video. The provider was located off-site and the patient is located in the hospital. The aforementioned video software was utilized to document the relevant history and physical exam    Aminah Ricardo MD  Department of Hospital Medicine   [facility] ([place])

## 2022-12-04 NOTE — SUBJECTIVE & OBJECTIVE
Interval History:  Patient noted to be HD stable and afebrile. Sitting up in the chair.  Medically stable for discharge, pending placement to LTAC    Review of Systems   Constitutional:  Positive for activity change and fatigue. Negative for fever.   Respiratory:  Negative for cough and shortness of breath.    Cardiovascular:  Negative for chest pain.   Gastrointestinal:  Negative for abdominal pain.   Musculoskeletal:  Positive for arthralgias.   Neurological:  Negative for dizziness and headaches.   Psychiatric/Behavioral:  Negative for confusion.    All other systems reviewed and are negative.  Objective:     Vital Signs (Most Recent):  Temp: 98 °F (36.7 °C) (12/04/22 1213)  Pulse: 79 (12/04/22 1213)  Resp: 20 (12/04/22 1213)  BP: 98/65 (12/04/22 1213)  SpO2: 96 % (12/04/22 1213)   Vital Signs (24h Range):  Temp:  [97.9 °F (36.6 °C)-98.5 °F (36.9 °C)] 98 °F (36.7 °C)  Pulse:  [67-79] 79  Resp:  [17-20] 20  SpO2:  [94 %-97 %] 96 %  BP: ()/(63-73) 98/65     Weight: 122.5 kg (270 lb 1 oz)  Body mass index is 34.67 kg/m².    Intake/Output Summary (Last 24 hours) at 12/4/2022 1244  Last data filed at 12/4/2022 1215  Gross per 24 hour   Intake 540 ml   Output 2200 ml   Net -1660 ml        Physical Exam  Vitals and nursing note reviewed.   Constitutional:       Appearance: Normal appearance.   HENT:      Head: Normocephalic and atraumatic.   Eyes:      Extraocular Movements: Extraocular movements intact.      Pupils: Pupils are equal, round, and reactive to light.   Cardiovascular:      Rate and Rhythm: Normal rate and regular rhythm.   Pulmonary:      Effort: Pulmonary effort is normal. No respiratory distress.   Abdominal:      General: Abdomen is flat. There is no distension.   Musculoskeletal:         General: Normal range of motion.      Cervical back: Normal range of motion.      Comments: Left toe wound in bandage.  Right foot covered in bandage.   Neurological:      General: No focal deficit present.       Mental Status: He is alert and oriented to person, place, and time.   Psychiatric:         Mood and Affect: Mood normal.         Behavior: Behavior normal.       Significant Labs: All pertinent labs within the past 24 hours have been reviewed.  CBC:   Recent Labs   Lab 12/03/22  0352   WBC 9.51   HGB 11.0*   HCT 35.8*   *     CMP: No results for input(s): NA, K, CL, CO2, GLU, BUN, CREATININE, CALCIUM, PROT, ALBUMIN, BILITOT, ALKPHOS, AST, ALT, ANIONGAP, EGFRNONAA in the last 48 hours.    Invalid input(s): [place]    Significant Imaging: I have reviewed all pertinent imaging results/findings within the past 24 hours.

## 2022-12-05 LAB
POCT GLUCOSE: 180 MG/DL (ref 70–110)
POCT GLUCOSE: 215 MG/DL (ref 70–110)
POCT GLUCOSE: 229 MG/DL (ref 70–110)
POCT GLUCOSE: 284 MG/DL (ref 70–110)

## 2022-12-05 PROCEDURE — 25000003 PHARM REV CODE 250: Performed by: STUDENT IN AN ORGANIZED HEALTH CARE EDUCATION/TRAINING PROGRAM

## 2022-12-05 PROCEDURE — 25000003 PHARM REV CODE 250: Performed by: HOSPITALIST

## 2022-12-05 PROCEDURE — 99233 PR SUBSEQUENT HOSPITAL CARE,LEVL III: ICD-10-PCS | Mod: ,,, | Performed by: PODIATRIST

## 2022-12-05 PROCEDURE — 99232 PR SUBSEQUENT HOSPITAL CARE,LEVL II: ICD-10-PCS | Mod: 95,,, | Performed by: HOSPITALIST

## 2022-12-05 PROCEDURE — 99232 SBSQ HOSP IP/OBS MODERATE 35: CPT | Mod: 95,,, | Performed by: HOSPITALIST

## 2022-12-05 PROCEDURE — 25000003 PHARM REV CODE 250: Performed by: INTERNAL MEDICINE

## 2022-12-05 PROCEDURE — 97530 THERAPEUTIC ACTIVITIES: CPT

## 2022-12-05 PROCEDURE — 94761 N-INVAS EAR/PLS OXIMETRY MLT: CPT

## 2022-12-05 PROCEDURE — 63600175 PHARM REV CODE 636 W HCPCS: Performed by: INTERNAL MEDICINE

## 2022-12-05 PROCEDURE — 25000003 PHARM REV CODE 250: Performed by: NURSE PRACTITIONER

## 2022-12-05 PROCEDURE — A4216 STERILE WATER/SALINE, 10 ML: HCPCS | Performed by: STUDENT IN AN ORGANIZED HEALTH CARE EDUCATION/TRAINING PROGRAM

## 2022-12-05 PROCEDURE — 11000001 HC ACUTE MED/SURG PRIVATE ROOM

## 2022-12-05 PROCEDURE — 99233 SBSQ HOSP IP/OBS HIGH 50: CPT | Mod: ,,, | Performed by: PODIATRIST

## 2022-12-05 RX ADMIN — INSULIN ASPART 4 UNITS: 100 INJECTION, SOLUTION INTRAVENOUS; SUBCUTANEOUS at 01:12

## 2022-12-05 RX ADMIN — INSULIN ASPART 4 UNITS: 100 INJECTION, SOLUTION INTRAVENOUS; SUBCUTANEOUS at 05:12

## 2022-12-05 RX ADMIN — HYDROCHLOROTHIAZIDE 25 MG: 25 TABLET ORAL at 08:12

## 2022-12-05 RX ADMIN — ASPIRIN 81 MG: 81 TABLET, COATED ORAL at 08:12

## 2022-12-05 RX ADMIN — HYDROCODONE BITARTRATE AND ACETAMINOPHEN 1 TABLET: 5; 325 TABLET ORAL at 08:12

## 2022-12-05 RX ADMIN — ENOXAPARIN SODIUM 120 MG: 100 INJECTION SUBCUTANEOUS at 11:12

## 2022-12-05 RX ADMIN — NASAL 2 SPRAY: 6.5 SPRAY NASAL at 05:12

## 2022-12-05 RX ADMIN — SODIUM CHLORIDE, PRESERVATIVE FREE 10 ML: 5 INJECTION INTRAVENOUS at 05:12

## 2022-12-05 RX ADMIN — SODIUM CHLORIDE, PRESERVATIVE FREE 10 ML: 5 INJECTION INTRAVENOUS at 01:12

## 2022-12-05 RX ADMIN — NASAL 2 SPRAY: 6.5 SPRAY NASAL at 09:12

## 2022-12-05 RX ADMIN — HYDROCODONE BITARTRATE AND ACETAMINOPHEN 1 TABLET: 5; 325 TABLET ORAL at 09:12

## 2022-12-05 RX ADMIN — LISINOPRIL 10 MG: 10 TABLET ORAL at 08:12

## 2022-12-05 RX ADMIN — SODIUM CHLORIDE, PRESERVATIVE FREE 10 ML: 5 INJECTION INTRAVENOUS at 11:12

## 2022-12-05 RX ADMIN — QUETIAPINE FUMARATE 200 MG: 200 TABLET ORAL at 09:12

## 2022-12-05 RX ADMIN — ERTAPENEM 1 G: 1 INJECTION INTRAMUSCULAR; INTRAVENOUS at 11:12

## 2022-12-05 RX ADMIN — DAPTOMYCIN 590 MG: 350 INJECTION, POWDER, LYOPHILIZED, FOR SOLUTION INTRAVENOUS at 08:12

## 2022-12-05 RX ADMIN — INSULIN ASPART 3 UNITS: 100 INJECTION, SOLUTION INTRAVENOUS; SUBCUTANEOUS at 09:12

## 2022-12-05 RX ADMIN — HYDROCODONE BITARTRATE AND ACETAMINOPHEN 1 TABLET: 5; 325 TABLET ORAL at 12:12

## 2022-12-05 RX ADMIN — INSULIN DETEMIR 35 UNITS: 100 INJECTION, SOLUTION SUBCUTANEOUS at 09:12

## 2022-12-05 RX ADMIN — ATORVASTATIN CALCIUM 40 MG: 20 TABLET, FILM COATED ORAL at 08:12

## 2022-12-05 RX ADMIN — MELATONIN TAB 3 MG 6 MG: 3 TAB at 09:12

## 2022-12-05 RX ADMIN — HYDROCODONE BITARTRATE AND ACETAMINOPHEN 1 TABLET: 5; 325 TABLET ORAL at 01:12

## 2022-12-05 RX ADMIN — NASAL 2 SPRAY: 6.5 SPRAY NASAL at 01:12

## 2022-12-05 RX ADMIN — MELATONIN TAB 3 MG 6 MG: 3 TAB at 12:12

## 2022-12-05 RX ADMIN — HYDROCODONE BITARTRATE AND ACETAMINOPHEN 1 TABLET: 5; 325 TABLET ORAL at 04:12

## 2022-12-05 RX ADMIN — HYDROCODONE BITARTRATE AND ACETAMINOPHEN 1 TABLET: 5; 325 TABLET ORAL at 05:12

## 2022-12-05 NOTE — PLAN OF CARE
12/05/22 1203   Post-Acute Status   Post-Acute Authorization Placement   Post-Acute Placement Status Pending payor medical review/second level review   Discharge Plan   Discharge Plan A Long-term acute care facility (LTAC)   Discharge Plan B Home Health       ARLENE contacted Winsome with [facility]/LT[facility], Winsome stated the patient needs an appeal letter signed by the provider.  Winsome stated she will e-mail me & the providers the appeal letter. CM updated the virtual medical team.

## 2022-12-05 NOTE — PT/OT/SLP PROGRESS
"Physical Therapy Treatment    Patient Name:  Daev Good   MRN:  [id number]    Recommendations:     Discharge Recommendations: LTACH (long-term acute care hospital)  Discharge Equipment Recommendations: none  Barriers to discharge: None    Assessment:     Dave Good is a 57 y.o. male admitted with a medical diagnosis of Diabetic wet gangrene of the foot.  He presents with the following impairments/functional limitations: impaired functional mobility, gait instability, impaired balance, weakness, decreased lower extremity function.    Pt presents to therapy with similar pain levels in BLE following last session and demonstrates gait using no AD with CGA-SBA and no LOB. Able to ascend/descend steps with CGA-SBA during today's session and has sufficiently met all previous PT goals at this time. Pt requests weekly check-in with PT but feels safe enough to ambulate and navigate stairs without AD at this point. Recommendation for LTAC upon safe d/c from hospital stay.     Rehab Prognosis: Good; patient would benefit from acute skilled PT services to address these deficits and reach maximum level of function.    Recent Surgery: * No surgery found *      Plan:     During this hospitalization, patient to be seen 1 x/week to address the identified rehab impairments via gait training, therapeutic activities, therapeutic exercises, neuromuscular re-education and progress toward the following goals:    Plan of Care Expires:  12/28/22    Subjective     Chief Complaint: none  Patient/Family Comments/goals: "glad y'all came back to do the stairs with me"   Pain/Comfort:  Pain Rating 1: 7/10  Location - Side 1: Bilateral  Location 1: foot  Pain Addressed 1: Reposition, Distraction, Cessation of Activity  Pain Rating Post-Intervention 1: 7/10      Objective:     Communicated with nursing staff prior to session.  Patient found up in chair with peripheral IV upon PT entry to room.     General Precautions: Standard, fall  Orthopedic " Precautions: N/A  Braces: N/A  Respiratory Status: Room air     Functional Mobility:  Transfers:     Sit to Stand:  stand by assistance with no AD  Gait: ambulated 2 trials of 120 ft in hallway with no AD and CGA progressing to SBA; no near falls or LOB; pt only displays increased franchesca as ambulation continues; maintains use of bilateral trunk sway to assist with fwd progression during gait   Balance: fair-good with dynamic standing; increased franchesca noted during 2nd ambulation trial   Stairs:  Pt ascended/descended 1 flight(s) with No Assistive Device with bilateral handrails with SBA-Contact Guard Assistance.       AM-PAC 6 CLICK MOBILITY  Turning over in bed (including adjusting bedclothes, sheets and blankets)?: 4  Sitting down on and standing up from a chair with arms (e.g., wheelchair, bedside commode, etc.): 4  Moving from lying on back to sitting on the side of the bed?: 4  Moving to and from a bed to a chair (including a wheelchair)?: 4  Need to walk in hospital room?: 3  Climbing 3-5 steps with a railing?: 3  Basic Mobility Total Score: 22       Treatment & Education:    PT educated patient re:   PT plan of care/role of PT  Safety with OOB mobility  Use of handrails during stair negotiation   Discharge disposition    Pt verbalized understanding      Assistance noted above with transfer/ambulation/stairs     Patient left up in chair with all lines intact, call button in reach, and spouse present..    GOALS:   Multidisciplinary Problems       Physical Therapy Goals          Problem: Physical Therapy    Goal Priority Disciplines Outcome Goal Variances Interventions   Physical Therapy Goal     PT, PT/OT Ongoing, Progressing     Description: Goals to be met by: 2022    Patient will increase functional independence with mobility by performin. Gait x 120 ft with SBA with LRAD and efficient gait pattern/franchesca throughout.   2. Ascend/descend 2 flights of steps with SBA and LRAD.     Goals MET:    1. Sit<>stand with supervision with LRAD.  MET 12/1/2022  2. Gait x 120 feet with SBA with LRAD.  MET 12/1/2022  3. Ascend/descend 1 flight of step(s) with least restrictive assistive device and SBA.  MET 12/5/22                          Time Tracking:     PT Received On: 12/05/22  PT Start Time: 1500     PT Stop Time: 1509  PT Total Time (min): 9 min     Billable Minutes: Therapeutic Activity 9    Treatment Type: Treatment  PT/PTA: PT     PTA Visit Number: 0     12/05/2022

## 2022-12-05 NOTE — PROGRESS NOTES
[facility] ([place])  Podiatry  Progress Note    Patient Name: Dave Good  MRN: [id number]  Admission Date: 11/22/2022  Attending Physician: Kulwinder Bedoya MD  Primary Care Provider: Reyna Jorge NP     Subjective:     Interval History: Patient with no new complaints.  Awaiting LTAC.  Iv antibiotics and Physical therapy.   Foot Dressing changed this morning.           Scheduled Meds:   aspirin  81 mg Oral Daily    atorvastatin  40 mg Oral Daily    ceFEPime (MAXIPIME) IVPB  2 g Intravenous Q8H    enoxaparin  1 mg/kg Subcutaneous Q12H    hydroCHLOROthiazide  25 mg Oral Daily    insulin detemir U-100  40 Units Subcutaneous QHS    lisinopriL  10 mg Oral Daily    mupirocin   Nasal BID    QUEtiapine  200 mg Oral Nightly    sodium chloride 0.9%  10 mL Intravenous Q6H    vancomycin (VANCOCIN) IVPB  1,500 mg Intravenous Q12H     Continuous Infusions:  PRN Meds:acetaminophen, albuterol-ipratropium, dextrose 10%, dextrose 10%, dextrose 10%, diphenhydrAMINE, glucagon (human recombinant), glucose, glucose, HYDROcodone-acetaminophen, insulin aspart U-100, melatonin, morphine, ondansetron, promethazine (PHENERGAN) IVPB, sodium chloride 0.9%, Flushing PICC Protocol **AND** sodium chloride 0.9% **AND** sodium chloride 0.9%, Pharmacy to dose Vancomycin consult **AND** vancomycin - pharmacy to dose    Review of Systems   Constitutional:  Negative for chills and fever.   HENT:  Negative for congestion.    Respiratory:  Negative for chest tightness and shortness of breath.    Cardiovascular:  Negative for chest pain and palpitations.   Gastrointestinal:  Negative for abdominal pain, nausea and vomiting.   Musculoskeletal:  Negative for back pain.   Skin:  Negative for wound.   Neurological:  Negative for dizziness and headaches.   Psychiatric/Behavioral:  Negative for agitation and behavioral problems.    Objective:     Vital Signs (Most Recent):  Temp: 97.6 °F (36.4 °C) (11/26/22 1935)  Pulse: 74 (11/26/22 1935)  Resp:  19 (11/26/22 1935)  BP: (!) 159/81 (11/26/22 1935)  SpO2: 95 % (11/26/22 1935)   Vital Signs (24h Range):  Temp:  [97.6 °F (36.4 °C)-98.7 °F (37.1 °C)] 97.6 °F (36.4 °C)  Pulse:  [74-78] 74  Resp:  [16-20] 19  SpO2:  [93 %-96 %] 95 %  BP: (123-163)/(76-86) 159/81     Weight: 122.5 kg (270 lb)  Body mass index is 34.67 kg/m².    Foot Exam    Right Foot/Ankle     Neurovascular  Dorsalis pedis: 1+  Posterior tibial: 1+  Saphenous nerve sensation: diminished  Tibial nerve sensation: diminished  Superficial peroneal nerve sensation: diminished  Deep peroneal nerve sensation: diminished  Sural nerve sensation: diminished    Comments  Right foot: Patient has a tma on right foot. Diffuse xerosis noted. Area of fluctuance noted on right medial side. Wound measuring 2.5cm x2.4cm x.3cm noted on plantar distal aspect of right foot. No purulent drainage no crepitus noted.     Left Foot/Ankle      Neurovascular  Dorsalis pedis: 1+  Posterior tibial: 1+  Saphenous nerve sensation: diminished  Tibial nerve sensation: diminished  Superficial peroneal nerve sensation: diminished  Deep peroneal nerve sensation: diminished  Sural nerve sensation: diminished    Comments  Left foot: Patient has a wound on the distal plantar aspect of left great toe measuring. 3.0cm x 2.4cm x .5. Wound is granular in nature. Wound probes to bone. No purulent drainage noted. No crepitus or fluctuance noted.     Laboratory:    CRP   Date Value Ref Range Status   11/22/2022 125.3 (H) 0.0 - 8.2 mg/L Final       Sed Rate   Date Value Ref Range Status   11/22/2022 79 (H) 0 - 10 mm/Hr Final       WBC   Date Value Ref Range Status   12/03/2022 9.51 3.90 - 12.70 K/uL Final       Hemoglobin A1C   Date Value Ref Range Status   11/22/2022 9.1 (H) 4.0 - 5.6 % Final     Comment:     ADA Screening Guidelines:  5.7-6.4%  Consistent with prediabetes  >or=6.5%  Consistent with diabetes    High levels of fetal hemoglobin interfere with the HbA1C  assay. Heterozygous  hemoglobin variants (HbS, HgC, etc)do  not significantly interfere with this assay.   However, presence of multiple variants may affect accuracy.                Prealbumin: No results for input(s): PREALBUMIN in the last 48 hours.  Wound Cultures:   Recent Labs   Lab 06/16/22  0050 06/17/22  0601   LABAERO PROTEUS MIRABILIS  Many  *  METHICILLIN RESISTANT STAPHYLOCOCCUS AUREUS  Many  *  ACINETOBACTER LWOFFII GROUP  Few  *  PROVIDENCIA RETTGERI  Few  *  METHICILLIN RESISTANT STAPHYLOCOCCUS AUREUS  Moderate  * DIPHTHEROIDS  Few  *  ENTEROCOCCUS FAECALIS  Few  *  No growth     Microbiology Results (last 7 days)       Procedure Component Value Units Date/Time    Blood culture [[id number]] Collected: 11/26/22 0951    Order Status: Sent Specimen: Blood Updated: 11/26/22 0951    Blood culture #1 **CANNOT BE ORDERED STAT** [[id number]] Collected: 11/22/22 1928    Order Status: Completed Specimen: Blood from Midline, Basilic, Right Updated: 11/26/22 0936     Blood Culture, Routine Gram stain jose bottle: Gram positive rods      Results called to and read back by: Viridiana Hernandez RN  11/26/2022  09:36    Blood culture #2 **CANNOT BE ORDERED STAT** [[id number]] Collected: 11/22/22 1900    Order Status: Completed Specimen: Blood from Midline, Basilic, Right Updated: 11/26/22 0612     Blood Culture, Routine No Growth to date      No Growth to date      No Growth to date      No Growth to date    Aerobic culture [[id number]]     Order Status: No result Specimen: Wound from Toe, Left Foot     Culture, Anaerobic [[id number]]     Order Status: No result Specimen: Wound from Toe, Left Foot           Specimen (24h ago, onward)      None                Assessment/Plan:     Diabetic ulcer of left great toe  - s/p bone biopsy with no growth to date.  Empiric antibiotics per ID.    - continue wound care.     - pending ltac.   - will see patient next week if still inpatient.               Lavonne Vigil DPM  NPI: [id number]         [facility]  [facility] - MED SURG [facility])  [address] [place] [id number]  Dept: [phone]  Dept Fax: [phone]  Loc: [phone]

## 2022-12-05 NOTE — PLAN OF CARE
Problem: Physical Therapy  Goal: Physical Therapy Goal  Description: Goals to be met by: 2022    Patient will increase functional independence with mobility by performin. Gait x 120 ft with SBA with LRAD and efficient gait pattern/franchesca throughout.   2. Ascend/descend 2 flights of steps with SBA and LRAD.     Goals MET:   1. Sit<>stand with supervision with LRAD.  MET 2022  2. Gait x 120 feet with SBA with LRAD.  MET 2022  3. Ascend/descend 1 flight of step(s) with least restrictive assistive device and SBA.  MET 22 1517 by KATHERINE Gill  Outcome: Ongoing, Progressing     Goals modified/updated.

## 2022-12-05 NOTE — PT/OT/SLP PROGRESS
Physical Therapy      Patient Name:  Dave Good   MRN:  [id number]    Patient not seen today secondary to Pain. Will follow-up as schedule allows.

## 2022-12-05 NOTE — PLAN OF CARE
Problem: Physical Therapy  Goal: Physical Therapy Goal  Description: Goals to be met by: 2022    Patient will increase functional independence with mobility by performin. Sit<>stand with supervision with LRAD. MET 2022  2. Gait x 120 feet with SBA with LRAD. MET 2022  3. Ascend/descend 1 flight of step(s) with least restrictive assistive device and SBA.      Outcome: Ongoing, Progressing     Pt presents to therapy with similar pain levels in BLE following last session and demonstrates gait using no AD with CGA-SBA and no LOB. Able to ascend/descend steps with CGA-SBA during today's session and has sufficiently met all PT goals at this time. Pt requests weekly check-in with PT but feels safe enough to ambulate and navigate stairs without AD at this point. Recommendation for LTAC upon safe d/c from hospital stay.

## 2022-12-06 LAB
POCT GLUCOSE: 192 MG/DL (ref 70–110)
POCT GLUCOSE: 202 MG/DL (ref 70–110)
POCT GLUCOSE: 225 MG/DL (ref 70–110)
POCT GLUCOSE: 269 MG/DL (ref 70–110)

## 2022-12-06 PROCEDURE — 99233 SBSQ HOSP IP/OBS HIGH 50: CPT | Mod: 95,,, | Performed by: HOSPITALIST

## 2022-12-06 PROCEDURE — 25000003 PHARM REV CODE 250: Performed by: HOSPITALIST

## 2022-12-06 PROCEDURE — 25000003 PHARM REV CODE 250: Performed by: INTERNAL MEDICINE

## 2022-12-06 PROCEDURE — 99233 PR SUBSEQUENT HOSPITAL CARE,LEVL III: ICD-10-PCS | Mod: 95,,, | Performed by: HOSPITALIST

## 2022-12-06 PROCEDURE — A4216 STERILE WATER/SALINE, 10 ML: HCPCS | Performed by: STUDENT IN AN ORGANIZED HEALTH CARE EDUCATION/TRAINING PROGRAM

## 2022-12-06 PROCEDURE — 25000003 PHARM REV CODE 250: Performed by: STUDENT IN AN ORGANIZED HEALTH CARE EDUCATION/TRAINING PROGRAM

## 2022-12-06 PROCEDURE — 25000003 PHARM REV CODE 250: Performed by: NURSE PRACTITIONER

## 2022-12-06 PROCEDURE — 94761 N-INVAS EAR/PLS OXIMETRY MLT: CPT

## 2022-12-06 PROCEDURE — 11000001 HC ACUTE MED/SURG PRIVATE ROOM

## 2022-12-06 PROCEDURE — 63600175 PHARM REV CODE 636 W HCPCS: Performed by: HOSPITALIST

## 2022-12-06 PROCEDURE — 63600175 PHARM REV CODE 636 W HCPCS: Performed by: INTERNAL MEDICINE

## 2022-12-06 RX ORDER — SODIUM CHLORIDE 9 MG/ML
INJECTION, SOLUTION INTRAVENOUS CONTINUOUS
Status: ACTIVE | OUTPATIENT
Start: 2022-12-06 | End: 2022-12-06

## 2022-12-06 RX ORDER — INSULIN ASPART 100 [IU]/ML
3 INJECTION, SOLUTION INTRAVENOUS; SUBCUTANEOUS
Status: DISCONTINUED | OUTPATIENT
Start: 2022-12-06 | End: 2022-12-07 | Stop reason: HOSPADM

## 2022-12-06 RX ORDER — ENOXAPARIN SODIUM 100 MG/ML
1 INJECTION SUBCUTANEOUS EVERY 12 HOURS
Qty: 72 ML | Refills: 2
Start: 2022-12-06 | End: 2023-01-03 | Stop reason: ALTCHOICE

## 2022-12-06 RX ADMIN — HYDROCODONE BITARTRATE AND ACETAMINOPHEN 1 TABLET: 5; 325 TABLET ORAL at 05:12

## 2022-12-06 RX ADMIN — NASAL 2 SPRAY: 6.5 SPRAY NASAL at 09:12

## 2022-12-06 RX ADMIN — SODIUM CHLORIDE, PRESERVATIVE FREE 10 ML: 5 INJECTION INTRAVENOUS at 11:12

## 2022-12-06 RX ADMIN — HYDROCODONE BITARTRATE AND ACETAMINOPHEN 1 TABLET: 5; 325 TABLET ORAL at 09:12

## 2022-12-06 RX ADMIN — ASPIRIN 81 MG: 81 TABLET, COATED ORAL at 09:12

## 2022-12-06 RX ADMIN — HYDROCODONE BITARTRATE AND ACETAMINOPHEN 1 TABLET: 5; 325 TABLET ORAL at 01:12

## 2022-12-06 RX ADMIN — INSULIN ASPART 2 UNITS: 100 INJECTION, SOLUTION INTRAVENOUS; SUBCUTANEOUS at 05:12

## 2022-12-06 RX ADMIN — NASAL 2 SPRAY: 6.5 SPRAY NASAL at 10:12

## 2022-12-06 RX ADMIN — INSULIN ASPART 3 UNITS: 100 INJECTION, SOLUTION INTRAVENOUS; SUBCUTANEOUS at 05:12

## 2022-12-06 RX ADMIN — INSULIN ASPART 2 UNITS: 100 INJECTION, SOLUTION INTRAVENOUS; SUBCUTANEOUS at 09:12

## 2022-12-06 RX ADMIN — QUETIAPINE FUMARATE 200 MG: 200 TABLET ORAL at 09:12

## 2022-12-06 RX ADMIN — SODIUM CHLORIDE, PRESERVATIVE FREE 10 ML: 5 INJECTION INTRAVENOUS at 06:12

## 2022-12-06 RX ADMIN — INSULIN ASPART 6 UNITS: 100 INJECTION, SOLUTION INTRAVENOUS; SUBCUTANEOUS at 12:12

## 2022-12-06 RX ADMIN — INSULIN ASPART 3 UNITS: 100 INJECTION, SOLUTION INTRAVENOUS; SUBCUTANEOUS at 12:12

## 2022-12-06 RX ADMIN — MELATONIN TAB 3 MG 6 MG: 3 TAB at 09:12

## 2022-12-06 RX ADMIN — ENOXAPARIN SODIUM 120 MG: 100 INJECTION SUBCUTANEOUS at 10:12

## 2022-12-06 RX ADMIN — SODIUM CHLORIDE: 0.9 INJECTION, SOLUTION INTRAVENOUS at 01:12

## 2022-12-06 RX ADMIN — ERTAPENEM 1 G: 1 INJECTION INTRAMUSCULAR; INTRAVENOUS at 12:12

## 2022-12-06 RX ADMIN — NASAL 2 SPRAY: 6.5 SPRAY NASAL at 12:12

## 2022-12-06 RX ADMIN — INSULIN ASPART 4 UNITS: 100 INJECTION, SOLUTION INTRAVENOUS; SUBCUTANEOUS at 09:12

## 2022-12-06 RX ADMIN — SODIUM CHLORIDE, PRESERVATIVE FREE 10 ML: 5 INJECTION INTRAVENOUS at 05:12

## 2022-12-06 RX ADMIN — ENOXAPARIN SODIUM 120 MG: 100 INJECTION SUBCUTANEOUS at 12:12

## 2022-12-06 RX ADMIN — NASAL 2 SPRAY: 6.5 SPRAY NASAL at 06:12

## 2022-12-06 RX ADMIN — ATORVASTATIN CALCIUM 40 MG: 20 TABLET, FILM COATED ORAL at 09:12

## 2022-12-06 RX ADMIN — SODIUM CHLORIDE, PRESERVATIVE FREE 10 ML: 5 INJECTION INTRAVENOUS at 12:12

## 2022-12-06 RX ADMIN — INSULIN DETEMIR 35 UNITS: 100 INJECTION, SOLUTION SUBCUTANEOUS at 09:12

## 2022-12-06 RX ADMIN — DAPTOMYCIN 590 MG: 350 INJECTION, POWDER, LYOPHILIZED, FOR SOLUTION INTRAVENOUS at 08:12

## 2022-12-06 NOTE — PROGRESS NOTES
[facility] Medicine  Telemedicine Progress Note    Patient Name: Dave Good  MRN: [id number]  Patient Class: IP- Inpatient   Admission Date: 11/22/2022  Length of Stay: 13 days  Attending Physician: Aminah Ricardo MD  Primary Care Provider: Reyna Jorge NP          Subjective:     Principal Problem:Diabetic wet gangrene of the foot        HPI:  DM2, diabetic neuropathy, COPD, HTN, depression, right foot transmetatarsal amputation and osteomyelitis who was initially admitted to [facility] from Dr. Flores's office for osteomyelitis evaluation.  Patient knows progressively worsening foul-smelling discharge from his left great toe for which he sought care with Dr. Flores.  Patient was evaluated [facility] by surgery.  He will require further inpatient evaluation for osteomyelitis.       Overview/Hospital Course:  Into the hospital from general surgery clinic for evaluation of left great toe osteomyelitis.  CT imaging showing fluid collection at the left 1st MTP.  Bedside debridement performed by Podiatry.  Bone biopsy was recommended.    During hospitalization, patient had midline placed vascular access issues.  Later his hospitalization is noted right upper extremity edema.  Ultrasound Doppler right upper extremity revealed DVT extending from the right basilic to the axillary vein.  Per discussion with Hematology/Oncology, initiated therapeutic anticoagulation.      Interval History:  Patient noted to be slghtly hypotensive today. Hold antihypertensives. Trial of IVF today. Denies any acute complaints.  pending placement to LTAC    Review of Systems   Constitutional:  Positive for activity change and fatigue. Negative for fever.   Respiratory:  Negative for cough and shortness of breath.    Cardiovascular:  Negative for chest pain.   Gastrointestinal:  Negative for abdominal pain.   Musculoskeletal:  Positive for arthralgias.   Neurological:  Negative for dizziness and headaches.    Psychiatric/Behavioral:  Negative for confusion.    All other systems reviewed and are negative.  Objective:     Vital Signs (Most Recent):  Temp: 98 °F (36.7 °C) (12/06/22 1156)  Pulse: 83 (12/06/22 1156)  Resp: 16 (12/06/22 1306)  BP: (!) 86/58 (12/06/22 1156)  SpO2: (!) 94 % (12/06/22 1110)   Vital Signs (24h Range):  Temp:  [97.9 °F (36.6 °C)-98.5 °F (36.9 °C)] 98 °F (36.7 °C)  Pulse:  [70-83] 83  Resp:  [14-20] 16  SpO2:  [92 %-97 %] 94 %  BP: ()/(52-67) 86/58     Weight: 122.5 kg (270 lb 1 oz)  Body mass index is 34.67 kg/m².    Intake/Output Summary (Last 24 hours) at 12/6/2022 1315  Last data filed at 12/6/2022 0539  Gross per 24 hour   Intake 918 ml   Output 1000 ml   Net -82 ml        Physical Exam  Vitals and nursing note reviewed.   Constitutional:       Appearance: Normal appearance.   HENT:      Head: Normocephalic and atraumatic.   Eyes:      Extraocular Movements: Extraocular movements intact.      Pupils: Pupils are equal, round, and reactive to light.   Cardiovascular:      Rate and Rhythm: Normal rate and regular rhythm.   Pulmonary:      Effort: Pulmonary effort is normal. No respiratory distress.   Abdominal:      General: Abdomen is flat. There is no distension.   Musculoskeletal:         General: Normal range of motion.      Cervical back: Normal range of motion.      Comments: Left toe wound in bandage.  Right foot covered in bandage.   Neurological:      General: No focal deficit present.      Mental Status: He is alert and oriented to person, place, and time.   Psychiatric:         Mood and Affect: Mood normal.         Behavior: Behavior normal.       Significant Labs: All pertinent labs within the past 24 hours have been reviewed.  CBC: No results for input(s): WBC, HGB, HCT, PLT in the last 48 hours.  CMP: No results for input(s): NA, K, CL, CO2, GLU, BUN, CREATININE, CALCIUM, PROT, ALBUMIN, BILITOT, ALKPHOS, AST, ALT, ANIONGAP, EGFRNONAA in the last 48 hours.    Invalid input(s):  ESTGFAFRICA    Significant Imaging: I have reviewed all pertinent imaging results/findings within the past 24 hours.      Assessment/Plan:      * Diabetic wet gangrene of the foot  Poor foot care with Left great toe wound concerning for wet gangrene.  Left 1st MTP fluid collection on imaging.  The patient declines amputation this time and would prefer medical treatment over transmetatarsal amputation.  Patient was started on IV Vanc and Zosyn in ED and changed to IV Vanc and Cefepime on admission.  Podiatry performed bedside debridement on 11/25 and bone biopsy on 11/27/2022.  Wound cultures sent on 11/27/2022 and pending.  Blood cultures negative to date.  Cefepime changed to Ertapenem on 11/29/2022.  IV Vanc changed to Daptomycin on 11/30/2022.    Plan:  Per ID recommendations -Empiric daptomycin 6 mg/kg IV and ertapenem 1 gm IV Q24 for 6 weeks total. End of care = 1/3/22  Follow up wound cultures - no growth to date  Rehab vs LTAC on discharge - pending      Epistaxis    -nasal saline spray  -resolved    Diabetic ulcer of left great toe    Lab Results   Component Value Date    HGBA1C 9.1 (H) 11/22/2022     Most recent fingerstick glucose reviewed-   Recent Labs   Lab 11/26/22 2006 11/27/22  0746 11/27/22  1135   POCTGLUCOSE 205* 190* 209*     Current correctional scale    Antihyperglycemics (From admission, onward)    Start     Stop Route Frequency Ordered    11/27/22 2100  insulin detemir U-100 pen 30 Units         -- SubQ Nightly 11/27/22 1536    11/24/22 1330  insulin aspart U-100 pen 1-10 Units         -- SubQ Before meals & nightly PRN 11/24/22 1231        Hold Oral hypoglycemics while patient is in the hospital.    NOTE:  Patient usually takes 40 units basal insulin q.h.s.  75% of his typical basal dose has been ordered as he will be NPO after midnight in anticipation for bone biopsy Monday morning    Acute deep vein thrombosis (DVT) of axillary vein of right upper extremity  Acute catheter associated DVT  extending to the deep veins of the right upper extremity seen on ultrasound Doppler 11/26.  Patient had a right basilic midline catheter, which was initially placed due to poor vascular access.  He was not taking therapeutic anticoagulation at the time.  Case was discussed with Hematology/Oncology (Dr. Salinas) who advised therapeutic anticoagulation given that the thrombus is extending to the axillary vein.  Left upper extremity PICC line placed 11/26.  RUE midline d/c on 11/28/2022    Plan:  Continue with Lovenox 1 milligram/kilogram x 3 months        COPD with asthma  Not in exacerbation  -DuoNebs p.r.n.    Class 1 obesity due to excess calories with serious comorbidity and body mass index (BMI) of 34.0 to 34.9 in adult  Body mass index is 34.67 kg/m². Morbid obesity complicates all aspects of disease management from diagnostic modalities to treatment. Weight loss encouraged and health benefits explained to patient.         Mixed hyperlipidemia  -Continue Statin      Depression  -Continue Seroquel        HTN (hypertension)  Home Meds:  Lisinopril 10mg and HCTZ 25mg  -Continue home meds this admission but held on 12/6 due to low BP  -Monitor and adjust as needed      Type 2 diabetes mellitus with diabetic peripheral angiopathy without gangrene, with long-term current use of insulin  Hemoglobin A1c 9.1% this admission  Home Meds:  Metformin 1g bid, Trulicity, Detemir 50 units, Aspart 10 unit TIDWM  Hospital Meds:  Detemir 35 units with moderate dose SSI  -Continue to hold Metformin and Trulicity  -Continue current regimen  -Diabetic Diet  -Monitor and adjust as needed      VTE Risk Mitigation (From admission, onward)         Ordered     enoxaparin injection 120 mg  Every 12 hours (non-standard times)         11/28/22 0957     IP VTE HIGH RISK PATIENT  Once         11/22/22 1724     Place sequential compression device  Until discontinued         11/22/22 1724                      I have completed this tele-visit with  the assistance of a telepresenter.    The attending portion of this evaluation, treatment, and documentation was performed per Aminah Ricardo MD via Telemedicine AudioVisual using the secure [employer] software platform with 2 way audio/video. The provider was located off-site and the patient is located in the hospital. The aforementioned video software was utilized to document the relevant history and physical exam    Aminah Ricardo MD  Department of Hospital Medicine   [facility] ([employer])

## 2022-12-06 NOTE — PLAN OF CARE
[facility]     Department of Hospital Medicine     [address] [place] [address]     [phone] after hours  [phone] fax       FACILITY TRANSFER ORDERS    12/06/2022    Admit to Nursing Home:  LTAC Bed         Diagnoses:  Active Hospital Problems    Diagnosis  POA    *Diabetic wet gangrene of the foot [E11.52]  Yes    Epistaxis [R04.0]  Yes    Acute deep vein thrombosis (DVT) of axillary vein of right upper extremity [I82.A11]  No    COPD with asthma [J44.9]  Yes    Type 2 diabetes mellitus with diabetic peripheral angiopathy without gangrene, with long-term current use of insulin [E11.51, Z79.4]  Not Applicable    HTN (hypertension) [I10]  Yes    Mixed hyperlipidemia [E78.2]  Yes    Class 1 obesity due to excess calories with serious comorbidity and body mass index (BMI) of 34.0 to 34.9 in adult [E66.09, Z68.34]  Not Applicable    Depression [F32.A]  Yes      Resolved Hospital Problems   No resolved problems to display.       Patient is homebound due to:  Diabetic wet gangrene of the foot    Allergies:  Review of patient's allergies indicates:   Allergen Reactions    Ibuprofen Swelling     Facial swelling       Vitals:       Every shift (Skilled Nursing patients)    Diet: diabetic/cardiac   Supplement:  1 can every three times a day with meals                         Type:    Glucerna       Acitivities:      - Up in a chair each morning as tolerated   - Ambulate with assistance to bathroom   - Scheduled walks once each shift (every 8 hours)    LABS:  Per facility protocol    Nursing Precautions:     - Aspiration precautions:             - Total assistance with meals            -  Upright 90 degrees befor during and after meals             -  Suction at bedside          - Fall precautions per nursing home protocol   - Decubitus precautions:        -  for positioning   - Pressure reducing foam mattress   - Turn patient every two hours. Use wedge  "pillows to anchor patient    CONSULTS:     Physical Therapy to evaluate and treat     Occupational Therapy to evaluate and treat     Speech Therapy  to evaluate and treat     Nutrition to evaluate and recommend diet    MISCELLANEOUS CARE:        Routine Skin for Bedridden Patients:  Apply moisture barrier cream to all    skin folds and wet areas in perineal area daily and after baths and                           all bowel movements.    Wound care:  1)  Irrigate the wound(s) [LEFT great toe & RIGHT plantar foot] with Vashe, sterile normal saline or wound cleanser.  Pat dry well.  2)  Apply a thin layer of Iodosorb to the wound(s), or Medi-Honey if Iodosorb is not available.   Cover with 4x4" gauze and 4x4 plain foam (ex. Balbir).   3)  Secure with 4" Kerlix and 4" ACE bandage.   4)  Change daily.  5)  Darco shoe bilateral.    DIABETES CARE:      Check blood sugar:       Fingerstick blood sugar AC and HS   Fingerstick blood sugar every 6 hours if unable to eat      Report CBG < 60 or > 400 to physician.                                          Insulin Sliding Scale          Glucose  Novolog Insulin Subcutaneous        0 - 60   Orange juice or glucose tablet, hold insulin      No insulin   201-250  2 units   251-300  4 units   301-350  6 units   351-400  8 units   >400   10 units then call physician      Medications: Discontinue all previous medication orders, if any. See new list below.       Medication List        START taking these medications      enoxaparin 100 mg/mL Syrg  Commonly known as: LOVENOX  Inject 1.2 mLs (120 mg total) into the skin every 12 (twelve) hours.     ERTAPENEM (INVANZ) 1 G/100 ML NS (READY TO MIX)  Inject 100 mLs (1 g total) into the vein once daily.  End date 1/3/23   sodium chloride 0.9% SolP 50 mL with DAPTOmycin 500 mg SolR 590 mg  Inject 590 mg into the vein once daily.  End date 1/3/23          CHANGE how you take these medications      metFORMIN 1000 MG tablet  Commonly known " "as: GLUCOPHAGE  Take 1 tablet (1,000 mg total) by mouth 2 (two) times daily.  What changed: how much to take            CONTINUE taking these medications      ammonium lactate 12 % Crea  Apply twice daily to affected parts both feet as needed.     aspirin 81 MG EC tablet  Commonly known as: ECOTRIN  Take 1 tablet (81 mg total) by mouth once daily.     atorvastatin 20 MG tablet  Commonly known as: LIPITOR  Take 2 tablets (40 mg total) by mouth once daily.     collagenase ointment  Commonly known as: SANTYL  Apply topically once daily.     hydroCHLOROthiazide 25 MG tablet  Commonly known as: HYDRODIURIL  Take 1 tablet (25 mg total) by mouth once daily.     ketoconazole 2 % cream  Commonly known as: NIZORAL     LEVEMIR FLEXTOUCH U-100 INSULN 100 unit/mL (3 mL) Inpn pen  Generic drug: insulin detemir U-100  Inject 50 Units into the skin every evening.     lisinopriL 10 MG tablet  Take 1 tablet (10 mg total) by mouth once daily.     NovoLOG Flexpen U-100 Insulin 100 unit/mL (3 mL) Inpn pen  Generic drug: insulin aspart U-100  INJECT 10 UNITS INTO THE SKIN BEFORE MEALS THREE TIMES A DAY (50 DAYS)     polyethylene glycol 17 gram/dose powder  Commonly known as: GLYCOLAX     QUEtiapine 200 MG Tab  Commonly known as: SEROQUEL  Take 1 tablet (200 mg total) by mouth nightly.     TRULICITY 0.75 mg/0.5 mL pen injector  Generic drug: dulaglutide     urea 45 % Crea  Apply 1 application topically 2 (two) times daily.            STOP taking these medications      blood-glucose meter Misc     ONETOUCH VERIO METER Misc  Generic drug: blood-glucose meter     ONETOUCH VERIO TEST STRIPS Strp  Generic drug: blood sugar diagnostic     pen needle, diabetic 31 gauge x 1/4" Ndle            ASK your doctor about these medications      HYDROcodone-acetaminophen 5-325 mg per tablet  Commonly known as: NORCO  Take 1 tablet by mouth every 6 (six) hours as needed for Pain.               Where to Get Your Medications        Information about where to " get these medications is not yet available    Ask your nurse or doctor about these medications  enoxaparin 100 mg/mL Syrg  ERTAPENEM (INVANZ) 1 G/100 ML NS (READY TO MIX)  sodium chloride 0.9% SolP 50 mL with DAPTOmycin 500 mg SolR 590 mg                 _________________________________  Aminah Ricardo MD  12/06/2022

## 2022-12-06 NOTE — SUBJECTIVE & OBJECTIVE
Interval History:  Patient noted to be slghtly hypotensive today. Hold antihypertensives. Trial of IVF today. Denies any acute complaints.  pending placement to LTAC    Review of Systems   Constitutional:  Positive for activity change and fatigue. Negative for fever.   Respiratory:  Negative for cough and shortness of breath.    Cardiovascular:  Negative for chest pain.   Gastrointestinal:  Negative for abdominal pain.   Musculoskeletal:  Positive for arthralgias.   Neurological:  Negative for dizziness and headaches.   Psychiatric/Behavioral:  Negative for confusion.    All other systems reviewed and are negative.  Objective:     Vital Signs (Most Recent):  Temp: 98 °F (36.7 °C) (12/06/22 1156)  Pulse: 83 (12/06/22 1156)  Resp: 16 (12/06/22 1306)  BP: (!) 86/58 (12/06/22 1156)  SpO2: (!) 94 % (12/06/22 1110)   Vital Signs (24h Range):  Temp:  [97.9 °F (36.6 °C)-98.5 °F (36.9 °C)] 98 °F (36.7 °C)  Pulse:  [70-83] 83  Resp:  [14-20] 16  SpO2:  [92 %-97 %] 94 %  BP: ()/(52-67) 86/58     Weight: 122.5 kg (270 lb 1 oz)  Body mass index is 34.67 kg/m².    Intake/Output Summary (Last 24 hours) at 12/6/2022 1315  Last data filed at 12/6/2022 0539  Gross per 24 hour   Intake 918 ml   Output 1000 ml   Net -82 ml        Physical Exam  Vitals and nursing note reviewed.   Constitutional:       Appearance: Normal appearance.   HENT:      Head: Normocephalic and atraumatic.   Eyes:      Extraocular Movements: Extraocular movements intact.      Pupils: Pupils are equal, round, and reactive to light.   Cardiovascular:      Rate and Rhythm: Normal rate and regular rhythm.   Pulmonary:      Effort: Pulmonary effort is normal. No respiratory distress.   Abdominal:      General: Abdomen is flat. There is no distension.   Musculoskeletal:         General: Normal range of motion.      Cervical back: Normal range of motion.      Comments: Left toe wound in bandage.  Right foot covered in bandage.   Neurological:      General: No  focal deficit present.      Mental Status: He is alert and oriented to person, place, and time.   Psychiatric:         Mood and Affect: Mood normal.         Behavior: Behavior normal.       Significant Labs: All pertinent labs within the past 24 hours have been reviewed.  CBC: No results for input(s): WBC, HGB, HCT, PLT in the last 48 hours.  CMP: No results for input(s): NA, K, CL, CO2, GLU, BUN, CREATININE, CALCIUM, PROT, ALBUMIN, BILITOT, ALKPHOS, AST, ALT, ANIONGAP, EGFRNONAA in the last 48 hours.    Invalid input(s): ESTGFAFRICA    Significant Imaging: I have reviewed all pertinent imaging results/findings within the past 24 hours.

## 2022-12-06 NOTE — SUBJECTIVE & OBJECTIVE
Interval History:  Patient noted to be HD stable and afebrile. Sitting up in the chair.  Having regular bowel movements.  Nosebleeds now resolved.  Medically stable for discharge, pending placement to LTAC    Review of Systems   Constitutional:  Positive for activity change and fatigue. Negative for fever.   Respiratory:  Negative for cough and shortness of breath.    Cardiovascular:  Negative for chest pain.   Gastrointestinal:  Negative for abdominal pain.   Musculoskeletal:  Positive for arthralgias.   Neurological:  Negative for dizziness and headaches.   Psychiatric/Behavioral:  Negative for confusion.    All other systems reviewed and are negative.  Objective:     Vital Signs (Most Recent):  Temp: 97.9 °F (36.6 °C) (12/05/22 1925)  Pulse: 83 (12/05/22 1925)  Resp: 16 (12/05/22 2122)  BP: (!) 103/55 (12/05/22 1925)  SpO2: 97 % (12/05/22 1925)   Vital Signs (24h Range):  Temp:  [97.9 °F (36.6 °C)-98.1 °F (36.7 °C)] 97.9 °F (36.6 °C)  Pulse:  [69-83] 83  Resp:  [14-19] 16  SpO2:  [92 %-97 %] 97 %  BP: ()/(55-67) 103/55     Weight: 122.5 kg (270 lb 1 oz)  Body mass index is 34.67 kg/m².    Intake/Output Summary (Last 24 hours) at 12/5/2022 2332  Last data filed at 12/5/2022 1735  Gross per 24 hour   Intake 1300 ml   Output 1550 ml   Net -250 ml        Physical Exam  Vitals and nursing note reviewed.   Constitutional:       Appearance: Normal appearance.   HENT:      Head: Normocephalic and atraumatic.   Eyes:      Extraocular Movements: Extraocular movements intact.      Pupils: Pupils are equal, round, and reactive to light.   Cardiovascular:      Rate and Rhythm: Normal rate and regular rhythm.   Pulmonary:      Effort: Pulmonary effort is normal. No respiratory distress.   Abdominal:      General: Abdomen is flat. There is no distension.   Musculoskeletal:         General: Normal range of motion.      Cervical back: Normal range of motion.      Comments: Left toe wound in bandage.  Right foot covered in  bandage.   Neurological:      General: No focal deficit present.      Mental Status: He is alert and oriented to person, place, and time.   Psychiatric:         Mood and Affect: Mood normal.         Behavior: Behavior normal.       Significant Labs: All pertinent labs within the past 24 hours have been reviewed.  CBC: No results for input(s): WBC, HGB, HCT, PLT in the last 48 hours.  CMP: No results for input(s): NA, K, CL, CO2, GLU, BUN, CREATININE, CALCIUM, PROT, ALBUMIN, BILITOT, ALKPHOS, AST, ALT, ANIONGAP, EGFRNONAA in the last 48 hours.    Invalid input(s): ESTGFAFRICA    Significant Imaging: I have reviewed all pertinent imaging results/findings within the past 24 hours.

## 2022-12-06 NOTE — PLAN OF CARE
12/06/22 1551   Post-Acute Status   Post-Acute Authorization Placement   Post-Acute Placement Status Pending post-acute provider review/more information requested   Discharge Delays (!) Post-Acute Set-up   Discharge Plan   Discharge Plan A Long-term acute care facility (LTAC)   Discharge Plan B Rehab       CM contacted [facility] & spoke with Clare in admission. Clare stated the patient appeal was received by his insurance & they do not have answer today. Clare went on to stated they will have a answer by Thursday. CM updated the virtual medicine team.     [facility].   [phone]

## 2022-12-06 NOTE — ASSESSMENT & PLAN NOTE
Home Meds:  Lisinopril 10mg and HCTZ 25mg  -Continue home meds this admission but held on 12/6 due to low BP  -Monitor and adjust as needed

## 2022-12-06 NOTE — PROGRESS NOTES
[facility] Medicine  Telemedicine Progress Note    Patient Name: Dave Good  MRN: [id number]  Patient Class: IP- Inpatient   Admission Date: 11/22/2022  Length of Stay: 12 days  Attending Physician: Aminah Ricardo MD  Primary Care Provider: Reyna Jorge NP          Subjective:     Principal Problem:Diabetic wet gangrene of the foot        HPI:  DM2, diabetic neuropathy, COPD, HTN, depression, right foot transmetatarsal amputation and osteomyelitis who was initially admitted to [facility] from Dr. Flores's office for osteomyelitis evaluation.  Patient knows progressively worsening foul-smelling discharge from his left great toe for which he sought care with Dr. Flores.  Patient was evaluated [facility] by surgery.  He will require further inpatient evaluation for osteomyelitis.       Overview/Hospital Course:  Into the hospital from general surgery clinic for evaluation of left great toe osteomyelitis.  CT imaging showing fluid collection at the left 1st MTP.  Bedside debridement performed by Podiatry.  Bone biopsy was recommended.    During hospitalization, patient had midline placed vascular access issues.  Later his hospitalization is noted right upper extremity edema.  Ultrasound Doppler right upper extremity revealed DVT extending from the right basilic to the axillary vein.  Per discussion with Hematology/Oncology, initiated therapeutic anticoagulation.      Interval History:  Patient noted to be HD stable and afebrile. Sitting up in the chair.  Having regular bowel movements.  Nosebleeds now resolved.  Medically stable for discharge, pending placement to LTAC    Review of Systems   Constitutional:  Positive for activity change and fatigue. Negative for fever.   Respiratory:  Negative for cough and shortness of breath.    Cardiovascular:  Negative for chest pain.   Gastrointestinal:  Negative for abdominal pain.   Musculoskeletal:  Positive for arthralgias.   Neurological:  Negative for  dizziness and headaches.   Psychiatric/Behavioral:  Negative for confusion.    All other systems reviewed and are negative.  Objective:     Vital Signs (Most Recent):  Temp: 97.9 °F (36.6 °C) (12/05/22 1925)  Pulse: 83 (12/05/22 1925)  Resp: 16 (12/05/22 2122)  BP: (!) 103/55 (12/05/22 1925)  SpO2: 97 % (12/05/22 1925)   Vital Signs (24h Range):  Temp:  [97.9 °F (36.6 °C)-98.1 °F (36.7 °C)] 97.9 °F (36.6 °C)  Pulse:  [69-83] 83  Resp:  [14-19] 16  SpO2:  [92 %-97 %] 97 %  BP: ()/(55-67) 103/55     Weight: 122.5 kg (270 lb 1 oz)  Body mass index is 34.67 kg/m².    Intake/Output Summary (Last 24 hours) at 12/5/2022 2332  Last data filed at 12/5/2022 1735  Gross per 24 hour   Intake 1300 ml   Output 1550 ml   Net -250 ml        Physical Exam  Vitals and nursing note reviewed.   Constitutional:       Appearance: Normal appearance.   HENT:      Head: Normocephalic and atraumatic.   Eyes:      Extraocular Movements: Extraocular movements intact.      Pupils: Pupils are equal, round, and reactive to light.   Cardiovascular:      Rate and Rhythm: Normal rate and regular rhythm.   Pulmonary:      Effort: Pulmonary effort is normal. No respiratory distress.   Abdominal:      General: Abdomen is flat. There is no distension.   Musculoskeletal:         General: Normal range of motion.      Cervical back: Normal range of motion.      Comments: Left toe wound in bandage.  Right foot covered in bandage.   Neurological:      General: No focal deficit present.      Mental Status: He is alert and oriented to person, place, and time.   Psychiatric:         Mood and Affect: Mood normal.         Behavior: Behavior normal.       Significant Labs: All pertinent labs within the past 24 hours have been reviewed.  CBC: No results for input(s): WBC, HGB, HCT, PLT in the last 48 hours.  CMP: No results for input(s): NA, K, CL, CO2, GLU, BUN, CREATININE, CALCIUM, PROT, ALBUMIN, BILITOT, ALKPHOS, AST, ALT, ANIONGAP, EGFRNONAA in the last  48 hours.    Invalid input(s): DALEAFALEX    Significant Imaging: I have reviewed all pertinent imaging results/findings within the past 24 hours.      Assessment/Plan:      * Diabetic wet gangrene of the foot  Poor foot care with Left great toe wound concerning for wet gangrene.  Left 1st MTP fluid collection on imaging.  The patient declines amputation this time and would prefer medical treatment over transmetatarsal amputation.  Patient was started on IV Vanc and Zosyn in ED and changed to IV Vanc and Cefepime on admission.  Podiatry performed bedside debridement on 11/25 and bone biopsy on 11/27/2022.  Wound cultures sent on 11/27/2022 and pending.  Blood cultures negative to date.  Cefepime changed to Ertapenem on 11/29/2022.  IV Vanc changed to Daptomycin on 11/30/2022.    Plan:  Per ID recommendations -Empiric daptomycin 6 mg/kg IV and ertapenem 1 gm IV Q24 for 6 weeks total. End of care = 1/3/22  Follow up wound cultures - no growth to date  Rehab vs LTAC on discharge - pending      Epistaxis    -nasal saline spray  -resolved    Diabetic ulcer of left great toe    Lab Results   Component Value Date    HGBA1C 9.1 (H) 11/22/2022     Most recent fingerstick glucose reviewed-   Recent Labs   Lab 11/26/22 2006 11/27/22  0746 11/27/22  1135   POCTGLUCOSE 205* 190* 209*     Current correctional scale    Antihyperglycemics (From admission, onward)    Start     Stop Route Frequency Ordered    11/27/22 2100  insulin detemir U-100 pen 30 Units         -- SubQ Nightly 11/27/22 1536    11/24/22 1330  insulin aspart U-100 pen 1-10 Units         -- SubQ Before meals & nightly PRN 11/24/22 1231        Hold Oral hypoglycemics while patient is in the hospital.    NOTE:  Patient usually takes 40 units basal insulin q.h.s.  75% of his typical basal dose has been ordered as he will be NPO after midnight in anticipation for bone biopsy Monday morning    Acute deep vein thrombosis (DVT) of axillary vein of right upper  extremity  Acute catheter associated DVT extending to the deep veins of the right upper extremity seen on ultrasound Doppler 11/26.  Patient had a right basilic midline catheter, which was initially placed due to poor vascular access.  He was not taking therapeutic anticoagulation at the time.  Case was discussed with Hematology/Oncology (Dr. Salinas) who advised therapeutic anticoagulation given that the thrombus is extending to the axillary vein.  Left upper extremity PICC line placed 11/26.  RUE midline d/c on 11/28/2022    Plan:  Continue with Lovenox 1 milligram/kilogram x 3 months        COPD with asthma  Not in exacerbation  -DuoNebs p.r.n.    Class 1 obesity due to excess calories with serious comorbidity and body mass index (BMI) of 34.0 to 34.9 in adult  Body mass index is 34.67 kg/m². Morbid obesity complicates all aspects of disease management from diagnostic modalities to treatment. Weight loss encouraged and health benefits explained to patient.         Mixed hyperlipidemia  -Continue Statin      Depression  -Continue Seroquel        HTN (hypertension)  Home Meds:  Lisinopril 10mg and HCTZ 25mg  -Continue home meds  -Monitor and adjust as needed      Type 2 diabetes mellitus with diabetic peripheral angiopathy without gangrene, with long-term current use of insulin  Hemoglobin A1c 9.1% this admission  Home Meds:  Metformin 1g bid, Trulicity, Detemir 50 units, Aspart 10 unit TIDWM  Hospital Meds:  Detemir 35 units with moderate dose SSI  -Continue to hold Metformin and Trulicity  -Continue current regimen  -Diabetic Diet  -Monitor and adjust as needed      VTE Risk Mitigation (From admission, onward)         Ordered     enoxaparin injection 120 mg  Every 12 hours (non-standard times)         11/28/22 0957     IP VTE HIGH RISK PATIENT  Once         11/22/22 1724     Place sequential compression device  Until discontinued         11/22/22 1724                      I have completed this tele-visit with the  assistance of a telepresenter.    The attending portion of this evaluation, treatment, and documentation was performed per Aminah Ricardo MD via Telemedicine AudioVisual using the secure [employer] software platform with 2 way audio/video. The provider was located off-site and the patient is located in the hospital. The aforementioned video software was utilized to document the relevant history and physical exam    Aminah Ricardo MD  Department of Hospital Medicine   [facility] ([place])

## 2022-12-06 NOTE — PLAN OF CARE
Pt requested pain medication as available, complaining of pain in his feet bilaterally and radiating up toward his knees.  Friend at bedside most of shift.  Pt up in chair most of shift.  Excellent appetite.  Blood sugar and vital signs closely monitored.  Safety measures in place.

## 2022-12-07 VITALS
HEIGHT: 74 IN | RESPIRATION RATE: 18 BRPM | OXYGEN SATURATION: 97 % | DIASTOLIC BLOOD PRESSURE: 73 MMHG | SYSTOLIC BLOOD PRESSURE: 123 MMHG | BODY MASS INDEX: 34.66 KG/M2 | WEIGHT: 270.06 LBS | TEMPERATURE: 98 F | HEART RATE: 71 BPM

## 2022-12-07 LAB
POCT GLUCOSE: 193 MG/DL (ref 70–110)
POCT GLUCOSE: 220 MG/DL (ref 70–110)

## 2022-12-07 PROCEDURE — 25000003 PHARM REV CODE 250: Performed by: INTERNAL MEDICINE

## 2022-12-07 PROCEDURE — 25000003 PHARM REV CODE 250: Performed by: STUDENT IN AN ORGANIZED HEALTH CARE EDUCATION/TRAINING PROGRAM

## 2022-12-07 PROCEDURE — 25000003 PHARM REV CODE 250: Performed by: HOSPITALIST

## 2022-12-07 PROCEDURE — 63600175 PHARM REV CODE 636 W HCPCS: Performed by: INTERNAL MEDICINE

## 2022-12-07 PROCEDURE — 94761 N-INVAS EAR/PLS OXIMETRY MLT: CPT

## 2022-12-07 PROCEDURE — A4216 STERILE WATER/SALINE, 10 ML: HCPCS | Performed by: STUDENT IN AN ORGANIZED HEALTH CARE EDUCATION/TRAINING PROGRAM

## 2022-12-07 PROCEDURE — 25000003 PHARM REV CODE 250: Performed by: NURSE PRACTITIONER

## 2022-12-07 RX ADMIN — ATORVASTATIN CALCIUM 40 MG: 20 TABLET, FILM COATED ORAL at 09:12

## 2022-12-07 RX ADMIN — INSULIN ASPART 4 UNITS: 100 INJECTION, SOLUTION INTRAVENOUS; SUBCUTANEOUS at 09:12

## 2022-12-07 RX ADMIN — NASAL 2 SPRAY: 6.5 SPRAY NASAL at 12:12

## 2022-12-07 RX ADMIN — SODIUM CHLORIDE, PRESERVATIVE FREE 10 ML: 5 INJECTION INTRAVENOUS at 11:12

## 2022-12-07 RX ADMIN — HYDROCODONE BITARTRATE AND ACETAMINOPHEN 1 TABLET: 5; 325 TABLET ORAL at 01:12

## 2022-12-07 RX ADMIN — DAPTOMYCIN 590 MG: 350 INJECTION, POWDER, LYOPHILIZED, FOR SOLUTION INTRAVENOUS at 10:12

## 2022-12-07 RX ADMIN — INSULIN ASPART 3 UNITS: 100 INJECTION, SOLUTION INTRAVENOUS; SUBCUTANEOUS at 11:12

## 2022-12-07 RX ADMIN — INSULIN ASPART 3 UNITS: 100 INJECTION, SOLUTION INTRAVENOUS; SUBCUTANEOUS at 07:12

## 2022-12-07 RX ADMIN — HYDROCODONE BITARTRATE AND ACETAMINOPHEN 1 TABLET: 5; 325 TABLET ORAL at 04:12

## 2022-12-07 RX ADMIN — ASPIRIN 81 MG: 81 TABLET, COATED ORAL at 09:12

## 2022-12-07 RX ADMIN — INSULIN ASPART 2 UNITS: 100 INJECTION, SOLUTION INTRAVENOUS; SUBCUTANEOUS at 11:12

## 2022-12-07 RX ADMIN — HYDROCODONE BITARTRATE AND ACETAMINOPHEN 1 TABLET: 5; 325 TABLET ORAL at 09:12

## 2022-12-07 RX ADMIN — ENOXAPARIN SODIUM 120 MG: 100 INJECTION SUBCUTANEOUS at 11:12

## 2022-12-07 RX ADMIN — ERTAPENEM 1 G: 1 INJECTION INTRAMUSCULAR; INTRAVENOUS at 11:12

## 2022-12-07 RX ADMIN — SODIUM CHLORIDE, PRESERVATIVE FREE 10 ML: 5 INJECTION INTRAVENOUS at 05:12

## 2022-12-07 RX ADMIN — HYDROCODONE BITARTRATE AND ACETAMINOPHEN 1 TABLET: 5; 325 TABLET ORAL at 12:12

## 2022-12-07 NOTE — PLAN OF CARE
[facility]     Department of Hospital Medicine     [address] [place] [address]     [phone] after hours  [phone] fax       FACILITY TRANSFER ORDERS    12/07/2022    Admit to Nursing Home:  LTAC Bed         Diagnoses:  Active Hospital Problems    Diagnosis  POA    *Diabetic wet gangrene of the foot [E11.52]  Yes    Epistaxis [R04.0]  Yes    Acute deep vein thrombosis (DVT) of axillary vein of right upper extremity [I82.A11]  No    COPD with asthma [J44.9]  Yes    Type 2 diabetes mellitus with diabetic peripheral angiopathy without gangrene, with long-term current use of insulin [E11.51, Z79.4]  Not Applicable    HTN (hypertension) [I10]  Yes    Mixed hyperlipidemia [E78.2]  Yes    Class 1 obesity due to excess calories with serious comorbidity and body mass index (BMI) of 34.0 to 34.9 in adult [E66.09, Z68.34]  Not Applicable    Depression [F32.A]  Yes      Resolved Hospital Problems   No resolved problems to display.       Patient is homebound due to:  Diabetic wet gangrene of the foot    Allergies:  Review of patient's allergies indicates:   Allergen Reactions    Ibuprofen Swelling     Facial swelling       Vitals:       Every shift (Skilled Nursing patients)    Diet: diabetic/cardiac   Supplement:  1 can every three times a day with meals                         Type:    Glucerna       Acitivities:      - Up in a chair each morning as tolerated   - Ambulate with assistance to bathroom   - Scheduled walks once each shift (every 8 hours)    LABS:  Per facility protocol    Nursing Precautions:     - Aspiration precautions:             - Total assistance with meals            -  Upright 90 degrees befor during and after meals             -  Suction at bedside          - Fall precautions per nursing home protocol   - Decubitus precautions:        -  for positioning   - Pressure reducing foam mattress   - Turn patient every two hours. Use wedge  "pillows to anchor patient    CONSULTS:     Physical Therapy to evaluate and treat     Occupational Therapy to evaluate and treat     Speech Therapy  to evaluate and treat     Nutrition to evaluate and recommend diet    MISCELLANEOUS CARE:        Routine Skin for Bedridden Patients:  Apply moisture barrier cream to all    skin folds and wet areas in perineal area daily and after baths and                           all bowel movements.    Wound care:  1)  Irrigate the wound(s) [LEFT great toe & RIGHT plantar foot] with Vashe, sterile normal saline or wound cleanser.  Pat dry well.  2)  Apply a thin layer of Iodosorb to the wound(s), or Medi-Honey if Iodosorb is not available.   Cover with 4x4" gauze and 4x4 plain foam (ex. Balbir).   3)  Secure with 4" Kerlix and 4" ACE bandage.   4)  Change daily.  5)  Darco shoe bilateral.    DIABETES CARE:      Check blood sugar:       Fingerstick blood sugar AC and HS   Fingerstick blood sugar every 6 hours if unable to eat      Report CBG < 60 or > 400 to physician.                                          Insulin Sliding Scale          Glucose  Novolog Insulin Subcutaneous        0 - 60   Orange juice or glucose tablet, hold insulin      No insulin   201-250  2 units   251-300  4 units   301-350  6 units   351-400  8 units   >400   10 units then call physician      Medications: Discontinue all previous medication orders, if any. See new list below.       Medication List        START taking these medications      enoxaparin 100 mg/mL Syrg  Commonly known as: LOVENOX  Inject 1.2 mLs (120 mg total) into the skin every 12 (twelve) hours.     ERTAPENEM (INVANZ) 1 G/100 ML NS (READY TO MIX)  Inject 100 mLs (1 g total) into the vein once daily.  End date 1/3/23   sodium chloride 0.9% SolP 50 mL with DAPTOmycin 500 mg SolR 590 mg  Inject 590 mg into the vein once daily.  End date 1/3/23          CHANGE how you take these medications      metFORMIN 1000 MG tablet  Commonly known " "as: GLUCOPHAGE  Take 1 tablet (1,000 mg total) by mouth 2 (two) times daily.  What changed: how much to take            CONTINUE taking these medications      ammonium lactate 12 % Crea  Apply twice daily to affected parts both feet as needed.     aspirin 81 MG EC tablet  Commonly known as: ECOTRIN  Take 1 tablet (81 mg total) by mouth once daily.     atorvastatin 20 MG tablet  Commonly known as: LIPITOR  Take 2 tablets (40 mg total) by mouth once daily.     collagenase ointment  Commonly known as: SANTYL  Apply topically once daily.     hydroCHLOROthiazide 25 MG tablet  Commonly known as: HYDRODIURIL  Take 1 tablet (25 mg total) by mouth once daily.     ketoconazole 2 % cream  Commonly known as: NIZORAL     LEVEMIR FLEXTOUCH U-100 INSULN 100 unit/mL (3 mL) Inpn pen  Generic drug: insulin detemir U-100  Inject 50 Units into the skin every evening.     lisinopriL 10 MG tablet  Take 1 tablet (10 mg total) by mouth once daily.     NovoLOG Flexpen U-100 Insulin 100 unit/mL (3 mL) Inpn pen  Generic drug: insulin aspart U-100  INJECT 10 UNITS INTO THE SKIN BEFORE MEALS THREE TIMES A DAY (50 DAYS)     polyethylene glycol 17 gram/dose powder  Commonly known as: GLYCOLAX     QUEtiapine 200 MG Tab  Commonly known as: SEROQUEL  Take 1 tablet (200 mg total) by mouth nightly.     TRULICITY 0.75 mg/0.5 mL pen injector  Generic drug: dulaglutide     urea 45 % Crea  Apply 1 application topically 2 (two) times daily.            STOP taking these medications      blood-glucose meter Misc     ONETOUCH VERIO METER Misc  Generic drug: blood-glucose meter     ONETOUCH VERIO TEST STRIPS Strp  Generic drug: blood sugar diagnostic     pen needle, diabetic 31 gauge x 1/4" Ndle            ASK your doctor about these medications      HYDROcodone-acetaminophen 5-325 mg per tablet  Commonly known as: NORCO  Take 1 tablet by mouth every 6 (six) hours as needed for Pain.               Where to Get Your Medications        Information about where to " get these medications is not yet available    Ask your nurse or doctor about these medications  enoxaparin 100 mg/mL Syrg  ERTAPENEM (INVANZ) 1 G/100 ML NS (READY TO MIX)  sodium chloride 0.9% SolP 50 mL with DAPTOmycin 500 mg SolR 590 mg                 _________________________________  Aminah Ricardo MD  12/07/2022

## 2022-12-07 NOTE — PLAN OF CARE
12/07/22 1219   Final Note   Assessment Type Final Discharge Note   Anticipated Discharge Disposition LTAC   Hospital Resources/Appts/Education Provided Provided patient/caregiver with written discharge plan information;Appointments scheduled and added to AVS;Appointments scheduled by Navigator/Coordinator   Post-Acute Status   Post-Acute Placement Status Set-up Complete/Auth obtained   Discharge Delays None known at this time       Patient will discharge to [facility] located at [address] [place] [address]  [place] [address].    Patient will be transported by wheelchair van (ADT 30) to [address] [place] [address]  [place] [address]    Room--- 104 Report----- [phone]    All discharge needs addressed from a CM perspective.

## 2022-12-07 NOTE — SUBJECTIVE & OBJECTIVE
Interval History:  Patient noted to be slghtly hypotensive today. Hold antihypertensives. Trial of IVF today. Denies any acute complaints.  pending placement to LTAC    Review of Systems   Constitutional:  Positive for activity change and fatigue. Negative for fever.   Respiratory:  Negative for cough and shortness of breath.    Cardiovascular:  Negative for chest pain.   Gastrointestinal:  Negative for abdominal pain.   Musculoskeletal:  Positive for arthralgias.   Neurological:  Negative for dizziness and headaches.   Psychiatric/Behavioral:  Negative for confusion.    All other systems reviewed and are negative.  Objective:     Vital Signs (Most Recent):  Temp: 98 °F (36.7 °C) (12/07/22 1146)  Pulse: 71 (12/07/22 1146)  Resp: 18 (12/07/22 1146)  BP: 123/73 (12/07/22 1146)  SpO2: 97 % (12/07/22 1146)   Vital Signs (24h Range):  Temp:  [97.7 °F (36.5 °C)-98.7 °F (37.1 °C)] 98 °F (36.7 °C)  Pulse:  [65-76] 71  Resp:  [15-20] 18  SpO2:  [93 %-98 %] 97 %  BP: (102-123)/(60-73) 123/73     Weight: 122.5 kg (270 lb 1 oz)  Body mass index is 34.67 kg/m².    Intake/Output Summary (Last 24 hours) at 12/7/2022 1231  Last data filed at 12/7/2022 0809  Gross per 24 hour   Intake 1805.59 ml   Output 2400 ml   Net -594.41 ml        Physical Exam  Vitals and nursing note reviewed.   Constitutional:       Appearance: Normal appearance.   HENT:      Head: Normocephalic and atraumatic.   Eyes:      Extraocular Movements: Extraocular movements intact.      Pupils: Pupils are equal, round, and reactive to light.   Cardiovascular:      Rate and Rhythm: Normal rate and regular rhythm.   Pulmonary:      Effort: Pulmonary effort is normal. No respiratory distress.   Abdominal:      General: Abdomen is flat. There is no distension.   Musculoskeletal:         General: Normal range of motion.      Cervical back: Normal range of motion.      Comments: Left toe wound in bandage.  Right foot covered in bandage.   Neurological:      General: No  focal deficit present.      Mental Status: He is alert and oriented to person, place, and time.   Psychiatric:         Mood and Affect: Mood normal.         Behavior: Behavior normal.       Significant Labs: All pertinent labs within the past 24 hours have been reviewed.  CBC: No results for input(s): WBC, HGB, HCT, PLT in the last 48 hours.  CMP: No results for input(s): NA, K, CL, CO2, GLU, BUN, CREATININE, CALCIUM, PROT, ALBUMIN, BILITOT, ALKPHOS, AST, ALT, ANIONGAP, EGFRNONAA in the last 48 hours.    Invalid input(s): ESTGFAFRICA    Significant Imaging: I have reviewed all pertinent imaging results/findings within the past 24 hours.

## 2022-12-07 NOTE — PLAN OF CARE
Pt's BP reported to MD; NS infusing at 100 mL/hr.  PRN pain medication given as ordered and requested.  Good appetite.  Friend at bedside in morning.  Safety measures in place.  Vital signs and blood sugar closely monitored.

## 2022-12-07 NOTE — PLAN OF CARE
POC reviewed w/ pt and purposeful rounding complete. Meds administered per AR. PRN pain meds administered for c/o bilateral foot pain w/ relief obtained. VSS on RA. Blood glucose monitored and PRN insulin administered per sliding scale. Pt AAOx4 and ambulatory. Pt denies needs at this time; will continue to monitor.     Problem: Adult Inpatient Plan of Care  Goal: Plan of Care Review  Outcome: Ongoing, Progressing  Goal: Patient-Specific Goal (Individualized)  Outcome: Ongoing, Progressing  Goal: Absence of Hospital-Acquired Illness or Injury  Outcome: Ongoing, Progressing  Goal: Optimal Comfort and Wellbeing  Outcome: Ongoing, Progressing  Goal: Readiness for Transition of Care  Outcome: Ongoing, Progressing     Problem: Diabetes Comorbidity  Goal: Blood Glucose Level Within Targeted Range  Outcome: Ongoing, Progressing     Problem: Infection  Goal: Absence of Infection Signs and Symptoms  Outcome: Ongoing, Progressing     Problem: Impaired Wound Healing  Goal: Optimal Wound Healing  Outcome: Ongoing, Progressing

## 2022-12-08 ENCOUNTER — TELEPHONE (OUTPATIENT)
Dept: PODIATRY | Facility: CLINIC | Age: 57
End: 2022-12-08
Payer: MEDICAID

## 2022-12-08 NOTE — DISCHARGE SUMMARY
[facility] Medicine  Discharge Summary      Patient Name: Dave Good  MRN: [id number]  Patient Class: IP- Inpatient  Admission Date: 11/22/2022  Hospital Length of Stay: 14 days  Discharge Date and Time: 12/7/2022  5:16 PM  Attending Physician: No att. providers found   Discharging Provider: Aminah Ricardo MD  Primary Care Provider: Reyna Jorge NP      HPI:   DM2, diabetic neuropathy, COPD, HTN, depression, right foot transmetatarsal amputation and osteomyelitis who was initially admitted to ED from Dr. Flores's office for osteomyelitis evaluation.  Patient knows progressively worsening foul-smelling discharge from his left great toe for which he sought care with Dr. Flores.  Patient was evaluated [facility] by surgery.  He will require further inpatient evaluation for osteomyelitis.       * No surgery found *      Hospital Course:   Diabetic wet gangrene of the foot  Poor foot care with Left great toe wound concerning for wet gangrene.  Left 1st MTP fluid collection on imaging.  The patient declines amputation this time and would prefer medical treatment over transmetatarsal amputation.  Patient was started on IV Vanc and Zosyn in ED and changed to IV Vanc and Cefepime on admission.  Podiatry performed bedside debridement on 11/25 and bone biopsy on 11/27/2022.  Wound cultures sent on 11/27/2022 and pending.  Blood cultures negative to date.  Cefepime changed to Ertapenem on 11/29/2022.  IV Vanc changed to Daptomycin on 11/30/2022.     Plan:  Per ID recommendations -Empiric daptomycin 6 mg/kg IV and ertapenem 1 gm IV Q24 for 6 weeks total. End of care = 1/3/22  Follow up wound cultures - no growth to date  LTAC on discharge     Acute deep vein thrombosis (DVT) of axillary vein of right upper extremity  Acute catheter associated DVT extending to the deep veins of the right upper extremity seen on ultrasound Doppler 11/26.  Patient had a right basilic midline catheter, which was initially  placed due to poor vascular access.  He was not taking therapeutic anticoagulation at the time.  Case was discussed with Hematology/Oncology (Dr. Salinas) who advised therapeutic anticoagulation given that the thrombus is extending to the axillary vein.  Left upper extremity PICC line placed 11/26.  BYRON midline d/c on 11/28/2022     Plan:  Continue with Lovenox 1 milligram/kilogram x 3 months           COPD with asthma  Not in exacerbation  -DuoNebs p.r.n.      Mixed hyperlipidemia  -Continue Statin        Depression  -Continue Seroquel           HTN (hypertension)  Home Meds:  Lisinopril 10mg and HCTZ 25mg  -Continue home meds this admission but held on 12/6 due to low BP             Goals of Care Treatment Preferences:  Code Status: Full Code      Consults:   Consults (From admission, onward)        Status Ordering Provider     Inpatient virtual consult to Hospital Medicine  Once        Provider:  Vineet Salinas MD    Completed NAHUM ESTRELLA     Inpatient consult to Infectious Diseases  Once        Provider:  Gomez Manrique MD    Completed NAHUM ESTRELLA     Inpatient consult to Interventional Radiology  Once        Provider:  Miguel Angel Moore MD    Completed LADONNA GROSS     Inpatient consult to PICC team (NIAS)  Once        Provider:  (Not yet assigned)    Completed LADONNA GROSS     Inpatient consult to Podiatry  Once        Provider:  Samuel Toussaint DPM    Completed LADONNA GROSS     Inpatient consult to Midline team  Once        Provider:  (Not yet assigned)    Completed AMPARO PEREZ          No new Assessment & Plan notes have been filed under this hospital service since the last note was generated.  Service: Hospital Medicine    Final Active Diagnoses:    Diagnosis Date Noted POA    PRINCIPAL PROBLEM:  Diabetic wet gangrene of the foot [E11.52] 09/20/2021 Yes    Epistaxis [R04.0] 12/01/2022 Yes    Acute deep vein thrombosis (DVT) of axillary vein of right upper  extremity [I82.A11] 11/26/2022 No    COPD with asthma [J44.9] 01/07/2022 Yes    Type 2 diabetes mellitus with diabetic peripheral angiopathy without gangrene, with long-term current use of insulin [E11.51, Z79.4] 09/20/2021 Not Applicable    HTN (hypertension) [I10] 09/20/2021 Yes    Mixed hyperlipidemia [E78.2] 09/20/2021 Yes    Class 1 obesity due to excess calories with serious comorbidity and body mass index (BMI) of 34.0 to 34.9 in adult [E66.09, Z68.34] 09/20/2021 Not Applicable    Depression [F32.A] 09/20/2021 Yes      Problems Resolved During this Admission:       Discharged Condition: stable    Disposition: Long Term Acute Care    Follow Up:   Follow-up Information     Reyna Jorge NP Follow up in 1 week(s).    Specialty: Family Medicine  Contact information:  [address] [facility] [address]  [place] [address]  [phone]             [facility] INFECTIOUS DISEASE Follow up.    Specialty: Infectious Diseases  Contact information:  [address] [address]  [phone]           [facility] PODIATRY .    Specialty: Podiatry  Contact information:  [address] [id number] [phone]           [facility] - Emergency Dept Follow up.    Specialty: Emergency Medicine  Why: If symptoms worsen, As needed  Contact information:  [address] [address] [phone]           [facility] Follow up.    Specialty: [facility]  Contact information:  [address] [facility] [address]  [phone]                     Patient Instructions:      Ambulatory referral/consult to Infectious Disease   Standing Status: Future   Referral Priority: Routine Referral Type: Consultation   Referral Reason: Specialty Services Required   Requested Specialty: Infectious Diseases   Number of Visits Requested: 1     Ambulatory referral/consult to Podiatry   Standing Status: Future   Referral  Priority: Routine Referral Type: Consultation   Referral Reason: Specialty Services Required   Requested Specialty: Podiatry   Number of Visits Requested: 1     Diet Cardiac     Diet diabetic     Notify your health care provider if you experience any of the following:  temperature >100.4     Notify your health care provider if you experience any of the following:  persistent nausea and vomiting or diarrhea     Notify your health care provider if you experience any of the following:  severe uncontrolled pain     Notify your health care provider if you experience any of the following:  redness, tenderness, or signs of infection (pain, swelling, redness, odor or green/yellow discharge around incision site)     Notify your health care provider if you experience any of the following:  difficulty breathing or increased cough     Notify your health care provider if you experience any of the following:  severe persistent headache     Notify your health care provider if you experience any of the following:  worsening rash     Notify your health care provider if you experience any of the following:  persistent dizziness, light-headedness, or visual disturbances     Notify your health care provider if you experience any of the following:  increased confusion or weakness     Send follow up & questions to   Order Comments: Patient's primary care physician: Reyna Jorge NP     Activity as tolerated       Significant Diagnostic Studies: Labs: CMP No results for input(s): NA, K, CL, CO2, GLU, BUN, CREATININE, CALCIUM, PROT, ALBUMIN, BILITOT, ALKPHOS, AST, ALT, ANIONGAP, ESTGFRAFRICA, EGFRNONAA in the last 48 hours. and CBC No results for input(s): WBC, HGB, HCT, PLT in the last 48 hours.    Pending Diagnostic Studies:     None         Medications:  Reconciled Home Medications:      Medication List      START taking these medications    enoxaparin 100 mg/mL Syrg  Commonly known as: LOVENOX  Inject 1.2 mLs (120 mg total) into  the skin every 12 (twelve) hours.     ERTAPENEM (INVANZ) 1 G/100 ML NS (READY TO MIX)  Inject 100 mLs (1 g total) into the vein once daily.     sodium chloride 0.9% SolP 50 mL with DAPTOmycin 500 mg SolR 590 mg  Inject 590 mg into the vein once daily.        CHANGE how you take these medications    metFORMIN 1000 MG tablet  Commonly known as: GLUCOPHAGE  Take 1 tablet (1,000 mg total) by mouth 2 (two) times daily.  What changed: how much to take        CONTINUE taking these medications    ammonium lactate 12 % Crea  Apply twice daily to affected parts both feet as needed.     aspirin 81 MG EC tablet  Commonly known as: ECOTRIN  Take 1 tablet (81 mg total) by mouth once daily.     atorvastatin 20 MG tablet  Commonly known as: LIPITOR  Take 2 tablets (40 mg total) by mouth once daily.     collagenase ointment  Commonly known as: SANTYL  Apply topically once daily.     hydroCHLOROthiazide 25 MG tablet  Commonly known as: HYDRODIURIL  Take 1 tablet (25 mg total) by mouth once daily.     ketoconazole 2 % cream  Commonly known as: NIZORAL  Apply topically.     LEVEMIR FLEXTOUCH U-100 INSULN 100 unit/mL (3 mL) Inpn pen  Generic drug: insulin detemir U-100  Inject 50 Units into the skin every evening.     lisinopriL 10 MG tablet  Take 1 tablet (10 mg total) by mouth once daily.     NovoLOG Flexpen U-100 Insulin 100 unit/mL (3 mL) Inpn pen  Generic drug: insulin aspart U-100  INJECT 10 UNITS INTO THE SKIN BEFORE MEALS THREE TIMES A DAY (50 DAYS)     polyethylene glycol 17 gram/dose powder  Commonly known as: GLYCOLAX     QUEtiapine 200 MG Tab  Commonly known as: SEROQUEL  Take 1 tablet (200 mg total) by mouth nightly.     TRULICITY 0.75 mg/0.5 mL pen injector  Generic drug: dulaglutide  Inject into the skin.     urea 45 % Crea  Apply 1 application topically 2 (two) times daily.        STOP taking these medications    blood-glucose meter Misc     ONETOUCH VERIO METER Misc  Generic drug: blood-glucose meter     ONETOUCH VERIO  "TEST STRIPS Strp  Generic drug: blood sugar diagnostic     pen needle, diabetic 31 gauge x 1/4" Ndle        ASK your doctor about these medications    HYDROcodone-acetaminophen 5-325 mg per tablet  Commonly known as: NORCO  Take 1 tablet by mouth every 6 (six) hours as needed for Pain.            Indwelling Lines/Drains at time of discharge:   Lines/Drains/Airways     Peripherally Inserted Central Catheter Line  Duration           PICC Double Lumen 11/26/22 1436 left basilic 11 days                Time spent on the discharge of patient: 39 minutes         The attending portion of this evaluation, treatment, and documentation was performed per Aminah Ricardo MD via Telemedicine AudioVisual using the secure [employer] software platform with 2 way audio/video. The provider was located off-site and the patient is located in the hospital. The aforementioned video software was utilized to document the relevant history and physical exam    Aminah Ricadro MD  Department of Hospital Medicine  [facility]  "

## 2022-12-08 NOTE — TELEPHONE ENCOUNTER
Spoke with pt wife in regards to a message sent over the portal. Pt wife stated that Mr. Acosta been in the hospital for bone infection and that they are D/C him from the hospital Jan 5th. I scheduled the pt for Jan 6th to see Dr. Toussaint. Pt wife agreed to date and time. Call ended.

## 2023-01-04 ENCOUNTER — OFFICE VISIT (OUTPATIENT)
Dept: INFECTIOUS DISEASES | Facility: CLINIC | Age: 58
End: 2023-01-04
Payer: MEDICAID

## 2023-01-04 VITALS
HEART RATE: 88 BPM | BODY MASS INDEX: 38.62 KG/M2 | DIASTOLIC BLOOD PRESSURE: 79 MMHG | WEIGHT: 300.94 LBS | HEIGHT: 74 IN | SYSTOLIC BLOOD PRESSURE: 127 MMHG | TEMPERATURE: 98 F

## 2023-01-04 DIAGNOSIS — M86.172 ACUTE OSTEOMYELITIS OF LEFT FOOT: ICD-10-CM

## 2023-01-04 PROCEDURE — 4010F PR ACE/ARB THEARPY RXD/TAKEN: ICD-10-PCS | Mod: CPTII,,, | Performed by: PHYSICIAN ASSISTANT

## 2023-01-04 PROCEDURE — 3074F PR MOST RECENT SYSTOLIC BLOOD PRESSURE < 130 MM HG: ICD-10-PCS | Mod: CPTII,,, | Performed by: PHYSICIAN ASSISTANT

## 2023-01-04 PROCEDURE — 4010F ACE/ARB THERAPY RXD/TAKEN: CPT | Mod: CPTII,,, | Performed by: PHYSICIAN ASSISTANT

## 2023-01-04 PROCEDURE — 99999 PR PBB SHADOW E&M-EST. PATIENT-LVL V: ICD-10-PCS | Mod: PBBFAC,,, | Performed by: PHYSICIAN ASSISTANT

## 2023-01-04 PROCEDURE — 99215 OFFICE O/P EST HI 40 MIN: CPT | Mod: PBBFAC | Performed by: PHYSICIAN ASSISTANT

## 2023-01-04 PROCEDURE — 3008F PR BODY MASS INDEX (BMI) DOCUMENTED: ICD-10-PCS | Mod: CPTII,,, | Performed by: PHYSICIAN ASSISTANT

## 2023-01-04 PROCEDURE — 1111F DSCHRG MED/CURRENT MED MERGE: CPT | Mod: CPTII,,, | Performed by: PHYSICIAN ASSISTANT

## 2023-01-04 PROCEDURE — 99214 PR OFFICE/OUTPT VISIT, EST, LEVL IV, 30-39 MIN: ICD-10-PCS | Mod: S$PBB,,, | Performed by: PHYSICIAN ASSISTANT

## 2023-01-04 PROCEDURE — 1159F MED LIST DOCD IN RCRD: CPT | Mod: CPTII,,, | Performed by: PHYSICIAN ASSISTANT

## 2023-01-04 PROCEDURE — 1111F PR DISCHARGE MEDS RECONCILED W/ CURRENT OUTPATIENT MED LIST: ICD-10-PCS | Mod: CPTII,,, | Performed by: PHYSICIAN ASSISTANT

## 2023-01-04 PROCEDURE — 99999 PR PBB SHADOW E&M-EST. PATIENT-LVL V: CPT | Mod: PBBFAC,,, | Performed by: PHYSICIAN ASSISTANT

## 2023-01-04 PROCEDURE — 3078F PR MOST RECENT DIASTOLIC BLOOD PRESSURE < 80 MM HG: ICD-10-PCS | Mod: CPTII,,, | Performed by: PHYSICIAN ASSISTANT

## 2023-01-04 PROCEDURE — 99214 OFFICE O/P EST MOD 30 MIN: CPT | Mod: S$PBB,,, | Performed by: PHYSICIAN ASSISTANT

## 2023-01-04 PROCEDURE — 3074F SYST BP LT 130 MM HG: CPT | Mod: CPTII,,, | Performed by: PHYSICIAN ASSISTANT

## 2023-01-04 PROCEDURE — 1159F PR MEDICATION LIST DOCUMENTED IN MEDICAL RECORD: ICD-10-PCS | Mod: CPTII,,, | Performed by: PHYSICIAN ASSISTANT

## 2023-01-04 PROCEDURE — 3078F DIAST BP <80 MM HG: CPT | Mod: CPTII,,, | Performed by: PHYSICIAN ASSISTANT

## 2023-01-04 PROCEDURE — 3008F BODY MASS INDEX DOCD: CPT | Mod: CPTII,,, | Performed by: PHYSICIAN ASSISTANT

## 2023-01-04 RX ORDER — BLOOD SUGAR DIAGNOSTIC
STRIP MISCELLANEOUS
Status: ON HOLD | COMMUNITY
Start: 2022-12-23 | End: 2023-09-20 | Stop reason: HOSPADM

## 2023-01-04 RX ORDER — INSULIN GLARGINE 100 [IU]/ML
INJECTION, SOLUTION SUBCUTANEOUS
Status: ON HOLD | COMMUNITY
Start: 2022-12-01 | End: 2023-09-14 | Stop reason: HOSPADM

## 2023-01-04 NOTE — PROGRESS NOTES
Subjective:       Patient ID: Dave Good is a 57 y.o. male.    Chief Complaint: Follow-up    HPI      Mr. Good is a 56 y/o male with DMII presents to ID clinic for EOC of left hallux osteomyelitis. He was admitted 11/2022 for left foot osteomyelitis s/p bone biopsy. Cultures NGTD. Gram stain negative, however pathology results returned positive with osteomyelitis. Pt was discharged on daptomycin and ertapenem x 6 weeks EOC 1/3/23. Pt was discharged to [facility] in [place]. He is seen today for OPAT/EOC. Pt is new to me.    Pt denies having any fever chills or night sweats. He will follow up with podiatry and wound care. He lives with his fiance. He reports his wounds have been healing well. His sugars have been well controlled. He denies having any new wounds. No issues with his picc line or abx. He had his picc line removed at [facility] prior to discharge yesterday.     Review of Systems   Constitutional:  Negative for activity change, appetite change, chills, diaphoresis, fatigue and fever.   Respiratory:  Negative for cough and shortness of breath.    Gastrointestinal:  Negative for abdominal pain, diarrhea, nausea and vomiting.   Genitourinary:  Negative for dysuria.   Musculoskeletal:  Negative for arthralgias, back pain and joint swelling.   Integumentary:  Positive for wound. Negative for rash.   Neurological:  Negative for dizziness and headaches.       Objective:      Physical Exam  Vitals and nursing note reviewed.   Constitutional:       General: He is not in acute distress.     Appearance: He is well-developed. He is not diaphoretic.   HENT:      Head: Normocephalic and atraumatic.   Eyes:      Pupils: Pupils are equal, round, and reactive to light.   Cardiovascular:      Rate and Rhythm: Normal rate and regular rhythm.      Heart sounds: Normal heart sounds. No murmur heard.    No friction rub. No gallop.   Pulmonary:      Effort: Pulmonary effort is normal. No respiratory distress.       Breath sounds: Normal breath sounds. No wheezing or rales.   Chest:      Chest wall: No tenderness.   Abdominal:      General: Bowel sounds are normal. There is no distension.      Palpations: There is no mass.      Tenderness: There is no abdominal tenderness. There is no guarding.   Musculoskeletal:         General: No deformity. Normal range of motion.      Cervical back: Normal range of motion and neck supple.      Comments: Wound healing well. No purulent drainage. No deep ulcerations.    Skin:     General: Skin is warm and dry.      Findings: No erythema or rash.   Neurological:      Mental Status: He is alert and oriented to person, place, and time.   Psychiatric:         Behavior: Behavior normal.         Thought Content: Thought content normal.         Judgment: Judgment normal.                 Assessment:       Problem List Items Addressed This Visit    None  Visit Diagnoses       Acute osteomyelitis of left foot                  Plan:         Picc line removed at [facility] yesterday. Had cbc cmp drawn on Monday per [facility]. Labs will be faxed order per nursing at [facility]. Wounds without signs of infection at this time. Continue local wound care, tight glycemic control. Follow up with podiatry.   RTC as needed    Labs received, CRP downtrended to 7. CBC CMP WNL      All questions answered. Pt verbalized understanding     >35 minutes of total time spent on the encounter, which includes face to face time and non-face to face time preparing to see the patient (eg, review of tests), Obtaining and/or reviewing separately obtained history, Documenting clinical information in the electronic or other health record, Independently interpreting results (not separately reported) and communicating results to the patient/family/caregiver, or Care coordination (not separately reported).

## 2023-01-12 ENCOUNTER — OFFICE VISIT (OUTPATIENT)
Dept: SURGERY | Facility: CLINIC | Age: 58
End: 2023-01-12
Attending: SPECIALIST
Payer: MEDICAID

## 2023-01-12 VITALS
DIASTOLIC BLOOD PRESSURE: 76 MMHG | OXYGEN SATURATION: 98 % | SYSTOLIC BLOOD PRESSURE: 112 MMHG | HEART RATE: 86 BPM | BODY MASS INDEX: 38.5 KG/M2 | WEIGHT: 300 LBS | HEIGHT: 74 IN

## 2023-01-12 DIAGNOSIS — M86.071 ACUTE HEMATOGENOUS OSTEOMYELITIS OF RIGHT FOOT: ICD-10-CM

## 2023-01-12 DIAGNOSIS — L97.522 ULCERATED, FOOT, LEFT, WITH FAT LAYER EXPOSED: Primary | ICD-10-CM

## 2023-01-12 PROCEDURE — 4010F ACE/ARB THERAPY RXD/TAKEN: CPT | Mod: CPTII,,, | Performed by: SPECIALIST

## 2023-01-12 PROCEDURE — 99212 OFFICE O/P EST SF 10 MIN: CPT | Mod: S$PBB,,, | Performed by: SPECIALIST

## 2023-01-12 PROCEDURE — 3008F PR BODY MASS INDEX (BMI) DOCUMENTED: ICD-10-PCS | Mod: CPTII,,, | Performed by: SPECIALIST

## 2023-01-12 PROCEDURE — 3078F PR MOST RECENT DIASTOLIC BLOOD PRESSURE < 80 MM HG: ICD-10-PCS | Mod: CPTII,,, | Performed by: SPECIALIST

## 2023-01-12 PROCEDURE — 1159F MED LIST DOCD IN RCRD: CPT | Mod: CPTII,,, | Performed by: SPECIALIST

## 2023-01-12 PROCEDURE — 3008F BODY MASS INDEX DOCD: CPT | Mod: CPTII,,, | Performed by: SPECIALIST

## 2023-01-12 PROCEDURE — 3074F PR MOST RECENT SYSTOLIC BLOOD PRESSURE < 130 MM HG: ICD-10-PCS | Mod: CPTII,,, | Performed by: SPECIALIST

## 2023-01-12 PROCEDURE — 99999 PR PBB SHADOW E&M-EST. PATIENT-LVL IV: CPT | Mod: PBBFAC,,, | Performed by: SPECIALIST

## 2023-01-12 PROCEDURE — 3074F SYST BP LT 130 MM HG: CPT | Mod: CPTII,,, | Performed by: SPECIALIST

## 2023-01-12 PROCEDURE — 4010F PR ACE/ARB THEARPY RXD/TAKEN: ICD-10-PCS | Mod: CPTII,,, | Performed by: SPECIALIST

## 2023-01-12 PROCEDURE — 3078F DIAST BP <80 MM HG: CPT | Mod: CPTII,,, | Performed by: SPECIALIST

## 2023-01-12 PROCEDURE — 99214 OFFICE O/P EST MOD 30 MIN: CPT | Mod: PBBFAC | Performed by: SPECIALIST

## 2023-01-12 PROCEDURE — 99999 PR PBB SHADOW E&M-EST. PATIENT-LVL IV: ICD-10-PCS | Mod: PBBFAC,,, | Performed by: SPECIALIST

## 2023-01-12 PROCEDURE — 99212 PR OFFICE/OUTPT VISIT, EST, LEVL II, 10-19 MIN: ICD-10-PCS | Mod: S$PBB,,, | Performed by: SPECIALIST

## 2023-01-12 PROCEDURE — 1159F PR MEDICATION LIST DOCUMENTED IN MEDICAL RECORD: ICD-10-PCS | Mod: CPTII,,, | Performed by: SPECIALIST

## 2023-01-12 NOTE — PROGRESS NOTES
57-year-old male with longstanding type 2 diabetes mellitus and peripheral neuropathy  Patient admitted from this office on 11/22/2022 with bilateral trophic ulcers and cellulitis   Patient has subsequently hospitalized in LTAC on [place] and given 6 weeks of ertapenem and daptomycin    Subjective   Denies fever, chills, pain     PE   Normal pedal pulses, bilateral trophic ulcers without evidence of infection  Feet pink and warm with trace edema    Impression/plan   Feet lesions markedly improved  Continue local care and [place]   RTC 6 weeks

## 2023-01-13 ENCOUNTER — TELEPHONE (OUTPATIENT)
Dept: INFECTIOUS DISEASES | Facility: CLINIC | Age: 58
End: 2023-01-13
Payer: MEDICAID

## 2023-01-13 NOTE — TELEPHONE ENCOUNTER
Pt sent appointment request with provider Melody Cordon who is a Pcp. I sent message to their staff pool to help schedule pt.

## 2023-01-13 NOTE — TELEPHONE ENCOUNTER
----- Message from Fernanda Gomez LPN sent at 1/13/2023  9:27 AM CST -----  Dr. SHEN has never seen the pt   PCP  is  Dr. Jorge   If they need an appointment , go to PCP or wait til 1st available comes up for a new patient     Sorry , I can not help      ----- Message -----  From: Jazmín Zuniga MA  Sent: 1/13/2023   8:55 AM CST  To: [facility] Staff    Good morning,     This pt sent us a appt request, however it has Dr. Cordon on it as the provider.  Can someone please help reschedule this pt? Thank you!

## 2023-01-24 ENCOUNTER — HOSPITAL ENCOUNTER (OUTPATIENT)
Dept: WOUND CARE | Facility: HOSPITAL | Age: 58
Discharge: HOME | End: 2023-01-24
Attending: SURGERY
Payer: MEDICAID

## 2023-01-24 VITALS
WEIGHT: 300 LBS | SYSTOLIC BLOOD PRESSURE: 132 MMHG | HEIGHT: 74 IN | BODY MASS INDEX: 38.5 KG/M2 | TEMPERATURE: 98 F | DIASTOLIC BLOOD PRESSURE: 82 MMHG | HEART RATE: 80 BPM

## 2023-01-24 DIAGNOSIS — Z79.4 TYPE 2 DIABETES MELLITUS WITH DIABETIC PERIPHERAL ANGIOPATHY WITHOUT GANGRENE, WITH LONG-TERM CURRENT USE OF INSULIN: ICD-10-CM

## 2023-01-24 DIAGNOSIS — E11.51 TYPE 2 DIABETES MELLITUS WITH DIABETIC PERIPHERAL ANGIOPATHY WITHOUT GANGRENE, WITH LONG-TERM CURRENT USE OF INSULIN: ICD-10-CM

## 2023-01-24 DIAGNOSIS — L97.512 RIGHT FOOT ULCER, WITH FAT LAYER EXPOSED: Primary | ICD-10-CM

## 2023-01-24 PROCEDURE — 99213 OFFICE O/P EST LOW 20 MIN: CPT | Mod: 25

## 2023-01-24 PROCEDURE — 11042 DBRDMT SUBQ TIS 1ST 20SQCM/<: CPT

## 2023-01-24 NOTE — PROGRESS NOTES
Subjective:       Patient ID: Dave Good is a 57 y.o. male.    Chief Complaint: No chief complaint on file.    HPI  Review of Systems    Objective:      Physical Exam    Assessment:       No diagnosis found.           Plan:                ***

## 2023-01-24 NOTE — PROGRESS NOTES
[facility]    Subjective:       Patient ID: Dave Good is a 57 y.o. male.    Chief Complaint: Non-healing Wound and Diabetic Foot Ulcer    Readmit to wound care with ble foot diabetic foot ulcers. HIs last visit with us was early Nov 2022. Reports recent discharge from [facility].  States he had 6 weeks IV antibiotics for left foot diabetic foot infection.  No wounds to left. Right foot wound debrided. Plan of care initiated. Home health ordered for weekly dressing changes nurse visits ordered until home health starts.     Review of Systems   All systems were reviewed and are negative, except that mentioned in the HPI.    Objective:     Vitals:    01/24/23 1426   BP: 132/82   Pulse: 80   Temp: 97.7 °F (36.5 °C)         Physical Exam  Constitutional:       Appearance: He is well-developed.   HENT:      Head: Normocephalic and atraumatic.   Eyes:      Pupils: Pupils are equal, round, and reactive to light.   Cardiovascular:      Rate and Rhythm: Normal rate.   Pulmonary:      Effort: Pulmonary effort is normal. No respiratory distress.      Breath sounds: No stridor.   Abdominal:      General: There is no distension.   Musculoskeletal:      Cervical back: Normal range of motion.   Skin:     Comments: See Synopsis for wound details   Neurological:      Mental Status: He is alert and oriented to person, place, and time.          Altered Skin Integrity 11/08/22 1300 Left plantar Foot Diabetic Ulcer Full thickness tissue loss. Subcutaneous fat may be visible but bone, tendon or muscle are not exposed (Active)   11/08/22 1300   Altered Skin Integrity Present on Admission: yes   Side: Left   Orientation: plantar   Location: Foot   Wound Number:    Is this injury device related?: No   Primary Wound Type: Diabetic ulc   Description of Altered Skin Integrity: Full thickness tissue loss. Subcutaneous fat may be visible but bone, tendon or muscle  are not exposed   Ankle-Brachial Index: 1.30   Pulses:    Removal Indication and Assessment:    Wound Outcome:    (Retired) Wound Length (cm):    (Retired) Wound Width (cm):    (Retired) Depth (cm):    Wound Description (Comments):    Removal Indications:    Wound Image     01/24/23 1518   Dressing Appearance Dry;Intact 01/24/23 1518   Drainage Amount None 01/24/23 1518   Appearance Closed/resurfaced 01/24/23 1518   Tissue loss description Not applicable 01/24/23 1518   Periwound Area Dry;Intact 01/24/23 1518   Wound Edges Rolled/closed 01/24/23 1518   Patrick Classification (diabetic foot ulcers only) Grade 0 01/24/23 1518   Wound Length (cm) 0 cm 01/24/23 1518   Wound Width (cm) 0 cm 01/24/23 1518   Wound Depth (cm) 0 cm 01/24/23 1518   Wound Volume (cm^3) 0 cm^3 01/24/23 1518   Wound Surface Area (cm^2) 0 cm^2 01/24/23 1518   Care Cleansed with:;Soap and water 01/24/23 1518   Dressing Applied;Foam;Cast padding;Non-adherent 01/24/23 1518   Periwound Care Moisture barrier applied;Dry periwound area maintained 01/24/23 1518   Off Loading Football dressing;Off loading shoe 01/24/23 1518   Dressing Change Due 01/27/23 01/24/23 1518            Altered Skin Integrity 11/08/22 1300 Right plantar Foot Diabetic Ulcer (Active)   11/08/22 1300   Altered Skin Integrity Present on Admission: yes   Side: Right   Orientation: plantar   Location: Foot   Wound Number:    Is this injury device related?:    Primary Wound Type: Diabetic ulc   Description of Altered Skin Integrity:    Ankle-Brachial Index: 1.25   Pulses:    Removal Indication and Assessment:    Wound Outcome:    (Retired) Wound Length (cm):    (Retired) Wound Width (cm):    (Retired) Depth (cm):    Wound Description (Comments):    Removal Indications:    Wound Image    01/24/23 1518   Description of Altered Skin Integrity Full thickness tissue loss. Subcutaneous fat may be visible but bone, tendon or muscle are not exposed 01/24/23 1518   Dressing Appearance Intact  23 1518   Drainage Amount Small 23 1518   Drainage Characteristics/Odor Serosanguineous 23 1518   Appearance Red;Moist 23 1518   Tissue loss description Full thickness 23 1518   Red (%), Wound Tissue Color 100 % 23 1518   Periwound Area Intact;Dry 23 1518   Wound Edges Callused;Open 23 1518   Patrick Classification (diabetic foot ulcers only) Grade 1 23 1518   Wound Length (cm) 0.8 cm 23 1518   Wound Width (cm) 0.4 cm 23 1518   Wound Depth (cm) 0.2 cm 23 1518   Wound Volume (cm^3) 0.064 cm^3 23 1518   Wound Surface Area (cm^2) 0.32 cm^2 23 1518   Undermining (depth (cm)/location) 12-12 1.1cm 23 1518   Care Soap and water;Sterile normal saline;Debrided 23 1518   Dressing Applied;Hydrofiber;Foam;Cast padding;Non-adherent 23 151   Periwound Care Absorptive dressing applied;Moisturizer applied 23   Off Loading Football dressing;Off loading shoe 23 1518   Dressing Change Due 23 151         Assessment/Plan:         ICD-10-CM ICD-9-CM   1. Right foot ulcer, with fat layer exposed  L97.512 707.15   2. Type 2 diabetes mellitus with diabetic peripheral angiopathy without gangrene, with long-term current use of insulin  E11.51 250.70    Z79.4 443.81     V58.67           Tissue pathology and/or culture taken:  [] Yes [x] No   Sharp debridement performed:   [x] Yes [] No   Labs ordered this visit:   [] Yes [x] No   Imaging ordered this visit:   [] Yes [x] No           Orders Placed This Encounter   Procedures    Change dressing     Left plantar diabetic ulcer   Cleanse wound with: NS   Lidocaine: PRN   Silver nitrate: PRN   Periwound care: Sween and adaptic to dry skin plantar foot and heel in clinic.  Ammonium Lactate Lotion per home health   Secondary dressing: football dressin otf foam to plantar foot , 2 otf foam to heel, 2 cast padding, mefix tape, flexinetting, blue bootie    Offloading: football dressing, Darco Shoe      Right plantar diabetic ulcer   Cleanse wound with: NS   Lidocaine: PRN   Silver nitrate: PRN   Periwound care: Sween and adaptic to dry skin plantar foot and heel in clinic.  Ammonium Lactate Lotion per home health.   Primary dressing: Hydrofera Ready   Secondary dressing: football dressin otf foam to wound, 2 otf foam to heel, 2 cast padding, mefix tape, flexinetting, blue bootie   Offloading: football dressing, Darco Shoe  Edema control: elevate bilateral legs when able     Frequency:  Twice  Weekly   Follow-up: in 1 week with Dr Walker 23, Nurse visit weekly until home health starts    Home Health: Admit patient, Provide wound care and dressing changes as above.     Refill sent to pharmacy for Ammonium Lactate Lotion 23.  Podiatry appointment with Dr. Toussaint 2/15/23.        Follow up in about 1 week (around 2023) for .

## 2023-01-26 ENCOUNTER — TELEPHONE (OUTPATIENT)
Dept: WOUND CARE | Facility: HOSPITAL | Age: 58
End: 2023-01-26
Payer: MEDICAID

## 2023-01-26 NOTE — TELEPHONE ENCOUNTER
Received a call from Linda with [facility] stating that she can accept patient. Wound care orders can be faxed to #[phone]

## 2023-01-28 DIAGNOSIS — R23.9 ALTERATION IN SKIN INTEGRITY: Primary | ICD-10-CM

## 2023-01-28 DIAGNOSIS — R23.4 SCALY SKIN: ICD-10-CM

## 2023-01-28 RX ORDER — AMMONIUM LACTATE 12 G/100G
LOTION TOPICAL
Qty: 396 G | Refills: 3 | Status: SHIPPED | OUTPATIENT
Start: 2023-01-28 | End: 2023-03-08

## 2023-01-28 NOTE — PROCEDURES
Debridement    Date/Time: 1/24/2023 1:37 PM  Performed by: Miracle Walker DO  Authorized by: Miracle Walker, DO     Consent Done?:  Yes (Written)  Local anesthesia used?: Yes    Local anesthetic:  Topical anesthetic    Wound Details:    Location:  Right foot    Type of Debridement:  Excisional       Length (cm):  0.8       Area (sq cm):  0.32       Width (cm):  0.4       Percent Debrided (%):  100       Depth (cm):  0.2       Total Area Debrided (sq cm):  0.32    Depth of debridement:  Subcutaneous tissue    Tissue debrided:  Subcutaneous, Epidermis and Dermis    Devitalized tissue debrided:  Callus    Instruments:  Blade    Bleeding:  Minimal  Hemostasis Achieved: Yes    Method Used:  Pressure  Patient tolerance:  Patient tolerated the procedure well with no immediate complications

## 2023-01-30 NOTE — SUBJECTIVE & OBJECTIVE
Interval History: No acute events. Continuing current antibiotics. Attempting LTAC placement     Review of Systems   Constitutional:  Negative for chills, fatigue and fever.   HENT: Negative.     Eyes: Negative.    Respiratory:  Negative for cough and shortness of breath.    Cardiovascular:  Negative for chest pain, palpitations and leg swelling.   Gastrointestinal:  Negative for abdominal pain and vomiting.   Genitourinary: Negative.    Skin: Negative.    Neurological:  Negative for seizures and speech difficulty.   Psychiatric/Behavioral:  Negative for agitation and confusion. The patient is not nervous/anxious.    Objective:     Vital Signs (Most Recent):  Temp: 98.1 °F (36.7 °C) (06/26/22 0704)  Pulse: 70 (06/26/22 0704)  Resp: 16 (06/26/22 0819)  BP: 120/74 (06/26/22 0819)  SpO2: 95 % (06/26/22 0704) Vital Signs (24h Range):  Temp:  [97.1 °F (36.2 °C)-98.5 °F (36.9 °C)] 98.1 °F (36.7 °C)  Pulse:  [68-79] 70  Resp:  [14-20] 16  SpO2:  [95 %-98 %] 95 %  BP: (107-138)/(62-77) 120/74     Weight: 131.7 kg (290 lb 5.5 oz)  Body mass index is 37.28 kg/m².    Intake/Output Summary (Last 24 hours) at 6/26/2022 0954  Last data filed at 6/26/2022 0800  Gross per 24 hour   Intake 960 ml   Output 1800 ml   Net -840 ml        Physical Exam  Vitals and nursing note reviewed.   Constitutional:       General: He is awake. He is not in acute distress.     Appearance: Normal appearance. He is well-developed and well-groomed. He is not ill-appearing, toxic-appearing or diaphoretic.   HENT:      Head: Normocephalic and atraumatic.   Cardiovascular:      Rate and Rhythm: Normal rate.   Pulmonary:      Effort: Pulmonary effort is normal. No tachypnea, accessory muscle usage or respiratory distress.   Neurological:      Mental Status: He is alert and oriented to person, place, and time. Mental status is at baseline.   Psychiatric:         Attention and Perception: Attention normal. He is attentive.         Mood and Affect: Mood  normal. Mood is not anxious.         Speech: Speech normal.         Behavior: Behavior normal. Behavior is cooperative.         Thought Content: Thought content normal.         Cognition and Memory: Cognition and memory normal. Cognition is not impaired. Memory is not impaired. He does not exhibit impaired recent memory.         Judgment: Judgment normal.       Significant Labs: All pertinent labs within the past 24 hours have been reviewed.    Significant Imaging: I have reviewed all pertinent imaging results/findings within the past 24 hours.   - - -

## 2023-01-31 ENCOUNTER — HOSPITAL ENCOUNTER (OUTPATIENT)
Dept: WOUND CARE | Facility: HOSPITAL | Age: 58
Discharge: HOME | End: 2023-01-31
Attending: SURGERY
Payer: MEDICAID

## 2023-01-31 DIAGNOSIS — L97.522 ULCER OF BOTH FEET WITH FAT LAYER EXPOSED: ICD-10-CM

## 2023-01-31 DIAGNOSIS — L97.529 DIABETIC ULCER OF LEFT GREAT TOE: Primary | ICD-10-CM

## 2023-01-31 DIAGNOSIS — M86.9 OSTEOMYELITIS OF RIGHT FOOT: ICD-10-CM

## 2023-01-31 DIAGNOSIS — E11.621 DIABETIC ULCER OF LEFT GREAT TOE: Primary | ICD-10-CM

## 2023-01-31 DIAGNOSIS — L97.512 ULCER OF BOTH FEET WITH FAT LAYER EXPOSED: ICD-10-CM

## 2023-01-31 PROCEDURE — 99212 OFFICE O/P EST SF 10 MIN: CPT

## 2023-01-31 NOTE — PROGRESS NOTES
[facility]    Subjective:       Patient ID: Dave Good is a 57 y.o. male.    Chief Complaint: Wound Care    HPI  Wounds have healed. Pt is in need of DM shoe recs and DM toe nail care. No new c/o or questions. Patient instructed to follow  up with Podiatry for foot care    Review of Systems   All systems were reviewed and are negative, except that mentioned in the HPI.    Objective:   AVSS     Physical Exam  Constitutional:       Appearance: He is well-developed.   HENT:      Head: Normocephalic and atraumatic.   Eyes:      Pupils: Pupils are equal, round, and reactive to light.   Cardiovascular:      Rate and Rhythm: Normal rate.   Pulmonary:      Effort: Pulmonary effort is normal. No respiratory distress.      Breath sounds: No stridor.   Abdominal:      General: There is no distension.   Musculoskeletal:      Cervical back: Normal range of motion.   Skin:     Comments: See Synopsis for wound details   Neurological:      Mental Status: He is alert and oriented to person, place, and time.          Altered Skin Integrity 11/08/22 1300 Left plantar Foot Diabetic Ulcer Full thickness tissue loss. Subcutaneous fat may be visible but bone, tendon or muscle are not exposed (Active)   11/08/22 1300   Altered Skin Integrity Present on Admission: yes   Side: Left   Orientation: plantar   Location: Foot   Wound Number:    Is this injury device related?: No   Primary Wound Type: Diabetic ulc   Description of Altered Skin Integrity: Full thickness tissue loss. Subcutaneous fat may be visible but bone, tendon or muscle are not exposed   Ankle-Brachial Index: 1.30   Pulses:    Removal Indication and Assessment:    Wound Outcome:    (Retired) Wound Length (cm):    (Retired) Wound Width (cm):    (Retired) Depth (cm):    Wound Description (Comments):    Removal Indications:    Appearance Closed/resurfaced 01/31/23 3635   Tissue loss description Not applicable 01/31/23 1084    Periwound Area Intact;Dry 01/31/23 1454   Wound Edges Callused 01/31/23 1454   Wound Length (cm) 0 cm 01/31/23 1454   Wound Width (cm) 0 cm 01/31/23 1454   Wound Depth (cm) 0 cm 01/31/23 1454   Wound Volume (cm^3) 0 cm^3 01/31/23 1454   Wound Surface Area (cm^2) 0 cm^2 01/31/23 1454   Care Cleansed with:;Soap and water 01/31/23 1454   Dressing Tubular bandage 01/31/23 1454   Periwound Care Moisturizer applied 01/31/23 1454   Compression Tubular elasticized bandage 01/31/23 1454   Dressing Change Due 02/28/23 01/31/23 1454            Altered Skin Integrity 11/08/22 1300 Right plantar Foot Diabetic Ulcer (Active)   11/08/22 1300   Altered Skin Integrity Present on Admission: yes   Side: Right   Orientation: plantar   Location: Foot   Wound Number:    Is this injury device related?:    Primary Wound Type: Diabetic ulc   Description of Altered Skin Integrity:    Ankle-Brachial Index: 1.25   Pulses:    Removal Indication and Assessment:    Wound Outcome:    (Retired) Wound Length (cm):    (Retired) Wound Width (cm):    (Retired) Depth (cm):    Wound Description (Comments):    Removal Indications:    Wound Image   01/31/23 1454   Dressing Appearance Dry 01/31/23 1454   Drainage Amount None 01/31/23 1454   Appearance Intact 01/31/23 1454   Tissue loss description Not applicable 01/31/23 1454   Periwound Area Intact 01/31/23 1454   Wound Edges Callused 01/31/23 1454   Wound Length (cm) 0 cm 01/31/23 1454   Wound Width (cm) 0 cm 01/31/23 1454   Wound Depth (cm) 0 cm 01/31/23 1454   Wound Volume (cm^3) 0 cm^3 01/31/23 1454   Wound Surface Area (cm^2) 0 cm^2 01/31/23 1454   Care Cleansed with:;Soap and water 01/31/23 1454   Dressing Tubular bandage 01/31/23 1454   Periwound Care Moisturizer applied 01/31/23 1454   Dressing Change Due 02/28/23 01/31/23 1454         Assessment/Plan:         ICD-10-CM ICD-9-CM   1. Diabetic ulcer of left great toe  E11.621 250.80    L97.529 707.15   2. Osteomyelitis of right foot  M86.9 730.27    3. Ulcer of both feet with fat layer exposed  L97.512 707.15    L97.522            Tissue pathology and/or culture taken:  [] Yes [x] No   Sharp debridement performed:   [] Yes [x] No   Labs ordered this visit:   [] Yes [x] No   Imaging ordered this visit:   [] Yes [x] No           Orders Placed This Encounter   Procedures    Change dressing     Left plantar  foot and right plantar foot diabetic ulcer   Cleanse wound with: NS   Lidocaine: PRN   Silver nitrate: PRN   Periwound care: Sween and adaptic to dry skin plantar foot and heel in clinic.   Secondary dressing: tubi  dressing  Offloading: Darco Shoe   Frequency:  Patient to return to clinic in 4 weeks to see Dr. Walker if not seen by podiatry prior to then    Other orders:  Patient instructed to shower and apply moisturizer daily.      Podiatry appointment with Dr. Toussaint 2/15/23.      Patient to follow up in 4 weeks

## 2023-02-22 ENCOUNTER — APPOINTMENT (OUTPATIENT)
Dept: RADIOLOGY | Facility: OTHER | Age: 58
End: 2023-02-22
Attending: PODIATRIST
Payer: MEDICAID

## 2023-02-22 ENCOUNTER — OFFICE VISIT (OUTPATIENT)
Dept: PODIATRY | Facility: CLINIC | Age: 58
End: 2023-02-22
Payer: MEDICAID

## 2023-02-22 VITALS
HEIGHT: 74 IN | DIASTOLIC BLOOD PRESSURE: 84 MMHG | SYSTOLIC BLOOD PRESSURE: 125 MMHG | HEART RATE: 90 BPM | BODY MASS INDEX: 38.5 KG/M2 | WEIGHT: 300 LBS

## 2023-02-22 DIAGNOSIS — L97.522 ULCER OF GREAT TOE, LEFT, WITH FAT LAYER EXPOSED: ICD-10-CM

## 2023-02-22 DIAGNOSIS — L97.522 ULCER OF GREAT TOE, LEFT, WITH FAT LAYER EXPOSED: Primary | ICD-10-CM

## 2023-02-22 DIAGNOSIS — B35.1 ONYCHOMYCOSIS DUE TO DERMATOPHYTE: ICD-10-CM

## 2023-02-22 DIAGNOSIS — Z89.431 PARTIAL NONTRAUMATIC AMPUTATION OF FOOT, RIGHT: ICD-10-CM

## 2023-02-22 DIAGNOSIS — E11.42 TYPE 2 DIABETES MELLITUS WITH DIABETIC POLYNEUROPATHY, UNSPECIFIED WHETHER LONG TERM INSULIN USE: ICD-10-CM

## 2023-02-22 DIAGNOSIS — L57.0 KERATOSIS: ICD-10-CM

## 2023-02-22 PROCEDURE — 1159F PR MEDICATION LIST DOCUMENTED IN MEDICAL RECORD: ICD-10-PCS | Mod: CPTII,,, | Performed by: PODIATRIST

## 2023-02-22 PROCEDURE — 73630 XR FOOT COMPLETE 3 VIEW LEFT: ICD-10-PCS | Mod: 26,LT,, | Performed by: RADIOLOGY

## 2023-02-22 PROCEDURE — 3074F PR MOST RECENT SYSTOLIC BLOOD PRESSURE < 130 MM HG: ICD-10-PCS | Mod: CPTII,,, | Performed by: PODIATRIST

## 2023-02-22 PROCEDURE — 99214 PR OFFICE/OUTPT VISIT, EST, LEVL IV, 30-39 MIN: ICD-10-PCS | Mod: 25,S$PBB,, | Performed by: PODIATRIST

## 2023-02-22 PROCEDURE — 1160F PR REVIEW ALL MEDS BY PRESCRIBER/CLIN PHARMACIST DOCUMENTED: ICD-10-PCS | Mod: CPTII,,, | Performed by: PODIATRIST

## 2023-02-22 PROCEDURE — 3079F DIAST BP 80-89 MM HG: CPT | Mod: CPTII,,, | Performed by: PODIATRIST

## 2023-02-22 PROCEDURE — 99999 PR PBB SHADOW E&M-EST. PATIENT-LVL V: CPT | Mod: PBBFAC,,, | Performed by: PODIATRIST

## 2023-02-22 PROCEDURE — 99215 OFFICE O/P EST HI 40 MIN: CPT | Mod: PBBFAC,PN,25 | Performed by: PODIATRIST

## 2023-02-22 PROCEDURE — 3008F PR BODY MASS INDEX (BMI) DOCUMENTED: ICD-10-PCS | Mod: CPTII,,, | Performed by: PODIATRIST

## 2023-02-22 PROCEDURE — 73630 X-RAY EXAM OF FOOT: CPT | Mod: 26,LT,, | Performed by: RADIOLOGY

## 2023-02-22 PROCEDURE — 1159F MED LIST DOCD IN RCRD: CPT | Mod: CPTII,,, | Performed by: PODIATRIST

## 2023-02-22 PROCEDURE — 11042 DBRDMT SUBQ TIS 1ST 20SQCM/<: CPT | Mod: PBBFAC,PN | Performed by: PODIATRIST

## 2023-02-22 PROCEDURE — 4010F PR ACE/ARB THEARPY RXD/TAKEN: ICD-10-PCS | Mod: CPTII,,, | Performed by: PODIATRIST

## 2023-02-22 PROCEDURE — 11721 PR DEBRIDEMENT OF NAILS, 6 OR MORE: ICD-10-PCS | Mod: 59,S$PBB,, | Performed by: PODIATRIST

## 2023-02-22 PROCEDURE — 99999 PR PBB SHADOW E&M-EST. PATIENT-LVL V: ICD-10-PCS | Mod: PBBFAC,,, | Performed by: PODIATRIST

## 2023-02-22 PROCEDURE — 3074F SYST BP LT 130 MM HG: CPT | Mod: CPTII,,, | Performed by: PODIATRIST

## 2023-02-22 PROCEDURE — 11721 DEBRIDE NAIL 6 OR MORE: CPT | Mod: Q7,PBBFAC,PN | Performed by: PODIATRIST

## 2023-02-22 PROCEDURE — 1160F RVW MEDS BY RX/DR IN RCRD: CPT | Mod: CPTII,,, | Performed by: PODIATRIST

## 2023-02-22 PROCEDURE — 11042 WOUND DEBRIDEMENT: ICD-10-PCS | Mod: S$PBB,,, | Performed by: PODIATRIST

## 2023-02-22 PROCEDURE — 73630 X-RAY EXAM OF FOOT: CPT | Mod: TC,LT

## 2023-02-22 PROCEDURE — 4010F ACE/ARB THERAPY RXD/TAKEN: CPT | Mod: CPTII,,, | Performed by: PODIATRIST

## 2023-02-22 PROCEDURE — 3008F BODY MASS INDEX DOCD: CPT | Mod: CPTII,,, | Performed by: PODIATRIST

## 2023-02-22 PROCEDURE — 3079F PR MOST RECENT DIASTOLIC BLOOD PRESSURE 80-89 MM HG: ICD-10-PCS | Mod: CPTII,,, | Performed by: PODIATRIST

## 2023-02-22 PROCEDURE — 99214 OFFICE O/P EST MOD 30 MIN: CPT | Mod: 25,S$PBB,, | Performed by: PODIATRIST

## 2023-02-22 PROCEDURE — 11721 DEBRIDE NAIL 6 OR MORE: CPT | Mod: 59,S$PBB,, | Performed by: PODIATRIST

## 2023-02-22 RX ORDER — GLUCAGON HYDROCHLORIDE 1 MG/ML
INJECTION, POWDER, FOR SOLUTION INTRAMUSCULAR; INTRAVENOUS; SUBCUTANEOUS
Status: ON HOLD | COMMUNITY
Start: 2022-12-21 | End: 2023-09-20 | Stop reason: HOSPADM

## 2023-02-22 RX ORDER — DAPTOMYCIN 50 MG/ML
INJECTION, POWDER, LYOPHILIZED, FOR SOLUTION INTRAVENOUS
Status: ON HOLD | COMMUNITY
Start: 2022-12-08 | End: 2023-09-08

## 2023-02-22 RX ORDER — DEXTROSE 15 G/37.5G
GEL ORAL
Status: ON HOLD | COMMUNITY
Start: 2022-12-21 | End: 2023-09-14 | Stop reason: HOSPADM

## 2023-02-22 RX ORDER — MEROPENEM 500 MG/1
INJECTION, POWDER, FOR SOLUTION INTRAVENOUS
Status: ON HOLD | COMMUNITY
Start: 2022-12-08 | End: 2023-09-08

## 2023-02-22 RX ORDER — BLOOD-GLUCOSE METER
EACH MISCELLANEOUS
Status: ON HOLD | COMMUNITY
Start: 2023-01-18 | End: 2023-09-20 | Stop reason: HOSPADM

## 2023-02-22 RX ORDER — ENOXAPARIN SODIUM 100 MG/ML
INJECTION SUBCUTANEOUS
Status: ON HOLD | COMMUNITY
Start: 2022-12-07 | End: 2023-09-14 | Stop reason: HOSPADM

## 2023-02-22 RX ORDER — ONDANSETRON 2 MG/ML
INJECTION INTRAMUSCULAR; INTRAVENOUS
Status: ON HOLD | COMMUNITY
Start: 2022-12-07 | End: 2023-09-20 | Stop reason: HOSPADM

## 2023-02-22 RX ORDER — DAPTOMYCIN 350 MG/7ML
INJECTION, POWDER, LYOPHILIZED, FOR SOLUTION INTRAVENOUS
Status: ON HOLD | COMMUNITY
Start: 2022-12-08 | End: 2023-09-08

## 2023-02-22 RX ORDER — DOCUSATE SODIUM 100 MG
100 CAPSULE ORAL 2 TIMES DAILY
Status: ON HOLD | COMMUNITY
Start: 2022-12-10 | End: 2023-09-20 | Stop reason: HOSPADM

## 2023-02-22 RX ORDER — CICLOPIROX 80 MG/ML
SOLUTION TOPICAL NIGHTLY
Qty: 6.6 ML | Refills: 11 | Status: SHIPPED | OUTPATIENT
Start: 2023-02-22 | End: 2023-03-08

## 2023-02-22 RX ORDER — BISACODYL 5 MG
10 TABLET, DELAYED RELEASE (ENTERIC COATED) ORAL ONCE
Status: ON HOLD | COMMUNITY
Start: 2022-12-13 | End: 2023-09-14 | Stop reason: HOSPADM

## 2023-02-22 RX ORDER — DEXTROSE 50 % IN WATER (D50W) INTRAVENOUS SYRINGE
Status: ON HOLD | COMMUNITY
Start: 2022-12-21 | End: 2023-09-14 | Stop reason: HOSPADM

## 2023-02-22 RX ORDER — INSULIN LISPRO 100 [IU]/ML
INJECTION, SOLUTION INTRAVENOUS; SUBCUTANEOUS
Status: ON HOLD | COMMUNITY
Start: 2022-12-07 | End: 2023-09-14 | Stop reason: HOSPADM

## 2023-02-22 RX ORDER — ALTEPLASE 2.2 MG/2ML
INJECTION, POWDER, LYOPHILIZED, FOR SOLUTION INTRAVENOUS
Status: ON HOLD | COMMUNITY
Start: 2022-12-15 | End: 2023-09-14 | Stop reason: HOSPADM

## 2023-02-22 NOTE — PROCEDURES
"Wound Debridement    Date/Time: 2/22/2023 10:15 AM  Performed by: Samuel Toussaint DPM  Authorized by: Samuel Toussaint DPM     Time out: Immediately prior to procedure a "time out" was called to verify the correct patient, procedure, equipment, support staff and site/side marked as required.    Consent Done?:  Yes (Verbal)  Local anesthesia used?: No      Wound Details:    Location:  Left foot    Location:  Left 1st Toe    Type of Debridement:  Excisional       Length (cm):  2.2       Area (sq cm):  1.1       Width (cm):  0.5       Percent Debrided (%):  100       Depth (cm):  0.2       Total Area Debrided (sq cm):  1.1    Depth of debridement:  Subcutaneous tissue    Tissue debrided:  Dermis, Epidermis and Subcutaneous    Devitalized tissue debrided:  Biofilm, Callus and Necrotic/Eschar    Instruments:  Curette and Blade    Bleeding:  Minimal  Hemostasis Achieved: Yes    Method Used:  Pressure  Patient tolerance:  Patient tolerated the procedure well with no immediate complications  "

## 2023-02-22 NOTE — PROGRESS NOTES
Subjective:      Patient ID: Dave Good is a 57 y.o. male.    Chief Complaint: Diabetic Foot Exam (PCP Reyna Jorge, KEVYN  11/11/2022)    Diabetes, increased risk amputation needing evaluation/management/optomization of foot care.    Cc2 thick discolored misshapen toenails all toes.Gradual onset, worsening over past several weeks, aggravated by increased weight bearing, shoe gear, pressure.  No previous medical treatment.  OTC pain med not helping.     Review of Systems   Constitutional: Negative for chills, diaphoresis, fever, malaise/fatigue and night sweats.   Cardiovascular:  Positive for leg swelling. Negative for claudication, cyanosis and syncope.   Skin:  Positive for nail changes and poor wound healing. Negative for color change, dry skin, rash, suspicious lesions and unusual hair distribution.   Musculoskeletal:  Negative for falls, joint pain, joint swelling, muscle cramps, muscle weakness and stiffness.   Gastrointestinal:  Negative for constipation, diarrhea, nausea and vomiting.   Neurological:  Positive for numbness, paresthesias and sensory change. Negative for brief paralysis, disturbances in coordination, focal weakness and tremors.         Objective:      Physical Exam  Constitutional:       General: He is not in acute distress.     Appearance: He is well-developed. He is not diaphoretic.   Cardiovascular:      Pulses:           Popliteal pulses are 2+ on the right side and 2+ on the left side.        Dorsalis pedis pulses are 2+ on the right side and 2+ on the left side.        Posterior tibial pulses are 2+ on the right side and 2+ on the left side.      Comments: Capillary refill 3 seconds all toes/distal feet, all toes/both feet warm to touch.      Negative lymphadenopathy bilateral popliteal fossa and tarsal tunnel.      Negavie lower extremity edema bilateral.    Musculoskeletal:      Right ankle: No swelling, deformity, ecchymosis or lacerations. Normal range of motion. Normal pulse.       Right Achilles Tendon: Normal. No defects. Gonzáles's test negative.      Comments: Ray resection distal 1st and 2nd rays right.    Otherwise, Normal angle, base, station of gait. All ten toes without clubbing, cyanosis, or signs of ischemia.  No pain to palpation bilateral lower extremities.  Range of motion, stability, muscle strength, and muscle tone normal bilateral feet and legs.    Lymphadenopathy:      Lower Body: No right inguinal adenopathy. No left inguinal adenopathy.      Comments: Negative lymphadenopathy bilateral popliteal fossa and tarsal tunnel.    Negative lymphangitic streaking bilateral feet/ankles/legs.   Skin:     General: Skin is warm and dry.      Capillary Refill: Capillary refill takes 2 to 3 seconds.      Coloration: Skin is not pale.      Findings: No abrasion, bruising, burn, ecchymosis, erythema, laceration, lesion or rash.      Nails: There is no clubbing.      Comments: Wound:  plantar left hallux base.    Size:    Pre:[id number]  Post: [id number]    Base:  Granular, pink with moderate serous/serosanaguinous drainage only.  No pus, tracking, fluctuance, malodor, or cardinal signs infection.    Borders:  Hyperkeratotic, debriding to flat, pink, blanchable skin edges without undermining.      Focal hyperkeratotic lesion consisting entirely of hyperkeratotic tissue without open skin, drainage, pus, fluctuance, malodor, or signs of infection plantar distal metatarsal 3 right, 3 left.    Otherwise, Skin thin, shiny, atrophic, with decreased density and distribution of pedal hair bilateral, but without hyperpigmentation, lizy discoloration,  ulcers, masses, nodules or cords palpated bilateral feet and legs.    Toenails right 3rd, 4th, 5th  left 1-5 are hypertrophic thickened 2-3 mm, dystrophic, discolored tanish brown with tan, gray crumbly subungual debris.  Tender to distal nail plate pressure, without periungual skin abnormality of each.       Neurological:      Mental Status: He is alert  and oriented to person, place, and time.      Sensory: No sensory deficit.      Motor: No tremor, atrophy or abnormal muscle tone.      Gait: Gait normal.      Deep Tendon Reflexes:      Reflex Scores:       Patellar reflexes are 2+ on the right side and 2+ on the left side.       Achilles reflexes are 2+ on the right side and 2+ on the left side.     Comments: Negative tinel sign to percussion sural, superficial peroneal, deep peroneal, saphenous, and posterior tibial nerves right and left ankles and feet.    Diminished/loss of protective sensation all toes bilateral to 10 gram monofilament.    Paresthesias, and burning bilateral feet with no clearly identified trigger or source.     Psychiatric:         Behavior: Behavior is cooperative.           Assessment:       Encounter Diagnoses   Name Primary?    Ulcer of great toe, left, with fat layer exposed Yes    Keratosis     Type 2 diabetes mellitus with diabetic polyneuropathy, unspecified whether long term insulin use     Partial nontraumatic amputation of foot, right     Onychomycosis due to dermatophyte          Plan:       Don was seen today for diabetic foot exam.    Diagnoses and all orders for this visit:    Ulcer of great toe, left, with fat layer exposed  -     X-Ray Foot Complete Left; Future    Keratosis  -     X-Ray Foot Complete Left; Future    Type 2 diabetes mellitus with diabetic polyneuropathy, unspecified whether long term insulin use  -     X-Ray Foot Complete Left; Future    Partial nontraumatic amputation of foot, right  -     X-Ray Foot Complete Left; Future    Onychomycosis due to dermatophyte  -     ciclopirox (PENLAC) 8 % Soln; Apply topically nightly.      I counseled the patient on his conditions, their implications and medical management.        The patient has received literature on basic diabetic foot care.  Patient will inspect feet daily, wear protective shoe gear when ambulatory, and apply moisturizer to skin as needed to  maintain elasticity and help prevent ulceration.    Read ulcer left hallux.      X-rays left foot.      Dressed wound with Bety wound foam football.  Do not change or wet.      Dispense new surgical shoes bilateral.      With the patient's permission, I debrided all ten toenails with a sterile nipper and curette, removing all offending nail and debris.  Patient tolerated the procedure well and related significant relief.    Penlac    Short-term goal heel wound offload area.      Long-term goal diabetic shoes, offloading, prevent recurrence ulcer left hallux.          No follow-ups on file.

## 2023-03-01 ENCOUNTER — OFFICE VISIT (OUTPATIENT)
Dept: PODIATRY | Facility: CLINIC | Age: 58
End: 2023-03-01
Payer: MEDICAID

## 2023-03-01 VITALS
BODY MASS INDEX: 38.5 KG/M2 | HEIGHT: 74 IN | HEART RATE: 76 BPM | SYSTOLIC BLOOD PRESSURE: 160 MMHG | WEIGHT: 300 LBS | DIASTOLIC BLOOD PRESSURE: 102 MMHG

## 2023-03-01 DIAGNOSIS — L97.522 ULCER OF GREAT TOE, LEFT, WITH FAT LAYER EXPOSED: Primary | ICD-10-CM

## 2023-03-01 DIAGNOSIS — L57.0 KERATOSIS: ICD-10-CM

## 2023-03-01 DIAGNOSIS — E11.42 TYPE 2 DIABETES MELLITUS WITH DIABETIC POLYNEUROPATHY, UNSPECIFIED WHETHER LONG TERM INSULIN USE: ICD-10-CM

## 2023-03-01 DIAGNOSIS — Z89.431 PARTIAL NONTRAUMATIC AMPUTATION OF FOOT, RIGHT: ICD-10-CM

## 2023-03-01 PROCEDURE — 99214 OFFICE O/P EST MOD 30 MIN: CPT | Mod: PBBFAC,PN | Performed by: PODIATRIST

## 2023-03-01 PROCEDURE — 3080F DIAST BP >= 90 MM HG: CPT | Mod: CPTII,,, | Performed by: PODIATRIST

## 2023-03-01 PROCEDURE — 99213 OFFICE O/P EST LOW 20 MIN: CPT | Mod: S$PBB,,, | Performed by: PODIATRIST

## 2023-03-01 PROCEDURE — 99999 PR PBB SHADOW E&M-EST. PATIENT-LVL IV: CPT | Mod: PBBFAC,,, | Performed by: PODIATRIST

## 2023-03-01 PROCEDURE — 3008F PR BODY MASS INDEX (BMI) DOCUMENTED: ICD-10-PCS | Mod: CPTII,,, | Performed by: PODIATRIST

## 2023-03-01 PROCEDURE — 4010F PR ACE/ARB THEARPY RXD/TAKEN: ICD-10-PCS | Mod: CPTII,,, | Performed by: PODIATRIST

## 2023-03-01 PROCEDURE — 1159F MED LIST DOCD IN RCRD: CPT | Mod: CPTII,,, | Performed by: PODIATRIST

## 2023-03-01 PROCEDURE — 99213 PR OFFICE/OUTPT VISIT, EST, LEVL III, 20-29 MIN: ICD-10-PCS | Mod: S$PBB,,, | Performed by: PODIATRIST

## 2023-03-01 PROCEDURE — 3077F SYST BP >= 140 MM HG: CPT | Mod: CPTII,,, | Performed by: PODIATRIST

## 2023-03-01 PROCEDURE — 99999 PR PBB SHADOW E&M-EST. PATIENT-LVL IV: ICD-10-PCS | Mod: PBBFAC,,, | Performed by: PODIATRIST

## 2023-03-01 PROCEDURE — 3008F BODY MASS INDEX DOCD: CPT | Mod: CPTII,,, | Performed by: PODIATRIST

## 2023-03-01 PROCEDURE — 1159F PR MEDICATION LIST DOCUMENTED IN MEDICAL RECORD: ICD-10-PCS | Mod: CPTII,,, | Performed by: PODIATRIST

## 2023-03-01 PROCEDURE — 4010F ACE/ARB THERAPY RXD/TAKEN: CPT | Mod: CPTII,,, | Performed by: PODIATRIST

## 2023-03-01 PROCEDURE — 3080F PR MOST RECENT DIASTOLIC BLOOD PRESSURE >= 90 MM HG: ICD-10-PCS | Mod: CPTII,,, | Performed by: PODIATRIST

## 2023-03-01 PROCEDURE — 3077F PR MOST RECENT SYSTOLIC BLOOD PRESSURE >= 140 MM HG: ICD-10-PCS | Mod: CPTII,,, | Performed by: PODIATRIST

## 2023-03-01 NOTE — PROGRESS NOTES
Subjective:      Patient ID: Dave Good is a 57 y.o. male.    Chief Complaint: Foot Ulcer    Diabetes, increased risk amputation needing evaluation/management/optomization of foot care.    Also the bottom of the left hallux base/1st MTPJ left foot last visit.  Football intact, clean dry.  Ambulating surgical shoes bilateral.  X-rays last visit show chronic osteomyelitis and erosion of the distal phalanx of the left hallux expected for this condition not clinically relevant for current wound plantar 1st MTPJ  Review of Systems   Constitutional: Negative for chills, diaphoresis, fever, malaise/fatigue and night sweats.   Cardiovascular:  Positive for leg swelling. Negative for claudication, cyanosis and syncope.   Skin:  Positive for nail changes and poor wound healing. Negative for color change, dry skin, rash, suspicious lesions and unusual hair distribution.   Musculoskeletal:  Negative for falls, joint pain, joint swelling, muscle cramps, muscle weakness and stiffness.   Gastrointestinal:  Negative for constipation, diarrhea, nausea and vomiting.   Neurological:  Positive for numbness, paresthesias and sensory change. Negative for brief paralysis, disturbances in coordination, focal weakness and tremors.         Objective:      Physical Exam  Constitutional:       General: He is not in acute distress.     Appearance: He is well-developed. He is not diaphoretic.   Cardiovascular:      Pulses:           Popliteal pulses are 2+ on the right side and 2+ on the left side.        Dorsalis pedis pulses are 2+ on the right side and 2+ on the left side.        Posterior tibial pulses are 2+ on the right side and 2+ on the left side.      Comments: Capillary refill 3 seconds all toes/distal feet, all toes/both feet warm to touch.      Negative lymphadenopathy bilateral popliteal fossa and tarsal tunnel.      Negavie lower extremity edema bilateral.    Musculoskeletal:      Right ankle: No swelling, deformity, ecchymosis  or lacerations. Normal range of motion. Normal pulse.      Right Achilles Tendon: Normal. No defects. Gonzáles's test negative.      Comments: Ray resection distal 1st and 2nd rays right.    Otherwise, Normal angle, base, station of gait. All ten toes without clubbing, cyanosis, or signs of ischemia.  No pain to palpation bilateral lower extremities.  Range of motion, stability, muscle strength, and muscle tone normal bilateral feet and legs.    Lymphadenopathy:      Lower Body: No right inguinal adenopathy. No left inguinal adenopathy.      Comments: Negative lymphadenopathy bilateral popliteal fossa and tarsal tunnel.    Negative lymphangitic streaking bilateral feet/ankles/legs.   Skin:     General: Skin is warm and dry.      Capillary Refill: Capillary refill takes 2 to 3 seconds.      Coloration: Skin is not pale.      Findings: No abrasion, bruising, burn, ecchymosis, erythema, laceration, lesion or rash.      Nails: There is no clubbing.      Comments: Wound plantar left 1st mtpj/hallux base closed today, epithelialized  without ulceration, drainage, pus, tracking, fluctuance, malodor, or cardinal signs infection.      Otherwise, Skin thin, shiny, atrophic, with decreased density and distribution of pedal hair bilateral, but without hyperpigmentation, lizy discoloration,  ulcers, masses, nodules or cords palpated bilateral feet and legs.           Neurological:      Mental Status: He is alert and oriented to person, place, and time.      Sensory: No sensory deficit.      Motor: No tremor, atrophy or abnormal muscle tone.      Gait: Gait normal.      Deep Tendon Reflexes:      Reflex Scores:       Patellar reflexes are 2+ on the right side and 2+ on the left side.       Achilles reflexes are 2+ on the right side and 2+ on the left side.     Comments: Negative tinel sign to percussion sural, superficial peroneal, deep peroneal, saphenous, and posterior tibial nerves right and left ankles and  feet.    Diminished/loss of protective sensation all toes bilateral to 10 gram monofilament.    Paresthesias, and burning bilateral feet with no clearly identified trigger or source.     Psychiatric:         Behavior: Behavior is cooperative.           Assessment:       Encounter Diagnoses   Name Primary?    Ulcer of great toe, left, with fat layer exposed Yes    Keratosis     Type 2 diabetes mellitus with diabetic polyneuropathy, unspecified whether long term insulin use     Partial nontraumatic amputation of foot, right          Plan:       Dave was seen today for foot ulcer.    Diagnoses and all orders for this visit:    Ulcer of great toe, left, with fat layer exposed    Keratosis    Type 2 diabetes mellitus with diabetic polyneuropathy, unspecified whether long term insulin use    Partial nontraumatic amputation of foot, right    I counseled the patient on his conditions, their implications and medical management.    Inspect feet multiple times daily for signs of occurrence/recurrence ulceration.    Adequate vitamin supplementation, protein intake, and hydration - discussed with patient.     Dressed wound site today to offload and facilitate healing.      Follow up 1 week for re-evaluation, sooner p.r.n..      Continue minimal ambulation surgical shoes bilateral.      Declines prescription diabetic shoes pending change of primary care physician which he is planning today.        Follow up in about 1 week (around 3/8/2023).

## 2023-03-08 ENCOUNTER — OFFICE VISIT (OUTPATIENT)
Dept: PODIATRY | Facility: CLINIC | Age: 58
End: 2023-03-08
Payer: MEDICAID

## 2023-03-08 VITALS
HEART RATE: 79 BPM | DIASTOLIC BLOOD PRESSURE: 97 MMHG | SYSTOLIC BLOOD PRESSURE: 159 MMHG | HEIGHT: 74 IN | BODY MASS INDEX: 38.5 KG/M2 | WEIGHT: 300 LBS

## 2023-03-08 DIAGNOSIS — Z89.431 PARTIAL NONTRAUMATIC AMPUTATION OF FOOT, RIGHT: ICD-10-CM

## 2023-03-08 DIAGNOSIS — Z87.898 HISTORY OF ULCERATION: ICD-10-CM

## 2023-03-08 DIAGNOSIS — L57.0 KERATOSIS: ICD-10-CM

## 2023-03-08 DIAGNOSIS — E11.42 TYPE 2 DIABETES MELLITUS WITH DIABETIC POLYNEUROPATHY, UNSPECIFIED WHETHER LONG TERM INSULIN USE: Primary | ICD-10-CM

## 2023-03-08 DIAGNOSIS — B35.1 ONYCHOMYCOSIS DUE TO DERMATOPHYTE: ICD-10-CM

## 2023-03-08 PROCEDURE — 4010F ACE/ARB THERAPY RXD/TAKEN: CPT | Mod: CPTII,,, | Performed by: PODIATRIST

## 2023-03-08 PROCEDURE — 3080F DIAST BP >= 90 MM HG: CPT | Mod: CPTII,,, | Performed by: PODIATRIST

## 2023-03-08 PROCEDURE — 99214 OFFICE O/P EST MOD 30 MIN: CPT | Mod: S$PBB,,, | Performed by: PODIATRIST

## 2023-03-08 PROCEDURE — 3077F SYST BP >= 140 MM HG: CPT | Mod: CPTII,,, | Performed by: PODIATRIST

## 2023-03-08 PROCEDURE — 1160F PR REVIEW ALL MEDS BY PRESCRIBER/CLIN PHARMACIST DOCUMENTED: ICD-10-PCS | Mod: CPTII,,, | Performed by: PODIATRIST

## 2023-03-08 PROCEDURE — 1159F MED LIST DOCD IN RCRD: CPT | Mod: CPTII,,, | Performed by: PODIATRIST

## 2023-03-08 PROCEDURE — 3008F PR BODY MASS INDEX (BMI) DOCUMENTED: ICD-10-PCS | Mod: CPTII,,, | Performed by: PODIATRIST

## 2023-03-08 PROCEDURE — 4010F PR ACE/ARB THEARPY RXD/TAKEN: ICD-10-PCS | Mod: CPTII,,, | Performed by: PODIATRIST

## 2023-03-08 PROCEDURE — 99999 PR PBB SHADOW E&M-EST. PATIENT-LVL V: CPT | Mod: PBBFAC,,, | Performed by: PODIATRIST

## 2023-03-08 PROCEDURE — 99215 OFFICE O/P EST HI 40 MIN: CPT | Mod: PBBFAC,PN | Performed by: PODIATRIST

## 2023-03-08 PROCEDURE — 1159F PR MEDICATION LIST DOCUMENTED IN MEDICAL RECORD: ICD-10-PCS | Mod: CPTII,,, | Performed by: PODIATRIST

## 2023-03-08 PROCEDURE — 3008F BODY MASS INDEX DOCD: CPT | Mod: CPTII,,, | Performed by: PODIATRIST

## 2023-03-08 PROCEDURE — 3080F PR MOST RECENT DIASTOLIC BLOOD PRESSURE >= 90 MM HG: ICD-10-PCS | Mod: CPTII,,, | Performed by: PODIATRIST

## 2023-03-08 PROCEDURE — 99214 PR OFFICE/OUTPT VISIT, EST, LEVL IV, 30-39 MIN: ICD-10-PCS | Mod: S$PBB,,, | Performed by: PODIATRIST

## 2023-03-08 PROCEDURE — 99999 PR PBB SHADOW E&M-EST. PATIENT-LVL V: ICD-10-PCS | Mod: PBBFAC,,, | Performed by: PODIATRIST

## 2023-03-08 PROCEDURE — 3077F PR MOST RECENT SYSTOLIC BLOOD PRESSURE >= 140 MM HG: ICD-10-PCS | Mod: CPTII,,, | Performed by: PODIATRIST

## 2023-03-08 PROCEDURE — 1160F RVW MEDS BY RX/DR IN RCRD: CPT | Mod: CPTII,,, | Performed by: PODIATRIST

## 2023-03-08 RX ORDER — CICLOPIROX 80 MG/ML
SOLUTION TOPICAL NIGHTLY
Qty: 6.6 ML | Refills: 11 | Status: ON HOLD | OUTPATIENT
Start: 2023-03-08 | End: 2023-09-14 | Stop reason: HOSPADM

## 2023-03-08 RX ORDER — AMMONIUM LACTATE 12 G/100G
CREAM TOPICAL
Qty: 140 G | Refills: 11 | Status: SHIPPED | OUTPATIENT
Start: 2023-03-08 | End: 2023-03-08

## 2023-03-08 RX ORDER — UREA 450 MG/G
1 CREAM TOPICAL 2 TIMES DAILY
Qty: 255 G | Refills: 11 | Status: SHIPPED | OUTPATIENT
Start: 2023-03-08 | End: 2023-07-06 | Stop reason: SDUPTHER

## 2023-03-08 RX ORDER — CICLOPIROX 80 MG/ML
SOLUTION TOPICAL NIGHTLY
Qty: 6.6 ML | Refills: 11 | Status: SHIPPED | OUTPATIENT
Start: 2023-03-08 | End: 2023-03-08

## 2023-03-08 RX ORDER — AMMONIUM LACTATE 12 G/100G
CREAM TOPICAL
Qty: 140 G | Refills: 11 | Status: ON HOLD | OUTPATIENT
Start: 2023-03-08 | End: 2023-09-20 | Stop reason: HOSPADM

## 2023-03-08 NOTE — PROGRESS NOTES
Subjective:      Patient ID: Dave Good is a 57 y.o. male.    Chief Complaint: Foot Ulcer    Diabetes, increased risk amputation needing evaluation/management/optomization of foot care.    Ulcer the bottom of the left hallux base/1st MTPJ left foot last visit.  Football intact, clean dry.  Ambulating surgical shoes bilateral.  X-rays last visit show chronic osteomyelitis and erosion of the distal phalanx of the left hallux expected for this condition not clinically relevant for current wound plantar 1st MTPJ  Review of Systems   Constitutional: Negative for chills, diaphoresis, fever, malaise/fatigue and night sweats.   Cardiovascular:  Positive for leg swelling. Negative for claudication, cyanosis and syncope.   Skin:  Positive for nail changes and poor wound healing. Negative for color change, dry skin, rash, suspicious lesions and unusual hair distribution.   Musculoskeletal:  Negative for falls, joint pain, joint swelling, muscle cramps, muscle weakness and stiffness.   Gastrointestinal:  Negative for constipation, diarrhea, nausea and vomiting.   Neurological:  Positive for numbness, paresthesias and sensory change. Negative for brief paralysis, disturbances in coordination, focal weakness and tremors.         Objective:      Physical Exam  Constitutional:       General: He is not in acute distress.     Appearance: He is well-developed. He is not diaphoretic.   Cardiovascular:      Pulses:           Popliteal pulses are 2+ on the right side and 2+ on the left side.        Dorsalis pedis pulses are 2+ on the right side and 2+ on the left side.        Posterior tibial pulses are 2+ on the right side and 2+ on the left side.      Comments: Capillary refill 3 seconds all toes/distal feet, all toes/both feet warm to touch.      Negative lymphadenopathy bilateral popliteal fossa and tarsal tunnel.      Negavie lower extremity edema bilateral.    Musculoskeletal:      Right ankle: No swelling, deformity, ecchymosis  or lacerations. Normal range of motion. Normal pulse.      Right Achilles Tendon: Normal. No defects. Gonzáles's test negative.      Comments: Ray resection distal 1st and 2nd rays right.    Otherwise, Normal angle, base, station of gait. All ten toes without clubbing, cyanosis, or signs of ischemia.  No pain to palpation bilateral lower extremities.  Range of motion, stability, muscle strength, and muscle tone normal bilateral feet and legs.    Lymphadenopathy:      Lower Body: No right inguinal adenopathy. No left inguinal adenopathy.      Comments: Negative lymphadenopathy bilateral popliteal fossa and tarsal tunnel.    Negative lymphangitic streaking bilateral feet/ankles/legs.   Skin:     General: Skin is warm and dry.      Capillary Refill: Capillary refill takes 2 to 3 seconds.      Coloration: Skin is not pale.      Findings: No abrasion, bruising, burn, ecchymosis, erythema, laceration, lesion or rash.      Nails: There is no clubbing.      Comments: Wound plantar left 1st mtpj/hallux base closed today, epithelialized  without ulceration, drainage, pus, tracking, fluctuance, malodor, or cardinal signs infection.      Otherwise, Skin thin, shiny, atrophic, with decreased density and distribution of pedal hair bilateral, but without hyperpigmentation, lizy discoloration,  ulcers, masses, nodules or cords palpated bilateral feet and legs.           Neurological:      Mental Status: He is alert and oriented to person, place, and time.      Sensory: No sensory deficit.      Motor: No tremor, atrophy or abnormal muscle tone.      Gait: Gait normal.      Deep Tendon Reflexes:      Reflex Scores:       Patellar reflexes are 2+ on the right side and 2+ on the left side.       Achilles reflexes are 2+ on the right side and 2+ on the left side.     Comments: Negative tinel sign to percussion sural, superficial peroneal, deep peroneal, saphenous, and posterior tibial nerves right and left ankles and  feet.    Diminished/loss of protective sensation all toes bilateral to 10 gram monofilament.    Paresthesias, and burning bilateral feet with no clearly identified trigger or source.     Psychiatric:         Behavior: Behavior is cooperative.           Assessment:       Encounter Diagnoses   Name Primary?    Type 2 diabetes mellitus with diabetic polyneuropathy, unspecified whether long term insulin use Yes    Partial nontraumatic amputation of foot, right     History of ulceration     Keratosis     Onychomycosis due to dermatophyte          Plan:       Dave was seen today for foot ulcer.    Diagnoses and all orders for this visit:    Type 2 diabetes mellitus with diabetic polyneuropathy, unspecified whether long term insulin use  -      DIABETES FOOT EXAM  -     DIABETIC SHOES FOR HOME USE    Partial nontraumatic amputation of foot, right  -      DIABETES FOOT EXAM  -     DIABETIC SHOES FOR HOME USE  -     urea 45 % Crea; Apply 1 application topically 2 (two) times daily.    History of ulceration  -      DIABETES FOOT EXAM  -     DIABETIC SHOES FOR HOME USE  -     urea 45 % Crea; Apply 1 application topically 2 (two) times daily.    Keratosis  -      DIABETES FOOT EXAM  -     DIABETIC SHOES FOR HOME USE  -     urea 45 % Crea; Apply 1 application topically 2 (two) times daily.    Onychomycosis due to dermatophyte  -     ciclopirox (PENLAC) 8 % Soln; Apply topically nightly.    Other orders  -     ammonium lactate 12 % Crea; Apply twice daily to affected parts both feet as needed.    I counseled the patient on his conditions, their implications and medical management.    Inspect feet multiple times daily for signs of occurrence/recurrence ulceration.    Adequate vitamin supplementation, protein intake, and hydration - discussed with patient.     Gradually return to normal activity level and most current diabetic shoes custom inserts.    Three months     Continue minimal ambulation surgical shoes bilateral.       Rx DM Shoes, custom inserts        No follow-ups on file.

## 2023-03-15 ENCOUNTER — OFFICE VISIT (OUTPATIENT)
Dept: PODIATRY | Facility: CLINIC | Age: 58
End: 2023-03-15
Payer: MEDICAID

## 2023-03-15 VITALS
SYSTOLIC BLOOD PRESSURE: 172 MMHG | HEART RATE: 80 BPM | WEIGHT: 300 LBS | HEIGHT: 74 IN | DIASTOLIC BLOOD PRESSURE: 102 MMHG | BODY MASS INDEX: 38.5 KG/M2

## 2023-03-15 DIAGNOSIS — B35.1 ONYCHOMYCOSIS DUE TO DERMATOPHYTE: ICD-10-CM

## 2023-03-15 DIAGNOSIS — Z87.898 HISTORY OF ULCERATION: ICD-10-CM

## 2023-03-15 DIAGNOSIS — E11.42 TYPE 2 DIABETES MELLITUS WITH DIABETIC POLYNEUROPATHY, UNSPECIFIED WHETHER LONG TERM INSULIN USE: Primary | ICD-10-CM

## 2023-03-15 DIAGNOSIS — L57.0 KERATOSIS: ICD-10-CM

## 2023-03-15 DIAGNOSIS — Z89.431 PARTIAL NONTRAUMATIC AMPUTATION OF FOOT, RIGHT: ICD-10-CM

## 2023-03-15 PROCEDURE — 3008F PR BODY MASS INDEX (BMI) DOCUMENTED: ICD-10-PCS | Mod: CPTII,,, | Performed by: PODIATRIST

## 2023-03-15 PROCEDURE — 3077F PR MOST RECENT SYSTOLIC BLOOD PRESSURE >= 140 MM HG: ICD-10-PCS | Mod: CPTII,,, | Performed by: PODIATRIST

## 2023-03-15 PROCEDURE — 99213 PR OFFICE/OUTPT VISIT, EST, LEVL III, 20-29 MIN: ICD-10-PCS | Mod: S$PBB,,, | Performed by: PODIATRIST

## 2023-03-15 PROCEDURE — 99213 OFFICE O/P EST LOW 20 MIN: CPT | Mod: S$PBB,,, | Performed by: PODIATRIST

## 2023-03-15 PROCEDURE — 1159F PR MEDICATION LIST DOCUMENTED IN MEDICAL RECORD: ICD-10-PCS | Mod: CPTII,,, | Performed by: PODIATRIST

## 2023-03-15 PROCEDURE — 4010F PR ACE/ARB THEARPY RXD/TAKEN: ICD-10-PCS | Mod: CPTII,,, | Performed by: PODIATRIST

## 2023-03-15 PROCEDURE — 99999 PR PBB SHADOW E&M-EST. PATIENT-LVL IV: CPT | Mod: PBBFAC,,, | Performed by: PODIATRIST

## 2023-03-15 PROCEDURE — 3008F BODY MASS INDEX DOCD: CPT | Mod: CPTII,,, | Performed by: PODIATRIST

## 2023-03-15 PROCEDURE — 3077F SYST BP >= 140 MM HG: CPT | Mod: CPTII,,, | Performed by: PODIATRIST

## 2023-03-15 PROCEDURE — 99999 PR PBB SHADOW E&M-EST. PATIENT-LVL IV: ICD-10-PCS | Mod: PBBFAC,,, | Performed by: PODIATRIST

## 2023-03-15 PROCEDURE — 99214 OFFICE O/P EST MOD 30 MIN: CPT | Mod: PBBFAC,PN | Performed by: PODIATRIST

## 2023-03-15 PROCEDURE — 3080F DIAST BP >= 90 MM HG: CPT | Mod: CPTII,,, | Performed by: PODIATRIST

## 2023-03-15 PROCEDURE — 4010F ACE/ARB THERAPY RXD/TAKEN: CPT | Mod: CPTII,,, | Performed by: PODIATRIST

## 2023-03-15 PROCEDURE — 3080F PR MOST RECENT DIASTOLIC BLOOD PRESSURE >= 90 MM HG: ICD-10-PCS | Mod: CPTII,,, | Performed by: PODIATRIST

## 2023-03-15 PROCEDURE — 1159F MED LIST DOCD IN RCRD: CPT | Mod: CPTII,,, | Performed by: PODIATRIST

## 2023-03-15 NOTE — PROGRESS NOTES
Subjective:      Patient ID: Dave Good is a 57 y.o. male.    Chief Complaint: Foot Ulcer (Left foot)    Diabetes, increased risk amputation needing evaluation/management/optomization of foot care.    Ulcer the bottom of the left hallux base/1st MTPJ left foot closed last visit.  No dressings necessary today-regular socks are worn  Ambulating surgical shoes bilateral.      Review of Systems   Constitutional: Negative for chills, diaphoresis, fever, malaise/fatigue and night sweats.   Cardiovascular:  Positive for leg swelling. Negative for claudication, cyanosis and syncope.   Skin:  Positive for nail changes and poor wound healing. Negative for color change, dry skin, rash, suspicious lesions and unusual hair distribution.   Musculoskeletal:  Negative for falls, joint pain, joint swelling, muscle cramps, muscle weakness and stiffness.   Gastrointestinal:  Negative for constipation, diarrhea, nausea and vomiting.   Neurological:  Positive for numbness, paresthesias and sensory change. Negative for brief paralysis, disturbances in coordination, focal weakness and tremors.         Objective:      Physical Exam  Constitutional:       General: He is not in acute distress.     Appearance: He is well-developed. He is not diaphoretic.   Cardiovascular:      Pulses:           Popliteal pulses are 2+ on the right side and 2+ on the left side.        Dorsalis pedis pulses are 2+ on the right side and 2+ on the left side.        Posterior tibial pulses are 2+ on the right side and 2+ on the left side.      Comments: Capillary refill 3 seconds all toes/distal feet, all toes/both feet warm to touch.      Negative lymphadenopathy bilateral popliteal fossa and tarsal tunnel.      Negavie lower extremity edema bilateral.    Musculoskeletal:      Right ankle: No swelling, deformity, ecchymosis or lacerations. Normal range of motion. Normal pulse.      Right Achilles Tendon: Normal. No defects. Gonzáles's test negative.       Comments: Ray resection distal 1st and 2nd rays right.    Otherwise, Normal angle, base, station of gait. All ten toes without clubbing, cyanosis, or signs of ischemia.  No pain to palpation bilateral lower extremities.  Range of motion, stability, muscle strength, and muscle tone normal bilateral feet and legs.    Lymphadenopathy:      Lower Body: No right inguinal adenopathy. No left inguinal adenopathy.      Comments: Negative lymphadenopathy bilateral popliteal fossa and tarsal tunnel.    Negative lymphangitic streaking bilateral feet/ankles/legs.   Skin:     General: Skin is warm and dry.      Capillary Refill: Capillary refill takes 2 to 3 seconds.      Coloration: Skin is not pale.      Findings: No abrasion, bruising, burn, ecchymosis, erythema, laceration, lesion or rash.      Nails: There is no clubbing.      Comments: Wound plantar left 1st mtpj/hallux base remains closed today, epithelialized  without ulceration, drainage, pus, tracking, fluctuance, malodor, or cardinal signs infection.      Otherwise, Skin thin, atrophic, with decreased density and distribution of pedal hair bilateral, but without hyperpigmentation, lizy discoloration,  ulcers, masses, nodules or cords palpated bilateral feet and legs.    Generalized keratosis plantar aspect right left feet large from being covered with dressings-this is all beginning to slough as expected using the current prescriptions to soften fully that skin.  There are no signs of infection or open sores either feet today       Neurological:      Mental Status: He is alert and oriented to person, place, and time.      Sensory: No sensory deficit.      Motor: No tremor, atrophy or abnormal muscle tone.      Gait: Gait normal.      Deep Tendon Reflexes:      Reflex Scores:       Patellar reflexes are 2+ on the right side and 2+ on the left side.       Achilles reflexes are 2+ on the right side and 2+ on the left side.     Comments: Negative tinel sign to percussion  sural, superficial peroneal, deep peroneal, saphenous, and posterior tibial nerves right and left ankles and feet.    Diminished/loss of protective sensation all toes bilateral to 10 gram monofilament.    Paresthesias, and burning bilateral feet with no clearly identified trigger or source.     Psychiatric:         Behavior: Behavior is cooperative.           Assessment:       Encounter Diagnoses   Name Primary?    Type 2 diabetes mellitus with diabetic polyneuropathy, unspecified whether long term insulin use Yes    Partial nontraumatic amputation of foot, right     History of ulceration     Keratosis     Onychomycosis due to dermatophyte          Plan:       Dave was seen today for foot ulcer.    Diagnoses and all orders for this visit:    Type 2 diabetes mellitus with diabetic polyneuropathy, unspecified whether long term insulin use    Partial nontraumatic amputation of foot, right    History of ulceration    Keratosis    Onychomycosis due to dermatophyte    I counseled the patient on his conditions, their implications and medical management.    Inspect feet multiple times daily for signs of occurrence/recurrence ulceration.    Adequate vitamin supplementation, protein intake, and hydration - discussed with patient.     Gradually return to normal activity level and most current diabetic shoes custom inserts.    Three months     Continue minimal ambulation surgical shoes bilateral.      Rx DM Shoes, custom inserts    Lac-Hydrin and Penlac refilled last visit-continue        Follow up in about 1 year (around 3/15/2024).

## 2023-04-20 ENCOUNTER — TELEPHONE (OUTPATIENT)
Dept: PODIATRY | Facility: CLINIC | Age: 58
End: 2023-04-20
Payer: MEDICAID

## 2023-04-20 NOTE — TELEPHONE ENCOUNTER
Staff informed patient the Rx for ciclopirox (PENLAC) 8 % Soln is not cover by his insurance.     Patient verbalized understanding.

## 2023-06-21 ENCOUNTER — OFFICE VISIT (OUTPATIENT)
Dept: PODIATRY | Facility: CLINIC | Age: 58
End: 2023-06-21
Payer: MEDICAID

## 2023-06-21 VITALS
HEART RATE: 80 BPM | HEIGHT: 74 IN | SYSTOLIC BLOOD PRESSURE: 121 MMHG | BODY MASS INDEX: 38.5 KG/M2 | DIASTOLIC BLOOD PRESSURE: 82 MMHG | WEIGHT: 300 LBS

## 2023-06-21 DIAGNOSIS — L97.512 ULCERATED, FOOT, RIGHT, WITH FAT LAYER EXPOSED: ICD-10-CM

## 2023-06-21 DIAGNOSIS — E11.42 TYPE 2 DIABETES MELLITUS WITH DIABETIC POLYNEUROPATHY, UNSPECIFIED WHETHER LONG TERM INSULIN USE: Primary | ICD-10-CM

## 2023-06-21 DIAGNOSIS — Z89.431 PARTIAL NONTRAUMATIC AMPUTATION OF FOOT, RIGHT: ICD-10-CM

## 2023-06-21 DIAGNOSIS — L97.522 ULCERATED, FOOT, LEFT, WITH FAT LAYER EXPOSED: ICD-10-CM

## 2023-06-21 PROCEDURE — 99215 OFFICE O/P EST HI 40 MIN: CPT | Mod: PBBFAC,PN | Performed by: PODIATRIST

## 2023-06-21 PROCEDURE — 3008F PR BODY MASS INDEX (BMI) DOCUMENTED: ICD-10-PCS | Mod: CPTII,,, | Performed by: PODIATRIST

## 2023-06-21 PROCEDURE — 99999 PR PBB SHADOW E&M-EST. PATIENT-LVL V: ICD-10-PCS | Mod: PBBFAC,,, | Performed by: PODIATRIST

## 2023-06-21 PROCEDURE — 1159F MED LIST DOCD IN RCRD: CPT | Mod: CPTII,,, | Performed by: PODIATRIST

## 2023-06-21 PROCEDURE — 99213 PR OFFICE/OUTPT VISIT, EST, LEVL III, 20-29 MIN: ICD-10-PCS | Mod: 25,S$PBB,, | Performed by: PODIATRIST

## 2023-06-21 PROCEDURE — 3079F DIAST BP 80-89 MM HG: CPT | Mod: CPTII,,, | Performed by: PODIATRIST

## 2023-06-21 PROCEDURE — 3074F PR MOST RECENT SYSTOLIC BLOOD PRESSURE < 130 MM HG: ICD-10-PCS | Mod: CPTII,,, | Performed by: PODIATRIST

## 2023-06-21 PROCEDURE — 99999 PR PBB SHADOW E&M-EST. PATIENT-LVL V: CPT | Mod: PBBFAC,,, | Performed by: PODIATRIST

## 2023-06-21 PROCEDURE — 99213 OFFICE O/P EST LOW 20 MIN: CPT | Mod: 25,S$PBB,, | Performed by: PODIATRIST

## 2023-06-21 PROCEDURE — 3079F PR MOST RECENT DIASTOLIC BLOOD PRESSURE 80-89 MM HG: ICD-10-PCS | Mod: CPTII,,, | Performed by: PODIATRIST

## 2023-06-21 PROCEDURE — 4010F PR ACE/ARB THEARPY RXD/TAKEN: ICD-10-PCS | Mod: CPTII,,, | Performed by: PODIATRIST

## 2023-06-21 PROCEDURE — 3008F BODY MASS INDEX DOCD: CPT | Mod: CPTII,,, | Performed by: PODIATRIST

## 2023-06-21 PROCEDURE — 4010F ACE/ARB THERAPY RXD/TAKEN: CPT | Mod: CPTII,,, | Performed by: PODIATRIST

## 2023-06-21 PROCEDURE — 11042 DBRDMT SUBQ TIS 1ST 20SQCM/<: CPT | Mod: PBBFAC,PN | Performed by: PODIATRIST

## 2023-06-21 PROCEDURE — 11042 WOUND DEBRIDEMENT: ICD-10-PCS | Mod: S$PBB,,, | Performed by: PODIATRIST

## 2023-06-21 PROCEDURE — 3074F SYST BP LT 130 MM HG: CPT | Mod: CPTII,,, | Performed by: PODIATRIST

## 2023-06-21 PROCEDURE — 1159F PR MEDICATION LIST DOCUMENTED IN MEDICAL RECORD: ICD-10-PCS | Mod: CPTII,,, | Performed by: PODIATRIST

## 2023-06-21 RX ORDER — GABAPENTIN 600 MG/1
1 TABLET ORAL
Status: ON HOLD | COMMUNITY
Start: 2023-04-26 | End: 2023-09-14 | Stop reason: HOSPADM

## 2023-06-21 NOTE — PROGRESS NOTES
Subjective:      Patient ID: Dave Good is a 57 y.o. male.    Chief Complaint: Wound Check    Dark coloration and drainage from the forefoot on the right and left feet.  Gradual onset, worsening over the past 8 weeks.  Aggravated by increased weight-bearing prolonged standing some shoes.  No prior medical treatment.  No self-treatment.  Patient denies trauma.  Has had partial amputation right.    Review of Systems   Constitutional: Negative for chills, diaphoresis, fever, malaise/fatigue and night sweats.   Cardiovascular:  Positive for leg swelling. Negative for claudication, cyanosis and syncope.   Skin:  Positive for poor wound healing. Negative for color change, dry skin, rash, suspicious lesions and unusual hair distribution.   Musculoskeletal:  Negative for falls, joint pain, joint swelling, muscle cramps, muscle weakness and stiffness.   Gastrointestinal:  Negative for constipation, diarrhea, nausea and vomiting.   Neurological:  Positive for numbness, paresthesias and sensory change. Negative for brief paralysis, disturbances in coordination, focal weakness and tremors.         Objective:      Physical Exam  Constitutional:       General: He is not in acute distress.     Appearance: He is well-developed. He is not diaphoretic.   Cardiovascular:      Pulses:           Popliteal pulses are 2+ on the right side and 2+ on the left side.        Dorsalis pedis pulses are 1+ on the right side and 1+ on the left side.        Posterior tibial pulses are 1+ on the right side and 1+ on the left side.      Comments: Capillary refill 3 seconds all toes/distal feet, all toes/both feet warm to touch.      Negative lymphadenopathy bilateral popliteal fossa and tarsal tunnel.     Less than 2+ pitting lower extremity edema bilateral.    Musculoskeletal:      Right ankle: No swelling, deformity, ecchymosis or lacerations. Normal range of motion. Normal pulse.      Right Achilles Tendon: Normal. No defects. Gonzáles's test  negative.      Comments: Partial amputation right foot   Lymphadenopathy:      Lower Body: No right inguinal adenopathy. No left inguinal adenopathy.      Comments: Negative lymphadenopathy bilateral popliteal fossa and tarsal tunnel.    Negative lymphangitic streaking bilateral feet/ankles/legs.   Skin:     General: Skin is warm and dry.      Capillary Refill: Capillary refill takes 2 to 3 seconds.      Coloration: Skin is not pale.      Findings: No abrasion, bruising, burn, ecchymosis, erythema, laceration, lesion or rash.      Nails: There is no clubbing.      Comments:   Wound:  plantar forefoot left    Size:    Pre:1x1x0.2cm  Post: 3x2x0.5cm    Base:  Granular, pink with moderate serous/serosanaguinous drainage only.  No pus, tracking, fluctuance, malodor, or cardinal signs infection.    Borders:  Hyperkeratotic, debriding to flat, pink, blanchable skin edges without undermining.      Wound:  plantar forefoot right    Size:    Pre:0.1x0.3x0.1cm  Post: 2x1x0.4cm    Base:  Granular, pink with moderate serous/serosanaguinous drainage only.  No pus, tracking, fluctuance, malodor, or cardinal signs infection.    Borders:  Hyperkeratotic, debriding to flat, pink, blanchable skin edges without undermining.       Neurological:      Mental Status: He is alert and oriented to person, place, and time.      Sensory: No sensory deficit.      Motor: No tremor, atrophy or abnormal muscle tone.      Gait: Gait normal.      Comments: Diminished/loss of protective sensation all toes bilateral to 10 gram monofilament.    Psychiatric:         Behavior: Behavior is cooperative.           Assessment:       Encounter Diagnoses   Name Primary?    Type 2 diabetes mellitus with diabetic polyneuropathy, unspecified whether long term insulin use Yes    Partial nontraumatic amputation of foot, right     Ulcerated, foot, right, with fat layer exposed     Ulcerated, foot, left, with fat layer exposed          Plan:       Dave was seen  today for wound check.    Diagnoses and all orders for this visit:    Type 2 diabetes mellitus with diabetic polyneuropathy, unspecified whether long term insulin use  -     X-Ray Foot Complete Bilateral; Future    Partial nontraumatic amputation of foot, right  -     X-Ray Foot Complete Bilateral; Future    Ulcerated, foot, right, with fat layer exposed  -     X-Ray Foot Complete Bilateral; Future    Ulcerated, foot, left, with fat layer exposed  -     X-Ray Foot Complete Bilateral; Future      I counseled the patient on his conditions, their implications and medical management.        Debride Wounds both feet.      Dressed wounds both feet with Bety, Balbir foam, football dressing.  Do not change or wet.      Dispense surgical shoes bilateral.  Ambulate minimally.      Continue cane for stabilization.      X-rays both feet.      One-week, sooner p.r.n..          Follow up in about 1 week (around 6/28/2023).

## 2023-06-28 ENCOUNTER — OFFICE VISIT (OUTPATIENT)
Dept: PODIATRY | Facility: CLINIC | Age: 58
End: 2023-06-28
Payer: MEDICAID

## 2023-06-28 ENCOUNTER — APPOINTMENT (OUTPATIENT)
Dept: RADIOLOGY | Facility: OTHER | Age: 58
End: 2023-06-28
Attending: PODIATRIST
Payer: MEDICAID

## 2023-06-28 VITALS
SYSTOLIC BLOOD PRESSURE: 123 MMHG | WEIGHT: 300 LBS | DIASTOLIC BLOOD PRESSURE: 80 MMHG | BODY MASS INDEX: 38.5 KG/M2 | HEIGHT: 74 IN | HEART RATE: 80 BPM

## 2023-06-28 DIAGNOSIS — L97.512 ULCERATED, FOOT, RIGHT, WITH FAT LAYER EXPOSED: ICD-10-CM

## 2023-06-28 DIAGNOSIS — E11.42 TYPE 2 DIABETES MELLITUS WITH DIABETIC POLYNEUROPATHY, UNSPECIFIED WHETHER LONG TERM INSULIN USE: ICD-10-CM

## 2023-06-28 DIAGNOSIS — L97.522 ULCERATED, FOOT, LEFT, WITH FAT LAYER EXPOSED: ICD-10-CM

## 2023-06-28 DIAGNOSIS — L97.512 ULCERATED, FOOT, RIGHT, WITH FAT LAYER EXPOSED: Primary | ICD-10-CM

## 2023-06-28 DIAGNOSIS — Z89.431 PARTIAL NONTRAUMATIC AMPUTATION OF FOOT, RIGHT: ICD-10-CM

## 2023-06-28 PROCEDURE — 1159F PR MEDICATION LIST DOCUMENTED IN MEDICAL RECORD: ICD-10-PCS | Mod: CPTII,,, | Performed by: PODIATRIST

## 2023-06-28 PROCEDURE — 99213 OFFICE O/P EST LOW 20 MIN: CPT | Mod: 25,S$PBB,, | Performed by: PODIATRIST

## 2023-06-28 PROCEDURE — 4010F ACE/ARB THERAPY RXD/TAKEN: CPT | Mod: CPTII,,, | Performed by: PODIATRIST

## 2023-06-28 PROCEDURE — 73630 X-RAY EXAM OF FOOT: CPT | Mod: 26,50,, | Performed by: RADIOLOGY

## 2023-06-28 PROCEDURE — 1160F PR REVIEW ALL MEDS BY PRESCRIBER/CLIN PHARMACIST DOCUMENTED: ICD-10-PCS | Mod: CPTII,,, | Performed by: PODIATRIST

## 2023-06-28 PROCEDURE — 1160F RVW MEDS BY RX/DR IN RCRD: CPT | Mod: CPTII,,, | Performed by: PODIATRIST

## 2023-06-28 PROCEDURE — 99999 PR PBB SHADOW E&M-EST. PATIENT-LVL IV: ICD-10-PCS | Mod: PBBFAC,,, | Performed by: PODIATRIST

## 2023-06-28 PROCEDURE — 73630 XR FOOT COMPLETE 3 VIEW BILATERAL: ICD-10-PCS | Mod: 26,50,, | Performed by: RADIOLOGY

## 2023-06-28 PROCEDURE — 99213 PR OFFICE/OUTPT VISIT, EST, LEVL III, 20-29 MIN: ICD-10-PCS | Mod: 25,S$PBB,, | Performed by: PODIATRIST

## 2023-06-28 PROCEDURE — 99214 OFFICE O/P EST MOD 30 MIN: CPT | Mod: PBBFAC,PN | Performed by: PODIATRIST

## 2023-06-28 PROCEDURE — 3074F PR MOST RECENT SYSTOLIC BLOOD PRESSURE < 130 MM HG: ICD-10-PCS | Mod: CPTII,,, | Performed by: PODIATRIST

## 2023-06-28 PROCEDURE — 3074F SYST BP LT 130 MM HG: CPT | Mod: CPTII,,, | Performed by: PODIATRIST

## 2023-06-28 PROCEDURE — 99999 PR PBB SHADOW E&M-EST. PATIENT-LVL IV: CPT | Mod: PBBFAC,,, | Performed by: PODIATRIST

## 2023-06-28 PROCEDURE — 3079F DIAST BP 80-89 MM HG: CPT | Mod: CPTII,,, | Performed by: PODIATRIST

## 2023-06-28 PROCEDURE — 1159F MED LIST DOCD IN RCRD: CPT | Mod: CPTII,,, | Performed by: PODIATRIST

## 2023-06-28 PROCEDURE — 73630 X-RAY EXAM OF FOOT: CPT | Mod: TC,50

## 2023-06-28 PROCEDURE — 3008F PR BODY MASS INDEX (BMI) DOCUMENTED: ICD-10-PCS | Mod: CPTII,,, | Performed by: PODIATRIST

## 2023-06-28 PROCEDURE — 4010F PR ACE/ARB THEARPY RXD/TAKEN: ICD-10-PCS | Mod: CPTII,,, | Performed by: PODIATRIST

## 2023-06-28 PROCEDURE — 3008F BODY MASS INDEX DOCD: CPT | Mod: CPTII,,, | Performed by: PODIATRIST

## 2023-06-28 PROCEDURE — 3079F PR MOST RECENT DIASTOLIC BLOOD PRESSURE 80-89 MM HG: ICD-10-PCS | Mod: CPTII,,, | Performed by: PODIATRIST

## 2023-06-28 NOTE — PROGRESS NOTES
Subjective:      Patient ID: Dave Good is a 57 y.o. male.    Chief Complaint: Wound Check    Forefoot ulcerations right and left feet.  Gradual onset, worsening over the past 8 weeks.  Aggravated by increased weight-bearing prolonged standing some shoes.  Debridement football dressings have improved the.  Patient denies trauma.  Has had partial amputation right.  X-rays this morning show subluxed 3rd digit-unknown significance-no additional osteolysis in the area the 2nd 3rd rays left or the 3rd ray right with the ulcerations are clinically.  No gas in soft tissues, no evidence foreign body    Review of Systems   Constitutional: Negative for chills, diaphoresis, fever, malaise/fatigue and night sweats.   Cardiovascular:  Positive for leg swelling. Negative for claudication, cyanosis and syncope.   Skin:  Positive for poor wound healing. Negative for color change, dry skin, rash, suspicious lesions and unusual hair distribution.   Musculoskeletal:  Negative for falls, joint pain, joint swelling, muscle cramps, muscle weakness and stiffness.   Gastrointestinal:  Negative for constipation, diarrhea, nausea and vomiting.   Neurological:  Positive for numbness, paresthesias and sensory change. Negative for brief paralysis, disturbances in coordination, focal weakness and tremors.         Objective:      Physical Exam  Constitutional:       General: He is not in acute distress.     Appearance: He is well-developed. He is not diaphoretic.   Cardiovascular:      Pulses:           Popliteal pulses are 2+ on the right side and 2+ on the left side.        Dorsalis pedis pulses are 1+ on the right side and 1+ on the left side.        Posterior tibial pulses are 1+ on the right side and 1+ on the left side.      Comments: Capillary refill 3 seconds all toes/distal feet, all toes/both feet warm to touch.      Negative lymphadenopathy bilateral popliteal fossa and tarsal tunnel.     Less than 2+ pitting lower extremity edema  bilateral.    Musculoskeletal:      Right ankle: No swelling, deformity, ecchymosis or lacerations. Normal range of motion. Normal pulse.      Right Achilles Tendon: Normal. No defects. Gonzáles's test negative.      Comments: Partial amputation right foot, rays 1,2.   Lymphadenopathy:      Lower Body: No right inguinal adenopathy. No left inguinal adenopathy.      Comments: Negative lymphadenopathy bilateral popliteal fossa and tarsal tunnel.    Negative lymphangitic streaking bilateral feet/ankles/legs.   Skin:     General: Skin is warm and dry.      Capillary Refill: Capillary refill takes 2 to 3 seconds.      Coloration: Skin is not pale.      Findings: No abrasion, bruising, burn, ecchymosis, erythema, laceration, lesion or rash.      Nails: There is no clubbing.      Comments:   Wound:  plantar forefoot left    Size:    Pre:72y44i5 mm      Base:  Granular, pink with moderate serous/serosanaguinous drainage only.  No pus, tracking, fluctuance, malodor, or cardinal signs infection.    Borders:  Hyperkeratotic, debriding to flat, pink, blanchable skin edges without undermining.      Wound:  plantar forefoot right    Size:    Pre: 95y71k2 mm    Base:  Granular, pink with moderate serous/serosanaguinous drainage only.  No pus, tracking, fluctuance, malodor, or cardinal signs infection.    Borders:  Hyperkeratotic, debriding to flat, pink, blanchable skin edges without undermining.    Otherwise, plantar skin both feet hyperkeratotic changing to more normal elastic skin color turgor texture in the dorsum of the feet.   Neurological:      Mental Status: He is alert and oriented to person, place, and time.      Sensory: No sensory deficit.      Motor: No tremor, atrophy or abnormal muscle tone.      Gait: Gait normal.      Comments: Diminished/loss of protective sensation all toes bilateral to 10 gram monofilament.    Psychiatric:         Behavior: Behavior is cooperative.           Assessment:       Encounter  Diagnoses   Name Primary?    Ulcerated, foot, right, with fat layer exposed Yes    Ulcerated, foot, left, with fat layer exposed     Partial nontraumatic amputation of foot, right     Type 2 diabetes mellitus with diabetic polyneuropathy, unspecified whether long term insulin use            Plan:       Dave was seen today for wound check.    Diagnoses and all orders for this visit:    Ulcerated, foot, right, with fat layer exposed    Ulcerated, foot, left, with fat layer exposed    Partial nontraumatic amputation of foot, right    Type 2 diabetes mellitus with diabetic polyneuropathy, unspecified whether long term insulin use        I counseled the patient on his conditions, their implications and medical management.        Dressed wounds both feet with Bety, Balbir foam, football dressing.  Do not change or wet.      Continue surgical shoes bilateral.  Ambulate minimally.      Continue cane for stabilization.        One-week, sooner p.r.n..          No follow-ups on file.

## 2023-07-06 ENCOUNTER — OFFICE VISIT (OUTPATIENT)
Dept: PODIATRY | Facility: CLINIC | Age: 58
End: 2023-07-06
Payer: MEDICAID

## 2023-07-06 VITALS
SYSTOLIC BLOOD PRESSURE: 154 MMHG | DIASTOLIC BLOOD PRESSURE: 95 MMHG | HEIGHT: 74 IN | HEART RATE: 80 BPM | BODY MASS INDEX: 38.5 KG/M2 | WEIGHT: 300 LBS

## 2023-07-06 DIAGNOSIS — Z89.431 PARTIAL NONTRAUMATIC AMPUTATION OF FOOT, RIGHT: ICD-10-CM

## 2023-07-06 DIAGNOSIS — Z87.898 HISTORY OF ULCERATION: ICD-10-CM

## 2023-07-06 DIAGNOSIS — L57.0 KERATOSIS: ICD-10-CM

## 2023-07-06 PROCEDURE — 99215 OFFICE O/P EST HI 40 MIN: CPT | Mod: PBBFAC,PN | Performed by: PODIATRIST

## 2023-07-06 PROCEDURE — 1160F PR REVIEW ALL MEDS BY PRESCRIBER/CLIN PHARMACIST DOCUMENTED: ICD-10-PCS | Mod: CPTII,,, | Performed by: PODIATRIST

## 2023-07-06 PROCEDURE — 99214 PR OFFICE/OUTPT VISIT, EST, LEVL IV, 30-39 MIN: ICD-10-PCS | Mod: S$PBB,,, | Performed by: PODIATRIST

## 2023-07-06 PROCEDURE — 3008F BODY MASS INDEX DOCD: CPT | Mod: CPTII,,, | Performed by: PODIATRIST

## 2023-07-06 PROCEDURE — 3080F PR MOST RECENT DIASTOLIC BLOOD PRESSURE >= 90 MM HG: ICD-10-PCS | Mod: CPTII,,, | Performed by: PODIATRIST

## 2023-07-06 PROCEDURE — 3077F PR MOST RECENT SYSTOLIC BLOOD PRESSURE >= 140 MM HG: ICD-10-PCS | Mod: CPTII,,, | Performed by: PODIATRIST

## 2023-07-06 PROCEDURE — 1160F RVW MEDS BY RX/DR IN RCRD: CPT | Mod: CPTII,,, | Performed by: PODIATRIST

## 2023-07-06 PROCEDURE — 3080F DIAST BP >= 90 MM HG: CPT | Mod: CPTII,,, | Performed by: PODIATRIST

## 2023-07-06 PROCEDURE — 1159F PR MEDICATION LIST DOCUMENTED IN MEDICAL RECORD: ICD-10-PCS | Mod: CPTII,,, | Performed by: PODIATRIST

## 2023-07-06 PROCEDURE — 4010F ACE/ARB THERAPY RXD/TAKEN: CPT | Mod: CPTII,,, | Performed by: PODIATRIST

## 2023-07-06 PROCEDURE — 99999 PR PBB SHADOW E&M-EST. PATIENT-LVL V: CPT | Mod: PBBFAC,,, | Performed by: PODIATRIST

## 2023-07-06 PROCEDURE — 1159F MED LIST DOCD IN RCRD: CPT | Mod: CPTII,,, | Performed by: PODIATRIST

## 2023-07-06 PROCEDURE — 99214 OFFICE O/P EST MOD 30 MIN: CPT | Mod: S$PBB,,, | Performed by: PODIATRIST

## 2023-07-06 PROCEDURE — 4010F PR ACE/ARB THEARPY RXD/TAKEN: ICD-10-PCS | Mod: CPTII,,, | Performed by: PODIATRIST

## 2023-07-06 PROCEDURE — 3077F SYST BP >= 140 MM HG: CPT | Mod: CPTII,,, | Performed by: PODIATRIST

## 2023-07-06 PROCEDURE — 3008F PR BODY MASS INDEX (BMI) DOCUMENTED: ICD-10-PCS | Mod: CPTII,,, | Performed by: PODIATRIST

## 2023-07-06 PROCEDURE — 99999 PR PBB SHADOW E&M-EST. PATIENT-LVL V: ICD-10-PCS | Mod: PBBFAC,,, | Performed by: PODIATRIST

## 2023-07-06 RX ORDER — UREA 450 MG/G
1 CREAM TOPICAL 2 TIMES DAILY
Qty: 255 G | Refills: 11 | Status: CANCELLED | OUTPATIENT
Start: 2023-07-06

## 2023-07-06 RX ORDER — UREA 450 MG/G
1 CREAM TOPICAL 2 TIMES DAILY
Qty: 255 G | Refills: 11 | Status: ON HOLD | OUTPATIENT
Start: 2023-07-06 | End: 2023-09-14 | Stop reason: HOSPADM

## 2023-07-06 NOTE — PROGRESS NOTES
Subjective:      Patient ID: Dave Good is a 57 y.o. male.    Chief Complaint: Foot Ulcer (Wound Care)    Forefoot ulcerations right and left feet.  Gradual onset, worsening over the past 8 weeks.  Aggravated by increased weight-bearing prolonged standing some shoes.  Debridement football dressings have improved the.  Patient denies trauma.  Has had partial amputation right.  X-rays this morning show subluxed 3rd digit-unknown significance-no additional osteolysis in the area the 2nd 3rd rays left or the 3rd ray right with the ulcerations are clinically.  No gas in soft tissues, no evidence foreign body    Review of Systems   Constitutional: Negative for chills, diaphoresis, fever, malaise/fatigue and night sweats.   Cardiovascular:  Positive for leg swelling. Negative for claudication, cyanosis and syncope.   Skin:  Positive for poor wound healing. Negative for color change, dry skin, rash, suspicious lesions and unusual hair distribution.   Musculoskeletal:  Negative for falls, joint pain, joint swelling, muscle cramps, muscle weakness and stiffness.   Gastrointestinal:  Negative for constipation, diarrhea, nausea and vomiting.   Neurological:  Positive for numbness, paresthesias and sensory change. Negative for brief paralysis, disturbances in coordination, focal weakness and tremors.         Objective:      Physical Exam  Constitutional:       General: He is not in acute distress.     Appearance: He is well-developed. He is not diaphoretic.   Cardiovascular:      Pulses:           Popliteal pulses are 2+ on the right side and 2+ on the left side.        Dorsalis pedis pulses are 1+ on the right side and 1+ on the left side.        Posterior tibial pulses are 1+ on the right side and 1+ on the left side.      Comments: Capillary refill 3 seconds all toes/distal feet, all toes/both feet warm to touch.      Negative lymphadenopathy bilateral popliteal fossa and tarsal tunnel.     Less than 2+ pitting lower  extremity edema bilateral.    Musculoskeletal:      Right ankle: No swelling, deformity, ecchymosis or lacerations. Normal range of motion. Normal pulse.      Right Achilles Tendon: Normal. No defects. Gonzáles's test negative.      Comments: Partial amputation right foot, rays 1,2.   Lymphadenopathy:      Lower Body: No right inguinal adenopathy. No left inguinal adenopathy.      Comments: Negative lymphadenopathy bilateral popliteal fossa and tarsal tunnel.    Negative lymphangitic streaking bilateral feet/ankles/legs.   Skin:     General: Skin is warm and dry.      Capillary Refill: Capillary refill takes 2 to 3 seconds.      Coloration: Skin is not pale.      Findings: No abrasion, bruising, burn, ecchymosis, erythema, laceration, lesion or rash.      Nails: There is no clubbing.      Comments:   Wound plantar right and left forefoot closed today,  thin tan escharotic keratosis  without ulceration, drainage, pus, tracking, fluctuance, malodor, or cardinal signs infection.     Patient has chronic thick keratosis the plantar surface of both feet which are contributing to his recurrent ulcerations.   Neurological:      Mental Status: He is alert and oriented to person, place, and time.      Sensory: No sensory deficit.      Motor: No tremor, atrophy or abnormal muscle tone.      Gait: Gait normal.      Comments: Diminished/loss of protective sensation all toes bilateral to 10 gram monofilament.    Psychiatric:         Behavior: Behavior is cooperative.           Assessment:       Encounter Diagnoses   Name Primary?    Partial nontraumatic amputation of foot, right     History of ulceration     Keratosis            Plan:       Dave was seen today for foot ulcer.    Diagnoses and all orders for this visit:    Partial nontraumatic amputation of foot, right  -     DIABETIC SHOES FOR HOME USE  -     urea 45 % Crea; Apply 1 application  topically 2 (two) times daily.    History of ulceration  -     DIABETIC SHOES FOR HOME  USE  -     urea 45 % Crea; Apply 1 application  topically 2 (two) times daily.    Keratosis  -     DIABETIC SHOES FOR HOME USE  -     urea 45 % Crea; Apply 1 application  topically 2 (two) times daily.    Other orders  The following orders have not been finalized:  -     Cancel: urea 45 % Crea        I counseled the patient on his conditions, their implications and medical management.    We would a lizy discussion at length about the role of the keratosis placed in the recurrent ulcerations.  Part of the picture is pressure, however part of it is the quality thickness of the skin.  When it is not elastic attention cracking facilitate this ulceration which can be quite severe and difficult for him to heal.  He understands after conversation that this is an ongoing issue that wound need daily maintenance with twice daily topical medicines and pumice stone maintenance at home.  Verbalizes understanding.        Topical urea cream twice daily.      Prescribed diabetic shoes custom inserts forefoot filler.      Continue surgical shoes bilateral.  Ambulate minimally.      Continue cane for stabilization.      Inspect feet multiple times daily for signs of occurrence/recurrence ulceration.     Urea, Rx DM shoes/inserts    two-week, sooner p.r.n..          No follow-ups on file.

## 2023-07-19 ENCOUNTER — OFFICE VISIT (OUTPATIENT)
Dept: PODIATRY | Facility: CLINIC | Age: 58
End: 2023-07-19
Payer: MEDICAID

## 2023-07-19 VITALS
HEART RATE: 89 BPM | BODY MASS INDEX: 38.5 KG/M2 | SYSTOLIC BLOOD PRESSURE: 124 MMHG | HEIGHT: 74 IN | WEIGHT: 300 LBS | DIASTOLIC BLOOD PRESSURE: 81 MMHG

## 2023-07-19 DIAGNOSIS — L97.512 ULCERATED, FOOT, RIGHT, WITH FAT LAYER EXPOSED: ICD-10-CM

## 2023-07-19 DIAGNOSIS — Z89.431 PARTIAL NONTRAUMATIC AMPUTATION OF FOOT, RIGHT: ICD-10-CM

## 2023-07-19 DIAGNOSIS — E11.42 TYPE 2 DIABETES MELLITUS WITH DIABETIC POLYNEUROPATHY, UNSPECIFIED WHETHER LONG TERM INSULIN USE: Primary | ICD-10-CM

## 2023-07-19 PROCEDURE — 99499 UNLISTED E&M SERVICE: CPT | Mod: S$PBB,,, | Performed by: PODIATRIST

## 2023-07-19 PROCEDURE — 99999 PR PBB SHADOW E&M-EST. PATIENT-LVL IV: CPT | Mod: PBBFAC,,, | Performed by: PODIATRIST

## 2023-07-19 PROCEDURE — 11042 DBRDMT SUBQ TIS 1ST 20SQCM/<: CPT | Mod: PBBFAC,PN | Performed by: PODIATRIST

## 2023-07-19 PROCEDURE — 99499 NO LOS: ICD-10-PCS | Mod: S$PBB,,, | Performed by: PODIATRIST

## 2023-07-19 PROCEDURE — 11042 WOUND DEBRIDEMENT: ICD-10-PCS | Mod: S$PBB,,, | Performed by: PODIATRIST

## 2023-07-19 PROCEDURE — 99999 PR PBB SHADOW E&M-EST. PATIENT-LVL IV: ICD-10-PCS | Mod: PBBFAC,,, | Performed by: PODIATRIST

## 2023-07-19 PROCEDURE — 99214 OFFICE O/P EST MOD 30 MIN: CPT | Mod: PBBFAC,PN,25 | Performed by: PODIATRIST

## 2023-07-19 NOTE — PROGRESS NOTES
Subjective:      Patient ID: Dave Good is a 57 y.o. male.    Chief Complaint: Wound Check    History ulceration both feet.  Wounds closed 2 weeks ago.  Patient has drainage right forefoot today through cracks and callus.  Unknown onset discovered at visit-no change.  No medical treatment since wound closure 2 weeks ago.  Denies fever chills night sweats nausea vomiting.  Patient has taken no steps to fill diabetic shoes custom offload inserts.    Review of Systems   Constitutional: Negative for chills, diaphoresis, fever, malaise/fatigue and night sweats.   Cardiovascular:  Positive for leg swelling. Negative for claudication, cyanosis and syncope.   Skin:  Positive for poor wound healing. Negative for color change, dry skin, nail changes, rash, suspicious lesions and unusual hair distribution.   Musculoskeletal:  Negative for falls, joint pain, joint swelling, muscle cramps, muscle weakness and stiffness.   Gastrointestinal:  Negative for constipation, diarrhea, nausea and vomiting.   Neurological:  Positive for numbness, paresthesias and sensory change. Negative for brief paralysis, disturbances in coordination, focal weakness and tremors.         Objective:      Physical Exam  Constitutional:       General: He is not in acute distress.     Appearance: He is well-developed. He is not diaphoretic.   Cardiovascular:      Pulses:           Popliteal pulses are 2+ on the right side and 2+ on the left side.        Dorsalis pedis pulses are 2+ on the right side and 2+ on the left side.        Posterior tibial pulses are 2+ on the right side and 2+ on the left side.      Comments: Capillary refill 3 seconds all toes/distal feet, all toes/both feet warm to touch.      Negative lymphadenopathy bilateral popliteal fossa and tarsal tunnel.      Less than 2+ pitting lower extremity edema bilateral.    Musculoskeletal:      Right ankle: No swelling, deformity, ecchymosis or lacerations. Normal range of motion. Normal  pulse.      Right Achilles Tendon: Normal. No defects. Gonzáles's test negative.      Comments: Pumps ray amputations 1 2 right.   Lymphadenopathy:      Lower Body: No right inguinal adenopathy. No left inguinal adenopathy.      Comments: Negative lymphadenopathy bilateral popliteal fossa and tarsal tunnel.    Negative lymphangitic streaking bilateral feet/ankles/legs.   Skin:     General: Skin is warm and dry.      Capillary Refill: Capillary refill takes 2 to 3 seconds.      Coloration: Skin is not pale.      Findings: No abrasion, bruising, burn, ecchymosis, erythema, laceration, lesion or rash.      Nails: There is no clubbing.      Comments: Wound:  Plantar right forefoot.    Size:    Pre:  0 x 0 x 1 mm-cracking callus right forefoot  Post:  20 x 20 x 3 mm    Base:  Granular, pink with moderate serous/serosanaguinous drainage only.  No pus, tracking, fluctuance, malodor, or cardinal signs infection.    Borders:  Hyperkeratotic, debriding to flat, pink, blanchable skin edges without undermining.     Wound left forefoot remains closed   Neurological:      Mental Status: He is alert and oriented to person, place, and time.      Sensory: No sensory deficit.      Motor: No tremor, atrophy or abnormal muscle tone.      Gait: Gait normal.      Comments: Diminished/loss of protective sensation all toes bilateral to 10 gram monofilament.  Keratosis   Psychiatric:         Behavior: Behavior is cooperative.           Assessment:       Encounter Diagnoses   Name Primary?    Type 2 diabetes mellitus with diabetic polyneuropathy, unspecified whether long term insulin use Yes    Ulcerated, foot, right, with fat layer exposed     Partial nontraumatic amputation of foot, right          Plan:       Dave was seen today for wound check.    Diagnoses and all orders for this visit:    Type 2 diabetes mellitus with diabetic polyneuropathy, unspecified whether long term insulin use    Ulcerated, foot, right, with fat layer  exposed    Partial nontraumatic amputation of foot, right      I counseled the patient on his conditions, their implications and medical management.    I would a lizy discussion with this patient that without diabetic shoes and successful offloading he will need further amputation to create the appropriate weight-bearing parabola avoid constant ulceration ultimate risk of sepsis death or leg loss.  He verbalizes understanding.      Like to get him to wound care center [facility] where they can better offloading with total contact casting until he is able to get diabetic shoe gear.      Reprinted prescription diabetic shoe gear and vendor list.    Debride ulcer right foot.      Dressed ulcer right foot with Drawtex, adding football.  Continue minimal ambulation surgical shoes bilateral.      Adequate vitamin supplementation, protein intake, and hydration - discussed with patient.            Follow up in about 1 week (around 7/26/2023) for Ulcer right foot..

## 2023-07-19 NOTE — PROCEDURES
"Wound Debridement    Date/Time: 7/19/2023 8:04 AM  Performed by: Samuel Toussaint DPM  Authorized by: Samuel Toussaint DPM     Time out: Immediately prior to procedure a "time out" was called to verify the correct patient, procedure, equipment, support staff and site/side marked as required.    Consent Done?:  Yes (Verbal)    Preparation: Patient was prepped and draped in usual sterile fashion    Local anesthesia used?: No      Wound Details:    Location:  Right foot    Location:  Right 3rd Metatarsal Head    Type of Debridement:  Excisional       Length (cm):  2       Area (sq cm):  4       Width (cm):  2       Percent Debrided (%):  100       Depth (cm):  0.3       Total Area Debrided (sq cm):  4    Depth of debridement:  Subcutaneous tissue    Tissue debrided:  Dermis, Epidermis and Subcutaneous    Devitalized tissue debrided:  Callus and Slough    Instruments:  Blade, Curette and Nippers  Bleeding:  Minimal  Hemostasis Achieved: Yes  Method Used:  Pressure and Alginate  Patient tolerance:  Patient tolerated the procedure well with no immediate complications  "

## 2023-07-26 ENCOUNTER — OFFICE VISIT (OUTPATIENT)
Dept: PODIATRY | Facility: CLINIC | Age: 58
End: 2023-07-26
Payer: MEDICAID

## 2023-07-26 VITALS
HEIGHT: 74 IN | SYSTOLIC BLOOD PRESSURE: 125 MMHG | BODY MASS INDEX: 38.5 KG/M2 | HEART RATE: 90 BPM | DIASTOLIC BLOOD PRESSURE: 82 MMHG | WEIGHT: 300 LBS

## 2023-07-26 DIAGNOSIS — L97.512 ULCERATED, FOOT, RIGHT, WITH FAT LAYER EXPOSED: ICD-10-CM

## 2023-07-26 DIAGNOSIS — E11.42 TYPE 2 DIABETES MELLITUS WITH DIABETIC POLYNEUROPATHY, UNSPECIFIED WHETHER LONG TERM INSULIN USE: Primary | ICD-10-CM

## 2023-07-26 DIAGNOSIS — Z89.431 PARTIAL NONTRAUMATIC AMPUTATION OF FOOT, RIGHT: ICD-10-CM

## 2023-07-26 PROCEDURE — 11042 WOUND DEBRIDEMENT: ICD-10-PCS | Mod: S$PBB,,, | Performed by: PODIATRIST

## 2023-07-26 PROCEDURE — 99499 UNLISTED E&M SERVICE: CPT | Mod: S$PBB,,, | Performed by: PODIATRIST

## 2023-07-26 PROCEDURE — 99999 PR PBB SHADOW E&M-EST. PATIENT-LVL IV: CPT | Mod: PBBFAC,,, | Performed by: PODIATRIST

## 2023-07-26 PROCEDURE — 11042 DBRDMT SUBQ TIS 1ST 20SQCM/<: CPT | Mod: PBBFAC,PN | Performed by: PODIATRIST

## 2023-07-26 PROCEDURE — 99999 PR PBB SHADOW E&M-EST. PATIENT-LVL IV: ICD-10-PCS | Mod: PBBFAC,,, | Performed by: PODIATRIST

## 2023-07-26 PROCEDURE — 99499 NO LOS: ICD-10-PCS | Mod: S$PBB,,, | Performed by: PODIATRIST

## 2023-07-26 PROCEDURE — 99214 OFFICE O/P EST MOD 30 MIN: CPT | Mod: PBBFAC,PN | Performed by: PODIATRIST

## 2023-07-26 NOTE — PROCEDURES
"Wound Debridement    Date/Time: 7/26/2023 9:05 AM  Performed by: Samuel Toussaint DPM  Authorized by: Samuel Toussaint DPM     Time out: Immediately prior to procedure a "time out" was called to verify the correct patient, procedure, equipment, support staff and site/side marked as required.    Consent Done?:  Yes (Verbal)    Preparation: Patient was prepped and draped in usual sterile fashion    Local anesthesia used?: No      Wound Details:    Location:  Right foot    Location:  Right 3rd Metatarsal Head       Length (cm):  2       Area (sq cm):  3       Width (cm):  1.5       Percent Debrided (%):  100       Depth (cm):  0.2       Total Area Debrided (sq cm):  3    Depth of debridement:  Subcutaneous tissue    Tissue debrided:  Dermis, Epidermis and Subcutaneous    Devitalized tissue debrided:  Biofilm, Callus and Slough    Instruments:  Blade  Bleeding:  Minimal  Hemostasis Achieved: Yes  Method Used:  Pressure  Patient tolerance:  Patient tolerated the procedure well with no immediate complications  "

## 2023-07-26 NOTE — PROGRESS NOTES
Subjective:      Patient ID: Dave Good is a 57 y.o. male.    Chief Complaint: Wound Check    History ulceration both feet.  Wounds closed 2 weeks ago.  Patient has drainage right forefoot today through cracks and callus.  Unknown onset discovered at visit-no change.  No medical treatment since wound closure 2 weeks ago.  Denies fever chills night sweats nausea vomiting.  Patient has taken no steps to fill diabetic shoes custom offload inserts.    Review of Systems   Constitutional: Negative for chills, diaphoresis, fever, malaise/fatigue and night sweats.   Cardiovascular:  Positive for leg swelling. Negative for claudication, cyanosis and syncope.   Skin:  Positive for poor wound healing. Negative for color change, dry skin, nail changes, rash, suspicious lesions and unusual hair distribution.   Musculoskeletal:  Negative for falls, joint pain, joint swelling, muscle cramps, muscle weakness and stiffness.   Gastrointestinal:  Negative for constipation, diarrhea, nausea and vomiting.   Neurological:  Positive for numbness, paresthesias and sensory change. Negative for brief paralysis, disturbances in coordination, focal weakness and tremors.         Objective:      Physical Exam  Constitutional:       General: He is not in acute distress.     Appearance: He is well-developed. He is not diaphoretic.   Cardiovascular:      Pulses:           Popliteal pulses are 2+ on the right side and 2+ on the left side.        Dorsalis pedis pulses are 2+ on the right side and 2+ on the left side.        Posterior tibial pulses are 2+ on the right side and 2+ on the left side.      Comments: Capillary refill 3 seconds all toes/distal feet, all toes/both feet warm to touch.      Negative lymphadenopathy bilateral popliteal fossa and tarsal tunnel.      Less than 2+ pitting lower extremity edema bilateral.    Musculoskeletal:      Right ankle: No swelling, deformity, ecchymosis or lacerations. Normal range of motion. Normal  pulse.      Right Achilles Tendon: Normal. No defects. Gonzáles's test negative.      Comments: Pumps ray amputations 1 2 right.   Lymphadenopathy:      Lower Body: No right inguinal adenopathy. No left inguinal adenopathy.      Comments: Negative lymphadenopathy bilateral popliteal fossa and tarsal tunnel.    Negative lymphangitic streaking bilateral feet/ankles/legs.   Skin:     General: Skin is warm and dry.      Capillary Refill: Capillary refill takes 2 to 3 seconds.      Coloration: Skin is not pale.      Findings: No abrasion, bruising, burn, ecchymosis, erythema, laceration, lesion or rash.      Nails: There is no clubbing.      Comments: Wound:  Plantar right forefoot.    Size:    Pre:  12x10 x 1 mm-cracking callus right forefoot  Post:  20 x 15 x 2 mm    Base:  Granular, pink with moderate serous/serosanaguinous drainage only.  No pus, tracking, fluctuance, malodor, or cardinal signs infection.    Borders:  Hyperkeratotic, debriding to flat, pink, blanchable skin edges without undermining.     Wound left forefoot remains closed   Neurological:      Mental Status: He is alert and oriented to person, place, and time.      Sensory: No sensory deficit.      Motor: No tremor, atrophy or abnormal muscle tone.      Gait: Gait normal.      Comments: Diminished/loss of protective sensation all toes bilateral to 10 gram monofilament.  Keratosis   Psychiatric:         Behavior: Behavior is cooperative.           Assessment:       No diagnosis found.        Plan:       There are no diagnoses linked to this encounter.    I counseled the patient on his conditions, their implications and medical management.    I would a lizy discussion with this patient that without diabetic shoes and successful offloading he will need further amputation to create the appropriate weight-bearing parabola avoid constant ulceration ultimate risk of sepsis death or leg loss.  He verbalizes understanding.      Like to get him to wound care  center [facility] where they can better offloading with total contact casting until he is able to get diabetic shoe gear.      Reprinted prescription diabetic shoe gear and vendor list.    Debride ulcer right foot.      Dressed ulcer right foot with Drawtex, adding football.  Continue minimal ambulation surgical shoes bilateral.      Adequate vitamin supplementation, protein intake, and hydration - discussed with patient.            Follow up in about 1 week (around 8/2/2023).

## 2023-08-02 ENCOUNTER — OFFICE VISIT (OUTPATIENT)
Dept: PODIATRY | Facility: CLINIC | Age: 58
End: 2023-08-02
Payer: MEDICAID

## 2023-08-02 VITALS
HEART RATE: 93 BPM | DIASTOLIC BLOOD PRESSURE: 87 MMHG | BODY MASS INDEX: 38.5 KG/M2 | WEIGHT: 300 LBS | SYSTOLIC BLOOD PRESSURE: 146 MMHG | HEIGHT: 74 IN

## 2023-08-02 DIAGNOSIS — Z89.431 PARTIAL NONTRAUMATIC AMPUTATION OF FOOT, RIGHT: ICD-10-CM

## 2023-08-02 DIAGNOSIS — Z87.898 HISTORY OF ULCERATION: ICD-10-CM

## 2023-08-02 DIAGNOSIS — E11.42 TYPE 2 DIABETES MELLITUS WITH DIABETIC POLYNEUROPATHY, UNSPECIFIED WHETHER LONG TERM INSULIN USE: Primary | ICD-10-CM

## 2023-08-02 DIAGNOSIS — L57.0 KERATOSIS: ICD-10-CM

## 2023-08-02 PROCEDURE — 3079F PR MOST RECENT DIASTOLIC BLOOD PRESSURE 80-89 MM HG: ICD-10-PCS | Mod: CPTII,,, | Performed by: PODIATRIST

## 2023-08-02 PROCEDURE — 3008F BODY MASS INDEX DOCD: CPT | Mod: CPTII,,, | Performed by: PODIATRIST

## 2023-08-02 PROCEDURE — 1160F RVW MEDS BY RX/DR IN RCRD: CPT | Mod: CPTII,,, | Performed by: PODIATRIST

## 2023-08-02 PROCEDURE — 99213 PR OFFICE/OUTPT VISIT, EST, LEVL III, 20-29 MIN: ICD-10-PCS | Mod: S$PBB,,, | Performed by: PODIATRIST

## 2023-08-02 PROCEDURE — 99999 PR PBB SHADOW E&M-EST. PATIENT-LVL IV: CPT | Mod: PBBFAC,,, | Performed by: PODIATRIST

## 2023-08-02 PROCEDURE — 99214 OFFICE O/P EST MOD 30 MIN: CPT | Mod: PBBFAC,PN | Performed by: PODIATRIST

## 2023-08-02 PROCEDURE — 1160F PR REVIEW ALL MEDS BY PRESCRIBER/CLIN PHARMACIST DOCUMENTED: ICD-10-PCS | Mod: CPTII,,, | Performed by: PODIATRIST

## 2023-08-02 PROCEDURE — 99213 OFFICE O/P EST LOW 20 MIN: CPT | Mod: S$PBB,,, | Performed by: PODIATRIST

## 2023-08-02 PROCEDURE — 3008F PR BODY MASS INDEX (BMI) DOCUMENTED: ICD-10-PCS | Mod: CPTII,,, | Performed by: PODIATRIST

## 2023-08-02 PROCEDURE — 3077F SYST BP >= 140 MM HG: CPT | Mod: CPTII,,, | Performed by: PODIATRIST

## 2023-08-02 PROCEDURE — 4010F ACE/ARB THERAPY RXD/TAKEN: CPT | Mod: CPTII,,, | Performed by: PODIATRIST

## 2023-08-02 PROCEDURE — 1159F PR MEDICATION LIST DOCUMENTED IN MEDICAL RECORD: ICD-10-PCS | Mod: CPTII,,, | Performed by: PODIATRIST

## 2023-08-02 PROCEDURE — 1159F MED LIST DOCD IN RCRD: CPT | Mod: CPTII,,, | Performed by: PODIATRIST

## 2023-08-02 PROCEDURE — 3079F DIAST BP 80-89 MM HG: CPT | Mod: CPTII,,, | Performed by: PODIATRIST

## 2023-08-02 PROCEDURE — 99999 PR PBB SHADOW E&M-EST. PATIENT-LVL IV: ICD-10-PCS | Mod: PBBFAC,,, | Performed by: PODIATRIST

## 2023-08-02 PROCEDURE — 4010F PR ACE/ARB THEARPY RXD/TAKEN: ICD-10-PCS | Mod: CPTII,,, | Performed by: PODIATRIST

## 2023-08-02 PROCEDURE — 3077F PR MOST RECENT SYSTOLIC BLOOD PRESSURE >= 140 MM HG: ICD-10-PCS | Mod: CPTII,,, | Performed by: PODIATRIST

## 2023-08-02 NOTE — PROGRESS NOTES
Subjective:      Patient ID: Dave Good is a 57 y.o. male.    Chief Complaint: Foot Ulcer    History ulceration both feet.  Plantar right foot wound reopened last visit.  Right football CDI  Unknown onset discovered at visit-no change.  Debridement, offloading, football dressing close the wound today.  Denies fever chills night sweats nausea vomiting.  Patient has taken no steps to fill diabetic shoes custom offload inserts.    Review of Systems   Constitutional: Negative for chills, diaphoresis, fever, malaise/fatigue and night sweats.   Cardiovascular:  Positive for leg swelling. Negative for claudication, cyanosis and syncope.   Skin:  Positive for poor wound healing. Negative for color change, dry skin, nail changes, rash, suspicious lesions and unusual hair distribution.   Musculoskeletal:  Negative for falls, joint pain, joint swelling, muscle cramps, muscle weakness and stiffness.   Gastrointestinal:  Negative for constipation, diarrhea, nausea and vomiting.   Neurological:  Positive for numbness, paresthesias and sensory change. Negative for brief paralysis, disturbances in coordination, focal weakness and tremors.           Objective:      Physical Exam  Constitutional:       General: He is not in acute distress.     Appearance: He is well-developed. He is not diaphoretic.   Cardiovascular:      Pulses:           Popliteal pulses are 2+ on the right side and 2+ on the left side.        Dorsalis pedis pulses are 2+ on the right side and 2+ on the left side.        Posterior tibial pulses are 2+ on the right side and 2+ on the left side.      Comments: Capillary refill 3 seconds all toes/distal feet, all toes/both feet warm to touch.      Negative lymphadenopathy bilateral popliteal fossa and tarsal tunnel.      Less than 2+ pitting lower extremity edema bilateral.    Musculoskeletal:      Right ankle: No swelling, deformity, ecchymosis or lacerations. Normal range of motion. Normal pulse.      Right  Achilles Tendon: Normal. No defects. Gonzáles's test negative.      Comments: Pumps ray amputations 1 2 right.   Lymphadenopathy:      Lower Body: No right inguinal adenopathy. No left inguinal adenopathy.      Comments: Negative lymphadenopathy bilateral popliteal fossa and tarsal tunnel.    Negative lymphangitic streaking bilateral feet/ankles/legs.   Skin:     General: Skin is warm and dry.      Capillary Refill: Capillary refill takes 2 to 3 seconds.      Coloration: Skin is not pale.      Findings: No abrasion, bruising, burn, ecchymosis, erythema, laceration, lesion or rash.      Nails: There is no clubbing.      Comments: Wound plantar right forefoot closed today, epithelialized  without ulceration, drainage, pus, tracking, fluctuance, malodor, or cardinal signs infection.       Wound left forefoot remains closed    Generalized thick hypertrophic keratosis remains in plantar surfaces of both feet.  He is improved in quality (softer) with urea cream he has been using.   Neurological:      Mental Status: He is alert and oriented to person, place, and time.      Sensory: No sensory deficit.      Motor: No tremor, atrophy or abnormal muscle tone.      Gait: Gait normal.      Comments: Diminished/loss of protective sensation all toes bilateral to 10 gram monofilament.  Keratosis   Psychiatric:         Behavior: Behavior is cooperative.             Assessment:       No diagnosis found.        Plan:       There are no diagnoses linked to this encounter.    I counseled the patient on his conditions, their implications and medical management.    I would a lizy discussion with this patient that without diabetic shoes and successful offloading he will need further amputation to create the appropriate weight-bearing parabola avoid constant ulceration ultimate risk of sepsis death or leg loss.  He verbalizes understanding.      Like to get him to wound care center [facility] where they can better offloading with total  contact casting until he is able to get diabetic shoe gear.      Inspect feet multiple times daily for signs of occurrence/recurrence ulceration.        Continue minimal ambulation surgical shoes bilateral.      Adequate vitamin supplementation, protein intake, and hydration - discussed with patient.            No follow-ups on file.

## 2023-08-27 ENCOUNTER — HOSPITAL ENCOUNTER (EMERGENCY)
Facility: OTHER | Age: 58
Discharge: HOME | End: 2023-08-27
Attending: EMERGENCY MEDICINE
Payer: MEDICAID

## 2023-08-27 VITALS
RESPIRATION RATE: 18 BRPM | DIASTOLIC BLOOD PRESSURE: 74 MMHG | TEMPERATURE: 99 F | SYSTOLIC BLOOD PRESSURE: 122 MMHG | HEART RATE: 93 BPM | OXYGEN SATURATION: 96 %

## 2023-08-27 DIAGNOSIS — M86.9 TOE OSTEOMYELITIS, LEFT: ICD-10-CM

## 2023-08-27 DIAGNOSIS — M79.675 TOE PAIN, LEFT: Primary | ICD-10-CM

## 2023-08-27 PROCEDURE — 99283 EMERGENCY DEPT VISIT LOW MDM: CPT

## 2023-08-27 PROCEDURE — 25000003 PHARM REV CODE 250: Performed by: EMERGENCY MEDICINE

## 2023-08-27 RX ORDER — HYDROCODONE BITARTRATE AND ACETAMINOPHEN 5; 325 MG/1; MG/1
1 TABLET ORAL
Status: COMPLETED | OUTPATIENT
Start: 2023-08-27 | End: 2023-08-27

## 2023-08-27 RX ADMIN — BACITRACIN ZINC, NEOMYCIN, POLYMYXIN B 1 EACH: 400; 3.5; 5 OINTMENT TOPICAL at 11:08

## 2023-08-27 RX ADMIN — HYDROCODONE BITARTRATE AND ACETAMINOPHEN 1 TABLET: 5; 325 TABLET ORAL at 09:08

## 2023-08-27 NOTE — ED NOTES
"Pt in er 17 via ems stretcher with c.o left great toe pain s.p stomping toe this AM. EMS reports pt has chronic left leg swelling. Pt states left leg started swelling Friday and comes and goes. Pt states he is suppose to take "fluid pill" but has been out.  Hx of dm  AAO x 3 nand +swelling to left lower leg.  Pt has abrasion/blister on bottom of left foot. Pt has area on left great toe that appears to be where he stomped it.  Dried blood present. No active bleeding.   "

## 2023-08-27 NOTE — ED PROVIDER NOTES
Encounter Date: 8/27/2023    SCRIBE #1 NOTE: ILisbet, am scribing for, and in the presence of,  Trista Hollingsworth MD.       History     Chief Complaint   Patient presents with    Toe Pain     Pt brought in by NO EMS with c.o  left great toe pain onset 1 hour after walking and stomped his toe.  AAO x 3 nadn.  EMS reports has chronic left leg swelling      57-year-old male with a PMHx of type 2 diabetes presents with a complaint of left great toe pain after injuring it while walking.  He states that he got out of bed this morning and stubbed the toe on the floor.  He did not fall.  He also reports chronic swelling in his legs, with the left worse than the right. Per his medical record, his last tetanus shot was in November of 2019. He follows with podiatrist Dr. Toussaint.     The history is provided by the patient.     Review of patient's allergies indicates:   Allergen Reactions    Ibuprofen Swelling     Facial swelling     Past Medical History:   Diagnosis Date    COPD (chronic obstructive pulmonary disease)     COVID-19     Depression     Diabetes mellitus     Diabetes mellitus, type 2     Hypertension      Past Surgical History:   Procedure Laterality Date    DEBRIDEMENT OF LOWER EXTREMITY Bilateral 3/2/2022    Procedure: DEBRIDEMENT, LOWER EXTREMITY;  Surgeon: Jesus Flores Jr., MD;  Location: [facility];  Service: General;  Laterality: Bilateral;    FOOT AMPUTATION THROUGH METATARSAL Right 9/23/2021    Procedure: AMPUTATION, FOOT, TRANSMETATARSAL right 1st ray resection;  Surgeon: Samuel Toussaint DPM;  Location: [facility] OR;  Service: Podiatry;  Laterality: Right;    INCISION AND DRAINAGE FOOT Bilateral 9/21/2021    Procedure: INCISION AND DRAINAGE, FOOT - Bilateral;  Surgeon: Lavonne Vigil DPM;  Location: [facility] OR;  Service: Podiatry;  Laterality: Bilateral;    REPLACEMENT OF WOUND DRESSING Right 2/24/2022    Procedure: REPLACEMENT, DRESSING, WOUND;  Surgeon: Jesus Flores Jr., MD;  Location: [facility];  Service:  Vascular;  Laterality: Right;  wound vac dressing changed    WOUND DEBRIDEMENT Right 2/22/2022    Procedure: DEBRIDEMENT, WOUND - RIGHT FOOT;  Surgeon: Jesus Flores Jr., MD;  Location: [facility] OR;  Service: Vascular;  Laterality: Right;    WOUND DEBRIDEMENT Bilateral 2/24/2022    Procedure: DEBRIDEMENT, WOUND / BILATERAL DEBRIDEMENT FEET;  Surgeon: Jesus Flores Jr., MD;  Location: [facility] OR;  Service: Vascular;  Laterality: Bilateral;    WOUND DEBRIDEMENT Right 5/27/2022    Procedure: DEBRIDEMENT, WOUND;  Surgeon: Jesus Flores Jr., MD;  Location: [facility] OR;  Service: General;  Laterality: Right;     No family history on file.  Social History     Tobacco Use    Smoking status: Former    Smokeless tobacco: Never   Substance Use Topics    Alcohol use: Yes     Alcohol/week: 3.0 standard drinks of alcohol     Types: 3 Drinks containing 0.5 oz of alcohol per week     Comment: whiskey and beer every other day    Drug use: Not Currently     Types: Marijuana     Review of Systems   Constitutional:  Negative for chills and fever.   HENT:  Negative for congestion and sore throat.    Eyes:  Negative for visual disturbance.   Respiratory:  Negative for cough and shortness of breath.    Cardiovascular:  Positive for leg swelling. Negative for chest pain and palpitations.   Gastrointestinal:  Negative for abdominal pain, diarrhea and vomiting.   Genitourinary:  Negative for decreased urine volume, dysuria and frequency.   Musculoskeletal:  Positive for arthralgias (Left great toe). Negative for joint swelling, neck pain and neck stiffness.   Skin:  Positive for wound. Negative for rash.   Neurological:  Negative for weakness, numbness and headaches.   Psychiatric/Behavioral:  Negative for behavioral problems and confusion.        Physical Exam     Initial Vitals [08/27/23 0829]   BP Pulse Resp Temp SpO2   122/74 93 20 98.6 °F (37 °C) 96 %      MAP       --         Physical Exam    Constitutional: He appears well-developed and  well-nourished.   HENT:   Head: Normocephalic and atraumatic.   Nose: Nose normal.   Mouth/Throat: Oropharynx is clear and moist.   Eyes: Conjunctivae are normal.   Neck: Neck supple.   Normal range of motion.  Cardiovascular:  Normal rate and regular rhythm.     Exam reveals no gallop and no friction rub.       No murmur heard.  Pulmonary/Chest: Breath sounds normal. No respiratory distress. He has no wheezes. He has no rales.   Musculoskeletal:         General: Edema present. No tenderness.      Cervical back: Normal range of motion and neck supple.      Comments: 1+ pitting edema left leg. None on right.      Neurological: He is alert and oriented to person, place, and time. He has normal strength. Gait normal. GCS score is 15. GCS eye subscore is 4. GCS verbal subscore is 5. GCS motor subscore is 6.   Skin: Skin is warm and dry. No rash noted.   Right foot is in a [employer] shoe.  Left foot with onychomycosis and onycholysis of toenails. Small superficial open wound at nail bed of left great toe, 3 mm by 2 mm at proximal dorsal IP joint. Large callous on plantar MTP surface of left foot.   Psychiatric: He has a normal mood and affect. His speech is normal and behavior is normal.         ED Course   Procedures  Labs Reviewed - No data to display       Imaging Results              X-Ray Toe 2 or More Views Left (Final result)  Result time 08/27/23 09:22:32      Final result by Aline Bacon MD (08/27/23 09:22:32)                   Impression:      Severe osseous erosion distal phalanx of the 1st toe concerning for osteomyelitis      Electronically signed by: Aline Bacon MD  Date:    08/27/2023  Time:    09:22               Narrative:    EXAMINATION:  XR TOE 2 OR MORE VIEWS LEFT    CLINICAL HISTORY:  toe inj;    TECHNIQUE:  Three views of the left toes were performed    COMPARISON:  06/28/2023    FINDINGS:  Severe erosion of the distal phalanx of the 1st toe concerning for osteomyelitis.  The  interphalangeal joint is not definitely involved.  The remainder of the visualized osseous structures and soft tissues appear normal.                                    X-Rays:   Independently Interpreted Readings:   Other Readings:  Chronic changes present, no definite fracture or dislocation or subcutaneous air or foreign body.  Will defer to official radiology reading.      Medications   HYDROcodone-acetaminophen 5-325 mg per tablet 1 tablet (1 tablet Oral Given 8/27/23 0900)   neomycin-bacitracnZn-polymyxnB packet 1 each (1 each Topical (Top) Given 8/27/23 1138)     Medical Decision Making  Emergent evaluation a 57-year-old diabetic male who presents after injuring his left great toe.  He sees podiatry and had has been noted to have osteomyelitis of the toe on prior x-ray from 07/2023.  Discussed the case with Radiology, osteomyelitis seems more advanced.  No fracture from trauma perspective.  Antibiotic ointment and bandage were placed over small wound.  I discussed the case with Podiatry, they recommend no antibiotics or admission, until follow-up outpatient with Infectious Disease as he is had drug-resistant organisms in the past and osteomyelitis is chronic rather than acute.  Consult was placed.  They will follow the patient this week.  He is discharged in good condition and encouraged close follow-up and strict return precautions.    Amount and/or Complexity of Data Reviewed  External Data Reviewed: notes.     Details: Per medical record, his last tetanus shot was in November of 2019.  Radiology: ordered and independent interpretation performed. Decision-making details documented in ED Course.  Discussion of management or test interpretation with external provider(s): Discussed with Radiology, Dr. Bacon.  She provided comparison to prior x-ray which does show progression of great toe osteomyelitis.  Discussed with Podiatry, Dr. Toussaint.  He recommended outpatient follow-up and infectious disease  outpatient appointment.    Risk  OTC drugs.  Prescription drug management.  Decision regarding hospitalization.            Scribe Attestation:   Scribe #1: I performed the above scribed service and the documentation accurately describes the services I performed. I attest to the accuracy of the note.    Physician Attestation for Scribe: I, Trista Hollingsworth, reviewed documentation as scribed in my presence, which is both accurate and complete.      ED Course as of 08/27/23 1210   Sun Aug 27, 2023   0952 Sent secure chat to Podiatry - would like consultation/advice.    I discussed the case with Dr. Bacon, radiology.  She states when compared to prior imaging osteomyelitis is significantly progressed. [AK]      ED Course User Index  [AK] Trista Hollingsworth MD                    Clinical Impression:   Final diagnoses:  [M79.675] Toe pain, left (Primary)  [M86.9] Toe osteomyelitis, left        ED Disposition Condition    Discharge Stable          ED Prescriptions    None       Follow-up Information       Follow up With Specialties Details Why Contact Info    Samuel Toussaint, DPM Podiatry, Wound Care Schedule an appointment as soon as possible for a visit   [address] [place] [id number]  [phone]               Trista Hollingsworth MD  08/27/23 1216

## 2023-08-27 NOTE — DISCHARGE INSTRUCTIONS
Dr. Toussaint will call you tomorrow to schedule close follow-up at his clinic.    A referral was placed for Infectious Disease Clinic, they will call you to schedule.   You have a bone infection in your toe, but nothing is broken today.

## 2023-09-07 ENCOUNTER — HOSPITAL ENCOUNTER (INPATIENT)
Facility: OTHER | Age: 58
LOS: 13 days | Discharge: HOME HEALTH | DRG: 854 | End: 2023-09-20
Attending: INTERNAL MEDICINE | Admitting: HOSPITALIST
Payer: MEDICAID

## 2023-09-07 DIAGNOSIS — I50.9 HEART FAILURE: ICD-10-CM

## 2023-09-07 DIAGNOSIS — E11.52 DIABETIC WET GANGRENE OF THE FOOT: ICD-10-CM

## 2023-09-07 DIAGNOSIS — M86.9 OSTEOMYELITIS: ICD-10-CM

## 2023-09-07 DIAGNOSIS — M86.179 OTHER ACUTE OSTEOMYELITIS, UNSPECIFIED ANKLE AND FOOT: ICD-10-CM

## 2023-09-07 DIAGNOSIS — A41.01 STAPHYLOCOCCUS AUREUS BACTEREMIA WITH SEPSIS: ICD-10-CM

## 2023-09-07 DIAGNOSIS — Z89.412 STATUS POST AMPUTATION OF LEFT GREAT TOE: ICD-10-CM

## 2023-09-07 DIAGNOSIS — Z89.419 STATUS POST AMPUTATION OF GREAT TOE, UNSPECIFIED LATERALITY: ICD-10-CM

## 2023-09-07 DIAGNOSIS — M86.672 CHRONIC OSTEOMYELITIS OF TOE OF LEFT FOOT: ICD-10-CM

## 2023-09-07 DIAGNOSIS — E11.51 TYPE 2 DIABETES MELLITUS WITH DIABETIC PERIPHERAL ANGIOPATHY WITHOUT GANGRENE, WITH LONG-TERM CURRENT USE OF INSULIN: ICD-10-CM

## 2023-09-07 DIAGNOSIS — S91.101A OPEN WOUND OF RIGHT GREAT TOE, INITIAL ENCOUNTER: ICD-10-CM

## 2023-09-07 DIAGNOSIS — M79.675 GREAT TOE PAIN, LEFT: Primary | ICD-10-CM

## 2023-09-07 DIAGNOSIS — Z79.4 TYPE 2 DIABETES MELLITUS WITH DIABETIC PERIPHERAL ANGIOPATHY WITHOUT GANGRENE, WITH LONG-TERM CURRENT USE OF INSULIN: ICD-10-CM

## 2023-09-07 DIAGNOSIS — S91.301D OPEN WOUND OF RIGHT FOOT, SUBSEQUENT ENCOUNTER: ICD-10-CM

## 2023-09-07 DIAGNOSIS — L97.522 ULCERATED, FOOT, LEFT, WITH FAT LAYER EXPOSED: ICD-10-CM

## 2023-09-07 LAB
BASOPHILS # BLD AUTO: 0.05 K/UL (ref 0–0.2)
BASOPHILS NFR BLD: 0.2 % (ref 0–1.9)
DIFFERENTIAL METHOD: ABNORMAL
EOSINOPHIL # BLD AUTO: 0.3 K/UL (ref 0–0.5)
EOSINOPHIL NFR BLD: 1.2 % (ref 0–8)
ERYTHROCYTE [DISTWIDTH] IN BLOOD BY AUTOMATED COUNT: 16.1 % (ref 11.5–14.5)
HCT VFR BLD AUTO: 32.7 % (ref 40–54)
HGB BLD-MCNC: 10 G/DL (ref 14–18)
IMM GRANULOCYTES # BLD AUTO: 0.15 K/UL (ref 0–0.04)
IMM GRANULOCYTES NFR BLD AUTO: 0.7 % (ref 0–0.5)
LYMPHOCYTES # BLD AUTO: 2 K/UL (ref 1–4.8)
LYMPHOCYTES NFR BLD: 9 % (ref 18–48)
MCH RBC QN AUTO: 24.9 PG (ref 27–31)
MCHC RBC AUTO-ENTMCNC: 30.6 G/DL (ref 32–36)
MCV RBC AUTO: 82 FL (ref 82–98)
MONOCYTES # BLD AUTO: 1.6 K/UL (ref 0.3–1)
MONOCYTES NFR BLD: 7.4 % (ref 4–15)
NEUTROPHILS # BLD AUTO: 17.8 K/UL (ref 1.8–7.7)
NEUTROPHILS NFR BLD: 81.5 % (ref 38–73)
NRBC BLD-RTO: 0 /100 WBC
PLATELET # BLD AUTO: 745 K/UL (ref 150–450)
PMV BLD AUTO: 9.1 FL (ref 9.2–12.9)
POCT GLUCOSE: 172 MG/DL (ref 70–110)
RBC # BLD AUTO: 4.01 M/UL (ref 4.6–6.2)
WBC # BLD AUTO: 21.91 K/UL (ref 3.9–12.7)

## 2023-09-07 PROCEDURE — 85025 COMPLETE CBC W/AUTO DIFF WBC: CPT | Performed by: INTERNAL MEDICINE

## 2023-09-07 PROCEDURE — 80053 COMPREHEN METABOLIC PANEL: CPT | Performed by: INTERNAL MEDICINE

## 2023-09-07 PROCEDURE — 87154 CUL TYP ID BLD PTHGN 6+ TRGT: CPT | Performed by: INTERNAL MEDICINE

## 2023-09-07 PROCEDURE — 25000003 PHARM REV CODE 250: Performed by: INTERNAL MEDICINE

## 2023-09-07 PROCEDURE — 12000002 HC ACUTE/MED SURGE SEMI-PRIVATE ROOM

## 2023-09-07 PROCEDURE — 87077 CULTURE AEROBIC IDENTIFY: CPT | Performed by: INTERNAL MEDICINE

## 2023-09-07 PROCEDURE — 82962 GLUCOSE BLOOD TEST: CPT

## 2023-09-07 PROCEDURE — 87186 SC STD MICRODIL/AGAR DIL: CPT | Performed by: INTERNAL MEDICINE

## 2023-09-07 PROCEDURE — 87040 BLOOD CULTURE FOR BACTERIA: CPT | Mod: 59 | Performed by: INTERNAL MEDICINE

## 2023-09-07 PROCEDURE — 99285 EMERGENCY DEPT VISIT HI MDM: CPT

## 2023-09-07 RX ORDER — ACETAMINOPHEN 500 MG
1000 TABLET ORAL
Status: COMPLETED | OUTPATIENT
Start: 2023-09-07 | End: 2023-09-07

## 2023-09-07 RX ADMIN — ACETAMINOPHEN 1000 MG: 500 TABLET ORAL at 10:09

## 2023-09-07 NOTE — Clinical Note
Diagnosis: Osteomyelitis [316108]   Future Attending Provider: SANJIV KAY [[id number]]   Admitting Provider:: SANJIV KAY [[id number]]

## 2023-09-08 ENCOUNTER — ANESTHESIA (OUTPATIENT)
Dept: SURGERY | Facility: OTHER | Age: 58
DRG: 854 | End: 2023-09-08
Payer: MEDICAID

## 2023-09-08 ENCOUNTER — ANESTHESIA EVENT (OUTPATIENT)
Dept: SURGERY | Facility: OTHER | Age: 58
DRG: 854 | End: 2023-09-08
Payer: MEDICAID

## 2023-09-08 PROBLEM — M86.672 CHRONIC OSTEOMYELITIS OF TOE OF LEFT FOOT: Status: ACTIVE | Noted: 2023-09-08

## 2023-09-08 PROBLEM — M86.9 OSTEOMYELITIS: Status: ACTIVE | Noted: 2023-09-08

## 2023-09-08 LAB
ALBUMIN SERPL BCP-MCNC: 1.2 G/DL (ref 3.5–5.2)
ALBUMIN SERPL BCP-MCNC: 1.3 G/DL (ref 3.5–5.2)
ALP SERPL-CCNC: 78 U/L (ref 55–135)
ALP SERPL-CCNC: 82 U/L (ref 55–135)
ALT SERPL W/O P-5'-P-CCNC: 15 U/L (ref 10–44)
ALT SERPL W/O P-5'-P-CCNC: 17 U/L (ref 10–44)
ANION GAP SERPL CALC-SCNC: 7 MMOL/L (ref 8–16)
ANION GAP SERPL CALC-SCNC: 9 MMOL/L (ref 8–16)
AST SERPL-CCNC: 24 U/L (ref 10–40)
AST SERPL-CCNC: 25 U/L (ref 10–40)
AV INDEX (PROSTH): 0.81
AV MEAN GRADIENT: 5 MMHG
AV PEAK GRADIENT: 7 MMHG
AV VALVE AREA BY VELOCITY RATIO: 3.2 CM²
AV VALVE AREA: 3.26 CM²
AV VELOCITY RATIO: 0.79
BASOPHILS # BLD AUTO: 0.06 K/UL (ref 0–0.2)
BASOPHILS NFR BLD: 0.3 % (ref 0–1.9)
BILIRUB SERPL-MCNC: 0.3 MG/DL (ref 0.1–1)
BILIRUB SERPL-MCNC: 0.4 MG/DL (ref 0.1–1)
BNP SERPL-MCNC: 22 PG/ML (ref 0–99)
BSA FOR ECHO PROCEDURE: 2.47 M2
BUN SERPL-MCNC: 15 MG/DL (ref 6–20)
BUN SERPL-MCNC: 17 MG/DL (ref 6–20)
CALCIUM SERPL-MCNC: 9.1 MG/DL (ref 8.7–10.5)
CALCIUM SERPL-MCNC: 9.1 MG/DL (ref 8.7–10.5)
CHLORIDE SERPL-SCNC: 96 MMOL/L (ref 95–110)
CHLORIDE SERPL-SCNC: 97 MMOL/L (ref 95–110)
CO2 SERPL-SCNC: 29 MMOL/L (ref 23–29)
CO2 SERPL-SCNC: 30 MMOL/L (ref 23–29)
CREAT SERPL-MCNC: 1 MG/DL (ref 0.5–1.4)
CREAT SERPL-MCNC: 1.1 MG/DL (ref 0.5–1.4)
CV ECHO LV RWT: 0.32 CM
DIFFERENTIAL METHOD: ABNORMAL
DOP CALC AO PEAK VEL: 1.34 M/S
DOP CALC AO VTI: 23.6 CM
DOP CALC LVOT AREA: 4 CM2
DOP CALC LVOT DIAMETER: 2.27 CM
DOP CALC LVOT PEAK VEL: 1.06 M/S
DOP CALC LVOT STROKE VOLUME: 76.86 CM3
DOP CALCLVOT PEAK VEL VTI: 19 CM
E WAVE DECELERATION TIME: 174.31 MSEC
E/A RATIO: 0.86
E/E' RATIO: 11.38 M/S
ECHO LV POSTERIOR WALL: 0.89 CM (ref 0.6–1.1)
EJECTION FRACTION: 45 %
EOSINOPHIL # BLD AUTO: 0.3 K/UL (ref 0–0.5)
EOSINOPHIL NFR BLD: 1.6 % (ref 0–8)
ERYTHROCYTE [DISTWIDTH] IN BLOOD BY AUTOMATED COUNT: 16.3 % (ref 11.5–14.5)
EST. GFR  (NO RACE VARIABLE): >60 ML/MIN/1.73 M^2
EST. GFR  (NO RACE VARIABLE): >60 ML/MIN/1.73 M^2
ESTIMATED AVG GLUCOSE: 169 MG/DL (ref 68–131)
FRACTIONAL SHORTENING: 35 % (ref 28–44)
GLUCOSE SERPL-MCNC: 205 MG/DL (ref 70–110)
GLUCOSE SERPL-MCNC: 209 MG/DL (ref 70–110)
HBA1C MFR BLD: 7.5 % (ref 4–5.6)
HCT VFR BLD AUTO: 30.7 % (ref 40–54)
HGB BLD-MCNC: 9.3 G/DL (ref 14–18)
IMM GRANULOCYTES # BLD AUTO: 0.15 K/UL (ref 0–0.04)
IMM GRANULOCYTES NFR BLD AUTO: 0.8 % (ref 0–0.5)
INTERVENTRICULAR SEPTUM: 0.85 CM (ref 0.6–1.1)
IVC DIAMETER: 1.37 CM
IVRT: 68.51 MSEC
LA MAJOR: 5.34 CM
LA MINOR: 4.5 CM
LA WIDTH: 3.5 CM
LEFT ATRIUM SIZE: 3.23 CM
LEFT ATRIUM VOLUME INDEX: 19.4 ML/M2
LEFT ATRIUM VOLUME: 46.93 CM3
LEFT INTERNAL DIMENSION IN SYSTOLE: 3.58 CM (ref 2.1–4)
LEFT VENTRICLE DIASTOLIC VOLUME INDEX: 61.7 ML/M2
LEFT VENTRICLE DIASTOLIC VOLUME: 149.31 ML
LEFT VENTRICLE MASS INDEX: 74 G/M2
LEFT VENTRICLE SYSTOLIC VOLUME INDEX: 22.1 ML/M2
LEFT VENTRICLE SYSTOLIC VOLUME: 53.57 ML
LEFT VENTRICULAR INTERNAL DIMENSION IN DIASTOLE: 5.53 CM (ref 3.5–6)
LEFT VENTRICULAR MASS: 179.59 G
LV LATERAL E/E' RATIO: 18.5 M/S
LV SEPTAL E/E' RATIO: 8.22 M/S
LVOT MG: 2.89 MMHG
LVOT MV: 0.8 CM/S
LYMPHOCYTES # BLD AUTO: 2.5 K/UL (ref 1–4.8)
LYMPHOCYTES NFR BLD: 13 % (ref 18–48)
MAGNESIUM SERPL-MCNC: 1.5 MG/DL (ref 1.6–2.6)
MCH RBC QN AUTO: 24.7 PG (ref 27–31)
MCHC RBC AUTO-ENTMCNC: 30.3 G/DL (ref 32–36)
MCV RBC AUTO: 81 FL (ref 82–98)
MONOCYTES # BLD AUTO: 1.7 K/UL (ref 0.3–1)
MONOCYTES NFR BLD: 9 % (ref 4–15)
MV PEAK A VEL: 0.86 M/S
MV PEAK E VEL: 0.74 M/S
MV STENOSIS PRESSURE HALF TIME: 50.55 MS
MV VALVE AREA P 1/2 METHOD: 4.35 CM2
NEUTROPHILS # BLD AUTO: 14.2 K/UL (ref 1.8–7.7)
NEUTROPHILS NFR BLD: 75.3 % (ref 38–73)
NRBC BLD-RTO: 0 /100 WBC
PHOSPHATE SERPL-MCNC: 3.5 MG/DL (ref 2.7–4.5)
PISA TR MAX VEL: 2.18 M/S
PLATELET # BLD AUTO: 670 K/UL (ref 150–450)
PMV BLD AUTO: 9.1 FL (ref 9.2–12.9)
POCT GLUCOSE: 125 MG/DL (ref 70–110)
POCT GLUCOSE: 151 MG/DL (ref 70–110)
POCT GLUCOSE: 160 MG/DL (ref 70–110)
POCT GLUCOSE: 203 MG/DL (ref 70–110)
POCT GLUCOSE: 209 MG/DL (ref 70–110)
POTASSIUM SERPL-SCNC: 3.4 MMOL/L (ref 3.5–5.1)
POTASSIUM SERPL-SCNC: 3.7 MMOL/L (ref 3.5–5.1)
PROT SERPL-MCNC: 8.7 G/DL (ref 6–8.4)
PROT SERPL-MCNC: 9 G/DL (ref 6–8.4)
PULM VEIN S/D RATIO: 1.78
PV PEAK D VEL: 0.23 M/S
PV PEAK GRADIENT: 4 MMHG
PV PEAK S VEL: 0.41 M/S
PV PEAK VELOCITY: 1.01 M/S
RA MAJOR: 4.72 CM
RBC # BLD AUTO: 3.77 M/UL (ref 4.6–6.2)
SODIUM SERPL-SCNC: 134 MMOL/L (ref 136–145)
SODIUM SERPL-SCNC: 134 MMOL/L (ref 136–145)
TDI LATERAL: 0.04 M/S
TDI SEPTAL: 0.09 M/S
TDI: 0.07 M/S
TR MAX PG: 19 MMHG
WBC # BLD AUTO: 18.89 K/UL (ref 3.9–12.7)
Z-SCORE OF LEFT VENTRICULAR DIMENSION IN END DIASTOLE: -7.27
Z-SCORE OF LEFT VENTRICULAR DIMENSION IN END SYSTOLE: -5.12

## 2023-09-08 PROCEDURE — 87205 SMEAR GRAM STAIN: CPT | Performed by: PODIATRIST

## 2023-09-08 PROCEDURE — 87206 SMEAR FLUORESCENT/ACID STAI: CPT | Performed by: PODIATRIST

## 2023-09-08 PROCEDURE — 36000706: Performed by: PODIATRIST

## 2023-09-08 PROCEDURE — 25000003 PHARM REV CODE 250: Performed by: HOSPITALIST

## 2023-09-08 PROCEDURE — 11043 PR DEBRIDEMENT, SKIN, SUB-Q TISSUE,MUSCLE,=<20 SQ CM: ICD-10-PCS | Mod: 51,,, | Performed by: PODIATRIST

## 2023-09-08 PROCEDURE — 11000001 HC ACUTE MED/SURG PRIVATE ROOM

## 2023-09-08 PROCEDURE — 88305 TISSUE EXAM BY PATHOLOGIST: CPT | Performed by: STUDENT IN AN ORGANIZED HEALTH CARE EDUCATION/TRAINING PROGRAM

## 2023-09-08 PROCEDURE — D9220A PRA ANESTHESIA: Mod: ANES,,, | Performed by: ANESTHESIOLOGY

## 2023-09-08 PROCEDURE — 28820 AMPUTATION OF TOE: CPT | Mod: TA,,, | Performed by: PODIATRIST

## 2023-09-08 PROCEDURE — 87186 SC STD MICRODIL/AGAR DIL: CPT | Performed by: PODIATRIST

## 2023-09-08 PROCEDURE — 80053 COMPREHEN METABOLIC PANEL: CPT | Performed by: NURSE PRACTITIONER

## 2023-09-08 PROCEDURE — 36000707: Performed by: PODIATRIST

## 2023-09-08 PROCEDURE — 71000039 HC RECOVERY, EACH ADD'L HOUR: Performed by: PODIATRIST

## 2023-09-08 PROCEDURE — 63600175 PHARM REV CODE 636 W HCPCS: Performed by: PODIATRIST

## 2023-09-08 PROCEDURE — 99223 1ST HOSP IP/OBS HIGH 75: CPT | Mod: ,,, | Performed by: NURSE PRACTITIONER

## 2023-09-08 PROCEDURE — 27000221 HC OXYGEN, UP TO 24 HOURS

## 2023-09-08 PROCEDURE — 87102 FUNGUS ISOLATION CULTURE: CPT | Performed by: PODIATRIST

## 2023-09-08 PROCEDURE — 88311 DECALCIFY TISSUE: CPT | Performed by: STUDENT IN AN ORGANIZED HEALTH CARE EDUCATION/TRAINING PROGRAM

## 2023-09-08 PROCEDURE — 83880 ASSAY OF NATRIURETIC PEPTIDE: CPT | Performed by: NURSE PRACTITIONER

## 2023-09-08 PROCEDURE — 88305 TISSUE EXAM BY PATHOLOGIST: CPT | Mod: 26,,, | Performed by: STUDENT IN AN ORGANIZED HEALTH CARE EDUCATION/TRAINING PROGRAM

## 2023-09-08 PROCEDURE — 87015 SPECIMEN INFECT AGNT CONCNTJ: CPT | Performed by: PODIATRIST

## 2023-09-08 PROCEDURE — 36415 COLL VENOUS BLD VENIPUNCTURE: CPT | Performed by: NURSE PRACTITIONER

## 2023-09-08 PROCEDURE — 84100 ASSAY OF PHOSPHORUS: CPT | Performed by: NURSE PRACTITIONER

## 2023-09-08 PROCEDURE — 71000033 HC RECOVERY, INTIAL HOUR: Performed by: PODIATRIST

## 2023-09-08 PROCEDURE — 88311 PR  DECALCIFY TISSUE: ICD-10-PCS | Mod: 26,,, | Performed by: STUDENT IN AN ORGANIZED HEALTH CARE EDUCATION/TRAINING PROGRAM

## 2023-09-08 PROCEDURE — 63600175 PHARM REV CODE 636 W HCPCS: Performed by: NURSE ANESTHETIST, CERTIFIED REGISTERED

## 2023-09-08 PROCEDURE — D9220A PRA ANESTHESIA: Mod: CRNA,,, | Performed by: NURSE ANESTHETIST, CERTIFIED REGISTERED

## 2023-09-08 PROCEDURE — 83036 HEMOGLOBIN GLYCOSYLATED A1C: CPT | Performed by: NURSE PRACTITIONER

## 2023-09-08 PROCEDURE — 88311 DECALCIFY TISSUE: CPT | Mod: 26,,, | Performed by: STUDENT IN AN ORGANIZED HEALTH CARE EDUCATION/TRAINING PROGRAM

## 2023-09-08 PROCEDURE — 25000003 PHARM REV CODE 250: Performed by: ANESTHESIOLOGY

## 2023-09-08 PROCEDURE — 25000003 PHARM REV CODE 250: Performed by: NURSE PRACTITIONER

## 2023-09-08 PROCEDURE — 96372 THER/PROPH/DIAG INJ SC/IM: CPT | Performed by: NURSE PRACTITIONER

## 2023-09-08 PROCEDURE — 99223 PR INITIAL HOSPITAL CARE,LEVL III: ICD-10-PCS | Mod: ,,, | Performed by: INTERNAL MEDICINE

## 2023-09-08 PROCEDURE — 87116 MYCOBACTERIA CULTURE: CPT | Performed by: PODIATRIST

## 2023-09-08 PROCEDURE — 99223 1ST HOSP IP/OBS HIGH 75: CPT | Mod: ,,, | Performed by: INTERNAL MEDICINE

## 2023-09-08 PROCEDURE — 85025 COMPLETE CBC W/AUTO DIFF WBC: CPT | Performed by: NURSE PRACTITIONER

## 2023-09-08 PROCEDURE — 63600175 PHARM REV CODE 636 W HCPCS: Performed by: NURSE PRACTITIONER

## 2023-09-08 PROCEDURE — 87070 CULTURE OTHR SPECIMN AEROBIC: CPT | Performed by: PODIATRIST

## 2023-09-08 PROCEDURE — 37000009 HC ANESTHESIA EA ADD 15 MINS: Performed by: PODIATRIST

## 2023-09-08 PROCEDURE — 87077 CULTURE AEROBIC IDENTIFY: CPT | Performed by: PODIATRIST

## 2023-09-08 PROCEDURE — 36415 COLL VENOUS BLD VENIPUNCTURE: CPT | Performed by: HOSPITALIST

## 2023-09-08 PROCEDURE — 37000008 HC ANESTHESIA 1ST 15 MINUTES: Performed by: PODIATRIST

## 2023-09-08 PROCEDURE — 87075 CULTR BACTERIA EXCEPT BLOOD: CPT | Performed by: PODIATRIST

## 2023-09-08 PROCEDURE — 94799 UNLISTED PULMONARY SVC/PX: CPT

## 2023-09-08 PROCEDURE — 83735 ASSAY OF MAGNESIUM: CPT | Performed by: NURSE PRACTITIONER

## 2023-09-08 PROCEDURE — 87040 BLOOD CULTURE FOR BACTERIA: CPT | Performed by: HOSPITALIST

## 2023-09-08 PROCEDURE — 99223 PR INITIAL HOSPITAL CARE,LEVL III: ICD-10-PCS | Mod: ,,, | Performed by: NURSE PRACTITIONER

## 2023-09-08 PROCEDURE — 94640 AIRWAY INHALATION TREATMENT: CPT

## 2023-09-08 PROCEDURE — 63600175 PHARM REV CODE 636 W HCPCS: Performed by: ANESTHESIOLOGY

## 2023-09-08 PROCEDURE — 25000242 PHARM REV CODE 250 ALT 637 W/ HCPCS: Performed by: HOSPITALIST

## 2023-09-08 PROCEDURE — 94761 N-INVAS EAR/PLS OXIMETRY MLT: CPT

## 2023-09-08 PROCEDURE — 25000003 PHARM REV CODE 250: Performed by: PODIATRIST

## 2023-09-08 PROCEDURE — D9220A PRA ANESTHESIA: ICD-10-PCS | Mod: CRNA,,, | Performed by: NURSE ANESTHETIST, CERTIFIED REGISTERED

## 2023-09-08 PROCEDURE — D9220A PRA ANESTHESIA: ICD-10-PCS | Mod: ANES,,, | Performed by: ANESTHESIOLOGY

## 2023-09-08 PROCEDURE — 11043 DBRDMT MUSC&/FSCA 1ST 20/<: CPT | Mod: 51,,, | Performed by: PODIATRIST

## 2023-09-08 PROCEDURE — 88305 TISSUE EXAM BY PATHOLOGIST: ICD-10-PCS | Mod: 26,,, | Performed by: STUDENT IN AN ORGANIZED HEALTH CARE EDUCATION/TRAINING PROGRAM

## 2023-09-08 PROCEDURE — 99900035 HC TECH TIME PER 15 MIN (STAT)

## 2023-09-08 PROCEDURE — 63600175 PHARM REV CODE 636 W HCPCS: Performed by: HOSPITALIST

## 2023-09-08 PROCEDURE — 28820 PR AMPUTATION TOE,MT-P JT: ICD-10-PCS | Mod: TA,,, | Performed by: PODIATRIST

## 2023-09-08 RX ORDER — ACETAMINOPHEN 325 MG/1
650 TABLET ORAL EVERY 4 HOURS PRN
Status: DISCONTINUED | OUTPATIENT
Start: 2023-09-08 | End: 2023-09-08

## 2023-09-08 RX ORDER — GLUCAGON 1 MG
1 KIT INJECTION
Status: DISCONTINUED | OUTPATIENT
Start: 2023-09-08 | End: 2023-09-20 | Stop reason: HOSPADM

## 2023-09-08 RX ORDER — SODIUM CHLORIDE 0.9 % (FLUSH) 0.9 %
10 SYRINGE (ML) INJECTION EVERY 8 HOURS PRN
Status: DISCONTINUED | OUTPATIENT
Start: 2023-09-08 | End: 2023-09-20 | Stop reason: HOSPADM

## 2023-09-08 RX ORDER — NALOXONE HCL 0.4 MG/ML
0.02 VIAL (ML) INJECTION
Status: DISCONTINUED | OUTPATIENT
Start: 2023-09-08 | End: 2023-09-20 | Stop reason: HOSPADM

## 2023-09-08 RX ORDER — FUROSEMIDE 10 MG/ML
40 INJECTION INTRAMUSCULAR; INTRAVENOUS ONCE
Status: COMPLETED | OUTPATIENT
Start: 2023-09-08 | End: 2023-09-08

## 2023-09-08 RX ORDER — LIDOCAINE HYDROCHLORIDE 10 MG/ML
INJECTION, SOLUTION EPIDURAL; INFILTRATION; INTRACAUDAL; PERINEURAL
Status: DISCONTINUED | OUTPATIENT
Start: 2023-09-08 | End: 2023-09-08 | Stop reason: HOSPADM

## 2023-09-08 RX ORDER — BUPIVACAINE HYDROCHLORIDE 5 MG/ML
INJECTION, SOLUTION EPIDURAL; INTRACAUDAL
Status: DISCONTINUED | OUTPATIENT
Start: 2023-09-08 | End: 2023-09-08 | Stop reason: HOSPADM

## 2023-09-08 RX ORDER — MEPERIDINE HYDROCHLORIDE 25 MG/ML
12.5 INJECTION INTRAMUSCULAR; INTRAVENOUS; SUBCUTANEOUS ONCE AS NEEDED
Status: DISCONTINUED | OUTPATIENT
Start: 2023-09-08 | End: 2023-09-09

## 2023-09-08 RX ORDER — POTASSIUM CHLORIDE 20 MEQ/1
40 TABLET, EXTENDED RELEASE ORAL ONCE
Status: COMPLETED | OUTPATIENT
Start: 2023-09-08 | End: 2023-09-08

## 2023-09-08 RX ORDER — OXYCODONE HYDROCHLORIDE 5 MG/1
5 TABLET ORAL EVERY 4 HOURS PRN
Status: DISCONTINUED | OUTPATIENT
Start: 2023-09-08 | End: 2023-09-20 | Stop reason: HOSPADM

## 2023-09-08 RX ORDER — ONDANSETRON 2 MG/ML
4 INJECTION INTRAMUSCULAR; INTRAVENOUS DAILY PRN
Status: DISCONTINUED | OUTPATIENT
Start: 2023-09-08 | End: 2023-09-09

## 2023-09-08 RX ORDER — SODIUM CHLORIDE 9 MG/ML
INJECTION, SOLUTION INTRAVENOUS CONTINUOUS
Status: DISCONTINUED | OUTPATIENT
Start: 2023-09-08 | End: 2023-09-08

## 2023-09-08 RX ORDER — ONDANSETRON 2 MG/ML
4 INJECTION INTRAMUSCULAR; INTRAVENOUS EVERY 8 HOURS PRN
Status: DISCONTINUED | OUTPATIENT
Start: 2023-09-08 | End: 2023-09-20 | Stop reason: HOSPADM

## 2023-09-08 RX ORDER — HYDROMORPHONE HYDROCHLORIDE 2 MG/ML
0.4 INJECTION, SOLUTION INTRAMUSCULAR; INTRAVENOUS; SUBCUTANEOUS EVERY 5 MIN PRN
Status: DISCONTINUED | OUTPATIENT
Start: 2023-09-08 | End: 2023-09-09

## 2023-09-08 RX ORDER — SODIUM CHLORIDE 0.9 % (FLUSH) 0.9 %
3 SYRINGE (ML) INJECTION
Status: DISCONTINUED | OUTPATIENT
Start: 2023-09-08 | End: 2023-09-09

## 2023-09-08 RX ORDER — SODIUM CHLORIDE 0.9 % (FLUSH) 0.9 %
10 SYRINGE (ML) INJECTION
Status: CANCELLED | OUTPATIENT
Start: 2023-09-08

## 2023-09-08 RX ORDER — HYDROMORPHONE HYDROCHLORIDE 1 MG/ML
0.5 INJECTION, SOLUTION INTRAMUSCULAR; INTRAVENOUS; SUBCUTANEOUS EVERY 4 HOURS PRN
Status: DISCONTINUED | OUTPATIENT
Start: 2023-09-08 | End: 2023-09-20 | Stop reason: HOSPADM

## 2023-09-08 RX ORDER — PROPOFOL 10 MG/ML
VIAL (ML) INTRAVENOUS
Status: DISCONTINUED | OUTPATIENT
Start: 2023-09-08 | End: 2023-09-08

## 2023-09-08 RX ORDER — LISINOPRIL 10 MG/1
10 TABLET ORAL DAILY
Status: DISCONTINUED | OUTPATIENT
Start: 2023-09-08 | End: 2023-09-08

## 2023-09-08 RX ORDER — HYDROCODONE BITARTRATE AND ACETAMINOPHEN 5; 325 MG/1; MG/1
1 TABLET ORAL EVERY 4 HOURS PRN
Status: DISCONTINUED | OUTPATIENT
Start: 2023-09-08 | End: 2023-09-08

## 2023-09-08 RX ORDER — INSULIN ASPART 100 [IU]/ML
0-10 INJECTION, SOLUTION INTRAVENOUS; SUBCUTANEOUS EVERY 6 HOURS PRN
Status: DISCONTINUED | OUTPATIENT
Start: 2023-09-08 | End: 2023-09-12

## 2023-09-08 RX ORDER — IPRATROPIUM BROMIDE AND ALBUTEROL SULFATE 2.5; .5 MG/3ML; MG/3ML
3 SOLUTION RESPIRATORY (INHALATION) EVERY 4 HOURS
Status: DISCONTINUED | OUTPATIENT
Start: 2023-09-08 | End: 2023-09-15

## 2023-09-08 RX ORDER — MAGNESIUM SULFATE HEPTAHYDRATE 40 MG/ML
2 INJECTION, SOLUTION INTRAVENOUS ONCE
Status: COMPLETED | OUTPATIENT
Start: 2023-09-08 | End: 2023-09-08

## 2023-09-08 RX ORDER — ASPIRIN 81 MG/1
81 TABLET ORAL DAILY
Status: DISCONTINUED | OUTPATIENT
Start: 2023-09-08 | End: 2023-09-20 | Stop reason: HOSPADM

## 2023-09-08 RX ORDER — QUETIAPINE FUMARATE 200 MG/1
200 TABLET, FILM COATED ORAL NIGHTLY
Status: DISCONTINUED | OUTPATIENT
Start: 2023-09-08 | End: 2023-09-20 | Stop reason: HOSPADM

## 2023-09-08 RX ORDER — MUPIROCIN 20 MG/G
OINTMENT TOPICAL 2 TIMES DAILY
Status: COMPLETED | OUTPATIENT
Start: 2023-09-08 | End: 2023-09-13

## 2023-09-08 RX ORDER — OXYCODONE HYDROCHLORIDE 5 MG/1
5 TABLET ORAL
Status: DISCONTINUED | OUTPATIENT
Start: 2023-09-08 | End: 2023-09-09

## 2023-09-08 RX ORDER — GABAPENTIN 300 MG/1
300 CAPSULE ORAL 2 TIMES DAILY
Status: DISCONTINUED | OUTPATIENT
Start: 2023-09-08 | End: 2023-09-20 | Stop reason: HOSPADM

## 2023-09-08 RX ORDER — ACETAMINOPHEN 500 MG
1000 TABLET ORAL EVERY 8 HOURS PRN
Status: DISCONTINUED | OUTPATIENT
Start: 2023-09-08 | End: 2023-09-20 | Stop reason: HOSPADM

## 2023-09-08 RX ORDER — ATORVASTATIN CALCIUM 20 MG/1
40 TABLET, FILM COATED ORAL DAILY
Status: DISCONTINUED | OUTPATIENT
Start: 2023-09-08 | End: 2023-09-20 | Stop reason: HOSPADM

## 2023-09-08 RX ADMIN — POTASSIUM CHLORIDE 40 MEQ: 1500 TABLET, EXTENDED RELEASE ORAL at 09:09

## 2023-09-08 RX ADMIN — QUETIAPINE FUMARATE 200 MG: 200 TABLET ORAL at 03:09

## 2023-09-08 RX ADMIN — HYDROMORPHONE HYDROCHLORIDE 0.4 MG: 2 INJECTION INTRAMUSCULAR; INTRAVENOUS; SUBCUTANEOUS at 06:09

## 2023-09-08 RX ADMIN — MEROPENEM 1 G: 1 INJECTION, POWDER, FOR SOLUTION INTRAVENOUS at 02:09

## 2023-09-08 RX ADMIN — VANCOMYCIN HYDROCHLORIDE 2000 MG: 10 INJECTION, POWDER, LYOPHILIZED, FOR SOLUTION INTRAVENOUS at 03:09

## 2023-09-08 RX ADMIN — HYDROCODONE BITARTRATE AND ACETAMINOPHEN 1 TABLET: 5; 325 TABLET ORAL at 03:09

## 2023-09-08 RX ADMIN — GABAPENTIN 300 MG: 300 CAPSULE ORAL at 11:09

## 2023-09-08 RX ADMIN — OXYCODONE HYDROCHLORIDE 5 MG: 5 TABLET ORAL at 06:09

## 2023-09-08 RX ADMIN — SODIUM CHLORIDE: 9 INJECTION, SOLUTION INTRAVENOUS at 04:09

## 2023-09-08 RX ADMIN — LISINOPRIL 10 MG: 10 TABLET ORAL at 09:09

## 2023-09-08 RX ADMIN — PROPOFOL 20 MG: 10 INJECTION, EMULSION INTRAVENOUS at 05:09

## 2023-09-08 RX ADMIN — QUETIAPINE FUMARATE 200 MG: 200 TABLET ORAL at 10:09

## 2023-09-08 RX ADMIN — PROPOFOL 50 MCG/KG/MIN: 10 INJECTION, EMULSION INTRAVENOUS at 05:09

## 2023-09-08 RX ADMIN — INSULIN DETEMIR 25 UNITS: 100 INJECTION, SOLUTION SUBCUTANEOUS at 04:09

## 2023-09-08 RX ADMIN — ATORVASTATIN CALCIUM 40 MG: 20 TABLET, FILM COATED ORAL at 09:09

## 2023-09-08 RX ADMIN — MUPIROCIN: 20 OINTMENT TOPICAL at 10:09

## 2023-09-08 RX ADMIN — HYDROCODONE BITARTRATE AND ACETAMINOPHEN 1 TABLET: 5; 325 TABLET ORAL at 10:09

## 2023-09-08 RX ADMIN — POTASSIUM CHLORIDE 40 MEQ: 1500 TABLET, EXTENDED RELEASE ORAL at 01:09

## 2023-09-08 RX ADMIN — MEROPENEM 1 G: 1 INJECTION, POWDER, FOR SOLUTION INTRAVENOUS at 10:09

## 2023-09-08 RX ADMIN — MAGNESIUM SULFATE HEPTAHYDRATE 2 G: 40 INJECTION, SOLUTION INTRAVENOUS at 11:09

## 2023-09-08 RX ADMIN — INSULIN DETEMIR 25 UNITS: 100 INJECTION, SOLUTION SUBCUTANEOUS at 10:09

## 2023-09-08 RX ADMIN — SODIUM CHLORIDE, SODIUM LACTATE, POTASSIUM CHLORIDE, AND CALCIUM CHLORIDE: .6; .31; .03; .02 INJECTION, SOLUTION INTRAVENOUS at 05:09

## 2023-09-08 RX ADMIN — INSULIN ASPART 2 UNITS: 100 INJECTION, SOLUTION INTRAVENOUS; SUBCUTANEOUS at 04:09

## 2023-09-08 RX ADMIN — FUROSEMIDE 40 MG: 10 INJECTION, SOLUTION INTRAMUSCULAR; INTRAVENOUS at 01:09

## 2023-09-08 RX ADMIN — ASPIRIN 81 MG: 81 TABLET, COATED ORAL at 09:09

## 2023-09-08 RX ADMIN — GABAPENTIN 300 MG: 300 CAPSULE ORAL at 10:09

## 2023-09-08 RX ADMIN — IPRATROPIUM BROMIDE AND ALBUTEROL SULFATE 3 ML: 2.5; .5 SOLUTION RESPIRATORY (INHALATION) at 04:09

## 2023-09-08 NOTE — ED TRIAGE NOTES
Seen in ED last Sunday after stubbing left great toe. Reports increased pain and swelling today with green drainage and subjective fever. States pain radiates up leg to knee. Presents in no distress.

## 2023-09-08 NOTE — ED PROVIDER NOTES
Encounter date: 9:59 PM 09/08/2023    SCRIBE #1 NOTE: I, Andrey Boateng, am scribing for, and in the presence of,  Brenda Camargo MD. I have scribed the following portions of the note - Other sections scribed: HPI, ROS, PE.       Source of History   Patient    Chief Complaint   Pt presents with:   Toe Pain (Infection and increased swelling to toe after stubbing toe last week, reports being seen, h/o toe amputations and DM)      History Of Present Illness   Dave Good is a 58 y.o. male with type 2 diabetes with chronic osteomyelitis of left great toe who follows with podiatry who presents to the ED with chief complaint of worsening left great toe pain infection and swelling.  Patient states that he was recently seen in the ED. Since then he has been unable to follow-up with podiatry or infectious disease.  He states that he has not tried to call their offices and was waiting for them to call him.  States that his toe pain has gotten worse.  He has been soaking it in a foot massage machine chair.  He developed diarrhea 3 days ago.  He states that he has been eating and drinking.  He denies dysuria.  He denies fever, chills, nausea, vomiting, diarrhea.  He denies chest pain or shortness of breath.    This is the extent to the patients complaints today here in the emergency department.  Past History     Review of patient's allergies indicates:   Allergen Reactions    Ibuprofen Swelling     Facial swelling       Current Facility-Administered Medications on File Prior to Encounter   Medication Dose Route Frequency Provider Last Rate Last Admin    0.9%  NaCl infusion   Intravenous Continuous Jesus Flores Jr., MD        LIDOcaine (PF) 10 mg/ml (1%) injection 10 mg  1 mL Intradermal Once Jesus Flores Jr., MD         Current Outpatient Medications on File Prior to Encounter   Medication Sig Dispense Refill    ammonium lactate 12 % Crea Apply twice daily to affected parts both feet as needed. 140 g 11    apixaban (ELIQUIS)  "5 mg Tab Take 1 tablet (5 mg total) by mouth 2 (two) times daily. 60 tablet 0    aspirin (ECOTRIN) 81 MG EC tablet Take 1 tablet (81 mg total) by mouth once daily. 30 tablet 0    atorvastatin (LIPITOR) 40 MG tablet Take 1 tablet (40 mg total) by mouth once daily. 90 tablet 3    bisacodyL (DULCOLAX) 5 mg EC tablet Take 10 mg by mouth once.      CATHFLO ACTIVASE 2 mg injection SMARTSI Vial(s) injection Once      ciclopirox (PENLAC) 8 % Soln Apply topically nightly. 6.6 mL 11    COVID-19 antigen test (FLOWFLEX COVID-19 AG HOME TEST MISC)       DAPTOmycin (CUBICIN) 350 mg injection Inject into the vein.      DAPTOmycin (CUBICIN) 500 mg injection SMARTSI Milligram(s) IV Daily      dextrose 50 % in water, D50W, Syrg SMARTSI Milliliter(s) IV As Directed PRN      EASY COMFORT PEN NEEDLES 31 gauge x 1/4" Ndle       enoxaparin (LOVENOX) 60 mg/0.6 mL Syrg SMARTSI.2 Milliliter(s) SUB-Q Every 12 Hours      gabapentin (NEURONTIN) 600 MG tablet 1 capsule.      GLUCAGEN DIAGNOSTIC KIT 1 mg/mL SolR SMARTSI Milliliter(s) IM As Directed PRN      GLUTOSE-15 40 % gel SMARTSI.5 Gram(s) By Mouth As Directed PRN      HUMALOG U-100 INSULIN 100 unit/mL injection SMARTSI.01 Milliliter(s) SUB-Q 4 Times Daily      hydroCHLOROthiazide (HYDRODIURIL) 25 MG tablet Take 1 tablet (25 mg total) by mouth once daily. 30 tablet 0    HYDROcodone-acetaminophen (NORCO) 5-325 mg per tablet Take 1 tablet by mouth every 4 (four) hours as needed for Pain. 42 tablet 0    insulin aspart U-100 (NOVOLOG FLEXPEN U-100 INSULIN) 100 unit/mL (3 mL) InPn pen INJECT 10 UNITS INTO THE SKIN BEFORE MEALS THREE TIMES A DAY (50 DAYS) 15 mL 0    insulin detemir U-100 (LEVEMIR FLEXTOUCH U-100 INSULN) 100 unit/mL (3 mL) InPn pen Inject 50 Units into the skin every evening. 15 mL 0    ketoconazole (NIZORAL) 2 % cream Apply topically.      LANTUS SOLOSTAR U-100 INSULIN glargine 100 units/mL SubQ pen Inject into the skin.      lisinopriL 10 MG tablet Take 1 " tablet (10 mg total) by mouth once daily. 30 tablet 0    meropenem (MERREM) 500 mg injection Inject into the vein.      metFORMIN (GLUCOPHAGE) 1000 MG tablet Take 1 tablet (1,000 mg total) by mouth 2 (two) times daily. 60 tablet 0    ondansetron 4 mg/2 mL Soln SMARTSI Milliliter(s) IV Every 8 Hours PRN      ONETOUCH ULTRA TEST Strp       polyethylene glycol (GLYCOLAX) 17 gram/dose powder       QUEtiapine (SEROQUEL) 200 MG Tab Take 1 tablet (200 mg total) by mouth nightly. 30 tablet 0    STOOL SOFTENER 100 mg capsule Take 100 mg by mouth 2 (two) times daily.      TRULICITY 0.75 mg/0.5 mL pen injector Inject into the skin.      urea 45 % Crea Apply 1 application  topically 2 (two) times daily. 255 g 11    [DISCONTINUED] losartan (COZAAR) 25 MG tablet Take 25 mg by mouth 2 (two) times daily.         As per HPI and below:  Past Medical History:   Diagnosis Date    COPD (chronic obstructive pulmonary disease)     COVID-19     Depression     Diabetes mellitus     Diabetes mellitus, type 2     Hypertension      Past Surgical History:   Procedure Laterality Date    DEBRIDEMENT OF LOWER EXTREMITY Bilateral 3/2/2022    Procedure: DEBRIDEMENT, LOWER EXTREMITY;  Surgeon: Jesus Flores Jr., MD;  Location: [facility];  Service: General;  Laterality: Bilateral;    FOOT AMPUTATION THROUGH METATARSAL Right 2021    Procedure: AMPUTATION, FOOT, TRANSMETATARSAL right 1st ray resection;  Surgeon: Samuel Toussaint DPM;  Location: [facility] OR;  Service: Podiatry;  Laterality: Right;    INCISION AND DRAINAGE FOOT Bilateral 2021    Procedure: INCISION AND DRAINAGE, FOOT - Bilateral;  Surgeon: Lavonne Vigil DPM;  Location: [facility] OR;  Service: Podiatry;  Laterality: Bilateral;    REPLACEMENT OF WOUND DRESSING Right 2022    Procedure: REPLACEMENT, DRESSING, WOUND;  Surgeon: Jesus Flores Jr., MD;  Location: [facility];  Service: Vascular;  Laterality: Right;  wound vac dressing changed    WOUND DEBRIDEMENT Right 2022    Procedure:  DEBRIDEMENT, WOUND - RIGHT FOOT;  Surgeon: Jesus Flores Jr., MD;  Location: [facility] OR;  Service: Vascular;  Laterality: Right;    WOUND DEBRIDEMENT Bilateral 2/24/2022    Procedure: DEBRIDEMENT, WOUND / BILATERAL DEBRIDEMENT FEET;  Surgeon: Jesus Flores Jr., MD;  Location: [facility] OR;  Service: Vascular;  Laterality: Bilateral;    WOUND DEBRIDEMENT Right 5/27/2022    Procedure: DEBRIDEMENT, WOUND;  Surgeon: Jesus Flores Jr., MD;  Location: [facility];  Service: General;  Laterality: Right;       Social History     Socioeconomic History    Marital status: Single   Tobacco Use    Smoking status: Former    Smokeless tobacco: Never   Substance and Sexual Activity    Alcohol use: Yes     Alcohol/week: 3.0 standard drinks of alcohol     Types: 3 Drinks containing 0.5 oz of alcohol per week     Comment: whiskey and beer every other day    Drug use: Not Currently     Types: Marijuana    Sexual activity: Yes     Partners: Female     Social Determinants of Health     Financial Resource Strain: Low Risk  (6/16/2022)    Overall Financial Resource Strain (CARDIA)     Difficulty of Paying Living Expenses: Not very hard   Food Insecurity: Food Insecurity Present (6/16/2022)    Hunger Vital Sign     Worried About Running Out of Food in the Last Year: Often true     Ran Out of Food in the Last Year: Often true   Transportation Needs: No Transportation Needs (6/16/2022)    PRAPARE - Transportation     Lack of Transportation (Medical): No     Lack of Transportation (Non-Medical): No   Physical Activity: Inactive (6/16/2022)    Exercise Vital Sign     Days of Exercise per Week: 0 days     Minutes of Exercise per Session: 0 min   Stress: No Stress Concern Present (6/16/2022)    [employer] [place] [employer] - Occupational Stress Questionnaire     Feeling of Stress : Not at all   Social Connections: Moderately Integrated (6/16/2022)    Social Connection and Isolation Panel [NHANES]     Frequency of Communication with Friends and  Family: Twice a week     Frequency of Social Gatherings with Friends and Family: Twice a week     Attends [employer] Services: 1 to 4 times per year     Active Member of Clubs or Organizations: No     Attends Club or Organization Meetings: Never     Marital Status: Living with partner   Housing Stability: Unknown (6/16/2022)    Housing Stability Vital Sign     Unable to Pay for Housing in the Last Year: No     Unstable Housing in the Last Year: No       No family history on file.    Physical Exam     Vitals:    09/07/23 2345 09/08/23 0059 09/08/23 0100 09/08/23 0219   BP:   (!) 140/76 135/77   BP Location:    Left arm   Patient Position:    Lying   Pulse: 92 88  86   Resp:    18   Temp:   98.9 °F (37.2 °C) 98.1 °F (36.7 °C)   TempSrc:   Oral Oral   SpO2: 95%   95%   Weight:       Height:         Physical Exam:   Nursing note and vitals reviewed.  Appearance:  Well-appearing, comfortable male in no acute respiratory distress.  Making purposeful movements.  Speaking in full sentences.  Eyes: No conjunctival injection. EOMI, PERRL.  Head: NC/AT  ENT: Oropharynx clear.    Chest: CTAB. No increased work of breathing, bilateral chest rise.  Cardiovascular: Regular rate and rhythm.  Normal equal bilateral radial pulses.  Abdomen: Soft.  Not distended.  Nontender.  No guarding.  No rebound. No Masses  Musculoskeletal: Good range of motion all joints.  No deformities.  Neck supple, full range of motion, no obvious deformity.  Right foot in a Darco shoe.  Left great toe with open foul-smelling wound.  Neurologic: Moves all extremities.  Normal sensation.  No facial droop.  Normal speech.    Mental Status:  Alert and oriented x 3.  Appropriate, conversant.      Results and Medications    Procedures    Labs Reviewed   CBC W/ AUTO DIFFERENTIAL - Abnormal; Notable for the following components:       Result Value    WBC 21.91 (*)     RBC 4.01 (*)     Hemoglobin 10.0 (*)     Hematocrit 32.7 (*)     MCH 24.9 (*)     MCHC 30.6 (*)      RDW 16.1 (*)     Platelets 745 (*)     MPV 9.1 (*)     Immature Granulocytes 0.7 (*)     Gran # (ANC) 17.8 (*)     Immature Grans (Abs) 0.15 (*)     Mono # 1.6 (*)     Gran % 81.5 (*)     Lymph % 9.0 (*)     All other components within normal limits   COMPREHENSIVE METABOLIC PANEL - Abnormal; Notable for the following components:    Sodium 134 (*)     CO2 30 (*)     Glucose 209 (*)     Total Protein 9.0 (*)     Albumin 1.3 (*)     Anion Gap 7 (*)     All other components within normal limits   POCT GLUCOSE - Abnormal; Notable for the following components:    POCT Glucose 172 (*)     All other components within normal limits   CULTURE, BLOOD   CULTURE, BLOOD   POCT GLUCOSE MONITORING CONTINUOUS       Imaging Results              X-Ray Toe 2 or More Views Left (Final result)  Result time 09/07/23 23:12:30      Final result by Juancarlos Sanchez MD (09/07/23 23:12:30)                   Impression:      Acute osteomyelitis involving the great toe and distal aspects of the 2nd and 4th toes.      Electronically signed by: Juancarlos Sanchez MD  Date:    09/07/2023  Time:    23:12               Narrative:    EXAMINATION:  XR TOE 2 OR MORE VIEWS LEFT    CLINICAL HISTORY:  injury;    TECHNIQUE:  Three views of the left toes were performed    COMPARISON:  08/27/2023.    FINDINGS:  Abnormal osseous destructive changes are seen involving the great toe distal phalanx consistent with acute osteomyelitis.  Pronounced soft tissue swelling and small amount of soft tissue air are seen involving the great toe.  Additional osseous erosive change is seen at the distal aspects of the 2nd and 4th toes likely reflecting acute osteomyelitis.                                          Medications   acetaminophen tablet 1,000 mg (1,000 mg Oral Given 9/7/23 2229)       MDM, Impression and Plan   Previous Records:   I decided to obtain old medical records which is listed here or in ED course:  Patient was recently stated the ED and was given  instruction to follow-up with podiatry infectious disease in the outpatient setting as that plate they recommended against antibiotics or admission as they thought that he had drug resistant organisms in the past and osteomyelitis was thought to be chronic rather than acute at that time.    Clinical Tests:   Lab Tests: Ordered and Reviewed  Radiological Study: Ordered and Reviewed  Medical Tests: Ordered and Reviewed    Differential diagnosis:  -acute versus chronic osteomyelitis   -unlikely sepsis based off physical exam vital signs and well appearance of patient   -cellulitis versus chronic wound on great toe verses fracture versus dislocation    Initial Assessment & ED Management:    Urgent evaluation of 58 y.o. male with History as above who presents the ED with chief complaint of worsening left great toe infection.  On arrival to the ED he is noted to be afebrile, hemodynamically in no acute respiratory distress.  Physical exam as above.  Is left great toe appears to have worsened since his last ED visit.  X-ray was obtained and shows signs concerning for acute osteomyelitis involving the great toe and distal aspect of the 2nd and 4th toe.  Patient was noted to have leukocytosis 21.9.  Blood cultures were ordered.  I was unable to reach Podiatry as there was no podiatrist on-call at this time thus I decided to admit the patient to Hospital Medicine for podiatry evaluation in the morning.  I discussed the case with on-call Hospital Medicine who was agreeable with admission, we decided to not initiate antibiotics until patient was evaluated by Podiatry in the morning while he was in the ED as he is not clearly septic, due to his multi-drug resistant previous infections into allow better potential biopsy and culture by Podiatry.  Patient updated on plans and agreeable.  Patient deemed stable for admission to hospital medicine.      Medical Decision Making  Amount and/or Complexity of Data Reviewed  External Data  Reviewed: radiology.     Details: Patient's left toes seems to have acutely worsened since his last x-ray  Labs: ordered.  Radiology: ordered.    Risk  OTC drugs.  Decision regarding hospitalization.               I called and discussed the case with:  Hospital Medicine    Please see ED course for discussion of workup and dispo if not listed above.          Final diagnoses:  [M86.9] Osteomyelitis  [M79.675] Great toe pain, left (Primary)        ED Disposition Condition    Observation Stable                      Scribe Attestation:   Scribe #1: I performed the above scribed service and the documentation accurately describes the services I performed. I attest to the accuracy of the note.    Physician Attestation for Scribe: I, Brenda Camargo MD , reviewed documentation as scribed in my presence, which is both accurate and complete.          MD Raman Byrd Heyward B, MD  09/08/23 0230

## 2023-09-08 NOTE — H&P
[facility] Medicine  History & Physical    Patient Name: Dave Good  MRN: [id number]  Patient Class: OP- Observation  Admission Date: 9/7/2023  Attending Physician: Hari Iverson MD   Primary Care Provider: Reyna Jorge NP         Patient information was obtained from patient, past medical records and ER records.     Subjective:     Principal Problem:Osteomyelitis    Chief Complaint:   Chief Complaint   Patient presents with    Toe Pain     Infection and increased swelling to toe after stubbing toe last week, reports being seen, h/o toe amputations and DM        HPI: The patient is a 58 y.o. male with a past medical history of type 2 diabetes, HTN, HLD, and chronic systolic congestive heart failure who presents with worsening left great toe pain infection and swelling.  Patient was recently seen in the ER and discharged to follow up with ID and wound care.  Since then he has been unable to follow-up with podiatry or infectious disease.  He states that he has not tried to call their offices and was waiting for them to call him.  States that his toe pain has gotten worse.  He has been soaking it in a foot massage machine chair with no improvement.  XR positive for left great, 2nd, and 4th toe acute osteomyelitis.  He will be admitted for further management of his acute osteomyelitis and infectious disease and podiatry consult.      Past Medical History:   Diagnosis Date    Arthritis     COPD (chronic obstructive pulmonary disease)     COVID-19     Depression     Diabetes mellitus     Diabetes mellitus, type 2     Hypertension        Past Surgical History:   Procedure Laterality Date    DEBRIDEMENT OF LOWER EXTREMITY Bilateral 3/2/2022    Procedure: DEBRIDEMENT, LOWER EXTREMITY;  Surgeon: Jesus Flores Jr., MD;  Location: [facility];  Service: General;  Laterality: Bilateral;    FOOT AMPUTATION THROUGH METATARSAL Right 9/23/2021    Procedure: AMPUTATION, FOOT, TRANSMETATARSAL  right 1st ray resection;  Surgeon: Samuel Toussaint DPM;  Location: [facility];  Service: Podiatry;  Laterality: Right;    INCISION AND DRAINAGE FOOT Bilateral 9/21/2021    Procedure: INCISION AND DRAINAGE, FOOT - Bilateral;  Surgeon: Lavonne Vigil DPM;  Location: [facility];  Service: Podiatry;  Laterality: Bilateral;    REPLACEMENT OF WOUND DRESSING Right 2/24/2022    Procedure: REPLACEMENT, DRESSING, WOUND;  Surgeon: Jesus Flores Jr., MD;  Location: [facility];  Service: Vascular;  Laterality: Right;  wound vac dressing changed    WOUND DEBRIDEMENT Right 2/22/2022    Procedure: DEBRIDEMENT, WOUND - RIGHT FOOT;  Surgeon: Jesus Flores Jr., MD;  Location: [facility];  Service: Vascular;  Laterality: Right;    WOUND DEBRIDEMENT Bilateral 2/24/2022    Procedure: DEBRIDEMENT, WOUND / BILATERAL DEBRIDEMENT FEET;  Surgeon: Jesus Flores Jr., MD;  Location: [facility];  Service: Vascular;  Laterality: Bilateral;    WOUND DEBRIDEMENT Right 5/27/2022    Procedure: DEBRIDEMENT, WOUND;  Surgeon: Jesus Flores Jr., MD;  Location: [facility];  Service: General;  Laterality: Right;       Review of patient's allergies indicates:   Allergen Reactions    Ibuprofen Swelling     Facial swelling       Current Facility-Administered Medications on File Prior to Encounter   Medication    0.9%  NaCl infusion    LIDOcaine (PF) 10 mg/ml (1%) injection 10 mg     Current Outpatient Medications on File Prior to Encounter   Medication Sig    ammonium lactate 12 % Crea Apply twice daily to affected parts both feet as needed.    apixaban (ELIQUIS) 5 mg Tab Take 1 tablet (5 mg total) by mouth 2 (two) times daily.    aspirin (ECOTRIN) 81 MG EC tablet Take 1 tablet (81 mg total) by mouth once daily.    atorvastatin (LIPITOR) 40 MG tablet Take 1 tablet (40 mg total) by mouth once daily.    bisacodyL (DULCOLAX) 5 mg EC tablet Take 10 mg by mouth once.    gabapentin (NEURONTIN) 600 MG tablet 1 capsule.    HUMALOG U-100 INSULIN 100 unit/mL injection  "SMARTSI.01 Milliliter(s) SUB-Q 4 Times Daily    hydroCHLOROthiazide (HYDRODIURIL) 25 MG tablet Take 1 tablet (25 mg total) by mouth once daily.    HYDROcodone-acetaminophen (NORCO) 5-325 mg per tablet Take 1 tablet by mouth every 4 (four) hours as needed for Pain.    insulin detemir U-100 (LEVEMIR FLEXTOUCH U-100 INSULN) 100 unit/mL (3 mL) InPn pen Inject 50 Units into the skin every evening.    lisinopriL 10 MG tablet Take 1 tablet (10 mg total) by mouth once daily.    metFORMIN (GLUCOPHAGE) 1000 MG tablet Take 1 tablet (1,000 mg total) by mouth 2 (two) times daily.    CATHFLO ACTIVASE 2 mg injection SMARTSI Vial(s) injection Once    ciclopirox (PENLAC) 8 % Soln Apply topically nightly.    COVID-19 antigen test (FLOWFLEX COVID-19 AG HOME TEST MISC)     dextrose 50 % in water, D50W, Syrg SMARTSI Milliliter(s) IV As Directed PRN    EASY COMFORT PEN NEEDLES 31 gauge x 1/4" Ndle     enoxaparin (LOVENOX) 60 mg/0.6 mL Syrg SMARTSI.2 Milliliter(s) SUB-Q Every 12 Hours    GLUCAGEN DIAGNOSTIC KIT 1 mg/mL SolR SMARTSI Milliliter(s) IM As Directed PRN    GLUTOSE-15 40 % gel SMARTSI.5 Gram(s) By Mouth As Directed PRN    insulin aspart U-100 (NOVOLOG FLEXPEN U-100 INSULIN) 100 unit/mL (3 mL) InPn pen INJECT 10 UNITS INTO THE SKIN BEFORE MEALS THREE TIMES A DAY (50 DAYS)    ketoconazole (NIZORAL) 2 % cream Apply topically.    LANTUS SOLOSTAR U-100 INSULIN glargine 100 units/mL SubQ pen Inject into the skin.    ondansetron 4 mg/2 mL Soln SMARTSI Milliliter(s) IV Every 8 Hours PRN    ONETOUCH ULTRA TEST Strp     polyethylene glycol (GLYCOLAX) 17 gram/dose powder     QUEtiapine (SEROQUEL) 200 MG Tab Take 1 tablet (200 mg total) by mouth nightly.    STOOL SOFTENER 100 mg capsule Take 100 mg by mouth 2 (two) times daily.    TRULICITY 0.75 mg/0.5 mL pen injector Inject into the skin.    urea 45 % Crea Apply 1 application  topically 2 (two) times daily.    [DISCONTINUED] DAPTOmycin " (CUBICIN) 350 mg injection Inject into the vein.    [DISCONTINUED] DAPTOmycin (CUBICIN) 500 mg injection SMARTSI Milligram(s) IV Daily    [DISCONTINUED] losartan (COZAAR) 25 MG tablet Take 25 mg by mouth 2 (two) times daily.    [DISCONTINUED] meropenem (MERREM) 500 mg injection Inject into the vein.     Family History    None       Tobacco Use    Smoking status: Former    Smokeless tobacco: Never   Substance and Sexual Activity    Alcohol use: Not Currently     Comment: pt reports he drinks 1/2 a pint of whiskey every day    Drug use: Yes     Types: Marijuana    Sexual activity: Yes     Partners: Female     Review of Systems   Constitutional:  Negative for activity change, appetite change, fatigue and fever.   HENT:  Negative for congestion, ear pain and postnasal drip.    Eyes:  Negative for discharge.   Respiratory:  Negative for apnea, shortness of breath and wheezing.    Cardiovascular:  Negative for chest pain and leg swelling.   Gastrointestinal:  Negative for abdominal distention, abdominal pain, nausea and vomiting.   Endocrine: Negative for polydipsia, polyphagia and polyuria.   Genitourinary:  Negative for difficulty urinating, flank pain, frequency, hematuria and urgency.   Musculoskeletal:  Positive for gait problem and myalgias. Negative for arthralgias and joint swelling.   Skin:  Positive for wound (left great toe). Negative for pallor and rash.   Allergic/Immunologic: Negative for environmental allergies and food allergies.   Neurological:  Negative for dizziness, speech difficulty, weakness, light-headedness and headaches.   Hematological:  Does not bruise/bleed easily.   Psychiatric/Behavioral:  Negative for agitation.      Objective:     Vital Signs (Most Recent):  Temp: 98.1 °F (36.7 °C) (23)  Pulse: 86 (23)  Resp: 18 (23)  BP: 135/77 (23)  SpO2: 95 % (23) Vital Signs (24h Range):  Temp:  [98 °F (36.7 °C)-98.9 °F (37.2 °C)] 98.1  °F (36.7 °C)  Pulse:  [86-99] 86  Resp:  [16-18] 18  SpO2:  [95 %-99 %] 95 %  BP: (135-150)/(70-82) 135/77     Weight: 117.2 kg (258 lb 6.1 oz)  Body mass index is 33.17 kg/m².     Physical Exam  Constitutional:       Appearance: Normal appearance. He is well-developed. He is ill-appearing.   HENT:      Head: Normocephalic.   Eyes:      Conjunctiva/sclera: Conjunctivae normal.   Cardiovascular:      Rate and Rhythm: Normal rate and regular rhythm.      Pulses:           Radial pulses are 2+ on the right side and 2+ on the left side.      Heart sounds: Normal heart sounds.   Pulmonary:      Effort: Pulmonary effort is normal. Tachypnea present.      Breath sounds: Normal breath sounds.   Abdominal:      General: Bowel sounds are decreased. There is no distension.      Palpations: Abdomen is soft.      Tenderness: There is no abdominal tenderness.   Musculoskeletal:         General: Normal range of motion.      Cervical back: Normal range of motion and neck supple.   Skin:     General: Skin is warm and dry.      Findings: Lesion (left great toe-malodorous, swelling) present.   Neurological:      Mental Status: He is alert and oriented to person, place, and time.      GCS: GCS eye subscore is 4. GCS verbal subscore is 5. GCS motor subscore is 6.      Motor: Motor function is intact.   Psychiatric:         Mood and Affect: Mood normal.         Speech: Speech normal.         Behavior: Behavior normal.                Significant Labs: All pertinent labs within the past 24 hours have been reviewed.  CBC:   Recent Labs   Lab 09/07/23  2343   WBC 21.91*   HGB 10.0*   HCT 32.7*   *     CMP:   Recent Labs   Lab 09/07/23  2343   *   K 3.7   CL 97   CO2 30*   *   BUN 15   CREATININE 1.1   CALCIUM 9.1   PROT 9.0*   ALBUMIN 1.3*   BILITOT 0.3   ALKPHOS 82   AST 25   ALT 17   ANIONGAP 7*       Significant Imaging: I have reviewed all pertinent imaging results/findings within the past 24  "hours.    Assessment/Plan:     * Osteomyelitis  XR- Acute osteomyelitis involving the great toe and distal aspects of the 2nd and 4th toes.    Consult Podiatry and Infectious disease  Hold abx for now for cultures in AM  IVF        Chronic systolic congestive heart failure  Patient is identified as having Systolic (HFrEF) heart failure that is Chronic. CHF is currently controlled. Latest ECHO performed and demonstrates- Results for orders placed during the hospital encounter of 02/21/22    Echo    Interpretation Summary  · The left ventricle is normal in size with concentric remodeling and normal systolic function.  · Normal central venous pressure (3 mmHg).  · There is abnormal septal wall motion.  · Normal left ventricular diastolic function.  · Normal right ventricular size with normal right ventricular systolic function.  . Hold lisinopril and hctz  and monitor clinical status closely. Monitor on telemetry. Patient is off CHF pathway.  Monitor strict Is&Os and daily weights.  Place on fluid restriction of 1.5 L. Cardiology has not been any consulted. Continue to stress to patient importance of self efficacy and  on diet for CHF. Last BNP reviewed- and noted below No results for input(s): "BNP", "BNPTRIAGEBLO" in the last 168 hours..    Mixed hyperlipidemia  Continue Lipitor      HTN (hypertension)  Continue lisinopril      Type 2 diabetes mellitus with diabetic peripheral angiopathy without gangrene, with long-term current use of insulin  BG- 209    A1c pending  Levemir  Moderate dose SSI  BG AC/HS        VTE Risk Mitigation (From admission, onward)         Ordered     Reason for No Pharmacological VTE Prophylaxis  Once        Question:  Reasons:  Answer:  Risk of Bleeding    09/08/23 0325     IP VTE HIGH RISK PATIENT  Once         09/08/23 0325     Place sequential compression device  Until discontinued         09/08/23 0325                       Larry Shepherd NP  Department of Hospital " [facility] ([place])

## 2023-09-08 NOTE — SUBJECTIVE & OBJECTIVE
Past Medical History:   Diagnosis Date    Arthritis     COPD (chronic obstructive pulmonary disease)     COVID-19     Depression     Diabetes mellitus     Diabetes mellitus, type 2     Hypertension        Past Surgical History:   Procedure Laterality Date    DEBRIDEMENT OF LOWER EXTREMITY Bilateral 3/2/2022    Procedure: DEBRIDEMENT, LOWER EXTREMITY;  Surgeon: Jesus Flores Jr., MD;  Location: [facility];  Service: General;  Laterality: Bilateral;    FOOT AMPUTATION THROUGH METATARSAL Right 9/23/2021    Procedure: AMPUTATION, FOOT, TRANSMETATARSAL right 1st ray resection;  Surgeon: Samuel Toussaint DPM;  Location: [facility];  Service: Podiatry;  Laterality: Right;    INCISION AND DRAINAGE FOOT Bilateral 9/21/2021    Procedure: INCISION AND DRAINAGE, FOOT - Bilateral;  Surgeon: Lavonne Vigil DPM;  Location: [facility];  Service: Podiatry;  Laterality: Bilateral;    REPLACEMENT OF WOUND DRESSING Right 2/24/2022    Procedure: REPLACEMENT, DRESSING, WOUND;  Surgeon: Jesus Flores Jr., MD;  Location: [facility];  Service: Vascular;  Laterality: Right;  wound vac dressing changed    WOUND DEBRIDEMENT Right 2/22/2022    Procedure: DEBRIDEMENT, WOUND - RIGHT FOOT;  Surgeon: Jesus Flores Jr., MD;  Location: [facility];  Service: Vascular;  Laterality: Right;    WOUND DEBRIDEMENT Bilateral 2/24/2022    Procedure: DEBRIDEMENT, WOUND / BILATERAL DEBRIDEMENT FEET;  Surgeon: Jesus Flores Jr., MD;  Location: [facility];  Service: Vascular;  Laterality: Bilateral;    WOUND DEBRIDEMENT Right 5/27/2022    Procedure: DEBRIDEMENT, WOUND;  Surgeon: Jesus Flores Jr., MD;  Location: [facility];  Service: General;  Laterality: Right;       Review of patient's allergies indicates:   Allergen Reactions    Ibuprofen Swelling     Facial swelling       Medications:  Medications Prior to Admission   Medication Sig    ammonium lactate 12 % Crea Apply twice daily to affected parts both feet as needed.    apixaban (ELIQUIS) 5 mg Tab Take 1 tablet (5 mg total) by  "mouth 2 (two) times daily.    aspirin (ECOTRIN) 81 MG EC tablet Take 1 tablet (81 mg total) by mouth once daily.    atorvastatin (LIPITOR) 40 MG tablet Take 1 tablet (40 mg total) by mouth once daily.    bisacodyL (DULCOLAX) 5 mg EC tablet Take 10 mg by mouth once.    gabapentin (NEURONTIN) 600 MG tablet 1 capsule.    HUMALOG U-100 INSULIN 100 unit/mL injection SMARTSI.01 Milliliter(s) SUB-Q 4 Times Daily    hydroCHLOROthiazide (HYDRODIURIL) 25 MG tablet Take 1 tablet (25 mg total) by mouth once daily.    HYDROcodone-acetaminophen (NORCO) 5-325 mg per tablet Take 1 tablet by mouth every 4 (four) hours as needed for Pain.    insulin detemir U-100 (LEVEMIR FLEXTOUCH U-100 INSULN) 100 unit/mL (3 mL) InPn pen Inject 50 Units into the skin every evening.    lisinopriL 10 MG tablet Take 1 tablet (10 mg total) by mouth once daily.    metFORMIN (GLUCOPHAGE) 1000 MG tablet Take 1 tablet (1,000 mg total) by mouth 2 (two) times daily.    CATHFLO ACTIVASE 2 mg injection SMARTSI Vial(s) injection Once    ciclopirox (PENLAC) 8 % Soln Apply topically nightly.    COVID-19 antigen test (FLOWFLEX COVID-19 AG HOME TEST MISC)     dextrose 50 % in water, D50W, Syrg SMARTSI Milliliter(s) IV As Directed PRN    EASY COMFORT PEN NEEDLES 31 gauge x 1/4" Ndle     enoxaparin (LOVENOX) 60 mg/0.6 mL Syrg SMARTSI.2 Milliliter(s) SUB-Q Every 12 Hours    GLUCAGEN DIAGNOSTIC KIT 1 mg/mL SolR SMARTSI Milliliter(s) IM As Directed PRN    GLUTOSE-15 40 % gel SMARTSI.5 Gram(s) By Mouth As Directed PRN    insulin aspart U-100 (NOVOLOG FLEXPEN U-100 INSULIN) 100 unit/mL (3 mL) InPn pen INJECT 10 UNITS INTO THE SKIN BEFORE MEALS THREE TIMES A DAY (50 DAYS)    ketoconazole (NIZORAL) 2 % cream Apply topically.    LANTUS SOLOSTAR U-100 INSULIN glargine 100 units/mL SubQ pen Inject into the skin.    ondansetron 4 mg/2 mL Soln SMARTSI Milliliter(s) IV Every 8 Hours PRN    ONETOUCH ULTRA TEST Strp     polyethylene glycol (GLYCOLAX) 17 " gram/dose powder     QUEtiapine (SEROQUEL) 200 MG Tab Take 1 tablet (200 mg total) by mouth nightly.    STOOL SOFTENER 100 mg capsule Take 100 mg by mouth 2 (two) times daily.    TRULICITY 0.75 mg/0.5 mL pen injector Inject into the skin.    urea 45 % Crea Apply 1 application  topically 2 (two) times daily.    [DISCONTINUED] DAPTOmycin (CUBICIN) 350 mg injection Inject into the vein.    [DISCONTINUED] DAPTOmycin (CUBICIN) 500 mg injection SMARTSI Milligram(s) IV Daily    [DISCONTINUED] meropenem (MERREM) 500 mg injection Inject into the vein.     Antibiotics (From admission, onward)      Start     Stop Route Frequency Ordered    23 1400  meropenem (MERREM) 1 g in sodium chloride 0.9 % 100 mL IVPB (MB+)         -- IV Every 8 hours (non-standard times) 23 1257    23 1400  vancomycin 2 g in dextrose 5 % 500 mL IVPB         -- IV Once 23 1303    23 1354  vancomycin - pharmacy to dose  (vancomycin IVPB (PEDS and ADULTS))        See Hyperspace for full Linked Orders Report.    -- IV pharmacy to manage frequency 23 1257          Antifungals (From admission, onward)      None          Antivirals (From admission, onward)      None             There is no immunization history for the selected administration types on file for this patient.    Family History    None       Social History     Socioeconomic History    Marital status: Single   Tobacco Use    Smoking status: Former    Smokeless tobacco: Never   Substance and Sexual Activity    Alcohol use: Not Currently     Comment: pt reports he drinks 1/2 a pint of whiskey every day    Drug use: Yes     Types: Marijuana    Sexual activity: Yes     Partners: Female     Social Determinants of Health     Financial Resource Strain: Low Risk  (2022)    Overall Financial Resource Strain (CARDIA)     Difficulty of Paying Living Expenses: Not very hard   Food Insecurity: Food Insecurity Present (2022)    Hunger Vital Sign     Worried  About Running Out of Food in the Last Year: Often true     Ran Out of Food in the Last Year: Often true   Transportation Needs: No Transportation Needs (6/16/2022)    PRAPARE - Transportation     Lack of Transportation (Medical): No     Lack of Transportation (Non-Medical): No   Physical Activity: Inactive (6/16/2022)    Exercise Vital Sign     Days of Exercise per Week: 0 days     Minutes of Exercise per Session: 0 min   Stress: No Stress Concern Present (6/16/2022)    [employer] [place] [employer] - Occupational Stress Questionnaire     Feeling of Stress : Not at all   Social Connections: Moderately Integrated (6/16/2022)    Social Connection and Isolation Panel [NHANES]     Frequency of Communication with Friends and Family: Twice a week     Frequency of Social Gatherings with Friends and Family: Twice a week     Attends [employer] Services: 1 to 4 times per year     Active Member of Clubs or Organizations: No     Attends Club or Organization Meetings: Never     Marital Status: Living with partner   Housing Stability: Unknown (6/16/2022)    Housing Stability Vital Sign     Unable to Pay for Housing in the Last Year: No     Unstable Housing in the Last Year: No     Review of Systems   Constitutional:  Negative for fever.   All other systems reviewed and are negative.    Objective:     Vital Signs (Most Recent):  Temp: 98.7 °F (37.1 °C) (09/08/23 1114)  Pulse: 94 (09/08/23 1114)  Resp: 16 (09/08/23 1114)  BP: 127/74 (09/08/23 1114)  SpO2: (!) 92 % (notified [place] Jara) (09/08/23 1114) Vital Signs (24h Range):  Temp:  [98 °F (36.7 °C)-99.2 °F (37.3 °C)] 98.7 °F (37.1 °C)  Pulse:  [86-99] 94  Resp:  [16-18] 16  SpO2:  [90 %-99 %] 92 %  BP: (108-150)/(70-82) 127/74     Weight: 117.2 kg (258 lb 6.1 oz)  Body mass index is 33.17 kg/m².    Estimated Creatinine Clearance: 106.9 mL/min (based on SCr of 1 mg/dL).     Physical Exam  Vitals and nursing note reviewed.   Constitutional:       Appearance: Normal  "appearance.   HENT:      Head: Normocephalic and atraumatic.   Eyes:      Conjunctiva/sclera: Conjunctivae normal.      Pupils: Pupils are equal, round, and reactive to light.   Cardiovascular:      Rate and Rhythm: Normal rate and regular rhythm.   Pulmonary:      Effort: Pulmonary effort is normal.      Breath sounds: Normal breath sounds.   Abdominal:      General: Abdomen is flat. Bowel sounds are normal.   Musculoskeletal:      Comments: Left hallux with swelling and tenderness.    Skin:     General: Skin is warm and dry.   Neurological:      General: No focal deficit present.      Mental Status: He is alert and oriented to person, place, and time.          Significant Labs: Blood Culture: No results for input(s): "LABBLOO" in the last 4320 hours.  CBC:   Recent Labs   Lab 09/07/23 2343 09/08/23  0401   WBC 21.91* 18.89*   HGB 10.0* 9.3*   HCT 32.7* 30.7*   * 670*     CMP:   Recent Labs   Lab 09/07/23 2343 09/08/23  0401   * 134*   K 3.7 3.4*   CL 97 96   CO2 30* 29   * 205*   BUN 15 17   CREATININE 1.1 1.0   CALCIUM 9.1 9.1   PROT 9.0* 8.7*   ALBUMIN 1.3* 1.2*   BILITOT 0.3 0.4   ALKPHOS 82 78   AST 25 24   ALT 17 15   ANIONGAP 7* 9     Wound Culture: No results for input(s): "LABAERO" in the last 4320 hours.    Significant Imaging: Left foot XRAY: Acute osteomyelitis involving the great toe and distal aspects of the 2nd and 4th toes."    MRI and CXR have been ordered.     MRI in June 2022 showed:   "There is soft tissue thickening/ulceration along the plantar aspect of the forefoot, at the level of the 1st through 3rd metatarsal heads.  There is mild marrow edema in the tuft of the distal 1st phalanx (series 8, image 15).  The T1 signal is relatively normal, suggesting reactive edema versus early/mild osteomyelitis.  Second and 3rd phalanges marrow signal is within normal limits.  There is probable chronic marrow changes to the distal phalanx of the 4th phalanges.  No fracture.  No " "marrow replacement process.  There is muscle atrophy of the forefoot.  No fluid collections.  There is tenosynovitis of the flexor hallucis longus in the midfoot."  "

## 2023-09-08 NOTE — CONSULTS
[facility] ([place])  Podiatry  Consult Note    Patient Name: Dave Good  MRN: [id number]  Admission Date: 9/7/2023  Hospital Length of Stay: 0 days  Attending Physician: Hari Iverson MD  Primary Care Provider: Reyna Jorge NP     Inpatient consult to Podiatry  Consult performed by: Samuel Toussaint DPM  Consult ordered by: Larry Shepherd NP        Subjective:     History of Present Illness:  Open ulceration foul smell left great toe.  In the setting of chronic poorly-controlled diabetes, renal disease, progressive chronic peripheral polyneuropathy, prior amputation partial right foot.    Current ulceration and pain are  Rapid onset after stubbing the toe 5 days ago.  Aggravated with touch pressure ambulation.  No prior medical treatment.  Self-treatment with light dressings have not improve the condition.  He was admitted through the emergency room for bone infection.  X-rays show progressive erosion distal phalanx left hallux.  This has been an ongoing process over the past year or 2 and has been stable until he struck the toe a few days ago.    Scheduled Meds:   albuterol-ipratropium  3 mL Nebulization Q4H    aspirin  81 mg Oral Daily    atorvastatin  40 mg Oral Daily    gabapentin  300 mg Oral BID    insulin detemir U-100 (Levemir)  25 Units Subcutaneous QHS    meropenem (MERREM) IVPB  1 g Intravenous Q8H    QUEtiapine  200 mg Oral Nightly    vancomycin (VANCOCIN) IV (PEDS and ADULTS)  2,000 mg Intravenous Once     Continuous Infusions:  PRN Meds:acetaminophen, dextrose 10%, dextrose 10%, glucagon (human recombinant), HYDROmorphone, insulin aspart U-100, naloxone, ondansetron, oxyCODONE, sodium chloride 0.9%, Pharmacy to dose Vancomycin consult **AND** vancomycin - pharmacy to dose    Review of patient's allergies indicates:   Allergen Reactions    Ibuprofen Swelling     Facial swelling        Past Medical History:   Diagnosis Date    Arthritis     COPD (chronic obstructive pulmonary  disease)     COVID-19     Depression     Diabetes mellitus     Diabetes mellitus, type 2     Hypertension      Past Surgical History:   Procedure Laterality Date    DEBRIDEMENT OF LOWER EXTREMITY Bilateral 3/2/2022    Procedure: DEBRIDEMENT, LOWER EXTREMITY;  Surgeon: Jesus Flores Jr., MD;  Location: [facility];  Service: General;  Laterality: Bilateral;    FOOT AMPUTATION THROUGH METATARSAL Right 9/23/2021    Procedure: AMPUTATION, FOOT, TRANSMETATARSAL right 1st ray resection;  Surgeon: Samuel Toussaint DPM;  Location: [facility] OR;  Service: Podiatry;  Laterality: Right;    INCISION AND DRAINAGE FOOT Bilateral 9/21/2021    Procedure: INCISION AND DRAINAGE, FOOT - Bilateral;  Surgeon: Lavonne Vigil DPM;  Location: [facility];  Service: Podiatry;  Laterality: Bilateral;    REPLACEMENT OF WOUND DRESSING Right 2/24/2022    Procedure: REPLACEMENT, DRESSING, WOUND;  Surgeon: Jesus Flores Jr., MD;  Location: [facility];  Service: Vascular;  Laterality: Right;  wound vac dressing changed    WOUND DEBRIDEMENT Right 2/22/2022    Procedure: DEBRIDEMENT, WOUND - RIGHT FOOT;  Surgeon: Jesus Flores Jr., MD;  Location: [facility];  Service: Vascular;  Laterality: Right;    WOUND DEBRIDEMENT Bilateral 2/24/2022    Procedure: DEBRIDEMENT, WOUND / BILATERAL DEBRIDEMENT FEET;  Surgeon: Jesus Flores Jr., MD;  Location: [facility];  Service: Vascular;  Laterality: Bilateral;    WOUND DEBRIDEMENT Right 5/27/2022    Procedure: DEBRIDEMENT, WOUND;  Surgeon: Jesus Flores Jr., MD;  Location: [facility];  Service: General;  Laterality: Right;       Family History    None       Tobacco Use    Smoking status: Former    Smokeless tobacco: Never   Substance and Sexual Activity    Alcohol use: Not Currently     Comment: pt reports he drinks 1/2 a pint of whiskey every day    Drug use: Yes     Types: Marijuana    Sexual activity: Yes     Partners: Female     Review of Systems  Objective:     Vital Signs (Most Recent):  Temp: 98.7 °F (37.1 °C) (09/08/23  "1114)  Pulse: 94 (09/08/23 1114)  Resp: 16 (09/08/23 1114)  BP: 127/74 (09/08/23 1114)  SpO2: (!) 92 % (notified VIRGINIA Jara) (09/08/23 1114) Vital Signs (24h Range):  Temp:  [98 °F (36.7 °C)-99.2 °F (37.3 °C)] 98.7 °F (37.1 °C)  Pulse:  [86-99] 94  Resp:  [16-18] 16  SpO2:  [90 %-99 %] 92 %  BP: (108-150)/(70-82) 127/74     Weight: 117 kg (258 lb)  Body mass index is 33.13 kg/m².    Foot Exam    Laboratory:  A1C:   Recent Labs   Lab 09/08/23 0401   HGBA1C 7.5*     Blood Cultures: No results for input(s): "LABBLOO" in the last 48 hours.  BMP:   Recent Labs   Lab 09/08/23 0401   *   *   K 3.4*   CL 96   CO2 29   BUN 17   CREATININE 1.0   CALCIUM 9.1   MG 1.5*     CBC:   Recent Labs   Lab 09/08/23 0401   WBC 18.89*   RBC 3.77*   HGB 9.3*   HCT 30.7*   *   MCV 81*   MCH 24.7*   MCHC 30.3*     CMP:   Recent Labs   Lab 09/08/23 0401   *   CALCIUM 9.1   ALBUMIN 1.2*   PROT 8.7*   *   K 3.4*   CO2 29   CL 96   BUN 17   CREATININE 1.0   ALKPHOS 78   ALT 15   AST 24   BILITOT 0.4     Coagulation: No results for input(s): "PT", "INR", "APTT" in the last 168 hours.  CRP: No results for input(s): "CRP" in the last 168 hours.  ESR: No results for input(s): "SEDRATE" in the last 168 hours.  Prealbumin: No results for input(s): "PREALBUMIN" in the last 48 hours.  Wound Cultures: No results for input(s): "LABAERO" in the last 4320 hours.  Microbiology Results (last 7 days)       Procedure Component Value Units Date/Time    Blood culture [[id number]] Collected: 09/08/23 1333    Order Status: Sent Specimen: Blood Updated: 09/08/23 1333    Blood culture #2 **CANNOT BE ORDERED STAT** [[id number]] Collected: 09/07/23 2343    Order Status: Sent Specimen: Blood from Peripheral, Hand, Right Updated: 09/08/23 0906    Blood culture #1 **CANNOT BE ORDERED STAT** [[id number]] Collected: 09/07/23 2343    Order Status: Sent Specimen: Blood from Peripheral, Hand, Left Updated: 09/08/23 0906          Specimen (24h " ago, onward)      None          All pertinent labs reviewed within the last 24 hours.    Diagnostic Results:  MRI: I have reviewed all pertinent results/findings within the past 24 hours.  pending  X-Ray: I have reviewed all pertinent results/findings within the past 24 hours.  Bur aggressive erosion and fragmentation distal phalanx left hallux.  No gas foreign body appreciated    Clinical Findings:  Full-thickness ulceration with central necrosis of the tip of the left hallux distal phalanx with foul smell and serous drainage only without pus tracking.  It encompasses most of the distal aspect of the digit in the nail bed.  Surrounding skin seems viable about a cm back from the ulceration circumferentially.    Otherwise the patient has forefoot keratosis and scarring from prior surgeries and ulcerations both feet.    Partial amputation right foot.  Hammertoe deformities of the remaining lesser digits bilateral without current symptoms.  No gait exam-ulceration.    DP and PT pulses palpable bilateral capillary refills about 3 seconds to 4 seconds all remaining digits, distal foot and toes warm bilateral.      Patient has lost protective sensation to 10 g monofilament both feet, decreased vibratory sensation bilateral 128 hertz tuning fork.  Negative allodynia both feet.  DTRs 2+ Achilles bilateral patella bilateral    Assessment/Plan:     Active Diagnoses:    Diagnosis Date Noted POA    PRINCIPAL PROBLEM:  Chronic osteomyelitis of toe of left foot [M86.672] 09/08/2023 Yes    Chronic systolic congestive heart failure [I50.22] 01/07/2022 Yes    Type 2 diabetes mellitus with diabetic peripheral angiopathy without gangrene, with long-term current use of insulin [E11.51, Z79.4] 09/20/2021 Not Applicable    Mixed hyperlipidemia [E78.2] 09/20/2021 Yes    HTN (hypertension) [I10] 09/20/2021 Yes      Problems Resolved During this Admission:       Dry dressing was applied over the left hallux.      I discussed with the  patient the treatments for this can be medical which I do not expect to be effective in his case and they would be quite protracted and difficult on his kidneys also possibly.      I have recommended amputation of the right hallux surgically tonight if possible.  Patient is amenable says he understands that this is my best attempt to save the remainder of his foot reduce his risk of whole-body infection (sepsis).    Plan amputation left hallux this afternoon.    MRI left foot-identify of the potential contributing factors such as deep abscess septic joint-also may clean information about the tips of some lesser digits which also appeared abnormal on the patient's x-ray left    Thank you for your consult. I will follow-up with patient. Please contact us if you have any additional questions.    Samuel Toussaint DPM  Podiatry  [facility] ([place])

## 2023-09-08 NOTE — ASSESSMENT & PLAN NOTE
57 yo male with DM, HTN with chronic osteomyelitis of left hallux who presents with elevated WBC and left hallux pain. Xray suggests changes in hallux, 2nd, and 4th toes - however, these may represent previous infection as well - changes to hallux and 4th toe were seen on MRI in June 2022. Repeat comparison MRI pending, but last treatment ended in January 2023.    Recommend:  Consider amputation of left hallux. Discussed with patient  Obtain cultures of other toes, if possible.  Start empiric therapy with daptomycin and ertapenem as outpatient - patient went to [facility] on last treatment.

## 2023-09-08 NOTE — PROGRESS NOTES
"Pharmacokinetic Initial Assessment: IV Vancomycin    Assessment/Plan:    Initiate intravenous vancomycin with loading dose of 2000 mg once followed by a maintenance dose of vancomycin 2000 mg IV every 12 hours  Desired empiric serum trough concentration is 10 to 20 mcg/mL  Draw vancomycin trough level 60 min prior to fourth dose on 9/9/2023 at approximately 02:00  Pharmacy will continue to follow and monitor vancomycin.      Please contact pharmacy at extension [phone] with any questions regarding this assessment.     Thank you for the consult,   Palma Rankin       Patient brief summary:  Dave Good is a 58 y.o. male initiated on antimicrobial therapy with IV Vancomycin for treatment of suspected bone/joint infection    Drug Allergies:   Review of patient's allergies indicates:   Allergen Reactions    Ibuprofen Swelling     Facial swelling       Actual Body Weight:   117 kg    Renal Function:   Estimated Creatinine Clearance: 106.8 mL/min (based on SCr of 1 mg/dL).,     Dialysis Method (if applicable):  N/A    CBC (last 72 hours):  Recent Labs   Lab Result Units 09/07/23  2343 09/08/23  0401   WBC K/uL 21.91* 18.89*   Hemoglobin g/dL 10.0* 9.3*   Hemoglobin A1C %  --  7.5*   Hematocrit % 32.7* 30.7*   Platelets K/uL 745* 670*   Gran % % 81.5* 75.3*   Lymph % % 9.0* 13.0*   Mono % % 7.4 9.0   Eosinophil % % 1.2 1.6   Basophil % % 0.2 0.3   Differential Method  Automated Automated       Metabolic Panel (last 72 hours):  Recent Labs   Lab Result Units 09/07/23 2343 09/08/23  0401   Sodium mmol/L 134* 134*   Potassium mmol/L 3.7 3.4*   Chloride mmol/L 97 96   CO2 mmol/L 30* 29   Glucose mg/dL 209* 205*   BUN mg/dL 15 17   Creatinine mg/dL 1.1 1.0   Albumin g/dL 1.3* 1.2*   Total Bilirubin mg/dL 0.3 0.4   Alkaline Phosphatase U/L 82 78   AST U/L 25 24   ALT U/L 17 15   Magnesium mg/dL  --  1.5*   Phosphorus mg/dL  --  3.5       Drug levels (last 3 results):  No results for input(s): "VANCOMYCINRA", "VANCORANDOM", " ""VANCOMYCINPE", "VANCOPEAK", "VANCOMYCINTR", "VANCOTROUGH" in the last 72 hours.    Microbiologic Results:  Microbiology Results (last 7 days)       Procedure Component Value Units Date/Time    Blood culture #1 **CANNOT BE ORDERED STAT** [[id number]] Collected: 09/07/23 2343    Order Status: Completed Specimen: Blood from Peripheral, Hand, Left Updated: 09/08/23 1545     Blood Culture, Routine No Growth to date    Blood culture #2 **CANNOT BE ORDERED STAT** [[id number]] Collected: 09/07/23 2343    Order Status: Completed Specimen: Blood from Peripheral, Hand, Right Updated: 09/08/23 1545     Blood Culture, Routine No Growth to date    Blood culture [[id number]] Collected: 09/08/23 1333    Order Status: Sent Specimen: Blood Updated: 09/08/23 1333            "

## 2023-09-08 NOTE — CONSULTS
[facility] - Med Surg ([place])  Wound Care    Patient Name:  Dave Good   MRN:  [id number]  Date: 9/8/2023  Diagnosis: Osteomyelitis    History:     Past Medical History:   Diagnosis Date    Arthritis     COPD (chronic obstructive pulmonary disease)     COVID-19     Depression     Diabetes mellitus     Diabetes mellitus, type 2     Hypertension        Social History     Socioeconomic History    Marital status: Single   Tobacco Use    Smoking status: Former    Smokeless tobacco: Never   Substance and Sexual Activity    Alcohol use: Not Currently     Comment: pt reports he drinks 1/2 a pint of whiskey every day    Drug use: Yes     Types: Marijuana    Sexual activity: Yes     Partners: Female     Social Determinants of Health     Financial Resource Strain: Low Risk  (6/16/2022)    Overall Financial Resource Strain (CARDIA)     Difficulty of Paying Living Expenses: Not very hard   Food Insecurity: Food Insecurity Present (6/16/2022)    Hunger Vital Sign     Worried About Running Out of Food in the Last Year: Often true     Ran Out of Food in the Last Year: Often true   Transportation Needs: No Transportation Needs (6/16/2022)    PRAPARE - Transportation     Lack of Transportation (Medical): No     Lack of Transportation (Non-Medical): No   Physical Activity: Inactive (6/16/2022)    Exercise Vital Sign     Days of Exercise per Week: 0 days     Minutes of Exercise per Session: 0 min   Stress: No Stress Concern Present (6/16/2022)    [employer] [place] [employer] - Occupational Stress Questionnaire     Feeling of Stress : Not at all   Social Connections: Moderately Integrated (6/16/2022)    Social Connection and Isolation Panel [NHANES]     Frequency of Communication with Friends and Family: Twice a week     Frequency of Social Gatherings with Friends and Family: Twice a week     Attends Buddhist Services: 1 to 4 times per year     Active Member of Clubs or Organizations: No     Attends Club or Organization  Meetings: Never     Marital Status: Living with partner   Housing Stability: Unknown (6/16/2022)    Housing Stability Vital Sign     Unable to Pay for Housing in the Last Year: No     Unstable Housing in the Last Year: No       Precautions:     Allergies as of 09/07/2023 - Reviewed 09/07/2023   Allergen Reaction Noted    Ibuprofen Swelling 07/29/2014       [facility] Assessment Details/Treatment     Wound care consult received for assessment of left foot wounds to great toe and plantar foot. Patient is a 58 year old established patient of Dr. Toussaint and sees him regularly in the podiatry clinic. Patient is currently NPO. Spoke with Dr. Toussaint who stated that wound care can hold off on assessing patient today and he will see patient and determine if he will need a debridement in OR. Nursing notified.Dressing placed to left foot overnight by nursing is currently  intact. Wound care will await podiatry recommendations and follow pt if wound care team is needed.          09/08/2023

## 2023-09-08 NOTE — PROGRESS NOTES
Respiratory nebulizer treatment completed. No adverse reactions noted.Worked with pt on the incentive spirometer.   Pt understands the benefits of the breathing device and is working to achieve more volume and sustain a breath hold. Deep breathing and occasional coughing was encouraged.Pt doing well on NC 2. No SOB  Will continue to monitor saturations.

## 2023-09-08 NOTE — FIRST PROVIDER EVALUATION
Emergency Department TeleTriage Encounter Note      CHIEF COMPLAINT    Chief Complaint   Patient presents with    Toe Pain     Infection and increased swelling to toe after stubbing toe last week, reports being seen, h/o toe amputations and DM       VITAL SIGNS   Initial Vitals [09/07/23 1938]   BP Pulse Resp Temp SpO2   (!) 150/70 99 16 98 °F (36.7 °C) 99 %      MAP       --            ALLERGIES    Review of patient's allergies indicates:   Allergen Reactions    Ibuprofen Swelling     Facial swelling       PROVIDER TRIAGE NOTE  This is a teletriage evaluation of a 58 y.o. male presenting to the ED complaining of swelling and possible infection of toe after injury last week. Pt was seen in ED on 8/27  57-year-old diabetic male who presents after injuring his left great toe.  He sees podiatry and had has been noted to have osteomyelitis of the toe on prior x-ray from 07/2023.  Discussed the case with Radiology, osteomyelitis seems more advanced.  No fracture from trauma perspective.  Antibiotic ointment and bandage were placed over small wound.  I discussed the case with Podiatry, they recommend no antibiotics or admission, until follow-up outpatient with Infectious Disease as he is had drug-resistant organisms in the past and osteomyelitis is chronic rather than acute.  Consult was placed.   Pt states he has f/u with podiatry and they did not start antibiotics. No new trauma.    Initial orders will be placed and care will be transferred to an alternate provider when patient is roomed for a full evaluation. Any additional orders and the final disposition will be determined by that provider.         ORDERS  Labs Reviewed   POCT GLUCOSE MONITORING CONTINUOUS       ED Orders (720h ago, onward)      Start Ordered     Status Ordering Provider    09/07/23 2021 09/07/23 2021  POCT glucose  Once         Ordered JAXON AUSTIN              Virtual Visit Note: The provider triage portion of this emergency  department evaluation and documentation was performed via [employer], a HIPAA-compliant telemedicine application, in concert with a tele-presenter in the room. A face to face patient evaluation with one of my colleagues will occur once the patient is placed in an emergency department room.      DISCLAIMER: This note was prepared with [employer] voice recognition transcription software. Garbled syntax, mangled pronouns, and other bizarre constructions may be attributed to that software system.

## 2023-09-08 NOTE — PLAN OF CARE
MRI of L foot ordered.  Pt had TTE and chest xray.  IV antibiotics ordered.  Pt will have L hallux amputated this evening.  PRN oxygen ordered; to be discontinued when sats are at or above 95%.  Pt NPO throughout shift.  Supplemental oxygen ordered in afternoon due to continued low oxygen sats.  Pt's IV infiltrated while getting vancomycin in afternoon.  Vital signs and blood sugar closely monitored.

## 2023-09-08 NOTE — PLAN OF CARE
Recommendations  1) When medically able, adv diet to consistent carbohydrate    2) Recommend James BID to promote wound healing    3) MVI daily    4) RD to follow to monitor nutrition status    Goals:   Pt will maintain >75% EEN/EPN by RD follow up  Nutrition Goal Status: new  Communication of RD Recs:  (POC)

## 2023-09-08 NOTE — BRIEF OP NOTE
[facility] Med [facility]urg ([place])  Brief Operative Note    SUMMARY     Surgery Date: 9/8/2023     Surgeon(s) and Role:     * Samuel Toussaint, DPM - Primary    Assisting Surgeon: None    Pre-op Diagnosis:  Other acute osteomyelitis, unspecified ankle and foot [M86.179]  Open wound of right great toe, initial encounter [S91.101A]    Post-op Diagnosis:  Post-Op Diagnosis Codes:     * Other acute osteomyelitis, unspecified ankle and foot [M86.179]     * Open wound of right great toe, initial encounter [S91.101A]    Procedure(s) (LRB):  AMPUTATION, TOE (Left)    Anesthesia: Local MAC    Operative Findings: abscess with pus tracking up EHL tendon sheath, necrotic distal phalanx.  Grossly intact proximal phalanx left hallux.    Estimated Blood Loss: * No values recorded between 9/8/2023  5:48 PM and 9/8/2023  6:14 PM *    Estimated Blood Loss has not been documented. EBL = 30.         Specimens:   Specimen (24h ago, onward)       Start     Ordered    09/08/23 1755  Specimen to Pathology, Surgery Orthopedics  Once        Comments: Pre-op Diagnosis: Other acute osteomyelitis, unspecified ankle and foot [M86.179]Open wound of right great toe, initial encounter [S91.101A]Procedure(s):AMPUTATION, TOE Number of specimens: 1Name of specimens: 1. Left Great Toe - PERM     References:    Click here for ordering Quick Tip   Question Answer Comment   Procedure Type: Orthopedics    Specimen Class: Routine/Screening    Which provider would you like to cc? SAMUEL TOUSSAINT    Release to patient Immediate        09/08/23 1224                    [id number]

## 2023-09-08 NOTE — ANESTHESIA PREPROCEDURE EVALUATION
09/08/2023  Dave Good is a 58 y.o., male.      Pre-op Assessment    I have reviewed the Patient Summary Reports.     I have reviewed the Nursing Notes. I have reviewed the NPO Status.   I have reviewed the Medications.     Review of Systems  Anesthesia Hx:  Denies Family Hx of Anesthesia complications.   Denies Personal Hx of Anesthesia complications.   Social:  Former Smoker Marijuana   Hematology/Oncology:         -- Anemia: Hematology Comments: Hb 9.8   Cardiovascular:   Hypertension Echo 2022:  ? The left ventricle is normal in size with concentric remodeling and normal systolic function.  ? Normal central venous pressure (3 mmHg).  ? There is abnormal septal wall motion.  ? Normal left ventricular diastolic function.  ? Normal right ventricular size with normal right ventricular systolic function.   Pulmonary:   COPD Asthma    Renal/:  Renal/ Normal     Hepatic/GI:  Hepatic/GI Normal    Musculoskeletal:   Arthritis     Endocrine:   Diabetes  Obesity / BMI > 30  Psych:   Psychiatric History depression          Physical Exam  General: Well nourished, Cooperative, Alert and Oriented    Airway:  Mallampati: II   Mouth Opening: Normal  TM Distance: Normal  Tongue: Normal  Neck ROM: Normal ROM    Dental:  Periodontal disease        Anesthesia Plan  Type of Anesthesia, risks & benefits discussed:    Anesthesia Type: MAC  Intra-op Monitoring Plan: Standard ASA Monitors  Post Op Pain Control Plan: multimodal analgesia  Induction:  IV  Informed Consent: Informed consent signed with the Patient and all parties understand the risks and agree with anesthesia plan.  All questions answered.   ASA Score: 3    Ready For Surgery From Anesthesia Perspective.     .

## 2023-09-08 NOTE — PLAN OF CARE
Patient is alert and oriented with no communication barriers. Patient lives alone and he not on coumadin or dialysis. Patient will need a ride home at discharge.   09/08/23 1404   Discharge Assessment   Assessment Type Discharge Planning Assessment   Confirmed/corrected address, phone number and insurance Yes   Confirmed Demographics Correct on Facesheet   Source of Information patient   People in Home alone   Do you expect to return to your current living situation? Yes   Do you have help at home or someone to help you manage your care at home? No   Prior to hospitilization cognitive status: Alert/Oriented   Current cognitive status: Alert/Oriented   Equipment Currently Used at Home none   Readmission within 30 days? No   Patient currently being followed by outpatient case management? No   Do you currently have service(s) that help you manage your care at home? No   Do you take prescription medications? Yes   Do you have prescription coverage? Yes   Coverage Medicaid   Do you have any problems affording any of your prescribed medications? No   Is the patient taking medications as prescribed? yes   Who is going to help you get home at discharge? Patient will need a ride home.   How do you get to doctors appointments? family or friend will provide;health plan transportation   Are you on dialysis? No   DME Needed Upon Discharge  none   Transition of Care Barriers None   Discharge Plan A Home     [facility]  Initial Discharge Assessment       Primary Care Provider: Reyna Jorge NP    Admission Diagnosis: Osteomyelitis [M86.9]    Admission Date: 9/7/2023  Expected Discharge Date:     Transition of Care Barriers: (P) None    Payor: MEDICAID / Plan: [facility] ([place] MEDICAID) / Product Type: Managed Medicaid /     Extended Emergency Contact Information  Primary Emergency Contact: jimy low  Mobile Phone: [phone]  Relation: Relative   needed? No    Discharge  Plan A: (P) Home         [facility] - [place], [place] - [address]  [address]  [address]  [place] [address]  Phone: [phone] Fax: [phone]    [facility]  [address]  [place] [address]  Phone: [phone] Fax: [phone]    [id number] - [place], [place] - [address] AT [facility] [address]  [address]  [place] [address]  Phone: [phone] Fax: [phone]      Initial Assessment (most recent)       Adult Discharge Assessment - 09/08/23 1404          Discharge Assessment    Assessment Type Discharge Planning Assessment (P)      Confirmed/corrected address, phone number and insurance Yes (P)      Confirmed Demographics Correct on Facesheet (P)      Source of Information patient (P)      People in Home alone (P)      Do you expect to return to your current living situation? Yes (P)      Do you have help at home or someone to help you manage your care at home? No (P)      Prior to hospitilization cognitive status: Alert/Oriented (P)      Current cognitive status: Alert/Oriented (P)      Equipment Currently Used at Home none (P)      Readmission within 30 days? No (P)      Patient currently being followed by outpatient case management? No (P)      Do you currently have service(s) that help you manage your care at home? No (P)      Do you take prescription medications? Yes (P)      Do you have prescription coverage? Yes (P)      Coverage Medicaid (P)      Do you have any problems affording any of your prescribed medications? No (P)      Is the patient taking medications as prescribed? yes (P)      Who is going to help you get home at discharge? Patient will need a ride home. (P)      How do you get to doctors appointments? family or friend will provide;health plan transportation (P)      Are you on dialysis? No (P)      DME Needed Upon Discharge  none (P)      Transition of Care  Barriers None (P)      Discharge Plan A Home (P)

## 2023-09-08 NOTE — TRANSFER OF CARE
"Anesthesia Transfer of Care Note    Patient: Dave Good    Procedure(s) Performed: Procedure(s) (LRB):  AMPUTATION, TOE (Left)    Patient location: PACU    Anesthesia Type: MAC    Transport from OR: Transported from OR on 2-3 L/min O2 by NC with adequate spontaneous ventilation    Post pain: adequate analgesia    Post assessment: no apparent anesthetic complications and tolerated procedure well    Post vital signs: stable    Level of consciousness: awake    Nausea/Vomiting: no nausea/vomiting    Complications: none    Transfer of care protocol was followed      Last vitals:   Visit Vitals  /74 (Patient Position: Lying)   Pulse 89   Temp 37.1 °C (98.7 °F) (Oral)   Resp 16   Ht 6' 2" (1.88 m)   Wt 117 kg (257 lb 15 oz)   SpO2 95%   BMI 33.12 kg/m²     "

## 2023-09-08 NOTE — HPI
57 yo male with DM, HTN, PAD, chronic osteomyelitis of left hallux who presented on 8/27 and 9/7 with left hallux pain. He was last seen by ID in January after finishing 6 weeks of IV daptomycin and meropenem on 1/3/23. On presentation he had no fever, but WBC was elevated at 21. He was given one dose of vancomycin. I am consulted for evaluation and treatment.

## 2023-09-08 NOTE — CONSULTS
[facility]  Infectious Disease  Consult Note    Patient Name: Dave Good  MRN: [id number]  Admission Date: 9/7/2023  Hospital Length of Stay: 0 days  Attending Physician: Hari Iverson MD  Primary Care Provider: Reyna Jorge NP     Isolation Status: No active isolations    Patient information was obtained from patient, past medical records and ER records.      Inpatient consult to Infectious Diseases  Consult performed by: Gomez Manrique MD  Consult ordered by: Larry Shepherd NP        Assessment/Plan:     ID  * Chronic osteomyelitis of toe of left foot  57 yo male with DM, HTN with chronic osteomyelitis of left hallux who presents with elevated WBC and left hallux pain. Xray suggests changes in hallux, 2nd, and 4th toes - however, these may represent previous infection as well - changes to hallux and 4th toe were seen on MRI in June 2022. Repeat comparison MRI pending, but last treatment ended in January 2023.    Recommend:  Consider amputation of left hallux. Discussed with patient  Obtain cultures of other toes, if possible.  Start empiric therapy with daptomycin and ertapenem as outpatient - patient went to LTAC on last treatment.         Thank you for your consult. I will follow-up with patient. Please contact us if you have any additional questions.    Gomez Manrique MD  Infectious Disease  [facility]    Subjective:     Principal Problem: Chronic osteomyelitis of toe of left foot    HPI: 57 yo male with DM, HTN, PAD, chronic osteomyelitis of left hallux who presented on 8/27 and 9/7 with left hallux pain. He was last seen by ID in January after finishing 6 weeks of IV daptomycin and meropenem on 1/3/23. On presentation he had no fever, but WBC was elevated at 21. He was given one dose of vancomycin. I am consulted for evaluation and treatment.       Past Medical History:   Diagnosis Date    Arthritis     COPD (chronic obstructive pulmonary disease)      COVID-19     Depression     Diabetes mellitus     Diabetes mellitus, type 2     Hypertension        Past Surgical History:   Procedure Laterality Date    DEBRIDEMENT OF LOWER EXTREMITY Bilateral 3/2/2022    Procedure: DEBRIDEMENT, LOWER EXTREMITY;  Surgeon: Jesus Flores Jr., MD;  Location: [facility];  Service: General;  Laterality: Bilateral;    FOOT AMPUTATION THROUGH METATARSAL Right 9/23/2021    Procedure: AMPUTATION, FOOT, TRANSMETATARSAL right 1st ray resection;  Surgeon: Samuel Toussaint DPM;  Location: [facility] OR;  Service: Podiatry;  Laterality: Right;    INCISION AND DRAINAGE FOOT Bilateral 9/21/2021    Procedure: INCISION AND DRAINAGE, FOOT - Bilateral;  Surgeon: Lavonne Vigil DPM;  Location: [facility];  Service: Podiatry;  Laterality: Bilateral;    REPLACEMENT OF WOUND DRESSING Right 2/24/2022    Procedure: REPLACEMENT, DRESSING, WOUND;  Surgeon: Jesus Flores Jr., MD;  Location: [facility];  Service: Vascular;  Laterality: Right;  wound vac dressing changed    WOUND DEBRIDEMENT Right 2/22/2022    Procedure: DEBRIDEMENT, WOUND - RIGHT FOOT;  Surgeon: Jesus Flores Jr., MD;  Location: [facility];  Service: Vascular;  Laterality: Right;    WOUND DEBRIDEMENT Bilateral 2/24/2022    Procedure: DEBRIDEMENT, WOUND / BILATERAL DEBRIDEMENT FEET;  Surgeon: Jesus Flores Jr., MD;  Location: [facility];  Service: Vascular;  Laterality: Bilateral;    WOUND DEBRIDEMENT Right 5/27/2022    Procedure: DEBRIDEMENT, WOUND;  Surgeon: Jesus Flores Jr., MD;  Location: [facility];  Service: General;  Laterality: Right;       Review of patient's allergies indicates:   Allergen Reactions    Ibuprofen Swelling     Facial swelling       Medications:  Medications Prior to Admission   Medication Sig    ammonium lactate 12 % Crea Apply twice daily to affected parts both feet as needed.    apixaban (ELIQUIS) 5 mg Tab Take 1 tablet (5 mg total) by mouth 2 (two) times daily.    aspirin (ECOTRIN) 81 MG EC tablet Take 1 tablet (81 mg  "total) by mouth once daily.    atorvastatin (LIPITOR) 40 MG tablet Take 1 tablet (40 mg total) by mouth once daily.    bisacodyL (DULCOLAX) 5 mg EC tablet Take 10 mg by mouth once.    gabapentin (NEURONTIN) 600 MG tablet 1 capsule.    HUMALOG U-100 INSULIN 100 unit/mL injection SMARTSI.01 Milliliter(s) SUB-Q 4 Times Daily    hydroCHLOROthiazide (HYDRODIURIL) 25 MG tablet Take 1 tablet (25 mg total) by mouth once daily.    HYDROcodone-acetaminophen (NORCO) 5-325 mg per tablet Take 1 tablet by mouth every 4 (four) hours as needed for Pain.    insulin detemir U-100 (LEVEMIR FLEXTOUCH U-100 INSULN) 100 unit/mL (3 mL) InPn pen Inject 50 Units into the skin every evening.    lisinopriL 10 MG tablet Take 1 tablet (10 mg total) by mouth once daily.    metFORMIN (GLUCOPHAGE) 1000 MG tablet Take 1 tablet (1,000 mg total) by mouth 2 (two) times daily.    CATHFLO ACTIVASE 2 mg injection SMARTSI Vial(s) injection Once    ciclopirox (PENLAC) 8 % Soln Apply topically nightly.    COVID-19 antigen test (FLOWFLEX COVID-19 AG HOME TEST MISC)     dextrose 50 % in water, D50W, Syrg SMARTSI Milliliter(s) IV As Directed PRN    EASY COMFORT PEN NEEDLES 31 gauge x 1/4" Ndle     enoxaparin (LOVENOX) 60 mg/0.6 mL Syrg SMARTSI.2 Milliliter(s) SUB-Q Every 12 Hours    GLUCAGEN DIAGNOSTIC KIT 1 mg/mL SolR SMARTSI Milliliter(s) IM As Directed PRN    GLUTOSE-15 40 % gel SMARTSI.5 Gram(s) By Mouth As Directed PRN    insulin aspart U-100 (NOVOLOG FLEXPEN U-100 INSULIN) 100 unit/mL (3 mL) InPn pen INJECT 10 UNITS INTO THE SKIN BEFORE MEALS THREE TIMES A DAY (50 DAYS)    ketoconazole (NIZORAL) 2 % cream Apply topically.    LANTUS SOLOSTAR U-100 INSULIN glargine 100 units/mL SubQ pen Inject into the skin.    ondansetron 4 mg/2 mL Soln SMARTSI Milliliter(s) IV Every 8 Hours PRN    ONETOUCH ULTRA TEST Strp     polyethylene glycol (GLYCOLAX) 17 gram/dose powder     QUEtiapine (SEROQUEL) 200 MG Tab Take 1 " tablet (200 mg total) by mouth nightly.    STOOL SOFTENER 100 mg capsule Take 100 mg by mouth 2 (two) times daily.    TRULICITY 0.75 mg/0.5 mL pen injector Inject into the skin.    urea 45 % Crea Apply 1 application  topically 2 (two) times daily.    [DISCONTINUED] DAPTOmycin (CUBICIN) 350 mg injection Inject into the vein.    [DISCONTINUED] DAPTOmycin (CUBICIN) 500 mg injection SMARTSI Milligram(s) IV Daily    [DISCONTINUED] meropenem (MERREM) 500 mg injection Inject into the vein.     Antibiotics (From admission, onward)      Start     Stop Route Frequency Ordered    23 1400  meropenem (MERREM) 1 g in sodium chloride 0.9 % 100 mL IVPB (MB+)         -- IV Every 8 hours (non-standard times) 23 1257    23 1400  vancomycin 2 g in dextrose 5 % 500 mL IVPB         -- IV Once 23 1303    23 1354  vancomycin - pharmacy to dose  (vancomycin IVPB (PEDS and ADULTS))        See Hyperspace for full Linked Orders Report.    -- IV pharmacy to manage frequency 23 1257          Antifungals (From admission, onward)      None          Antivirals (From admission, onward)      None             There is no immunization history for the selected administration types on file for this patient.    Family History    None       Social History     Socioeconomic History    Marital status: Single   Tobacco Use    Smoking status: Former    Smokeless tobacco: Never   Substance and Sexual Activity    Alcohol use: Not Currently     Comment: pt reports he drinks 1/2 a pint of whiskey every day    Drug use: Yes     Types: Marijuana    Sexual activity: Yes     Partners: Female     Social Determinants of Health     Financial Resource Strain: Low Risk  (2022)    Overall Financial Resource Strain (CARDIA)     Difficulty of Paying Living Expenses: Not very hard   Food Insecurity: Food Insecurity Present (2022)    Hunger Vital Sign     Worried About Running Out of Food in the Last Year: Often  true     Ran Out of Food in the Last Year: Often true   Transportation Needs: No Transportation Needs (6/16/2022)    PRAPARE - Transportation     Lack of Transportation (Medical): No     Lack of Transportation (Non-Medical): No   Physical Activity: Inactive (6/16/2022)    Exercise Vital Sign     Days of Exercise per Week: 0 days     Minutes of Exercise per Session: 0 min   Stress: No Stress Concern Present (6/16/2022)    [employer] - Occupational Stress Questionnaire     Feeling of Stress : Not at all   Social Connections: Moderately Integrated (6/16/2022)    Social Connection and Isolation Panel [NHANES]     Frequency of Communication with Friends and Family: Twice a week     Frequency of Social Gatherings with Friends and Family: Twice a week     Attends [employer] Services: 1 to 4 times per year     Active Member of Clubs or Organizations: No     Attends Club or Organization Meetings: Never     Marital Status: Living with partner   Housing Stability: Unknown (6/16/2022)    Housing Stability Vital Sign     Unable to Pay for Housing in the Last Year: No     Unstable Housing in the Last Year: No     Review of Systems   Constitutional:  Negative for fever.   All other systems reviewed and are negative.    Objective:     Vital Signs (Most Recent):  Temp: 98.7 °F (37.1 °C) (09/08/23 1114)  Pulse: 94 (09/08/23 1114)  Resp: 16 (09/08/23 1114)  BP: 127/74 (09/08/23 1114)  SpO2: (!) 92 % (notified [place]IR[place] Jara) (09/08/23 1114) Vital Signs (24h Range):  Temp:  [98 °F (36.7 °C)-99.2 °F (37.3 °C)] 98.7 °F (37.1 °C)  Pulse:  [86-99] 94  Resp:  [16-18] 16  SpO2:  [90 %-99 %] 92 %  BP: (108-150)/(70-82) 127/74     Weight: 117.2 kg (258 lb 6.1 oz)  Body mass index is 33.17 kg/m².    Estimated Creatinine Clearance: 106.9 mL/min (based on SCr of 1 mg/dL).     Physical Exam  Vitals and nursing note reviewed.   Constitutional:       Appearance: Normal appearance.   HENT:      Head: Normocephalic  "and atraumatic.   Eyes:      Conjunctiva/sclera: Conjunctivae normal.      Pupils: Pupils are equal, round, and reactive to light.   Cardiovascular:      Rate and Rhythm: Normal rate and regular rhythm.   Pulmonary:      Effort: Pulmonary effort is normal.      Breath sounds: Normal breath sounds.   Abdominal:      General: Abdomen is flat. Bowel sounds are normal.   Musculoskeletal:      Comments: Left hallux with swelling and tenderness.    Skin:     General: Skin is warm and dry.   Neurological:      General: No focal deficit present.      Mental Status: He is alert and oriented to person, place, and time.          Significant Labs: Blood Culture: No results for input(s): "LABBLOO" in the last 4320 hours.  CBC:   Recent Labs   Lab 09/07/23  2343 09/08/23  0401   WBC 21.91* 18.89*   HGB 10.0* 9.3*   HCT 32.7* 30.7*   * 670*     CMP:   Recent Labs   Lab 09/07/23 2343 09/08/23  0401   * 134*   K 3.7 3.4*   CL 97 96   CO2 30* 29   * 205*   BUN 15 17   CREATININE 1.1 1.0   CALCIUM 9.1 9.1   PROT 9.0* 8.7*   ALBUMIN 1.3* 1.2*   BILITOT 0.3 0.4   ALKPHOS 82 78   AST 25 24   ALT 17 15   ANIONGAP 7* 9     Wound Culture: No results for input(s): "LABAERO" in the last 4320 hours.    Significant Imaging: Left foot XRAY: Acute osteomyelitis involving the great toe and distal aspects of the 2nd and 4th toes."    MRI and CXR have been ordered.     MRI in June 2022 showed:   "There is soft tissue thickening/ulceration along the plantar aspect of the forefoot, at the level of the 1st through 3rd metatarsal heads.  There is mild marrow edema in the tuft of the distal 1st phalanx (series 8, image 15).  The T1 signal is relatively normal, suggesting reactive edema versus early/mild osteomyelitis.  Second and 3rd phalanges marrow signal is within normal limits.  There is probable chronic marrow changes to the distal phalanx of the 4th phalanges.  No fracture.  No marrow replacement process.  There is muscle " "atrophy of the forefoot.  No fluid collections.  There is tenosynovitis of the flexor hallucis longus in the midfoot."              "

## 2023-09-08 NOTE — PROGRESS NOTES
Surgery called stating ready for patient.  MRI then called asking if they could send for patient.  RN advised surgery of MRI's call.  While preparing pt for transfer to surgery, RN noted patient's IV had infiltrated while vancomycin infusing.  MD and surgery made aware.  Surgery will place new IV.

## 2023-09-08 NOTE — OP NOTE
09/08/2023    Her     No assist     Preop diagnosis osteomyelitis with abscess cellulitis left foot.      Postop diagnosis same    Pathology great toe left    Procedure amputation left hallux , irrigation debridement foot left    Anesthesia local with monitored anesthesia care     Hemostasis anatomic dissection direct pressure manually     Estimated blood loss 30 mL     materials none     Injectables 15 mL one-to-one mixture 1% lidocaine plain 0.5% Marcaine plain    Condition stable     Complications none apparent    Procedure in detail:     Under mild sedation the patient was brought in the operating room and placed on the operating table in supine position.  Time-out was performed all patient identifiers site markings and procedures were noted to be in agreement all present.      following establishment of IV sedation, local anesthesia was obtained about the patient's left foot utilizing local injection consisting of a total of 15 mL of one-to-one mixture 1% lidocaine plain 0.5% Marcaine plain.      Attention was then directed to the distal 1st ray of the patient's left foot where 2 semielliptical horizontally oriented incisions were made around the base of the proximal phalanx of the left hallux.  These were made down to bone.  Periosteal dissection was carried to the 1st metatarsophalangeal joint which was then incised circumferentially affecting amputation of the digit.      Digit was passed from the operative site and sent as gross pathology.    Was noted to be pus tracking down the extensor hallucis longus tendon sheath this was cultured along the deep wound track.      Incision was then made exposing the wound track of the EHL necrotic tendon was resected passed from the operative site all nonviable tissue was then surgically removed with sterile 15 blade.      The wound was copiously irrigated with a L sterile normal saline.  All remaining tissue appeared to be viable with good perfusion no necrosis and no  purulence remaining.      Wound was partially closed long dorsal incision and the edges of the horizontal incision.  Wound was then packed with saline soaked Kerlix dressed with Kerlix and Ace bandage foot and ankle neutral position.      Podiatry will follow up tomorrow.

## 2023-09-08 NOTE — HPI
"Per Larry Shepherd, KEVYN:    "The patient is a 58 y.o. male with a past medical history of type 2 diabetes, HTN, HLD, and chronic systolic congestive heart failure who presents with worsening left great toe pain infection and swelling.  Patient was recently seen in the ER and discharged to follow up with ID and wound care.  Since then he has been unable to follow-up with podiatry or infectious disease.  He states that he has not tried to call their offices and was waiting for them to call him.  States that his toe pain has gotten worse.  He has been soaking it in a foot massage machine chair with no improvement.  XR positive for left great, 2nd, and 4th toe acute osteomyelitis.  He will be admitted for further management of his acute osteomyelitis and infectious disease and podiatry consult."  "

## 2023-09-08 NOTE — PLAN OF CARE
Pt now in room 312. Assisted to bed from stretcher with X1 assist. AAOx4 on RA. Pt c/o pain to L foot, PRN Norco given. Wound to L great toe with small creamy serosanguineous drainage-malodorous. Wound cleansed with Vashe, Ag Hydrofiber applied to wound bed, skin barrier applied to periwound area, dressed with Kerlix, cast padding and ACE wrap applied, elevated BLE on pillow. NS infusing @ 75 mL/hr.  Purposeful hourly rounding performed. Pt educated to call with assist ambulating, urinal at bedside. VSS on RA. Pt watching television in bed, NADN. Bed low and locked, bed alarm on, call light in reach. Side rails up x2. Will continue to monitor.

## 2023-09-08 NOTE — ASSESSMENT & PLAN NOTE
Continue lisinopril     Post-Care Instructions: I reviewed with the patient in detail post-care instructions. Patient is to wear sunprotection, and avoid picking at any of the treated lesions. Pt may apply Vaseline to crusted or scabbing areas. Duration Of Freeze Thaw-Cycle (Seconds): 10 Detail Level: Detailed Render Note In Bullet Format When Appropriate: No Consent: The patient's consent was obtained including but not limited to risks of crusting, scabbing, blistering, scarring, darker or lighter pigmentary change, recurrence, incomplete removal and infection. Number Of Freeze-Thaw Cycles: 1 freeze-thaw cycle

## 2023-09-08 NOTE — SUBJECTIVE & OBJECTIVE
Past Medical History:   Diagnosis Date    Arthritis     COPD (chronic obstructive pulmonary disease)     COVID-19     Depression     Diabetes mellitus     Diabetes mellitus, type 2     Hypertension        Past Surgical History:   Procedure Laterality Date    DEBRIDEMENT OF LOWER EXTREMITY Bilateral 3/2/2022    Procedure: DEBRIDEMENT, LOWER EXTREMITY;  Surgeon: Jesus Flores Jr., MD;  Location: [facility];  Service: General;  Laterality: Bilateral;    FOOT AMPUTATION THROUGH METATARSAL Right 9/23/2021    Procedure: AMPUTATION, FOOT, TRANSMETATARSAL right 1st ray resection;  Surgeon: Samuel Toussaint DPM;  Location: [facility];  Service: Podiatry;  Laterality: Right;    INCISION AND DRAINAGE FOOT Bilateral 9/21/2021    Procedure: INCISION AND DRAINAGE, FOOT - Bilateral;  Surgeon: Lavonne Vigil DPM;  Location: [facility];  Service: Podiatry;  Laterality: Bilateral;    REPLACEMENT OF WOUND DRESSING Right 2/24/2022    Procedure: REPLACEMENT, DRESSING, WOUND;  Surgeon: Jesus Flores Jr., MD;  Location: [facility];  Service: Vascular;  Laterality: Right;  wound vac dressing changed    WOUND DEBRIDEMENT Right 2/22/2022    Procedure: DEBRIDEMENT, WOUND - RIGHT FOOT;  Surgeon: Jesus Flores Jr., MD;  Location: [facility];  Service: Vascular;  Laterality: Right;    WOUND DEBRIDEMENT Bilateral 2/24/2022    Procedure: DEBRIDEMENT, WOUND / BILATERAL DEBRIDEMENT FEET;  Surgeon: Jesus Flores Jr., MD;  Location: [facility];  Service: Vascular;  Laterality: Bilateral;    WOUND DEBRIDEMENT Right 5/27/2022    Procedure: DEBRIDEMENT, WOUND;  Surgeon: Jesus Flores Jr., MD;  Location: [facility];  Service: General;  Laterality: Right;       Review of patient's allergies indicates:   Allergen Reactions    Ibuprofen Swelling     Facial swelling       Current Facility-Administered Medications on File Prior to Encounter   Medication    0.9%  NaCl infusion    LIDOcaine (PF) 10 mg/ml (1%) injection 10 mg     Current Outpatient Medications on File Prior to  "Encounter   Medication Sig    ammonium lactate 12 % Crea Apply twice daily to affected parts both feet as needed.    apixaban (ELIQUIS) 5 mg Tab Take 1 tablet (5 mg total) by mouth 2 (two) times daily.    aspirin (ECOTRIN) 81 MG EC tablet Take 1 tablet (81 mg total) by mouth once daily.    atorvastatin (LIPITOR) 40 MG tablet Take 1 tablet (40 mg total) by mouth once daily.    bisacodyL (DULCOLAX) 5 mg EC tablet Take 10 mg by mouth once.    gabapentin (NEURONTIN) 600 MG tablet 1 capsule.    HUMALOG U-100 INSULIN 100 unit/mL injection SMARTSI.01 Milliliter(s) SUB-Q 4 Times Daily    hydroCHLOROthiazide (HYDRODIURIL) 25 MG tablet Take 1 tablet (25 mg total) by mouth once daily.    HYDROcodone-acetaminophen (NORCO) 5-325 mg per tablet Take 1 tablet by mouth every 4 (four) hours as needed for Pain.    insulin detemir U-100 (LEVEMIR FLEXTOUCH U-100 INSULN) 100 unit/mL (3 mL) InPn pen Inject 50 Units into the skin every evening.    lisinopriL 10 MG tablet Take 1 tablet (10 mg total) by mouth once daily.    metFORMIN (GLUCOPHAGE) 1000 MG tablet Take 1 tablet (1,000 mg total) by mouth 2 (two) times daily.    CATHFLO ACTIVASE 2 mg injection SMARTSI Vial(s) injection Once    ciclopirox (PENLAC) 8 % Soln Apply topically nightly.    COVID-19 antigen test (FLOWFLEX COVID-19 AG HOME TEST MISC)     dextrose 50 % in water, D50W, Syrg SMARTSI Milliliter(s) IV As Directed PRN    EASY COMFORT PEN NEEDLES 31 gauge x 1/4" Ndle     enoxaparin (LOVENOX) 60 mg/0.6 mL Syrg SMARTSI.2 Milliliter(s) SUB-Q Every 12 Hours    GLUCAGEN DIAGNOSTIC KIT 1 mg/mL SolR SMARTSI Milliliter(s) IM As Directed PRN    GLUTOSE-15 40 % gel SMARTSI.5 Gram(s) By Mouth As Directed PRN    insulin aspart U-100 (NOVOLOG FLEXPEN U-100 INSULIN) 100 unit/mL (3 mL) InPn pen INJECT 10 UNITS INTO THE SKIN BEFORE MEALS THREE TIMES A DAY (50 DAYS)    ketoconazole (NIZORAL) 2 % cream Apply topically.    LANTUS SOLOSTAR U-100 INSULIN glargine 100 units/mL " SubQ pen Inject into the skin.    ondansetron 4 mg/2 mL Soln SMARTSI Milliliter(s) IV Every 8 Hours PRN    ONETOUCH ULTRA TEST Strp     polyethylene glycol (GLYCOLAX) 17 gram/dose powder     QUEtiapine (SEROQUEL) 200 MG Tab Take 1 tablet (200 mg total) by mouth nightly.    STOOL SOFTENER 100 mg capsule Take 100 mg by mouth 2 (two) times daily.    TRULICITY 0.75 mg/0.5 mL pen injector Inject into the skin.    urea 45 % Crea Apply 1 application  topically 2 (two) times daily.    [DISCONTINUED] DAPTOmycin (CUBICIN) 350 mg injection Inject into the vein.    [DISCONTINUED] DAPTOmycin (CUBICIN) 500 mg injection SMARTSI Milligram(s) IV Daily    [DISCONTINUED] losartan (COZAAR) 25 MG tablet Take 25 mg by mouth 2 (two) times daily.    [DISCONTINUED] meropenem (MERREM) 500 mg injection Inject into the vein.     Family History    None       Tobacco Use    Smoking status: Former    Smokeless tobacco: Never   Substance and Sexual Activity    Alcohol use: Not Currently     Comment: pt reports he drinks 1/2 a pint of whiskey every day    Drug use: Yes     Types: Marijuana    Sexual activity: Yes     Partners: Female     Review of Systems   Constitutional:  Negative for activity change, appetite change, fatigue and fever.   HENT:  Negative for congestion, ear pain and postnasal drip.    Eyes:  Negative for discharge.   Respiratory:  Negative for apnea, shortness of breath and wheezing.    Cardiovascular:  Negative for chest pain and leg swelling.   Gastrointestinal:  Negative for abdominal distention, abdominal pain, nausea and vomiting.   Endocrine: Negative for polydipsia, polyphagia and polyuria.   Genitourinary:  Negative for difficulty urinating, flank pain, frequency, hematuria and urgency.   Musculoskeletal:  Positive for gait problem and myalgias. Negative for arthralgias and joint swelling.   Skin:  Positive for wound (left great toe). Negative for pallor and rash.   Allergic/Immunologic: Negative for environmental  allergies and food allergies.   Neurological:  Negative for dizziness, speech difficulty, weakness, light-headedness and headaches.   Hematological:  Does not bruise/bleed easily.   Psychiatric/Behavioral:  Negative for agitation.      Objective:     Vital Signs (Most Recent):  Temp: 98.1 °F (36.7 °C) (09/08/23 0219)  Pulse: 86 (09/08/23 0219)  Resp: 18 (09/08/23 0219)  BP: 135/77 (09/08/23 0219)  SpO2: 95 % (09/08/23 0219) Vital Signs (24h Range):  Temp:  [98 °F (36.7 °C)-98.9 °F (37.2 °C)] 98.1 °F (36.7 °C)  Pulse:  [86-99] 86  Resp:  [16-18] 18  SpO2:  [95 %-99 %] 95 %  BP: (135-150)/(70-82) 135/77     Weight: 117.2 kg (258 lb 6.1 oz)  Body mass index is 33.17 kg/m².     Physical Exam  Constitutional:       Appearance: Normal appearance. He is well-developed. He is ill-appearing.   HENT:      Head: Normocephalic.   Eyes:      Conjunctiva/sclera: Conjunctivae normal.   Cardiovascular:      Rate and Rhythm: Normal rate and regular rhythm.      Pulses:           Radial pulses are 2+ on the right side and 2+ on the left side.      Heart sounds: Normal heart sounds.   Pulmonary:      Effort: Pulmonary effort is normal. Tachypnea present.      Breath sounds: Normal breath sounds.   Abdominal:      General: Bowel sounds are decreased. There is no distension.      Palpations: Abdomen is soft.      Tenderness: There is no abdominal tenderness.   Musculoskeletal:         General: Normal range of motion.      Cervical back: Normal range of motion and neck supple.   Skin:     General: Skin is warm and dry.      Findings: Lesion (left great toe-malodorous, swelling) present.   Neurological:      Mental Status: He is alert and oriented to person, place, and time.      GCS: GCS eye subscore is 4. GCS verbal subscore is 5. GCS motor subscore is 6.      Motor: Motor function is intact.   Psychiatric:         Mood and Affect: Mood normal.         Speech: Speech normal.         Behavior: Behavior normal.                Significant  Labs: All pertinent labs within the past 24 hours have been reviewed.  CBC:   Recent Labs   Lab 09/07/23  2343   WBC 21.91*   HGB 10.0*   HCT 32.7*   *     CMP:   Recent Labs   Lab 09/07/23  2343   *   K 3.7   CL 97   CO2 30*   *   BUN 15   CREATININE 1.1   CALCIUM 9.1   PROT 9.0*   ALBUMIN 1.3*   BILITOT 0.3   ALKPHOS 82   AST 25   ALT 17   ANIONGAP 7*       Significant Imaging: I have reviewed all pertinent imaging results/findings within the past 24 hours.

## 2023-09-08 NOTE — CONSULTS
[facility]  Adult Nutrition  Consult Note    SUMMARY     Recommendations  1) When medically able, adv diet to consistent carbohydrate    2) Recommend James BID to promote wound healing    3) MVI daily    4) RD to follow to monitor nutrition status    Goals:   Pt will maintain >75% EEN/EPN by RD follow up  Nutrition Goal Status: new  Communication of RD Recs:  (POC)    Assessment and Plan  Nutrition Problem  Increased protein needs    Related to (etiology):   Wound healing    Signs and Symptoms (as evidenced by):   Chronic osteomyelitis of toe of left foot    Interventions (treatment strategy):  Collaboration with other providers    Nutrition Diagnosis Status:   New      Malnutrition Assessment     Skin (Micronutrient): wounds unhealed                                 Reason for Assessment    Reason For Assessment: consult (large non healing wound)  Diagnosis:  (Chronic osteomyelitis of toe of left foot)  Relevant Medical History:   Patient Active Problem List   Diagnosis    Diabetic wet gangrene of the foot    Type 2 diabetes mellitus with diabetic peripheral angiopathy without gangrene, with long-term current use of insulin    HTN (hypertension)    Depression    Mixed hyperlipidemia    Class 1 obesity due to excess calories with serious comorbidity and body mass index (BMI) of 34.0 to 34.9 in adult    Open wound of right foot    Type II diabetes mellitus with peripheral circulatory disorder, uncontrolled    Amputation of right great toe    Skin flap infection    MRSA (methicillin resistant Staphylococcus aureus) infection    Ulcer of both feet with fat layer exposed    Open wound of right great toe    Open wound of toe    COVID-19    COPD with asthma    Chronic systolic congestive heart failure    Open wound of left foot    Cavitary lesion of lung    Increased nutritional needs    Osteomyelitis of right foot    Slow transit constipation    Ulcerated, foot, left, with fat layer exposed    Acute deep  "vein thrombosis (DVT) of axillary vein of right upper extremity    Diabetic ulcer of left great toe    Epistaxis    Chronic osteomyelitis of toe of left foot     Interdisciplinary Rounds: did not attend  General Information Comments: Pt NPO at time of assessment, reports good appetite pta. Pt states he makes his meals at home.  lb. Pt states he has been intentionally losing weight, lost 10 lbs recently. c/o diarrhea, no other GI intolerance reported. Recommend James BID to promote wound healing once diet adv. PIV. Delvis 19- L toe. NFPE completed pt nourished.  Nutrition Discharge Planning: dc on consistent carbohydrates    Nutrition Risk Screen    Nutrition Risk Screen: large or nonhealing wound, burn or pressure injury    Nutrition/Diet History    Food Allergies: NKFA  Factors Affecting Nutritional Intake: pain    Anthropometrics    Temp: 98.7 °F (37.1 °C)  Height Method: Stated  Height: 6' 2" (188 cm)  Height (inches): 74 in  Weight Method: Bed Scale  Weight: 117 kg (257 lb 15 oz)  Weight (lb): 257.94 lb  Ideal Body Weight (IBW), Male: 190 lb  % Ideal Body Weight, Male (lb): 135.76 %  BMI (Calculated): 33.1  BMI Grade: 30 - 34.9- obesity - grade I       Lab/Procedures/Meds    Pertinent Labs Reviewed: reviewed  CBC:  Recent Labs   Lab 09/08/23  0401   WBC 18.89*   HGB 9.3*   HCT 30.7*   *     CMP:  Recent Labs   Lab 09/08/23  0401   CALCIUM 9.1   ALBUMIN 1.2*   PROT 8.7*   *   K 3.4*   CO2 29   CL 96   BUN 17   CREATININE 1.0   ALKPHOS 78   ALT 15   AST 24   BILITOT 0.4     Glucose   Date Value Ref Range Status   09/08/2023 205 (H) 70 - 110 mg/dL Final       Hemoglobin A1C   Date Value Ref Range Status   09/08/2023 7.5 (H) 4.0 - 5.6 % Final     Comment:     ADA Screening Guidelines:  5.7-6.4%  Consistent with prediabetes  >or=6.5%  Consistent with diabetes    High levels of fetal hemoglobin interfere with the HbA1C  assay. Heterozygous hemoglobin variants (HbS, HgC, etc)do  not significantly " interfere with this assay.   However, presence of multiple variants may affect accuracy.     11/22/2022 9.1 (H) 4.0 - 5.6 % Final     Comment:     ADA Screening Guidelines:  5.7-6.4%  Consistent with prediabetes  >or=6.5%  Consistent with diabetes    High levels of fetal hemoglobin interfere with the HbA1C  assay. Heterozygous hemoglobin variants (HbS, HgC, etc)do  not significantly interfere with this assay.   However, presence of multiple variants may affect accuracy.     05/27/2022 7.8 (H) 4.0 - 5.6 % Final     Comment:     ADA Screening Guidelines:  5.7-6.4%  Consistent with prediabetes  >or=6.5%  Consistent with diabetes    High levels of fetal hemoglobin interfere with the HbA1C  assay. Heterozygous hemoglobin variants (HbS, HgC, etc)do  not significantly interfere with this assay.   However, presence of multiple variants may affect accuracy.       Pertinent Medications Reviewed: reviewed  Scheduled Meds:   albuterol-ipratropium  3 mL Nebulization Q4H    aspirin  81 mg Oral Daily    atorvastatin  40 mg Oral Daily    gabapentin  300 mg Oral BID    insulin detemir U-100 (Levemir)  25 Units Subcutaneous QHS    meropenem (MERREM) IVPB  1 g Intravenous Q8H    mupirocin   Nasal BID    QUEtiapine  200 mg Oral Nightly    vancomycin (VANCOCIN) IV (PEDS and ADULTS)  2,000 mg Intravenous Once    vancomycin (VANCOCIN) IV (PEDS and ADULTS)  2,000 mg Intravenous Q12H     Continuous Infusions:  PRN Meds:.acetaminophen, dextrose 10%, dextrose 10%, glucagon (human recombinant), HYDROmorphone, insulin aspart U-100, naloxone, ondansetron, oxyCODONE, sodium chloride 0.9%, Pharmacy to dose Vancomycin consult **AND** vancomycin - pharmacy to dose    Estimated/Assessed Needs    Weight Used For Calorie Calculations: 117 kg (257 lb 15 oz)  Energy Calorie Requirements (kcal): 2458  Energy Need Method: [place]-St Jeor (x 1.2 wound healing)  Protein Requirements: 94-117g (0.8-1.0)  Weight Used For Protein Calculations: 117 kg (257 lb 15  oz)  Fluid Requirements (mL): 1 mL/kcal  Estimated Fluid Requirement Method:  (or per MD)  RDA Method (mL): 2458  CHO Requirement: 300g= 50%dv      Nutrition Prescription Ordered    Current Diet Order: NPO    Evaluation of Received Nutrient/Fluid Intake    I/O: 1680  Energy Calories Required: not meeting needs  Protein Required: not meeting needs  Fluid Required: not meeting needs  Comments: LBM: 9/8/23  Tolerance:  (NPO)  % Intake of Estimated Energy Needs: 0 - 25 %  % Meal Intake: NPO  Intake/Output - Last 3 Shifts         09/06 0700 09/07 0659 09/07 0700 09/08 0659 09/08 0700 09/09 0659    P.O.   50    I.V. (mL/kg)  118 (1)     Total Intake(mL/kg)  118 (1) 50 (0.4)    Urine (mL/kg/hr)   900 (0.9)    Total Output   900    Net  +118 -850           Urine Occurrence  0 x     Stool Occurrence  0 x     Emesis Occurrence  0 x             Nutrition Risk    Level of Risk/Frequency of Follow-up: high       Monitor and Evaluation    Food and Nutrient Intake: energy intake, food and beverage intake  Food and Nutrient Adminstration: diet order  Knowledge/Beliefs/Attitudes: food and nutrition knowledge/skill  Physical Activity and Function: nutrition-related ADLs and IADLs  Anthropometric Measurements: weight, weight change  Biochemical Data, Medical Tests and Procedures: electrolyte and renal panel, gastrointestinal profile, glucose/endocrine profile, inflammatory profile, lipid profile  Nutrition-Focused Physical Findings: overall appearance, skin       Nutrition Follow-Up    RD Follow-up?: Yes    Doris Bojorquez RDN, THEO

## 2023-09-08 NOTE — ASSESSMENT & PLAN NOTE
XR- Acute osteomyelitis involving the great toe and distal aspects of the 2nd and 4th toes.    Consult Podiatry and Infectious disease  Hold abx for now for cultures in AM  IVF

## 2023-09-08 NOTE — ASSESSMENT & PLAN NOTE
"Patient is identified as having Systolic (HFrEF) heart failure that is Chronic. CHF is currently controlled. Latest ECHO performed and demonstrates- Results for orders placed during the hospital encounter of 02/21/22    Echo    Interpretation Summary  · The left ventricle is normal in size with concentric remodeling and normal systolic function.  · Normal central venous pressure (3 mmHg).  · There is abnormal septal wall motion.  · Normal left ventricular diastolic function.  · Normal right ventricular size with normal right ventricular systolic function.  . Hold lisinopril and hctz  and monitor clinical status closely. Monitor on telemetry. Patient is off CHF pathway.  Monitor strict Is&Os and daily weights.  Place on fluid restriction of 1.5 L. Cardiology has not been any consulted. Continue to stress to patient importance of self efficacy and  on diet for CHF. Last BNP reviewed- and noted below No results for input(s): "BNP", "BNPTRIAGEBLO" in the last 168 hours..  "

## 2023-09-09 PROBLEM — M86.672 CHRONIC OSTEOMYELITIS OF TOE OF LEFT FOOT: Status: RESOLVED | Noted: 2023-09-08 | Resolved: 2023-09-09

## 2023-09-09 PROBLEM — A41.01 STAPHYLOCOCCUS AUREUS BACTEREMIA WITH SEPSIS: Status: ACTIVE | Noted: 2023-09-09

## 2023-09-09 PROBLEM — I50.43 ACUTE ON CHRONIC COMBINED SYSTOLIC AND DIASTOLIC CONGESTIVE HEART FAILURE: Status: ACTIVE | Noted: 2022-01-07

## 2023-09-09 PROBLEM — E78.2 MIXED HYPERLIPIDEMIA: Status: RESOLVED | Noted: 2021-09-20 | Resolved: 2023-09-09

## 2023-09-09 LAB
ACINETOBACTER CALCOACETICUS/BAUMANNII COMPLEX: NOT DETECTED
ANION GAP SERPL CALC-SCNC: 9 MMOL/L (ref 8–16)
BACTEROIDES FRAGILIS: NOT DETECTED
BASOPHILS # BLD AUTO: 0.05 K/UL (ref 0–0.2)
BASOPHILS NFR BLD: 0.4 % (ref 0–1.9)
BUN SERPL-MCNC: 16 MG/DL (ref 6–20)
CALCIUM SERPL-MCNC: 8.6 MG/DL (ref 8.7–10.5)
CANDIDA ALBICANS: NOT DETECTED
CANDIDA AURIS: NOT DETECTED
CANDIDA GLABRATA: NOT DETECTED
CANDIDA KRUSEI: NOT DETECTED
CANDIDA PARAPSILOSIS: NOT DETECTED
CANDIDA TROPICALIS: NOT DETECTED
CHLORIDE SERPL-SCNC: 96 MMOL/L (ref 95–110)
CO2 SERPL-SCNC: 26 MMOL/L (ref 23–29)
CREAT SERPL-MCNC: 1.1 MG/DL (ref 0.5–1.4)
CRYPTOCOCCUS NEOFORMANS/GATTII: NOT DETECTED
CTX-M GENE: ABNORMAL
DIFFERENTIAL METHOD: ABNORMAL
ENTEROBACTER CLOACAE COMPLEX: NOT DETECTED
ENTEROBACTERALES: NOT DETECTED
ENTEROCOCCUS FAECALIS: NOT DETECTED
ENTEROCOCCUS FAECIUM: NOT DETECTED
EOSINOPHIL # BLD AUTO: 0.3 K/UL (ref 0–0.5)
EOSINOPHIL NFR BLD: 2.1 % (ref 0–8)
ERYTHROCYTE [DISTWIDTH] IN BLOOD BY AUTOMATED COUNT: 16.4 % (ref 11.5–14.5)
ESCHERICHIA COLI: NOT DETECTED
EST. GFR  (NO RACE VARIABLE): >60 ML/MIN/1.73 M^2
GLUCOSE SERPL-MCNC: 204 MG/DL (ref 70–110)
GRAM STN SPEC: NORMAL
HAEMOPHILUS INFLUENZAE: NOT DETECTED
HCT VFR BLD AUTO: 29.2 % (ref 40–54)
HGB BLD-MCNC: 8.9 G/DL (ref 14–18)
IMM GRANULOCYTES # BLD AUTO: 0.07 K/UL (ref 0–0.04)
IMM GRANULOCYTES NFR BLD AUTO: 0.5 % (ref 0–0.5)
IMP GENE: ABNORMAL
KLEBSIELLA AEROGENES: NOT DETECTED
KLEBSIELLA OXYTOCA: NOT DETECTED
KLEBSIELLA PNEUMONIAE GROUP: NOT DETECTED
KPC: ABNORMAL
LISTERIA MONOCYTOGENES: NOT DETECTED
LYMPHOCYTES # BLD AUTO: 2 K/UL (ref 1–4.8)
LYMPHOCYTES NFR BLD: 14.6 % (ref 18–48)
MAGNESIUM SERPL-MCNC: 1.7 MG/DL (ref 1.6–2.6)
MCH RBC QN AUTO: 24.9 PG (ref 27–31)
MCHC RBC AUTO-ENTMCNC: 30.5 G/DL (ref 32–36)
MCR-1: ABNORMAL
MCV RBC AUTO: 82 FL (ref 82–98)
MEC A/C AND MREJ (MRSA): NOT DETECTED
MEC A/C: ABNORMAL
MONOCYTES # BLD AUTO: 1.5 K/UL (ref 0.3–1)
MONOCYTES NFR BLD: 11.2 % (ref 4–15)
NDM GENE: ABNORMAL
NEISSERIA MENINGITIDIS: NOT DETECTED
NEUTROPHILS # BLD AUTO: 9.7 K/UL (ref 1.8–7.7)
NEUTROPHILS NFR BLD: 71.2 % (ref 38–73)
NRBC BLD-RTO: 0 /100 WBC
OXA-48-LIKE: ABNORMAL
PHOSPHATE SERPL-MCNC: 2.9 MG/DL (ref 2.7–4.5)
PLATELET # BLD AUTO: 620 K/UL (ref 150–450)
PMV BLD AUTO: 8.9 FL (ref 9.2–12.9)
POCT GLUCOSE: 197 MG/DL (ref 70–110)
POCT GLUCOSE: 210 MG/DL (ref 70–110)
POCT GLUCOSE: 228 MG/DL (ref 70–110)
POCT GLUCOSE: 242 MG/DL (ref 70–110)
POTASSIUM SERPL-SCNC: 3.9 MMOL/L (ref 3.5–5.1)
PROTEUS SPECIES: NOT DETECTED
PSEUDOMONAS AERUGINOSA: NOT DETECTED
RBC # BLD AUTO: 3.57 M/UL (ref 4.6–6.2)
SALMONELLA SP: NOT DETECTED
SERRATIA MARCESCENS: NOT DETECTED
SODIUM SERPL-SCNC: 131 MMOL/L (ref 136–145)
STAPHYLOCOCCUS AUREUS: DETECTED
STAPHYLOCOCCUS EPIDERMIDIS: NOT DETECTED
STAPHYLOCOCCUS LUGDUNESIS: NOT DETECTED
STAPHYLOCOCCUS SPECIES: ABNORMAL
STENOTROPHOMONAS MALTOPHILIA: NOT DETECTED
STREPTOCOCCUS AGALACTIAE: NOT DETECTED
STREPTOCOCCUS PNEUMONIAE: NOT DETECTED
STREPTOCOCCUS PYOGENES: NOT DETECTED
STREPTOCOCCUS SPECIES: NOT DETECTED
VAN A/B: ABNORMAL
VANCOMYCIN TROUGH SERPL-MCNC: 9.7 UG/ML (ref 10–22)
VIM GENE: ABNORMAL
WBC # BLD AUTO: 13.55 K/UL (ref 3.9–12.7)

## 2023-09-09 PROCEDURE — 63600175 PHARM REV CODE 636 W HCPCS: Performed by: HOSPITALIST

## 2023-09-09 PROCEDURE — 25000003 PHARM REV CODE 250: Performed by: HOSPITALIST

## 2023-09-09 PROCEDURE — 25000242 PHARM REV CODE 250 ALT 637 W/ HCPCS: Performed by: HOSPITALIST

## 2023-09-09 PROCEDURE — 94799 UNLISTED PULMONARY SVC/PX: CPT

## 2023-09-09 PROCEDURE — 99233 PR SUBSEQUENT HOSPITAL CARE,LEVL III: ICD-10-PCS | Mod: ,,, | Performed by: HOSPITALIST

## 2023-09-09 PROCEDURE — 36415 COLL VENOUS BLD VENIPUNCTURE: CPT | Performed by: HOSPITALIST

## 2023-09-09 PROCEDURE — 83735 ASSAY OF MAGNESIUM: CPT | Performed by: HOSPITALIST

## 2023-09-09 PROCEDURE — 99233 SBSQ HOSP IP/OBS HIGH 50: CPT | Mod: ,,, | Performed by: HOSPITALIST

## 2023-09-09 PROCEDURE — 99024 PR POST-OP FOLLOW-UP VISIT: ICD-10-PCS | Mod: ,,, | Performed by: PODIATRIST

## 2023-09-09 PROCEDURE — 80202 ASSAY OF VANCOMYCIN: CPT | Performed by: HOSPITALIST

## 2023-09-09 PROCEDURE — 97116 GAIT TRAINING THERAPY: CPT

## 2023-09-09 PROCEDURE — 94640 AIRWAY INHALATION TREATMENT: CPT

## 2023-09-09 PROCEDURE — 99024 POSTOP FOLLOW-UP VISIT: CPT | Mod: ,,, | Performed by: PODIATRIST

## 2023-09-09 PROCEDURE — 84100 ASSAY OF PHOSPHORUS: CPT | Performed by: HOSPITALIST

## 2023-09-09 PROCEDURE — 11000001 HC ACUTE MED/SURG PRIVATE ROOM

## 2023-09-09 PROCEDURE — 99900035 HC TECH TIME PER 15 MIN (STAT)

## 2023-09-09 PROCEDURE — 94761 N-INVAS EAR/PLS OXIMETRY MLT: CPT

## 2023-09-09 PROCEDURE — 27000221 HC OXYGEN, UP TO 24 HOURS

## 2023-09-09 PROCEDURE — 25000003 PHARM REV CODE 250: Performed by: NURSE PRACTITIONER

## 2023-09-09 PROCEDURE — 80048 BASIC METABOLIC PNL TOTAL CA: CPT | Performed by: HOSPITALIST

## 2023-09-09 PROCEDURE — 85025 COMPLETE CBC W/AUTO DIFF WBC: CPT | Performed by: HOSPITALIST

## 2023-09-09 PROCEDURE — 97162 PT EVAL MOD COMPLEX 30 MIN: CPT

## 2023-09-09 RX ORDER — INSULIN ASPART 100 [IU]/ML
4 INJECTION, SOLUTION INTRAVENOUS; SUBCUTANEOUS
Status: DISCONTINUED | OUTPATIENT
Start: 2023-09-09 | End: 2023-09-13

## 2023-09-09 RX ORDER — SODIUM CHLORIDE 9 MG/ML
INJECTION, SOLUTION INTRAVENOUS
Status: DISCONTINUED | OUTPATIENT
Start: 2023-09-09 | End: 2023-09-19

## 2023-09-09 RX ORDER — ENOXAPARIN SODIUM 100 MG/ML
40 INJECTION SUBCUTANEOUS EVERY 24 HOURS
Status: DISCONTINUED | OUTPATIENT
Start: 2023-09-09 | End: 2023-09-19

## 2023-09-09 RX ADMIN — MEROPENEM 1 G: 1 INJECTION, POWDER, FOR SOLUTION INTRAVENOUS at 11:09

## 2023-09-09 RX ADMIN — IPRATROPIUM BROMIDE AND ALBUTEROL SULFATE 3 ML: 2.5; .5 SOLUTION RESPIRATORY (INHALATION) at 07:09

## 2023-09-09 RX ADMIN — INSULIN DETEMIR 20 UNITS: 100 INJECTION, SOLUTION SUBCUTANEOUS at 08:09

## 2023-09-09 RX ADMIN — ASPIRIN 81 MG: 81 TABLET, COATED ORAL at 08:09

## 2023-09-09 RX ADMIN — OXYCODONE HYDROCHLORIDE 5 MG: 5 TABLET ORAL at 09:09

## 2023-09-09 RX ADMIN — IPRATROPIUM BROMIDE AND ALBUTEROL SULFATE 3 ML: 2.5; .5 SOLUTION RESPIRATORY (INHALATION) at 03:09

## 2023-09-09 RX ADMIN — ENOXAPARIN SODIUM 40 MG: 40 INJECTION SUBCUTANEOUS at 05:09

## 2023-09-09 RX ADMIN — IPRATROPIUM BROMIDE AND ALBUTEROL SULFATE 3 ML: 2.5; .5 SOLUTION RESPIRATORY (INHALATION) at 11:09

## 2023-09-09 RX ADMIN — MEROPENEM 1 G: 1 INJECTION, POWDER, FOR SOLUTION INTRAVENOUS at 08:09

## 2023-09-09 RX ADMIN — OXYCODONE HYDROCHLORIDE 5 MG: 5 TABLET ORAL at 05:09

## 2023-09-09 RX ADMIN — QUETIAPINE FUMARATE 200 MG: 200 TABLET ORAL at 08:09

## 2023-09-09 RX ADMIN — INSULIN ASPART 4 UNITS: 100 INJECTION, SOLUTION INTRAVENOUS; SUBCUTANEOUS at 11:09

## 2023-09-09 RX ADMIN — MEROPENEM 1 G: 1 INJECTION, POWDER, FOR SOLUTION INTRAVENOUS at 05:09

## 2023-09-09 RX ADMIN — IPRATROPIUM BROMIDE AND ALBUTEROL SULFATE 3 ML: 2.5; .5 SOLUTION RESPIRATORY (INHALATION) at 12:09

## 2023-09-09 RX ADMIN — OXYCODONE HYDROCHLORIDE 5 MG: 5 TABLET ORAL at 01:09

## 2023-09-09 RX ADMIN — GABAPENTIN 300 MG: 300 CAPSULE ORAL at 08:09

## 2023-09-09 RX ADMIN — MUPIROCIN: 20 OINTMENT TOPICAL at 08:09

## 2023-09-09 RX ADMIN — INSULIN ASPART 2 UNITS: 100 INJECTION, SOLUTION INTRAVENOUS; SUBCUTANEOUS at 08:09

## 2023-09-09 RX ADMIN — SODIUM CHLORIDE: 9 INJECTION, SOLUTION INTRAVENOUS at 03:09

## 2023-09-09 RX ADMIN — INSULIN ASPART 4 UNITS: 100 INJECTION, SOLUTION INTRAVENOUS; SUBCUTANEOUS at 05:09

## 2023-09-09 RX ADMIN — ATORVASTATIN CALCIUM 40 MG: 20 TABLET, FILM COATED ORAL at 08:09

## 2023-09-09 RX ADMIN — VANCOMYCIN HYDROCHLORIDE 2000 MG: 10 INJECTION, POWDER, LYOPHILIZED, FOR SOLUTION INTRAVENOUS at 02:09

## 2023-09-09 RX ADMIN — OXYCODONE HYDROCHLORIDE 5 MG: 5 TABLET ORAL at 12:09

## 2023-09-09 RX ADMIN — IPRATROPIUM BROMIDE AND ALBUTEROL SULFATE 3 ML: 2.5; .5 SOLUTION RESPIRATORY (INHALATION) at 04:09

## 2023-09-09 NOTE — PROGRESS NOTES
Pt back from surgery. VSS on 1 L O2 NC. Pt denies pain at this time. Requesting sandwich. CBG is 125. [place] tray given. All safety precautions in place. Continuing to monitor.

## 2023-09-09 NOTE — PLAN OF CARE
Problem: Physical Therapy  Goal: Physical Therapy Goal  Description: Goals to be met by: 10/9/2023    Patient will increase functional independence with mobility by performin. Sit<>stand with supervision with RW while maintaining WB precautions on LLE.  2. Gait x 50 feet with RW with CGA while maintaining WB precautions on LLE.  3. Ascend/descend 10 step(s) with least restrictive assistive device and minimal assist while maintaining WB precautions on LLE.      Outcome: Ongoing, Progressing   Patient evaluated by PT and goals established. Patient required significant encouragement for participation in PT evaluation and OOB activity. Patient only able to ambulate 6 feet with RW and minimal assist and required maximal verbal cues to maintain partial heel WB precautions on the LLE. Discharge recommendation to SNF. Discharge DME TBD at next level of care.  PT will continue to follow and progress as tolerated. Please see progress note for detailed plan of care and recommendations.

## 2023-09-09 NOTE — ASSESSMENT & PLAN NOTE
Echocardiogram shows evidence of reduced systolic function also diastolic dysfunction.  Status post dose of intravenous furosemide yesterday with improvement oxygenation.  Repeat labs today.  Plan to repeat dose of diuretic therapy today but may need additional electrolyte replacement.

## 2023-09-09 NOTE — HOSPITAL COURSE
Patient is 58-year-old man with history of hypertension, diabetes mellitus type 2, reported history of systolic heart failure (although last echocardiogram with normal systolic function), significant prior tobacco smoking history with chronic obstructive pulmonary disease admitted to the hospital with diabetic ulcer with abscess involving left foot including great toe and proximal foot.  Patient with marked leukocytosis and elevated heart rate consistent with systemic inflammatory response/sepsis secondary to foot infection.  Blood cultures obtained.  Patient started on broad-spectrum intravenous antibiotics.      Podiatry consulted and took the patient for surgical debridement with removal of necrotic tissue including amputation of left 1st toe on 9/8/2023.  Blood cultures positive for methicillin sensitive Staphylococcus aureus.  Surgical culture also positive for methicillin sensitive Staphylococcus aureus. and Streptococcus Dysgalactiae.  Pathology showed evidence of osteomyelitis with evidence of infection involving surgical margin.    Empiric antibiotics switched to continuous infusion of intravenous cefazolin.  Transthoracic echocardiogram did not show evidence of endocarditis.  Patient clinically improved with intravenous cefazolin and wound care.  Repeat blood culture collected on 9/11/2023 negative.    Infectious Disease service consulted recommended patient undergo a transesophageal echocardiogram however patient declined transesophageal echocardiogram based on prior negative experience with that procedure.  Dr. Isidro Duran recommended 6 weeks of intravenous antibiotic treatment with cefazolin.    Post acute facility placement strongly recommended to ensure patient continues to receive daily wound care along with prolonged course of intravenous antibiotics to improve chances that he will healed his wound without further surgery and potential further amputation.    Referral for LTAC placement  submitted and patient accepted by LTAC facilities.  However patient's insurance company (managed medication plan by [employer]) denied LTAC placement stating patient could receive needed care in a skilled nursing facility.  Referral sent to skilled nursing facility however patient refused to be placed in a nursing facility and decided to go home to complete his intravenous antibiotics in the home setting and to have ongoing wound care with home health and also assistance from his family.  Caregiver instructed on how to provide wound care.  Wound care supplies provided to the patient.  Patient advised on close outpatient follow-up with both Podiatry and Infectious Disease was clinics.  Patient counseled to return to the hospital if his wound becomes worse or develops fevers or chills.    Close outpatient follow-up with his primary care physician also advised for ongoing management of his medical comorbidities particularly to ensure that he maintains well control of his diabetes.

## 2023-09-09 NOTE — ASSESSMENT & PLAN NOTE
Clinically improving with source control yesterday with surgical debridement of infected left foot/abscess and empiric intravenous antibiotics.  Blood cultures on admission positive 2 for 2 for Gram-positive cocci in clusters representing Staphylococcus.  His PCR testing consistent with Staphylococcus aureus.  Transthoracic echocardiogram that showed evidence of endocarditis.  Repeat blood culture negative thus far.  Discontinue meropenem.  Continue vancomycin and switch to intravenous cefazolin pending determination whether he has methicillin sensitive or resistant Staphylococcus aureus.  MRI of the foot ordered yesterday prior to surgery.  Unclear if MRI needed at this time.  Hold off further imaging now pending follow-up recommendations by Podiatry service.

## 2023-09-09 NOTE — PT/OT/SLP EVAL
Physical Therapy Evaluation    Patient Name:  Dave Good   MRN:  [id number]    Recommendations:     Discharge Recommendations: nursing facility, skilled   Discharge Equipment Recommendations: to be determined by next level of care   Barriers to discharge: Inaccessible home, Decreased caregiver support, and current functional status    Assessment:     Dave Good is a 58 y.o. male admitted with a medical diagnosis of Staphylococcus aureus bacteremia with sepsis.  He presents with the following impairments/functional limitations: weakness, gait instability, decreased upper extremity function, impaired cardiopulmonary response to activity, impaired endurance, impaired balance, decreased lower extremity function, impaired functional mobility, decreased coordination, impaired self care skills, decreased safety awareness, impaired muscle length .       Patient evaluated by PT and goals established. Patient required significant encouragement for participation in PT evaluation and OOB activity. Patient only able to ambulate 6 feet with RW and minimal assist and required maximal verbal cues to maintain partial heel WB precautions on the LLE. Discharge recommendation to SNF. Discharge DME TBD at next level of care.  PT will continue to follow and progress as tolerated. Please see progress note for detailed plan of care and recommendations.    Rehab Prognosis: Fair; patient would benefit from acute skilled PT services to address these deficits and reach maximum level of function.    Recent Surgery: Procedure(s) (LRB):  AMPUTATION, TOE (Left) 1 Day Post-Op    Plan:     During this hospitalization, patient to be seen 5 x/week to address the identified rehab impairments via gait training, therapeutic activities, therapeutic exercises and progress toward the following goals:    Plan of Care Expires:  10/09/23    Subjective     Chief Complaint: foot pain and that he thinks it too soon to get out of bed  Patient/Family  Comments/goals: Patient agrees to participate with encouragement  Pain/Comfort:  Pain Rating 1: 10/10  Location - Side 1: Left  Location 1: foot  Pain Addressed 1: Reposition, Distraction, Cessation of Activity    Patients cultural, spiritual, Yazidism conflicts given the current situation: no    Living Environment:  Patient lives in home with 10 KRUPA and bilateral railing. Patient reports his daughter lives with him and is able to assist him as needed.   Prior to admission, patients level of function was independent with all functional mobility and ADLs prior to toe injury. Since he injured his toe he has not been able to walk and has been having his daughter assist him.  Equipment used at home: walker, rolling.  DME owned (not currently used): none.  Upon discharge, patient will have assistance from daughter.    Objective:     Communicated with nusing prior to session.  Patient found supine with arterial line, bed alarm, telemetry, peripheral IV  upon PT entry to room.    General Precautions: Standard, fall  Orthopedic Precautions:LLE partial weight bearing   Braces: N/A (darco shoe donned)  Respiratory Status: Nasal cannula, flow 1 L/min    Exams:  Cognition:   Patient is oriented to person, place, time and situation.  Pt follows approximately 100% of verbal commands.    Mood: cooperative, irritated  Safety Awareness: poor  Musculoskeletal:  BMI: 33  Posture:  slouched shoulders, forward head posture   LE ROM/Strength:   R ROM: WNL  L ROM: WNL  R Strength:   Knee extension: 4/5  Dorsiflexion: 4/5   L Strength:   Knee extension: 4/5  Dorsiflexion: 4/5   Neuromuscular:  Sensation: not assessed   Tone/Reflexes: No impairments identified with functional mobility. No formal testing performed.   Balance:   Static sitting: good  Static standing: fair   Dynamic standing: poor  (one LOB requiring physical assist to regain stability)  Visual-vestibular: No impairments identified with functional mobility. No formal  testing performed.  Integument: Visible skin intact  Cardiopulmonary:  Edema: none visible       Functional Mobility:  Bed Mobility:     Supine to Sit: contact guard assistance  Sit to Supine: contact guard assistance  Transfers:     Sit to Stand:  minimum assistance with rolling walker  Gait: Patient ambulated 5 feet with RW and minimal assist, patient required constant verbal cues to maintain partial WB precautions on LLE and had great difficulty maintaining these precautions throughout.Patient reports significant pain with ambulation and reports he was unable to continue and was returned to bed.        AM-PAC 6 CLICK MOBILITY  Total Score:18       Treatment & Education:  Patient educated on POC, purpose of PT, discharge planning, importance of participation in therapy, transfers, bed mobility, WB precautions.     Transfer and gait training as stated above.     Patient left supine with all lines intact and call button in reach.    GOALS:   Multidisciplinary Problems       Physical Therapy Goals          Problem: Physical Therapy    Goal Priority Disciplines Outcome Goal Variances Interventions   Physical Therapy Goal     PT, PT/OT Ongoing, Progressing     Description: Goals to be met by: 10/9/2023    Patient will increase functional independence with mobility by performin. Sit<>stand with supervision with RW while maintaining WB precautions on LLE.  2. Gait x 50 feet with RW with CGA while maintaining WB precautions on LLE.  3. Ascend/descend 10 step(s) with least restrictive assistive device and minimal assist while maintaining WB precautions on LLE.                           History:     Past Medical History:   Diagnosis Date    Arthritis     COPD (chronic obstructive pulmonary disease)     COVID-19     Depression     Diabetes mellitus     Diabetes mellitus, type 2     Hypertension        Past Surgical History:   Procedure Laterality Date    DEBRIDEMENT OF LOWER EXTREMITY Bilateral 3/2/2022    Procedure:  DEBRIDEMENT, LOWER EXTREMITY;  Surgeon: Jesus Flores Jr., MD;  Location: [facility];  Service: General;  Laterality: Bilateral;    FOOT AMPUTATION THROUGH METATARSAL Right 9/23/2021    Procedure: AMPUTATION, FOOT, TRANSMETATARSAL right 1st ray resection;  Surgeon: Samuel Toussaint DPM;  Location: [facility];  Service: Podiatry;  Laterality: Right;    INCISION AND DRAINAGE FOOT Bilateral 9/21/2021    Procedure: INCISION AND DRAINAGE, FOOT - Bilateral;  Surgeon: Lavonne Vigil DPM;  Location: [facility];  Service: Podiatry;  Laterality: Bilateral;    REPLACEMENT OF WOUND DRESSING Right 2/24/2022    Procedure: REPLACEMENT, DRESSING, WOUND;  Surgeon: Jesus Flores Jr., MD;  Location: [facility];  Service: Vascular;  Laterality: Right;  wound vac dressing changed    WOUND DEBRIDEMENT Right 2/22/2022    Procedure: DEBRIDEMENT, WOUND - RIGHT FOOT;  Surgeon: Jesus Flores Jr., MD;  Location: [facility];  Service: Vascular;  Laterality: Right;    WOUND DEBRIDEMENT Bilateral 2/24/2022    Procedure: DEBRIDEMENT, WOUND / BILATERAL DEBRIDEMENT FEET;  Surgeon: Jesus Flores Jr., MD;  Location: [facility];  Service: Vascular;  Laterality: Bilateral;    WOUND DEBRIDEMENT Right 5/27/2022    Procedure: DEBRIDEMENT, WOUND;  Surgeon: Jesus Flores Jr., MD;  Location: [facility];  Service: General;  Laterality: Right;       Time Tracking:     PT Received On: 09/09/23  PT Start Time: 1225     PT Stop Time: 1245  PT Total Time (min): 20 min     Billable Minutes: Evaluation 12 and Gait Training 8      09/09/2023

## 2023-09-09 NOTE — ASSESSMENT & PLAN NOTE
Hemoglobin A1c of 7.5 percent suggest reasonably controlled diabetes with current home regimen.  In the setting of sepsis holding metformin.  Increase long-acting insulin and start preprandial insulin this morning for better glycemic control.

## 2023-09-09 NOTE — PLAN OF CARE
Problem: Diabetes Comorbidity  Goal: Blood Glucose Level Within Targeted Range  Outcome: Ongoing, Progressing  Patient on a carb consistent diet. Insulin administered per MD orders.      Problem: Infection  Goal: Absence of Infection Signs and Symptoms  Outcome: Ongoing, Progressing  Patient receiving IV antibiotics per MD orders. Afebrile this shift.      Problem: Impaired Wound Healing  Goal: Optimal Wound Healing  Outcome: Ongoing, Progressing   Surgical incision on left great toe. Dressing C/D/I. Diabetic ulcer on distal toe with impaired healing.

## 2023-09-09 NOTE — SUBJECTIVE & OBJECTIVE
Interval History:  Status post surgical debridement and removal of abscess on the distal left foot.  Patient tolerated surgery well.  Blood cultures positive for Gram-positive cocci resembling Staphylococcus aureus.  Postoperative pain well controlled.  Oxygen requirements improving.    Review of Systems   Constitutional:  Negative for chills and fever.   Respiratory:  Negative for shortness of breath.    Cardiovascular:  Negative for chest pain.   Gastrointestinal:  Negative for abdominal pain, constipation, diarrhea, nausea and vomiting.     Objective:     Vital Signs (Most Recent):  Temp: 99.1 °F (37.3 °C) (09/09/23 0714)  Pulse: 84 (09/09/23 0759)  Resp: 16 (09/09/23 0759)  BP: 134/77 (09/09/23 0714)  SpO2: 96 % (09/09/23 0759) Vital Signs (24h Range):  Temp:  [98.2 °F (36.8 °C)-99.1 °F (37.3 °C)] 99.1 °F (37.3 °C)  Pulse:  [81-94] 84  Resp:  [16-20] 16  SpO2:  [89 %-100 %] 96 %  BP: (102-134)/(60-77) 134/77     Weight: 117 kg (257 lb 15 oz)  Body mass index is 33.12 kg/m².    Intake/Output Summary (Last 24 hours) at 9/9/2023 1051  Last data filed at 9/9/2023 0927  Gross per 24 hour   Intake 1860.04 ml   Output 2075 ml   Net -214.96 ml         Physical Exam  Constitutional:       Appearance: He is obese.   Cardiovascular:      Rate and Rhythm: Normal rate and regular rhythm.      Heart sounds: Normal heart sounds. No murmur heard.     No friction rub. No gallop.   Pulmonary:      Effort: Pulmonary effort is normal. No respiratory distress.      Breath sounds: Rales present. No wheezing.      Comments: Minimal rales at lung base, improved with cough.  Moving air well.  Abdominal:      General: Bowel sounds are normal. There is no distension.      Palpations: Abdomen is soft.      Tenderness: There is no abdominal tenderness.   Musculoskeletal:         General: No tenderness. Normal range of motion.      Comments: Left foot with surgical dressing clean, dry, and intact..   Skin:     General: Skin is warm and dry.       Coloration: Skin is not pale.      Findings: No erythema or rash.   Neurological:      Mental Status: He is alert and oriented to person, place, and time.             Significant Labs: All pertinent labs within the past 24 hours have been reviewed.    Significant Imaging: I have reviewed all pertinent imaging results/findings within the past 24 hours.

## 2023-09-09 NOTE — ANESTHESIA POSTPROCEDURE EVALUATION
Anesthesia Post Evaluation    Patient: Dave Good    Procedure(s) Performed: Procedure(s) (LRB):  AMPUTATION, TOE (Left)    Final Anesthesia Type: general      Patient location during evaluation: PACU  Patient participation: Yes- Able to Participate  Level of consciousness: awake and alert  Post-procedure vital signs: reviewed and stable  Pain management: adequate  Airway patency: patent    PONV status at discharge: No PONV  Anesthetic complications: no      Cardiovascular status: blood pressure returned to baseline  Respiratory status: unassisted and spontaneous ventilation  Hydration status: euvolemic  Follow-up not needed.          Vitals Value Taken Time   /67 09/08/23 1846   Temp 36.9 °C (98.5 °F) 09/08/23 1815   Pulse 84 09/08/23 1859   Resp 16 09/08/23 1845   SpO2 95 % 09/08/23 1859   Vitals shown include unvalidated device data.      No case tracking events are documented in the log.      Pain/Mignon Score: Pain Rating Prior to Med Admin: 10 (9/8/2023  6:34 PM)  Pain Rating Post Med Admin: 0 (pt sleeping) (9/8/2023 11:08 AM)  Mignon Score: 8 (9/8/2023  6:45 PM)

## 2023-09-09 NOTE — PT/OT/SLP PROGRESS
"Occupational Therapy      Patient Name:  Dave Good   MRN:  [id number]    Patient not seen today secondary to Patient unwilling to participate stating, "I done this already" and "I just had the procedure done and y'all want me to get up already."  Educated on role of OT and the importance of OOB activity with continued refusal.  Discussed plans to follow up tomorrow with Pt. Agreeable and reporting a preference for A.M.  Will follow-up tomorrow, 9/10/2023.    9/9/2023  "

## 2023-09-09 NOTE — PROGRESS NOTES
"[facility] Medicine  Progress Note    Patient Name: Dave Good  MRN: [id number]  Patient Class: IP- Inpatient   Admission Date: 9/7/2023  Length of Stay: 1 days  Attending Physician: Hari Iverson MD  Primary Care Provider: Reyna Jorge NP        Subjective:     Principal Problem:Staphylococcus aureus bacteremia with sepsis        HPI:  Per Larry Shepherd, NP:    "The patient is a 58 y.o. male with a past medical history of type 2 diabetes, HTN, HLD, and chronic systolic congestive heart failure who presents with worsening left great toe pain infection and swelling.  Patient was recently seen in the ER and discharged to follow up with ID and wound care.  Since then he has been unable to follow-up with podiatry or infectious disease.  He states that he has not tried to call their offices and was waiting for them to call him.  States that his toe pain has gotten worse.  He has been soaking it in a foot massage machine chair with no improvement.  XR positive for left great, 2nd, and 4th toe acute osteomyelitis.  He will be admitted for further management of his acute osteomyelitis and infectious disease and podiatry consult."      Overview/Hospital Course:  Patient is 58-year-old man with history of hypertension, diabetes mellitus type 2, reported history of systolic heart failure (although last echocardiogram with normal systolic function), significant prior tobacco smoking history with chronic obstructive pulmonary disease admitted to the hospital with diabetic ulcer with abscess involving left foot including great toe and proximal foot.  Patient with marked leukocytosis and elevated heart rate consistent with systemic inflammatory response/sepsis secondary to foot infection.  Blood cultures obtained.  Patient started on intravenous antibiotics.  Podiatry consulted and took the patient for surgical debridement with removal of necrotic tissue on 9/8/2023.  Blood cultures " positive for Gram-positive cocci representing Staphylococcus aureus.    Patient with prior history of line associated deep vein thrombosis for which she was treated with three months of anticoagulation.      Interval History:  Status post surgical debridement and removal of abscess on the distal left foot.  Patient tolerated surgery well.  Blood cultures positive for Gram-positive cocci resembling Staphylococcus aureus.  Postoperative pain well controlled.  Oxygen requirements improving.    Review of Systems   Constitutional:  Negative for chills and fever.   Respiratory:  Negative for shortness of breath.    Cardiovascular:  Negative for chest pain.   Gastrointestinal:  Negative for abdominal pain, constipation, diarrhea, nausea and vomiting.     Objective:     Vital Signs (Most Recent):  Temp: 99.1 °F (37.3 °C) (09/09/23 0714)  Pulse: 84 (09/09/23 0759)  Resp: 16 (09/09/23 0759)  BP: 134/77 (09/09/23 0714)  SpO2: 96 % (09/09/23 0759) Vital Signs (24h Range):  Temp:  [98.2 °F (36.8 °C)-99.1 °F (37.3 °C)] 99.1 °F (37.3 °C)  Pulse:  [81-94] 84  Resp:  [16-20] 16  SpO2:  [89 %-100 %] 96 %  BP: (102-134)/(60-77) 134/77     Weight: 117 kg (257 lb 15 oz)  Body mass index is 33.12 kg/m².    Intake/Output Summary (Last 24 hours) at 9/9/2023 1051  Last data filed at 9/9/2023 0927  Gross per 24 hour   Intake 1860.04 ml   Output 2075 ml   Net -214.96 ml         Physical Exam  Constitutional:       Appearance: He is obese.   Cardiovascular:      Rate and Rhythm: Normal rate and regular rhythm.      Heart sounds: Normal heart sounds. No murmur heard.     No friction rub. No gallop.   Pulmonary:      Effort: Pulmonary effort is normal. No respiratory distress.      Breath sounds: Rales present. No wheezing.      Comments: Minimal rales at lung base, improved with cough.  Moving air well.  Abdominal:      General: Bowel sounds are normal. There is no distension.      Palpations: Abdomen is soft.      Tenderness: There is no  abdominal tenderness.   Musculoskeletal:         General: No tenderness. Normal range of motion.      Comments: Left foot with surgical dressing clean, dry, and intact..   Skin:     General: Skin is warm and dry.      Coloration: Skin is not pale.      Findings: No erythema or rash.   Neurological:      Mental Status: He is alert and oriented to person, place, and time.             Significant Labs: All pertinent labs within the past 24 hours have been reviewed.    Significant Imaging: I have reviewed all pertinent imaging results/findings within the past 24 hours.        Assessment/Plan:      * Staphylococcus aureus bacteremia with sepsis  Clinically improving with source control yesterday with surgical debridement of infected left foot/abscess and empiric intravenous antibiotics.  Blood cultures on admission positive 2 for 2 for Gram-positive cocci in clusters representing Staphylococcus.  His PCR testing consistent with Staphylococcus aureus.  Transthoracic echocardiogram did not show evidence of endocarditis.  Repeat blood culture negative thus far.  Discontinue meropenem.  Continue vancomycin and switch to intravenous cefazolin pending determination whether he has methicillin sensitive or resistant Staphylococcus aureus.  MRI of the foot ordered yesterday prior to surgery.  Unclear if MRI needed at this time.  Hold off further imaging now pending follow-up recommendations by Podiatry service.  Will like need long course of intravenous antibiotics.  Will follow up to ensure that repeat cultures remain negative prior to placing midline or PICC.    Acute on chronic combined systolic and diastolic congestive heart failure  Echocardiogram shows evidence of reduced systolic function also diastolic dysfunction.  Status post dose of intravenous furosemide yesterday with improvement oxygenation.  Repeat labs today.  Plan to repeat dose of diuretic therapy today but may need additional electrolyte  replacement.    Hypertension  Normotensive.  Holding lisinopril in the setting of sepsis to decrease hypotension.    Type 2 diabetes mellitus with diabetic peripheral angiopathy without gangrene, with long-term current use of insulin  Hemoglobin A1c of 7.5 percent suggest reasonably controlled diabetes with current home regimen.  In the setting of sepsis holding metformin.  Increase long-acting insulin and start preprandial insulin this morning for better glycemic control.      VTE Risk Mitigation (From admission, onward)           Ordered     enoxaparin injection 40 mg  Daily         09/09/23 1108     IP VTE HIGH RISK PATIENT  Once         09/09/23 1108     Place sequential compression device  Until discontinued         09/08/23 1064                    Hari Iverson MD  Department of Hospital Medicine   [facility]

## 2023-09-09 NOTE — ASSESSMENT & PLAN NOTE
Clinically improving with source control yesterday with surgical debridement of infected left foot/abscess and empiric intravenous antibiotics.  Blood cultures on admission positive 2 for 2 for Gram-positive cocci in clusters representing Staphylococcus.  His PCR testing consistent with Staphylococcus aureus.  Transthoracic echocardiogram did not show evidence of endocarditis.  Repeat blood culture positive.  Repeat blood culture tomorrow.  Continue intravenous vancomycin and cefazolin pending sensitivity testing.

## 2023-09-09 NOTE — PLAN OF CARE
Pt remained free of falls and injuries throughout shift. AAOx4. Pt calm and cooperative. Purposeful hourly rounding performed. Pt swallows meds whole. Pt received IV Merrem, IV vancomycin infusing at this time. Dsg to L foot CDI, elevated on pillow. Managed c/o L foot pain with PRN oxycodone. Pt sleeping most of shift after surgery yesterday afternoon, weight shift assistance provided, RUE with mild swelling, elevated on pillow. VSS on 1 L O2 NC. Pt sleeping in bed, even rise and fall of chest. Bed low and locked, bed alarm on, call light in reach. Side rails up x3. Will continue to monitor.

## 2023-09-09 NOTE — PROGRESS NOTES
Respiratory nebulizer treatment completed. No adverse reactions noted.Worked with pt on the incentive spirometer. Pt understands the benefits of the breathing device and is working to achieve more volume and sustain a breath hold. Deep breathing and occasional coughing was encouraged.

## 2023-09-10 PROBLEM — M86.672 CHRONIC OSTEOMYELITIS OF TOE OF LEFT FOOT: Status: RESOLVED | Noted: 2023-09-08 | Resolved: 2023-09-10

## 2023-09-10 LAB
ANION GAP SERPL CALC-SCNC: 7 MMOL/L (ref 8–16)
BASOPHILS # BLD AUTO: 0.06 K/UL (ref 0–0.2)
BASOPHILS NFR BLD: 0.5 % (ref 0–1.9)
BUN SERPL-MCNC: 16 MG/DL (ref 6–20)
CALCIUM SERPL-MCNC: 8.4 MG/DL (ref 8.7–10.5)
CHLORIDE SERPL-SCNC: 96 MMOL/L (ref 95–110)
CO2 SERPL-SCNC: 28 MMOL/L (ref 23–29)
CREAT SERPL-MCNC: 1.1 MG/DL (ref 0.5–1.4)
DIFFERENTIAL METHOD: ABNORMAL
EOSINOPHIL # BLD AUTO: 0.4 K/UL (ref 0–0.5)
EOSINOPHIL NFR BLD: 3 % (ref 0–8)
ERYTHROCYTE [DISTWIDTH] IN BLOOD BY AUTOMATED COUNT: 16.3 % (ref 11.5–14.5)
EST. GFR  (NO RACE VARIABLE): >60 ML/MIN/1.73 M^2
GLUCOSE SERPL-MCNC: 180 MG/DL (ref 70–110)
HCT VFR BLD AUTO: 29.7 % (ref 40–54)
HGB BLD-MCNC: 9 G/DL (ref 14–18)
IMM GRANULOCYTES # BLD AUTO: 0.04 K/UL (ref 0–0.04)
IMM GRANULOCYTES NFR BLD AUTO: 0.3 % (ref 0–0.5)
LYMPHOCYTES # BLD AUTO: 2.4 K/UL (ref 1–4.8)
LYMPHOCYTES NFR BLD: 18.5 % (ref 18–48)
MAGNESIUM SERPL-MCNC: 1.7 MG/DL (ref 1.6–2.6)
MCH RBC QN AUTO: 24.7 PG (ref 27–31)
MCHC RBC AUTO-ENTMCNC: 30.3 G/DL (ref 32–36)
MCV RBC AUTO: 82 FL (ref 82–98)
MONOCYTES # BLD AUTO: 1.5 K/UL (ref 0.3–1)
MONOCYTES NFR BLD: 11.9 % (ref 4–15)
NEUTROPHILS # BLD AUTO: 8.4 K/UL (ref 1.8–7.7)
NEUTROPHILS NFR BLD: 65.8 % (ref 38–73)
NRBC BLD-RTO: 0 /100 WBC
PHOSPHATE SERPL-MCNC: 2.7 MG/DL (ref 2.7–4.5)
PLATELET # BLD AUTO: 597 K/UL (ref 150–450)
PMV BLD AUTO: 8.8 FL (ref 9.2–12.9)
POCT GLUCOSE: 163 MG/DL (ref 70–110)
POCT GLUCOSE: 187 MG/DL (ref 70–110)
POCT GLUCOSE: 188 MG/DL (ref 70–110)
POCT GLUCOSE: 281 MG/DL (ref 70–110)
POTASSIUM SERPL-SCNC: 4.2 MMOL/L (ref 3.5–5.1)
RBC # BLD AUTO: 3.64 M/UL (ref 4.6–6.2)
SODIUM SERPL-SCNC: 131 MMOL/L (ref 136–145)
VANCOMYCIN TROUGH SERPL-MCNC: 20.7 UG/ML (ref 10–22)
WBC # BLD AUTO: 12.84 K/UL (ref 3.9–12.7)

## 2023-09-10 PROCEDURE — 27000221 HC OXYGEN, UP TO 24 HOURS

## 2023-09-10 PROCEDURE — 94640 AIRWAY INHALATION TREATMENT: CPT

## 2023-09-10 PROCEDURE — 11000001 HC ACUTE MED/SURG PRIVATE ROOM

## 2023-09-10 PROCEDURE — 63600175 PHARM REV CODE 636 W HCPCS: Performed by: HOSPITALIST

## 2023-09-10 PROCEDURE — 25000003 PHARM REV CODE 250: Performed by: HOSPITALIST

## 2023-09-10 PROCEDURE — 84100 ASSAY OF PHOSPHORUS: CPT | Performed by: HOSPITALIST

## 2023-09-10 PROCEDURE — 25000003 PHARM REV CODE 250: Performed by: NURSE PRACTITIONER

## 2023-09-10 PROCEDURE — 97166 OT EVAL MOD COMPLEX 45 MIN: CPT

## 2023-09-10 PROCEDURE — 97535 SELF CARE MNGMENT TRAINING: CPT

## 2023-09-10 PROCEDURE — 99233 SBSQ HOSP IP/OBS HIGH 50: CPT | Mod: ,,, | Performed by: HOSPITALIST

## 2023-09-10 PROCEDURE — 36415 COLL VENOUS BLD VENIPUNCTURE: CPT | Performed by: HOSPITALIST

## 2023-09-10 PROCEDURE — 99024 POSTOP FOLLOW-UP VISIT: CPT | Mod: ,,, | Performed by: PODIATRIST

## 2023-09-10 PROCEDURE — 99900035 HC TECH TIME PER 15 MIN (STAT)

## 2023-09-10 PROCEDURE — 80048 BASIC METABOLIC PNL TOTAL CA: CPT | Performed by: HOSPITALIST

## 2023-09-10 PROCEDURE — 99024 PR POST-OP FOLLOW-UP VISIT: ICD-10-PCS | Mod: ,,, | Performed by: PODIATRIST

## 2023-09-10 PROCEDURE — A4216 STERILE WATER/SALINE, 10 ML: HCPCS | Performed by: HOSPITALIST

## 2023-09-10 PROCEDURE — 99233 PR SUBSEQUENT HOSPITAL CARE,LEVL III: ICD-10-PCS | Mod: ,,, | Performed by: HOSPITALIST

## 2023-09-10 PROCEDURE — 25000242 PHARM REV CODE 250 ALT 637 W/ HCPCS: Performed by: HOSPITALIST

## 2023-09-10 PROCEDURE — 76937 US GUIDE VASCULAR ACCESS: CPT

## 2023-09-10 PROCEDURE — 97530 THERAPEUTIC ACTIVITIES: CPT

## 2023-09-10 PROCEDURE — 80202 ASSAY OF VANCOMYCIN: CPT | Performed by: HOSPITALIST

## 2023-09-10 PROCEDURE — 83735 ASSAY OF MAGNESIUM: CPT | Performed by: HOSPITALIST

## 2023-09-10 PROCEDURE — 36410 VNPNXR 3YR/> PHY/QHP DX/THER: CPT

## 2023-09-10 PROCEDURE — 94761 N-INVAS EAR/PLS OXIMETRY MLT: CPT

## 2023-09-10 PROCEDURE — 85025 COMPLETE CBC W/AUTO DIFF WBC: CPT | Performed by: HOSPITALIST

## 2023-09-10 PROCEDURE — C1751 CATH, INF, PER/CENT/MIDLINE: HCPCS

## 2023-09-10 RX ORDER — SODIUM CHLORIDE 0.9 % (FLUSH) 0.9 %
10 SYRINGE (ML) INJECTION EVERY 6 HOURS
Status: DISCONTINUED | OUTPATIENT
Start: 2023-09-10 | End: 2023-09-20 | Stop reason: HOSPADM

## 2023-09-10 RX ORDER — FUROSEMIDE 10 MG/ML
40 INJECTION INTRAMUSCULAR; INTRAVENOUS ONCE
Status: COMPLETED | OUTPATIENT
Start: 2023-09-10 | End: 2023-09-10

## 2023-09-10 RX ORDER — SODIUM CHLORIDE 0.9 % (FLUSH) 0.9 %
10 SYRINGE (ML) INJECTION
Status: DISCONTINUED | OUTPATIENT
Start: 2023-09-10 | End: 2023-09-20 | Stop reason: HOSPADM

## 2023-09-10 RX ADMIN — GABAPENTIN 300 MG: 300 CAPSULE ORAL at 09:09

## 2023-09-10 RX ADMIN — VANCOMYCIN HYDROCHLORIDE 2000 MG: 10 INJECTION, POWDER, LYOPHILIZED, FOR SOLUTION INTRAVENOUS at 02:09

## 2023-09-10 RX ADMIN — OXYCODONE HYDROCHLORIDE 5 MG: 5 TABLET ORAL at 10:09

## 2023-09-10 RX ADMIN — INSULIN ASPART 4 UNITS: 100 INJECTION, SOLUTION INTRAVENOUS; SUBCUTANEOUS at 09:09

## 2023-09-10 RX ADMIN — SODIUM CHLORIDE, PRESERVATIVE FREE 10 ML: 5 INJECTION INTRAVENOUS at 01:09

## 2023-09-10 RX ADMIN — INSULIN ASPART 2 UNITS: 100 INJECTION, SOLUTION INTRAVENOUS; SUBCUTANEOUS at 01:09

## 2023-09-10 RX ADMIN — OXYCODONE HYDROCHLORIDE 5 MG: 5 TABLET ORAL at 09:09

## 2023-09-10 RX ADMIN — FUROSEMIDE 40 MG: 10 INJECTION, SOLUTION INTRAMUSCULAR; INTRAVENOUS at 02:09

## 2023-09-10 RX ADMIN — OXYCODONE HYDROCHLORIDE 5 MG: 5 TABLET ORAL at 06:09

## 2023-09-10 RX ADMIN — IPRATROPIUM BROMIDE AND ALBUTEROL SULFATE 3 ML: 2.5; .5 SOLUTION RESPIRATORY (INHALATION) at 11:09

## 2023-09-10 RX ADMIN — OXYCODONE HYDROCHLORIDE 5 MG: 5 TABLET ORAL at 01:09

## 2023-09-10 RX ADMIN — CEFAZOLIN 2 G: 2 INJECTION, POWDER, FOR SOLUTION INTRAMUSCULAR; INTRAVENOUS at 02:09

## 2023-09-10 RX ADMIN — INSULIN DETEMIR 20 UNITS: 100 INJECTION, SOLUTION SUBCUTANEOUS at 09:09

## 2023-09-10 RX ADMIN — QUETIAPINE FUMARATE 200 MG: 200 TABLET ORAL at 09:09

## 2023-09-10 RX ADMIN — ENOXAPARIN SODIUM 40 MG: 40 INJECTION SUBCUTANEOUS at 06:09

## 2023-09-10 RX ADMIN — VANCOMYCIN HYDROCHLORIDE 1500 MG: 1.5 INJECTION, POWDER, LYOPHILIZED, FOR SOLUTION INTRAVENOUS at 03:09

## 2023-09-10 RX ADMIN — MUPIROCIN: 20 OINTMENT TOPICAL at 09:09

## 2023-09-10 RX ADMIN — IPRATROPIUM BROMIDE AND ALBUTEROL SULFATE 3 ML: 2.5; .5 SOLUTION RESPIRATORY (INHALATION) at 07:09

## 2023-09-10 RX ADMIN — INSULIN ASPART 2 UNITS: 100 INJECTION, SOLUTION INTRAVENOUS; SUBCUTANEOUS at 06:09

## 2023-09-10 RX ADMIN — CEFAZOLIN 2 G: 2 INJECTION, POWDER, FOR SOLUTION INTRAMUSCULAR; INTRAVENOUS at 09:09

## 2023-09-10 RX ADMIN — IPRATROPIUM BROMIDE AND ALBUTEROL SULFATE 3 ML: 2.5; .5 SOLUTION RESPIRATORY (INHALATION) at 03:09

## 2023-09-10 RX ADMIN — IPRATROPIUM BROMIDE AND ALBUTEROL SULFATE 3 ML: 2.5; .5 SOLUTION RESPIRATORY (INHALATION) at 02:09

## 2023-09-10 RX ADMIN — INSULIN ASPART 6 UNITS: 100 INJECTION, SOLUTION INTRAVENOUS; SUBCUTANEOUS at 09:09

## 2023-09-10 RX ADMIN — ASPIRIN 81 MG: 81 TABLET, COATED ORAL at 09:09

## 2023-09-10 RX ADMIN — ATORVASTATIN CALCIUM 40 MG: 20 TABLET, FILM COATED ORAL at 09:09

## 2023-09-10 RX ADMIN — MEROPENEM 1 G: 1 INJECTION, POWDER, FOR SOLUTION INTRAVENOUS at 10:09

## 2023-09-10 RX ADMIN — INSULIN ASPART 4 UNITS: 100 INJECTION, SOLUTION INTRAVENOUS; SUBCUTANEOUS at 06:09

## 2023-09-10 RX ADMIN — INSULIN ASPART 4 UNITS: 100 INJECTION, SOLUTION INTRAVENOUS; SUBCUTANEOUS at 01:09

## 2023-09-10 RX ADMIN — SODIUM CHLORIDE, PRESERVATIVE FREE 10 ML: 5 INJECTION INTRAVENOUS at 06:09

## 2023-09-10 RX ADMIN — IPRATROPIUM BROMIDE AND ALBUTEROL SULFATE 3 ML: 2.5; .5 SOLUTION RESPIRATORY (INHALATION) at 12:09

## 2023-09-10 NOTE — PROGRESS NOTES
[facility] ([place])  Podiatry  Progress Note    Patient Name: Dave Good  MRN: [id number]  Admission Date: 9/7/2023  Hospital Length of Stay: 2 days  Attending Physician: Hari Iverson MD  Primary Care Provider: Reyna Jorge NP     Subjective:     Interval History: NAEON. VSS. WBC continues to be elevated at 13.55.  Patient is is 2 days s/p left digit amputation.  Patient is doing well.  He was sitting up in bed watching TV 1 seen bedside.  Patient denies fevers, chills, nausea, or vomiting.  Denies any new pedal complaints    Follow-up For: Procedure(s) (LRB):  AMPUTATION, TOE (Left)    Post-Operative Day: 2 Days Post-Op    Scheduled Meds:   albuterol-ipratropium  3 mL Nebulization Q4H    aspirin  81 mg Oral Daily    atorvastatin  40 mg Oral Daily    enoxparin  40 mg Subcutaneous Daily    gabapentin  300 mg Oral BID    insulin aspart U-100  4 Units Subcutaneous TIDWM    insulin detemir U-100 (Levemir)  20 Units Subcutaneous QHS    meropenem (MERREM) IVPB  1 g Intravenous Q8H    mupirocin   Nasal BID    QUEtiapine  200 mg Oral Nightly    vancomycin (VANCOCIN) IV (PEDS and ADULTS)  2,000 mg Intravenous Q12H     Continuous Infusions:  PRN Meds:sodium chloride 0.9%, acetaminophen, dextrose 10%, dextrose 10%, glucagon (human recombinant), HYDROmorphone, insulin aspart U-100, naloxone, ondansetron, oxyCODONE, sodium chloride 0.9%, Pharmacy to dose Vancomycin consult **AND** vancomycin - pharmacy to dose    Review of Systems   Constitutional: Negative for chills, diaphoresis, fever, malaise/fatigue and night sweats.   Cardiovascular:  Positive for leg swelling. Negative for claudication, cyanosis and syncope.   Skin:  Positive for poor wound healing. Negative for color change, dry skin, nail changes, rash, suspicious lesions and unusual hair distribution.   Musculoskeletal:  Negative for falls, joint pain, joint swelling, muscle cramps, muscle weakness and stiffness.   Gastrointestinal:  Negative for  constipation, diarrhea, nausea and vomiting.   Neurological:  Positive for numbness, paresthesias and sensory change. Negative for brief paralysis, disturbances in coordination, focal weakness and tremors.     Objective:     Vital Signs (Most Recent):  Temp: 99 °F (37.2 °C) (09/09/23 2355)  Pulse: 86 (09/10/23 0010)  Resp: 16 (09/10/23 0010)  BP: 124/73 (09/09/23 2355)  SpO2: 96 % (09/10/23 0010) Vital Signs (24h Range):  Temp:  [98.8 °F (37.1 °C)-99.3 °F (37.4 °C)] 99 °F (37.2 °C)  Pulse:  [] 86  Resp:  [16-20] 16  SpO2:  [87 %-97 %] 96 %  BP: (109-134)/(64-77) 124/73     Weight: 117 kg (257 lb 15 oz)  Body mass index is 33.12 kg/m².    Physical Exam  Constitutional:       General: He is not in acute distress.     Appearance: He is well-developed. He is not diaphoretic.   Cardiovascular:      Pulses:           Popliteal pulses are 2+ on the right side and 2+ on the left side.        Dorsalis pedis pulses are 2+ on the right side and 2+ on the left side.        Posterior tibial pulses are 2+ on the right side and 2+ on the left side.      Comments: Capillary refill 3 seconds all toes/distal feet, all toes/both feet warm to touch.       Negative lymphadenopathy bilateral popliteal fossa and tarsal tunnel.    Less than 2+ pitting lower extremity edema bilateral.     Musculoskeletal:      Right ankle: No swelling, deformity, ecchymosis or lacerations. Normal range of motion. Normal pulse.      Right Achilles Tendon: Normal. No defects. Gonzáles's test negative.      Comments:  Status post left first digit amputation.  Surgical site bleeding appropriately.  No signs of necrotic wound base tissue.  No malodor noted.  Lymphadenopathy:      Lower Body: No right inguinal adenopathy. No left inguinal adenopathy.      Comments: Negative lymphadenopathy bilateral popliteal fossa and tarsal tunnel.     Negative lymphangitic streaking bilateral feet/ankles/legs.   Skin:     General: Skin is warm and dry.      Capillary  Refill: Capillary refill takes 2 to 3 seconds.      Coloration: Skin is not pale.      Findings: No abrasion, bruising, burn, ecchymosis, erythema, laceration, lesion or rash.      Nails: There is no clubbing.      Comments:  Status post left first digit amputation.  Sutures intact on dorsal, medial and lateral portion with opening in the central incision site.  Neurological:      Mental Status: He is alert and oriented to person, place, and time.      Sensory: No sensory deficit.      Motor: No tremor, atrophy or abnormal muscle tone.      Gait: Gait normal.      Comments: Diminished/loss of protective sensation all toes bilateral to 10 gram monofilament.  Psychiatric:         Behavior: Behavior is cooperative.          Laboratory:  CBC:   Recent Labs   Lab 09/09/23  1114   WBC 13.55*   RBC 3.57*   HGB 8.9*   HCT 29.2*   *   MCV 82   MCH 24.9*   MCHC 30.5*     CMP:   Recent Labs   Lab 09/08/23  0401 09/09/23  1114   * 204*   CALCIUM 9.1 8.6*   ALBUMIN 1.2*  --    PROT 8.7*  --    * 131*   K 3.4* 3.9   CO2 29 26   CL 96 96   BUN 17 16   CREATININE 1.0 1.1   ALKPHOS 78  --    ALT 15  --    AST 24  --    BILITOT 0.4  --      Microbiology Results (last 7 days)       Procedure Component Value Units Date/Time    Blood culture [[id number]] Collected: 09/08/23 1333    Order Status: Completed Specimen: Blood Updated: 09/09/23 2309     Blood Culture, Routine Gram stain jose bottle: Gram positive cocci in clusters resembling Staph      Results called to and read back by:Kelly Elder Rn 09/09/2023  23:09    Gram stain [[id number]] Collected: 09/08/23 1754    Order Status: Completed Specimen: Incision site from Toe, Left Foot Updated: 09/09/23 1918     Gram Stain Result Rare WBC's      Many Gram positive cocci      Few Gram negative rods    Narrative:      Left great toe    Fungus culture [[id number]] Collected: 09/08/23 1754    Order Status: Sent Specimen: Incision site from Toe, Left Foot Updated: 09/09/23  1521    AFB Culture & Smear [[id number]] Collected: 09/08/23 1754    Order Status: Sent Specimen: Incision site from Toe, Left Foot Updated: 09/09/23 1521    Culture, Anaerobe [[id number]] Collected: 09/08/23 1754    Order Status: Sent Specimen: Incision site from Toe, Left Foot Updated: 09/09/23 1521    Aerobic culture [[id number]] Collected: 09/08/23 1754    Order Status: Sent Specimen: Incision site from Toe, Left Foot Updated: 09/09/23 1521    Blood culture #2 **CANNOT BE ORDERED STAT** [[id number]] Collected: 09/07/23 2343    Order Status: Completed Specimen: Blood from Peripheral, Hand, Right Updated: 09/09/23 1334     Blood Culture, Routine Gram stain jose bottle: Gram positive cocci in clusters resembling Staph      Results called to and read back by:Starla Wilburn Lpn 09/09/2023 00:41      Gram stain aer bottle: Gram positive cocci in clusters resembling Staph      Positive results previously called 09/09/2023  13:34    Rapid Organism ID by PCR (from Blood culture) [[id number]]  (Abnormal) Collected: 09/07/23 2343    Order Status: Completed Updated: 09/09/23 0239     Enterococcus faecalis Not Detected     Enterococcus faecium Not Detected     Listeria monocytogenes Not Detected     Staphylococcus spp. See species for ID     Staphylococcus aureus Detected     Staphylococcus epidermidis Not Detected     Staphylococcus lugdunensis Not Detected     Streptococcus species Not Detected     Streptococcus agalactiae Not Detected     Streptococcus pneumoniae Not Detected     Streptococcus pyogenes Not Detected     Acinetobacter calcoaceticus/baumannii complex Not Detected     Bacteroides fragilis Not Detected     Enterobacterales Not Detected     Enterobacter cloacae complex Not Detected     Escherichia coli Not Detected     Klebsiella aerogenes Not Detected     Klebsiella oxytoca Not Detected     Klebsiella pneumoniae group Not Detected     Proteus Not Detected     Salmonella sp Not Detected     Serratia marcescens  Not Detected     Haemophilus influenzae Not Detected     Neisseria meningtidis Not Detected     Pseudomonas aeruginosa Not Detected     Stenotrophomonas maltophilia Not Detected     Candida albicans Not Detected     Candida auris Not Detected     Candida glabrata Not Detected     Candida krusei Not Detected     Candida parapsilosis Not Detected     Candida tropicalis Not Detected     Cryptococcus neoformans/gattii Not Detected     CTX-M (ESBL ) Test not applicable     IMP (Carbapenem resistant) Test not applicable     KPC resistance gene (Carbapenem resistant) Test not applicable     mcr-1  Test not applicable     mec A/C  Test not applicable     mec A/C and MREJ (MRSA) gene Not Detected     NDM (Carbapenem resistant) Test not applicable     OXA-48-like (Carbapenem resistant) Test not applicable     van A/B (VRE gene) Test not applicable     VIM (Carbapenem resistant) Test not applicable    Blood culture #1 **CANNOT BE ORDERED STAT** [[id number]] Collected: 09/07/23 7213    Order Status: Completed Specimen: Blood from Peripheral, Hand, Left Updated: 09/09/23 0044     Blood Culture, Routine Gram stain jose bottle: Gram positive cocci in clusters resembling Staph      Results called to and read back by:Starla Wilburn Lpn 09/09/2023 00:41          All pertinent labs reviewed within the last 24 hours.    Diagnostic Results:  I have reviewed all pertinent imaging results/findings within the past 24 hours.      Assessment/Plan:     ID  Chronic osteomyelitis of toe of left foot  Dave Good is 59 yo M who was admitted to the hospital due to a open ulcer with foul smell to the left great toe.  Patient is 2 days s/p L first digit amputation.    - Post-op Xrays L foot: Postoperative changes of great toe disarticulation amputation at the MTP joint.  Osseous stump appears unremarkable.  Note made of soft tissue swelling and soft tissue gas at the operative site.  Lisfranc articulation is congruent.  Degenerative changes  are seen in the midfoot. There is a prominent plantar calcaneal spur.  Generalized soft tissue swelling about the ankle and foot noted.   - Pending intra-op clean margin bone cultures  - Patient's left foot dressed with antimicrobial packing strips, 4x4 gauze, cast padding, and ACE wrap  - Post-op shoes applied to bilateral feet. Advised patient to always ambulate wearing postop shoes.  Patient okay to weight bear as tolerated  - Discussed with the patient possibility of returning to OR to close surgical site  - Advised patient to keep dressings clean, dry, and intact  - Continue antibiotics per Infectious Disease  - Podiatry will continue to follow        Evangelina Casarez DPM  Podiatry  [facility]

## 2023-09-10 NOTE — PT/OT/SLP EVAL
Occupational Therapy   Evaluation and Treatment    Name: Dave Good  MRN: [id number]  Admitting Diagnosis: Staphylococcus aureus bacteremia with sepsis  Recent Surgery: Procedure(s) (LRB):  AMPUTATION, TOE (Left) 2 Days Post-Op    Recommendations:     Discharge Recommendations: nursing facility, skilled  Discharge Equipment Recommendations:  bedside commode, walker, rolling, wheelchair, shower chair (currently needed though will defer to next level of care)  Barriers to discharge:  Inaccessible home environment, Decreased caregiver support ([address] apt; current functional level)    Assessment:   Initial OT eval/treat complete.  Supine<>sit with SBA for safety, increased time needed, and MIN cuing for task sequencing with good follow through.  Step transfer bed>BSC with MOD A and BSC>bed with MIN A with decreased lift assist required when standing from taller surface; initial cuing needed for safe hand placement during transitions and safe LLE positioning to maintain PWB.  TOTAL A to don shoe cover to LLE and sx shoes to BLE while at bed level.  No DME in use PTA.  Previously independent in ADL, navigating bus system, and sharing cooking/cleaning responsibilities with his live in friend; will require increased therapy to return safely to previous living situation and PLOF.  Needs RW, BSC, shower chair, and W/C currently though will defer DME needs to next level of care.  Recommend post acute OT in SNF.  Pt. Lives with friend that also works part-time.  To benefit from continued acute care OT services to increase independence in self-care/functional transfers.  OT to follow.     Dave Good is a 58 y.o. male with a medical diagnosis of Staphylococcus aureus bacteremia with sepsis.  He presents with below deficits decreasing independence in self-care/functional transfers. Performance deficits affecting function: weakness, impaired endurance, impaired self care skills, impaired functional mobility, gait instability,  "impaired balance, decreased lower extremity function, decreased safety awareness, pain, decreased ROM, impaired skin, orthopedic precautions.      Rehab Prognosis: Good; patient would benefit from acute skilled OT services to address these deficits and reach maximum level of function.       Plan:     Patient to be seen 5 x/week to address the above listed problems via self-care/home management, therapeutic activities, therapeutic exercises  Plan of Care Expires: 09/24/23  Plan of Care Reviewed with: patient    Subjective     Chief Complaint: With c/o initial 10/10 LLE-foot pain that improved after activity and repositioning in bed to 8/10.  Patient/Family Comments/goals: No goals stated at this time.  Pt. Stating, "We'll work on that tomorrow" in regards to sitting up in chair for lunch today.      Occupational Profile:  Lives with friend in [address] [place] [address] with [address]  midway by a landing and B-handrails; bathroom setup as walk-in shower and standard toilet.  Pt. Reports no AD or DME in use PTA.  Previously independent in ADL, IADL, sharing cooking/cleaning responsibilities with his live in friend.  Uses taxi for grocery shopping, insurance provided transport for MD appts, and for all other transportation needs he navigates the public bus system.   Equipment Used at Home: walker, rolling  Assistance upon Discharge: Lives with friend that works part-time.     Pain/Comfort:  Pain Rating 1: 10/10 (just prior to activity)  Location - Side 1: Left  Location - Orientation 1: generalized  Location 1: foot  Pain Addressed 1: Distraction, Cessation of Activity, Nurse notified, Reposition  Pain Rating Post-Intervention 1: 8/10 (at rest)    Patients cultural, spiritual, Catholic conflicts given the current situation:  (None stated.)    Objective:     Communicated with: Monik RIDLEY LPN prior to session.  Patient found HOB elevated with bed alarm, telemetry, peripheral IV upon OT entry to room.    General Precautions: " Standard, fall  Orthopedic Precautions: LLE partial weight bearing (to heel)  Braces:  (sx shoe)  Respiratory Status: Nasal cannula, flow 1 L/min; RT entering session and removing O2 NC while Pt. Was EOB    Occupational Performance:    Bed Mobility:    Supine<>sit with SBA, increased time, MIN cuing for task sequencing, and HOB elevated when coming into sitting.     Functional Mobility/Transfers:  Sit>stand EOB>RW with MOD A for lift, MIN cuing for safe hand placement and LLE positioning with good follow through.  Step transfer bed>BSC with MOD due to increased lift from bed and verbal cuing for step direction to reach BSC.  Step transfer BSC>bed with MIN A for lift from BSC, good follow through of step direction towards bed, and good self-initiation of safe hand placement during transitions.      Activities of Daily Living:  TOTAL A for donning/doffing shoe cover to LLE and sx shoes to BLE at bed level.  Denied need to toilet once at BSC.  Discussed using [facility] with nursing staff for toileting needs as well as calling for assist with task with Pt. Verbalizing understanding.      Cognitive/Visual Perceptual:  Cognitive/Psychosocial Skills:  -       Oriented to: Person, Place, Time, and Situation   -       Follows Commands/attention:Follows one-step commands  -       Communication: able to make basic needs known and answer questions appropriately  -       Memory: No Deficits noted  -       Safety awareness/insight to disability: impaired   -       Mood/Affect/Coping skills/emotional control: Cooperative  Visual/Perceptual:  -grossly intact    Physical Exam:  Postural examination/scapula alignment: -       Rounded shoulders  -       Forward head  Skin integrity: dressing to LLE foot with well documented in incision in EMR  Upper Extremity Range of Motion:  -       Right Upper Extremity: WFL  -       Left Upper Extremity: WFL  Upper Extremity Strength: -       Right Upper Extremity: 4+/5 gross  -       Left Upper  "Extremity: 4+/5 gross   Strength: -       Right Upper Extremity: WFL  -       Left Upper Extremity: WFL  Fine Motor Coordination: -       Intact  Left hand thumb/finger opposition skills and Right hand thumb/finger opposition skills    AMPA 6 Click ADL:  AMPAC Total Score: 15    Treatment & Education:  Educated on role of OT and POC.  Educated on LLE PWB to heel with Pt. Verbalizing and demos understanding this day.  Discussed importance of OOB activity with Pt. Encouraged to sit up in chair this day though Pt. Stating, "We'll work on that tomorrow;" discussed plans for sitting up in bedside chair at least for an hour after next scheduled therapy session with Pt. Agreeable.   Supine<>sit with SBA, increased time, MIN cuing for task sequencing, and HOB elevated when coming into sitting.   Sit>stand EOB>RW with MOD A for lift, MIN cuing for safe hand placement and LLE positioning with good follow through.  Step transfer bed>BSC with MOD due to increased lift from bed and verbal cuing for step direction to reach BSC.  Step transfer BSC>bed with MIN A for lift from BSC, good follow through of step direction towards bed, and good self-initiation of safe hand placement during transitions.    TOTAL A for donning/doffing shoe cover to LLE and sx shoes to BLE at bed level.  Denied need to toilet once at BSC.  Discussed using BSC with nursing staff for toileting needs as well as calling for assist with task with Pt. Verbalizing understanding.    Received call light review and importance of calling for assist as needed.     Patient left HOB elevated with all lines intact, call button in reach, and nursing notified    GOALS:   Multidisciplinary Problems       Occupational Therapy Goals          Problem: Occupational Therapy    Goal Priority Disciplines Outcome Interventions   Occupational Therapy Goal     OT, PT/OT Ongoing, Progressing    Description: Goals to be met by: 9/24/2023     Patient will increase functional " independence with ADLs by performing:    UE Dressing with Set-up Assistance.  LE Dressing with Moderate Assistance.  Grooming while seated at sink with Set-up Assistance.  Toileting from bedside commode with Contact Guard Assistance for hygiene and clothing management.   Toilet transfer to bedside commode with Stand-by Assistance while maintaining weight bearing precautions.                         History:     Past Medical History:   Diagnosis Date    Arthritis     COPD (chronic obstructive pulmonary disease)     COVID-19     Depression     Diabetes mellitus     Diabetes mellitus, type 2     Hypertension          Past Surgical History:   Procedure Laterality Date    DEBRIDEMENT OF LOWER EXTREMITY Bilateral 3/2/2022    Procedure: DEBRIDEMENT, LOWER EXTREMITY;  Surgeon: Jesus Flores Jr., MD;  Location: [facility];  Service: General;  Laterality: Bilateral;    FOOT AMPUTATION THROUGH METATARSAL Right 9/23/2021    Procedure: AMPUTATION, FOOT, TRANSMETATARSAL right 1st ray resection;  Surgeon: Samuel Toussaint DPM;  Location: [facility];  Service: Podiatry;  Laterality: Right;    INCISION AND DRAINAGE FOOT Bilateral 9/21/2021    Procedure: INCISION AND DRAINAGE, FOOT - Bilateral;  Surgeon: Lavonne Vigil DPM;  Location: [facility];  Service: Podiatry;  Laterality: Bilateral;    REPLACEMENT OF WOUND DRESSING Right 2/24/2022    Procedure: REPLACEMENT, DRESSING, WOUND;  Surgeon: Jesus Flores Jr., MD;  Location: [facility];  Service: Vascular;  Laterality: Right;  wound vac dressing changed    WOUND DEBRIDEMENT Right 2/22/2022    Procedure: DEBRIDEMENT, WOUND - RIGHT FOOT;  Surgeon: Jesus Flores Jr., MD;  Location: [facility];  Service: Vascular;  Laterality: Right;    WOUND DEBRIDEMENT Bilateral 2/24/2022    Procedure: DEBRIDEMENT, WOUND / BILATERAL DEBRIDEMENT FEET;  Surgeon: Jesus Flores Jr., MD;  Location: [facility];  Service: Vascular;  Laterality: Bilateral;    WOUND DEBRIDEMENT Right 5/27/2022    Procedure: DEBRIDEMENT, WOUND;   Surgeon: Jesus Flores Jr., MD;  Location: [facility];  Service: General;  Laterality: Right;       Time Tracking:     OT Date of Treatment: 09/10/23  OT Start Time: 1114  OT Stop Time: 1201  OT Total Time (min): 47 min    Billable Minutes:Evaluation 15  Self Care/Home Management 12  Therapeutic Activity 20    9/10/2023

## 2023-09-10 NOTE — SUBJECTIVE & OBJECTIVE
Subjective:     Interval History: NAEON. VSS. WBC continues to be elevated at 13.55.  Patient is is 2 days s/p left digit amputation.  Patient is doing well.  He was sitting up in bed watching TV 1 seen bedside.  Patient denies fevers, chills, nausea, or vomiting.  Denies any new pedal complaints    Follow-up For: Procedure(s) (LRB):  AMPUTATION, TOE (Left)    Post-Operative Day: 2 Days Post-Op    Scheduled Meds:   albuterol-ipratropium  3 mL Nebulization Q4H    aspirin  81 mg Oral Daily    atorvastatin  40 mg Oral Daily    enoxparin  40 mg Subcutaneous Daily    gabapentin  300 mg Oral BID    insulin aspart U-100  4 Units Subcutaneous TIDWM    insulin detemir U-100 (Levemir)  20 Units Subcutaneous QHS    meropenem (MERREM) IVPB  1 g Intravenous Q8H    mupirocin   Nasal BID    QUEtiapine  200 mg Oral Nightly    vancomycin (VANCOCIN) IV (PEDS and ADULTS)  2,000 mg Intravenous Q12H     Continuous Infusions:  PRN Meds:sodium chloride 0.9%, acetaminophen, dextrose 10%, dextrose 10%, glucagon (human recombinant), HYDROmorphone, insulin aspart U-100, naloxone, ondansetron, oxyCODONE, sodium chloride 0.9%, Pharmacy to dose Vancomycin consult **AND** vancomycin - pharmacy to dose    Review of Systems   Constitutional: Negative for chills, diaphoresis, fever, malaise/fatigue and night sweats.   Cardiovascular:  Positive for leg swelling. Negative for claudication, cyanosis and syncope.   Skin:  Positive for poor wound healing. Negative for color change, dry skin, nail changes, rash, suspicious lesions and unusual hair distribution.   Musculoskeletal:  Negative for falls, joint pain, joint swelling, muscle cramps, muscle weakness and stiffness.   Gastrointestinal:  Negative for constipation, diarrhea, nausea and vomiting.   Neurological:  Positive for numbness, paresthesias and sensory change. Negative for brief paralysis, disturbances in coordination, focal weakness and tremors.     Objective:     Vital Signs (Most  Recent):  Temp: 99 °F (37.2 °C) (09/09/23 2355)  Pulse: 86 (09/10/23 0010)  Resp: 16 (09/10/23 0010)  BP: 124/73 (09/09/23 2355)  SpO2: 96 % (09/10/23 0010) Vital Signs (24h Range):  Temp:  [98.8 °F (37.1 °C)-99.3 °F (37.4 °C)] 99 °F (37.2 °C)  Pulse:  [] 86  Resp:  [16-20] 16  SpO2:  [87 %-97 %] 96 %  BP: (109-134)/(64-77) 124/73     Weight: 117 kg (257 lb 15 oz)  Body mass index is 33.12 kg/m².    Physical Exam  Constitutional:       General: He is not in acute distress.     Appearance: He is well-developed. He is not diaphoretic.   Cardiovascular:      Pulses:           Popliteal pulses are 2+ on the right side and 2+ on the left side.        Dorsalis pedis pulses are 2+ on the right side and 2+ on the left side.        Posterior tibial pulses are 2+ on the right side and 2+ on the left side.      Comments: Capillary refill 3 seconds all toes/distal feet, all toes/both feet warm to touch.       Negative lymphadenopathy bilateral popliteal fossa and tarsal tunnel.    Less than 2+ pitting lower extremity edema bilateral.     Musculoskeletal:      Right ankle: No swelling, deformity, ecchymosis or lacerations. Normal range of motion. Normal pulse.      Right Achilles Tendon: Normal. No defects. Gonzáles's test negative.      Comments:  Status post left first digit amputation.  Surgical site bleeding appropriately.  No signs of necrotic wound base tissue.  No malodor noted.  Lymphadenopathy:      Lower Body: No right inguinal adenopathy. No left inguinal adenopathy.      Comments: Negative lymphadenopathy bilateral popliteal fossa and tarsal tunnel.     Negative lymphangitic streaking bilateral feet/ankles/legs.   Skin:     General: Skin is warm and dry.      Capillary Refill: Capillary refill takes 2 to 3 seconds.      Coloration: Skin is not pale.      Findings: No abrasion, bruising, burn, ecchymosis, erythema, laceration, lesion or rash.      Nails: There is no clubbing.      Comments:  Status post left  first digit amputation.  Sutures intact on dorsal, medial and lateral portion with opening in the central incision site.  Neurological:      Mental Status: He is alert and oriented to person, place, and time.      Sensory: No sensory deficit.      Motor: No tremor, atrophy or abnormal muscle tone.      Gait: Gait normal.      Comments: Diminished/loss of protective sensation all toes bilateral to 10 gram monofilament.  Psychiatric:         Behavior: Behavior is cooperative.          Laboratory:  CBC:   Recent Labs   Lab 09/09/23  1114   WBC 13.55*   RBC 3.57*   HGB 8.9*   HCT 29.2*   *   MCV 82   MCH 24.9*   MCHC 30.5*     CMP:   Recent Labs   Lab 09/08/23  0401 09/09/23  1114   * 204*   CALCIUM 9.1 8.6*   ALBUMIN 1.2*  --    PROT 8.7*  --    * 131*   K 3.4* 3.9   CO2 29 26   CL 96 96   BUN 17 16   CREATININE 1.0 1.1   ALKPHOS 78  --    ALT 15  --    AST 24  --    BILITOT 0.4  --      Microbiology Results (last 7 days)       Procedure Component Value Units Date/Time    Blood culture [[id number]] Collected: 09/08/23 1333    Order Status: Completed Specimen: Blood Updated: 09/09/23 2309     Blood Culture, Routine Gram stain jose bottle: Gram positive cocci in clusters resembling Staph      Results called to and read back by:Kelly Elder Rn 09/09/2023  23:09    Gram stain [[id number]] Collected: 09/08/23 1754    Order Status: Completed Specimen: Incision site from Toe, Left Foot Updated: 09/09/23 1918     Gram Stain Result Rare WBC's      Many Gram positive cocci      Few Gram negative rods    Narrative:      Left great toe    Fungus culture [[id number]] Collected: 09/08/23 1754    Order Status: Sent Specimen: Incision site from Toe, Left Foot Updated: 09/09/23 1521    AFB Culture & Smear [[id number]] Collected: 09/08/23 1754    Order Status: Sent Specimen: Incision site from Toe, Left Foot Updated: 09/09/23 1521    Culture, Anaerobe [[id number]] Collected: 09/08/23 1754    Order Status: Sent  Specimen: Incision site from Toe, Left Foot Updated: 09/09/23 1521    Aerobic culture [[id number]] Collected: 09/08/23 1754    Order Status: Sent Specimen: Incision site from Toe, Left Foot Updated: 09/09/23 1521    Blood culture #2 **CANNOT BE ORDERED STAT** [[id number]] Collected: 09/07/23 2343    Order Status: Completed Specimen: Blood from Peripheral, Hand, Right Updated: 09/09/23 1334     Blood Culture, Routine Gram stain jose bottle: Gram positive cocci in clusters resembling Staph      Results called to and read back by:Starla Wilburn Lpn 09/09/2023 00:41      Gram stain aer bottle: Gram positive cocci in clusters resembling Staph      Positive results previously called 09/09/2023  13:34    Rapid Organism ID by PCR (from Blood culture) [[id number]]  (Abnormal) Collected: 09/07/23 2343    Order Status: Completed Updated: 09/09/23 0239     Enterococcus faecalis Not Detected     Enterococcus faecium Not Detected     Listeria monocytogenes Not Detected     Staphylococcus spp. See species for ID     Staphylococcus aureus Detected     Staphylococcus epidermidis Not Detected     Staphylococcus lugdunensis Not Detected     Streptococcus species Not Detected     Streptococcus agalactiae Not Detected     Streptococcus pneumoniae Not Detected     Streptococcus pyogenes Not Detected     Acinetobacter calcoaceticus/baumannii complex Not Detected     Bacteroides fragilis Not Detected     Enterobacterales Not Detected     Enterobacter cloacae complex Not Detected     Escherichia coli Not Detected     Klebsiella aerogenes Not Detected     Klebsiella oxytoca Not Detected     Klebsiella pneumoniae group Not Detected     Proteus Not Detected     Salmonella sp Not Detected     Serratia marcescens Not Detected     Haemophilus influenzae Not Detected     Neisseria meningtidis Not Detected     Pseudomonas aeruginosa Not Detected     Stenotrophomonas maltophilia Not Detected     Candida albicans Not Detected     Candida auris Not  Detected     Candida glabrata Not Detected     Candida krusei Not Detected     Candida parapsilosis Not Detected     Candida tropicalis Not Detected     Cryptococcus neoformans/gattii Not Detected     CTX-M (ESBL ) Test not applicable     IMP (Carbapenem resistant) Test not applicable     KPC resistance gene (Carbapenem resistant) Test not applicable     mcr-1  Test not applicable     mec A/C  Test not applicable     mec A/C and MREJ (MRSA) gene Not Detected     NDM (Carbapenem resistant) Test not applicable     OXA-48-like (Carbapenem resistant) Test not applicable     van A/B (VRE gene) Test not applicable     VIM (Carbapenem resistant) Test not applicable    Blood culture #1 **CANNOT BE ORDERED STAT** [[id number]] Collected: 09/07/23 2343    Order Status: Completed Specimen: Blood from Peripheral, Hand, Left Updated: 09/09/23 0044     Blood Culture, Routine Gram stain jose bottle: Gram positive cocci in clusters resembling Staph      Results called to and read back by:Starla Wilburn Lpn 09/09/2023 00:41          All pertinent labs reviewed within the last 24 hours.    Diagnostic Results:  I have reviewed all pertinent imaging results/findings within the past 24 hours.

## 2023-09-10 NOTE — PLAN OF CARE
POC reviewed. No significant events this shift. 1LPM O2 NC noted. Complaints of pain treated with PRN pain medication per MAR. Pt voids and shifts in bed with assist. Bed low and locked, side rails up x3, call light within reach.    Problem: Adult Inpatient Plan of Care  Goal: Plan of Care Review  Outcome: Ongoing, Progressing  Goal: Patient-Specific Goal (Individualized)  Outcome: Ongoing, Progressing  Goal: Absence of Hospital-Acquired Illness or Injury  Outcome: Ongoing, Progressing  Goal: Optimal Comfort and Wellbeing  Outcome: Ongoing, Progressing  Goal: Readiness for Transition of Care  Outcome: Ongoing, Progressing     Problem: Diabetes Comorbidity  Goal: Blood Glucose Level Within Targeted Range  Outcome: Ongoing, Progressing     Problem: Infection  Goal: Absence of Infection Signs and Symptoms  Outcome: Ongoing, Progressing     Problem: Impaired Wound Healing  Goal: Optimal Wound Healing  Outcome: Ongoing, Progressing

## 2023-09-10 NOTE — ASSESSMENT & PLAN NOTE
Dave Good is 59 yo M who was admitted to the hospital due to a open ulcer with foul smell to the left great toe.  Patient is 2 days s/p L first digit amputation.    - Post-op Xrays L foot: Postoperative changes of great toe disarticulation amputation at the MTP joint.  Osseous stump appears unremarkable.  Note made of soft tissue swelling and soft tissue gas at the operative site.  Lisfranc articulation is congruent.  Degenerative changes are seen in the midfoot. There is a prominent plantar calcaneal spur.  Generalized soft tissue swelling about the ankle and foot noted.   - Pending intra-op clean margin bone cultures  - Patient's left foot dressed with antimicrobial packing strips, 4x4 gauze, cast padding, and ACE wrap  - Post-op shoes applied to bilateral feet. Advised patient to always ambulate wearing postop shoes.  Patient okay to weight bear as tolerated  - Discussed with the patient possibility of returning to OR to close surgical site  - Advised patient to keep dressings clean, dry, and intact  - Continue antibiotics per Infectious Disease  - Podiatry will continue to follow

## 2023-09-10 NOTE — PROGRESS NOTES
"[facility] Medicine  Progress Note    Patient Name: Dave Good  MRN: [id number]  Patient Class: IP- Inpatient   Admission Date: 9/7/2023  Length of Stay: 2 days  Attending Physician: Hari Iverson MD  Primary Care Provider: Reyna Jorge NP        Subjective:     Principal Problem:Staphylococcus aureus bacteremia with sepsis        HPI:  Per Larry Shepherd, NP:    "The patient is a 58 y.o. male with a past medical history of type 2 diabetes, HTN, HLD, and chronic systolic congestive heart failure who presents with worsening left great toe pain infection and swelling.  Patient was recently seen in the ER and discharged to follow up with ID and wound care.  Since then he has been unable to follow-up with podiatry or infectious disease.  He states that he has not tried to call their offices and was waiting for them to call him.  States that his toe pain has gotten worse.  He has been soaking it in a foot massage machine chair with no improvement.  XR positive for left great, 2nd, and 4th toe acute osteomyelitis.  He will be admitted for further management of his acute osteomyelitis and infectious disease and podiatry consult."      Overview/Hospital Course:  Patient is 58-year-old man with history of hypertension, diabetes mellitus type 2, reported history of systolic heart failure (although last echocardiogram with normal systolic function), significant prior tobacco smoking history with chronic obstructive pulmonary disease admitted to the hospital with diabetic ulcer with abscess involving left foot including great toe and proximal foot.  Patient with marked leukocytosis and elevated heart rate consistent with systemic inflammatory response/sepsis secondary to foot infection.  Blood cultures obtained.  Patient started on intravenous antibiotics.  Podiatry consulted and took the patient for surgical debridement with removal of necrotic tissue on 9/8/2023.  Blood cultures " positive for Gram-positive cocci representing Staphylococcus aureus.    Patient with prior history of line associated deep vein thrombosis for which she was treated with three months of anticoagulation.      Interval History:  Patient loss intravenous access.  Midline place the right upper extremity.    Review of Systems   Constitutional:  Negative for chills and fever.   Respiratory:  Negative for shortness of breath.    Cardiovascular:  Negative for chest pain.   Gastrointestinal:  Negative for abdominal pain, constipation, diarrhea, nausea and vomiting.     Objective:     Vital Signs (Most Recent):  Temp: 98.1 °F (36.7 °C) (09/10/23 1220)  Pulse: 88 (09/10/23 1221)  Resp: 16 (09/10/23 1220)  BP: 115/66 (09/10/23 1220)  SpO2: 95 % (09/10/23 1221) Vital Signs (24h Range):  Temp:  [98 °F (36.7 °C)-99.3 °F (37.4 °C)] 98.1 °F (36.7 °C)  Pulse:  [] 88  Resp:  [16-20] 16  SpO2:  [87 %-99 %] 95 %  BP: (115-135)/(66-78) 115/66     Weight: 117 kg (257 lb 15 oz)  Body mass index is 33.12 kg/m².    Intake/Output Summary (Last 24 hours) at 9/10/2023 1242  Last data filed at 9/10/2023 0517  Gross per 24 hour   Intake 550 ml   Output 1400 ml   Net -850 ml           Physical Exam  Constitutional:       Appearance: He is obese.   Cardiovascular:      Rate and Rhythm: Normal rate and regular rhythm.      Heart sounds: Normal heart sounds. No murmur heard.     No friction rub. No gallop.   Pulmonary:      Effort: Pulmonary effort is normal. No respiratory distress.      Breath sounds: No wheezing.      Comments: Minimal rales at lung base, improved with cough.  Moving air well.  Abdominal:      General: Bowel sounds are normal. There is no distension.      Palpations: Abdomen is soft.      Tenderness: There is no abdominal tenderness.   Musculoskeletal:         General: No tenderness. Normal range of motion.      Comments: Left foot with surgical dressing clean, dry, and intact..   Skin:     General: Skin is warm and dry.       Coloration: Skin is not pale.      Findings: No erythema or rash.   Neurological:      Mental Status: He is alert and oriented to person, place, and time.             Significant Labs: All pertinent labs within the past 24 hours have been reviewed.    Significant Imaging: I have reviewed all pertinent imaging results/findings within the past 24 hours.        Assessment/Plan:      * Staphylococcus aureus bacteremia with sepsis  Clinically improving with source control yesterday with surgical debridement of infected left foot/abscess and empiric intravenous antibiotics.  Blood cultures on admission positive 2 for 2 for Gram-positive cocci in clusters representing Staphylococcus.  His PCR testing consistent with Staphylococcus aureus.  Transthoracic echocardiogram did not show evidence of endocarditis.  Repeat blood culture positive.  Repeat blood culture tomorrow.  Continue intravenous vancomycin and cefazolin pending sensitivity testing.    Acute on chronic combined systolic and diastolic congestive heart failure  Echocardiogram shows evidence of reduced systolic function also diastolic dysfunction.  Kidney electrolytes within normal range.  Will give additional dose of diuretic to achieve euvolemia.  Wean supplemental oxygen.    Hypertension  Normotensive.  Holding lisinopril in the setting of sepsis to decrease hypotension.    Type 2 diabetes mellitus with diabetic peripheral angiopathy without gangrene, with long-term current use of insulin  Hemoglobin A1c of 7.5 percent suggest reasonably controlled diabetes with current home regimen.  In the setting of sepsis holding metformin.  Adjust subcutaneous insulin further today for better glycemic control.  Will ask pharmacy to formulate antibiotics without dextrose if possible.    VTE Risk Mitigation (From admission, onward)         Ordered     enoxaparin injection 40 mg  Daily         09/09/23 1108     IP VTE HIGH RISK PATIENT  Once         09/09/23 1108     Place  sequential compression device  Until discontinued         09/08/23 0325                Hari Iverson MD  Department of Hospital Medicine   [facility], [place], operated by [facility] ([place])

## 2023-09-10 NOTE — SUBJECTIVE & OBJECTIVE
Subjective:     Interval History: NAEON. VSS. WBC remains abnormally elevated but has been down  trending since day of surgery. He is 2 days s/p L hallux amputation. Patient is doing well. He says he was kept up all night do various blood draws. Patient ambulates with post-op shoes when up and out of bed. Denies f/c/n/v. Denies any other pedal complaints.     Follow-up For: Procedure(s) (LRB):  AMPUTATION, TOE (Left)    Post-Operative Day: 2 Days Post-Op    Scheduled Meds:   albuterol-ipratropium  3 mL Nebulization Q4H    aspirin  81 mg Oral Daily    atorvastatin  40 mg Oral Daily    enoxparin  40 mg Subcutaneous Daily    gabapentin  300 mg Oral BID    insulin aspart U-100  4 Units Subcutaneous TIDWM    insulin detemir U-100 (Levemir)  20 Units Subcutaneous QHS    meropenem (MERREM) IVPB  1 g Intravenous Q8H    mupirocin   Nasal BID    QUEtiapine  200 mg Oral Nightly    sodium chloride 0.9%  10 mL Intravenous Q6H    vancomycin (VANCOCIN) IV (PEDS and ADULTS)  1,500 mg Intravenous Q12H     Continuous Infusions:  PRN Meds:sodium chloride 0.9%, acetaminophen, dextrose 10%, dextrose 10%, glucagon (human recombinant), HYDROmorphone, insulin aspart U-100, naloxone, ondansetron, oxyCODONE, sodium chloride 0.9%, Flushing PICC/Midline Protocol **AND** sodium chloride 0.9% **AND** sodium chloride 0.9%, Pharmacy to dose Vancomycin consult **AND** vancomycin - pharmacy to dose    Review of Systems   Constitutional: Negative for chills, diaphoresis, fever, malaise/fatigue and night sweats.   Cardiovascular:  Positive for leg swelling. Negative for claudication, cyanosis and syncope.   Skin:  Positive for poor wound healing. Negative for color change, dry skin, nail changes, rash, suspicious lesions and unusual hair distribution.   Musculoskeletal:  Negative for falls, joint pain, joint swelling, muscle cramps, muscle weakness and stiffness.   Gastrointestinal:  Negative for constipation, diarrhea, nausea and vomiting.    Neurological:  Positive for numbness, paresthesias and sensory change. Negative for brief paralysis, disturbances in coordination, focal weakness and tremors.     Objective:     Vital Signs (Most Recent):  Temp: 98.3 °F (36.8 °C) (09/10/23 0732)  Pulse: 85 (09/10/23 1127)  Resp: 16 (09/10/23 1127)  BP: 123/73 (09/10/23 0732)  SpO2: 96 % (09/10/23 1127) Vital Signs (24h Range):  Temp:  [98 °F (36.7 °C)-99.3 °F (37.4 °C)] 98.3 °F (36.8 °C)  Pulse:  [] 85  Resp:  [16-20] 16  SpO2:  [87 %-99 %] 96 %  BP: (123-135)/(73-78) 123/73     Weight: 117 kg (257 lb 15 oz)  Body mass index is 33.12 kg/m².    Physical Exam  Constitutional:       General: He is not in acute distress.     Appearance: He is well-developed. He is not diaphoretic.   Cardiovascular:      Pulses:           Popliteal pulses are 2+ on the right side and 2+ on the left side.        Dorsalis pedis pulses are 2+ on the right side and 2+ on the left side.        Posterior tibial pulses are 2+ on the right side and 2+ on the left side.      Comments: Capillary refill 3 seconds all toes/distal feet, all toes/both feet warm to touch. Negative lymphadenopathy bilateral popliteal fossa and tarsal tunnel. Less than 2+ pitting lower extremity edema bilateral.     Musculoskeletal:      Right ankle: No swelling, deformity, ecchymosis or lacerations. Normal range of motion. Normal pulse.      Right Achilles Tendon: Normal. No defects. Gonzáles's test negative.      Comments:  s/p left first digit amputation.  Surgical site bleeding appropriately.  No signs of necrotic wound base tissue.  No malodor noted.  Lymphadenopathy:      Lower Body: No right inguinal adenopathy. No left inguinal adenopathy.      Comments: Negative lymphadenopathy bilateral popliteal fossa and tarsal tunnel.  Negative lymphangitic streaking bilateral feet/ankles/legs.   Skin:     General: Skin is warm and dry.      Capillary Refill: Capillary refill takes 2 to 3 seconds.      Coloration:  Skin is not pale.      Findings: No abrasion, bruising, burn, ecchymosis, erythema, laceration, lesion or rash.      Nails: There is no clubbing.      Comments:  s/p left first digit amputation.  Sutures intact on dorsal, medial and lateral portion with opening in the central incision site.  Neurological:      Mental Status: He is alert and oriented to person, place, and time.      Sensory: No sensory deficit.      Motor: No tremor, atrophy or abnormal muscle tone.      Gait: Gait normal.      Comments: Diminished/loss of protective sensation all toes bilateral to 10 gram monofilament.  Psychiatric:         Behavior: Behavior is cooperative.    Comment:  Patient is 2 days s/p left first digit amputation with partial closure.  No purulent drainage noted from surgical site.  Sutures on dorsal medial and lateral incision intact with open central portion of incision site.  No maceration noted.  No necrotic tissue noted.          Laboratory:  A1C:   Recent Labs   Lab 09/08/23  0401   HGBA1C 7.5*     CBC:   Recent Labs   Lab 09/10/23  0221   WBC 12.84*   RBC 3.64*   HGB 9.0*   HCT 29.7*   *   MCV 82   MCH 24.7*   MCHC 30.3*     CMP:   Recent Labs   Lab 09/08/23  0401 09/09/23  1114 09/10/23  0221   *   < > 180*   CALCIUM 9.1   < > 8.4*   ALBUMIN 1.2*  --   --    PROT 8.7*  --   --    *   < > 131*   K 3.4*   < > 4.2   CO2 29   < > 28   CL 96   < > 96   BUN 17   < > 16   CREATININE 1.0   < > 1.1   ALKPHOS 78  --   --    ALT 15  --   --    AST 24  --   --    BILITOT 0.4  --   --     < > = values in this interval not displayed.     Microbiology Results (last 7 days)       Procedure Component Value Units Date/Time    Culture, Anaerobe [[id number]] Collected: 09/08/23 1754    Order Status: Completed Specimen: Incision site from Toe, Left Foot Updated: 09/10/23 1106     Anaerobic Culture Culture in progress    Narrative:      Left great toe    Aerobic culture [[id number]] Collected: 09/08/23 1754    Order  Status: Completed Specimen: Incision site from Toe, Left Foot Updated: 09/10/23 0809     Aerobic Bacterial Culture Insufficient incubation, culture in progress    Narrative:      Left great toe    Blood culture #1 **CANNOT BE ORDERED STAT** [[id number]]  (Abnormal) Collected: 09/07/23 2343    Order Status: Completed Specimen: Blood from Peripheral, Hand, Left Updated: 09/10/23 0615     Blood Culture, Routine Gram stain jose bottle: Gram positive cocci in clusters resembling Staph      Results called to and read back by:Starla Wilburn Lpn 09/09/2023 00:41      Gram stain aer bottle: Gram positive cocci in clusters resembling Staph      Positive results previously called 09/10/2023  06:15      STAPHYLOCOCCUS AUREUS  ID consult required at [facility].  For susceptibility see order #[id number]      Blood culture #2 **CANNOT BE ORDERED STAT** [[id number]]  (Abnormal) Collected: 09/07/23 2343    Order Status: Completed Specimen: Blood from Peripheral, Hand, Right Updated: 09/10/23 0603     Blood Culture, Routine Gram stain jsoe bottle: Gram positive cocci in clusters resembling Staph      Results called to and read back by:Starla Wilburn Lpn 09/09/2023 00:41      Gram stain aer bottle: Gram positive cocci in clusters resembling Staph      Positive results previously called 09/09/2023  13:34      STAPHYLOCOCCUS AUREUS  Susceptibility pending  ID consult required at [facility].      Blood culture [[id number]] Collected: 09/08/23 1333    Order Status: Completed Specimen: Blood Updated: 09/09/23 2309     Blood Culture, Routine Gram stain jose bottle: Gram positive cocci in clusters resembling Staph      Results called to and read back by:Kelly Elder Rn 09/09/2023  23:09    Gram stain [[id number]] Collected: 09/08/23 1754    Order Status: Completed Specimen: Incision site from Toe, Left Foot Updated: 09/09/23 1918     Gram Stain Result Rare WBC's      Many Gram  positive cocci      Few Gram negative rods    Narrative:      Left great toe    Fungus culture [[id number]] Collected: 09/08/23 1754    Order Status: Sent Specimen: Incision site from Toe, Left Foot Updated: 09/09/23 1521    AFB Culture & Smear [[id number]] Collected: 09/08/23 1754    Order Status: Sent Specimen: Incision site from Toe, Left Foot Updated: 09/09/23 1521    Rapid Organism ID by PCR (from Blood culture) [[id number]]  (Abnormal) Collected: 09/07/23 2343    Order Status: Completed Updated: 09/09/23 0239     Enterococcus faecalis Not Detected     Enterococcus faecium Not Detected     Listeria monocytogenes Not Detected     Staphylococcus spp. See species for ID     Staphylococcus aureus Detected     Staphylococcus epidermidis Not Detected     Staphylococcus lugdunensis Not Detected     Streptococcus species Not Detected     Streptococcus agalactiae Not Detected     Streptococcus pneumoniae Not Detected     Streptococcus pyogenes Not Detected     Acinetobacter calcoaceticus/baumannii complex Not Detected     Bacteroides fragilis Not Detected     Enterobacterales Not Detected     Enterobacter cloacae complex Not Detected     Escherichia coli Not Detected     Klebsiella aerogenes Not Detected     Klebsiella oxytoca Not Detected     Klebsiella pneumoniae group Not Detected     Proteus Not Detected     Salmonella sp Not Detected     Serratia marcescens Not Detected     Haemophilus influenzae Not Detected     Neisseria meningtidis Not Detected     Pseudomonas aeruginosa Not Detected     Stenotrophomonas maltophilia Not Detected     Candida albicans Not Detected     Candida auris Not Detected     Candida glabrata Not Detected     Candida krusei Not Detected     Candida parapsilosis Not Detected     Candida tropicalis Not Detected     Cryptococcus neoformans/gattii Not Detected     CTX-M (ESBL ) Test not applicable     IMP (Carbapenem resistant) Test not applicable     KPC resistance gene (Carbapenem  resistant) Test not applicable     mcr-1  Test not applicable     mec A/C  Test not applicable     mec A/C and MREJ (MRSA) gene Not Detected     NDM (Carbapenem resistant) Test not applicable     OXA-48-like (Carbapenem resistant) Test not applicable     van A/B (VRE gene) Test not applicable     VIM (Carbapenem resistant) Test not applicable          Diagnostic Results:  I have reviewed all pertinent imaging results/findings within the past 24 hours.

## 2023-09-10 NOTE — ASSESSMENT & PLAN NOTE
Dave Good is 59 yo M who was admitted to the hospital due to a open ulcer with malodor of L great toe.  Patient is 2 days s/p L first digit amputation.    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 Osseous stump appears unremarkable.  Note made of soft tissue swelling and soft tissue gas at the operative site.  Lisfranc articulation is congruent. Degenerative changes are seen in the midfoot. There is a prominent plantar calcaneal spur. Generalized soft tissue swelling about the ankle and foot noted.   - Intra-op clean margin cultures growing gram-positive cocci, pending identification and susceptibility  - L foot dressed with antimicrobial packing strips, 4x4 gauze, cast padding, and ACE wrap  - Advised patient to always ambulate wearing postop shoes.  Patient okay to weight bear as tolerated  - Discussed with the patient possibility of returning to OR to close surgical site  - Advised patient to keep dressings clean, dry, and intact  - Continue antibiotics per Infectious Disease  - Podiatry will continue to follow

## 2023-09-10 NOTE — PROGRESS NOTES
Pharmacokinetic Assessment Follow Up: IV Vancomycin    Vancomycin serum concentration assessment(s):    The trough level was drawn correctly and can be used to guide therapy at this time. The measurement is above the desired definitive target range of 15 to 20 mcg/mL.    Vancomycin Regimen Plan:    Change regimen to Vancomycin 1500 mg IV every 12 hours with next serum trough concentration measured at 1400 prior to the dose on 9/11/23    Drug levels (last 3 results):  Recent Labs   Lab Result Units 09/09/23  0222 09/10/23  0221   Vancomycin-Trough ug/mL 9.7* 20.7       Pharmacy will continue to follow and monitor vancomycin.    Please contact pharmacy at extension [phone] for questions regarding this assessment.    Thank you for the consult,   Tanya Boggs       Patient brief summary:  Dave Good is a 58 y.o. male initiated on antimicrobial therapy with IV Vancomycin for treatment of bone/joint infection    Drug Allergies:   Review of patient's allergies indicates:   Allergen Reactions    Ibuprofen Swelling     Facial swelling       Actual Body Weight:   117 kg    Renal Function:   Estimated Creatinine Clearance: 97.1 mL/min (based on SCr of 1.1 mg/dL).,     Dialysis Method (if applicable):  N/A    CBC (last 72 hours):  Recent Labs   Lab Result Units 09/07/23  2343 09/08/23  0401 09/09/23  1114 09/10/23  0221   WBC K/uL 21.91* 18.89* 13.55* 12.84*   Hemoglobin g/dL 10.0* 9.3* 8.9* 9.0*   Hemoglobin A1C %  --  7.5*  --   --    Hematocrit % 32.7* 30.7* 29.2* 29.7*   Platelets K/uL 745* 670* 620* 597*   Gran % % 81.5* 75.3* 71.2 65.8   Lymph % % 9.0* 13.0* 14.6* 18.5   Mono % % 7.4 9.0 11.2 11.9   Eosinophil % % 1.2 1.6 2.1 3.0   Basophil % % 0.2 0.3 0.4 0.5   Differential Method  Automated Automated Automated Automated       Metabolic Panel (last 72 hours):  Recent Labs   Lab Result Units 09/07/23  2343 09/08/23  0401 09/09/23  1114 09/10/23  0221   Sodium mmol/L 134* 134* 131* 131*   Potassium mmol/L 3.7 3.4* 3.9  4.2   Chloride mmol/L 97 96 96 96   CO2 mmol/L 30* 29 26 28   Glucose mg/dL 209* 205* 204* 180*   BUN mg/dL 15 17 16 16   Creatinine mg/dL 1.1 1.0 1.1 1.1   Albumin g/dL 1.3* 1.2*  --   --    Total Bilirubin mg/dL 0.3 0.4  --   --    Alkaline Phosphatase U/L 82 78  --   --    AST U/L 25 24  --   --    ALT U/L 17 15  --   --    Magnesium mg/dL  --  1.5* 1.7 1.7   Phosphorus mg/dL  --  3.5 2.9 2.7       Vancomycin Administrations:  vancomycin given in the last 96 hours                     vancomycin 2 g in dextrose 5 % 500 mL IVPB (mg) 2,000 mg New Bag 09/10/23 0250     2,000 mg New Bag 09/09/23 1448      Restarted  0550    vancomycin 2 g in dextrose 5 % 500 mL IVPB (mg) 2,000 mg New Bag 09/08/23 1522                    Microbiologic Results:  Microbiology Results (last 7 days)       Procedure Component Value Units Date/Time    Blood culture [[id number]] Collected: 09/08/23 1333    Order Status: Completed Specimen: Blood Updated: 09/09/23 2309     Blood Culture, Routine Gram stain jose bottle: Gram positive cocci in clusters resembling Staph      Results called to and read back by:Kelly Elder Rn 09/09/2023  23:09    Gram stain [[id number]] Collected: 09/08/23 1754    Order Status: Completed Specimen: Incision site from Toe, Left Foot Updated: 09/09/23 1918     Gram Stain Result Rare WBC's      Many Gram positive cocci      Few Gram negative rods    Narrative:      Left great toe    Fungus culture [[id number]] Collected: 09/08/23 1754    Order Status: Sent Specimen: Incision site from Toe, Left Foot Updated: 09/09/23 1521    AFB Culture & Smear [[id number]] Collected: 09/08/23 1754    Order Status: Sent Specimen: Incision site from Toe, Left Foot Updated: 09/09/23 1521    Culture, Anaerobe [[id number]] Collected: 09/08/23 1754    Order Status: Sent Specimen: Incision site from Toe, Left Foot Updated: 09/09/23 1521    Aerobic culture [[id number]] Collected: 09/08/23 2936    Order Status: Sent Specimen: Incision site from  Toe, Left Foot Updated: 09/09/23 1521    Blood culture #2 **CANNOT BE ORDERED STAT** [[id number]] Collected: 09/07/23 2343    Order Status: Completed Specimen: Blood from Peripheral, Hand, Right Updated: 09/09/23 1334     Blood Culture, Routine Gram stain jose bottle: Gram positive cocci in clusters resembling Staph      Results called to and read back by:Starla Wilburn Lpn 09/09/2023 00:41      Gram stain aer bottle: Gram positive cocci in clusters resembling Staph      Positive results previously called 09/09/2023  13:34    Rapid Organism ID by PCR (from Blood culture) [[id number]]  (Abnormal) Collected: 09/07/23 2343    Order Status: Completed Updated: 09/09/23 0239     Enterococcus faecalis Not Detected     Enterococcus faecium Not Detected     Listeria monocytogenes Not Detected     Staphylococcus spp. See species for ID     Staphylococcus aureus Detected     Staphylococcus epidermidis Not Detected     Staphylococcus lugdunensis Not Detected     Streptococcus species Not Detected     Streptococcus agalactiae Not Detected     Streptococcus pneumoniae Not Detected     Streptococcus pyogenes Not Detected     Acinetobacter calcoaceticus/baumannii complex Not Detected     Bacteroides fragilis Not Detected     Enterobacterales Not Detected     Enterobacter cloacae complex Not Detected     Escherichia coli Not Detected     Klebsiella aerogenes Not Detected     Klebsiella oxytoca Not Detected     Klebsiella pneumoniae group Not Detected     Proteus Not Detected     Salmonella sp Not Detected     Serratia marcescens Not Detected     Haemophilus influenzae Not Detected     Neisseria meningtidis Not Detected     Pseudomonas aeruginosa Not Detected     Stenotrophomonas maltophilia Not Detected     Candida albicans Not Detected     Candida auris Not Detected     Candida glabrata Not Detected     Candida krusei Not Detected     Candida parapsilosis Not Detected     Candida tropicalis Not Detected     Cryptococcus  neoformans/gattii Not Detected     CTX-M (ESBL ) Test not applicable     IMP (Carbapenem resistant) Test not applicable     KPC resistance gene (Carbapenem resistant) Test not applicable     mcr-1  Test not applicable     mec A/C  Test not applicable     mec A/C and MREJ (MRSA) gene Not Detected     NDM (Carbapenem resistant) Test not applicable     OXA-48-like (Carbapenem resistant) Test not applicable     van A/B (VRE gene) Test not applicable     VIM (Carbapenem resistant) Test not applicable    Blood culture #1 **CANNOT BE ORDERED STAT** [[id number]] Collected: 09/07/23 2343    Order Status: Completed Specimen: Blood from Peripheral, Hand, Left Updated: 09/09/23 0044     Blood Culture, Routine Gram stain jose bottle: Gram positive cocci in clusters resembling Staph      Results called to and read back by:Starla Wilburn Lpn 09/09/2023 00:41

## 2023-09-10 NOTE — ASSESSMENT & PLAN NOTE
Hemoglobin A1c of 7.5 percent suggest reasonably controlled diabetes with current home regimen.  In the setting of sepsis holding metformin.  Adjust subcutaneous insulin further today for better glycemic control.  Will ask pharmacy to formulate antibiotics without dextrose if possible.

## 2023-09-10 NOTE — ASSESSMENT & PLAN NOTE
Dave Good is 57 yo M who was admitted to the hospital due to a open ulcer with malodor of L great toe.  Patient is 2 days s/p L first digit amputation.    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 Osseous stump appears unremarkable.  Note made of soft tissue swelling and soft tissue gas at the operative site.  Lisfranc articulation is congruent. Degenerative changes are seen in the midfoot. There is a prominent plantar calcaneal spur. Generalized soft tissue swelling about the ankle and foot noted.   - Intra-op clean margin cultures growing gram-positive cocci, pending identification and susceptibility  - L foot dressed with antimicrobial packing strips, 4x4 gauze, cast padding, and ACE wrap  - Advised patient to always ambulate wearing postop shoes.  Patient okay to weight bear as tolerated  - Discussed with the patient possibility of returning to OR to close surgical site  - Advised patient to keep dressings clean, dry, and intact  - Continue antibiotics per Infectious Disease  - Podiatry will continue to follow

## 2023-09-10 NOTE — PLAN OF CARE
Problem: Occupational Therapy  Goal: Occupational Therapy Goal  Description: Goals to be met by: 9/24/2023     Patient will increase functional independence with ADLs by performing:    UE Dressing with Set-up Assistance.  LE Dressing with Moderate Assistance.  Grooming while seated at sink with Set-up Assistance.  Toileting from bedside commode with Contact Guard Assistance for hygiene and clothing management.   Toilet transfer to bedside commode with Stand-by Assistance while maintaining weight bearing precautions.    Outcome: Ongoing, Progressing     Initial OT eval/treat complete.  No DME in use PTA.  Needs RW, BSC, shower chair, and W/C currently though will defer DME needs to next level of care.  Recommend post acute OT in SNF.  Pt. Lives with friend that also works part-time.  To benefit from continued acute care OT services to increase independence in self-care/functional transfers.  OT to follow.

## 2023-09-10 NOTE — ASSESSMENT & PLAN NOTE
Echocardiogram shows evidence of reduced systolic function also diastolic dysfunction.  Kidney electrolytes within normal range.  Will give additional dose of diuretic to achieve euvolemia.  Wean supplemental oxygen.

## 2023-09-10 NOTE — PROGRESS NOTES
[facility] ([place])  Podiatry  Progress Note    Patient Name: Dave Good  MRN: [id number]  Admission Date: 9/7/2023  Hospital Length of Stay: 2 days  Attending Physician: Hari Iverson MD  Primary Care Provider: Reyna Jorge NP     Subjective:     Interval History: NAEON. VSS. WBC remains abnormally elevated but has been down  trending since day of surgery. He is 2 days s/p L hallux amputation. Patient is doing well. He says he was kept up all night due to labs and blood work. Patient ambulates with post-op shoes when up and out of bed. Denies f/c/n/v. Denies any other pedal complaints.     Follow-up For: Procedure(s) (LRB):  AMPUTATION, TOE (Left)    Post-Operative Day: 2 Days Post-Op    Scheduled Meds:   albuterol-ipratropium  3 mL Nebulization Q4H    aspirin  81 mg Oral Daily    atorvastatin  40 mg Oral Daily    enoxparin  40 mg Subcutaneous Daily    gabapentin  300 mg Oral BID    insulin aspart U-100  4 Units Subcutaneous TIDWM    insulin detemir U-100 (Levemir)  20 Units Subcutaneous QHS    meropenem (MERREM) IVPB  1 g Intravenous Q8H    mupirocin   Nasal BID    QUEtiapine  200 mg Oral Nightly    sodium chloride 0.9%  10 mL Intravenous Q6H    vancomycin (VANCOCIN) IV (PEDS and ADULTS)  1,500 mg Intravenous Q12H     Continuous Infusions:  PRN Meds:sodium chloride 0.9%, acetaminophen, dextrose 10%, dextrose 10%, glucagon (human recombinant), HYDROmorphone, insulin aspart U-100, naloxone, ondansetron, oxyCODONE, sodium chloride 0.9%, Flushing PICC/Midline Protocol **AND** sodium chloride 0.9% **AND** sodium chloride 0.9%, Pharmacy to dose Vancomycin consult **AND** vancomycin - pharmacy to dose    Review of Systems   Constitutional: Negative for chills, diaphoresis, fever, malaise/fatigue and night sweats.   Cardiovascular:  Positive for leg swelling. Negative for claudication, cyanosis and syncope.   Skin:  Positive for poor wound healing. Negative for color change, dry skin, nail  changes, rash, suspicious lesions and unusual hair distribution.   Musculoskeletal:  Negative for falls, joint pain, joint swelling, muscle cramps, muscle weakness and stiffness.   Gastrointestinal:  Negative for constipation, diarrhea, nausea and vomiting.   Neurological:  Positive for numbness, paresthesias and sensory change. Negative for brief paralysis, disturbances in coordination, focal weakness and tremors.     Objective:     Vital Signs (Most Recent):  Temp: 98.3 °F (36.8 °C) (09/10/23 0732)  Pulse: 85 (09/10/23 1127)  Resp: 16 (09/10/23 1127)  BP: 123/73 (09/10/23 0732)  SpO2: 96 % (09/10/23 1127) Vital Signs (24h Range):  Temp:  [98 °F (36.7 °C)-99.3 °F (37.4 °C)] 98.3 °F (36.8 °C)  Pulse:  [] 85  Resp:  [16-20] 16  SpO2:  [87 %-99 %] 96 %  BP: (123-135)/(73-78) 123/73     Weight: 117 kg (257 lb 15 oz)  Body mass index is 33.12 kg/m².    Physical Exam  Constitutional:       General: He is not in acute distress.     Appearance: He is well-developed. He is not diaphoretic.   Cardiovascular:      Pulses:           Popliteal pulses are 2+ on the right side and 2+ on the left side.        Dorsalis pedis pulses are 2+ on the right side and 2+ on the left side.        Posterior tibial pulses are 2+ on the right side and 2+ on the left side.      Comments: Capillary refill 3 seconds all toes/distal feet, all toes/both feet warm to touch. Negative lymphadenopathy bilateral popliteal fossa and tarsal tunnel. Less than 2+ pitting lower extremity edema bilateral.     Musculoskeletal:      Right ankle: No swelling, deformity, ecchymosis or lacerations. Normal range of motion. Normal pulse.      Right Achilles Tendon: Normal. No defects. Gonzáles's test negative.      Comments:  s/p left first digit amputation.  Surgical site bleeding appropriately.  No signs of necrotic wound base tissue.  No malodor noted.  Lymphadenopathy:      Lower Body: No right inguinal adenopathy. No left inguinal adenopathy.       Comments: Negative lymphadenopathy bilateral popliteal fossa and tarsal tunnel.  Negative lymphangitic streaking bilateral feet/ankles/legs.   Skin:     General: Skin is warm and dry.      Capillary Refill: Capillary refill takes 2 to 3 seconds.      Coloration: Skin is not pale.      Findings: No abrasion, bruising, burn, ecchymosis, erythema, laceration, lesion or rash.      Nails: There is no clubbing.      Comments:  s/p left first digit amputation.  Sutures intact on dorsal, medial and lateral portion with opening in the central incision site.  Neurological:      Mental Status: He is alert and oriented to person, place, and time.      Sensory: No sensory deficit.      Motor: No tremor, atrophy or abnormal muscle tone.      Gait: Gait normal.      Comments: Diminished/loss of protective sensation all toes bilateral to 10 gram monofilament.  Psychiatric:         Behavior: Behavior is cooperative.    Comment:  Patient is 2 days s/p left first digit amputation with partial closure.  No purulent drainage noted from surgical site.  Sutures on dorsal medial and lateral incision intact with open central portion of incision site.  No maceration noted.  No necrotic tissue noted.          Laboratory:  A1C:   Recent Labs   Lab 09/08/23  0401   HGBA1C 7.5*     CBC:   Recent Labs   Lab 09/10/23  0221   WBC 12.84*   RBC 3.64*   HGB 9.0*   HCT 29.7*   *   MCV 82   MCH 24.7*   MCHC 30.3*     CMP:   Recent Labs   Lab 09/08/23  0401 09/09/23  1114 09/10/23  0221   *   < > 180*   CALCIUM 9.1   < > 8.4*   ALBUMIN 1.2*  --   --    PROT 8.7*  --   --    *   < > 131*   K 3.4*   < > 4.2   CO2 29   < > 28   CL 96   < > 96   BUN 17   < > 16   CREATININE 1.0   < > 1.1   ALKPHOS 78  --   --    ALT 15  --   --    AST 24  --   --    BILITOT 0.4  --   --     < > = values in this interval not displayed.     Microbiology Results (last 7 days)       Procedure Component Value Units Date/Time    Culture, Anaerobe [[id number]]  Collected: 09/08/23 1754    Order Status: Completed Specimen: Incision site from Toe, Left Foot Updated: 09/10/23 1106     Anaerobic Culture Culture in progress    Narrative:      Left great toe    Aerobic culture [[id number]] Collected: 09/08/23 1754    Order Status: Completed Specimen: Incision site from Toe, Left Foot Updated: 09/10/23 0809     Aerobic Bacterial Culture Insufficient incubation, culture in progress    Narrative:      Left great toe    Blood culture #1 **CANNOT BE ORDERED STAT** [[id number]]  (Abnormal) Collected: 09/07/23 2343    Order Status: Completed Specimen: Blood from Peripheral, Hand, Left Updated: 09/10/23 0615     Blood Culture, Routine Gram stain jose bottle: Gram positive cocci in clusters resembling Staph      Results called to and read back by:Starla Wilburn Lpn 09/09/2023 00:41      Gram stain aer bottle: Gram positive cocci in clusters resembling Staph      Positive results previously called 09/10/2023  06:15      STAPHYLOCOCCUS AUREUS  ID consult required at [facility].  For susceptibility see order #[id number]      Blood culture #2 **CANNOT BE ORDERED STAT** [[id number]]  (Abnormal) Collected: 09/07/23 2343    Order Status: Completed Specimen: Blood from Peripheral, Hand, Right Updated: 09/10/23 0603     Blood Culture, Routine Gram stain jose bottle: Gram positive cocci in clusters resembling Staph      Results called to and read back by:Starla Wilburn Lpn 09/09/2023 00:41      Gram stain aer bottle: Gram positive cocci in clusters resembling Staph      Positive results previously called 09/09/2023  13:34      STAPHYLOCOCCUS AUREUS  Susceptibility pending  ID consult required at [facility].      Blood culture [[id number]] Collected: 09/08/23 1333    Order Status: Completed Specimen: Blood Updated: 09/09/23 2309     Blood Culture, Routine Gram stain jose bottle: Gram positive cocci in clusters resembling Staph      Results  called to and read back by:Kelly Elder Rn 09/09/2023  23:09    Gram stain [[id number]] Collected: 09/08/23 1754    Order Status: Completed Specimen: Incision site from Toe, Left Foot Updated: 09/09/23 1918     Gram Stain Result Rare WBC's      Many Gram positive cocci      Few Gram negative rods    Narrative:      Left great toe    Fungus culture [[id number]] Collected: 09/08/23 1754    Order Status: Sent Specimen: Incision site from Toe, Left Foot Updated: 09/09/23 1521    AFB Culture & Smear [[id number]] Collected: 09/08/23 1754    Order Status: Sent Specimen: Incision site from Toe, Left Foot Updated: 09/09/23 1521    Rapid Organism ID by PCR (from Blood culture) [[id number]]  (Abnormal) Collected: 09/07/23 2343    Order Status: Completed Updated: 09/09/23 0239     Enterococcus faecalis Not Detected     Enterococcus faecium Not Detected     Listeria monocytogenes Not Detected     Staphylococcus spp. See species for ID     Staphylococcus aureus Detected     Staphylococcus epidermidis Not Detected     Staphylococcus lugdunensis Not Detected     Streptococcus species Not Detected     Streptococcus agalactiae Not Detected     Streptococcus pneumoniae Not Detected     Streptococcus pyogenes Not Detected     Acinetobacter calcoaceticus/baumannii complex Not Detected     Bacteroides fragilis Not Detected     Enterobacterales Not Detected     Enterobacter cloacae complex Not Detected     Escherichia coli Not Detected     Klebsiella aerogenes Not Detected     Klebsiella oxytoca Not Detected     Klebsiella pneumoniae group Not Detected     Proteus Not Detected     Salmonella sp Not Detected     Serratia marcescens Not Detected     Haemophilus influenzae Not Detected     Neisseria meningtidis Not Detected     Pseudomonas aeruginosa Not Detected     Stenotrophomonas maltophilia Not Detected     Candida albicans Not Detected     Candida auris Not Detected     Candida glabrata Not Detected     Candida krusei Not Detected      Arlyn parapsilosis Not Detected     Candida tropicalis Not Detected     Cryptococcus neoformans/gattii Not Detected     CTX-M (ESBL ) Test not applicable     IMP (Carbapenem resistant) Test not applicable     KPC resistance gene (Carbapenem resistant) Test not applicable     mcr-1  Test not applicable     mec A/C  Test not applicable     mec A/C and MREJ (MRSA) gene Not Detected     NDM (Carbapenem resistant) Test not applicable     OXA-48-like (Carbapenem resistant) Test not applicable     van A/B (VRE gene) Test not applicable     VIM (Carbapenem resistant) Test not applicable          Diagnostic Results:  I have reviewed all pertinent imaging results/findings within the past 24 hours.    Assessment/Plan:     ID  Chronic osteomyelitis of toe of left foot  Dave Good is 59 yo M who was admitted to the hospital due to a open ulcer with malodor of L great toe.  Patient is 2 days s/p L first digit amputation.    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 Osseous stump appears unremarkable.  Note made of soft tissue swelling and soft tissue gas at the operative site.  Lisfranc articulation is congruent. Degenerative changes are seen in the midfoot. There is a prominent plantar calcaneal spur. Generalized soft tissue swelling about the ankle and foot noted.   - Intra-op clean margin cultures growing gram-positive cocci, pending identification and susceptibility  - L foot dressed with antimicrobial packing strips, 4x4 gauze, cast padding, and ACE wrap  - Advised patient to always ambulate wearing postop shoes.  Patient okay to weight bear as tolerated  - Discussed with the patient possibility of returning to OR to close surgical site  - Advised patient to keep dressings clean, dry, and intact  - Continue antibiotics per Infectious Disease  - Podiatry will continue to follow      Evangelina Casarez DPM  Podiatry  [facility] ([place])

## 2023-09-10 NOTE — SUBJECTIVE & OBJECTIVE
Interval History:  Patient loss intravenous access.  Midline place the right upper extremity.    Review of Systems   Constitutional:  Negative for chills and fever.   Respiratory:  Negative for shortness of breath.    Cardiovascular:  Negative for chest pain.   Gastrointestinal:  Negative for abdominal pain, constipation, diarrhea, nausea and vomiting.     Objective:     Vital Signs (Most Recent):  Temp: 98.1 °F (36.7 °C) (09/10/23 1220)  Pulse: 88 (09/10/23 1221)  Resp: 16 (09/10/23 1220)  BP: 115/66 (09/10/23 1220)  SpO2: 95 % (09/10/23 1221) Vital Signs (24h Range):  Temp:  [98 °F (36.7 °C)-99.3 °F (37.4 °C)] 98.1 °F (36.7 °C)  Pulse:  [] 88  Resp:  [16-20] 16  SpO2:  [87 %-99 %] 95 %  BP: (115-135)/(66-78) 115/66     Weight: 117 kg (257 lb 15 oz)  Body mass index is 33.12 kg/m².    Intake/Output Summary (Last 24 hours) at 9/10/2023 1242  Last data filed at 9/10/2023 0517  Gross per 24 hour   Intake 550 ml   Output 1400 ml   Net -850 ml           Physical Exam  Constitutional:       Appearance: He is obese.   Cardiovascular:      Rate and Rhythm: Normal rate and regular rhythm.      Heart sounds: Normal heart sounds. No murmur heard.     No friction rub. No gallop.   Pulmonary:      Effort: Pulmonary effort is normal. No respiratory distress.      Breath sounds: No wheezing.      Comments: Minimal rales at lung base, improved with cough.  Moving air well.  Abdominal:      General: Bowel sounds are normal. There is no distension.      Palpations: Abdomen is soft.      Tenderness: There is no abdominal tenderness.   Musculoskeletal:         General: No tenderness. Normal range of motion.      Comments: Left foot with surgical dressing clean, dry, and intact..   Skin:     General: Skin is warm and dry.      Coloration: Skin is not pale.      Findings: No erythema or rash.   Neurological:      Mental Status: He is alert and oriented to person, place, and time.             Significant Labs: All pertinent labs  within the past 24 hours have been reviewed.    Significant Imaging: I have reviewed all pertinent imaging results/findings within the past 24 hours.

## 2023-09-11 LAB
ANION GAP SERPL CALC-SCNC: 8 MMOL/L (ref 8–16)
BACTERIA BLD CULT: ABNORMAL
BASOPHILS # BLD AUTO: 0.05 K/UL (ref 0–0.2)
BASOPHILS NFR BLD: 0.5 % (ref 0–1.9)
BUN SERPL-MCNC: 15 MG/DL (ref 6–20)
CALCIUM SERPL-MCNC: 8.7 MG/DL (ref 8.7–10.5)
CHLORIDE SERPL-SCNC: 96 MMOL/L (ref 95–110)
CO2 SERPL-SCNC: 28 MMOL/L (ref 23–29)
CREAT SERPL-MCNC: 1.1 MG/DL (ref 0.5–1.4)
DIFFERENTIAL METHOD: ABNORMAL
EOSINOPHIL # BLD AUTO: 0.5 K/UL (ref 0–0.5)
EOSINOPHIL NFR BLD: 4.4 % (ref 0–8)
ERYTHROCYTE [DISTWIDTH] IN BLOOD BY AUTOMATED COUNT: 16.5 % (ref 11.5–14.5)
EST. GFR  (NO RACE VARIABLE): >60 ML/MIN/1.73 M^2
GLUCOSE SERPL-MCNC: 167 MG/DL (ref 70–110)
HCT VFR BLD AUTO: 29 % (ref 40–54)
HGB BLD-MCNC: 8.7 G/DL (ref 14–18)
IMM GRANULOCYTES # BLD AUTO: 0.06 K/UL (ref 0–0.04)
IMM GRANULOCYTES NFR BLD AUTO: 0.5 % (ref 0–0.5)
LYMPHOCYTES # BLD AUTO: 2.5 K/UL (ref 1–4.8)
LYMPHOCYTES NFR BLD: 23 % (ref 18–48)
MAGNESIUM SERPL-MCNC: 1.7 MG/DL (ref 1.6–2.6)
MCH RBC QN AUTO: 24.6 PG (ref 27–31)
MCHC RBC AUTO-ENTMCNC: 30 G/DL (ref 32–36)
MCV RBC AUTO: 82 FL (ref 82–98)
MONOCYTES # BLD AUTO: 1.2 K/UL (ref 0.3–1)
MONOCYTES NFR BLD: 11.2 % (ref 4–15)
NEUTROPHILS # BLD AUTO: 6.6 K/UL (ref 1.8–7.7)
NEUTROPHILS NFR BLD: 60.4 % (ref 38–73)
NRBC BLD-RTO: 0 /100 WBC
PHOSPHATE SERPL-MCNC: 2.9 MG/DL (ref 2.7–4.5)
PLATELET # BLD AUTO: 584 K/UL (ref 150–450)
PMV BLD AUTO: 9.1 FL (ref 9.2–12.9)
POCT GLUCOSE: 196 MG/DL (ref 70–110)
POCT GLUCOSE: 201 MG/DL (ref 70–110)
POCT GLUCOSE: 213 MG/DL (ref 70–110)
POCT GLUCOSE: 231 MG/DL (ref 70–110)
POTASSIUM SERPL-SCNC: 4.5 MMOL/L (ref 3.5–5.1)
RBC # BLD AUTO: 3.53 M/UL (ref 4.6–6.2)
SODIUM SERPL-SCNC: 132 MMOL/L (ref 136–145)
WBC # BLD AUTO: 10.94 K/UL (ref 3.9–12.7)

## 2023-09-11 PROCEDURE — 27000221 HC OXYGEN, UP TO 24 HOURS

## 2023-09-11 PROCEDURE — 87040 BLOOD CULTURE FOR BACTERIA: CPT | Performed by: HOSPITALIST

## 2023-09-11 PROCEDURE — 63600175 PHARM REV CODE 636 W HCPCS: Performed by: HOSPITALIST

## 2023-09-11 PROCEDURE — 99900035 HC TECH TIME PER 15 MIN (STAT)

## 2023-09-11 PROCEDURE — 94761 N-INVAS EAR/PLS OXIMETRY MLT: CPT

## 2023-09-11 PROCEDURE — 84100 ASSAY OF PHOSPHORUS: CPT | Performed by: HOSPITALIST

## 2023-09-11 PROCEDURE — 11000001 HC ACUTE MED/SURG PRIVATE ROOM

## 2023-09-11 PROCEDURE — 94640 AIRWAY INHALATION TREATMENT: CPT

## 2023-09-11 PROCEDURE — 25000003 PHARM REV CODE 250: Performed by: NURSE PRACTITIONER

## 2023-09-11 PROCEDURE — 97110 THERAPEUTIC EXERCISES: CPT

## 2023-09-11 PROCEDURE — 25000242 PHARM REV CODE 250 ALT 637 W/ HCPCS: Performed by: HOSPITALIST

## 2023-09-11 PROCEDURE — 97116 GAIT TRAINING THERAPY: CPT

## 2023-09-11 PROCEDURE — 25000003 PHARM REV CODE 250: Performed by: HOSPITALIST

## 2023-09-11 PROCEDURE — 80048 BASIC METABOLIC PNL TOTAL CA: CPT | Performed by: HOSPITALIST

## 2023-09-11 PROCEDURE — 85025 COMPLETE CBC W/AUTO DIFF WBC: CPT | Performed by: HOSPITALIST

## 2023-09-11 PROCEDURE — 83735 ASSAY OF MAGNESIUM: CPT | Performed by: HOSPITALIST

## 2023-09-11 PROCEDURE — 99233 SBSQ HOSP IP/OBS HIGH 50: CPT | Mod: ,,, | Performed by: HOSPITALIST

## 2023-09-11 PROCEDURE — 36415 COLL VENOUS BLD VENIPUNCTURE: CPT | Performed by: HOSPITALIST

## 2023-09-11 PROCEDURE — 99233 PR SUBSEQUENT HOSPITAL CARE,LEVL III: ICD-10-PCS | Mod: ,,, | Performed by: HOSPITALIST

## 2023-09-11 PROCEDURE — A4216 STERILE WATER/SALINE, 10 ML: HCPCS | Performed by: HOSPITALIST

## 2023-09-11 PROCEDURE — 97535 SELF CARE MNGMENT TRAINING: CPT | Mod: CO

## 2023-09-11 PROCEDURE — 99233 PR SUBSEQUENT HOSPITAL CARE,LEVL III: ICD-10-PCS | Mod: ,,, | Performed by: INTERNAL MEDICINE

## 2023-09-11 PROCEDURE — 99233 SBSQ HOSP IP/OBS HIGH 50: CPT | Mod: ,,, | Performed by: INTERNAL MEDICINE

## 2023-09-11 RX ORDER — FUROSEMIDE 10 MG/ML
40 INJECTION INTRAMUSCULAR; INTRAVENOUS DAILY
Status: DISCONTINUED | OUTPATIENT
Start: 2023-09-11 | End: 2023-09-13

## 2023-09-11 RX ADMIN — MUPIROCIN: 20 OINTMENT TOPICAL at 09:09

## 2023-09-11 RX ADMIN — ATORVASTATIN CALCIUM 40 MG: 20 TABLET, FILM COATED ORAL at 09:09

## 2023-09-11 RX ADMIN — INSULIN DETEMIR 20 UNITS: 100 INJECTION, SOLUTION SUBCUTANEOUS at 09:09

## 2023-09-11 RX ADMIN — QUETIAPINE FUMARATE 200 MG: 200 TABLET ORAL at 08:09

## 2023-09-11 RX ADMIN — SODIUM CHLORIDE, PRESERVATIVE FREE 10 ML: 5 INJECTION INTRAVENOUS at 03:09

## 2023-09-11 RX ADMIN — VANCOMYCIN HYDROCHLORIDE 1500 MG: 1.5 INJECTION, POWDER, LYOPHILIZED, FOR SOLUTION INTRAVENOUS at 02:09

## 2023-09-11 RX ADMIN — INSULIN ASPART 2 UNITS: 100 INJECTION, SOLUTION INTRAVENOUS; SUBCUTANEOUS at 06:09

## 2023-09-11 RX ADMIN — INSULIN ASPART 4 UNITS: 100 INJECTION, SOLUTION INTRAVENOUS; SUBCUTANEOUS at 09:09

## 2023-09-11 RX ADMIN — INSULIN ASPART 2 UNITS: 100 INJECTION, SOLUTION INTRAVENOUS; SUBCUTANEOUS at 03:09

## 2023-09-11 RX ADMIN — SODIUM CHLORIDE, PRESERVATIVE FREE 10 ML: 5 INJECTION INTRAVENOUS at 12:09

## 2023-09-11 RX ADMIN — IPRATROPIUM BROMIDE AND ALBUTEROL SULFATE 3 ML: 2.5; .5 SOLUTION RESPIRATORY (INHALATION) at 07:09

## 2023-09-11 RX ADMIN — OXYCODONE HYDROCHLORIDE 5 MG: 5 TABLET ORAL at 09:09

## 2023-09-11 RX ADMIN — FUROSEMIDE 40 MG: 10 INJECTION, SOLUTION INTRAMUSCULAR; INTRAVENOUS at 10:09

## 2023-09-11 RX ADMIN — OXYCODONE HYDROCHLORIDE 5 MG: 5 TABLET ORAL at 02:09

## 2023-09-11 RX ADMIN — SODIUM CHLORIDE, PRESERVATIVE FREE 10 ML: 5 INJECTION INTRAVENOUS at 06:09

## 2023-09-11 RX ADMIN — SODIUM CHLORIDE, PRESERVATIVE FREE 10 ML: 5 INJECTION INTRAVENOUS at 05:09

## 2023-09-11 RX ADMIN — OXYCODONE HYDROCHLORIDE 5 MG: 5 TABLET ORAL at 05:09

## 2023-09-11 RX ADMIN — INSULIN ASPART 4 UNITS: 100 INJECTION, SOLUTION INTRAVENOUS; SUBCUTANEOUS at 03:09

## 2023-09-11 RX ADMIN — IPRATROPIUM BROMIDE AND ALBUTEROL SULFATE 3 ML: 2.5; .5 SOLUTION RESPIRATORY (INHALATION) at 04:09

## 2023-09-11 RX ADMIN — CEFAZOLIN 2 G: 2 INJECTION, POWDER, FOR SOLUTION INTRAMUSCULAR; INTRAVENOUS at 05:09

## 2023-09-11 RX ADMIN — IPRATROPIUM BROMIDE AND ALBUTEROL SULFATE 3 ML: 2.5; .5 SOLUTION RESPIRATORY (INHALATION) at 12:09

## 2023-09-11 RX ADMIN — ASPIRIN 81 MG: 81 TABLET, COATED ORAL at 09:09

## 2023-09-11 RX ADMIN — CEFAZOLIN 2 G: 2 INJECTION, POWDER, FOR SOLUTION INTRAMUSCULAR; INTRAVENOUS at 09:09

## 2023-09-11 RX ADMIN — CEFAZOLIN 2 G: 2 INJECTION, POWDER, FOR SOLUTION INTRAMUSCULAR; INTRAVENOUS at 03:09

## 2023-09-11 RX ADMIN — ENOXAPARIN SODIUM 40 MG: 40 INJECTION SUBCUTANEOUS at 06:09

## 2023-09-11 RX ADMIN — GABAPENTIN 300 MG: 300 CAPSULE ORAL at 08:09

## 2023-09-11 RX ADMIN — IPRATROPIUM BROMIDE AND ALBUTEROL SULFATE 3 ML: 2.5; .5 SOLUTION RESPIRATORY (INHALATION) at 03:09

## 2023-09-11 RX ADMIN — INSULIN ASPART 4 UNITS: 100 INJECTION, SOLUTION INTRAVENOUS; SUBCUTANEOUS at 06:09

## 2023-09-11 RX ADMIN — HYDROMORPHONE HYDROCHLORIDE 0.5 MG: 0.5 INJECTION, SOLUTION INTRAMUSCULAR; INTRAVENOUS; SUBCUTANEOUS at 02:09

## 2023-09-11 RX ADMIN — GABAPENTIN 300 MG: 300 CAPSULE ORAL at 09:09

## 2023-09-11 NOTE — PLAN OF CARE
POC reviewed. No significant events this shift. 1 LPM O2 NC noted. No complaints of pain. Pt voids and shifts in bed independently. Bed low and locked, side rails up x3, call light within reach.    Problem: Adult Inpatient Plan of Care  Goal: Plan of Care Review  Outcome: Ongoing, Progressing  Goal: Patient-Specific Goal (Individualized)  Outcome: Ongoing, Progressing  Goal: Absence of Hospital-Acquired Illness or Injury  Outcome: Ongoing, Progressing  Goal: Optimal Comfort and Wellbeing  Outcome: Ongoing, Progressing  Goal: Readiness for Transition of Care  Outcome: Ongoing, Progressing     Problem: Diabetes Comorbidity  Goal: Blood Glucose Level Within Targeted Range  Outcome: Ongoing, Progressing     Problem: Infection  Goal: Absence of Infection Signs and Symptoms  Outcome: Ongoing, Progressing     Problem: Impaired Wound Healing  Goal: Optimal Wound Healing  Outcome: Ongoing, Progressing     Problem: Skin Injury Risk Increased  Goal: Skin Health and Integrity  Outcome: Ongoing, Progressing

## 2023-09-11 NOTE — PT/OT/SLP PROGRESS
Occupational Therapy   Treatment    Name: Dave Good  MRN: [id number]  Admitting Diagnosis:  Staphylococcus aureus bacteremia with sepsis  3 Days Post-Op    Recommendations:     Discharge Recommendations: nursing facility, skilled  Discharge Equipment Recommendations:  bedside commode, walker, rolling, wheelchair, shower chair, to be determined by next level of care  Barriers to discharge:  Inaccessible home environment, Decreased caregiver support (10 KRUPA and current functional level)    Assessment:     Dave Good is a 58 y.o. male with a medical diagnosis of Staphylococcus aureus bacteremia with sepsis.  He presents with the following deficits affecting function are weakness, impaired endurance, impaired self care skills, impaired functional mobility, gait instability, impaired balance, decreased coordination, decreased lower extremity function, orthopedic precautions, decreased safety awareness, impaired skin, decreased ROM, impaired cardiopulmonary response to activity.     Overall tolerated session well though requiring repeated cues for LLE PWB with the heels. Patient progressing towards goals    Rehab Prognosis:  Good; patient would benefit from acute skilled OT services to address these deficits and reach maximum level of function.       Plan:     Patient to be seen 5 x/week to address the above listed problems via self-care/home management, therapeutic activities, therapeutic exercises  Plan of Care Expires: 09/24/23  Plan of Care Reviewed with: patient    Subjective     Chief Complaint: NA   Patient/Family Comments/goals: agreeable to participate in therapy for OT intervention    Pain/Comfort:  Pain Rating 1: 0/10  Pain Addressed 1: Pre-medicate for activity, Reposition, Distraction, Cessation of Activity    Objective:     Communicated with: RN Tiffany) prior to session.  Patient found supine with bed alarm, telemetry, oxygen, peripheral IV upon OT entry to room.    General Precautions: Standard,  fall, diabetic    Orthopedic Precautions:LLE partial weight bearing (to heel)  Braces:  (sx shoe)  Respiratory Status: Nasal cannula, flow 1 L/min     Occupational Performance:     Bed Mobility:    Supine > Sit: SBA with extended time     Functional Mobility/Transfers:  Sit <> Stand: Min A, RW with lift- cues for hand placement and LLE PWB precaution   Functional Mobility: CGA, RW witgh extended time with repeated cues for LLE PWB to the heels   BSC Transfer: Min A, RW with lift - cues for LLE PWB  Chair Transfer: CGA, RW with steady descend    Activities of Daily Living:  Grooming: Set up/SBA for oral care and facial hygiene seated on BSC in front of sink   LB Dressing: Total A to don B surgical shoe at bed level  UB Dressing: CGA to don/doff front/back gown sitting in chair - assistance with ties       AMPAC 6 Click ADL: 15    Treatment & Education:  OT role, plan of care, progression of goals, importance of continued OOB activity, ADL/functional transfer and mobility retraining, discharge recommendation, call don't fall, safety precautions, fall prevention.      Patient left up in chair with all lines intact, call button in reach, and RN notified    GOALS:   Multidisciplinary Problems       Occupational Therapy Goals          Problem: Occupational Therapy    Goal Priority Disciplines Outcome Interventions   Occupational Therapy Goal     OT, PT/OT Ongoing, Progressing    Description: Goals to be met by: 9/24/2023     Patient will increase functional independence with ADLs by performing:    UE Dressing with Set-up Assistance.  LE Dressing with Moderate Assistance.  Toileting from bedside commode with Contact Guard Assistance for hygiene and clothing management.   Toilet transfer to bedside commode with Stand-by Assistance while maintaining weight bearing precautions.    COMPLETED GOALS  Grooming while seated at sink with Set-up Assistance. MET 9/11/2023                         Time Tracking:     OT Date of  Treatment: 09/11/23  OT Start Time: 0910  OT Stop Time: 0941  OT Total Time (min): 31 min    Billable Minutes:Self Care/Home Management 31 min    OT/DEREK: DEREK     Number of DEREK visits since last OT visit: 1    9/11/2023

## 2023-09-11 NOTE — ASSESSMENT & PLAN NOTE
Clinically improving with source control with surgical debridement of infected left foot/abscess on 9/8/2023 with Dr. Samuel Toussaint and with empiric intravenous antibiotics.  Blood cultures on admission positive 2 for 2 for Gram-positive cocci in clusters representing Staphylococcus.  His PCR testing consistent with Staphylococcus aureus.  Transthoracic echocardiogram did not show evidence of endocarditis.  Repeat blood culture positive.  Repeat blood culture today.  Continue intravenous vancomycin and cefazolin pending sensitive testing.  Will call microbiology lab as sensitivity testing should be back by now.  If persistent bacteremia may need transesophageal echocardiogram to assess for intracardiac abscess.  Follow-up on surgical pathology results to ensure clean margin.  Possible persistent bacteremia due to residual infection in the foot versus alternative source of his persistent bacteremia.  Will discuss with Infectious Disease specialist.

## 2023-09-11 NOTE — PLAN OF CARE
Recommendations  1) Continue consistent carbohydrate    2) Recommend James BID to promote wound healing    3) MVI daily    4) RD to follow to monitor nutrition status    Goals:   Pt will maintain >75% EEN/EPN by RD follow up  Nutrition Goal Status: progressing towards goals  Communication of RD Recs:  (POC)

## 2023-09-11 NOTE — PT/OT/SLP PROGRESS
Physical Therapy Treatment    Patient Name:  Dave Good   MRN:  [id number]    Recommendations:     Discharge Recommendations: nursing facility, skilled  Discharge Equipment Recommendations: to be determined by next level of care  Barriers to discharge: Decreased caregiver support and current functional status    Assessment:     Dave Good is a 58 y.o. male admitted with a medical diagnosis of Staphylococcus aureus bacteremia with sepsis.  He presents with the following impairments/functional limitations: weakness, gait instability, impaired cardiopulmonary response to activity, impaired endurance, impaired balance, decreased lower extremity function, impaired muscle length, impaired self care skills, impaired functional mobility, impaired skin, decreased safety awareness, decreased coordination .    Patient tolerated session well despite requiring max encouragement for participation and requiring constant verbal cues for moderate adherence to partial WB precautions to LLE.     Rehab Prognosis: Good; patient would benefit from acute skilled PT services to address these deficits and reach maximum level of function.    Recent Surgery: Procedure(s) (LRB):  AMPUTATION, TOE (Left) 3 Days Post-Op    Plan:     During this hospitalization, patient to be seen 5 x/week to address the identified rehab impairments via gait training, therapeutic activities, therapeutic exercises and progress toward the following goals:    Plan of Care Expires:  10/09/23    Subjective     Chief Complaint: fatigue and not wanting to walk   Patient/Family Comments/goals: patient agrees to participate in PT intervention.   Pain/Comfort:  Pain Rating 1: 0/10      Objective:     Communicated with nursing prior to session.  Patient found up in chair with telemetry, oxygen, peripheral IV upon PT entry to room.     General Precautions: Standard, fall  Orthopedic Precautions: LLE partial weight bearing  Braces: N/A  Respiratory Status: Nasal cannula,  flow 2 L/min     Functional Mobility:  Bed Mobility:     Sit to Supine: stand by assistance  Transfers:     Sit to Stand:  contact guard assistance with rolling walker  Gait: patient ambulated 15 feet in the room with CGA and RW. Patient continues to require constant verbal cues with moderate adherence to WB precautions. Significant WB noted through BUE and significant forward flexed posture.       AM-PAC 6 CLICK MOBILITY  Turning over in bed (including adjusting bedclothes, sheets and blankets)?: 4  Sitting down on and standing up from a chair with arms (e.g., wheelchair, bedside commode, etc.): 3  Moving from lying on back to sitting on the side of the bed?: 3  Moving to and from a bed to a chair (including a wheelchair)?: 3  Need to walk in hospital room?: 3  Climbing 3-5 steps with a railing?: 2  Basic Mobility Total Score: 18       Treatment & Education:  Patient performed the following seated LE exercises (2 x 10 reps)   Barrington ESCOBEDO    Patient left supine with all lines intact and call button in reach..    GOALS:   Multidisciplinary Problems       Physical Therapy Goals          Problem: Physical Therapy    Goal Priority Disciplines Outcome Goal Variances Interventions   Physical Therapy Goal     PT, PT/OT Ongoing, Progressing     Description: Goals to be met by: 10/9/2023    Patient will increase functional independence with mobility by performin. Sit<>stand with supervision with RW while maintaining WB precautions on LLE.  2. Gait x 50 feet with RW with CGA while maintaining WB precautions on LLE.  3. Ascend/descend 10 step(s) with least restrictive assistive device and minimal assist while maintaining WB precautions on LLE.                           Time Tracking:     PT Received On: 23  PT Start Time: 1400     PT Stop Time: 1423  PT Total Time (min): 23 min     Billable Minutes: Gait Training 13 and Therapeutic Exercise 10    Treatment Type: Treatment  PT/PTA: PT     Number of PTA visits  since last PT visit: 0     09/11/2023

## 2023-09-11 NOTE — PROGRESS NOTES
[facility] ([place])  Adult Nutrition  Consult Note    SUMMARY     Recommendations  1) Continue consistent carbohydrate    2) Recommend James BID to promote wound healing    3) MVI daily    4) RD to follow to monitor nutrition status    Goals:   Pt will maintain >75% EEN/EPN by RD follow up  Nutrition Goal Status: progressing towards goals  Communication of RD Recs:  (POC)    Assessment and Plan  Nutrition Problem  Increased protein - energy needs    Related to (etiology):   Wound healing    Signs and Symptoms (as evidenced by):   Chronic osteomyelitis of toe of left foot    Interventions (treatment strategy):  CHO modified diet  Collaboration with other providers    Nutrition Diagnosis Status:   Continues      Malnutrition Assessment     Skin (Micronutrient): wounds unhealed                                 Reason for Assessment    Reason For Assessment: consult (large non healing wound)  Diagnosis:  (Chronic osteomyelitis of toe of left foot)  Relevant Medical History:   Patient Active Problem List   Diagnosis    Diabetic wet gangrene of the foot    Type 2 diabetes mellitus with diabetic peripheral angiopathy without gangrene, with long-term current use of insulin    Hypertension    Depression    Class 1 obesity due to excess calories with serious comorbidity and body mass index (BMI) of 34.0 to 34.9 in adult    Open wound of right foot    Type II diabetes mellitus with peripheral circulatory disorder, uncontrolled    Amputation of right great toe    Skin flap infection    MRSA (methicillin resistant Staphylococcus aureus) infection    Ulcer of both feet with fat layer exposed    Open wound of right great toe    Open wound of toe    COVID-19    COPD with asthma    Acute on chronic combined systolic and diastolic congestive heart failure    Open wound of left foot    Cavitary lesion of lung    Increased nutritional needs    Osteomyelitis of right foot    Slow transit constipation    Ulcerated, foot, left,  "with fat layer exposed    Acute deep vein thrombosis (DVT) of axillary vein of right upper extremity    Diabetic ulcer of left great toe    Epistaxis    Staphylococcus aureus bacteremia with sepsis     Interdisciplinary Rounds: did not attend  General Information Comments: Pt s/p amputation of L hallux 9/8, growing MSSA. Receiving a consistent carbohydrate diet, tolerating. Noted to have 100% intake recorded per chart, good appetite pta. A1c 7.5, reasonably controlled with current home regimen. Recommend James BID to promote wound healing. PIV. Delvis 17- L toe. NFPE completed pt nourished.  Nutrition Discharge Planning: dc on consistent carbohydrates    Nutrition Risk Screen    Nutrition Risk Screen: large or nonhealing wound, burn or pressure injury    Nutrition/Diet History    Spiritual, Cultural Beliefs, Buddhism Practices, Values that Affect Care: no  Food Allergies: NKFA  Factors Affecting Nutritional Intake: pain    Anthropometrics    Temp: 98 °F (36.7 °C)  Height Method: Stated  Height: 6' 2" (188 cm)  Height (inches): 74 in  Weight Method: Bed Scale  Weight: 117 kg (257 lb 15 oz)  Weight (lb): 257.94 lb  Ideal Body Weight (IBW), Male: 190 lb  % Ideal Body Weight, Male (lb): 135.76 %  BMI (Calculated): 33.1  BMI Grade: 30 - 34.9- obesity - grade I       Lab/Procedures/Meds    Pertinent Labs Reviewed: reviewed  CBC:  Recent Labs   Lab 09/11/23 0416   WBC 10.94   HGB 8.7*   HCT 29.0*   *     CMP:  Recent Labs   Lab 09/11/23 0416   CALCIUM 8.7   *   K 4.5   CO2 28   CL 96   BUN 15   CREATININE 1.1     Glucose   Date Value Ref Range Status   09/11/2023 167 (H) 70 - 110 mg/dL Final       Hemoglobin A1C   Date Value Ref Range Status   09/08/2023 7.5 (H) 4.0 - 5.6 % Final     Comment:     ADA Screening Guidelines:  5.7-6.4%  Consistent with prediabetes  >or=6.5%  Consistent with diabetes    High levels of fetal hemoglobin interfere with the HbA1C  assay. Heterozygous hemoglobin variants (HbS, HgC, " etc)do  not significantly interfere with this assay.   However, presence of multiple variants may affect accuracy.     11/22/2022 9.1 (H) 4.0 - 5.6 % Final     Comment:     ADA Screening Guidelines:  5.7-6.4%  Consistent with prediabetes  >or=6.5%  Consistent with diabetes    High levels of fetal hemoglobin interfere with the HbA1C  assay. Heterozygous hemoglobin variants (HbS, HgC, etc)do  not significantly interfere with this assay.   However, presence of multiple variants may affect accuracy.     05/27/2022 7.8 (H) 4.0 - 5.6 % Final     Comment:     ADA Screening Guidelines:  5.7-6.4%  Consistent with prediabetes  >or=6.5%  Consistent with diabetes    High levels of fetal hemoglobin interfere with the HbA1C  assay. Heterozygous hemoglobin variants (HbS, HgC, etc)do  not significantly interfere with this assay.   However, presence of multiple variants may affect accuracy.       Pertinent Medications Reviewed: reviewed  Scheduled Meds:   albuterol-ipratropium  3 mL Nebulization Q4H    aspirin  81 mg Oral Daily    atorvastatin  40 mg Oral Daily    ceFAZolin (ANCEF) IVPB  2 g Intravenous Q8H    enoxparin  40 mg Subcutaneous Daily    furosemide (LASIX) injection  40 mg Intravenous Daily    gabapentin  300 mg Oral BID    insulin aspart U-100  4 Units Subcutaneous TIDWM    insulin detemir U-100 (Levemir)  20 Units Subcutaneous QHS    mupirocin   Nasal BID    QUEtiapine  200 mg Oral Nightly    sodium chloride 0.9%  10 mL Intravenous Q6H     Continuous Infusions:  PRN Meds:.sodium chloride 0.9%, acetaminophen, dextrose 10%, dextrose 10%, glucagon (human recombinant), HYDROmorphone, insulin aspart U-100, naloxone, ondansetron, oxyCODONE, sodium chloride 0.9%, Flushing PICC/Midline Protocol **AND** sodium chloride 0.9% **AND** sodium chloride 0.9%    Estimated/Assessed Needs    Weight Used For Calorie Calculations: 117 kg (257 lb 15 oz)  Energy Calorie Requirements (kcal): 2458  Energy Need Method: [place] (x 1.2  wound healing)  Protein Requirements: 94-117g (0.8-1.0)  Weight Used For Protein Calculations: 117 kg (257 lb 15 oz)  Fluid Requirements (mL): 1 mL/kcal  Estimated Fluid Requirement Method:  (or per MD)  RDA Method (mL): 2458  CHO Requirement: 300g= 50%dv      Nutrition Prescription Ordered    Current Diet Order: Consistent Carbohydrates    Evaluation of Received Nutrient/Fluid Intake    I/O: - 3.7 L since admit  Energy Calories Required: meeting needs  Protein Required: meeting needs  Fluid Required: meeting needs  Comments: LBM: 9/8/23  Tolerance: tolerating  % Intake of Estimated Energy Needs: 75 - 100 %  % Meal Intake: 75 - 100 %  Intake/Output - Last 3 Shifts         09/09 0700  09/10 0659 09/10 0700  09/11 0659 09/11 0700 09/12 0659    P.O. 910      I.V. (mL/kg)       IV Piggyback       Total Intake(mL/kg) 910 (7.8)      Urine (mL/kg/hr) 1400 (0.5) 2820 (1)     Emesis/NG output       Stool 0 0     Total Output 1400 2820     Net -490 -2820            Stool Occurrence 0 x 0 x             Nutrition Risk    Level of Risk/Frequency of Follow-up: high       Monitor and Evaluation    Food and Nutrient Intake: energy intake, food and beverage intake  Food and Nutrient Adminstration: diet order  Knowledge/Beliefs/Attitudes: food and nutrition knowledge/skill  Physical Activity and Function: nutrition-related ADLs and IADLs  Anthropometric Measurements: weight, weight change  Biochemical Data, Medical Tests and Procedures: electrolyte and renal panel, gastrointestinal profile, glucose/endocrine profile, inflammatory profile, lipid profile  Nutrition-Focused Physical Findings: overall appearance, skin       Nutrition Follow-Up    RD Follow-up?: Yes    Doris Bojorquez RDN, SUHAN

## 2023-09-11 NOTE — SUBJECTIVE & OBJECTIVE
Interval History: Now s/p amputation (9/8) with blood cultures growing MSSA. Feeling better.    Review of Systems   Constitutional:  Negative for chills and fever.   Skin:  Positive for wound.   All other systems reviewed and are negative.    Objective:     Vital Signs (Most Recent):  Temp: 98.3 °F (36.8 °C) (09/11/23 0723)  Pulse: 84 (09/11/23 1202)  Resp: 18 (09/11/23 1202)  BP: 132/70 (09/11/23 0723)  SpO2: 95 % (09/11/23 1202) Vital Signs (24h Range):  Temp:  [96.9 °F (36.1 °C)-98.3 °F (36.8 °C)] 98.3 °F (36.8 °C)  Pulse:  [83-90] 84  Resp:  [15-26] 18  SpO2:  [91 %-97 %] 95 %  BP: (115-132)/(66-75) 132/70     Weight: 117 kg (257 lb 15 oz)  Body mass index is 33.12 kg/m².    Estimated Creatinine Clearance: 97.1 mL/min (based on SCr of 1.1 mg/dL).     Physical Exam  Vitals and nursing note reviewed.   Constitutional:       General: He is not in acute distress.     Appearance: Normal appearance. He is not ill-appearing or toxic-appearing.   HENT:      Head: Normocephalic and atraumatic.   Eyes:      Extraocular Movements: Extraocular movements intact.      Pupils: Pupils are equal, round, and reactive to light.   Cardiovascular:      Rate and Rhythm: Normal rate and regular rhythm.      Heart sounds: No murmur heard.  Neurological:      General: No focal deficit present.      Mental Status: He is alert and oriented to person, place, and time.   Psychiatric:         Mood and Affect: Mood normal.         Thought Content: Thought content normal.         Judgment: Judgment normal.          Significant Labs: Blood Culture:   Recent Labs   Lab 09/07/23  2343 09/08/23  1333   LABBLOO Gram stain jose bottle: Gram positive cocci in clusters resembling Staph  Results called to and read back by:Starla Wilburn Lpn 09/09/2023 00:41  Gram stain aer bottle: Gram positive cocci in clusters resembling Staph  Positive results previously called 09/10/2023  06:15  STAPHYLOCOCCUS AUREUS  ID consult required at [facility]  [facility].  For susceptibility see order #[id number]  *  Gram stain jose bottle: Gram positive cocci in clusters resembling Staph  Results called to and read back by:Starla Wilburn Lpn 09/09/2023 00:41  Gram stain aer bottle: Gram positive cocci in clusters resembling Staph  Positive results previously called 09/09/2023  13:34  STAPHYLOCOCCUS AUREUS  ID consult required at [facility] and [facility].  * Gram stain jose bottle: Gram positive cocci in clusters resembling Staph  Results called to and read back by:Kelly Elder Rn 09/09/2023  23:09  STAPHYLOCOCCUS AUREUS  ID consult required at [facility] and [facility].  For susceptibility see order #[id number]  *     CMP:   Recent Labs   Lab 09/10/23  0221 09/11/23  0416   * 132*   K 4.2 4.5   CL 96 96   CO2 28 28   * 167*   BUN 16 15   CREATININE 1.1 1.1   CALCIUM 8.4* 8.7   ANIONGAP 7* 8     Wound Culture:   Recent Labs   Lab 09/08/23  1754   LABAERO STAPHYLOCOCCUS AUREUS  Many  Susceptibility pending  *       Significant Imaging: I have reviewed all pertinent imaging results/findings within the past 24 hours.

## 2023-09-11 NOTE — PLAN OF CARE
Discharge plan pending final Podiatrist recommendations. Possible LTAC if pt needs wound care and IV antibiotics.     Pt has medicaid, so not appropriate for SNF, possible home infusion with a nurse for Wd care.    CM to follow for plans and arrangements

## 2023-09-11 NOTE — PLAN OF CARE
POC reviewed. Purposeful rounding completed. No significant events this shift. VS and assessment per flowsheets.  Complaints of pain treated with PRN pain medication. No other complaints verbalized during shift. Received IV antibiotics according to MAR. No injuries, falls, or trauma occurred during shift. Bed low and locked, side rails up x3, call light within reach. Safety maintained throughout shift.     Problem: Adult Inpatient Plan of Care  Goal: Plan of Care Review  Outcome: Ongoing, Progressing  Goal: Patient-Specific Goal (Individualized)  Outcome: Ongoing, Progressing  Goal: Absence of Hospital-Acquired Illness or Injury  Outcome: Ongoing, Progressing  Goal: Optimal Comfort and Wellbeing  Outcome: Ongoing, Progressing  Goal: Readiness for Transition of Care  Outcome: Ongoing, Progressing     Problem: Diabetes Comorbidity  Goal: Blood Glucose Level Within Targeted Range  Outcome: Ongoing, Progressing     Problem: Infection  Goal: Absence of Infection Signs and Symptoms  Outcome: Ongoing, Progressing

## 2023-09-11 NOTE — PLAN OF CARE
Problem: Occupational Therapy  Goal: Occupational Therapy Goal  Description: Goals to be met by: 9/24/2023     Patient will increase functional independence with ADLs by performing:    UE Dressing with Set-up Assistance.  LE Dressing with Moderate Assistance.  Toileting from bedside commode with Contact Guard Assistance for hygiene and clothing management.   Toilet transfer to bedside commode with Stand-by Assistance while maintaining weight bearing precautions.    COMPLETED GOALS  Grooming while seated at sink with Set-up Assistance. MET 9/11/2023    Outcome: Ongoing, Progressing       Overall tolerated session well though requiring repeated cues for LLE PWB with the heels. Patient progressing towards goals. Post acute rec -SNF. DME recs - RW, BSC, and WC; other needs to be determine by next level of care.

## 2023-09-11 NOTE — PROGRESS NOTES
[facility] ([place])  Infectious Disease  Progress Note    Patient Name: Dave Good  MRN: [id number]  Admission Date: 9/7/2023  Length of Stay: 3 days  Attending Physician: Hari Iverson MD  Primary Care Provider: Reyna Jorge NP    Isolation Status: No active isolations     Assessment/Plan:      ID  Chronic osteomyelitis of toe of left foot-resolved as of 9/10/2023  59 yo male with DM, HTN with chronic osteomyelitis of left hallux who presents with elevated WBC and left hallux pain. Xray suggests changes in hallux, 2nd, and 4th toes - however, these may represent previous infection as well - changes to hallux and 4th toe were seen on MRI in June 2022. Repeat comparison MRI pending, but last treatment ended in January 2023. Now, s/p amputation. OR culture growing SA and blood with MSSA.    Recommendation:  · Continue cefazolin and stop vancomycin  · Follow-up OR cultures  · Consider PAT (if negative and amputation was curative, patient will only require 2 weeks of cefazolin)  · Discussed with HM via SC        Isidro Duran MD  Infectious Disease  [facility] ([place])    Subjective:     Principal Problem:Staphylococcus aureus bacteremia with sepsis    HPI: 59 yo male with DM, HTN, PAD, chronic osteomyelitis of left hallux who presented on 8/27 and 9/7 with left hallux pain. He was last seen by ID in January after finishing 6 weeks of IV daptomycin and meropenem on 1/3/23. On presentation he had no fever, but WBC was elevated at 21. He was given one dose of vancomycin. I am consulted for evaluation and treatment.     Interval History: Now s/p amputation (9/8) with blood cultures growing MSSA. Feeling better.    Review of Systems   Constitutional:  Negative for chills and fever.   Skin:  Positive for wound.   All other systems reviewed and are negative.    Objective:     Vital Signs (Most Recent):  Temp: 98.3 °F (36.8 °C) (09/11/23 0723)  Pulse: 84 (09/11/23 1202)  Resp: 18 (09/11/23  1202)  BP: 132/70 (09/11/23 0723)  SpO2: 95 % (09/11/23 1202) Vital Signs (24h Range):  Temp:  [96.9 °F (36.1 °C)-98.3 °F (36.8 °C)] 98.3 °F (36.8 °C)  Pulse:  [83-90] 84  Resp:  [15-26] 18  SpO2:  [91 %-97 %] 95 %  BP: (115-132)/(66-75) 132/70     Weight: 117 kg (257 lb 15 oz)  Body mass index is 33.12 kg/m².    Estimated Creatinine Clearance: 97.1 mL/min (based on SCr of 1.1 mg/dL).     Physical Exam  Vitals and nursing note reviewed.   Constitutional:       General: He is not in acute distress.     Appearance: Normal appearance. He is not ill-appearing or toxic-appearing.   HENT:      Head: Normocephalic and atraumatic.   Eyes:      Extraocular Movements: Extraocular movements intact.      Pupils: Pupils are equal, round, and reactive to light.   Cardiovascular:      Rate and Rhythm: Normal rate and regular rhythm.      Heart sounds: No murmur heard.  Neurological:      General: No focal deficit present.      Mental Status: He is alert and oriented to person, place, and time.   Psychiatric:         Mood and Affect: Mood normal.         Thought Content: Thought content normal.         Judgment: Judgment normal.          Significant Labs: Blood Culture:   Recent Labs   Lab 09/07/23  2343 09/08/23  1333   LABBLOO Gram stain jose bottle: Gram positive cocci in clusters resembling Staph  Results called to and read back by:Starla Wilburn Lpn 09/09/2023 00:41  Gram stain aer bottle: Gram positive cocci in clusters resembling Staph  Positive results previously called 09/10/2023  06:15  STAPHYLOCOCCUS AUREUS  ID consult required at [facility] and [facility].  For susceptibility see order #[id number]  *  Gram stain jose bottle: Gram positive cocci in clusters resembling Staph  Results called to and read back by:Starla Wilburn Lpn 09/09/2023 00:41  Gram stain aer bottle: Gram positive cocci in clusters resembling Staph  Positive results previously called 09/09/2023  13:34  STAPHYLOCOCCUS AUREUS  ID  consult required at [facility] and [place] locations.  * Gram stain jose bottle: Gram positive cocci in clusters resembling Staph  Results called to and read back by:Kelly Elder Rn 09/09/2023  23:09  STAPHYLOCOCCUS AUREUS  ID consult required at [facility] and [place] locations.  For susceptibility see order #[id number]  *     CMP:   Recent Labs   Lab 09/10/23  0221 09/11/23  0416   * 132*   K 4.2 4.5   CL 96 96   CO2 28 28   * 167*   BUN 16 15   CREATININE 1.1 1.1   CALCIUM 8.4* 8.7   ANIONGAP 7* 8     Wound Culture:   Recent Labs   Lab 09/08/23  1754   LABAERO STAPHYLOCOCCUS AUREUS  Many  Susceptibility pending  *       Significant Imaging: I have reviewed all pertinent imaging results/findings within the past 24 hours.

## 2023-09-11 NOTE — SUBJECTIVE & OBJECTIVE
Interval History:  No acute events.    Review of Systems   Constitutional:  Negative for chills and fever.   Respiratory:  Negative for shortness of breath.    Cardiovascular:  Negative for chest pain.   Gastrointestinal:  Negative for abdominal pain, constipation, diarrhea, nausea and vomiting.     Objective:     Vital Signs (Most Recent):  Temp: 98.3 °F (36.8 °C) (09/11/23 0723)  Pulse: 83 (09/11/23 0723)  Resp: 18 (09/11/23 0723)  BP: 132/70 (09/11/23 0723)  SpO2: 96 % (09/11/23 0723) Vital Signs (24h Range):  Temp:  [96.9 °F (36.1 °C)-98.3 °F (36.8 °C)] 98.3 °F (36.8 °C)  Pulse:  [83-90] 83  Resp:  [15-26] 18  SpO2:  [91 %-97 %] 96 %  BP: (115-132)/(66-75) 132/70     Weight: 117 kg (257 lb 15 oz)  Body mass index is 33.12 kg/m².    Intake/Output Summary (Last 24 hours) at 9/11/2023 0854  Last data filed at 9/11/2023 0541  Gross per 24 hour   Intake --   Output 2820 ml   Net -2820 ml           Physical Exam  Constitutional:       Appearance: He is obese.   Cardiovascular:      Rate and Rhythm: Normal rate and regular rhythm.      Heart sounds: Normal heart sounds. No murmur heard.     No friction rub. No gallop.   Pulmonary:      Effort: Pulmonary effort is normal. No respiratory distress.      Breath sounds: No wheezing.      Comments: Minimal rales at lung base, improved with cough.  Moving air well.  Abdominal:      General: Bowel sounds are normal. There is no distension.      Palpations: Abdomen is soft.      Tenderness: There is no abdominal tenderness.   Musculoskeletal:         General: No tenderness. Normal range of motion.      Comments: Left foot with surgical dressing clean, dry, and intact..   Skin:     General: Skin is warm and dry.      Coloration: Skin is not pale.      Findings: No erythema or rash.   Neurological:      Mental Status: He is alert and oriented to person, place, and time.             Significant Labs: All pertinent labs within the past 24 hours have been reviewed.    Significant  Imaging: I have reviewed all pertinent imaging results/findings within the past 24 hours.

## 2023-09-11 NOTE — ASSESSMENT & PLAN NOTE
Echocardiogram shows evidence of reduced systolic function also diastolic dysfunction.  Kidney electrolytes within normal range.  Continue with daily dose of intravenous furosemide until we achieve euvolemia.  Wean supplemental oxygen.  Monitor kidney function.

## 2023-09-11 NOTE — ASSESSMENT & PLAN NOTE
57 yo male with DM, HTN with chronic osteomyelitis of left hallux who presents with elevated WBC and left hallux pain. Xray suggests changes in hallux, 2nd, and 4th toes - however, these may represent previous infection as well - changes to hallux and 4th toe were seen on MRI in June 2022. Repeat comparison MRI pending, but last treatment ended in January 2023. Now, s/p amputation. OR culture growing SA and blood with MSSA.    Recommendation:  · Continue cefazolin and stop vancomycin  · Follow-up OR cultures  · Consider PAT (if negative and amputation was curative, patient will only require 2 weeks of cefazolin)  · Discussed with HM via SC

## 2023-09-12 PROBLEM — I50.42 CHRONIC COMBINED SYSTOLIC AND DIASTOLIC CONGESTIVE HEART FAILURE: Status: ACTIVE | Noted: 2022-01-07

## 2023-09-12 LAB
ANION GAP SERPL CALC-SCNC: 8 MMOL/L (ref 8–16)
BACTERIA BLD CULT: ABNORMAL
BACTERIA SPEC AEROBE CULT: ABNORMAL
BACTERIA SPEC AEROBE CULT: ABNORMAL
BASOPHILS # BLD AUTO: 0.06 K/UL (ref 0–0.2)
BASOPHILS NFR BLD: 0.6 % (ref 0–1.9)
BUN SERPL-MCNC: 15 MG/DL (ref 6–20)
CALCIUM SERPL-MCNC: 8.3 MG/DL (ref 8.7–10.5)
CHLORIDE SERPL-SCNC: 97 MMOL/L (ref 95–110)
CO2 SERPL-SCNC: 29 MMOL/L (ref 23–29)
CREAT SERPL-MCNC: 0.9 MG/DL (ref 0.5–1.4)
DIFFERENTIAL METHOD: ABNORMAL
EOSINOPHIL # BLD AUTO: 0.5 K/UL (ref 0–0.5)
EOSINOPHIL NFR BLD: 5 % (ref 0–8)
ERYTHROCYTE [DISTWIDTH] IN BLOOD BY AUTOMATED COUNT: 16.3 % (ref 11.5–14.5)
EST. GFR  (NO RACE VARIABLE): >60 ML/MIN/1.73 M^2
GLUCOSE SERPL-MCNC: 126 MG/DL (ref 70–110)
HCT VFR BLD AUTO: 27.6 % (ref 40–54)
HGB BLD-MCNC: 8.4 G/DL (ref 14–18)
IMM GRANULOCYTES # BLD AUTO: 0.04 K/UL (ref 0–0.04)
IMM GRANULOCYTES NFR BLD AUTO: 0.4 % (ref 0–0.5)
LYMPHOCYTES # BLD AUTO: 2.5 K/UL (ref 1–4.8)
LYMPHOCYTES NFR BLD: 23.3 % (ref 18–48)
MAGNESIUM SERPL-MCNC: 1.7 MG/DL (ref 1.6–2.6)
MCH RBC QN AUTO: 24.8 PG (ref 27–31)
MCHC RBC AUTO-ENTMCNC: 30.4 G/DL (ref 32–36)
MCV RBC AUTO: 81 FL (ref 82–98)
MONOCYTES # BLD AUTO: 1.1 K/UL (ref 0.3–1)
MONOCYTES NFR BLD: 10.1 % (ref 4–15)
NEUTROPHILS # BLD AUTO: 6.5 K/UL (ref 1.8–7.7)
NEUTROPHILS NFR BLD: 60.6 % (ref 38–73)
NRBC BLD-RTO: 0 /100 WBC
PHOSPHATE SERPL-MCNC: 3.2 MG/DL (ref 2.7–4.5)
PLATELET # BLD AUTO: 600 K/UL (ref 150–450)
PMV BLD AUTO: 9.1 FL (ref 9.2–12.9)
POCT GLUCOSE: 151 MG/DL (ref 70–110)
POCT GLUCOSE: 204 MG/DL (ref 70–110)
POCT GLUCOSE: 216 MG/DL (ref 70–110)
POTASSIUM SERPL-SCNC: 4.9 MMOL/L (ref 3.5–5.1)
RBC # BLD AUTO: 3.39 M/UL (ref 4.6–6.2)
SODIUM SERPL-SCNC: 134 MMOL/L (ref 136–145)
WBC # BLD AUTO: 10.65 K/UL (ref 3.9–12.7)

## 2023-09-12 PROCEDURE — 94761 N-INVAS EAR/PLS OXIMETRY MLT: CPT

## 2023-09-12 PROCEDURE — 99900035 HC TECH TIME PER 15 MIN (STAT)

## 2023-09-12 PROCEDURE — 97535 SELF CARE MNGMENT TRAINING: CPT | Mod: CO

## 2023-09-12 PROCEDURE — 94640 AIRWAY INHALATION TREATMENT: CPT

## 2023-09-12 PROCEDURE — 99233 SBSQ HOSP IP/OBS HIGH 50: CPT | Mod: ,,, | Performed by: INTERNAL MEDICINE

## 2023-09-12 PROCEDURE — 25000242 PHARM REV CODE 250 ALT 637 W/ HCPCS: Performed by: HOSPITALIST

## 2023-09-12 PROCEDURE — 99024 PR POST-OP FOLLOW-UP VISIT: ICD-10-PCS | Mod: ,,, | Performed by: PODIATRIST

## 2023-09-12 PROCEDURE — 84100 ASSAY OF PHOSPHORUS: CPT | Performed by: HOSPITALIST

## 2023-09-12 PROCEDURE — 99233 PR SUBSEQUENT HOSPITAL CARE,LEVL III: ICD-10-PCS | Mod: ,,, | Performed by: INTERNAL MEDICINE

## 2023-09-12 PROCEDURE — 27000221 HC OXYGEN, UP TO 24 HOURS

## 2023-09-12 PROCEDURE — 99222 1ST HOSP IP/OBS MODERATE 55: CPT | Mod: ,,, | Performed by: INTERNAL MEDICINE

## 2023-09-12 PROCEDURE — 83735 ASSAY OF MAGNESIUM: CPT | Performed by: HOSPITALIST

## 2023-09-12 PROCEDURE — 80048 BASIC METABOLIC PNL TOTAL CA: CPT | Performed by: HOSPITALIST

## 2023-09-12 PROCEDURE — 36415 COLL VENOUS BLD VENIPUNCTURE: CPT | Performed by: HOSPITALIST

## 2023-09-12 PROCEDURE — 25000003 PHARM REV CODE 250: Performed by: HOSPITALIST

## 2023-09-12 PROCEDURE — 94799 UNLISTED PULMONARY SVC/PX: CPT

## 2023-09-12 PROCEDURE — 63600175 PHARM REV CODE 636 W HCPCS

## 2023-09-12 PROCEDURE — 25000003 PHARM REV CODE 250: Performed by: NURSE PRACTITIONER

## 2023-09-12 PROCEDURE — 99024 POSTOP FOLLOW-UP VISIT: CPT | Mod: ,,, | Performed by: PODIATRIST

## 2023-09-12 PROCEDURE — 63600175 PHARM REV CODE 636 W HCPCS: Performed by: HOSPITALIST

## 2023-09-12 PROCEDURE — A4216 STERILE WATER/SALINE, 10 ML: HCPCS | Performed by: HOSPITALIST

## 2023-09-12 PROCEDURE — 99222 PR INITIAL HOSPITAL CARE,LEVL II: ICD-10-PCS | Mod: ,,, | Performed by: INTERNAL MEDICINE

## 2023-09-12 PROCEDURE — 11000001 HC ACUTE MED/SURG PRIVATE ROOM

## 2023-09-12 PROCEDURE — A4216 STERILE WATER/SALINE, 10 ML: HCPCS | Performed by: NURSE PRACTITIONER

## 2023-09-12 PROCEDURE — 85025 COMPLETE CBC W/AUTO DIFF WBC: CPT | Performed by: HOSPITALIST

## 2023-09-12 RX ORDER — INSULIN ASPART 100 [IU]/ML
2 INJECTION, SOLUTION INTRAVENOUS; SUBCUTANEOUS ONCE
Status: COMPLETED | OUTPATIENT
Start: 2023-09-12 | End: 2023-09-12

## 2023-09-12 RX ORDER — INSULIN ASPART 100 [IU]/ML
0-10 INJECTION, SOLUTION INTRAVENOUS; SUBCUTANEOUS
Status: DISCONTINUED | OUTPATIENT
Start: 2023-09-12 | End: 2023-09-12

## 2023-09-12 RX ORDER — INSULIN ASPART 100 [IU]/ML
0-10 INJECTION, SOLUTION INTRAVENOUS; SUBCUTANEOUS
Status: DISCONTINUED | OUTPATIENT
Start: 2023-09-13 | End: 2023-09-13

## 2023-09-12 RX ADMIN — SODIUM CHLORIDE, PRESERVATIVE FREE 10 ML: 5 INJECTION INTRAVENOUS at 01:09

## 2023-09-12 RX ADMIN — IPRATROPIUM BROMIDE AND ALBUTEROL SULFATE 3 ML: 2.5; .5 SOLUTION RESPIRATORY (INHALATION) at 12:09

## 2023-09-12 RX ADMIN — INSULIN ASPART 4 UNITS: 100 INJECTION, SOLUTION INTRAVENOUS; SUBCUTANEOUS at 08:09

## 2023-09-12 RX ADMIN — IPRATROPIUM BROMIDE AND ALBUTEROL SULFATE 3 ML: 2.5; .5 SOLUTION RESPIRATORY (INHALATION) at 04:09

## 2023-09-12 RX ADMIN — SODIUM CHLORIDE, PRESERVATIVE FREE 10 ML: 5 INJECTION INTRAVENOUS at 12:09

## 2023-09-12 RX ADMIN — ATORVASTATIN CALCIUM 40 MG: 20 TABLET, FILM COATED ORAL at 08:09

## 2023-09-12 RX ADMIN — INSULIN ASPART 4 UNITS: 100 INJECTION, SOLUTION INTRAVENOUS; SUBCUTANEOUS at 01:09

## 2023-09-12 RX ADMIN — INSULIN DETEMIR 20 UNITS: 100 INJECTION, SOLUTION SUBCUTANEOUS at 08:09

## 2023-09-12 RX ADMIN — IPRATROPIUM BROMIDE AND ALBUTEROL SULFATE 3 ML: 2.5; .5 SOLUTION RESPIRATORY (INHALATION) at 03:09

## 2023-09-12 RX ADMIN — OXYCODONE HYDROCHLORIDE 5 MG: 5 TABLET ORAL at 05:09

## 2023-09-12 RX ADMIN — QUETIAPINE FUMARATE 200 MG: 200 TABLET ORAL at 08:09

## 2023-09-12 RX ADMIN — MUPIROCIN: 20 OINTMENT TOPICAL at 08:09

## 2023-09-12 RX ADMIN — ASPIRIN 81 MG: 81 TABLET, COATED ORAL at 08:09

## 2023-09-12 RX ADMIN — INSULIN ASPART 4 UNITS: 100 INJECTION, SOLUTION INTRAVENOUS; SUBCUTANEOUS at 05:09

## 2023-09-12 RX ADMIN — Medication 10 ML: at 05:09

## 2023-09-12 RX ADMIN — INSULIN ASPART 2 UNITS: 100 INJECTION, SOLUTION INTRAVENOUS; SUBCUTANEOUS at 08:09

## 2023-09-12 RX ADMIN — IPRATROPIUM BROMIDE AND ALBUTEROL SULFATE 3 ML: 2.5; .5 SOLUTION RESPIRATORY (INHALATION) at 08:09

## 2023-09-12 RX ADMIN — IPRATROPIUM BROMIDE AND ALBUTEROL SULFATE 3 ML: 2.5; .5 SOLUTION RESPIRATORY (INHALATION) at 07:09

## 2023-09-12 RX ADMIN — CEFAZOLIN 2 G: 2 INJECTION, POWDER, FOR SOLUTION INTRAMUSCULAR; INTRAVENOUS at 05:09

## 2023-09-12 RX ADMIN — INSULIN ASPART 2 UNITS: 100 INJECTION, SOLUTION INTRAVENOUS; SUBCUTANEOUS at 10:09

## 2023-09-12 RX ADMIN — OXYCODONE HYDROCHLORIDE 5 MG: 5 TABLET ORAL at 08:09

## 2023-09-12 RX ADMIN — GABAPENTIN 300 MG: 300 CAPSULE ORAL at 08:09

## 2023-09-12 RX ADMIN — FUROSEMIDE 40 MG: 10 INJECTION, SOLUTION INTRAMUSCULAR; INTRAVENOUS at 08:09

## 2023-09-12 RX ADMIN — ENOXAPARIN SODIUM 40 MG: 40 INJECTION SUBCUTANEOUS at 05:09

## 2023-09-12 RX ADMIN — SODIUM CHLORIDE, PRESERVATIVE FREE 10 ML: 5 INJECTION INTRAVENOUS at 06:09

## 2023-09-12 RX ADMIN — SODIUM CHLORIDE, PRESERVATIVE FREE 10 ML: 5 INJECTION INTRAVENOUS at 05:09

## 2023-09-12 NOTE — PLAN OF CARE
POC reviewed. Purposeful rounding completed. No significant events this shift. VS and assessment per flowsheets.  Complaints of pain treated with PRN pain medication. No other complaints verbalized during shift. Received IV antibiotics according to MAR. No injuries, falls, or trauma occurred during shift. Bed low and locked, side rails up x3, call light within reach. Safety maintained throughout shift      Problem: Adult Inpatient Plan of Care  Goal: Plan of Care Review  Outcome: Ongoing, Progressing  Goal: Patient-Specific Goal (Individualized)  Outcome: Ongoing, Progressing  Goal: Absence of Hospital-Acquired Illness or Injury  Outcome: Ongoing, Progressing  Goal: Optimal Comfort and Wellbeing  Outcome: Ongoing, Progressing  Goal: Readiness for Transition of Care  Outcome: Ongoing, Progressing     Problem: Diabetes Comorbidity  Goal: Blood Glucose Level Within Targeted Range  Outcome: Ongoing, Progressing     Problem: Infection  Goal: Absence of Infection Signs and Symptoms  Outcome: Ongoing, Progressing     Problem: Impaired Wound Healing  Goal: Optimal Wound Healing  Outcome: Ongoing, Progressing

## 2023-09-12 NOTE — ASSESSMENT & PLAN NOTE
Normotensive off meds.  Home Meds:  HCTZ 25mg, Lisinopril 10mg  -Continue to hold home meds  -Monitor and adjust as needed

## 2023-09-12 NOTE — PT/OT/SLP PROGRESS
Occupational Therapy   Treatment    Name: Dave Good  MRN: [id number]  Admitting Diagnosis:  Staphylococcus aureus bacteremia with sepsis  4 Days Post-Op    Recommendations:     Discharge Recommendations: nursing facility, skilled  Discharge Equipment Recommendations:  bedside commode, walker, rolling, wheelchair, shower chair, to be determined by next level of care  Barriers to discharge:  Inaccessible home environment, Decreased caregiver support (10 KRUPA and current functional level)    Assessment:     Dave Good is a 58 y.o. male with a medical diagnosis of Staphylococcus aureus bacteremia with sepsis.  He presents with Wound Care upon session. Performance deficits affecting function are weakness, impaired endurance, impaired self care skills, impaired functional mobility, gait instability, impaired balance, decreased coordination, decreased ROM, decreased lower extremity function, decreased safety awareness, pain, impaired cardiopulmonary response to activity.     Patient pleasant and agreeable to therapy. Patient educated on RW management while functionally ambulating with LLE PWB heel. Patient verbally understood though still required repeated cues for PWB tech. Overall, tolerated session well though activity tolerance is limited due to LLE PWB heel and LLE pain.     Rehab Prognosis:  Good; patient would benefit from acute skilled OT services to address these deficits and reach maximum level of function.       Plan:     Patient to be seen 5 x/week to address the above listed problems via self-care/home management, therapeutic activities, therapeutic exercises  Plan of Care Expires: 09/24/23  Plan of Care Reviewed with: patient    Subjective     Chief Complaint: foot pain   Patient/Family Comments/goals: agreeable to participate in therapy for OT intervention    Pain/Comfort:  Pain Rating 1: 7/10  Location - Side 1: Left  Location - Orientation 1: generalized  Location 1: foot  Pain Addressed 1: Pre-medicate  for activity, Reposition, Distraction, Cessation of Activity  Pain Rating Post-Intervention 1: 0/10 (at rest)    Objective:     Communicated with: RN (Monik) prior to session.  Patient found supine with oxygen, telemetry, peripheral IV upon OT entry to room.    General Precautions: Standard, fall    Orthopedic Precautions:LLE partial weight bearing  Braces: N/A  Respiratory Status: Nasal cannula, flow 1 L/min     Occupational Performance:     Bed Mobility:    Supine > Sit: SBA with hospital bed feature     Functional Mobility/Transfers:  Sit <> Stand: Min A, RW with lifting - cues for  rocking momentum   Functional Mobility: CGA, RW with increase of time though requiring repeated cues for PWB LLE heel  BSC Transfer: CGA with steady descend - cues for hand placement     Activities of Daily Living:  Grooming: Set/Supervision for oral care, hand hygiene, and face washing   LB Dressing: Attempting to educated and demonstrate to don pants pt patient decline   UB Dressing: CGA to don/doff front/back gown seated in chair - assistance with the ties   Toileting: Set Up/Supervision for voiding in the urinal with HOB elevated       AMPAC 6 Click ADL: 15    Treatment & Education:  OT role, plan of care, progression of goals, importance of continued OOB activity, ADL/functional transfer and mobility retraining, discharge recommendation, call don't fall, safety precautions, fall prevention.      Patient left up in chair with all lines intact, call button in reach, and RN notified    GOALS:   Multidisciplinary Problems       Occupational Therapy Goals          Problem: Occupational Therapy    Goal Priority Disciplines Outcome Interventions   Occupational Therapy Goal     OT, PT/OT Ongoing, Progressing    Description: Goals to be met by: 9/24/2023     Patient will increase functional independence with ADLs by performing:    UE Dressing with Set-up Assistance.  LE Dressing with Moderate Assistance.  Toileting from bedside commode  with Contact Guard Assistance for hygiene and clothing management.   Toilet transfer to bedside commode with Stand-by Assistance while maintaining weight bearing precautions.    COMPLETED GOALS  Grooming while seated at sink with Set-up Assistance. MET 9/11/2023                         Time Tracking:     OT Date of Treatment: 09/12/23  OT Start Time: 0938  OT Stop Time: 1006  OT Total Time (min): 28 min    Billable Minutes:Self Care/Home Management 28 min    OT/DEREK: DEREK     Number of DEREK visits since last OT visit: 2    9/12/2023

## 2023-09-12 NOTE — ASSESSMENT & PLAN NOTE
Patient presented to ED with complaints of worsening left great toe pain and swelling.  X-ray of left foot with acute osteomyelitis involving the great toe and distal aspects of the 2nd and 4th toes.  Labs on admission with WBC of 21.91 with left shift.  Blood cultures on admission (9/7/2023) with 4/4 bottles, 9/8/2023 with 1/2 bottles, and 9/11/2023 with 0/2 bottles positive for MSSA . Wound culture (9/8/2023 - areobic) positive for Staph aureus (sensitivities pending) and Strep dysgalactiae (sensitivities pending).  Started on IV Vanc/Meropenem on admission and changed to IV Vanc/Cefazolin on 9/10/2023.  IV Vanc discontinued on 9/11/2023. Podiatry consulted and patient underwent left 1st toe amputation on 9/8/2023 with Dr. Samuel Toussaint.    TTE on 9/8/2023 did not show evidence of endocarditis.  ID consulted and recommended PAT and if negative, 2 weeks total of IV antibiotics from last negative culture (9/11/2023).     Plan:  Follow up with Podiatry recommendations  PAT today  Follow up pending wound and blood cultures  Pain control as needed.  Wound Care consult  PT/OT consulted - SNF on discharge

## 2023-09-12 NOTE — CONSULTS
[facility] - Med Surg ([place])  Wound Care    Patient Name:  Dave Good   MRN:  [id number]  Date: 9/12/2023  Diagnosis: Staphylococcus aureus bacteremia with sepsis    History:     Past Medical History:   Diagnosis Date    Arthritis     COPD (chronic obstructive pulmonary disease)     COVID-19     Depression     Diabetes mellitus     Diabetes mellitus, type 2     Hypertension        Social History     Socioeconomic History    Marital status: Single   Tobacco Use    Smoking status: Former    Smokeless tobacco: Never   Substance and Sexual Activity    Alcohol use: Not Currently     Comment: pt reports he drinks 1/2 a pint of whiskey every day    Drug use: Yes     Types: Marijuana    Sexual activity: Yes     Partners: Female     Social Determinants of Health     Financial Resource Strain: Low Risk  (6/16/2022)    Overall Financial Resource Strain (CARDIA)     Difficulty of Paying Living Expenses: Not very hard   Food Insecurity: Food Insecurity Present (6/16/2022)    Hunger Vital Sign     Worried About Running Out of Food in the Last Year: Often true     Ran Out of Food in the Last Year: Often true   Transportation Needs: No Transportation Needs (6/16/2022)    PRAPARE - Transportation     Lack of Transportation (Medical): No     Lack of Transportation (Non-Medical): No   Physical Activity: Inactive (6/16/2022)    Exercise Vital Sign     Days of Exercise per Week: 0 days     Minutes of Exercise per Session: 0 min   Stress: No Stress Concern Present (6/16/2022)    [employer] - Occupational Stress Questionnaire     Feeling of Stress : Not at all   Social Connections: Moderately Integrated (6/16/2022)    Social Connection and Isolation Panel [NHANES]     Frequency of Communication with Friends and Family: Twice a week     Frequency of Social Gatherings with Friends and Family: Twice a week     Attends Congregation Services: 1 to 4 times per year     Active Member of Clubs or Organizations: No      Attends Club or Organization Meetings: Never     Marital Status: Living with partner   Housing Stability: Unknown (6/16/2022)    Housing Stability Vital Sign     Unable to Pay for Housing in the Last Year: No     Unstable Housing in the Last Year: No       Precautions:     Allergies as of 09/07/2023 - Reviewed 09/07/2023   Allergen Reaction Noted    Ibuprofen Swelling 07/29/2014       [facility] Assessment Details/Treatment     Wound care consult received for assessment of left foot wound. Patient is a 58 year old male now s/p L hallux amputation by podiatry.     Podiatry managing care att this time. Spoke with Dr. Toussaint with podiatry this morning for clarification of wound care orders. Wound care orders placed for nursing to complete every 2 days.     Wound care nurse completed dressing change today. Cleansed wound with Vashe and packed tunneling with Aquacel Ag Hydrofiber ribbon. Covered with dry gauze, cast padding and ace wrap. Patient tolerated dressing change well. Nursing and MD team notified. Wound care to assist with pt prn.      09/12/23 0934        Incision/Site 09/08/23 1641 Left Foot   Date First Assessed/Time First Assessed: 09/08/23 1641   Side: Left  Location: Foot   Wound Image    Incision WDL ex   Dressing Appearance Dry;Intact;Moist drainage   Drainage Amount Small   Drainage Characteristics/Odor Serosanguineous;No odor   Appearance Red;Moist   Periwound Area Dry   Wound Edges Open   Wound Length (cm) 1.5 cm   Wound Width (cm) 2.3 cm   Wound Depth (cm) 1 cm   Wound Volume (cm^3) 3.45 cm^3   Wound Surface Area (cm^2) 3.45 cm^2   Care Cleansed with:;Antimicrobial agent   Dressing Applied;Gauze;Cast padding;Tubular bandage   Packing packed with;hydrofiber/alginate   Dressing Change Due 09/14/23 09/12/2023

## 2023-09-12 NOTE — CARE UPDATE
09/12/23 0808   Patient Assessment/Suction   Level of Consciousness (AVPU) alert   Respiratory Effort Normal;Unlabored   Expansion/Accessory Muscles/Retractions no retractions;no use of accessory muscles   All Lung Fields Breath Sounds Anterior:;Posterior:;clear;equal bilaterally   Skin Integrity   $ Wound Care Tech Time 15 min   Area Observed Left;Right;Behind ear;Nares   Skin Appearance without discoloration   PRE-TX-O2   Device (Oxygen Therapy) nasal cannula   $ Is the patient on Low Flow Oxygen? Yes   Flow (L/min) 1   SpO2 (!) 94 %   Pulse Oximetry Type Intermittent   $ Pulse Oximetry - Multiple Charge Pulse Oximetry - Multiple   Pulse 85   Resp 18   Aerosol Therapy   $ Aerosol Therapy Charges Aerosol Treatment   Daily Review of Necessity (SVN) completed   Respiratory Treatment Status (SVN) given   Treatment Route (SVN) mask;air   Patient Position (SVN) HOB elevated   Post Treatment Assessment (SVN) breath sounds unchanged   Signs of Intolerance (SVN) none   Incentive Spirometer   $ Incentive Spirometer Charges done with encouragement   Administration (IS) instruction provided, follow-up   Number of Repetitions (IS) 10   Level Incentive Spirometer (mL) 1500   Patient Tolerance (IS) good

## 2023-09-12 NOTE — PLAN OF CARE
Patient resting well  Oxygen in use at 1 lpm nasal cannula. Aerosol given with no adverse reaction

## 2023-09-12 NOTE — PT/OT/SLP PROGRESS
Physical Therapy      Patient Name:  Dave Good   MRN:  [id number]    Patient not seen today secondary to patient unwilling to participate, saying that he's been up for a while and has walked in the room with OT, and now he's getting back in bed.  . Will follow-up next treatment day.

## 2023-09-12 NOTE — PROGRESS NOTES
"[facility] Medicine  Progress Note    Patient Name: Dave Good  MRN: [id number]  Patient Class: IP- Inpatient   Admission Date: 9/7/2023  Length of Stay: 4 days  Attending Physician: Zeus Nation MD  Primary Care Provider: Reyna Jorge NP        Subjective:     Principal Problem:Staphylococcus aureus bacteremia with sepsis      HPI:  Per Larry Shepherd, NP:    "The patient is a 58 y.o. male with a past medical history of type 2 diabetes, HTN, HLD, and chronic systolic congestive heart failure who presents with worsening left great toe pain infection and swelling.  Patient was recently seen in the ER and discharged to follow up with ID and wound care.  Since then he has been unable to follow-up with podiatry or infectious disease.  He states that he has not tried to call their offices and was waiting for them to call him.  States that his toe pain has gotten worse.  He has been soaking it in a foot massage machine chair with no improvement.  XR positive for left great, 2nd, and 4th toe acute osteomyelitis.  He will be admitted for further management of his acute osteomyelitis and infectious disease and podiatry consult."      Overview/Hospital Course:  Patient is 58-year-old man with history of hypertension, diabetes mellitus type 2, reported history of systolic heart failure (although last echocardiogram with normal systolic function), significant prior tobacco smoking history with chronic obstructive pulmonary disease admitted to the hospital with diabetic ulcer with abscess involving left foot including great toe and proximal foot.  Patient with marked leukocytosis and elevated heart rate consistent with systemic inflammatory response/sepsis secondary to foot infection.  Blood cultures obtained.  Patient started on intravenous antibiotics.  Podiatry consulted and took the patient for surgical debridement with removal of necrotic tissue on 9/8/2023.  Blood cultures positive " for Gram-positive cocci representing Staphylococcus aureus.    Patient with prior history of line associated deep vein thrombosis for which he was treated with three months of anticoagulation.      Interval History:  Patient is awake and alert this morning.  No acute events overnight.  Pain improved/controlled with medications.   Tolerating antibiotics well.      Review of Systems   Constitutional:  Negative for chills and fever.   HENT:  Negative for congestion and ear pain.    Eyes:  Negative for pain.   Respiratory:  Negative for shortness of breath.    Cardiovascular:  Negative for chest pain and palpitations.   Gastrointestinal:  Negative for abdominal pain.   Genitourinary:  Negative for difficulty urinating.   Musculoskeletal:  Negative for back pain.   Skin:  Positive for wound (Left foot wound clean and dressed).   Neurological:  Negative for dizziness and headaches.   Psychiatric/Behavioral:  Negative for agitation and behavioral problems.      Objective:     Vital Signs (Most Recent):  Temp: 98.4 °F (36.9 °C) (09/12/23 0742)  Pulse: 85 (09/12/23 0808)  Resp: 18 (09/12/23 0808)  BP: 125/71 (09/12/23 0742)  SpO2: (!) 94 % (09/12/23 0808)   Vital Signs (24h Range):  Temp:  [97.7 °F (36.5 °C)-98.9 °F (37.2 °C)] 98.4 °F (36.9 °C)  Pulse:  [76-91] 85  Resp:  [16-22] 18  SpO2:  [92 %-95 %] 94 %  BP: (115-133)/(68-77) 125/71     Weight: 117 kg (257 lb 15 oz)  Body mass index is 33.12 kg/m².    Intake/Output Summary (Last 24 hours) at 9/12/2023 0909  Last data filed at 9/12/2023 0608  Gross per 24 hour   Intake --   Output 2000 ml   Net -2000 ml        Physical Exam  Vitals reviewed.   Constitutional:       General: He is not in acute distress.     Appearance: Normal appearance. He is obese. He is ill-appearing (chronically). He is not toxic-appearing.   HENT:      Head: Normocephalic and atraumatic.      Right Ear: External ear normal.      Left Ear: External ear normal.      Nose: Nose normal.      Mouth/Throat:       Mouth: Mucous membranes are moist.      Pharynx: Oropharynx is clear.   Eyes:      General: No scleral icterus.     Conjunctiva/sclera: Conjunctivae normal.      Pupils: Pupils are equal, round, and reactive to light.   Cardiovascular:      Rate and Rhythm: Normal rate and regular rhythm.   Pulmonary:      Effort: Pulmonary effort is normal.      Breath sounds: Normal breath sounds. No wheezing or rales.   Abdominal:      General: Bowel sounds are normal.      Tenderness: There is no abdominal tenderness.   Musculoskeletal:      Cervical back: Normal range of motion and neck supple.      Comments: Left toe amp wound clean and dressed   Skin:     General: Skin is warm and dry.      Capillary Refill: Capillary refill takes less than 2 seconds.   Neurological:      General: No focal deficit present.      Mental Status: He is alert and oriented to person, place, and time.      Cranial Nerves: No cranial nerve deficit.   Psychiatric:         Mood and Affect: Mood normal.         Behavior: Behavior normal.         Significant Labs: All pertinent labs within the past 24 hours have been reviewed.  CBC:   Recent Labs   Lab 09/11/23 0416 09/12/23 0413   WBC 10.94 10.65   HGB 8.7* 8.4*   HCT 29.0* 27.6*   * 600*       CMP:   Recent Labs   Lab 09/11/23 0416 09/12/23 0413   * 134*   K 4.5 4.9   CL 96 97   CO2 28 29   * 126*   BUN 15 15   CREATININE 1.1 0.9   CALCIUM 8.7 8.3*   ANIONGAP 8 8         Significant Imaging: I have reviewed all pertinent imaging results/findings within the past 24 hours.      Assessment/Plan:      * Staphylococcus aureus bacteremia with sepsis  Patient presented to ED with complaints of worsening left great toe pain and swelling.  X-ray of left foot with acute osteomyelitis involving the great toe and distal aspects of the 2nd and 4th toes.  Labs on admission with WBC of 21.91 with left shift.  Blood cultures on admission (9/7/2023) with 4/4 bottles, 9/8/2023 with 1/2  bottles, and 9/11/2023 with 0/2 bottles positive for MSSA . Wound culture (9/8/2023 - areobic) positive for Staph aureus (sensitivities pending) and Strep dysgalactiae (sensitivities pending).  Started on IV Vanc/Meropenem on admission and changed to IV Vanc/Cefazolin on 9/10/2023.  IV Vanc discontinued on 9/11/2023. Podiatry consulted and patient underwent left 1st toe amputation on 9/8/2023 with Dr. Samuel Toussaint.    TTE on 9/8/2023 did not show evidence of endocarditis.  ID consulted and recommended PAT and if negative, 2 weeks total of IV antibiotics from last negative culture (9/11/2023).     Plan:  Follow up with Podiatry recommendations  PAT today  Follow up pending wound and blood cultures  Pain control as needed.  Wound Care consult  PT/OT consulted - SNF on discharge    Chronic combined systolic and diastolic congestive heart failure  No evidence of volume on admission with negative CXR and normal BNP.  Unclear why he is on 1L O2NC or IV Lasix.  TTE on admission with regional wall motion abnormalities are present: The septum is moderately hypokinetic. There is mildly reduced systolic function. Ejection fraction by visual approximation is 45%. Grade I diastolic dysfunction.    -Repeat CXR  -Continue IV Lasix for now, but if CXR clear will discontinue  -Continue with Incentive Spirometry  -I&O and low Na diet      Type 2 diabetes mellitus with diabetic peripheral angiopathy without gangrene, with long-term current use of insulin  Glucose stable.  Hemoglobin A1c of 7.5.    Home Meds: Metformin 1g bid, Detemir 50 units, Aspart 10 units TIDWM, Trulicity weekly.  Hospital Meds:  Detemir 20 units, Aspart 4 units TIDWM moderate correction dose SSI  -Continue with current regimen  -Diabetic Diet  -Monitor and adjust as needed      Obesity due to excess calories with serious comorbidity  Body mass index is 33.12 kg/m². Morbid obesity complicates all aspects of disease management from diagnostic modalities to  treatment. Weight loss encouraged and health benefits explained to patient.         Hypertension  Normotensive off meds.  Home Meds:  HCTZ 25mg, Lisinopril 10mg  -Continue to hold home meds  -Monitor and adjust as needed      VTE Risk Mitigation (From admission, onward)         Ordered     enoxaparin injection 40 mg  Daily         09/09/23 1108     IP VTE HIGH RISK PATIENT  Once         09/09/23 1108     Place sequential compression device  Until discontinued         09/08/23 0325                Discharge Planning   LOIS:      Code Status: Full Code   Is the patient medically ready for discharge?:     Reason for patient still in hospital (select all that apply): Patient trending condition, Treatment and Consult recommendations  Discharge Plan A: Home Health                  Zeus Nation MD  Department of Hospital Medicine   [facility] - Med Surg ([place])

## 2023-09-12 NOTE — PLAN OF CARE
POC reviewed. No significant events this shift. 1 LPM O2 NC noted. Complaints of pain treated with PRN medication per MAR. Pt voids and shifts in bed with assist. Bed low and locked, side rails up x3, call light within reach.    Problem: Adult Inpatient Plan of Care  Goal: Plan of Care Review  Outcome: Ongoing, Progressing  Goal: Patient-Specific Goal (Individualized)  Outcome: Ongoing, Progressing  Goal: Absence of Hospital-Acquired Illness or Injury  Outcome: Ongoing, Progressing  Goal: Optimal Comfort and Wellbeing  Outcome: Ongoing, Progressing  Goal: Readiness for Transition of Care  Outcome: Ongoing, Progressing     Problem: Diabetes Comorbidity  Goal: Blood Glucose Level Within Targeted Range  Outcome: Ongoing, Progressing     Problem: Infection  Goal: Absence of Infection Signs and Symptoms  Outcome: Ongoing, Progressing     Problem: Impaired Wound Healing  Goal: Optimal Wound Healing  Outcome: Ongoing, Progressing     Problem: Skin Injury Risk Increased  Goal: Skin Health and Integrity  Outcome: Ongoing, Progressing

## 2023-09-12 NOTE — ASSESSMENT & PLAN NOTE
Glucose stable.  Hemoglobin A1c of 7.5.    Home Meds: Metformin 1g bid, Detemir 50 units, Aspart 10 units TIDWM, Trulicity weekly.  Hospital Meds:  Detemir 20 units, Aspart 4 units TIDWM moderate correction dose SSI  -Continue with current regimen  -Diabetic Diet  -Monitor and adjust as needed

## 2023-09-12 NOTE — CONSULTS
Cardiology Consult  9/12/2023  9:03 AM    Attending Cardiologist: Chavo Vigil M.D.  Primary Care Provider: Reyna Jorge NP  Chief Complaint/Reason For Consultation:  PAT      Problem list  Patient Active Problem List   Diagnosis    Diabetic wet gangrene of the foot    Type 2 diabetes mellitus with diabetic peripheral angiopathy without gangrene, with long-term current use of insulin    Hypertension    Depression    Class 1 obesity due to excess calories with serious comorbidity and body mass index (BMI) of 34.0 to 34.9 in adult    Open wound of right foot    Type II diabetes mellitus with peripheral circulatory disorder, uncontrolled    Amputation of right great toe    Skin flap infection    MRSA (methicillin resistant Staphylococcus aureus) infection    Ulcer of both feet with fat layer exposed    Open wound of right great toe    Open wound of toe    COVID-19    COPD with asthma    Acute on chronic combined systolic and diastolic congestive heart failure    Open wound of left foot    Cavitary lesion of lung    Increased nutritional needs    Osteomyelitis of right foot    Slow transit constipation    Ulcerated, foot, left, with fat layer exposed    Acute deep vein thrombosis (DVT) of axillary vein of right upper extremity    Diabetic ulcer of left great toe    Epistaxis    Staphylococcus aureus bacteremia with sepsis       CC:  Toe pain    HPI:  Dave Good is a 58 y.o.year-old male with PMH of DM , HTN , PE and COPD who was admitted on 9/8/23 with left great toe pain and infection and found to have osteomyelitis.  Consulted for PAT.  He was seen last year and attempted PAT was unsuccessful as PAT probe was not able to be passed.  Patient does not want to have PAT this time.      Medications  Current Facility-Administered Medications   Medication Dose Route Frequency Provider Last Rate Last Admin    0.9%  NaCl infusion   Intravenous PRN Hari Iverson MD   Stopped at 09/09/23 8607     acetaminophen tablet 1,000 mg  1,000 mg Oral Q8H PRN Hari Iverson MD        albuterol-ipratropium 2.5 mg-0.5 mg/3 mL nebulizer solution 3 mL  3 mL Nebulization Q4H Hari Iverson MD   3 mL at 09/12/23 0808    aspirin EC tablet 81 mg  81 mg Oral Daily Larry Shepherd NP   81 mg at 09/12/23 0835    atorvastatin tablet 40 mg  40 mg Oral Daily Larry Shepherd NP   40 mg at 09/12/23 0835    dextrose 10% bolus 125 mL 125 mL  12.5 g Intravenous PRN Larry Shepherd NP        dextrose 10% bolus 250 mL 250 mL  25 g Intravenous PRN Larry Shepherd NP        enoxaparin injection 40 mg  40 mg Subcutaneous Daily Hari Iverson MD   40 mg at 09/11/23 1827    furosemide injection 40 mg  40 mg Intravenous Daily Hari Iverson MD   40 mg at 09/12/23 0853    gabapentin capsule 300 mg  300 mg Oral BID Larry Shepherd NP   300 mg at 09/12/23 0835    glucagon (human recombinant) injection 1 mg  1 mg Intramuscular PRN Larry Shepherd NP        HYDROmorphone injection 0.5 mg  0.5 mg Intravenous Q4H PRN Hari Iverson MD   0.5 mg at 09/11/23 0221    insulin aspart U-100 pen 0-10 Units  0-10 Units Subcutaneous Q6H PRN Larry Shepherd NP   2 Units at 09/12/23 0834    insulin aspart U-100 pen 4 Units  4 Units Subcutaneous TIDWM Hari Iverson MD   4 Units at 09/12/23 0834    insulin detemir U-100 (Levemir) pen 20 Units  20 Units Subcutaneous QHS Hari Iverson MD   20 Units at 09/11/23 2118    mupirocin 2 % ointment   Nasal BID Hari Iverson MD   Given at 09/12/23 0835    naloxone 0.4 mg/mL injection 0.02 mg  0.02 mg Intravenous PRN Larry Shepherd, KEVYN        ondansetron injection 4 mg  4 mg Intravenous Q8H PRN Larry Shepherd, NP        oxyCODONE immediate release tablet 5 mg  5 mg Oral Q4H PRN Hari Iverson MD   5 mg at 09/12/23 0551    QUEtiapine tablet 200 mg  200 mg Oral Nightly Larry Shepherd, NP   200 mg at 09/11/23 2054    sodium chloride 0.9% flush  10 mL  10 mL Intravenous Q8H PRN Larry Shepherd NP        sodium chloride 0.9% flush 10 mL  10 mL Intravenous Q6H Hari Iverson MD   10 mL at 23 0606    And    sodium chloride 0.9% flush 10 mL  10 mL Intravenous PRN Hari Iverson MD         Facility-Administered Medications Ordered in Other Encounters   Medication Dose Route Frequency Provider Last Rate Last Admin    0.9%  NaCl infusion   Intravenous Continuous Jesus Flores Jr., MD        LIDOcaine (PF) 10 mg/ml (1%) injection 10 mg  1 mL Intradermal Once Jesus Flores Jr., MD          Prior to Admission medications    Medication Sig Start Date End Date Taking? Authorizing Provider   ammonium lactate 12 % Crea Apply twice daily to affected parts both feet as needed. 3/8/23  Yes Samuel Toussaint DPM   apixaban (ELIQUIS) 5 mg Tab Take 1 tablet (5 mg total) by mouth 2 (two) times daily. 1/3/23  Yes Odilia Schmitt NP   aspirin (ECOTRIN) 81 MG EC tablet Take 1 tablet (81 mg total) by mouth once daily. 1/3/23  Yes Odilia Schmitt NP   atorvastatin (LIPITOR) 40 MG tablet Take 1 tablet (40 mg total) by mouth once daily. 1/3/23 1/3/24 Yes Odilia Schmitt NP   bisacodyL (DULCOLAX) 5 mg EC tablet Take 10 mg by mouth once. 22  Yes Provider, Historical   gabapentin (NEURONTIN) 600 MG tablet 1 capsule. 23  Yes Provider, Historical   HUMALOG U-100 INSULIN 100 unit/mL injection SMARTSI.01 Milliliter(s) SUB-Q 4 Times Daily 22  Yes Provider, Historical   hydroCHLOROthiazide (HYDRODIURIL) 25 MG tablet Take 1 tablet (25 mg total) by mouth once daily. 1/3/23  Yes Odilia Schmitt NP   HYDROcodone-acetaminophen (NORCO) 5-325 mg per tablet Take 1 tablet by mouth every 4 (four) hours as needed for Pain. 1/3/23  Yes Odilia Schmitt NP   insulin detemir U-100 (LEVEMIR FLEXTOUCH U-100 INSULN) 100 unit/mL (3 mL) InPn pen Inject 50 Units into the skin every evening. 1/3/23  Yes Odilia Schmitt, KEVYN   lisinopriL 10 MG tablet Take 1 tablet (10  "mg total) by mouth once daily. 1/3/23  Yes Odilia Schmitt NP   metFORMIN (GLUCOPHAGE) 1000 MG tablet Take 1 tablet (1,000 mg total) by mouth 2 (two) times daily. 1/3/23  Yes Odilia Schmitt NP   CATHFLO ACTIVASE 2 mg injection SMARTSI Vial(s) injection Once 12/15/22   Provider, Historical   ciclopirox (PENLAC) 8 % Soln Apply topically nightly. 3/8/23   Samuel Toussaint, DPWU   COVID-19 antigen test (FLOWFLEX COVID-19 AG HOME TEST MISC)  22   Provider, Historical   dextrose 50 % in water, D50W, Syrg SMARTSI Milliliter(s) IV As Directed PRN 22   Provider, Historical   EASY COMFORT PEN NEEDLES 31 gauge x 1/4" Ndle  23   Provider, Historical   enoxaparin (LOVENOX) 60 mg/0.6 mL Syrg SMARTSI.2 Milliliter(s) SUB-Q Every 12 Hours 22   Provider, Historical   GLUCAGEN DIAGNOSTIC KIT 1 mg/mL SolR SMARTSI Milliliter(s) IM As Directed PRN 22   Provider, Historical   GLUTOSE-15 40 % gel SMARTSI.5 Gram(s) By Mouth As Directed PRN 22   Provider, Historical   insulin aspart U-100 (NOVOLOG FLEXPEN U-100 INSULIN) 100 unit/mL (3 mL) InPn pen INJECT 10 UNITS INTO THE SKIN BEFORE MEALS THREE TIMES A DAY (50 DAYS) 10/27/22   Saeed Vigil MD   ketoconazole (NIZORAL) 2 % cream Apply topically. 22   Provider, Historical   LANTUS SOLOSTAR U-100 INSULIN glargine 100 units/mL SubQ pen Inject into the skin. 22   Provider, Historical   ondansetron 4 mg/2 mL Soln SMARTSI Milliliter(s) IV Every 8 Hours PRN 22   Provider, Historical   ONETOUCH ULTRA TEST Strp  22   Provider, Historical   polyethylene glycol (GLYCOLAX) 17 gram/dose powder  22   Provider, Historical   QUEtiapine (SEROQUEL) 200 MG Tab Take 1 tablet (200 mg total) by mouth nightly. 1/3/23   Odilia Schmitt, NP   STOOL SOFTENER 100 mg capsule Take 100 mg by mouth 2 (two) times daily. 12/10/22   Provider, Historical   TRULICITY 0.75 mg/0.5 mL pen injector Inject into the skin. 22   Provider, " Historical   urea 45 % Crea Apply 1 application  topically 2 (two) times daily. 7/6/23   Samuel Toussaint DPM   losartan (COZAAR) 25 MG tablet Take 25 mg by mouth 2 (two) times daily. 8/23/21 7/15/22  Provider, Historical         History  Past Medical History:   Diagnosis Date    Arthritis     COPD (chronic obstructive pulmonary disease)     COVID-19     Depression     Diabetes mellitus     Diabetes mellitus, type 2     Hypertension      Past Surgical History:   Procedure Laterality Date    DEBRIDEMENT OF LOWER EXTREMITY Bilateral 3/2/2022    Procedure: DEBRIDEMENT, LOWER EXTREMITY;  Surgeon: Jesus Flores Jr., MD;  Location: [facility];  Service: General;  Laterality: Bilateral;    FOOT AMPUTATION THROUGH METATARSAL Right 9/23/2021    Procedure: AMPUTATION, FOOT, TRANSMETATARSAL right 1st ray resection;  Surgeon: Samuel Toussaint DPM;  Location: [facility];  Service: Podiatry;  Laterality: Right;    INCISION AND DRAINAGE FOOT Bilateral 9/21/2021    Procedure: INCISION AND DRAINAGE, FOOT - Bilateral;  Surgeon: Lavonne Vigil DPM;  Location: [facility];  Service: Podiatry;  Laterality: Bilateral;    REPLACEMENT OF WOUND DRESSING Right 2/24/2022    Procedure: REPLACEMENT, DRESSING, WOUND;  Surgeon: Jesus Flores Jr., MD;  Location: [facility];  Service: Vascular;  Laterality: Right;  wound vac dressing changed    TOE AMPUTATION Left 9/8/2023    Procedure: AMPUTATION, TOE;  Surgeon: Samuel Toussaint DPM;  Location: [facility];  Service: Podiatry;  Laterality: Left;    WOUND DEBRIDEMENT Right 2/22/2022    Procedure: DEBRIDEMENT, WOUND - RIGHT FOOT;  Surgeon: Jesus Flores Jr., MD;  Location: [facility];  Service: Vascular;  Laterality: Right;    WOUND DEBRIDEMENT Bilateral 2/24/2022    Procedure: DEBRIDEMENT, WOUND / BILATERAL DEBRIDEMENT FEET;  Surgeon: Jesus Flores Jr., MD;  Location: [facility];  Service: Vascular;  Laterality: Bilateral;    WOUND DEBRIDEMENT Right 5/27/2022    Procedure: DEBRIDEMENT, WOUND;  Surgeon: Jesus Flores Jr.  MD;  Location: [facility];  Service: General;  Laterality: Right;     Social History     Socioeconomic History    Marital status: Single   Tobacco Use    Smoking status: Former    Smokeless tobacco: Never   Substance and Sexual Activity    Alcohol use: Not Currently     Comment: pt reports he drinks 1/2 a pint of whiskey every day    Drug use: Yes     Types: Marijuana    Sexual activity: Yes     Partners: Female     Social Determinants of Health     Financial Resource Strain: Low Risk  (6/16/2022)    Overall Financial Resource Strain (CARDIA)     Difficulty of Paying Living Expenses: Not very hard   Food Insecurity: Food Insecurity Present (6/16/2022)    Hunger Vital Sign     Worried About Running Out of Food in the Last Year: Often true     Ran Out of Food in the Last Year: Often true   Transportation Needs: No Transportation Needs (6/16/2022)    PRAPARE - Transportation     Lack of Transportation (Medical): No     Lack of Transportation (Non-Medical): No   Physical Activity: Inactive (6/16/2022)    Exercise Vital Sign     Days of Exercise per Week: 0 days     Minutes of Exercise per Session: 0 min   Stress: No Stress Concern Present (6/16/2022)    [employer] - Occupational Stress Questionnaire     Feeling of Stress : Not at all   Social Connections: Moderately Integrated (6/16/2022)    Social Connection and Isolation Panel [NHANES]     Frequency of Communication with Friends and Family: Twice a week     Frequency of Social Gatherings with Friends and Family: Twice a week     Attends [employer] Services: 1 to 4 times per year     Active Member of Clubs or Organizations: No     Attends Club or Organization Meetings: Never     Marital Status: Living with partner   Housing Stability: Unknown (6/16/2022)    Housing Stability Vital Sign     Unable to Pay for Housing in the Last Year: No     Unstable Housing in the Last Year: No         Allergies  Review of patient's allergies indicates:  "  Allergen Reactions    Ibuprofen Swelling     Facial swelling         Review of Systems   Review of Systems   Cardiovascular: Negative.    Respiratory: Negative.           Physical Exam  Wt Readings from Last 1 Encounters:   09/08/23 117 kg (257 lb 15 oz)     BP Readings from Last 3 Encounters:   09/12/23 125/71   08/27/23 122/74   08/02/23 (!) 146/87     Pulse Readings from Last 1 Encounters:   09/12/23 85     Body mass index is 33.12 kg/m².    Physical Exam  Cardiovascular:      Rate and Rhythm: Normal rate and regular rhythm.      Heart sounds: Normal heart sounds.   Pulmonary:      Breath sounds: Normal breath sounds and air entry.   Musculoskeletal:      Right lower leg: No edema.      Left lower leg: No edema.   Neurological:      Mental Status: He is alert.           Laboratory:  Trended Lab Data:  Recent Labs   Lab 09/10/23  0221 09/11/23  0416 09/12/23  0413   WBC 12.84* 10.94 10.65   HGB 9.0* 8.7* 8.4*   HCT 29.7* 29.0* 27.6*   * 584* 600*       Recent Labs   Lab 09/10/23  0221 09/11/23  0416 09/12/23  0413   * 132* 134*   K 4.2 4.5 4.9   CL 96 96 97   CO2 28 28 29   BUN 16 15 15   * 167* 126*   CALCIUM 8.4* 8.7 8.3*   MG 1.7 1.7 1.7   PHOS 2.7 2.9 3.2       Recent Labs   Lab 09/07/23  2343 09/08/23  0401   PROT 9.0* 8.7*   ALBUMIN 1.3* 1.2*   BILITOT 0.3 0.4   AST 25 24   ALT 17 15   ALKPHOS 82 78       No results for input(s): "PROTIME", "PTT", "INR" in the last 168 hours.    Cardiac:   Recent Labs   Lab 09/08/23 0401   BNP 22       FLP:   Lab Results   Component Value Date    CHOL 123 12/10/2021    HDL 42 12/10/2021    LDLCALC 64.6 12/10/2021    TRIG 82 12/10/2021    CHOLHDL 34.1 12/10/2021     DM:   Lab Results   Component Value Date    HGBA1C 7.5 (H) 09/08/2023    HGBA1C 9.1 (H) 11/22/2022    HGBA1C 7.8 (H) 05/27/2022    LDLCALC 64.6 12/10/2021    CREATININE 0.9 09/12/2023     Thyroid: No results found for: "TSH", "FREET4", "U0IZHTR", "H6NROUL", "THYROIDAB"  Anemia:   Lab Results "   Component Value Date    FERRITIN 1,033 (H) 01/09/2022     Urinalysis:   Lab Results   Component Value Date    COLORU Yellow 01/08/2022    SPECGRAV >=1.030 (A) 01/08/2022    NITRITE Negative 01/08/2022    KETONESU Trace (A) 01/08/2022    UROBILINOGEN Negative 01/08/2022     @    Other Results:  EKG (my interpretation):    TELEMETRY:      Echo:   Results for orders placed or performed during the hospital encounter of 09/07/23   Echo   Result Value Ref Range    BSA 2.47 m2    LVOT stroke volume 76.86 cm3    LVIDd 5.53 3.5 - 6.0 cm    LV Systolic Volume 53.57 mL    LV Systolic Volume Index 22.1 mL/m2    LVIDs 3.58 2.1 - 4.0 cm    LV Diastolic Volume 149.31 mL    LV Diastolic Volume Index 61.70 mL/m2    IVS 0.85 0.6 - 1.1 cm    LVOT diameter 2.27 cm    LVOT area 4.0 cm2    FS 35 28 - 44 %    Left Ventricle Relative Wall Thickness 0.32 cm    Posterior Wall 0.89 0.6 - 1.1 cm    LV mass 179.59 g    LV Mass Index 74 g/m2    MV Peak E Ap 0.74 m/s    TDI LATERAL 0.04 m/s    TDI SEPTAL 0.09 m/s    E/E' ratio 11.38 m/s    MV Peak A Ap 0.86 m/s    TR Max Ap 2.18 m/s    E/A ratio 0.86     IVRT 68.51 msec    E wave deceleration time 174.31 msec    LV SEPTAL E/E' RATIO 8.22 m/s    LV LATERAL E/E' RATIO 18.50 m/s    PV Peak S Ap 0.41 m/s    PV Peak D Ap 0.23 m/s    Pulm vein S/D ratio 1.78     LVOT peak ap 1.06 m/s    Left Ventricular Outflow Tract Mean Velocity 0.80 cm/s    Left Ventricular Outflow Tract Mean Gradient 2.89 mmHg    LA size 3.23 cm    Left Atrium Major Axis 5.34 cm    RA Major Axis 4.72 cm    AV mean gradient 5 mmHg    AV peak gradient 7 mmHg    Ao peak ap 1.34 m/s    Ao VTI 23.60 cm    LVOT peak VTI 19.00 cm    AV valve area 3.26 cm²    AV Velocity Ratio 0.79     AV index (prosthetic) 0.81     RIVER by Velocity Ratio 3.20 cm²    MV stenosis pressure 1/2 time 50.55 ms    MV valve area p 1/2 method 4.35 cm2    Triscuspid Valve Regurgitation Peak Gradient 19 mmHg    PV PEAK VELOCITY 1.01 m/s    PV peak gradient 4  mmHg    IVC diameter 1.37 cm    Mean e' 0.07 m/s    ZLVIDS -5.12     ZLVIDD -7.27     LA Volume Index 19.4 mL/m2    LA volume 46.93 cm3    Left Atrium Minor Axis 4.5 cm    LA WIDTH 3.5 cm    EF 45 %    Narrative      Left Ventricle: The left ventricle is normal in size. Normal wall   thickness. Regional wall motion abnormalities are present: The septum is   moderately hypokinetic. There is mildly reduced systolic function.   Ejection fraction by visual approximation is 45%. Grade I diastolic   dysfunction.    Right Ventricle: Normal right ventricular cavity size. Wall thickness   is normal. Right ventricle wall motion has global hypokinesis. Systolic   function is mildly reduced.         Radiology:  X-Ray Foot Complete Left    Result Date: 9/9/2023  EXAMINATION: XR FOOT COMPLETE 3 VIEW LEFT CLINICAL HISTORY: post op;.  Other acute osteomyelitis, unspecified ankle and foot TECHNIQUE: AP, lateral and oblique views of the left foot were performed. COMPARISON: 09/07/2023 FINDINGS: Postoperative changes of great toe disarticulation amputation at the MTP joint.  Osseous stump appears unremarkable.  Note made of soft tissue swelling and soft tissue gas at the operative site.  Lisfranc articulation is congruent.  Degenerative changes are seen in the midfoot.  There is a prominent plantar calcaneal spur.  Generalized soft tissue swelling about the ankle and foot noted.     As above. Electronically signed by: Loi Bull MD Date:    09/09/2023 Time:    08:16    Echo    Result Date: 9/8/2023    Left Ventricle: The left ventricle is normal in size. Normal wall thickness. Regional wall motion abnormalities are present: The septum is moderately hypokinetic. There is mildly reduced systolic function. Ejection fraction by visual approximation is 45%. Grade I diastolic dysfunction.   Right Ventricle: Normal right ventricular cavity size. Wall thickness is normal. Right ventricle wall motion has global hypokinesis. Systolic  function is mildly reduced.     X-Ray Chest 1 View    Result Date: 2023  EXAMINATION: XR CHEST 1 VIEW CLINICAL HISTORY: Mild hypoxia; TECHNIQUE: Single frontal view of the chest was performed. COMPARISON: Chest radiograph performed 2022 FINDINGS: Enlargement the cardiac silhouette and prominence and indistinctness central pulmonary vasculature. Interstitial alveolar opacities could relate to edema, infection or noninfectious inflammatory etiology could be considered. Small layering pleural effusions are not excluded. No definite pneumothorax. No acute findings in the visualized abdomen. Osseous and soft tissue structures appear without definite acute abnormality.     As above. Electronically signed by: Mario Manning Date:    2023 Time:    14:45    X-Ray Toe 2 or More Views Left    Result Date: 2023  EXAMINATION: XR TOE 2 OR MORE VIEWS LEFT CLINICAL HISTORY: injury; TECHNIQUE: Three views of the left toes were performed COMPARISON: 2023. FINDINGS: Abnormal osseous destructive changes are seen involving the great toe distal phalanx consistent with acute osteomyelitis.  Pronounced soft tissue swelling and small amount of soft tissue air are seen involving the great toe.  Additional osseous erosive change is seen at the distal aspects of the 2nd and 4th toes likely reflecting acute osteomyelitis.     Acute osteomyelitis involving the great toe and distal aspects of the 2nd and 4th toes. Electronically signed by: Juancarlos Sanchez MD Date:    2023 Time:    23:12    US Lower Extremity Veins Left    Result Date: 2023                      [facility]                Department of Cardiology  [address] [place] [id number] [phone] Name:               JERRELL SNOWDEN      Sex: M              Study Date: 2023 MR#:                [id number]         : 1965       Study Time: 11:01:09                                                            AM Account#                                     Accession#     [id number] Ordering Physician: [id number] GERI BENNETT                 Room Location: OP Indications: left leg swelling Study Details: Left lower extremity venous duplex exam (CPT 88107-PM) with Doppler waveform analysis, response to compression and other maneuvers were performed. The common femoral, superficial femoral, profunda, greater saphenous, popliteal and posterior tibial veins were evaluated unless otherwise noted. Study Quality: Study quality is adequate. FINDINGS: Left Lower Extremity: Color duplex evaluation of the left lower extremity shows no evidence of deep vein thrombosis. All evaluated veins are normally compressible with normal phasicity and augmentation. Summary:  1. No evidence of deep vein thrombosis in the left lower extremity. Interpreting Physician: Pool Villanueva MD Date/Time Verified:9/5/2023 at 3:10:07 PM cc: cc: ** Final **    X-Ray Toe 2 or More Views Left    Result Date: 8/27/2023  EXAMINATION: XR TOE 2 OR MORE VIEWS LEFT CLINICAL HISTORY: toe inj; TECHNIQUE: Three views of the left toes were performed COMPARISON: 06/28/2023 FINDINGS: Severe erosion of the distal phalanx of the 1st toe concerning for osteomyelitis.  The interphalangeal joint is not definitely involved.  The remainder of the visualized osseous structures and soft tissues appear normal.     Severe osseous erosion distal phalanx of the 1st toe concerning for osteomyelitis Electronically signed by: Aline Bacon MD Date:    08/27/2023 Time:    09:22        Current Medications:     Infusions:       Scheduled:   albuterol-ipratropium  3 mL Nebulization Q4H    aspirin  81 mg Oral Daily    atorvastatin  40 mg Oral Daily    enoxparin  40 mg Subcutaneous Daily    furosemide (LASIX) injection  40 mg Intravenous Daily    gabapentin  300 mg Oral BID    insulin aspart U-100  4 Units Subcutaneous TIDWM    insulin detemir U-100 (Levemir)  20 Units Subcutaneous QHS     mupirocin   Nasal BID    QUEtiapine  200 mg Oral Nightly    sodium chloride 0.9%  10 mL Intravenous Q6H        PRN:  sodium chloride 0.9%, acetaminophen, dextrose 10%, dextrose 10%, glucagon (human recombinant), HYDROmorphone, insulin aspart U-100, naloxone, ondansetron, oxyCODONE, sodium chloride 0.9%, Flushing PICC/Midline Protocol **AND** sodium chloride 0.9% **AND** sodium chloride 0.9%      Assessment and Plan:  Staphylococcus aureus bacteremia  -attempted PAT in 2022 and PAT probe was not able to be passed.  Discussed this with patient who remembers and does not want PAT.  Defer antibiotic duration to ID team.    Please call if needed.  Thank you.    Thank you for allowing me to participate in the care of Dave MARIPOSA McneillFlorentino.      Chavo Vigil MD, F.A.C.C, F.S.C.A.I.    Disclaimer: This document was created using voice recognition software (M*Modal Fluency Direct). Although it may be edited, this document may contain errors related to incorrect recognition of the spoken word. Please call the physician if clarification is needed.

## 2023-09-12 NOTE — ASSESSMENT & PLAN NOTE
No evidence of volume on admission with negative CXR and normal BNP.  Unclear why he is on 1L O2NC or IV Lasix.  TTE on admission with regional wall motion abnormalities are present: The septum is moderately hypokinetic. There is mildly reduced systolic function. Ejection fraction by visual approximation is 45%. Grade I diastolic dysfunction.    -Repeat CXR  -Continue IV Lasix for now, but if CXR clear will discontinue  -Continue with Incentive Spirometry  -I&O and low Na diet

## 2023-09-12 NOTE — ASSESSMENT & PLAN NOTE
Body mass index is 33.12 kg/m². Morbid obesity complicates all aspects of disease management from diagnostic modalities to treatment. Weight loss encouraged and health benefits explained to patient.

## 2023-09-12 NOTE — SUBJECTIVE & OBJECTIVE
Interval History:  Patient is awake and alert this morning.  No acute events overnight.  Pain improved/controlled with medications.   Tolerating antibiotics well.      Review of Systems   Constitutional:  Negative for chills and fever.   HENT:  Negative for congestion and ear pain.    Eyes:  Negative for pain.   Respiratory:  Negative for shortness of breath.    Cardiovascular:  Negative for chest pain and palpitations.   Gastrointestinal:  Negative for abdominal pain.   Genitourinary:  Negative for difficulty urinating.   Musculoskeletal:  Negative for back pain.   Skin:  Positive for wound (Left foot wound clean and dressed).   Neurological:  Negative for dizziness and headaches.   Psychiatric/Behavioral:  Negative for agitation and behavioral problems.      Objective:     Vital Signs (Most Recent):  Temp: 98.4 °F (36.9 °C) (09/12/23 0742)  Pulse: 85 (09/12/23 0808)  Resp: 18 (09/12/23 0808)  BP: 125/71 (09/12/23 0742)  SpO2: (!) 94 % (09/12/23 0808)   Vital Signs (24h Range):  Temp:  [97.7 °F (36.5 °C)-98.9 °F (37.2 °C)] 98.4 °F (36.9 °C)  Pulse:  [76-91] 85  Resp:  [16-22] 18  SpO2:  [92 %-95 %] 94 %  BP: (115-133)/(68-77) 125/71     Weight: 117 kg (257 lb 15 oz)  Body mass index is 33.12 kg/m².    Intake/Output Summary (Last 24 hours) at 9/12/2023 0909  Last data filed at 9/12/2023 0608  Gross per 24 hour   Intake --   Output 2000 ml   Net -2000 ml        Physical Exam  Vitals reviewed.   Constitutional:       General: He is not in acute distress.     Appearance: Normal appearance. He is obese. He is ill-appearing (chronically). He is not toxic-appearing.   HENT:      Head: Normocephalic and atraumatic.      Right Ear: External ear normal.      Left Ear: External ear normal.      Nose: Nose normal.      Mouth/Throat:      Mouth: Mucous membranes are moist.      Pharynx: Oropharynx is clear.   Eyes:      General: No scleral icterus.     Conjunctiva/sclera: Conjunctivae normal.      Pupils: Pupils are equal,  round, and reactive to light.   Cardiovascular:      Rate and Rhythm: Normal rate and regular rhythm.   Pulmonary:      Effort: Pulmonary effort is normal.      Breath sounds: Normal breath sounds. No wheezing or rales.   Abdominal:      General: Bowel sounds are normal.      Tenderness: There is no abdominal tenderness.   Musculoskeletal:      Cervical back: Normal range of motion and neck supple.      Comments: Left toe amp wound clean and dressed   Skin:     General: Skin is warm and dry.      Capillary Refill: Capillary refill takes less than 2 seconds.   Neurological:      General: No focal deficit present.      Mental Status: He is alert and oriented to person, place, and time.      Cranial Nerves: No cranial nerve deficit.   Psychiatric:         Mood and Affect: Mood normal.         Behavior: Behavior normal.         Significant Labs: All pertinent labs within the past 24 hours have been reviewed.  CBC:   Recent Labs   Lab 09/11/23 0416 09/12/23 0413   WBC 10.94 10.65   HGB 8.7* 8.4*   HCT 29.0* 27.6*   * 600*       CMP:   Recent Labs   Lab 09/11/23 0416 09/12/23 0413   * 134*   K 4.5 4.9   CL 96 97   CO2 28 29   * 126*   BUN 15 15   CREATININE 1.1 0.9   CALCIUM 8.7 8.3*   ANIONGAP 8 8         Significant Imaging: I have reviewed all pertinent imaging results/findings within the past 24 hours.

## 2023-09-13 PROBLEM — Z89.419: Status: ACTIVE | Noted: 2023-09-13

## 2023-09-13 LAB
ANION GAP SERPL CALC-SCNC: 7 MMOL/L (ref 8–16)
BASOPHILS # BLD AUTO: 0.05 K/UL (ref 0–0.2)
BASOPHILS NFR BLD: 0.5 % (ref 0–1.9)
BUN SERPL-MCNC: 15 MG/DL (ref 6–20)
CALCIUM SERPL-MCNC: 8.5 MG/DL (ref 8.7–10.5)
CHLORIDE SERPL-SCNC: 98 MMOL/L (ref 95–110)
CO2 SERPL-SCNC: 29 MMOL/L (ref 23–29)
CREAT SERPL-MCNC: 1 MG/DL (ref 0.5–1.4)
DIFFERENTIAL METHOD: ABNORMAL
EOSINOPHIL # BLD AUTO: 0.6 K/UL (ref 0–0.5)
EOSINOPHIL NFR BLD: 6 % (ref 0–8)
ERYTHROCYTE [DISTWIDTH] IN BLOOD BY AUTOMATED COUNT: 16.2 % (ref 11.5–14.5)
EST. GFR  (NO RACE VARIABLE): >60 ML/MIN/1.73 M^2
GLUCOSE SERPL-MCNC: 140 MG/DL (ref 70–110)
HCT VFR BLD AUTO: 28.9 % (ref 40–54)
HGB BLD-MCNC: 8.7 G/DL (ref 14–18)
IMM GRANULOCYTES # BLD AUTO: 0.06 K/UL (ref 0–0.04)
IMM GRANULOCYTES NFR BLD AUTO: 0.6 % (ref 0–0.5)
LYMPHOCYTES # BLD AUTO: 2.4 K/UL (ref 1–4.8)
LYMPHOCYTES NFR BLD: 24.1 % (ref 18–48)
MAGNESIUM SERPL-MCNC: 1.7 MG/DL (ref 1.6–2.6)
MCH RBC QN AUTO: 24.5 PG (ref 27–31)
MCHC RBC AUTO-ENTMCNC: 30.1 G/DL (ref 32–36)
MCV RBC AUTO: 81 FL (ref 82–98)
MONOCYTES # BLD AUTO: 1 K/UL (ref 0.3–1)
MONOCYTES NFR BLD: 9.8 % (ref 4–15)
NEUTROPHILS # BLD AUTO: 5.9 K/UL (ref 1.8–7.7)
NEUTROPHILS NFR BLD: 59 % (ref 38–73)
NRBC BLD-RTO: 0 /100 WBC
PHOSPHATE SERPL-MCNC: 3.4 MG/DL (ref 2.7–4.5)
PLATELET # BLD AUTO: 627 K/UL (ref 150–450)
PMV BLD AUTO: 8.9 FL (ref 9.2–12.9)
POCT GLUCOSE: 150 MG/DL (ref 70–110)
POCT GLUCOSE: 164 MG/DL (ref 70–110)
POCT GLUCOSE: 170 MG/DL (ref 70–110)
POCT GLUCOSE: 189 MG/DL (ref 70–110)
POTASSIUM SERPL-SCNC: 5 MMOL/L (ref 3.5–5.1)
RBC # BLD AUTO: 3.55 M/UL (ref 4.6–6.2)
SODIUM SERPL-SCNC: 134 MMOL/L (ref 136–145)
WBC # BLD AUTO: 10.05 K/UL (ref 3.9–12.7)

## 2023-09-13 PROCEDURE — 25000003 PHARM REV CODE 250: Performed by: HOSPITALIST

## 2023-09-13 PROCEDURE — 25000003 PHARM REV CODE 250: Performed by: NURSE PRACTITIONER

## 2023-09-13 PROCEDURE — 97116 GAIT TRAINING THERAPY: CPT | Mod: CQ

## 2023-09-13 PROCEDURE — 63600175 PHARM REV CODE 636 W HCPCS: Performed by: INTERNAL MEDICINE

## 2023-09-13 PROCEDURE — 36415 COLL VENOUS BLD VENIPUNCTURE: CPT | Performed by: HOSPITALIST

## 2023-09-13 PROCEDURE — 11000001 HC ACUTE MED/SURG PRIVATE ROOM

## 2023-09-13 PROCEDURE — 84100 ASSAY OF PHOSPHORUS: CPT | Performed by: HOSPITALIST

## 2023-09-13 PROCEDURE — A4216 STERILE WATER/SALINE, 10 ML: HCPCS | Performed by: HOSPITALIST

## 2023-09-13 PROCEDURE — 83735 ASSAY OF MAGNESIUM: CPT | Performed by: HOSPITALIST

## 2023-09-13 PROCEDURE — 27000221 HC OXYGEN, UP TO 24 HOURS

## 2023-09-13 PROCEDURE — 94761 N-INVAS EAR/PLS OXIMETRY MLT: CPT

## 2023-09-13 PROCEDURE — 99233 PR SUBSEQUENT HOSPITAL CARE,LEVL III: ICD-10-PCS | Mod: ,,, | Performed by: INTERNAL MEDICINE

## 2023-09-13 PROCEDURE — 94640 AIRWAY INHALATION TREATMENT: CPT

## 2023-09-13 PROCEDURE — 25000242 PHARM REV CODE 250 ALT 637 W/ HCPCS: Performed by: HOSPITALIST

## 2023-09-13 PROCEDURE — 25000003 PHARM REV CODE 250: Performed by: INTERNAL MEDICINE

## 2023-09-13 PROCEDURE — 97535 SELF CARE MNGMENT TRAINING: CPT | Mod: CO

## 2023-09-13 PROCEDURE — 99233 SBSQ HOSP IP/OBS HIGH 50: CPT | Mod: ,,, | Performed by: INTERNAL MEDICINE

## 2023-09-13 PROCEDURE — 80048 BASIC METABOLIC PNL TOTAL CA: CPT | Performed by: HOSPITALIST

## 2023-09-13 PROCEDURE — 63600175 PHARM REV CODE 636 W HCPCS: Performed by: HOSPITALIST

## 2023-09-13 PROCEDURE — 85025 COMPLETE CBC W/AUTO DIFF WBC: CPT | Performed by: HOSPITALIST

## 2023-09-13 PROCEDURE — 99900035 HC TECH TIME PER 15 MIN (STAT)

## 2023-09-13 RX ORDER — INSULIN ASPART 100 [IU]/ML
4 INJECTION, SOLUTION INTRAVENOUS; SUBCUTANEOUS
Status: DISCONTINUED | OUTPATIENT
Start: 2023-09-13 | End: 2023-09-20 | Stop reason: HOSPADM

## 2023-09-13 RX ORDER — LISINOPRIL 10 MG/1
10 TABLET ORAL DAILY
Status: DISCONTINUED | OUTPATIENT
Start: 2023-09-14 | End: 2023-09-20 | Stop reason: HOSPADM

## 2023-09-13 RX ORDER — INSULIN ASPART 100 [IU]/ML
0-10 INJECTION, SOLUTION INTRAVENOUS; SUBCUTANEOUS
Status: DISCONTINUED | OUTPATIENT
Start: 2023-09-13 | End: 2023-09-20 | Stop reason: HOSPADM

## 2023-09-13 RX ORDER — INSULIN ASPART 100 [IU]/ML
4 INJECTION, SOLUTION INTRAVENOUS; SUBCUTANEOUS
Status: DISCONTINUED | OUTPATIENT
Start: 2023-09-13 | End: 2023-09-13

## 2023-09-13 RX ADMIN — INSULIN ASPART 4 UNITS: 100 INJECTION, SOLUTION INTRAVENOUS; SUBCUTANEOUS at 11:09

## 2023-09-13 RX ADMIN — GABAPENTIN 300 MG: 300 CAPSULE ORAL at 08:09

## 2023-09-13 RX ADMIN — QUETIAPINE FUMARATE 200 MG: 200 TABLET ORAL at 08:09

## 2023-09-13 RX ADMIN — OXYCODONE HYDROCHLORIDE 5 MG: 5 TABLET ORAL at 07:09

## 2023-09-13 RX ADMIN — INSULIN ASPART 2 UNITS: 100 INJECTION, SOLUTION INTRAVENOUS; SUBCUTANEOUS at 08:09

## 2023-09-13 RX ADMIN — OXYCODONE HYDROCHLORIDE 5 MG: 5 TABLET ORAL at 08:09

## 2023-09-13 RX ADMIN — ASPIRIN 81 MG: 81 TABLET, COATED ORAL at 08:09

## 2023-09-13 RX ADMIN — INSULIN DETEMIR 20 UNITS: 100 INJECTION, SOLUTION SUBCUTANEOUS at 08:09

## 2023-09-13 RX ADMIN — INSULIN ASPART 4 UNITS: 100 INJECTION, SOLUTION INTRAVENOUS; SUBCUTANEOUS at 08:09

## 2023-09-13 RX ADMIN — ATORVASTATIN CALCIUM 40 MG: 20 TABLET, FILM COATED ORAL at 08:09

## 2023-09-13 RX ADMIN — IPRATROPIUM BROMIDE AND ALBUTEROL SULFATE 3 ML: 2.5; .5 SOLUTION RESPIRATORY (INHALATION) at 07:09

## 2023-09-13 RX ADMIN — SODIUM CHLORIDE, PRESERVATIVE FREE 10 ML: 5 INJECTION INTRAVENOUS at 06:09

## 2023-09-13 RX ADMIN — INSULIN ASPART 2 UNITS: 100 INJECTION, SOLUTION INTRAVENOUS; SUBCUTANEOUS at 11:09

## 2023-09-13 RX ADMIN — IPRATROPIUM BROMIDE AND ALBUTEROL SULFATE 3 ML: 2.5; .5 SOLUTION RESPIRATORY (INHALATION) at 12:09

## 2023-09-13 RX ADMIN — FUROSEMIDE 40 MG: 10 INJECTION, SOLUTION INTRAMUSCULAR; INTRAVENOUS at 08:09

## 2023-09-13 RX ADMIN — SODIUM CHLORIDE, PRESERVATIVE FREE 10 ML: 5 INJECTION INTRAVENOUS at 12:09

## 2023-09-13 RX ADMIN — IPRATROPIUM BROMIDE AND ALBUTEROL SULFATE 3 ML: 2.5; .5 SOLUTION RESPIRATORY (INHALATION) at 03:09

## 2023-09-13 RX ADMIN — IPRATROPIUM BROMIDE AND ALBUTEROL SULFATE 3 ML: 2.5; .5 SOLUTION RESPIRATORY (INHALATION) at 04:09

## 2023-09-13 RX ADMIN — ENOXAPARIN SODIUM 40 MG: 40 INJECTION SUBCUTANEOUS at 04:09

## 2023-09-13 RX ADMIN — CEFAZOLIN 2 G: 2 INJECTION, POWDER, FOR SOLUTION INTRAMUSCULAR; INTRAVENOUS at 09:09

## 2023-09-13 RX ADMIN — INSULIN ASPART 4 UNITS: 100 INJECTION, SOLUTION INTRAVENOUS; SUBCUTANEOUS at 04:09

## 2023-09-13 RX ADMIN — OXYCODONE HYDROCHLORIDE 5 MG: 5 TABLET ORAL at 02:09

## 2023-09-13 RX ADMIN — MUPIROCIN: 20 OINTMENT TOPICAL at 09:09

## 2023-09-13 RX ADMIN — INSULIN ASPART 1 UNITS: 100 INJECTION, SOLUTION INTRAVENOUS; SUBCUTANEOUS at 08:09

## 2023-09-13 NOTE — ASSESSMENT & PLAN NOTE
No evidence of volume overload on admission with negative CXR and normal BNP.  Unclear why he is on 1L O2NC or IV Lasix.  TTE on admission with regional wall motion abnormalities are present: The septum is moderately hypokinetic. There is mildly reduced systolic function. Ejection fraction by visual approximation is 45%. Grade I diastolic dysfunction.  Repeat CXR on 9/12/2023 with Streaky opacities at the lung bases can be seen with subsegmental atelectasis.  -D/c Lasix  -Restart ACEI  -Continue with Incentive Spirometry  -I&O and low Na diet

## 2023-09-13 NOTE — PT/OT/SLP PROGRESS
Occupational Therapy   Treatment    Name: Dvae Good  MRN: [id number]  Admitting Diagnosis:  Staphylococcus aureus bacteremia with sepsis  5 Days Post-Op    Recommendations:     Discharge Recommendations: nursing facility, skilled  Discharge Equipment Recommendations:  bedside commode, walker, rolling, wheelchair, shower chair, to be determined by next level of care  Barriers to discharge:  Decreased caregiver support (10 KRUPA and current functional level)    Assessment:     Dave Good is a 58 y.o. male with a medical diagnosis of Staphylococcus aureus bacteremia with sepsis.  He presents with no pain but expressed feeling fatigue. Performance deficits affecting function are weakness, impaired endurance, impaired self care skills, impaired functional mobility, gait instability, impaired balance, decreased coordination, decreased lower extremity function, decreased safety awareness, decreased ROM, impaired cardiopulmonary response to activity, impaired skin, orthopedic precautions.     Patient pleasant and agreeable to therapy. Patient with CGA, RW for sarah hygiene while standing. Set Up/SBA for sponge bath sitting at the sink. Requires consistent cues for  LLE PWB to the heel. Overall, tolerated session well though activity tolerance continues to be limited by fatigue and weakness.      Rehab Prognosis:  Good; patient would benefit from acute skilled OT services to address these deficits and reach maximum level of function.       Plan:     Patient to be seen 5 x/week to address the above listed problems via self-care/home management, therapeutic activities, therapeutic exercises  Plan of Care Expires: 09/24/23  Plan of Care Reviewed with: patient    Subjective     Chief Complaint: restless nights   Patient/Family Comments/goals: agreeable to participate in therapy for OT intervention     Pain/Comfort:  Pain Rating 1: 0/10    Objective:     Communicated with: RN (Senia) prior to session.  Patient found supine with  oxygen, telemetry, peripheral IV upon OT entry to room.    General Precautions: Standard, fall, diabetic    Orthopedic Precautions:LLE partial weight bearing (heel)  Braces:  (ACE wrap on left foot and sx shoes while ambulating)  Respiratory Status: Room air     Occupational Performance:     Bed Mobility:    Supine > Sit: SBA with Hospital bed feature     Functional Mobility/Transfers:  Sit <> Stand: Min A, RW with lifting - progressing to CGA   Functional Mobility: CGA, RW cues for LLE PWB to the heel - requires repeated cues   Chair/BSC transfer: CGA with steady/slow descend     Activities of Daily Living:  Grooming: Set Up/Supervision seated in front of sink  Toileting: CGA,RW for sarah hygiene while standing; pt able to  perform gown management   UB Sponge Bathing: Set Up/SBA while seated in front of sink    LB Dressing: Attempted LB dressing training but pt defer due to fatigue after sponge bathing       [facility] 6 Click ADL: 15    Treatment & Education:  OT role, plan of care, progression of goals, importance of continued OOB activity, ADL/functional transfer and mobility retraining, discharge recommendation, call don't fall, safety precautions, fall prevention, LLE PWB to the heel.      Patient left up in chair with all lines intact, call button in reach, and RN notified    GOALS:   Multidisciplinary Problems       Occupational Therapy Goals          Problem: Occupational Therapy    Goal Priority Disciplines Outcome Interventions   Occupational Therapy Goal     OT, PT/OT Ongoing, Progressing    Description: Goals to be met by: 9/24/2023     Patient will increase functional independence with ADLs by performing:    UE Dressing with Set-up Assistance.  LE Dressing with Moderate Assistance.  Toileting from bedside commode with Contact Guard Assistance for hygiene and clothing management.   Toilet transfer to bedside commode with Stand-by Assistance while maintaining weight bearing precautions.    COMPLETED  GOALS  Grooming while seated at sink with Set-up Assistance. MET 9/11/2023                         Time Tracking:     OT Date of Treatment: 09/13/23  OT Start Time: 0847  OT Stop Time: 0925  OT Total Time (min): 38 min    Billable Minutes:Self Care/Home Management 38 min    OT/DEREK: DEREK     Number of DEREK visits since last OT visit: 3    9/13/2023

## 2023-09-13 NOTE — PLAN OF CARE
This CM Manager received a phone call from Natalie with [facility] ([phone]). Per Natalie, the patient has been accepted.  DEWAYNE Lamar and ARLENE Munoz sent a secure chat with the update. DEWAYNE Lamar and ARLENE Munoz will continue to follow for d/c needs.   09/13/23 1327   Post-Acute Status   Post-Acute Authorization Placement   Post-Acute Placement Status Pending post-acute provider review/more information requested   Discharge Delays None known at this time   Discharge Plan   Discharge Plan A Long-term acute care facility (LTAC)   Discharge Plan B Long-term acute care facility (LTAC)

## 2023-09-13 NOTE — PROGRESS NOTES
[facility] ([place])  Infectious Disease  Progress Note    Patient Name: Dave Good  MRN: [id number]  Admission Date: 9/7/2023  Length of Stay: 5 days  Attending Physician: Zeus Nation MD  Primary Care Provider: Reyna Jorge NP    Isolation Status: No active isolations  Assessment/Plan:      ID  Chronic osteomyelitis of toe of left foot-resolved as of 9/10/2023  57 yo male with DM, HTN with chronic osteomyelitis of left hallux who presents with elevated WBC and left hallux pain. Xray suggests changes in hallux, 2nd, and 4th toes - however, these may represent previous infection as well - changes to hallux and 4th toe were seen on MRI in June 2022. Repeat comparison MRI pending, but last treatment ended in January 2023. Now, s/p amputation. OR culture growing SA and blood with MSSA. No PAT planned.    Recommendation:  · Continue cefazolin x 6 weeks  · Discussed with HM via SC      Isidro Duran MD  Infectious Disease  [facility] ([place])    Subjective:     Principal Problem:Staphylococcus aureus bacteremia with sepsis    HPI: 57 yo male with DM, HTN, PAD, chronic osteomyelitis of left hallux who presented on 8/27 and 9/7 with left hallux pain. He was last seen by ID in January after finishing 6 weeks of IV daptomycin and meropenem on 1/3/23. On presentation he had no fever, but WBC was elevated at 21. He was given one dose of vancomycin. I am consulted for evaluation and treatment.     Interval History: Events noted. Lost IV access, since replaced.       Objective:     Vital Signs (Most Recent):  Temp: 98.6 °F (37 °C) (09/13/23 1110)  Pulse: 76 (09/13/23 1209)  Resp: 16 (09/13/23 1410)  BP: 117/68 (09/13/23 1110)  SpO2: 99 % (09/13/23 1209) Vital Signs (24h Range):  Temp:  [98 °F (36.7 °C)-99.1 °F (37.3 °C)] 98.6 °F (37 °C)  Pulse:  [76-86] 76  Resp:  [16-20] 16  SpO2:  [95 %-99 %] 99 %  BP: (117-137)/(68-81) 117/68     Weight: 117 kg (257 lb 15 oz)  Body mass index is 33.12  kg/m².    Estimated Creatinine Clearance: 106.8 mL/min (based on SCr of 1 mg/dL).     Physical Exam  Vitals and nursing note reviewed.   Neurological:      General: No focal deficit present.      Mental Status: He is oriented to person, place, and time.   Psychiatric:         Mood and Affect: Mood normal.         Behavior: Behavior normal.         Thought Content: Thought content normal.          Significant Labs: Blood Culture:   Recent Labs   Lab 09/07/23  2343 09/08/23  1333 09/11/23  1104   LABBLOO Gram stain jose bottle: Gram positive cocci in clusters resembling Staph  Results called to and read back by:Starla Wilburn Lpn 09/09/2023 00:41  Gram stain aer bottle: Gram positive cocci in clusters resembling Staph  Positive results previously called 09/10/2023  06:15  STAPHYLOCOCCUS AUREUS  ID consult required at [facility].  For susceptibility see order #[id number]  *  Gram stain jose bottle: Gram positive cocci in clusters resembling Staph  Results called to and read back by:Starla Wilburn Lpn 09/09/2023 00:41  Gram stain aer bottle: Gram positive cocci in clusters resembling Staph  Positive results previously called 09/09/2023  13:34  STAPHYLOCOCCUS AUREUS  ID consult required at [facility].  * Gram stain jose bottle: Gram positive cocci in clusters resembling Staph  Results called to and read back by:Kelly Elder Rn 09/09/2023  23:09  STAPHYLOCOCCUS AUREUS  ID consult required at [facility].  For susceptibility see order #[id number]  * No Growth to date  No Growth to date     CBC:   Recent Labs   Lab 09/12/23  0413 09/13/23  0410   WBC 10.65 10.05   HGB 8.4* 8.7*   HCT 27.6* 28.9*   * 627*     Wound Culture:   Recent Labs   Lab 09/08/23  1754   LABAERO STAPHYLOCOCCUS AUREUS  Many  *  STREPTOCOCCUS DYSGALACTIAE  Many  Susceptibility testing not routinely performed  *       Significant Imaging: I have  reviewed all pertinent imaging results/findings within the past 24 hours.

## 2023-09-13 NOTE — PROGRESS NOTES
[facility] ([place])  Podiatry  Progress Note    Patient Name: Dave Good  MRN: [id number]  Admission Date: 9/7/2023  Hospital Length of Stay: 5 days  Attending Physician: Zeus Nation MD  Primary Care Provider: Reyna Jorge NP     Subjective:     Interval History: NAEON. VSS.   No foot or calf pain reported.   No fevers.   Dressings clean and intact.  Resting comfortably at bedside.   WBC 12->10.     Follow-up For: Procedure(s) (LRB):  AMPUTATION, TOE (Left)    Post-Operative Day: 4 Days Post-Op      Scheduled Meds:   albuterol-ipratropium  3 mL Nebulization Q4H    aspirin  81 mg Oral Daily    atorvastatin  40 mg Oral Daily    enoxparin  40 mg Subcutaneous Daily    gabapentin  300 mg Oral BID    insulin aspart U-100  4 Units Subcutaneous TIDWM    insulin detemir U-100 (Levemir)  20 Units Subcutaneous QHS    meropenem (MERREM) IVPB  1 g Intravenous Q8H    mupirocin   Nasal BID    QUEtiapine  200 mg Oral Nightly    sodium chloride 0.9%  10 mL Intravenous Q6H    vancomycin (VANCOCIN) IV (PEDS and ADULTS)  1,500 mg Intravenous Q12H     Continuous Infusions:  PRN Meds:sodium chloride 0.9%, acetaminophen, dextrose 10%, dextrose 10%, glucagon (human recombinant), HYDROmorphone, insulin aspart U-100, naloxone, ondansetron, oxyCODONE, sodium chloride 0.9%, Flushing PICC/Midline Protocol **AND** sodium chloride 0.9% **AND** sodium chloride 0.9%, Pharmacy to dose Vancomycin consult **AND** vancomycin - pharmacy to dose    Review of Systems   Constitutional: Negative for chills, diaphoresis, fever, malaise/fatigue and night sweats.   Cardiovascular:  Positive for leg swelling. Negative for claudication, cyanosis and syncope.   Skin:  Positive for poor wound healing. Negative for color change, dry skin, nail changes, rash, suspicious lesions and unusual hair distribution.   Musculoskeletal:  Negative for falls, joint pain, joint swelling, muscle cramps, muscle weakness and stiffness.   Gastrointestinal:   Negative for constipation, diarrhea, nausea and vomiting.   Neurological:  Positive for numbness, paresthesias and sensory change. Negative for brief paralysis, disturbances in coordination, focal weakness and tremors.     Objective:     Vital Signs (Most Recent):  Temp: 98.3 °F (36.8 °C) (09/10/23 0732)  Pulse: 85 (09/10/23 1127)  Resp: 16 (09/10/23 1127)  BP: 123/73 (09/10/23 0732)  SpO2: 96 % (09/10/23 1127) Vital Signs (24h Range):  Temp:  [98 °F (36.7 °C)-99.3 °F (37.4 °C)] 98.3 °F (36.8 °C)  Pulse:  [] 85  Resp:  [16-20] 16  SpO2:  [87 %-99 %] 96 %  BP: (123-135)/(73-78) 123/73     Weight: 117 kg (257 lb 15 oz)  Body mass index is 33.12 kg/m².    Physical Exam  Constitutional:       General: He is not in acute distress.     Appearance: He is well-developed. He is not diaphoretic.   Cardiovascular:      Pulses:           Popliteal pulses are 2+ on the right side and 2+ on the left side.        Dorsalis pedis pulses are 2+ on the right side and 2+ on the left side.        Posterior tibial pulses are 2+ on the right side and 2+ on the left side.      Comments: Capillary refill 3 seconds all toes/distal feet, all toes/both feet warm to touch. Negative lymphadenopathy bilateral popliteal fossa and tarsal tunnel. Less than 2+ pitting lower extremity edema bilateral.     Musculoskeletal:      Right ankle: No swelling, deformity, ecchymosis or lacerations. Normal range of motion. Normal pulse.      Right Achilles Tendon: Normal. No defects. Gonzáles's test negative.      Comments:  s/p left first digit amputation.  Surgical site bleeding appropriately.  No signs of necrotic wound base tissue.  No malodor noted.    Lymphadenopathy:      Lower Body: No right inguinal adenopathy. No left inguinal adenopathy.      Comments: Negative lymphadenopathy bilateral popliteal fossa and tarsal tunnel.  Negative lymphangitic streaking bilateral feet/ankles/legs.     Skin:     General: Skin is warm and dry.      Capillary  Refill: Capillary refill takes 2 to 3 seconds.      Coloration: Skin is not pale.      Findings: No abrasion, bruising, burn, ecchymosis, erythema, laceration, lesion or rash.      Nails: There is no clubbing.      Comments:  s/p left first digit amputation.  Sutures intact on dorsal, medial and lateral portion with opening in the central incision site.    Neurological:      Mental Status: He is alert and oriented to person, place, and time.      Sensory: No sensory deficit.      Motor: No tremor, atrophy or abnormal muscle tone.      Gait: Gait normal.      Comments: Diminished/loss of protective sensation all toes bilateral to 10 gram monofilament.      Comment:  Patient is 4 days s/p left first digit amputation with partial closure.  Scant serosanguinous drainage noted from surgical site.  Sutures on dorsal medial and lateral incision intact with open central portion of incision site.  No maceration noted.  No necrotic tissue noted.    9/12/2023            Laboratory:      CRP   Date Value Ref Range Status   11/22/2022 125.3 (H) 0.0 - 8.2 mg/L Final       Sed Rate   Date Value Ref Range Status   11/22/2022 79 (H) 0 - 10 mm/Hr Final       WBC   Date Value Ref Range Status   09/13/2023 10.05 3.90 - 12.70 K/uL Final       Hemoglobin A1C   Date Value Ref Range Status   09/08/2023 7.5 (H) 4.0 - 5.6 % Final     Comment:     ADA Screening Guidelines:  5.7-6.4%  Consistent with prediabetes  >or=6.5%  Consistent with diabetes    High levels of fetal hemoglobin interfere with the HbA1C  assay. Heterozygous hemoglobin variants (HbS, HgC, etc)do  not significantly interfere with this assay.   However, presence of multiple variants may affect accuracy.             ALBUMIN 1.2*  --   --    PROT 8.7*  --   --    *   < > 131*   K 3.4*   < > 4.2   CO2 29   < > 28   CL 96   < > 96   BUN 17   < > 16   CREATININE 1.0   < > 1.1   ALKPHOS 78  --   --    ALT 15  --   --    AST 24  --   --    BILITOT 0.4  --   --     < > = values  in this interval not displayed.     Microbiology Results (last 7 days)       Procedure Component Value Units Date/Time    Culture, Anaerobe [[id number]] Collected: 09/08/23 1754    Order Status: Completed Specimen: Incision site from Toe, Left Foot Updated: 09/10/23 1106     Anaerobic Culture Culture in progress    Narrative:      Left great toe    Aerobic culture [[id number]] Collected: 09/08/23 1754    Order Status: Completed Specimen: Incision site from Toe, Left Foot Updated: 09/10/23 0809     Aerobic Bacterial Culture Insufficient incubation, culture in progress    Narrative:      Left great toe    Blood culture #1 **CANNOT BE ORDERED STAT** [[id number]]  (Abnormal) Collected: 09/07/23 2343    Order Status: Completed Specimen: Blood from Peripheral, Hand, Left Updated: 09/10/23 0615     Blood Culture, Routine Gram stain jose bottle: Gram positive cocci in clusters resembling Staph      Results called to and read back by:Starla Wilburn Lpn 09/09/2023 00:41      Gram stain aer bottle: Gram positive cocci in clusters resembling Staph      Positive results previously called 09/10/2023  06:15      STAPHYLOCOCCUS AUREUS  ID consult required at [facility].  For susceptibility see order #[id number]      Blood culture #2 **CANNOT BE ORDERED STAT** [[id number]]  (Abnormal) Collected: 09/07/23 2343    Order Status: Completed Specimen: Blood from Peripheral, Hand, Right Updated: 09/10/23 0603     Blood Culture, Routine Gram stain jose bottle: Gram positive cocci in clusters resembling Staph      Results called to and read back by:Starla Wilburn Lpn 09/09/2023 00:41      Gram stain aer bottle: Gram positive cocci in clusters resembling Staph      Positive results previously called 09/09/2023  13:34      STAPHYLOCOCCUS AUREUS  Susceptibility pending  ID consult required at [facility].      Blood culture [[id number]] Collected: 09/08/23 1333    Order Status:  Completed Specimen: Blood Updated: 09/09/23 2309     Blood Culture, Routine Gram stain jose bottle: Gram positive cocci in clusters resembling Staph      Results called to and read back by:Kelly Elder Rn 09/09/2023  23:09    Gram stain [[id number]] Collected: 09/08/23 1754    Order Status: Completed Specimen: Incision site from Toe, Left Foot Updated: 09/09/23 1918     Gram Stain Result Rare WBC's      Many Gram positive cocci      Few Gram negative rods    Narrative:      Left great toe    Fungus culture [[id number]] Collected: 09/08/23 1754    Order Status: Sent Specimen: Incision site from Toe, Left Foot Updated: 09/09/23 1521    AFB Culture & Smear [[id number]] Collected: 09/08/23 1754    Order Status: Sent Specimen: Incision site from Toe, Left Foot Updated: 09/09/23 1521    Rapid Organism ID by PCR (from Blood culture) [[id number]]  (Abnormal) Collected: 09/07/23 2343    Order Status: Completed Updated: 09/09/23 0239           Diagnostic Results:  I have reviewed all pertinent imaging results/findings within the past 24 hours.    Assessment/Plan:     Problem List Items Addressed This Visit       RESOLVED: Chronic osteomyelitis of toe of left foot    Current Assessment & Plan     Dave Good is 57 yo M who was admitted to the hospital due to a open ulcer with malodor of L great toe.  Patient is 4 days s/p L first digit amputation.             Relevant Orders    Transfer patient (Completed)    X-Ray Foot Complete Left (Completed)    Diabetic wet gangrene of the foot    Relevant Medications    insulin detemir U-100 (Levemir) pen 20 Units    insulin aspart U-100 pen 2 Units (Completed)    insulin aspart U-100 pen 0-10 Units    insulin aspart U-100 pen 4 Units    Other Relevant Orders    Transfer patient (Completed)    X-Ray Foot Complete Left (Completed)    Open wound of right foot    Relevant Orders    Transfer patient (Completed)    X-Ray Foot Complete Left (Completed)    Open wound of right great toe    Relevant  "Orders    Case Request Operating Room: AMPUTATION, TOE (Completed)    * (Principal) Staphylococcus aureus bacteremia with sepsis    Ulcerated, foot, left, with fat layer exposed    Relevant Orders    X-Ray Foot Complete Left (Completed)     Other Visit Diagnoses       Great toe pain, left    -  Primary    Other acute osteomyelitis, unspecified ankle and foot        Relevant Orders    Case Request Operating Room: AMPUTATION, TOE (Completed)    Transfer patient (Completed)    X-Ray Foot Complete Left (Completed)    Heart failure        Relevant Orders    Echo (Completed)            4 days post op hallux amputation, open wound.     Antibiotics per ID.  Likely six weeks IV.   Continue glucose control.   Wound care daily, pack with aquacel rope, and secure with 4" Kerlix and 4" ACE.  Avoid tight compression.   WBAT, Darco shoe.                 Lavonne Vigil DPM  Podiatry  [facility] ([place])  "

## 2023-09-13 NOTE — SUBJECTIVE & OBJECTIVE
Interval History:  Patient is awake and alert this morning.  No acute events overnight.  Pain improved/controlled with medications.   Tolerating antibiotics well.  Patient declined PAT yesterday.      Review of Systems   Constitutional:  Negative for chills and fever.   HENT:  Negative for congestion and ear pain.    Eyes:  Negative for pain.   Respiratory:  Negative for shortness of breath.    Cardiovascular:  Negative for chest pain and palpitations.   Gastrointestinal:  Negative for abdominal pain.   Genitourinary:  Negative for difficulty urinating.   Musculoskeletal:  Negative for back pain.   Skin:  Positive for wound (Left foot wound clean and dressed).   Neurological:  Negative for dizziness and headaches.   Psychiatric/Behavioral:  Negative for agitation and behavioral problems.      Objective:     Vital Signs (Most Recent):  Temp: 98.6 °F (37 °C) (09/13/23 1110)  Pulse: 76 (09/13/23 1209)  Resp: 20 (09/13/23 1209)  BP: 117/68 (09/13/23 1110)  SpO2: 99 % (09/13/23 1209)   Vital Signs (24h Range):  Temp:  [98 °F (36.7 °C)-99.1 °F (37.3 °C)] 98.6 °F (37 °C)  Pulse:  [76-86] 76  Resp:  [16-20] 20  SpO2:  [95 %-99 %] 99 %  BP: (117-137)/(68-81) 117/68     Weight: 117 kg (257 lb 15 oz)  Body mass index is 33.12 kg/m².    Intake/Output Summary (Last 24 hours) at 9/13/2023 1255  Last data filed at 9/13/2023 0815  Gross per 24 hour   Intake 175 ml   Output 2000 ml   Net -1825 ml        Physical Exam  Vitals reviewed.   Constitutional:       General: He is not in acute distress.     Appearance: Normal appearance. He is obese. He is ill-appearing (chronically). He is not toxic-appearing.   HENT:      Head: Normocephalic and atraumatic.      Right Ear: External ear normal.      Left Ear: External ear normal.      Nose: Nose normal.      Mouth/Throat:      Mouth: Mucous membranes are moist.      Pharynx: Oropharynx is clear.   Eyes:      General: No scleral icterus.     Conjunctiva/sclera: Conjunctivae normal.       Pupils: Pupils are equal, round, and reactive to light.   Cardiovascular:      Rate and Rhythm: Normal rate and regular rhythm.   Pulmonary:      Effort: Pulmonary effort is normal.      Breath sounds: Normal breath sounds. No wheezing or rales.   Abdominal:      General: Bowel sounds are normal.      Tenderness: There is no abdominal tenderness.   Musculoskeletal:      Cervical back: Normal range of motion and neck supple.      Comments: Left toe amp wound clean and dressed   Skin:     General: Skin is warm and dry.      Capillary Refill: Capillary refill takes less than 2 seconds.   Neurological:      General: No focal deficit present.      Mental Status: He is alert and oriented to person, place, and time.      Cranial Nerves: No cranial nerve deficit.   Psychiatric:         Mood and Affect: Mood normal.         Behavior: Behavior normal.         Significant Labs: All pertinent labs within the past 24 hours have been reviewed.  CBC:   Recent Labs   Lab 09/12/23  0413 09/13/23  0410   WBC 10.65 10.05   HGB 8.4* 8.7*   HCT 27.6* 28.9*   * 627*       CMP:   Recent Labs   Lab 09/12/23  0413 09/13/23  0409   * 134*   K 4.9 5.0   CL 97 98   CO2 29 29   * 140*   BUN 15 15   CREATININE 0.9 1.0   CALCIUM 8.3* 8.5*   ANIONGAP 8 7*         Significant Imaging: I have reviewed all pertinent imaging results/findings within the past 24 hours.

## 2023-09-13 NOTE — ASSESSMENT & PLAN NOTE
"Patient presented to ED with complaints of worsening left great toe pain and swelling.  X-ray of left foot with acute osteomyelitis involving the great toe and distal aspects of the 2nd and 4th toes.  Labs on admission with WBC of 21.91 with left shift.  Blood cultures on admission (9/7/2023) with 4/4 bottles, 9/8/2023 with 1/2 bottles, and 9/11/2023 with 0/2 bottles positive for MSSA . Wound culture (9/8/2023 - areobic) positive for MSSA and Strep dysgalactiae.  Started on IV Vanc/Meropenem on admission and changed to IV Vanc/Cefazolin on 9/10/2023.  IV Vanc discontinued on 9/11/2023. Podiatry consulted and patient underwent left 1st toe amputation on 9/8/2023 with Dr. Samuel Toussaint.  TTE on 9/8/2023 did not show evidence of endocarditis.  ID consulted and recommended PAT, but patient declined given a history of complicated PAT in 2022.  IV Cefazolin inadvertently discontinued on 9/12/2023 and resumed on 9/13/2023.     Plan:  Follow up with Podiatry recommendations - "Wound care daily, pack with aquacel rope, and secure with 4" Kerlix and 4" ACE.  Avoid tight compression. WBAT, Darco shoe". Follow up pathology results.  Follow up with ID recommendations - likely will need 4-6 weeks IV antibiotics given no PAT.  Will need midline prior to discharge.  Pain control as needed.  Continue with Wound Care  PT/OT consulted - SNF on discharge - will need LTAC given duration of antibiotics needed  "

## 2023-09-13 NOTE — NURSING
0510  Spoke with Bonnie Terrazas regarding insulin order not being accurate. Telephone orders given for sliding scale insulin; placed at this time.

## 2023-09-13 NOTE — PLAN OF CARE
POC reviewed with patient. AAOx4. Stable on 1 LIT NC. Complaints of pain X2 treated with PRN pain medication according to MAR. No other complaints verbalized during shift.Dressings on foot remain CDI. Received IV antibiotics according to MAR. BSC and urinal utilized for voiding. Positions self x 1 assist.Instructed to call for help ambulating. No injuries, falls, or trauma occurred during shift. Purposeful rounding completed. Bed low and locked with side rails up x 3 and call light within reach.      Problem: Adult Inpatient Plan of Care  Goal: Plan of Care Review  Outcome: Ongoing, Progressing  Goal: Patient-Specific Goal (Individualized)  Outcome: Ongoing, Progressing  Goal: Absence of Hospital-Acquired Illness or Injury  Outcome: Ongoing, Progressing  Goal: Optimal Comfort and Wellbeing  Outcome: Ongoing, Progressing  Goal: Readiness for Transition of Care  Outcome: Ongoing, Progressing     Problem: Diabetes Comorbidity  Goal: Blood Glucose Level Within Targeted Range  Outcome: Ongoing, Progressing     Problem: Infection  Goal: Absence of Infection Signs and Symptoms  Outcome: Ongoing, Progressing     Problem: Impaired Wound Healing  Goal: Optimal Wound Healing  Outcome: Ongoing, Progressing     Problem: Skin Injury Risk Increased  Goal: Skin Health and Integrity  Outcome: Ongoing, Progressing

## 2023-09-13 NOTE — PROGRESS NOTES
"[facility] Medicine  Progress Note    Patient Name: Dave Good  MRN: [id number]  Patient Class: IP- Inpatient   Admission Date: 9/7/2023  Length of Stay: 5 days  Attending Physician: Zeus Nation MD  Primary Care Provider: Reyna Jorge NP        Subjective:     Principal Problem:Staphylococcus aureus bacteremia with sepsis      HPI:  Per Larry Shepherd, NP:    "The patient is a 58 y.o. male with a past medical history of type 2 diabetes, HTN, HLD, and chronic systolic congestive heart failure who presents with worsening left great toe pain infection and swelling.  Patient was recently seen in the ER and discharged to follow up with ID and wound care.  Since then he has been unable to follow-up with podiatry or infectious disease.  He states that he has not tried to call their offices and was waiting for them to call him.  States that his toe pain has gotten worse.  He has been soaking it in a foot massage machine chair with no improvement.  XR positive for left great, 2nd, and 4th toe acute osteomyelitis.  He will be admitted for further management of his acute osteomyelitis and infectious disease and podiatry consult."      Overview/Hospital Course:  Patient is 58-year-old man with history of hypertension, diabetes mellitus type 2, reported history of systolic heart failure (although last echocardiogram with normal systolic function), significant prior tobacco smoking history with chronic obstructive pulmonary disease admitted to the hospital with diabetic ulcer with abscess involving left foot including great toe and proximal foot.  Patient with marked leukocytosis and elevated heart rate consistent with systemic inflammatory response/sepsis secondary to foot infection.  Blood cultures obtained.  Patient started on intravenous antibiotics.  Podiatry consulted and took the patient for surgical debridement with removal of necrotic tissue on 9/8/2023.  Blood cultures positive " for Gram-positive cocci representing Staphylococcus aureus.    Patient with prior history of line associated deep vein thrombosis for which he was treated with three months of anticoagulation.      Interval History:  Patient is awake and alert this morning.  No acute events overnight.  Pain improved/controlled with medications.   Tolerating antibiotics well.  Patient declined PAT yesterday.      Review of Systems   Constitutional:  Negative for chills and fever.   HENT:  Negative for congestion and ear pain.    Eyes:  Negative for pain.   Respiratory:  Negative for shortness of breath.    Cardiovascular:  Negative for chest pain and palpitations.   Gastrointestinal:  Negative for abdominal pain.   Genitourinary:  Negative for difficulty urinating.   Musculoskeletal:  Negative for back pain.   Skin:  Positive for wound (Left foot wound clean and dressed).   Neurological:  Negative for dizziness and headaches.   Psychiatric/Behavioral:  Negative for agitation and behavioral problems.      Objective:     Vital Signs (Most Recent):  Temp: 98.6 °F (37 °C) (09/13/23 1110)  Pulse: 76 (09/13/23 1209)  Resp: 20 (09/13/23 1209)  BP: 117/68 (09/13/23 1110)  SpO2: 99 % (09/13/23 1209)   Vital Signs (24h Range):  Temp:  [98 °F (36.7 °C)-99.1 °F (37.3 °C)] 98.6 °F (37 °C)  Pulse:  [76-86] 76  Resp:  [16-20] 20  SpO2:  [95 %-99 %] 99 %  BP: (117-137)/(68-81) 117/68     Weight: 117 kg (257 lb 15 oz)  Body mass index is 33.12 kg/m².    Intake/Output Summary (Last 24 hours) at 9/13/2023 1255  Last data filed at 9/13/2023 0815  Gross per 24 hour   Intake 175 ml   Output 2000 ml   Net -1825 ml        Physical Exam  Vitals reviewed.   Constitutional:       General: He is not in acute distress.     Appearance: Normal appearance. He is obese. He is ill-appearing (chronically). He is not toxic-appearing.   HENT:      Head: Normocephalic and atraumatic.      Right Ear: External ear normal.      Left Ear: External ear normal.      Nose: Nose  normal.      Mouth/Throat:      Mouth: Mucous membranes are moist.      Pharynx: Oropharynx is clear.   Eyes:      General: No scleral icterus.     Conjunctiva/sclera: Conjunctivae normal.      Pupils: Pupils are equal, round, and reactive to light.   Cardiovascular:      Rate and Rhythm: Normal rate and regular rhythm.   Pulmonary:      Effort: Pulmonary effort is normal.      Breath sounds: Normal breath sounds. No wheezing or rales.   Abdominal:      General: Bowel sounds are normal.      Tenderness: There is no abdominal tenderness.   Musculoskeletal:      Cervical back: Normal range of motion and neck supple.      Comments: Left toe amp wound clean and dressed   Skin:     General: Skin is warm and dry.      Capillary Refill: Capillary refill takes less than 2 seconds.   Neurological:      General: No focal deficit present.      Mental Status: He is alert and oriented to person, place, and time.      Cranial Nerves: No cranial nerve deficit.   Psychiatric:         Mood and Affect: Mood normal.         Behavior: Behavior normal.         Significant Labs: All pertinent labs within the past 24 hours have been reviewed.  CBC:   Recent Labs   Lab 09/12/23  0413 09/13/23  0410   WBC 10.65 10.05   HGB 8.4* 8.7*   HCT 27.6* 28.9*   * 627*       CMP:   Recent Labs   Lab 09/12/23  0413 09/13/23  0409   * 134*   K 4.9 5.0   CL 97 98   CO2 29 29   * 140*   BUN 15 15   CREATININE 0.9 1.0   CALCIUM 8.3* 8.5*   ANIONGAP 8 7*         Significant Imaging: I have reviewed all pertinent imaging results/findings within the past 24 hours.      Assessment/Plan:      * Staphylococcus aureus bacteremia with sepsis  Patient presented to ED with complaints of worsening left great toe pain and swelling.  X-ray of left foot with acute osteomyelitis involving the great toe and distal aspects of the 2nd and 4th toes.  Labs on admission with WBC of 21.91 with left shift.  Blood cultures on admission (9/7/2023) with 4/4  "bottles, 9/8/2023 with 1/2 bottles, and 9/11/2023 with 0/2 bottles positive for MSSA . Wound culture (9/8/2023 - areobic) positive for MSSA and Strep dysgalactiae.  Started on IV Vanc/Meropenem on admission and changed to IV Vanc/Cefazolin on 9/10/2023.  IV Vanc discontinued on 9/11/2023. Podiatry consulted and patient underwent left 1st toe amputation on 9/8/2023 with Dr. Samuel Toussaint.  TTE on 9/8/2023 did not show evidence of endocarditis.  ID consulted and recommended PAT, but patient declined given a history of complicated PAT in 2022.  IV Cefazolin inadvertently discontinued on 9/12/2023 and resumed on 9/13/2023.     Plan:  Follow up with Podiatry recommendations - "Wound care daily, pack with aquacel rope, and secure with 4" Kerlix and 4" ACE.  Avoid tight compression. WBAT, Darco shoe". Follow up pathology results.  Follow up with ID recommendations - likely will need 4-6 weeks IV antibiotics given no PAT.  Will need midline prior to discharge.  Pain control as needed.  Continue with Wound Care  PT/OT consulted - SNF on discharge - will need LTAC given duration of antibiotics needed    Chronic combined systolic and diastolic congestive heart failure  No evidence of volume overload on admission with negative CXR and normal BNP.  Unclear why he is on 1L O2NC or IV Lasix.  TTE on admission with regional wall motion abnormalities are present: The septum is moderately hypokinetic. There is mildly reduced systolic function. Ejection fraction by visual approximation is 45%. Grade I diastolic dysfunction.  Repeat CXR on 9/12/2023 with Streaky opacities at the lung bases can be seen with subsegmental atelectasis.  -D/c Lasix  -Restart ACEI  -Continue with Incentive Spirometry  -I&O and low Na diet      Type 2 diabetes mellitus with diabetic peripheral angiopathy without gangrene, with long-term current use of insulin  Glucose stable.  Hemoglobin A1c of 7.5.    Home Meds: Metformin 1g bid, Detemir 50 units, Aspart 10 " units TIDWM, Trulicity weekly.  Hospital Meds:  Detemir 20 units, Aspart 4 units TIDWM moderate correction dose SSI  -Continue with current regimen  -Diabetic Diet  -Monitor and adjust as needed      Obesity due to excess calories with serious comorbidity  Body mass index is 33.12 kg/m². Morbid obesity complicates all aspects of disease management from diagnostic modalities to treatment. Weight loss encouraged and health benefits explained to patient.         Hypertension  Normotensive off meds.  Home Meds:  HCTZ 25mg, Lisinopril 10mg  -Restart Lisinopril  -Monitor and adjust as needed      VTE Risk Mitigation (From admission, onward)         Ordered     enoxaparin injection 40 mg  Daily         09/09/23 1108     IP VTE HIGH RISK PATIENT  Once         09/09/23 1108     Place sequential compression device  Until discontinued         09/08/23 0325                Discharge Planning   LOIS:      Code Status: Full Code   Is the patient medically ready for discharge?:     Reason for patient still in hospital (select all that apply): Patient trending condition, Treatment and Consult recommendations  Discharge Plan A: Home Health                  Zeus Nation MD  Department of Hospital Medicine   [facility])

## 2023-09-13 NOTE — PT/OT/SLP PROGRESS
Physical Therapy Treatment    Patient Name:  Dave Good   MRN:  [id number]    Recommendations:     Discharge Recommendations: nursing facility, skilled  Discharge Equipment Recommendations: bedside commode, wheelchair, shower chair, to be determined by next level of care  Barriers to discharge: Decreased caregiver support    Assessment:     Dave Good is a 58 y.o. male admitted with a medical diagnosis of Staphylococcus aureus bacteremia with sepsis.  He presents with the following impairments/functional limitations: weakness, impaired endurance, impaired self care skills, impaired functional mobility, gait instability, impaired balance, decreased lower extremity function, pain, decreased ROM, impaired skin, edema, orthopedic precautions, impaired cardiopulmonary response to activity ;pt with good mobility today, amb'd short distance w/ RW and CGA on RA, though dec O2 sats noted, 94% on RA at rest, 86% w/ walking.    Rehab Prognosis: Good; patient would benefit from acute skilled PT services to address these deficits and reach maximum level of function.    Recent Surgery: Procedure(s) (LRB):  AMPUTATION, TOE (Left) 5 Days Post-Op    Plan:     During this hospitalization, patient to be seen 5 x/week to address the identified rehab impairments via gait training, therapeutic activities, therapeutic exercises and progress toward the following goals:    Plan of Care Expires:  10/09/23    Subjective     Chief Complaint: pain in forefoot  Patient/Family Comments/goals: pt agreeable to session, pleasant.   Pain/Comfort:  Pain Rating 1: 6/10  Location - Side 1: Left  Location - Orientation 1: distal  Location 1: foot  Pain Addressed 1: Pre-medicate for activity, Reposition, Distraction  Pain Rating Post-Intervention 1: 7/10 (with amb)      Objective:     Communicated with nurse prior to session.  Patient found HOB elevated with oxygen, telemetry, PICC line upon PT entry to room.     General Precautions: Standard, fall,  "diabetic  Orthopedic Precautions: LLE partial weight bearing (on heel)  Braces:  (BLE Darco shoes)  Respiratory Status: Nasal cannula, flow 1 L/min     Functional Mobility:  Bed Mobility:     Supine to Sit: stand by assistance  Transfers:     Sit to Stand:  stand by assistance and contact guard assistance with rolling walker  Gait: amb'd 50' w/ RW and CGA, O2 on RA:86% following      AM-PAC 6 CLICK MOBILITY  Turning over in bed (including adjusting bedclothes, sheets and blankets)?: 4  Sitting down on and standing up from a chair with arms (e.g., wheelchair, bedside commode, etc.): 3  Moving from lying on back to sitting on the side of the bed?: 4  Moving to and from a bed to a chair (including a wheelchair)?: 3  Need to walk in hospital room?: 3  Climbing 3-5 steps with a railing?: 2  Basic Mobility Total Score: 19       Treatment & Education:  Pt inst'd in LE LAQ"s while seated, educated on elevating LLE.     Patient left up in chair with all lines intact, call button in reach, nurse notified, and LE's elevated ..    GOALS:   Multidisciplinary Problems       Physical Therapy Goals          Problem: Physical Therapy    Goal Priority Disciplines Outcome Goal Variances Interventions   Physical Therapy Goal     PT, PT/OT Ongoing, Progressing     Description: Goals to be met by: 10/9/2023    Patient will increase functional independence with mobility by performin. Sit<>stand with supervision with RW while maintaining WB precautions on LLE.  2. Gait x 50 feet with RW with CGA while maintaining WB precautions on LLE.  3. Ascend/descend 10 step(s) with least restrictive assistive device and minimal assist while maintaining WB precautions on LLE.                           Time Tracking:     PT Received On: 23  PT Start Time: 1307     PT Stop Time: 1326  PT Total Time (min): 19 min     Billable Minutes: Gait Training 19    Treatment Type: Treatment  PT/PTA: PTA     Number of PTA visits since last PT visit: 1 "     09/13/2023

## 2023-09-13 NOTE — ASSESSMENT & PLAN NOTE
Normotensive off meds.  Home Meds:  HCTZ 25mg, Lisinopril 10mg  -Restart Lisinopril  -Monitor and adjust as needed

## 2023-09-13 NOTE — ASSESSMENT & PLAN NOTE
"     Patient ID: Shukri Park is a 78 y.o. male.  Bilateral shoulder pain  Injections performed in August 2018 worked very well up until about the last couple weeks.  Having pain mainly in the right side with overhead activity and reaching left side hurts at night.      Review of Systems:  Bilateral shoulder pain  Denies chest pain      Objective:    BP 92/59   Pulse 79   Ht 185.4 cm (73\")   Wt 71.7 kg (158 lb)   BMI 20.85 kg/m²     Physical Examination:     Both shoulders demonstrates no scars with slight supraspinatus atrophy and pain in the impingement area.  Passive elevation is 150° on the left, 120 on the right, abduction 130° on the left, 90 on the right, external rotation 50° on the left and 20 on the right, internal rotation is the right hip. He has pain and weakness on Speed, [place],   and supraspinatus testing bilateral.  Belly press and lift-off are 5/5 and 4/5 bilateral.Sensory and motor exam are intact all distributions.  Radial pulse is palpable and capillary refill is less than two seconds to all digits    Imaging:       Assessment:    Bilateral shoulder degenerative joint disease    Plan:  Treatment options discussed  I recommend injection after todays evaluation.  Risks and benefits were discussed. Under sterile technique and written consent I injected 40mg of Kenalog and 1cc of 1% Lidocaine plain into the subacromial space bilateral. It was well tolerated.          Disclaimer: Please note that areas of this note were completed with computer voice recognition software.  Quite often unanticipated grammatical, syntax, homophones, and other interpretive errors are inadvertently transcribed by the computer software. Please excuse any errors that have escaped final proofreading.  " 57 yo male with DM, HTN with chronic osteomyelitis of left hallux who presents with elevated WBC and left hallux pain. Xray suggests changes in hallux, 2nd, and 4th toes - however, these may represent previous infection as well - changes to hallux and 4th toe were seen on MRI in June 2022. Repeat comparison MRI pending, but last treatment ended in January 2023. Now, s/p amputation. OR culture growing SA and blood with MSSA.    Recommendation:  · Continue cefazolin x 6 weeks  · Discussed with HM via SC

## 2023-09-13 NOTE — SUBJECTIVE & OBJECTIVE
Interval History: Events noted. Lost IV access, since replaced.       Objective:     Vital Signs (Most Recent):  Temp: 98.6 °F (37 °C) (09/13/23 1110)  Pulse: 76 (09/13/23 1209)  Resp: 16 (09/13/23 1410)  BP: 117/68 (09/13/23 1110)  SpO2: 99 % (09/13/23 1209) Vital Signs (24h Range):  Temp:  [98 °F (36.7 °C)-99.1 °F (37.3 °C)] 98.6 °F (37 °C)  Pulse:  [76-86] 76  Resp:  [16-20] 16  SpO2:  [95 %-99 %] 99 %  BP: (117-137)/(68-81) 117/68     Weight: 117 kg (257 lb 15 oz)  Body mass index is 33.12 kg/m².    Estimated Creatinine Clearance: 106.8 mL/min (based on SCr of 1 mg/dL).     Physical Exam  Vitals and nursing note reviewed.   Neurological:      General: No focal deficit present.      Mental Status: He is oriented to person, place, and time.   Psychiatric:         Mood and Affect: Mood normal.         Behavior: Behavior normal.         Thought Content: Thought content normal.          Significant Labs: Blood Culture:   Recent Labs   Lab 09/07/23  2343 09/08/23  1333 09/11/23  1104   LABBLOO Gram stain jose bottle: Gram positive cocci in clusters resembling Staph  Results called to and read back by:Starla Wilburn Lpn 09/09/2023 00:41  Gram stain aer bottle: Gram positive cocci in clusters resembling Staph  Positive results previously called 09/10/2023  06:15  STAPHYLOCOCCUS AUREUS  ID consult required at [facility].  For susceptibility see order #[id number]  *  Gram stain jose bottle: Gram positive cocci in clusters resembling Staph  Results called to and read back by:Starla Wilburn Lpn 09/09/2023 00:41  Gram stain aer bottle: Gram positive cocci in clusters resembling Staph  Positive results previously called 09/09/2023  13:34  STAPHYLOCOCCUS AUREUS  ID consult required at [facility].  * Gram stain jose bottle: Gram positive cocci in clusters resembling Staph  Results called to and read back by:Kelly Elder Rn 09/09/2023  23:09  STAPHYLOCOCCUS  AUREUS  ID consult required at [facility] [place] Frederic.Annie,Sandra and Shaheen shaw.  For susceptibility see order #[id number]  * No Growth to date  No Growth to date     CBC:   Recent Labs   Lab 09/12/23  0413 09/13/23  0410   WBC 10.65 10.05   HGB 8.4* 8.7*   HCT 27.6* 28.9*   * 627*     Wound Culture:   Recent Labs   Lab 09/08/23  1754   LABAERO STAPHYLOCOCCUS AUREUS  Many  *  STREPTOCOCCUS DYSGALACTIAE  Many  Susceptibility testing not routinely performed  *       Significant Imaging: I have reviewed all pertinent imaging results/findings within the past 24 hours.

## 2023-09-13 NOTE — PLAN OF CARE
Problem: Physical Therapy  Goal: Physical Therapy Goal  Description: Goals to be met by: 10/9/2023    Patient will increase functional independence with mobility by performin. Sit<>stand with supervision with RW while maintaining WB precautions on LLE.  2. Gait x 50 feet with RW with CGA while maintaining WB precautions on LLE.-MET   3. Ascend/descend 10 step(s) with least restrictive assistive device and minimal assist while maintaining WB precautions on LLE.      Outcome: Ongoing, Progressing   Pt sup to sit SBA, sit to stand CGA/SBA w/ RW, amb'd 50' w/ RW and CGA, PWB on LLE in Darco shoe. Recommend cont PT in SNF vs LTAC.

## 2023-09-14 LAB
ANION GAP SERPL CALC-SCNC: 6 MMOL/L (ref 8–16)
BACTERIA SPEC ANAEROBE CULT: NORMAL
BASOPHILS # BLD AUTO: 0.07 K/UL (ref 0–0.2)
BASOPHILS NFR BLD: 0.7 % (ref 0–1.9)
BUN SERPL-MCNC: 16 MG/DL (ref 6–20)
CALCIUM SERPL-MCNC: 9.2 MG/DL (ref 8.7–10.5)
CHLORIDE SERPL-SCNC: 99 MMOL/L (ref 95–110)
CO2 SERPL-SCNC: 29 MMOL/L (ref 23–29)
CREAT SERPL-MCNC: 1 MG/DL (ref 0.5–1.4)
DIFFERENTIAL METHOD: ABNORMAL
EOSINOPHIL # BLD AUTO: 0.7 K/UL (ref 0–0.5)
EOSINOPHIL NFR BLD: 6.9 % (ref 0–8)
ERYTHROCYTE [DISTWIDTH] IN BLOOD BY AUTOMATED COUNT: 16.5 % (ref 11.5–14.5)
EST. GFR  (NO RACE VARIABLE): >60 ML/MIN/1.73 M^2
GLUCOSE SERPL-MCNC: 136 MG/DL (ref 70–110)
HCT VFR BLD AUTO: 29.4 % (ref 40–54)
HGB BLD-MCNC: 8.7 G/DL (ref 14–18)
IMM GRANULOCYTES # BLD AUTO: 0.04 K/UL (ref 0–0.04)
IMM GRANULOCYTES NFR BLD AUTO: 0.4 % (ref 0–0.5)
LYMPHOCYTES # BLD AUTO: 2.3 K/UL (ref 1–4.8)
LYMPHOCYTES NFR BLD: 24.2 % (ref 18–48)
MAGNESIUM SERPL-MCNC: 1.8 MG/DL (ref 1.6–2.6)
MCH RBC QN AUTO: 24.3 PG (ref 27–31)
MCHC RBC AUTO-ENTMCNC: 29.6 G/DL (ref 32–36)
MCV RBC AUTO: 82 FL (ref 82–98)
MONOCYTES # BLD AUTO: 0.9 K/UL (ref 0.3–1)
MONOCYTES NFR BLD: 10 % (ref 4–15)
NEUTROPHILS # BLD AUTO: 5.5 K/UL (ref 1.8–7.7)
NEUTROPHILS NFR BLD: 57.8 % (ref 38–73)
NRBC BLD-RTO: 0 /100 WBC
PHOSPHATE SERPL-MCNC: 3.6 MG/DL (ref 2.7–4.5)
PLATELET # BLD AUTO: 662 K/UL (ref 150–450)
PMV BLD AUTO: 8.8 FL (ref 9.2–12.9)
POCT GLUCOSE: 139 MG/DL (ref 70–110)
POCT GLUCOSE: 148 MG/DL (ref 70–110)
POCT GLUCOSE: 170 MG/DL (ref 70–110)
POCT GLUCOSE: 170 MG/DL (ref 70–110)
POTASSIUM SERPL-SCNC: 4.5 MMOL/L (ref 3.5–5.1)
RBC # BLD AUTO: 3.58 M/UL (ref 4.6–6.2)
SODIUM SERPL-SCNC: 134 MMOL/L (ref 136–145)
WBC # BLD AUTO: 9.44 K/UL (ref 3.9–12.7)

## 2023-09-14 PROCEDURE — 11000001 HC ACUTE MED/SURG PRIVATE ROOM

## 2023-09-14 PROCEDURE — 97535 SELF CARE MNGMENT TRAINING: CPT | Mod: CO

## 2023-09-14 PROCEDURE — 94761 N-INVAS EAR/PLS OXIMETRY MLT: CPT

## 2023-09-14 PROCEDURE — 36415 COLL VENOUS BLD VENIPUNCTURE: CPT | Performed by: HOSPITALIST

## 2023-09-14 PROCEDURE — 63600175 PHARM REV CODE 636 W HCPCS: Performed by: INTERNAL MEDICINE

## 2023-09-14 PROCEDURE — A4216 STERILE WATER/SALINE, 10 ML: HCPCS | Performed by: HOSPITALIST

## 2023-09-14 PROCEDURE — 25000242 PHARM REV CODE 250 ALT 637 W/ HCPCS: Performed by: HOSPITALIST

## 2023-09-14 PROCEDURE — 63600175 PHARM REV CODE 636 W HCPCS: Performed by: HOSPITALIST

## 2023-09-14 PROCEDURE — 99233 SBSQ HOSP IP/OBS HIGH 50: CPT | Mod: ,,, | Performed by: INTERNAL MEDICINE

## 2023-09-14 PROCEDURE — 25000003 PHARM REV CODE 250: Performed by: HOSPITALIST

## 2023-09-14 PROCEDURE — 94640 AIRWAY INHALATION TREATMENT: CPT

## 2023-09-14 PROCEDURE — 97116 GAIT TRAINING THERAPY: CPT | Mod: CQ

## 2023-09-14 PROCEDURE — 25000003 PHARM REV CODE 250: Performed by: INTERNAL MEDICINE

## 2023-09-14 PROCEDURE — 94799 UNLISTED PULMONARY SVC/PX: CPT

## 2023-09-14 PROCEDURE — 99233 PR SUBSEQUENT HOSPITAL CARE,LEVL III: ICD-10-PCS | Mod: ,,, | Performed by: INTERNAL MEDICINE

## 2023-09-14 PROCEDURE — 25000003 PHARM REV CODE 250: Performed by: NURSE PRACTITIONER

## 2023-09-14 PROCEDURE — 85025 COMPLETE CBC W/AUTO DIFF WBC: CPT | Performed by: HOSPITALIST

## 2023-09-14 PROCEDURE — 84100 ASSAY OF PHOSPHORUS: CPT | Performed by: HOSPITALIST

## 2023-09-14 PROCEDURE — 83735 ASSAY OF MAGNESIUM: CPT | Performed by: HOSPITALIST

## 2023-09-14 PROCEDURE — 80048 BASIC METABOLIC PNL TOTAL CA: CPT | Performed by: HOSPITALIST

## 2023-09-14 RX ORDER — INSULIN DETEMIR 100 [IU]/ML
20 INJECTION, SOLUTION SUBCUTANEOUS NIGHTLY
Refills: 0
Start: 2023-09-14 | End: 2023-09-20 | Stop reason: SDUPTHER

## 2023-09-14 RX ORDER — INSULIN ASPART 100 [IU]/ML
4 INJECTION, SOLUTION INTRAVENOUS; SUBCUTANEOUS 3 TIMES DAILY
Refills: 0
Start: 2023-09-14 | End: 2023-09-20 | Stop reason: SDUPTHER

## 2023-09-14 RX ORDER — INSULIN ASPART 100 [IU]/ML
0-10 INJECTION, SOLUTION INTRAVENOUS; SUBCUTANEOUS
Refills: 0
Start: 2023-09-14 | End: 2023-09-20 | Stop reason: SDUPTHER

## 2023-09-14 RX ORDER — GABAPENTIN 300 MG/1
300 CAPSULE ORAL 2 TIMES DAILY
Qty: 60 CAPSULE | Refills: 1
Start: 2023-09-14 | End: 2023-09-20 | Stop reason: SDUPTHER

## 2023-09-14 RX ADMIN — CEFAZOLIN 6 G: 2 INJECTION, POWDER, FOR SOLUTION INTRAMUSCULAR; INTRAVENOUS at 10:09

## 2023-09-14 RX ADMIN — IPRATROPIUM BROMIDE AND ALBUTEROL SULFATE 3 ML: 2.5; .5 SOLUTION RESPIRATORY (INHALATION) at 08:09

## 2023-09-14 RX ADMIN — INSULIN ASPART 4 UNITS: 100 INJECTION, SOLUTION INTRAVENOUS; SUBCUTANEOUS at 08:09

## 2023-09-14 RX ADMIN — OXYCODONE HYDROCHLORIDE 5 MG: 5 TABLET ORAL at 03:09

## 2023-09-14 RX ADMIN — GABAPENTIN 300 MG: 300 CAPSULE ORAL at 08:09

## 2023-09-14 RX ADMIN — LISINOPRIL 10 MG: 10 TABLET ORAL at 08:09

## 2023-09-14 RX ADMIN — IPRATROPIUM BROMIDE AND ALBUTEROL SULFATE 3 ML: 2.5; .5 SOLUTION RESPIRATORY (INHALATION) at 04:09

## 2023-09-14 RX ADMIN — IPRATROPIUM BROMIDE AND ALBUTEROL SULFATE 3 ML: 2.5; .5 SOLUTION RESPIRATORY (INHALATION) at 03:09

## 2023-09-14 RX ADMIN — IPRATROPIUM BROMIDE AND ALBUTEROL SULFATE 3 ML: 2.5; .5 SOLUTION RESPIRATORY (INHALATION) at 07:09

## 2023-09-14 RX ADMIN — OXYCODONE HYDROCHLORIDE 5 MG: 5 TABLET ORAL at 08:09

## 2023-09-14 RX ADMIN — IPRATROPIUM BROMIDE AND ALBUTEROL SULFATE 3 ML: 2.5; .5 SOLUTION RESPIRATORY (INHALATION) at 12:09

## 2023-09-14 RX ADMIN — SODIUM CHLORIDE, PRESERVATIVE FREE 10 ML: 5 INJECTION INTRAVENOUS at 12:09

## 2023-09-14 RX ADMIN — INSULIN DETEMIR 20 UNITS: 100 INJECTION, SOLUTION SUBCUTANEOUS at 10:09

## 2023-09-14 RX ADMIN — INSULIN ASPART 4 UNITS: 100 INJECTION, SOLUTION INTRAVENOUS; SUBCUTANEOUS at 05:09

## 2023-09-14 RX ADMIN — ENOXAPARIN SODIUM 40 MG: 40 INJECTION SUBCUTANEOUS at 04:09

## 2023-09-14 RX ADMIN — ATORVASTATIN CALCIUM 40 MG: 20 TABLET, FILM COATED ORAL at 08:09

## 2023-09-14 RX ADMIN — ASPIRIN 81 MG: 81 TABLET, COATED ORAL at 08:09

## 2023-09-14 RX ADMIN — SODIUM CHLORIDE, PRESERVATIVE FREE 10 ML: 5 INJECTION INTRAVENOUS at 05:09

## 2023-09-14 RX ADMIN — INSULIN ASPART 1 UNITS: 100 INJECTION, SOLUTION INTRAVENOUS; SUBCUTANEOUS at 10:09

## 2023-09-14 RX ADMIN — QUETIAPINE FUMARATE 200 MG: 200 TABLET ORAL at 08:09

## 2023-09-14 NOTE — PROGRESS NOTES
Pt received on RA;SPO2 normal. Treatments were given and tolerated well. No changes were made. Will continue to monitor.

## 2023-09-14 NOTE — DISCHARGE SUMMARY
"[facility]  Discharge Summary      Patient Name: Dave Good  MRN: [id number]  CRISTA: [id number]  Patient Class: IP- Inpatient  Admission Date: 9/7/2023  Hospital Length of Stay: 10 days  Discharge Date and Time:  09/18/2023 2:21 PM  Attending Physician: Zeus Nation MD   Discharging Provider: Zeus Nation MD  Primary Care Provider: Reyna Jorge NP    Primary Care Team: Networked reference to record PCT     HPI:   Per Larry Shepherd NP:    "The patient is a 58 y.o. male with a past medical history of type 2 diabetes, HTN, HLD, and chronic systolic congestive heart failure who presents with worsening left great toe pain infection and swelling.  Patient was recently seen in the ER and discharged to follow up with ID and wound care.  Since then he has been unable to follow-up with podiatry or infectious disease.  He states that he has not tried to call their offices and was waiting for them to call him.  States that his toe pain has gotten worse.  He has been soaking it in a foot massage machine chair with no improvement.  XR positive for left great, 2nd, and 4th toe acute osteomyelitis.  He will be admitted for further management of his acute osteomyelitis and infectious disease and podiatry consult."      Procedure(s) (LRB):  AMPUTATION, TOE (Left)      Hospital Course:   Patient is 58-year-old man with history of hypertension, diabetes mellitus type 2, reported history of systolic heart failure (although last echocardiogram with normal systolic function), significant prior tobacco smoking history with chronic obstructive pulmonary disease admitted to the hospital with diabetic ulcer with abscess involving left foot including great toe and proximal foot.  Patient with marked leukocytosis and elevated heart rate consistent with systemic inflammatory response/sepsis secondary to foot infection.  Blood cultures obtained.  Patient started on intravenous antibiotics.  " Podiatry consulted and took the patient for surgical debridement with removal of necrotic tissue on 9/8/2023.  Blood cultures positive for Gram-positive cocci representing Staphylococcus aureus.    Patient with prior history of line associated deep vein thrombosis for which he was treated with three months of anticoagulation.       Goals of Care Treatment Preferences:  Code Status: Full Code      Consults:   Consults (From admission, onward)          Status Ordering Provider     Inpatient virtual consult to Hospital Medicine  Once        Provider:  (Not yet assigned)    Acknowledged NAHUM ESTRELLA     Inpatient consult to Cardiology  Once        Provider:  Chavo Vigil MD    Completed RAHULNUVIA GONZALEZIS FPanchito     Inpatient consult to Midline team  Once        Provider:  (Not yet assigned)    Acknowledged SANJIV KAY FPanchito     Inpatient consult to Infectious Diseases  Once        Provider:  Gomez Manrique MD    Completed JAS CARPENTER     Inpatient consult to Podiatry  Once        Provider:  Samuel Toussaint DPM    Completed JAS CARPENTER     Inpatient consult to Registered Dietitian/Nutritionist  Once        Provider:  (Not yet assigned)    Completed RAHUL, JAYLYNPanchito     Inpatient consult to Registered Dietitian/Nutritionist  Once        Provider:  (Not yet assigned)    Completed RAHUL JAYLYNPanchito     Inpatient consult to Social Work/Case Management  Once        Provider:  (Not yet assigned)    Completed JAS CARPENTER            No new Assessment & Plan notes have been filed under this hospital service since the last note was generated.  Service: Hospital Medicine    Final Active Diagnoses:    Diagnosis Date Noted POA    PRINCIPAL PROBLEM:  Staphylococcus aureus bacteremia with sepsis [A41.01] 09/09/2023 Yes    Chronic combined systolic and diastolic congestive heart failure [I50.42] 01/07/2022 Yes    Type 2 diabetes mellitus with diabetic peripheral angiopathy without gangrene, with  long-term current use of insulin [E11.51, Z79.4] 09/20/2021 Not Applicable    Microcytic anemia [D50.9] 09/16/2023 Yes    Mixed hyperlipidemia [E78.2] 09/20/2021 Yes    Hypertension [I10] 09/20/2021 Yes    Obesity due to excess calories with serious comorbidity [E66.09] 09/20/2021 Yes      Problems Resolved During this Admission:       Discharged Condition: stable    Disposition: Long Term Acute Care    Follow Up:   Follow-up Information       Reyna Jorge NP Follow up in 1 week(s).    Specialty: Family Medicine  Contact information:  [address]  [facility] [place] [id number] [phone]                           Patient Instructions:      Diet Cardiac     Diet diabetic     Notify your health care provider if you experience any of the following:  temperature >100.4     Notify your health care provider if you experience any of the following:  severe uncontrolled pain     Notify your health care provider if you experience any of the following:  redness, tenderness, or signs of infection (pain, swelling, redness, odor or green/yellow discharge around incision site)     Activity as tolerated       Significant Diagnostic Studies: Labs:   BMP:   Recent Labs   Lab 09/18/23  0639   *      K 4.5      CO2 28   BUN 18   CREATININE 1.0   CALCIUM 9.1   MG 1.7    and CBC   Recent Labs   Lab 09/18/23  0639   WBC 8.30   HGB 9.4*   HCT 31.3*   *       Pending Diagnostic Studies:       Procedure Component Value Units Date/Time    Soluble Transferrin Receptor [[id number]] Collected: 09/17/23 0942    Order Status: Sent Lab Status: In process Updated: 09/17/23 1928    Specimen: Blood            Medications:  Reconciled Home Medications:      Medication List        START taking these medications      dextrose 5 % (D5W) SolP 500 mL with ceFAZolin 1 gram SolR 6 g  Inject 6 g into the vein once daily.     gabapentin 300 MG capsule  Commonly known as: NEURONTIN  Take 1 capsule (300 mg total) by mouth  "2 (two) times daily.  Replaces: gabapentin 600 MG tablet            CHANGE how you take these medications      * insulin aspart U-100 100 unit/mL (3 mL) Inpn pen  Commonly known as: NovoLOG  Inject 0-10 Units into the skin before meals and at bedtime as needed (per algorithm).  What changed:   how much to take  how to take this  when to take this  reasons to take this  additional instructions     * insulin aspart U-100 100 unit/mL (3 mL) Inpn pen  Commonly known as: NovoLOG  Inject 4 Units into the skin 3 (three) times daily.  What changed: You were already taking a medication with the same name, and this prescription was added. Make sure you understand how and when to take each.     LEVEMIR FLEXTOUCH U100 INSULIN 100 unit/mL (3 mL) Inpn pen  Generic drug: insulin detemir U-100 (Levemir)  Inject 20 Units into the skin every evening.  What changed:   how much to take  how to take this  when to take this           * This list has 2 medication(s) that are the same as other medications prescribed for you. Read the directions carefully, and ask your doctor or other care provider to review them with you.                CONTINUE taking these medications      ammonium lactate 12 % Crea  Apply twice daily to affected parts both feet as needed.     apixaban 5 mg Tab  Commonly known as: ELIQUIS  Take 1 tablet (5 mg total) by mouth 2 (two) times daily.     aspirin 81 MG EC tablet  Commonly known as: ECOTRIN  Take 1 tablet (81 mg total) by mouth once daily.     atorvastatin 40 MG tablet  Commonly known as: LIPITOR  Take 1 tablet (40 mg total) by mouth once daily.     EASY COMFORT PEN NEEDLES 31 gauge x 1/4" Ndle  Generic drug: pen needle, diabetic     GLUCAGEN DIAGNOSTIC KIT 1 mg/mL Solr  Generic drug: glucagon  SMARTSI Milliliter(s) IM As Directed PRN     lisinopriL 10 MG tablet  Take 1 tablet (10 mg total) by mouth once daily.     metFORMIN 1000 MG tablet  Commonly known as: GLUCOPHAGE  Take 1 tablet (1,000 mg total) by " mouth 2 (two) times daily.     ondansetron 4 mg/2 mL Soln  SMARTSI Milliliter(s) IV Every 8 Hours PRN     ONETOUCH ULTRA TEST Strp  Generic drug: blood sugar diagnostic     polyethylene glycol 17 gram/dose powder  Commonly known as: GLYCOLAX     QUEtiapine 200 MG Tab  Commonly known as: SEROQUEL  Take 1 tablet (200 mg total) by mouth nightly.     STOOL SOFTENER 100 MG capsule  Generic drug: docusate sodium  Take 100 mg by mouth 2 (two) times daily.            STOP taking these medications      bisacodyL 5 mg EC tablet  Commonly known as: DULCOLAX     CATHFlo ACtivase 2 mg injection  Generic drug: alteplase     ciclopirox 8 % Soln  Commonly known as: PENLAC     dextrose 50 % in water (D50W) Syrg     enoxaparin 60 mg/0.6 mL Syrg  Commonly known as: LOVENOX     FLOWFLEX COVID-19 AG HOME TEST MISC     gabapentin 600 MG tablet  Commonly known as: NEURONTIN  Replaced by: gabapentin 300 MG capsule     GLUTOSE-15 40 % gel  Generic drug: dextrose     HumaLOG U-100 Insulin 100 unit/mL injection  Generic drug: insulin lispro     hydroCHLOROthiazide 25 MG tablet  Commonly known as: HYDRODIURIL     HYDROcodone-acetaminophen 5-325 mg per tablet  Commonly known as: NORCO     ketoconazole 2 % cream  Commonly known as: NIZORAL     LANTUS SOLOSTAR U-100 INSULIN glargine 100 units/mL SubQ pen  Generic drug: insulin     TRULICITY 0.75 mg/0.5 mL pen injector  Generic drug: dulaglutide     urea 45 % Crea              Indwelling Lines/Drains at time of discharge:   Lines/Drains/Airways       None                   Time spent on the discharge of patient: 35 minutes         Zeus Nation MD  Department of Hospital Medicine  [facility] ([place])

## 2023-09-14 NOTE — PT/OT/SLP PROGRESS
Physical Therapy Treatment    Patient Name:  Dave Good   MRN:  [id number]    Recommendations:     Discharge Recommendations: nursing facility, skilled (pt will d/c to LTACH)  Discharge Equipment Recommendations: to be determined by next level of care  Barriers to discharge: Decreased caregiver support    Assessment:     Dave Good is a 58 y.o. male admitted with a medical diagnosis of Staphylococcus aureus bacteremia with sepsis.  He presents with the following impairments/functional limitations: weakness, gait instability, impaired balance, impaired cardiopulmonary response to activity, impaired endurance, impaired functional mobility, impaired self care skills, impaired skin, decreased coordination, decreased lower extremity function, decreased ROM, decreased safety awareness.    Sit>stand from chair with RW and SBA  Amb 50' with RW and CGA progressing to SBA, B DARCO shoes, decreased gait speed, franchesca and B step length, increased double limb support time  Pt with good use of RW with gait to help offload LLE for PWB  Rec SNF (pt d/c'ing to LTACH)    Rehab Prognosis: Good; patient would benefit from acute skilled PT services to address these deficits and reach maximum level of function.    Recent Surgery: Procedure(s) (LRB):  AMPUTATION, TOE (Left) 6 Days Post-Op    Plan:     During this hospitalization, patient to be seen 5 x/week to address the identified rehab impairments via gait training, therapeutic activities, therapeutic exercises and progress toward the following goals:    Plan of Care Expires:  10/09/23    Subjective     Chief Complaint: pain  Patient/Family Comments/goals: I'm a big walker, I walk around  Pain/Comfort:  Pain Rating 1: 6/10  Location - Side 1: Left  Location - Orientation 1: generalized  Location 1: foot  Pain Addressed 1: Pre-medicate for activity, Reposition, Distraction, Cessation of Activity  Pain Rating Post-Intervention 1: 6/10      Objective:     Communicated with nurse Madison  prior to session.  Patient found up in chair with telemetry, peripheral IV, PICC line upon PT entry to room.     General Precautions: Standard, fall, diabetic  Orthopedic Precautions: LLE partial weight bearing (on heel)  Braces:  (BLE Darco shoes)  Respiratory Status: Room air     Functional Mobility:  Transfers:     Sit to Stand:  stand by assistance with rolling walker  Gait: 50' with RW and CGA progressing to SBA, B DARCO shoes, decreased gait speed, franchesca and B step length, increased double limb support time      AM-PAC 6 CLICK MOBILITY  Turning over in bed (including adjusting bedclothes, sheets and blankets)?: 4  Sitting down on and standing up from a chair with arms (e.g., wheelchair, bedside commode, etc.): 3  Moving from lying on back to sitting on the side of the bed?: 4  Moving to and from a bed to a chair (including a wheelchair)?: 3  Need to walk in hospital room?: 3  Climbing 3-5 steps with a railing?: 2  Basic Mobility Total Score: 19       Treatment & Education:  Seated therex: LAQ, hip flexion marches x 10  Gait training as noted    Patient left up in chair with all lines intact, call button in reach, and nurse Gricelda notified..    GOALS:   Multidisciplinary Problems       Physical Therapy Goals          Problem: Physical Therapy    Goal Priority Disciplines Outcome Goal Variances Interventions   Physical Therapy Goal     PT, PT/OT Ongoing, Progressing     Description: Goals to be met by: 10/9/2023    Patient will increase functional independence with mobility by performin. Sit<>stand with supervision with RW while maintaining WB precautions on LLE.  2. Gait x 50 feet with RW with CGA while maintaining WB precautions on LLE. - MET 23  3. Ascend/descend 10 step(s) with least restrictive assistive device and minimal assist while maintaining WB precautions on LLE.                           Time Tracking:     PT Received On: 23  PT Start Time: 1150     PT Stop Time: 1204  PT Total  Time (min): 14 min     Billable Minutes: Gait Training 14    Treatment Type: Treatment  PT/PTA: PTA     Number of PTA visits since last PT visit: 2     09/14/2023

## 2023-09-14 NOTE — PROGRESS NOTES
Pt received on room air. Aerosol treatment given with no adverse reaction, Will continue to monitor.

## 2023-09-14 NOTE — ASSESSMENT & PLAN NOTE
Glucose stable.  Hemoglobin A1c of 7.5.    Home Meds: Metformin 1g bid, Detemir 50 units, Aspart 10 units TIDWM, Trulicity weekly (?)  Hospital Meds:  Detemir 20 units, Aspart 4 units TIDWM moderate correction dose SSI  -Continue with current regimen - can restart Metformin on discharge  -Diabetic Diet  -Monitor and adjust as needed

## 2023-09-14 NOTE — ASSESSMENT & PLAN NOTE
No evidence of volume overload on admission with negative CXR and normal BNP.  S/p IV Lasix and d/c on 9/13/2023.   TTE on admission with regional wall motion abnormalities are present: The septum is moderately hypokinetic. There is mildly reduced systolic function. Ejection fraction by visual approximation is 45%. Grade I diastolic dysfunction.  Repeat CXR on 9/12/2023 with streaky opacities at the lung bases can be seen with subsegmental atelectasis.  -Continue with ACEI - Lisinopril  -Low Na diet

## 2023-09-14 NOTE — PLAN OF CARE
ARLENE followed up again with Jacey (navigator with [facility] [phone]) regarding LTACH placement. She says auth has been submitted, and she will contact me as soon as she has a response.

## 2023-09-14 NOTE — ASSESSMENT & PLAN NOTE
Normotensive on Lisinopril  Home Meds:  HCTZ 25mg, Lisinopril 10mg  -Continue with Lisinopril  -Monitor and adjust as needed

## 2023-09-14 NOTE — ASSESSMENT & PLAN NOTE
"Patient presented to ED with complaints of worsening left great toe pain and swelling.  X-ray of left foot with acute osteomyelitis involving the great toe and distal aspects of the 2nd and 4th toes.  Labs on admission with WBC of 21.91 with left shift.  Blood cultures on admission (9/7/2023) with 4/4 bottles, 9/8/2023 with 1/2 bottles, and 9/11/2023 with 0/2 bottles positive for MSSA . Wound culture (9/8/2023 - areobic) positive for MSSA and Strep dysgalactiae.  Started on IV Vanc/Meropenem on admission and changed to IV Vanc/Cefazolin on 9/10/2023.  IV Vanc discontinued on 9/11/2023. Podiatry consulted and patient underwent left 1st toe amputation on 9/8/2023 with Dr. Samuel Toussaint.  TTE on 9/8/2023 did not show evidence of endocarditis.  ID consulted and recommended PAT, but patient declined given a history of complicated PAT in 2022.  Patient will need 6 weeks of IV antibiotics given no PAT.  Waiting for LTAC placement.    Plan:  Follow up with Podiatry recommendations - "Wound care daily, pack with aquacel rope, and secure with 4" Kerlix and 4" ACE.  Avoid tight compression. WBAT, Darco shoe". Follow up pathology results.  Follow up with ID recommendations - will need 6 weeks IV antibiotics given no PAT.  Has Midline.  EOT will be 10/26/2023.  Pain control as needed.  Continue with Wound Care  PT/OT consulted - will need LTAC given duration of antibiotics needed  Follow up with Podiatry on discharge.  "

## 2023-09-14 NOTE — PT/OT/SLP PROGRESS
Occupational Therapy   Treatment    Name: Dave Good  MRN: [id number]  Admitting Diagnosis:  Staphylococcus aureus bacteremia with sepsis  6 Days Post-Op    Recommendations:     Discharge Recommendations: nursing facility, skilled  Discharge Equipment Recommendations:  bedside commode, wheelchair, shower chair, to be determined by next level of care  Barriers to discharge:  Decreased caregiver support (10 KRUPA and current functional level)    Assessment:     Dave Good is a 58 y.o. male with a medical diagnosis of Staphylococcus aureus bacteremia with sepsis.  He presents with Left foot pain. Performance deficits affecting function are weakness, impaired endurance, impaired self care skills, impaired functional mobility, gait instability, impaired balance, decreased coordination, decreased lower extremity function, decreased safety awareness, decreased ROM, impaired skin, impaired cardiopulmonary response to activity.     Patient pleasant and agreeable to therapy. Patient with CGA, RW to don pants for dynamic standing balance for managing waistline sitting EOB. Patient continues to require cues for LLE PWB on heel during functional mobility.     Rehab Prognosis:  Good; patient would benefit from acute skilled OT services to address these deficits and reach maximum level of function.       Plan:     Patient to be seen 5 x/week to address the above listed problems via self-care/home management, therapeutic activities, therapeutic exercises  Plan of Care Expires: 09/24/23  Plan of Care Reviewed with: patient    Subjective     Chief Complaint: Pain in left foot    Patient/Family Comments/goals: agreeable to participate in therapy for OT intervention    Pain/Comfort:  Pain Rating 1: 6/10  Location - Side 1: Left  Location - Orientation 1: generalized  Location 1: foot  Pain Addressed 1: Pre-medicate for activity, Reposition, Distraction, Cessation of Activity  Pain Rating Post-Intervention 1: 0/10 (at  rest)    Objective:     Communicated with: RN  prior to session.  Patient found supine with oxygen, telemetry, PICC line upon OT entry to room.    General Precautions: Standard, fall, diabetic    Orthopedic Precautions:LLE partial weight bearing (on heel)  Braces:  (BLE sx shoes)  Respiratory Status: Room air     Occupational Performance:     Bed Mobility:    Supine > Sit: Supv with hospital bed features      Functional Mobility/Transfers:  Sit <> Stand: CGA, RW with rocking momentum   Functional Mobility: CGA, RW with increase of time - repeated cues for PWB on Left Heel   BSC/Chair transfer: CGA    Activities of Daily Living:  Grooming: Set up/Supervision for oral care and facial hygiene seated front of sink, on BSC   LB Dressing: CGA to don pants - needing assistance for dynamic standing balance for waistline management       [facility] 6 Click ADL: 15    Treatment & Education:  OT role, plan of care, progression of goals, importance of continued OOB activity, ADL/functional transfer and mobility retraining, discharge recommendation, call don't fall, safety precautions, fall prevention, PWB on Left Heel .      Patient left up in chair with all lines intact, call button in reach, and RN notified    GOALS:   Multidisciplinary Problems       Occupational Therapy Goals          Problem: Occupational Therapy    Goal Priority Disciplines Outcome Interventions   Occupational Therapy Goal     OT, PT/OT Ongoing, Progressing    Description: Goals to be met by: 9/24/2023     Patient will increase functional independence with ADLs by performing:    UE Dressing with Set-up Assistance.  LE Dressing with Moderate Assistance.  Toileting from bedside commode with Contact Guard Assistance for hygiene and clothing management.   Toilet transfer to bedside commode with Stand-by Assistance while maintaining weight bearing precautions.    COMPLETED GOALS  Grooming while seated at sink with Set-up Assistance. MET 9/11/2023                          Time Tracking:     OT Date of Treatment: 09/14/23  OT Start Time: 0920  OT Stop Time: 0948  OT Total Time (min): 28 min    Billable Minutes:Self Care/Home Management 28 min    OT/DEREK: DEREK     Number of DEREK visits since last OT visit: 4    9/14/2023

## 2023-09-14 NOTE — PLAN OF CARE
ARLENE spoke with navigator (Jacey [phone]) at [facility]. She says they are almost done with reviewing pre-screening for admission, and will submit for authorization today.

## 2023-09-14 NOTE — SUBJECTIVE & OBJECTIVE
Interval History:  Patient is awake and alert this morning.  No acute events overnight.  Pain improved/controlled with medications.   Tolerating antibiotics well.  Waiting for LTAC placement.    Review of Systems   Constitutional:  Negative for chills and fever.   HENT:  Negative for congestion and ear pain.    Eyes:  Negative for pain.   Respiratory:  Negative for shortness of breath.    Cardiovascular:  Negative for chest pain and palpitations.   Gastrointestinal:  Negative for abdominal pain.   Genitourinary:  Negative for difficulty urinating.   Musculoskeletal:  Negative for back pain.   Skin:  Positive for wound (Left foot wound clean and dressed).   Neurological:  Negative for dizziness and headaches.   Psychiatric/Behavioral:  Negative for agitation and behavioral problems.      Objective:     Vital Signs (Most Recent):  Temp: 98.3 °F (36.8 °C) (09/14/23 1233)  Pulse: 81 (09/14/23 1545)  Resp: 18 (09/14/23 1545)  BP: 95/75 (09/14/23 1233)  SpO2: 97 % (09/14/23 1545)   Vital Signs (24h Range):  Temp:  [97.6 °F (36.4 °C)-98.6 °F (37 °C)] 98.3 °F (36.8 °C)  Pulse:  [77-97] 81  Resp:  [16-20] 18  SpO2:  [91 %-98 %] 97 %  BP: ()/(64-80) 95/75     Weight: 117 kg (257 lb 15 oz)  Body mass index is 33.12 kg/m².    Intake/Output Summary (Last 24 hours) at 9/14/2023 1605  Last data filed at 9/14/2023 1306  Gross per 24 hour   Intake 312.65 ml   Output 1100 ml   Net -787.35 ml        Physical Exam  Vitals reviewed.   Constitutional:       General: He is not in acute distress.     Appearance: Normal appearance. He is obese. He is ill-appearing (chronically). He is not toxic-appearing.   HENT:      Head: Normocephalic and atraumatic.      Right Ear: External ear normal.      Left Ear: External ear normal.      Nose: Nose normal.      Mouth/Throat:      Mouth: Mucous membranes are moist.      Pharynx: Oropharynx is clear.   Eyes:      General: No scleral icterus.     Conjunctiva/sclera: Conjunctivae normal.       Pupils: Pupils are equal, round, and reactive to light.   Cardiovascular:      Rate and Rhythm: Normal rate and regular rhythm.   Pulmonary:      Effort: Pulmonary effort is normal.      Breath sounds: Normal breath sounds. No wheezing or rales.   Abdominal:      General: Bowel sounds are normal.      Tenderness: There is no abdominal tenderness.   Musculoskeletal:      Cervical back: Normal range of motion and neck supple.      Comments: Left toe amp wound clean and dressed   Skin:     General: Skin is warm and dry.      Capillary Refill: Capillary refill takes less than 2 seconds.   Neurological:      General: No focal deficit present.      Mental Status: He is alert and oriented to person, place, and time.      Cranial Nerves: No cranial nerve deficit.   Psychiatric:         Mood and Affect: Mood normal.         Behavior: Behavior normal.         Significant Labs: All pertinent labs within the past 24 hours have been reviewed.  CBC:   Recent Labs   Lab 09/13/23  0410 09/14/23  0418   WBC 10.05 9.44   HGB 8.7* 8.7*   HCT 28.9* 29.4*   * 662*       CMP:   Recent Labs   Lab 09/13/23  0409 09/14/23  0418   * 134*   K 5.0 4.5   CL 98 99   CO2 29 29   * 136*   BUN 15 16   CREATININE 1.0 1.0   CALCIUM 8.5* 9.2   ANIONGAP 7* 6*         Significant Imaging: I have reviewed all pertinent imaging results/findings within the past 24 hours.

## 2023-09-14 NOTE — NURSING
Patient is awake and lying in bed. VSS.  No acute events noted during shift.  Pt medicated for pain x1. Safety maintained.  Report given to oncoming nurse. Carli Wilburn

## 2023-09-14 NOTE — PROGRESS NOTES
"[facility]  Progress Note    Patient Name: Dave Good  MRN: [id number]  Patient Class: IP- Inpatient   Admission Date: 9/7/2023  Length of Stay: 6 days  Attending Physician: Zeus Nation MD  Primary Care Provider: Reyna Jorge NP        Subjective:     Principal Problem:Staphylococcus aureus bacteremia with sepsis        HPI:  Per Larry Shepherd, NP:    "The patient is a 58 y.o. male with a past medical history of type 2 diabetes, HTN, HLD, and chronic systolic congestive heart failure who presents with worsening left great toe pain infection and swelling.  Patient was recently seen in the ER and discharged to follow up with ID and wound care.  Since then he has been unable to follow-up with podiatry or infectious disease.  He states that he has not tried to call their offices and was waiting for them to call him.  States that his toe pain has gotten worse.  He has been soaking it in a foot massage machine chair with no improvement.  XR positive for left great, 2nd, and 4th toe acute osteomyelitis.  He will be admitted for further management of his acute osteomyelitis and infectious disease and podiatry consult."      Overview/Hospital Course:  Patient is 58-year-old man with history of hypertension, diabetes mellitus type 2, reported history of systolic heart failure (although last echocardiogram with normal systolic function), significant prior tobacco smoking history with chronic obstructive pulmonary disease admitted to the hospital with diabetic ulcer with abscess involving left foot including great toe and proximal foot.  Patient with marked leukocytosis and elevated heart rate consistent with systemic inflammatory response/sepsis secondary to foot infection.  Blood cultures obtained.  Patient started on intravenous antibiotics.  Podiatry consulted and took the patient for surgical debridement with removal of necrotic tissue on 9/8/2023.  Blood cultures " positive for Gram-positive cocci representing Staphylococcus aureus.    Patient with prior history of line associated deep vein thrombosis for which he was treated with three months of anticoagulation.      Interval History:  Patient is awake and alert this morning.  No acute events overnight.  Pain improved/controlled with medications.   Tolerating antibiotics well.  Waiting for LTAC placement.    Review of Systems   Constitutional:  Negative for chills and fever.   HENT:  Negative for congestion and ear pain.    Eyes:  Negative for pain.   Respiratory:  Negative for shortness of breath.    Cardiovascular:  Negative for chest pain and palpitations.   Gastrointestinal:  Negative for abdominal pain.   Genitourinary:  Negative for difficulty urinating.   Musculoskeletal:  Negative for back pain.   Skin:  Positive for wound (Left foot wound clean and dressed).   Neurological:  Negative for dizziness and headaches.   Psychiatric/Behavioral:  Negative for agitation and behavioral problems.      Objective:     Vital Signs (Most Recent):  Temp: 98.3 °F (36.8 °C) (09/14/23 1233)  Pulse: 81 (09/14/23 1545)  Resp: 18 (09/14/23 1545)  BP: 95/75 (09/14/23 1233)  SpO2: 97 % (09/14/23 1545)   Vital Signs (24h Range):  Temp:  [97.6 °F (36.4 °C)-98.6 °F (37 °C)] 98.3 °F (36.8 °C)  Pulse:  [77-97] 81  Resp:  [16-20] 18  SpO2:  [91 %-98 %] 97 %  BP: ()/(64-80) 95/75     Weight: 117 kg (257 lb 15 oz)  Body mass index is 33.12 kg/m².    Intake/Output Summary (Last 24 hours) at 9/14/2023 1605  Last data filed at 9/14/2023 1306  Gross per 24 hour   Intake 312.65 ml   Output 1100 ml   Net -787.35 ml        Physical Exam  Vitals reviewed.   Constitutional:       General: He is not in acute distress.     Appearance: Normal appearance. He is obese. He is ill-appearing (chronically). He is not toxic-appearing.   HENT:      Head: Normocephalic and atraumatic.      Right Ear: External ear normal.      Left Ear: External ear normal.       Nose: Nose normal.      Mouth/Throat:      Mouth: Mucous membranes are moist.      Pharynx: Oropharynx is clear.   Eyes:      General: No scleral icterus.     Conjunctiva/sclera: Conjunctivae normal.      Pupils: Pupils are equal, round, and reactive to light.   Cardiovascular:      Rate and Rhythm: Normal rate and regular rhythm.   Pulmonary:      Effort: Pulmonary effort is normal.      Breath sounds: Normal breath sounds. No wheezing or rales.   Abdominal:      General: Bowel sounds are normal.      Tenderness: There is no abdominal tenderness.   Musculoskeletal:      Cervical back: Normal range of motion and neck supple.      Comments: Left toe amp wound clean and dressed   Skin:     General: Skin is warm and dry.      Capillary Refill: Capillary refill takes less than 2 seconds.   Neurological:      General: No focal deficit present.      Mental Status: He is alert and oriented to person, place, and time.      Cranial Nerves: No cranial nerve deficit.   Psychiatric:         Mood and Affect: Mood normal.         Behavior: Behavior normal.         Significant Labs: All pertinent labs within the past 24 hours have been reviewed.  CBC:   Recent Labs   Lab 09/13/23  0410 09/14/23  0418   WBC 10.05 9.44   HGB 8.7* 8.7*   HCT 28.9* 29.4*   * 662*       CMP:   Recent Labs   Lab 09/13/23  0409 09/14/23  0418   * 134*   K 5.0 4.5   CL 98 99   CO2 29 29   * 136*   BUN 15 16   CREATININE 1.0 1.0   CALCIUM 8.5* 9.2   ANIONGAP 7* 6*         Significant Imaging: I have reviewed all pertinent imaging results/findings within the past 24 hours.      Assessment/Plan:      * Staphylococcus aureus bacteremia with sepsis  Patient presented to ED with complaints of worsening left great toe pain and swelling.  X-ray of left foot with acute osteomyelitis involving the great toe and distal aspects of the 2nd and 4th toes.  Labs on admission with WBC of 21.91 with left shift.  Blood cultures on admission (9/7/2023)  "with 4/4 bottles, 9/8/2023 with 1/2 bottles, and 9/11/2023 with 0/2 bottles positive for MSSA . Wound culture (9/8/2023 - areobic) positive for MSSA and Strep dysgalactiae.  Started on IV Vanc/Meropenem on admission and changed to IV Vanc/Cefazolin on 9/10/2023.  IV Vanc discontinued on 9/11/2023. Podiatry consulted and patient underwent left 1st toe amputation on 9/8/2023 with Dr. Samuel Toussaint.  TTE on 9/8/2023 did not show evidence of endocarditis.  ID consulted and recommended PAT, but patient declined given a history of complicated PAT in 2022.  Patient will need 6 weeks of IV antibiotics given no PAT.  Waiting for LTAC placement.    Plan:  Follow up with Podiatry recommendations - "Wound care daily, pack with aquacel rope, and secure with 4" Kerlix and 4" ACE.  Avoid tight compression. WBAT, Darco shoe". Follow up pathology results.  Follow up with ID recommendations - will need 6 weeks IV antibiotics given no PAT.  Has Midline.  EOT will be 10/26/2023.  Pain control as needed.  Continue with Wound Care  PT/OT consulted - will need LTAC given duration of antibiotics needed  Follow up with Podiatry on discharge.    Chronic combined systolic and diastolic congestive heart failure  No evidence of volume overload on admission with negative CXR and normal BNP.  S/p IV Lasix and d/c on 9/13/2023.   TTE on admission with regional wall motion abnormalities are present: The septum is moderately hypokinetic. There is mildly reduced systolic function. Ejection fraction by visual approximation is 45%. Grade I diastolic dysfunction.  Repeat CXR on 9/12/2023 with streaky opacities at the lung bases can be seen with subsegmental atelectasis.  -Continue with ACEI - Lisinopril  -Low Na diet      Type 2 diabetes mellitus with diabetic peripheral angiopathy without gangrene, with long-term current use of insulin  Glucose stable.  Hemoglobin A1c of 7.5.    Home Meds: Metformin 1g bid, Detemir 50 units, Aspart 10 units TIDWM, " Trulicity weekly (?)  Hospital Meds:  Detemir 20 units, Aspart 4 units TIDWM moderate correction dose SSI  -Continue with current regimen - can restart Metformin on discharge  -Diabetic Diet  -Monitor and adjust as needed      Obesity due to excess calories with serious comorbidity  Body mass index is 33.12 kg/m². Morbid obesity complicates all aspects of disease management from diagnostic modalities to treatment. Weight loss encouraged and health benefits explained to patient.         Hypertension  Normotensive on Lisinopril  Home Meds:  HCTZ 25mg, Lisinopril 10mg  -Continue with Lisinopril  -Monitor and adjust as needed      VTE Risk Mitigation (From admission, onward)         Ordered     enoxaparin injection 40 mg  Daily         09/09/23 1108     IP VTE HIGH RISK PATIENT  Once         09/09/23 1108     Place sequential compression device  Until discontinued         09/08/23 0325                Discharge Planning   LOIS: 9/14/2023     Code Status: Full Code   Is the patient medically ready for discharge?:     Reason for patient still in hospital (select all that apply): Patient trending condition, Treatment and Consult recommendations  Discharge Plan A: Long-term acute care facility (LTAC)   Discharge Delays: None known at this time              Zeus Nation MD  Department of Hospital Medicine   [facility] ([place]

## 2023-09-14 NOTE — PLAN OF CARE
Problem: Physical Therapy  Goal: Physical Therapy Goal  Description: Goals to be met by: 10/9/2023    Patient will increase functional independence with mobility by performin. Sit<>stand with supervision with RW while maintaining WB precautions on LLE.  2. Gait x 50 feet with RW with CGA while maintaining WB precautions on LLE. - MET 23  3. Ascend/descend 10 step(s) with least restrictive assistive device and minimal assist while maintaining WB precautions on LLE.      Sit>stand from chair with RW and SBA  Amb 50' with RW and CGA progressing to SBA, decreased gait speed, franchesca and B step length, increased double limb support time  Pt with good use of RW with gait to help offload LLE for PWB  Rec SNF (pt d/c'ing to [facility])  Outcome: Ongoing, Progressing

## 2023-09-14 NOTE — ASSESSMENT & PLAN NOTE
"Patient presented to ED with complaints of worsening left great toe pain and swelling.  X-ray of left foot with acute osteomyelitis involving the great toe and distal aspects of the 2nd and 4th toes.  Labs on admission with WBC of 21.91 with left shift.  Blood cultures on admission (9/7/2023) with 4/4 bottles, 9/8/2023 with 1/2 bottles, and 9/11/2023 with 0/2 bottles positive for MSSA . Wound culture (9/8/2023 - areobic) positive for MSSA and Strep dysgalactiae.  Started on IV Vanc/Meropenem on admission and changed to IV Vanc/Cefazolin on 9/10/2023.  IV Vanc discontinued on 9/11/2023. Podiatry consulted and patient underwent left 1st toe amputation on 9/8/2023 with Dr. Samuel Toussaint.  TTE on 9/8/2023 did not show evidence of endocarditis.  ID consulted and recommended PAT, but patient declined given a history of complicated PAT in 2022.  Patient will need 6 weeks of IV antibiotics given no PAT.  Patient to be discharged to LTAC today.    Plan:  Follow up with Podiatry recommendations - "Wound care daily, pack with aquacel rope, and secure with 4" Kerlix and 4" ACE.  Avoid tight compression. WBAT, Darco shoe". Follow up pathology results.  Follow up with ID recommendations - will need 6 weeks IV antibiotics given no PAT.  Has Midline.  EOT will be 10/26/2023.  Pain control as needed.  Continue with Wound Care  PT/OT consulted - will need LTAC given duration of antibiotics needed  Follow up with Podiatry on discharge.  "

## 2023-09-14 NOTE — PLAN OF CARE
"    Skilled Nursing Home Orders    09/20/2023  [facility]  [facility] ([facility])  [address]  [facility] [address]  Dept: [phone]  Loc: [phone]     Admit to Nursing Home: Skilled Nursing    Diagnoses:  Active Hospital Problems    Diagnosis  POA    *Staphylococcus aureus bacteremia with sepsis [A41.01]  Yes     Priority: 1 - High    Chronic combined systolic and diastolic congestive heart failure [I50.42]  Yes     Priority: 2     Type 2 diabetes mellitus with diabetic peripheral angiopathy without gangrene, with long-term current use of insulin [E11.51, Z79.4]  Not Applicable     Priority: 3     Microcytic anemia [D50.9]  Yes     Priority: 4     Mixed hyperlipidemia [E78.2]  Yes    Hypertension [I10]  Yes    Obesity due to excess calories with serious comorbidity [E66.09]  Yes      Resolved Hospital Problems   No resolved problems to display.       Patient is homebound due to:  Staphylococcus aureus bacteremia with sepsis    Allergies:  Review of patient's allergies indicates:   Allergen Reactions    Ibuprofen Swelling     Facial swelling       Vitals:  Routine    Diet: cardiac diet, diabetic diet: 2000 calorie, and 2 gram sodium diet    Activities:   Activity as tolerated    Goals of Care Treatment Preferences:  Code Status: Full Code      Labs:  Weekly CMP and CBC while on Cefazolin    Nursing Precautions:  Aspiration , Fall, and Pressure ulcer prevention    Consults:   PT to evaluate and treat- 5 times a week, OT to evaluate and treat- 5 times a week, and Wound Care     Miscellaneous Care: Routine Skin for Bedridden Patients:  Apply moisture barrier cream to all    Wound Care: yes:  Left foot 1st toe amputation - "Wound care daily, pack with aquacel rope, and secure with 4" Kerlix and 4" ACE.  Avoid tight compression.   WBAT, Darco shoe".    Diabetes Care: Diabetes: Check blood sugar. Fingerstick blood sugar AC and HS  Sliding Scale/Hypoglycemia Protocol: **MODERATE " CORRECTION DOSE**  Blood Glucose  mg/dL    Pre-meal     Bedtime  151-200  2 units        1 unit  201-250   4 units       2 units   251-300  6 units        3 units   301-350   8 units       4 units   >350      10 units        5 units  **CALL MD for BG >350**    Midline Catheter Maintenance:  Always clamp the PICC/Midline tubing when not in use.  Use only 10 mL syringes to flush PICC/Midline; flush each lumen of PICC/Midline every 6 hours with 10 mL preservative-free saline flush solution     Labs: Draw CBC, CMP, Mg, Phos, Sed Rate, CRP every Monday starting 9/25/2023 and fax results to [facility] ID [facility] attention Dr. Isidro Duran.                Diabetes Care:  SN to perform and educate Diabetic management with blood glucose monitoring:, Fingerstick blood sugar AC and HS, and Report CBG < 60 or > 350 to physician.      Medications: Discontinue all previous medication orders, if any. See new list below.       Medication List        START taking these medications      dextrose 5 % (D5W) SolP 500 mL with ceFAZolin 1 gram SolR 6 g  Inject 6 g into the vein once daily.  Continue until Monday, October 23, 2023 unless directed otherwise by ID clinic.     gabapentin 300 MG capsule  Commonly known as: NEURONTIN  Take 1 capsule (300 mg total) by mouth 2 (two) times daily.  Replaces: gabapentin 600 MG tablet            CHANGE how you take these medications      * insulin aspart U-100 100 unit/mL (3 mL) Inpn pen  Commonly known as: NovoLOG  Inject 0-10 Units into the skin before meals and at bedtime as needed (per algorithm).  What changed:   how much to take  how to take this  when to take this  reasons to take this  additional instructions     * insulin aspart U-100 100 unit/mL (3 mL) Inpn pen  Commonly known as: NovoLOG  Inject 4 Units into the skin 3 (three) times daily.  What changed: You were already taking a medication with the same name, and this prescription was added. Make sure you understand how and when to take  "each.     LEVEMIR FLEXTOUCH U100 INSULIN 100 unit/mL (3 mL) Inpn pen  Generic drug: insulin detemir U-100 (Levemir)  Inject 20 Units into the skin every evening.  What changed:   how much to take  how to take this  when to take this           * This list has 2 medication(s) that are the same as other medications prescribed for you. Read the directions carefully, and ask your doctor or other care provider to review them with you.                CONTINUE taking these medications      ammonium lactate 12 % Crea  Apply twice daily to affected parts both feet as needed.     apixaban 5 mg Tab  Commonly known as: ELIQUIS  Take 1 tablet (5 mg total) by mouth 2 (two) times daily.     aspirin 81 MG EC tablet  Commonly known as: ECOTRIN  Take 1 tablet (81 mg total) by mouth once daily.     atorvastatin 40 MG tablet  Commonly known as: LIPITOR  Take 1 tablet (40 mg total) by mouth once daily.     EASY COMFORT PEN NEEDLES 31 gauge x 1/4" Ndle  Generic drug: pen needle, diabetic     GLUCAGEN DIAGNOSTIC KIT 1 mg/mL Solr  Generic drug: glucagon  SMARTSI Milliliter(s) IM As Directed PRN     lisinopriL 10 MG tablet  Take 1 tablet (10 mg total) by mouth once daily.     metFORMIN 1000 MG tablet  Commonly known as: GLUCOPHAGE  Take 1 tablet (1,000 mg total) by mouth 2 (two) times daily.     ondansetron 4 mg/2 mL Soln  SMARTSI Milliliter(s) IV Every 8 Hours PRN     ONETOUCH ULTRA TEST Strp  Generic drug: blood sugar diagnostic     polyethylene glycol 17 gram/dose powder  Commonly known as: GLYCOLAX     QUEtiapine 200 MG Tab  Commonly known as: SEROQUEL  Take 1 tablet (200 mg total) by mouth nightly.     STOOL SOFTENER 100 MG capsule  Generic drug: docusate sodium  Take 100 mg by mouth 2 (two) times daily.            STOP taking these medications      bisacodyL 5 mg EC tablet  Commonly known as: DULCOLAX     CATHFlo ACtivase 2 mg injection  Generic drug: alteplase     ciclopirox 8 % Soln  Commonly known as: PENLAC     dextrose 50 % " in water (D50W) Syrg     enoxaparin 60 mg/0.6 mL Syrg  Commonly known as: LOVENOX     FLOWFLEX COVID-19 AG HOME TEST MISC     gabapentin 600 MG tablet  Commonly known as: NEURONTIN  Replaced by: gabapentin 300 MG capsule     GLUTOSE-15 40 % gel  Generic drug: dextrose     HumaLOG U-100 Insulin 100 unit/mL injection  Generic drug: insulin lispro     hydroCHLOROthiazide 25 MG tablet  Commonly known as: HYDRODIURIL     HYDROcodone-acetaminophen 5-325 mg per tablet  Commonly known as: NORCO     ketoconazole 2 % cream  Commonly known as: NIZORAL     LANTUS SOLOSTAR U-100 INSULIN glargine 100 units/mL SubQ pen  Generic drug: insulin     TRULICITY 0.75 mg/0.5 mL pen injector  Generic drug: dulaglutide     urea 45 % Crea            IV Antibiotics:  IV Cefazolin 6g continues infusion q24 hours for 6 weeks.  EOT:  10/26/2023.  Weekly CMP and CBC.      Immunizations Administered as of 9/18/2023       No immunizations on file.          Follow Up:  Infectious Diseases and Podiatry.        Hari Iverson MD  09/20/2023

## 2023-09-15 LAB
ANION GAP SERPL CALC-SCNC: 7 MMOL/L (ref 8–16)
BASOPHILS # BLD AUTO: 0.04 K/UL (ref 0–0.2)
BASOPHILS NFR BLD: 0.4 % (ref 0–1.9)
BUN SERPL-MCNC: 19 MG/DL (ref 6–20)
CALCIUM SERPL-MCNC: 9.4 MG/DL (ref 8.7–10.5)
CHLORIDE SERPL-SCNC: 100 MMOL/L (ref 95–110)
CO2 SERPL-SCNC: 27 MMOL/L (ref 23–29)
CREAT SERPL-MCNC: 1 MG/DL (ref 0.5–1.4)
DIFFERENTIAL METHOD: ABNORMAL
EOSINOPHIL # BLD AUTO: 0.7 K/UL (ref 0–0.5)
EOSINOPHIL NFR BLD: 6.6 % (ref 0–8)
ERYTHROCYTE [DISTWIDTH] IN BLOOD BY AUTOMATED COUNT: 16.8 % (ref 11.5–14.5)
EST. GFR  (NO RACE VARIABLE): >60 ML/MIN/1.73 M^2
FINAL PATHOLOGIC DIAGNOSIS: NORMAL
GLUCOSE SERPL-MCNC: 155 MG/DL (ref 70–110)
HCT VFR BLD AUTO: 30 % (ref 40–54)
HGB BLD-MCNC: 8.9 G/DL (ref 14–18)
IMM GRANULOCYTES # BLD AUTO: 0.05 K/UL (ref 0–0.04)
IMM GRANULOCYTES NFR BLD AUTO: 0.5 % (ref 0–0.5)
LYMPHOCYTES # BLD AUTO: 2.7 K/UL (ref 1–4.8)
LYMPHOCYTES NFR BLD: 27.9 % (ref 18–48)
Lab: NORMAL
MAGNESIUM SERPL-MCNC: 1.8 MG/DL (ref 1.6–2.6)
MCH RBC QN AUTO: 24.3 PG (ref 27–31)
MCHC RBC AUTO-ENTMCNC: 29.7 G/DL (ref 32–36)
MCV RBC AUTO: 82 FL (ref 82–98)
MONOCYTES # BLD AUTO: 1 K/UL (ref 0.3–1)
MONOCYTES NFR BLD: 10.1 % (ref 4–15)
NEUTROPHILS # BLD AUTO: 5.3 K/UL (ref 1.8–7.7)
NEUTROPHILS NFR BLD: 54.5 % (ref 38–73)
NRBC BLD-RTO: 0 /100 WBC
PHOSPHATE SERPL-MCNC: 3.8 MG/DL (ref 2.7–4.5)
PLATELET # BLD AUTO: 643 K/UL (ref 150–450)
PMV BLD AUTO: 9 FL (ref 9.2–12.9)
POCT GLUCOSE: 140 MG/DL (ref 70–110)
POCT GLUCOSE: 146 MG/DL (ref 70–110)
POCT GLUCOSE: 164 MG/DL (ref 70–110)
POCT GLUCOSE: 182 MG/DL (ref 70–110)
POCT GLUCOSE: 183 MG/DL (ref 70–110)
POCT GLUCOSE: 191 MG/DL (ref 70–110)
POTASSIUM SERPL-SCNC: 4.5 MMOL/L (ref 3.5–5.1)
RBC # BLD AUTO: 3.67 M/UL (ref 4.6–6.2)
SODIUM SERPL-SCNC: 134 MMOL/L (ref 136–145)
WBC # BLD AUTO: 9.81 K/UL (ref 3.9–12.7)

## 2023-09-15 PROCEDURE — 36415 COLL VENOUS BLD VENIPUNCTURE: CPT | Performed by: HOSPITALIST

## 2023-09-15 PROCEDURE — 94640 AIRWAY INHALATION TREATMENT: CPT

## 2023-09-15 PROCEDURE — 25000003 PHARM REV CODE 250: Performed by: INTERNAL MEDICINE

## 2023-09-15 PROCEDURE — 94761 N-INVAS EAR/PLS OXIMETRY MLT: CPT

## 2023-09-15 PROCEDURE — 99024 POSTOP FOLLOW-UP VISIT: CPT | Mod: ,,, | Performed by: PODIATRIST

## 2023-09-15 PROCEDURE — 63600175 PHARM REV CODE 636 W HCPCS: Performed by: INTERNAL MEDICINE

## 2023-09-15 PROCEDURE — 97110 THERAPEUTIC EXERCISES: CPT | Mod: CQ

## 2023-09-15 PROCEDURE — 25000003 PHARM REV CODE 250: Performed by: HOSPITALIST

## 2023-09-15 PROCEDURE — 85025 COMPLETE CBC W/AUTO DIFF WBC: CPT | Performed by: HOSPITALIST

## 2023-09-15 PROCEDURE — 99239 HOSP IP/OBS DSCHRG MGMT >30: CPT | Mod: ,,, | Performed by: INTERNAL MEDICINE

## 2023-09-15 PROCEDURE — 99239 PR HOSPITAL DISCHARGE DAY,>30 MIN: ICD-10-PCS | Mod: ,,, | Performed by: INTERNAL MEDICINE

## 2023-09-15 PROCEDURE — 63600175 PHARM REV CODE 636 W HCPCS: Performed by: HOSPITALIST

## 2023-09-15 PROCEDURE — 84100 ASSAY OF PHOSPHORUS: CPT | Performed by: HOSPITALIST

## 2023-09-15 PROCEDURE — A4216 STERILE WATER/SALINE, 10 ML: HCPCS | Performed by: HOSPITALIST

## 2023-09-15 PROCEDURE — 83735 ASSAY OF MAGNESIUM: CPT | Performed by: HOSPITALIST

## 2023-09-15 PROCEDURE — 97116 GAIT TRAINING THERAPY: CPT | Mod: CQ

## 2023-09-15 PROCEDURE — 25000003 PHARM REV CODE 250: Performed by: NURSE PRACTITIONER

## 2023-09-15 PROCEDURE — 99024 PR POST-OP FOLLOW-UP VISIT: ICD-10-PCS | Mod: ,,, | Performed by: PODIATRIST

## 2023-09-15 PROCEDURE — 11000001 HC ACUTE MED/SURG PRIVATE ROOM

## 2023-09-15 PROCEDURE — 25000242 PHARM REV CODE 250 ALT 637 W/ HCPCS: Performed by: INTERNAL MEDICINE

## 2023-09-15 PROCEDURE — 25000242 PHARM REV CODE 250 ALT 637 W/ HCPCS: Performed by: HOSPITALIST

## 2023-09-15 PROCEDURE — 80048 BASIC METABOLIC PNL TOTAL CA: CPT | Performed by: HOSPITALIST

## 2023-09-15 RX ORDER — AMOXICILLIN 250 MG
2 CAPSULE ORAL DAILY PRN
Status: DISCONTINUED | OUTPATIENT
Start: 2023-09-15 | End: 2023-09-20 | Stop reason: HOSPADM

## 2023-09-15 RX ORDER — IPRATROPIUM BROMIDE AND ALBUTEROL SULFATE 2.5; .5 MG/3ML; MG/3ML
3 SOLUTION RESPIRATORY (INHALATION)
Status: DISCONTINUED | OUTPATIENT
Start: 2023-09-15 | End: 2023-09-16

## 2023-09-15 RX ADMIN — SODIUM CHLORIDE, PRESERVATIVE FREE 10 ML: 5 INJECTION INTRAVENOUS at 12:09

## 2023-09-15 RX ADMIN — INSULIN ASPART 4 UNITS: 100 INJECTION, SOLUTION INTRAVENOUS; SUBCUTANEOUS at 11:09

## 2023-09-15 RX ADMIN — INSULIN ASPART 2 UNITS: 100 INJECTION, SOLUTION INTRAVENOUS; SUBCUTANEOUS at 11:09

## 2023-09-15 RX ADMIN — ATORVASTATIN CALCIUM 40 MG: 20 TABLET, FILM COATED ORAL at 08:09

## 2023-09-15 RX ADMIN — HYDROMORPHONE HYDROCHLORIDE 0.5 MG: 0.5 INJECTION, SOLUTION INTRAMUSCULAR; INTRAVENOUS; SUBCUTANEOUS at 08:09

## 2023-09-15 RX ADMIN — IPRATROPIUM BROMIDE AND ALBUTEROL SULFATE 3 ML: 2.5; .5 SOLUTION RESPIRATORY (INHALATION) at 08:09

## 2023-09-15 RX ADMIN — CEFAZOLIN 6 G: 2 INJECTION, POWDER, FOR SOLUTION INTRAMUSCULAR; INTRAVENOUS at 11:09

## 2023-09-15 RX ADMIN — ASPIRIN 81 MG: 81 TABLET, COATED ORAL at 08:09

## 2023-09-15 RX ADMIN — GABAPENTIN 300 MG: 300 CAPSULE ORAL at 08:09

## 2023-09-15 RX ADMIN — INSULIN ASPART 2 UNITS: 100 INJECTION, SOLUTION INTRAVENOUS; SUBCUTANEOUS at 08:09

## 2023-09-15 RX ADMIN — OXYCODONE HYDROCHLORIDE 5 MG: 5 TABLET ORAL at 12:09

## 2023-09-15 RX ADMIN — INSULIN ASPART 4 UNITS: 100 INJECTION, SOLUTION INTRAVENOUS; SUBCUTANEOUS at 04:09

## 2023-09-15 RX ADMIN — HYDROMORPHONE HYDROCHLORIDE 0.5 MG: 0.5 INJECTION, SOLUTION INTRAMUSCULAR; INTRAVENOUS; SUBCUTANEOUS at 01:09

## 2023-09-15 RX ADMIN — INSULIN ASPART 4 UNITS: 100 INJECTION, SOLUTION INTRAVENOUS; SUBCUTANEOUS at 08:09

## 2023-09-15 RX ADMIN — IPRATROPIUM BROMIDE AND ALBUTEROL SULFATE 3 ML: 2.5; .5 SOLUTION RESPIRATORY (INHALATION) at 12:09

## 2023-09-15 RX ADMIN — SODIUM CHLORIDE, PRESERVATIVE FREE 10 ML: 5 INJECTION INTRAVENOUS at 11:09

## 2023-09-15 RX ADMIN — QUETIAPINE FUMARATE 200 MG: 200 TABLET ORAL at 08:09

## 2023-09-15 RX ADMIN — OXYCODONE HYDROCHLORIDE 5 MG: 5 TABLET ORAL at 08:09

## 2023-09-15 RX ADMIN — IPRATROPIUM BROMIDE AND ALBUTEROL SULFATE 3 ML: 2.5; .5 SOLUTION RESPIRATORY (INHALATION) at 03:09

## 2023-09-15 RX ADMIN — LISINOPRIL 10 MG: 10 TABLET ORAL at 08:09

## 2023-09-15 RX ADMIN — OXYCODONE HYDROCHLORIDE 5 MG: 5 TABLET ORAL at 06:09

## 2023-09-15 RX ADMIN — SODIUM CHLORIDE, PRESERVATIVE FREE 10 ML: 5 INJECTION INTRAVENOUS at 06:09

## 2023-09-15 RX ADMIN — ENOXAPARIN SODIUM 40 MG: 40 INJECTION SUBCUTANEOUS at 04:09

## 2023-09-15 RX ADMIN — INSULIN DETEMIR 20 UNITS: 100 INJECTION, SOLUTION SUBCUTANEOUS at 08:09

## 2023-09-15 NOTE — PLAN OF CARE
Pt's insurance denied LTAC, did offer a peer to peer, pending MD decision on peer to peer to peer.    CM sent multiple SNF requests, pending review for acceptance.    Cm to follow for plans and arrangements.    Pt informed of current issues.

## 2023-09-15 NOTE — PROGRESS NOTES
"[facility]  Progress Note    Patient Name: Dave Good  MRN: [id number]  Patient Class: IP- Inpatient   Admission Date: 9/7/2023  Length of Stay: 7 days  Attending Physician: Zeus Nation MD  Primary Care Provider: Reyna Jorge NP        Subjective:     Principal Problem:Staphylococcus aureus bacteremia with sepsis        HPI:  Per Larry Shepherd, NP:    "The patient is a 58 y.o. male with a past medical history of type 2 diabetes, HTN, HLD, and chronic systolic congestive heart failure who presents with worsening left great toe pain infection and swelling.  Patient was recently seen in the ER and discharged to follow up with ID and wound care.  Since then he has been unable to follow-up with podiatry or infectious disease.  He states that he has not tried to call their offices and was waiting for them to call him.  States that his toe pain has gotten worse.  He has been soaking it in a foot massage machine chair with no improvement.  XR positive for left great, 2nd, and 4th toe acute osteomyelitis.  He will be admitted for further management of his acute osteomyelitis and infectious disease and podiatry consult."      Overview/Hospital Course:  Patient is 58-year-old man with history of hypertension, diabetes mellitus type 2, reported history of systolic heart failure (although last echocardiogram with normal systolic function), significant prior tobacco smoking history with chronic obstructive pulmonary disease admitted to the hospital with diabetic ulcer with abscess involving left foot including great toe and proximal foot.  Patient with marked leukocytosis and elevated heart rate consistent with systemic inflammatory response/sepsis secondary to foot infection.  Blood cultures obtained.  Patient started on intravenous antibiotics.  Podiatry consulted and took the patient for surgical debridement with removal of necrotic tissue on 9/8/2023.  Blood cultures " positive for Gram-positive cocci representing Staphylococcus aureus.    Patient with prior history of line associated deep vein thrombosis for which he was treated with three months of anticoagulation.      Interval History:  Patient is awake and alert this morning.  No acute events overnight.  Pain improved/controlled with medications.   Tolerating antibiotics well.  Waiting for LTAC placement.  Stable.    Review of Systems   Constitutional:  Negative for chills and fever.   HENT:  Negative for congestion and ear pain.    Eyes:  Negative for pain.   Respiratory:  Negative for shortness of breath.    Cardiovascular:  Negative for chest pain and palpitations.   Gastrointestinal:  Negative for abdominal pain.   Genitourinary:  Negative for difficulty urinating.   Musculoskeletal:  Negative for back pain.   Skin:  Positive for wound (Left foot wound clean and dressed).   Neurological:  Negative for dizziness and headaches.   Psychiatric/Behavioral:  Negative for agitation and behavioral problems.      Objective:     Vital Signs (Most Recent):  Temp: 98.7 °F (37.1 °C) (09/15/23 1100)  Pulse: 80 (09/15/23 1209)  Resp: 16 (09/15/23 1209)  BP: 120/75 (09/15/23 1100)  SpO2: 96 % (09/15/23 1209)   Vital Signs (24h Range):  Temp:  [97.6 °F (36.4 °C)-98.8 °F (37.1 °C)] 98.7 °F (37.1 °C)  Pulse:  [80-90] 80  Resp:  [16-20] 16  SpO2:  [92 %-97 %] 96 %  BP: (104-120)/(58-79) 120/75     Weight: 117 kg (257 lb 15 oz)  Body mass index is 33.12 kg/m².    Intake/Output Summary (Last 24 hours) at 9/15/2023 1311  Last data filed at 9/15/2023 0612  Gross per 24 hour   Intake 948.8 ml   Output 1000 ml   Net -51.2 ml        Physical Exam  Vitals reviewed.   Constitutional:       General: He is not in acute distress.     Appearance: Normal appearance. He is obese. He is ill-appearing (chronically). He is not toxic-appearing.   HENT:      Head: Normocephalic and atraumatic.      Right Ear: External ear normal.      Left Ear: External ear  normal.      Nose: Nose normal.      Mouth/Throat:      Mouth: Mucous membranes are moist.      Pharynx: Oropharynx is clear.   Eyes:      General: No scleral icterus.     Conjunctiva/sclera: Conjunctivae normal.      Pupils: Pupils are equal, round, and reactive to light.   Cardiovascular:      Rate and Rhythm: Normal rate and regular rhythm.   Pulmonary:      Effort: Pulmonary effort is normal.      Breath sounds: Normal breath sounds. No wheezing or rales.   Abdominal:      General: Bowel sounds are normal.      Tenderness: There is no abdominal tenderness.   Musculoskeletal:      Cervical back: Normal range of motion and neck supple.      Comments: Left toe amp wound clean and dressed   Skin:     General: Skin is warm and dry.      Capillary Refill: Capillary refill takes less than 2 seconds.   Neurological:      General: No focal deficit present.      Mental Status: He is alert and oriented to person, place, and time.      Cranial Nerves: No cranial nerve deficit.   Psychiatric:         Mood and Affect: Mood normal.         Behavior: Behavior normal.         Significant Labs: All pertinent labs within the past 24 hours have been reviewed.  CBC:   Recent Labs   Lab 09/14/23 0418 09/15/23  0449   WBC 9.44 9.81   HGB 8.7* 8.9*   HCT 29.4* 30.0*   * 643*       CMP:   Recent Labs   Lab 09/14/23 0418 09/15/23  0449   * 134*   K 4.5 4.5   CL 99 100   CO2 29 27   * 155*   BUN 16 19   CREATININE 1.0 1.0   CALCIUM 9.2 9.4   ANIONGAP 6* 7*         Significant Imaging: I have reviewed all pertinent imaging results/findings within the past 24 hours.      Assessment/Plan:      * Staphylococcus aureus bacteremia with sepsis  Patient presented to ED with complaints of worsening left great toe pain and swelling.  X-ray of left foot with acute osteomyelitis involving the great toe and distal aspects of the 2nd and 4th toes.  Labs on admission with WBC of 21.91 with left shift.  Blood cultures on admission  "(9/7/2023) with 4/4 bottles, 9/8/2023 with 1/2 bottles, and 9/11/2023 with 0/2 bottles positive for MSSA . Wound culture (9/8/2023 - areobic) positive for MSSA and Strep dysgalactiae.  Started on IV Vanc/Meropenem on admission and changed to IV Vanc/Cefazolin on 9/10/2023.  IV Vanc discontinued on 9/11/2023. Podiatry consulted and patient underwent left 1st toe amputation on 9/8/2023 with Dr. Samuel Toussaint.  TTE on 9/8/2023 did not show evidence of endocarditis.  ID consulted and recommended PAT, but patient declined given a history of complicated PAT in 2022.  Patient will need 6 weeks of IV antibiotics given no PAT.  Waiting for LTAC placement - possibly today.    Plan:  Follow up with Podiatry recommendations - "Wound care daily, pack with aquacel rope, and secure with 4" Kerlix and 4" ACE.  Avoid tight compression. WBAT, Darco shoe". Follow up pathology results.  Follow up with ID recommendations - will need 6 weeks IV antibiotics given no PAT.  Has Midline.  EOT will be 10/26/2023.  Pain control as needed.  Continue with Wound Care  PT/OT consulted - will need LTAC given duration of antibiotics needed  Follow up with Podiatry on discharge.    Chronic combined systolic and diastolic congestive heart failure  No evidence of volume overload on admission with negative CXR and normal BNP.  S/p IV Lasix and d/c on 9/13/2023.   TTE on admission with regional wall motion abnormalities are present: The septum is moderately hypokinetic. There is mildly reduced systolic function. Ejection fraction by visual approximation is 45%. Grade I diastolic dysfunction.  Repeat CXR on 9/12/2023 with streaky opacities at the lung bases can be seen with subsegmental atelectasis.  -Continue with ACEI - Lisinopril  -Low Na diet      Type 2 diabetes mellitus with diabetic peripheral angiopathy without gangrene, with long-term current use of insulin  Glucose stable.  Hemoglobin A1c of 7.5.    Home Meds: Metformin 1g bid, Detemir 50 units, " Aspart 10 units TIDWM, Trulicity weekly (?)  Hospital Meds:  Detemir 20 units, Aspart 4 units TIDWM moderate correction dose SSI  -Continue with current regimen - can restart Metformin on discharge  -Diabetic Diet  -Monitor and adjust as needed      Obesity due to excess calories with serious comorbidity  Body mass index is 33.12 kg/m². Morbid obesity complicates all aspects of disease management from diagnostic modalities to treatment. Weight loss encouraged and health benefits explained to patient.         Hypertension  Normotensive on Lisinopril  Home Meds:  HCTZ 25mg, Lisinopril 10mg  -Continue with Lisinopril  -Monitor and adjust as needed      VTE Risk Mitigation (From admission, onward)         Ordered     enoxaparin injection 40 mg  Daily         09/09/23 1108     IP VTE HIGH RISK PATIENT  Once         09/09/23 1108     Place sequential compression device  Until discontinued         09/08/23 0325                Discharge Planning   LOIS: 9/15/2023     Code Status: Full Code   Is the patient medically ready for discharge?:     Reason for patient still in hospital (select all that apply): Patient trending condition, Treatment and Consult recommendations  Discharge Plan A: Long-term acute care facility (LTAC)   Discharge Delays: None known at this time              Zeus Nation MD  Department of Hospital Medicine   [facility] - Med Surg ([place])

## 2023-09-15 NOTE — PROGRESS NOTES
[facility] ([place])  Podiatry  Progress Note    Patient Name: Dave Good  MRN: [id number]  Admission Date: 9/7/2023  Hospital Length of Stay: 7 days  Attending Physician: Zeus Nation MD  Primary Care Provider: Reyna Jorge NP     Subjective:     Interval History: NAEON. VSS.   No foot or calf pain reported.   No fevers.   Dressings intact, some strikethru.  Hasn't been changed since Tuesday.     Resting comfortably in chair at bedside.   WBC 12->10--> 9.81.    Awaiting LTAC placement.     Follow-up For: Procedure(s) (LRB):  AMPUTATION, TOE (Left)    Post-Operative Day: 7 Days Post-Op      Scheduled Meds:   albuterol-ipratropium  3 mL Nebulization Q4H    aspirin  81 mg Oral Daily    atorvastatin  40 mg Oral Daily    enoxparin  40 mg Subcutaneous Daily    gabapentin  300 mg Oral BID    insulin aspart U-100  4 Units Subcutaneous TIDWM    insulin detemir U-100 (Levemir)  20 Units Subcutaneous QHS    meropenem (MERREM) IVPB  1 g Intravenous Q8H    mupirocin   Nasal BID    QUEtiapine  200 mg Oral Nightly    sodium chloride 0.9%  10 mL Intravenous Q6H    vancomycin (VANCOCIN) IV (PEDS and ADULTS)  1,500 mg Intravenous Q12H     Continuous Infusions:  PRN Meds:sodium chloride 0.9%, acetaminophen, dextrose 10%, dextrose 10%, glucagon (human recombinant), HYDROmorphone, insulin aspart U-100, naloxone, ondansetron, oxyCODONE, sodium chloride 0.9%, Flushing PICC/Midline Protocol **AND** sodium chloride 0.9% **AND** sodium chloride 0.9%, Pharmacy to dose Vancomycin consult **AND** vancomycin - pharmacy to dose    Review of Systems   Constitutional: Negative for chills, diaphoresis, fever, malaise/fatigue and night sweats.   Cardiovascular:  Positive for leg swelling. Negative for claudication, cyanosis and syncope.   Skin:  Positive for poor wound healing. Negative for color change, dry skin, nail changes, rash, suspicious lesions and unusual hair distribution.   Musculoskeletal:  Negative for falls, joint  pain, joint swelling, muscle cramps, muscle weakness and stiffness.   Gastrointestinal:  Negative for constipation, diarrhea, nausea and vomiting.   Neurological:  Positive for numbness, paresthesias and sensory change. Negative for brief paralysis, disturbances in coordination, focal weakness and tremors.     Objective:     Vital Signs (Most Recent):  Temp: 98.3 °F (36.8 °C) (09/10/23 0732)  Pulse: 85 (09/10/23 1127)  Resp: 16 (09/10/23 1127)  BP: 123/73 (09/10/23 0732)  SpO2: 96 % (09/10/23 1127) Vital Signs (24h Range):  Temp:  [98 °F (36.7 °C)-99.3 °F (37.4 °C)] 98.3 °F (36.8 °C)  Pulse:  [] 85  Resp:  [16-20] 16  SpO2:  [87 %-99 %] 96 %  BP: (123-135)/(73-78) 123/73     Weight: 117 kg (257 lb 15 oz)  Body mass index is 33.12 kg/m².    Physical Exam  Constitutional:       General: He is not in acute distress.     Appearance: He is well-developed. He is not diaphoretic.   Cardiovascular:      Pulses:           Popliteal pulses are 2+ on the right side and 2+ on the left side.        Dorsalis pedis pulses are 2+ on the right side and 2+ on the left side.        Posterior tibial pulses are 2+ on the right side and 2+ on the left side.      Comments: Capillary refill 3 seconds all toes/distal feet, all toes/both feet warm to touch. Negative lymphadenopathy bilateral popliteal fossa and tarsal tunnel. Less than 2+ pitting lower extremity edema bilateral.     Musculoskeletal:      Right ankle: No swelling, deformity, ecchymosis or lacerations. Normal range of motion. Normal pulse.      Right Achilles Tendon: Normal. No defects. Gonzáles's test negative.      Comments:  s/p left first digit amputation.  Surgical site bleeding appropriately.  No signs of necrotic wound base tissue.  No malodor noted.    Lymphadenopathy:      Lower Body: No right inguinal adenopathy. No left inguinal adenopathy.      Comments: Negative lymphadenopathy bilateral popliteal fossa and tarsal tunnel.  Negative lymphangitic streaking  bilateral feet/ankles/legs.     Skin:     General: Skin is warm and dry.      Capillary Refill: Capillary refill takes 2 to 3 seconds.      Coloration: Skin is not pale.      Findings: No abrasion, bruising, burn, ecchymosis, erythema, laceration, lesion or rash.      Nails: There is no clubbing.      Comments:  s/p left first digit amputation.  Sutures mainly intact on dorsal, medial and lateral portion with opening in the central incision site.   One loose stitch on the dorsal incision, which was removed, opening remains.     Neurological:      Mental Status: He is alert and oriented to person, place, and time.      Sensory: No sensory deficit.      Motor: No tremor, atrophy or abnormal muscle tone.      Gait: Gait normal.      Comments: Diminished/loss of protective sensation all toes bilateral to 10 gram monofilament.      Comment:  Patient is 7 days s/p left first digit amputation with partial closure.  serosanguinous drainage noted from surgical site.   Slight maceration noted.  No necrotic tissue noted.    9/12/2023            Laboratory:      CRP   Date Value Ref Range Status   11/22/2022 125.3 (H) 0.0 - 8.2 mg/L Final       Sed Rate   Date Value Ref Range Status   11/22/2022 79 (H) 0 - 10 mm/Hr Final       WBC   Date Value Ref Range Status   09/15/2023 9.81 3.90 - 12.70 K/uL Final       Hemoglobin A1C   Date Value Ref Range Status   09/08/2023 7.5 (H) 4.0 - 5.6 % Final     Comment:     ADA Screening Guidelines:  5.7-6.4%  Consistent with prediabetes  >or=6.5%  Consistent with diabetes    High levels of fetal hemoglobin interfere with the HbA1C  assay. Heterozygous hemoglobin variants (HbS, HgC, etc)do  not significantly interfere with this assay.   However, presence of multiple variants may affect accuracy.             ALBUMIN 1.2*  --   --    PROT 8.7*  --   --    *   < > 131*   K 3.4*   < > 4.2   CO2 29   < > 28   CL 96   < > 96   BUN 17   < > 16   CREATININE 1.0   < > 1.1   ALKPHOS 78  --   --     ALT 15  --   --    AST 24  --   --    BILITOT 0.4  --   --     < > = values in this interval not displayed.     Microbiology Results (last 7 days)       Procedure Component Value Units Date/Time    Culture, Anaerobe [[id number]] Collected: 09/08/23 1754    Order Status: Completed Specimen: Incision site from Toe, Left Foot Updated: 09/10/23 1106     Anaerobic Culture Culture in progress    Narrative:      Left great toe    Aerobic culture [[id number]] Collected: 09/08/23 1754    Order Status: Completed Specimen: Incision site from Toe, Left Foot Updated: 09/10/23 0809     Aerobic Bacterial Culture Insufficient incubation, culture in progress    Narrative:      Left great toe    Blood culture #1 **CANNOT BE ORDERED STAT** [[id number]]  (Abnormal) Collected: 09/07/23 2343    Order Status: Completed Specimen: Blood from Peripheral, Hand, Left Updated: 09/10/23 0615     Blood Culture, Routine Gram stain jose bottle: Gram positive cocci in clusters resembling Staph      Results called to and read back by:Starla Wilburn Lpn 09/09/2023 00:41      Gram stain aer bottle: Gram positive cocci in clusters resembling Staph      Positive results previously called 09/10/2023  06:15      STAPHYLOCOCCUS AUREUS  ID consult required at [facility].  For susceptibility see order #[id number]      Blood culture #2 **CANNOT BE ORDERED STAT** [[id number]]  (Abnormal) Collected: 09/07/23 2343    Order Status: Completed Specimen: Blood from Peripheral, Hand, Right Updated: 09/10/23 0603     Blood Culture, Routine Gram stain jose bottle: Gram positive cocci in clusters resembling Staph      Results called to and read back by:Starla Wilburn Lpn 09/09/2023 00:41      Gram stain aer bottle: Gram positive cocci in clusters resembling Staph      Positive results previously called 09/09/2023  13:34      STAPHYLOCOCCUS AUREUS  Susceptibility pending  ID consult required at [facility].       Blood culture [[id number]] Collected: 09/08/23 1333    Order Status: Completed Specimen: Blood Updated: 09/09/23 2309     Blood Culture, Routine Gram stain jose bottle: Gram positive cocci in clusters resembling Staph      Results called to and read back by:Kelly Elder Rn 09/09/2023  23:09    Gram stain [[id number]] Collected: 09/08/23 1754    Order Status: Completed Specimen: Incision site from Toe, Left Foot Updated: 09/09/23 1918     Gram Stain Result Rare WBC's      Many Gram positive cocci      Few Gram negative rods    Narrative:      Left great toe    Fungus culture [[id number]] Collected: 09/08/23 1754    Order Status: Sent Specimen: Incision site from Toe, Left Foot Updated: 09/09/23 1521    AFB Culture & Smear [[id number]] Collected: 09/08/23 1754    Order Status: Sent Specimen: Incision site from Toe, Left Foot Updated: 09/09/23 1521    Rapid Organism ID by PCR (from Blood culture) [[id number]]  (Abnormal) Collected: 09/07/23 2343    Order Status: Completed Updated: 09/09/23 0239           Diagnostic Results:  I have reviewed all pertinent imaging results/findings within the past 24 hours.    Assessment/Plan:     Problem List Items Addressed This Visit       RESOLVED: Chronic osteomyelitis of toe of left foot    Current Assessment & Plan     Dave Good is 57 yo M who was admitted to the hospital due to a open ulcer with malodor of L great toe.  Patient is 7 days s/p L first digit amputation.             Relevant Orders    Transfer patient (Completed)    X-Ray Foot Complete Left (Completed)    Diabetic wet gangrene of the foot    Relevant Medications    insulin detemir U-100 (Levemir) pen 20 Units    insulin aspart U-100 pen 2 Units (Completed)    insulin aspart U-100 pen 0-10 Units    insulin aspart U-100 pen 4 Units    Other Relevant Orders    Transfer patient (Completed)    X-Ray Foot Complete Left (Completed)    Open wound of right foot    Relevant Orders    Transfer patient (Completed)    X-Ray Foot  "Complete Left (Completed)    Open wound of right great toe    Relevant Orders    Case Request Operating Room: AMPUTATION, TOE (Completed)    * (Principal) Staphylococcus aureus bacteremia with sepsis    Ulcerated, foot, left, with fat layer exposed    Relevant Orders    X-Ray Foot Complete Left (Completed)     Other Visit Diagnoses       Great toe pain, left    -  Primary    Other acute osteomyelitis, unspecified ankle and foot        Relevant Orders    Case Request Operating Room: AMPUTATION, TOE (Completed)    Transfer patient (Completed)    X-Ray Foot Complete Left (Completed)    Heart failure        Relevant Orders    Echo (Completed)            7  days post op hallux amputation, open wound.   Continue Antibiotics per ID.  Likely six weeks IV.  Pending LTAC placement, for antibiotic and wound care.   Continue glucose control.   Wound care daily, pack with aquacel rope, and secure with 4" Kerlix and 4" ACE.  Avoid tight compression.   WBAT, Darco shoe.                 Lavonne Vigil DPM  Podiatry  [facility] ([place])  "

## 2023-09-15 NOTE — PT/OT/SLP PROGRESS
Physical Therapy Treatment    Patient Name:  Dave Good   MRN:  [id number]    Recommendations:     Discharge Recommendations: nursing facility, skilled (pt will d/c to LTACH)  Discharge Equipment Recommendations: to be determined by next level of care  Barriers to discharge: Decreased caregiver support    Assessment:     Dave Good is a 58 y.o. male admitted with a medical diagnosis of Staphylococcus aureus bacteremia with sepsis.  He presents with the following impairments/functional limitations: weakness, gait instability, impaired balance, impaired cardiopulmonary response to activity, impaired endurance, impaired functional mobility, impaired self care skills, impaired skin, decreased coordination, decreased lower extremity function, decreased ROM, decreased safety awareness.    Sit>stand from chair with RW and SBA, L LE PWB  Amb 2 x 50' with RW and CGA, L LE PWB, B DARCO shoes, decreased gait speed, franchesca and B step length  Pt with good mobility, increasing amb distance  Rec SNF (pt will d/c to [facility]ACH)    Rehab Prognosis: Good; patient would benefit from acute skilled PT services to address these deficits and reach maximum level of function.    Recent Surgery: Procedure(s) (LRB):  AMPUTATION, TOE (Left) 7 Days Post-Op    Plan:     During this hospitalization, patient to be seen 5 x/week to address the identified rehab impairments via gait training, therapeutic activities, therapeutic exercises and progress toward the following goals:    Plan of Care Expires:  10/09/23    Subjective     Chief Complaint: pain  Patient/Family Comments/goals: as long as I'm here, I've got y'all to keep me moving  Pain/Comfort:  Pain Rating 1: 7/10  Location - Side 1: Left  Location - Orientation 1: generalized  Location 1: foot  Pain Addressed 1: Reposition, Distraction, Pre-medicate for activity, Cessation of Activity  Pain Rating Post-Intervention 1: 7/10      Objective:     Communicated with nurse Tripathi prior to session.   Patient found up in chair with telemetry, peripheral IV, PICC line upon PT entry to room.     General Precautions: Standard, fall, diabetic  Orthopedic Precautions: LLE partial weight bearing (on heel)  Braces:  (BLE Darco shoes)  Respiratory Status: Room air     Functional Mobility:  Transfers:     Sit to Stand:  stand by assistance with rolling walker  Gait: 2 x 50' with RW and CGA, L LE PWB, B DARCO shoes, decreased gait speed, franchesca and B step length      AM-PAC 6 CLICK MOBILITY  Turning over in bed (including adjusting bedclothes, sheets and blankets)?: 4  Sitting down on and standing up from a chair with arms (e.g., wheelchair, bedside commode, etc.): 3  Moving from lying on back to sitting on the side of the bed?: 4  Moving to and from a bed to a chair (including a wheelchair)?: 3  Need to walk in hospital room?: 3  Climbing 3-5 steps with a railing?: 2  Basic Mobility Total Score: 19       Treatment & Education:  Reclined therex B LE: AP, hip abd/add, SLR 2 x 10  Gait training as noted    Patient left up in chair with all lines intact and call button in reach..    GOALS:   Multidisciplinary Problems       Physical Therapy Goals          Problem: Physical Therapy    Goal Priority Disciplines Outcome Goal Variances Interventions   Physical Therapy Goal     PT, PT/OT Ongoing, Progressing     Description: Goals to be met by: 10/9/2023    Patient will increase functional independence with mobility by performin. Sit<>stand with supervision with RW while maintaining WB precautions on LLE.  2. Gait x 50 feet with RW with CGA while maintaining WB precautions on LLE. - MET 23  3. Ascend/descend 10 step(s) with least restrictive assistive device and minimal assist while maintaining WB precautions on LLE.                           Time Tracking:     PT Received On: 09/15/23  PT Start Time: 1513     PT Stop Time: 1536  PT Total Time (min): 23 min     Billable Minutes: Gait Training 12 and Therapeutic  Exercise 11    Treatment Type: Treatment  PT/PTA: PTA     Number of PTA visits since last PT visit: 3     09/15/2023

## 2023-09-15 NOTE — PLAN OF CARE
Pt remained free of falls and injuries throughout shift. AAOx4. Pt calm and cooperative. Purposeful hourly rounding performed. Pt swallows meds whole. IV Cefazolin infusing continuously as ordered to RUE midline. Dsg to L foot CDI, elevated on pillow. Managed c/o L foot pain with PRN oxycodone. VSS on RA. Pt resting comfortably in bed, NADN, denies needs at this time. Bed low and locked, call light in reach. Side rails up x3. Report given to [place] Tripathi.

## 2023-09-15 NOTE — PLAN OF CARE
ARLENE spoke to Jacey, [phone], at [facility] to inquire about auth for admit today.    Jacey requested ARLENE to contact ins company to inquire about auth.as facility has not yet received auth.    ARLENE placed a call to [facility], left a message for a return call.

## 2023-09-15 NOTE — SUBJECTIVE & OBJECTIVE
Interval History:  Patient is awake and alert this morning.  No acute events overnight.  Pain improved/controlled with medications.   Tolerating antibiotics well.  Waiting for LTAC placement.  Stable.    Review of Systems   Constitutional:  Negative for chills and fever.   HENT:  Negative for congestion and ear pain.    Eyes:  Negative for pain.   Respiratory:  Negative for shortness of breath.    Cardiovascular:  Negative for chest pain and palpitations.   Gastrointestinal:  Negative for abdominal pain.   Genitourinary:  Negative for difficulty urinating.   Musculoskeletal:  Negative for back pain.   Skin:  Positive for wound (Left foot wound clean and dressed).   Neurological:  Negative for dizziness and headaches.   Psychiatric/Behavioral:  Negative for agitation and behavioral problems.      Objective:     Vital Signs (Most Recent):  Temp: 98.7 °F (37.1 °C) (09/15/23 1100)  Pulse: 80 (09/15/23 1209)  Resp: 16 (09/15/23 1209)  BP: 120/75 (09/15/23 1100)  SpO2: 96 % (09/15/23 1209)   Vital Signs (24h Range):  Temp:  [97.6 °F (36.4 °C)-98.8 °F (37.1 °C)] 98.7 °F (37.1 °C)  Pulse:  [80-90] 80  Resp:  [16-20] 16  SpO2:  [92 %-97 %] 96 %  BP: (104-120)/(58-79) 120/75     Weight: 117 kg (257 lb 15 oz)  Body mass index is 33.12 kg/m².    Intake/Output Summary (Last 24 hours) at 9/15/2023 1311  Last data filed at 9/15/2023 0612  Gross per 24 hour   Intake 948.8 ml   Output 1000 ml   Net -51.2 ml        Physical Exam  Vitals reviewed.   Constitutional:       General: He is not in acute distress.     Appearance: Normal appearance. He is obese. He is ill-appearing (chronically). He is not toxic-appearing.   HENT:      Head: Normocephalic and atraumatic.      Right Ear: External ear normal.      Left Ear: External ear normal.      Nose: Nose normal.      Mouth/Throat:      Mouth: Mucous membranes are moist.      Pharynx: Oropharynx is clear.   Eyes:      General: No scleral icterus.     Conjunctiva/sclera: Conjunctivae  normal.      Pupils: Pupils are equal, round, and reactive to light.   Cardiovascular:      Rate and Rhythm: Normal rate and regular rhythm.   Pulmonary:      Effort: Pulmonary effort is normal.      Breath sounds: Normal breath sounds. No wheezing or rales.   Abdominal:      General: Bowel sounds are normal.      Tenderness: There is no abdominal tenderness.   Musculoskeletal:      Cervical back: Normal range of motion and neck supple.      Comments: Left toe amp wound clean and dressed   Skin:     General: Skin is warm and dry.      Capillary Refill: Capillary refill takes less than 2 seconds.   Neurological:      General: No focal deficit present.      Mental Status: He is alert and oriented to person, place, and time.      Cranial Nerves: No cranial nerve deficit.   Psychiatric:         Mood and Affect: Mood normal.         Behavior: Behavior normal.         Significant Labs: All pertinent labs within the past 24 hours have been reviewed.  CBC:   Recent Labs   Lab 09/14/23 0418 09/15/23  0449   WBC 9.44 9.81   HGB 8.7* 8.9*   HCT 29.4* 30.0*   * 643*       CMP:   Recent Labs   Lab 09/14/23  0418 09/15/23  0449   * 134*   K 4.5 4.5   CL 99 100   CO2 29 27   * 155*   BUN 16 19   CREATININE 1.0 1.0   CALCIUM 9.2 9.4   ANIONGAP 6* 7*         Significant Imaging: I have reviewed all pertinent imaging results/findings within the past 24 hours.

## 2023-09-15 NOTE — ASSESSMENT & PLAN NOTE
"Patient presented to ED with complaints of worsening left great toe pain and swelling.  X-ray of left foot with acute osteomyelitis involving the great toe and distal aspects of the 2nd and 4th toes.  Labs on admission with WBC of 21.91 with left shift.  Blood cultures on admission (9/7/2023) with 4/4 bottles, 9/8/2023 with 1/2 bottles, and 9/11/2023 with 0/2 bottles positive for MSSA . Wound culture (9/8/2023 - areobic) positive for MSSA and Strep dysgalactiae.  Started on IV Vanc/Meropenem on admission and changed to IV Vanc/Cefazolin on 9/10/2023.  IV Vanc discontinued on 9/11/2023. Podiatry consulted and patient underwent left 1st toe amputation on 9/8/2023 with Dr. Samuel Toussaint.  TTE on 9/8/2023 did not show evidence of endocarditis.  ID consulted and recommended PAT, but patient declined given a history of complicated PAT in 2022.  Patient will need 6 weeks of IV antibiotics given no PAT.  Waiting for LTAC placement - possibly today.    Plan:  Follow up with Podiatry recommendations - "Wound care daily, pack with aquacel rope, and secure with 4" Kerlix and 4" ACE.  Avoid tight compression. WBAT, Darco shoe". Follow up pathology results.  Follow up with ID recommendations - will need 6 weeks IV antibiotics given no PAT.  Has Midline.  EOT will be 10/26/2023.  Pain control as needed.  Continue with Wound Care  PT/OT consulted - will need LTAC given duration of antibiotics needed  Follow up with Podiatry on discharge.  "

## 2023-09-16 PROBLEM — D50.9 MICROCYTIC ANEMIA: Status: ACTIVE | Noted: 2023-09-16

## 2023-09-16 LAB
ANION GAP SERPL CALC-SCNC: 7 MMOL/L (ref 8–16)
BACTERIA BLD CULT: NORMAL
BASOPHILS # BLD AUTO: 0.04 K/UL (ref 0–0.2)
BASOPHILS NFR BLD: 0.5 % (ref 0–1.9)
BUN SERPL-MCNC: 18 MG/DL (ref 6–20)
CALCIUM SERPL-MCNC: 9.3 MG/DL (ref 8.7–10.5)
CHLORIDE SERPL-SCNC: 100 MMOL/L (ref 95–110)
CO2 SERPL-SCNC: 27 MMOL/L (ref 23–29)
CREAT SERPL-MCNC: 1 MG/DL (ref 0.5–1.4)
DIFFERENTIAL METHOD: ABNORMAL
EOSINOPHIL # BLD AUTO: 0.8 K/UL (ref 0–0.5)
EOSINOPHIL NFR BLD: 8.6 % (ref 0–8)
ERYTHROCYTE [DISTWIDTH] IN BLOOD BY AUTOMATED COUNT: 16.7 % (ref 11.5–14.5)
EST. GFR  (NO RACE VARIABLE): >60 ML/MIN/1.73 M^2
FERRITIN SERPL-MCNC: 378 NG/ML (ref 20–300)
GLUCOSE SERPL-MCNC: 128 MG/DL (ref 70–110)
HCT VFR BLD AUTO: 28.4 % (ref 40–54)
HGB BLD-MCNC: 8.5 G/DL (ref 14–18)
IMM GRANULOCYTES # BLD AUTO: 0.04 K/UL (ref 0–0.04)
IMM GRANULOCYTES NFR BLD AUTO: 0.5 % (ref 0–0.5)
IRON SERPL-MCNC: 25 UG/DL (ref 45–160)
LYMPHOCYTES # BLD AUTO: 2.6 K/UL (ref 1–4.8)
LYMPHOCYTES NFR BLD: 29.8 % (ref 18–48)
MAGNESIUM SERPL-MCNC: 1.7 MG/DL (ref 1.6–2.6)
MCH RBC QN AUTO: 24.2 PG (ref 27–31)
MCHC RBC AUTO-ENTMCNC: 29.9 G/DL (ref 32–36)
MCV RBC AUTO: 81 FL (ref 82–98)
MONOCYTES # BLD AUTO: 0.8 K/UL (ref 0.3–1)
MONOCYTES NFR BLD: 9.6 % (ref 4–15)
NEUTROPHILS # BLD AUTO: 4.5 K/UL (ref 1.8–7.7)
NEUTROPHILS NFR BLD: 51 % (ref 38–73)
NRBC BLD-RTO: 0 /100 WBC
PHOSPHATE SERPL-MCNC: 3.7 MG/DL (ref 2.7–4.5)
PLATELET # BLD AUTO: 623 K/UL (ref 150–450)
PMV BLD AUTO: 8.7 FL (ref 9.2–12.9)
POCT GLUCOSE: 134 MG/DL (ref 70–110)
POCT GLUCOSE: 136 MG/DL (ref 70–110)
POCT GLUCOSE: 153 MG/DL (ref 70–110)
POCT GLUCOSE: 166 MG/DL (ref 70–110)
POTASSIUM SERPL-SCNC: 4.5 MMOL/L (ref 3.5–5.1)
RBC # BLD AUTO: 3.51 M/UL (ref 4.6–6.2)
SATURATED IRON: 11 % (ref 20–50)
SODIUM SERPL-SCNC: 134 MMOL/L (ref 136–145)
TOTAL IRON BINDING CAPACITY: 226 UG/DL (ref 250–450)
TRANSFERRIN SERPL-MCNC: 153 MG/DL (ref 200–375)
WBC # BLD AUTO: 8.79 K/UL (ref 3.9–12.7)

## 2023-09-16 PROCEDURE — 25000003 PHARM REV CODE 250: Performed by: NURSE PRACTITIONER

## 2023-09-16 PROCEDURE — 99233 PR SUBSEQUENT HOSPITAL CARE,LEVL III: ICD-10-PCS | Mod: ,,, | Performed by: INTERNAL MEDICINE

## 2023-09-16 PROCEDURE — 63600175 PHARM REV CODE 636 W HCPCS: Performed by: HOSPITALIST

## 2023-09-16 PROCEDURE — 99233 SBSQ HOSP IP/OBS HIGH 50: CPT | Mod: ,,, | Performed by: INTERNAL MEDICINE

## 2023-09-16 PROCEDURE — 85025 COMPLETE CBC W/AUTO DIFF WBC: CPT | Performed by: HOSPITALIST

## 2023-09-16 PROCEDURE — 11000001 HC ACUTE MED/SURG PRIVATE ROOM

## 2023-09-16 PROCEDURE — 83540 ASSAY OF IRON: CPT | Performed by: HOSPITALIST

## 2023-09-16 PROCEDURE — 99900035 HC TECH TIME PER 15 MIN (STAT)

## 2023-09-16 PROCEDURE — 25000003 PHARM REV CODE 250

## 2023-09-16 PROCEDURE — 25000003 PHARM REV CODE 250: Performed by: INTERNAL MEDICINE

## 2023-09-16 PROCEDURE — 94761 N-INVAS EAR/PLS OXIMETRY MLT: CPT

## 2023-09-16 PROCEDURE — 84466 ASSAY OF TRANSFERRIN: CPT | Performed by: HOSPITALIST

## 2023-09-16 PROCEDURE — 83735 ASSAY OF MAGNESIUM: CPT | Performed by: HOSPITALIST

## 2023-09-16 PROCEDURE — 80048 BASIC METABOLIC PNL TOTAL CA: CPT | Performed by: HOSPITALIST

## 2023-09-16 PROCEDURE — 84100 ASSAY OF PHOSPHORUS: CPT | Performed by: HOSPITALIST

## 2023-09-16 PROCEDURE — 36415 COLL VENOUS BLD VENIPUNCTURE: CPT | Performed by: HOSPITALIST

## 2023-09-16 PROCEDURE — 82728 ASSAY OF FERRITIN: CPT | Performed by: HOSPITALIST

## 2023-09-16 PROCEDURE — 25000003 PHARM REV CODE 250: Performed by: HOSPITALIST

## 2023-09-16 PROCEDURE — 63600175 PHARM REV CODE 636 W HCPCS: Performed by: INTERNAL MEDICINE

## 2023-09-16 PROCEDURE — A4216 STERILE WATER/SALINE, 10 ML: HCPCS | Performed by: HOSPITALIST

## 2023-09-16 RX ORDER — LANOLIN ALCOHOL/MO/W.PET/CERES
400 CREAM (GRAM) TOPICAL ONCE
Status: COMPLETED | OUTPATIENT
Start: 2023-09-16 | End: 2023-09-16

## 2023-09-16 RX ORDER — IPRATROPIUM BROMIDE AND ALBUTEROL SULFATE 2.5; .5 MG/3ML; MG/3ML
3 SOLUTION RESPIRATORY (INHALATION) EVERY 4 HOURS PRN
Status: DISCONTINUED | OUTPATIENT
Start: 2023-09-16 | End: 2023-09-20 | Stop reason: HOSPADM

## 2023-09-16 RX ADMIN — OXYCODONE HYDROCHLORIDE 5 MG: 5 TABLET ORAL at 08:09

## 2023-09-16 RX ADMIN — QUETIAPINE FUMARATE 200 MG: 200 TABLET ORAL at 08:09

## 2023-09-16 RX ADMIN — INSULIN ASPART 4 UNITS: 100 INJECTION, SOLUTION INTRAVENOUS; SUBCUTANEOUS at 04:09

## 2023-09-16 RX ADMIN — LISINOPRIL 10 MG: 10 TABLET ORAL at 08:09

## 2023-09-16 RX ADMIN — INSULIN ASPART 4 UNITS: 100 INJECTION, SOLUTION INTRAVENOUS; SUBCUTANEOUS at 08:09

## 2023-09-16 RX ADMIN — INSULIN ASPART 2 UNITS: 100 INJECTION, SOLUTION INTRAVENOUS; SUBCUTANEOUS at 11:09

## 2023-09-16 RX ADMIN — ENOXAPARIN SODIUM 40 MG: 40 INJECTION SUBCUTANEOUS at 04:09

## 2023-09-16 RX ADMIN — GABAPENTIN 300 MG: 300 CAPSULE ORAL at 08:09

## 2023-09-16 RX ADMIN — OXYCODONE HYDROCHLORIDE 5 MG: 5 TABLET ORAL at 02:09

## 2023-09-16 RX ADMIN — OXYCODONE HYDROCHLORIDE 5 MG: 5 TABLET ORAL at 12:09

## 2023-09-16 RX ADMIN — ASPIRIN 81 MG: 81 TABLET, COATED ORAL at 08:09

## 2023-09-16 RX ADMIN — SODIUM CHLORIDE, PRESERVATIVE FREE 10 ML: 5 INJECTION INTRAVENOUS at 12:09

## 2023-09-16 RX ADMIN — INSULIN ASPART 1 UNITS: 100 INJECTION, SOLUTION INTRAVENOUS; SUBCUTANEOUS at 08:09

## 2023-09-16 RX ADMIN — CEFAZOLIN 6 G: 2 INJECTION, POWDER, FOR SOLUTION INTRAMUSCULAR; INTRAVENOUS at 11:09

## 2023-09-16 RX ADMIN — Medication 400 MG: at 09:09

## 2023-09-16 RX ADMIN — SENNOSIDES AND DOCUSATE SODIUM 2 TABLET: 50; 8.6 TABLET ORAL at 02:09

## 2023-09-16 RX ADMIN — INSULIN DETEMIR 20 UNITS: 100 INJECTION, SOLUTION SUBCUTANEOUS at 08:09

## 2023-09-16 RX ADMIN — ATORVASTATIN CALCIUM 40 MG: 20 TABLET, FILM COATED ORAL at 08:09

## 2023-09-16 RX ADMIN — SODIUM CHLORIDE, PRESERVATIVE FREE 10 ML: 5 INJECTION INTRAVENOUS at 11:09

## 2023-09-16 RX ADMIN — INSULIN ASPART 4 UNITS: 100 INJECTION, SOLUTION INTRAVENOUS; SUBCUTANEOUS at 11:09

## 2023-09-16 NOTE — PLAN OF CARE
Problem: Adult Inpatient Plan of Care  Goal: Plan of Care Review  Outcome: Ongoing, Progressing  Goal: Patient-Specific Goal (Individualized)  Outcome: Ongoing, Progressing  Goal: Absence of Hospital-Acquired Illness or Injury  Outcome: Ongoing, Progressing  Goal: Optimal Comfort and Wellbeing  Outcome: Ongoing, Progressing  Goal: Readiness for Transition of Care  Outcome: Ongoing, Progressing     Problem: Diabetes Comorbidity  Goal: Blood Glucose Level Within Targeted Range  Outcome: Ongoing, Progressing     Problem: Infection  Goal: Absence of Infection Signs and Symptoms  Outcome: Ongoing, Progressing     Problem: Impaired Wound Healing  Goal: Optimal Wound Healing  Outcome: Ongoing, Progressing     Problem: Skin Injury Risk Increased  Goal: Skin Health and Integrity  Outcome: Ongoing, Progressing     Problem: Fall Injury Risk  Goal: Absence of Fall and Fall-Related Injury  Outcome: Ongoing, Progressing      Ketoconazole Pregnancy And Lactation Text: This medication is Pregnancy Category C and it isn't know if it is safe during pregnancy. It is also excreted in breast milk and breast feeding isn't recommended.

## 2023-09-16 NOTE — ASSESSMENT & PLAN NOTE
"Patient presented to ED with complaints of worsening left great toe pain and swelling.  X-ray of left foot with acute osteomyelitis involving the great toe and distal aspects of the 2nd and 4th toes.  Labs on admission with WBC of 21.91 with left shift.  Blood cultures on admission (9/7/2023) with 4/4 bottles, 9/8/2023 with 1/2 bottles, and 9/11/2023 with 0/2 bottles positive for MSSA . Wound culture (9/8/2023 - areobic) positive for MSSA and Strep dysgalactiae.  Started on IV Vanc/Meropenem on admission and changed to IV Vanc/Cefazolin on 9/10/2023.  IV Vanc discontinued on 9/11/2023.  Podiatry consulted and patient underwent left 1st toe amputation on 9/8/2023 with Dr. Samuel Toussaint - Pathology with underlying bone with focal acute osteomyelitis, focally involving margin of resection.  TTE on 9/8/2023 did not show evidence of endocarditis.  ID consulted and recommended PAT, but patient declined given a history of complicated PAT in 2022.  Patient will need 6 weeks of IV antibiotics given no PAT.  Waiting for LTAC placement.  Stable.    Plan:  Follow up with Podiatry recommendations - "Wound care daily, pack with aquacel rope, and secure with 4" Kerlix and 4" ACE.  Avoid tight compression. WBAT, Darco shoe". Follow up pathology results.  Follow up with ID recommendations - will need 6 weeks IV antibiotics given no PAT.  Has Midline.  EOT will be 10/26/2023.  Pain control as needed.  Continue with Wound Care  PT/OT consulted - will need LTAC given duration of antibiotics needed  Follow up with Podiatry on discharge.  "

## 2023-09-16 NOTE — ASSESSMENT & PLAN NOTE
No evidence of volume overload on admission with negative CXR and normal BNP.  S/p IV Lasix and d/c on 9/13/2023.   TTE on admission with regional wall motion abnormalities are present: The septum is moderately hypokinetic. There is mildly reduced systolic function. Ejection fraction by visual approximation is 45%. Grade I diastolic dysfunction.  Repeat CXR on 9/12/2023 with streaky opacities at the lung bases can be seen with subsegmental atelectasis.  -Continue with ACEI - Lisinopril  -Low Na diet  -Monitor Fluid status

## 2023-09-16 NOTE — SUBJECTIVE & OBJECTIVE
Interval History:  Patient is awake and alert this morning.  No acute events overnight.  Pain improved/controlled with medications.   Tolerating antibiotics well.  Waiting for LTAC placement.  No new complaints.    Review of Systems   Constitutional:  Negative for chills and fever.   HENT:  Negative for congestion and ear pain.    Eyes:  Negative for pain.   Respiratory:  Negative for shortness of breath.    Cardiovascular:  Negative for chest pain and palpitations.   Gastrointestinal:  Negative for abdominal pain.   Genitourinary:  Negative for difficulty urinating.   Musculoskeletal:  Negative for back pain.   Skin:  Positive for wound (Left foot wound clean and dressed).   Neurological:  Negative for dizziness and headaches.   Psychiatric/Behavioral:  Negative for agitation and behavioral problems.      Objective:     Vital Signs (Most Recent):  Temp: 97.8 °F (36.6 °C) (09/16/23 0714)  Pulse: 79 (09/16/23 0714)  Resp: 18 (09/16/23 0821)  BP: 104/66 (09/16/23 0714)  SpO2: 95 % (09/16/23 0714)   Vital Signs (24h Range):  Temp:  [97.8 °F (36.6 °C)-98.7 °F (37.1 °C)] 97.8 °F (36.6 °C)  Pulse:  [79-87] 79  Resp:  [16-18] 18  SpO2:  [95 %-98 %] 95 %  BP: (102-120)/(60-75) 104/66     Weight: 117 kg (257 lb 15 oz)  Body mass index is 33.12 kg/m².    Intake/Output Summary (Last 24 hours) at 9/16/2023 0900  Last data filed at 9/16/2023 0639  Gross per 24 hour   Intake 1540 ml   Output 1700 ml   Net -160 ml        Physical Exam  Vitals reviewed.   Constitutional:       General: He is not in acute distress.     Appearance: Normal appearance. He is obese. He is ill-appearing (chronically). He is not toxic-appearing.   HENT:      Head: Normocephalic and atraumatic.      Right Ear: External ear normal.      Left Ear: External ear normal.      Nose: Nose normal.      Mouth/Throat:      Mouth: Mucous membranes are moist.      Pharynx: Oropharynx is clear.   Eyes:      General: No scleral icterus.     Conjunctiva/sclera:  Conjunctivae normal.      Pupils: Pupils are equal, round, and reactive to light.   Cardiovascular:      Rate and Rhythm: Normal rate and regular rhythm.   Pulmonary:      Effort: Pulmonary effort is normal.      Breath sounds: Normal breath sounds. No wheezing or rales.   Abdominal:      General: Bowel sounds are normal.      Tenderness: There is no abdominal tenderness.   Musculoskeletal:      Cervical back: Normal range of motion and neck supple.      Comments: Left toe amp wound clean and dressed   Skin:     General: Skin is warm and dry.      Capillary Refill: Capillary refill takes less than 2 seconds.   Neurological:      General: No focal deficit present.      Mental Status: He is alert and oriented to person, place, and time.      Cranial Nerves: No cranial nerve deficit.   Psychiatric:         Mood and Affect: Mood normal.         Behavior: Behavior normal.         Significant Labs: All pertinent labs within the past 24 hours have been reviewed.  CBC:   Recent Labs   Lab 09/15/23  0449 09/16/23  0509   WBC 9.81 8.79   HGB 8.9* 8.5*   HCT 30.0* 28.4*   * 623*       CMP:   Recent Labs   Lab 09/15/23  0449 09/16/23  0508   * 134*   K 4.5 4.5    100   CO2 27 27   * 128*   BUN 19 18   CREATININE 1.0 1.0   CALCIUM 9.4 9.3   ANIONGAP 7* 7*         Significant Imaging: I have reviewed all pertinent imaging results/findings within the past 24 hours.

## 2023-09-16 NOTE — PLAN OF CARE
Pt remained free of falls and injuries throughout shift. AAOx4. Pt calm, pleasant, and cooperative. Purposeful hourly rounding performed. Pt swallows meds whole. IV Cefazolin infusing continuously @ 20.8 mL/hr to RUE midline. Dsg with ACE wrap to L foot CDI, elevated on pillow. Pt c/o L foot pain, managing with PRN oxycodone. Frequent weight shift encouraged, urinal at bedside in use.VSS on RA. Pt sleeping in bed, even rise and fall of chest. Bed low and locked, call light in reach. Side rails up x3. Will continue to monitor.

## 2023-09-16 NOTE — PROGRESS NOTES
"[facility] Medicine  Progress Note    Patient Name: Dave Good  MRN: [id number]  Patient Class: IP- Inpatient   Admission Date: 9/7/2023  Length of Stay: 8 days  Attending Physician: Zeus Nation MD  Primary Care Provider: Reyna Jorge NP        Subjective:     Principal Problem:Staphylococcus aureus bacteremia with sepsis      HPI:  Per Larry Shepherd, NP:    "The patient is a 58 y.o. male with a past medical history of type 2 diabetes, HTN, HLD, and chronic systolic congestive heart failure who presents with worsening left great toe pain infection and swelling.  Patient was recently seen in the ER and discharged to follow up with ID and wound care.  Since then he has been unable to follow-up with podiatry or infectious disease.  He states that he has not tried to call their offices and was waiting for them to call him.  States that his toe pain has gotten worse.  He has been soaking it in a foot massage machine chair with no improvement.  XR positive for left great, 2nd, and 4th toe acute osteomyelitis.  He will be admitted for further management of his acute osteomyelitis and infectious disease and podiatry consult."      Overview/Hospital Course:  Patient is 58-year-old man with history of hypertension, diabetes mellitus type 2, reported history of systolic heart failure (although last echocardiogram with normal systolic function), significant prior tobacco smoking history with chronic obstructive pulmonary disease admitted to the hospital with diabetic ulcer with abscess involving left foot including great toe and proximal foot.  Patient with marked leukocytosis and elevated heart rate consistent with systemic inflammatory response/sepsis secondary to foot infection.  Blood cultures obtained.  Patient started on intravenous antibiotics.  Podiatry consulted and took the patient for surgical debridement with removal of necrotic tissue on 9/8/2023.  Blood cultures positive " for Gram-positive cocci representing Staphylococcus aureus.    Patient with prior history of line associated deep vein thrombosis for which he was treated with three months of anticoagulation.      Interval History:  Patient is awake and alert this morning.  No acute events overnight.  Pain improved/controlled with medications.   Tolerating antibiotics well.  Waiting for LTAC placement.  No new complaints.    Review of Systems   Constitutional:  Negative for chills and fever.   HENT:  Negative for congestion and ear pain.    Eyes:  Negative for pain.   Respiratory:  Negative for shortness of breath.    Cardiovascular:  Negative for chest pain and palpitations.   Gastrointestinal:  Negative for abdominal pain.   Genitourinary:  Negative for difficulty urinating.   Musculoskeletal:  Negative for back pain.   Skin:  Positive for wound (Left foot wound clean and dressed).   Neurological:  Negative for dizziness and headaches.   Psychiatric/Behavioral:  Negative for agitation and behavioral problems.      Objective:     Vital Signs (Most Recent):  Temp: 97.8 °F (36.6 °C) (09/16/23 0714)  Pulse: 79 (09/16/23 0714)  Resp: 18 (09/16/23 0821)  BP: 104/66 (09/16/23 0714)  SpO2: 95 % (09/16/23 0714)   Vital Signs (24h Range):  Temp:  [97.8 °F (36.6 °C)-98.7 °F (37.1 °C)] 97.8 °F (36.6 °C)  Pulse:  [79-87] 79  Resp:  [16-18] 18  SpO2:  [95 %-98 %] 95 %  BP: (102-120)/(60-75) 104/66     Weight: 117 kg (257 lb 15 oz)  Body mass index is 33.12 kg/m².    Intake/Output Summary (Last 24 hours) at 9/16/2023 0900  Last data filed at 9/16/2023 0639  Gross per 24 hour   Intake 1540 ml   Output 1700 ml   Net -160 ml        Physical Exam  Vitals reviewed.   Constitutional:       General: He is not in acute distress.     Appearance: Normal appearance. He is obese. He is ill-appearing (chronically). He is not toxic-appearing.   HENT:      Head: Normocephalic and atraumatic.      Right Ear: External ear normal.      Left Ear: External ear  normal.      Nose: Nose normal.      Mouth/Throat:      Mouth: Mucous membranes are moist.      Pharynx: Oropharynx is clear.   Eyes:      General: No scleral icterus.     Conjunctiva/sclera: Conjunctivae normal.      Pupils: Pupils are equal, round, and reactive to light.   Cardiovascular:      Rate and Rhythm: Normal rate and regular rhythm.   Pulmonary:      Effort: Pulmonary effort is normal.      Breath sounds: Normal breath sounds. No wheezing or rales.   Abdominal:      General: Bowel sounds are normal.      Tenderness: There is no abdominal tenderness.   Musculoskeletal:      Cervical back: Normal range of motion and neck supple.      Comments: Left toe amp wound clean and dressed   Skin:     General: Skin is warm and dry.      Capillary Refill: Capillary refill takes less than 2 seconds.   Neurological:      General: No focal deficit present.      Mental Status: He is alert and oriented to person, place, and time.      Cranial Nerves: No cranial nerve deficit.   Psychiatric:         Mood and Affect: Mood normal.         Behavior: Behavior normal.         Significant Labs: All pertinent labs within the past 24 hours have been reviewed.  CBC:   Recent Labs   Lab 09/15/23  0449 09/16/23  0509   WBC 9.81 8.79   HGB 8.9* 8.5*   HCT 30.0* 28.4*   * 623*       CMP:   Recent Labs   Lab 09/15/23  0449 09/16/23  0508   * 134*   K 4.5 4.5    100   CO2 27 27   * 128*   BUN 19 18   CREATININE 1.0 1.0   CALCIUM 9.4 9.3   ANIONGAP 7* 7*         Significant Imaging: I have reviewed all pertinent imaging results/findings within the past 24 hours.      Assessment/Plan:      * Staphylococcus aureus bacteremia with sepsis  Patient presented to ED with complaints of worsening left great toe pain and swelling.  X-ray of left foot with acute osteomyelitis involving the great toe and distal aspects of the 2nd and 4th toes.  Labs on admission with WBC of 21.91 with left shift.  Blood cultures on admission  "(9/7/2023) with 4/4 bottles, 9/8/2023 with 1/2 bottles, and 9/11/2023 with 0/2 bottles positive for MSSA . Wound culture (9/8/2023 - areobic) positive for MSSA and Strep dysgalactiae.  Started on IV Vanc/Meropenem on admission and changed to IV Vanc/Cefazolin on 9/10/2023.  IV Vanc discontinued on 9/11/2023.  Podiatry consulted and patient underwent left 1st toe amputation on 9/8/2023 with Dr. Samuel Toussaint - Pathology with underlying bone with focal acute osteomyelitis, focally involving margin of resection.  TTE on 9/8/2023 did not show evidence of endocarditis.  ID consulted and recommended PAT, but patient declined given a history of complicated PAT in 2022.  Patient will need 6 weeks of IV antibiotics given no PAT.  Waiting for LTAC placement.  Stable.    Plan:  Follow up with Podiatry recommendations - "Wound care daily, pack with aquacel rope, and secure with 4" Kerlix and 4" ACE.  Avoid tight compression. WBAT, Darco shoe". Follow up pathology results.  Follow up with ID recommendations - will need 6 weeks IV antibiotics given no PAT.  Has Midline.  EOT will be 10/26/2023.  Pain control as needed.  Continue with Wound Care  PT/OT consulted - will need LTAC given duration of antibiotics needed  Follow up with Podiatry on discharge.    Chronic combined systolic and diastolic congestive heart failure  No evidence of volume overload on admission with negative CXR and normal BNP.  S/p IV Lasix and d/c on 9/13/2023.   TTE on admission with regional wall motion abnormalities are present: The septum is moderately hypokinetic. There is mildly reduced systolic function. Ejection fraction by visual approximation is 45%. Grade I diastolic dysfunction.  Repeat CXR on 9/12/2023 with streaky opacities at the lung bases can be seen with subsegmental atelectasis.  -Continue with ACEI - Lisinopril  -Low Na diet  -Monitor Fluid status      Type 2 diabetes mellitus with diabetic peripheral angiopathy without gangrene, with " long-term current use of insulin  Glucose stable.  Hemoglobin A1c of 7.5.    Home Meds: Metformin 1g bid, Detemir 50 units, Aspart 10 units TIDWM, Trulicity weekly (?)  Hospital Meds:  Detemir 20 units, Aspart 4 units TIDWM moderate correction dose SSI  -Continue with current regimen - can restart Metformin on discharge  -Diabetic Diet  -Monitor and adjust as needed      Microcytic anemia  Hgb 10 on admission and stable at 8.5.    -Check Iron Studies      Obesity due to excess calories with serious comorbidity  Body mass index is 33.12 kg/m². Morbid obesity complicates all aspects of disease management from diagnostic modalities to treatment. Weight loss encouraged and health benefits explained to patient.         Mixed hyperlipidemia  -Continue Lipitor      Hypertension  Normotensive on Lisinopril  Home Meds:  HCTZ 25mg, Lisinopril 10mg  -Continue with Lisinopril  -Monitor and adjust as needed      VTE Risk Mitigation (From admission, onward)         Ordered     enoxaparin injection 40 mg  Daily         09/09/23 1108     IP VTE HIGH RISK PATIENT  Once         09/09/23 1108     Place sequential compression device  Until discontinued         09/08/23 0325                Discharge Planning   LOIS: 9/15/2023     Code Status: Full Code   Is the patient medically ready for discharge?:     Reason for patient still in hospital (select all that apply): Patient trending condition, Treatment and Consult recommendations  Discharge Plan A: Long-term acute care facility (LTAC)   Discharge Delays: None known at this time              Zeus Nation MD  Department of Hospital Medicine   [facility] ([place])

## 2023-09-17 LAB
POCT GLUCOSE: 135 MG/DL (ref 70–110)
POCT GLUCOSE: 142 MG/DL (ref 70–110)
POCT GLUCOSE: 151 MG/DL (ref 70–110)
POCT GLUCOSE: 155 MG/DL (ref 70–110)
POCT GLUCOSE: 158 MG/DL (ref 70–110)

## 2023-09-17 PROCEDURE — 94761 N-INVAS EAR/PLS OXIMETRY MLT: CPT

## 2023-09-17 PROCEDURE — 25000003 PHARM REV CODE 250: Performed by: NURSE PRACTITIONER

## 2023-09-17 PROCEDURE — 25000003 PHARM REV CODE 250: Performed by: INTERNAL MEDICINE

## 2023-09-17 PROCEDURE — A4216 STERILE WATER/SALINE, 10 ML: HCPCS | Performed by: HOSPITALIST

## 2023-09-17 PROCEDURE — 99233 PR SUBSEQUENT HOSPITAL CARE,LEVL III: ICD-10-PCS | Mod: ,,, | Performed by: INTERNAL MEDICINE

## 2023-09-17 PROCEDURE — 84238 ASSAY NONENDOCRINE RECEPTOR: CPT | Performed by: INTERNAL MEDICINE

## 2023-09-17 PROCEDURE — 99233 SBSQ HOSP IP/OBS HIGH 50: CPT | Mod: ,,, | Performed by: INTERNAL MEDICINE

## 2023-09-17 PROCEDURE — 36415 COLL VENOUS BLD VENIPUNCTURE: CPT | Performed by: INTERNAL MEDICINE

## 2023-09-17 PROCEDURE — 25000003 PHARM REV CODE 250: Performed by: HOSPITALIST

## 2023-09-17 PROCEDURE — 99900035 HC TECH TIME PER 15 MIN (STAT)

## 2023-09-17 PROCEDURE — 11000001 HC ACUTE MED/SURG PRIVATE ROOM

## 2023-09-17 PROCEDURE — 63600175 PHARM REV CODE 636 W HCPCS: Performed by: INTERNAL MEDICINE

## 2023-09-17 RX ADMIN — INSULIN ASPART 4 UNITS: 100 INJECTION, SOLUTION INTRAVENOUS; SUBCUTANEOUS at 03:09

## 2023-09-17 RX ADMIN — QUETIAPINE FUMARATE 200 MG: 200 TABLET ORAL at 08:09

## 2023-09-17 RX ADMIN — OXYCODONE HYDROCHLORIDE 5 MG: 5 TABLET ORAL at 04:09

## 2023-09-17 RX ADMIN — OXYCODONE HYDROCHLORIDE 5 MG: 5 TABLET ORAL at 08:09

## 2023-09-17 RX ADMIN — SODIUM CHLORIDE, PRESERVATIVE FREE 10 ML: 5 INJECTION INTRAVENOUS at 03:09

## 2023-09-17 RX ADMIN — OXYCODONE HYDROCHLORIDE 5 MG: 5 TABLET ORAL at 10:09

## 2023-09-17 RX ADMIN — INSULIN ASPART 1 UNITS: 100 INJECTION, SOLUTION INTRAVENOUS; SUBCUTANEOUS at 08:09

## 2023-09-17 RX ADMIN — INSULIN DETEMIR 20 UNITS: 100 INJECTION, SOLUTION SUBCUTANEOUS at 08:09

## 2023-09-17 RX ADMIN — SODIUM CHLORIDE, PRESERVATIVE FREE 10 ML: 5 INJECTION INTRAVENOUS at 05:09

## 2023-09-17 RX ADMIN — ATORVASTATIN CALCIUM 40 MG: 20 TABLET, FILM COATED ORAL at 10:09

## 2023-09-17 RX ADMIN — SODIUM CHLORIDE, PRESERVATIVE FREE 10 ML: 5 INJECTION INTRAVENOUS at 12:09

## 2023-09-17 RX ADMIN — SODIUM CHLORIDE, PRESERVATIVE FREE 10 ML: 5 INJECTION INTRAVENOUS at 06:09

## 2023-09-17 RX ADMIN — INSULIN ASPART 4 UNITS: 100 INJECTION, SOLUTION INTRAVENOUS; SUBCUTANEOUS at 06:09

## 2023-09-17 RX ADMIN — INSULIN ASPART 2 UNITS: 100 INJECTION, SOLUTION INTRAVENOUS; SUBCUTANEOUS at 03:09

## 2023-09-17 RX ADMIN — GABAPENTIN 300 MG: 300 CAPSULE ORAL at 10:09

## 2023-09-17 RX ADMIN — LISINOPRIL 10 MG: 10 TABLET ORAL at 10:09

## 2023-09-17 RX ADMIN — CEFAZOLIN 6 G: 2 INJECTION, POWDER, FOR SOLUTION INTRAMUSCULAR; INTRAVENOUS at 10:09

## 2023-09-17 RX ADMIN — INSULIN ASPART 4 UNITS: 100 INJECTION, SOLUTION INTRAVENOUS; SUBCUTANEOUS at 10:09

## 2023-09-17 RX ADMIN — INSULIN ASPART 2 UNITS: 100 INJECTION, SOLUTION INTRAVENOUS; SUBCUTANEOUS at 10:09

## 2023-09-17 RX ADMIN — ASPIRIN 81 MG: 81 TABLET, COATED ORAL at 10:09

## 2023-09-17 RX ADMIN — GABAPENTIN 300 MG: 300 CAPSULE ORAL at 08:09

## 2023-09-17 NOTE — PROGRESS NOTES
"[facility] Medicine  Progress Note    Patient Name: Dave Good  MRN: [id number]  Patient Class: IP- Inpatient   Admission Date: 9/7/2023  Length of Stay: 9 days  Attending Physician: Zeus Nation MD  Primary Care Provider: Reyna Jorge NP        Subjective:     Principal Problem:Staphylococcus aureus bacteremia with sepsis        HPI:  Per Larry Shepherd, NP:    "The patient is a 58 y.o. male with a past medical history of type 2 diabetes, HTN, HLD, and chronic systolic congestive heart failure who presents with worsening left great toe pain infection and swelling.  Patient was recently seen in the ER and discharged to follow up with ID and wound care.  Since then he has been unable to follow-up with podiatry or infectious disease.  He states that he has not tried to call their offices and was waiting for them to call him.  States that his toe pain has gotten worse.  He has been soaking it in a foot massage machine chair with no improvement.  XR positive for left great, 2nd, and 4th toe acute osteomyelitis.  He will be admitted for further management of his acute osteomyelitis and infectious disease and podiatry consult."      Overview/Hospital Course:  Patient is 58-year-old man with history of hypertension, diabetes mellitus type 2, reported history of systolic heart failure (although last echocardiogram with normal systolic function), significant prior tobacco smoking history with chronic obstructive pulmonary disease admitted to the hospital with diabetic ulcer with abscess involving left foot including great toe and proximal foot.  Patient with marked leukocytosis and elevated heart rate consistent with systemic inflammatory response/sepsis secondary to foot infection.  Blood cultures obtained.  Patient started on intravenous antibiotics.  Podiatry consulted and took the patient for surgical debridement with removal of necrotic tissue on 9/8/2023.  Blood cultures " positive for Gram-positive cocci representing Staphylococcus aureus.    Patient with prior history of line associated deep vein thrombosis for which he was treated with three months of anticoagulation.      Interval History:  Patient is awake and alert this morning.  No acute events overnight.  Pain improved/controlled with medications.   Tolerating antibiotics well.  Waiting for LTAC placement.  No new complaints.  Continue with current treatment plan.    Review of Systems   Constitutional:  Negative for chills and fever.   HENT:  Negative for congestion and ear pain.    Eyes:  Negative for pain.   Respiratory:  Negative for shortness of breath.    Cardiovascular:  Negative for chest pain and palpitations.   Gastrointestinal:  Negative for abdominal pain.   Genitourinary:  Negative for difficulty urinating.   Musculoskeletal:  Negative for back pain.   Skin:  Positive for wound (Left foot wound clean and dressed).   Neurological:  Negative for dizziness and headaches.   Psychiatric/Behavioral:  Negative for agitation and behavioral problems.      Objective:     Vital Signs (Most Recent):  Temp: 98.3 °F (36.8 °C) (09/17/23 0822)  Pulse: 86 (09/17/23 0822)  Resp: 18 (09/17/23 0822)  BP: 122/75 (09/17/23 0822)  SpO2: 95 % (09/17/23 0822)   Vital Signs (24h Range):  Temp:  [97.5 °F (36.4 °C)-98.3 °F (36.8 °C)] 98.3 °F (36.8 °C)  Pulse:  [] 86  Resp:  [16-20] 18  SpO2:  [92 %-97 %] 95 %  BP: ()/(62-75) 122/75     Weight: 117 kg (257 lb 15 oz)  Body mass index is 33.12 kg/m².    Intake/Output Summary (Last 24 hours) at 9/17/2023 0917  Last data filed at 9/17/2023 0727  Gross per 24 hour   Intake 720 ml   Output 1276 ml   Net -556 ml        Physical Exam  Vitals reviewed.   Constitutional:       General: He is not in acute distress.     Appearance: Normal appearance. He is obese. He is ill-appearing (chronically). He is not toxic-appearing.   HENT:      Head: Normocephalic and atraumatic.      Right Ear:  External ear normal.      Left Ear: External ear normal.      Nose: Nose normal.      Mouth/Throat:      Mouth: Mucous membranes are moist.      Pharynx: Oropharynx is clear.   Eyes:      General: No scleral icterus.     Conjunctiva/sclera: Conjunctivae normal.      Pupils: Pupils are equal, round, and reactive to light.   Cardiovascular:      Rate and Rhythm: Normal rate and regular rhythm.   Pulmonary:      Effort: Pulmonary effort is normal.      Breath sounds: Normal breath sounds. No wheezing or rales.   Abdominal:      General: Bowel sounds are normal.      Tenderness: There is no abdominal tenderness.   Musculoskeletal:      Cervical back: Normal range of motion and neck supple.      Comments: Left toe amp wound clean and dressed   Skin:     General: Skin is warm and dry.      Capillary Refill: Capillary refill takes less than 2 seconds.   Neurological:      General: No focal deficit present.      Mental Status: He is alert and oriented to person, place, and time.      Cranial Nerves: No cranial nerve deficit.   Psychiatric:         Mood and Affect: Mood normal.         Behavior: Behavior normal.         Significant Labs: All pertinent labs within the past 24 hours have been reviewed.  CBC:   Recent Labs   Lab 09/16/23  0509   WBC 8.79   HGB 8.5*   HCT 28.4*   *       CMP:   Recent Labs   Lab 09/16/23  0508   *   K 4.5      CO2 27   *   BUN 18   CREATININE 1.0   CALCIUM 9.3   ANIONGAP 7*         Significant Imaging: I have reviewed all pertinent imaging results/findings within the past 24 hours.      Assessment/Plan:      * Staphylococcus aureus bacteremia with sepsis  Patient presented to ED with complaints of worsening left great toe pain and swelling.  X-ray of left foot with acute osteomyelitis involving the great toe and distal aspects of the 2nd and 4th toes.  Labs on admission with WBC of 21.91 with left shift.  Blood cultures on admission (9/7/2023) with 4/4 bottles, 9/8/2023  "with 1/2 bottles, and 9/11/2023 with 0/2 bottles positive for MSSA . Wound culture (9/8/2023 - areobic) positive for MSSA and Strep dysgalactiae.  Started on IV Vanc/Meropenem on admission and changed to IV Vanc/Cefazolin on 9/10/2023.  IV Vanc discontinued on 9/11/2023.  Podiatry consulted and patient underwent left 1st toe amputation on 9/8/2023 with Dr. Samuel Toussaint - Pathology with underlying bone with focal acute osteomyelitis, focally involving margin of resection.  TTE on 9/8/2023 did not show evidence of endocarditis.  ID consulted and recommended PAT, but patient declined given a history of complicated PAT in 2022.  Patient will need 6 weeks of IV antibiotics given no PAT.  Waiting for LTAC placement.  Stable.  Hopeful for discharge tomorrow.  No labs today.    Plan:  Follow up with Podiatry recommendations - "Wound care daily, pack with aquacel rope, and secure with 4" Kerlix and 4" ACE.  Avoid tight compression. WBAT, Darco shoe". Follow up with Podiatry on discharge.  Follow up with ID recommendations - will need 6 weeks IV antibiotics given no PAT.  Has Midline.  EOT will be 10/26/2023.  Pain control as needed.  Continue with Wound Care  PT/OT consulted - will need LTAC given duration of antibiotics needed      Chronic combined systolic and diastolic congestive heart failure  No evidence of volume overload on admission with negative CXR and normal BNP.  S/p IV Lasix and d/c on 9/13/2023.   TTE on admission with regional wall motion abnormalities are present: The septum is moderately hypokinetic. There is mildly reduced systolic function. Ejection fraction by visual approximation is 45%. Grade I diastolic dysfunction.  Repeat CXR on 9/12/2023 with streaky opacities at the lung bases can be seen with subsegmental atelectasis.  -Continue with ACEI - Lisinopril  -Low Na diet  -Monitor Fluid status      Type 2 diabetes mellitus with diabetic peripheral angiopathy without gangrene, with long-term current use " of insulin  Glucose stable.  Hemoglobin A1c of 7.5.    Home Meds: Metformin 1g bid, Detemir 50 units, Aspart 10 units TIDWM, Trulicity weekly (?)  Hospital Meds:  Detemir 20 units, Aspart 4 units TIDWM moderate correction dose SSI  -Continue with current regimen - can restart Metformin on discharge  -Diabetic Diet  -Monitor and adjust as needed      Microcytic anemia  Hgb 10 on admission and stable at 8.5.  Ferritin 378 with Sat of 11%.  -Check soluble transferrin receptor to evaluate possibility of JEWEL      Obesity due to excess calories with serious comorbidity  Body mass index is 33.12 kg/m². Morbid obesity complicates all aspects of disease management from diagnostic modalities to treatment. Weight loss encouraged and health benefits explained to patient.         Mixed hyperlipidemia  -Continue Lipitor      Hypertension  Normotensive on Lisinopril alone  Home Meds:  HCTZ 25mg, Lisinopril 10mg  -Continue with Lisinopril  -Low salt diet  -Monitor and adjust as needed      VTE Risk Mitigation (From admission, onward)         Ordered     enoxaparin injection 40 mg  Daily         09/09/23 1108     IP VTE HIGH RISK PATIENT  Once         09/09/23 1108     Place sequential compression device  Until discontinued         09/08/23 0325                Discharge Planning   LOIS: 9/15/2023     Code Status: Full Code   Is the patient medically ready for discharge?:     Reason for patient still in hospital (select all that apply): Patient trending condition, Treatment and Consult recommendations  Discharge Plan A: Long-term acute care facility (LTAC)   Discharge Delays: None known at this time              Zeus Nation MD  Department of Hospital Medicine   [facility] ([place])

## 2023-09-17 NOTE — CARE UPDATE
09/16/23 1924   Patient Assessment/Suction   Level of Consciousness (AVPU) alert   Respiratory Effort Normal;Unlabored   All Lung Fields Breath Sounds clear   Rhythm/Pattern, Respiratory pattern regular   PRE-TX-O2   Device (Oxygen Therapy) room air   SpO2 96 %   Pulse Oximetry Type Intermittent   $ Pulse Oximetry - Multiple Charge Pulse Oximetry - Multiple   Pulse 83   Resp 16   Aerosol Therapy   $ Aerosol Therapy Charges PRN treatment not required

## 2023-09-17 NOTE — ASSESSMENT & PLAN NOTE
Normotensive on Lisinopril alone  Home Meds:  HCTZ 25mg, Lisinopril 10mg  -Continue with Lisinopril  -Low salt diet  -Monitor and adjust as needed

## 2023-09-17 NOTE — PLAN OF CARE
POC reviewed. No significant events this shift. Pt stable on RA. Complaints of pain treated with PRN medication PER MAR. Pt voids and shifts in bed with assist. Bed low and locked, side rails up x3, call light within reach.    Problem: Adult Inpatient Plan of Care  Goal: Plan of Care Review  Outcome: Ongoing, Progressing  Goal: Patient-Specific Goal (Individualized)  Outcome: Ongoing, Progressing  Goal: Absence of Hospital-Acquired Illness or Injury  Outcome: Ongoing, Progressing  Goal: Optimal Comfort and Wellbeing  Outcome: Ongoing, Progressing  Goal: Readiness for Transition of Care  Outcome: Ongoing, Progressing     Problem: Diabetes Comorbidity  Goal: Blood Glucose Level Within Targeted Range  Outcome: Ongoing, Progressing     Problem: Infection  Goal: Absence of Infection Signs and Symptoms  Outcome: Ongoing, Progressing     Problem: Impaired Wound Healing  Goal: Optimal Wound Healing  Outcome: Ongoing, Progressing     Problem: Skin Injury Risk Increased  Goal: Skin Health and Integrity  Outcome: Ongoing, Progressing     Problem: Fall Injury Risk  Goal: Absence of Fall and Fall-Related Injury  Outcome: Ongoing, Progressing

## 2023-09-17 NOTE — ASSESSMENT & PLAN NOTE
"Patient presented to ED with complaints of worsening left great toe pain and swelling.  X-ray of left foot with acute osteomyelitis involving the great toe and distal aspects of the 2nd and 4th toes.  Labs on admission with WBC of 21.91 with left shift.  Blood cultures on admission (9/7/2023) with 4/4 bottles, 9/8/2023 with 1/2 bottles, and 9/11/2023 with 0/2 bottles positive for MSSA . Wound culture (9/8/2023 - areobic) positive for MSSA and Strep dysgalactiae.  Started on IV Vanc/Meropenem on admission and changed to IV Vanc/Cefazolin on 9/10/2023.  IV Vanc discontinued on 9/11/2023.  Podiatry consulted and patient underwent left 1st toe amputation on 9/8/2023 with Dr. Samuel Toussaint - Pathology with underlying bone with focal acute osteomyelitis, focally involving margin of resection.  TTE on 9/8/2023 did not show evidence of endocarditis.  ID consulted and recommended PAT, but patient declined given a history of complicated PAT in 2022.  Patient will need 6 weeks of IV antibiotics given no PAT.  Waiting for LTAC placement.  Stable.  Hopeful for discharge tomorrow.  No labs today.    Plan:  Follow up with Podiatry recommendations - "Wound care daily, pack with aquacel rope, and secure with 4" Kerlix and 4" ACE.  Avoid tight compression. WBAT, Darco shoe". Follow up with Podiatry on discharge.  Follow up with ID recommendations - will need 6 weeks IV antibiotics given no PAT.  Has Midline.  EOT will be 10/26/2023.  Pain control as needed.  Continue with Wound Care  PT/OT consulted - will need LTAC given duration of antibiotics needed    "

## 2023-09-17 NOTE — ASSESSMENT & PLAN NOTE
Hgb 10 on admission and stable at 8.5.  Ferritin 378 with Sat of 11%.  -Check soluble transferrin receptor to evaluate possibility of JEWEL

## 2023-09-17 NOTE — SUBJECTIVE & OBJECTIVE
Interval History:  Patient is awake and alert this morning.  No acute events overnight.  Pain improved/controlled with medications.   Tolerating antibiotics well.  Waiting for LTAC placement.  No new complaints.  Continue with current treatment plan.    Review of Systems   Constitutional:  Negative for chills and fever.   HENT:  Negative for congestion and ear pain.    Eyes:  Negative for pain.   Respiratory:  Negative for shortness of breath.    Cardiovascular:  Negative for chest pain and palpitations.   Gastrointestinal:  Negative for abdominal pain.   Genitourinary:  Negative for difficulty urinating.   Musculoskeletal:  Negative for back pain.   Skin:  Positive for wound (Left foot wound clean and dressed).   Neurological:  Negative for dizziness and headaches.   Psychiatric/Behavioral:  Negative for agitation and behavioral problems.      Objective:     Vital Signs (Most Recent):  Temp: 98.3 °F (36.8 °C) (09/17/23 0822)  Pulse: 86 (09/17/23 0822)  Resp: 18 (09/17/23 0822)  BP: 122/75 (09/17/23 0822)  SpO2: 95 % (09/17/23 0822)   Vital Signs (24h Range):  Temp:  [97.5 °F (36.4 °C)-98.3 °F (36.8 °C)] 98.3 °F (36.8 °C)  Pulse:  [] 86  Resp:  [16-20] 18  SpO2:  [92 %-97 %] 95 %  BP: ()/(62-75) 122/75     Weight: 117 kg (257 lb 15 oz)  Body mass index is 33.12 kg/m².    Intake/Output Summary (Last 24 hours) at 9/17/2023 0917  Last data filed at 9/17/2023 0727  Gross per 24 hour   Intake 720 ml   Output 1276 ml   Net -556 ml        Physical Exam  Vitals reviewed.   Constitutional:       General: He is not in acute distress.     Appearance: Normal appearance. He is obese. He is ill-appearing (chronically). He is not toxic-appearing.   HENT:      Head: Normocephalic and atraumatic.      Right Ear: External ear normal.      Left Ear: External ear normal.      Nose: Nose normal.      Mouth/Throat:      Mouth: Mucous membranes are moist.      Pharynx: Oropharynx is clear.   Eyes:      General: No scleral  icterus.     Conjunctiva/sclera: Conjunctivae normal.      Pupils: Pupils are equal, round, and reactive to light.   Cardiovascular:      Rate and Rhythm: Normal rate and regular rhythm.   Pulmonary:      Effort: Pulmonary effort is normal.      Breath sounds: Normal breath sounds. No wheezing or rales.   Abdominal:      General: Bowel sounds are normal.      Tenderness: There is no abdominal tenderness.   Musculoskeletal:      Cervical back: Normal range of motion and neck supple.      Comments: Left toe amp wound clean and dressed   Skin:     General: Skin is warm and dry.      Capillary Refill: Capillary refill takes less than 2 seconds.   Neurological:      General: No focal deficit present.      Mental Status: He is alert and oriented to person, place, and time.      Cranial Nerves: No cranial nerve deficit.   Psychiatric:         Mood and Affect: Mood normal.         Behavior: Behavior normal.         Significant Labs: All pertinent labs within the past 24 hours have been reviewed.  CBC:   Recent Labs   Lab 09/16/23  0509   WBC 8.79   HGB 8.5*   HCT 28.4*   *       CMP:   Recent Labs   Lab 09/16/23  0508   *   K 4.5      CO2 27   *   BUN 18   CREATININE 1.0   CALCIUM 9.3   ANIONGAP 7*         Significant Imaging: I have reviewed all pertinent imaging results/findings within the past 24 hours.

## 2023-09-18 LAB
ALBUMIN SERPL BCP-MCNC: 1.6 G/DL (ref 3.5–5.2)
ALP SERPL-CCNC: 133 U/L (ref 55–135)
ALT SERPL W/O P-5'-P-CCNC: 25 U/L (ref 10–44)
ANION GAP SERPL CALC-SCNC: 6 MMOL/L (ref 8–16)
AST SERPL-CCNC: 41 U/L (ref 10–40)
BASOPHILS # BLD AUTO: 0.03 K/UL (ref 0–0.2)
BASOPHILS NFR BLD: 0.4 % (ref 0–1.9)
BILIRUB SERPL-MCNC: 0.2 MG/DL (ref 0.1–1)
BUN SERPL-MCNC: 18 MG/DL (ref 6–20)
CALCIUM SERPL-MCNC: 9.1 MG/DL (ref 8.7–10.5)
CHLORIDE SERPL-SCNC: 102 MMOL/L (ref 95–110)
CO2 SERPL-SCNC: 28 MMOL/L (ref 23–29)
CREAT SERPL-MCNC: 1 MG/DL (ref 0.5–1.4)
DIFFERENTIAL METHOD: ABNORMAL
EOSINOPHIL # BLD AUTO: 0.7 K/UL (ref 0–0.5)
EOSINOPHIL NFR BLD: 8.3 % (ref 0–8)
ERYTHROCYTE [DISTWIDTH] IN BLOOD BY AUTOMATED COUNT: 16.9 % (ref 11.5–14.5)
EST. GFR  (NO RACE VARIABLE): >60 ML/MIN/1.73 M^2
GLUCOSE SERPL-MCNC: 147 MG/DL (ref 70–110)
HCT VFR BLD AUTO: 31.3 % (ref 40–54)
HGB BLD-MCNC: 9.4 G/DL (ref 14–18)
IMM GRANULOCYTES # BLD AUTO: 0.05 K/UL (ref 0–0.04)
IMM GRANULOCYTES NFR BLD AUTO: 0.6 % (ref 0–0.5)
LYMPHOCYTES # BLD AUTO: 3 K/UL (ref 1–4.8)
LYMPHOCYTES NFR BLD: 36.6 % (ref 18–48)
MAGNESIUM SERPL-MCNC: 1.7 MG/DL (ref 1.6–2.6)
MCH RBC QN AUTO: 24.7 PG (ref 27–31)
MCHC RBC AUTO-ENTMCNC: 30 G/DL (ref 32–36)
MCV RBC AUTO: 82 FL (ref 82–98)
MONOCYTES # BLD AUTO: 0.7 K/UL (ref 0.3–1)
MONOCYTES NFR BLD: 8.1 % (ref 4–15)
NEUTROPHILS # BLD AUTO: 3.8 K/UL (ref 1.8–7.7)
NEUTROPHILS NFR BLD: 46 % (ref 38–73)
NRBC BLD-RTO: 0 /100 WBC
PLATELET # BLD AUTO: 688 K/UL (ref 150–450)
PMV BLD AUTO: 8.7 FL (ref 9.2–12.9)
POCT GLUCOSE: 124 MG/DL (ref 70–110)
POCT GLUCOSE: 129 MG/DL (ref 70–110)
POCT GLUCOSE: 140 MG/DL (ref 70–110)
POCT GLUCOSE: 183 MG/DL (ref 70–110)
POTASSIUM SERPL-SCNC: 4.5 MMOL/L (ref 3.5–5.1)
PROT SERPL-MCNC: 9.1 G/DL (ref 6–8.4)
RBC # BLD AUTO: 3.81 M/UL (ref 4.6–6.2)
SODIUM SERPL-SCNC: 136 MMOL/L (ref 136–145)
WBC # BLD AUTO: 8.3 K/UL (ref 3.9–12.7)

## 2023-09-18 PROCEDURE — 25000003 PHARM REV CODE 250: Performed by: INTERNAL MEDICINE

## 2023-09-18 PROCEDURE — 25000003 PHARM REV CODE 250: Performed by: HOSPITALIST

## 2023-09-18 PROCEDURE — 11000001 HC ACUTE MED/SURG PRIVATE ROOM

## 2023-09-18 PROCEDURE — 80053 COMPREHEN METABOLIC PANEL: CPT | Performed by: INTERNAL MEDICINE

## 2023-09-18 PROCEDURE — 83735 ASSAY OF MAGNESIUM: CPT | Performed by: INTERNAL MEDICINE

## 2023-09-18 PROCEDURE — 97535 SELF CARE MNGMENT TRAINING: CPT

## 2023-09-18 PROCEDURE — 63600175 PHARM REV CODE 636 W HCPCS: Performed by: INTERNAL MEDICINE

## 2023-09-18 PROCEDURE — 94761 N-INVAS EAR/PLS OXIMETRY MLT: CPT

## 2023-09-18 PROCEDURE — 36415 COLL VENOUS BLD VENIPUNCTURE: CPT | Performed by: INTERNAL MEDICINE

## 2023-09-18 PROCEDURE — 99233 SBSQ HOSP IP/OBS HIGH 50: CPT | Mod: ,,, | Performed by: INTERNAL MEDICINE

## 2023-09-18 PROCEDURE — 99900035 HC TECH TIME PER 15 MIN (STAT)

## 2023-09-18 PROCEDURE — 85025 COMPLETE CBC W/AUTO DIFF WBC: CPT | Performed by: INTERNAL MEDICINE

## 2023-09-18 PROCEDURE — 25000003 PHARM REV CODE 250: Performed by: NURSE PRACTITIONER

## 2023-09-18 PROCEDURE — A4216 STERILE WATER/SALINE, 10 ML: HCPCS | Performed by: HOSPITALIST

## 2023-09-18 PROCEDURE — 99233 PR SUBSEQUENT HOSPITAL CARE,LEVL III: ICD-10-PCS | Mod: ,,, | Performed by: INTERNAL MEDICINE

## 2023-09-18 PROCEDURE — 97116 GAIT TRAINING THERAPY: CPT | Mod: CQ

## 2023-09-18 RX ORDER — LANOLIN ALCOHOL/MO/W.PET/CERES
400 CREAM (GRAM) TOPICAL ONCE
Status: COMPLETED | OUTPATIENT
Start: 2023-09-18 | End: 2023-09-18

## 2023-09-18 RX ADMIN — SODIUM CHLORIDE, PRESERVATIVE FREE 10 ML: 5 INJECTION INTRAVENOUS at 12:09

## 2023-09-18 RX ADMIN — Medication 400 MG: at 12:09

## 2023-09-18 RX ADMIN — ATORVASTATIN CALCIUM 40 MG: 20 TABLET, FILM COATED ORAL at 08:09

## 2023-09-18 RX ADMIN — GABAPENTIN 300 MG: 300 CAPSULE ORAL at 08:09

## 2023-09-18 RX ADMIN — LISINOPRIL 10 MG: 10 TABLET ORAL at 08:09

## 2023-09-18 RX ADMIN — INSULIN ASPART 4 UNITS: 100 INJECTION, SOLUTION INTRAVENOUS; SUBCUTANEOUS at 08:09

## 2023-09-18 RX ADMIN — GABAPENTIN 300 MG: 300 CAPSULE ORAL at 09:09

## 2023-09-18 RX ADMIN — OXYCODONE HYDROCHLORIDE 5 MG: 5 TABLET ORAL at 06:09

## 2023-09-18 RX ADMIN — OXYCODONE HYDROCHLORIDE 5 MG: 5 TABLET ORAL at 11:09

## 2023-09-18 RX ADMIN — INSULIN DETEMIR 20 UNITS: 100 INJECTION, SOLUTION SUBCUTANEOUS at 09:09

## 2023-09-18 RX ADMIN — INSULIN ASPART 4 UNITS: 100 INJECTION, SOLUTION INTRAVENOUS; SUBCUTANEOUS at 12:09

## 2023-09-18 RX ADMIN — OXYCODONE HYDROCHLORIDE 5 MG: 5 TABLET ORAL at 12:09

## 2023-09-18 RX ADMIN — SODIUM CHLORIDE, PRESERVATIVE FREE 10 ML: 5 INJECTION INTRAVENOUS at 11:09

## 2023-09-18 RX ADMIN — SODIUM CHLORIDE, PRESERVATIVE FREE 10 ML: 5 INJECTION INTRAVENOUS at 06:09

## 2023-09-18 RX ADMIN — INSULIN ASPART 2 UNITS: 100 INJECTION, SOLUTION INTRAVENOUS; SUBCUTANEOUS at 08:09

## 2023-09-18 RX ADMIN — ASPIRIN 81 MG: 81 TABLET, COATED ORAL at 08:09

## 2023-09-18 RX ADMIN — OXYCODONE HYDROCHLORIDE 5 MG: 5 TABLET ORAL at 08:09

## 2023-09-18 RX ADMIN — SODIUM CHLORIDE, PRESERVATIVE FREE 10 ML: 5 INJECTION INTRAVENOUS at 05:09

## 2023-09-18 RX ADMIN — CEFAZOLIN 6 G: 2 INJECTION, POWDER, FOR SOLUTION INTRAMUSCULAR; INTRAVENOUS at 10:09

## 2023-09-18 RX ADMIN — INSULIN ASPART 4 UNITS: 100 INJECTION, SOLUTION INTRAVENOUS; SUBCUTANEOUS at 06:09

## 2023-09-18 RX ADMIN — QUETIAPINE FUMARATE 200 MG: 200 TABLET ORAL at 09:09

## 2023-09-18 NOTE — PROGRESS NOTES
"[facility]  Progress Note    Patient Name: Dave Good  MRN: [id number]  Patient Class: IP- Inpatient   Admission Date: 9/7/2023  Length of Stay: 10 days  Attending Physician: Zeus Nation MD  Primary Care Provider: Reyna Jorge NP        Subjective:     Principal Problem:Staphylococcus aureus bacteremia with sepsis        HPI:  Per Larry Shepherd, NP:    "The patient is a 58 y.o. male with a past medical history of type 2 diabetes, HTN, HLD, and chronic systolic congestive heart failure who presents with worsening left great toe pain infection and swelling.  Patient was recently seen in the ER and discharged to follow up with ID and wound care.  Since then he has been unable to follow-up with podiatry or infectious disease.  He states that he has not tried to call their offices and was waiting for them to call him.  States that his toe pain has gotten worse.  He has been soaking it in a foot massage machine chair with no improvement.  XR positive for left great, 2nd, and 4th toe acute osteomyelitis.  He will be admitted for further management of his acute osteomyelitis and infectious disease and podiatry consult."      Overview/Hospital Course:  Patient is 58-year-old man with history of hypertension, diabetes mellitus type 2, reported history of systolic heart failure (although last echocardiogram with normal systolic function), significant prior tobacco smoking history with chronic obstructive pulmonary disease admitted to the hospital with diabetic ulcer with abscess involving left foot including great toe and proximal foot.  Patient with marked leukocytosis and elevated heart rate consistent with systemic inflammatory response/sepsis secondary to foot infection.  Blood cultures obtained.  Patient started on intravenous antibiotics.  Podiatry consulted and took the patient for surgical debridement with removal of necrotic tissue on 9/8/2023.  Blood cultures " positive for Gram-positive cocci representing Staphylococcus aureus.    Patient with prior history of line associated deep vein thrombosis for which he was treated with three months of anticoagulation.      Interval History:  Patient is awake and alert this morning.  No acute events overnight.  Pain improved/controlled with medications.   Tolerating antibiotics well.  Waiting for LTAC placement - has been difficult to place and may need [id number].    Review of Systems   Constitutional:  Negative for chills and fever.   HENT:  Negative for congestion and ear pain.    Eyes:  Negative for pain.   Respiratory:  Negative for shortness of breath.    Cardiovascular:  Negative for chest pain and palpitations.   Gastrointestinal:  Negative for abdominal pain.   Genitourinary:  Negative for difficulty urinating.   Musculoskeletal:  Negative for back pain.   Skin:  Positive for wound (Left foot wound clean and dressed).   Neurological:  Negative for dizziness and headaches.   Psychiatric/Behavioral:  Negative for agitation and behavioral problems.      Objective:     Vital Signs (Most Recent):  Temp: 97.8 °F (36.6 °C) (09/18/23 0732)  Pulse: 78 (09/18/23 0732)  Resp: 16 (09/18/23 0812)  BP: 124/69 (09/18/23 0732)  SpO2: 98 % (09/18/23 0738)   Vital Signs (24h Range):  Temp:  [97.4 °F (36.3 °C)-98.8 °F (37.1 °C)] 97.8 °F (36.6 °C)  Pulse:  [78-85] 78  Resp:  [16-17] 16  SpO2:  [94 %-99 %] 98 %  BP: (106-124)/(65-70) 124/69     Weight: 117 kg (257 lb 15 oz)  Body mass index is 33.12 kg/m².    Intake/Output Summary (Last 24 hours) at 9/18/2023 0917  Last data filed at 9/18/2023 0458  Gross per 24 hour   Intake --   Output 1800 ml   Net -1800 ml        Physical Exam  Vitals reviewed.   Constitutional:       General: He is not in acute distress.     Appearance: Normal appearance. He is obese. He is ill-appearing (chronically). He is not toxic-appearing.   HENT:      Head: Normocephalic and atraumatic.      Right Ear: External  ear normal.      Left Ear: External ear normal.      Nose: Nose normal.      Mouth/Throat:      Mouth: Mucous membranes are moist.      Pharynx: Oropharynx is clear.   Eyes:      General: No scleral icterus.     Conjunctiva/sclera: Conjunctivae normal.      Pupils: Pupils are equal, round, and reactive to light.   Cardiovascular:      Rate and Rhythm: Normal rate and regular rhythm.   Pulmonary:      Effort: Pulmonary effort is normal.      Breath sounds: Normal breath sounds. No wheezing or rales.   Abdominal:      General: Bowel sounds are normal.      Tenderness: There is no abdominal tenderness.   Musculoskeletal:      Cervical back: Normal range of motion and neck supple.      Comments: Left toe amp wound clean and dressed   Skin:     General: Skin is warm and dry.      Capillary Refill: Capillary refill takes less than 2 seconds.   Neurological:      General: No focal deficit present.      Mental Status: He is alert and oriented to person, place, and time.      Cranial Nerves: No cranial nerve deficit.   Psychiatric:         Mood and Affect: Mood normal.         Behavior: Behavior normal.         Significant Labs: All pertinent labs within the past 24 hours have been reviewed.  CBC:   Recent Labs   Lab 09/18/23  0639   WBC 8.30   HGB 9.4*   HCT 31.3*   *       CMP:   Recent Labs   Lab 09/18/23  0639      K 4.5      CO2 28   *   BUN 18   CREATININE 1.0   CALCIUM 9.1   PROT 9.1*   ALBUMIN 1.6*   BILITOT 0.2   ALKPHOS 133   AST 41*   ALT 25   ANIONGAP 6*         Significant Imaging: I have reviewed all pertinent imaging results/findings within the past 24 hours.      Assessment/Plan:      * Staphylococcus aureus bacteremia with sepsis  Patient presented to ED with complaints of worsening left great toe pain and swelling.  X-ray of left foot with acute osteomyelitis involving the great toe and distal aspects of the 2nd and 4th toes.  Labs on admission with WBC of 21.91 with left shift.   "Blood cultures on admission (9/7/2023) with 4/4 bottles, 9/8/2023 with 1/2 bottles, and 9/11/2023 with 0/2 bottles positive for MSSA . Wound culture (9/8/2023 - areobic) positive for MSSA and Strep dysgalactiae.  Started on IV Vanc/Meropenem on admission and changed to IV Vanc/Cefazolin on 9/10/2023.  IV Vanc discontinued on 9/11/2023.  Podiatry consulted and patient underwent left 1st toe amputation on 9/8/2023 with Dr. Samuel Toussaint - Pathology with underlying bone with focal acute osteomyelitis, focally involving margin of resection.  TTE on 9/8/2023 did not show evidence of endocarditis.  ID consulted and recommended PAT, but patient declined given a history of complicated PAT in 2022.  Patient will need 6 weeks of IV antibiotics given no PAT.  Waiting for LTAC placement - has been difficult to place and may require [id number]-[id number].  Will consult Virtual  to resume care pending placement.    Plan:  Follow up with Podiatry recommendations - "Wound care daily, pack with aquacel rope, and secure with 4" Kerlix and 4" ACE.  Avoid tight compression. WBAT, Darco shoe". Follow up with Podiatry on discharge.  Follow up with ID recommendations - will need 6 weeks IV antibiotics given no PAT.  Has Midline.  EOT will be 10/26/2023.  Pain control as needed.  Continue with Wound Care  PT/OT consulted - LTAC pending  Consult [facility]      Chronic combined systolic and diastolic congestive heart failure  No evidence of volume overload on admission with negative CXR and normal BNP.  S/p IV Lasix and d/c on 9/13/2023.     TTE on admission with regional wall motion abnormalities are present: The septum is moderately hypokinetic. There is mildly reduced systolic function. Ejection fraction by visual approximation is 45%. Grade I diastolic dysfunction.    Repeat CXR on 9/12/2023 with streaky opacities at the lung bases can be seen with subsegmental atelectasis.  -Continue with ACEI - Lisinopril  -Low Na diet  -Monitor Fluid " status      Type 2 diabetes mellitus with diabetic peripheral angiopathy without gangrene, with long-term current use of insulin  Glucose stable.  Hemoglobin A1c of 7.5.    Home Meds: Metformin 1g bid, Detemir 50 units, Aspart 10 units TIDWM, Trulicity weekly (?)  Hospital Meds:  Detemir 20 units, Aspart 4 units TIDWM moderate correction dose SSI  -Continue with current regimen - can restart Metformin on discharge  -Diabetic Diet  -Monitor and adjust as needed      Microcytic anemia  Hgb 10 on admission and stable at 9.4.  Ferritin 378 with Sat of 11%, but has an inflammatory condition.  -Follow up with Soluble Transferrin Receptor to evaluate possibility of JEWEL  -Check B12 and Folate      Obesity due to excess calories with serious comorbidity  Body mass index is 33.12 kg/m². Morbid obesity complicates all aspects of disease management from diagnostic modalities to treatment. Weight loss encouraged and health benefits explained to patient.         Mixed hyperlipidemia  -Continue Lipitor      Hypertension  Normotensive on Lisinopril alone  Home Meds:  HCTZ 25mg, Lisinopril 10mg  -Continue with Lisinopril  -Low salt diet  -Monitor and adjust as needed      VTE Risk Mitigation (From admission, onward)         Ordered     enoxaparin injection 40 mg  Daily         09/09/23 1108     IP VTE HIGH RISK PATIENT  Once         09/09/23 1108     Place sequential compression device  Until discontinued         09/08/23 0325                Discharge Planning   LOIS: 9/15/2023     Code Status: Full Code   Is the patient medically ready for discharge?:     Reason for patient still in hospital (select all that apply): Patient trending condition, Treatment and Consult recommendations  Discharge Plan A: Long-term acute care facility (LTAC)   Discharge Delays: None known at this time              Zeus Nation MD  Department of Hospital Medicine   [facility])

## 2023-09-18 NOTE — ASSESSMENT & PLAN NOTE
"Patient presented to ED with complaints of worsening left great toe pain and swelling.  X-ray of left foot with acute osteomyelitis involving the great toe and distal aspects of the 2nd and 4th toes.  Labs on admission with WBC of 21.91 with left shift.  Blood cultures on admission (9/7/2023) with 4/4 bottles, 9/8/2023 with 1/2 bottles, and 9/11/2023 with 0/2 bottles positive for MSSA . Wound culture (9/8/2023 - areobic) positive for MSSA and Strep dysgalactiae.  Started on IV Vanc/Meropenem on admission and changed to IV Vanc/Cefazolin on 9/10/2023.  IV Vanc discontinued on 9/11/2023.  Podiatry consulted and patient underwent left 1st toe amputation on 9/8/2023 with Dr. Samuel Toussaint - Pathology with underlying bone with focal acute osteomyelitis, focally involving margin of resection.  TTE on 9/8/2023 did not show evidence of endocarditis.  ID consulted and recommended PAT, but patient declined given a history of complicated PAT in 2022.  Patient will need 6 weeks of IV antibiotics given no PAT.  Waiting for LTAC placement - has been difficult to place and may require [id number]-[id number].  Will consult Virtual  to resume care pending placement.    Plan:  Follow up with Podiatry recommendations - "Wound care daily, pack with aquacel rope, and secure with 4" Kerlix and 4" ACE.  Avoid tight compression. WBAT, Darco shoe". Follow up with Podiatry on discharge.  Follow up with ID recommendations - will need 6 weeks IV antibiotics given no PAT.  Has Midline.  EOT will be 10/26/2023.  Pain control as needed.  Continue with Wound Care  PT/OT consulted - LTAC pending  Consult [facility]    "

## 2023-09-18 NOTE — PLAN OF CARE
ARLENE spoke to the peer to peer line, Feder[facility], at [facility]. The peer to peer for LTAC is scheduled for 9/19/23 between 12 and 1500 with Dr Wyatt.    ARLENE will notify Dr Iverson in the morning when we SIBR round.

## 2023-09-18 NOTE — SUBJECTIVE & OBJECTIVE
Interval History:  Patient is awake and alert this morning.  No acute events overnight.  Pain improved/controlled with medications.   Tolerating antibiotics well.  Waiting for LTAC placement - has been difficult to place and may need occc-pu-xrbq.    Review of Systems   Constitutional:  Negative for chills and fever.   HENT:  Negative for congestion and ear pain.    Eyes:  Negative for pain.   Respiratory:  Negative for shortness of breath.    Cardiovascular:  Negative for chest pain and palpitations.   Gastrointestinal:  Negative for abdominal pain.   Genitourinary:  Negative for difficulty urinating.   Musculoskeletal:  Negative for back pain.   Skin:  Positive for wound (Left foot wound clean and dressed).   Neurological:  Negative for dizziness and headaches.   Psychiatric/Behavioral:  Negative for agitation and behavioral problems.      Objective:     Vital Signs (Most Recent):  Temp: 97.8 °F (36.6 °C) (09/18/23 0732)  Pulse: 78 (09/18/23 0732)  Resp: 16 (09/18/23 0812)  BP: 124/69 (09/18/23 0732)  SpO2: 98 % (09/18/23 0738)   Vital Signs (24h Range):  Temp:  [97.4 °F (36.3 °C)-98.8 °F (37.1 °C)] 97.8 °F (36.6 °C)  Pulse:  [78-85] 78  Resp:  [16-17] 16  SpO2:  [94 %-99 %] 98 %  BP: (106-124)/(65-70) 124/69     Weight: 117 kg (257 lb 15 oz)  Body mass index is 33.12 kg/m².    Intake/Output Summary (Last 24 hours) at 9/18/2023 0917  Last data filed at 9/18/2023 0458  Gross per 24 hour   Intake --   Output 1800 ml   Net -1800 ml        Physical Exam  Vitals reviewed.   Constitutional:       General: He is not in acute distress.     Appearance: Normal appearance. He is obese. He is ill-appearing (chronically). He is not toxic-appearing.   HENT:      Head: Normocephalic and atraumatic.      Right Ear: External ear normal.      Left Ear: External ear normal.      Nose: Nose normal.      Mouth/Throat:      Mouth: Mucous membranes are moist.      Pharynx: Oropharynx is clear.   Eyes:      General: No scleral icterus.      Conjunctiva/sclera: Conjunctivae normal.      Pupils: Pupils are equal, round, and reactive to light.   Cardiovascular:      Rate and Rhythm: Normal rate and regular rhythm.   Pulmonary:      Effort: Pulmonary effort is normal.      Breath sounds: Normal breath sounds. No wheezing or rales.   Abdominal:      General: Bowel sounds are normal.      Tenderness: There is no abdominal tenderness.   Musculoskeletal:      Cervical back: Normal range of motion and neck supple.      Comments: Left toe amp wound clean and dressed   Skin:     General: Skin is warm and dry.      Capillary Refill: Capillary refill takes less than 2 seconds.   Neurological:      General: No focal deficit present.      Mental Status: He is alert and oriented to person, place, and time.      Cranial Nerves: No cranial nerve deficit.   Psychiatric:         Mood and Affect: Mood normal.         Behavior: Behavior normal.         Significant Labs: All pertinent labs within the past 24 hours have been reviewed.  CBC:   Recent Labs   Lab 09/18/23  0639   WBC 8.30   HGB 9.4*   HCT 31.3*   *       CMP:   Recent Labs   Lab 09/18/23  0639      K 4.5      CO2 28   *   BUN 18   CREATININE 1.0   CALCIUM 9.1   PROT 9.1*   ALBUMIN 1.6*   BILITOT 0.2   ALKPHOS 133   AST 41*   ALT 25   ANIONGAP 6*         Significant Imaging: I have reviewed all pertinent imaging results/findings within the past 24 hours.

## 2023-09-18 NOTE — PLAN OF CARE
Problem: Occupational Therapy  Goal: Occupational Therapy Goal  Description: Goals to be met by: 9/24/2023     Patient will increase functional independence with ADLs by performing:    UE Dressing with Set-up Assistance.  LE Dressing with Moderate Assistance.  Toileting from bedside commode with supervision for hygiene and clothing management.   Toilet transfer to bedside commode with Stand-by Assistance while maintaining weight bearing precautions.  Grooming while standing at sink with supervision, RW.    COMPLETED GOALS  Grooming while seated at sink with Set-up Assistance. MET 9/11/2023  Toileting from bedside commode with Contact Guard Assistance for hygiene and clothing management. MET 9/18/2023    Outcome: Ongoing, Progressing     Pt met one goal this date and is making progress towards others.  SNF is recommended upon d/c from acute care to further address deficits and help pt improve overall functional independence.     ELAINE Lu  9/18/2023

## 2023-09-18 NOTE — PLAN OF CARE
CM met with pt to discuss discharge plan. Pt states he has no one who is able to help him at home with antibiotic infusion or to assist with wound care.    Pt's insurance has declined LTAC. Multiple snf referrals declined. [facility] reviewing pt and will return call to CM.    CM will continue to follow pt for plans and arrangements.   09/18/23 1012   Discharge Reassessment   Assessment Type Discharge Planning Reassessment   Did the patient's condition or plan change since previous assessment? No   Discharge Plan discussed with: Patient   Communicated LOIS with patient/caregiver Date not available/Unable to determine   Discharge Plan A Rehab   Discharge Plan B Skilled Nursing Facility   Transition of Care Barriers Underinsured   Why the patient remains in the hospital Placement issues   Post-Acute Status   Post-Acute Placement Status Referrals Sent   Discharge Delays (!) Payor Issues

## 2023-09-18 NOTE — PT/OT/SLP PROGRESS
Occupational Therapy   Treatment    Name: Dave Good  MRN: [id number]  Admitting Diagnosis:  Staphylococcus aureus bacteremia with sepsis  10 Days Post-Op    Recommendations:     Discharge Recommendations: nursing facility, skilled  Discharge Equipment Recommendations:  to be determined by next level of care  Barriers to discharge:  Decreased caregiver support (10 KRUPA; current functional level)    Assessment:     Dvae Good is a 58 y.o. male with a medical diagnosis of Staphylococcus aureus bacteremia with sepsis.  He presents with pain in both feet. Performance deficits affecting function are weakness, impaired functional mobility, gait instability, impaired self care skills, impaired cardiopulmonary response to activity, impaired balance, impaired skin, decreased coordination, decreased lower extremity function, decreased ROM, decreased safety awareness, impaired endurance.  Pt agreeable to participating in therapy upon arrival to room.  Pt demonstrated improvement with grooming performing with SBA, RW standing at sink.  In addition, pt able to perform toilet transfer to BSC with CGA, RW and toileting with SBA.    Overall, pt tolerated therapy well and put forth good effort.  Some difficulty fully adhering to orthopedic precautions (PWB in LLE through heel) observed during session requiring cues.  Pt met one goal this date and is making progress towards others.  He would continue to benefit from skilled OT services to address problems listed above and increase independence with ADLs.  SNF is recommended upon d/c from acute care to further address deficits and help pt improve overall functional independence.           Rehab Prognosis:  Good; patient would benefit from acute skilled OT services to address these deficits and reach maximum level of function.       Plan:     Patient to be seen 5 x/week to address the above listed problems via self-care/home management, therapeutic activities, therapeutic  exercises  Plan of Care Expires: 09/24/23  Plan of Care Reviewed with: patient    Subjective     Chief Complaint: Being denied ltach   Patient/Family Comments/goals: Find placement  Pain/Comfort:  Pain Rating 1:  (pt indicated pain in feet, but did not rate)    Objective:     Communicated with: RN Tiffany) prior to session.  Patient found HOB elevated with telemetry, peripheral IV, PICC line upon OT entry to room.    General Precautions: Standard, fall, diabetic    Orthopedic Precautions:LLE partial weight bearing (on heel)  Braces:  (DARCO shoes both feet)  Respiratory Status: Room air     Occupational Performance:     Bed Mobility:    Supine <> sit: SBA  Scooting: SBA seated towards EOB    Functional Mobility/Transfers:  Sit <> Stand:   SBA, RW x 1 trial from EOB  SBA, RW x 1 trial from BSC  Toilet: CGA, RW taking steps to BSC  Functional Mobility: Pt ambulated ~40 ft with SBA, RW (in room and in hallway).  Cues for RW management required 2* pt placing RW too far in front of body    Activities of Daily Living:  Grooming: stand by assistance for brushing teeth, washing hands, and washing face while standing at sink.  Pt stood for ~5 minutes  Cues for RW placement provided  Upper Body Dressing: Mod I, RW for doffing glasses while standing at sink.  Lower Body Dressing: SBA for donning DARCO shoes on both feet while seated at EOB.  Toileting: SBA, RW for bowel movement from [facility].  Pt performed sarah care in seated position.      [facility] 6 Click ADL: 19    Treatment & Education:  *Session focused on promoting increased endurance, strength, balance, coordination, and ROM needed for ADLs and functional transfers as part of daily routine.   -pt educated on role of OT in acute care setting  -pt educated on orthopedic precautions  -pt ambulated within room and hallway; cues for RW management required 2* pt placing RW too far in front of him when taking steps  -pt performed multiple grooming tasks standing at sink; cues for  RW placement provided   -pt performed toilet transfer to BSC  -pt performed toileting from BSC; cues for safety provided  -POC reviewed with    Patient left up in chair with all lines intact, call button in reach, and RN (Monik) notified    GOALS:   Multidisciplinary Problems       Occupational Therapy Goals          Problem: Occupational Therapy    Goal Priority Disciplines Outcome Interventions   Occupational Therapy Goal     OT, PT/OT Ongoing, Progressing    Description: Goals to be met by: 9/24/2023     Patient will increase functional independence with ADLs by performing:    UE Dressing with Set-up Assistance.  LE Dressing with Moderate Assistance.  Toileting from bedside commode with supervision for hygiene and clothing management.   Toilet transfer to bedside commode with Stand-by Assistance while maintaining weight bearing precautions.  Grooming while standing at sink with supervision, RW.    COMPLETED GOALS  Grooming while seated at sink with Set-up Assistance. MET 9/11/2023  Toileting from bedside commode with Contact Guard Assistance for hygiene and clothing management. MET 9/18/2023                         Time Tracking:     OT Date of Treatment: 09/18/23  OT Start Time: 0955  OT Stop Time: 1026  OT Total Time (min): 31 min    Billable Minutes:Self Care/Home Management 31    OT/DEREK: OT     Number of DEREK visits since last OT visit: 4    ELAINE Lu  9/18/2023

## 2023-09-18 NOTE — PT/OT/SLP PROGRESS
Physical Therapy Treatment    Patient Name:  Dave Good   MRN:  [id number]    Recommendations:     Discharge Recommendations: nursing facility, skilled (pt will d/c to LTACH)  Discharge Equipment Recommendations: to be determined by next level of care  Barriers to discharge: Decreased caregiver support    Assessment:     Dave Good is a 58 y.o. male admitted with a medical diagnosis of Staphylococcus aureus bacteremia with sepsis.  He presents with the following impairments/functional limitations: weakness, gait instability, impaired balance, impaired cardiopulmonary response to activity, impaired endurance, impaired functional mobility, impaired self care skills, impaired skin, decreased lower extremity function, decreased coordination, decreased ROM, decreased safety awareness.    Sit>stand from chair with RW and SBA  Amb 2 x 50' with RW and SBA, L LE PWB, B DARCO shoes, decreased gait speed, franchesca and B step length  Pt progressing toward goals, managing mobility with PWB precaution  Rec SNF    Rehab Prognosis: Good; patient would benefit from acute skilled PT services to address these deficits and reach maximum level of function.    Recent Surgery: Procedure(s) (LRB):  AMPUTATION, TOE (Left) 10 Days Post-Op    Plan:     During this hospitalization, patient to be seen 5 x/week to address the identified rehab impairments via gait training, therapeutic activities, therapeutic exercises and progress toward the following goals:    Plan of Care Expires:  10/09/23    Subjective     Chief Complaint: I'm not going anywhere!  Patient/Family Comments/goals: Can we walk more?  Pain/Comfort:  Pain Rating 1: other (see comments) (not rated)  Location - Side 1: Left  Location - Orientation 1: generalized  Location 1: foot  Pain Addressed 1: Pre-medicate for activity, Reposition, Distraction, Cessation of Activity  Pain Rating Post-Intervention 1: other (see comments) (not rated)      Objective:     Communicated with mitchel  Monik prior to session.  Patient found up in chair with telemetry, peripheral IV, PICC line upon PT entry to room.     General Precautions: Standard, fall, diabetic  Orthopedic Precautions: LLE partial weight bearing (on heel)  Braces:  (BLE Darco shoes)  Respiratory Status: Room air     Functional Mobility:  Transfers:     Sit to Stand:  stand by assistance with rolling walker  Gait: 2 x 50' with RW and SBA, L LE PWB, B DARCO shoes, decreased gait speed, franchesca and B step length      AM-PAC 6 CLICK MOBILITY  Turning over in bed (including adjusting bedclothes, sheets and blankets)?: 4  Sitting down on and standing up from a chair with arms (e.g., wheelchair, bedside commode, etc.): 3  Moving from lying on back to sitting on the side of the bed?: 4  Moving to and from a bed to a chair (including a wheelchair)?: 3  Need to walk in hospital room?: 3  Climbing 3-5 steps with a railing?: 2  Basic Mobility Total Score: 19       Treatment & Education:  Gait training as noted    Patient left up in chair with all lines intact and call button in reach..    GOALS:   Multidisciplinary Problems       Physical Therapy Goals          Problem: Physical Therapy    Goal Priority Disciplines Outcome Goal Variances Interventions   Physical Therapy Goal     PT, PT/OT Ongoing, Progressing     Description: Goals to be met by: 10/9/2023    Patient will increase functional independence with mobility by performin. Sit<>stand with supervision with RW while maintaining WB precautions on LLE.  2. Gait x 50 feet with RW with CGA while maintaining WB precautions on LLE. - MET 23  3. Ascend/descend 10 step(s) with least restrictive assistive device and minimal assist while maintaining WB precautions on LLE.                           Time Tracking:     PT Received On: 23  PT Start Time: 1328     PT Stop Time: 1345  PT Total Time (min): 17 min     Billable Minutes: Gait Training 17    Treatment Type: Treatment  PT/PTA: PTA      Number of PTA visits since last PT visit: 4     09/18/2023

## 2023-09-18 NOTE — ASSESSMENT & PLAN NOTE
Hgb 10 on admission and stable at 9.4.  Ferritin 378 with Sat of 11%, but has an inflammatory condition.  -Follow up with Soluble Transferrin Receptor to evaluate possibility of JEWEL  -Check B12 and Folate

## 2023-09-18 NOTE — PLAN OF CARE
POC reviewed. No significant events this shift. Pt stable on RA. Complaints of pain treated with PRN mediation per MAR. Pt voids and shifts in bed with assist. Bed low and locked, side rails up x3, call light within reach.    Problem: Adult Inpatient Plan of Care  Goal: Plan of Care Review  Outcome: Ongoing, Progressing  Goal: Patient-Specific Goal (Individualized)  Outcome: Ongoing, Progressing  Goal: Absence of Hospital-Acquired Illness or Injury  Outcome: Ongoing, Progressing  Goal: Optimal Comfort and Wellbeing  Outcome: Ongoing, Progressing  Goal: Readiness for Transition of Care  Outcome: Ongoing, Progressing     Problem: Diabetes Comorbidity  Goal: Blood Glucose Level Within Targeted Range  Outcome: Ongoing, Progressing     Problem: Infection  Goal: Absence of Infection Signs and Symptoms  Outcome: Ongoing, Progressing     Problem: Impaired Wound Healing  Goal: Optimal Wound Healing  Outcome: Ongoing, Progressing     Problem: Skin Injury Risk Increased  Goal: Skin Health and Integrity  Outcome: Ongoing, Progressing     Problem: Fall Injury Risk  Goal: Absence of Fall and Fall-Related Injury  Outcome: Ongoing, Progressing

## 2023-09-18 NOTE — ASSESSMENT & PLAN NOTE
Clinically improving with source control with surgical debridement of infected left foot/abscess on 9/8/2023 with Dr. Samuel Toussaint and with empiric intravenous antibiotics.  Blood cultures on admission positive 2 for 2 for meticillin-sensitive Staphylococcus aureus.  Surgical wound culture positive for Staphylococcus aureus and Streptococcus dysgalactaie.  Repeat blood culture collected on 9/11/2023 no growth today.  Infectious Disease service (Dr. Isidro Duran) consulted and recommending 6 weeks of intravenous cefazolin along with continued wound care.  Patient unable to provide further wound care needed at home nor able to do home infusion.  In light of both wound care needs and long course of intravenous antibiotic needed, LTAC placement sought.  Awaiting LTAC placement.

## 2023-09-19 PROBLEM — D50.9 MICROCYTIC ANEMIA: Status: RESOLVED | Noted: 2023-09-16 | Resolved: 2023-09-19

## 2023-09-19 PROBLEM — E66.09 OBESITY DUE TO EXCESS CALORIES WITH SERIOUS COMORBIDITY: Status: RESOLVED | Noted: 2021-09-20 | Resolved: 2023-09-19

## 2023-09-19 PROBLEM — E78.2 MIXED HYPERLIPIDEMIA: Status: RESOLVED | Noted: 2021-09-20 | Resolved: 2023-09-19

## 2023-09-19 LAB
FOLATE SERPL-MCNC: 4.1 NG/ML (ref 4–24)
POCT GLUCOSE: 134 MG/DL (ref 70–110)
POCT GLUCOSE: 136 MG/DL (ref 70–110)
POCT GLUCOSE: 173 MG/DL (ref 70–110)
POCT GLUCOSE: 195 MG/DL (ref 70–110)
STFR SERPL-MCNC: 4.1 MG/L (ref 1.8–4.6)
VIT B12 SERPL-MCNC: <148 PG/ML (ref 210–950)

## 2023-09-19 PROCEDURE — 63600175 PHARM REV CODE 636 W HCPCS: Performed by: INTERNAL MEDICINE

## 2023-09-19 PROCEDURE — 99900035 HC TECH TIME PER 15 MIN (STAT)

## 2023-09-19 PROCEDURE — 25000003 PHARM REV CODE 250: Performed by: HOSPITALIST

## 2023-09-19 PROCEDURE — 25000003 PHARM REV CODE 250: Performed by: NURSE PRACTITIONER

## 2023-09-19 PROCEDURE — 97116 GAIT TRAINING THERAPY: CPT | Mod: CQ

## 2023-09-19 PROCEDURE — 11000001 HC ACUTE MED/SURG PRIVATE ROOM

## 2023-09-19 PROCEDURE — 25000003 PHARM REV CODE 250: Performed by: INTERNAL MEDICINE

## 2023-09-19 PROCEDURE — 36415 COLL VENOUS BLD VENIPUNCTURE: CPT | Performed by: INTERNAL MEDICINE

## 2023-09-19 PROCEDURE — 94761 N-INVAS EAR/PLS OXIMETRY MLT: CPT

## 2023-09-19 PROCEDURE — 82607 VITAMIN B-12: CPT | Performed by: INTERNAL MEDICINE

## 2023-09-19 PROCEDURE — 99233 SBSQ HOSP IP/OBS HIGH 50: CPT | Mod: ,,, | Performed by: HOSPITALIST

## 2023-09-19 PROCEDURE — 97530 THERAPEUTIC ACTIVITIES: CPT

## 2023-09-19 PROCEDURE — A4216 STERILE WATER/SALINE, 10 ML: HCPCS | Performed by: HOSPITALIST

## 2023-09-19 PROCEDURE — 99233 PR SUBSEQUENT HOSPITAL CARE,LEVL III: ICD-10-PCS | Mod: ,,, | Performed by: HOSPITALIST

## 2023-09-19 PROCEDURE — 82746 ASSAY OF FOLIC ACID SERUM: CPT | Performed by: INTERNAL MEDICINE

## 2023-09-19 RX ADMIN — INSULIN ASPART 4 UNITS: 100 INJECTION, SOLUTION INTRAVENOUS; SUBCUTANEOUS at 09:09

## 2023-09-19 RX ADMIN — OXYCODONE HYDROCHLORIDE 5 MG: 5 TABLET ORAL at 09:09

## 2023-09-19 RX ADMIN — ASPIRIN 81 MG: 81 TABLET, COATED ORAL at 09:09

## 2023-09-19 RX ADMIN — ATORVASTATIN CALCIUM 40 MG: 20 TABLET, FILM COATED ORAL at 09:09

## 2023-09-19 RX ADMIN — GABAPENTIN 300 MG: 300 CAPSULE ORAL at 09:09

## 2023-09-19 RX ADMIN — OXYCODONE HYDROCHLORIDE 5 MG: 5 TABLET ORAL at 05:09

## 2023-09-19 RX ADMIN — INSULIN DETEMIR 20 UNITS: 100 INJECTION, SOLUTION SUBCUTANEOUS at 09:09

## 2023-09-19 RX ADMIN — SODIUM CHLORIDE, PRESERVATIVE FREE 10 ML: 5 INJECTION INTRAVENOUS at 06:09

## 2023-09-19 RX ADMIN — CEFAZOLIN 6 G: 2 INJECTION, POWDER, FOR SOLUTION INTRAMUSCULAR; INTRAVENOUS at 10:09

## 2023-09-19 RX ADMIN — INSULIN ASPART 4 UNITS: 100 INJECTION, SOLUTION INTRAVENOUS; SUBCUTANEOUS at 12:09

## 2023-09-19 RX ADMIN — LISINOPRIL 10 MG: 10 TABLET ORAL at 09:09

## 2023-09-19 RX ADMIN — SODIUM CHLORIDE, PRESERVATIVE FREE 10 ML: 5 INJECTION INTRAVENOUS at 05:09

## 2023-09-19 RX ADMIN — SODIUM CHLORIDE, PRESERVATIVE FREE 10 ML: 5 INJECTION INTRAVENOUS at 12:09

## 2023-09-19 RX ADMIN — INSULIN ASPART 4 UNITS: 100 INJECTION, SOLUTION INTRAVENOUS; SUBCUTANEOUS at 05:09

## 2023-09-19 RX ADMIN — INSULIN ASPART 2 UNITS: 100 INJECTION, SOLUTION INTRAVENOUS; SUBCUTANEOUS at 09:09

## 2023-09-19 RX ADMIN — QUETIAPINE FUMARATE 200 MG: 200 TABLET ORAL at 09:09

## 2023-09-19 NOTE — PROGRESS NOTES
[facility] - Med Surg ([facility])  Adult Nutrition  Consult Note    SUMMARY     Recommendations  1) Continue consistent carbohydrate    2) Recommend James BID to promote wound healing    3) MVI daily    4) RD to follow to monitor nutrition status    Goals:   Pt will maintain >75% EEN/EPN by RD follow up  Nutrition Goal Status: progressing towards goals  Communication of RD Recs:  (POC)    Assessment and Plan  Nutrition Problem  Increased protein - energy needs    Related to (etiology):   Wound healing    Signs and Symptoms (as evidenced by):   Chronic osteomyelitis of toe of left foot    Interventions (treatment strategy):  CHO modified diet  Collaboration with other providers    Nutrition Diagnosis Status:   Continues      Malnutrition Assessment     Skin (Micronutrient): wounds unhealed                                 Reason for Assessment    Reason For Assessment: consult (large non healing wound)  Diagnosis:  (Chronic osteomyelitis of toe of left foot)  Relevant Medical History:   Patient Active Problem List   Diagnosis    Diabetic wet gangrene of the foot    Type 2 diabetes mellitus with diabetic peripheral angiopathy without gangrene, with long-term current use of insulin    Hypertension    Depression    Mixed hyperlipidemia    Obesity due to excess calories with serious comorbidity    Open wound of right foot    Type II diabetes mellitus with peripheral circulatory disorder, uncontrolled    Amputation of right great toe    Skin flap infection    MRSA (methicillin resistant Staphylococcus aureus) infection    Ulcer of both feet with fat layer exposed    Open wound of right great toe    Open wound of toe    COVID-19    COPD with asthma    Chronic combined systolic and diastolic congestive heart failure    Open wound of left foot    Cavitary lesion of lung    Increased nutritional needs    Osteomyelitis of right foot    Slow transit constipation    Ulcerated, foot, left, with fat layer exposed    Acute deep vein  "thrombosis (DVT) of axillary vein of right upper extremity    Diabetic ulcer of left great toe    Epistaxis    Staphylococcus aureus bacteremia with sepsis    Status post amputation of great toe    Microcytic anemia     Interdisciplinary Rounds: did not attend  General Information Comments: Pt receiving a consistent carbohydrate diet, tolerating. Pt reports good appetite, no GI intolerance reported to RD. Recommend James BID to promote wound healing. PIV. Delvis 19- L toe. NFPE completed pt nourished.  Nutrition Discharge Planning: dc on consistent carbohydrates    Nutrition Risk Screen    Nutrition Risk Screen: no indicators present    Nutrition/Diet History    Spiritual, Cultural Beliefs, Jain Practices, Values that Affect Care: no  Food Allergies: NKFA  Factors Affecting Nutritional Intake: pain    Anthropometrics    Temp: 98.6 °F (37 °C)  Height Method: Stated  Height: 6' 2" (188 cm)  Height (inches): 74 in  Weight Method: Bed Scale  Weight: 117 kg (257 lb 15 oz)  Weight (lb): 257.94 lb  Ideal Body Weight (IBW), Male: 190 lb  % Ideal Body Weight, Male (lb): 135.76 %  BMI (Calculated): 33.1  BMI Grade: 30 - 34.9- obesity - grade I       Lab/Procedures/Meds    Pertinent Labs Reviewed: reviewed  CBC:  Recent Labs   Lab 09/18/23  0639   WBC 8.30   HGB 9.4*   HCT 31.3*   *     CMP:  Recent Labs   Lab 09/18/23  0639   CALCIUM 9.1   ALBUMIN 1.6*   PROT 9.1*      K 4.5   CO2 28      BUN 18   CREATININE 1.0   ALKPHOS 133   ALT 25   AST 41*   BILITOT 0.2     Glucose   Date Value Ref Range Status   09/18/2023 147 (H) 70 - 110 mg/dL Final       Hemoglobin A1C   Date Value Ref Range Status   09/08/2023 7.5 (H) 4.0 - 5.6 % Final     Comment:     ADA Screening Guidelines:  5.7-6.4%  Consistent with prediabetes  >or=6.5%  Consistent with diabetes    High levels of fetal hemoglobin interfere with the HbA1C  assay. Heterozygous hemoglobin variants (HbS, HgC, etc)do  not significantly interfere with this " assay.   However, presence of multiple variants may affect accuracy.     11/22/2022 9.1 (H) 4.0 - 5.6 % Final     Comment:     ADA Screening Guidelines:  5.7-6.4%  Consistent with prediabetes  >or=6.5%  Consistent with diabetes    High levels of fetal hemoglobin interfere with the HbA1C  assay. Heterozygous hemoglobin variants (HbS, HgC, etc)do  not significantly interfere with this assay.   However, presence of multiple variants may affect accuracy.     05/27/2022 7.8 (H) 4.0 - 5.6 % Final     Comment:     ADA Screening Guidelines:  5.7-6.4%  Consistent with prediabetes  >or=6.5%  Consistent with diabetes    High levels of fetal hemoglobin interfere with the HbA1C  assay. Heterozygous hemoglobin variants (HbS, HgC, etc)do  not significantly interfere with this assay.   However, presence of multiple variants may affect accuracy.       Pertinent Medications Reviewed: reviewed  Scheduled Meds:   aspirin  81 mg Oral Daily    atorvastatin  40 mg Oral Daily    ceFAZolin (ANCEF) 6 g in dextrose 5 % (D5W) 500 mL CONTINUOUS INFUSION  6 g Intravenous Q24H    enoxparin  40 mg Subcutaneous Daily    gabapentin  300 mg Oral BID    insulin aspart U-100  4 Units Subcutaneous TIDWM    insulin detemir U-100 (Levemir)  20 Units Subcutaneous QHS    lisinopriL  10 mg Oral Daily    QUEtiapine  200 mg Oral Nightly    sodium chloride 0.9%  10 mL Intravenous Q6H     Continuous Infusions:  PRN Meds:.sodium chloride 0.9%, acetaminophen, albuterol-ipratropium, dextrose 10%, dextrose 10%, glucagon (human recombinant), HYDROmorphone, insulin aspart U-100, naloxone, ondansetron, oxyCODONE, senna-docusate 8.6-50 mg, sodium chloride 0.9%, Flushing PICC/Midline Protocol **AND** sodium chloride 0.9% **AND** sodium chloride 0.9%    Estimated/Assessed Needs    Weight Used For Calorie Calculations: 117 kg (257 lb 15 oz)  Energy Calorie Requirements (kcal): 2458  Energy Need Method: Reeves-St Jeor (x 1.2 wound healing)  Protein Requirements: 94-117g  (0.8-1.0)  Weight Used For Protein Calculations: 117 kg (257 lb 15 oz)  Fluid Requirements (mL): 1 mL/kcal  Estimated Fluid Requirement Method:  (or per MD)  RDA Method (mL): 2458  CHO Requirement: 300g= 50%dv      Nutrition Prescription Ordered    Current Diet Order: Consistent Carbohydrates    Evaluation of Received Nutrient/Fluid Intake    I/O: - 6.9 L since admit  Energy Calories Required: meeting needs  Protein Required: meeting needs  Fluid Required: meeting needs  Comments: LBM: 9/17/23  Tolerance: tolerating  % Intake of Estimated Energy Needs: 75 - 100 %  % Meal Intake: 75 - 100 %  Intake/Output - Last 3 Shifts         09/17 0700  09/18 0659 09/18 0700 09/19 0659    P.O.      Total Intake(mL/kg)      Urine (mL/kg/hr) 2425 (0.9) 300 (0.2)    Stool 0     Total Output 2425 300    Net -2425 -300          Stool Occurrence 1 x             Nutrition Risk    Level of Risk/Frequency of Follow-up: high       Monitor and Evaluation    Food and Nutrient Intake: energy intake, food and beverage intake  Food and Nutrient Adminstration: diet order  Knowledge/Beliefs/Attitudes: food and nutrition knowledge/skill  Physical Activity and Function: nutrition-related ADLs and IADLs  Anthropometric Measurements: weight, weight change  Biochemical Data, Medical Tests and Procedures: electrolyte and renal panel, gastrointestinal profile, glucose/endocrine profile, inflammatory profile, lipid profile  Nutrition-Focused Physical Findings: overall appearance, skin       Nutrition Follow-Up    RD Follow-up?: Yes    Doris Bojorquez RDN, [facility]

## 2023-09-19 NOTE — PLAN OF CARE
POC reviewed. No significant events this shift. Cardiac monitor maintained. Pt stable on RA. Complaints of pain treated with PRN pain medication per MAR. Pt voids and shifts in bed with assist. Daily wound care performed by wound care nurse. Bed low and locked, side rails up x3, call light within reach.    Problem: Adult Inpatient Plan of Care  Goal: Plan of Care Review  Outcome: Ongoing, Progressing  Goal: Patient-Specific Goal (Individualized)  Outcome: Ongoing, Progressing  Goal: Absence of Hospital-Acquired Illness or Injury  Outcome: Ongoing, Progressing  Goal: Optimal Comfort and Wellbeing  Outcome: Ongoing, Progressing  Goal: Readiness for Transition of Care  Outcome: Ongoing, Progressing     Problem: Diabetes Comorbidity  Goal: Blood Glucose Level Within Targeted Range  Outcome: Ongoing, Progressing     Problem: Infection  Goal: Absence of Infection Signs and Symptoms  Outcome: Ongoing, Progressing     Problem: Impaired Wound Healing  Goal: Optimal Wound Healing  Outcome: Ongoing, Progressing     Problem: Skin Injury Risk Increased  Goal: Skin Health and Integrity  Outcome: Ongoing, Progressing     Problem: Fall Injury Risk  Goal: Absence of Fall and Fall-Related Injury  Outcome: Ongoing, Progressing

## 2023-09-19 NOTE — PLAN OF CARE
CM spoke to pt's niece, Nava Good, [phone], to discuss discharge plan.    Nava informed that MD was attempting to talk to pt's insurance company regarding LTAC approval. Ms Vick states that if he is denied, she will do whatever she needs to do to take care of him. She is available to come to the hospital for infusion teaching if necessary and will bring him to her house to care for him while on antibiotics and wd care.    Pending LTAC decision, CM will attempt to arrange home infusion. Pt does want to give up any financial information to SNF or to this CM.    CM to follow for plans and arrangement.

## 2023-09-19 NOTE — PT/OT/SLP PROGRESS
Occupational Therapy   Treatment    Name: Dave Good  MRN: [id number]  Admitting Diagnosis:  Staphylococcus aureus bacteremia with sepsis  11 Days Post-Op    Recommendations:     Discharge Recommendations: nursing facility, skilled  Discharge Equipment Recommendations:  to be determined by next level of care  Barriers to discharge:  Decreased caregiver support (10 KRUPA; current functional level)    Assessment:     Dave Good is a 58 y.o. male with a medical diagnosis of Staphylococcus aureus bacteremia with sepsis.  He presents with mild pain in foot. Performance deficits affecting function are weakness, impaired self care skills, impaired functional mobility, gait instability, impaired balance, decreased lower extremity function, orthopedic precautions, decreased safety awareness, decreased coordination.  Pt agreeable to participating in chair level activity upon arrival to room.  Pt able to perform sit <> stand transfer with SBA from chair, and feed himself with setup.    Overall, pt tolerated therapy well.  When lunch arrived pt declined further activity at that time.  Pt is making strong progress towards goals and would continue to benefit from skilled OT services to address problems listed above and increase independence with ADLs.  SNF is recommended upon d/c from acute care to further address deficits and help pt improve overall functional independence.           Rehab Prognosis:  Good; patient would benefit from acute skilled OT services to address these deficits and reach maximum level of function.       Plan:     Patient to be seen 4x/week to address the above listed problems via self-care/home management, therapeutic activities, therapeutic exercises  Plan of Care Expires: 09/24/23  Plan of Care Reviewed with: patient    Subjective     Chief Complaint: trying to find placement after discharge from hospital  Patient/Family Comments/goals: Get stronger  Pain/Comfort:  Pain Rating 1: 0/10 (pt indicated  some pain in foot)  Pain Rating Post-Intervention 1:  (pt comfortable at rest)    Objective:     Communicated with: RN Tiffany) prior to session.  Patient found up in chair with telemetry, peripheral IV, PICC line upon OT entry to room.    General Precautions: Standard, fall, diabetic    Orthopedic Precautions:LLE partial weight bearing (on heel)  Braces:  (DARCO shoe both feet)  Respiratory Status: Room air     Occupational Performance:     Bed Mobility:    Not assessed 2* pt up in chair upon arrival.    Functional Mobility/Transfers:  Sit <> Stand: SBA x 1 trial from chair  Functional Mobility: Pt did not take steps during session this date 2* lunch arrived    Activities of Daily Living:  Feeding:  set up while seated up in chair.       [facility] 6 Click ADL: 19    Treatment & Education:  *Pt educated on role of OT in acute care setting and benefits of performing UB exercises/stretches during the day  *Pt performed UB exercises to provide stretch and promote increased endurance needed for ADLs seated up in chair  -scapular squeeze  -arm flexion  -forward circular rows  -shoulder abduction/adduction  -chest press  *Pt performed sit <> stand transfer from chair  *POC reviewed       Patient left up in chair with all lines intact, call button in reach, and RN(Monik) notified    GOALS:   Multidisciplinary Problems       Occupational Therapy Goals          Problem: Occupational Therapy    Goal Priority Disciplines Outcome Interventions   Occupational Therapy Goal     OT, PT/OT Ongoing, Progressing    Description: Goals to be met by: 9/24/2023     Patient will increase functional independence with ADLs by performing:    UE Dressing with Set-up Assistance.  LE Dressing with Moderate Assistance.  Toileting from bedside commode with supervision for hygiene and clothing management.   Toilet transfer to bedside commode with Stand-by Assistance while maintaining weight bearing precautions.  Grooming while standing at sink  with supervision, RW.    COMPLETED GOALS  Grooming while seated at sink with Set-up Assistance. MET 9/11/2023  Toileting from bedside commode with Contact Guard Assistance for hygiene and clothing management. MET 9/18/2023                         Time Tracking:     OT Date of Treatment: 09/19/23  OT Start Time: 1226  OT Stop Time: 1234  OT Total Time (min): 8 min    Billable Minutes:Therapeutic Activity 8    OT/DEREK: OT     Number of DEREK visits since last OT visit: 0    ELAINE Lu  9/19/2023

## 2023-09-19 NOTE — SUBJECTIVE & OBJECTIVE
Interval History: No acute events overnight.    Review of Systems   Constitutional:  Negative for chills and fever.   Respiratory:  Negative for shortness of breath.    Cardiovascular:  Negative for chest pain.   Gastrointestinal:  Negative for abdominal pain, constipation, diarrhea, nausea and vomiting.     Objective:     Vital Signs (Most Recent):  Temp: 97.7 °F (36.5 °C) (09/19/23 1131)  Pulse: 76 (09/19/23 1131)  Resp: 16 (09/19/23 1131)  BP: 114/73 (09/19/23 1131)  SpO2: 96 % (09/19/23 1131) Vital Signs (24h Range):  Temp:  [97.6 °F (36.4 °C)-98.6 °F (37 °C)] 97.7 °F (36.5 °C)  Pulse:  [76-91] 76  Resp:  [16-18] 16  SpO2:  [91 %-98 %] 96 %  BP: (102-115)/(58-73) 114/73     Weight: 117 kg (257 lb 15 oz)  Body mass index is 33.12 kg/m².    Intake/Output Summary (Last 24 hours) at 9/19/2023 1326  Last data filed at 9/19/2023 0732  Gross per 24 hour   Intake --   Output 1000 ml   Net -1000 ml         Physical Exam  Constitutional:       Appearance: He is obese.   Cardiovascular:      Rate and Rhythm: Normal rate and regular rhythm.      Heart sounds: Normal heart sounds. No murmur heard.     No friction rub. No gallop.   Pulmonary:      Effort: Pulmonary effort is normal. No respiratory distress.      Breath sounds: No wheezing.   Abdominal:      General: Bowel sounds are normal. There is no distension.      Palpations: Abdomen is soft.      Tenderness: There is no abdominal tenderness.   Musculoskeletal:         General: No tenderness. Normal range of motion.      Comments: Left foot with surgical dressing clean, dry, and intact..   Skin:     General: Skin is warm and dry.      Coloration: Skin is not pale.      Findings: No erythema or rash.   Neurological:      Mental Status: He is alert and oriented to person, place, and time.             Significant Labs: All pertinent labs within the past 24 hours have been reviewed.    Significant Imaging: I have reviewed all pertinent imaging results/findings within the  past 24 hours.

## 2023-09-19 NOTE — PT/OT/SLP PROGRESS
"Physical Therapy Treatment    Patient Name:  Dave Good   MRN:  [id number]    Recommendations:     Discharge Recommendations: nursing facility, skilled (though noted in chart that pt may be going to LTAC)  Discharge Equipment Recommendations: to be determined by next level of care  Barriers to discharge: Decreased caregiver support    Assessment:     Dave Good is a 58 y.o. male admitted with a medical diagnosis of Staphylococcus aureus bacteremia with sepsis.  He presents with the following impairments/functional limitations: weakness, impaired self care skills, impaired functional mobility, gait instability, impaired balance, decreased coordination, decreased lower extremity function, decreased safety awareness, impaired skin, edema, orthopedic precautions ;pt with good mobility today, just needing cueing to keep PWB on L heel.    Rehab Prognosis: Good; patient would benefit from acute skilled PT services to address these deficits and reach maximum level of function.    Recent Surgery: Procedure(s) (LRB):  AMPUTATION, TOE (Left) 11 Days Post-Op    Plan:     During this hospitalization, patient to be seen 5 x/week to address the identified rehab impairments via gait training, therapeutic activities, therapeutic exercises and progress toward the following goals:    Plan of Care Expires:  10/09/23    Subjective     Chief Complaint: none stated  Patient/Family Comments/goals: pt agreeable to session, though stating he wants to walk without the walker, "doesn't need it". Explained to pt that it is helping him to keep his wt bearing precautions,pt disregarding therapist's explanation.     Pain/Comfort:  Pain Rating 1: 0/10  Pain Rating Post-Intervention 1: 0/10      Objective:     Communicated with nurse prior to session.  Patient found up in chair,LE's elevated with telemetry, peripheral IV, PICC line upon PT entry to room.     General Precautions: Standard, fall, diabetic  Orthopedic Precautions: LLE partial weight " bearing (on heel in Darco shoe)  Braces:  (BLE Darco shoes)  Respiratory Status: Room air     Functional Mobility:  Transfers:     Sit to Stand:  supervision with rolling walker  Gait: amb'd ~200' w/ RW and SBA, IV pole in tow. Cueing to keep LLE PWB on heel (pt leading w/ RLE at times, disregarding therapist's cueing).       AM-PAC 6 CLICK MOBILITY  Turning over in bed (including adjusting bedclothes, sheets and blankets)?: 4  Sitting down on and standing up from a chair with arms (e.g., wheelchair, bedside commode, etc.): 3  Moving from lying on back to sitting on the side of the bed?: 4  Moving to and from a bed to a chair (including a wheelchair)?: 3  Need to walk in hospital room?: 3  Climbing 3-5 steps with a railing?: 3  Basic Mobility Total Score: 20       Treatment & Education:  Pt reports he has been perform his leg ex's on his own.     Patient left  sitting up on commode  with  nurse notified..    GOALS:   Multidisciplinary Problems       Physical Therapy Goals          Problem: Physical Therapy    Goal Priority Disciplines Outcome Goal Variances Interventions   Physical Therapy Goal     PT, PT/OT Ongoing, Progressing     Description: Goals to be met by: 10/9/2023    Patient will increase functional independence with mobility by performin. Sit<>stand with supervision with RW while maintaining WB precautions on LLE.  2. Gait x 50 feet with RW with CGA while maintaining WB precautions on LLE. - MET 23  3. Ascend/descend 10 step(s) with least restrictive assistive device and minimal assist while maintaining WB precautions on LLE.                           Time Tracking:     PT Received On: 23  PT Start Time: 1138     PT Stop Time: 1154  PT Total Time (min): 16 min     Billable Minutes: Gait Training 16    Treatment Type: Treatment  PT/PTA: PTA     Number of PTA visits since last PT visit: 5     2023

## 2023-09-19 NOTE — ASSESSMENT & PLAN NOTE
Reasonably controlled with current regimen with lisinopril 10 mg oral daily.  Will continue with current regimen and continue to monitor.

## 2023-09-19 NOTE — PROGRESS NOTES
"[facility] Medicine  Progress Note    Patient Name: Dave Good  MRN: [id number]  Patient Class: IP- Inpatient   Admission Date: 9/7/2023  Length of Stay: 11 days  Attending Physician: Hari Iverson MD  Primary Care Provider: Reyna Jorge NP        Subjective:     Principal Problem:Staphylococcus aureus bacteremia with sepsis        HPI:  Per Larry Shepherd, NP:    "The patient is a 58 y.o. male with a past medical history of type 2 diabetes, HTN, HLD, and chronic systolic congestive heart failure who presents with worsening left great toe pain infection and swelling.  Patient was recently seen in the ER and discharged to follow up with ID and wound care.  Since then he has been unable to follow-up with podiatry or infectious disease.  He states that he has not tried to call their offices and was waiting for them to call him.  States that his toe pain has gotten worse.  He has been soaking it in a foot massage machine chair with no improvement.  XR positive for left great, 2nd, and 4th toe acute osteomyelitis.  He will be admitted for further management of his acute osteomyelitis and infectious disease and podiatry consult."      Overview/Hospital Course:  Per Dr. Schulz. PHILIPPE Nation:    "Patient presented to ED with complaints of worsening left great toe pain and swelling.  X-ray of left foot with acute osteomyelitis involving the great toe and distal aspects of the 2nd and 4th toes.  Labs on admission with WBC of 21.91 with left shift.  Blood cultures on admission (9/7/2023) with 4/4 bottles, 9/8/2023 with 1/2 bottles, and 9/11/2023 with 0/2 bottles positive for MSSA . Wound culture (9/8/2023 - areobic) positive for MSSA and Strep dysgalactiae.  Started on IV Vanc/Meropenem on admission and changed to IV Vanc/Cefazolin on 9/10/2023.  IV Vanc discontinued on 9/11/2023.  Podiatry consulted and patient underwent left 1st toe amputation on 9/8/2023 with Dr. Samuel Toussaint - Pathology " "with underlying bone with focal acute osteomyelitis, focally involving margin of resection.  TTE on 9/8/2023 did not show evidence of endocarditis.  ID consulted and recommended PAT, but patient declined given a history of complicated PAT in 2022.  Patient will need 6 weeks of IV antibiotics given no PAT."      Interval History: No acute events overnight.    Review of Systems   Constitutional:  Negative for chills and fever.   Respiratory:  Negative for shortness of breath.    Cardiovascular:  Negative for chest pain.   Gastrointestinal:  Negative for abdominal pain, constipation, diarrhea, nausea and vomiting.     Objective:     Vital Signs (Most Recent):  Temp: 97.7 °F (36.5 °C) (09/19/23 1131)  Pulse: 76 (09/19/23 1131)  Resp: 16 (09/19/23 1131)  BP: 114/73 (09/19/23 1131)  SpO2: 96 % (09/19/23 1131) Vital Signs (24h Range):  Temp:  [97.6 °F (36.4 °C)-98.6 °F (37 °C)] 97.7 °F (36.5 °C)  Pulse:  [76-91] 76  Resp:  [16-18] 16  SpO2:  [91 %-98 %] 96 %  BP: (102-115)/(58-73) 114/73     Weight: 117 kg (257 lb 15 oz)  Body mass index is 33.12 kg/m².    Intake/Output Summary (Last 24 hours) at 9/19/2023 1326  Last data filed at 9/19/2023 0732  Gross per 24 hour   Intake --   Output 1000 ml   Net -1000 ml         Physical Exam  Constitutional:       Appearance: He is obese.   Cardiovascular:      Rate and Rhythm: Normal rate and regular rhythm.      Heart sounds: Normal heart sounds. No murmur heard.     No friction rub. No gallop.   Pulmonary:      Effort: Pulmonary effort is normal. No respiratory distress.      Breath sounds: No wheezing.   Abdominal:      General: Bowel sounds are normal. There is no distension.      Palpations: Abdomen is soft.      Tenderness: There is no abdominal tenderness.   Musculoskeletal:         General: No tenderness. Normal range of motion.      Comments: Left foot with surgical dressing clean, dry, and intact..   Skin:     General: Skin is warm and dry.      Coloration: Skin is not pale. "      Findings: No erythema or rash.   Neurological:      Mental Status: He is alert and oriented to person, place, and time.             Significant Labs: All pertinent labs within the past 24 hours have been reviewed.    Significant Imaging: I have reviewed all pertinent imaging results/findings within the past 24 hours.      Assessment/Plan:      * Staphylococcus aureus bacteremia with sepsis  Clinically improving with source control with surgical debridement of infected left foot/abscess on 9/8/2023 with Dr. Samuel Toussaint and with empiric intravenous antibiotics.  Blood cultures on admission positive 2 for 2 for meticillin-sensitive Staphylococcus aureus.  Surgical wound culture positive for Staphylococcus aureus and Streptococcus dysgalactaie.  Repeat blood culture collected on 9/11/2023 no growth today.  Infectious Disease service (Dr. Isidro Duran) consulted and recommending 6 weeks of intravenous cefazolin along with continued wound care.  Patient unable to provide further wound care needed at home nor able to do home infusion.  In light of both wound care needs and long course of intravenous antibiotic needed, LTAC placement sought.  Awaiting LTAC placement.    Chronic combined systolic and diastolic congestive heart failure  Clinically euvolemic.  Stable.  Continue medical therapy.  Monitor volume status.    Hypertension  Reasonably controlled with current regimen with lisinopril 10 mg oral daily.  Will continue with current regimen and continue to monitor.    Type 2 diabetes mellitus with diabetic peripheral angiopathy without gangrene, with long-term current use of insulin  Reasonably controlled with current regimen.  Will continue with current regimen and continue to monitor.      DVT prophylaxis.   Intermittent nosebleeds.  Hold enoxaparin tonight.  Repeat blood counts tomorrow.  Thrombo Embolic Deterrent hose (CARLOS A® hose) and sequential compression devices for now.        Hari Iverson,  MD  Department of Hospital Medicine   [facility], [place], operated by [facility] - Med Surg ([place])

## 2023-09-19 NOTE — PROGRESS NOTES
[facility] - Med Surg ([place])  Wound Care    Patient Name:  Dave Good   MRN:  [id number]  Date: 9/19/2023  Diagnosis: Staphylococcus aureus bacteremia with sepsis    History:     Past Medical History:   Diagnosis Date    Arthritis     COPD (chronic obstructive pulmonary disease)     COVID-19     Depression     Diabetes mellitus     Diabetes mellitus, type 2     Hypertension        Social History     Socioeconomic History    Marital status: Single   Tobacco Use    Smoking status: Former    Smokeless tobacco: Never   Substance and Sexual Activity    Alcohol use: Not Currently     Comment: pt reports he drinks 1/2 a pint of whiskey every day    Drug use: Yes     Types: Marijuana    Sexual activity: Yes     Partners: Female     Social Determinants of Health     Financial Resource Strain: Low Risk  (6/16/2022)    Overall Financial Resource Strain (CARDIA)     Difficulty of Paying Living Expenses: Not very hard   Food Insecurity: Food Insecurity Present (6/16/2022)    Hunger Vital Sign     Worried About Running Out of Food in the Last Year: Often true     Ran Out of Food in the Last Year: Often true   Transportation Needs: No Transportation Needs (6/16/2022)    PRAPARE - Transportation     Lack of Transportation (Medical): No     Lack of Transportation (Non-Medical): No   Physical Activity: Inactive (6/16/2022)    Exercise Vital Sign     Days of Exercise per Week: 0 days     Minutes of Exercise per Session: 0 min   Stress: No Stress Concern Present (6/16/2022)    [employer] [place] [employer] - Occupational Stress Questionnaire     Feeling of Stress : Not at all   Social Connections: Moderately Integrated (6/16/2022)    Social Connection and Isolation Panel [NHANES]     Frequency of Communication with Friends and Family: Twice a week     Frequency of Social Gatherings with Friends and Family: Twice a week     Attends Jewish Services: 1 to 4 times per year     Active Member of Clubs or Organizations: No      Attends Club or Organization Meetings: Never     Marital Status: Living with partner   Housing Stability: Unknown (6/16/2022)    Housing Stability Vital Sign     Unable to Pay for Housing in the Last Year: No     Unstable Housing in the Last Year: No       Precautions:     Allergies as of 09/07/2023 - Reviewed 09/07/2023   Allergen Reaction Noted    Ibuprofen Swelling 07/29/2014       [facility] Assessment Details/Treatment     Wound care follow up:     Met with patient this morning for reassessment and dressing change of left foot. Upon assessment noted suture missing with open wound that communicates amputated site.     Wound care completed today as ordered. Cleansed wound with Vashe and packed tunneling with Aquacel Ag Hydrofiber ribbon. Covered with dry gauze, cast padding and ace wrap. Patient tolerated dressing change well.      09/19/23 0930        Incision/Site 09/08/23 1641 Left Foot   Date First Assessed/Time First Assessed: 09/08/23 1641   Side: Left  Location: Foot   Wound Image     Incision WDL ex   Dressing Appearance Intact;Moist drainage   Drainage Amount Small   Drainage Characteristics/Odor Serosanguineous;No odor   Appearance Red;Moist   Periwound Area Dry   Wound Edges Open   Care Cleansed with:;Antimicrobial agent   Dressing Applied;Gauze;Rolled gauze;Elastic bandage;Cast padding   Packing packed with;hydrofiber/alginate   Off Loading Off loading shoe   Dressing Change Due 09/20/23 09/19/2023

## 2023-09-20 VITALS
DIASTOLIC BLOOD PRESSURE: 80 MMHG | TEMPERATURE: 98 F | OXYGEN SATURATION: 96 % | HEART RATE: 89 BPM | RESPIRATION RATE: 16 BRPM | WEIGHT: 257.94 LBS | SYSTOLIC BLOOD PRESSURE: 123 MMHG | BODY MASS INDEX: 33.1 KG/M2 | HEIGHT: 74 IN

## 2023-09-20 LAB
ANION GAP SERPL CALC-SCNC: 8 MMOL/L (ref 8–16)
BASOPHILS # BLD AUTO: 0.04 K/UL (ref 0–0.2)
BASOPHILS NFR BLD: 0.5 % (ref 0–1.9)
BUN SERPL-MCNC: 17 MG/DL (ref 6–20)
CALCIUM SERPL-MCNC: 9.5 MG/DL (ref 8.7–10.5)
CHLORIDE SERPL-SCNC: 102 MMOL/L (ref 95–110)
CO2 SERPL-SCNC: 27 MMOL/L (ref 23–29)
CREAT SERPL-MCNC: 0.9 MG/DL (ref 0.5–1.4)
DIFFERENTIAL METHOD: ABNORMAL
EOSINOPHIL # BLD AUTO: 0.6 K/UL (ref 0–0.5)
EOSINOPHIL NFR BLD: 7.6 % (ref 0–8)
ERYTHROCYTE [DISTWIDTH] IN BLOOD BY AUTOMATED COUNT: 16.7 % (ref 11.5–14.5)
EST. GFR  (NO RACE VARIABLE): >60 ML/MIN/1.73 M^2
GLUCOSE SERPL-MCNC: 122 MG/DL (ref 70–110)
HCT VFR BLD AUTO: 30.6 % (ref 40–54)
HGB BLD-MCNC: 9 G/DL (ref 14–18)
IMM GRANULOCYTES # BLD AUTO: 0.03 K/UL (ref 0–0.04)
IMM GRANULOCYTES NFR BLD AUTO: 0.4 % (ref 0–0.5)
LYMPHOCYTES # BLD AUTO: 3.1 K/UL (ref 1–4.8)
LYMPHOCYTES NFR BLD: 38.5 % (ref 18–48)
MAGNESIUM SERPL-MCNC: 1.7 MG/DL (ref 1.6–2.6)
MCH RBC QN AUTO: 24.2 PG (ref 27–31)
MCHC RBC AUTO-ENTMCNC: 29.4 G/DL (ref 32–36)
MCV RBC AUTO: 82 FL (ref 82–98)
MONOCYTES # BLD AUTO: 0.7 K/UL (ref 0.3–1)
MONOCYTES NFR BLD: 8.8 % (ref 4–15)
NEUTROPHILS # BLD AUTO: 3.5 K/UL (ref 1.8–7.7)
NEUTROPHILS NFR BLD: 44.2 % (ref 38–73)
NRBC BLD-RTO: 0 /100 WBC
PHOSPHATE SERPL-MCNC: 3.9 MG/DL (ref 2.7–4.5)
PLATELET # BLD AUTO: 692 K/UL (ref 150–450)
PMV BLD AUTO: 8.7 FL (ref 9.2–12.9)
POCT GLUCOSE: 120 MG/DL (ref 70–110)
POCT GLUCOSE: 135 MG/DL (ref 70–110)
POCT GLUCOSE: 147 MG/DL (ref 70–110)
POTASSIUM SERPL-SCNC: 4.3 MMOL/L (ref 3.5–5.1)
RBC # BLD AUTO: 3.72 M/UL (ref 4.6–6.2)
SODIUM SERPL-SCNC: 137 MMOL/L (ref 136–145)
WBC # BLD AUTO: 8 K/UL (ref 3.9–12.7)

## 2023-09-20 PROCEDURE — 97535 SELF CARE MNGMENT TRAINING: CPT | Mod: CO

## 2023-09-20 PROCEDURE — 25000003 PHARM REV CODE 250: Performed by: NURSE PRACTITIONER

## 2023-09-20 PROCEDURE — 25000003 PHARM REV CODE 250: Performed by: INTERNAL MEDICINE

## 2023-09-20 PROCEDURE — A4216 STERILE WATER/SALINE, 10 ML: HCPCS | Performed by: HOSPITALIST

## 2023-09-20 PROCEDURE — 25000003 PHARM REV CODE 250: Performed by: HOSPITALIST

## 2023-09-20 PROCEDURE — 36415 COLL VENOUS BLD VENIPUNCTURE: CPT | Performed by: HOSPITALIST

## 2023-09-20 PROCEDURE — 99239 PR HOSPITAL DISCHARGE DAY,>30 MIN: ICD-10-PCS | Mod: ,,, | Performed by: HOSPITALIST

## 2023-09-20 PROCEDURE — 99233 SBSQ HOSP IP/OBS HIGH 50: CPT | Mod: ,,, | Performed by: INTERNAL MEDICINE

## 2023-09-20 PROCEDURE — 94761 N-INVAS EAR/PLS OXIMETRY MLT: CPT

## 2023-09-20 PROCEDURE — 63600175 PHARM REV CODE 636 W HCPCS: Performed by: INTERNAL MEDICINE

## 2023-09-20 PROCEDURE — 85025 COMPLETE CBC W/AUTO DIFF WBC: CPT | Performed by: HOSPITALIST

## 2023-09-20 PROCEDURE — 99233 PR SUBSEQUENT HOSPITAL CARE,LEVL III: ICD-10-PCS | Mod: ,,, | Performed by: INTERNAL MEDICINE

## 2023-09-20 PROCEDURE — 84100 ASSAY OF PHOSPHORUS: CPT | Performed by: HOSPITALIST

## 2023-09-20 PROCEDURE — 83735 ASSAY OF MAGNESIUM: CPT | Performed by: HOSPITALIST

## 2023-09-20 PROCEDURE — 80048 BASIC METABOLIC PNL TOTAL CA: CPT | Performed by: HOSPITALIST

## 2023-09-20 PROCEDURE — 99239 HOSP IP/OBS DSCHRG MGMT >30: CPT | Mod: ,,, | Performed by: HOSPITALIST

## 2023-09-20 RX ORDER — AMOXICILLIN 250 MG
2 CAPSULE ORAL DAILY PRN
Qty: 60 TABLET | Refills: 0 | Status: SHIPPED | OUTPATIENT
Start: 2023-09-20 | End: 2023-10-20

## 2023-09-20 RX ORDER — INSULIN DETEMIR 100 [IU]/ML
20 INJECTION, SOLUTION SUBCUTANEOUS NIGHTLY
Qty: 15 ML | Refills: 0 | Status: SHIPPED | OUTPATIENT
Start: 2023-09-20

## 2023-09-20 RX ORDER — PEN NEEDLE, DIABETIC 30 GX3/16"
1 NEEDLE, DISPOSABLE MISCELLANEOUS
Qty: 100 EACH | Refills: 0 | Status: SHIPPED | OUTPATIENT
Start: 2023-09-20 | End: 2023-10-20

## 2023-09-20 RX ORDER — INSULIN ASPART 100 [IU]/ML
0-10 INJECTION, SOLUTION INTRAVENOUS; SUBCUTANEOUS
Qty: 3 ML | Refills: 0 | Status: SHIPPED | OUTPATIENT
Start: 2023-09-20

## 2023-09-20 RX ORDER — METFORMIN HYDROCHLORIDE 500 MG/1
500 TABLET ORAL 2 TIMES DAILY
Qty: 60 TABLET | Refills: 0 | Status: SHIPPED | OUTPATIENT
Start: 2023-09-20

## 2023-09-20 RX ORDER — QUETIAPINE FUMARATE 200 MG/1
200 TABLET, FILM COATED ORAL NIGHTLY
Qty: 30 TABLET | Refills: 0 | Status: SHIPPED | OUTPATIENT
Start: 2023-09-20

## 2023-09-20 RX ORDER — ASPIRIN 81 MG/1
81 TABLET ORAL DAILY
Qty: 30 TABLET | Refills: 0 | Status: SHIPPED | OUTPATIENT
Start: 2023-09-20

## 2023-09-20 RX ORDER — ATORVASTATIN CALCIUM 40 MG/1
40 TABLET, FILM COATED ORAL DAILY
Qty: 30 TABLET | Refills: 0 | Status: SHIPPED | OUTPATIENT
Start: 2023-09-20

## 2023-09-20 RX ORDER — LISINOPRIL 10 MG/1
10 TABLET ORAL DAILY
Qty: 30 TABLET | Refills: 0 | Status: SHIPPED | OUTPATIENT
Start: 2023-09-20

## 2023-09-20 RX ORDER — INSULIN ASPART 100 [IU]/ML
4 INJECTION, SOLUTION INTRAVENOUS; SUBCUTANEOUS 3 TIMES DAILY
Qty: 15 ML | Refills: 0 | Status: SHIPPED | OUTPATIENT
Start: 2023-09-20

## 2023-09-20 RX ORDER — GABAPENTIN 300 MG/1
300 CAPSULE ORAL 2 TIMES DAILY
Qty: 60 CAPSULE | Refills: 0 | Status: SHIPPED | OUTPATIENT
Start: 2023-09-20

## 2023-09-20 RX ADMIN — OXYCODONE HYDROCHLORIDE 5 MG: 5 TABLET ORAL at 04:09

## 2023-09-20 RX ADMIN — OXYCODONE HYDROCHLORIDE 5 MG: 5 TABLET ORAL at 01:09

## 2023-09-20 RX ADMIN — CEFAZOLIN 6 G: 2 INJECTION, POWDER, FOR SOLUTION INTRAMUSCULAR; INTRAVENOUS at 12:09

## 2023-09-20 RX ADMIN — INSULIN ASPART 4 UNITS: 100 INJECTION, SOLUTION INTRAVENOUS; SUBCUTANEOUS at 01:09

## 2023-09-20 RX ADMIN — INSULIN ASPART 4 UNITS: 100 INJECTION, SOLUTION INTRAVENOUS; SUBCUTANEOUS at 04:09

## 2023-09-20 RX ADMIN — SODIUM CHLORIDE, PRESERVATIVE FREE 10 ML: 5 INJECTION INTRAVENOUS at 12:09

## 2023-09-20 RX ADMIN — INSULIN ASPART 4 UNITS: 100 INJECTION, SOLUTION INTRAVENOUS; SUBCUTANEOUS at 10:09

## 2023-09-20 RX ADMIN — ASPIRIN 81 MG: 81 TABLET, COATED ORAL at 10:09

## 2023-09-20 RX ADMIN — ATORVASTATIN CALCIUM 40 MG: 20 TABLET, FILM COATED ORAL at 10:09

## 2023-09-20 RX ADMIN — SODIUM CHLORIDE, PRESERVATIVE FREE 10 ML: 5 INJECTION INTRAVENOUS at 05:09

## 2023-09-20 RX ADMIN — GABAPENTIN 300 MG: 300 CAPSULE ORAL at 10:09

## 2023-09-20 RX ADMIN — OXYCODONE HYDROCHLORIDE 5 MG: 5 TABLET ORAL at 10:09

## 2023-09-20 RX ADMIN — LISINOPRIL 10 MG: 10 TABLET ORAL at 10:09

## 2023-09-20 NOTE — PROGRESS NOTES
[facility] - Med Surg ([place])  Wound Care    Patient Name:  Dave Good   MRN:  [id number]  Date: 9/20/2023  Diagnosis: Staphylococcus aureus bacteremia with sepsis    History:     Past Medical History:   Diagnosis Date    Arthritis     COPD (chronic obstructive pulmonary disease)     COVID-19     Depression     Diabetes mellitus     Diabetes mellitus, type 2     Hypertension        Social History     Socioeconomic History    Marital status: Single   Tobacco Use    Smoking status: Former    Smokeless tobacco: Never   Substance and Sexual Activity    Alcohol use: Not Currently     Comment: pt reports he drinks 1/2 a pint of whiskey every day    Drug use: Yes     Types: Marijuana    Sexual activity: Yes     Partners: Female     Social Determinants of Health     Financial Resource Strain: Low Risk  (6/16/2022)    Overall Financial Resource Strain (CARDIA)     Difficulty of Paying Living Expenses: Not very hard   Food Insecurity: Food Insecurity Present (6/16/2022)    Hunger Vital Sign     Worried About Running Out of Food in the Last Year: Often true     Ran Out of Food in the Last Year: Often true   Transportation Needs: No Transportation Needs (6/16/2022)    PRAPARE - Transportation     Lack of Transportation (Medical): No     Lack of Transportation (Non-Medical): No   Physical Activity: Inactive (6/16/2022)    Exercise Vital Sign     Days of Exercise per Week: 0 days     Minutes of Exercise per Session: 0 min   Stress: No Stress Concern Present (6/16/2022)    [employer] of Occupational Health - Occupational Stress Questionnaire     Feeling of Stress : Not at all   Social Connections: Moderately Integrated (6/16/2022)    Social Connection and Isolation Panel [NHANES]     Frequency of Communication with Friends and Family: Twice a week     Frequency of Social Gatherings with Friends and Family: Twice a week     Attends Mosque Services: 1 to 4 times per year     Active Member of Clubs or Organizations: No      Attends Club or Organization Meetings: Never     Marital Status: Living with partner   Housing Stability: Unknown (6/16/2022)    Housing Stability Vital Sign     Unable to Pay for Housing in the Last Year: No     Unstable Housing in the Last Year: No       Precautions:     Allergies as of 09/07/2023 - Reviewed 09/07/2023   Allergen Reaction Noted    Ibuprofen Swelling 07/29/2014       [facility] Assessment Details/Treatment     Notified by wound care to supply patient with wound care supplies for discharge to home. Supplies given to patient at bedside. Patient with questions concerning his discharge.  following patient notified. Will continue to follow.     09/20/2023

## 2023-09-20 NOTE — PLAN OF CARE
[facility] is coming for 1530 for infusion education along with pt's santhosh, derek, [phone]. Home Health arrangements in process with [facility].    Transport pending, santhosh may be able to provide.

## 2023-09-20 NOTE — PT/OT/SLP PROGRESS
Occupational Therapy   Treatment    Name: Dave Good  MRN: [id number]  Admitting Diagnosis:  Staphylococcus aureus bacteremia with sepsis  12 Days Post-Op    Recommendations:     Discharge Recommendations: nursing facility, skilled (though noted in chart that pt may be going to LTAC)  Discharge Equipment Recommendations:  to be determined by next level of care  Barriers to discharge:  Decreased caregiver support (10 KRUPA and current functional level)    Assessment:     Dave Good is a 58 y.o. male with a medical diagnosis of Staphylococcus aureus bacteremia with sepsis.  He presents with no pain. Performance deficits affecting function are weakness, impaired self care skills, impaired functional mobility, gait instability, impaired balance, decreased coordination, decreased lower extremity function, decreased safety awareness, impaired skin, edema, orthopedic precautions.     Patient pleasant and agreeable to therapy. Patient with CGA, RW for functional mobility though require cues for PWB LLE on the heel. Set up/SBA to don B darco shoes. CGA, RW for dynamic standing balance for waistline management to don pants. Overall, tolerated session well and progressing toward goals.     Rehab Prognosis:  Good; patient would benefit from acute skilled OT services to address these deficits and reach maximum level of function.       Plan:     Patient to be seen 5 x/week to address the above listed problems via self-care/home management, therapeutic activities, therapeutic exercises  Plan of Care Expires: 09/24/23  Plan of Care Reviewed with: patient    Subjective     Chief Complaint: NA  Patient/Family Comments/goals: agreeable to participate in therapy for OT intervention    Pain/Comfort:  Pain Rating 1: 0/10    Objective:     Communicated with: RN prior to session.  Patient found up in chair with telemetry, peripheral IV, PICC line upon OT entry to room.    General Precautions: Standard, fall, diabetic    Orthopedic  Precautions:LLE partial weight bearing (on heel with darco shoe on)  Braces:  (BLE darco shoe)  Respiratory Status: Room air     Occupational Performance:     Bed Mobility:    NT d/t pt [facility]     Functional Mobility/Transfers:  Sit <> Stand: CGA/SBA, RW   Functional Mobility: CGA, RW progressing to SBA - no LOB noted observed during session     Activities of Daily Living:  LB Dressing: Set up/SBA to don/doff darco shoes seated in chair. CGA, RW for dynamic standing balance for waist line management to don pants   Grooming: SBA, RW for oral care, hand washing, and facial hygiene standing at the sink       [facility] 6 Click ADL: 19    Treatment & Education:  OT role, plan of care, progression of goals, importance of continued OOB activity, ADL/functional transfer and mobility retraining, discharge recommendation, call don't fall, safety precautions, fall prevention.      Patient left up in chair with all lines intact, call button in reach, and RN notified    GOALS:   Multidisciplinary Problems       Occupational Therapy Goals          Problem: Occupational Therapy    Goal Priority Disciplines Outcome Interventions   Occupational Therapy Goal     OT, PT/OT Ongoing, Progressing    Description: Goals to be met by: 9/24/2023     Patient will increase functional independence with ADLs by performing:    UE Dressing with Set-up Assistance.  Toileting from bedside commode with supervision for hygiene and clothing management.   Toilet transfer to bedside commode with Stand-by Assistance while maintaining weight bearing precautions.  Grooming while standing at sink with supervision, RW.    COMPLETED GOALS  Grooming while seated at sink with Set-up Assistance. MET 9/11/2023  Toileting from bedside commode with Contact Guard Assistance for hygiene and clothing management. MET 9/18/2023  LE Dressing with Moderate Assistance. MET 9/20/2023 with pants and B Darco shoes                            Time Tracking:     OT Date of Treatment:  09/20/23  OT Start Time: 1019  OT Stop Time: 1033  OT Total Time (min): 14 min    Billable Minutes:Self Care/Home Management 14 min    OT/DEREK: DEREK     Number of DEREK visits since last OT visit: 1    9/20/2023

## 2023-09-20 NOTE — PLAN OF CARE
CM met with pt and his santhosh Mckeon, [phone], for final discharge assessment.    Pt will discharge with [facility], choice form signed, and [facility]. Santhosh included in home infusion education and will provide transportation home.    Meds delivered to pt prior to discharge from [facility]    Pt is ready for discharge home from CM perspective.   09/20/23 1625   Final Note   Assessment Type Final Discharge Note   Anticipated Discharge Disposition Home-Health   What phone number can be called within the next 1-3 days to see how you are doing after discharge? [phone]   Hospital Resources/Appts/Education Provided Provided patient/caregiver with written discharge plan information;Provided education on problems/symptoms using teachback;Appointments scheduled and added to AVS   Post-Acute Status   Post-Acute Authorization Home Health   Home Health Status Set-up Complete/Auth obtained     [facility] ([place])  Discharge Final Note    Primary Care Provider: Reyna Jorge NP    Expected Discharge Date: 9/20/2023    Final Discharge Note (most recent)       Final Note - 09/20/23 1625          Final Note    Assessment Type Final Discharge Note (P)      Anticipated Discharge Disposition Home-Health Care Svc (P)      What phone number can be called within the next 1-3 days to see how you are doing after discharge? [phone] (P)      Hospital Resources/Appts/Education Provided Provided patient/caregiver with written discharge plan information;Provided education on problems/symptoms using teachback;Appointments scheduled and added to AVS (P)         Post-Acute Status    Post-Acute Authorization Home Health (P)      Home Health Status Set-up Complete/Auth obtained (P)                      Important Message from Medicare             Contact Info       Reyna Jorge NP   Specialty: Family Medicine   Relationship: PCP - General    [address]  [place] [address]   Phone: [phone]        Next Steps: Follow up in 1 week(s)    [facility]    [address]  [address]  [place] [address]   Phone: [phone]       Next Steps: Follow up    Instructions: Education provided in hospital 9/20/23 with pt and Mike trevizo, [phone]    [place], [facility] -   Specialty: Home Health Services    [address]  [address]  [place] [address]   Phone: [phone]       Next Steps: Follow up

## 2023-09-20 NOTE — PROGRESS NOTES
"[facility] ([place]  Infectious Disease  Progress Note    Patient Name: Dave Good  MRN: [id number]  Admission Date: 9/7/2023  Length of Stay: 12 days  Attending Physician: Hari Iverson MD  Primary Care Provider: Reyna Jorge NP    Isolation Status: No active isolations  Assessment/Plan:      ID  Chronic osteomyelitis of toe of left foot-resolved as of 9/10/2023  59 yo male with DM, HTN with chronic osteomyelitis of left hallux who presents with elevated WBC and left hallux pain. Xray suggests changes in hallux, 2nd, and 4th toes - however, these may represent previous infection as well - changes to hallux and 4th toe were seen on MRI in June 2022.  Now, s/p amputation. OR culture growing MSSA and blood with MSSA. Bacteremia cleared 9/11.    Recommendation:  · Continue cefazolin x 6 weeks (EOC: 10/23/2023)  · LTAC referral pending        Isidro Duran MD  Infectious Disease  [facility] ([place]    Subjective:     Principal Problem:Staphylococcus aureus bacteremia with sepsis    HPI: 59 yo male with DM, HTN, PAD, chronic osteomyelitis of left hallux who presented on 8/27 and 9/7 with left hallux pain. He was last seen by ID in January after finishing 6 weeks of IV daptomycin and meropenem on 1/3/23. On presentation he had no fever, but WBC was elevated at 21. He was given one dose of vancomycin. I am consulted for evaluation and treatment.     Interval History: "They're cutting me loose, doc," the patient tells me. Tolerating abx - back on cefazolin.    Review of Systems   Constitutional:  Negative for chills and fever.   All other systems reviewed and are negative.    Objective:     Vital Signs (Most Recent):  Temp: 97.2 °F (36.2 °C) (09/20/23 1152)  Pulse: 79 (09/20/23 1152)  Resp: 16 (09/20/23 1152)  BP: 116/70 (09/20/23 1152)  SpO2: 98 % (09/20/23 1152) Vital Signs (24h Range):  Temp:  [97.2 °F (36.2 °C)-98.1 °F (36.7 °C)] 97.2 °F (36.2 °C)  Pulse:  [74-81] 79  Resp:  [16-20] " PT Evaluation     Today's date: 2018  Patient name: Elisabet Tapia  : 1997  MRN: [id number]  Referring provider: Feliciano Anderson DMD  Dx:   Encounter Diagnosis     ICD-10-CM    1  Jaw pain R68 84        Start Time:   Stop Time: 932  Total time in clinic (min): 25 minutes    Assessment  Impairments: abnormal or restricted ROM and pain with function    Assessment details: Decreased ROM mandibular depression with mild tenderness at Bilateral TMJ  Good for return to full function  Understanding of Dx/Px/POC: good  Goals  STG decrease pain 2-3/10  Increase ROM 5-10 mm  LTG decrease pain 0-1/10  ROM 35-40 mm mandibular depression    Plan  Patient would benefit from: skilled PT  Planned therapy interventions: manual therapy and neuromuscular re-education  Frequency: 1x week  Duration in weeks: 4  Treatment plan discussed with: patient  Plan details: Continue with modalities as needed with stretching and strengthening and postural exercise as tolerated          Subjective Evaluation    History of Present Illness  Date of surgery: 5/3/2018  Mechanism of injury: The doctor gave me a muscle relaxer   Pain  Current pain ratin  At best pain ratin  At worst pain ratin  Quality: radiating, dull ache, throbbing, tight, discomfort and pulling  Relieving factors: ice  Aggravating factors: eating          Objective     Palpation     Additional Palpation Details  Mild tenderness at Bilateral TMJ joints    Active Range of Motion   Cervical/Thoracic Spine   Cervical    Flexion: WFL  Extension: WFL  Left lateral flexion: WFL  Right lateral flexion: Neck active lateral bend right: 75%   Left rotation: Neck active rotation left: 80%   Right rotation: [facility]  TMJ   Jaw observations: facial symmetry within normal limits  Jaw findings: Mandibular depression 25 mm active 30 mm passive  Mandibular protrusion hyper mobile  Lateral excursion WFL and equal bilaterallly   Patient presents with: no jaw 16  SpO2:  [95 %-98 %] 98 %  BP: (106-131)/(57-79) 116/70     Weight: 117 kg (257 lb 15 oz)  Body mass index is 33.12 kg/m².    Estimated Creatinine Clearance: 118.6 mL/min (based on SCr of 0.9 mg/dL).     Physical Exam  Vitals and nursing note reviewed.   Constitutional:       General: He is not in acute distress.     Appearance: Normal appearance. He is not ill-appearing, toxic-appearing or diaphoretic.   HENT:      Head: Normocephalic and atraumatic.      Right Ear: External ear normal.      Left Ear: External ear normal.   Neurological:      General: No focal deficit present.      Mental Status: He is alert and oriented to person, place, and time.   Psychiatric:         Mood and Affect: Mood normal.         Behavior: Behavior normal.          Significant Labs: Blood Culture:   Recent Labs   Lab 09/07/23  2343 09/08/23  1333 09/11/23  1104   LABBLOO Gram stain jose bottle: Gram positive cocci in clusters resembling Staph  Results called to and read back by:Starla Wilburn Lpn 09/09/2023 00:41  Gram stain aer bottle: Gram positive cocci in clusters resembling Staph  Positive results previously called 09/10/2023  06:15  STAPHYLOCOCCUS AUREUS  ID consult required at [facility].  For susceptibility see order #[id number]  *  Gram stain jose bottle: Gram positive cocci in clusters resembling Staph  Results called to and read back by:Starla Wilburn Lpn 09/09/2023 00:41  Gram stain aer bottle: Gram positive cocci in clusters resembling Staph  Positive results previously called 09/09/2023  13:34  STAPHYLOCOCCUS AUREUS  ID consult required at [facility].  * Gram stain jose bottle: Gram positive cocci in clusters resembling Staph  Results called to and read back by:Kelly Elder Rn 09/09/2023  23:09  STAPHYLOCOCCUS AUREUS  ID consult required at [facility].  For susceptibility see order #[id number]  * No growth after 5 days.  trauma  Dental findings: One filling no other findings  Wearing a mouth guard for night wear  Joint sounds left: normal      Flowsheet Rows      Most Recent Value   PT/OT G-Codes   Current Score  69   Projected Score  82   FOTO information reviewed  Yes   Assessment Type  Re-evaluation   G code set  Other PT/OT Primary   Other PT Primary Current Status ()  CJ   Other PT Primary Goal Status ()  CI          Precautions: TMJ surgery     Daily Treatment Diary      Manual  5/16 5/23 6/1 6/6 6/13             TMJ B MFR 10 min  8 min  8 min  10min  6 min                                                                                      ROM  20 D 25D  20-25  17-22  25--28                   Exercise Diary                        TMJ active opening x5 with 5 sec hold  x7 with 5 sec hold  x7 with 5 sec hold  x 7 with 5 sec hold  x 8 with sec             Passive mandibular depression x3 with 10 sec hold  x5 with 10 sec hold  x 5 with 10 sec hold  x 5 10 sec hold  x 5 with 10 sechold             Scapular retraction nv  x 10 with 5 sec hold  green tb x 20  blue tb x 20  black tb x 20             Cervical retraction nv  x 10 with 5 sechold  x 10  x 10  x 10             Shoulder extension nv Green TB x 20  blue tb x 20  black tb x 20  apollo 35# x                supine retraction      x 10 with 10 sec hold  x 10  x 10             Supine chin tuck with neck flexion     x5 with 5 sec hold  x 7  x 8                                                                                                                                                                                                                                                                                                                                           Modalities                                                                                                      BMP:   Recent Labs   Lab 09/20/23  0403   *      K 4.3      CO2 27   BUN 17   CREATININE 0.9   CALCIUM 9.5   MG 1.7     CBC:   Recent Labs   Lab 09/20/23  0403   WBC 8.00   HGB 9.0*   HCT 30.6*   *     Wound Culture:   Recent Labs   Lab 09/08/23  1754   LABAERO STAPHYLOCOCCUS AUREUS  Many  *  STREPTOCOCCUS DYSGALACTIAE  Many  Susceptibility testing not routinely performed  *       Significant Imaging: I have reviewed all pertinent imaging results/findings within the past 24 hours.

## 2023-09-20 NOTE — PLAN OF CARE
POC reviewed. No significant events this shift. Complaints of pain treated with PRN pain medication. No other complaints verbalized during shift. Dressing to left foot C/D/I. No falls, or trauma occurred during shift. Bed low and locked, side rails up x3, call light within reach. Safety maintained throughout shift.    Problem: Adult Inpatient Plan of Care  Goal: Plan of Care Review  Outcome: Ongoing, Progressing  Goal: Patient-Specific Goal (Individualized)  Outcome: Ongoing, Progressing  Goal: Absence of Hospital-Acquired Illness or Injury  Outcome: Ongoing, Progressing  Goal: Optimal Comfort and Wellbeing  Outcome: Ongoing, Progressing  Goal: Readiness for Transition of Care  Outcome: Ongoing, Progressing

## 2023-09-20 NOTE — NURSING
Pt. Being discharged home per MD orders with H[facility]. VSS, pt afebrile and no c/o pain at this time. Reviewed discharge instruction, follow-up instructions, and med with pt. Pt left w/ ML to RUE which was reinforced and placement verified prior to DC by VIR[place]IA James. LLE foot dressing CDI. HH came with instructions for pt and daughter. Ride to be by family upon discharge and will transport per W/C.

## 2023-09-20 NOTE — PLAN OF CARE
"[facility]  HOME HEALTH ORDERS  FACE TO FACE ENCOUNTER    Patient Name: Dave Good  YOB: 1965  PCP: Reyna Jorge NP   PCP Address: [address] / [place] [address]  PCP Phone Number: [phone]  PCP Fax: [phone]    Encounter Date: 9/7/23    Admit to Home Health    Diagnoses:  Active Hospital Problems    Diagnosis  POA    *Staphylococcus aureus bacteremia with sepsis [A41.01]  Yes     Priority: 1 - High    Chronic combined systolic and diastolic congestive heart failure [I50.42]  Yes     Priority: 8     Hypertension [I10]  Yes     Priority: 10     Type 2 diabetes mellitus with diabetic peripheral angiopathy without gangrene, with long-term current use of insulin [E11.51, Z79.4]  Not Applicable     Priority: 12       Resolved Hospital Problems    Diagnosis Date Resolved POA    Microcytic anemia [D50.9] 09/19/2023 Yes    Mixed hyperlipidemia [E78.2] 09/19/2023 Yes    Obesity due to excess calories with serious comorbidity [E66.09] 09/19/2023 Yes       Follow Up Appointments:  Future Appointments   Date Time Provider Department Center   10/19/2023  3:00 PM [facility]       Allergies:  Review of patient's allergies indicates:   Allergen Reactions    Ibuprofen Swelling     Facial swelling       Medications: Review discharge medications with patient and family and provide education.      Current Discharge Medication List        START taking these medications    Details   dextrose 5 % (D5W) SolP 500 mL with ceFAZolin 1 gram SolR 6 g Inject 6 g into the vein once daily.      gabapentin (NEURONTIN) 300 MG capsule Take 1 capsule (300 mg total) by mouth 2 (two) times daily.  Qty: 60 capsule, Refills: 0      pen needle,diabetic dual safty (BD AUTOSHIELD DUO PEN NEEDLE) 30 gauge x 3/16" Ndle 1 each by Misc.(Non-Drug; Combo Route) route as needed (Insulin injections using insulin pens).  Qty: 100 each, Refills: 0      senna-docusate " 8.6-50 mg (PERICOLACE) 8.6-50 mg per tablet Take 2 tablets by mouth daily as needed for Constipation.  Qty: 60 tablet, Refills: 0           CONTINUE these medications which have CHANGED    Details   apixaban (ELIQUIS) 5 mg Tab Take 1 tablet (5 mg total) by mouth 2 (two) times daily.  Qty: 60 tablet, Refills: 0      aspirin (ECOTRIN) 81 MG EC tablet Take 1 tablet (81 mg total) by mouth once daily.  Qty: 30 tablet, Refills: 0      atorvastatin (LIPITOR) 40 MG tablet Take 1 tablet (40 mg total) by mouth once daily.  Qty: 30 tablet, Refills: 0      !! insulin aspart U-100 (NOVOLOG) 100 unit/mL (3 mL) InPn pen Inject 0-10 Units into the skin before meals and at bedtime as needed (Per sliding scale).  Qty: 3 mL, Refills: 0      !! insulin aspart U-100 (NOVOLOG) 100 unit/mL (3 mL) InPn pen Inject 4 Units into the skin 3 (three) times daily.  Qty: 3 mL, Refills: 0      insulin detemir U-100, Levemir, (LEVEMIR FLEXTOUCH U100 INSULIN) 100 unit/mL (3 mL) InPn pen Inject 20 Units into the skin every evening.  Qty: 3 mL, Refills: 0      lisinopriL 10 MG tablet Take 1 tablet (10 mg total) by mouth once daily.  Qty: 30 tablet, Refills: 0    Comments: .      metFORMIN (GLUCOPHAGE) 500 MG tablet Take 1 tablet (500 mg total) by mouth 2 (two) times daily.  Qty: 60 tablet, Refills: 0      QUEtiapine (SEROQUEL) 200 MG Tab Take 1 tablet (200 mg total) by mouth nightly.  Qty: 30 tablet, Refills: 0       !! - Potential duplicate medications found. Please discuss with provider.        STOP taking these medications       ammonium lactate 12 % Crea Comments:   Reason for Stopping:         bisacodyL (DULCOLAX) 5 mg EC tablet Comments:   Reason for Stopping:         gabapentin (NEURONTIN) 600 MG tablet Comments:   Reason for Stopping:         HUMALOG U-100 INSULIN 100 unit/mL injection Comments:   Reason for Stopping:         hydroCHLOROthiazide (HYDRODIURIL) 25 MG tablet Comments:   Reason for Stopping:         HYDROcodone-acetaminophen  "(NORCO) 5-325 mg per tablet Comments:   Reason for Stopping:         CATHFLO ACTIVASE 2 mg injection Comments:   Reason for Stopping:         ciclopirox (PENLAC) 8 % Soln Comments:   Reason for Stopping:         COVID-19 antigen test (FLOWFLEX COVID-19 AG HOME TEST MISC) Comments:   Reason for Stopping:         dextrose 50 % in water, D50W, Syrg Comments:   Reason for Stopping:         EASY COMFORT PEN NEEDLES 31 gauge x 1/4" Ndle Comments:   Reason for Stopping:         enoxaparin (LOVENOX) 60 mg/0.6 mL Syrg Comments:   Reason for Stopping:         GLUCAGEN DIAGNOSTIC KIT 1 mg/mL SolR Comments:   Reason for Stopping:         GLUTOSE-15 40 % gel Comments:   Reason for Stopping:         ketoconazole (NIZORAL) 2 % cream Comments:   Reason for Stopping:         LANTUS SOLOSTAR U-100 INSULIN glargine 100 units/mL SubQ pen Comments:   Reason for Stopping:         ondansetron 4 mg/2 mL Soln Comments:   Reason for Stopping:         ONETOUCH ULTRA TEST Strp Comments:   Reason for Stopping:         polyethylene glycol (GLYCOLAX) 17 gram/dose powder Comments:   Reason for Stopping:         STOOL SOFTENER 100 mg capsule Comments:   Reason for Stopping:         TRULICITY 0.75 mg/0.5 mL pen injector Comments:   Reason for Stopping:         urea 45 % Crea Comments:   Reason for Stopping:                 I have seen and examined this patient within the last 30 days. My clinical findings that support the need for the home health skilled services and home bound status are the following:no   Weakness/numbness causing balance and gait disturbance due to Infection and Weakness/Debility making it taxing to leave home.  Patient with medication mismanagement issues requiring home bound status as evidenced by  Poor understanding of medication regimen/dosage, Poor adherence to medication regimen/dosage, and Uncontrolled hyperglycemic/hypoglycemic events.     Diet:   diabetic diet 1800 calorie    Labs:  SN to perform labs:  CBC, CMP, Sed " Rate, CRP every Monday starting on 9/25/2023, Fax results to [facility] attention Dr. Isidro Duran.    Referrals/ Consults  Physical Therapy to evaluate and treat. Evaluate for home safety and equipment needs; Establish/upgrade home exercise program. Perform / instruct on therapeutic exercises, gait training, transfer training, and Range of Motion.  Occupational Therapy to evaluate and treat. Evaluate home environment for safety and equipment needs. Perform/Instruct on transfers, ADL training, ROM, and therapeutic exercises.   to evaluate for community resources/long-range planning.  Aide to provide assistance with personal care, ADLs, and vital signs.    Activities:   activity as tolerated    Nursing:   Agency to admit patient within 24 hours of hospital discharge unless specified on physician order or at patient request    SN to complete comprehensive assessment including routine vital signs. Instruct on disease process and s/s of complications to report to MD. Review/verify medication list sent home with the patient at time of discharge  and instruct patient/caregiver as needed. Frequency may be adjusted depending on start of care date.     Skilled nurse to perform up to 3 visits PRN for symptoms related to diagnosis    Notify MD if SBP > 160 or < 90; DBP > 90 or < 50; HR > 120 or < 50; Temp > 101; O2 < 88%    For all post-discharge communication and subsequent orders please contact patient's primary care physician.    Miscellaneous   Home Infusion Therapy:   SN to perform Infusion Therapy/Central Line Care.  Review Central Line Care & Central Line Flush with patient.    Administer (drug and dose): cefazolin 6 gram continuous infusion every 24 hours for 6 weeks, continue treatment until 10/26/2023 unless changed for infectious disease clinic.    Scrub the Hub: Prior to accessing the line, always perform a 30 second alcohol scrub  Each lumen of the central line is to be flushed at least  "daily with 10 mL Normal Saline and 3 mL Heparin flush (10 units/mL)  Skilled Nurse (SN) may draw blood from IV access  Blood Draw Procedure:   - Aspirate at least 5 mL of blood   - Discard   - Obtain specimen   - Change injection cap   - Flush with 20 mL Normal Saline followed by a                 3-5 mL Heparin flush (10 units/mL)  Central :   - Sterile dressing changes are done weekly and as needed.   - Use chlor-hexadine scrub to cleanse site, apply Biopatch to insertion site,       apply securement device dressing   - Injection caps are changed weekly and after EVERY lab draw.   - If sterile gauze is under dressing to control oozing,                 dressing change must be performed every 24 hours until gauze is not needed.    Diabetic Care:   SN to perform and educate Diabetic management with blood glucose monitoring:, Fingerstick blood sugar AC and HS, and Report CBG < 60 or > 350 to physician.    Home Health Aide:  Nursing Weekly, Physical ad Occupational Therapy Three times weekly, Medical Social Work Weekly, and Home Health Aide Twice weekly    Wound Care Orders  Left foot 1st toe amputation:  Wound care daily, pack with aquacel rope, and secure with 4" Kerlix and 4" ACE.  Avoid tight compression.  WBAT, Darco shoe.    I certify that this patient is confined to his home and needs intermittent skilled nursing care, physical therapy, and occupational therapy.          "

## 2023-09-20 NOTE — PLAN OF CARE
Problem: Occupational Therapy  Goal: Occupational Therapy Goal  Description: Goals to be met by: 9/24/2023     Patient will increase functional independence with ADLs by performing:    UE Dressing with Set-up Assistance.  Toileting from bedside commode with supervision for hygiene and clothing management.   Toilet transfer to bedside commode with Stand-by Assistance while maintaining weight bearing precautions.  Grooming while standing at sink with supervision, RW.    COMPLETED GOALS  Grooming while seated at sink with Set-up Assistance. MET 9/11/2023  Toileting from bedside commode with Contact Guard Assistance for hygiene and clothing management. MET 9/18/2023  LE Dressing with Moderate Assistance. MET 9/20/2023 with pants and B Darco shoes       Outcome: Ongoing, Progressing

## 2023-09-20 NOTE — SUBJECTIVE & OBJECTIVE
"Interval History: "They're cutting me loose, doc," the patient tells me. Tolerating abx - back on cefazolin.    Review of Systems   Constitutional:  Negative for chills and fever.   All other systems reviewed and are negative.    Objective:     Vital Signs (Most Recent):  Temp: 97.2 °F (36.2 °C) (09/20/23 1152)  Pulse: 79 (09/20/23 1152)  Resp: 16 (09/20/23 1152)  BP: 116/70 (09/20/23 1152)  SpO2: 98 % (09/20/23 1152) Vital Signs (24h Range):  Temp:  [97.2 °F (36.2 °C)-98.1 °F (36.7 °C)] 97.2 °F (36.2 °C)  Pulse:  [74-81] 79  Resp:  [16-20] 16  SpO2:  [95 %-98 %] 98 %  BP: (106-131)/(57-79) 116/70     Weight: 117 kg (257 lb 15 oz)  Body mass index is 33.12 kg/m².    Estimated Creatinine Clearance: 118.6 mL/min (based on SCr of 0.9 mg/dL).     Physical Exam  Vitals and nursing note reviewed.   Constitutional:       General: He is not in acute distress.     Appearance: Normal appearance. He is not ill-appearing, toxic-appearing or diaphoretic.   HENT:      Head: Normocephalic and atraumatic.      Right Ear: External ear normal.      Left Ear: External ear normal.   Neurological:      General: No focal deficit present.      Mental Status: He is alert and oriented to person, place, and time.   Psychiatric:         Mood and Affect: Mood normal.         Behavior: Behavior normal.          Significant Labs: Blood Culture:   Recent Labs   Lab 09/07/23  2343 09/08/23  1333 09/11/23  1104   LABBLOO Gram stain jose bottle: Gram positive cocci in clusters resembling Staph  Results called to and read back by:Starla Wilburn Lpn 09/09/2023 00:41  Gram stain aer bottle: Gram positive cocci in clusters resembling Staph  Positive results previously called 09/10/2023  06:15  STAPHYLOCOCCUS AUREUS  ID consult required at [facility] and [facility].  For susceptibility see order #[id number]  *  Gram stain jose bottle: Gram positive cocci in clusters resembling Staph  Results called to and read back by:Starla Wilburn " Lpn 09/09/2023 00:41  Gram stain aer bottle: Gram positive cocci in clusters resembling Staph  Positive results previously called 09/09/2023  13:34  STAPHYLOCOCCUS AUREUS  ID consult required at [facility] locations.  * Gram stain jose bottle: Gram positive cocci in clusters resembling Staph  Results called to and read back by:Kelly Elder Rn 09/09/2023  23:09  STAPHYLOCOCCUS AUREUS  ID consult required at [facility].  For susceptibility see order #[id number]  * No growth after 5 days.     BMP:   Recent Labs   Lab 09/20/23  0403   *      K 4.3      CO2 27   BUN 17   CREATININE 0.9   CALCIUM 9.5   MG 1.7     CBC:   Recent Labs   Lab 09/20/23  0403   WBC 8.00   HGB 9.0*   HCT 30.6*   *     Wound Culture:   Recent Labs   Lab 09/08/23  1754   LABAERO STAPHYLOCOCCUS AUREUS  Many  *  STREPTOCOCCUS DYSGALACTIAE  Many  Susceptibility testing not routinely performed  *       Significant Imaging: I have reviewed all pertinent imaging results/findings within the past 24 hours.

## 2023-09-20 NOTE — PLAN OF CARE
CM left a message this morning for Viktor , [phone],at [facility], and for Richard, [phone], at [facility].

## 2023-09-20 NOTE — ASSESSMENT & PLAN NOTE
59 yo male with DM, HTN with chronic osteomyelitis of left hallux who presents with elevated WBC and left hallux pain. Xray suggests changes in hallux, 2nd, and 4th toes - however, these may represent previous infection as well - changes to hallux and 4th toe were seen on MRI in June 2022.  Now, s/p amputation. OR culture growing MSSA and blood with MSSA. Bacteremia cleared 9/11.    Recommendation:  · Continue cefazolin x 6 weeks (EOC: 10/23/2023)  · LTAC referral pending

## 2023-09-20 NOTE — PLAN OF CARE
CM spoke with [facility], they are in network with pt's insurance.    CM will be called for time frame for teaching so as to coordinate with pt's Mike trevizo, [phone].    Home health in progress for accepting company.

## 2023-09-21 PROCEDURE — G0180 PR HOME HEALTH MD CERTIFICATION: ICD-10-PCS | Mod: ,,, | Performed by: HOSPITALIST

## 2023-09-21 PROCEDURE — G0180 MD CERTIFICATION HHA PATIENT: HCPCS | Mod: ,,, | Performed by: HOSPITALIST

## 2023-09-21 NOTE — DISCHARGE SUMMARY
"[facility] Medicine  Discharge Summary      Patient Name: Dave Good  MRN: [id number]  CRISTA: [id number]  Patient Class: IP- Inpatient  Admission Date: 9/7/2023  Hospital Length of Stay: 12 days  Discharge Date and Time: 9/20/2023  6:48 PM  Attending Physician: Hari Iverson MD   Discharging Provider: Hari Iverson MD  Primary Care Provider: Reyna Jorge NP      HPI:   Per Larry Shepherd NP:    "The patient is a 58 y.o. male with a past medical history of type 2 diabetes, HTN, HLD, and chronic systolic congestive heart failure who presents with worsening left great toe pain infection and swelling.  Patient was recently seen in the ER and discharged to follow up with ID and wound care.  Since then he has been unable to follow-up with podiatry or infectious disease.  He states that he has not tried to call their offices and was waiting for them to call him.  States that his toe pain has gotten worse.  He has been soaking it in a foot massage machine chair with no improvement.  XR positive for left great, 2nd, and 4th toe acute osteomyelitis.  He will be admitted for further management of his acute osteomyelitis and infectious disease and podiatry consult."      Procedure(s) (LRB):  AMPUTATION, TOE (Left)      Hospital Course:   Patient is 58-year-old man with history of hypertension, diabetes mellitus type 2, reported history of systolic heart failure (although last echocardiogram with normal systolic function), significant prior tobacco smoking history with chronic obstructive pulmonary disease admitted to the hospital with diabetic ulcer with abscess involving left foot including great toe and proximal foot.  Patient with marked leukocytosis and elevated heart rate consistent with systemic inflammatory response/sepsis secondary to foot infection.  Blood cultures obtained.  Patient started on broad-spectrum intravenous antibiotics.      Podiatry consulted and took the patient " for surgical debridement with removal of necrotic tissue including amputation of left 1st toe on 9/8/2023.  Blood cultures positive for methicillin sensitive Staphylococcus aureus.  Surgical culture also positive for methicillin sensitive Staphylococcus aureus. and Streptococcus Dysgalactiae.  Pathology showed evidence of osteomyelitis with evidence of infection involving surgical margin.    Empiric antibiotics switched to continuous infusion of intravenous cefazolin.  Transthoracic echocardiogram did not show evidence of endocarditis.  Patient clinically improved with intravenous cefazolin and wound care.  Repeat blood culture collected on 9/11/2023 negative.    Infectious Disease service consulted recommended patient undergo a transesophageal echocardiogram however patient declined transesophageal echocardiogram based on prior negative experience with that procedure.  Dr. Isidro Duran recommended 6 weeks of intravenous antibiotic treatment with cefazolin.    Post acute facility placement strongly recommended to ensure patient continues to receive daily wound care along with prolonged course of intravenous antibiotics to improve chances that he will healed his wound without further surgery and potential further amputation.    Referral for LTAC placement submitted and patient accepted by LTAC facilities.  However patient's insurance company (managed medication plan by [employer]) denied LTAC placement stating patient could receive needed care in a skilled nursing facility.  Referral sent to skilled nursing facility however patient refused to be placed in a nursing facility and decided to go home to complete his intravenous antibiotics in the home setting and to have ongoing wound care with home health and also assistance from his family.  Caregiver instructed on how to provide wound care.  Wound care supplies provided to the patient.  Patient advised on close outpatient follow-up with both Podiatry and  Infectious Disease was clinics.  Patient counseled to return to the hospital if his wound becomes worse or develops fevers or chills.    Close outpatient follow-up with his primary care physician also advised for ongoing management of his medical comorbidities particularly to ensure that he maintains well control of his diabetes.       Goals of Care Treatment Preferences:  Code Status: Full Code      Consults:   Consults (From admission, onward)        Status Ordering Provider     Inpatient consult to Cardiology  Once        Provider:  Chavo Vigil MD    Completed SANJIV KAY     Inpatient consult to Infectious Diseases  Once        Provider:  Gomez Manrique MD    Completed JAS CARPENTER     Inpatient consult to Podiatry  Once        Provider:  Samuel Toussaint DPM    Completed JAS CARPENTER     Inpatient consult to Registered Dietitian/Nutritionist  Once        Provider:  (Not yet assigned)    Completed SANJIV KAY     Inpatient consult to Registered Dietitian/Nutritionist  Once        Provider:  (Not yet assigned)    Completed SANJIV KAY     Inpatient consult to Social Work/Case Management  Once        Provider:  (Not yet assigned)    Completed JAS CARPENTER          Final Active Diagnoses:    Diagnosis Date Noted POA    PRINCIPAL PROBLEM:  Staphylococcus aureus bacteremia with sepsis [A41.01] 09/09/2023 Yes    Chronic combined systolic and diastolic congestive heart failure [I50.42] 01/07/2022 Yes    Hypertension [I10] 09/20/2021 Yes    Type 2 diabetes mellitus with diabetic peripheral angiopathy without gangrene, with long-term current use of insulin [E11.51, Z79.4] 09/20/2021 Not Applicable      Problems Resolved During this Admission:    Diagnosis Date Noted Date Resolved POA    Microcytic anemia [D50.9] 09/16/2023 09/19/2023 Yes    Mixed hyperlipidemia [E78.2] 09/20/2021 09/19/2023 Yes    Obesity due to excess calories with serious comorbidity [E66.09]  "09/20/2021 09/19/2023 Yes       Discharged Condition: Stable    Disposition: Home with Home Health    Follow Up:   Follow-up Information     Reyna Jogre NP Follow up in 1 week(s).    Specialty: Family Medicine  Contact information:  [address]  [address]  [facility] [address]  [phone]             [facility] Follow up.    Why: Education provided in hospital 9/20/23 with pt and Mike trevizo [phone]  Contact information:  [address]  [address]  [place] [address]  [phone]           [place] - Follow up.    Specialty: Home Health Services  Contact information:  [address]  [address]  [place] [address]  [phone]                       Patient Instructions:      Diet diabetic     Notify your health care provider if you experience any of the following:  temperature >100.4     Notify your health care provider if you experience any of the following:  severe uncontrolled pain     Notify your health care provider if you experience any of the following:  redness, tenderness, or signs of infection (pain, swelling, redness, odor or green/yellow discharge around incision site)     Activity as tolerated         Pending Diagnostic Studies:     None         Medications:  Reconciled Home Medications:      Medication List      START taking these medications    BD ULTRA-FINE MINI PEN NEEDLE 31 gauge x 3/16" Ndle  Generic drug: pen needle, diabetic  Use 1 each by Misc.(Non-Drug; Combo Route) route as needed (Insulin injections using insulin pens).  Replaces: EASY COMFORT PEN NEEDLES 31 gauge x 1/4" Ndle     dextrose 5 % (D5W) SolP 500 mL with ceFAZolin 1 gram SolR 6 g  Inject 6 g into the vein once daily.     gabapentin 300 MG capsule  Commonly known as: NEURONTIN  Take 1 capsule (300 mg total) by mouth 2 (two) times daily.  Replaces: gabapentin 600 MG tablet     senna-docusate 8.6-50 mg 8.6-50 mg per tablet  Commonly known as: PERICOLACE  Take 2 tablets by mouth " daily as needed for Constipation.        CHANGE how you take these medications    * insulin aspart U-100 100 unit/mL (3 mL) Inpn pen  Commonly known as: NovoLOG  Inject 0-10 Units into the skin before meals and at bedtime as needed (Per sliding scale).  What changed:   · how much to take  · how to take this  · when to take this  · reasons to take this  · additional instructions     * NovoLOG FlexPen U-100 Insulin 100 unit/mL (3 mL) Inpn pen  Generic drug: insulin aspart U-100  Inject 4 Units into the skin 3 (three) times daily. inject an additional 0-10 Units into the skin before meals and at bedtime as needed (Per sliding scale).  What changed: You were already taking a medication with the same name, and this prescription was added. Make sure you understand how and when to take each.     LEVEMIR FLEXPEN 100 unit/mL (3 mL) Inpn pen  Generic drug: insulin detemir U-100 (Levemir)  Inject 20 Units into the skin every evening.  What changed:   · how much to take  · how to take this  · when to take this     metFORMIN 500 MG tablet  Commonly known as: GLUCOPHAGE  Take 1 tablet (500 mg total) by mouth 2 (two) times daily.  What changed:   · medication strength  · how much to take         * This list has 2 medication(s) that are the same as other medications prescribed for you. Read the directions carefully, and ask your doctor or other care provider to review them with you.            CONTINUE taking these medications    aspirin 81 MG EC tablet  Commonly known as: ECOTRIN  Take 1 tablet (81 mg total) by mouth once daily.     atorvastatin 40 MG tablet  Commonly known as: LIPITOR  Take 1 tablet (40 mg total) by mouth once daily.     ELIQUIS 5 mg Tab  Generic drug: apixaban  Take 1 tablet (5 mg total) by mouth 2 (two) times daily.     lisinopriL 10 MG tablet  Take 1 tablet (10 mg total) by mouth once daily.     QUEtiapine 200 MG Tab  Commonly known as: SEROQUEL  Take 1 tablet (200 mg total) by mouth nightly.        STOP  "taking these medications    ammonium lactate 12 % Crea     bisacodyL 5 mg EC tablet  Commonly known as: DULCOLAX     CATHFlo ACtivase 2 mg injection  Generic drug: alteplase     ciclopirox 8 % Soln  Commonly known as: PENLAC     dextrose 50 % in water (D50W) Syrg     EASY COMFORT PEN NEEDLES 31 gauge x 1/4" Ndle  Generic drug: pen needle, diabetic  Replaced by: BD ULTRA-FINE MINI PEN NEEDLE 31 gauge x 3/16" Ndle     enoxaparin 60 mg/0.6 mL Syrg  Commonly known as: LOVENOX     FLOWFLEX COVID-19 AG HOME TEST MISC     gabapentin 600 MG tablet  Commonly known as: NEURONTIN  Replaced by: gabapentin 300 MG capsule     GLUCAGEN DIAGNOSTIC KIT 1 mg/mL Solr  Generic drug: glucagon     GLUTOSE-15 40 % gel  Generic drug: dextrose     HumaLOG U-100 Insulin 100 unit/mL injection  Generic drug: insulin lispro     hydroCHLOROthiazide 25 MG tablet  Commonly known as: HYDRODIURIL     HYDROcodone-acetaminophen 5-325 mg per tablet  Commonly known as: NORCO     ketoconazole 2 % cream  Commonly known as: NIZORAL     LANTUS SOLOSTAR U-100 INSULIN glargine 100 units/mL SubQ pen  Generic drug: insulin     ondansetron 4 mg/2 mL Soln     ONETOUCH ULTRA TEST Strp  Generic drug: blood sugar diagnostic     polyethylene glycol 17 gram/dose powder  Commonly known as: GLYCOLAX     STOOL SOFTENER 100 MG capsule  Generic drug: docusate sodium     TRULICITY 0.75 mg/0.5 mL pen injector  Generic drug: dulaglutide     urea 45 % Crea            Indwelling Lines/Drains at time of discharge:   Lines/Drains/Airways     None                 Time spent on the discharge of patient: 55 minutes         Hari Iverson MD  Department of Hospital Medicine  [facility]  "

## 2023-09-28 ENCOUNTER — LAB VISIT (OUTPATIENT)
Dept: LAB | Facility: HOSPITAL | Age: 58
End: 2023-09-28
Attending: INTERNAL MEDICINE
Payer: MEDICAID

## 2023-09-28 DIAGNOSIS — A41.01 METHICILLIN SUSCEPTIBLE STAPHYLOCOCCUS AUREUS SEPTICEMIA: Primary | ICD-10-CM

## 2023-09-28 LAB
ALBUMIN SERPL BCP-MCNC: 1.9 G/DL (ref 3.5–5.2)
ALP SERPL-CCNC: 120 U/L (ref 55–135)
ALT SERPL W/O P-5'-P-CCNC: 11 U/L (ref 10–44)
ANION GAP SERPL CALC-SCNC: 8 MMOL/L (ref 8–16)
AST SERPL-CCNC: 24 U/L (ref 10–40)
BASOPHILS # BLD AUTO: 0.05 K/UL (ref 0–0.2)
BASOPHILS NFR BLD: 0.5 % (ref 0–1.9)
BILIRUB SERPL-MCNC: 0.2 MG/DL (ref 0.1–1)
BUN SERPL-MCNC: 11 MG/DL (ref 6–20)
CALCIUM SERPL-MCNC: 8.5 MG/DL (ref 8.7–10.5)
CHLORIDE SERPL-SCNC: 105 MMOL/L (ref 95–110)
CO2 SERPL-SCNC: 24 MMOL/L (ref 23–29)
CREAT SERPL-MCNC: 0.9 MG/DL (ref 0.5–1.4)
CRP SERPL-MCNC: 37.8 MG/L (ref 0–8.2)
DIFFERENTIAL METHOD: ABNORMAL
EOSINOPHIL # BLD AUTO: 0.4 K/UL (ref 0–0.5)
EOSINOPHIL NFR BLD: 4.3 % (ref 0–8)
ERYTHROCYTE [DISTWIDTH] IN BLOOD BY AUTOMATED COUNT: 17.9 % (ref 11.5–14.5)
ERYTHROCYTE [SEDIMENTATION RATE] IN BLOOD BY WESTERGREN METHOD: 119 MM/HR (ref 0–10)
EST. GFR  (NO RACE VARIABLE): >60 ML/MIN/1.73 M^2
GLUCOSE SERPL-MCNC: 171 MG/DL (ref 70–110)
HCT VFR BLD AUTO: 31.8 % (ref 40–54)
HGB BLD-MCNC: 9.3 G/DL (ref 14–18)
IMM GRANULOCYTES # BLD AUTO: 0.03 K/UL (ref 0–0.04)
IMM GRANULOCYTES NFR BLD AUTO: 0.3 % (ref 0–0.5)
LYMPHOCYTES # BLD AUTO: 3.2 K/UL (ref 1–4.8)
LYMPHOCYTES NFR BLD: 33.3 % (ref 18–48)
MCH RBC QN AUTO: 24.3 PG (ref 27–31)
MCHC RBC AUTO-ENTMCNC: 29.2 G/DL (ref 32–36)
MCV RBC AUTO: 83 FL (ref 82–98)
MONOCYTES # BLD AUTO: 1 K/UL (ref 0.3–1)
MONOCYTES NFR BLD: 10.9 % (ref 4–15)
NEUTROPHILS # BLD AUTO: 4.8 K/UL (ref 1.8–7.7)
NEUTROPHILS NFR BLD: 50.7 % (ref 38–73)
NRBC BLD-RTO: 0 /100 WBC
PLATELET # BLD AUTO: 551 K/UL (ref 150–450)
PMV BLD AUTO: 9 FL (ref 9.2–12.9)
POTASSIUM SERPL-SCNC: 4.4 MMOL/L (ref 3.5–5.1)
PROT SERPL-MCNC: 9.2 G/DL (ref 6–8.4)
RBC # BLD AUTO: 3.82 M/UL (ref 4.6–6.2)
SODIUM SERPL-SCNC: 137 MMOL/L (ref 136–145)
WBC # BLD AUTO: 9.48 K/UL (ref 3.9–12.7)

## 2023-09-28 PROCEDURE — 86140 C-REACTIVE PROTEIN: CPT | Performed by: INTERNAL MEDICINE

## 2023-09-28 PROCEDURE — 85025 COMPLETE CBC W/AUTO DIFF WBC: CPT | Performed by: INTERNAL MEDICINE

## 2023-09-28 PROCEDURE — 85652 RBC SED RATE AUTOMATED: CPT | Performed by: INTERNAL MEDICINE

## 2023-09-28 PROCEDURE — 80053 COMPREHEN METABOLIC PANEL: CPT | Performed by: INTERNAL MEDICINE

## 2023-10-02 ENCOUNTER — LAB VISIT (OUTPATIENT)
Dept: LAB | Facility: HOSPITAL | Age: 58
End: 2023-10-02
Attending: INTERNAL MEDICINE
Payer: MEDICAID

## 2023-10-02 DIAGNOSIS — E11.69 DIABETES MELLITUS ASSOCIATED WITH HORMONAL ETIOLOGY: ICD-10-CM

## 2023-10-02 DIAGNOSIS — E11.51 TYPE II DIABETES MELLITUS WITH PERIPHERAL CIRCULATORY DISORDER: ICD-10-CM

## 2023-10-02 DIAGNOSIS — A41.01 METHICILLIN SUSCEPTIBLE STAPHYLOCOCCUS AUREUS SEPTICEMIA: Primary | ICD-10-CM

## 2023-10-02 DIAGNOSIS — M86.9 INFLAMMATION OF BONE: ICD-10-CM

## 2023-10-02 LAB
ALBUMIN SERPL BCP-MCNC: 1.9 G/DL (ref 3.5–5.2)
ALP SERPL-CCNC: 111 U/L (ref 55–135)
ALT SERPL W/O P-5'-P-CCNC: 11 U/L (ref 10–44)
ANION GAP SERPL CALC-SCNC: 8 MMOL/L (ref 8–16)
AST SERPL-CCNC: 19 U/L (ref 10–40)
BASOPHILS # BLD AUTO: 0.04 K/UL (ref 0–0.2)
BASOPHILS NFR BLD: 0.5 % (ref 0–1.9)
BILIRUB SERPL-MCNC: 0.2 MG/DL (ref 0.1–1)
BUN SERPL-MCNC: 8 MG/DL (ref 6–20)
CALCIUM SERPL-MCNC: 8.2 MG/DL (ref 8.7–10.5)
CHLORIDE SERPL-SCNC: 103 MMOL/L (ref 95–110)
CO2 SERPL-SCNC: 25 MMOL/L (ref 23–29)
CREAT SERPL-MCNC: 0.8 MG/DL (ref 0.5–1.4)
CRP SERPL-MCNC: 61 MG/L (ref 0–8.2)
DIFFERENTIAL METHOD: ABNORMAL
EOSINOPHIL # BLD AUTO: 0.5 K/UL (ref 0–0.5)
EOSINOPHIL NFR BLD: 6.4 % (ref 0–8)
ERYTHROCYTE [DISTWIDTH] IN BLOOD BY AUTOMATED COUNT: 18.6 % (ref 11.5–14.5)
ERYTHROCYTE [SEDIMENTATION RATE] IN BLOOD BY WESTERGREN METHOD: 113 MM/HR (ref 0–10)
EST. GFR  (NO RACE VARIABLE): >60 ML/MIN/1.73 M^2
GLUCOSE SERPL-MCNC: 178 MG/DL (ref 70–110)
HCT VFR BLD AUTO: 29.7 % (ref 40–54)
HGB BLD-MCNC: 8.9 G/DL (ref 14–18)
IMM GRANULOCYTES # BLD AUTO: 0.03 K/UL (ref 0–0.04)
IMM GRANULOCYTES NFR BLD AUTO: 0.4 % (ref 0–0.5)
LYMPHOCYTES # BLD AUTO: 2.5 K/UL (ref 1–4.8)
LYMPHOCYTES NFR BLD: 31.5 % (ref 18–48)
MCH RBC QN AUTO: 24.3 PG (ref 27–31)
MCHC RBC AUTO-ENTMCNC: 30 G/DL (ref 32–36)
MCV RBC AUTO: 81 FL (ref 82–98)
MONOCYTES # BLD AUTO: 1.1 K/UL (ref 0.3–1)
MONOCYTES NFR BLD: 13.7 % (ref 4–15)
NEUTROPHILS # BLD AUTO: 3.8 K/UL (ref 1.8–7.7)
NEUTROPHILS NFR BLD: 47.5 % (ref 38–73)
NRBC BLD-RTO: 0 /100 WBC
PLATELET # BLD AUTO: 492 K/UL (ref 150–450)
PMV BLD AUTO: 9.1 FL (ref 9.2–12.9)
POTASSIUM SERPL-SCNC: 3.7 MMOL/L (ref 3.5–5.1)
PROT SERPL-MCNC: 8.6 G/DL (ref 6–8.4)
RBC # BLD AUTO: 3.66 M/UL (ref 4.6–6.2)
SODIUM SERPL-SCNC: 136 MMOL/L (ref 136–145)
WBC # BLD AUTO: 8.02 K/UL (ref 3.9–12.7)

## 2023-10-02 PROCEDURE — 86140 C-REACTIVE PROTEIN: CPT | Performed by: INTERNAL MEDICINE

## 2023-10-02 PROCEDURE — 85652 RBC SED RATE AUTOMATED: CPT | Performed by: INTERNAL MEDICINE

## 2023-10-02 PROCEDURE — 85025 COMPLETE CBC W/AUTO DIFF WBC: CPT | Performed by: INTERNAL MEDICINE

## 2023-10-02 PROCEDURE — 80053 COMPREHEN METABOLIC PANEL: CPT | Performed by: INTERNAL MEDICINE

## 2023-10-03 ENCOUNTER — DOCUMENT SCAN (OUTPATIENT)
Dept: HOME HEALTH SERVICES | Facility: HOSPITAL | Age: 58
End: 2023-10-03
Payer: MEDICAID

## 2023-10-09 ENCOUNTER — LAB VISIT (OUTPATIENT)
Dept: LAB | Facility: HOSPITAL | Age: 58
End: 2023-10-09
Attending: INTERNAL MEDICINE
Payer: MEDICAID

## 2023-10-09 DIAGNOSIS — I11.0 HYPERTENSIVE HEART DISEASE WITH CONGESTIVE HEART FAILURE: ICD-10-CM

## 2023-10-09 DIAGNOSIS — L03.116 CELLULITIS OF LEFT FOOT: ICD-10-CM

## 2023-10-09 DIAGNOSIS — E11.69 DIABETES MELLITUS ASSOCIATED WITH HORMONAL ETIOLOGY: ICD-10-CM

## 2023-10-09 DIAGNOSIS — A41.01 METHICILLIN SUSCEPTIBLE STAPHYLOCOCCUS AUREUS SEPTICEMIA: Primary | ICD-10-CM

## 2023-10-09 LAB
ALBUMIN SERPL BCP-MCNC: 2.4 G/DL (ref 3.5–5.2)
ALP SERPL-CCNC: 131 U/L (ref 55–135)
ALT SERPL W/O P-5'-P-CCNC: 7 U/L (ref 10–44)
ANION GAP SERPL CALC-SCNC: 10 MMOL/L (ref 8–16)
AST SERPL-CCNC: 14 U/L (ref 10–40)
BASOPHILS # BLD AUTO: 0.04 K/UL (ref 0–0.2)
BASOPHILS NFR BLD: 0.5 % (ref 0–1.9)
BILIRUB SERPL-MCNC: 0.4 MG/DL (ref 0.1–1)
BUN SERPL-MCNC: 6 MG/DL (ref 6–20)
CALCIUM SERPL-MCNC: 8.8 MG/DL (ref 8.7–10.5)
CHLORIDE SERPL-SCNC: 102 MMOL/L (ref 95–110)
CO2 SERPL-SCNC: 26 MMOL/L (ref 23–29)
CREAT SERPL-MCNC: 0.9 MG/DL (ref 0.5–1.4)
CRP SERPL-MCNC: 23.5 MG/L (ref 0–8.2)
DIFFERENTIAL METHOD: ABNORMAL
EOSINOPHIL # BLD AUTO: 0.4 K/UL (ref 0–0.5)
EOSINOPHIL NFR BLD: 5.5 % (ref 0–8)
ERYTHROCYTE [DISTWIDTH] IN BLOOD BY AUTOMATED COUNT: 19.9 % (ref 11.5–14.5)
ERYTHROCYTE [SEDIMENTATION RATE] IN BLOOD BY WESTERGREN METHOD: 90 MM/HR (ref 0–10)
EST. GFR  (NO RACE VARIABLE): >60 ML/MIN/1.73 M^2
GLUCOSE SERPL-MCNC: 151 MG/DL (ref 70–110)
HCT VFR BLD AUTO: 33.5 % (ref 40–54)
HGB BLD-MCNC: 9.9 G/DL (ref 14–18)
IMM GRANULOCYTES # BLD AUTO: 0.02 K/UL (ref 0–0.04)
IMM GRANULOCYTES NFR BLD AUTO: 0.3 % (ref 0–0.5)
LYMPHOCYTES # BLD AUTO: 3 K/UL (ref 1–4.8)
LYMPHOCYTES NFR BLD: 39.4 % (ref 18–48)
MCH RBC QN AUTO: 24.3 PG (ref 27–31)
MCHC RBC AUTO-ENTMCNC: 29.6 G/DL (ref 32–36)
MCV RBC AUTO: 82 FL (ref 82–98)
MONOCYTES # BLD AUTO: 0.9 K/UL (ref 0.3–1)
MONOCYTES NFR BLD: 12.3 % (ref 4–15)
NEUTROPHILS # BLD AUTO: 3.2 K/UL (ref 1.8–7.7)
NEUTROPHILS NFR BLD: 42 % (ref 38–73)
NRBC BLD-RTO: 0 /100 WBC
PLATELET # BLD AUTO: 480 K/UL (ref 150–450)
PMV BLD AUTO: 9.2 FL (ref 9.2–12.9)
POTASSIUM SERPL-SCNC: 4.1 MMOL/L (ref 3.5–5.1)
PROT SERPL-MCNC: 9.2 G/DL (ref 6–8.4)
RBC # BLD AUTO: 4.07 M/UL (ref 4.6–6.2)
SODIUM SERPL-SCNC: 138 MMOL/L (ref 136–145)
WBC # BLD AUTO: 7.58 K/UL (ref 3.9–12.7)

## 2023-10-09 PROCEDURE — 80053 COMPREHEN METABOLIC PANEL: CPT | Performed by: INTERNAL MEDICINE

## 2023-10-09 PROCEDURE — 86140 C-REACTIVE PROTEIN: CPT | Performed by: INTERNAL MEDICINE

## 2023-10-09 PROCEDURE — 85025 COMPLETE CBC W/AUTO DIFF WBC: CPT | Performed by: INTERNAL MEDICINE

## 2023-10-09 PROCEDURE — 85652 RBC SED RATE AUTOMATED: CPT | Performed by: INTERNAL MEDICINE

## 2023-10-10 ENCOUNTER — TELEPHONE (OUTPATIENT)
Dept: INFECTIOUS DISEASES | Facility: CLINIC | Age: 58
End: 2023-10-10
Payer: MEDICAID

## 2023-10-10 NOTE — TELEPHONE ENCOUNTER
Raul, Janusz Avendaño, Negra LOVELACE LPN; Gomez Manrique MD; Isidro Duran MD  Good morning,     Mr Good has had some difficulties with home cefazolin infusion. He is currently prescribed 6g as continuous infusion over 24hrs. This was initially provided to him as IV bags to run via CADD infusion pump since that is what his insurance prefers. Field nurse reported good teachback and verbalization of skills to change the bag q24h on the CADD however when it was due time for refill he was reporting excess bags on hand. We then changed him to the elastomeric ball to simplify the regimen for him. This is a 6gm/240mL ball running @ 10mL\hr over 24h. Since 9/27 we have supplied him with 15 balls which should take him through roughly tomorrow(nasrin) however he is reporting having a 9 day supply left in the home. He assures me the balls are deflating appropriately. He assures me he is changing the ball q24h ~10am however this is a mathematical impossibility. His lab inflammatory markers continue to decrease (albeit slowly). We will continue to keep vigilant eye on him but wanted the ID team to be aware of his case prior to his follow up on 10/25.   I have enlisted the assistance of my field nurse to touch base with him and re-review education with him and his family.       Janusz Carter, PharmD, BCPS, BCSCP    of Pharmacy     [facility]   Phone: [phone]  Fax: [phone]   [facility] [place] [address]   Email: [email or web address]

## 2023-10-11 ENCOUNTER — EXTERNAL HOME HEALTH (OUTPATIENT)
Dept: HOME HEALTH SERVICES | Facility: HOSPITAL | Age: 58
End: 2023-10-11
Payer: MEDICAID

## 2023-10-11 LAB — FUNGUS SPEC CULT: NORMAL

## 2023-10-13 ENCOUNTER — DOCUMENT SCAN (OUTPATIENT)
Dept: HOME HEALTH SERVICES | Facility: HOSPITAL | Age: 58
End: 2023-10-13
Payer: MEDICAID

## 2023-10-16 ENCOUNTER — DOCUMENT SCAN (OUTPATIENT)
Dept: HOME HEALTH SERVICES | Facility: HOSPITAL | Age: 58
End: 2023-10-16
Payer: MEDICAID

## 2023-10-16 ENCOUNTER — LAB VISIT (OUTPATIENT)
Dept: LAB | Facility: HOSPITAL | Age: 58
End: 2023-10-16
Attending: INTERNAL MEDICINE
Payer: MEDICAID

## 2023-10-16 DIAGNOSIS — A41.01 METHICILLIN SUSCEPTIBLE STAPHYLOCOCCUS AUREUS SEPTICEMIA: Primary | ICD-10-CM

## 2023-10-16 DIAGNOSIS — J44.9 VANISHING LUNG: ICD-10-CM

## 2023-10-16 DIAGNOSIS — I10 ESSENTIAL HYPERTENSION, MALIGNANT: ICD-10-CM

## 2023-10-16 DIAGNOSIS — E11.69 DIABETES MELLITUS ASSOCIATED WITH HORMONAL ETIOLOGY: ICD-10-CM

## 2023-10-16 LAB
ALBUMIN SERPL BCP-MCNC: 2.5 G/DL (ref 3.5–5.2)
ALP SERPL-CCNC: 109 U/L (ref 55–135)
ALT SERPL W/O P-5'-P-CCNC: 8 U/L (ref 10–44)
ANION GAP SERPL CALC-SCNC: 8 MMOL/L (ref 8–16)
AST SERPL-CCNC: 14 U/L (ref 10–40)
BASOPHILS # BLD AUTO: 0.05 K/UL (ref 0–0.2)
BASOPHILS NFR BLD: 0.6 % (ref 0–1.9)
BILIRUB SERPL-MCNC: 0.5 MG/DL (ref 0.1–1)
BUN SERPL-MCNC: 11 MG/DL (ref 6–20)
CALCIUM SERPL-MCNC: 8.7 MG/DL (ref 8.7–10.5)
CHLORIDE SERPL-SCNC: 102 MMOL/L (ref 95–110)
CO2 SERPL-SCNC: 25 MMOL/L (ref 23–29)
CREAT SERPL-MCNC: 0.9 MG/DL (ref 0.5–1.4)
CRP SERPL-MCNC: 64.6 MG/L (ref 0–8.2)
DIFFERENTIAL METHOD: ABNORMAL
EOSINOPHIL # BLD AUTO: 0.4 K/UL (ref 0–0.5)
EOSINOPHIL NFR BLD: 4.9 % (ref 0–8)
ERYTHROCYTE [DISTWIDTH] IN BLOOD BY AUTOMATED COUNT: 20.7 % (ref 11.5–14.5)
ERYTHROCYTE [SEDIMENTATION RATE] IN BLOOD BY WESTERGREN METHOD: 116 MM/HR (ref 0–10)
EST. GFR  (NO RACE VARIABLE): >60 ML/MIN/1.73 M^2
GLUCOSE SERPL-MCNC: 166 MG/DL (ref 70–110)
HCT VFR BLD AUTO: 31.2 % (ref 40–54)
HGB BLD-MCNC: 9.4 G/DL (ref 14–18)
IMM GRANULOCYTES # BLD AUTO: 0.03 K/UL (ref 0–0.04)
IMM GRANULOCYTES NFR BLD AUTO: 0.4 % (ref 0–0.5)
LYMPHOCYTES # BLD AUTO: 2.5 K/UL (ref 1–4.8)
LYMPHOCYTES NFR BLD: 31.2 % (ref 18–48)
MCH RBC QN AUTO: 25.1 PG (ref 27–31)
MCHC RBC AUTO-ENTMCNC: 30.1 G/DL (ref 32–36)
MCV RBC AUTO: 83 FL (ref 82–98)
MONOCYTES # BLD AUTO: 1 K/UL (ref 0.3–1)
MONOCYTES NFR BLD: 12.8 % (ref 4–15)
NEUTROPHILS # BLD AUTO: 4.1 K/UL (ref 1.8–7.7)
NEUTROPHILS NFR BLD: 50.1 % (ref 38–73)
NRBC BLD-RTO: 0 /100 WBC
PLATELET # BLD AUTO: 443 K/UL (ref 150–450)
PMV BLD AUTO: 9.3 FL (ref 9.2–12.9)
POTASSIUM SERPL-SCNC: 4.3 MMOL/L (ref 3.5–5.1)
PROT SERPL-MCNC: 8.6 G/DL (ref 6–8.4)
RBC # BLD AUTO: 3.75 M/UL (ref 4.6–6.2)
SODIUM SERPL-SCNC: 135 MMOL/L (ref 136–145)
WBC # BLD AUTO: 8.14 K/UL (ref 3.9–12.7)

## 2023-10-16 PROCEDURE — 80053 COMPREHEN METABOLIC PANEL: CPT | Performed by: INTERNAL MEDICINE

## 2023-10-16 PROCEDURE — 85025 COMPLETE CBC W/AUTO DIFF WBC: CPT | Performed by: INTERNAL MEDICINE

## 2023-10-16 PROCEDURE — 85652 RBC SED RATE AUTOMATED: CPT | Performed by: INTERNAL MEDICINE

## 2023-10-16 PROCEDURE — 86140 C-REACTIVE PROTEIN: CPT | Performed by: INTERNAL MEDICINE

## 2023-10-23 ENCOUNTER — TELEPHONE (OUTPATIENT)
Dept: INFECTIOUS DISEASES | Facility: CLINIC | Age: 58
End: 2023-10-23
Payer: MEDICAID

## 2023-10-23 ENCOUNTER — HOSPITAL ENCOUNTER (EMERGENCY)
Facility: OTHER | Age: 58
Discharge: HOME | End: 2023-10-23
Attending: EMERGENCY MEDICINE
Payer: MEDICAID

## 2023-10-23 VITALS
BODY MASS INDEX: 32.08 KG/M2 | HEART RATE: 80 BPM | OXYGEN SATURATION: 99 % | SYSTOLIC BLOOD PRESSURE: 153 MMHG | RESPIRATION RATE: 18 BRPM | DIASTOLIC BLOOD PRESSURE: 82 MMHG | HEIGHT: 74 IN | TEMPERATURE: 98 F | WEIGHT: 250 LBS

## 2023-10-23 DIAGNOSIS — T82.898A PICC LINE INFILTRATION, INITIAL ENCOUNTER: Primary | ICD-10-CM

## 2023-10-23 PROCEDURE — 25000003 PHARM REV CODE 250: Performed by: EMERGENCY MEDICINE

## 2023-10-23 PROCEDURE — 99283 EMERGENCY DEPT VISIT LOW MDM: CPT

## 2023-10-23 RX ORDER — DOXYLAMINE SUCCINATE 25 MG
TABLET ORAL
Status: COMPLETED | OUTPATIENT
Start: 2023-10-23 | End: 2023-10-23

## 2023-10-23 RX ORDER — CEPHALEXIN 500 MG/1
500 CAPSULE ORAL 4 TIMES DAILY
Qty: 28 CAPSULE | Refills: 0 | Status: SHIPPED | OUTPATIENT
Start: 2023-10-23 | End: 2023-10-25

## 2023-10-23 RX ORDER — CEPHALEXIN 500 MG/1
500 CAPSULE ORAL
Status: COMPLETED | OUTPATIENT
Start: 2023-10-23 | End: 2023-10-23

## 2023-10-23 RX ADMIN — CEPHALEXIN 500 MG: 500 CAPSULE ORAL at 06:10

## 2023-10-23 RX ADMIN — MICONAZOLE NITRATE: 20 CREAM TOPICAL at 06:10

## 2023-10-23 NOTE — TELEPHONE ENCOUNTER
----- Message from Xenia Stuart MA sent at 10/23/2023 12:18 PM CDT -----  Regarding: FW: Pt advice  Contact: Marilyn hickey [facility] [phone]  [facility] PT  Called Marilyn back, no answer  Left a voice message  ----- Message -----  From: Celso Godwin  Sent: 10/23/2023  11:10 AM CDT  To: Renee [facility] Staff  Subject: Pt advice                                        Marilyn hickey [facility] is calling to speak with the provider nurse would like to discuss the pt Midline was saturated this morning and unable to force line in or barely to get dressing to stay on. Please call

## 2023-10-23 NOTE — TELEPHONE ENCOUNTER
spoke with Mr Good's  nurse and she said that his midline dressing was saturated this morning and when she undressed it he had a blister that was intact and his arm is swollen.  She flushed line and liquid pored out so unsure how long it has been this way last Monday it was dry.  She said it was definitely longer then just today.  his EOC I have is today his appt for follow up is 10/25 with Dr martinez so they have his antibiotics to continue through 10/26?  Should he do to ED?  His antibiotics run over 24hrs.?  please advise    Dr Duran advised that patient go to the ED at [facility].     I contacted Mr Good and he discussed it with his Niece and she will drop him off at the ED at [facility] in a few minutes per patient.

## 2023-10-23 NOTE — ED PROVIDER NOTES
Encounter Date: 10/23/2023    SCRIBE #1 NOTE: I, Agustin Sapp, am scribing for, and in the presence of,  Ellis Nielson MD.       History     Chief Complaint   Patient presents with    Vascular Access Problem     Pt states that he has PICC line for home antibiotics, pt states site started leaking today with swelling noted.      Time seen by provider: 5:28 PM    Dave Good is a 58 y.o. male, with a PMHx of HTN, DM, CHF, and recent admission for toe osteomyelitis discharged on longterm antibiotics, who presents to the ED with leakage from his PICC line starting yesterday. Patient reports first noticing wet bandages yesterday with increased arm swelling near his line. He denies any chest pains or SOB and reports no displacement or pulling of his line. Patient states he is supposed to finish his antibiotics this week and notes follow up with infectious disease on 10/25.  He reports weekly at-home visits with wound care with last bandage change today and notes no changes in baseline foot pain following surgery. This is the extent of the patient's complaints today in the Emergency Department.      The history is provided by the patient.     Review of patient's allergies indicates:   Allergen Reactions    Ibuprofen Swelling     Facial swelling     Past Medical History:   Diagnosis Date    Arthritis     COPD (chronic obstructive pulmonary disease)     COVID-19     Depression     Diabetes mellitus     Diabetes mellitus, type 2     Hypertension      Past Surgical History:   Procedure Laterality Date    DEBRIDEMENT OF LOWER EXTREMITY Bilateral 3/2/2022    Procedure: DEBRIDEMENT, LOWER EXTREMITY;  Surgeon: Jesus Flores Jr., MD;  Location: [facility], [place], operated by [facility] OR;  Service: General;  Laterality: Bilateral;    FOOT AMPUTATION THROUGH METATARSAL Right 9/23/2021    Procedure: AMPUTATION, FOOT, TRANSMETATARSAL right 1st ray resection;  Surgeon: Samuel Toussaint DPM;  Location: [facility], [place], operated by [facility] OR;  Service: Podiatry;  Laterality: Right;    INCISION  AND DRAINAGE FOOT Bilateral 9/21/2021    Procedure: INCISION AND DRAINAGE, FOOT - Bilateral;  Surgeon: Lavonne Vigil DPM;  Location: [facility] OR;  Service: Podiatry;  Laterality: Bilateral;    REPLACEMENT OF WOUND DRESSING Right 2/24/2022    Procedure: REPLACEMENT, DRESSING, WOUND;  Surgeon: Jesus Flores Jr., MD;  Location: [facility];  Service: Vascular;  Laterality: Right;  wound vac dressing changed    TOE AMPUTATION Left 9/8/2023    Procedure: AMPUTATION, TOE;  Surgeon: Samuel Toussaint DPM;  Location: [facility] OR;  Service: Podiatry;  Laterality: Left;    WOUND DEBRIDEMENT Right 2/22/2022    Procedure: DEBRIDEMENT, WOUND - RIGHT FOOT;  Surgeon: Jesus Flores Jr., MD;  Location: [facility] OR;  Service: Vascular;  Laterality: Right;    WOUND DEBRIDEMENT Bilateral 2/24/2022    Procedure: DEBRIDEMENT, WOUND / BILATERAL DEBRIDEMENT FEET;  Surgeon: Jesus Flores Jr., MD;  Location: [facility];  Service: Vascular;  Laterality: Bilateral;    WOUND DEBRIDEMENT Right 5/27/2022    Procedure: DEBRIDEMENT, WOUND;  Surgeon: Jesus Flores Jr., MD;  Location: [facility];  Service: General;  Laterality: Right;     No family history on file.  Social History     Tobacco Use    Smoking status: Former    Smokeless tobacco: Never   Substance Use Topics    Alcohol use: Not Currently     Comment: pt reports he drinks 1/2 a pint of whiskey every day    Drug use: Yes     Types: Marijuana     Review of Systems   Constitutional:  Negative for fever.   HENT:  Negative for congestion.    Eyes:  Negative for redness.   Respiratory:  Negative for shortness of breath.    Cardiovascular:  Negative for chest pain.   Gastrointestinal:  Negative for abdominal pain.   Genitourinary:  Negative for dysuria.   Musculoskeletal:         Arm swelling.   Skin:  Negative for rash.   Neurological:  Negative for headaches.   Psychiatric/Behavioral:  Negative for confusion.        Physical Exam     Initial Vitals [10/23/23 1517]   BP Pulse Resp Temp SpO2   (!) 153/82 80 18 97.9  °F (36.6 °C) 99 %      MAP       --         Physical Exam    Constitutional: He is not diaphoretic. No distress.   HENT:   Head: Normocephalic and atraumatic.   Eyes: Conjunctivae are normal.   Neck: Neck supple.   Cardiovascular:  Normal rate, regular rhythm, normal heart sounds and intact distal pulses.           No murmur heard.  Pulmonary/Chest: Breath sounds normal. No respiratory distress. He has no wheezes. He has no rhonchi. He has no rales.   Musculoskeletal:      Cervical back: Neck supple.     Neurological: He is alert and oriented to person, place, and time.   Skin: Skin is warm and dry.   Right upper arm PICC line in place with area of edema and induration proximal to it. Left foot dressed with no surrounding erythema. Trace left pedal edema.          ED Course   Procedures  Labs Reviewed - No data to display       Imaging Results    None          Medications   miconazole 2 % cream ( Topical (Top) Given 10/23/23 1821)   cephALEXin capsule 500 mg (500 mg Oral Given 10/23/23 1806)     Medical Decision Making      58-year-old male with history of HTN, DM, CHF, recent admission last month for left 1st toe osteomyelitis s/p amputation, started on 6 weeks of IV Ancef after cultures positive for MSSA, sent by home nurse due to concern about his PICC line. Patient has been compliant with his home infusions, states that yesterday he noticed his PICC line dressing was wet, and he noticed some swelling around the area in his right upper arm today with mild associated pain.  He denies any trauma or any pulling involving IV tubing or PICC line. He has been getting wound care for his left foot s/p toe amputation and it was dressed today, no associated fevers or new signs of infection, though he does have some persistent left foot pain since the amputation.  He denies any chest pain, SOB, or other new complaints.  On exam patient does have right upper extremity PICC line over medial right upper arm with small area of  soft tissue edema and induration distally, though no associated bleeding or obvious PICC line displacement.  His left foot is dressed by home nurse earlier today, he prefers not to unwrap it this time for exam, but denies any signs of infection.  No distal edema of right arm to suggest DVT, no sign of cellulitis.      PICC line most likely infiltrated, will need to be removed.  Per review of patient's discharged on last month, his date of completion of long-term IV Ancef is 10/23, which is today.  He states he has only 2 bags left of IV Ancef left at home today, would prefer to transition to pills as directed instead of placing a new PICC line.  I discussed this with Dr. Duran, ID on-call who did see patient when he was admitted here, who feels this is reasonable, will start Keflex p.o. now until he can follow up with ID urgently; he has appt in 2 days.  No indication for repeat labs or further emergent workup at this time, patient understands to continue wound care and return to the ED for any other concerns.  PICC line removed in the ED without complication, patient had small area of possible fungal rash underneath so was started on topical antifungal.  He also had compression dressing placed to area of PICC line infiltration and advised on elevation and supportive care.         Risk  OTC drugs.  Prescription drug management.            Scribe Attestation:   Scribe #1: I performed the above scribed service and the documentation accurately describes the services I performed. I attest to the accuracy of the note.    I, Dr. Ellis Nielson, personally performed the services described in this documentation. All medical record entries made by the scribe were at my direction and in my presence.  I have reviewed the chart and agree that the record reflects my personal performance and is accurate and complete. Ellis Nielson MD.  2:30 AM 10/24/2023                        Clinical Impression:   Final  diagnoses:  [T82.898A] PICC line infiltration, initial encounter (Primary)        ED Disposition Condition    Discharge Stable          ED Prescriptions       Medication Sig Dispense Start Date End Date Auth. Provider    cephALEXin (KEFLEX) 500 MG capsule Take 1 capsule (500 mg total) by mouth 4 (four) times daily. for 7 days 28 capsule 10/23/2023 10/30/2023 Ellis Nielson MD          Follow-up Information       Follow up With Specialties Details Why Contact Info    Infectious Disease  Go in 2 days               Ellis Nielson MD  10/24/23 5712

## 2023-10-23 NOTE — DISCHARGE INSTRUCTIONS
Please place compression dressing over R upper arm where PICC line was and elevate as much as possible

## 2023-10-23 NOTE — NURSING
"Called by ED to assess pts intravascular access.  The pt has a 4F SL PowerMidline to the R UE. Dressing and biopatch damp.  Dressing is occlusive. The catheter dressing was removed. There is no blood return present. When flushed immediate leaking is visualized at insertion site. There is an area of induration proximal to the insertion site.  It measures 11 cm x 8 cm. The area is reddened and the pt endorses "a little bit of pain." There is a small fluid filled blister medial to the insertion site. The current arm circumference is 43 cm which is an 8 cm increase from his baseline measurement of 35 cm.      Catheter gently withdrawn, pulling it parallel to the arm with a constant, steady motion.  Firm, even, direct pressure placed on the exit site with a dry gauze as the catheter exited the site. Direct pressure was applied until hemostasis was achieved.  Catheter intact and length is 12 centimeters which is equivalent to the original insertion length.       Pt instructed to leave dressing on for 24 hours. Educated him to report any concerns for infection to ID physician whom he states he has appointment with in two days.  "

## 2023-10-23 NOTE — TELEPHONE ENCOUNTER
----- Message from Adela Carolineangelic Cartagena sent at 10/23/2023 12:39 PM CDT -----  Regarding: returning missed c/b  Contact: pt @[phone]  Pt is returning a missed call from someone in the office and is asking for a return call back soon. Thanks.         Reason for call: missed c/b         Patient's DX:n/a         Patient requesting call back or [facility] [phone]

## 2023-10-25 ENCOUNTER — OFFICE VISIT (OUTPATIENT)
Dept: INFECTIOUS DISEASES | Facility: CLINIC | Age: 58
End: 2023-10-25
Payer: MEDICAID

## 2023-10-25 VITALS
SYSTOLIC BLOOD PRESSURE: 156 MMHG | HEART RATE: 89 BPM | DIASTOLIC BLOOD PRESSURE: 89 MMHG | HEIGHT: 74 IN | TEMPERATURE: 99 F | WEIGHT: 260.81 LBS | BODY MASS INDEX: 33.47 KG/M2

## 2023-10-25 DIAGNOSIS — A41.01 STAPHYLOCOCCUS AUREUS BACTEREMIA WITH SEPSIS: Primary | ICD-10-CM

## 2023-10-25 DIAGNOSIS — S98.111A AMPUTATION OF RIGHT GREAT TOE: ICD-10-CM

## 2023-10-25 DIAGNOSIS — Z79.4 TYPE 2 DIABETES MELLITUS WITH DIABETIC PERIPHERAL ANGIOPATHY WITHOUT GANGRENE, WITH LONG-TERM CURRENT USE OF INSULIN: ICD-10-CM

## 2023-10-25 DIAGNOSIS — Z89.419 STATUS POST AMPUTATION OF GREAT TOE, UNSPECIFIED LATERALITY: ICD-10-CM

## 2023-10-25 DIAGNOSIS — E11.51 TYPE 2 DIABETES MELLITUS WITH DIABETIC PERIPHERAL ANGIOPATHY WITHOUT GANGRENE, WITH LONG-TERM CURRENT USE OF INSULIN: ICD-10-CM

## 2023-10-25 DIAGNOSIS — M86.071 ACUTE HEMATOGENOUS OSTEOMYELITIS OF RIGHT FOOT: ICD-10-CM

## 2023-10-25 DIAGNOSIS — A49.02 MRSA (METHICILLIN RESISTANT STAPHYLOCOCCUS AUREUS) INFECTION: ICD-10-CM

## 2023-10-25 DIAGNOSIS — S91.301A OPEN WOUND OF RIGHT FOOT, INITIAL ENCOUNTER: ICD-10-CM

## 2023-10-25 PROCEDURE — 3079F PR MOST RECENT DIASTOLIC BLOOD PRESSURE 80-89 MM HG: ICD-10-PCS | Mod: CPTII,,, | Performed by: INTERNAL MEDICINE

## 2023-10-25 PROCEDURE — 99213 OFFICE O/P EST LOW 20 MIN: CPT | Mod: PBBFAC | Performed by: INTERNAL MEDICINE

## 2023-10-25 PROCEDURE — 3079F DIAST BP 80-89 MM HG: CPT | Mod: CPTII,,, | Performed by: INTERNAL MEDICINE

## 2023-10-25 PROCEDURE — 99213 PR OFFICE/OUTPT VISIT, EST, LEVL III, 20-29 MIN: ICD-10-PCS | Mod: S$PBB,,, | Performed by: INTERNAL MEDICINE

## 2023-10-25 PROCEDURE — 4010F PR ACE/ARB THEARPY RXD/TAKEN: ICD-10-PCS | Mod: CPTII,,, | Performed by: INTERNAL MEDICINE

## 2023-10-25 PROCEDURE — 3008F PR BODY MASS INDEX (BMI) DOCUMENTED: ICD-10-PCS | Mod: CPTII,,, | Performed by: INTERNAL MEDICINE

## 2023-10-25 PROCEDURE — 3051F PR MOST RECENT HEMOGLOBIN A1C LEVEL 7.0 - < 8.0%: ICD-10-PCS | Mod: CPTII,,, | Performed by: INTERNAL MEDICINE

## 2023-10-25 PROCEDURE — 1159F PR MEDICATION LIST DOCUMENTED IN MEDICAL RECORD: ICD-10-PCS | Mod: CPTII,,, | Performed by: INTERNAL MEDICINE

## 2023-10-25 PROCEDURE — 1159F MED LIST DOCD IN RCRD: CPT | Mod: CPTII,,, | Performed by: INTERNAL MEDICINE

## 2023-10-25 PROCEDURE — 3008F BODY MASS INDEX DOCD: CPT | Mod: CPTII,,, | Performed by: INTERNAL MEDICINE

## 2023-10-25 PROCEDURE — 99213 OFFICE O/P EST LOW 20 MIN: CPT | Mod: S$PBB,,, | Performed by: INTERNAL MEDICINE

## 2023-10-25 PROCEDURE — 3077F PR MOST RECENT SYSTOLIC BLOOD PRESSURE >= 140 MM HG: ICD-10-PCS | Mod: CPTII,,, | Performed by: INTERNAL MEDICINE

## 2023-10-25 PROCEDURE — 4010F ACE/ARB THERAPY RXD/TAKEN: CPT | Mod: CPTII,,, | Performed by: INTERNAL MEDICINE

## 2023-10-25 PROCEDURE — 99999 PR PBB SHADOW E&M-EST. PATIENT-LVL III: CPT | Mod: PBBFAC,,, | Performed by: INTERNAL MEDICINE

## 2023-10-25 PROCEDURE — 99999 PR PBB SHADOW E&M-EST. PATIENT-LVL III: ICD-10-PCS | Mod: PBBFAC,,, | Performed by: INTERNAL MEDICINE

## 2023-10-25 PROCEDURE — 3051F HG A1C>EQUAL 7.0%<8.0%: CPT | Mod: CPTII,,, | Performed by: INTERNAL MEDICINE

## 2023-10-25 PROCEDURE — 3077F SYST BP >= 140 MM HG: CPT | Mod: CPTII,,, | Performed by: INTERNAL MEDICINE

## 2023-10-25 RX ORDER — DOXYCYCLINE HYCLATE 100 MG
100 TABLET ORAL 2 TIMES DAILY
Qty: 20 TABLET | Refills: 0 | Status: SHIPPED | OUTPATIENT
Start: 2023-10-25 | End: 2023-12-06 | Stop reason: SDUPTHER

## 2023-10-25 NOTE — PROGRESS NOTES
Patient presents for follow up. Finished 6 weeks of cefazolin for chronic osteomyelitis of left foot with MSSA.  Culture from 9/8 grew MSSA, sensitive to doxycycline.  PICC line removed 10/23/23 after home health nurse sent patent to ED for malfunction of PICC.  Patient placed on Keflex. , , WBC 9/36 on 10/23/23    Wants to go back to his apartment, living with his niece while receiving IV antibiotics.     Exam - right arm - PICC site without erythema, tenderness, or drainage. PICC removed.  Foot - no drainage or tenderness.     1. Staphylococcus aureus bacteremia with sepsis    2. Acute hematogenous osteomyelitis of right foot    3. MRSA (methicillin resistant Staphylococcus aureus) infection    4. Type 2 diabetes mellitus with diabetic peripheral angiopathy without gangrene, with long-term current use of insulin    5. Amputation of right great toe    6. Open wound of right foot, initial encounter    7. Status post amputation of great toe, unspecified laterality        Will start doxycycline 100 mg PO BID for chronic osteomyelitis.  Stop keflex  Follow up with podiatry (Norbert) and ID in 1 month  Continue wound care

## 2023-10-28 LAB
ACID FAST MOD KINY STN SPEC: NORMAL
MYCOBACTERIUM SPEC QL CULT: NORMAL

## 2023-10-30 ENCOUNTER — TELEPHONE (OUTPATIENT)
Dept: PODIATRY | Facility: CLINIC | Age: 58
End: 2023-10-30
Payer: MEDICAID

## 2023-10-30 NOTE — TELEPHONE ENCOUNTER
I left a message for the patient to return my call.       ----- Message from Marilyn Hollingsworth sent at 10/30/2023  9:27 AM CDT -----  Regarding: appt access  Name of Caller: pt      When is the first available appointment? no access to schedule      Symptoms: check f      Best Call Back Number: [phone]        Additional Information:pt needs a call back to get an appt scheduled, please advise.

## 2023-11-02 ENCOUNTER — TELEPHONE (OUTPATIENT)
Dept: PODIATRY | Facility: CLINIC | Age: 58
End: 2023-11-02
Payer: MEDICAID

## 2023-11-08 ENCOUNTER — OFFICE VISIT (OUTPATIENT)
Dept: PODIATRY | Facility: CLINIC | Age: 58
End: 2023-11-08
Payer: MEDICAID

## 2023-11-08 VITALS
BODY MASS INDEX: 33.47 KG/M2 | DIASTOLIC BLOOD PRESSURE: 76 MMHG | SYSTOLIC BLOOD PRESSURE: 109 MMHG | HEIGHT: 74 IN | HEART RATE: 98 BPM | WEIGHT: 260.81 LBS

## 2023-11-08 DIAGNOSIS — Z89.432 PARTIAL NONTRAUMATIC AMPUTATION OF FOOT, LEFT: ICD-10-CM

## 2023-11-08 DIAGNOSIS — Z89.431 PARTIAL NONTRAUMATIC AMPUTATION OF FOOT, RIGHT: ICD-10-CM

## 2023-11-08 DIAGNOSIS — L57.0 KERATOSIS: ICD-10-CM

## 2023-11-08 DIAGNOSIS — Z87.898 HISTORY OF ULCERATION: ICD-10-CM

## 2023-11-08 DIAGNOSIS — B35.1 ONYCHOMYCOSIS DUE TO DERMATOPHYTE: ICD-10-CM

## 2023-11-08 DIAGNOSIS — E11.42 TYPE 2 DIABETES MELLITUS WITH DIABETIC POLYNEUROPATHY, UNSPECIFIED WHETHER LONG TERM INSULIN USE: Primary | ICD-10-CM

## 2023-11-08 PROCEDURE — 3008F PR BODY MASS INDEX (BMI) DOCUMENTED: ICD-10-PCS | Mod: CPTII,,, | Performed by: PODIATRIST

## 2023-11-08 PROCEDURE — 3051F HG A1C>EQUAL 7.0%<8.0%: CPT | Mod: CPTII,,, | Performed by: PODIATRIST

## 2023-11-08 PROCEDURE — 99214 OFFICE O/P EST MOD 30 MIN: CPT | Mod: 25,S$PBB,, | Performed by: PODIATRIST

## 2023-11-08 PROCEDURE — 99214 PR OFFICE/OUTPT VISIT, EST, LEVL IV, 30-39 MIN: ICD-10-PCS | Mod: 25,S$PBB,, | Performed by: PODIATRIST

## 2023-11-08 PROCEDURE — 1159F MED LIST DOCD IN RCRD: CPT | Mod: CPTII,,, | Performed by: PODIATRIST

## 2023-11-08 PROCEDURE — 4010F ACE/ARB THERAPY RXD/TAKEN: CPT | Mod: CPTII,,, | Performed by: PODIATRIST

## 2023-11-08 PROCEDURE — 3074F SYST BP LT 130 MM HG: CPT | Mod: CPTII,,, | Performed by: PODIATRIST

## 2023-11-08 PROCEDURE — 3051F PR MOST RECENT HEMOGLOBIN A1C LEVEL 7.0 - < 8.0%: ICD-10-PCS | Mod: CPTII,,, | Performed by: PODIATRIST

## 2023-11-08 PROCEDURE — 11721 DEBRIDE NAIL 6 OR MORE: CPT | Mod: Q9,PBBFAC,PN | Performed by: PODIATRIST

## 2023-11-08 PROCEDURE — 3008F BODY MASS INDEX DOCD: CPT | Mod: CPTII,,, | Performed by: PODIATRIST

## 2023-11-08 PROCEDURE — 3074F PR MOST RECENT SYSTOLIC BLOOD PRESSURE < 130 MM HG: ICD-10-PCS | Mod: CPTII,,, | Performed by: PODIATRIST

## 2023-11-08 PROCEDURE — 1159F PR MEDICATION LIST DOCUMENTED IN MEDICAL RECORD: ICD-10-PCS | Mod: CPTII,,, | Performed by: PODIATRIST

## 2023-11-08 PROCEDURE — 11721 PR DEBRIDEMENT OF NAILS, 6 OR MORE: ICD-10-PCS | Mod: S$PBB,,, | Performed by: PODIATRIST

## 2023-11-08 PROCEDURE — 99999 PR PBB SHADOW E&M-EST. PATIENT-LVL III: ICD-10-PCS | Mod: PBBFAC,,, | Performed by: PODIATRIST

## 2023-11-08 PROCEDURE — 4010F PR ACE/ARB THEARPY RXD/TAKEN: ICD-10-PCS | Mod: CPTII,,, | Performed by: PODIATRIST

## 2023-11-08 PROCEDURE — 11721 DEBRIDE NAIL 6 OR MORE: CPT | Mod: S$PBB,,, | Performed by: PODIATRIST

## 2023-11-08 PROCEDURE — 3078F PR MOST RECENT DIASTOLIC BLOOD PRESSURE < 80 MM HG: ICD-10-PCS | Mod: CPTII,,, | Performed by: PODIATRIST

## 2023-11-08 PROCEDURE — 3078F DIAST BP <80 MM HG: CPT | Mod: CPTII,,, | Performed by: PODIATRIST

## 2023-11-08 PROCEDURE — 99999 PR PBB SHADOW E&M-EST. PATIENT-LVL III: CPT | Mod: PBBFAC,,, | Performed by: PODIATRIST

## 2023-11-08 PROCEDURE — 99213 OFFICE O/P EST LOW 20 MIN: CPT | Mod: 25,PBBFAC,PN | Performed by: PODIATRIST

## 2023-11-08 RX ORDER — CICLOPIROX 80 MG/ML
SOLUTION TOPICAL NIGHTLY
Qty: 6.6 ML | Refills: 11 | Status: SHIPPED | OUTPATIENT
Start: 2023-11-08

## 2023-11-08 RX ORDER — UREA 40 %
CREAM (GRAM) TOPICAL DAILY
Qty: 85 G | Refills: 11 | Status: SHIPPED | OUTPATIENT
Start: 2023-11-08

## 2023-11-08 NOTE — PROGRESS NOTES
Subjective:      Patient ID: Dave Good is a 58 y.o. male.    Chief Complaint: Follow-up    Diabetes, increased risk amputation needing evaluation/management/optomization of foot care.    Cc2 thick hard skin all over the bottom of left foot.  Gradual onset, worsening over the past month or so.  Aggravated by increased weight-bearing.  Prior medical treatment with urea cream is helping gradually.      Cc3 long thick discolored misshapen toenails both feet.  Chronic condition present for years.  Aggravated with socks shoes pressure ambulation.  Topical antifungal as helped in the past.  Periodic debridement help symptoms.      Chief Complaint   Patient presents with    Follow-up     Surgical shoes both feet.    Review of Systems   Constitutional: Negative for chills, diaphoresis, fever, malaise/fatigue and night sweats.   Cardiovascular:  Positive for leg swelling. Negative for claudication, cyanosis and syncope.   Skin:  Positive for dry skin and nail changes. Negative for color change, rash, suspicious lesions and unusual hair distribution.   Musculoskeletal:  Negative for falls, joint pain, joint swelling, muscle cramps, muscle weakness and stiffness.   Gastrointestinal:  Negative for constipation, diarrhea, nausea and vomiting.   Neurological:  Positive for numbness, paresthesias and sensory change. Negative for brief paralysis, disturbances in coordination, focal weakness and tremors.           Objective:      Physical Exam  Constitutional:       General: He is not in acute distress.     Appearance: He is well-developed. He is not diaphoretic.   Cardiovascular:      Pulses:           Popliteal pulses are 2+ on the right side and 2+ on the left side.        Dorsalis pedis pulses are 1+ on the right side and 1+ on the left side.        Posterior tibial pulses are 1+ on the right side and 1+ on the left side.      Comments: Capillary refill 3 seconds all toes/distal feet, all toes/both feet warm to touch.       Negative lymphadenopathy bilateral popliteal fossa and tarsal tunnel.     Less than 2+ pitting lower extremity edema bilateral.    Musculoskeletal:      Right ankle: No swelling, deformity, ecchymosis or lacerations. Normal range of motion. Normal pulse.      Right Achilles Tendon: Normal. No defects. Gonzáles's test negative.      Comments: Partial ray amputations 1 2 right.    Hallux amputation left.    Both well healed.  Decreased stride, a propulsive toe off bilateral.   Lymphadenopathy:      Lower Body: No right inguinal adenopathy. No left inguinal adenopathy.      Comments: Negative lymphadenopathy bilateral popliteal fossa and tarsal tunnel.    Negative lymphangitic streaking bilateral feet/ankles/legs.   Skin:     General: Skin is warm and dry.      Capillary Refill: Capillary refill takes 2 to 3 seconds.      Coloration: Skin is not pale.      Findings: No abrasion, bruising, burn, ecchymosis, erythema, laceration, lesion or rash.      Nails: There is no clubbing.      Comments: Thick keratosis in the bottoms of both feet without open skin drainage pus tracking fluctuance malodor signs of infection.      Sutures remain of the dorsal 1st ray remnant left-removed without event no findings of open skin or infection     Neurological:      Mental Status: He is alert and oriented to person, place, and time.      Sensory: Sensory deficit present.      Motor: No tremor, atrophy or abnormal muscle tone.      Gait: Gait normal.      Deep Tendon Reflexes:      Reflex Scores:       Patellar reflexes are 2+ on the right side and 2+ on the left side.       Achilles reflexes are 2+ on the right side and 2+ on the left side.     Comments: Diminished/loss of protective sensation all toes bilateral to 10 gram monofilament.      Paresthesias, and burning bilateral feet with no clearly identified trigger or source.       Psychiatric:         Behavior: Behavior is cooperative.               Assessment:       Encounter  Diagnoses   Name Primary?    Type 2 diabetes mellitus with diabetic polyneuropathy, unspecified whether long term insulin use Yes    Partial nontraumatic amputation of foot, right     History of ulceration     Keratosis     Partial nontraumatic amputation of foot, left     Onychomycosis due to dermatophyte          Plan:       Dave was seen today for follow-up.    Diagnoses and all orders for this visit:    Type 2 diabetes mellitus with diabetic polyneuropathy, unspecified whether long term insulin use  -     DIABETIC SHOES FOR HOME USE    Partial nontraumatic amputation of foot, right  -     DIABETIC SHOES FOR HOME USE    History of ulceration  -     DIABETIC SHOES FOR HOME USE    Keratosis  -     DIABETIC SHOES FOR HOME USE    Partial nontraumatic amputation of foot, left  -     DIABETIC SHOES FOR HOME USE    Onychomycosis due to dermatophyte    Other orders  -     ciclopirox (PENLAC) 8 % Soln; Apply topically nightly.  -     urea (CARMOL) 40 % Crea; Apply topically once daily.      I counseled the patient on his conditions, their implications and medical management.    The patient has received literature on basic diabetic foot care.  Patient will inspect feet daily, wear protective shoe gear when ambulatory, and apply moisturizer to skin as needed to maintain elasticity and help prevent ulceration.     With the patient's permission, I debrided all 7 toenails with a sterile nipper and curette, removing all offending nail and debris.  Patient tolerated the procedure well and related significant relief.    Penlac    Urea  cream    Rx DM shoes, inserts    1 year, sooner as needed AR care.          No follow-ups on file.

## 2023-11-22 ENCOUNTER — DOCUMENT SCAN (OUTPATIENT)
Dept: HOME HEALTH SERVICES | Facility: HOSPITAL | Age: 58
End: 2023-11-22
Payer: MEDICAID

## 2023-12-06 ENCOUNTER — OFFICE VISIT (OUTPATIENT)
Dept: INFECTIOUS DISEASES | Facility: CLINIC | Age: 58
End: 2023-12-06
Payer: MEDICAID

## 2023-12-06 VITALS
HEART RATE: 87 BPM | SYSTOLIC BLOOD PRESSURE: 134 MMHG | TEMPERATURE: 98 F | HEIGHT: 74 IN | BODY MASS INDEX: 33.49 KG/M2 | DIASTOLIC BLOOD PRESSURE: 78 MMHG

## 2023-12-06 DIAGNOSIS — E11.51 TYPE 2 DIABETES MELLITUS WITH DIABETIC PERIPHERAL ANGIOPATHY WITHOUT GANGRENE, WITH LONG-TERM CURRENT USE OF INSULIN: ICD-10-CM

## 2023-12-06 DIAGNOSIS — A41.01 STAPHYLOCOCCUS AUREUS BACTEREMIA WITH SEPSIS: ICD-10-CM

## 2023-12-06 DIAGNOSIS — S98.111A AMPUTATION OF RIGHT GREAT TOE: ICD-10-CM

## 2023-12-06 DIAGNOSIS — Z89.419 STATUS POST AMPUTATION OF GREAT TOE, UNSPECIFIED LATERALITY: Primary | ICD-10-CM

## 2023-12-06 DIAGNOSIS — A49.02 MRSA (METHICILLIN RESISTANT STAPHYLOCOCCUS AUREUS) INFECTION: ICD-10-CM

## 2023-12-06 DIAGNOSIS — M86.071 ACUTE HEMATOGENOUS OSTEOMYELITIS OF RIGHT FOOT: ICD-10-CM

## 2023-12-06 DIAGNOSIS — Z79.4 TYPE 2 DIABETES MELLITUS WITH DIABETIC PERIPHERAL ANGIOPATHY WITHOUT GANGRENE, WITH LONG-TERM CURRENT USE OF INSULIN: ICD-10-CM

## 2023-12-06 PROCEDURE — 99213 OFFICE O/P EST LOW 20 MIN: CPT | Mod: S$PBB,,, | Performed by: INTERNAL MEDICINE

## 2023-12-06 PROCEDURE — 3008F BODY MASS INDEX DOCD: CPT | Mod: CPTII,,, | Performed by: INTERNAL MEDICINE

## 2023-12-06 PROCEDURE — 3075F SYST BP GE 130 - 139MM HG: CPT | Mod: CPTII,,, | Performed by: INTERNAL MEDICINE

## 2023-12-06 PROCEDURE — 3078F DIAST BP <80 MM HG: CPT | Mod: CPTII,,, | Performed by: INTERNAL MEDICINE

## 2023-12-06 PROCEDURE — 3051F HG A1C>EQUAL 7.0%<8.0%: CPT | Mod: CPTII,,, | Performed by: INTERNAL MEDICINE

## 2023-12-06 PROCEDURE — 4010F PR ACE/ARB THEARPY RXD/TAKEN: ICD-10-PCS | Mod: CPTII,,, | Performed by: INTERNAL MEDICINE

## 2023-12-06 PROCEDURE — 1159F PR MEDICATION LIST DOCUMENTED IN MEDICAL RECORD: ICD-10-PCS | Mod: CPTII,,, | Performed by: INTERNAL MEDICINE

## 2023-12-06 PROCEDURE — 3075F PR MOST RECENT SYSTOLIC BLOOD PRESS GE 130-139MM HG: ICD-10-PCS | Mod: CPTII,,, | Performed by: INTERNAL MEDICINE

## 2023-12-06 PROCEDURE — 4010F ACE/ARB THERAPY RXD/TAKEN: CPT | Mod: CPTII,,, | Performed by: INTERNAL MEDICINE

## 2023-12-06 PROCEDURE — 3051F PR MOST RECENT HEMOGLOBIN A1C LEVEL 7.0 - < 8.0%: ICD-10-PCS | Mod: CPTII,,, | Performed by: INTERNAL MEDICINE

## 2023-12-06 PROCEDURE — 1159F MED LIST DOCD IN RCRD: CPT | Mod: CPTII,,, | Performed by: INTERNAL MEDICINE

## 2023-12-06 PROCEDURE — 3078F PR MOST RECENT DIASTOLIC BLOOD PRESSURE < 80 MM HG: ICD-10-PCS | Mod: CPTII,,, | Performed by: INTERNAL MEDICINE

## 2023-12-06 PROCEDURE — 3008F PR BODY MASS INDEX (BMI) DOCUMENTED: ICD-10-PCS | Mod: CPTII,,, | Performed by: INTERNAL MEDICINE

## 2023-12-06 PROCEDURE — 99999 PR PBB SHADOW E&M-EST. PATIENT-LVL III: CPT | Mod: PBBFAC,,, | Performed by: INTERNAL MEDICINE

## 2023-12-06 PROCEDURE — 99999 PR PBB SHADOW E&M-EST. PATIENT-LVL III: ICD-10-PCS | Mod: PBBFAC,,, | Performed by: INTERNAL MEDICINE

## 2023-12-06 PROCEDURE — 99213 OFFICE O/P EST LOW 20 MIN: CPT | Mod: PBBFAC | Performed by: INTERNAL MEDICINE

## 2023-12-06 PROCEDURE — 99213 PR OFFICE/OUTPT VISIT, EST, LEVL III, 20-29 MIN: ICD-10-PCS | Mod: S$PBB,,, | Performed by: INTERNAL MEDICINE

## 2023-12-06 RX ORDER — SPIRONOLACTONE 25 MG/1
25 TABLET ORAL DAILY
Qty: 30 TABLET | Refills: 11 | Status: SHIPPED | OUTPATIENT
Start: 2023-12-06 | End: 2024-12-05

## 2023-12-06 RX ORDER — DOXYCYCLINE HYCLATE 100 MG
100 TABLET ORAL 2 TIMES DAILY
Qty: 60 TABLET | Refills: 1 | Status: SHIPPED | OUTPATIENT
Start: 2023-12-06 | End: 2024-01-05

## 2023-12-06 RX ORDER — DOXYCYCLINE HYCLATE 100 MG
100 TABLET ORAL 2 TIMES DAILY
Qty: 20 TABLET | Refills: 0 | Status: SHIPPED | OUTPATIENT
Start: 2023-12-06 | End: 2023-12-06 | Stop reason: SDUPTHER

## 2023-12-06 NOTE — PROGRESS NOTES
Subjective:      Patient ID: Dave Good is a 58 y.o. male.    Chief Complaint:No chief complaint on file.      History of Present Illness    Complains of left leg swelling and pain. No swelling in right leg.     Review of Systems   Constitutional: Positive for weight loss. Negative for chills, decreased appetite, fever, malaise/fatigue, night sweats and weight gain.   HENT:  Negative for congestion, ear pain, hearing loss, hoarse voice, sore throat and tinnitus.    Eyes:  Negative for blurred vision, redness and visual disturbance.   Cardiovascular:  Positive for leg swelling. Negative for chest pain and palpitations.   Respiratory:  Negative for cough, hemoptysis, shortness of breath, sputum production and wheezing.    Hematologic/Lymphatic: Negative for adenopathy. Does not bruise/bleed easily.   Skin:  Negative for dry skin, itching, rash and suspicious lesions.   Musculoskeletal:  Positive for myalgias. Negative for back pain, joint pain and neck pain.   Gastrointestinal:  Negative for abdominal pain, constipation, diarrhea, heartburn, nausea and vomiting.   Genitourinary:  Negative for dysuria, flank pain, frequency, hematuria, hesitancy and urgency.   Neurological:  Negative for dizziness, headaches, numbness, paresthesias and weakness.   Psychiatric/Behavioral:  Negative for depression and memory loss. The patient does not have insomnia and is not nervous/anxious.    Objective:   Physical Exam  Vitals and nursing note reviewed.   Constitutional:       Appearance: Normal appearance.   HENT:      Head: Normocephalic and atraumatic.   Eyes:      Extraocular Movements: Extraocular movements intact.      Conjunctiva/sclera: Conjunctivae normal.      Pupils: Pupils are equal, round, and reactive to light.   Cardiovascular:      Rate and Rhythm: Normal rate and regular rhythm.   Pulmonary:      Effort: Pulmonary effort is normal.      Breath sounds: Normal breath sounds.   Musculoskeletal:      Right lower leg:  No edema.      Left lower leg: Edema present.   Skin:     General: Skin is warm and dry.      Findings: No lesion.   Neurological:      Mental Status: He is alert.       Assessment:     1. Status post amputation of great toe, unspecified laterality    2. Amputation of right great toe    3. Staphylococcus aureus bacteremia with sepsis    4. Acute hematogenous osteomyelitis of right foot    5. MRSA (methicillin resistant Staphylococcus aureus) infection    6. Type 2 diabetes mellitus with diabetic peripheral angiopathy without gangrene, with long-term current use of insulin        Plan:      Discussed foot care  Discussed edema control  Continue doxycycline for chronic osteomyelitis  Start aldactone for mild edema control.

## 2024-01-05 RX ORDER — DOXYCYCLINE HYCLATE 100 MG
100 TABLET ORAL 2 TIMES DAILY
Qty: 60 TABLET | Refills: 1 | Status: SHIPPED | OUTPATIENT
Start: 2024-01-05 | End: 2024-03-05

## 2024-02-23 ENCOUNTER — TELEPHONE (OUTPATIENT)
Dept: PODIATRY | Facility: CLINIC | Age: 59
End: 2024-02-23
Payer: MEDICAID

## 2024-02-23 NOTE — TELEPHONE ENCOUNTER
Tried reaching patient mailbox is full.        ----- Message from Britton Nguyenporsche Fisher sent at 2/23/2024  9:47 AM CST -----  Regarding: call back  Type: Patient Call Back    Who called: pt    What is the request in detail: requesting an appt to be seen for an open wound on his foot, EPIC not allowing scheduling with provider     Can the clinic reply by [facility]?no    Would the patient rather a call back or a response via [facility]? call    Best call back number: [phone]     Additional Information:

## 2024-03-04 RX ORDER — DOXYCYCLINE HYCLATE 100 MG
100 TABLET ORAL 2 TIMES DAILY
Qty: 60 TABLET | Refills: 1 | Status: SHIPPED | OUTPATIENT
Start: 2024-03-04 | End: 2024-05-03

## 2024-03-06 ENCOUNTER — APPOINTMENT (OUTPATIENT)
Dept: RADIOLOGY | Facility: OTHER | Age: 59
End: 2024-03-06
Attending: PODIATRIST
Payer: COMMERCIAL

## 2024-03-06 ENCOUNTER — OFFICE VISIT (OUTPATIENT)
Dept: PODIATRY | Facility: CLINIC | Age: 59
End: 2024-03-06
Payer: COMMERCIAL

## 2024-03-06 VITALS
SYSTOLIC BLOOD PRESSURE: 149 MMHG | HEIGHT: 74 IN | BODY MASS INDEX: 33.37 KG/M2 | HEART RATE: 85 BPM | WEIGHT: 260 LBS | DIASTOLIC BLOOD PRESSURE: 79 MMHG

## 2024-03-06 DIAGNOSIS — E11.42 TYPE 2 DIABETES MELLITUS WITH DIABETIC POLYNEUROPATHY, UNSPECIFIED WHETHER LONG TERM INSULIN USE: ICD-10-CM

## 2024-03-06 DIAGNOSIS — Z89.431 PARTIAL NONTRAUMATIC AMPUTATION OF FOOT, RIGHT: ICD-10-CM

## 2024-03-06 DIAGNOSIS — L97.513 ULCERATED, FOOT, RIGHT, WITH NECROSIS OF MUSCLE: Primary | ICD-10-CM

## 2024-03-06 DIAGNOSIS — L97.513 ULCERATED, FOOT, RIGHT, WITH NECROSIS OF MUSCLE: ICD-10-CM

## 2024-03-06 PROCEDURE — 99213 OFFICE O/P EST LOW 20 MIN: CPT | Mod: 25,S$GLB,, | Performed by: PODIATRIST

## 2024-03-06 PROCEDURE — 99999 PR PBB SHADOW E&M-EST. PATIENT-LVL IV: CPT | Mod: PBBFAC,,, | Performed by: PODIATRIST

## 2024-03-06 PROCEDURE — 99214 OFFICE O/P EST MOD 30 MIN: CPT | Mod: PBBFAC,PN,25 | Performed by: PODIATRIST

## 2024-03-06 PROCEDURE — 73630 X-RAY EXAM OF FOOT: CPT | Mod: 26,RT,, | Performed by: RADIOLOGY

## 2024-03-06 PROCEDURE — 73630 X-RAY EXAM OF FOOT: CPT | Mod: TC,PN,RT

## 2024-03-06 PROCEDURE — 11043 DBRDMT MUSC&/FSCA 1ST 20/<: CPT | Mod: PBBFAC,PN | Performed by: PODIATRIST

## 2024-03-06 RX ORDER — FERROUS SULFATE TAB 325 MG (65 MG ELEMENTAL FE) 325 (65 FE) MG
TAB ORAL
COMMUNITY
Start: 2023-12-20

## 2024-03-06 RX ORDER — INSULIN GLARGINE 100 [IU]/ML
INJECTION, SOLUTION SUBCUTANEOUS
COMMUNITY
Start: 2024-02-10

## 2024-03-06 RX ORDER — DULAGLUTIDE 0.75 MG/.5ML
INJECTION, SOLUTION SUBCUTANEOUS
COMMUNITY

## 2024-03-06 RX ORDER — BLOOD-GLUCOSE METER
EACH MISCELLANEOUS
COMMUNITY
Start: 2024-02-14

## 2024-03-06 RX ORDER — ASPIRIN 325 MG
50000 TABLET, DELAYED RELEASE (ENTERIC COATED) ORAL
COMMUNITY
Start: 2023-12-19

## 2024-03-06 RX ORDER — HYDROCHLOROTHIAZIDE 25 MG/1
25 TABLET ORAL
COMMUNITY

## 2024-03-06 NOTE — PROGRESS NOTES
Subjective:      Patient ID: Dave Good is a 58 y.o. male.    Chief Complaint: Foot Problem (Cut on bottom of foot)     Open skin right foot.  Unknown onset, discovered about a month ago.  Worsening over the past month.  Aggravated with increased weight-bearing.  No prior medical treatment.  No self-treatment.  Denies trauma and surgery right foot.    Review of Systems   Constitutional: Negative for chills, diaphoresis, fever, malaise/fatigue and night sweats.   Cardiovascular:  Negative for claudication, cyanosis, leg swelling and syncope.   Skin:  Positive for nail changes and rash. Negative for color change, dry skin, suspicious lesions and unusual hair distribution.   Musculoskeletal:  Negative for falls, joint pain, joint swelling, muscle cramps, muscle weakness and stiffness.   Gastrointestinal:  Negative for constipation, diarrhea, nausea and vomiting.   Neurological:  Positive for numbness, paresthesias and sensory change. Negative for brief paralysis, disturbances in coordination, focal weakness and tremors.         Objective:      Physical Exam  Constitutional:       General: He is not in acute distress.     Appearance: He is well-developed. He is not diaphoretic.   Cardiovascular:      Pulses:           Dorsalis pedis pulses are 1+ on the right side and 1+ on the left side.        Posterior tibial pulses are 1+ on the right side and 1+ on the left side.      Comments: Toes warm, pink, cap fill <5 seconds.  Musculoskeletal:      Comments:   Partial amputation rays 1 2 right healed well.   Lymphadenopathy:      Comments: Negative lymphadenopathy bilateral popliteal fossa and tarsal tunnel.     Skin:     General: Skin is warm and dry.      Coloration: Skin is not pale.      Findings: No abrasion, bruising, burn, ecchymosis, erythema, laceration, lesion or rash.      Comments:     Wound:  Plantar right midfoot    Size:    Pre:10 x 10x2mm  Post:  45 x 42 by 5mm    Base:  Granular, pink with moderate  serous/serosanaguinous drainage only.  No pus, tracking, fluctuance, malodor, or cardinal signs infection.    Borders:  Hyperkeratotic, debriding to flat, pink, blanchable skin edges without undermining.       Neurological:      Sensory: Sensory deficit present.      Comments: Diminished/loss of protective sensation all toes bilateral to 10 gram monofilament.     Psychiatric:         Behavior: Behavior is cooperative.           Assessment:       Encounter Diagnoses   Name Primary?    Ulcerated, foot, right, with necrosis of muscle Yes    Type 2 diabetes mellitus with diabetic polyneuropathy, unspecified whether long term insulin use     Partial nontraumatic amputation of foot, right          Plan:       Don was seen today for foot problem.    Diagnoses and all orders for this visit:    Ulcerated, foot, right, with necrosis of muscle  -     X-Ray Foot Complete Right; Future    Type 2 diabetes mellitus with diabetic polyneuropathy, unspecified whether long term insulin use  -     X-Ray Foot Complete Right; Future    Partial nontraumatic amputation of foot, right  -     X-Ray Foot Complete Right; Future      I counseled the patient on his conditions, their implications and medical management.         Debride ulcer right foot.      X-rays right foot.      Dressed right foot with aperture and football dressing do not change or wet.      Dispense surgical shoe right.  Ambulate minimally in surgical shoe right diabetic shoes custom inserts left.      Adequate vitamin supplementation, protein intake, and hydration - discussed with patient.    One-week          Follow up in about 1 week (around 3/13/2024).

## 2024-03-06 NOTE — PROCEDURES
"Wound Debridement    Date/Time: 3/6/2024 8:09 AM    Performed by: Samuel Toussaint DPM  Authorized by: Samuel Toussaint DPM    Time out: Immediately prior to procedure a "time out" was called to verify the correct patient, procedure, equipment, support staff and site/side marked as required.    Consent Done?:  Yes (Verbal)  Local anesthesia used?: No      Wound Details:    Location:  Right foot    Location:  Right Midfoot    Type of Debridement:  Excisional       Length (cm):  4.5       Area (sq cm):  18.9       Width (cm):  4.2       Percent Debrided (%):  100       Depth (cm):  0.5       Total Area Debrided (sq cm):  18.9    Depth of debridement:  Muscle/fascia/tendon    Tissue debrided:  Dermis, Epidermis, Subcutaneous, Muscle and Fascia    Devitalized tissue debrided:  Callus, Necrotic/Eschar and Slough    Instruments:  Blade  Bleeding:  Minimal  Hemostasis Achieved: Yes  Method Used:  Pressure  Patient tolerance:  Patient tolerated the procedure well with no immediate complications    "

## 2024-03-12 ENCOUNTER — OFFICE VISIT (OUTPATIENT)
Dept: PODIATRY | Facility: CLINIC | Age: 59
End: 2024-03-12
Payer: MEDICAID

## 2024-03-12 VITALS
DIASTOLIC BLOOD PRESSURE: 81 MMHG | BODY MASS INDEX: 33.37 KG/M2 | SYSTOLIC BLOOD PRESSURE: 136 MMHG | HEART RATE: 87 BPM | WEIGHT: 260 LBS | HEIGHT: 74 IN

## 2024-03-12 DIAGNOSIS — E11.42 TYPE 2 DIABETES MELLITUS WITH DIABETIC POLYNEUROPATHY, UNSPECIFIED WHETHER LONG TERM INSULIN USE: ICD-10-CM

## 2024-03-12 DIAGNOSIS — L97.513 ULCERATED, FOOT, RIGHT, WITH NECROSIS OF MUSCLE: Primary | ICD-10-CM

## 2024-03-12 DIAGNOSIS — Z89.431 PARTIAL NONTRAUMATIC AMPUTATION OF FOOT, RIGHT: ICD-10-CM

## 2024-03-12 PROCEDURE — 99213 OFFICE O/P EST LOW 20 MIN: CPT | Mod: PBBFAC,PN | Performed by: PODIATRIST

## 2024-03-12 PROCEDURE — 99499 UNLISTED E&M SERVICE: CPT | Mod: S$GLB,,, | Performed by: PODIATRIST

## 2024-03-12 PROCEDURE — 11042 DBRDMT SUBQ TIS 1ST 20SQCM/<: CPT | Mod: PBBFAC,PN | Performed by: PODIATRIST

## 2024-03-12 PROCEDURE — 99999 PR PBB SHADOW E&M-EST. PATIENT-LVL III: CPT | Mod: PBBFAC,,, | Performed by: PODIATRIST

## 2024-03-12 NOTE — PROGRESS NOTES
Subjective:      Patient ID: Dave Good is a 58 y.o. male.    Chief Complaint: Foot Ulcer     Open skin right foot.  Unknown onset, discovered about a month ago.  Improving well over the past week with wound care, offloading, football dressing, surgical shoe ambulation..  Aggravated with increased weight-bearing.  No prior medical treatment.  No self-treatment.  Denies trauma and surgery right foot.  X-rays last week negative new osteolysis gas and foreign body.    Review of Systems   Constitutional: Negative for chills, diaphoresis, fever, malaise/fatigue and night sweats.   Cardiovascular:  Negative for claudication, cyanosis, leg swelling and syncope.   Skin:  Positive for nail changes and poor wound healing. Negative for color change, dry skin, rash, suspicious lesions and unusual hair distribution.   Musculoskeletal:  Negative for falls, joint pain, joint swelling, muscle cramps, muscle weakness and stiffness.   Gastrointestinal:  Negative for constipation, diarrhea, nausea and vomiting.   Neurological:  Positive for numbness, paresthesias and sensory change. Negative for brief paralysis, disturbances in coordination, focal weakness and tremors.           Objective:      Physical Exam  Constitutional:       General: He is not in acute distress.     Appearance: He is well-developed. He is not diaphoretic.   Cardiovascular:      Pulses:           Dorsalis pedis pulses are 1+ on the right side and 1+ on the left side.        Posterior tibial pulses are 1+ on the right side and 1+ on the left side.      Comments: Toes warm, pink, cap fill <5 seconds.  Musculoskeletal:      Comments:   Partial amputation rays 1 2 right healed well.   Lymphadenopathy:      Comments: Negative lymphadenopathy bilateral popliteal fossa and tarsal tunnel.     Skin:     General: Skin is warm and dry.      Coloration: Skin is not pale.      Findings: No abrasion, bruising, burn, ecchymosis, erythema, laceration, lesion or rash.       Comments:     Wound:  Plantar right midfoot    Size:    Pre:10 [id number]  Post:  [id number]    Base:  Granular, pink with moderate serous/serosanaguinous drainage only.  No pus, tracking, fluctuance, malodor, or cardinal signs infection.    Borders:  Hyperkeratotic, debriding to flat, pink, blanchable skin edges without undermining.       Neurological:      Sensory: Sensory deficit present.      Comments: Diminished/loss of protective sensation all toes bilateral to 10 gram monofilament.     Psychiatric:         Behavior: Behavior is cooperative.             Assessment:       Encounter Diagnoses   Name Primary?    Ulcerated, foot, right, with necrosis of muscle Yes    Type 2 diabetes mellitus with diabetic polyneuropathy, unspecified whether long term insulin use     Partial nontraumatic amputation of foot, right            Plan:       Don was seen today for foot ulcer.    Diagnoses and all orders for this visit:    Ulcerated, foot, right, with necrosis of muscle  -     Wound Debridement    Type 2 diabetes mellitus with diabetic polyneuropathy, unspecified whether long term insulin use  -     Wound Debridement    Partial nontraumatic amputation of foot, right  -     Wound Debridement        I counseled the patient on his conditions, their implications and medical management.         Debride ulcer right foot.       Dressed right foot with aperture and football dressing do not change or wet.      Continue surgical shoe right.  Ambulate minimally in surgical shoe right diabetic shoes custom inserts left.      Adequate vitamin supplementation, protein intake, and hydration - discussed with patient.    One-week          Follow up in about 1 week (around 3/19/2024).

## 2024-03-12 NOTE — PROCEDURES
"Wound Debridement    Date/Time: 3/12/2024 2:22 PM    Performed by: Samuel Toussaint DPM  Authorized by: Samuel Toussaint DPM    Time out: Immediately prior to procedure a "time out" was called to verify the correct patient, procedure, equipment, support staff and site/side marked as required.    Consent Done?:  Yes (Verbal)  Local anesthesia used?: No      Wound Details:    Location:  Right foot    Location:  Right Midfoot    Type of Debridement:  Excisional       Length (cm):  1.5       Area (sq cm):  1.95       Width (cm):  1.3       Percent Debrided (%):  100       Depth (cm):  0.3       Total Area Debrided (sq cm):  1.95    Depth of debridement:  Subcutaneous tissue    Tissue debrided:  Dermis, Epidermis and Subcutaneous    Devitalized tissue debrided:  Biofilm and Callus    Instruments:  Blade  Bleeding:  Minimal  Hemostasis Achieved: Yes  Method Used:  Pressure  Patient tolerance:  Patient tolerated the procedure well with no immediate complications    "

## 2024-03-20 ENCOUNTER — OFFICE VISIT (OUTPATIENT)
Dept: PODIATRY | Facility: CLINIC | Age: 59
End: 2024-03-20
Payer: COMMERCIAL

## 2024-03-20 VITALS
SYSTOLIC BLOOD PRESSURE: 120 MMHG | HEART RATE: 90 BPM | BODY MASS INDEX: 33.36 KG/M2 | WEIGHT: 259.94 LBS | DIASTOLIC BLOOD PRESSURE: 76 MMHG | HEIGHT: 74 IN

## 2024-03-20 DIAGNOSIS — E11.42 TYPE 2 DIABETES MELLITUS WITH DIABETIC POLYNEUROPATHY, UNSPECIFIED WHETHER LONG TERM INSULIN USE: ICD-10-CM

## 2024-03-20 DIAGNOSIS — L97.513 ULCERATED, FOOT, RIGHT, WITH NECROSIS OF MUSCLE: Primary | ICD-10-CM

## 2024-03-20 DIAGNOSIS — Z89.431 PARTIAL NONTRAUMATIC AMPUTATION OF FOOT, RIGHT: ICD-10-CM

## 2024-03-20 PROCEDURE — 99999 PR PBB SHADOW E&M-EST. PATIENT-LVL IV: CPT | Mod: PBBFAC,,, | Performed by: PODIATRIST

## 2024-03-20 PROCEDURE — 11042 DBRDMT SUBQ TIS 1ST 20SQCM/<: CPT | Mod: PBBFAC,PN | Performed by: PODIATRIST

## 2024-03-20 PROCEDURE — 99499 UNLISTED E&M SERVICE: CPT | Mod: S$GLB,,, | Performed by: PODIATRIST

## 2024-03-20 PROCEDURE — 99214 OFFICE O/P EST MOD 30 MIN: CPT | Mod: PBBFAC,PN,25 | Performed by: PODIATRIST

## 2024-03-20 NOTE — PROGRESS NOTES
Subjective:      Patient ID: Dave Good is a 58 y.o. male.    Chief Complaint: Foot Ulcer     Open skin right foot.  Unknown onset, discovered about a month ago.  Improving well over the past week with wound care, offloading, football dressing, surgical shoe ambulation..  Aggravated with increased weight-bearing.  No prior medical treatment.  No self-treatment.  Denies trauma and surgery right foot.  X-rays last week negative new osteolysis gas and foreign body.    Review of Systems   Constitutional: Negative for chills, diaphoresis, fever, malaise/fatigue and night sweats.   Cardiovascular:  Negative for claudication, cyanosis, leg swelling and syncope.   Skin:  Positive for nail changes and poor wound healing. Negative for color change, dry skin, rash, suspicious lesions and unusual hair distribution.   Musculoskeletal:  Negative for falls, joint pain, joint swelling, muscle cramps, muscle weakness and stiffness.   Gastrointestinal:  Negative for constipation, diarrhea, nausea and vomiting.   Neurological:  Positive for numbness, paresthesias and sensory change. Negative for brief paralysis, disturbances in coordination, focal weakness and tremors.           Objective:      Physical Exam  Constitutional:       General: He is not in acute distress.     Appearance: He is well-developed. He is not diaphoretic.   Cardiovascular:      Pulses:           Dorsalis pedis pulses are 1+ on the right side and 1+ on the left side.        Posterior tibial pulses are 1+ on the right side and 1+ on the left side.      Comments: Toes warm, pink, cap fill <5 seconds.  Musculoskeletal:      Comments:   Partial amputation rays 1 2 right healed well.   Lymphadenopathy:      Comments: Negative lymphadenopathy bilateral popliteal fossa and tarsal tunnel.     Skin:     General: Skin is warm and dry.      Coloration: Skin is not pale.      Findings: No abrasion, bruising, burn, ecchymosis, erythema, laceration, lesion or rash.       Comments:     Wound:  Plantar right midfoot    Size:    Pre:10 x7x1mm  Post:  [id number]    Base:  Granular, pink with moderate serous/serosanaguinous drainage only.  No pus, tracking, fluctuance, malodor, or cardinal signs infection.    Borders:  Hyperkeratotic, debriding to flat, pink, blanchable skin edges without undermining.       Neurological:      Sensory: Sensory deficit present.      Comments: Diminished/loss of protective sensation all toes bilateral to 10 gram monofilament.     Psychiatric:         Behavior: Behavior is cooperative.             Assessment:       No diagnosis found.          Plan:       There are no diagnoses linked to this encounter.      I counseled the patient on his conditions, their implications and medical management.         Debride ulcer right foot.       Dressed right foot with aperture and football dressing do not change or wet.      Continue surgical shoe right.  Ambulate minimally in surgical shoe right diabetic shoes custom inserts left.      Adequate vitamin supplementation, protein intake, and hydration - discussed with patient.    One-week          Follow up in about 1 week (around 3/27/2024).

## 2024-03-20 NOTE — PROCEDURES
"Wound Debridement    Date/Time: 3/20/2024 9:20 AM    Performed by: Samuel Toussaint DPM  Authorized by: Samuel Toussaint DPM    Time out: Immediately prior to procedure a "time out" was called to verify the correct patient, procedure, equipment, support staff and site/side marked as required.    Consent Done?:  Yes (Verbal)  Local anesthesia used?: No      Wound Details:    Location:  Right foot    Location:  Right Midfoot    Type of Debridement:  Excisional       Length (cm):  1.3       Area (sq cm):  1.17       Width (cm):  0.9       Percent Debrided (%):  100       Depth (cm):  0.2       Total Area Debrided (sq cm):  1.17    Depth of debridement:  Subcutaneous tissue    Tissue debrided:  Dermis, Epidermis and Subcutaneous    Devitalized tissue debrided:  Biofilm and Callus    Instruments:  Blade  Bleeding:  Minimal  Hemostasis Achieved: Yes  Method Used:  Pressure  Patient tolerance:  Patient tolerated the procedure well with no immediate complications    "

## 2024-03-27 ENCOUNTER — OFFICE VISIT (OUTPATIENT)
Dept: PODIATRY | Facility: CLINIC | Age: 59
End: 2024-03-27
Payer: COMMERCIAL

## 2024-03-27 VITALS
DIASTOLIC BLOOD PRESSURE: 81 MMHG | BODY MASS INDEX: 33.36 KG/M2 | HEIGHT: 74 IN | SYSTOLIC BLOOD PRESSURE: 123 MMHG | WEIGHT: 259.94 LBS | HEART RATE: 90 BPM

## 2024-03-27 DIAGNOSIS — Z89.431 PARTIAL NONTRAUMATIC AMPUTATION OF FOOT, RIGHT: ICD-10-CM

## 2024-03-27 DIAGNOSIS — L97.513 ULCERATED, FOOT, RIGHT, WITH NECROSIS OF MUSCLE: Primary | ICD-10-CM

## 2024-03-27 DIAGNOSIS — E11.42 TYPE 2 DIABETES MELLITUS WITH DIABETIC POLYNEUROPATHY, UNSPECIFIED WHETHER LONG TERM INSULIN USE: ICD-10-CM

## 2024-03-27 PROCEDURE — 99213 OFFICE O/P EST LOW 20 MIN: CPT | Mod: PBBFAC,PN | Performed by: PODIATRIST

## 2024-03-27 PROCEDURE — 99499 UNLISTED E&M SERVICE: CPT | Mod: S$PBB,,, | Performed by: PODIATRIST

## 2024-03-27 PROCEDURE — 99999 PR PBB SHADOW E&M-EST. PATIENT-LVL III: CPT | Mod: PBBFAC,,, | Performed by: PODIATRIST

## 2024-03-27 NOTE — PROGRESS NOTES
Subjective:      Patient ID: Dave Good is a 58 y.o. male.    Chief Complaint: Foot Ulcer     Open skin right foot. Improving well over the past week with wound care, offloading, football dressing, surgical shoe ambulation..  Aggravated with increased weight-bearing.  Wound care, dressings, offloading are helping  Denies trauma and surgery right foot.  X-rays last week negative new osteolysis gas and foreign body.    Review of Systems   Constitutional: Negative for chills, diaphoresis, fever, malaise/fatigue and night sweats.   Cardiovascular:  Negative for claudication, cyanosis, leg swelling and syncope.   Skin:  Positive for nail changes and poor wound healing. Negative for color change, dry skin, rash, suspicious lesions and unusual hair distribution.   Musculoskeletal:  Negative for falls, joint pain, joint swelling, muscle cramps, muscle weakness and stiffness.   Gastrointestinal:  Negative for constipation, diarrhea, nausea and vomiting.   Neurological:  Positive for numbness, paresthesias and sensory change. Negative for brief paralysis, disturbances in coordination, focal weakness and tremors.           Objective:      Physical Exam  Constitutional:       General: He is not in acute distress.     Appearance: He is well-developed. He is not diaphoretic.   Cardiovascular:      Pulses:           Dorsalis pedis pulses are 1+ on the right side and 1+ on the left side.        Posterior tibial pulses are 1+ on the right side and 1+ on the left side.      Comments: Toes warm, pink, cap fill <5 seconds.  Musculoskeletal:      Comments:   Partial amputation rays 1 2 right healed well.   Lymphadenopathy:      Comments: Negative lymphadenopathy bilateral popliteal fossa and tarsal tunnel.     Skin:     General: Skin is warm and dry.      Coloration: Skin is not pale.      Findings: No abrasion, bruising, burn, ecchymosis, erythema, laceration, lesion or rash.      Comments:     Wound:  Plantar right midfoot    Size:     Pre:2s2o6le  Post:  [id number]    Base:  Granular, pink with moderate serous/serosanaguinous drainage only.  No pus, tracking, fluctuance, malodor, or cardinal signs infection.    Borders:  Hyperkeratotic, debriding to flat, pink, blanchable skin edges without undermining.       Neurological:      Sensory: Sensory deficit present.      Comments: Diminished/loss of protective sensation all toes bilateral to 10 gram monofilament.     Psychiatric:         Behavior: Behavior is cooperative.           Assessment:       Encounter Diagnoses   Name Primary?    Ulcerated, foot, right, with necrosis of muscle Yes    Type 2 diabetes mellitus with diabetic polyneuropathy, unspecified whether long term insulin use     Partial nontraumatic amputation of foot, right              Plan:       Dave was seen today for foot ulcer.    Diagnoses and all orders for this visit:    Ulcerated, foot, right, with necrosis of muscle    Type 2 diabetes mellitus with diabetic polyneuropathy, unspecified whether long term insulin use    Partial nontraumatic amputation of foot, right          I counseled the patient on his conditions, their implications and medical management.         Debride ulcer right foot.       Dressed right foot with aperture and football dressing do not change or wet.      Continue surgical shoe right.  Ambulate minimally in surgical shoe right diabetic shoes custom inserts left.      Adequate vitamin supplementation, protein intake, and hydration - discussed with patient.    One-week          No follow-ups on file.

## 2024-04-03 ENCOUNTER — OFFICE VISIT (OUTPATIENT)
Dept: PODIATRY | Facility: CLINIC | Age: 59
End: 2024-04-03
Payer: COMMERCIAL

## 2024-04-03 ENCOUNTER — TELEPHONE (OUTPATIENT)
Dept: ORTHOPEDICS | Facility: CLINIC | Age: 59
End: 2024-04-03
Payer: COMMERCIAL

## 2024-04-03 ENCOUNTER — APPOINTMENT (OUTPATIENT)
Dept: RADIOLOGY | Facility: OTHER | Age: 59
End: 2024-04-03
Attending: PODIATRIST
Payer: COMMERCIAL

## 2024-04-03 VITALS
HEIGHT: 74 IN | BODY MASS INDEX: 33.36 KG/M2 | DIASTOLIC BLOOD PRESSURE: 77 MMHG | SYSTOLIC BLOOD PRESSURE: 143 MMHG | HEART RATE: 87 BPM | WEIGHT: 259.94 LBS

## 2024-04-03 DIAGNOSIS — M14.672 CHARCOT'S JOINT, LEFT ANKLE AND FOOT: ICD-10-CM

## 2024-04-03 DIAGNOSIS — E11.42 TYPE 2 DIABETES MELLITUS WITH DIABETIC POLYNEUROPATHY, UNSPECIFIED WHETHER LONG TERM INSULIN USE: ICD-10-CM

## 2024-04-03 DIAGNOSIS — L97.513 ULCERATED, FOOT, RIGHT, WITH NECROSIS OF MUSCLE: ICD-10-CM

## 2024-04-03 DIAGNOSIS — Z89.431 PARTIAL NONTRAUMATIC AMPUTATION OF FOOT, RIGHT: ICD-10-CM

## 2024-04-03 DIAGNOSIS — M14.672 CHARCOT'S JOINT, LEFT ANKLE AND FOOT: Primary | ICD-10-CM

## 2024-04-03 PROCEDURE — 99214 OFFICE O/P EST MOD 30 MIN: CPT | Mod: 25,S$GLB,, | Performed by: PODIATRIST

## 2024-04-03 PROCEDURE — 99214 OFFICE O/P EST MOD 30 MIN: CPT | Mod: PBBFAC,PN | Performed by: PODIATRIST

## 2024-04-03 PROCEDURE — 99999 PR PBB SHADOW E&M-EST. PATIENT-LVL IV: CPT | Mod: PBBFAC,,, | Performed by: PODIATRIST

## 2024-04-03 PROCEDURE — 73590 X-RAY EXAM OF LOWER LEG: CPT | Mod: 26,LT,, | Performed by: RADIOLOGY

## 2024-04-03 PROCEDURE — 73610 X-RAY EXAM OF ANKLE: CPT | Mod: TC,PN,LT

## 2024-04-03 PROCEDURE — 73610 X-RAY EXAM OF ANKLE: CPT | Mod: 26,LT,, | Performed by: RADIOLOGY

## 2024-04-03 PROCEDURE — 73590 X-RAY EXAM OF LOWER LEG: CPT | Mod: TC,PN,LT

## 2024-04-03 PROCEDURE — 11042 DBRDMT SUBQ TIS 1ST 20SQCM/<: CPT | Mod: PBBFAC,PN | Performed by: PODIATRIST

## 2024-04-03 NOTE — TELEPHONE ENCOUNTER
Called pt and let him know we do not take medicaid, gave him numbers for [facility] and [facility].

## 2024-04-03 NOTE — PROGRESS NOTES
Subjective:      Patient ID: Dave Good is a 58 y.o. male.    Chief Complaint: Diabetic Foot Ulcer     Open skin right foot. Improving well over the past week with wound care, offloading, football dressing, surgical shoe ambulation..  Aggravated with increased weight-bearing.  Wound care, dressings, offloading are helping  Denies trauma and surgery right foot.     Cc2 pain swelling deformity left foot ankle and leg.  Patient relates this was rapid onset without known inciting event last Friday.  He has been ambulating on it with some pain.  The foot is now side ways help from beneath the ankle laterally.  No prior medical treatment.  No self-treatment.    Review of Systems   Constitutional: Negative for chills, diaphoresis, fever, malaise/fatigue and night sweats.   Cardiovascular:  Negative for claudication, cyanosis, leg swelling and syncope.   Skin:  Positive for nail changes and poor wound healing. Negative for color change, dry skin, rash, suspicious lesions and unusual hair distribution.   Musculoskeletal:  Negative for falls, joint pain, joint swelling, muscle cramps, muscle weakness and stiffness.   Gastrointestinal:  Negative for constipation, diarrhea, nausea and vomiting.   Neurological:  Positive for numbness, paresthesias and sensory change. Negative for brief paralysis, disturbances in coordination, focal weakness and tremors.           Objective:      Physical Exam  Constitutional:       General: He is not in acute distress.     Appearance: He is well-developed. He is not diaphoretic.   Cardiovascular:      Pulses:           Dorsalis pedis pulses are 1+ on the right side and 1+ on the left side.        Posterior tibial pulses are 1+ on the right side and 1+ on the left side.      Comments: Toes warm, pink, cap fill <5 seconds.  Musculoskeletal:      Comments:   Partial amputation rays 1 2 right healed well.      Left foot and ankle have gross deformity in valgus position with 2+ pitting edema  without fracture blister, skin breakdown or, area of obvious impact trauma.      The heel is palpable plantarly laterally and posteriorly positioned from normal anatomic.   Lymphadenopathy:      Comments: Negative lymphadenopathy bilateral popliteal fossa and tarsal tunnel.     Skin:     General: Skin is warm and dry.      Coloration: Skin is not pale.      Findings: No abrasion, bruising, burn, ecchymosis, erythema, laceration, lesion or rash.      Comments:     Wound:  Plantar right midfoot    Size:    Pre:[id number]  Post:  [id number]    Base:  Granular, pink with moderate serous/serosanaguinous drainage only.  No pus, tracking, fluctuance, malodor, or cardinal signs infection.    Borders:  Hyperkeratotic, debriding to flat, pink, blanchable skin edges without undermining.       Neurological:      Sensory: Sensory deficit present.      Comments: Diminished/loss of protective sensation all toes bilateral to 10 gram monofilament.     Psychiatric:         Behavior: Behavior is cooperative.           Assessment:       Encounter Diagnoses   Name Primary?    Charcot's joint, left ankle and foot Yes    Ulcerated, foot, right, with necrosis of muscle     Type 2 diabetes mellitus with diabetic polyneuropathy, unspecified whether long term insulin use     Partial nontraumatic amputation of foot, right              Plan:       Dave was seen today for diabetic foot ulcer.    Diagnoses and all orders for this visit:    Charcot's joint, left ankle and foot  -     X-Ray Foot Complete Left; Future  -     X-Ray Ankle Complete Left; Future  -     X-Ray Tibia Fibula 2 View Left; Future    Ulcerated, foot, right, with necrosis of muscle    Type 2 diabetes mellitus with diabetic polyneuropathy, unspecified whether long term insulin use    Partial nontraumatic amputation of foot, right          I counseled the patient on his conditions, their implications and medical management.    I discussed that the x-rays left lower extremity show  severely progress Charcot ankle complete dislocation the hindfoot and ankle.  In my experience this would be best managed with a below-the-knee amputation.  I have put in an orthopedic consultation.  Meantime, our goals will be to prevent ulceration and infection, optimize integrity of the soft tissue envelope to facilitate closure of amputation when undertaken.  Briefly discussed with the patient there has a very very small chance that he may fundus surgeon willing to try to salvage this limb-however he is findings or so advanced that I do not believe he is a good candidate.     Debride ulcer right foot.       Dressed right foot with aperture and football dressing do not change or wet.      Continue surgical shoe right.  Ambulate minimally in surgical shoe right diabetic shoes custom inserts left.      Adequate vitamin supplementation, protein intake, and hydration - discussed with patient.    One-week          No follow-ups on file.

## 2024-04-03 NOTE — PROCEDURES
"Wound Debridement    Date/Time: 4/3/2024 9:38 AM    Performed by: Samuel Toussaint DPM  Authorized by: Samuel Toussaint DPM    Time out: Immediately prior to procedure a "time out" was called to verify the correct patient, procedure, equipment, support staff and site/side marked as required.    Consent Done?:  Yes (Verbal)  Local anesthesia used?: No      Wound Details:    Location:  Right foot    Location:  Right Midfoot    Type of Debridement:  Excisional       Length (cm):  1.5       Area (sq cm):  1.5       Width (cm):  1       Percent Debrided (%):  100       Depth (cm):  0.2       Total Area Debrided (sq cm):  1.5    Depth of debridement:  Subcutaneous tissue    Tissue debrided:  Dermis, Epidermis and Subcutaneous    Devitalized tissue debrided:  Biofilm and Callus    Instruments:  Curette  Bleeding:  Minimal  Hemostasis Achieved: Yes  Method Used:  Pressure and Alginate  Patient tolerance:  Patient tolerated the procedure well with no immediate complications    "

## 2024-04-03 NOTE — TELEPHONE ENCOUNTER
----- Message from Bill Redd sent at 4/3/2024  3:45 PM CDT -----  Type:  Sooner Apoointment Request    Caller is requesting a sooner appointment.  Caller declined first available appointment listed below.  Caller will not accept being placed on the waitlist and is requesting a message be sent to doctor.  Name of Caller:Ebony Evans- [facility]  When is the first available appointment?none  Symptoms: Charcot's joint, left ankle and foot [M14.672]  Would the patient rather a call back or a response via [facility]? Call  Best Call Back Number: [phone] or via TEAMS  Additional Information: pt has referral

## 2024-04-10 ENCOUNTER — TELEPHONE (OUTPATIENT)
Dept: PODIATRY | Facility: CLINIC | Age: 59
End: 2024-04-10
Payer: COMMERCIAL

## 2024-04-10 NOTE — TELEPHONE ENCOUNTER
Staff left message to inform patient appointment was canceled do to power outage from weather. Patient can callback to reschedule.

## 2024-04-23 ENCOUNTER — OFFICE VISIT (OUTPATIENT)
Dept: PODIATRY | Facility: CLINIC | Age: 59
End: 2024-04-23
Payer: COMMERCIAL

## 2024-04-23 VITALS
BODY MASS INDEX: 33.36 KG/M2 | HEIGHT: 74 IN | HEART RATE: 112 BPM | DIASTOLIC BLOOD PRESSURE: 60 MMHG | SYSTOLIC BLOOD PRESSURE: 96 MMHG | WEIGHT: 259.94 LBS

## 2024-04-23 DIAGNOSIS — L97.513 ULCERATED, FOOT, RIGHT, WITH NECROSIS OF MUSCLE: ICD-10-CM

## 2024-04-23 DIAGNOSIS — Z89.431 PARTIAL NONTRAUMATIC AMPUTATION OF FOOT, RIGHT: ICD-10-CM

## 2024-04-23 DIAGNOSIS — E11.42 TYPE 2 DIABETES MELLITUS WITH DIABETIC POLYNEUROPATHY, UNSPECIFIED WHETHER LONG TERM INSULIN USE: ICD-10-CM

## 2024-04-23 DIAGNOSIS — M14.672 CHARCOT'S JOINT, LEFT ANKLE AND FOOT: Primary | ICD-10-CM

## 2024-04-23 PROCEDURE — 99999 PR PBB SHADOW E&M-EST. PATIENT-LVL IV: CPT | Mod: PBBFAC,,, | Performed by: PODIATRIST

## 2024-04-23 PROCEDURE — 11042 DBRDMT SUBQ TIS 1ST 20SQCM/<: CPT | Mod: PBBFAC,PN | Performed by: PODIATRIST

## 2024-04-23 PROCEDURE — 99214 OFFICE O/P EST MOD 30 MIN: CPT | Mod: PBBFAC,PN,25 | Performed by: PODIATRIST

## 2024-04-23 PROCEDURE — 99213 OFFICE O/P EST LOW 20 MIN: CPT | Mod: 25,S$GLB,, | Performed by: PODIATRIST

## 2024-04-23 NOTE — PROCEDURES
"Wound Debridement    Date/Time: 4/23/2024 12:04 PM    Performed by: Samuel Toussaint DPM  Authorized by: Samuel Toussaint DPM    Time out: Immediately prior to procedure a "time out" was called to verify the correct patient, procedure, equipment, support staff and site/side marked as required.    Consent Done?:  Yes (Verbal)  Local anesthesia used?: No      Wound Details:    Location:  Right foot    Location:  Right Midfoot    Type of Debridement:  Excisional       Length (cm):  4       Area (sq cm):  8.8       Width (cm):  2.2       Percent Debrided (%):  100       Depth (cm):  0.2       Total Area Debrided (sq cm):  8.8    Depth of debridement:  Subcutaneous tissue    Tissue debrided:  Dermis, Epidermis and Subcutaneous    Devitalized tissue debrided:  Biofilm and Callus    Instruments:  Blade  Bleeding:  Minimal  Method Used:  Pressure  Patient tolerance:  Patient tolerated the procedure well with no immediate complications    "

## 2024-04-23 NOTE — PROGRESS NOTES
Subjective:      Patient ID: Dave Good is a 58 y.o. male.    Chief Complaint: Charcot     Open skin right foot. Improving well over the past week with wound care, offloading, football dressing, surgical shoe ambulation..  Aggravated with increased weight-bearing.  Wound care, dressings, offloading are helping  Denies trauma and surgery right foot.     Cc2 pain swelling deformity left foot ankle and leg.  Patient relates this was rapid onset without known inciting event last Friday.  He has been ambulating on it with some pain AMA.  The foot is now side ways help from beneath the ankle laterally.  No prior medical treatment.  No self-treatment.  He has not yet acquired about orthopedic consultation.  He verbally acknowledges knows that the deformity in danger left lower extremity we will get more severe as he continues to ambulate.  Increasing his likelihood of ulceration infection amputation and death    Review of Systems   Constitutional: Negative for chills, diaphoresis, fever, malaise/fatigue and night sweats.   Cardiovascular:  Negative for claudication, cyanosis, leg swelling and syncope.   Skin:  Positive for nail changes and poor wound healing. Negative for color change, dry skin, rash, suspicious lesions and unusual hair distribution.   Musculoskeletal:  Positive for joint swelling. Negative for falls, joint pain, muscle cramps, muscle weakness and stiffness.   Gastrointestinal:  Negative for constipation, diarrhea, nausea and vomiting.   Neurological:  Positive for numbness, paresthesias and sensory change. Negative for brief paralysis, disturbances in coordination, focal weakness and tremors.           Objective:      Physical Exam  Constitutional:       General: He is not in acute distress.     Appearance: He is well-developed. He is not diaphoretic.   Cardiovascular:      Pulses:           Dorsalis pedis pulses are 1+ on the right side and 1+ on the left side.        Posterior tibial pulses are 1+ on  the right side and 1+ on the left side.      Comments: Toes warm, pink, cap fill <5 seconds.    Left foot and ankle have severe deformity and swelling of the ankle and foot.  Musculoskeletal:      Comments:   Partial amputation rays 1 2 right healed well.      Left foot and ankle have gross deformity in valgus position with 2+ pitting edema without fracture blister, skin breakdown or, area of obvious impact trauma.      The heel is palpable plantarly laterally and posteriorly positioned from normal anatomic.   Lymphadenopathy:      Comments: Negative lymphadenopathy bilateral popliteal fossa and tarsal tunnel.     Skin:     General: Skin is warm and dry.      Coloration: Skin is not pale.      Findings: No abrasion, bruising, burn, ecchymosis, erythema, laceration, lesion or rash.      Comments:     Wound:  Plantar right midfoot    Size:    Pre:[id number]  Post:  [id number]    Base:  Granular, pink with moderate serous/serosanaguinous drainage only.  No pus, tracking, fluctuance, malodor, or cardinal signs infection.    Borders:  Hyperkeratotic, debriding to flat, pink, blanchable skin edges without undermining.       Neurological:      Sensory: Sensory deficit present.      Comments: Diminished/loss of protective sensation all toes bilateral to 10 gram monofilament.     Psychiatric:         Behavior: Behavior is cooperative.             Assessment:       Encounter Diagnoses   Name Primary?    Charcot's joint, left ankle and foot Yes    Ulcerated, foot, right, with necrosis of muscle     Type 2 diabetes mellitus with diabetic polyneuropathy, unspecified whether long term insulin use     Partial nontraumatic amputation of foot, right              Plan:       Don was seen today for charcot.    Diagnoses and all orders for this visit:    Charcot's joint, left ankle and foot    Ulcerated, foot, right, with necrosis of muscle    Type 2 diabetes mellitus with diabetic polyneuropathy, unspecified whether long term insulin  use    Partial nontraumatic amputation of foot, right          I counseled the patient on his conditions, their implications and medical management.    I discussed that the x-rays left lower extremity show severely progress Charcot ankle complete dislocation the hindfoot and ankle.  In my experience this would be best managed with a below-the-knee amputation.  I have put in an orthopedic consultation.  Meantime, our goals will be to prevent ulceration and infection, optimize integrity of the soft tissue envelope to facilitate closure of amputation when undertaken.  Briefly discussed with the patient there has a very very small chance that he may find a surgeon willing to try to salvage this limb-however he is findings or so advanced that I do not believe he is a good candidate.    We discussed again at this visit that continuing to walk on the limb against medical wise we will eventually result in ulceration which can be very rapidly become infection and threatened limb leg and life.  Patient verbally acknowledges.  I have recommended he go to an emergency room if necessary for evaluation admission consultation as appropriate     Debride ulcer right foot.       Dressed right foot with aperture and football dressing do not change or wet.      Continue surgical shoe right.  Ambulate minimally in same total nonweightbearing left.      Prescribed knee walker left.    One-week          No follow-ups on file.

## 2024-04-30 ENCOUNTER — OFFICE VISIT (OUTPATIENT)
Dept: PODIATRY | Facility: CLINIC | Age: 59
End: 2024-04-30
Payer: MEDICAID

## 2024-04-30 VITALS
SYSTOLIC BLOOD PRESSURE: 135 MMHG | HEIGHT: 74 IN | DIASTOLIC BLOOD PRESSURE: 87 MMHG | HEART RATE: 90 BPM | WEIGHT: 259.94 LBS | BODY MASS INDEX: 33.36 KG/M2

## 2024-04-30 DIAGNOSIS — Z87.898 HISTORY OF ULCERATION: ICD-10-CM

## 2024-04-30 DIAGNOSIS — E11.42 TYPE 2 DIABETES MELLITUS WITH DIABETIC POLYNEUROPATHY, UNSPECIFIED WHETHER LONG TERM INSULIN USE: ICD-10-CM

## 2024-04-30 DIAGNOSIS — M14.672 CHARCOT'S JOINT, LEFT ANKLE AND FOOT: Primary | ICD-10-CM

## 2024-04-30 DIAGNOSIS — Z89.431 PARTIAL NONTRAUMATIC AMPUTATION OF FOOT, RIGHT: ICD-10-CM

## 2024-04-30 PROCEDURE — 99213 OFFICE O/P EST LOW 20 MIN: CPT | Mod: S$GLB,,, | Performed by: PODIATRIST

## 2024-04-30 PROCEDURE — 99999 PR PBB SHADOW E&M-EST. PATIENT-LVL IV: CPT | Mod: PBBFAC,,, | Performed by: PODIATRIST

## 2024-04-30 PROCEDURE — 99214 OFFICE O/P EST MOD 30 MIN: CPT | Mod: PBBFAC,PN | Performed by: PODIATRIST

## 2024-04-30 NOTE — PROGRESS NOTES
Subjective:      Patient ID: Dave Good is a 58 y.o. male.    Chief Complaint: Charcot     Open skin right foot. Improving well over the past week with wound care, offloading, football dressing, surgical shoe ambulation..  Aggravated with increased weight-bearing.  Wound care, dressings, offloading are helping  Denies trauma and surgery right foot.     Cc2 pain swelling deformity left foot ankle and leg.  Patient relates this was rapid onset without known inciting event last Friday.  He has been ambulating on it with some pain AMA.  The foot is now side ways help from beneath the ankle laterally.  No prior medical treatment.  No self-treatment.  He has not yet acquired about orthopedic consultation.  He verbally acknowledges knows that the deformity in danger left lower extremity we will get more severe as he continues to ambulate.  Increasing his likelihood of ulceration infection amputation and death    Review of Systems   Constitutional: Negative for chills, diaphoresis, fever, malaise/fatigue and night sweats.   Cardiovascular:  Negative for claudication, cyanosis, leg swelling and syncope.   Skin:  Positive for nail changes and poor wound healing. Negative for color change, dry skin, rash, suspicious lesions and unusual hair distribution.   Musculoskeletal:  Positive for joint swelling. Negative for falls, joint pain, muscle cramps, muscle weakness and stiffness.   Gastrointestinal:  Negative for constipation, diarrhea, nausea and vomiting.   Neurological:  Positive for numbness, paresthesias and sensory change. Negative for brief paralysis, disturbances in coordination, focal weakness and tremors.           Objective:      Physical Exam  Constitutional:       General: He is not in acute distress.     Appearance: He is well-developed. He is not diaphoretic.   Cardiovascular:      Pulses:           Dorsalis pedis pulses are 1+ on the right side and 1+ on the left side.        Posterior tibial pulses are 1+ on  the right side and 1+ on the left side.      Comments: Toes warm, pink, cap fill <5 seconds.    Left foot and ankle have severe deformity and swelling of the ankle and foot.  Musculoskeletal:      Comments:   Partial amputation rays 1 2 right healed well.      Left foot and ankle have gross deformity in valgus position with 2+ pitting edema without fracture blister, skin breakdown or, area of obvious impact trauma.      The heel is palpable plantarly laterally and posteriorly positioned from normal anatomic.   Lymphadenopathy:      Comments: Negative lymphadenopathy bilateral popliteal fossa and tarsal tunnel.     Skin:     General: Skin is warm and dry.      Coloration: Skin is not pale.      Findings: No abrasion, bruising, burn, ecchymosis, erythema, laceration, lesion or rash.      Comments:     Wound:  Plantar right midfoot    Size:    Pre:5x3x1    Base:  Granular, pink with moderate serous/serosanaguinous drainage only.  No pus, tracking, fluctuance, malodor, or cardinal signs infection.    Borders:  Hyperkeratotic, debriding to flat, pink, blanchable skin edges without undermining.       Neurological:      Sensory: Sensory deficit present.      Comments: Diminished/loss of protective sensation all toes bilateral to 10 gram monofilament.     Psychiatric:         Behavior: Behavior is cooperative.             Assessment:       Encounter Diagnoses   Name Primary?    Charcot's joint, left ankle and foot Yes    Type 2 diabetes mellitus with diabetic polyneuropathy, unspecified whether long term insulin use     Partial nontraumatic amputation of foot, right     History of ulceration              Plan:       Dave was seen today for charcot.    Diagnoses and all orders for this visit:    Charcot's joint, left ankle and foot    Type 2 diabetes mellitus with diabetic polyneuropathy, unspecified whether long term insulin use    Partial nontraumatic amputation of foot, right    History of ulceration          I counseled  the patient on his conditions, their implications and medical management.    I discussed that the x-rays left lower extremity show severely progressed Charcot ankle complete dislocation the hindfoot and ankle.  In my experience this would be best managed with a below-the-knee amputation.  I have put in an orthopedic consultation.  Meantime, our goals will be to prevent ulceration and infection, optimize integrity of the soft tissue envelope to facilitate closure of amputation when undertaken.  Briefly discussed with the patient there has a very very small chance that he may find a surgeon willing to try to salvage this limb-however he is findings or so advanced that I do not believe he is a good candidate.    Keep appointment with Orthopedics for May.        Dressed right foot with aperture and football dressing do not change or wet.      Continue surgical shoe right.  Ambulate minimally in same total nonweightbearing left.      Continue knee walker left.    One-week          No follow-ups on file.

## 2024-05-07 ENCOUNTER — OFFICE VISIT (OUTPATIENT)
Dept: PODIATRY | Facility: CLINIC | Age: 59
End: 2024-05-07
Payer: COMMERCIAL

## 2024-05-07 VITALS
DIASTOLIC BLOOD PRESSURE: 78 MMHG | BODY MASS INDEX: 33.36 KG/M2 | HEART RATE: 87 BPM | HEIGHT: 74 IN | WEIGHT: 259.94 LBS | SYSTOLIC BLOOD PRESSURE: 138 MMHG

## 2024-05-07 DIAGNOSIS — Z89.431 PARTIAL NONTRAUMATIC AMPUTATION OF FOOT, RIGHT: ICD-10-CM

## 2024-05-07 DIAGNOSIS — Z87.898 HISTORY OF ULCERATION: ICD-10-CM

## 2024-05-07 DIAGNOSIS — E11.42 TYPE 2 DIABETES MELLITUS WITH DIABETIC POLYNEUROPATHY, UNSPECIFIED WHETHER LONG TERM INSULIN USE: ICD-10-CM

## 2024-05-07 DIAGNOSIS — L57.0 KERATOSIS: ICD-10-CM

## 2024-05-07 DIAGNOSIS — M14.672 CHARCOT'S JOINT, LEFT ANKLE AND FOOT: Primary | ICD-10-CM

## 2024-05-07 PROCEDURE — 99214 OFFICE O/P EST MOD 30 MIN: CPT | Mod: S$PBB,,, | Performed by: PODIATRIST

## 2024-05-07 PROCEDURE — 99213 OFFICE O/P EST LOW 20 MIN: CPT | Mod: PBBFAC,PN | Performed by: PODIATRIST

## 2024-05-07 PROCEDURE — 4010F ACE/ARB THERAPY RXD/TAKEN: CPT | Mod: CPTII,,, | Performed by: PODIATRIST

## 2024-05-07 PROCEDURE — 3008F BODY MASS INDEX DOCD: CPT | Mod: CPTII,,, | Performed by: PODIATRIST

## 2024-05-07 PROCEDURE — 3075F SYST BP GE 130 - 139MM HG: CPT | Mod: CPTII,,, | Performed by: PODIATRIST

## 2024-05-07 PROCEDURE — 3078F DIAST BP <80 MM HG: CPT | Mod: CPTII,,, | Performed by: PODIATRIST

## 2024-05-07 PROCEDURE — 1159F MED LIST DOCD IN RCRD: CPT | Mod: CPTII,,, | Performed by: PODIATRIST

## 2024-05-07 PROCEDURE — 99999 PR PBB SHADOW E&M-EST. PATIENT-LVL III: CPT | Mod: PBBFAC,,, | Performed by: PODIATRIST

## 2024-05-07 RX ORDER — UREA 40 %
CREAM (GRAM) TOPICAL DAILY
Qty: 85 G | Refills: 11 | Status: SHIPPED | OUTPATIENT
Start: 2024-05-07

## 2024-05-07 RX ORDER — DOXYCYCLINE 100 MG/1
100 CAPSULE ORAL 2 TIMES DAILY
COMMUNITY
Start: 2024-03-04

## 2024-05-07 NOTE — PROGRESS NOTES
Subjective:      Patient ID: Dave Good is a 58 y.o. male.    Chief Complaint: Foot Ulcer     Open skin right foot. Improving well over the past week with wound care, offloading, football dressing, surgical shoe ambulation..  Aggravated with increased weight-bearing.  Wound care, dressings, offloading are helping  Denies trauma and surgery right foot.     Cc2 pain swelling deformity left foot ankle and leg.  Patient relates this was rapid onset without known inciting event last Friday.  He has been ambulating on it with some pain AMA.  The foot is now side ways help from beneath the ankle laterally.  No prior medical treatment.  No self-treatment.  He has not yet acquired about orthopedic consultation.  He verbally acknowledges knows that the deformity in danger left lower extremity we will get more severe as he continues to ambulate.  Increasing his likelihood of ulceration infection amputation and death    Review of Systems   Constitutional: Negative for chills, diaphoresis, fever, malaise/fatigue and night sweats.   Cardiovascular:  Negative for claudication, cyanosis, leg swelling and syncope.   Skin:  Positive for nail changes and poor wound healing. Negative for color change, dry skin, rash, suspicious lesions and unusual hair distribution.   Musculoskeletal:  Positive for joint swelling. Negative for falls, joint pain, muscle cramps, muscle weakness and stiffness.   Gastrointestinal:  Negative for constipation, diarrhea, nausea and vomiting.   Neurological:  Positive for numbness, paresthesias and sensory change. Negative for brief paralysis, disturbances in coordination, focal weakness and tremors.           Objective:      Physical Exam  Constitutional:       General: He is not in acute distress.     Appearance: He is well-developed. He is not diaphoretic.   Cardiovascular:      Pulses:           Dorsalis pedis pulses are 1+ on the right side and 1+ on the left side.        Posterior tibial pulses are 1+  on the right side and 1+ on the left side.      Comments: Toes warm, pink, cap fill <5 seconds.    Left foot and ankle have severe deformity and swelling of the ankle and foot.  Musculoskeletal:      Comments:   Partial amputation rays 1 2 right healed well.      Left foot and ankle have gross deformity in valgus position with 2+ pitting edema without fracture blister, skin breakdown or, area of obvious impact trauma.      The heel is palpable plantarly laterally and posteriorly positioned from normal anatomic.   Lymphadenopathy:      Comments: Negative lymphadenopathy bilateral popliteal fossa and tarsal tunnel.     Skin:     General: Skin is warm and dry.      Coloration: Skin is not pale.      Findings: No abrasion, bruising, burn, ecchymosis, erythema, laceration, lesion or rash.      Comments:     Wound plantar right midfoot closed today, epithelialized  without ulceration, drainage, pus, tracking, fluctuance, malodor, or cardinal signs infection.     Generalized keratosis and bottom of the right foot from being wrapped for so long during wound care without open skin drainage pus tracking fluctuance malodor signs of infection   Neurological:      Sensory: Sensory deficit present.      Comments: Diminished/loss of protective sensation all toes bilateral to 10 gram monofilament.     Psychiatric:         Behavior: Behavior is cooperative.             Assessment:       Encounter Diagnoses   Name Primary?    Charcot's joint, left ankle and foot Yes    Type 2 diabetes mellitus with diabetic polyneuropathy, unspecified whether long term insulin use     Partial nontraumatic amputation of foot, right     History of ulceration     Keratosis              Plan:       Dave was seen today for foot ulcer.    Diagnoses and all orders for this visit:    Charcot's joint, left ankle and foot  -     DIABETIC SHOES FOR HOME USE    Type 2 diabetes mellitus with diabetic polyneuropathy, unspecified whether long term insulin use  -      DIABETIC SHOES FOR HOME USE    Partial nontraumatic amputation of foot, right  -     DIABETIC SHOES FOR HOME USE    History of ulceration  -     DIABETIC SHOES FOR HOME USE    Keratosis  -     DIABETIC SHOES FOR HOME USE    Other orders  -     urea (CARMOL) 40 % Crea; Apply topically once daily.          I counseled the patient on his conditions, their implications and medical management.        Right foot may return to diabetic shoes and inserts with regular socks shoes and bathing.    Continue to wrap the left foot to help control edema minimize risk of ulceration severe infection and amputation with a possible sepsis and death.      Dispensed some supplies the patient for cast padding and Ace bandage heel wrap from toes to mid calf changing every few days or as needed.      Continue surgical shoe right.  Ambulate minimally in same total nonweightbearing left.      Continue knee walker left.    New prescription for urea cream chronically to control keratosis in the bottoms of the feet.      New prescription diabetic shoes custom molded inserts.      Keep orthopedic appointment at [facility] severe left Charcot with deformity          Follow up in about 1 year (around 5/7/2025).

## 2024-07-08 NOTE — ASSESSMENT & PLAN NOTE
Acute respiratory failure due to COVID-19 PNA, COPD with asthma, former smoker  - COVID PNA with acute hypoxemic respiratory failure.  - Inflammatory markers significantly elevated. Trend.  - Continue remdesivir 100mg IV daily for 5 day total course, dexamethasone 6mg PO daily for 10 day total course.  - Continue supplemental O2, wean as tolerated.  - Continue albuterol 2 puff inhaled as q4hr PRN, ipratropium-albuterol 2 puff inhaled as q6hr wake.  - Continue azithromycin 500mg PO daily, ceftriaxone 1g IV q24hr for 5 day total course.  - Continue enoxaparin 1mg/kg subQ BID.   details… ROM intact/normal gait/strength 5/5 bilateral upper extremities/strength 5/5 bilateral lower extremities

## 2025-01-27 NOTE — PLAN OF CARE
06/28/22 1028   Post-Acute Status   Post-Acute Authorization Placement   Post-Acute Placement Status Referrals Sent   IV Infusion Status Referral(s) sent   Discharge Delays (!) Post-Acute Set-up   Discharge Plan   Discharge Plan A Long-term acute care facility (LTAC)   Discharge Plan B Home Health   CM called Wins[facility] at [facility]. Winsome said that they should have a decision in about 2 hours. Winsome said that she would call us back. [phone].  Updated MD, and patient spouse, about the information we received from Winsome.   Calm

## 2025-04-30 NOTE — PLAN OF CARE
Problem: Adult Inpatient Plan of Care  Goal: Plan of Care Review  Outcome: Ongoing, Progressing  Goal: Patient-Specific Goal (Individualized)  Outcome: Ongoing, Progressing  Goal: Absence of Hospital-Acquired Illness or Injury  Outcome: Ongoing, Progressing  Goal: Optimal Comfort and Wellbeing  Outcome: Ongoing, Progressing  Goal: Readiness for Transition of Care  Outcome: Ongoing, Progressing     Problem: Diabetes Comorbidity  Goal: Blood Glucose Level Within Targeted Range  Outcome: Ongoing, Progressing     Problem: Fluid and Electrolyte Imbalance (Acute Kidney Injury/Impairment)  Goal: Fluid and Electrolyte Balance  Outcome: Ongoing, Progressing     Problem: Impaired Wound Healing  Goal: Optimal Wound Healing  Outcome: Ongoing, Progressing      <-- Click to add NO significant Past Surgical History

## (undated) DEVICE — SEE MEDLINE ITEM 152529

## (undated) DEVICE — ADHESIVE MASTISOL VIAL 48/BX

## (undated) DEVICE — DISSECTOR LIGASURE EXACT 21CM

## (undated) DEVICE — UNDERPAD PROTECT PLUS 17X24IN

## (undated) DEVICE — COLLECTOR SPECIMEN ANAEROBIC

## (undated) DEVICE — COVER MAYO STAND REINFRCD 30

## (undated) DEVICE — STAPLER SKIN ROTATING HEAD

## (undated) DEVICE — PAD ABD 8X10 STERILE

## (undated) DEVICE — GLOVE BIOGEL SKINSENSE PI 6.5

## (undated) DEVICE — SOL PVP-I SCRUB 7.5% 4OZ

## (undated) DEVICE — SEE MEDLINE ITEM 152522

## (undated) DEVICE — SET EXT CLEARLINK LL 44IN

## (undated) DEVICE — BLADE MEDIUM LONG 9MM X 31MM

## (undated) DEVICE — Device

## (undated) DEVICE — TOWEL OR DISP STRL BLUE 4/PK

## (undated) DEVICE — DRESSING N ADH OIL EMUL 3X3

## (undated) DEVICE — GAUZE SPONGE 4X4 12PLY

## (undated) DEVICE — ELECTRODE REM PLYHSV RETURN 9

## (undated) DEVICE — SUT PROLENE 2-0 SH 36IN BLU

## (undated) DEVICE — SWAB CULTURETTE II DUAL

## (undated) DEVICE — GLOVE BIOGEL SKINSENSE PI 8.0

## (undated) DEVICE — SPONGE COTTON TRAY 4X4IN

## (undated) DEVICE — BANDAGE ACE NON LATEX 3IN

## (undated) DEVICE — SOL BETADINE 5%

## (undated) DEVICE — GOWN SMART IMP BREATHABLE XXLG

## (undated) DEVICE — SOL IRR SOD CHL .9% POUR

## (undated) DEVICE — BLADE SURG STAINLESS STEEL #15

## (undated) DEVICE — TAPE SURG MEDIPORE 6X72IN

## (undated) DEVICE — SET BASIN 48X48IN 6000ML RING

## (undated) DEVICE — PACKING STRIP IDOFORM 1/4X5YD

## (undated) DEVICE — APPLICATOR CHLORAPREP ORN 26ML

## (undated) DEVICE — SUT PROLENE 0 CT1 30IN BLUE

## (undated) DEVICE — SPONGE DERMACEA GAUZE 4X4

## (undated) DEVICE — DRAPE EXTREMITY ORTHOMAX

## (undated) DEVICE — SOL NS 1000CC

## (undated) DEVICE — UNDERGLOVE BIOGEL PI SZ 6.5 LF

## (undated) DEVICE — GLOVE BIOGEL SKINSENSE PI 7.5

## (undated) DEVICE — TRAY DRY SKIN SCRUB PREP

## (undated) DEVICE — BAG DRAIN ANTI REFLUX 2000ML

## (undated) DEVICE — BANDAGE KERLIX AMD

## (undated) DEVICE — TOURNIQUET SB QC DP 18X4IN

## (undated) DEVICE — CANISTER INFOV.A.C WOUND 500ML

## (undated) DEVICE — BLADE MEDIUM 9MM X 25MM

## (undated) DEVICE — SUT PROLENE 3-0 30SH

## (undated) DEVICE — SEE MEDLINE ITEM 157216

## (undated) DEVICE — NDL HYPO REG 25G X 1 1/2

## (undated) DEVICE — SEE L#120831

## (undated) DEVICE — SET CYSTO IRR DRP CHMBR 84IN

## (undated) DEVICE — SEE MEDLINE ITEM 157131

## (undated) DEVICE — UNDERGLOVES BIOGEL PI SIZE 7.5

## (undated) DEVICE — SUT VICRYL PLUS 3-0 SH 18IN

## (undated) DEVICE — SET CYSTO IRRIGATION UNIV SPIK

## (undated) DEVICE — BLADE SURGICAL CARBON #22

## (undated) DEVICE — PAD UNDERPAD 30X30

## (undated) DEVICE — DRESSING XEROFORM FOIL PK 1X8

## (undated) DEVICE — KIT DRSNG GRNUFM MED 18X12X5CM

## (undated) DEVICE — SEE MEDLINE ITEM 152515

## (undated) DEVICE — DRESSING GZ SURGICOUNT 4X8

## (undated) DEVICE — DRAPE C-ARM MINI DISP

## (undated) DEVICE — GLOVE BIOGEL SKINSENSE PI 6.0

## (undated) DEVICE — SEE MEDLINE ITEM 157171

## (undated) DEVICE — SPONGE LAP 18X18 PREWASHED

## (undated) DEVICE — SET EXTENSION 30 IN W/LL ROLLE

## (undated) DEVICE — BANDAGE ROLL COTTN 4.5INX4.1YD

## (undated) DEVICE — DRESSING XEROFORM GAUZE 5X9

## (undated) DEVICE — STOCKINETTE DBL PLY ST 4X

## (undated) DEVICE — SYR 10CC LUER LOCK

## (undated) DEVICE — GAUZE SPONGE 4'X4 12 PLY

## (undated) DEVICE — SOCKINETTE DOUBLE PLY 4X48IN

## (undated) DEVICE — SEE MEDLINE ITEM 156930

## (undated) DEVICE — SEE MEDLINE ITEM 146308

## (undated) DEVICE — SOL NORMAL USPCA 0.9%

## (undated) DEVICE — DRESSING SPONGE 8PLY 4X4 STRL

## (undated) DEVICE — SYR B-D DISP CONTROL 10CC100/C

## (undated) DEVICE — SEE MEDLINE ITEM 146322

## (undated) DEVICE — BANDAGE MATRIX HK LOOP 4IN 5YD